# Patient Record
Sex: FEMALE | Race: BLACK OR AFRICAN AMERICAN | NOT HISPANIC OR LATINO | Employment: UNEMPLOYED | RURAL
[De-identification: names, ages, dates, MRNs, and addresses within clinical notes are randomized per-mention and may not be internally consistent; named-entity substitution may affect disease eponyms.]

---

## 2017-04-17 ENCOUNTER — HISTORICAL (OUTPATIENT)
Dept: ADMINISTRATIVE | Facility: HOSPITAL | Age: 42
End: 2017-04-17

## 2017-04-19 LAB
LAB AP CLINICAL INFORMATION: NORMAL
LAB AP COMMENTS: NORMAL
LAB AP GENERAL CAT - HISTORICAL: NORMAL
LAB AP INTERPRETATION/RESULT - HISTORICAL: NEGATIVE
LAB AP SPECIMEN ADEQUACY - HISTORICAL: NORMAL
LAB AP SPECIMEN SUBMITTED - HISTORICAL: NORMAL

## 2019-08-12 ENCOUNTER — HISTORICAL (OUTPATIENT)
Dept: ADMINISTRATIVE | Facility: HOSPITAL | Age: 44
End: 2019-08-12

## 2020-06-01 ENCOUNTER — HISTORICAL (OUTPATIENT)
Dept: ADMINISTRATIVE | Facility: HOSPITAL | Age: 45
End: 2020-06-01

## 2020-06-16 ENCOUNTER — HISTORICAL (OUTPATIENT)
Dept: ADMINISTRATIVE | Facility: HOSPITAL | Age: 45
End: 2020-06-16

## 2020-09-23 ENCOUNTER — HISTORICAL (OUTPATIENT)
Dept: ADMINISTRATIVE | Facility: HOSPITAL | Age: 45
End: 2020-09-23

## 2020-09-25 LAB
LAB AP CLINICAL INFORMATION: NORMAL
LAB AP GENERAL CAT - HISTORICAL: NORMAL
LAB AP INTERPRETATION/RESULT - HISTORICAL: NEGATIVE
LAB AP SPECIMEN ADEQUACY - HISTORICAL: NORMAL
LAB AP SPECIMEN SUBMITTED - HISTORICAL: NORMAL

## 2020-09-28 ENCOUNTER — HISTORICAL (OUTPATIENT)
Dept: ADMINISTRATIVE | Facility: HOSPITAL | Age: 45
End: 2020-09-28

## 2020-10-09 ENCOUNTER — HISTORICAL (OUTPATIENT)
Dept: ADMINISTRATIVE | Facility: HOSPITAL | Age: 45
End: 2020-10-09

## 2020-12-22 LAB
LEFT EYE DM RETINOPATHY: NEGATIVE
RIGHT EYE DM RETINOPATHY: NEGATIVE

## 2021-04-09 ENCOUNTER — OFFICE VISIT (OUTPATIENT)
Dept: FAMILY MEDICINE | Facility: CLINIC | Age: 46
End: 2021-04-09
Payer: OTHER GOVERNMENT

## 2021-04-09 VITALS
HEIGHT: 66 IN | WEIGHT: 227 LBS | TEMPERATURE: 99 F | DIASTOLIC BLOOD PRESSURE: 82 MMHG | SYSTOLIC BLOOD PRESSURE: 130 MMHG | HEART RATE: 83 BPM | BODY MASS INDEX: 36.48 KG/M2 | OXYGEN SATURATION: 97 %

## 2021-04-09 DIAGNOSIS — J01.91 ACUTE RECURRENT SINUSITIS, UNSPECIFIED LOCATION: Primary | ICD-10-CM

## 2021-04-09 DIAGNOSIS — H66.92 LEFT OTITIS MEDIA, UNSPECIFIED OTITIS MEDIA TYPE: ICD-10-CM

## 2021-04-09 PROCEDURE — 99213 PR OFFICE/OUTPT VISIT, EST, LEVL III, 20-29 MIN: ICD-10-PCS | Mod: ,,, | Performed by: NURSE PRACTITIONER

## 2021-04-09 PROCEDURE — 99213 OFFICE O/P EST LOW 20 MIN: CPT | Mod: ,,, | Performed by: NURSE PRACTITIONER

## 2021-04-09 RX ORDER — ATORVASTATIN CALCIUM 20 MG/1
20 TABLET, FILM COATED ORAL DAILY
COMMUNITY
End: 2022-02-11 | Stop reason: SDUPTHER

## 2021-04-09 RX ORDER — LEVOTHYROXINE SODIUM 150 UG/1
TABLET ORAL
COMMUNITY
Start: 2021-03-12 | End: 2021-05-14 | Stop reason: DRUGHIGH

## 2021-04-09 RX ORDER — NIFEDIPINE 60 MG/1
60 TABLET, EXTENDED RELEASE ORAL DAILY
COMMUNITY
Start: 2021-03-12 | End: 2021-08-16 | Stop reason: SDUPTHER

## 2021-04-09 RX ORDER — METOPROLOL SUCCINATE 100 MG/1
100 TABLET, EXTENDED RELEASE ORAL DAILY
COMMUNITY
Start: 2021-03-16 | End: 2021-12-06

## 2021-04-09 RX ORDER — HYDRALAZINE HYDROCHLORIDE 100 MG/1
100 TABLET, FILM COATED ORAL 3 TIMES DAILY
COMMUNITY
Start: 2021-03-26 | End: 2021-11-15 | Stop reason: SDUPTHER

## 2021-04-09 RX ORDER — NEOMYCIN SULFATE, POLYMYXIN B SULFATE AND HYDROCORTISONE 10; 3.5; 1 MG/ML; MG/ML; [USP'U]/ML
3 SUSPENSION/ DROPS AURICULAR (OTIC) 3 TIMES DAILY
Qty: 10 ML | Refills: 0 | Status: SHIPPED | OUTPATIENT
Start: 2021-04-09 | End: 2021-08-16

## 2021-04-09 RX ORDER — LORATADINE 10 MG/1
10 TABLET ORAL DAILY
Qty: 90 TABLET | Refills: 1 | Status: SHIPPED | OUTPATIENT
Start: 2021-04-09 | End: 2021-08-16

## 2021-04-09 RX ORDER — TRAMADOL HYDROCHLORIDE 50 MG/1
TABLET ORAL
COMMUNITY
Start: 2021-01-04 | End: 2021-08-11 | Stop reason: CLARIF

## 2021-04-09 RX ORDER — SULFAMETHOXAZOLE AND TRIMETHOPRIM 800; 160 MG/1; MG/1
TABLET ORAL
COMMUNITY
Start: 2021-01-04 | End: 2021-04-09

## 2021-04-09 RX ORDER — CEPHALEXIN 500 MG/1
CAPSULE ORAL
COMMUNITY
Start: 2021-03-09 | End: 2021-04-09

## 2021-04-09 RX ORDER — SERTRALINE HYDROCHLORIDE 100 MG/1
100 TABLET, FILM COATED ORAL DAILY
COMMUNITY
Start: 2021-03-03 | End: 2021-10-04 | Stop reason: SDUPTHER

## 2021-04-09 RX ORDER — LEVOFLOXACIN 500 MG/1
TABLET, FILM COATED ORAL
COMMUNITY
Start: 2021-02-06 | End: 2021-04-09

## 2021-04-09 RX ORDER — VENLAFAXINE HYDROCHLORIDE 150 MG/1
CAPSULE, EXTENDED RELEASE ORAL
COMMUNITY
Start: 2021-03-30 | End: 2021-06-24 | Stop reason: SDUPTHER

## 2021-04-09 RX ORDER — CLONIDINE HYDROCHLORIDE 0.2 MG/1
TABLET ORAL
COMMUNITY
Start: 2021-02-18 | End: 2021-06-24 | Stop reason: SDUPTHER

## 2021-04-09 RX ORDER — PREDNISONE 20 MG/1
40 TABLET ORAL DAILY
Qty: 10 TABLET | Refills: 0 | Status: SHIPPED | OUTPATIENT
Start: 2021-04-09 | End: 2021-04-14

## 2021-04-09 RX ORDER — FLUTICASONE PROPIONATE 50 MCG
2 SPRAY, SUSPENSION (ML) NASAL DAILY
Qty: 18.2 ML | Refills: 0 | Status: SHIPPED | OUTPATIENT
Start: 2021-04-09 | End: 2022-02-03 | Stop reason: SDUPTHER

## 2021-04-09 RX ORDER — ROSUVASTATIN CALCIUM 20 MG/1
TABLET, COATED ORAL
COMMUNITY
Start: 2021-03-30 | End: 2021-08-16

## 2021-04-09 RX ORDER — ACETAMINOPHEN AND CODEINE PHOSPHATE 300; 30 MG/1; MG/1
TABLET ORAL
COMMUNITY
Start: 2021-03-09 | End: 2021-08-16

## 2021-04-21 ENCOUNTER — OFFICE VISIT (OUTPATIENT)
Dept: FAMILY MEDICINE | Facility: CLINIC | Age: 46
End: 2021-04-21
Payer: OTHER GOVERNMENT

## 2021-04-21 ENCOUNTER — TELEPHONE (OUTPATIENT)
Dept: FAMILY MEDICINE | Facility: CLINIC | Age: 46
End: 2021-04-21

## 2021-04-21 VITALS
HEART RATE: 88 BPM | BODY MASS INDEX: 36.96 KG/M2 | RESPIRATION RATE: 16 BRPM | HEIGHT: 66 IN | WEIGHT: 230 LBS | DIASTOLIC BLOOD PRESSURE: 80 MMHG | SYSTOLIC BLOOD PRESSURE: 130 MMHG | TEMPERATURE: 97 F

## 2021-04-21 DIAGNOSIS — H66.90 OTITIS MEDIA, UNSPECIFIED LATERALITY, UNSPECIFIED OTITIS MEDIA TYPE: ICD-10-CM

## 2021-04-21 DIAGNOSIS — H60.90 OTITIS EXTERNA, UNSPECIFIED CHRONICITY, UNSPECIFIED LATERALITY, UNSPECIFIED TYPE: Primary | ICD-10-CM

## 2021-04-21 PROCEDURE — 99213 OFFICE O/P EST LOW 20 MIN: CPT | Mod: ,,, | Performed by: FAMILY MEDICINE

## 2021-04-21 PROCEDURE — 99213 PR OFFICE/OUTPT VISIT, EST, LEVL III, 20-29 MIN: ICD-10-PCS | Mod: ,,, | Performed by: FAMILY MEDICINE

## 2021-04-21 RX ORDER — AZITHROMYCIN 250 MG/1
TABLET, FILM COATED ORAL
Qty: 6 TABLET | Refills: 0 | Status: SHIPPED | OUTPATIENT
Start: 2021-04-21 | End: 2021-04-26

## 2021-05-07 ENCOUNTER — OFFICE VISIT (OUTPATIENT)
Dept: FAMILY MEDICINE | Facility: CLINIC | Age: 46
End: 2021-05-07
Payer: OTHER GOVERNMENT

## 2021-05-07 VITALS
TEMPERATURE: 98 F | HEART RATE: 78 BPM | DIASTOLIC BLOOD PRESSURE: 80 MMHG | WEIGHT: 226 LBS | BODY MASS INDEX: 36.32 KG/M2 | HEIGHT: 66 IN | SYSTOLIC BLOOD PRESSURE: 130 MMHG | OXYGEN SATURATION: 97 %

## 2021-05-07 DIAGNOSIS — M54.50 ACUTE LEFT-SIDED LOW BACK PAIN WITHOUT SCIATICA: Primary | ICD-10-CM

## 2021-05-07 DIAGNOSIS — R35.0 URINARY FREQUENCY: ICD-10-CM

## 2021-05-07 DIAGNOSIS — M62.838 OTHER MUSCLE SPASM: ICD-10-CM

## 2021-05-07 LAB
BILIRUB UR QL STRIP: NEGATIVE
CLARITY UR: CLEAR
COLOR UR: YELLOW
GLUCOSE UR STRIP-MCNC: >=1000 MG/DL
KETONES UR STRIP-SCNC: NEGATIVE MG/DL
LEUKOCYTE ESTERASE UR QL STRIP: NEGATIVE
NITRITE UR QL STRIP: NEGATIVE
PH UR STRIP: 6.5 PH UNITS
PROT UR QL STRIP: NEGATIVE
RBC # UR STRIP: NEGATIVE /UL
SP GR UR STRIP: 1.02
UROBILINOGEN UR STRIP-ACNC: 0.2 MG/DL

## 2021-05-07 PROCEDURE — 99213 OFFICE O/P EST LOW 20 MIN: CPT | Mod: 25,,, | Performed by: FAMILY MEDICINE

## 2021-05-07 PROCEDURE — 96372 THER/PROPH/DIAG INJ SC/IM: CPT | Mod: ,,, | Performed by: FAMILY MEDICINE

## 2021-05-07 PROCEDURE — 99213 PR OFFICE/OUTPT VISIT, EST, LEVL III, 20-29 MIN: ICD-10-PCS | Mod: 25,,, | Performed by: FAMILY MEDICINE

## 2021-05-07 PROCEDURE — 96372 PR INJECTION,THERAP/PROPH/DIAG2ST, IM OR SUBCUT: ICD-10-PCS | Mod: ,,, | Performed by: FAMILY MEDICINE

## 2021-05-07 PROCEDURE — 81003 URINALYSIS AUTO W/O SCOPE: CPT | Mod: QW,,, | Performed by: CLINICAL MEDICAL LABORATORY

## 2021-05-07 PROCEDURE — 81003 URINALYSIS: ICD-10-PCS | Mod: QW,,, | Performed by: CLINICAL MEDICAL LABORATORY

## 2021-05-07 RX ORDER — KETOROLAC TROMETHAMINE 30 MG/ML
30 INJECTION, SOLUTION INTRAMUSCULAR; INTRAVENOUS
Status: COMPLETED | OUTPATIENT
Start: 2021-05-07 | End: 2021-05-07

## 2021-05-07 RX ORDER — NAPROXEN 500 MG/1
500 TABLET ORAL 2 TIMES DAILY
Qty: 20 TABLET | Refills: 0 | Status: SHIPPED | OUTPATIENT
Start: 2021-05-07 | End: 2021-08-16

## 2021-05-07 RX ORDER — BACLOFEN 10 MG/1
TABLET ORAL
Qty: 30 TABLET | Refills: 0 | Status: SHIPPED | OUTPATIENT
Start: 2021-05-07 | End: 2021-08-16

## 2021-05-07 RX ADMIN — KETOROLAC TROMETHAMINE 30 MG: 30 INJECTION, SOLUTION INTRAMUSCULAR; INTRAVENOUS at 09:05

## 2021-05-10 ENCOUNTER — TELEPHONE (OUTPATIENT)
Dept: FAMILY MEDICINE | Facility: CLINIC | Age: 46
End: 2021-05-10

## 2021-05-14 ENCOUNTER — OFFICE VISIT (OUTPATIENT)
Dept: FAMILY MEDICINE | Facility: CLINIC | Age: 46
End: 2021-05-14
Payer: OTHER GOVERNMENT

## 2021-05-14 ENCOUNTER — TELEPHONE (OUTPATIENT)
Dept: FAMILY MEDICINE | Facility: CLINIC | Age: 46
End: 2021-05-14

## 2021-05-14 VITALS
SYSTOLIC BLOOD PRESSURE: 130 MMHG | BODY MASS INDEX: 36.64 KG/M2 | TEMPERATURE: 98 F | DIASTOLIC BLOOD PRESSURE: 80 MMHG | WEIGHT: 228 LBS | HEIGHT: 66 IN | OXYGEN SATURATION: 95 % | HEART RATE: 79 BPM

## 2021-05-14 DIAGNOSIS — E03.8 OTHER SPECIFIED HYPOTHYROIDISM: ICD-10-CM

## 2021-05-14 DIAGNOSIS — I10 ESSENTIAL HYPERTENSION: Primary | ICD-10-CM

## 2021-05-14 DIAGNOSIS — E11.65 CONTROLLED TYPE 2 DIABETES MELLITUS WITH HYPERGLYCEMIA, WITHOUT LONG-TERM CURRENT USE OF INSULIN: ICD-10-CM

## 2021-05-14 LAB
ANION GAP SERPL CALCULATED.3IONS-SCNC: 10 MMOL/L (ref 7–16)
BASOPHILS # BLD AUTO: 0.03 K/UL (ref 0–0.2)
BASOPHILS NFR BLD AUTO: 0.4 % (ref 0–1)
BUN SERPL-MCNC: 8 MG/DL (ref 7–18)
BUN/CREAT SERPL: 10 (ref 6–20)
CALCIUM SERPL-MCNC: 9.3 MG/DL (ref 8.5–10.1)
CHLORIDE SERPL-SCNC: 104 MMOL/L (ref 98–107)
CO2 SERPL-SCNC: 32 MMOL/L (ref 21–32)
CREAT SERPL-MCNC: 0.82 MG/DL (ref 0.55–1.02)
DIFFERENTIAL METHOD BLD: ABNORMAL
EOSINOPHIL # BLD AUTO: 0.12 K/UL (ref 0–0.5)
EOSINOPHIL NFR BLD AUTO: 1.7 % (ref 1–4)
ERYTHROCYTE [DISTWIDTH] IN BLOOD BY AUTOMATED COUNT: 12 % (ref 11.5–14.5)
EST. AVERAGE GLUCOSE BLD GHB EST-MCNC: 270 MG/DL
GLUCOSE SERPL-MCNC: 238 MG/DL (ref 74–106)
HBA1C MFR BLD HPLC: 10.7 % (ref 4.5–6.6)
HCT VFR BLD AUTO: 40.7 % (ref 38–47)
HGB BLD-MCNC: 12.6 G/DL (ref 12–16)
IMM GRANULOCYTES # BLD AUTO: 0.02 K/UL (ref 0–0.04)
IMM GRANULOCYTES NFR BLD: 0.3 % (ref 0–0.4)
LYMPHOCYTES # BLD AUTO: 2.83 K/UL (ref 1–4.8)
LYMPHOCYTES NFR BLD AUTO: 39.8 % (ref 27–41)
MCH RBC QN AUTO: 27 PG (ref 27–31)
MCHC RBC AUTO-ENTMCNC: 31 G/DL (ref 32–36)
MCV RBC AUTO: 87.3 FL (ref 80–96)
MONOCYTES # BLD AUTO: 0.4 K/UL (ref 0–0.8)
MONOCYTES NFR BLD AUTO: 5.6 % (ref 2–6)
MPC BLD CALC-MCNC: 10.5 FL (ref 9.4–12.4)
NEUTROPHILS # BLD AUTO: 3.71 K/UL (ref 1.8–7.7)
NEUTROPHILS NFR BLD AUTO: 52.2 % (ref 53–65)
NRBC # BLD AUTO: 0 X10E3/UL
NRBC, AUTO (.00): 0 %
PLATELET # BLD AUTO: 425 K/UL (ref 150–400)
POTASSIUM SERPL-SCNC: 3.6 MMOL/L (ref 3.5–5.1)
RBC # BLD AUTO: 4.66 M/UL (ref 4.2–5.4)
SODIUM SERPL-SCNC: 142 MMOL/L (ref 136–145)
TSH SERPL DL<=0.005 MIU/L-ACNC: 10.2 UIU/ML (ref 0.36–3.74)
WBC # BLD AUTO: 7.11 K/UL (ref 4.5–11)

## 2021-05-14 PROCEDURE — 83036 HEMOGLOBIN GLYCOSYLATED A1C: CPT | Mod: ,,, | Performed by: CLINICAL MEDICAL LABORATORY

## 2021-05-14 PROCEDURE — 84443 TSH: ICD-10-PCS | Mod: ,,, | Performed by: CLINICAL MEDICAL LABORATORY

## 2021-05-14 PROCEDURE — 85025 CBC WITH DIFFERENTIAL: ICD-10-PCS | Mod: ,,, | Performed by: CLINICAL MEDICAL LABORATORY

## 2021-05-14 PROCEDURE — 80048 BASIC METABOLIC PANEL: ICD-10-PCS | Mod: ,,, | Performed by: CLINICAL MEDICAL LABORATORY

## 2021-05-14 PROCEDURE — 85025 COMPLETE CBC W/AUTO DIFF WBC: CPT | Mod: ,,, | Performed by: CLINICAL MEDICAL LABORATORY

## 2021-05-14 PROCEDURE — 99214 OFFICE O/P EST MOD 30 MIN: CPT | Mod: ,,, | Performed by: FAMILY MEDICINE

## 2021-05-14 PROCEDURE — 83036 HEMOGLOBIN A1C: ICD-10-PCS | Mod: ,,, | Performed by: CLINICAL MEDICAL LABORATORY

## 2021-05-14 PROCEDURE — 80048 BASIC METABOLIC PNL TOTAL CA: CPT | Mod: ,,, | Performed by: CLINICAL MEDICAL LABORATORY

## 2021-05-14 PROCEDURE — 99214 PR OFFICE/OUTPT VISIT, EST, LEVL IV, 30-39 MIN: ICD-10-PCS | Mod: ,,, | Performed by: FAMILY MEDICINE

## 2021-05-14 PROCEDURE — 84443 ASSAY THYROID STIM HORMONE: CPT | Mod: ,,, | Performed by: CLINICAL MEDICAL LABORATORY

## 2021-05-14 RX ORDER — LEVOTHYROXINE SODIUM 175 UG/1
175 TABLET ORAL
Qty: 30 TABLET | Refills: 2 | Status: SHIPPED | OUTPATIENT
Start: 2021-05-14 | End: 2021-08-28 | Stop reason: SDUPTHER

## 2021-05-17 ENCOUNTER — TELEPHONE (OUTPATIENT)
Dept: FAMILY MEDICINE | Facility: CLINIC | Age: 46
End: 2021-05-17

## 2021-06-07 ENCOUNTER — OFFICE VISIT (OUTPATIENT)
Dept: FAMILY MEDICINE | Facility: CLINIC | Age: 46
End: 2021-06-07
Payer: OTHER GOVERNMENT

## 2021-06-07 VITALS
SYSTOLIC BLOOD PRESSURE: 118 MMHG | WEIGHT: 224 LBS | BODY MASS INDEX: 36 KG/M2 | HEIGHT: 66 IN | HEART RATE: 75 BPM | TEMPERATURE: 98 F | DIASTOLIC BLOOD PRESSURE: 80 MMHG | OXYGEN SATURATION: 97 %

## 2021-06-07 DIAGNOSIS — M62.838 OTHER MUSCLE SPASM: ICD-10-CM

## 2021-06-07 DIAGNOSIS — M54.50 ACUTE LEFT-SIDED LOW BACK PAIN WITHOUT SCIATICA: Primary | ICD-10-CM

## 2021-06-07 PROCEDURE — 99213 PR OFFICE/OUTPT VISIT, EST, LEVL III, 20-29 MIN: ICD-10-PCS | Mod: 25,,, | Performed by: FAMILY MEDICINE

## 2021-06-07 PROCEDURE — 99213 OFFICE O/P EST LOW 20 MIN: CPT | Mod: 25,,, | Performed by: FAMILY MEDICINE

## 2021-06-07 PROCEDURE — 96372 PR INJECTION,THERAP/PROPH/DIAG2ST, IM OR SUBCUT: ICD-10-PCS | Mod: ,,, | Performed by: FAMILY MEDICINE

## 2021-06-07 PROCEDURE — 96372 THER/PROPH/DIAG INJ SC/IM: CPT | Mod: ,,, | Performed by: FAMILY MEDICINE

## 2021-06-07 RX ORDER — KETOROLAC TROMETHAMINE 30 MG/ML
30 INJECTION, SOLUTION INTRAMUSCULAR; INTRAVENOUS
Status: COMPLETED | OUTPATIENT
Start: 2021-06-07 | End: 2021-06-07

## 2021-06-07 RX ORDER — NAPROXEN 500 MG/1
500 TABLET ORAL 2 TIMES DAILY
Qty: 20 TABLET | Refills: 0 | Status: SHIPPED | OUTPATIENT
Start: 2021-06-07 | End: 2021-08-11 | Stop reason: CLARIF

## 2021-06-07 RX ORDER — MELOXICAM 7.5 MG/1
TABLET ORAL
COMMUNITY
Start: 2020-12-10 | End: 2022-02-11

## 2021-06-07 RX ORDER — BACLOFEN 10 MG/1
TABLET ORAL
Qty: 30 TABLET | Refills: 0 | Status: SHIPPED | OUTPATIENT
Start: 2021-06-07 | End: 2021-08-11 | Stop reason: CLARIF

## 2021-06-07 RX ADMIN — KETOROLAC TROMETHAMINE 30 MG: 30 INJECTION, SOLUTION INTRAMUSCULAR; INTRAVENOUS at 04:06

## 2021-06-24 ENCOUNTER — OFFICE VISIT (OUTPATIENT)
Dept: FAMILY MEDICINE | Facility: CLINIC | Age: 46
End: 2021-06-24
Payer: OTHER GOVERNMENT

## 2021-06-24 VITALS
OXYGEN SATURATION: 96 % | BODY MASS INDEX: 36 KG/M2 | HEIGHT: 66 IN | WEIGHT: 224 LBS | TEMPERATURE: 98 F | SYSTOLIC BLOOD PRESSURE: 132 MMHG | HEART RATE: 72 BPM | DIASTOLIC BLOOD PRESSURE: 72 MMHG

## 2021-06-24 DIAGNOSIS — M47.816 LUMBAR SPONDYLOSIS: Primary | ICD-10-CM

## 2021-06-24 PROCEDURE — 99213 PR OFFICE/OUTPT VISIT, EST, LEVL III, 20-29 MIN: ICD-10-PCS | Mod: 25,,, | Performed by: FAMILY MEDICINE

## 2021-06-24 PROCEDURE — 96372 THER/PROPH/DIAG INJ SC/IM: CPT | Mod: ,,, | Performed by: FAMILY MEDICINE

## 2021-06-24 PROCEDURE — 96372 PR INJECTION,THERAP/PROPH/DIAG2ST, IM OR SUBCUT: ICD-10-PCS | Mod: ,,, | Performed by: FAMILY MEDICINE

## 2021-06-24 PROCEDURE — 99213 OFFICE O/P EST LOW 20 MIN: CPT | Mod: 25,,, | Performed by: FAMILY MEDICINE

## 2021-06-24 RX ORDER — NAPROXEN 500 MG/1
500 TABLET ORAL 2 TIMES DAILY
Qty: 20 TABLET | Refills: 0 | Status: SHIPPED | OUTPATIENT
Start: 2021-06-24 | End: 2021-08-11 | Stop reason: CLARIF

## 2021-06-24 RX ORDER — KETOROLAC TROMETHAMINE 30 MG/ML
30 INJECTION, SOLUTION INTRAMUSCULAR; INTRAVENOUS
Status: COMPLETED | OUTPATIENT
Start: 2021-06-24 | End: 2021-06-24

## 2021-06-24 RX ORDER — CLONIDINE HYDROCHLORIDE 0.2 MG/1
0.2 TABLET ORAL DAILY
Qty: 30 TABLET | Refills: 2 | Status: SHIPPED | OUTPATIENT
Start: 2021-06-24 | End: 2021-07-01

## 2021-06-24 RX ORDER — VENLAFAXINE HYDROCHLORIDE 150 MG/1
CAPSULE, EXTENDED RELEASE ORAL
Qty: 30 CAPSULE | Refills: 2 | Status: SHIPPED | OUTPATIENT
Start: 2021-06-24 | End: 2021-08-28 | Stop reason: SDUPTHER

## 2021-06-24 RX ORDER — TRAMADOL HYDROCHLORIDE 50 MG/1
50 TABLET ORAL EVERY 6 HOURS PRN
Qty: 20 TABLET | Refills: 0 | Status: SHIPPED | OUTPATIENT
Start: 2021-06-24 | End: 2021-08-16 | Stop reason: SDUPTHER

## 2021-06-24 RX ADMIN — KETOROLAC TROMETHAMINE 30 MG: 30 INJECTION, SOLUTION INTRAMUSCULAR; INTRAVENOUS at 11:06

## 2021-07-01 ENCOUNTER — TELEPHONE (OUTPATIENT)
Dept: FAMILY MEDICINE | Facility: CLINIC | Age: 46
End: 2021-07-01

## 2021-07-01 RX ORDER — CLONIDINE HYDROCHLORIDE 0.2 MG/1
0.2 TABLET ORAL 4 TIMES DAILY
Qty: 120 TABLET | Refills: 2 | Status: SHIPPED | OUTPATIENT
Start: 2021-07-01 | End: 2021-09-16

## 2021-07-12 ENCOUNTER — OFFICE VISIT (OUTPATIENT)
Dept: SPINE | Facility: CLINIC | Age: 46
End: 2021-07-12
Payer: OTHER GOVERNMENT

## 2021-07-12 DIAGNOSIS — M47.816 LUMBAR SPONDYLOSIS: ICD-10-CM

## 2021-07-12 DIAGNOSIS — M51.36 DDD (DEGENERATIVE DISC DISEASE), LUMBAR: ICD-10-CM

## 2021-07-12 DIAGNOSIS — M54.9 DORSALGIA, UNSPECIFIED: ICD-10-CM

## 2021-07-12 DIAGNOSIS — M54.16 LUMBAR RADICULOPATHY: Primary | ICD-10-CM

## 2021-07-12 DIAGNOSIS — E66.9 CLASS 2 OBESITY WITH BODY MASS INDEX (BMI) OF 36.0 TO 36.9 IN ADULT, UNSPECIFIED OBESITY TYPE, UNSPECIFIED WHETHER SERIOUS COMORBIDITY PRESENT: ICD-10-CM

## 2021-07-12 PROCEDURE — 99214 PR OFFICE/OUTPT VISIT, EST, LEVL IV, 30-39 MIN: ICD-10-PCS | Mod: S$PBB,,, | Performed by: ORTHOPAEDIC SURGERY

## 2021-07-12 PROCEDURE — 99214 OFFICE O/P EST MOD 30 MIN: CPT | Mod: PBBFAC | Performed by: ORTHOPAEDIC SURGERY

## 2021-07-12 PROCEDURE — 99214 OFFICE O/P EST MOD 30 MIN: CPT | Mod: S$PBB,,, | Performed by: ORTHOPAEDIC SURGERY

## 2021-07-20 DIAGNOSIS — M54.9 DORSALGIA, UNSPECIFIED: ICD-10-CM

## 2021-07-26 ENCOUNTER — OFFICE VISIT (OUTPATIENT)
Dept: SPINE | Facility: CLINIC | Age: 46
End: 2021-07-26
Payer: OTHER GOVERNMENT

## 2021-07-26 ENCOUNTER — HOSPITAL ENCOUNTER (OUTPATIENT)
Dept: RADIOLOGY | Facility: HOSPITAL | Age: 46
Discharge: HOME | End: 2021-07-26
Attending: ORTHOPAEDIC SURGERY
Payer: OTHER GOVERNMENT

## 2021-07-26 DIAGNOSIS — M54.9 DORSALGIA, UNSPECIFIED: ICD-10-CM

## 2021-07-26 DIAGNOSIS — E66.9 CLASS 2 OBESITY WITH BODY MASS INDEX (BMI) OF 36.0 TO 36.9 IN ADULT, UNSPECIFIED OBESITY TYPE, UNSPECIFIED WHETHER SERIOUS COMORBIDITY PRESENT: ICD-10-CM

## 2021-07-26 DIAGNOSIS — M51.36 DDD (DEGENERATIVE DISC DISEASE), LUMBAR: Primary | ICD-10-CM

## 2021-07-26 DIAGNOSIS — M54.16 LUMBAR RADICULOPATHY: ICD-10-CM

## 2021-07-26 PROCEDURE — 99214 PR OFFICE/OUTPT VISIT, EST, LEVL IV, 30-39 MIN: ICD-10-PCS | Mod: S$PBB,,, | Performed by: ORTHOPAEDIC SURGERY

## 2021-07-26 PROCEDURE — 72110 X-RAY EXAM L-2 SPINE 4/>VWS: CPT | Mod: 26,,, | Performed by: ORTHOPAEDIC SURGERY

## 2021-07-26 PROCEDURE — 72110 XR LUMBAR SPINE 5 VIEW WITH FLEX AND EXT: ICD-10-PCS | Mod: 26,,, | Performed by: ORTHOPAEDIC SURGERY

## 2021-07-26 PROCEDURE — 99213 OFFICE O/P EST LOW 20 MIN: CPT | Mod: PBBFAC | Performed by: ORTHOPAEDIC SURGERY

## 2021-07-26 PROCEDURE — 72110 X-RAY EXAM L-2 SPINE 4/>VWS: CPT | Mod: TC

## 2021-07-26 PROCEDURE — 99214 OFFICE O/P EST MOD 30 MIN: CPT | Mod: S$PBB,,, | Performed by: ORTHOPAEDIC SURGERY

## 2021-08-05 DIAGNOSIS — M47.816 LUMBAR SPONDYLOSIS: Primary | ICD-10-CM

## 2021-08-05 RX ORDER — SODIUM CHLORIDE 9 MG/ML
INJECTION, SOLUTION INTRAVENOUS CONTINUOUS
Status: CANCELLED | OUTPATIENT
Start: 2021-08-05

## 2021-08-11 RX ORDER — AZITHROMYCIN 250 MG/1
TABLET, FILM COATED ORAL
COMMUNITY
Start: 2020-11-09 | End: 2021-08-11 | Stop reason: CLARIF

## 2021-08-11 RX ORDER — MELOXICAM 7.5 MG/1
TABLET ORAL
COMMUNITY
Start: 2020-11-24 | End: 2021-08-11 | Stop reason: CLARIF

## 2021-08-11 RX ORDER — TRIAMCINOLONE ACETONIDE 1 MG/G
OINTMENT TOPICAL
COMMUNITY
Start: 2020-08-04 | End: 2021-08-16

## 2021-08-11 RX ORDER — CLINDAMYCIN HYDROCHLORIDE 300 MG/1
CAPSULE ORAL
COMMUNITY
Start: 2020-08-18 | End: 2021-08-16

## 2021-08-11 RX ORDER — NIFEDIPINE 60 MG/1
TABLET, EXTENDED RELEASE ORAL
COMMUNITY
Start: 2020-08-18 | End: 2021-08-11 | Stop reason: CLARIF

## 2021-08-11 RX ORDER — ROSUVASTATIN CALCIUM 20 MG/1
TABLET, COATED ORAL
COMMUNITY
Start: 2021-02-05 | End: 2021-08-11 | Stop reason: CLARIF

## 2021-08-11 RX ORDER — NEOMYCIN SULFATE, POLYMYXIN B SULFATE AND HYDROCORTISONE 10; 3.5; 1 MG/ML; MG/ML; [USP'U]/ML
SUSPENSION/ DROPS AURICULAR (OTIC)
COMMUNITY
Start: 2020-10-21 | End: 2021-08-16

## 2021-08-11 RX ORDER — CIPROFLOXACIN 250 MG/1
TABLET, FILM COATED ORAL
COMMUNITY
Start: 2020-09-23 | End: 2021-08-16

## 2021-08-11 RX ORDER — HYDRALAZINE HYDROCHLORIDE 100 MG/1
TABLET, FILM COATED ORAL
COMMUNITY
Start: 2020-10-23 | End: 2021-08-11 | Stop reason: CLARIF

## 2021-08-11 RX ORDER — HYDROXYZINE HYDROCHLORIDE 25 MG/1
TABLET, FILM COATED ORAL
COMMUNITY
Start: 2020-08-04 | End: 2021-08-16

## 2021-08-11 RX ORDER — CEPHALEXIN 500 MG/1
CAPSULE ORAL
COMMUNITY
Start: 2021-03-03 | End: 2021-08-16

## 2021-08-11 RX ORDER — LEVOFLOXACIN 500 MG/1
TABLET, FILM COATED ORAL
COMMUNITY
Start: 2021-02-05 | End: 2021-08-16

## 2021-08-11 RX ORDER — ACETAMINOPHEN AND CODEINE PHOSPHATE 300; 30 MG/1; MG/1
TABLET ORAL
COMMUNITY
Start: 2021-03-03 | End: 2021-08-11 | Stop reason: CLARIF

## 2021-08-11 RX ORDER — AZITHROMYCIN 250 MG/1
TABLET, FILM COATED ORAL
COMMUNITY
Start: 2021-04-21 | End: 2021-08-16

## 2021-08-11 RX ORDER — FLUCONAZOLE 200 MG/1
TABLET ORAL
COMMUNITY
Start: 2020-09-23 | End: 2021-08-16

## 2021-08-11 RX ORDER — FLUTICASONE PROPIONATE 50 MCG
SPRAY, SUSPENSION (ML) NASAL
COMMUNITY
Start: 2020-11-09 | End: 2021-08-11 | Stop reason: CLARIF

## 2021-08-11 RX ORDER — METOPROLOL SUCCINATE 100 MG/1
TABLET, EXTENDED RELEASE ORAL
COMMUNITY
Start: 2021-01-13 | End: 2021-08-16

## 2021-08-11 RX ORDER — NITROFURANTOIN 25; 75 MG/1; MG/1
CAPSULE ORAL
COMMUNITY
Start: 2020-09-29 | End: 2021-08-16

## 2021-08-11 RX ORDER — VENLAFAXINE HYDROCHLORIDE 150 MG/1
CAPSULE, EXTENDED RELEASE ORAL
COMMUNITY
Start: 2021-02-22 | End: 2021-08-11 | Stop reason: CLARIF

## 2021-08-11 RX ORDER — CIPROFLOXACIN 500 MG/1
TABLET ORAL
COMMUNITY
Start: 2020-09-14 | End: 2021-08-16

## 2021-08-11 RX ORDER — PREDNISONE 20 MG/1
TABLET ORAL
COMMUNITY
Start: 2021-04-09 | End: 2021-08-16

## 2021-08-11 RX ORDER — SULFAMETHOXAZOLE AND TRIMETHOPRIM 800; 160 MG/1; MG/1
TABLET ORAL
COMMUNITY
Start: 2021-01-02 | End: 2021-08-16

## 2021-08-11 RX ORDER — INFLUENZA A VIRUS A/GUANGDONG-MAONAN/SWL1536/2019 CNIC-1909 (H1N1) ANTIGEN (FORMALDEHYDE INACTIVATED), INFLUENZA A VIRUS A/HONG KONG/2671/2019 (H3N2) ANTIGEN (FORMALDEHYDE INACTIVATED), INFLUENZA B VIRUS B/PHUKET/3073/2013 ANTIGEN (FORMALDEHYDE INACTIVATED), AND INFLUENZA B VIRUS B/WASHINGTON/02/2019 ANTIGEN (FORMALDEHYDE INACTIVATED) 15; 15; 15; 15 UG/.5ML; UG/.5ML; UG/.5ML; UG/.5ML
INJECTION, SUSPENSION INTRAMUSCULAR
COMMUNITY
Start: 2020-09-29 | End: 2021-08-16

## 2021-08-11 RX ORDER — PERMETHRIN 50 MG/G
CREAM TOPICAL
COMMUNITY
Start: 2020-08-04 | End: 2021-08-16

## 2021-08-11 RX ORDER — TRAMADOL HYDROCHLORIDE 50 MG/1
TABLET ORAL
COMMUNITY
Start: 2021-01-02 | End: 2021-08-11 | Stop reason: CLARIF

## 2021-08-11 RX ORDER — SERTRALINE HYDROCHLORIDE 100 MG/1
TABLET, FILM COATED ORAL
COMMUNITY
Start: 2020-10-23 | End: 2021-08-11 | Stop reason: CLARIF

## 2021-08-11 RX ORDER — GLIPIZIDE AND METFORMIN HCL 2.5; 5 MG/1; MG/1
1 TABLET, FILM COATED ORAL 2 TIMES DAILY
COMMUNITY
Start: 2020-09-18 | End: 2021-08-16 | Stop reason: SDUPTHER

## 2021-08-16 ENCOUNTER — OFFICE VISIT (OUTPATIENT)
Dept: FAMILY MEDICINE | Facility: CLINIC | Age: 46
End: 2021-08-16
Payer: OTHER GOVERNMENT

## 2021-08-16 ENCOUNTER — TELEPHONE (OUTPATIENT)
Dept: FAMILY MEDICINE | Facility: CLINIC | Age: 46
End: 2021-08-16

## 2021-08-16 VITALS
HEIGHT: 66 IN | WEIGHT: 227 LBS | HEART RATE: 80 BPM | OXYGEN SATURATION: 97 % | BODY MASS INDEX: 36.48 KG/M2 | DIASTOLIC BLOOD PRESSURE: 80 MMHG | SYSTOLIC BLOOD PRESSURE: 120 MMHG | TEMPERATURE: 98 F

## 2021-08-16 DIAGNOSIS — E11.65 UNCONTROLLED TYPE 2 DIABETES MELLITUS WITH HYPERGLYCEMIA: ICD-10-CM

## 2021-08-16 DIAGNOSIS — M47.816 LUMBAR SPONDYLOSIS: Primary | ICD-10-CM

## 2021-08-16 DIAGNOSIS — E03.8 OTHER SPECIFIED HYPOTHYROIDISM: ICD-10-CM

## 2021-08-16 LAB — TSH SERPL DL<=0.005 MIU/L-ACNC: 30 UIU/ML (ref 0.36–3.74)

## 2021-08-16 PROCEDURE — 96372 PR INJECTION,THERAP/PROPH/DIAG2ST, IM OR SUBCUT: ICD-10-PCS | Mod: ,,, | Performed by: FAMILY MEDICINE

## 2021-08-16 PROCEDURE — 84443 ASSAY THYROID STIM HORMONE: CPT | Mod: ,,, | Performed by: CLINICAL MEDICAL LABORATORY

## 2021-08-16 PROCEDURE — 99214 OFFICE O/P EST MOD 30 MIN: CPT | Mod: 25,,, | Performed by: FAMILY MEDICINE

## 2021-08-16 PROCEDURE — 99214 PR OFFICE/OUTPT VISIT, EST, LEVL IV, 30-39 MIN: ICD-10-PCS | Mod: 25,,, | Performed by: FAMILY MEDICINE

## 2021-08-16 PROCEDURE — 84443 TSH: ICD-10-PCS | Mod: ,,, | Performed by: CLINICAL MEDICAL LABORATORY

## 2021-08-16 PROCEDURE — 96372 THER/PROPH/DIAG INJ SC/IM: CPT | Mod: ,,, | Performed by: FAMILY MEDICINE

## 2021-08-16 RX ORDER — ALBUTEROL SULFATE 90 UG/1
2 AEROSOL, METERED RESPIRATORY (INHALATION) EVERY 6 HOURS PRN
Qty: 18 G | Refills: 1 | Status: SHIPPED | OUTPATIENT
Start: 2021-08-16 | End: 2022-06-23

## 2021-08-16 RX ORDER — KETOROLAC TROMETHAMINE 30 MG/ML
30 INJECTION, SOLUTION INTRAMUSCULAR; INTRAVENOUS
Status: COMPLETED | OUTPATIENT
Start: 2021-08-16 | End: 2021-08-16

## 2021-08-16 RX ORDER — NIFEDIPINE 60 MG/1
60 TABLET, EXTENDED RELEASE ORAL DAILY
Qty: 90 TABLET | Refills: 0 | Status: SHIPPED | OUTPATIENT
Start: 2021-08-16 | End: 2021-11-11 | Stop reason: SDUPTHER

## 2021-08-16 RX ORDER — GLIPIZIDE AND METFORMIN HCL 2.5; 5 MG/1; MG/1
1 TABLET, FILM COATED ORAL 2 TIMES DAILY
Qty: 180 TABLET | Refills: 0 | Status: SHIPPED | OUTPATIENT
Start: 2021-08-16 | End: 2022-01-06

## 2021-08-16 RX ORDER — TRAMADOL HYDROCHLORIDE 50 MG/1
50 TABLET ORAL EVERY 6 HOURS PRN
Qty: 20 TABLET | Refills: 0 | Status: SHIPPED | OUTPATIENT
Start: 2021-08-16 | End: 2022-02-11 | Stop reason: SDUPTHER

## 2021-08-16 RX ORDER — ALBUTEROL SULFATE 90 UG/1
2 AEROSOL, METERED RESPIRATORY (INHALATION) EVERY 6 HOURS PRN
COMMUNITY
End: 2021-08-16 | Stop reason: SDUPTHER

## 2021-08-16 RX ADMIN — KETOROLAC TROMETHAMINE 30 MG: 30 INJECTION, SOLUTION INTRAMUSCULAR; INTRAVENOUS at 02:08

## 2021-08-27 ENCOUNTER — OFFICE VISIT (OUTPATIENT)
Dept: FAMILY MEDICINE | Facility: CLINIC | Age: 46
End: 2021-08-27
Payer: OTHER GOVERNMENT

## 2021-08-27 VITALS
SYSTOLIC BLOOD PRESSURE: 120 MMHG | TEMPERATURE: 98 F | OXYGEN SATURATION: 96 % | HEIGHT: 66 IN | DIASTOLIC BLOOD PRESSURE: 64 MMHG | HEART RATE: 77 BPM | WEIGHT: 227 LBS | BODY MASS INDEX: 36.48 KG/M2

## 2021-08-27 DIAGNOSIS — Z01.818 PREOPERATIVE CLEARANCE: ICD-10-CM

## 2021-08-27 DIAGNOSIS — Z01.812 PRE-OPERATIVE LABORATORY EXAMINATION: ICD-10-CM

## 2021-08-27 DIAGNOSIS — Z01.810 PRE-OPERATIVE CARDIOVASCULAR EXAMINATION: Primary | ICD-10-CM

## 2021-08-27 PROCEDURE — 93000 ELECTROCARDIOGRAM COMPLETE: CPT | Mod: ,,, | Performed by: FAMILY MEDICINE

## 2021-08-27 PROCEDURE — 93000 PR ELECTROCARDIOGRAM, COMPLETE: ICD-10-PCS | Mod: ,,, | Performed by: FAMILY MEDICINE

## 2021-08-27 PROCEDURE — 99214 OFFICE O/P EST MOD 30 MIN: CPT | Mod: ,,, | Performed by: FAMILY MEDICINE

## 2021-08-27 PROCEDURE — 99214 PR OFFICE/OUTPT VISIT, EST, LEVL IV, 30-39 MIN: ICD-10-PCS | Mod: ,,, | Performed by: FAMILY MEDICINE

## 2021-08-30 ENCOUNTER — CLINICAL SUPPORT (OUTPATIENT)
Dept: FAMILY MEDICINE | Facility: CLINIC | Age: 46
End: 2021-08-30
Payer: OTHER GOVERNMENT

## 2021-08-30 ENCOUNTER — TELEPHONE (OUTPATIENT)
Dept: FAMILY MEDICINE | Facility: CLINIC | Age: 46
End: 2021-08-30

## 2021-08-30 DIAGNOSIS — Z01.818 PREOPERATIVE CLEARANCE: ICD-10-CM

## 2021-08-30 DIAGNOSIS — Z01.810 PRE-OPERATIVE CARDIOVASCULAR EXAMINATION: Primary | ICD-10-CM

## 2021-08-30 DIAGNOSIS — Z01.812 PRE-OPERATIVE LABORATORY EXAMINATION: ICD-10-CM

## 2021-08-30 LAB
ALBUMIN SERPL BCP-MCNC: 3.5 G/DL (ref 3.5–5)
ALBUMIN/GLOB SERPL: 0.9 {RATIO}
ALP SERPL-CCNC: 150 U/L (ref 37–98)
ALT SERPL W P-5'-P-CCNC: 36 U/L (ref 13–56)
ANION GAP SERPL CALCULATED.3IONS-SCNC: 10 MMOL/L (ref 7–16)
APTT PPP: 40.5 SECONDS (ref 25.2–37.3)
AST SERPL W P-5'-P-CCNC: 26 U/L (ref 15–37)
BASOPHILS # BLD AUTO: 0.03 K/UL (ref 0–0.2)
BASOPHILS NFR BLD AUTO: 0.5 % (ref 0–1)
BILIRUB SERPL-MCNC: 0.2 MG/DL (ref 0–1.2)
BUN SERPL-MCNC: 8 MG/DL (ref 7–18)
BUN/CREAT SERPL: 10 (ref 6–20)
CALCIUM SERPL-MCNC: 9.1 MG/DL (ref 8.5–10.1)
CHLORIDE SERPL-SCNC: 104 MMOL/L (ref 98–107)
CO2 SERPL-SCNC: 29 MMOL/L (ref 21–32)
CREAT SERPL-MCNC: 0.83 MG/DL (ref 0.55–1.02)
DIFFERENTIAL METHOD BLD: ABNORMAL
EOSINOPHIL # BLD AUTO: 0.09 K/UL (ref 0–0.5)
EOSINOPHIL NFR BLD AUTO: 1.4 % (ref 1–4)
ERYTHROCYTE [DISTWIDTH] IN BLOOD BY AUTOMATED COUNT: 13.1 % (ref 11.5–14.5)
EST. AVERAGE GLUCOSE BLD GHB EST-MCNC: 217 MG/DL
GLOBULIN SER-MCNC: 3.8 G/DL (ref 2–4)
GLUCOSE SERPL-MCNC: 337 MG/DL (ref 74–106)
HBA1C MFR BLD HPLC: 9.1 % (ref 4.5–6.6)
HCT VFR BLD AUTO: 37.9 % (ref 38–47)
HCV AB SER QL: NORMAL
HGB BLD-MCNC: 12 G/DL (ref 12–16)
HIV 1+O+2 AB SERPL QL: NORMAL
IMM GRANULOCYTES # BLD AUTO: 0.02 K/UL (ref 0–0.04)
IMM GRANULOCYTES NFR BLD: 0.3 % (ref 0–0.4)
INR BLD: 0.88 (ref 0.9–1.1)
LYMPHOCYTES # BLD AUTO: 3.22 K/UL (ref 1–4.8)
LYMPHOCYTES NFR BLD AUTO: 49.5 % (ref 27–41)
MCH RBC QN AUTO: 27.9 PG (ref 27–31)
MCHC RBC AUTO-ENTMCNC: 31.7 G/DL (ref 32–36)
MCV RBC AUTO: 88.1 FL (ref 80–96)
MONOCYTES # BLD AUTO: 0.43 K/UL (ref 0–0.8)
MONOCYTES NFR BLD AUTO: 6.6 % (ref 2–6)
MPC BLD CALC-MCNC: 10.2 FL (ref 9.4–12.4)
NEUTROPHILS # BLD AUTO: 2.71 K/UL (ref 1.8–7.7)
NEUTROPHILS NFR BLD AUTO: 41.7 % (ref 53–65)
NRBC # BLD AUTO: 0 X10E3/UL
NRBC, AUTO (.00): 0 %
PLATELET # BLD AUTO: 422 K/UL (ref 150–400)
POTASSIUM SERPL-SCNC: 3.5 MMOL/L (ref 3.5–5.1)
PROT SERPL-MCNC: 7.3 G/DL (ref 6.4–8.2)
PROTHROMBIN TIME: 12 SECONDS (ref 11.7–14.7)
RBC # BLD AUTO: 4.3 M/UL (ref 4.2–5.4)
SODIUM SERPL-SCNC: 139 MMOL/L (ref 136–145)
WBC # BLD AUTO: 6.5 K/UL (ref 4.5–11)

## 2021-08-30 PROCEDURE — 83036 HEMOGLOBIN GLYCOSYLATED A1C: CPT | Mod: ,,, | Performed by: CLINICAL MEDICAL LABORATORY

## 2021-08-30 PROCEDURE — 80053 COMPREHEN METABOLIC PANEL: CPT | Mod: ,,, | Performed by: CLINICAL MEDICAL LABORATORY

## 2021-08-30 PROCEDURE — 80053 COMPREHENSIVE METABOLIC PANEL: ICD-10-PCS | Mod: ,,, | Performed by: CLINICAL MEDICAL LABORATORY

## 2021-08-30 PROCEDURE — 83036 HEMOGLOBIN A1C: ICD-10-PCS | Mod: ,,, | Performed by: CLINICAL MEDICAL LABORATORY

## 2021-08-30 PROCEDURE — 87389 HIV-1 AG W/HIV-1&-2 AB AG IA: CPT | Mod: ,,, | Performed by: CLINICAL MEDICAL LABORATORY

## 2021-08-30 PROCEDURE — 85025 COMPLETE CBC W/AUTO DIFF WBC: CPT | Mod: ,,, | Performed by: CLINICAL MEDICAL LABORATORY

## 2021-08-30 PROCEDURE — 85730 THROMBOPLASTIN TIME PARTIAL: CPT | Performed by: FAMILY MEDICINE

## 2021-08-30 PROCEDURE — 85025 CBC WITH DIFFERENTIAL: ICD-10-PCS | Mod: ,,, | Performed by: CLINICAL MEDICAL LABORATORY

## 2021-08-30 PROCEDURE — 86803 HEPATITIS C ANTIBODY: ICD-10-PCS | Mod: ,,, | Performed by: CLINICAL MEDICAL LABORATORY

## 2021-08-30 PROCEDURE — 87389 HIV 1 / 2 ANTIBODY: ICD-10-PCS | Mod: ,,, | Performed by: CLINICAL MEDICAL LABORATORY

## 2021-08-30 PROCEDURE — 86803 HEPATITIS C AB TEST: CPT | Mod: ,,, | Performed by: CLINICAL MEDICAL LABORATORY

## 2021-08-30 PROCEDURE — 85610 PROTHROMBIN TIME: CPT | Performed by: FAMILY MEDICINE

## 2021-08-31 ENCOUNTER — TELEPHONE (OUTPATIENT)
Dept: SPINE | Facility: CLINIC | Age: 46
End: 2021-08-31

## 2021-09-03 ENCOUNTER — OFFICE VISIT (OUTPATIENT)
Dept: FAMILY MEDICINE | Facility: CLINIC | Age: 46
End: 2021-09-03
Payer: OTHER GOVERNMENT

## 2021-09-03 VITALS
WEIGHT: 227 LBS | DIASTOLIC BLOOD PRESSURE: 76 MMHG | TEMPERATURE: 99 F | HEART RATE: 70 BPM | HEIGHT: 66 IN | BODY MASS INDEX: 36.48 KG/M2 | SYSTOLIC BLOOD PRESSURE: 116 MMHG | OXYGEN SATURATION: 95 %

## 2021-09-03 DIAGNOSIS — E11.65 UNCONTROLLED TYPE 2 DIABETES MELLITUS WITH HYPERGLYCEMIA: ICD-10-CM

## 2021-09-03 DIAGNOSIS — M54.16 LUMBAR RADICULOPATHY: Primary | ICD-10-CM

## 2021-09-03 PROCEDURE — 96372 THER/PROPH/DIAG INJ SC/IM: CPT | Mod: ,,, | Performed by: FAMILY MEDICINE

## 2021-09-03 PROCEDURE — 96372 PR INJECTION,THERAP/PROPH/DIAG2ST, IM OR SUBCUT: ICD-10-PCS | Mod: ,,, | Performed by: FAMILY MEDICINE

## 2021-09-03 PROCEDURE — 99214 OFFICE O/P EST MOD 30 MIN: CPT | Mod: 25,,, | Performed by: FAMILY MEDICINE

## 2021-09-03 PROCEDURE — 99214 PR OFFICE/OUTPT VISIT, EST, LEVL IV, 30-39 MIN: ICD-10-PCS | Mod: 25,,, | Performed by: FAMILY MEDICINE

## 2021-09-03 RX ORDER — TRAMADOL HYDROCHLORIDE 50 MG/1
50 TABLET ORAL EVERY 6 HOURS
Qty: 20 TABLET | Refills: 0 | Status: SHIPPED | OUTPATIENT
Start: 2021-09-03 | End: 2022-02-11 | Stop reason: SDUPTHER

## 2021-09-03 RX ORDER — KETOROLAC TROMETHAMINE 30 MG/ML
30 INJECTION, SOLUTION INTRAMUSCULAR; INTRAVENOUS
Status: COMPLETED | OUTPATIENT
Start: 2021-09-03 | End: 2021-09-03

## 2021-09-03 RX ORDER — DULAGLUTIDE 0.75 MG/.5ML
0.75 INJECTION, SOLUTION SUBCUTANEOUS
Qty: 4 PEN | Refills: 3 | Status: SHIPPED | OUTPATIENT
Start: 2021-09-03 | End: 2021-10-04 | Stop reason: SDUPTHER

## 2021-09-03 RX ADMIN — KETOROLAC TROMETHAMINE 30 MG: 30 INJECTION, SOLUTION INTRAMUSCULAR; INTRAVENOUS at 08:09

## 2021-09-13 ENCOUNTER — INFUSION (OUTPATIENT)
Dept: INFECTIOUS DISEASES | Facility: HOSPITAL | Age: 46
End: 2021-09-13
Attending: FAMILY MEDICINE
Payer: OTHER GOVERNMENT

## 2021-09-13 ENCOUNTER — OFFICE VISIT (OUTPATIENT)
Dept: FAMILY MEDICINE | Facility: CLINIC | Age: 46
End: 2021-09-13
Payer: OTHER GOVERNMENT

## 2021-09-13 VITALS
OXYGEN SATURATION: 96 % | DIASTOLIC BLOOD PRESSURE: 97 MMHG | RESPIRATION RATE: 16 BRPM | TEMPERATURE: 99 F | HEART RATE: 99 BPM | SYSTOLIC BLOOD PRESSURE: 142 MMHG

## 2021-09-13 VITALS
HEART RATE: 99 BPM | TEMPERATURE: 98 F | HEIGHT: 66 IN | OXYGEN SATURATION: 99 % | BODY MASS INDEX: 34.55 KG/M2 | WEIGHT: 215 LBS

## 2021-09-13 DIAGNOSIS — U07.1 COVID-19: ICD-10-CM

## 2021-09-13 DIAGNOSIS — U07.1 COVID-19: Primary | ICD-10-CM

## 2021-09-13 DIAGNOSIS — Z20.822 ENCOUNTER FOR LABORATORY TESTING FOR COVID-19 VIRUS: ICD-10-CM

## 2021-09-13 LAB
CTP QC/QA: YES
SARS-COV-2 AG RESP QL IA.RAPID: POSITIVE

## 2021-09-13 PROCEDURE — 63600175 PHARM REV CODE 636 W HCPCS: Performed by: FAMILY MEDICINE

## 2021-09-13 PROCEDURE — M0243 CASIRIVI AND IMDEVI INFUSION: HCPCS | Performed by: FAMILY MEDICINE

## 2021-09-13 PROCEDURE — 87426 SARSCOV CORONAVIRUS AG IA: CPT | Mod: QW,,, | Performed by: FAMILY MEDICINE

## 2021-09-13 PROCEDURE — 87426 SARS CORONAVIRUS 2 ANTIGEN POCT: ICD-10-PCS | Mod: QW,,, | Performed by: FAMILY MEDICINE

## 2021-09-13 PROCEDURE — 99213 OFFICE O/P EST LOW 20 MIN: CPT | Mod: ,,, | Performed by: FAMILY MEDICINE

## 2021-09-13 PROCEDURE — 99213 PR OFFICE/OUTPT VISIT, EST, LEVL III, 20-29 MIN: ICD-10-PCS | Mod: ,,, | Performed by: FAMILY MEDICINE

## 2021-09-13 PROCEDURE — 25000003 PHARM REV CODE 250: Performed by: FAMILY MEDICINE

## 2021-09-13 RX ORDER — SODIUM CHLORIDE 0.9 % (FLUSH) 0.9 %
10 SYRINGE (ML) INJECTION
Status: DISCONTINUED | OUTPATIENT
Start: 2021-09-13 | End: 2022-02-11

## 2021-09-13 RX ORDER — DIPHENHYDRAMINE HYDROCHLORIDE 50 MG/ML
25 INJECTION INTRAMUSCULAR; INTRAVENOUS ONCE AS NEEDED
Status: DISCONTINUED | OUTPATIENT
Start: 2021-09-13 | End: 2022-02-11

## 2021-09-13 RX ORDER — ONDANSETRON 4 MG/1
4 TABLET, ORALLY DISINTEGRATING ORAL ONCE AS NEEDED
Status: DISCONTINUED | OUTPATIENT
Start: 2021-09-13 | End: 2022-02-11

## 2021-09-13 RX ORDER — BENZONATATE 100 MG/1
CAPSULE ORAL
Qty: 30 CAPSULE | Refills: 0 | Status: ON HOLD | OUTPATIENT
Start: 2021-09-13 | End: 2021-12-01 | Stop reason: HOSPADM

## 2021-09-13 RX ORDER — CETIRIZINE HYDROCHLORIDE 10 MG/1
10 TABLET ORAL DAILY
Qty: 30 TABLET | Refills: 0 | Status: SHIPPED | OUTPATIENT
Start: 2021-09-13 | End: 2022-02-11

## 2021-09-13 RX ORDER — ALBUTEROL SULFATE 90 UG/1
2 AEROSOL, METERED RESPIRATORY (INHALATION)
Status: DISCONTINUED | OUTPATIENT
Start: 2021-09-13 | End: 2022-02-11

## 2021-09-13 RX ORDER — ACETAMINOPHEN 325 MG/1
650 TABLET ORAL ONCE AS NEEDED
Status: DISCONTINUED | OUTPATIENT
Start: 2021-09-13 | End: 2022-02-11

## 2021-09-13 RX ORDER — IPRATROPIUM BROMIDE 21 UG/1
2 SPRAY, METERED NASAL 3 TIMES DAILY
Qty: 15 ML | Refills: 0 | Status: SHIPPED | OUTPATIENT
Start: 2021-09-13 | End: 2022-01-03

## 2021-09-13 RX ORDER — EPINEPHRINE 0.3 MG/.3ML
0.3 INJECTION SUBCUTANEOUS
Status: DISCONTINUED | OUTPATIENT
Start: 2021-09-13 | End: 2022-02-11

## 2021-09-13 RX ADMIN — CASIRIVIMAB AND IMDEVIMAB 600 MG: 600; 600 INJECTION, SOLUTION, CONCENTRATE INTRAVENOUS at 11:09

## 2021-09-30 ENCOUNTER — HOSPITAL ENCOUNTER (OUTPATIENT)
Dept: RADIOLOGY | Facility: HOSPITAL | Age: 46
Discharge: HOME | End: 2021-09-30
Payer: OTHER GOVERNMENT

## 2021-09-30 DIAGNOSIS — Z12.31 SCREENING MAMMOGRAM, ENCOUNTER FOR: ICD-10-CM

## 2021-09-30 PROCEDURE — 77067 SCR MAMMO BI INCL CAD: CPT | Mod: TC

## 2021-10-04 ENCOUNTER — OFFICE VISIT (OUTPATIENT)
Dept: FAMILY MEDICINE | Facility: CLINIC | Age: 46
End: 2021-10-04
Payer: OTHER GOVERNMENT

## 2021-10-04 VITALS
HEIGHT: 66 IN | HEART RATE: 94 BPM | DIASTOLIC BLOOD PRESSURE: 70 MMHG | BODY MASS INDEX: 35.52 KG/M2 | TEMPERATURE: 97 F | OXYGEN SATURATION: 98 % | WEIGHT: 221 LBS | SYSTOLIC BLOOD PRESSURE: 120 MMHG

## 2021-10-04 DIAGNOSIS — F41.1 GENERALIZED ANXIETY DISORDER: Primary | ICD-10-CM

## 2021-10-04 PROCEDURE — 99213 PR OFFICE/OUTPT VISIT, EST, LEVL III, 20-29 MIN: ICD-10-PCS | Mod: ,,, | Performed by: FAMILY MEDICINE

## 2021-10-04 PROCEDURE — 99213 OFFICE O/P EST LOW 20 MIN: CPT | Mod: ,,, | Performed by: FAMILY MEDICINE

## 2021-10-04 RX ORDER — DULAGLUTIDE 0.75 MG/.5ML
0.75 INJECTION, SOLUTION SUBCUTANEOUS
Qty: 4 PEN | Refills: 3 | Status: SHIPPED | OUTPATIENT
Start: 2021-10-04 | End: 2022-01-03

## 2021-10-04 RX ORDER — CLONIDINE HYDROCHLORIDE 0.2 MG/1
TABLET ORAL
Qty: 120 TABLET | Refills: 2 | Status: SHIPPED | OUTPATIENT
Start: 2021-10-04 | End: 2021-11-11 | Stop reason: SDUPTHER

## 2021-10-04 RX ORDER — LEVOTHYROXINE SODIUM 175 UG/1
TABLET ORAL
Qty: 30 TABLET | Refills: 2 | Status: SHIPPED | OUTPATIENT
Start: 2021-10-04 | End: 2021-12-06

## 2021-10-04 RX ORDER — SERTRALINE HYDROCHLORIDE 100 MG/1
100 TABLET, FILM COATED ORAL DAILY
Qty: 60 TABLET | Refills: 0 | Status: SHIPPED | OUTPATIENT
Start: 2021-10-04 | End: 2022-01-06 | Stop reason: SDUPTHER

## 2021-10-15 ENCOUNTER — OFFICE VISIT (OUTPATIENT)
Dept: FAMILY MEDICINE | Facility: CLINIC | Age: 46
End: 2021-10-15
Payer: OTHER GOVERNMENT

## 2021-10-15 VITALS
HEIGHT: 66 IN | OXYGEN SATURATION: 98 % | DIASTOLIC BLOOD PRESSURE: 70 MMHG | HEART RATE: 92 BPM | TEMPERATURE: 99 F | BODY MASS INDEX: 35.52 KG/M2 | WEIGHT: 221 LBS | SYSTOLIC BLOOD PRESSURE: 90 MMHG

## 2021-10-15 DIAGNOSIS — E03.8 OTHER SPECIFIED HYPOTHYROIDISM: ICD-10-CM

## 2021-10-15 DIAGNOSIS — E11.65 UNCONTROLLED TYPE 2 DIABETES MELLITUS WITH HYPERGLYCEMIA: Primary | ICD-10-CM

## 2021-10-15 PROCEDURE — 99213 PR OFFICE/OUTPT VISIT, EST, LEVL III, 20-29 MIN: ICD-10-PCS | Mod: ,,, | Performed by: FAMILY MEDICINE

## 2021-10-15 PROCEDURE — 99213 OFFICE O/P EST LOW 20 MIN: CPT | Mod: ,,, | Performed by: FAMILY MEDICINE

## 2021-10-18 ENCOUNTER — TELEPHONE (OUTPATIENT)
Dept: FAMILY MEDICINE | Facility: CLINIC | Age: 46
End: 2021-10-18

## 2021-10-25 ENCOUNTER — OFFICE VISIT (OUTPATIENT)
Dept: OBSTETRICS AND GYNECOLOGY | Facility: CLINIC | Age: 46
End: 2021-10-25
Payer: OTHER GOVERNMENT

## 2021-10-25 VITALS
HEIGHT: 63 IN | BODY MASS INDEX: 39.16 KG/M2 | WEIGHT: 221 LBS | SYSTOLIC BLOOD PRESSURE: 106 MMHG | DIASTOLIC BLOOD PRESSURE: 69 MMHG

## 2021-10-25 DIAGNOSIS — Z12.72 SPECIAL SCREENING FOR MALIGNANT NEOPLASMS, VAGINA: ICD-10-CM

## 2021-10-25 DIAGNOSIS — Z01.419 ENCOUNTER FOR ANNUAL ROUTINE GYNECOLOGICAL EXAMINATION: Primary | ICD-10-CM

## 2021-10-25 PROCEDURE — 99213 OFFICE O/P EST LOW 20 MIN: CPT | Mod: PBBFAC | Performed by: OBSTETRICS & GYNECOLOGY

## 2021-10-25 PROCEDURE — 99396 PR PREVENTIVE VISIT,EST,40-64: ICD-10-PCS | Mod: S$PBB,,, | Performed by: OBSTETRICS & GYNECOLOGY

## 2021-10-25 PROCEDURE — 88142 CYTOPATH C/V THIN LAYER: CPT | Mod: GCY | Performed by: OBSTETRICS & GYNECOLOGY

## 2021-10-25 PROCEDURE — 99396 PREV VISIT EST AGE 40-64: CPT | Mod: S$PBB,,, | Performed by: OBSTETRICS & GYNECOLOGY

## 2021-10-27 LAB
GH SERPL-MCNC: NORMAL NG/ML
INSULIN SERPL-ACNC: NORMAL U[IU]/ML
LAB AP CLINICAL INFORMATION: NORMAL
LAB AP GYN INTERPRETATION: NEGATIVE
LAB AP PAP DISCLAIMER COMMENTS: NORMAL
RENIN PLAS-CCNC: NORMAL NG/ML/H

## 2021-11-11 ENCOUNTER — OFFICE VISIT (OUTPATIENT)
Dept: FAMILY MEDICINE | Facility: CLINIC | Age: 46
End: 2021-11-11
Payer: OTHER GOVERNMENT

## 2021-11-11 VITALS
BODY MASS INDEX: 36.96 KG/M2 | SYSTOLIC BLOOD PRESSURE: 130 MMHG | DIASTOLIC BLOOD PRESSURE: 80 MMHG | OXYGEN SATURATION: 98 % | WEIGHT: 230 LBS | TEMPERATURE: 98 F | HEIGHT: 66 IN | HEART RATE: 92 BPM

## 2021-11-11 DIAGNOSIS — E11.65 UNCONTROLLED TYPE 2 DIABETES MELLITUS WITH HYPERGLYCEMIA: ICD-10-CM

## 2021-11-11 DIAGNOSIS — Z79.899 ENCOUNTER FOR LONG-TERM (CURRENT) USE OF OTHER MEDICATIONS: ICD-10-CM

## 2021-11-11 DIAGNOSIS — M54.16 LUMBAR RADICULOPATHY: Primary | ICD-10-CM

## 2021-11-11 LAB
CTP QC/QA: YES
GLUCOSE SERPL-MCNC: 256 MG/DL (ref 70–110)
POC (AMP) AMPHETAMINE: NEGATIVE
POC (BAR) BARBITURATES: NEGATIVE
POC (BUP) BUPRENORPHINE: NEGATIVE
POC (BZO) BENZODIAZEPINES: NEGATIVE
POC (COC) COCAINE: NEGATIVE
POC (MDMA) METHYLENEDIOXYMETHAMPHETAMINE 3,4: NEGATIVE
POC (MET) METHAMPHETAMINE: NEGATIVE
POC (MOP) OPIATES: NEGATIVE
POC (MTD) METHADONE: NEGATIVE
POC (OXY) OXYCODONE: NEGATIVE
POC (PCP) PHENCYCLIDINE: NEGATIVE
POC (TCA) TRICYCLIC ANTIDEPRESSANTS: NEGATIVE
POC TEMPERATURE (URINE): 90
POC THC: NEGATIVE

## 2021-11-11 PROCEDURE — 99214 PR OFFICE/OUTPT VISIT, EST, LEVL IV, 30-39 MIN: ICD-10-PCS | Mod: 25,,, | Performed by: FAMILY MEDICINE

## 2021-11-11 PROCEDURE — 80305 POCT URINE DRUG SCREEN PRESUMP: ICD-10-PCS | Mod: QW,,, | Performed by: FAMILY MEDICINE

## 2021-11-11 PROCEDURE — 80305 DRUG TEST PRSMV DIR OPT OBS: CPT | Mod: QW,,, | Performed by: FAMILY MEDICINE

## 2021-11-11 PROCEDURE — 96372 THER/PROPH/DIAG INJ SC/IM: CPT | Mod: ,,, | Performed by: FAMILY MEDICINE

## 2021-11-11 PROCEDURE — 96372 PR INJECTION,THERAP/PROPH/DIAG2ST, IM OR SUBCUT: ICD-10-PCS | Mod: ,,, | Performed by: FAMILY MEDICINE

## 2021-11-11 PROCEDURE — 99214 OFFICE O/P EST MOD 30 MIN: CPT | Mod: 25,,, | Performed by: FAMILY MEDICINE

## 2021-11-11 RX ORDER — KETOROLAC TROMETHAMINE 30 MG/ML
30 INJECTION, SOLUTION INTRAMUSCULAR; INTRAVENOUS
Status: COMPLETED | OUTPATIENT
Start: 2021-11-11 | End: 2021-11-11

## 2021-11-11 RX ORDER — HYDROCODONE BITARTRATE AND ACETAMINOPHEN 7.5; 325 MG/1; MG/1
1 TABLET ORAL EVERY 8 HOURS PRN
Qty: 15 TABLET | Refills: 0 | Status: SHIPPED | OUTPATIENT
Start: 2021-11-11 | End: 2022-04-28

## 2021-11-11 RX ORDER — CLONIDINE HYDROCHLORIDE 0.2 MG/1
TABLET ORAL
Qty: 120 TABLET | Refills: 2 | Status: SHIPPED | OUTPATIENT
Start: 2021-11-11 | End: 2021-12-16

## 2021-11-11 RX ORDER — NIFEDIPINE 60 MG/1
60 TABLET, EXTENDED RELEASE ORAL DAILY
Qty: 90 TABLET | Refills: 0 | Status: SHIPPED | OUTPATIENT
Start: 2021-11-11 | End: 2022-02-03 | Stop reason: SDUPTHER

## 2021-11-11 RX ADMIN — KETOROLAC TROMETHAMINE 30 MG: 30 INJECTION, SOLUTION INTRAMUSCULAR; INTRAVENOUS at 08:11

## 2021-11-12 ENCOUNTER — TELEPHONE (OUTPATIENT)
Dept: ADMINISTRATIVE | Facility: HOSPITAL | Age: 46
End: 2021-11-12
Payer: OTHER GOVERNMENT

## 2021-11-12 DIAGNOSIS — Z01.810 PRE-OPERATIVE CARDIOVASCULAR EXAMINATION, HIGH RISK SURGERY: Primary | ICD-10-CM

## 2021-11-15 DIAGNOSIS — Z01.818 PRE-PROCEDURAL EXAMINATION: Primary | ICD-10-CM

## 2021-11-15 RX ORDER — HYDRALAZINE HYDROCHLORIDE 100 MG/1
100 TABLET, FILM COATED ORAL 3 TIMES DAILY
Qty: 270 TABLET | Refills: 0 | Status: SHIPPED | OUTPATIENT
Start: 2021-11-15 | End: 2022-02-03 | Stop reason: SDUPTHER

## 2021-11-18 ENCOUNTER — OFFICE VISIT (OUTPATIENT)
Dept: FAMILY MEDICINE | Facility: CLINIC | Age: 46
End: 2021-11-18
Payer: OTHER GOVERNMENT

## 2021-11-18 VITALS
BODY MASS INDEX: 35.79 KG/M2 | TEMPERATURE: 98 F | OXYGEN SATURATION: 95 % | HEART RATE: 103 BPM | DIASTOLIC BLOOD PRESSURE: 82 MMHG | WEIGHT: 228 LBS | HEIGHT: 67 IN | SYSTOLIC BLOOD PRESSURE: 130 MMHG

## 2021-11-18 DIAGNOSIS — Z01.818 PRE-OPERATIVE CLEARANCE: Primary | ICD-10-CM

## 2021-11-18 PROCEDURE — 99213 PR OFFICE/OUTPT VISIT, EST, LEVL III, 20-29 MIN: ICD-10-PCS | Mod: ,,, | Performed by: FAMILY MEDICINE

## 2021-11-18 PROCEDURE — 99213 OFFICE O/P EST LOW 20 MIN: CPT | Mod: ,,, | Performed by: FAMILY MEDICINE

## 2021-11-18 RX ORDER — ONDANSETRON 4 MG/1
4 TABLET, FILM COATED ORAL EVERY 6 HOURS PRN
Qty: 20 TABLET | Refills: 0 | Status: SHIPPED | OUTPATIENT
Start: 2021-11-18 | End: 2022-01-03

## 2021-11-19 DIAGNOSIS — Z01.818 PRE-PROCEDURAL EXAMINATION: Primary | ICD-10-CM

## 2021-11-23 PROCEDURE — 85730 THROMBOPLASTIN TIME PARTIAL: CPT | Performed by: ORTHOPAEDIC SURGERY

## 2021-11-23 PROCEDURE — 85610 PROTHROMBIN TIME: CPT | Performed by: ORTHOPAEDIC SURGERY

## 2021-11-29 ENCOUNTER — OFFICE VISIT (OUTPATIENT)
Dept: SPINE | Facility: CLINIC | Age: 46
DRG: 517 | End: 2021-11-29
Payer: OTHER GOVERNMENT

## 2021-11-29 DIAGNOSIS — Z01.818 PRE-PROCEDURAL EXAMINATION: ICD-10-CM

## 2021-11-29 DIAGNOSIS — M54.16 LUMBAR RADICULOPATHY: Primary | ICD-10-CM

## 2021-11-29 DIAGNOSIS — M51.36 DDD (DEGENERATIVE DISC DISEASE), LUMBAR: ICD-10-CM

## 2021-11-29 DIAGNOSIS — M47.816 LUMBAR SPONDYLOSIS: ICD-10-CM

## 2021-11-29 PROCEDURE — 99024 PR POST-OP FOLLOW-UP VISIT: ICD-10-PCS | Mod: S$PBB,,, | Performed by: ORTHOPAEDIC SURGERY

## 2021-11-29 PROCEDURE — 99024 POSTOP FOLLOW-UP VISIT: CPT | Mod: S$PBB,,, | Performed by: ORTHOPAEDIC SURGERY

## 2021-11-29 PROCEDURE — 99212 OFFICE O/P EST SF 10 MIN: CPT | Mod: PBBFAC | Performed by: ORTHOPAEDIC SURGERY

## 2021-11-29 RX ORDER — PROMETHAZINE HYDROCHLORIDE 25 MG/1
25 TABLET ORAL EVERY 4 HOURS
Qty: 45 TABLET | Refills: 0 | Status: SHIPPED | OUTPATIENT
Start: 2021-11-29 | End: 2022-05-08

## 2021-11-29 RX ORDER — OXYCODONE AND ACETAMINOPHEN 5; 325 MG/1; MG/1
1 TABLET ORAL EVERY 4 HOURS PRN
Qty: 45 TABLET | Refills: 0 | Status: SHIPPED | OUTPATIENT
Start: 2021-11-29 | End: 2022-02-11

## 2021-11-29 RX ORDER — CYCLOBENZAPRINE HCL 5 MG
5 TABLET ORAL 3 TIMES DAILY PRN
Qty: 45 TABLET | Refills: 0 | Status: SHIPPED | OUTPATIENT
Start: 2021-11-29 | End: 2021-12-09

## 2021-11-30 ENCOUNTER — ANESTHESIA (OUTPATIENT)
Dept: SURGERY | Facility: HOSPITAL | Age: 46
DRG: 517 | End: 2021-11-30
Payer: OTHER GOVERNMENT

## 2021-11-30 ENCOUNTER — HOSPITAL ENCOUNTER (INPATIENT)
Facility: HOSPITAL | Age: 46
LOS: 1 days | Discharge: HOME | DRG: 517 | End: 2021-12-01
Attending: ORTHOPAEDIC SURGERY | Admitting: ORTHOPAEDIC SURGERY
Payer: OTHER GOVERNMENT

## 2021-11-30 ENCOUNTER — ANESTHESIA EVENT (OUTPATIENT)
Dept: SURGERY | Facility: HOSPITAL | Age: 46
DRG: 517 | End: 2021-11-30
Payer: OTHER GOVERNMENT

## 2021-11-30 DIAGNOSIS — U07.1 COVID-19: ICD-10-CM

## 2021-11-30 DIAGNOSIS — M47.816 LUMBAR SPONDYLOSIS: ICD-10-CM

## 2021-11-30 DIAGNOSIS — I10 HYPERTENSION: ICD-10-CM

## 2021-11-30 LAB
GLUCOSE SERPL-MCNC: 201 MG/DL (ref 70–105)
GLUCOSE SERPL-MCNC: 263 MG/DL (ref 70–105)
GLUCOSE SERPL-MCNC: 313 MG/DL (ref 70–105)
GLUCOSE SERPL-MCNC: 375 MG/DL (ref 70–105)

## 2021-11-30 PROCEDURE — 82962 GLUCOSE BLOOD TEST: CPT

## 2021-11-30 PROCEDURE — 37000008 HC ANESTHESIA 1ST 15 MINUTES: Performed by: ORTHOPAEDIC SURGERY

## 2021-11-30 PROCEDURE — 11000001 HC ACUTE MED/SURG PRIVATE ROOM

## 2021-11-30 PROCEDURE — 63047 LAM FACETEC & FORAMOT LUMBAR: CPT | Mod: ,,, | Performed by: ORTHOPAEDIC SURGERY

## 2021-11-30 PROCEDURE — 94640 AIRWAY INHALATION TREATMENT: CPT

## 2021-11-30 PROCEDURE — 36000711: Performed by: ORTHOPAEDIC SURGERY

## 2021-11-30 PROCEDURE — 97161 PT EVAL LOW COMPLEX 20 MIN: CPT

## 2021-11-30 PROCEDURE — 27000509 HC TUBE SALEM SUMP ANY SIZE: Performed by: ANESTHESIOLOGY

## 2021-11-30 PROCEDURE — 63600175 PHARM REV CODE 636 W HCPCS: Performed by: ANESTHESIOLOGY

## 2021-11-30 PROCEDURE — 63047 PR LAMINEC/FACETECT/FORAMIN,LUMBAR 1 SEG: ICD-10-PCS | Mod: ,,, | Performed by: ORTHOPAEDIC SURGERY

## 2021-11-30 PROCEDURE — 63048 LAM FACETEC &FORAMOT EA ADDL: CPT | Mod: ,,, | Performed by: ORTHOPAEDIC SURGERY

## 2021-11-30 PROCEDURE — 36000710: Performed by: ORTHOPAEDIC SURGERY

## 2021-11-30 PROCEDURE — 25000003 PHARM REV CODE 250: Performed by: ORTHOPAEDIC SURGERY

## 2021-11-30 PROCEDURE — 27000689 HC BLADE LARYNGOSCOPE ANY SIZE: Performed by: ANESTHESIOLOGY

## 2021-11-30 PROCEDURE — 96372 THER/PROPH/DIAG INJ SC/IM: CPT

## 2021-11-30 PROCEDURE — 63600175 PHARM REV CODE 636 W HCPCS: Performed by: NURSE ANESTHETIST, CERTIFIED REGISTERED

## 2021-11-30 PROCEDURE — 63048 PR LAMINECT/FACETECT/FORAMINOT, EA ADDTL VERTEBRAL SEGM: ICD-10-PCS | Mod: ,,, | Performed by: ORTHOPAEDIC SURGERY

## 2021-11-30 PROCEDURE — 27000221 HC OXYGEN, UP TO 24 HOURS

## 2021-11-30 PROCEDURE — 63600175 PHARM REV CODE 636 W HCPCS: Performed by: ORTHOPAEDIC SURGERY

## 2021-11-30 PROCEDURE — 25000003 PHARM REV CODE 250: Performed by: NURSE ANESTHETIST, CERTIFIED REGISTERED

## 2021-11-30 PROCEDURE — D9220A PRA ANESTHESIA: Mod: ANES,,, | Performed by: ANESTHESIOLOGY

## 2021-11-30 PROCEDURE — 37000009 HC ANESTHESIA EA ADD 15 MINS: Performed by: ORTHOPAEDIC SURGERY

## 2021-11-30 PROCEDURE — 93010 ELECTROCARDIOGRAM REPORT: CPT | Mod: ,,, | Performed by: INTERNAL MEDICINE

## 2021-11-30 PROCEDURE — 27000260 *HC AIRWAY ORAL: Performed by: ANESTHESIOLOGY

## 2021-11-30 PROCEDURE — 93005 ELECTROCARDIOGRAM TRACING: CPT

## 2021-11-30 PROCEDURE — 25000242 PHARM REV CODE 250 ALT 637 W/ HCPCS: Performed by: ANESTHESIOLOGY

## 2021-11-30 PROCEDURE — 27000716 HC OXISENSOR PROBE, ANY SIZE: Performed by: ANESTHESIOLOGY

## 2021-11-30 PROCEDURE — 71000033 HC RECOVERY, INTIAL HOUR: Performed by: ORTHOPAEDIC SURGERY

## 2021-11-30 PROCEDURE — 27000165 HC TUBE, ETT CUFFED: Performed by: ANESTHESIOLOGY

## 2021-11-30 PROCEDURE — D9220A PRA ANESTHESIA: ICD-10-PCS | Mod: ANES,,, | Performed by: ANESTHESIOLOGY

## 2021-11-30 PROCEDURE — 27000510 HC BLANKET BAIR HUGGER ANY SIZE: Performed by: ANESTHESIOLOGY

## 2021-11-30 PROCEDURE — 99900035 HC TECH TIME PER 15 MIN (STAT)

## 2021-11-30 PROCEDURE — D9220A PRA ANESTHESIA: ICD-10-PCS | Mod: CRNA,,, | Performed by: NURSE ANESTHETIST, CERTIFIED REGISTERED

## 2021-11-30 PROCEDURE — 94761 N-INVAS EAR/PLS OXIMETRY MLT: CPT

## 2021-11-30 PROCEDURE — 27201423 OPTIME MED/SURG SUP & DEVICES STERILE SUPPLY: Performed by: ORTHOPAEDIC SURGERY

## 2021-11-30 PROCEDURE — D9220A PRA ANESTHESIA: Mod: CRNA,,, | Performed by: NURSE ANESTHETIST, CERTIFIED REGISTERED

## 2021-11-30 PROCEDURE — 93010 EKG 12-LEAD: ICD-10-PCS | Mod: ,,, | Performed by: INTERNAL MEDICINE

## 2021-11-30 RX ORDER — VANCOMYCIN HYDROCHLORIDE 1 G/20ML
INJECTION, POWDER, LYOPHILIZED, FOR SOLUTION INTRAVENOUS
Status: DISCONTINUED | OUTPATIENT
Start: 2021-11-30 | End: 2021-11-30 | Stop reason: HOSPADM

## 2021-11-30 RX ORDER — NIFEDIPINE 60 MG/1
60 TABLET, EXTENDED RELEASE ORAL DAILY
Status: DISCONTINUED | OUTPATIENT
Start: 2021-12-01 | End: 2021-12-01 | Stop reason: HOSPADM

## 2021-11-30 RX ORDER — MORPHINE SULFATE 10 MG/ML
4 INJECTION INTRAMUSCULAR; INTRAVENOUS; SUBCUTANEOUS EVERY 5 MIN PRN
Status: DISCONTINUED | OUTPATIENT
Start: 2021-11-30 | End: 2021-11-30 | Stop reason: HOSPADM

## 2021-11-30 RX ORDER — ONDANSETRON 2 MG/ML
4 INJECTION INTRAMUSCULAR; INTRAVENOUS EVERY 12 HOURS PRN
Status: DISCONTINUED | OUTPATIENT
Start: 2021-11-30 | End: 2021-12-01 | Stop reason: HOSPADM

## 2021-11-30 RX ORDER — EPINEPHRINE CONVENIENCE KIT 1 MG/ML(1)
KIT INTRAMUSCULAR; SUBCUTANEOUS
Status: DISCONTINUED | OUTPATIENT
Start: 2021-11-30 | End: 2021-11-30 | Stop reason: HOSPADM

## 2021-11-30 RX ORDER — CELECOXIB 100 MG/1
200 CAPSULE ORAL ONCE
Status: COMPLETED | OUTPATIENT
Start: 2021-11-30 | End: 2021-11-30

## 2021-11-30 RX ORDER — GABAPENTIN 300 MG/1
600 CAPSULE ORAL ONCE
Status: COMPLETED | OUTPATIENT
Start: 2021-11-30 | End: 2021-11-30

## 2021-11-30 RX ORDER — LIDOCAINE HYDROCHLORIDE 20 MG/ML
INJECTION, SOLUTION EPIDURAL; INFILTRATION; INTRACAUDAL; PERINEURAL
Status: DISCONTINUED | OUTPATIENT
Start: 2021-11-30 | End: 2021-11-30

## 2021-11-30 RX ORDER — TRAMADOL HYDROCHLORIDE 50 MG/1
50 TABLET ORAL EVERY 6 HOURS
Status: DISCONTINUED | OUTPATIENT
Start: 2021-11-30 | End: 2021-12-01 | Stop reason: HOSPADM

## 2021-11-30 RX ORDER — CEFAZOLIN SODIUM 2 G/50ML
2 SOLUTION INTRAVENOUS
Status: DISCONTINUED | OUTPATIENT
Start: 2021-11-30 | End: 2021-11-30 | Stop reason: HOSPADM

## 2021-11-30 RX ORDER — OXYCODONE HYDROCHLORIDE 5 MG/1
10 TABLET ORAL EVERY 4 HOURS PRN
Status: DISCONTINUED | OUTPATIENT
Start: 2021-11-30 | End: 2021-12-01 | Stop reason: HOSPADM

## 2021-11-30 RX ORDER — ALBUTEROL SULFATE 90 UG/1
2 AEROSOL, METERED RESPIRATORY (INHALATION)
Status: CANCELLED | OUTPATIENT
Start: 2021-11-30

## 2021-11-30 RX ORDER — SODIUM CHLORIDE 0.9 % (FLUSH) 0.9 %
10 SYRINGE (ML) INJECTION
Status: DISCONTINUED | OUTPATIENT
Start: 2021-11-30 | End: 2021-12-01 | Stop reason: HOSPADM

## 2021-11-30 RX ORDER — DOCUSATE SODIUM 100 MG/1
100 CAPSULE, LIQUID FILLED ORAL 2 TIMES DAILY
Status: DISCONTINUED | OUTPATIENT
Start: 2021-11-30 | End: 2021-12-01 | Stop reason: HOSPADM

## 2021-11-30 RX ORDER — MUPIROCIN 20 MG/G
OINTMENT TOPICAL 2 TIMES DAILY
Status: DISCONTINUED | OUTPATIENT
Start: 2021-11-30 | End: 2021-12-01 | Stop reason: HOSPADM

## 2021-11-30 RX ORDER — IPRATROPIUM BROMIDE AND ALBUTEROL SULFATE 2.5; .5 MG/3ML; MG/3ML
3 SOLUTION RESPIRATORY (INHALATION) ONCE AS NEEDED
Status: COMPLETED | OUTPATIENT
Start: 2021-11-30 | End: 2021-11-30

## 2021-11-30 RX ORDER — VENLAFAXINE HYDROCHLORIDE 75 MG/1
150 CAPSULE, EXTENDED RELEASE ORAL DAILY
Status: DISCONTINUED | OUTPATIENT
Start: 2021-12-01 | End: 2021-12-01 | Stop reason: HOSPADM

## 2021-11-30 RX ORDER — INSULIN ASPART 100 [IU]/ML
1-10 INJECTION, SOLUTION INTRAVENOUS; SUBCUTANEOUS
Status: DISCONTINUED | OUTPATIENT
Start: 2021-11-30 | End: 2021-12-01 | Stop reason: HOSPADM

## 2021-11-30 RX ORDER — CLINDAMYCIN PHOSPHATE 900 MG/50ML
INJECTION, SOLUTION INTRAVENOUS
Status: DISCONTINUED | OUTPATIENT
Start: 2021-11-30 | End: 2021-11-30

## 2021-11-30 RX ORDER — SODIUM CHLORIDE 9 MG/ML
INJECTION, SOLUTION INTRAVENOUS CONTINUOUS
Status: DISCONTINUED | OUTPATIENT
Start: 2021-11-30 | End: 2021-11-30

## 2021-11-30 RX ORDER — ONDANSETRON 4 MG/1
4 TABLET, ORALLY DISINTEGRATING ORAL ONCE AS NEEDED
Status: CANCELLED | OUTPATIENT
Start: 2021-11-30 | End: 2033-04-28

## 2021-11-30 RX ORDER — HYDRALAZINE HYDROCHLORIDE 50 MG/1
100 TABLET, FILM COATED ORAL 3 TIMES DAILY
Status: DISCONTINUED | OUTPATIENT
Start: 2021-11-30 | End: 2021-12-01 | Stop reason: HOSPADM

## 2021-11-30 RX ORDER — HYDROMORPHONE HYDROCHLORIDE 2 MG/ML
0.5 INJECTION, SOLUTION INTRAMUSCULAR; INTRAVENOUS; SUBCUTANEOUS EVERY 5 MIN PRN
Status: DISCONTINUED | OUTPATIENT
Start: 2021-11-30 | End: 2021-11-30 | Stop reason: HOSPADM

## 2021-11-30 RX ORDER — FENTANYL CITRATE 50 UG/ML
INJECTION, SOLUTION INTRAMUSCULAR; INTRAVENOUS
Status: DISCONTINUED | OUTPATIENT
Start: 2021-11-30 | End: 2021-11-30

## 2021-11-30 RX ORDER — SODIUM CHLORIDE 0.9 % (FLUSH) 0.9 %
10 SYRINGE (ML) INJECTION
Status: CANCELLED | OUTPATIENT
Start: 2021-11-30

## 2021-11-30 RX ORDER — GLUCAGON 1 MG
1 KIT INJECTION
Status: DISCONTINUED | OUTPATIENT
Start: 2021-11-30 | End: 2021-12-01 | Stop reason: HOSPADM

## 2021-11-30 RX ORDER — OXYCODONE HYDROCHLORIDE 5 MG/1
5 TABLET ORAL
Status: DISCONTINUED | OUTPATIENT
Start: 2021-11-30 | End: 2021-11-30 | Stop reason: HOSPADM

## 2021-11-30 RX ORDER — OXYCODONE HCL 10 MG/1
10 TABLET, FILM COATED, EXTENDED RELEASE ORAL ONCE
Status: COMPLETED | OUTPATIENT
Start: 2021-11-30 | End: 2021-11-30

## 2021-11-30 RX ORDER — FLUTICASONE PROPIONATE 50 MCG
2 SPRAY, SUSPENSION (ML) NASAL DAILY
Status: DISCONTINUED | OUTPATIENT
Start: 2021-12-01 | End: 2021-12-01 | Stop reason: HOSPADM

## 2021-11-30 RX ORDER — MEPERIDINE HYDROCHLORIDE 25 MG/ML
25 INJECTION INTRAMUSCULAR; INTRAVENOUS; SUBCUTANEOUS EVERY 10 MIN PRN
Status: DISCONTINUED | OUTPATIENT
Start: 2021-11-30 | End: 2021-11-30 | Stop reason: HOSPADM

## 2021-11-30 RX ORDER — ACETAMINOPHEN 500 MG
500 TABLET ORAL EVERY 4 HOURS
Status: DISCONTINUED | OUTPATIENT
Start: 2021-11-30 | End: 2021-12-01 | Stop reason: HOSPADM

## 2021-11-30 RX ORDER — IPRATROPIUM BROMIDE 21 UG/1
2 SPRAY, METERED NASAL 3 TIMES DAILY
Status: DISCONTINUED | OUTPATIENT
Start: 2021-11-30 | End: 2021-12-01 | Stop reason: HOSPADM

## 2021-11-30 RX ORDER — CYCLOBENZAPRINE HCL 5 MG
5 TABLET ORAL 3 TIMES DAILY PRN
Status: DISCONTINUED | OUTPATIENT
Start: 2021-11-30 | End: 2021-12-01 | Stop reason: HOSPADM

## 2021-11-30 RX ORDER — SERTRALINE HYDROCHLORIDE 50 MG/1
100 TABLET, FILM COATED ORAL DAILY
Status: DISCONTINUED | OUTPATIENT
Start: 2021-12-01 | End: 2021-12-01 | Stop reason: HOSPADM

## 2021-11-30 RX ORDER — CLINDAMYCIN PHOSPHATE 900 MG/50ML
900 INJECTION, SOLUTION INTRAVENOUS
Status: COMPLETED | OUTPATIENT
Start: 2021-11-30 | End: 2021-12-01

## 2021-11-30 RX ORDER — EPHEDRINE SULFATE 50 MG/ML
INJECTION, SOLUTION INTRAVENOUS
Status: DISCONTINUED | OUTPATIENT
Start: 2021-11-30 | End: 2021-11-30

## 2021-11-30 RX ORDER — ONDANSETRON 2 MG/ML
INJECTION INTRAMUSCULAR; INTRAVENOUS
Status: DISCONTINUED | OUTPATIENT
Start: 2021-11-30 | End: 2021-11-30

## 2021-11-30 RX ORDER — MORPHINE SULFATE 2 MG/ML
2 INJECTION, SOLUTION INTRAMUSCULAR; INTRAVENOUS
Status: DISCONTINUED | OUTPATIENT
Start: 2021-11-30 | End: 2021-12-01 | Stop reason: HOSPADM

## 2021-11-30 RX ORDER — MIDAZOLAM HYDROCHLORIDE 1 MG/ML
INJECTION INTRAMUSCULAR; INTRAVENOUS
Status: DISCONTINUED | OUTPATIENT
Start: 2021-11-30 | End: 2021-11-30

## 2021-11-30 RX ORDER — ONDANSETRON 2 MG/ML
4 INJECTION INTRAMUSCULAR; INTRAVENOUS DAILY PRN
Status: DISCONTINUED | OUTPATIENT
Start: 2021-11-30 | End: 2021-11-30 | Stop reason: HOSPADM

## 2021-11-30 RX ORDER — ROCURONIUM BROMIDE 10 MG/ML
INJECTION, SOLUTION INTRAVENOUS
Status: DISCONTINUED | OUTPATIENT
Start: 2021-11-30 | End: 2021-11-30

## 2021-11-30 RX ORDER — PROPOFOL 10 MG/ML
VIAL (ML) INTRAVENOUS
Status: DISCONTINUED | OUTPATIENT
Start: 2021-11-30 | End: 2021-11-30

## 2021-11-30 RX ORDER — AMOXICILLIN 250 MG
1 CAPSULE ORAL 2 TIMES DAILY
Status: DISCONTINUED | OUTPATIENT
Start: 2021-11-30 | End: 2021-12-01 | Stop reason: HOSPADM

## 2021-11-30 RX ORDER — ALBUTEROL SULFATE 90 UG/1
2 AEROSOL, METERED RESPIRATORY (INHALATION) EVERY 6 HOURS PRN
Status: CANCELLED | OUTPATIENT
Start: 2021-11-30

## 2021-11-30 RX ORDER — METOPROLOL SUCCINATE 100 MG/1
100 TABLET, EXTENDED RELEASE ORAL DAILY
Status: DISCONTINUED | OUTPATIENT
Start: 2021-12-01 | End: 2021-12-01 | Stop reason: HOSPADM

## 2021-11-30 RX ORDER — FAMOTIDINE 20 MG/1
20 TABLET, FILM COATED ORAL 2 TIMES DAILY
Status: DISCONTINUED | OUTPATIENT
Start: 2021-11-30 | End: 2021-12-01 | Stop reason: HOSPADM

## 2021-11-30 RX ORDER — DIPHENHYDRAMINE HYDROCHLORIDE 50 MG/ML
25 INJECTION INTRAMUSCULAR; INTRAVENOUS EVERY 6 HOURS PRN
Status: DISCONTINUED | OUTPATIENT
Start: 2021-11-30 | End: 2021-11-30 | Stop reason: HOSPADM

## 2021-11-30 RX ORDER — ATORVASTATIN CALCIUM 20 MG/1
20 TABLET, FILM COATED ORAL DAILY
Status: DISCONTINUED | OUTPATIENT
Start: 2021-12-01 | End: 2021-12-01 | Stop reason: HOSPADM

## 2021-11-30 RX ORDER — METOCLOPRAMIDE HYDROCHLORIDE 5 MG/ML
5 INJECTION INTRAMUSCULAR; INTRAVENOUS EVERY 6 HOURS PRN
Status: DISCONTINUED | OUTPATIENT
Start: 2021-11-30 | End: 2021-12-01 | Stop reason: HOSPADM

## 2021-11-30 RX ORDER — PROMETHAZINE HYDROCHLORIDE 25 MG/1
25 TABLET ORAL EVERY 4 HOURS
Status: DISCONTINUED | OUTPATIENT
Start: 2021-11-30 | End: 2021-12-01 | Stop reason: HOSPADM

## 2021-11-30 RX ORDER — POLYETHYLENE GLYCOL 3350 17 G/17G
17 POWDER, FOR SOLUTION ORAL DAILY
Status: DISCONTINUED | OUTPATIENT
Start: 2021-12-01 | End: 2021-12-01 | Stop reason: HOSPADM

## 2021-11-30 RX ORDER — OXYCODONE HYDROCHLORIDE 5 MG/1
5 TABLET ORAL
Status: DISCONTINUED | OUTPATIENT
Start: 2021-11-30 | End: 2021-12-01 | Stop reason: HOSPADM

## 2021-11-30 RX ADMIN — CLINDAMYCIN PHOSPHATE 900 MG: 900 INJECTION, SOLUTION INTRAVENOUS at 04:11

## 2021-11-30 RX ADMIN — IPRATROPIUM BROMIDE AND ALBUTEROL SULFATE 3 ML: .5; 3 SOLUTION RESPIRATORY (INHALATION) at 01:11

## 2021-11-30 RX ADMIN — TRANEXAMIC ACID 1000 MG: 100 INJECTION, SOLUTION INTRAVENOUS at 10:11

## 2021-11-30 RX ADMIN — TRANEXAMIC ACID 1000 MG: 100 INJECTION, SOLUTION INTRAVENOUS at 12:11

## 2021-11-30 RX ADMIN — ACETAMINOPHEN 500 MG: 500 TABLET ORAL at 10:11

## 2021-11-30 RX ADMIN — FENTANYL CITRATE 100 MCG: 50 INJECTION INTRAMUSCULAR; INTRAVENOUS at 10:11

## 2021-11-30 RX ADMIN — ONDANSETRON 8 MG: 2 INJECTION INTRAMUSCULAR; INTRAVENOUS at 10:11

## 2021-11-30 RX ADMIN — SODIUM CHLORIDE: 9 INJECTION, SOLUTION INTRAVENOUS at 11:11

## 2021-11-30 RX ADMIN — INSULIN ASPART 8 UNITS: 100 INJECTION, SOLUTION INTRAVENOUS; SUBCUTANEOUS at 04:11

## 2021-11-30 RX ADMIN — HYDRALAZINE HYDROCHLORIDE 100 MG: 50 TABLET ORAL at 08:11

## 2021-11-30 RX ADMIN — INSULIN ASPART 5 UNITS: 100 INJECTION, SOLUTION INTRAVENOUS; SUBCUTANEOUS at 08:11

## 2021-11-30 RX ADMIN — HYDROMORPHONE HYDROCHLORIDE 0.5 MG: 2 INJECTION INTRAMUSCULAR; INTRAVENOUS; SUBCUTANEOUS at 01:11

## 2021-11-30 RX ADMIN — HUMAN INSULIN 6 UNITS: 100 INJECTION, SOLUTION SUBCUTANEOUS at 01:11

## 2021-11-30 RX ADMIN — EPHEDRINE SULFATE 25 MG: 50 INJECTION INTRAVENOUS at 12:11

## 2021-11-30 RX ADMIN — OXYCODONE HYDROCHLORIDE 10 MG: 10 TABLET, FILM COATED, EXTENDED RELEASE ORAL at 08:11

## 2021-11-30 RX ADMIN — FENTANYL CITRATE 100 MCG: 50 INJECTION INTRAMUSCULAR; INTRAVENOUS at 11:11

## 2021-11-30 RX ADMIN — PROMETHAZINE HYDROCHLORIDE 25 MG: 25 TABLET ORAL at 05:11

## 2021-11-30 RX ADMIN — MIDAZOLAM 2 MG: 1 INJECTION INTRAMUSCULAR; INTRAVENOUS at 10:11

## 2021-11-30 RX ADMIN — CELECOXIB 200 MG: 100 CAPSULE ORAL at 08:11

## 2021-11-30 RX ADMIN — CLINDAMYCIN PHOSPHATE 900 MG: 900 INJECTION, SOLUTION INTRAVENOUS at 10:11

## 2021-11-30 RX ADMIN — MEPERIDINE HYDROCHLORIDE 25 MG: 25 INJECTION INTRAMUSCULAR; INTRAVENOUS; SUBCUTANEOUS at 01:11

## 2021-11-30 RX ADMIN — PROMETHAZINE HYDROCHLORIDE 25 MG: 25 TABLET ORAL at 10:11

## 2021-11-30 RX ADMIN — MUPIROCIN: 20 OINTMENT TOPICAL at 08:11

## 2021-11-30 RX ADMIN — ROCURONIUM BROMIDE 50 MG: 10 INJECTION, SOLUTION INTRAVENOUS at 10:11

## 2021-11-30 RX ADMIN — OXYCODONE HYDROCHLORIDE 5 MG: 5 TABLET ORAL at 04:11

## 2021-11-30 RX ADMIN — OXYCODONE HYDROCHLORIDE 5 MG: 5 TABLET ORAL at 08:11

## 2021-11-30 RX ADMIN — FAMOTIDINE 20 MG: 20 TABLET, FILM COATED ORAL at 08:11

## 2021-11-30 RX ADMIN — ROCURONIUM BROMIDE 20 MG: 10 INJECTION, SOLUTION INTRAVENOUS at 11:11

## 2021-11-30 RX ADMIN — GABAPENTIN 600 MG: 300 CAPSULE ORAL at 08:11

## 2021-11-30 RX ADMIN — ROCURONIUM BROMIDE 30 MG: 10 INJECTION, SOLUTION INTRAVENOUS at 11:11

## 2021-11-30 RX ADMIN — SUGAMMADEX 200 MG: 100 INJECTION, SOLUTION INTRAVENOUS at 01:11

## 2021-11-30 RX ADMIN — DOCUSATE SODIUM 100 MG: 100 CAPSULE ORAL at 08:11

## 2021-11-30 RX ADMIN — ACETAMINOPHEN 500 MG: 500 TABLET ORAL at 05:11

## 2021-11-30 RX ADMIN — LIDOCAINE HYDROCHLORIDE 100 MG: 20 INJECTION, SOLUTION INTRAVENOUS at 10:11

## 2021-11-30 RX ADMIN — SENNOSIDES AND DOCUSATE SODIUM 1 TABLET: 50; 8.6 TABLET ORAL at 08:11

## 2021-11-30 RX ADMIN — PROPOFOL 150 MG: 10 INJECTION, EMULSION INTRAVENOUS at 10:11

## 2021-11-30 RX ADMIN — CLINDAMYCIN IN 5 PERCENT DEXTROSE 900 MG: 18 INJECTION, SOLUTION INTRAVENOUS at 10:11

## 2021-11-30 RX ADMIN — TRAMADOL HYDROCHLORIDE 50 MG: 50 TABLET, FILM COATED ORAL at 05:11

## 2021-12-01 VITALS
TEMPERATURE: 98 F | DIASTOLIC BLOOD PRESSURE: 84 MMHG | WEIGHT: 233 LBS | RESPIRATION RATE: 18 BRPM | BODY MASS INDEX: 38.82 KG/M2 | SYSTOLIC BLOOD PRESSURE: 132 MMHG | OXYGEN SATURATION: 96 % | HEART RATE: 96 BPM | HEIGHT: 65 IN

## 2021-12-01 LAB
GLUCOSE SERPL-MCNC: 180 MG/DL (ref 70–105)
GLUCOSE SERPL-MCNC: 214 MG/DL (ref 70–105)

## 2021-12-01 PROCEDURE — 63600175 PHARM REV CODE 636 W HCPCS: Performed by: ORTHOPAEDIC SURGERY

## 2021-12-01 PROCEDURE — 25000003 PHARM REV CODE 250: Performed by: ORTHOPAEDIC SURGERY

## 2021-12-01 PROCEDURE — 97165 OT EVAL LOW COMPLEX 30 MIN: CPT

## 2021-12-01 PROCEDURE — 96372 THER/PROPH/DIAG INJ SC/IM: CPT

## 2021-12-01 PROCEDURE — 97110 THERAPEUTIC EXERCISES: CPT

## 2021-12-01 PROCEDURE — 25000242 PHARM REV CODE 250 ALT 637 W/ HCPCS: Performed by: ORTHOPAEDIC SURGERY

## 2021-12-01 PROCEDURE — 94761 N-INVAS EAR/PLS OXIMETRY MLT: CPT

## 2021-12-01 PROCEDURE — 82962 GLUCOSE BLOOD TEST: CPT

## 2021-12-01 RX ADMIN — ACETAMINOPHEN 500 MG: 500 TABLET ORAL at 09:12

## 2021-12-01 RX ADMIN — OXYCODONE HYDROCHLORIDE 5 MG: 5 TABLET ORAL at 11:12

## 2021-12-01 RX ADMIN — TRAMADOL HYDROCHLORIDE 50 MG: 50 TABLET, FILM COATED ORAL at 06:12

## 2021-12-01 RX ADMIN — POLYETHYLENE GLYCOL 3350 17 G: 17 POWDER, FOR SOLUTION ORAL at 08:12

## 2021-12-01 RX ADMIN — NIFEDIPINE 60 MG: 60 TABLET, FILM COATED, EXTENDED RELEASE ORAL at 08:12

## 2021-12-01 RX ADMIN — INSULIN ASPART 2 UNITS: 100 INJECTION, SOLUTION INTRAVENOUS; SUBCUTANEOUS at 11:12

## 2021-12-01 RX ADMIN — PROMETHAZINE HYDROCHLORIDE 25 MG: 25 TABLET ORAL at 02:12

## 2021-12-01 RX ADMIN — OXYCODONE HYDROCHLORIDE 5 MG: 5 TABLET ORAL at 12:12

## 2021-12-01 RX ADMIN — CLINDAMYCIN PHOSPHATE 900 MG: 900 INJECTION, SOLUTION INTRAVENOUS at 04:12

## 2021-12-01 RX ADMIN — LEVOTHYROXINE SODIUM 175 MCG: 0.1 TABLET ORAL at 08:12

## 2021-12-01 RX ADMIN — METOPROLOL SUCCINATE 100 MG: 100 TABLET, EXTENDED RELEASE ORAL at 08:12

## 2021-12-01 RX ADMIN — TRAMADOL HYDROCHLORIDE 50 MG: 50 TABLET, FILM COATED ORAL at 12:12

## 2021-12-01 RX ADMIN — PROMETHAZINE HYDROCHLORIDE 25 MG: 25 TABLET ORAL at 06:12

## 2021-12-01 RX ADMIN — OXYCODONE HYDROCHLORIDE 5 MG: 5 TABLET ORAL at 08:12

## 2021-12-01 RX ADMIN — VENLAFAXINE HYDROCHLORIDE 150 MG: 75 CAPSULE, EXTENDED RELEASE ORAL at 08:12

## 2021-12-01 RX ADMIN — PROMETHAZINE HYDROCHLORIDE 25 MG: 25 TABLET ORAL at 09:12

## 2021-12-01 RX ADMIN — MORPHINE SULFATE 2 MG: 2 INJECTION, SOLUTION INTRAMUSCULAR; INTRAVENOUS at 09:12

## 2021-12-01 RX ADMIN — HYDRALAZINE HYDROCHLORIDE 100 MG: 50 TABLET ORAL at 08:12

## 2021-12-01 RX ADMIN — OXYCODONE HYDROCHLORIDE 5 MG: 5 TABLET ORAL at 04:12

## 2021-12-01 RX ADMIN — TRAMADOL HYDROCHLORIDE 50 MG: 50 TABLET, FILM COATED ORAL at 11:12

## 2021-12-01 RX ADMIN — FAMOTIDINE 20 MG: 20 TABLET, FILM COATED ORAL at 08:12

## 2021-12-01 RX ADMIN — FLUTICASONE PROPIONATE 100 MCG: 50 SPRAY, METERED NASAL at 08:12

## 2021-12-01 RX ADMIN — INSULIN ASPART 4 UNITS: 100 INJECTION, SOLUTION INTRAVENOUS; SUBCUTANEOUS at 06:12

## 2021-12-01 RX ADMIN — DOCUSATE SODIUM 100 MG: 100 CAPSULE ORAL at 08:12

## 2021-12-01 RX ADMIN — ACETAMINOPHEN 500 MG: 500 TABLET ORAL at 06:12

## 2021-12-01 RX ADMIN — ATORVASTATIN CALCIUM 20 MG: 20 TABLET, FILM COATED ORAL at 08:12

## 2021-12-01 RX ADMIN — ACETAMINOPHEN 500 MG: 500 TABLET ORAL at 02:12

## 2021-12-01 RX ADMIN — MUPIROCIN: 20 OINTMENT TOPICAL at 08:12

## 2021-12-01 RX ADMIN — SERTRALINE HYDROCHLORIDE 100 MG: 50 TABLET ORAL at 08:12

## 2021-12-01 RX ADMIN — SENNOSIDES AND DOCUSATE SODIUM 1 TABLET: 50; 8.6 TABLET ORAL at 08:12

## 2021-12-02 ENCOUNTER — TELEPHONE (OUTPATIENT)
Dept: SPINE | Facility: CLINIC | Age: 46
End: 2021-12-02
Payer: OTHER GOVERNMENT

## 2021-12-13 DIAGNOSIS — Z09 POSTOP CHECK: Primary | ICD-10-CM

## 2021-12-15 ENCOUNTER — OFFICE VISIT (OUTPATIENT)
Dept: SPINE | Facility: CLINIC | Age: 46
End: 2021-12-15
Payer: OTHER GOVERNMENT

## 2021-12-15 ENCOUNTER — HOSPITAL ENCOUNTER (OUTPATIENT)
Dept: RADIOLOGY | Facility: HOSPITAL | Age: 46
Discharge: HOME | End: 2021-12-15
Attending: ORTHOPAEDIC SURGERY
Payer: OTHER GOVERNMENT

## 2021-12-15 DIAGNOSIS — M51.36 DDD (DEGENERATIVE DISC DISEASE), LUMBAR: Primary | ICD-10-CM

## 2021-12-15 DIAGNOSIS — M54.16 LUMBAR RADICULOPATHY: ICD-10-CM

## 2021-12-15 DIAGNOSIS — Z09 POSTOP CHECK: ICD-10-CM

## 2021-12-15 PROCEDURE — 99024 PR POST-OP FOLLOW-UP VISIT: ICD-10-PCS | Mod: ,,, | Performed by: ORTHOPAEDIC SURGERY

## 2021-12-15 PROCEDURE — 72100 X-RAY EXAM L-S SPINE 2/3 VWS: CPT | Mod: TC

## 2021-12-15 PROCEDURE — 72100 XR LUMBAR SPINE AP AND LATERAL: ICD-10-PCS | Mod: 26,,, | Performed by: ORTHOPAEDIC SURGERY

## 2021-12-15 PROCEDURE — 72100 X-RAY EXAM L-S SPINE 2/3 VWS: CPT | Mod: 26,,, | Performed by: ORTHOPAEDIC SURGERY

## 2021-12-15 PROCEDURE — 99214 OFFICE O/P EST MOD 30 MIN: CPT | Mod: PBBFAC | Performed by: ORTHOPAEDIC SURGERY

## 2021-12-15 PROCEDURE — 99024 POSTOP FOLLOW-UP VISIT: CPT | Mod: ,,, | Performed by: ORTHOPAEDIC SURGERY

## 2021-12-15 RX ORDER — SULFAMETHOXAZOLE AND TRIMETHOPRIM 800; 160 MG/1; MG/1
1 TABLET ORAL 2 TIMES DAILY
Status: SHIPPED | OUTPATIENT
Start: 2021-12-15 | End: 2021-12-25

## 2021-12-15 RX ORDER — OXYCODONE AND ACETAMINOPHEN 5; 325 MG/1; MG/1
1 TABLET ORAL EVERY 4 HOURS PRN
Qty: 45 TABLET | Refills: 0 | Status: SHIPPED | OUTPATIENT
Start: 2021-12-15 | End: 2022-05-08

## 2021-12-27 ENCOUNTER — TELEPHONE (OUTPATIENT)
Dept: SPINE | Facility: CLINIC | Age: 46
End: 2021-12-27
Payer: OTHER GOVERNMENT

## 2021-12-27 RX ORDER — CYCLOBENZAPRINE HCL 5 MG
5 TABLET ORAL 3 TIMES DAILY PRN
Qty: 45 TABLET | Refills: 3 | Status: SHIPPED | OUTPATIENT
Start: 2021-12-27 | End: 2022-04-28

## 2021-12-28 ENCOUNTER — TELEPHONE (OUTPATIENT)
Dept: SPINE | Facility: CLINIC | Age: 46
End: 2021-12-28
Payer: OTHER GOVERNMENT

## 2021-12-28 ENCOUNTER — OFFICE VISIT (OUTPATIENT)
Dept: SPINE | Facility: CLINIC | Age: 46
End: 2021-12-28
Payer: OTHER GOVERNMENT

## 2021-12-28 ENCOUNTER — HOSPITAL ENCOUNTER (OUTPATIENT)
Dept: RADIOLOGY | Facility: HOSPITAL | Age: 46
Discharge: HOME | End: 2021-12-28
Attending: NURSE PRACTITIONER
Payer: OTHER GOVERNMENT

## 2021-12-28 DIAGNOSIS — Z09 POSTOP CHECK: Primary | ICD-10-CM

## 2021-12-28 DIAGNOSIS — T81.49XA WOUND INFECTION AFTER SURGERY: ICD-10-CM

## 2021-12-28 DIAGNOSIS — Z09 POSTOP CHECK: ICD-10-CM

## 2021-12-28 PROCEDURE — 72100 X-RAY EXAM L-S SPINE 2/3 VWS: CPT | Mod: TC

## 2021-12-28 PROCEDURE — 99214 OFFICE O/P EST MOD 30 MIN: CPT | Mod: PBBFAC | Performed by: NURSE PRACTITIONER

## 2021-12-28 PROCEDURE — 72100 XR LUMBAR SPINE AP AND LATERAL: ICD-10-PCS | Mod: 26,,, | Performed by: RADIOLOGY

## 2021-12-28 PROCEDURE — 99024 PR POST-OP FOLLOW-UP VISIT: ICD-10-PCS | Mod: ,,, | Performed by: NURSE PRACTITIONER

## 2021-12-28 PROCEDURE — 99024 POSTOP FOLLOW-UP VISIT: CPT | Mod: ,,, | Performed by: NURSE PRACTITIONER

## 2021-12-28 PROCEDURE — 72100 X-RAY EXAM L-S SPINE 2/3 VWS: CPT | Mod: 26,,, | Performed by: RADIOLOGY

## 2021-12-28 RX ORDER — SULFAMETHOXAZOLE AND TRIMETHOPRIM 800; 160 MG/1; MG/1
1 TABLET ORAL 2 TIMES DAILY
Qty: 20 TABLET | Refills: 0 | Status: CANCELLED | OUTPATIENT
Start: 2021-12-28 | End: 2022-01-07

## 2022-01-03 ENCOUNTER — OFFICE VISIT (OUTPATIENT)
Dept: SPINE | Facility: CLINIC | Age: 47
End: 2022-01-03
Payer: OTHER GOVERNMENT

## 2022-01-03 ENCOUNTER — OFFICE VISIT (OUTPATIENT)
Dept: FAMILY MEDICINE | Facility: CLINIC | Age: 47
End: 2022-01-03
Payer: OTHER GOVERNMENT

## 2022-01-03 VITALS
SYSTOLIC BLOOD PRESSURE: 132 MMHG | BODY MASS INDEX: 37.35 KG/M2 | WEIGHT: 238 LBS | DIASTOLIC BLOOD PRESSURE: 70 MMHG | HEIGHT: 67 IN | OXYGEN SATURATION: 96 % | TEMPERATURE: 97 F | HEART RATE: 95 BPM

## 2022-01-03 DIAGNOSIS — M47.816 LUMBAR SPONDYLOSIS: ICD-10-CM

## 2022-01-03 DIAGNOSIS — R10.9 RIGHT FLANK PAIN: ICD-10-CM

## 2022-01-03 DIAGNOSIS — T81.49XA WOUND INFECTION AFTER SURGERY: Primary | ICD-10-CM

## 2022-01-03 DIAGNOSIS — E11.65 UNCONTROLLED TYPE 2 DIABETES MELLITUS WITH HYPERGLYCEMIA: Primary | ICD-10-CM

## 2022-01-03 LAB
AMORPH PHOS CRY #/AREA URNS LPF: ABNORMAL /LPF
BACTERIA #/AREA URNS HPF: ABNORMAL /HPF
BILIRUB UR QL STRIP: NEGATIVE
CAOX CRY #/AREA URNS LPF: ABNORMAL /LPF
CLARITY UR: ABNORMAL
COLOR UR: YELLOW
GLUCOSE SERPL-MCNC: 246 MG/DL (ref 70–110)
GLUCOSE UR STRIP-MCNC: NEGATIVE MG/DL
KETONES UR STRIP-SCNC: 15 MG/DL
LEUKOCYTE ESTERASE UR QL STRIP: NEGATIVE
NITRITE UR QL STRIP: NEGATIVE
PH UR STRIP: 6 PH UNITS
PROT UR QL STRIP: 30
RBC # UR STRIP: NEGATIVE /UL
RBC #/AREA URNS HPF: ABNORMAL /HPF
SP GR UR STRIP: 1.02
SQUAMOUS #/AREA URNS LPF: ABNORMAL /LPF
UROBILINOGEN UR STRIP-ACNC: 0.2 MG/DL
WBC #/AREA URNS HPF: ABNORMAL /HPF

## 2022-01-03 PROCEDURE — 81001 URINALYSIS AUTO W/SCOPE: CPT | Mod: ,,, | Performed by: CLINICAL MEDICAL LABORATORY

## 2022-01-03 PROCEDURE — 99214 PR OFFICE/OUTPT VISIT, EST, LEVL IV, 30-39 MIN: ICD-10-PCS | Mod: ,,, | Performed by: FAMILY MEDICINE

## 2022-01-03 PROCEDURE — 99214 OFFICE O/P EST MOD 30 MIN: CPT | Mod: PBBFAC | Performed by: ORTHOPAEDIC SURGERY

## 2022-01-03 PROCEDURE — 99214 OFFICE O/P EST MOD 30 MIN: CPT | Mod: ,,, | Performed by: FAMILY MEDICINE

## 2022-01-03 PROCEDURE — 81001 URINALYSIS, REFLEX TO URINE CULTURE: ICD-10-PCS | Mod: ,,, | Performed by: CLINICAL MEDICAL LABORATORY

## 2022-01-03 PROCEDURE — 99024 PR POST-OP FOLLOW-UP VISIT: ICD-10-PCS | Mod: ,,, | Performed by: ORTHOPAEDIC SURGERY

## 2022-01-03 PROCEDURE — 99024 POSTOP FOLLOW-UP VISIT: CPT | Mod: ,,, | Performed by: ORTHOPAEDIC SURGERY

## 2022-01-03 RX ORDER — DULAGLUTIDE 1.5 MG/.5ML
1.5 INJECTION, SOLUTION SUBCUTANEOUS
Qty: 4 PEN | Refills: 2 | Status: SHIPPED | OUTPATIENT
Start: 2022-01-03 | End: 2022-02-03 | Stop reason: SDUPTHER

## 2022-01-04 ENCOUNTER — TELEPHONE (OUTPATIENT)
Dept: SPINE | Facility: CLINIC | Age: 47
End: 2022-01-04
Payer: OTHER GOVERNMENT

## 2022-01-04 ENCOUNTER — TELEPHONE (OUTPATIENT)
Dept: FAMILY MEDICINE | Facility: CLINIC | Age: 47
End: 2022-01-04
Payer: OTHER GOVERNMENT

## 2022-01-04 DIAGNOSIS — T81.49XA WOUND INFECTION AFTER SURGERY: Primary | ICD-10-CM

## 2022-01-04 NOTE — TELEPHONE ENCOUNTER
----- Message from Long Fernández DO sent at 1/4/2022 10:37 AM CST -----  Urinalysis shows mild dehydration but no signs of infection

## 2022-01-04 NOTE — PROGRESS NOTES
MDM/time:  postop    ASSESSMENT:  46 y.o. female with lumbar spondylosis and radiculopathy now status post L2-L5 laminectomy 2021    PLAN:  Repeat labs today were persistently elevated.  I have ordered an MRI lumbar spine to assess for fluid collection    HPI:  46 y.o. female here for wound check.  She is status post L2-L5 laminectomy 2021.  Reports her drainage has been improving.  Has been taking the Bactrim.     IMAGIN2021 X-rays lumbar spine reviewed show:  There is no fracture identified.  There are postsurgical changes from laminectomies in the lumbar spine similar to prior.  Alignment is maintained.       Past Medical History:   Diagnosis Date    Asthma     CHF (congestive heart failure)     Chronic pain syndrome     Depressive disorder     Enlarged heart     Gastroparesis     GERD (gastroesophageal reflux disease)     Hyperlipidemia     Hypertension     IBS (irritable bowel syndrome)     Kidney stones     Lumbar radiculopathy     Sleep apnea      Past Surgical History:   Procedure Laterality Date    Bilateral L3-S1 MBB Bilateral 2017, 2017, 2014    Dr Jessica Randolph    CARPAL TUNNEL RELEASE      CHOLECYSTECTOMY      DIAGNOSTIC LAPAROSCOPY  2010    Exploratory Laparotomy, Myomectomy, Hydrotubation-Dr. Feng    DILATION AND CURETTAGE OF UTERUS  2010    Hysteroscopy-Dr. Feng    EXPLORATORY LAPAROTOMY WITH UTERINE MYOMECTOMY  2007    Dr. Marquise Anderson    HYSTERECTOMY  2011    Robotic, FABIENNE, Cystoscopy-Dr. Feng    HYSTEROSCOPY WITH DILATION AND CURETTAGE OF UTERUS  2006    Laparoscopy, Chromotubation-Dr. Marquise Anderson    LAMINECTOMY N/A 2021    Procedure: L2-L5 Laminectomy;  Surgeon: Cyril Upton MD;  Location: [facility], [place];  Service: Neurosurgery;  Laterality: N/A;    LEFT HEART CATHETERIZATION      Left L3-S1 RFTC Left 2017    Dr Lopez    Left SI JI Left 2013    Dr Randolph    OOPHORECTOMY   11/11/2014    Robotic, FABIENNE, Cystoscopy-Dr. Feng    SINUS SURGERY       Social History     Tobacco Use    Smoking status: Never Smoker    Smokeless tobacco: Never Used   Substance Use Topics    Alcohol use: Not Currently    Drug use: Never      Current Outpatient Medications   Medication Instructions    albuterol (PROVENTIL/VENTOLIN HFA) 90 mcg/actuation inhaler 2 puffs, Inhalation, Every 6 hours PRN, Rescue     atorvastatin (LIPITOR) 20 mg, Oral, Daily    cetirizine (ZYRTEC) 10 mg, Oral, Daily    cloNIDine (CATAPRES) 0.2 MG tablet TAKE 1 TABLET BY MOUTH 4 TIMES DAILY    cyclobenzaprine (FLEXERIL) 5 mg, Oral, 3 times daily PRN    fluticasone propionate (FLONASE) 100 mcg, Each Nostril, Daily    glipizide-metformin (METAGLIP) 2.5-500 mg per tablet 1 tablet, Oral, 2 times daily    hydrALAZINE (APRESOLINE) 100 mg, Oral, 3 times daily    HYDROcodone-acetaminophen (NORCO) 7.5-325 mg per tablet 1 tablet, Oral, Every 8 hours PRN    levothyroxine (SYNTHROID, LEVOTHROID) 175 MCG tablet TAKE 1 TABLET BY MOUTH EACH MORNING BEFORE BREAKFAST ON EMPTY STOMACH (DRINK PLENTY OF WATER)    meloxicam (MOBIC) 7.5 MG tablet TAKE 1 TABLET BY MOUTH DAILY WITH FOOD AS NEEDED FOR PAIN ...STOP TAKING ALL OTHER NSAIDS WHILE TAKING THIS MEDICATION    metoprolol succinate (TOPROL-XL) 100 MG 24 hr tablet TAKE 1 TABLET BY MOUTH EVERY DAY FOR HIGH BLOOD PRESSURE    NIFEdipine (PROCARDIA-XL) 60 mg, Oral, Daily    oxyCODONE-acetaminophen (PERCOCET) 5-325 mg per tablet 1 tablet, Oral, Every 4 hours PRN    oxyCODONE-acetaminophen (PERCOCET) 5-325 mg per tablet 1 tablet, Oral, Every 4 hours PRN    promethazine (PHENERGAN) 25 mg, Oral, Every 4 hours    sertraline (ZOLOFT) 100 mg, Oral, Daily    traMADoL (ULTRAM) 50 mg, Oral, Every 6 hours PRN    traMADoL (ULTRAM) 50 mg, Oral, Every 6 hours    TRULICITY 1.5 mg, Subcutaneous, Every 7 days    venlafaxine (EFFEXOR-XR) 150 MG Cp24 TAKE 1 CAPSULE BY MOUTH EVERY DAY WITH FOOD         EXAM:  Constitutional  General Appearance:  There is no height or weight on file to calculate BMI., NAD  Psychiatric   Orientation: Oriented to time, oriented to place, oriented to person  Mood and Affect: Active and alert, normal mood, normal affect  Gait and Station   Appearance:  Normal gait, normal tandem gait, able to walk on toes, able to walk on heels  Healing incision with some induration.  Unable to express any purulence.    5/5 strength  Sensation intact  2+ pulses

## 2022-01-04 NOTE — TELEPHONE ENCOUNTER
Dr. Upton reviewed patients lab work and has order MRI lumbar with and without contrast to assess infection in lumbar. Scheduled MRI for 1/6 @ 12:15 and Notified patient of appt. She verbalizes understanding. She confirms that she is still taking PO antibiotics.

## 2022-01-06 ENCOUNTER — DOCUMENTATION ONLY (OUTPATIENT)
Dept: SPINE | Facility: CLINIC | Age: 47
End: 2022-01-06
Payer: OTHER GOVERNMENT

## 2022-01-06 ENCOUNTER — OFFICE VISIT (OUTPATIENT)
Dept: FAMILY MEDICINE | Facility: CLINIC | Age: 47
End: 2022-01-06
Payer: OTHER GOVERNMENT

## 2022-01-06 VITALS
HEART RATE: 98 BPM | OXYGEN SATURATION: 97 % | DIASTOLIC BLOOD PRESSURE: 80 MMHG | TEMPERATURE: 97 F | HEIGHT: 66 IN | BODY MASS INDEX: 36.16 KG/M2 | SYSTOLIC BLOOD PRESSURE: 130 MMHG | WEIGHT: 225 LBS

## 2022-01-06 DIAGNOSIS — L76.34 POSTOPERATIVE SEROMA OF SUBCUTANEOUS TISSUE AFTER NON-DERMATOLOGIC PROCEDURE: ICD-10-CM

## 2022-01-06 DIAGNOSIS — T81.49XA WOUND INFECTION AFTER SURGERY: Primary | ICD-10-CM

## 2022-01-06 DIAGNOSIS — E11.65 UNCONTROLLED TYPE 2 DIABETES MELLITUS WITH HYPERGLYCEMIA: Primary | ICD-10-CM

## 2022-01-06 PROCEDURE — 99213 OFFICE O/P EST LOW 20 MIN: CPT | Mod: ,,, | Performed by: FAMILY MEDICINE

## 2022-01-06 PROCEDURE — 99213 PR OFFICE/OUTPT VISIT, EST, LEVL III, 20-29 MIN: ICD-10-PCS | Mod: ,,, | Performed by: FAMILY MEDICINE

## 2022-01-06 RX ORDER — VENLAFAXINE HYDROCHLORIDE 150 MG/1
CAPSULE, EXTENDED RELEASE ORAL
Qty: 30 CAPSULE | Refills: 0 | Status: SHIPPED | OUTPATIENT
Start: 2022-01-06 | End: 2022-01-06 | Stop reason: SDUPTHER

## 2022-01-06 RX ORDER — GLIPIZIDE AND METFORMIN HCL 5; 500 MG/1; MG/1
1 TABLET, FILM COATED ORAL
Qty: 60 TABLET | Refills: 2 | Status: SHIPPED | OUTPATIENT
Start: 2022-01-06 | End: 2022-08-02 | Stop reason: SDUPTHER

## 2022-01-06 RX ORDER — METOPROLOL SUCCINATE 100 MG/1
100 TABLET, EXTENDED RELEASE ORAL DAILY
Qty: 90 TABLET | Refills: 0 | Status: SHIPPED | OUTPATIENT
Start: 2022-01-06 | End: 2022-02-03 | Stop reason: SDUPTHER

## 2022-01-06 RX ORDER — VENLAFAXINE HYDROCHLORIDE 150 MG/1
CAPSULE, EXTENDED RELEASE ORAL
Qty: 90 CAPSULE | Refills: 0 | Status: SHIPPED | OUTPATIENT
Start: 2022-01-06 | End: 2022-02-03 | Stop reason: SDUPTHER

## 2022-01-06 RX ORDER — CLONIDINE HYDROCHLORIDE 0.2 MG/1
0.2 TABLET ORAL 4 TIMES DAILY
Qty: 120 TABLET | Refills: 0 | Status: SHIPPED | OUTPATIENT
Start: 2022-01-06 | End: 2022-01-06 | Stop reason: SDUPTHER

## 2022-01-06 RX ORDER — SERTRALINE HYDROCHLORIDE 100 MG/1
100 TABLET, FILM COATED ORAL DAILY
Qty: 90 TABLET | Refills: 0 | Status: SHIPPED | OUTPATIENT
Start: 2022-01-06 | End: 2022-02-03 | Stop reason: SDUPTHER

## 2022-01-06 RX ORDER — SERTRALINE HYDROCHLORIDE 100 MG/1
100 TABLET, FILM COATED ORAL DAILY
Qty: 30 TABLET | Refills: 0 | Status: SHIPPED | OUTPATIENT
Start: 2022-01-06 | End: 2022-01-06 | Stop reason: SDUPTHER

## 2022-01-06 RX ORDER — CLONIDINE HYDROCHLORIDE 0.2 MG/1
0.2 TABLET ORAL 4 TIMES DAILY
Qty: 360 TABLET | Refills: 0 | Status: SHIPPED | OUTPATIENT
Start: 2022-01-06 | End: 2022-02-03 | Stop reason: SDUPTHER

## 2022-01-06 RX ORDER — METOPROLOL SUCCINATE 100 MG/1
100 TABLET, EXTENDED RELEASE ORAL DAILY PRN
Qty: 30 TABLET | Refills: 0 | Status: SHIPPED | OUTPATIENT
Start: 2022-01-06 | End: 2022-01-06 | Stop reason: SDUPTHER

## 2022-01-06 NOTE — PROGRESS NOTES
Mrs. Leonardo stopped by clinic after her MRI. Dr. Upton reviewed MRI and has ordered CT Biopsy Paraspinal- IR. First available appt 1/12 @ 8:30. Discussed with patient she verbalizes understanding and states that she is still on her antibiotics.

## 2022-01-06 NOTE — PROGRESS NOTES
[facility]    Chief Complaint      Chief Complaint   Patient presents with    Diabetes      Elevated blood sugar       History of Present Illness      Carey Leonardo is a 46 y.o. female with chronic conditions of HTN, DM2, hypothyroidism, dyslipidemia, anxiety and lumbar radiculopathy who presents today for elevated blood glucose.  Patient recently underwent L2-L5 laminectomy.  There were concerns over possible wound infection inpatient recently completed course of Bactrim.  Patient states blood glucose has been running 200-300s.  Patient states she has been compliant medications.    Past Medical History:  Past Medical History:   Diagnosis Date    Asthma     CHF (congestive heart failure)     Chronic pain syndrome     Depressive disorder     Enlarged heart     Gastroparesis     GERD (gastroesophageal reflux disease)     Hyperlipidemia     Hypertension     IBS (irritable bowel syndrome)     Kidney stones     Lumbar radiculopathy     Sleep apnea        Past Surgical History:   has a past surgical history that includes Hysterectomy (09/29/2011); Oophorectomy (11/11/2014); Dilation and curettage of uterus (04/14/2010); Diagnostic laparoscopy (04/14/2010); Exploratory laparotomy with uterine myomectomy (02/27/2007); Hysteroscopy with dilation and curettage of uterus (04/04/2006); Carpal tunnel release; Cholecystectomy; Bilateral L3-S1 MBB (Bilateral, 6-, 5-, 1-8-2014); Left heart catheterization; Left L3-S1 RFTC (Left, 07/18/2017); Left SI JI (Left, 09/30/2013); Sinus surgery; and Laminectomy (N/A, 11/30/2021).    Social History:  Social History     Tobacco Use    Smoking status: Never Smoker    Smokeless tobacco: Never Used   Substance Use Topics    Alcohol use: Not Currently    Drug use: Never       I personally reviewed all past medical, surgical, and social.     Review of Systems   Constitutional: Negative for fatigue and fever.   HENT: Negative for ear pain.    Eyes:  Negative for pain and visual disturbance.   Respiratory: Negative for chest tightness and shortness of breath.    Cardiovascular: Negative for chest pain and leg swelling.   Gastrointestinal: Negative for abdominal pain.   Genitourinary: Negative for difficulty urinating.   Musculoskeletal: Negative for gait problem and myalgias.   Skin: Positive for wound. Negative for rash.   Neurological: Negative for dizziness and light-headedness.   Hematological: Does not bruise/bleed easily.        Medications:  Outpatient Encounter Medications as of 1/3/2022   Medication Sig Dispense Refill    albuterol (PROVENTIL/VENTOLIN HFA) 90 mcg/actuation inhaler Inhale 2 puffs into the lungs every 6 (six) hours as needed for Wheezing. Rescue 18 g 1    atorvastatin (LIPITOR) 20 MG tablet Take 20 mg by mouth once daily.      cloNIDine (CATAPRES) 0.2 MG tablet TAKE 1 TABLET BY MOUTH 4 TIMES DAILY 120 tablet 2    cyclobenzaprine (FLEXERIL) 5 MG tablet Take 1 tablet (5 mg total) by mouth 3 (three) times daily as needed for Muscle spasms. 45 tablet 3    fluticasone propionate (FLONASE) 50 mcg/actuation nasal spray 2 sprays (100 mcg total) by Each Nostril route once daily. 18.2 mL 0    glipizide-metformin (METAGLIP) 2.5-500 mg per tablet Take 1 tablet by mouth 2 (two) times a day. 180 tablet 0    hydrALAZINE (APRESOLINE) 100 MG tablet Take 1 tablet (100 mg total) by mouth 3 (three) times daily. 270 tablet 0    HYDROcodone-acetaminophen (NORCO) 7.5-325 mg per tablet Take 1 tablet by mouth every 8 (eight) hours as needed for Pain. 15 tablet 0    levothyroxine (SYNTHROID, LEVOTHROID) 175 MCG tablet TAKE 1 TABLET BY MOUTH EACH MORNING BEFORE BREAKFAST ON EMPTY STOMACH (DRINK PLENTY OF WATER) 30 tablet 2    meloxicam (MOBIC) 7.5 MG tablet TAKE 1 TABLET BY MOUTH DAILY WITH FOOD AS NEEDED FOR PAIN ...STOP TAKING ALL OTHER NSAIDS WHILE TAKING THIS MEDICATION      metoprolol succinate (TOPROL-XL) 100 MG 24 hr tablet TAKE 1 TABLET BY MOUTH  EVERY DAY FOR HIGH BLOOD PRESSURE 90 tablet 3    NIFEdipine (PROCARDIA-XL) 60 MG (OSM) 24 hr tablet Take 1 tablet (60 mg total) by mouth once daily. 90 tablet 0    oxyCODONE-acetaminophen (PERCOCET) 5-325 mg per tablet Take 1 tablet by mouth every 4 (four) hours as needed for Pain. 45 tablet 0    oxyCODONE-acetaminophen (PERCOCET) 5-325 mg per tablet Take 1 tablet by mouth every 4 (four) hours as needed for Pain. 45 tablet 0    promethazine (PHENERGAN) 25 MG tablet Take 1 tablet (25 mg total) by mouth every 4 (four) hours. 45 tablet 0    sertraline (ZOLOFT) 100 MG tablet Take 1 tablet (100 mg total) by mouth once daily. 60 tablet 0    traMADoL (ULTRAM) 50 mg tablet Take 1 tablet (50 mg total) by mouth every 6 (six) hours as needed for Pain. 20 tablet 0    traMADoL (ULTRAM) 50 mg tablet Take 1 tablet (50 mg total) by mouth every 6 (six) hours. 20 tablet 0    venlafaxine (EFFEXOR-XR) 150 MG Cp24 TAKE 1 CAPSULE BY MOUTH EVERY DAY WITH FOOD 30 capsule 2    [DISCONTINUED] dulaglutide (TRULICITY) 0.75 mg/0.5 mL pen injector Inject 0.75 mg into the skin every 7 days. 4 pen 3    cetirizine (ZYRTEC) 10 MG tablet Take 1 tablet (10 mg total) by mouth once daily. 30 tablet 0    dulaglutide (TRULICITY) 1.5 mg/0.5 mL pen injector Inject 1.5 mg into the skin every 7 days. 4 pen 2    [DISCONTINUED] ipratropium (ATROVENT) 21 mcg (0.03 %) nasal spray 2 sprays by Nasal route 3 (three) times daily. (Patient not taking: Reported on 11/29/2021) 15 mL 0    [DISCONTINUED] ondansetron (ZOFRAN) 4 MG tablet Take 1 tablet (4 mg total) by mouth every 6 (six) hours as needed for Nausea. (Patient not taking: Reported on 11/29/2021) 20 tablet 0     Facility-Administered Encounter Medications as of 1/3/2022   Medication Dose Route Frequency Provider Last Rate Last Admin    acetaminophen tablet 650 mg  650 mg Oral Once PRN Long Fernández DO        albuterol inhaler 2 puff  2 puff Inhalation Q20 Min PRN Long Fernández DO      "   diphenhydrAMINE injection 25 mg  25 mg Intravenous Once PRN Long Fernández, DO        EPINEPHrine (EPIPEN) 0.3 mg/0.3 mL pen injection 0.3 mg  0.3 mg Intramuscular PRN Long Fernández, DO        methylPREDNISolone sodium succinate injection 40 mg  40 mg Intravenous Once PRN Long Fernández, DO        ondansetron disintegrating tablet 4 mg  4 mg Oral Once PRN Long Fernández, DO        sodium chloride 0.9% 500 mL flush bag   Intravenous PRN Long Fernández, DO        sodium chloride 0.9% flush 10 mL  10 mL Intravenous PRN Long Fernández, DO           Allergies:  Review of patient's allergies indicates:   Allergen Reactions    Fish containing products Anaphylaxis    Iodinated contrast media     Penicillins        Health Maintenance:    There is no immunization history on file for this patient.   Health Maintenance   Topic Date Due    Lipid Panel  Never done    Foot Exam  Never done    Eye Exam  Never done    TETANUS VACCINE  Never done    Hemoglobin A1c  05/23/2022    Mammogram  09/30/2022    Low Dose Statin  11/30/2022    Hepatitis C Screening  Completed        Physical Exam      Vital Signs  Temp: 97.2 °F (36.2 °C)  Pulse: 95  SpO2: 96 %  BP: 132/70  BP Location: Left arm  Patient Position: Sitting  Height and Weight  Height: 5' 7" (170.2 cm)  Weight: 108 kg (238 lb)  BSA (Calculated - sq m): 2.26 sq meters  BMI (Calculated): 37.3  Weight in (lb) to have BMI = 25: 159.3]    Physical Exam  Constitutional:       Appearance: Normal appearance.   HENT:      Head: Normocephalic and atraumatic.   Eyes:      Extraocular Movements: Extraocular movements intact.      Conjunctiva/sclera: Conjunctivae normal.      Pupils: Pupils are equal, round, and reactive to light.   Cardiovascular:      Rate and Rhythm: Normal rate and regular rhythm.      Pulses: Normal pulses.           Radial pulses are 2+ on the right side and 2+ on the left side.      Heart sounds: Normal heart sounds. "   Pulmonary:      Effort: Pulmonary effort is normal.      Breath sounds: Normal breath sounds.   Abdominal:      Palpations: Abdomen is soft.      Tenderness: There is no abdominal tenderness.   Musculoskeletal:         General: Normal range of motion.      Right lower leg: No edema.      Left lower leg: No edema.   Skin:     General: Skin is warm and dry.      Findings: No rash.          Neurological:      General: No focal deficit present.      Mental Status: She is alert. Mental status is at baseline.   Psychiatric:         Mood and Affect: Mood normal.          Laboratory:  CBC:  Recent Labs   Lab 11/23/21  1346 11/23/21  1346 12/28/21  0912 12/28/21  0912 01/03/22  0824   WBC 7.47   < > 7.03   < > 6.86   RBC 4.47   < > 4.07 L   < > 4.17 L   Hemoglobin 12.4   < > 11.1 L   < > 11.3 L   Hematocrit 39.8   < > 36.9 L   < > 37.4 L   Platelet Count 461 H   < > 414 H   < > 482 H   MCV 89.0   < > 90.7   < > 89.7   MCH 27.7   < > 27.3   < > 27.1   MCHC 31.2 L  --  30.1 L  --  30.2 L    < > = values in this interval not displayed.     CMP:  Recent Labs   Lab 08/30/21  1017 10/18/21  0832 11/23/21  1346 11/23/21  1346 12/28/21  0912   Glucose 337 H   < > 194 H   < > 341 H   Calcium 9.1  --  9.0   < > 9.4   Albumin 3.5  --  3.4 L   < > 3.3 L   Total Protein 7.3  --  7.7   < > 7.4   Sodium 139  --  143   < > 137   Potassium 3.5  --  3.5   < > 3.8   CO2 29  --  29   < > 33 H   Chloride 104  --  108 H   < > 101   BUN 8  --  6 L   < > 8   Alk Phos 150 H  --  157 H   < > 148 H   ALT 36  --  45   < > 32   AST 26  --  32   < > 28   Bilirubin, Total 0.2  --  0.2  --  0.3    < > = values in this interval not displayed.     LIPIDS:  Recent Labs   Lab 05/14/21  0938 08/16/21  1450 10/18/21  0832   TSH 10.200 H 30.000 H 1.690     TSH:  Recent Labs   Lab 05/14/21  0938 08/16/21  1450 10/18/21  0832   TSH 10.200 H 30.000 H 1.690     A1C:  Recent Labs   Lab 05/14/21  0938 08/30/21  1017 11/15/21  1049 11/23/21  1346   Hemoglobin A1C 10.7  H 9.1 H 7.6 H 7.6 H       Assessment/Plan     Carey Leonardo is a 46 y.o.female with:     1. Uncontrolled type 2 diabetes mellitus with hyperglycemia  - POCT glucose = 246  - discussed that with recent surgery a possible infections is likely contributing to her hyperglycemia; but also discuss that hyperglycemia was slow wound healing  - will increase Trulicity to 1.5 mg weekly  - patient to check with insurance company to see what with common her and testing supplies they will cover    2. Right flank pain  - will check urinalysis to rule out other sources of infection  - Urinalysis, Reflex to Urine Culture Urine, Clean Catch  - Urinalysis, Microscopic    3. Lumbar spondylosis  - follow-up with ortho spine as scheduled  - notify their clinic or our clinic if she notes any signs of worsening infection    Total time spent face-to-face and non-face-to-face coordinating care for this encounter was: 30 min    Chronic conditions status updated as per HPI.  Other than changes above, cont current medications and maintain follow up with specialists.  Return to clinic PRN.    Long Fernández DO  [facility]

## 2022-01-10 NOTE — PATIENT INSTRUCTIONS
- take medications as prescribed  - follow-up with Interventional Radiology as scheduled  - notify clinic if blood glucose remains elevated

## 2022-01-10 NOTE — PROGRESS NOTES
[facility]    Chief Complaint      Chief Complaint   Patient presents with    Follow-up     Med refill       History of Present Illness      Carey Leonardo is a 46 y.o. female with chronic conditions of HTN, DM2, hypothyroidism, dyslipidemia, anxiety and lumbar radiculopathy who presents today for elevated blood glucose and to discuss MRI.  Patient had MRI this morning which demonstrated small fluid collection posterior to the thecal sac at L4.  States she is scheduled for draining of this abscess versus seroma on 01/12/2022.  Trulicity was increased 1.5 mg weekly at last visit.  Patient states she still does not have a glucometer, but feels like her blood sugar has been elevated.    Past Medical History:  Past Medical History:   Diagnosis Date    Asthma     CHF (congestive heart failure)     Chronic pain syndrome     Depressive disorder     Enlarged heart     Gastroparesis     GERD (gastroesophageal reflux disease)     Hyperlipidemia     Hypertension     IBS (irritable bowel syndrome)     Kidney stones     Lumbar radiculopathy     Sleep apnea        Past Surgical History:   has a past surgical history that includes Hysterectomy (09/29/2011); Oophorectomy (11/11/2014); Dilation and curettage of uterus (04/14/2010); Diagnostic laparoscopy (04/14/2010); Exploratory laparotomy with uterine myomectomy (02/27/2007); Hysteroscopy with dilation and curettage of uterus (04/04/2006); Carpal tunnel release; Cholecystectomy; Bilateral L3-S1 MBB (Bilateral, 6-, 5-, 1-8-2014); Left heart catheterization; Left L3-S1 RFTC (Left, 07/18/2017); Left SI JI (Left, 09/30/2013); Sinus surgery; and Laminectomy (N/A, 11/30/2021).    Social History:  Social History     Tobacco Use    Smoking status: Never Smoker    Smokeless tobacco: Never Used   Substance Use Topics    Alcohol use: Not Currently    Drug use: Never       I personally reviewed all past medical, surgical, and social.     Review of  Systems   Constitutional: Negative for fatigue and fever.   HENT: Negative for ear pain.    Eyes: Negative for pain and visual disturbance.   Respiratory: Negative for chest tightness and shortness of breath.    Cardiovascular: Negative for chest pain and leg swelling.   Gastrointestinal: Negative for abdominal pain.   Genitourinary: Negative for difficulty urinating.   Musculoskeletal: Positive for back pain. Negative for gait problem and myalgias.   Skin: Negative for rash.   Neurological: Negative for dizziness and light-headedness.   Hematological: Does not bruise/bleed easily.        Medications:  Outpatient Encounter Medications as of 1/6/2022   Medication Sig Dispense Refill    HYDROcodone-acetaminophen (NORCO) 7.5-325 mg per tablet Take 1 tablet by mouth every 8 (eight) hours as needed for Pain. 15 tablet 0    levothyroxine (SYNTHROID, LEVOTHROID) 175 MCG tablet TAKE 1 TABLET BY MOUTH EACH MORNING BEFORE BREAKFAST ON EMPTY STOMACH (DRINK PLENTY OF WATER) 30 tablet 2    meloxicam (MOBIC) 7.5 MG tablet TAKE 1 TABLET BY MOUTH DAILY WITH FOOD AS NEEDED FOR PAIN ...STOP TAKING ALL OTHER NSAIDS WHILE TAKING THIS MEDICATION      NIFEdipine (PROCARDIA-XL) 60 MG (OSM) 24 hr tablet Take 1 tablet (60 mg total) by mouth once daily. 90 tablet 0    oxyCODONE-acetaminophen (PERCOCET) 5-325 mg per tablet Take 1 tablet by mouth every 4 (four) hours as needed for Pain. 45 tablet 0    oxyCODONE-acetaminophen (PERCOCET) 5-325 mg per tablet Take 1 tablet by mouth every 4 (four) hours as needed for Pain. 45 tablet 0    promethazine (PHENERGAN) 25 MG tablet Take 1 tablet (25 mg total) by mouth every 4 (four) hours. 45 tablet 0    traMADoL (ULTRAM) 50 mg tablet Take 1 tablet (50 mg total) by mouth every 6 (six) hours as needed for Pain. 20 tablet 0    traMADoL (ULTRAM) 50 mg tablet Take 1 tablet (50 mg total) by mouth every 6 (six) hours. 20 tablet 0    [DISCONTINUED] cloNIDine (CATAPRES) 0.2 MG tablet TAKE 1 TABLET BY  MOUTH 4 TIMES DAILY 120 tablet 2    [DISCONTINUED] metoprolol succinate (TOPROL-XL) 100 MG 24 hr tablet TAKE 1 TABLET BY MOUTH EVERY DAY FOR HIGH BLOOD PRESSURE 90 tablet 3    [DISCONTINUED] sertraline (ZOLOFT) 100 MG tablet Take 1 tablet (100 mg total) by mouth once daily. 60 tablet 0    [DISCONTINUED] venlafaxine (EFFEXOR-XR) 150 MG Cp24 TAKE 1 CAPSULE BY MOUTH EVERY DAY WITH FOOD 30 capsule 2    albuterol (PROVENTIL/VENTOLIN HFA) 90 mcg/actuation inhaler Inhale 2 puffs into the lungs every 6 (six) hours as needed for Wheezing. Rescue 18 g 1    atorvastatin (LIPITOR) 20 MG tablet Take 20 mg by mouth once daily.      cetirizine (ZYRTEC) 10 MG tablet Take 1 tablet (10 mg total) by mouth once daily. 30 tablet 0    cloNIDine (CATAPRES) 0.2 MG tablet Take 1 tablet (0.2 mg total) by mouth 4 (four) times daily. 360 tablet 0    cyclobenzaprine (FLEXERIL) 5 MG tablet Take 1 tablet (5 mg total) by mouth 3 (three) times daily as needed for Muscle spasms. 45 tablet 3    dulaglutide (TRULICITY) 1.5 mg/0.5 mL pen injector Inject 1.5 mg into the skin every 7 days. 4 pen 2    fluticasone propionate (FLONASE) 50 mcg/actuation nasal spray 2 sprays (100 mcg total) by Each Nostril route once daily. 18.2 mL 0    glipizide-metformin (METAGLIP) 5-500 mg per tablet Take 1 tablet by mouth 2 (two) times daily before meals. 60 tablet 2    hydrALAZINE (APRESOLINE) 100 MG tablet Take 1 tablet (100 mg total) by mouth 3 (three) times daily. 270 tablet 0    metoprolol succinate (TOPROL-XL) 100 MG 24 hr tablet Take 1 tablet (100 mg total) by mouth once daily. 90 tablet 0    sertraline (ZOLOFT) 100 MG tablet Take 1 tablet (100 mg total) by mouth once daily. 90 tablet 0    venlafaxine (EFFEXOR-XR) 150 MG Cp24 TAKE 1 CAPSULE BY MOUTH EVERY DAY WITH FOOD 90 capsule 0    [DISCONTINUED] cloNIDine (CATAPRES) 0.2 MG tablet Take 1 tablet (0.2 mg total) by mouth 4 (four) times daily. 120 tablet 0    [DISCONTINUED] glipizide-metformin  (METAGLIP) 2.5-500 mg per tablet Take 1 tablet by mouth 2 (two) times a day. 180 tablet 0    [DISCONTINUED] metoprolol succinate (TOPROL-XL) 100 MG 24 hr tablet Take 1 tablet (100 mg total) by mouth daily as needed. 30 tablet 0    [DISCONTINUED] sertraline (ZOLOFT) 100 MG tablet Take 1 tablet (100 mg total) by mouth once daily. 30 tablet 0    [DISCONTINUED] venlafaxine (EFFEXOR-XR) 150 MG Cp24 TAKE 1 CAPSULE BY MOUTH EVERY DAY WITH FOOD 30 capsule 0     Facility-Administered Encounter Medications as of 1/6/2022   Medication Dose Route Frequency Provider Last Rate Last Admin    acetaminophen tablet 650 mg  650 mg Oral Once PRN Long Fernández, DO        albuterol inhaler 2 puff  2 puff Inhalation Q20 Min PRN Long Fernández DO        diphenhydrAMINE injection 25 mg  25 mg Intravenous Once PRN Long Fernández DO        EPINEPHrine (EPIPEN) 0.3 mg/0.3 mL pen injection 0.3 mg  0.3 mg Intramuscular PRN Long Fernández DO        [COMPLETED] gadoterate meglumine injection 20 mL  20 mL Intravenous ONCE PRN Cyril Upton MD   20 mL at 01/06/22 1214    methylPREDNISolone sodium succinate injection 40 mg  40 mg Intravenous Once PRN Long Fernández DO        ondansetron disintegrating tablet 4 mg  4 mg Oral Once PRN Long Fernández DO        sodium chloride 0.9% 500 mL flush bag   Intravenous PRN Long Fernández,         sodium chloride 0.9% flush 10 mL  10 mL Intravenous PRN Long Fernández DO           Allergies:  Review of patient's allergies indicates:   Allergen Reactions    Fish containing products Anaphylaxis    Iodinated contrast media     Penicillins        Health Maintenance:    There is no immunization history on file for this patient.   Health Maintenance   Topic Date Due    Lipid Panel  Never done    Foot Exam  Never done    Eye Exam  Never done    TETANUS VACCINE  Never done    Hemoglobin A1c  05/23/2022    Mammogram  09/30/2022    Low Dose Statin   "01/06/2023    Hepatitis C Screening  Completed        Physical Exam      Vital Signs  Temp: 97.2 °F (36.2 °C)  Pulse: 98  SpO2: 97 %  BP: 130/80  BP Location: Left arm  Patient Position: Sitting  Height and Weight  Height: 5' 6" (167.6 cm)  Weight: 102.1 kg (225 lb)  BSA (Calculated - sq m): 2.18 sq meters  BMI (Calculated): 36.3  Weight in (lb) to have BMI = 25: 154.6]    Physical Exam  Constitutional:       Appearance: Normal appearance.   HENT:      Head: Normocephalic and atraumatic.   Eyes:      Extraocular Movements: Extraocular movements intact.      Conjunctiva/sclera: Conjunctivae normal.      Pupils: Pupils are equal, round, and reactive to light.   Cardiovascular:      Rate and Rhythm: Normal rate and regular rhythm.      Pulses: Normal pulses.           Radial pulses are 2+ on the right side and 2+ on the left side.      Heart sounds: Normal heart sounds.   Pulmonary:      Effort: Pulmonary effort is normal.      Breath sounds: Normal breath sounds.   Abdominal:      Palpations: Abdomen is soft.      Tenderness: There is no abdominal tenderness.   Musculoskeletal:         General: Normal range of motion.      Right lower leg: No edema.      Left lower leg: No edema.   Skin:     General: Skin is warm and dry.      Findings: No rash.   Neurological:      General: No focal deficit present.      Mental Status: She is alert. Mental status is at baseline.   Psychiatric:         Mood and Affect: Mood normal.          Laboratory:  CBC:  Recent Labs   Lab 11/23/21  1346 11/23/21  1346 12/28/21  0912 12/28/21  0912 01/03/22  0824   WBC 7.47   < > 7.03   < > 6.86   RBC 4.47   < > 4.07 L   < > 4.17 L   Hemoglobin 12.4   < > 11.1 L   < > 11.3 L   Hematocrit 39.8   < > 36.9 L   < > 37.4 L   Platelet Count 461 H   < > 414 H   < > 482 H   MCV 89.0   < > 90.7   < > 89.7   MCH 27.7   < > 27.3   < > 27.1   MCHC 31.2 L  --  30.1 L  --  30.2 L    < > = values in this interval not displayed.     CMP:  Recent Labs   Lab " 08/30/21  1017 10/18/21  0832 11/23/21  1346 11/23/21  1346 12/28/21  0912   Glucose 337 H   < > 194 H   < > 341 H   Calcium 9.1  --  9.0   < > 9.4   Albumin 3.5  --  3.4 L   < > 3.3 L   Total Protein 7.3  --  7.7   < > 7.4   Sodium 139  --  143   < > 137   Potassium 3.5  --  3.5   < > 3.8   CO2 29  --  29   < > 33 H   Chloride 104  --  108 H   < > 101   BUN 8  --  6 L   < > 8   Alk Phos 150 H  --  157 H   < > 148 H   ALT 36  --  45   < > 32   AST 26  --  32   < > 28   Bilirubin, Total 0.2  --  0.2  --  0.3    < > = values in this interval not displayed.     LIPIDS:  Recent Labs   Lab 05/14/21  0938 08/16/21  1450 10/18/21  0832   TSH 10.200 H 30.000 H 1.690     TSH:  Recent Labs   Lab 05/14/21  0938 08/16/21  1450 10/18/21  0832   TSH 10.200 H 30.000 H 1.690     A1C:  Recent Labs   Lab 05/14/21  0938 08/30/21  1017 11/15/21  1049 11/23/21  1346   Hemoglobin A1C 10.7 H 9.1 H 7.6 H 7.6 H       Assessment/Plan     Carey Leonardo is a 46 y.o.female with:     1. Postoperative seroma of subcutaneous tissue after non-dermatologic procedure  - follow-up with Interventional Radiology as scheduled    2. Uncontrolled type 2 diabetes mellitus with hyperglycemia  - will increase glipizide-metformin to 5-500mg BID  - [facility]    Total time spent face-to-face and non-face-to-face coordinating care for this encounter was: 20 min    Chronic conditions status updated as per HPI.  Other than changes above, cont current medications and maintain follow up with specialists.  Return to clinic as scheduled.    Long Fernández DO  [facility]

## 2022-01-12 ENCOUNTER — HOSPITAL ENCOUNTER (OUTPATIENT)
Dept: RADIOLOGY | Facility: HOSPITAL | Age: 47
Discharge: HOME | End: 2022-01-12
Attending: ORTHOPAEDIC SURGERY
Payer: OTHER GOVERNMENT

## 2022-01-12 VITALS
OXYGEN SATURATION: 95 % | BODY MASS INDEX: 36.68 KG/M2 | HEART RATE: 97 BPM | RESPIRATION RATE: 18 BRPM | DIASTOLIC BLOOD PRESSURE: 81 MMHG | HEIGHT: 67 IN | WEIGHT: 233.69 LBS | SYSTOLIC BLOOD PRESSURE: 141 MMHG | TEMPERATURE: 98 F

## 2022-01-12 DIAGNOSIS — T81.49XA WOUND INFECTION AFTER SURGERY: ICD-10-CM

## 2022-01-12 LAB
APTT PPP: 42.9 SECONDS (ref 25.2–37.3)
GLUCOSE SERPL-MCNC: 135 MG/DL (ref 70–105)
GRAM STN SPEC: NORMAL
GRAM STN SPEC: NORMAL
INR BLD: 0.91 (ref 0.9–1.1)
PROTHROMBIN TIME: 12.3 SECONDS (ref 11.7–14.7)

## 2022-01-12 PROCEDURE — 77012 CT SCAN FOR NEEDLE BIOPSY: CPT | Mod: 26,,, | Performed by: RADIOLOGY

## 2022-01-12 PROCEDURE — 87205 SMEAR GRAM STAIN: CPT | Performed by: ORTHOPAEDIC SURGERY

## 2022-01-12 PROCEDURE — 25000003 PHARM REV CODE 250: Performed by: RADIOLOGY

## 2022-01-12 PROCEDURE — 10160 PNXR ASPIR ABSC HMTMA BULLA: CPT | Mod: ,,, | Performed by: RADIOLOGY

## 2022-01-12 PROCEDURE — 77012 CT ABSCESS ASPIRATION WITHOUT CATHETER: ICD-10-PCS | Mod: 26,,, | Performed by: RADIOLOGY

## 2022-01-12 PROCEDURE — 77012 CT SCAN FOR NEEDLE BIOPSY: CPT | Mod: TC

## 2022-01-12 PROCEDURE — 85730 THROMBOPLASTIN TIME PARTIAL: CPT | Performed by: RADIOLOGY

## 2022-01-12 PROCEDURE — 10160 CT ABSCESS ASPIRATION WITHOUT CATHETER: ICD-10-PCS | Mod: ,,, | Performed by: RADIOLOGY

## 2022-01-12 PROCEDURE — 36415 COLL VENOUS BLD VENIPUNCTURE: CPT | Performed by: RADIOLOGY

## 2022-01-12 PROCEDURE — 82962 GLUCOSE BLOOD TEST: CPT

## 2022-01-12 PROCEDURE — 87070 CULTURE OTHR SPECIMN AEROBIC: CPT | Performed by: ORTHOPAEDIC SURGERY

## 2022-01-12 PROCEDURE — 87075 CULTR BACTERIA EXCEPT BLOOD: CPT | Performed by: ORTHOPAEDIC SURGERY

## 2022-01-12 RX ORDER — SODIUM CHLORIDE 450 MG/100ML
30 INJECTION, SOLUTION INTRAVENOUS ONCE
Status: COMPLETED | OUTPATIENT
Start: 2022-01-12 | End: 2022-01-12

## 2022-01-12 RX ADMIN — SODIUM CHLORIDE 30 ML/HR: 4.5 INJECTION, SOLUTION INTRAVENOUS at 09:01

## 2022-01-12 NOTE — PROGRESS NOTES
Interventional radiology consulted for lower back fluid collection aspiration.  Informed consent has been obtained.  History and physical has been reviewed.

## 2022-01-12 NOTE — PROCEDURES
CT guided fluid aspiration performed by Dr. Plascencia and assisted by Sonny HELMS.  Patient was prepped with Betadine and draped in sterile fashion.  Formal time-out was called with all present in agreement.  4 cc of 1% lidocaine infused.  3cc of blood tinged thin fluid dained  Specimen sent to the lab for analysis.  Specimen transport to the lab by Tita Mancilla RN . Patient tolerated procedure well with no immediate complication.

## 2022-01-14 DIAGNOSIS — Z09 POSTOP CHECK: Primary | ICD-10-CM

## 2022-01-14 LAB — MICROORGANISM SPEC CULT: ABNORMAL

## 2022-01-15 LAB — BACTERIA SPEC ANAEROBE CULT: NORMAL

## 2022-01-19 ENCOUNTER — OFFICE VISIT (OUTPATIENT)
Dept: SPINE | Facility: CLINIC | Age: 47
End: 2022-01-19
Payer: OTHER GOVERNMENT

## 2022-01-19 DIAGNOSIS — M54.16 LUMBAR RADICULOPATHY: ICD-10-CM

## 2022-01-19 DIAGNOSIS — T81.49XA WOUND INFECTION AFTER SURGERY: Primary | ICD-10-CM

## 2022-01-19 PROCEDURE — 99024 PR POST-OP FOLLOW-UP VISIT: ICD-10-PCS | Mod: ,,, | Performed by: ORTHOPAEDIC SURGERY

## 2022-01-19 PROCEDURE — 99212 OFFICE O/P EST SF 10 MIN: CPT | Mod: PBBFAC | Performed by: ORTHOPAEDIC SURGERY

## 2022-01-19 PROCEDURE — 99024 POSTOP FOLLOW-UP VISIT: CPT | Mod: ,,, | Performed by: ORTHOPAEDIC SURGERY

## 2022-01-19 RX ORDER — CEPHALEXIN 500 MG/1
500 CAPSULE ORAL EVERY 6 HOURS
Qty: 56 CAPSULE | Refills: 0 | Status: SHIPPED | OUTPATIENT
Start: 2022-01-19 | End: 2022-02-02

## 2022-01-19 NOTE — PROGRESS NOTES
MDM/time:  postop    ASSESSMENT:  46 y.o. female with lumbar spondylosis and radiculopathy now status post L2-L5 laminectomy 2021    PLAN:  Wound was probed and packed today.  No purulence noted.  Repeat labs today.  Keflex.   will do packing changes.  Follow-up on Monday    HPI:  46 y.o. female here for wound check.  She is status post L2-L5 laminectomy 2021.  She had IR biopsy which showed strep agalactiae.  She reports that yesterday she noticed increased drainage from the inferior aspect of her incision.  Denies any new injuries.  She has been out of the Bactrim for a couple days.     IMAGIN2021 X-rays lumbar spine reviewed show:  There is no fracture identified.  There are postsurgical changes from laminectomies in the lumbar spine similar to prior.  Alignment is maintained.       Past Medical History:   Diagnosis Date    Asthma     CHF (congestive heart failure)     Chronic pain syndrome     Depressive disorder     Enlarged heart     Gastroparesis     GERD (gastroesophageal reflux disease)     Hyperlipidemia     Hypertension     IBS (irritable bowel syndrome)     Kidney stones     Lumbar radiculopathy     Sleep apnea     Does not use a CPAP     Past Surgical History:   Procedure Laterality Date    Bilateral L3-S1 MBB Bilateral 2017, 2017, 2014    Dr Jessica Randolph    CARPAL TUNNEL RELEASE      CHOLECYSTECTOMY      DIAGNOSTIC LAPAROSCOPY  2010    Exploratory Laparotomy, Myomectomy, Hydrotubation-Dr. Feng    DILATION AND CURETTAGE OF UTERUS  2010    Hysteroscopy-Dr. Feng    EXPLORATORY LAPAROTOMY WITH UTERINE MYOMECTOMY  2007    Dr. Marquise Anderson    HYSTERECTOMY  2011    Robotic, FABIENNE, Cystoscopy-Dr. Feng    HYSTEROSCOPY WITH DILATION AND CURETTAGE OF UTERUS  2006    Laparoscopy, Chromotubation-Dr. Marquise Anderson    LAMINECTOMY N/A 2021    Procedure: L2-L5 Laminectomy;  Surgeon: Cyril Upton MD;   Location: [facility];  Service: Neurosurgery;  Laterality: N/A;    LEFT HEART CATHETERIZATION      Left L3-S1 RFTC Left 07/18/2017    Dr Lopez    Left SI JI Left 09/30/2013    Dr Randolph    OOPHORECTOMY  11/11/2014    Robotic, FABIENNE, Cystoscopy-Dr. Feng    SINUS SURGERY       Social History     Tobacco Use    Smoking status: Never Smoker    Smokeless tobacco: Never Used   Substance Use Topics    Alcohol use: Not Currently    Drug use: Not Currently     Types: Hydrocodone, Oxycodone      Current Outpatient Medications   Medication Instructions    albuterol (PROVENTIL/VENTOLIN HFA) 90 mcg/actuation inhaler 2 puffs, Inhalation, Every 6 hours PRN, Rescue     atorvastatin (LIPITOR) 20 mg, Oral, Daily    cephALEXin (KEFLEX) 500 mg, Oral, Every 6 hours    cetirizine (ZYRTEC) 10 mg, Oral, Daily    cloNIDine (CATAPRES) 0.2 mg, Oral, 4 times daily    cyclobenzaprine (FLEXERIL) 5 mg, Oral, 3 times daily PRN    fluticasone propionate (FLONASE) 100 mcg, Each Nostril, Daily    glipizide-metformin (METAGLIP) 5-500 mg per tablet 1 tablet, Oral, 2 times daily before meals    hydrALAZINE (APRESOLINE) 100 mg, Oral, 3 times daily    HYDROcodone-acetaminophen (NORCO) 7.5-325 mg per tablet 1 tablet, Oral, Every 8 hours PRN    levothyroxine (SYNTHROID, LEVOTHROID) 175 MCG tablet TAKE 1 TABLET BY MOUTH EACH MORNING BEFORE BREAKFAST ON EMPTY STOMACH (DRINK PLENTY OF WATER)    meloxicam (MOBIC) 7.5 MG tablet TAKE 1 TABLET BY MOUTH DAILY WITH FOOD AS NEEDED FOR PAIN ...STOP TAKING ALL OTHER NSAIDS WHILE TAKING THIS MEDICATION    metoprolol succinate (TOPROL-XL) 100 mg, Oral, Daily    NIFEdipine (PROCARDIA-XL) 60 mg, Oral, Daily    oxyCODONE-acetaminophen (PERCOCET) 5-325 mg per tablet 1 tablet, Oral, Every 4 hours PRN    oxyCODONE-acetaminophen (PERCOCET) 5-325 mg per tablet 1 tablet, Oral, Every 4 hours PRN    promethazine (PHENERGAN) 25 mg, Oral, Every 4 hours    sertraline (ZOLOFT) 100 mg, Oral, Daily    traMADoL  (ULTRAM) 50 mg, Oral, Every 6 hours PRN    traMADoL (ULTRAM) 50 mg, Oral, Every 6 hours    TRULICITY 1.5 mg, Subcutaneous, Every 7 days    venlafaxine (EFFEXOR-XR) 150 MG Cp24 TAKE 1 CAPSULE BY MOUTH EVERY DAY WITH FOOD        EXAM:  Constitutional  General Appearance:  There is no height or weight on file to calculate BMI., NAD  Psychiatric   Orientation: Oriented to time, oriented to place, oriented to person  Mood and Affect: Active and alert, normal mood, normal affect  Gait and Station   Appearance:  Normal gait, normal tandem gait, able to walk on toes, able to walk on heels  Healing incision with some induration.  Wound probed and packed. Unable to express any purulence.    5/5 strength  Sensation intact  2+ pulses

## 2022-02-03 ENCOUNTER — OFFICE VISIT (OUTPATIENT)
Dept: FAMILY MEDICINE | Facility: CLINIC | Age: 47
End: 2022-02-03
Payer: OTHER GOVERNMENT

## 2022-02-03 VITALS
TEMPERATURE: 97 F | HEART RATE: 90 BPM | DIASTOLIC BLOOD PRESSURE: 76 MMHG | BODY MASS INDEX: 36.73 KG/M2 | HEIGHT: 67 IN | SYSTOLIC BLOOD PRESSURE: 132 MMHG | WEIGHT: 234 LBS | OXYGEN SATURATION: 95 %

## 2022-02-03 DIAGNOSIS — E11.65 UNCONTROLLED TYPE 2 DIABETES MELLITUS WITH HYPERGLYCEMIA: Primary | ICD-10-CM

## 2022-02-03 DIAGNOSIS — M47.816 LUMBAR SPONDYLOSIS: ICD-10-CM

## 2022-02-03 DIAGNOSIS — J01.91 ACUTE RECURRENT SINUSITIS, UNSPECIFIED LOCATION: ICD-10-CM

## 2022-02-03 LAB
ANION GAP SERPL CALCULATED.3IONS-SCNC: 11 MMOL/L (ref 7–16)
BUN SERPL-MCNC: 8 MG/DL (ref 7–18)
BUN/CREAT SERPL: 10 (ref 6–20)
CALCIUM SERPL-MCNC: 9.1 MG/DL (ref 8.5–10.1)
CHLORIDE SERPL-SCNC: 104 MMOL/L (ref 98–107)
CO2 SERPL-SCNC: 29 MMOL/L (ref 21–32)
CREAT SERPL-MCNC: 0.81 MG/DL (ref 0.55–1.02)
CREAT UR-MCNC: 435 MG/DL (ref 28–219)
EST. AVERAGE GLUCOSE BLD GHB EST-MCNC: 164 MG/DL
GLUCOSE SERPL-MCNC: 188 MG/DL (ref 74–106)
HBA1C MFR BLD HPLC: 7.5 % (ref 4.5–6.6)
MICROALBUMIN UR-MCNC: 2.3 MG/DL (ref 0–2.8)
MICROALBUMIN/CREAT RATIO PNL UR: 5.3 MG/G (ref 0–30)
POTASSIUM SERPL-SCNC: 3.7 MMOL/L (ref 3.5–5.1)
SODIUM SERPL-SCNC: 140 MMOL/L (ref 136–145)

## 2022-02-03 PROCEDURE — 99214 PR OFFICE/OUTPT VISIT, EST, LEVL IV, 30-39 MIN: ICD-10-PCS | Mod: ,,, | Performed by: FAMILY MEDICINE

## 2022-02-03 PROCEDURE — 82043 UR ALBUMIN QUANTITATIVE: CPT | Mod: ,,, | Performed by: CLINICAL MEDICAL LABORATORY

## 2022-02-03 PROCEDURE — 80048 BASIC METABOLIC PANEL: ICD-10-PCS | Mod: ,,, | Performed by: CLINICAL MEDICAL LABORATORY

## 2022-02-03 PROCEDURE — 82570 ASSAY OF URINE CREATININE: CPT | Mod: ,,, | Performed by: CLINICAL MEDICAL LABORATORY

## 2022-02-03 PROCEDURE — 99214 OFFICE O/P EST MOD 30 MIN: CPT | Mod: ,,, | Performed by: FAMILY MEDICINE

## 2022-02-03 PROCEDURE — 80048 BASIC METABOLIC PNL TOTAL CA: CPT | Mod: ,,, | Performed by: CLINICAL MEDICAL LABORATORY

## 2022-02-03 PROCEDURE — 83036 HEMOGLOBIN GLYCOSYLATED A1C: CPT | Mod: ,,, | Performed by: CLINICAL MEDICAL LABORATORY

## 2022-02-03 PROCEDURE — 82043 MICROALBUMIN / CREATININE RATIO URINE: ICD-10-PCS | Mod: ,,, | Performed by: CLINICAL MEDICAL LABORATORY

## 2022-02-03 PROCEDURE — 82570 MICROALBUMIN / CREATININE RATIO URINE: ICD-10-PCS | Mod: ,,, | Performed by: CLINICAL MEDICAL LABORATORY

## 2022-02-03 PROCEDURE — 83036 HEMOGLOBIN A1C: ICD-10-PCS | Mod: ,,, | Performed by: CLINICAL MEDICAL LABORATORY

## 2022-02-03 RX ORDER — NIFEDIPINE 60 MG/1
60 TABLET, EXTENDED RELEASE ORAL DAILY
Qty: 90 TABLET | Refills: 0 | Status: SHIPPED | OUTPATIENT
Start: 2022-02-03 | End: 2022-06-13

## 2022-02-03 RX ORDER — SERTRALINE HYDROCHLORIDE 100 MG/1
100 TABLET, FILM COATED ORAL DAILY
Qty: 90 TABLET | Refills: 0 | Status: SHIPPED | OUTPATIENT
Start: 2022-02-03 | End: 2022-04-15

## 2022-02-03 RX ORDER — CLONIDINE HYDROCHLORIDE 0.2 MG/1
0.2 TABLET ORAL 4 TIMES DAILY
Qty: 360 TABLET | Refills: 0 | Status: SHIPPED | OUTPATIENT
Start: 2022-02-03 | End: 2022-04-15

## 2022-02-03 RX ORDER — FLUTICASONE PROPIONATE 50 MCG
2 SPRAY, SUSPENSION (ML) NASAL DAILY
Qty: 18.2 ML | Refills: 0 | Status: SHIPPED | OUTPATIENT
Start: 2022-02-03 | End: 2022-06-23

## 2022-02-03 RX ORDER — SERTRALINE HYDROCHLORIDE 100 MG/1
100 TABLET, FILM COATED ORAL DAILY
Qty: 30 TABLET | Refills: 0 | Status: CANCELLED | OUTPATIENT
Start: 2022-02-03

## 2022-02-03 RX ORDER — DULAGLUTIDE 1.5 MG/.5ML
1.5 INJECTION, SOLUTION SUBCUTANEOUS
Qty: 4 PEN | Refills: 2 | Status: SHIPPED | OUTPATIENT
Start: 2022-02-03 | End: 2022-06-20

## 2022-02-03 RX ORDER — TRAMADOL HYDROCHLORIDE 50 MG/1
50 TABLET ORAL EVERY 6 HOURS PRN
Qty: 20 TABLET | Refills: 0 | Status: SHIPPED | OUTPATIENT
Start: 2022-02-03 | End: 2022-04-28

## 2022-02-03 RX ORDER — METOPROLOL SUCCINATE 100 MG/1
100 TABLET, EXTENDED RELEASE ORAL DAILY
Qty: 90 TABLET | Refills: 0 | Status: SHIPPED | OUTPATIENT
Start: 2022-02-03 | End: 2022-05-03

## 2022-02-03 RX ORDER — VENLAFAXINE HYDROCHLORIDE 150 MG/1
CAPSULE, EXTENDED RELEASE ORAL
Qty: 90 CAPSULE | Refills: 0 | Status: SHIPPED | OUTPATIENT
Start: 2022-02-03 | End: 2023-01-10 | Stop reason: SDUPTHER

## 2022-02-03 RX ORDER — HYDRALAZINE HYDROCHLORIDE 100 MG/1
100 TABLET, FILM COATED ORAL 3 TIMES DAILY
Qty: 270 TABLET | Refills: 0 | Status: SHIPPED | OUTPATIENT
Start: 2022-02-03 | End: 2022-08-02 | Stop reason: SDUPTHER

## 2022-02-04 ENCOUNTER — TELEPHONE (OUTPATIENT)
Dept: FAMILY MEDICINE | Facility: CLINIC | Age: 47
End: 2022-02-04
Payer: OTHER GOVERNMENT

## 2022-02-04 NOTE — PROGRESS NOTES
[facility]    Chief Complaint      Chief Complaint   Patient presents with    Follow-up     1 month follow up       History of Present Illness      Carey Leonardo is a 46 y.o. female with chronic conditions of HTN, DM2, hypothyroidism, dyslipidemia, anxiety and lumbar radiculopathy who presents today for routine follow up. Trulicity and glipizide/metformin were increased at last visits. States blood glucose has been improving. States she is still having significant left leg and low back pain post laminectomy. Pain worse after standing extended periods of time or walking long distances. Interested in Pain Treatment referral.    Past Medical History:  Past Medical History:   Diagnosis Date    Asthma     CHF (congestive heart failure)     Chronic pain syndrome     Depressive disorder     Enlarged heart     Gastroparesis     GERD (gastroesophageal reflux disease)     Hyperlipidemia     Hypertension     IBS (irritable bowel syndrome)     Kidney stones     Lumbar radiculopathy     Sleep apnea     Does not use a CPAP       Past Surgical History:   has a past surgical history that includes Hysterectomy (09/29/2011); Oophorectomy (11/11/2014); Dilation and curettage of uterus (04/14/2010); Diagnostic laparoscopy (04/14/2010); Exploratory laparotomy with uterine myomectomy (02/27/2007); Hysteroscopy with dilation and curettage of uterus (04/04/2006); Carpal tunnel release; Cholecystectomy; Bilateral L3-S1 MBB (Bilateral, 6-, 5-, 1-8-2014); Left heart catheterization; Left L3-S1 RFTC (Left, 07/18/2017); Left SI JI (Left, 09/30/2013); Sinus surgery; and Laminectomy (N/A, 11/30/2021).    Social History:  Social History     Tobacco Use    Smoking status: Never Smoker    Smokeless tobacco: Never Used   Substance Use Topics    Alcohol use: Not Currently    Drug use: Not Currently     Types: Hydrocodone, Oxycodone       I personally reviewed all past medical, surgical, and social.      Review of Systems   Constitutional: Negative for fatigue and fever.   HENT: Negative for ear pain.    Eyes: Negative for pain and visual disturbance.   Respiratory: Negative for chest tightness and shortness of breath.    Cardiovascular: Negative for chest pain and leg swelling.   Gastrointestinal: Negative for abdominal pain.   Genitourinary: Negative for difficulty urinating.   Musculoskeletal: Positive for arthralgias and back pain. Negative for gait problem and myalgias.   Skin: Negative for rash.   Neurological: Negative for dizziness and light-headedness.   Hematological: Does not bruise/bleed easily.        Medications:  Outpatient Encounter Medications as of 2/3/2022   Medication Sig Dispense Refill    atorvastatin (LIPITOR) 20 MG tablet Take 20 mg by mouth once daily.      cyclobenzaprine (FLEXERIL) 5 MG tablet Take 1 tablet (5 mg total) by mouth 3 (three) times daily as needed for Muscle spasms. 45 tablet 3    glipizide-metformin (METAGLIP) 5-500 mg per tablet Take 1 tablet by mouth 2 (two) times daily before meals. 60 tablet 2    HYDROcodone-acetaminophen (NORCO) 7.5-325 mg per tablet Take 1 tablet by mouth every 8 (eight) hours as needed for Pain. 15 tablet 0    levothyroxine (SYNTHROID, LEVOTHROID) 175 MCG tablet TAKE 1 TABLET BY MOUTH EACH MORNING BEFORE BREAKFAST ON EMPTY STOMACH (DRINK PLENTY OF WATER) 30 tablet 2    meloxicam (MOBIC) 7.5 MG tablet TAKE 1 TABLET BY MOUTH DAILY WITH FOOD AS NEEDED FOR PAIN ...STOP TAKING ALL OTHER NSAIDS WHILE TAKING THIS MEDICATION      oxyCODONE-acetaminophen (PERCOCET) 5-325 mg per tablet Take 1 tablet by mouth every 4 (four) hours as needed for Pain. 45 tablet 0    oxyCODONE-acetaminophen (PERCOCET) 5-325 mg per tablet Take 1 tablet by mouth every 4 (four) hours as needed for Pain. 45 tablet 0    promethazine (PHENERGAN) 25 MG tablet Take 1 tablet (25 mg total) by mouth every 4 (four) hours. 45 tablet 0    traMADoL (ULTRAM) 50 mg tablet Take 1 tablet  (50 mg total) by mouth every 6 (six) hours as needed for Pain. 20 tablet 0    traMADoL (ULTRAM) 50 mg tablet Take 1 tablet (50 mg total) by mouth every 6 (six) hours. 20 tablet 0    [DISCONTINUED] cloNIDine (CATAPRES) 0.2 MG tablet Take 1 tablet (0.2 mg total) by mouth 4 (four) times daily. 360 tablet 0    [DISCONTINUED] dulaglutide (TRULICITY) 1.5 mg/0.5 mL pen injector Inject 1.5 mg into the skin every 7 days. 4 pen 2    [DISCONTINUED] fluticasone propionate (FLONASE) 50 mcg/actuation nasal spray 2 sprays (100 mcg total) by Each Nostril route once daily. 18.2 mL 0    [DISCONTINUED] hydrALAZINE (APRESOLINE) 100 MG tablet Take 1 tablet (100 mg total) by mouth 3 (three) times daily. 270 tablet 0    [DISCONTINUED] metoprolol succinate (TOPROL-XL) 100 MG 24 hr tablet Take 1 tablet (100 mg total) by mouth once daily. 90 tablet 0    [DISCONTINUED] NIFEdipine (PROCARDIA-XL) 60 MG (OSM) 24 hr tablet Take 1 tablet (60 mg total) by mouth once daily. 90 tablet 0    [DISCONTINUED] sertraline (ZOLOFT) 100 MG tablet Take 1 tablet (100 mg total) by mouth once daily. 90 tablet 0    [DISCONTINUED] venlafaxine (EFFEXOR-XR) 150 MG Cp24 TAKE 1 CAPSULE BY MOUTH EVERY DAY WITH FOOD 90 capsule 0    albuterol (PROVENTIL/VENTOLIN HFA) 90 mcg/actuation inhaler Inhale 2 puffs into the lungs every 6 (six) hours as needed for Wheezing. Rescue 18 g 1    [] cephALEXin (KEFLEX) 500 MG capsule Take 1 capsule (500 mg total) by mouth every 6 (six) hours. for 14 days 56 capsule 0    cetirizine (ZYRTEC) 10 MG tablet Take 1 tablet (10 mg total) by mouth once daily. 30 tablet 0    cloNIDine (CATAPRES) 0.2 MG tablet Take 1 tablet (0.2 mg total) by mouth 4 (four) times daily. 360 tablet 0    dulaglutide (TRULICITY) 1.5 mg/0.5 mL pen injector Inject 1.5 mg into the skin every 7 days. 4 pen 2    fluticasone propionate (FLONASE) 50 mcg/actuation nasal spray 2 sprays (100 mcg total) by Each Nostril route once daily. 18.2 mL 0     hydrALAZINE (APRESOLINE) 100 MG tablet Take 1 tablet (100 mg total) by mouth 3 (three) times daily. 270 tablet 0    metoprolol succinate (TOPROL-XL) 100 MG 24 hr tablet Take 1 tablet (100 mg total) by mouth once daily. 90 tablet 0    NIFEdipine (PROCARDIA-XL) 60 MG (OSM) 24 hr tablet Take 1 tablet (60 mg total) by mouth once daily. 90 tablet 0    sertraline (ZOLOFT) 100 MG tablet Take 1 tablet (100 mg total) by mouth once daily. 90 tablet 0    traMADoL (ULTRAM) 50 mg tablet Take 1 tablet (50 mg total) by mouth every 6 (six) hours as needed for Pain. 20 tablet 0    venlafaxine (EFFEXOR-XR) 150 MG Cp24 TAKE 1 CAPSULE BY MOUTH EVERY DAY WITH FOOD 90 capsule 0     Facility-Administered Encounter Medications as of 2/3/2022   Medication Dose Route Frequency Provider Last Rate Last Admin    acetaminophen tablet 650 mg  650 mg Oral Once PRN Long Fernández, DO        albuterol inhaler 2 puff  2 puff Inhalation Q20 Min PRN Long Fernández, DO        diphenhydrAMINE injection 25 mg  25 mg Intravenous Once PRN Long Fernández, DO        EPINEPHrine (EPIPEN) 0.3 mg/0.3 mL pen injection 0.3 mg  0.3 mg Intramuscular PRN Long Fernández, DO        methylPREDNISolone sodium succinate injection 40 mg  40 mg Intravenous Once PRN Long Fernández, DO        ondansetron disintegrating tablet 4 mg  4 mg Oral Once PRN Long Fernández, DO        sodium chloride 0.9% 500 mL flush bag   Intravenous PRN Long Fernández, DO        sodium chloride 0.9% flush 10 mL  10 mL Intravenous PRN Long Fernández, DO           Allergies:  Review of patient's allergies indicates:   Allergen Reactions    Fish containing products Anaphylaxis    Iodinated contrast media     Penicillins        Health Maintenance:    There is no immunization history on file for this patient.   Health Maintenance   Topic Date Due    Lipid Panel  Never done    Foot Exam  Never done    Eye Exam  Never done    TETANUS VACCINE  Never done  "   Hemoglobin A1c  08/03/2022    Mammogram  09/30/2022    Low Dose Statin  02/03/2023    Hepatitis C Screening  Completed        Physical Exam      Vital Signs  Temp: 97.1 °F (36.2 °C)  Pulse: 90  SpO2: 95 %  BP: 132/76  BP Location: Left arm  Patient Position: Sitting  Height and Weight  Height: 5' 7" (170.2 cm)  Weight: 106.1 kg (234 lb)  BSA (Calculated - sq m): 2.24 sq meters  BMI (Calculated): 36.6  Weight in (lb) to have BMI = 25: 159.3]    Physical Exam  Constitutional:       Appearance: Normal appearance.   HENT:      Head: Normocephalic and atraumatic.   Eyes:      Extraocular Movements: Extraocular movements intact.      Conjunctiva/sclera: Conjunctivae normal.      Pupils: Pupils are equal, round, and reactive to light.   Cardiovascular:      Rate and Rhythm: Normal rate and regular rhythm.      Pulses: Normal pulses.           Radial pulses are 2+ on the right side and 2+ on the left side.      Heart sounds: Normal heart sounds.   Pulmonary:      Effort: Pulmonary effort is normal.      Breath sounds: Normal breath sounds.   Abdominal:      Palpations: Abdomen is soft.      Tenderness: There is no abdominal tenderness.   Musculoskeletal:         General: Normal range of motion.      Right lower leg: No edema.      Left lower leg: No edema.   Skin:     General: Skin is warm and dry.      Findings: No rash.   Neurological:      General: No focal deficit present.      Mental Status: She is alert. Mental status is at baseline.   Psychiatric:         Mood and Affect: Mood normal.          Laboratory:  CBC:  Recent Labs   Lab 12/28/21  0912 12/28/21  0912 01/03/22  0824 01/03/22  0824 01/19/22  0847   WBC 7.03   < > 6.86   < > 6.38   RBC 4.07 L   < > 4.17 L   < > 3.59 L   Hemoglobin 11.1 L   < > 11.3 L   < > 9.8 L   Hematocrit 36.9 L   < > 37.4 L   < > 33.1 L   Platelet Count 414 H   < > 482 H   < > 502 H   MCV 90.7   < > 89.7   < > 92.2   MCH 27.3   < > 27.1   < > 27.3   MCHC 30.1 L  --  30.2 L  --  29.6 " L    < > = values in this interval not displayed.     CMP:  Recent Labs   Lab 08/30/21  1017 10/18/21  0832 11/23/21  1346 11/23/21  1346 12/28/21  0912 12/28/21  0912 02/03/22  0910   Glucose 337 H   < > 194 H   < > 341 H   < > 188 H   Calcium 9.1  --  9.0   < > 9.4   < > 9.1   Albumin 3.5  --  3.4 L   < > 3.3 L  --   --    Total Protein 7.3  --  7.7   < > 7.4  --   --    Sodium 139  --  143   < > 137   < > 140   Potassium 3.5  --  3.5   < > 3.8   < > 3.7   CO2 29  --  29   < > 33 H   < > 29   Chloride 104  --  108 H   < > 101   < > 104   BUN 8  --  6 L   < > 8   < > 8   Alk Phos 150 H  --  157 H   < > 148 H  --   --    ALT 36  --  45   < > 32  --   --    AST 26  --  32   < > 28  --   --    Bilirubin, Total 0.2  --  0.2  --  0.3  --   --     < > = values in this interval not displayed.     LIPIDS:  Recent Labs   Lab 05/14/21  0938 08/16/21  1450 10/18/21  0832   TSH 10.200 H 30.000 H 1.690     TSH:  Recent Labs   Lab 05/14/21  0938 08/16/21  1450 10/18/21  0832   TSH 10.200 H 30.000 H 1.690     A1C:  Recent Labs   Lab 05/14/21  0938 08/30/21  1017 11/15/21  1049 11/23/21  1346 02/03/22  0910   Hemoglobin A1C 10.7 H 9.1 H 7.6 H 7.6 H 7.5 H       Assessment/Plan     Carey Leonardo is a 46 y.o.female with:     1. Acute recurrent sinusitis, unspecified location  - fluticasone propionate (FLONASE) 50 mcg/actuation nasal spray; 2 sprays (100 mcg total) by Each Nostril route once daily.  Dispense: 18.2 mL; Refill: 0    2. Uncontrolled type 2 diabetes mellitus with hyperglycemia  - Will recheck A1c and adjust medications as indicated  - Basic Metabolic Panel  - Hemoglobin A1C  - Microalbumin/Creatinine Ratio, Urine    3. Lumbar spondylosis  - Ambulatory referral/consult to Pain Clinic; Future       Total time spent face-to-face and non-face-to-face coordinating care for this encounter was: 30 min    Chronic conditions status updated as per HPI.  Other than changes above, cont current medications and maintain follow up  with specialists.  Return to clinic in 6 weeks.    Long Fernández DO  [facility]

## 2022-02-04 NOTE — TELEPHONE ENCOUNTER
----- Message from Long Fernández DO sent at 2/4/2022 10:21 AM CST -----  A1c still just above goal, but stable. It hasn't quite been 3 months with all her diabetic med changes so will wait until recheck of A1c before we consider changing her medications.

## 2022-02-11 ENCOUNTER — OFFICE VISIT (OUTPATIENT)
Dept: FAMILY MEDICINE | Facility: CLINIC | Age: 47
End: 2022-02-11
Payer: OTHER GOVERNMENT

## 2022-02-11 VITALS
WEIGHT: 206 LBS | BODY MASS INDEX: 33.11 KG/M2 | SYSTOLIC BLOOD PRESSURE: 121 MMHG | RESPIRATION RATE: 18 BRPM | OXYGEN SATURATION: 97 % | HEART RATE: 96 BPM | DIASTOLIC BLOOD PRESSURE: 79 MMHG | HEIGHT: 66 IN | TEMPERATURE: 99 F

## 2022-02-11 DIAGNOSIS — J06.9 UPPER RESPIRATORY TRACT INFECTION, UNSPECIFIED TYPE: ICD-10-CM

## 2022-02-11 DIAGNOSIS — R52 BODY ACHES: Primary | ICD-10-CM

## 2022-02-11 DIAGNOSIS — J02.9 SORE THROAT: ICD-10-CM

## 2022-02-11 DIAGNOSIS — J45.909 ASTHMA, UNSPECIFIED ASTHMA SEVERITY, UNSPECIFIED WHETHER COMPLICATED, UNSPECIFIED WHETHER PERSISTENT: ICD-10-CM

## 2022-02-11 DIAGNOSIS — R09.89 RUNNY NOSE: ICD-10-CM

## 2022-02-11 PROBLEM — E11.9 TYPE 2 DIABETES MELLITUS WITHOUT COMPLICATION, WITHOUT LONG-TERM CURRENT USE OF INSULIN: Status: ACTIVE | Noted: 2022-02-11

## 2022-02-11 LAB
CTP QC/QA: YES
CTP QC/QA: YES
FLUAV AG NPH QL: NEGATIVE
FLUBV AG NPH QL: NEGATIVE
S PYO RRNA THROAT QL PROBE: NEGATIVE
SARS-COV-2 AG RESP QL IA.RAPID: NEGATIVE

## 2022-02-11 PROCEDURE — 87880 POCT RAPID STREP A: ICD-10-PCS | Mod: QW,,, | Performed by: NURSE PRACTITIONER

## 2022-02-11 PROCEDURE — 87880 STREP A ASSAY W/OPTIC: CPT | Mod: QW,,, | Performed by: NURSE PRACTITIONER

## 2022-02-11 PROCEDURE — 87428 POCT SARS-COV2 (COVID) WITH FLU ANTIGEN: ICD-10-PCS | Mod: QW,,, | Performed by: NURSE PRACTITIONER

## 2022-02-11 PROCEDURE — 99214 OFFICE O/P EST MOD 30 MIN: CPT | Mod: ,,, | Performed by: NURSE PRACTITIONER

## 2022-02-11 PROCEDURE — 87081 CULTURE SCREEN ONLY: CPT | Mod: ,,, | Performed by: CLINICAL MEDICAL LABORATORY

## 2022-02-11 PROCEDURE — 99214 PR OFFICE/OUTPT VISIT, EST, LEVL IV, 30-39 MIN: ICD-10-PCS | Mod: ,,, | Performed by: NURSE PRACTITIONER

## 2022-02-11 PROCEDURE — 87428 SARSCOV & INF VIR A&B AG IA: CPT | Mod: QW,,, | Performed by: NURSE PRACTITIONER

## 2022-02-11 PROCEDURE — 87081 CULTURE, STREP A,  THROAT: ICD-10-PCS | Mod: ,,, | Performed by: CLINICAL MEDICAL LABORATORY

## 2022-02-11 RX ORDER — ATORVASTATIN CALCIUM 20 MG/1
20 TABLET, FILM COATED ORAL NIGHTLY
Qty: 90 TABLET | Refills: 3 | Status: SHIPPED | OUTPATIENT
Start: 2022-02-11 | End: 2023-03-21 | Stop reason: SDUPTHER

## 2022-02-11 RX ORDER — AZITHROMYCIN 250 MG/1
TABLET, FILM COATED ORAL
Qty: 6 TABLET | Refills: 0 | Status: SHIPPED | OUTPATIENT
Start: 2022-02-11 | End: 2022-02-16

## 2022-02-11 RX ORDER — ALBUTEROL SULFATE 0.83 MG/ML
2.5 SOLUTION RESPIRATORY (INHALATION) EVERY 6 HOURS PRN
Qty: 25 EACH | Refills: 0 | Status: SHIPPED | OUTPATIENT
Start: 2022-02-11 | End: 2022-06-23

## 2022-02-11 NOTE — PROGRESS NOTES
ROGELIO Chahal   [facility]      PATIENT NAME: Carey Leonardo  : 1975  DATE: 22  MRN: [id number]      Patient PCP Information     Provider PCP Type    Long Fernández DO General          Reason for Visit / Chief Complaint: Sore Throat, Generalized Body Aches, and Nasal Congestion       History of Present Illness / Problem Focused Workflow     Carey Leonardo presents to the clinic with Sore Throat, Generalized Body Aches, and Nasal Congestion     OSKAR Patino is a 46 year-old well nourished  female that presents to the clinic with symptoms of sore throat, nasal congestion, runny-nose, and feeling tired for 2 days. She has had no fever, no nausea, vomiting, or diarrhea. Also, has complaints of loss of appetite, headaches, and just not feeling well. At home she has taken Tylenol and Thera-flu over-the-counter with no relief of symptoms. She has a history of asthma and allergies with bilateral tubes placed in each ear. She comes to the clinic seeking relief of the symptoms that she has been having.  Hx of covid 2022. No hospitalization. Did not require oxygen. Did receive monoclonal infusion 2022 which patient states helped symptoms.   Has not received covid vaccine.   Prior medical hx HTN, DM, asthma, thyroid disorder , CHF, KILO    Review of Systems     Review of Systems   Constitutional: Negative for activity change, appetite change, chills, diaphoresis, fatigue, fever and unexpected weight change.   HENT: Positive for congestion, rhinorrhea, sinus pressure, sinus pain and sore throat. Negative for ear pain, facial swelling, hearing loss and nosebleeds.    Respiratory: Negative for apnea, cough, shortness of breath and wheezing.    Cardiovascular: Negative for chest pain, palpitations and leg swelling.   Gastrointestinal: Negative for abdominal distention, abdominal pain, blood in stool, constipation, diarrhea and nausea.   Endocrine: Negative for cold  intolerance, heat intolerance, polydipsia, polyphagia and polyuria.   Genitourinary: Negative for decreased urine volume, difficulty urinating, dysuria, flank pain, frequency, hematuria and urgency.   Musculoskeletal: Positive for myalgias. Negative for arthralgias and joint swelling.   Skin: Negative for color change and rash.   Neurological: Negative for dizziness, tremors, seizures, syncope, facial asymmetry, speech difficulty, weakness, light-headedness, numbness and headaches.   Hematological: Negative for adenopathy. Does not bruise/bleed easily.   Psychiatric/Behavioral: Negative for behavioral problems and confusion.       Medical / Social / Family History     Past Medical History:   Diagnosis Date    Asthma     CHF (congestive heart failure)     Chronic pain syndrome     Depressive disorder     Enlarged heart     Gastroparesis     GERD (gastroesophageal reflux disease)     Hyperlipidemia     Hypertension     IBS (irritable bowel syndrome)     Kidney stones     Lumbar radiculopathy     Sleep apnea     Does not use a CPAP       Past Surgical History:   Procedure Laterality Date    Bilateral L3-S1 MBB Bilateral 6-, 5-, 1-8-2014    Dr Jessica Randolph    CARPAL TUNNEL RELEASE      CHOLECYSTECTOMY      DIAGNOSTIC LAPAROSCOPY  04/14/2010    Exploratory Laparotomy, Myomectomy, Hydrotubation-Dr. Feng    DILATION AND CURETTAGE OF UTERUS  04/14/2010    Hysteroscopy-Dr. Feng    EXPLORATORY LAPAROTOMY WITH UTERINE MYOMECTOMY  02/27/2007    Dr. Marquise Anderson    HYSTERECTOMY  09/29/2011    Robotic, FABIENNE, Cystoscopy-Dr. Feng    HYSTEROSCOPY WITH DILATION AND CURETTAGE OF UTERUS  04/04/2006    Laparoscopy, Chromotubation-Dr. Marquise Anderson    LAMINECTOMY N/A 11/30/2021    Procedure: L2-L5 Laminectomy;  Surgeon: Cyril Upton MD;  Location: [facility];  Service: Neurosurgery;  Laterality: N/A;    LEFT HEART CATHETERIZATION      Left L3-S1 RFTC Left 07/18/2017    Dr Lopez    Left SI  JI Left 09/30/2013    Dr Randolph    OOPHORECTOMY  11/11/2014    Robotic, FABIENNE, Cystoscopy-Dr. Feng    SINUS SURGERY         Social History  Ms.  reports that she has never smoked. She has never used smokeless tobacco. She reports previous alcohol use. She reports previous drug use. Drugs: Hydrocodone and Oxycodone.    Family History  Ms.'s family history includes Diabetes Mellitus in her mother; Heart disease in her mother and sister; Hypertension in her mother; Migraines in her mother.    Medications and Allergies     Medications  Outpatient Medications Marked as Taking for the 2/11/22 encounter (Office Visit) with ROGELIO Chahal   Medication Sig Dispense Refill    albuterol (PROVENTIL/VENTOLIN HFA) 90 mcg/actuation inhaler Inhale 2 puffs into the lungs every 6 (six) hours as needed for Wheezing. Rescue 18 g 1    cetirizine (ZYRTEC) 10 MG tablet Take 1 tablet (10 mg total) by mouth once daily. 30 tablet 0    cloNIDine (CATAPRES) 0.2 MG tablet Take 1 tablet (0.2 mg total) by mouth 4 (four) times daily. 360 tablet 0    cyclobenzaprine (FLEXERIL) 5 MG tablet Take 1 tablet (5 mg total) by mouth 3 (three) times daily as needed for Muscle spasms. 45 tablet 3    dulaglutide (TRULICITY) 1.5 mg/0.5 mL pen injector Inject 1.5 mg into the skin every 7 days. 4 pen 2    fluticasone propionate (FLONASE) 50 mcg/actuation nasal spray 2 sprays (100 mcg total) by Each Nostril route once daily. 18.2 mL 0    glipizide-metformin (METAGLIP) 5-500 mg per tablet Take 1 tablet by mouth 2 (two) times daily before meals. 60 tablet 2    hydrALAZINE (APRESOLINE) 100 MG tablet Take 1 tablet (100 mg total) by mouth 3 (three) times daily. 270 tablet 0    levothyroxine (SYNTHROID, LEVOTHROID) 175 MCG tablet TAKE 1 TABLET BY MOUTH EACH MORNING BEFORE BREAKFAST ON EMPTY STOMACH (DRINK PLENTY OF WATER) 30 tablet 2    metoprolol succinate (TOPROL-XL) 100 MG 24 hr tablet Take 1 tablet (100 mg total) by mouth once daily. 90 tablet 0     NIFEdipine (PROCARDIA-XL) 60 MG (OSM) 24 hr tablet Take 1 tablet (60 mg total) by mouth once daily. 90 tablet 0    sertraline (ZOLOFT) 100 MG tablet Take 1 tablet (100 mg total) by mouth once daily. 90 tablet 0    traMADoL (ULTRAM) 50 mg tablet Take 1 tablet (50 mg total) by mouth every 6 (six) hours as needed for Pain. 20 tablet 0    venlafaxine (EFFEXOR-XR) 150 MG Cp24 TAKE 1 CAPSULE BY MOUTH EVERY DAY WITH FOOD 90 capsule 0    [DISCONTINUED] atorvastatin (LIPITOR) 20 MG tablet Take 20 mg by mouth once daily.       Current Facility-Administered Medications for the 2/11/22 encounter (Office Visit) with ROGELIO Chahal   Medication Dose Route Frequency Provider Last Rate Last Admin    [DISCONTINUED] acetaminophen tablet 650 mg  650 mg Oral Once PRN Long Fernádnez, DO        [DISCONTINUED] albuterol inhaler 2 puff  2 puff Inhalation Q20 Min PRN Long Fernández DO        [DISCONTINUED] diphenhydrAMINE injection 25 mg  25 mg Intravenous Once PRN Long Fernández, DO        [DISCONTINUED] EPINEPHrine (EPIPEN) 0.3 mg/0.3 mL pen injection 0.3 mg  0.3 mg Intramuscular PRN Long Fernández DO        [DISCONTINUED] methylPREDNISolone sodium succinate injection 40 mg  40 mg Intravenous Once PRN Long Fernández DO        [DISCONTINUED] ondansetron disintegrating tablet 4 mg  4 mg Oral Once PRN Long Fernández, DO        [DISCONTINUED] sodium chloride 0.9% 500 mL flush bag   Intravenous PRN Long Fernández DO        [DISCONTINUED] sodium chloride 0.9% flush 10 mL  10 mL Intravenous PRN Long Fernández DO           Allergies  Review of patient's allergies indicates:   Allergen Reactions    Fish containing products Anaphylaxis    Iodinated contrast media     Penicillins        Physical Examination     Vitals:    02/11/22 1014   BP: 121/79   BP Location: Right arm   Patient Position: Sitting   Pulse: 96   Resp: 18   Temp: 98.6 °F (37 °C)   SpO2: 97%   Weight: 93.4 kg (206  "lb)  Comment: per pt   Height: 5' 6" (1.676 m)       Physical Exam  Vitals reviewed.   Constitutional:       Appearance: Normal appearance. She is obese.   HENT:      Head: Normocephalic.      Right Ear: Tympanic membrane, ear canal and external ear normal.      Left Ear: Tympanic membrane, ear canal and external ear normal.      Ears:      Comments: Tube in place right TM, no tube to left     Nose: Congestion and rhinorrhea present.      Comments: Enlarged turbinates, frontal and maxillary sinus tenderness      Mouth/Throat:      Mouth: Mucous membranes are moist.      Pharynx: Posterior oropharyngeal erythema present.   Eyes:      Extraocular Movements: Extraocular movements intact.   Cardiovascular:      Rate and Rhythm: Normal rate and regular rhythm.      Pulses: Normal pulses.      Heart sounds: Normal heart sounds.   Pulmonary:      Effort: Pulmonary effort is normal.      Breath sounds: Wheezing present.      Comments: Exp wheezing left anterior chest, diminishes breath sounds posterior bilaterally on auscultation  Abdominal:      Palpations: Abdomen is soft.   Musculoskeletal:         General: Normal range of motion.      Cervical back: Normal range of motion.   Skin:     General: Skin is warm and dry.      Capillary Refill: Capillary refill takes less than 2 seconds.   Neurological:      General: No focal deficit present.      Mental Status: She is alert and oriented to person, place, and time.   Psychiatric:         Mood and Affect: Mood normal.         Behavior: Behavior normal.         Thought Content: Thought content normal.         Judgment: Judgment normal.           Office Visit on 02/11/2022   Component Date Value Ref Range Status    SARS Coronavirus 2 Antigen 02/11/2022 Negative  Negative Final    Rapid Influenza A Ag 02/11/2022 Negative  Negative Final    Rapid Influenza B Ag 02/11/2022 Negative  Negative Final     Acceptable 02/11/2022 Yes   Final    Rapid Strep A Screen " 02/11/2022 Negative  Negative Final     Acceptable 02/11/2022 Yes   Final   Office Visit on 02/03/2022   Component Date Value Ref Range Status    Sodium 02/03/2022 140  136 - 145 mmol/L Final    Potassium 02/03/2022 3.7  3.5 - 5.1 mmol/L Final    Chloride 02/03/2022 104  98 - 107 mmol/L Final    CO2 02/03/2022 29  21 - 32 mmol/L Final    Anion Gap 02/03/2022 11  7 - 16 mmol/L Final    Glucose 02/03/2022 188* 74 - 106 mg/dL Final    BUN 02/03/2022 8  7 - 18 mg/dL Final    Creatinine 02/03/2022 0.81  0.55 - 1.02 mg/dL Final    BUN/Creatinine Ratio 02/03/2022 10  6 - 20 Final    Calcium 02/03/2022 9.1  8.5 - 10.1 mg/dL Final    eGFR 02/03/2022 81  >=60 mL/min/1.73m² Final    Hemoglobin A1C 02/03/2022 7.5* 4.5 - 6.6 % Final      Normal:               <5.7%  Pre-Diabetic:       5.7% to 6.4%  Diabetic:             >6.4%  Diabetic Goal:     <7%    Estimated Average Glucose 02/03/2022 164  mg/dL Final    Creatinine, Urine 02/03/2022 435* 28 - 219 mg/dL Final    Microalbumin 02/03/2022 2.3  0.0 - 2.8 mg/dL Final    Microalbumin/Creatinine Ratio 02/03/2022 5.3  0.0 - 30.0 mg/g Final             Assessment and Plan (including Health Maintenance)       Plan:   Body aches  Runny nose  URI  -     POCT SARS-COV2 (COVID) with Flu Antigen negative/negative     Sore throat  -     POCT rapid strep A negative, throat cx pending    Asthma      - albuterol neb refills every 4-6 hours prn      - azithromycin 1 pack take as directed     Follow up prn if symptoms fail to improve  Supportive care adequate hydration, tylenol prn for fever or pain  There are no Patient Instructions on file for this visit.       Health Maintenance Due   Topic Date Due    Lipid Panel  Never done    COVID-19 Vaccine (1) Never done    Pneumococcal Vaccines (Age 0-64) (1 of 2 - PPSV23) Never done    Foot Exam  Never done    Eye Exam  Never done    TETANUS VACCINE  Never done    Sign Pain Contract  Never done    Complete  Opioid Risk Tool  Never done    Influenza Vaccine (1) Never done    Colorectal Cancer Screening  Never done         There is no immunization history on file for this patient.     Problem List Items Addressed This Visit    None     Visit Diagnoses     Body aches    -  Primary    Relevant Orders    POCT SARS-COV2 (COVID) with Flu Antigen (Completed)    Runny nose        Relevant Orders    POCT SARS-COV2 (COVID) with Flu Antigen (Completed)    Sore throat        Relevant Orders    POCT rapid strep A (Completed)    Strep A culture, throat    Asthma, unspecified asthma severity, unspecified whether complicated, unspecified whether persistent              Health Maintenance Topics with due status: Not Due       Topic Last Completion Date    Mammogram 09/30/2021    Diabetes Urine Screening 02/03/2022    Hemoglobin A1c 02/03/2022    Low Dose Statin 02/11/2022       Future Appointments   Date Time Provider Department Center   2/14/2022  9:30 AM Long Fernández DO [facility] [place]   3/17/2022  8:30 AM Long Fernández DO [facility] [place]   10/6/2022  9:00 AM [facility]            Signature:  ROGELIO Chahal  [facility]     Date of encounter: 2/11/22

## 2022-02-13 LAB — DEPRECATED S PYO AG THROAT QL EIA: NORMAL

## 2022-02-14 ENCOUNTER — OFFICE VISIT (OUTPATIENT)
Dept: FAMILY MEDICINE | Facility: CLINIC | Age: 47
End: 2022-02-14
Payer: OTHER GOVERNMENT

## 2022-02-14 VITALS
BODY MASS INDEX: 34.55 KG/M2 | OXYGEN SATURATION: 95 % | TEMPERATURE: 99 F | WEIGHT: 215 LBS | SYSTOLIC BLOOD PRESSURE: 177 MMHG | HEIGHT: 66 IN | DIASTOLIC BLOOD PRESSURE: 100 MMHG | HEART RATE: 86 BPM

## 2022-02-14 DIAGNOSIS — Z20.822 ENCOUNTER FOR LABORATORY TESTING FOR COVID-19 VIRUS: Primary | ICD-10-CM

## 2022-02-14 DIAGNOSIS — U07.1 COVID-19: ICD-10-CM

## 2022-02-14 LAB
CTP QC/QA: YES
FLUAV AG NPH QL: NEGATIVE
FLUBV AG NPH QL: NEGATIVE
SARS-COV-2 AG RESP QL IA.RAPID: POSITIVE

## 2022-02-14 PROCEDURE — 99213 OFFICE O/P EST LOW 20 MIN: CPT | Mod: ,,, | Performed by: FAMILY MEDICINE

## 2022-02-14 PROCEDURE — 87428 POCT SARS-COV2 (COVID) WITH FLU ANTIGEN: ICD-10-PCS | Mod: QW,,, | Performed by: FAMILY MEDICINE

## 2022-02-14 PROCEDURE — 87428 SARSCOV & INF VIR A&B AG IA: CPT | Mod: QW,,, | Performed by: FAMILY MEDICINE

## 2022-02-14 PROCEDURE — 99213 PR OFFICE/OUTPT VISIT, EST, LEVL III, 20-29 MIN: ICD-10-PCS | Mod: ,,, | Performed by: FAMILY MEDICINE

## 2022-02-14 RX ORDER — BENZONATATE 100 MG/1
CAPSULE ORAL
Qty: 30 CAPSULE | Refills: 0 | Status: SHIPPED | OUTPATIENT
Start: 2022-02-14 | End: 2022-05-08 | Stop reason: CLARIF

## 2022-02-14 RX ORDER — CETIRIZINE HYDROCHLORIDE 10 MG/1
10 TABLET ORAL DAILY
Qty: 30 TABLET | Refills: 0 | Status: SHIPPED | OUTPATIENT
Start: 2022-02-14 | End: 2022-03-16

## 2022-02-14 RX ORDER — IPRATROPIUM BROMIDE 21 UG/1
2 SPRAY, METERED NASAL 3 TIMES DAILY
Qty: 15 ML | Refills: 0 | Status: SHIPPED | OUTPATIENT
Start: 2022-02-14 | End: 2022-05-08

## 2022-02-14 NOTE — PROGRESS NOTES
[facility]    Chief Complaint      Chief Complaint   Patient presents with    SS/URI     Sore Throat, Hoarseness, wheezing, SOB, fatigue, fever(Friday) x  3 days       History of Present Illness      Carey Leonardo is a 46 y.o. female with chronic conditions of HTN, DM2, hypothyroidism, dyslipidemia, anxiety and lumbar radiculopathy who presents today for fever and sore throat. Symptoms started Friday. Negative for Covid and flu at that time. Noted sore throat, hoarseness, mild dyspnea, fever and fatigue.    Past Medical History:  Past Medical History:   Diagnosis Date    Asthma     CHF (congestive heart failure)     Chronic pain syndrome     Depressive disorder     Enlarged heart     Gastroparesis     GERD (gastroesophageal reflux disease)     Hyperlipidemia     Hypertension     IBS (irritable bowel syndrome)     Kidney stones     Lumbar radiculopathy     Sleep apnea     Does not use a CPAP       Past Surgical History:   has a past surgical history that includes Hysterectomy (09/29/2011); Oophorectomy (11/11/2014); Dilation and curettage of uterus (04/14/2010); Diagnostic laparoscopy (04/14/2010); Exploratory laparotomy with uterine myomectomy (02/27/2007); Hysteroscopy with dilation and curettage of uterus (04/04/2006); Carpal tunnel release; Cholecystectomy; Bilateral L3-S1 MBB (Bilateral, 6-, 5-, 1-8-2014); Left heart catheterization; Left L3-S1 RFTC (Left, 07/18/2017); Left SI JI (Left, 09/30/2013); Sinus surgery; and Laminectomy (N/A, 11/30/2021).    Social History:  Social History     Tobacco Use    Smoking status: Never Smoker    Smokeless tobacco: Never Used   Substance Use Topics    Alcohol use: Not Currently    Drug use: Not Currently     Types: Hydrocodone, Oxycodone       I personally reviewed all past medical, surgical, and social.     Review of Systems   Constitutional: Positive for fatigue and fever.   HENT: Positive for sore throat. Negative for ear pain.     Eyes: Negative for pain and visual disturbance.   Respiratory: Positive for shortness of breath. Negative for chest tightness.    Cardiovascular: Negative for chest pain and leg swelling.   Gastrointestinal: Negative for abdominal pain.   Genitourinary: Negative for difficulty urinating.   Musculoskeletal: Negative for gait problem and myalgias.   Skin: Negative for rash.   Neurological: Negative for dizziness and light-headedness.   Hematological: Does not bruise/bleed easily.        Medications:  Outpatient Encounter Medications as of 2/14/2022   Medication Sig Dispense Refill    albuterol (PROVENTIL) 2.5 mg /3 mL (0.083 %) nebulizer solution Take 3 mLs (2.5 mg total) by nebulization every 6 (six) hours as needed for Wheezing. Rescue 25 each 0    albuterol (PROVENTIL/VENTOLIN HFA) 90 mcg/actuation inhaler Inhale 2 puffs into the lungs every 6 (six) hours as needed for Wheezing. Rescue 18 g 1    atorvastatin (LIPITOR) 20 MG tablet Take 1 tablet (20 mg total) by mouth every evening. 90 tablet 3    azithromycin (Z-DANIEL) 250 MG tablet Take 2 tablets by mouth on day 1; Take 1 tablet by mouth on days 2-5 6 tablet 0    benzonatate (TESSALON) 100 MG capsule One to two capsules three times daily as needed for cough 30 capsule 0    cetirizine (ZYRTEC) 10 MG tablet Take 1 tablet (10 mg total) by mouth once daily. 30 tablet 0    cetirizine (ZYRTEC) 10 MG tablet Take 1 tablet (10 mg total) by mouth once daily. 30 tablet 0    cloNIDine (CATAPRES) 0.2 MG tablet Take 1 tablet (0.2 mg total) by mouth 4 (four) times daily. 360 tablet 0    cyclobenzaprine (FLEXERIL) 5 MG tablet Take 1 tablet (5 mg total) by mouth 3 (three) times daily as needed for Muscle spasms. 45 tablet 3    dulaglutide (TRULICITY) 1.5 mg/0.5 mL pen injector Inject 1.5 mg into the skin every 7 days. 4 pen 2    fluticasone propionate (FLONASE) 50 mcg/actuation nasal spray 2 sprays (100 mcg total) by Each Nostril route once daily. 18.2 mL 0     glipizide-metformin (METAGLIP) 5-500 mg per tablet Take 1 tablet by mouth 2 (two) times daily before meals. 60 tablet 2    hydrALAZINE (APRESOLINE) 100 MG tablet Take 1 tablet (100 mg total) by mouth 3 (three) times daily. 270 tablet 0    HYDROcodone-acetaminophen (NORCO) 7.5-325 mg per tablet Take 1 tablet by mouth every 8 (eight) hours as needed for Pain. 15 tablet 0    ipratropium (ATROVENT) 21 mcg (0.03 %) nasal spray 2 sprays by Nasal route 3 (three) times daily. 15 mL 0    levothyroxine (SYNTHROID, LEVOTHROID) 175 MCG tablet TAKE 1 TABLET BY MOUTH EACH MORNING BEFORE BREAKFAST ON EMPTY STOMACH (DRINK PLENTY OF WATER) 30 tablet 2    metoprolol succinate (TOPROL-XL) 100 MG 24 hr tablet Take 1 tablet (100 mg total) by mouth once daily. 90 tablet 0    NIFEdipine (PROCARDIA-XL) 60 MG (OSM) 24 hr tablet Take 1 tablet (60 mg total) by mouth once daily. 90 tablet 0    oxyCODONE-acetaminophen (PERCOCET) 5-325 mg per tablet Take 1 tablet by mouth every 4 (four) hours as needed for Pain. 45 tablet 0    promethazine (PHENERGAN) 25 MG tablet Take 1 tablet (25 mg total) by mouth every 4 (four) hours. 45 tablet 0    sertraline (ZOLOFT) 100 MG tablet Take 1 tablet (100 mg total) by mouth once daily. 90 tablet 0    traMADoL (ULTRAM) 50 mg tablet Take 1 tablet (50 mg total) by mouth every 6 (six) hours as needed for Pain. 20 tablet 0    venlafaxine (EFFEXOR-XR) 150 MG Cp24 TAKE 1 CAPSULE BY MOUTH EVERY DAY WITH FOOD 90 capsule 0     No facility-administered encounter medications on file as of 2/14/2022.       Allergies:  Review of patient's allergies indicates:   Allergen Reactions    Fish containing products Anaphylaxis    Iodinated contrast media     Penicillins        Health Maintenance:    There is no immunization history on file for this patient.   Health Maintenance   Topic Date Due    Lipid Panel  Never done    Foot Exam  Never done    Eye Exam  Never done    TETANUS VACCINE  Never done    Hemoglobin  "A1c  08/03/2022    Mammogram  09/30/2022    Low Dose Statin  02/11/2023    Hepatitis C Screening  Completed        Physical Exam      Vital Signs  Temp: 98.7 °F (37.1 °C)  Temp src: Oral  Pulse: 86  SpO2: 95 %  BP: (!) 177/100  BP Location: Left arm  Patient Position: Sitting  Height and Weight  Height: 5' 6" (167.6 cm)  Weight: 97.5 kg (215 lb)  BSA (Calculated - sq m): 2.13 sq meters  BMI (Calculated): 34.7  Weight in (lb) to have BMI = 25: 154.6]    Physical Exam  Constitutional:       Appearance: Normal appearance.   HENT:      Head: Normocephalic and atraumatic.   Eyes:      Extraocular Movements: Extraocular movements intact.      Conjunctiva/sclera: Conjunctivae normal.      Pupils: Pupils are equal, round, and reactive to light.   Cardiovascular:      Rate and Rhythm: Normal rate and regular rhythm.      Pulses: Normal pulses.           Radial pulses are 2+ on the right side and 2+ on the left side.      Heart sounds: Normal heart sounds.   Pulmonary:      Effort: Pulmonary effort is normal.      Breath sounds: Normal breath sounds.   Abdominal:      Palpations: Abdomen is soft.      Tenderness: There is no abdominal tenderness.   Musculoskeletal:         General: Normal range of motion.      Right lower leg: No edema.      Left lower leg: No edema.   Skin:     General: Skin is warm and dry.      Findings: No rash.   Neurological:      General: No focal deficit present.      Mental Status: She is alert. Mental status is at baseline.   Psychiatric:         Mood and Affect: Mood normal.          Laboratory:  CBC:  Recent Labs   Lab 12/28/21  0912 12/28/21  0912 01/03/22  0824 01/03/22  0824 01/19/22  0847   WBC 7.03   < > 6.86   < > 6.38   RBC 4.07 L   < > 4.17 L   < > 3.59 L   Hemoglobin 11.1 L   < > 11.3 L   < > 9.8 L   Hematocrit 36.9 L   < > 37.4 L   < > 33.1 L   Platelet Count 414 H   < > 482 H   < > 502 H   MCV 90.7   < > 89.7   < > 92.2   MCH 27.3   < > 27.1   < > 27.3   MCHC 30.1 L  --  30.2 L  --  " 29.6 L    < > = values in this interval not displayed.     CMP:  Recent Labs   Lab 08/30/21  1017 10/18/21  0832 11/23/21  1346 11/23/21  1346 12/28/21  0912 12/28/21  0912 02/03/22  0910   Glucose 337 H   < > 194 H   < > 341 H   < > 188 H   Calcium 9.1  --  9.0   < > 9.4   < > 9.1   Albumin 3.5  --  3.4 L   < > 3.3 L  --   --    Total Protein 7.3  --  7.7   < > 7.4  --   --    Sodium 139  --  143   < > 137   < > 140   Potassium 3.5  --  3.5   < > 3.8   < > 3.7   CO2 29  --  29   < > 33 H   < > 29   Chloride 104  --  108 H   < > 101   < > 104   BUN 8  --  6 L   < > 8   < > 8   Alk Phos 150 H  --  157 H   < > 148 H  --   --    ALT 36  --  45   < > 32  --   --    AST 26  --  32   < > 28  --   --    Bilirubin, Total 0.2  --  0.2  --  0.3  --   --     < > = values in this interval not displayed.     LIPIDS:  Recent Labs   Lab 05/14/21  0938 08/16/21  1450 10/18/21  0832   TSH 10.200 H 30.000 H 1.690     TSH:  Recent Labs   Lab 05/14/21  0938 08/16/21  1450 10/18/21  0832   TSH 10.200 H 30.000 H 1.690     A1C:  Recent Labs   Lab 05/14/21  0938 08/30/21  1017 11/15/21  1049 11/23/21  1346 02/03/22  0910   Hemoglobin A1C 10.7 H 9.1 H 7.6 H 7.6 H 7.5 H       Assessment/Plan     Carey Leonardo is a 46 y.o.female with:     1. Encounter for laboratory testing for COVID-19 virus  - POCT SARS-COV2 (COVID) with Flu Antigen    2. COVID-19  - Covid positive  - OTC Vitamin C, Zinc, and Pepcid  - intranasal Atrovent p.r.n. for nasal congestion  - benzonatate +OTC Mucinex for cough  - daily antihistamine    Total time spent face-to-face and non-face-to-face coordinating care for this encounter was: 20 min    Chronic conditions status updated as per HPI.  Other than changes above, cont current medications and maintain follow up with specialists.  Return to clinic PRN.    Long Fernández DO  [facility]

## 2022-02-25 ENCOUNTER — OFFICE VISIT (OUTPATIENT)
Dept: FAMILY MEDICINE | Facility: CLINIC | Age: 47
End: 2022-02-25
Payer: OTHER GOVERNMENT

## 2022-02-25 VITALS
HEART RATE: 88 BPM | OXYGEN SATURATION: 98 % | SYSTOLIC BLOOD PRESSURE: 130 MMHG | HEIGHT: 66 IN | WEIGHT: 234 LBS | TEMPERATURE: 98 F | BODY MASS INDEX: 37.61 KG/M2 | DIASTOLIC BLOOD PRESSURE: 70 MMHG

## 2022-02-25 DIAGNOSIS — E03.8 OTHER SPECIFIED HYPOTHYROIDISM: Primary | ICD-10-CM

## 2022-02-25 LAB — TSH SERPL DL<=0.005 MIU/L-ACNC: 40.1 UIU/ML (ref 0.36–3.74)

## 2022-02-25 PROCEDURE — 84443 ASSAY THYROID STIM HORMONE: CPT | Mod: ,,, | Performed by: CLINICAL MEDICAL LABORATORY

## 2022-02-25 PROCEDURE — 84443 TSH: ICD-10-PCS | Mod: ,,, | Performed by: CLINICAL MEDICAL LABORATORY

## 2022-02-25 PROCEDURE — 99213 PR OFFICE/OUTPT VISIT, EST, LEVL III, 20-29 MIN: ICD-10-PCS | Mod: ,,, | Performed by: FAMILY MEDICINE

## 2022-02-25 PROCEDURE — 99213 OFFICE O/P EST LOW 20 MIN: CPT | Mod: ,,, | Performed by: FAMILY MEDICINE

## 2022-02-25 NOTE — PATIENT INSTRUCTIONS
- Take medications as prescribed  - Notify clinic if symptoms persist or worsen  - Follow up with endocrinology

## 2022-02-25 NOTE — PROGRESS NOTES
[facility]    Chief Complaint      Chief Complaint   Patient presents with    Thyroid     Off sleep pattern, feeling sluggish x few weeks.       History of Present Illness      Carey Leonardo is a 46 y.o. female with chronic conditions of HTN, DM2, hypothyroidism, dyslipidemia, anxiety and lumbar radiculopathy who presents today for fatigue. Pt states she's noted increased fatigue and lethargy over the last few days. Concerned about it being issue with her thyroid since she's had difficulty controlling TSH levels in the past. Pt was referred to endocrinology in October 2021, but she missed appt due to issues with her lumbar spine surgery at that time. Compliant with medications and states she is taking appropriately.    Past Medical History:  Past Medical History:   Diagnosis Date    Asthma     CHF (congestive heart failure)     Chronic pain syndrome     Depressive disorder     Enlarged heart     Gastroparesis     GERD (gastroesophageal reflux disease)     Hyperlipidemia     Hypertension     IBS (irritable bowel syndrome)     Kidney stones     Lumbar radiculopathy     Sleep apnea     Does not use a CPAP       Past Surgical History:   has a past surgical history that includes Hysterectomy (09/29/2011); Oophorectomy (11/11/2014); Dilation and curettage of uterus (04/14/2010); Diagnostic laparoscopy (04/14/2010); Exploratory laparotomy with uterine myomectomy (02/27/2007); Hysteroscopy with dilation and curettage of uterus (04/04/2006); Carpal tunnel release; Cholecystectomy; Bilateral L3-S1 MBB (Bilateral, 6-, 5-, 1-8-2014); Left heart catheterization; Left L3-S1 RFTC (Left, 07/18/2017); Left SI JI (Left, 09/30/2013); Sinus surgery; and Laminectomy (N/A, 11/30/2021).    Social History:  Social History     Tobacco Use    Smoking status: Never Smoker    Smokeless tobacco: Never Used   Substance Use Topics    Alcohol use: Not Currently    Drug use: Not Currently     Types:  Hydrocodone, Oxycodone       I personally reviewed all past medical, surgical, and social.     Review of Systems   Constitutional: Positive for fatigue. Negative for fever.   HENT: Negative for ear pain.    Eyes: Negative for pain and visual disturbance.   Respiratory: Negative for chest tightness and shortness of breath.    Cardiovascular: Negative for chest pain and leg swelling.   Gastrointestinal: Negative for abdominal pain.   Genitourinary: Negative for difficulty urinating.   Musculoskeletal: Negative for gait problem and myalgias.   Skin: Negative for rash.   Neurological: Negative for dizziness and light-headedness.   Hematological: Does not bruise/bleed easily.        Medications:  Outpatient Encounter Medications as of 2/25/2022   Medication Sig Dispense Refill    albuterol (PROVENTIL) 2.5 mg /3 mL (0.083 %) nebulizer solution Take 3 mLs (2.5 mg total) by nebulization every 6 (six) hours as needed for Wheezing. Rescue 25 each 0    albuterol (PROVENTIL/VENTOLIN HFA) 90 mcg/actuation inhaler Inhale 2 puffs into the lungs every 6 (six) hours as needed for Wheezing. Rescue 18 g 1    atorvastatin (LIPITOR) 20 MG tablet Take 1 tablet (20 mg total) by mouth every evening. 90 tablet 3    benzonatate (TESSALON) 100 MG capsule One to two capsules three times daily as needed for cough 30 capsule 0    cetirizine (ZYRTEC) 10 MG tablet Take 1 tablet (10 mg total) by mouth once daily. 30 tablet 0    cloNIDine (CATAPRES) 0.2 MG tablet Take 1 tablet (0.2 mg total) by mouth 4 (four) times daily. 360 tablet 0    cyclobenzaprine (FLEXERIL) 5 MG tablet Take 1 tablet (5 mg total) by mouth 3 (three) times daily as needed for Muscle spasms. 45 tablet 3    dulaglutide (TRULICITY) 1.5 mg/0.5 mL pen injector Inject 1.5 mg into the skin every 7 days. 4 pen 2    fluticasone propionate (FLONASE) 50 mcg/actuation nasal spray 2 sprays (100 mcg total) by Each Nostril route once daily. 18.2 mL 0    glipizide-metformin (METAGLIP)  5-500 mg per tablet Take 1 tablet by mouth 2 (two) times daily before meals. 60 tablet 2    hydrALAZINE (APRESOLINE) 100 MG tablet Take 1 tablet (100 mg total) by mouth 3 (three) times daily. 270 tablet 0    HYDROcodone-acetaminophen (NORCO) 7.5-325 mg per tablet Take 1 tablet by mouth every 8 (eight) hours as needed for Pain. 15 tablet 0    ipratropium (ATROVENT) 21 mcg (0.03 %) nasal spray 2 sprays by Nasal route 3 (three) times daily. 15 mL 0    levothyroxine (SYNTHROID, LEVOTHROID) 175 MCG tablet TAKE 1 TABLET BY MOUTH EACH MORNING BEFORE BREAKFAST ON EMPTY STOMACH (DRINK PLENTY OF WATER) 30 tablet 2    metoprolol succinate (TOPROL-XL) 100 MG 24 hr tablet Take 1 tablet (100 mg total) by mouth once daily. 90 tablet 0    NIFEdipine (PROCARDIA-XL) 60 MG (OSM) 24 hr tablet Take 1 tablet (60 mg total) by mouth once daily. 90 tablet 0    oxyCODONE-acetaminophen (PERCOCET) 5-325 mg per tablet Take 1 tablet by mouth every 4 (four) hours as needed for Pain. 45 tablet 0    promethazine (PHENERGAN) 25 MG tablet Take 1 tablet (25 mg total) by mouth every 4 (four) hours. 45 tablet 0    sertraline (ZOLOFT) 100 MG tablet Take 1 tablet (100 mg total) by mouth once daily. 90 tablet 0    traMADoL (ULTRAM) 50 mg tablet Take 1 tablet (50 mg total) by mouth every 6 (six) hours as needed for Pain. 20 tablet 0    venlafaxine (EFFEXOR-XR) 150 MG Cp24 TAKE 1 CAPSULE BY MOUTH EVERY DAY WITH FOOD 90 capsule 0     No facility-administered encounter medications on file as of 2/25/2022.       Allergies:  Review of patient's allergies indicates:   Allergen Reactions    Fish containing products Anaphylaxis    Iodinated contrast media     Penicillins        Health Maintenance:    There is no immunization history on file for this patient.   Health Maintenance   Topic Date Due    Lipid Panel  Never done    Foot Exam  Never done    Eye Exam  Never done    TETANUS VACCINE  Never done    Hemoglobin A1c  08/03/2022    Mammogram   "09/30/2022    Low Dose Statin  02/25/2023    Hepatitis C Screening  Completed        Physical Exam      Vital Signs  Temp: 98.3 °F (36.8 °C)  Temp src: Oral  Pulse: 88  SpO2: 98 %  BP: 130/70  BP Location: Left arm  Patient Position: Sitting  Height and Weight  Height: 5' 6" (167.6 cm)  Weight: 106.1 kg (234 lb)  BSA (Calculated - sq m): 2.22 sq meters  BMI (Calculated): 37.8  Weight in (lb) to have BMI = 25: 154.6]    Physical Exam  Constitutional:       Appearance: Normal appearance.   HENT:      Head: Normocephalic and atraumatic.   Eyes:      Extraocular Movements: Extraocular movements intact.      Conjunctiva/sclera: Conjunctivae normal.      Pupils: Pupils are equal, round, and reactive to light.   Neck:      Thyroid: No thyroid tenderness.   Cardiovascular:      Rate and Rhythm: Normal rate and regular rhythm.      Pulses: Normal pulses.           Radial pulses are 2+ on the right side and 2+ on the left side.      Heart sounds: Normal heart sounds.   Pulmonary:      Effort: Pulmonary effort is normal.      Breath sounds: Normal breath sounds.   Abdominal:      Palpations: Abdomen is soft.      Tenderness: There is no abdominal tenderness.   Musculoskeletal:         General: Normal range of motion.      Right lower leg: No edema.      Left lower leg: No edema.   Skin:     General: Skin is warm and dry.      Findings: No rash.   Neurological:      General: No focal deficit present.      Mental Status: She is alert. Mental status is at baseline.   Psychiatric:         Mood and Affect: Mood normal.          Laboratory:  CBC:  Recent Labs   Lab 12/28/21  0912 01/03/22  0824 01/19/22  0847   WBC 7.03 6.86 6.38   RBC 4.07 L 4.17 L 3.59 L   Hemoglobin 11.1 L 11.3 L 9.8 L   Hematocrit 36.9 L 37.4 L 33.1 L   Platelet Count 414 H 482 H 502 H   MCV 90.7 89.7 92.2   MCH 27.3 27.1 27.3   MCHC 30.1 L 30.2 L 29.6 L     CMP:  Recent Labs   Lab 08/30/21  1017 10/18/21  0832 11/23/21  1346 12/28/21  0912 02/03/22  0910 "   Glucose 337 H   < > 194 H 341 H 188 H   Calcium 9.1  --  9.0 9.4 9.1   Albumin 3.5  --  3.4 L 3.3 L  --    Total Protein 7.3  --  7.7 7.4  --    Sodium 139  --  143 137 140   Potassium 3.5  --  3.5 3.8 3.7   CO2 29  --  29 33 H 29   Chloride 104  --  108 H 101 104   BUN 8  --  6 L 8 8   Alk Phos 150 H  --  157 H 148 H  --    ALT 36  --  45 32  --    AST 26  --  32 28  --    Bilirubin, Total 0.2  --  0.2 0.3  --     < > = values in this interval not displayed.     LIPIDS:  Recent Labs   Lab 05/14/21  0938 08/16/21  1450 10/18/21  0832   TSH 10.200 H 30.000 H 1.690     TSH:  Recent Labs   Lab 05/14/21  0938 08/16/21  1450 10/18/21  0832   TSH 10.200 H 30.000 H 1.690     A1C:  Recent Labs   Lab 05/14/21  0938 08/30/21  1017 11/15/21  1049 11/23/21  1346 02/03/22  0910   Hemoglobin A1C 10.7 H 9.1 H 7.6 H 7.6 H 7.5 H       Assessment/Plan     Carey Leonardo is a 46 y.o.female with:     1. Other specified hypothyroidism  - Will recheck TSH level today  - Ambulatory referral/consult to Endocrinology; Future  - TSH       Total time spent face-to-face and non-face-to-face coordinating care for this encounter was: 20 min    Chronic conditions status updated as per HPI.  Other than changes above, cont current medications and maintain follow up with specialists.  Return to clinic as scheduled.    Long Fernández DO  [facility]

## 2022-03-01 ENCOUNTER — TELEPHONE (OUTPATIENT)
Dept: FAMILY MEDICINE | Facility: CLINIC | Age: 47
End: 2022-03-01
Payer: OTHER GOVERNMENT

## 2022-03-01 DIAGNOSIS — Z09 FOLLOW-UP SURGERY CARE: Primary | ICD-10-CM

## 2022-03-01 RX ORDER — LEVOTHYROXINE SODIUM 200 UG/1
200 TABLET ORAL
Qty: 90 TABLET | Refills: 0 | Status: SHIPPED | OUTPATIENT
Start: 2022-03-01 | End: 2022-06-20 | Stop reason: SDUPTHER

## 2022-03-09 ENCOUNTER — OFFICE VISIT (OUTPATIENT)
Dept: SPINE | Facility: CLINIC | Age: 47
End: 2022-03-09
Payer: OTHER GOVERNMENT

## 2022-03-09 ENCOUNTER — HOSPITAL ENCOUNTER (OUTPATIENT)
Dept: RADIOLOGY | Facility: HOSPITAL | Age: 47
Discharge: HOME | End: 2022-03-09
Attending: ORTHOPAEDIC SURGERY
Payer: OTHER GOVERNMENT

## 2022-03-09 DIAGNOSIS — M54.16 LUMBAR RADICULOPATHY: ICD-10-CM

## 2022-03-09 DIAGNOSIS — T81.49XA WOUND INFECTION AFTER SURGERY: Primary | ICD-10-CM

## 2022-03-09 DIAGNOSIS — M51.36 DDD (DEGENERATIVE DISC DISEASE), LUMBAR: ICD-10-CM

## 2022-03-09 DIAGNOSIS — Z09 FOLLOW-UP SURGERY CARE: ICD-10-CM

## 2022-03-09 PROCEDURE — 99212 OFFICE O/P EST SF 10 MIN: CPT | Mod: PBBFAC | Performed by: ORTHOPAEDIC SURGERY

## 2022-03-09 PROCEDURE — 99214 PR OFFICE/OUTPT VISIT, EST, LEVL IV, 30-39 MIN: ICD-10-PCS | Mod: S$PBB,,, | Performed by: ORTHOPAEDIC SURGERY

## 2022-03-09 PROCEDURE — 99214 OFFICE O/P EST MOD 30 MIN: CPT | Mod: S$PBB,,, | Performed by: ORTHOPAEDIC SURGERY

## 2022-03-09 PROCEDURE — 72100 XR LUMBAR SPINE AP AND LATERAL: ICD-10-PCS | Mod: 26,,, | Performed by: ORTHOPAEDIC SURGERY

## 2022-03-09 PROCEDURE — 72100 X-RAY EXAM L-S SPINE 2/3 VWS: CPT | Mod: TC

## 2022-03-09 PROCEDURE — 72100 X-RAY EXAM L-S SPINE 2/3 VWS: CPT | Mod: 26,,, | Performed by: ORTHOPAEDIC SURGERY

## 2022-03-09 NOTE — PROGRESS NOTES
MDM/time:  Greater than 30 minutes spent on this encounter including 10 minutes reviewing imaging and notes, 15 minutes with the patient, 5 minutes documentation    ASSESSMENT:  46 y.o. female with lumbar spondylosis and radiculopathy now status post L2-L5 laminectomy 11/30/2021    PLAN:  Have ordered repeat labs and an MRI lumbar spine with and without to assess for abscess    HPI:  46 y.o. female here for wound check.  She is status post L2-L5 laminectomy 11/30/2021.  Was seen January 19, 2022 and wound was packed placed on antibiotics at that time.  She was postop follow-up following week but has not followed up until today.  Reports she was doing better on the antibiotics however last several weeks has had worsening pain and some drainage in the back.  Denies any recent injuries.     IMAGING:  X-rays lumbar spine reviewed show:  On the AP there is normal coronal alignment.  There are 5 non-rib-bearing lumbar vertebrae.  On the lateral there is maintained lumbar lordosis.  She is status post L2-L5 laminectomy.     Past Medical History:   Diagnosis Date    Asthma     CHF (congestive heart failure)     Chronic pain syndrome     Depressive disorder     Enlarged heart     Gastroparesis     GERD (gastroesophageal reflux disease)     Hyperlipidemia     Hypertension     IBS (irritable bowel syndrome)     Kidney stones     Lumbar radiculopathy     Sleep apnea     Does not use a CPAP     Past Surgical History:   Procedure Laterality Date    Bilateral L3-S1 MBB Bilateral 6-, 5-, 1-8-2014    Dr Jessica Randolph    CARPAL TUNNEL RELEASE      CHOLECYSTECTOMY      DIAGNOSTIC LAPAROSCOPY  04/14/2010    Exploratory Laparotomy, Myomectomy, Hydrotubation-Dr. Feng    DILATION AND CURETTAGE OF UTERUS  04/14/2010    Hysteroscopy-Dr. Feng    EXPLORATORY LAPAROTOMY WITH UTERINE MYOMECTOMY  02/27/2007    Dr. Marquise Anderson    HYSTERECTOMY  09/29/2011    Robotic, FABIENNE, Cystoscopy-Dr. Feng     HYSTEROSCOPY WITH DILATION AND CURETTAGE OF UTERUS  04/04/2006    Laparoscopy, Chromotubation-Dr. Marquise Anderson    LAMINECTOMY N/A 11/30/2021    Procedure: L2-L5 Laminectomy;  Surgeon: Cyril Upton MD;  Location: [facility];  Service: Neurosurgery;  Laterality: N/A;    LEFT HEART CATHETERIZATION      Left L3-S1 RFTC Left 07/18/2017    Dr Lopez    Left SI JI Left 09/30/2013    Dr Randolph    OOPHORECTOMY  11/11/2014    Robotic, FABIENNE, Cystoscopy-Dr. Feng    SINUS SURGERY       Social History     Tobacco Use    Smoking status: Never Smoker    Smokeless tobacco: Never Used   Substance Use Topics    Alcohol use: Not Currently    Drug use: Not Currently     Types: Hydrocodone, Oxycodone      Current Outpatient Medications   Medication Instructions    albuterol (PROVENTIL) 2.5 mg, Nebulization, Every 6 hours PRN, Rescue    albuterol (PROVENTIL/VENTOLIN HFA) 90 mcg/actuation inhaler 2 puffs, Inhalation, Every 6 hours PRN, Rescue     atorvastatin (LIPITOR) 20 mg, Oral, Nightly    benzonatate (TESSALON) 100 MG capsule One to two capsules three times daily as needed for cough    cetirizine (ZYRTEC) 10 mg, Oral, Daily    cloNIDine (CATAPRES) 0.2 mg, Oral, 4 times daily    cyclobenzaprine (FLEXERIL) 5 mg, Oral, 3 times daily PRN    fluticasone propionate (FLONASE) 100 mcg, Each Nostril, Daily    glipizide-metformin (METAGLIP) 5-500 mg per tablet 1 tablet, Oral, 2 times daily before meals    hydrALAZINE (APRESOLINE) 100 mg, Oral, 3 times daily    HYDROcodone-acetaminophen (NORCO) 7.5-325 mg per tablet 1 tablet, Oral, Every 8 hours PRN    ipratropium (ATROVENT) 21 mcg (0.03 %) nasal spray 2 sprays, Nasal, 3 times daily    levothyroxine (SYNTHROID) 200 mcg, Oral, Before breakfast    metoprolol succinate (TOPROL-XL) 100 mg, Oral, Daily    NIFEdipine (PROCARDIA-XL) 60 mg, Oral, Daily    oxyCODONE-acetaminophen (PERCOCET) 5-325 mg per tablet 1 tablet, Oral, Every 4 hours PRN    promethazine (PHENERGAN) 25  mg, Oral, Every 4 hours    sertraline (ZOLOFT) 100 mg, Oral, Daily    traMADoL (ULTRAM) 50 mg, Oral, Every 6 hours PRN    TRULICITY 1.5 mg, Subcutaneous, Every 7 days    venlafaxine (EFFEXOR-XR) 150 MG Cp24 TAKE 1 CAPSULE BY MOUTH EVERY DAY WITH FOOD        EXAM:  Constitutional  General Appearance:  There is no height or weight on file to calculate BMI., NAD  Psychiatric   Orientation: Oriented to time, oriented to place, oriented to person  Mood and Affect: Active and alert, normal mood, normal affect  Gait and Station   Appearance:  Normal gait, normal tandem gait, able to walk on toes, able to walk on heels  Healing incision with some fluctuance. Unable to express any purulence.    5/5 strength  Sensation intact  2+ pulses

## 2022-03-09 NOTE — PROGRESS NOTES
AP, lateral views of the lumbar spine reviewed    On the AP there is normal coronal alignment.  There are 5 non-rib-bearing lumbar vertebrae.  On the lateral there is maintained lumbar lordosis.  She is status post L2-L5 laminectomy.    Impression:  Spondylotic changes of the lumbar spine as noted above.  Status post L2-L5 laminectomy

## 2022-03-21 ENCOUNTER — OFFICE VISIT (OUTPATIENT)
Dept: SPINE | Facility: CLINIC | Age: 47
End: 2022-03-21
Payer: OTHER GOVERNMENT

## 2022-03-21 DIAGNOSIS — T81.49XA WOUND INFECTION AFTER SURGERY: ICD-10-CM

## 2022-03-21 DIAGNOSIS — M54.16 LUMBAR RADICULOPATHY: ICD-10-CM

## 2022-03-21 DIAGNOSIS — M51.36 DDD (DEGENERATIVE DISC DISEASE), LUMBAR: Primary | ICD-10-CM

## 2022-03-21 PROCEDURE — 99214 PR OFFICE/OUTPT VISIT, EST, LEVL IV, 30-39 MIN: ICD-10-PCS | Mod: S$PBB,,, | Performed by: ORTHOPAEDIC SURGERY

## 2022-03-21 PROCEDURE — 99213 OFFICE O/P EST LOW 20 MIN: CPT | Mod: PBBFAC | Performed by: ORTHOPAEDIC SURGERY

## 2022-03-21 PROCEDURE — 99214 OFFICE O/P EST MOD 30 MIN: CPT | Mod: S$PBB,,, | Performed by: ORTHOPAEDIC SURGERY

## 2022-03-21 RX ORDER — DOXYCYCLINE 100 MG/1
100 CAPSULE ORAL 2 TIMES DAILY
Qty: 60 CAPSULE | Refills: 0 | OUTPATIENT
Start: 2022-03-21 | End: 2022-05-08

## 2022-03-22 ENCOUNTER — OFFICE VISIT (OUTPATIENT)
Dept: FAMILY MEDICINE | Facility: CLINIC | Age: 47
End: 2022-03-22
Payer: OTHER GOVERNMENT

## 2022-03-22 VITALS
HEART RATE: 83 BPM | DIASTOLIC BLOOD PRESSURE: 80 MMHG | BODY MASS INDEX: 37.45 KG/M2 | SYSTOLIC BLOOD PRESSURE: 100 MMHG | OXYGEN SATURATION: 99 % | TEMPERATURE: 98 F | WEIGHT: 233 LBS | HEIGHT: 66 IN

## 2022-03-22 DIAGNOSIS — M47.816 LUMBAR SPONDYLOSIS: Primary | ICD-10-CM

## 2022-03-22 DIAGNOSIS — E03.8 OTHER SPECIFIED HYPOTHYROIDISM: ICD-10-CM

## 2022-03-22 LAB — TSH SERPL DL<=0.005 MIU/L-ACNC: 37.4 UIU/ML (ref 0.36–3.74)

## 2022-03-22 PROCEDURE — 99213 PR OFFICE/OUTPT VISIT, EST, LEVL III, 20-29 MIN: ICD-10-PCS | Mod: 25,,, | Performed by: FAMILY MEDICINE

## 2022-03-22 PROCEDURE — 84443 TSH: ICD-10-PCS | Mod: ,,, | Performed by: CLINICAL MEDICAL LABORATORY

## 2022-03-22 PROCEDURE — 96372 PR INJECTION,THERAP/PROPH/DIAG2ST, IM OR SUBCUT: ICD-10-PCS | Mod: ,,, | Performed by: FAMILY MEDICINE

## 2022-03-22 PROCEDURE — 84443 ASSAY THYROID STIM HORMONE: CPT | Mod: ,,, | Performed by: CLINICAL MEDICAL LABORATORY

## 2022-03-22 PROCEDURE — 99213 OFFICE O/P EST LOW 20 MIN: CPT | Mod: 25,,, | Performed by: FAMILY MEDICINE

## 2022-03-22 PROCEDURE — 96372 THER/PROPH/DIAG INJ SC/IM: CPT | Mod: ,,, | Performed by: FAMILY MEDICINE

## 2022-03-22 RX ORDER — KETOROLAC TROMETHAMINE 30 MG/ML
30 INJECTION, SOLUTION INTRAMUSCULAR; INTRAVENOUS
Status: COMPLETED | OUTPATIENT
Start: 2022-03-22 | End: 2022-03-22

## 2022-03-22 RX ADMIN — KETOROLAC TROMETHAMINE 30 MG: 30 INJECTION, SOLUTION INTRAMUSCULAR; INTRAVENOUS at 08:03

## 2022-03-22 NOTE — PROGRESS NOTES
MDM/time:  Greater than 30 minutes spent on this encounter including 10 minutes reviewing imaging and notes, 15 minutes with the patient, 5 minutes documentation    ASSESSMENT:  46 y.o. female with lumbar spondylosis and radiculopathy now status post L2-L5 laminectomy 11/30/2021    PLAN:  Have given her a prescription for doxycycline.  Follow-up in 2 weeks for wound check and repeat labs    HPI:  46 y.o. female here for wound check.  She is status post L2-L5 laminectomy 11/30/2021.  Reports that she still has some drainage from the incision.  Recent MRI does not show any fluid collection or abscess.  Denies any recent injuries.     IMAGING:  X-rays lumbar spine reviewed show:  On the AP there is normal coronal alignment.  There are 5 non-rib-bearing lumbar vertebrae.  On the lateral there is maintained lumbar lordosis.  She is status post L2-L5 laminectomy.     Past Medical History:   Diagnosis Date    Asthma     CHF (congestive heart failure)     Chronic pain syndrome     Depressive disorder     Enlarged heart     Gastroparesis     GERD (gastroesophageal reflux disease)     Hyperlipidemia     Hypertension     IBS (irritable bowel syndrome)     Kidney stones     Lumbar radiculopathy     Sleep apnea     Does not use a CPAP     Past Surgical History:   Procedure Laterality Date    Bilateral L3-S1 MBB Bilateral 6-, 5-, 1-8-2014    Dr Jessica Randolph    CARPAL TUNNEL RELEASE      CHOLECYSTECTOMY      DIAGNOSTIC LAPAROSCOPY  04/14/2010    Exploratory Laparotomy, Myomectomy, Hydrotubation-Dr. Feng    DILATION AND CURETTAGE OF UTERUS  04/14/2010    Hysteroscopy-Dr. Feng    EXPLORATORY LAPAROTOMY WITH UTERINE MYOMECTOMY  02/27/2007    Dr. Marquise Anderson    HYSTERECTOMY  09/29/2011    Robotic, FABIENNE, Cystoscopy-Dr. Feng    HYSTEROSCOPY WITH DILATION AND CURETTAGE OF UTERUS  04/04/2006    Laparoscopy, Chromotubation-Dr. Marquise Anderson    LAMINECTOMY N/A 11/30/2021    Procedure: L2-L5  Laminectomy;  Surgeon: Cyril Upton MD;  Location: [facility];  Service: Neurosurgery;  Laterality: N/A;    LEFT HEART CATHETERIZATION      Left L3-S1 RFTC Left 07/18/2017    Dr Lopez    Left SI JI Left 09/30/2013    Dr Randolph    OOPHORECTOMY  11/11/2014    Robotic, FABIENNE, Cystoscopy-Dr. Feng    SINUS SURGERY       Social History     Tobacco Use    Smoking status: Never Smoker    Smokeless tobacco: Never Used   Substance Use Topics    Alcohol use: Not Currently    Drug use: Not Currently     Types: Hydrocodone, Oxycodone      Current Outpatient Medications   Medication Instructions    albuterol (PROVENTIL) 2.5 mg, Nebulization, Every 6 hours PRN, Rescue    albuterol (PROVENTIL/VENTOLIN HFA) 90 mcg/actuation inhaler 2 puffs, Inhalation, Every 6 hours PRN, Rescue     atorvastatin (LIPITOR) 20 mg, Oral, Nightly    benzonatate (TESSALON) 100 MG capsule One to two capsules three times daily as needed for cough    cetirizine (ZYRTEC) 10 mg, Oral, Daily    cloNIDine (CATAPRES) 0.2 mg, Oral, 4 times daily    cyclobenzaprine (FLEXERIL) 5 mg, Oral, 3 times daily PRN    doxycycline (MONODOX) 100 mg, Oral, 2 times daily    fluticasone propionate (FLONASE) 100 mcg, Each Nostril, Daily    glipizide-metformin (METAGLIP) 5-500 mg per tablet 1 tablet, Oral, 2 times daily before meals    hydrALAZINE (APRESOLINE) 100 mg, Oral, 3 times daily    HYDROcodone-acetaminophen (NORCO) 7.5-325 mg per tablet 1 tablet, Oral, Every 8 hours PRN    ipratropium (ATROVENT) 21 mcg (0.03 %) nasal spray 2 sprays, Nasal, 3 times daily    levothyroxine (SYNTHROID) 200 mcg, Oral, Before breakfast    metoprolol succinate (TOPROL-XL) 100 mg, Oral, Daily    NIFEdipine (PROCARDIA-XL) 60 mg, Oral, Daily    oxyCODONE-acetaminophen (PERCOCET) 5-325 mg per tablet 1 tablet, Oral, Every 4 hours PRN    promethazine (PHENERGAN) 25 mg, Oral, Every 4 hours    sertraline (ZOLOFT) 100 mg, Oral, Daily    traMADoL (ULTRAM) 50 mg, Oral, Every  6 hours PRN    TRULICITY 1.5 mg, Subcutaneous, Every 7 days    venlafaxine (EFFEXOR-XR) 150 MG Cp24 TAKE 1 CAPSULE BY MOUTH EVERY DAY WITH FOOD        EXAM:  Constitutional  General Appearance:  There is no height or weight on file to calculate BMI., NAD  Psychiatric   Orientation: Oriented to time, oriented to place, oriented to person  Mood and Affect: Active and alert, normal mood, normal affect  Gait and Station   Appearance:  Normal gait, normal tandem gait, able to walk on toes, able to walk on heels  Healing incision with some fluctuance. Unable to express any purulence.    5/5 strength  Sensation intact  2+ pulses

## 2022-03-22 NOTE — PROGRESS NOTES
[facility]    Chief Complaint      Chief Complaint   Patient presents with    Follow-up     6 week follow up; pain in back/legs.       History of Present Illness      Carey Leonardo is a 46 y.o. female with chronic conditions of HTN, DM2, hypothyroidism, dyslipidemia, anxiety and lumbar radiculopathy who presents today for routine follow up. Today pt has c/o chronic low back pain, but states she is doing well otherwise. Has follow up with Pain Treatment in 2 weeks. Levothyroxine was increased to 200mcg daily at last visit. Pt was referred to endocrinology, but because she missed last appt she was instructed to call their office to get appt scheduled. Pt states she never called for appt, but she plans to call today.    Past Medical History:  Past Medical History:   Diagnosis Date    Asthma     CHF (congestive heart failure)     Chronic pain syndrome     Depressive disorder     Enlarged heart     Gastroparesis     GERD (gastroesophageal reflux disease)     Hyperlipidemia     Hypertension     IBS (irritable bowel syndrome)     Kidney stones     Lumbar radiculopathy     Sleep apnea     Does not use a CPAP       Past Surgical History:   has a past surgical history that includes Hysterectomy (09/29/2011); Oophorectomy (11/11/2014); Dilation and curettage of uterus (04/14/2010); Diagnostic laparoscopy (04/14/2010); Exploratory laparotomy with uterine myomectomy (02/27/2007); Hysteroscopy with dilation and curettage of uterus (04/04/2006); Carpal tunnel release; Cholecystectomy; Bilateral L3-S1 MBB (Bilateral, 6-, 5-, 1-8-2014); Left heart catheterization; Left L3-S1 RFTC (Left, 07/18/2017); Left SI JI (Left, 09/30/2013); Sinus surgery; and Laminectomy (N/A, 11/30/2021).    Social History:  Social History     Tobacco Use    Smoking status: Never Smoker    Smokeless tobacco: Never Used   Substance Use Topics    Alcohol use: Not Currently    Drug use: Not Currently     Types:  Hydrocodone, Oxycodone       I personally reviewed all past medical, surgical, and social.     Review of Systems   Constitutional: Negative for fatigue and fever.   HENT: Negative for ear pain.    Eyes: Negative for pain and visual disturbance.   Respiratory: Negative for chest tightness and shortness of breath.    Cardiovascular: Negative for chest pain and leg swelling.   Gastrointestinal: Negative for abdominal pain.   Genitourinary: Negative for difficulty urinating.   Musculoskeletal: Positive for back pain. Negative for gait problem and myalgias.   Skin: Negative for rash.   Neurological: Negative for dizziness and light-headedness.   Hematological: Does not bruise/bleed easily.        Medications:  Outpatient Encounter Medications as of 3/22/2022   Medication Sig Dispense Refill    albuterol (PROVENTIL) 2.5 mg /3 mL (0.083 %) nebulizer solution Take 3 mLs (2.5 mg total) by nebulization every 6 (six) hours as needed for Wheezing. Rescue 25 each 0    albuterol (PROVENTIL/VENTOLIN HFA) 90 mcg/actuation inhaler Inhale 2 puffs into the lungs every 6 (six) hours as needed for Wheezing. Rescue 18 g 1    atorvastatin (LIPITOR) 20 MG tablet Take 1 tablet (20 mg total) by mouth every evening. 90 tablet 3    benzonatate (TESSALON) 100 MG capsule One to two capsules three times daily as needed for cough 30 capsule 0    cetirizine (ZYRTEC) 10 MG tablet Take 1 tablet (10 mg total) by mouth once daily. 30 tablet 0    cloNIDine (CATAPRES) 0.2 MG tablet Take 1 tablet (0.2 mg total) by mouth 4 (four) times daily. 360 tablet 0    cyclobenzaprine (FLEXERIL) 5 MG tablet Take 1 tablet (5 mg total) by mouth 3 (three) times daily as needed for Muscle spasms. 45 tablet 3    doxycycline (MONODOX) 100 MG capsule Take 1 capsule (100 mg total) by mouth 2 (two) times daily. 60 capsule 0    dulaglutide (TRULICITY) 1.5 mg/0.5 mL pen injector Inject 1.5 mg into the skin every 7 days. 4 pen 2    fluticasone propionate (FLONASE) 50  mcg/actuation nasal spray 2 sprays (100 mcg total) by Each Nostril route once daily. 18.2 mL 0    glipizide-metformin (METAGLIP) 5-500 mg per tablet Take 1 tablet by mouth 2 (two) times daily before meals. 60 tablet 2    hydrALAZINE (APRESOLINE) 100 MG tablet Take 1 tablet (100 mg total) by mouth 3 (three) times daily. 270 tablet 0    HYDROcodone-acetaminophen (NORCO) 7.5-325 mg per tablet Take 1 tablet by mouth every 8 (eight) hours as needed for Pain. 15 tablet 0    ipratropium (ATROVENT) 21 mcg (0.03 %) nasal spray 2 sprays by Nasal route 3 (three) times daily. 15 mL 0    levothyroxine (SYNTHROID) 200 MCG tablet Take 1 tablet (200 mcg total) by mouth before breakfast. 90 tablet 0    metoprolol succinate (TOPROL-XL) 100 MG 24 hr tablet Take 1 tablet (100 mg total) by mouth once daily. 90 tablet 0    NIFEdipine (PROCARDIA-XL) 60 MG (OSM) 24 hr tablet Take 1 tablet (60 mg total) by mouth once daily. 90 tablet 0    oxyCODONE-acetaminophen (PERCOCET) 5-325 mg per tablet Take 1 tablet by mouth every 4 (four) hours as needed for Pain. 45 tablet 0    promethazine (PHENERGAN) 25 MG tablet Take 1 tablet (25 mg total) by mouth every 4 (four) hours. 45 tablet 0    sertraline (ZOLOFT) 100 MG tablet Take 1 tablet (100 mg total) by mouth once daily. 90 tablet 0    traMADoL (ULTRAM) 50 mg tablet Take 1 tablet (50 mg total) by mouth every 6 (six) hours as needed for Pain. 20 tablet 0    venlafaxine (EFFEXOR-XR) 150 MG Cp24 TAKE 1 CAPSULE BY MOUTH EVERY DAY WITH FOOD 90 capsule 0     Facility-Administered Encounter Medications as of 3/22/2022   Medication Dose Route Frequency Provider Last Rate Last Admin    [COMPLETED] ketorolac injection 30 mg  30 mg Intramuscular 1 time in Clinic/HOD Long Fernández DO   30 mg at 03/22/22 0877       Allergies:  Review of patient's allergies indicates:   Allergen Reactions    Fish containing products Anaphylaxis    Iodinated contrast media     [facility]  "Maintenance:    There is no immunization history on file for this patient.   Health Maintenance   Topic Date Due    Lipid Panel  Never done    Foot Exam  Never done    Eye Exam  Never done    TETANUS VACCINE  Never done    Hemoglobin A1c  08/03/2022    Mammogram  09/30/2022    Low Dose Statin  02/25/2023    Hepatitis C Screening  Completed        Physical Exam      Vital Signs  Temp: 98.4 °F (36.9 °C)  Temp src: Oral  Pulse: 83  SpO2: 99 %  BP: 100/80  BP Location: Left arm  Patient Position: Sitting  Height and Weight  Height: 5' 6" (167.6 cm)  Weight: 105.7 kg (233 lb)  BSA (Calculated - sq m): 2.22 sq meters  BMI (Calculated): 37.6  Weight in (lb) to have BMI = 25: 154.6]    Physical Exam  Constitutional:       Appearance: Normal appearance.   HENT:      Head: Normocephalic and atraumatic.   Eyes:      Extraocular Movements: Extraocular movements intact.      Conjunctiva/sclera: Conjunctivae normal.      Pupils: Pupils are equal, round, and reactive to light.   Cardiovascular:      Rate and Rhythm: Normal rate and regular rhythm.      Pulses: Normal pulses.           Radial pulses are 2+ on the right side and 2+ on the left side.      Heart sounds: Normal heart sounds.   Pulmonary:      Effort: Pulmonary effort is normal.      Breath sounds: Normal breath sounds.   Abdominal:      Palpations: Abdomen is soft.      Tenderness: There is no abdominal tenderness.   Musculoskeletal:         General: Normal range of motion.      Right lower leg: No edema.      Left lower leg: No edema.   Skin:     General: Skin is warm and dry.      Findings: No rash.   Neurological:      General: No focal deficit present.      Mental Status: She is alert. Mental status is at baseline.   Psychiatric:         Mood and Affect: Mood normal.          Laboratory:  CBC:  Recent Labs   Lab 01/03/22  0824 01/19/22  0847 03/09/22  0910   WBC 6.86 6.38 7.98   RBC 4.17 L 3.59 L 4.39   Hemoglobin 11.3 L 9.8 L 12.0   Hematocrit 37.4 L 33.1 " L 38.8   Platelet Count 482 H 502 H 423 H   MCV 89.7 92.2 88.4   MCH 27.1 27.3 27.3   MCHC 30.2 L 29.6 L 30.9 L     CMP:  Recent Labs   Lab 08/30/21  1017 10/18/21  0832 11/23/21  1346 12/28/21  0912 02/03/22  0910 03/09/22  0910   Glucose 337 H   < > 194 H 341 H   < > 193 H   Calcium 9.1  --  9.0 9.4   < > 9.0   Albumin 3.5  --  3.4 L 3.3 L  --   --    Total Protein 7.3  --  7.7 7.4  --   --    Sodium 139  --  143 137   < > 141   Potassium 3.5  --  3.5 3.8   < > 3.3 L   CO2 29  --  29 33 H   < > 29   Chloride 104  --  108 H 101   < > 104   BUN 8  --  6 L 8   < > 7   Alk Phos 150 H  --  157 H 148 H  --   --    ALT 36  --  45 32  --   --    AST 26  --  32 28  --   --    Bilirubin, Total 0.2  --  0.2 0.3  --   --     < > = values in this interval not displayed.     LIPIDS:  Recent Labs   Lab 08/16/21  1450 10/18/21  0832 02/25/22  0850   TSH 30.000 H 1.690 40.100 H     TSH:  Recent Labs   Lab 08/16/21  1450 10/18/21  0832 02/25/22  0850   TSH 30.000 H 1.690 40.100 H     A1C:  Recent Labs   Lab 05/14/21  0938 08/30/21  1017 11/15/21  1049 11/23/21  1346 02/03/22  0910   Hemoglobin A1C 10.7 H 9.1 H 7.6 H 7.6 H 7.5 H       Assessment/Plan     Carey Leonardo is a 46 y.o.female with:     1. Lumbar spondylosis  - ketorolac injection 30 mg    2. Other specified hypothyroidism  - Discussed importance of calling to schedule appt with endocrinology; pt voiced agreement and understanding  - Will recheck TSH today and titrate levothyroxine as indicated  - TSH       Total time spent face-to-face and non-face-to-face coordinating care for this encounter was: 20 min    Chronic conditions status updated as per HPI.  Other than changes above, cont current medications and maintain follow up with specialists.  Return to clinic in 6 weeks.    Long Fernández DO  [facility]

## 2022-03-24 ENCOUNTER — TELEPHONE (OUTPATIENT)
Dept: FAMILY MEDICINE | Facility: CLINIC | Age: 47
End: 2022-03-24
Payer: OTHER GOVERNMENT

## 2022-03-30 ENCOUNTER — OFFICE VISIT (OUTPATIENT)
Dept: PAIN MEDICINE | Facility: CLINIC | Age: 47
End: 2022-03-30
Payer: OTHER GOVERNMENT

## 2022-03-30 VITALS
WEIGHT: 232 LBS | DIASTOLIC BLOOD PRESSURE: 92 MMHG | RESPIRATION RATE: 17 BRPM | OXYGEN SATURATION: 98 % | HEIGHT: 66 IN | SYSTOLIC BLOOD PRESSURE: 146 MMHG | HEART RATE: 82 BPM | BODY MASS INDEX: 37.28 KG/M2

## 2022-03-30 DIAGNOSIS — Z79.899 ENCOUNTER FOR LONG-TERM (CURRENT) USE OF OTHER MEDICATIONS: Primary | ICD-10-CM

## 2022-03-30 DIAGNOSIS — M47.816 LUMBAR SPONDYLOSIS: ICD-10-CM

## 2022-03-30 LAB
CTP QC/QA: YES
POC (AMP) AMPHETAMINE: NEGATIVE
POC (BAR) BARBITURATES: NEGATIVE
POC (BUP) BUPRENORPHINE: NEGATIVE
POC (BZO) BENZODIAZEPINES: NEGATIVE
POC (COC) COCAINE: NEGATIVE
POC (MDMA) METHYLENEDIOXYMETHAMPHETAMINE 3,4: NEGATIVE
POC (MET) METHAMPHETAMINE: NEGATIVE
POC (MOP) OPIATES: NEGATIVE
POC (MTD) METHADONE: NEGATIVE
POC (OXY) OXYCODONE: NEGATIVE
POC (PCP) PHENCYCLIDINE: NEGATIVE
POC (TCA) TRICYCLIC ANTIDEPRESSANTS: NEGATIVE
POC TEMPERATURE (URINE): 90
POC THC: NEGATIVE

## 2022-03-30 PROCEDURE — 99203 OFFICE O/P NEW LOW 30 MIN: CPT | Mod: S$PBB,,, | Performed by: PAIN MEDICINE

## 2022-03-30 PROCEDURE — 99215 OFFICE O/P EST HI 40 MIN: CPT | Mod: PBBFAC | Performed by: PAIN MEDICINE

## 2022-03-30 PROCEDURE — G0481 DRUG TEST DEF 8-14 CLASSES: HCPCS | Mod: ,,, | Performed by: CLINICAL MEDICAL LABORATORY

## 2022-03-30 PROCEDURE — 99203 PR OFFICE/OUTPT VISIT, NEW, LEVL III, 30-44 MIN: ICD-10-PCS | Mod: S$PBB,,, | Performed by: PAIN MEDICINE

## 2022-03-30 PROCEDURE — G0481 PR DRUG TEST DEF 8-14 CLASSES: ICD-10-PCS | Mod: ,,, | Performed by: CLINICAL MEDICAL LABORATORY

## 2022-03-30 PROCEDURE — 80305 DRUG TEST PRSMV DIR OPT OBS: CPT | Mod: PBBFAC | Performed by: PAIN MEDICINE

## 2022-03-30 RX ORDER — TRAMADOL HYDROCHLORIDE 50 MG/1
50 TABLET ORAL EVERY 12 HOURS PRN
Qty: 45 TABLET | Refills: 0 | Status: SHIPPED | OUTPATIENT
Start: 2022-03-30 | End: 2022-04-28

## 2022-03-30 RX ORDER — CYCLOBENZAPRINE HCL 10 MG
10 TABLET ORAL 3 TIMES DAILY PRN
Qty: 90 TABLET | Refills: 0 | Status: SHIPPED | OUTPATIENT
Start: 2022-03-30 | End: 2022-04-28 | Stop reason: SDUPTHER

## 2022-03-30 NOTE — PROGRESS NOTES
Chronic Pain - New Consult    Referring Physician: Long Fernández, DO       SUBJECTIVE: Disclaimer: This note has been generated using voice-recognition software. There may be typographical errors that have been missed during proof-reading      Initial encounter:    Carey Leonardo presents to the clinic for the evaluation of lower back  pain.       46-year-old female presents for new patient evaluation and consultation.  She was last evaluated by Dr. Lopez in 2017 for chronic lower back pain.  The pain remained persistent and she eventually required a  L2-5  laminectomy from  by Dr. Upton,  November 30, 2021. She still experiences chronic lower back pain with radicular symptoms to the bilateral lower extremities, left  greater than right.  She notes  paresthesia and weakness of the lower extremities  and feet.  The pain interrupts her sleep.  Postoperatively she developed  a wound infection and remains on oral antibiotics.  She was referred to our clinic for opioid maintenance.  Tramadol 1-2 tablets a day provides adequate pain relief.  She has not been involved in physical therapy and is not a candidate for nerve block injections.      Pain Assessment  Pain Assessment: 0-10  Pain Score:   3  Pain Location: Back  Pain Descriptors: Aching  Pain Frequency: Continuous  Pain Intervention(s): Home medication, Rest      Physical Therapy/Home Exercise: no        Pain Medications:  has a current medication list which includes the following prescription(s): albuterol, albuterol, atorvastatin, benzonatate, clonidine, cyclobenzaprine, doxycycline, trulicity, fluticasone propionate, glipizide-metformin, hydralazine, hydrocodone-acetaminophen, ipratropium, levothyroxine, metoprolol succinate, nifedipine, oxycodone-acetaminophen, promethazine, sertraline, venlafaxine, cetirizine, cyclobenzaprine, tramadol, and tramadol.      Tried in Past:  NSAIDS-yes  TCA-no  SNRI-no  Anti-convulsants-yes  Muscle  "Relaxants-yes  Opioids-yes  Benzodiazepines-no     4A"s of Opioid Risk Assessment  Activity: Patient can not perform  ADL  Analgesia:  Patient's pain is partially controlled by current medication.   Aberrant Behavior:  reviewed with no aberrant drug seeking/taking behavior     report:  Reviewed and consistent with medication use as prescribed.    Patient denies suicidal or homicidal ideations    Pain interventional therapy-yes    Chiropractor -yes, 2 years ago  Acupuncture -   TENS unit -no  Spinal decompression -yes  Joint replacement -no     Review of Systems          MRI Lumbar Spine W WO Contrast  Narrative: EXAMINATION:  MRI LUMBAR SPINE W WO CONTRAST    CLINICAL HISTORY:  Low back pain, infection suspected;  Infection following a procedure, other surgical site, initial encounter    TECHNIQUE:  Multiplanar multisequence MRI lumbar spine performed without and with intravenous contrast.  20 mL  Dotarem utilized.    COMPARISON:  Spine MRI 01/06/2022    FINDINGS:  Again seen are post laminectomy changes from L2 through L5 similar to the prior exam.  The vertebral body heights and alignment are maintained.  There is no spinal canal stenosis.  There are facet degenerative changes from L3-4 through L5-S1 without high-grade foraminal narrowing detected.  The conus terminates at L1.  Cauda quinine nerve roots appear normal with no enhancement.  The fluid collection previously seen in the superficial soft tissues has resolved and no longer identified.  Also the collection seen posterior to the thecal sac is no longer identified.  There remains enhancement of the posterior soft tissues which appears somewhat uniform and a not uncommon postoperative finding.  Impression: The fluid collection seen posterior to the thecal sac and within the superficial soft tissues have resolved.  No new fluid collection or abnormality identified.    Electronically signed by: Jean Plascencia  Date:    03/11/2022  Time:    10:50         Past " Medical History:   Diagnosis Date    Asthma     CHF (congestive heart failure)     Chronic pain syndrome     Depressive disorder     Diabetes mellitus, type 2     Enlarged heart     Gastroparesis     GERD (gastroesophageal reflux disease)     Hyperlipidemia     Hypertension     IBS (irritable bowel syndrome)     Kidney stones     Lumbar radiculopathy     Sleep apnea     Does not use a CPAP    Thyroid disease      Past Surgical History:   Procedure Laterality Date    Bilateral L3-S1 MBB Bilateral 6-, 5-, 1-8-2014    Dr Jessica Randolph    CARPAL TUNNEL RELEASE      CHOLECYSTECTOMY      DIAGNOSTIC LAPAROSCOPY  04/14/2010    Exploratory Laparotomy, Myomectomy, Hydrotubation-Dr. Feng    DILATION AND CURETTAGE OF UTERUS  04/14/2010    Hysteroscopy-Dr. Feng    EXPLORATORY LAPAROTOMY WITH UTERINE MYOMECTOMY  02/27/2007    Dr. Maruqise Anderson    HYSTERECTOMY  09/29/2011    Robotic, FABIENNE, Cystoscopy-Dr. Feng    HYSTEROSCOPY WITH DILATION AND CURETTAGE OF UTERUS  04/04/2006    Laparoscopy, Chromotubation-Dr. Marquise Anderson    LAMINECTOMY N/A 11/30/2021    Procedure: L2-L5 Laminectomy;  Surgeon: Cyril Upton MD;  Location: [facility];  Service: Neurosurgery;  Laterality: N/A;    LEFT HEART CATHETERIZATION      Left L3-S1 RFTC Left 07/18/2017    Dr Lopez    Left SI JI Left 09/30/2013    Dr Randolph    OOPHORECTOMY  11/11/2014    Robotic, FABIENNE, Cystoscopy-Dr. Feng    SINUS SURGERY       Social History     Socioeconomic History    Marital status:    Tobacco Use    Smoking status: Never Smoker    Smokeless tobacco: Never Used   Substance and Sexual Activity    Alcohol use: Not Currently    Drug use: Not Currently     Types: Hydrocodone, Oxycodone    Sexual activity: Not Currently     Family History   Problem Relation Age of Onset    Diabetes Mellitus Mother     Heart disease Mother     Hypertension Mother     Migraines Mother     Heart disease Sister      Review of  "patient's allergies indicates:   Allergen Reactions    Fish containing products Anaphylaxis    Iodinated contrast media     Penicillins          OBJECTIVE:  Vitals:    03/30/22 1338   BP: (!) 146/92   Pulse: 82   Resp: 17     BP (!) 146/92   Pulse 82   Resp 17   Ht 5' 6" (1.676 m)   Wt 105.2 kg (232 lb)   LMP  (LMP Unknown)   SpO2 98%   BMI 37.45 kg/m²   Physical Exam       ASSESSMENT: 46 y.o. year old female with pain, consistent with     Encounter Diagnoses   Name Primary?    Lumbar spondylosis     Encounter for long-term (current) use of other medications Yes        PLAN:   1. reviewed  2..Addiction, Dependency, Tolerance, Opioid abuse-misuse, Death, Diversion Discussed. Overdose reversal drug Naloxone discussed  2.UDS point of care obtained for new patient evaluation and consultation. We will obtain a definitve UDS for confirmation.  3. Opioid contract signed today  4.Refill/ Continue medications for pain control and function.  Start Flexeril for muscle spasticity and continue tramadol for chronic pain       Requested Prescriptions     Signed Prescriptions Disp Refills    traMADoL (ULTRAM) 50 mg tablet 45 tablet 0     Sig: Take 1 tablet (50 mg total) by mouth every 12 (twelve) hours as needed for Pain.    cyclobenzaprine (FLEXERIL) 10 MG tablet 90 tablet 0     Sig: Take 1 tablet (10 mg total) by mouth 3 (three) times daily as needed for Muscle spasms.     5. Urine drug screen and confirmation testing was ordered as documented on the requisition form in order to verify medication compliance, test for illicit substances.    Orders Placed This Encounter   Procedures    Drug Screen Definitive 14, Urine     Standing Status:   Future     Number of Occurrences:   1     Standing Expiration Date:   5/29/2023     Order Specific Question:   Specimen Source     Answer:   Urine    POCT Urine Drug Screen Presump     Interpretive Information:     Negative:  No drug detected at the cut off level.   Positive:  " This result represents presumptive positive for the   tested drug, other substances may yield a positive response other   than the analyte of interest. This result should be utilized for   diagnostic purpose only. Confirmation testing will be performed upon physician request only.         6. Patient is not a candidate for nerve block injections at this time  7. Continue opiates as prescribed for post-laminectomy pain  8.Follow with FUNMI Albert in 1 month for re-evaluation and medication refill        The total time spent for evaluation and management on 03/30/2022 including reviewing separately obtained history, performing a medically appropriate exam and evaluation, documenting clinical information in the health record, independently interpreting results and communicating them to the patient/family/caregiver, and ordering medications/tests/procedures was between 15-29 minutes.    The above plan and management options were discussed at length with patient. Patient is in agreement with the above and verbalized understanding. It will be communicated with the referring physician via electronic record, fax, or mail.    Rasheeda Bills  03/30/2022

## 2022-03-31 ENCOUNTER — OFFICE VISIT (OUTPATIENT)
Dept: FAMILY MEDICINE | Facility: CLINIC | Age: 47
End: 2022-03-31
Payer: OTHER GOVERNMENT

## 2022-03-31 ENCOUNTER — TELEPHONE (OUTPATIENT)
Dept: FAMILY MEDICINE | Facility: CLINIC | Age: 47
End: 2022-03-31
Payer: OTHER GOVERNMENT

## 2022-03-31 VITALS
HEIGHT: 66 IN | TEMPERATURE: 98 F | HEART RATE: 75 BPM | WEIGHT: 233 LBS | DIASTOLIC BLOOD PRESSURE: 72 MMHG | SYSTOLIC BLOOD PRESSURE: 134 MMHG | BODY MASS INDEX: 37.45 KG/M2 | OXYGEN SATURATION: 98 %

## 2022-03-31 DIAGNOSIS — E03.8 OTHER SPECIFIED HYPOTHYROIDISM: ICD-10-CM

## 2022-03-31 DIAGNOSIS — R30.0 DYSURIA: Primary | ICD-10-CM

## 2022-03-31 LAB
BILIRUB UR QL STRIP: NEGATIVE
CLARITY UR: CLEAR
COLOR UR: YELLOW
GLUCOSE UR STRIP-MCNC: NEGATIVE MG/DL
KETONES UR STRIP-SCNC: NEGATIVE MG/DL
LEUKOCYTE ESTERASE UR QL STRIP: NEGATIVE
NITRITE UR QL STRIP: NEGATIVE
PH UR STRIP: 6.5 PH UNITS
PROT UR QL STRIP: NEGATIVE
RBC # UR STRIP: NEGATIVE /UL
SP GR UR STRIP: 1.02
UROBILINOGEN UR STRIP-ACNC: 0.2 MG/DL

## 2022-03-31 PROCEDURE — 81003 URINALYSIS AUTO W/O SCOPE: CPT | Mod: QW,,, | Performed by: CLINICAL MEDICAL LABORATORY

## 2022-03-31 PROCEDURE — 81003 URINALYSIS, REFLEX TO URINE CULTURE: ICD-10-PCS | Mod: QW,,, | Performed by: CLINICAL MEDICAL LABORATORY

## 2022-03-31 PROCEDURE — 99213 PR OFFICE/OUTPT VISIT, EST, LEVL III, 20-29 MIN: ICD-10-PCS | Mod: ,,, | Performed by: FAMILY MEDICINE

## 2022-03-31 PROCEDURE — 99213 OFFICE O/P EST LOW 20 MIN: CPT | Mod: ,,, | Performed by: FAMILY MEDICINE

## 2022-03-31 RX ORDER — PHENAZOPYRIDINE HYDROCHLORIDE 100 MG/1
100 TABLET, FILM COATED ORAL 3 TIMES DAILY PRN
Qty: 12 TABLET | Refills: 0 | Status: SHIPPED | OUTPATIENT
Start: 2022-03-31 | End: 2022-04-10

## 2022-03-31 RX ORDER — NITROFURANTOIN 25; 75 MG/1; MG/1
100 CAPSULE ORAL 2 TIMES DAILY
Qty: 14 CAPSULE | Refills: 0 | Status: SHIPPED | OUTPATIENT
Start: 2022-03-31 | End: 2022-05-08

## 2022-03-31 NOTE — PROGRESS NOTES
[facility] Medicine    Chief Complaint      Chief Complaint   Patient presents with    Back Pain     Left sided Pain When Using The Bathroom (Urination) x 5 Days       History of Present Illness      Carey Leonardo is a 46 y.o. female with chronic conditions of HTN, DM2, hypothyroidism, dyslipidemia, anxiety and lumbar radiculopathy who presents today for dysuria and flank pain. States symptoms present for 4 days duration. Noted low back pain radiating to pelvic area when she urinates. Noted dysuria, frequency and urgency. Denies hematuria. States she was diagnosed with nephrolithiasis approx 1 year ago, but had no interventions.    Pt states she tried to call endocrinology to reschedule appt but they refused to see her because she'd missed her appt during a period she was having back surgery.    Past Medical History:  Past Medical History:   Diagnosis Date    Asthma     CHF (congestive heart failure)     Chronic pain syndrome     Depressive disorder     Diabetes mellitus, type 2     Enlarged heart     Gastroparesis     GERD (gastroesophageal reflux disease)     Hyperlipidemia     Hypertension     IBS (irritable bowel syndrome)     Kidney stones     Lumbar radiculopathy     Sleep apnea     Does not use a CPAP    Thyroid disease        Past Surgical History:   has a past surgical history that includes Hysterectomy (09/29/2011); Oophorectomy (11/11/2014); Dilation and curettage of uterus (04/14/2010); Diagnostic laparoscopy (04/14/2010); Exploratory laparotomy with uterine myomectomy (02/27/2007); Hysteroscopy with dilation and curettage of uterus (04/04/2006); Carpal tunnel release; Cholecystectomy; Bilateral L3-S1 MBB (Bilateral, 6-, 5-, 1-8-2014); Left heart catheterization; Left L3-S1 RFTC (Left, 07/18/2017); Left SI JI (Left, 09/30/2013); Sinus surgery; and Laminectomy (N/A, 11/30/2021).    Social History:  Social History     Tobacco Use    Smoking status: Never Smoker     Smokeless tobacco: Never Used   Substance Use Topics    Alcohol use: Not Currently    Drug use: Not Currently     Types: Hydrocodone, Oxycodone       I personally reviewed all past medical, surgical, and social.     Review of Systems   Constitutional: Negative for fatigue and fever.   HENT: Negative for ear pain.    Eyes: Negative for pain and visual disturbance.   Respiratory: Negative for chest tightness and shortness of breath.    Cardiovascular: Negative for chest pain and leg swelling.   Gastrointestinal: Negative for abdominal pain.   Genitourinary: Positive for dysuria, frequency and urgency. Negative for difficulty urinating and hematuria.   Musculoskeletal: Negative for gait problem and myalgias.   Skin: Negative for rash.   Neurological: Negative for dizziness and light-headedness.   Hematological: Does not bruise/bleed easily.        Medications:  Outpatient Encounter Medications as of 3/31/2022   Medication Sig Dispense Refill    albuterol (PROVENTIL) 2.5 mg /3 mL (0.083 %) nebulizer solution Take 3 mLs (2.5 mg total) by nebulization every 6 (six) hours as needed for Wheezing. Rescue 25 each 0    albuterol (PROVENTIL/VENTOLIN HFA) 90 mcg/actuation inhaler Inhale 2 puffs into the lungs every 6 (six) hours as needed for Wheezing. Rescue 18 g 1    atorvastatin (LIPITOR) 20 MG tablet Take 1 tablet (20 mg total) by mouth every evening. 90 tablet 3    benzonatate (TESSALON) 100 MG capsule One to two capsules three times daily as needed for cough 30 capsule 0    cetirizine (ZYRTEC) 10 MG tablet Take 1 tablet (10 mg total) by mouth once daily. 30 tablet 0    cloNIDine (CATAPRES) 0.2 MG tablet Take 1 tablet (0.2 mg total) by mouth 4 (four) times daily. 360 tablet 0    cyclobenzaprine (FLEXERIL) 10 MG tablet Take 1 tablet (10 mg total) by mouth 3 (three) times daily as needed for Muscle spasms. 90 tablet 0    cyclobenzaprine (FLEXERIL) 5 MG tablet Take 1 tablet (5 mg total) by mouth 3 (three) times daily  as needed for Muscle spasms. 45 tablet 3    doxycycline (MONODOX) 100 MG capsule Take 1 capsule (100 mg total) by mouth 2 (two) times daily. 60 capsule 0    dulaglutide (TRULICITY) 1.5 mg/0.5 mL pen injector Inject 1.5 mg into the skin every 7 days. 4 pen 2    fluticasone propionate (FLONASE) 50 mcg/actuation nasal spray 2 sprays (100 mcg total) by Each Nostril route once daily. 18.2 mL 0    glipizide-metformin (METAGLIP) 5-500 mg per tablet Take 1 tablet by mouth 2 (two) times daily before meals. 60 tablet 2    hydrALAZINE (APRESOLINE) 100 MG tablet Take 1 tablet (100 mg total) by mouth 3 (three) times daily. 270 tablet 0    HYDROcodone-acetaminophen (NORCO) 7.5-325 mg per tablet Take 1 tablet by mouth every 8 (eight) hours as needed for Pain. 15 tablet 0    ipratropium (ATROVENT) 21 mcg (0.03 %) nasal spray 2 sprays by Nasal route 3 (three) times daily. 15 mL 0    levothyroxine (SYNTHROID) 200 MCG tablet Take 1 tablet (200 mcg total) by mouth before breakfast. 90 tablet 0    metoprolol succinate (TOPROL-XL) 100 MG 24 hr tablet Take 1 tablet (100 mg total) by mouth once daily. 90 tablet 0    NIFEdipine (PROCARDIA-XL) 60 MG (OSM) 24 hr tablet Take 1 tablet (60 mg total) by mouth once daily. 90 tablet 0    nitrofurantoin, macrocrystal-monohydrate, (MACROBID) 100 MG capsule Take 1 capsule (100 mg total) by mouth 2 (two) times daily. 14 capsule 0    oxyCODONE-acetaminophen (PERCOCET) 5-325 mg per tablet Take 1 tablet by mouth every 4 (four) hours as needed for Pain. 45 tablet 0    phenazopyridine (PYRIDIUM) 100 MG tablet Take 1 tablet (100 mg total) by mouth 3 (three) times daily as needed for Pain. 12 tablet 0    promethazine (PHENERGAN) 25 MG tablet Take 1 tablet (25 mg total) by mouth every 4 (four) hours. 45 tablet 0    sertraline (ZOLOFT) 100 MG tablet Take 1 tablet (100 mg total) by mouth once daily. 90 tablet 0    traMADoL (ULTRAM) 50 mg tablet Take 1 tablet (50 mg total) by mouth every 6 (six)  "hours as needed for Pain. (Patient not taking: Reported on 3/30/2022) 20 tablet 0    traMADoL (ULTRAM) 50 mg tablet Take 1 tablet (50 mg total) by mouth every 12 (twelve) hours as needed for Pain. 45 tablet 0    venlafaxine (EFFEXOR-XR) 150 MG Cp24 TAKE 1 CAPSULE BY MOUTH EVERY DAY WITH FOOD 90 capsule 0     No facility-administered encounter medications on file as of 3/31/2022.       Allergies:  Review of patient's allergies indicates:   Allergen Reactions    Fish containing products Anaphylaxis    Iodinated contrast media     Penicillins        Health Maintenance:    There is no immunization history on file for this patient.   Health Maintenance   Topic Date Due    Lipid Panel  Never done    Foot Exam  Never done    Eye Exam  Never done    TETANUS VACCINE  Never done    Hemoglobin A1c  08/03/2022    Mammogram  09/30/2022    Low Dose Statin  03/30/2023    Hepatitis C Screening  Completed        Physical Exam      Vital Signs  Temp: 98.3 °F (36.8 °C)  Temp src: Temporal  Pulse: 75  SpO2: 98 %  BP: 134/72  BP Location: Right arm  Patient Position: Sitting  Height and Weight  Height: 5' 6" (167.6 cm)  Weight: 105.7 kg (233 lb)  BSA (Calculated - sq m): 2.22 sq meters  BMI (Calculated): 37.6  Weight in (lb) to have BMI = 25: 154.6]    Physical Exam  Constitutional:       Appearance: Normal appearance.   HENT:      Head: Normocephalic and atraumatic.   Eyes:      Extraocular Movements: Extraocular movements intact.      Conjunctiva/sclera: Conjunctivae normal.      Pupils: Pupils are equal, round, and reactive to light.   Cardiovascular:      Rate and Rhythm: Normal rate and regular rhythm.      Pulses: Normal pulses.           Radial pulses are 2+ on the right side and 2+ on the left side.      Heart sounds: Normal heart sounds.   Pulmonary:      Effort: Pulmonary effort is normal.      Breath sounds: Normal breath sounds.   Abdominal:      Palpations: Abdomen is soft.      Tenderness: There is no " abdominal tenderness. There is no right CVA tenderness or left CVA tenderness.   Musculoskeletal:         General: Normal range of motion.      Right lower leg: No edema.      Left lower leg: No edema.   Skin:     General: Skin is warm and dry.      Findings: No rash.   Neurological:      General: No focal deficit present.      Mental Status: She is alert. Mental status is at baseline.   Psychiatric:         Mood and Affect: Mood normal.          Laboratory:  CBC:  Recent Labs   Lab 01/03/22  0824 01/19/22  0847 03/09/22  0910   WBC 6.86 6.38 7.98   RBC 4.17 L 3.59 L 4.39   Hemoglobin 11.3 L 9.8 L 12.0   Hematocrit 37.4 L 33.1 L 38.8   Platelet Count 482 H 502 H 423 H   MCV 89.7 92.2 88.4   MCH 27.1 27.3 27.3   MCHC 30.2 L 29.6 L 30.9 L     CMP:  Recent Labs   Lab 08/30/21  1017 10/18/21  0832 11/23/21  1346 12/28/21  0912 02/03/22  0910 03/09/22  0910   Glucose 337 H   < > 194 H 341 H   < > 193 H   Calcium 9.1  --  9.0 9.4   < > 9.0   Albumin 3.5  --  3.4 L 3.3 L  --   --    Total Protein 7.3  --  7.7 7.4  --   --    Sodium 139  --  143 137   < > 141   Potassium 3.5  --  3.5 3.8   < > 3.3 L   CO2 29  --  29 33 H   < > 29   Chloride 104  --  108 H 101   < > 104   BUN 8  --  6 L 8   < > 7   Alk Phos 150 H  --  157 H 148 H  --   --    ALT 36  --  45 32  --   --    AST 26  --  32 28  --   --    Bilirubin, Total 0.2  --  0.2 0.3  --   --     < > = values in this interval not displayed.     LIPIDS:  Recent Labs   Lab 10/18/21  0832 02/25/22  0850 03/22/22  0917   TSH 1.690 40.100 H 37.400 H     TSH:  Recent Labs   Lab 10/18/21  0832 02/25/22  0850 03/22/22  0917   TSH 1.690 40.100 H 37.400 H     A1C:  Recent Labs   Lab 05/14/21  0938 08/30/21  1017 11/15/21  1049 11/23/21  1346 02/03/22  0910   Hemoglobin A1C 10.7 H 9.1 H 7.6 H 7.6 H 7.5 H       Assessment/Plan     Carey LANGLEY Malissa is a 46 y.o.female with:     1. Dysuria  - X-Ray KUB; Future  - Urinalysis, Reflex to Urine Culture Urine, Clean Catch; Future  - Pyridium  100mg TID PRN  - Will go ahead and send in Macrobid 100mg BID x7 days since I'll be out of the office the next few days    2. Other specified hypothyroidism  - Ambulatory referral/consult to Endocrinology; Future       Total time spent face-to-face and non-face-to-face coordinating care for this encounter was: 20 min    Chronic conditions status updated as per HPI.  Other than changes above, cont current medications and maintain follow up with specialists.  Return to clinic as scheduled.    Long Fernández DO  [facility]

## 2022-03-31 NOTE — TELEPHONE ENCOUNTER
----- Message from Long Fernández DO sent at 3/31/2022  2:39 PM CDT -----  Urinalysis normal; no signs of UTI. X-ray demonstrated constipation, but no kidney stones noted. Needs to start an OTC stool softener like Miralax or Colace.

## 2022-04-01 LAB
6-ACETYLMORPHINE, URINE (RUSH): NEGATIVE 10 NG/ML
7-AMINOCLONAZEPAM, URINE (RUSH): NEGATIVE 25 NG/ML
A-HYDROXYALPRAZOLAM, URINE (RUSH): NEGATIVE 25 NG/ML
ACETYL FENTANYL, URINE (RUSH): NEGATIVE 2.5 NG/ML
ACETYL NORFENTANYL OXALATE, URINE (RUSH): NEGATIVE 5 NG/ML
AMPHET UR QL SCN: NEGATIVE 100 NG/ML
BENZOYLECGONINE, URINE (RUSH): NEGATIVE 100 NG/ML
BUPRENORPHINE UR QL SCN: NEGATIVE 25 NG/ML
CODEINE, URINE (RUSH): NEGATIVE 25 NG/ML
CREAT UR-MCNC: 391 MG/DL (ref 28–219)
EDDP, URINE (RUSH): NEGATIVE 25 NG/ML
FENTANYL, URINE (RUSH): NEGATIVE 2.5 NG/ML
HYDROCODONE, URINE (RUSH): NEGATIVE 25 NG/ML
HYDROMORPHONE, URINE (RUSH): NEGATIVE 25 NG/ML
LORAZEPAM, URINE (RUSH): NEGATIVE 25 NG/ML
METHADONE UR QL SCN: NEGATIVE 25 NG/ML
METHAMPHET UR QL SCN: NEGATIVE 100 NG/ML
MORPHINE, URINE (RUSH): NEGATIVE 25 NG/ML
NORBUPRENORPHINE, URINE (RUSH): NEGATIVE 25 NG/ML
NORDIAZEPAM, URINE (RUSH): NEGATIVE 25 NG/ML
NORFENTANYL OXALATE, URINE (RUSH): NEGATIVE 5 NG/ML
NORHYDROCODONE, URINE (RUSH): NEGATIVE 50 NG/ML
NOROXYCODONE HCL, URINE (RUSH): NEGATIVE 50 NG/ML
OXAZEPAM, URINE (RUSH): NEGATIVE 25 NG/ML
OXYCODONE UR QL SCN: NEGATIVE 25 NG/ML
OXYMORPHONE, URINE (RUSH): NEGATIVE 25 NG/ML
PH UR STRIP: 6.5 PH UNITS
SP GR UR STRIP: 1.02
TAPENTADOL, URINE (RUSH): NEGATIVE 25 NG/ML
TEMAZEPAM, URINE (RUSH): NEGATIVE 25 NG/ML
THC-COOH, URINE (RUSH): NEGATIVE 25 NG/ML
TRAMADOL, URINE (RUSH): NEGATIVE 100 NG/ML

## 2022-04-01 NOTE — TELEPHONE ENCOUNTER
Pt notified on lab and xray findings, no questions or concerns at this time. Verbalized understanding

## 2022-04-11 ENCOUNTER — OFFICE VISIT (OUTPATIENT)
Dept: FAMILY MEDICINE | Facility: CLINIC | Age: 47
End: 2022-04-11
Payer: OTHER GOVERNMENT

## 2022-04-11 VITALS
DIASTOLIC BLOOD PRESSURE: 76 MMHG | TEMPERATURE: 98 F | HEART RATE: 86 BPM | BODY MASS INDEX: 38.25 KG/M2 | OXYGEN SATURATION: 96 % | HEIGHT: 66 IN | WEIGHT: 238 LBS | SYSTOLIC BLOOD PRESSURE: 110 MMHG

## 2022-04-11 DIAGNOSIS — L01.00 IMPETIGO: Primary | ICD-10-CM

## 2022-04-11 PROCEDURE — 99213 PR OFFICE/OUTPT VISIT, EST, LEVL III, 20-29 MIN: ICD-10-PCS | Mod: ,,, | Performed by: FAMILY MEDICINE

## 2022-04-11 PROCEDURE — 99213 OFFICE O/P EST LOW 20 MIN: CPT | Mod: ,,, | Performed by: FAMILY MEDICINE

## 2022-04-11 RX ORDER — DOXYCYCLINE HYCLATE 100 MG
100 TABLET ORAL 2 TIMES DAILY
Qty: 14 TABLET | Refills: 0 | OUTPATIENT
Start: 2022-04-11 | End: 2022-05-08

## 2022-04-11 NOTE — PROGRESS NOTES
"[facility] Medicine    Chief Complaint      Chief Complaint   Patient presents with    Insect Bite     Burning/stinging/itching insect bite on chin x 5 days.       History of Present Illness      Carey Leonardo is a 46 y.o. female who presents today for ulcerated skin lesion on chin. States symptoms started 4 days ago. Questionable arthropod bite. States area has slowly increased in size and had small amount of clear drainage. Lesion is pruritic and painful; describes as "burning." States she has been using a Bactrim ointment with little improvement in symptoms.    Past Medical History:  Past Medical History:   Diagnosis Date    Asthma     CHF (congestive heart failure)     Chronic pain syndrome     Depressive disorder     Diabetes mellitus, type 2     Enlarged heart     Gastroparesis     GERD (gastroesophageal reflux disease)     Hyperlipidemia     Hypertension     IBS (irritable bowel syndrome)     Kidney stones     Lumbar radiculopathy     Sleep apnea     Does not use a CPAP    Thyroid disease        Past Surgical History:   has a past surgical history that includes Hysterectomy (09/29/2011); Oophorectomy (11/11/2014); Dilation and curettage of uterus (04/14/2010); Diagnostic laparoscopy (04/14/2010); Exploratory laparotomy with uterine myomectomy (02/27/2007); Hysteroscopy with dilation and curettage of uterus (04/04/2006); Carpal tunnel release; Cholecystectomy; Bilateral L3-S1 MBB (Bilateral, 6-, 5-, 1-8-2014); Left heart catheterization; Left L3-S1 RFTC (Left, 07/18/2017); Left SI JI (Left, 09/30/2013); Sinus surgery; and Laminectomy (N/A, 11/30/2021).    Social History:  Social History     Tobacco Use    Smoking status: Never Smoker    Smokeless tobacco: Never Used   Substance Use Topics    Alcohol use: Not Currently    Drug use: Not Currently     Types: Hydrocodone, Oxycodone       I personally reviewed all past medical, surgical, and social.     Review of Systems "   Constitutional: Negative for fatigue and fever.   HENT: Negative for ear pain.    Eyes: Negative for pain and visual disturbance.   Respiratory: Negative for chest tightness and shortness of breath.    Cardiovascular: Negative for chest pain and leg swelling.   Gastrointestinal: Negative for abdominal pain.   Genitourinary: Negative for difficulty urinating.   Musculoskeletal: Negative for gait problem and myalgias.   Skin: Positive for wound. Negative for rash.   Neurological: Negative for dizziness and light-headedness.   Hematological: Does not bruise/bleed easily.        Medications:  Outpatient Encounter Medications as of 4/11/2022   Medication Sig Dispense Refill    albuterol (PROVENTIL) 2.5 mg /3 mL (0.083 %) nebulizer solution Take 3 mLs (2.5 mg total) by nebulization every 6 (six) hours as needed for Wheezing. Rescue 25 each 0    albuterol (PROVENTIL/VENTOLIN HFA) 90 mcg/actuation inhaler Inhale 2 puffs into the lungs every 6 (six) hours as needed for Wheezing. Rescue 18 g 1    atorvastatin (LIPITOR) 20 MG tablet Take 1 tablet (20 mg total) by mouth every evening. 90 tablet 3    benzonatate (TESSALON) 100 MG capsule One to two capsules three times daily as needed for cough 30 capsule 0    cetirizine (ZYRTEC) 10 MG tablet Take 1 tablet (10 mg total) by mouth once daily. 30 tablet 0    cloNIDine (CATAPRES) 0.2 MG tablet Take 1 tablet (0.2 mg total) by mouth 4 (four) times daily. 360 tablet 0    cyclobenzaprine (FLEXERIL) 10 MG tablet Take 1 tablet (10 mg total) by mouth 3 (three) times daily as needed for Muscle spasms. 90 tablet 0    cyclobenzaprine (FLEXERIL) 5 MG tablet Take 1 tablet (5 mg total) by mouth 3 (three) times daily as needed for Muscle spasms. 45 tablet 3    doxycycline (MONODOX) 100 MG capsule Take 1 capsule (100 mg total) by mouth 2 (two) times daily. 60 capsule 0    doxycycline (VIBRA-TABS) 100 MG tablet Take 1 tablet (100 mg total) by mouth 2 (two) times daily. 14 tablet 0     dulaglutide (TRULICITY) 1.5 mg/0.5 mL pen injector Inject 1.5 mg into the skin every 7 days. 4 pen 2    fluticasone propionate (FLONASE) 50 mcg/actuation nasal spray 2 sprays (100 mcg total) by Each Nostril route once daily. 18.2 mL 0    glipizide-metformin (METAGLIP) 5-500 mg per tablet Take 1 tablet by mouth 2 (two) times daily before meals. 60 tablet 2    hydrALAZINE (APRESOLINE) 100 MG tablet Take 1 tablet (100 mg total) by mouth 3 (three) times daily. 270 tablet 0    HYDROcodone-acetaminophen (NORCO) 7.5-325 mg per tablet Take 1 tablet by mouth every 8 (eight) hours as needed for Pain. 15 tablet 0    ipratropium (ATROVENT) 21 mcg (0.03 %) nasal spray 2 sprays by Nasal route 3 (three) times daily. 15 mL 0    levothyroxine (SYNTHROID) 200 MCG tablet Take 1 tablet (200 mcg total) by mouth before breakfast. 90 tablet 0    metoprolol succinate (TOPROL-XL) 100 MG 24 hr tablet Take 1 tablet (100 mg total) by mouth once daily. 90 tablet 0    NIFEdipine (PROCARDIA-XL) 60 MG (OSM) 24 hr tablet Take 1 tablet (60 mg total) by mouth once daily. 90 tablet 0    nitrofurantoin, macrocrystal-monohydrate, (MACROBID) 100 MG capsule Take 1 capsule (100 mg total) by mouth 2 (two) times daily. 14 capsule 0    oxyCODONE-acetaminophen (PERCOCET) 5-325 mg per tablet Take 1 tablet by mouth every 4 (four) hours as needed for Pain. 45 tablet 0    [] phenazopyridine (PYRIDIUM) 100 MG tablet Take 1 tablet (100 mg total) by mouth 3 (three) times daily as needed for Pain. 12 tablet 0    promethazine (PHENERGAN) 25 MG tablet Take 1 tablet (25 mg total) by mouth every 4 (four) hours. 45 tablet 0    sertraline (ZOLOFT) 100 MG tablet Take 1 tablet (100 mg total) by mouth once daily. 90 tablet 0    traMADoL (ULTRAM) 50 mg tablet Take 1 tablet (50 mg total) by mouth every 6 (six) hours as needed for Pain. (Patient not taking: Reported on 3/30/2022) 20 tablet 0    traMADoL (ULTRAM) 50 mg tablet Take 1 tablet (50 mg total) by  "mouth every 12 (twelve) hours as needed for Pain. 45 tablet 0    venlafaxine (EFFEXOR-XR) 150 MG Cp24 TAKE 1 CAPSULE BY MOUTH EVERY DAY WITH FOOD 90 capsule 0     No facility-administered encounter medications on file as of 4/11/2022.       Allergies:  Review of patient's allergies indicates:   Allergen Reactions    Fish containing products Anaphylaxis    Iodinated contrast media     Penicillins        Health Maintenance:    There is no immunization history on file for this patient.   Health Maintenance   Topic Date Due    Lipid Panel  Never done    Foot Exam  Never done    Eye Exam  Never done    TETANUS VACCINE  Never done    Hemoglobin A1c  08/03/2022    Mammogram  09/30/2022    Low Dose Statin  03/30/2023    Hepatitis C Screening  Completed        Physical Exam      Vital Signs  Temp: 97.9 °F (36.6 °C)  Temp src: Oral  Pulse: 86  SpO2: 96 %  BP: 110/76  BP Location: Left arm  Patient Position: Sitting  Height and Weight  Height: 5' 6" (167.6 cm)  Weight: 108 kg (238 lb)  BSA (Calculated - sq m): 2.24 sq meters  BMI (Calculated): 38.4  Weight in (lb) to have BMI = 25: 154.6]    Physical Exam  Constitutional:       Appearance: Normal appearance.   HENT:      Head: Normocephalic and atraumatic.     Eyes:      Extraocular Movements: Extraocular movements intact.      Conjunctiva/sclera: Conjunctivae normal.      Pupils: Pupils are equal, round, and reactive to light.   Cardiovascular:      Rate and Rhythm: Normal rate and regular rhythm.      Pulses: Normal pulses.           Radial pulses are 2+ on the right side and 2+ on the left side.      Heart sounds: Normal heart sounds.   Pulmonary:      Effort: Pulmonary effort is normal.      Breath sounds: Normal breath sounds.   Abdominal:      Palpations: Abdomen is soft.      Tenderness: There is no abdominal tenderness.   Musculoskeletal:         General: Normal range of motion.      Right lower leg: No edema.      Left lower leg: No edema.   Skin:     " General: Skin is warm and dry.      Findings: No rash.   Neurological:      General: No focal deficit present.      Mental Status: She is alert. Mental status is at baseline.   Psychiatric:         Mood and Affect: Mood normal.          Laboratory:  CBC:  Recent Labs   Lab 01/03/22  0824 01/19/22  0847 03/09/22  0910   WBC 6.86 6.38 7.98   RBC 4.17 L 3.59 L 4.39   Hemoglobin 11.3 L 9.8 L 12.0   Hematocrit 37.4 L 33.1 L 38.8   Platelet Count 482 H 502 H 423 H   MCV 89.7 92.2 88.4   MCH 27.1 27.3 27.3   MCHC 30.2 L 29.6 L 30.9 L     CMP:  Recent Labs   Lab 08/30/21  1017 10/18/21  0832 11/23/21  1346 12/28/21  0912 02/03/22  0910 03/09/22  0910   Glucose 337 H   < > 194 H 341 H   < > 193 H   Calcium 9.1  --  9.0 9.4   < > 9.0   Albumin 3.5  --  3.4 L 3.3 L  --   --    Total Protein 7.3  --  7.7 7.4  --   --    Sodium 139  --  143 137   < > 141   Potassium 3.5  --  3.5 3.8   < > 3.3 L   CO2 29  --  29 33 H   < > 29   Chloride 104  --  108 H 101   < > 104   BUN 8  --  6 L 8   < > 7   Alk Phos 150 H  --  157 H 148 H  --   --    ALT 36  --  45 32  --   --    AST 26  --  32 28  --   --    Bilirubin, Total 0.2  --  0.2 0.3  --   --     < > = values in this interval not displayed.     LIPIDS:  Recent Labs   Lab 10/18/21  0832 02/25/22  0850 03/22/22  0917   TSH 1.690 40.100 H 37.400 H     TSH:  Recent Labs   Lab 10/18/21  0832 02/25/22  0850 03/22/22  0917   TSH 1.690 40.100 H 37.400 H     A1C:  Recent Labs   Lab 05/14/21  0938 08/30/21  1017 11/15/21  1049 11/23/21  1346 02/03/22  0910   Hemoglobin A1C 10.7 H 9.1 H 7.6 H 7.6 H 7.5 H       Assessment/Plan     Everettluzmaria Leonardo is a 46 y.o.female with:     1. Impetigo  - Doxycycline 100mg BID x7 days  - Notify clinic if symptoms do not improve and will refer to derm    Total time spent face-to-face and non-face-to-face coordinating care for this encounter was: 20 min    Chronic conditions status updated as per HPI.  Other than changes above, cont current medications and  maintain follow up with specialists.  Return to clinic PRN.    Long Fernández DO  [facility]

## 2022-04-19 ENCOUNTER — OFFICE VISIT (OUTPATIENT)
Dept: FAMILY MEDICINE | Facility: CLINIC | Age: 47
End: 2022-04-19
Payer: OTHER GOVERNMENT

## 2022-04-19 VITALS
SYSTOLIC BLOOD PRESSURE: 110 MMHG | HEART RATE: 82 BPM | BODY MASS INDEX: 37.28 KG/M2 | WEIGHT: 232 LBS | DIASTOLIC BLOOD PRESSURE: 70 MMHG | OXYGEN SATURATION: 98 % | HEIGHT: 66 IN | TEMPERATURE: 99 F

## 2022-04-19 DIAGNOSIS — R41.840 DECREASED ATTENTION SPAN: Primary | ICD-10-CM

## 2022-04-19 DIAGNOSIS — J11.1 FLU: ICD-10-CM

## 2022-04-19 DIAGNOSIS — Z11.52 ENCOUNTER FOR SCREENING LABORATORY TESTING FOR COVID-19 VIRUS: ICD-10-CM

## 2022-04-19 LAB
CTP QC/QA: YES
FLUAV AG NPH QL: NEGATIVE
FLUBV AG NPH QL: NEGATIVE
SARS-COV-2 AG RESP QL IA.RAPID: NEGATIVE

## 2022-04-19 PROCEDURE — 87428 POCT SARS-COV2 (COVID) WITH FLU ANTIGEN: ICD-10-PCS | Mod: QW,,, | Performed by: FAMILY MEDICINE

## 2022-04-19 PROCEDURE — 99213 PR OFFICE/OUTPT VISIT, EST, LEVL III, 20-29 MIN: ICD-10-PCS | Mod: ,,, | Performed by: FAMILY MEDICINE

## 2022-04-19 PROCEDURE — 99213 OFFICE O/P EST LOW 20 MIN: CPT | Mod: ,,, | Performed by: FAMILY MEDICINE

## 2022-04-19 PROCEDURE — 87428 SARSCOV & INF VIR A&B AG IA: CPT | Mod: QW,,, | Performed by: FAMILY MEDICINE

## 2022-04-19 RX ORDER — CETIRIZINE HYDROCHLORIDE 10 MG/1
10 TABLET ORAL DAILY
Qty: 30 TABLET | Refills: 0 | Status: SHIPPED | OUTPATIENT
Start: 2022-04-19 | End: 2023-03-09

## 2022-04-19 RX ORDER — IPRATROPIUM BROMIDE 21 UG/1
2 SPRAY, METERED NASAL 3 TIMES DAILY
Qty: 15 ML | Refills: 0 | Status: SHIPPED | OUTPATIENT
Start: 2022-04-19 | End: 2022-05-08

## 2022-04-19 RX ORDER — BENZONATATE 100 MG/1
CAPSULE ORAL
Qty: 30 CAPSULE | Refills: 0 | Status: SHIPPED | OUTPATIENT
Start: 2022-04-19 | End: 2022-05-08 | Stop reason: CLARIF

## 2022-04-19 RX ORDER — OSELTAMIVIR PHOSPHATE 75 MG/1
75 CAPSULE ORAL 2 TIMES DAILY
Qty: 10 CAPSULE | Refills: 0 | Status: SHIPPED | OUTPATIENT
Start: 2022-04-19 | End: 2022-04-24

## 2022-04-19 NOTE — PROGRESS NOTES
[facility]    Chief Complaint      Chief Complaint   Patient presents with    Referral     Pt wanting referral for ADHD    Flu     Body aches, runny nose, nonproductive cough, SOB, nausea, diarrhea x 1 day. Has been exposed from husbad, grandchildren.        History of Present Illness      Carey Leonardo is a 46 y.o. female with chronic conditions of HTN, DM2, hypothyroidism, dyslipidemia, anxiety and lumbar radiculopathy who presents today for referral for ADHD testing. Pt followed by Dr. Gonzales at [facility] for anxiety. Dr. Gonzales felt like pt would benefit from psychology testing and pt requiring referral. Pt states she has difficulty completing tasks around the house, gets distracted easily and has very short attention span.    Today also has c/o congestion, cough, mild dyspnea, myalgia, nausea and diarrhea. States 5 people in her home recently tested positive for flu A.     Past Medical History:  Past Medical History:   Diagnosis Date    Asthma     CHF (congestive heart failure)     Chronic pain syndrome     Depressive disorder     Diabetes mellitus, type 2     Enlarged heart     Gastroparesis     GERD (gastroesophageal reflux disease)     Hyperlipidemia     Hypertension     IBS (irritable bowel syndrome)     Kidney stones     Lumbar radiculopathy     Sleep apnea     Does not use a CPAP    Thyroid disease        Past Surgical History:   has a past surgical history that includes Hysterectomy (09/29/2011); Oophorectomy (11/11/2014); Dilation and curettage of uterus (04/14/2010); Diagnostic laparoscopy (04/14/2010); Exploratory laparotomy with uterine myomectomy (02/27/2007); Hysteroscopy with dilation and curettage of uterus (04/04/2006); Carpal tunnel release; Cholecystectomy; Bilateral L3-S1 MBB (Bilateral, 6-, 5-, 1-8-2014); Left heart catheterization; Left L3-S1 RFTC (Left, 07/18/2017); Left SI JI (Left, 09/30/2013); Sinus surgery; and Laminectomy (N/A,  11/30/2021).    Social History:  Social History     Tobacco Use    Smoking status: Never Smoker    Smokeless tobacco: Never Used   Substance Use Topics    Alcohol use: Not Currently    Drug use: Not Currently     Types: Hydrocodone, Oxycodone       I personally reviewed all past medical, surgical, and social.     Review of Systems   Constitutional: Negative for fatigue and fever.   HENT: Positive for congestion. Negative for ear pain.    Eyes: Negative for pain and visual disturbance.   Respiratory: Positive for cough and shortness of breath. Negative for chest tightness.    Cardiovascular: Negative for chest pain and leg swelling.   Gastrointestinal: Positive for diarrhea and nausea. Negative for abdominal pain.   Genitourinary: Negative for difficulty urinating.   Musculoskeletal: Positive for myalgias. Negative for gait problem.   Skin: Negative for rash.   Neurological: Negative for dizziness and light-headedness.   Hematological: Does not bruise/bleed easily.   Psychiatric/Behavioral: Positive for decreased concentration.        Medications:  Outpatient Encounter Medications as of 4/19/2022   Medication Sig Dispense Refill    albuterol (PROVENTIL) 2.5 mg /3 mL (0.083 %) nebulizer solution Take 3 mLs (2.5 mg total) by nebulization every 6 (six) hours as needed for Wheezing. Rescue 25 each 0    albuterol (PROVENTIL/VENTOLIN HFA) 90 mcg/actuation inhaler Inhale 2 puffs into the lungs every 6 (six) hours as needed for Wheezing. Rescue 18 g 1    atorvastatin (LIPITOR) 20 MG tablet Take 1 tablet (20 mg total) by mouth every evening. 90 tablet 3    benzonatate (TESSALON) 100 MG capsule One to two capsules three times daily as needed for cough 30 capsule 0    benzonatate (TESSALON) 100 MG capsule One to two capsules three times daily as needed for cough 30 capsule 0    cetirizine (ZYRTEC) 10 MG tablet Take 1 tablet (10 mg total) by mouth once daily. 30 tablet 0    cetirizine (ZYRTEC) 10 MG tablet Take 1  tablet (10 mg total) by mouth once daily. 30 tablet 0    cloNIDine (CATAPRES) 0.2 MG tablet TAKE 1 TABLET FOUR TIMES A  tablet 3    cyclobenzaprine (FLEXERIL) 10 MG tablet Take 1 tablet (10 mg total) by mouth 3 (three) times daily as needed for Muscle spasms. 90 tablet 0    cyclobenzaprine (FLEXERIL) 5 MG tablet Take 1 tablet (5 mg total) by mouth 3 (three) times daily as needed for Muscle spasms. 45 tablet 3    doxycycline (MONODOX) 100 MG capsule Take 1 capsule (100 mg total) by mouth 2 (two) times daily. 60 capsule 0    doxycycline (VIBRA-TABS) 100 MG tablet Take 1 tablet (100 mg total) by mouth 2 (two) times daily. 14 tablet 0    dulaglutide (TRULICITY) 1.5 mg/0.5 mL pen injector Inject 1.5 mg into the skin every 7 days. 4 pen 2    fluticasone propionate (FLONASE) 50 mcg/actuation nasal spray 2 sprays (100 mcg total) by Each Nostril route once daily. 18.2 mL 0    glipizide-metformin (METAGLIP) 5-500 mg per tablet Take 1 tablet by mouth 2 (two) times daily before meals. 60 tablet 2    hydrALAZINE (APRESOLINE) 100 MG tablet Take 1 tablet (100 mg total) by mouth 3 (three) times daily. 270 tablet 0    HYDROcodone-acetaminophen (NORCO) 7.5-325 mg per tablet Take 1 tablet by mouth every 8 (eight) hours as needed for Pain. 15 tablet 0    ipratropium (ATROVENT) 21 mcg (0.03 %) nasal spray 2 sprays by Nasal route 3 (three) times daily. 15 mL 0    ipratropium (ATROVENT) 21 mcg (0.03 %) nasal spray 2 sprays by Nasal route 3 (three) times daily. 15 mL 0    levothyroxine (SYNTHROID) 200 MCG tablet Take 1 tablet (200 mcg total) by mouth before breakfast. 90 tablet 0    metoprolol succinate (TOPROL-XL) 100 MG 24 hr tablet Take 1 tablet (100 mg total) by mouth once daily. 90 tablet 0    NIFEdipine (PROCARDIA-XL) 60 MG (OSM) 24 hr tablet Take 1 tablet (60 mg total) by mouth once daily. 90 tablet 0    nitrofurantoin, macrocrystal-monohydrate, (MACROBID) 100 MG capsule Take 1 capsule (100 mg total) by mouth  "2 (two) times daily. 14 capsule 0    oseltamivir (TAMIFLU) 75 MG capsule Take 1 capsule (75 mg total) by mouth 2 (two) times daily. for 5 days 10 capsule 0    oxyCODONE-acetaminophen (PERCOCET) 5-325 mg per tablet Take 1 tablet by mouth every 4 (four) hours as needed for Pain. 45 tablet 0    promethazine (PHENERGAN) 25 MG tablet Take 1 tablet (25 mg total) by mouth every 4 (four) hours. 45 tablet 0    sertraline (ZOLOFT) 100 MG tablet TAKE 1 TABLET DAILY 90 tablet 3    traMADoL (ULTRAM) 50 mg tablet Take 1 tablet (50 mg total) by mouth every 6 (six) hours as needed for Pain. (Patient not taking: Reported on 3/30/2022) 20 tablet 0    traMADoL (ULTRAM) 50 mg tablet Take 1 tablet (50 mg total) by mouth every 12 (twelve) hours as needed for Pain. 45 tablet 0    venlafaxine (EFFEXOR-XR) 150 MG Cp24 TAKE 1 CAPSULE BY MOUTH EVERY DAY WITH FOOD 90 capsule 0     No facility-administered encounter medications on file as of 4/19/2022.       Allergies:  Review of patient's allergies indicates:   Allergen Reactions    Fish containing products Anaphylaxis    Iodinated contrast media     Penicillins        Health Maintenance:    There is no immunization history on file for this patient.   Health Maintenance   Topic Date Due    Lipid Panel  Never done    Foot Exam  Never done    Eye Exam  Never done    TETANUS VACCINE  Never done    Hemoglobin A1c  08/03/2022    Mammogram  09/30/2022    Low Dose Statin  03/30/2023    Hepatitis C Screening  Completed        Physical Exam      Vital Signs  Temp: 98.7 °F (37.1 °C)  Temp src: Oral  Pulse: 82  SpO2: 98 %  BP: 110/70  BP Location: Left arm  Patient Position: Sitting  Height and Weight  Height: 5' 6" (167.6 cm)  Weight: 105.2 kg (232 lb)  BSA (Calculated - sq m): 2.21 sq meters  BMI (Calculated): 37.5  Weight in (lb) to have BMI = 25: 154.6]    Physical Exam  Constitutional:       Appearance: Normal appearance.   HENT:      Head: Normocephalic and atraumatic.      Right " Ear: Tympanic membrane and ear canal normal.      Left Ear: Tympanic membrane and ear canal normal.   Eyes:      Extraocular Movements: Extraocular movements intact.      Conjunctiva/sclera: Conjunctivae normal.      Pupils: Pupils are equal, round, and reactive to light.   Cardiovascular:      Rate and Rhythm: Normal rate and regular rhythm.      Pulses: Normal pulses.           Radial pulses are 2+ on the right side and 2+ on the left side.      Heart sounds: Normal heart sounds.   Pulmonary:      Effort: Pulmonary effort is normal.      Breath sounds: Normal breath sounds.   Abdominal:      Palpations: Abdomen is soft.      Tenderness: There is no abdominal tenderness.   Musculoskeletal:         General: Normal range of motion.      Right lower leg: No edema.      Left lower leg: No edema.   Skin:     General: Skin is warm and dry.      Findings: No rash.   Neurological:      General: No focal deficit present.      Mental Status: She is alert. Mental status is at baseline.   Psychiatric:         Mood and Affect: Mood normal.          Laboratory:  CBC:  Recent Labs   Lab 01/03/22  0824 01/19/22  0847 03/09/22  0910   WBC 6.86 6.38 7.98   RBC 4.17 L 3.59 L 4.39   Hemoglobin 11.3 L 9.8 L 12.0   Hematocrit 37.4 L 33.1 L 38.8   Platelet Count 482 H 502 H 423 H   MCV 89.7 92.2 88.4   MCH 27.1 27.3 27.3   MCHC 30.2 L 29.6 L 30.9 L     CMP:  Recent Labs   Lab 08/30/21  1017 10/18/21  0832 11/23/21  1346 12/28/21  0912 02/03/22  0910 03/09/22  0910   Glucose 337 H   < > 194 H 341 H   < > 193 H   Calcium 9.1  --  9.0 9.4   < > 9.0   Albumin 3.5  --  3.4 L 3.3 L  --   --    Total Protein 7.3  --  7.7 7.4  --   --    Sodium 139  --  143 137   < > 141   Potassium 3.5  --  3.5 3.8   < > 3.3 L   CO2 29  --  29 33 H   < > 29   Chloride 104  --  108 H 101   < > 104   BUN 8  --  6 L 8   < > 7   Alk Phos 150 H  --  157 H 148 H  --   --    ALT 36  --  45 32  --   --    AST 26  --  32 28  --   --    Bilirubin, Total 0.2  --  0.2 0.3   --   --     < > = values in this interval not displayed.     LIPIDS:  Recent Labs   Lab 10/18/21  0832 02/25/22  0850 03/22/22  0917   TSH 1.690 40.100 H 37.400 H     TSH:  Recent Labs   Lab 10/18/21  0832 02/25/22  0850 03/22/22  0917   TSH 1.690 40.100 H 37.400 H     A1C:  Recent Labs   Lab 05/14/21  0938 08/30/21  1017 11/15/21  1049 11/23/21  1346 02/03/22  0910   Hemoglobin A1C 10.7 H 9.1 H 7.6 H 7.6 H 7.5 H       Assessment/Plan     Carey Leonardo is a 46 y.o.female with:     1. Decreased attention Span  - Ambulatory referral/consult to Psychiatry; Future    2. Encounter for screening laboratory testing for COVID-19 virus  - POCT SARS-COV2 (COVID) with Flu Antigen    3. Flu  - Flu and covid negative, but concern for false negative considering significant expsoure  - Tamiflu BID x5 days  - intranasal Atrovent p.r.n. for nasal congestion  - benzonatate +OTC Mucinex for cough  - daily antihistamine    Total time spent face-to-face and non-face-to-face coordinating care for this encounter was: 20 min    Chronic conditions status updated as per HPI.  Other than changes above, cont current medications and maintain follow up with specialists.  Return to clinic as scheduled.    Long Fernández DO  [facility]

## 2022-04-28 ENCOUNTER — OFFICE VISIT (OUTPATIENT)
Dept: PAIN MEDICINE | Facility: CLINIC | Age: 47
End: 2022-04-28
Payer: OTHER GOVERNMENT

## 2022-04-28 VITALS
WEIGHT: 234 LBS | HEIGHT: 67 IN | BODY MASS INDEX: 36.73 KG/M2 | DIASTOLIC BLOOD PRESSURE: 61 MMHG | SYSTOLIC BLOOD PRESSURE: 118 MMHG | HEART RATE: 79 BPM

## 2022-04-28 DIAGNOSIS — M96.1 POSTLAMINECTOMY SYNDROME, LUMBAR REGION: Chronic | ICD-10-CM

## 2022-04-28 DIAGNOSIS — M47.817 LUMBOSACRAL SPONDYLOSIS WITHOUT MYELOPATHY: Primary | Chronic | ICD-10-CM

## 2022-04-28 DIAGNOSIS — M53.3 DISORDER OF SACRUM: Chronic | ICD-10-CM

## 2022-04-28 PROCEDURE — 99214 OFFICE O/P EST MOD 30 MIN: CPT | Mod: S$PBB,25,, | Performed by: PHYSICIAN ASSISTANT

## 2022-04-28 PROCEDURE — 99213 OFFICE O/P EST LOW 20 MIN: CPT | Mod: PBBFAC | Performed by: PHYSICIAN ASSISTANT

## 2022-04-28 PROCEDURE — 99214 PR OFFICE/OUTPT VISIT, EST, LEVL IV, 30-39 MIN: ICD-10-PCS | Mod: S$PBB,25,, | Performed by: PHYSICIAN ASSISTANT

## 2022-04-28 PROCEDURE — 96372 THER/PROPH/DIAG INJ SC/IM: CPT | Mod: PBBFAC | Performed by: PHYSICIAN ASSISTANT

## 2022-04-28 RX ORDER — TRAMADOL HYDROCHLORIDE 50 MG/1
50 TABLET ORAL EVERY 12 HOURS PRN
Qty: 45 TABLET | Refills: 1 | Status: SHIPPED | OUTPATIENT
Start: 2022-04-29 | End: 2023-01-10

## 2022-04-28 RX ORDER — KETOROLAC TROMETHAMINE 30 MG/ML
60 INJECTION, SOLUTION INTRAMUSCULAR; INTRAVENOUS
Status: COMPLETED | OUTPATIENT
Start: 2022-04-28 | End: 2022-04-28

## 2022-04-28 RX ORDER — CYCLOBENZAPRINE HCL 10 MG
10 TABLET ORAL 3 TIMES DAILY PRN
Qty: 90 TABLET | Refills: 1 | Status: SHIPPED | OUTPATIENT
Start: 2022-04-28 | End: 2023-01-10

## 2022-04-28 RX ADMIN — KETOROLAC TROMETHAMINE 60 MG: 30 INJECTION, SOLUTION INTRAMUSCULAR at 10:04

## 2022-04-28 NOTE — PROGRESS NOTES
Disclaimer:  This note has been generated using voice recognition software.  There may be typographical errors that have been missed during proof reading      Subjective:      Patient ID: Carey Leonardo is a 46 y.o. female.    Chief Complaint: Low-back Pain      Low-back Pain  This is a chronic problem. The current episode started more than 1 year ago. The problem occurs daily. The problem has been waxing and waning since onset. Associated symptoms include leg pain. Pertinent negatives include no bladder incontinence, chest pain, dysuria or fever.     Review of Systems   Constitutional: Negative for activity change, appetite change, chills, diaphoresis, fever and unexpected weight change.   HENT: Negative for drooling, ear discharge, ear pain, facial swelling, nosebleeds, sore throat, trouble swallowing, voice change and goiter.    Eyes: Negative for photophobia, pain, discharge, redness and visual disturbance.   Respiratory: Negative for apnea, cough, choking, chest tightness, shortness of breath, wheezing and stridor.    Cardiovascular: Negative for chest pain, palpitations and leg swelling.   Gastrointestinal: Negative for abdominal distention, change in bowel habit, diarrhea, rectal pain, vomiting, fecal incontinence and change in bowel habit.   Endocrine: Negative for cold intolerance, heat intolerance, polydipsia, polyphagia and polyuria.   Genitourinary: Negative for bladder incontinence, dysuria, flank pain, frequency and hot flashes.   Musculoskeletal: Positive for arthralgias, back pain and leg pain. Negative for neck pain.   Integumentary:  Negative for color change, pallor and rash.   Allergic/Immunologic: Negative for immunocompromised state.   Neurological: Negative for dizziness, vertigo, seizures, syncope, facial asymmetry, speech difficulty, light-headedness, disturbances in coordination, memory loss and coordination difficulties.   Hematological: Negative for adenopathy. Does not bruise/bleed  "easily.   Psychiatric/Behavioral: Negative for agitation, behavioral problems, confusion, decreased concentration, dysphoric mood, hallucinations, self-injury and suicidal ideas. The patient is not nervous/anxious and is not hyperactive.             Objective:  Vitals:    04/28/22 0932   BP: 118/61   Pulse: 79   Weight: 106.1 kg (234 lb)   Height: 5' 7" (1.702 m)   PainSc:   6         Physical Exam  Vitals and nursing note reviewed. Exam conducted with a chaperone present.   Constitutional:       General: She is awake. She is not in acute distress.     Appearance: Normal appearance. She is not ill-appearing, toxic-appearing or diaphoretic.   HENT:      Head: Normocephalic and atraumatic.      Nose: Nose normal.      Mouth/Throat:      Mouth: Mucous membranes are moist.      Pharynx: Oropharynx is clear.   Eyes:      Conjunctiva/sclera: Conjunctivae normal.      Pupils: Pupils are equal, round, and reactive to light.   Cardiovascular:      Rate and Rhythm: Normal rate.   Pulmonary:      Effort: Pulmonary effort is normal. No respiratory distress.   Abdominal:      Palpations: Abdomen is soft.      Tenderness: There is no guarding.   Musculoskeletal:         General: Normal range of motion.      Cervical back: Normal range of motion and neck supple. Tenderness present. No rigidity.      Lumbar back: Tenderness present.   Skin:     General: Skin is warm and dry.      Coloration: Skin is not jaundiced or pale.   Neurological:      General: No focal deficit present.      Mental Status: She is alert and oriented to person, place, and time. Mental status is at baseline.      Cranial Nerves: Cranial nerves are intact. No cranial nerve deficit (II-XII).   Psychiatric:         Mood and Affect: Mood normal.         Behavior: Behavior normal. Behavior is cooperative.         Thought Content: Thought content normal.           X-Ray KUB  Narrative: EXAMINATION:  XR KUB    CLINICAL HISTORY:  Dysuria    COMPARISON:  12/03/2014 the " through 06/01/2020    FINDINGS:  Surgical clips are present in the right upper quadrant.  There is abundant stool throughout the colon but no evidence of a fecal impaction.  No calcifications are seen overlying the kidneys.  Numerous bilateral pelvic calcifications are present.    The patient is status post laminectomy at the L3 and L4 levels and degenerative changes are present at multiple levels in the lower thoracic and lumbar spine.  Impression: Abundant stool but nonobstructive bowel gas pattern.    Place of service: [facility]    Electronically signed by: Raeann Field  Date:    03/31/2022  Time:    12:33       Office Visit on 04/19/2022   Component Date Value Ref Range Status    SARS Coronavirus 2 Antigen 04/19/2022 Negative  Negative Final    Rapid Influenza A Ag 04/19/2022 Negative  Negative Final    Rapid Influenza B Ag 04/19/2022 Negative  Negative Final     Acceptable 04/19/2022 Yes   Final   Office Visit on 03/31/2022   Component Date Value Ref Range Status    Color, UA 03/31/2022 Yellow  Colorless, Straw, Yellow, Dark Yellow Final    Clarity, UA 03/31/2022 Clear  Clear Final    pH, UA 03/31/2022 6.5  5.0, 5.5, 6.0, 6.5, 7.0, 7.5, 8.0 pH Units Final    Leukocytes, UA 03/31/2022 Negative  Negative Final    Nitrites, UA 03/31/2022 Negative  Negative Final    Protein, UA 03/31/2022 Negative  Negative Final    Glucose, UA 03/31/2022 Negative  Negative mg/dL Final    Ketones, UA 03/31/2022 Negative  Negative, Trace mg/dL Final    Urobilinogen, UA 03/31/2022 0.2  0.2, 1.0 mg/dL Final    Bilirubin, UA 03/31/2022 Negative  Negative Final    Blood, UA 03/31/2022 Negative  Negative Final    Specific [place], UA 03/31/2022 1.020  <=1.005, 1.010, 1.015, 1.020, 1.025, 1.030 Final   Office Visit on 03/30/2022   Component Date Value Ref Range Status    POC Amphetamines 03/30/2022 Negative  Negative, Inconclusive Final    POC Barbiturates 03/30/2022 Negative  Negative,  Inconclusive Final    POC Benzodiazepines 03/30/2022 Negative  Negative, Inconclusive Final    POC Cocaine 03/30/2022 Negative  Negative, Inconclusive Final    POC THC 03/30/2022 Negative  Negative, Inconclusive Final    POC Methadone 03/30/2022 Negative  Negative, Inconclusive Final    POC Methamphetamine 03/30/2022 Negative  Negative, Inconclusive Final    POC Opiates 03/30/2022 Negative  Negative, Inconclusive Final    POC Oxycodone 03/30/2022 Negative  Negative, Inconclusive Final    POC Phencyclidine 03/30/2022 Negative  Negative, Inconclusive Final    POC Methylenedioxymethamphetamine * 03/30/2022 Negative  Negative, Inconclusive Final    POC Tricyclic Antidepressants 03/30/2022 Negative  Negative, Inconclusive Final    POC Buprenorphine 03/30/2022 Negative   Final     Acceptable 03/30/2022 Yes   Final    POC Temperature (Urine) 03/30/2022 90   Final    pH, UA 03/30/2022 6.5  5.0, 5.5, 6.0, 6.5, 7.0, 7.5, 8.0 pH Units Final    Specific Gravity, UA 03/30/2022 1.025  <=1.005, 1.010, 1.015, 1.020, 1.025, 1.030 Final    Creatinine, Urine 03/30/2022 391 (A) 28 - 219 mg/dL Final    6-Acetylmorphine 03/30/2022 Negative  10 ng/mL Final    7-Aminoclonazepam 03/30/2022 Negative  Negative 25 ng/mL Final    a-Hydroxyalprazolam 03/30/2022 Negative  25 ng/mL Final    Acetyl Fentanyl 03/30/2022 Negative  2.5 ng/mL Final    Acetyl Norfentanyl Oxalate 03/30/2022 Negative  5 ng/mL Final    Benzoylecgonine 03/30/2022 Negative  100 ng/mL Final    Buprenorphine 03/30/2022 Negative  25 ng/mL Final    Codeine 03/30/2022 Negative  25 ng/mL Final    EDDP 03/30/2022 Negative  25 ng/mL Final    Fentanyl 03/30/2022 Negative  2.5 ng/mL Final    Hydrocodone 03/30/2022 Negative  25 ng/mL Final    Hydromorphone 03/30/2022 Negative  25 ng/mL Final    Lorazepam 03/30/2022 Negative  25 ng/mL Final    Morphine 03/30/2022 Negative  25 ng/mL Final    Norbuprenorphine 03/30/2022 Negative  25 ng/mL Final     Nordiazepam 03/30/2022 Negative  25 ng/mL Final    Norfentanyl Oxalate 03/30/2022 Negative  5 ng/mL Final    Norhydrocodone 03/30/2022 Negative  50 ng/mL Final    Noroxycodone HCL 03/30/2022 Negative  50 ng/mL Final    Oxazepam 03/30/2022 Negative  25 ng/mL Final    Oxymorphone 03/30/2022 Negative  25 ng/mL Final    Tapentadol 03/30/2022 Negative  25 ng/mL Final    Temazepam 03/30/2022 Negative  25 ng/mL Final    THC-COOH 03/30/2022 Negative  25 ng/mL Final    Tramadol 03/30/2022 Negative  100 ng/mL Final    Amphetamine, Urine 03/30/2022 Negative  Negative 100 ng/mL Final    Methamphetamines, Urine 03/30/2022 Negative  Negative 100 ng/mL Final    Methadone, Urine 03/30/2022 Negative  Negative 25 ng/mL Final    Oxycodone, Urine 03/30/2022 Negative  Negative 25 ng/mL Final   Office Visit on 03/22/2022   Component Date Value Ref Range Status    TSH 03/22/2022 37.400 (A) 0.358 - 3.740 uIU/mL Final   Lab Visit on 03/09/2022   Component Date Value Ref Range Status    ESR Westergren 03/09/2022 66 (A) 0 - 20 mm/Hr Final    WBC 03/09/2022 7.98  4.50 - 11.00 K/uL Final    RBC 03/09/2022 4.39  4.20 - 5.40 M/uL Final    Hemoglobin 03/09/2022 12.0  12.0 - 16.0 g/dL Final    Hematocrit 03/09/2022 38.8  38.0 - 47.0 % Final    MCV 03/09/2022 88.4  80.0 - 96.0 fL Final    MCH 03/09/2022 27.3  27.0 - 31.0 pg Final    MCHC 03/09/2022 30.9 (A) 32.0 - 36.0 g/dL Final    RDW 03/09/2022 14.0  11.5 - 14.5 % Final    Platelet Count 03/09/2022 423 (A) 150 - 400 K/uL Final    MPV 03/09/2022 9.4  9.4 - 12.4 fL Final    Neutrophils % 03/09/2022 53.8  53.0 - 65.0 % Final    Lymphocytes % 03/09/2022 36.1  27.0 - 41.0 % Final    Monocytes % 03/09/2022 6.1 (A) 2.0 - 6.0 % Final    Eosinophils % 03/09/2022 3.1  1.0 - 4.0 % Final    Basophils % 03/09/2022 0.6  0.0 - 1.0 % Final    Immature Granulocytes % 03/09/2022 0.3  0.0 - 0.4 % Final    nRBC, Auto 03/09/2022 0.0  <=0.0 % Final    Neutrophils, Abs 03/09/2022  4.29  1.80 - 7.70 K/uL Final    Lymphocytes, Absolute 03/09/2022 2.88  1.00 - 4.80 K/uL Final    Monocytes, Absolute 03/09/2022 0.49  0.00 - 0.80 K/uL Final    Eosinophils, Absolute 03/09/2022 0.25  0.00 - 0.50 K/uL Final    Basophils, Absolute 03/09/2022 0.05  0.00 - 0.20 K/uL Final    Immature Granulocytes, Absolute 03/09/2022 0.02  0.00 - 0.04 K/uL Final    nRBC, Absolute 03/09/2022 0.00  <=0.00 x10e3/uL Final    Diff Type 03/09/2022 Auto   Final    Sodium 03/09/2022 141  136 - 145 mmol/L Final    Potassium 03/09/2022 3.3 (A) 3.5 - 5.1 mmol/L Final    Chloride 03/09/2022 104  98 - 107 mmol/L Final    CO2 03/09/2022 29  21 - 32 mmol/L Final    Anion Gap 03/09/2022 11  7 - 16 mmol/L Final    Glucose 03/09/2022 193 (A) 74 - 106 mg/dL Final    BUN 03/09/2022 7  7 - 18 mg/dL Final    Creatinine 03/09/2022 0.76  0.55 - 1.02 mg/dL Final    BUN/Creatinine Ratio 03/09/2022 9  6 - 20 Final    Calcium 03/09/2022 9.0  8.5 - 10.1 mg/dL Final    eGFR 03/09/2022 87  >=60 mL/min/1.73m² Final    CRP 03/09/2022 0.58  0.00 - 0.80 mg/dL Final   Office Visit on 02/25/2022   Component Date Value Ref Range Status    TSH 02/25/2022 40.100 (A) 0.358 - 3.740 uIU/mL Final   Office Visit on 02/14/2022   Component Date Value Ref Range Status    SARS Coronavirus 2 Antigen 02/14/2022 Positive (A) Negative Final    Rapid Influenza A Ag 02/14/2022 Negative  Negative Final    Rapid Influenza B Ag 02/14/2022 Negative  Negative Final     Acceptable 02/14/2022 Yes   Final   Office Visit on 02/11/2022   Component Date Value Ref Range Status    SARS Coronavirus 2 Antigen 02/11/2022 Negative  Negative Final    Rapid Influenza A Ag 02/11/2022 Negative  Negative Final    Rapid Influenza B Ag 02/11/2022 Negative  Negative Final     Acceptable 02/11/2022 Yes   Final    Rapid Strep A Screen 02/11/2022 Negative  Negative Final     Acceptable 02/11/2022 Yes   Final    Culture, Group A  Strep 02/11/2022 Negative for Group A Streptococcus   Final   Office Visit on 02/03/2022   Component Date Value Ref Range Status    Sodium 02/03/2022 140  136 - 145 mmol/L Final    Potassium 02/03/2022 3.7  3.5 - 5.1 mmol/L Final    Chloride 02/03/2022 104  98 - 107 mmol/L Final    CO2 02/03/2022 29  21 - 32 mmol/L Final    Anion Gap 02/03/2022 11  7 - 16 mmol/L Final    Glucose 02/03/2022 188 (A) 74 - 106 mg/dL Final    BUN 02/03/2022 8  7 - 18 mg/dL Final    Creatinine 02/03/2022 0.81  0.55 - 1.02 mg/dL Final    BUN/Creatinine Ratio 02/03/2022 10  6 - 20 Final    Calcium 02/03/2022 9.1  8.5 - 10.1 mg/dL Final    eGFR 02/03/2022 81  >=60 mL/min/1.73m² Final    Hemoglobin A1C 02/03/2022 7.5 (A) 4.5 - 6.6 % Final    Estimated Average Glucose 02/03/2022 164  mg/dL Final    Creatinine, Urine 02/03/2022 435 (A) 28 - 219 mg/dL Final    Microalbumin 02/03/2022 2.3  0.0 - 2.8 mg/dL Final    Microalbumin/Creatinine Ratio 02/03/2022 5.3  0.0 - 30.0 mg/g Final   Lab Visit on 01/19/2022   Component Date Value Ref Range Status    ESR Westergren 01/19/2022 65 (A) 0 - 20 mm/Hr Final    CRP 01/19/2022 0.37  0.00 - 0.80 mg/dL Final    WBC 01/19/2022 6.38  4.50 - 11.00 K/uL Final    RBC 01/19/2022 3.59 (A) 4.20 - 5.40 M/uL Final    Hemoglobin 01/19/2022 9.8 (A) 12.0 - 16.0 g/dL Final    Hematocrit 01/19/2022 33.1 (A) 38.0 - 47.0 % Final    MCV 01/19/2022 92.2  80.0 - 96.0 fL Final    MCH 01/19/2022 27.3  27.0 - 31.0 pg Final    MCHC 01/19/2022 29.6 (A) 32.0 - 36.0 g/dL Final    RDW 01/19/2022 14.5  11.5 - 14.5 % Final    Platelet Count 01/19/2022 502 (A) 150 - 400 K/uL Final    MPV 01/19/2022 9.8  9.4 - 12.4 fL Final    Neutrophils % 01/19/2022 47.0 (A) 53.0 - 65.0 % Final    Lymphocytes % 01/19/2022 43.1 (A) 27.0 - 41.0 % Final    Monocytes % 01/19/2022 6.1 (A) 2.0 - 6.0 % Final    Eosinophils % 01/19/2022 2.7  1.0 - 4.0 % Final    Basophils % 01/19/2022 0.8  0.0 - 1.0 % Final    Immature  Granulocytes % 01/19/2022 0.3  0.0 - 0.4 % Final    nRBC, Auto 01/19/2022 0.0  <=0.0 % Final    Neutrophils, Abs 01/19/2022 3.00  1.80 - 7.70 K/uL Final    Lymphocytes, Absolute 01/19/2022 2.75  1.00 - 4.80 K/uL Final    Monocytes, Absolute 01/19/2022 0.39  0.00 - 0.80 K/uL Final    Eosinophils, Absolute 01/19/2022 0.17  0.00 - 0.50 K/uL Final    Basophils, Absolute 01/19/2022 0.05  0.00 - 0.20 K/uL Final    Immature Granulocytes, Absolute 01/19/2022 0.02  0.00 - 0.04 K/uL Final    nRBC, Absolute 01/19/2022 0.00  <=0.00 x10e3/uL Final    Diff Type 01/19/2022 Auto   Final   There may be more visits with results that are not included.         No orders of the defined types were placed in this encounter.        Requested Prescriptions     Signed Prescriptions Disp Refills    traMADoL (ULTRAM) 50 mg tablet 45 tablet 1     Sig: Take 1 tablet (50 mg total) by mouth every 12 (twelve) hours as needed for Pain.    cyclobenzaprine (FLEXERIL) 10 MG tablet 90 tablet 1     Sig: Take 1 tablet (10 mg total) by mouth 3 (three) times daily as needed for Muscle spasms.       Assessment:     1. Lumbosacral spondylosis without myelopathy    2. Postlaminectomy syndrome, lumbar region    3. Disorder of sacrum         A's of Opioid Risk Assessment  Activity:Patient can perform ADL.   Analgesia:Patients pain is partially controlled by current medication. Patient has tried OTC medications such as Tylenol and Ibuprofen with out relief.   Adverse Effects: Patient denies constipation or sedation.  Aberrant Behavior:  reviewed with no aberrant drug seeking/taking behavior.  Overdose reversal drug naloxone discussed    Drug screen reviewed      Plan:    Patient is not a candidate for nerve block injections at this time    She states she has completed her antibiotics after infection from surgery    Continues complaint back and joint pain    She is requesting Toradol injection today    Toradol 60 mg IM, tolerated well    She states  current medication is helping she would like to continue current medication    Continue home exercise program as directed    Continue current medication    Follow-up 2 months    Dr. Bills, March 2023    Bring original prescription medication bottles/container/box with labels to each visit

## 2022-05-02 ENCOUNTER — OFFICE VISIT (OUTPATIENT)
Dept: SPINE | Facility: CLINIC | Age: 47
End: 2022-05-02
Payer: OTHER GOVERNMENT

## 2022-05-02 DIAGNOSIS — M54.16 LUMBAR RADICULOPATHY: Primary | ICD-10-CM

## 2022-05-02 DIAGNOSIS — M51.36 DDD (DEGENERATIVE DISC DISEASE), LUMBAR: ICD-10-CM

## 2022-05-02 DIAGNOSIS — T81.49XA WOUND INFECTION AFTER SURGERY: ICD-10-CM

## 2022-05-02 DIAGNOSIS — Z09 FOLLOW-UP SURGERY CARE: ICD-10-CM

## 2022-05-02 PROCEDURE — 99214 OFFICE O/P EST MOD 30 MIN: CPT | Mod: S$PBB,,, | Performed by: ORTHOPAEDIC SURGERY

## 2022-05-02 PROCEDURE — 99212 OFFICE O/P EST SF 10 MIN: CPT | Mod: PBBFAC | Performed by: ORTHOPAEDIC SURGERY

## 2022-05-02 PROCEDURE — 99214 PR OFFICE/OUTPT VISIT, EST, LEVL IV, 30-39 MIN: ICD-10-PCS | Mod: S$PBB,,, | Performed by: ORTHOPAEDIC SURGERY

## 2022-05-03 NOTE — PROGRESS NOTES
MDM/time:  Greater than 30 minutes spent on this encounter including 10 minutes reviewing imaging and notes, 15 minutes with the patient, 5 minutes documentation    ASSESSMENT:  46 y.o. female with lumbar spondylosis and radiculopathy now status post L2-L5 laminectomy 11/30/2021    PLAN:  Repeated her lab work.  CRP is normal.  ESR is trending down.  Physical therapy.  Follow-up in 3 months    HPI:  46 y.o. female here for wound check.  She is status post L2-L5 laminectomy 11/30/2021.  Reports she has been doing well.  Incision is healed.  Denies any recent injuries.     IMAGING:  X-rays lumbar spine reviewed show:  On the AP there is normal coronal alignment.  There are 5 non-rib-bearing lumbar vertebrae.  On the lateral there is maintained lumbar lordosis.  She is status post L2-L5 laminectomy.     Past Medical History:   Diagnosis Date    Asthma     CHF (congestive heart failure)     Chronic pain syndrome     Depressive disorder     Diabetes mellitus, type 2     Enlarged heart     Gastroparesis     GERD (gastroesophageal reflux disease)     Hyperlipidemia     Hypertension     IBS (irritable bowel syndrome)     Kidney stones     Lumbar radiculopathy     Sleep apnea     Does not use a CPAP    Thyroid disease      Past Surgical History:   Procedure Laterality Date    Bilateral L3-S1 MBB Bilateral 6-, 5-, 1-8-2014    Dr Jessica Randolph    CARPAL TUNNEL RELEASE      CHOLECYSTECTOMY      DIAGNOSTIC LAPAROSCOPY  04/14/2010    Exploratory Laparotomy, Myomectomy, Hydrotubation-Dr. Feng    DILATION AND CURETTAGE OF UTERUS  04/14/2010    Hysteroscopy-Dr. Feng    EXPLORATORY LAPAROTOMY WITH UTERINE MYOMECTOMY  02/27/2007    Dr. Marquise Anderson    HYSTERECTOMY  09/29/2011    Robotic, FABIENNE, Cystoscopy-Dr. Feng    HYSTEROSCOPY WITH DILATION AND CURETTAGE OF UTERUS  04/04/2006    Laparoscopy, Chromotubation-Dr. Marquise Anderson    LAMINECTOMY N/A 11/30/2021    Procedure: L2-L5 Laminectomy;   Surgeon: Cyril Upton MD;  Location: [facility];  Service: Neurosurgery;  Laterality: N/A;    LEFT HEART CATHETERIZATION      Left L3-S1 RFTC Left 07/18/2017    Dr Lopez    Left SI JI Left 09/30/2013    Dr Randolph    OOPHORECTOMY  11/11/2014    Robotic, FABIENNE, Cystoscopy-Dr. Feng    SINUS SURGERY       Social History     Tobacco Use    Smoking status: Never Smoker    Smokeless tobacco: Never Used   Substance Use Topics    Alcohol use: Not Currently    Drug use: Not Currently     Types: Hydrocodone, Oxycodone      Current Outpatient Medications   Medication Instructions    albuterol (PROVENTIL) 2.5 mg, Nebulization, Every 6 hours PRN, Rescue    albuterol (PROVENTIL/VENTOLIN HFA) 90 mcg/actuation inhaler 2 puffs, Inhalation, Every 6 hours PRN, Rescue     atorvastatin (LIPITOR) 20 mg, Oral, Nightly    benzonatate (TESSALON) 100 MG capsule One to two capsules three times daily as needed for cough    benzonatate (TESSALON) 100 MG capsule One to two capsules three times daily as needed for cough    cetirizine (ZYRTEC) 10 mg, Oral, Daily    cloNIDine (CATAPRES) 0.2 MG tablet TAKE 1 TABLET FOUR TIMES A DAY    cyclobenzaprine (FLEXERIL) 10 mg, Oral, 3 times daily PRN    doxycycline (MONODOX) 100 mg, Oral, 2 times daily    doxycycline (VIBRA-TABS) 100 mg, Oral, 2 times daily    fluticasone propionate (FLONASE) 100 mcg, Each Nostril, Daily    glipizide-metformin (METAGLIP) 5-500 mg per tablet 1 tablet, Oral, 2 times daily before meals    hydrALAZINE (APRESOLINE) 100 mg, Oral, 3 times daily    ipratropium (ATROVENT) 21 mcg (0.03 %) nasal spray 2 sprays, Nasal, 3 times daily    ipratropium (ATROVENT) 21 mcg (0.03 %) nasal spray 2 sprays, Nasal, 3 times daily    levothyroxine (SYNTHROID) 200 mcg, Oral, Before breakfast    metoprolol succinate (TOPROL-XL) 100 MG 24 hr tablet TAKE 1 TABLET DAILY    NIFEdipine (PROCARDIA-XL) 60 mg, Oral, Daily    nitrofurantoin, macrocrystal-monohydrate, (MACROBID)  100 MG capsule 100 mg, Oral, 2 times daily    oxyCODONE-acetaminophen (PERCOCET) 5-325 mg per tablet 1 tablet, Oral, Every 4 hours PRN    promethazine (PHENERGAN) 25 mg, Oral, Every 4 hours    sertraline (ZOLOFT) 100 MG tablet TAKE 1 TABLET DAILY    traMADoL (ULTRAM) 50 mg, Oral, Every 12 hours PRN    TRULICITY 1.5 mg, Subcutaneous, Every 7 days    venlafaxine (EFFEXOR-XR) 150 MG Cp24 TAKE 1 CAPSULE BY MOUTH EVERY DAY WITH FOOD        EXAM:  Constitutional  General Appearance:  There is no height or weight on file to calculate BMI., NAD  Psychiatric   Orientation: Oriented to time, oriented to place, oriented to person  Mood and Affect: Active and alert, normal mood, normal affect  Gait and Station   Appearance:  Normal gait, normal tandem gait, able to walk on toes, able to walk on heels  Healed incision    5/5 strength  Sensation intact  2+ pulses

## 2022-05-05 ENCOUNTER — OFFICE VISIT (OUTPATIENT)
Dept: FAMILY MEDICINE | Facility: CLINIC | Age: 47
End: 2022-05-05
Payer: OTHER GOVERNMENT

## 2022-05-05 VITALS
HEIGHT: 67 IN | BODY MASS INDEX: 36.26 KG/M2 | OXYGEN SATURATION: 95 % | SYSTOLIC BLOOD PRESSURE: 120 MMHG | DIASTOLIC BLOOD PRESSURE: 70 MMHG | WEIGHT: 231 LBS | HEART RATE: 86 BPM

## 2022-05-05 DIAGNOSIS — R10.84 GENERALIZED ABDOMINAL PAIN: ICD-10-CM

## 2022-05-05 DIAGNOSIS — R11.0 NAUSEA: ICD-10-CM

## 2022-05-05 DIAGNOSIS — R10.11 RUQ PAIN: Primary | ICD-10-CM

## 2022-05-05 LAB
ALBUMIN SERPL BCP-MCNC: 3.6 G/DL (ref 3.5–5)
ALBUMIN/GLOB SERPL: 0.8 {RATIO}
ALP SERPL-CCNC: 162 U/L (ref 39–100)
ALT SERPL W P-5'-P-CCNC: 51 U/L (ref 13–56)
ANION GAP SERPL CALCULATED.3IONS-SCNC: 9 MMOL/L (ref 7–16)
AST SERPL W P-5'-P-CCNC: 44 U/L (ref 15–37)
BACTERIA #/AREA URNS HPF: ABNORMAL /HPF
BASOPHILS # BLD AUTO: 0.03 K/UL (ref 0–0.2)
BASOPHILS NFR BLD AUTO: 0.5 % (ref 0–1)
BILIRUB SERPL-MCNC: 0.3 MG/DL (ref 0–1.2)
BILIRUB UR QL STRIP: NEGATIVE
BUN SERPL-MCNC: 7 MG/DL (ref 7–18)
BUN/CREAT SERPL: 8 (ref 6–20)
CALCIUM SERPL-MCNC: 9.2 MG/DL (ref 8.5–10.1)
CAOX CRY #/AREA URNS LPF: ABNORMAL /LPF
CHLORIDE SERPL-SCNC: 100 MMOL/L (ref 98–107)
CLARITY UR: ABNORMAL
CO2 SERPL-SCNC: 32 MMOL/L (ref 21–32)
COLOR UR: YELLOW
CREAT SERPL-MCNC: 0.9 MG/DL (ref 0.55–1.02)
DIFFERENTIAL METHOD BLD: ABNORMAL
EOSINOPHIL # BLD AUTO: 0.17 K/UL (ref 0–0.5)
EOSINOPHIL NFR BLD AUTO: 2.6 % (ref 1–4)
ERYTHROCYTE [DISTWIDTH] IN BLOOD BY AUTOMATED COUNT: 13.6 % (ref 11.5–14.5)
GLOBULIN SER-MCNC: 4.3 G/DL (ref 2–4)
GLUCOSE SERPL-MCNC: 346 MG/DL (ref 74–106)
GLUCOSE UR STRIP-MCNC: 500 MG/DL
HCT VFR BLD AUTO: 39.4 % (ref 38–47)
HGB BLD-MCNC: 12.5 G/DL (ref 12–16)
IMM GRANULOCYTES # BLD AUTO: 0.02 K/UL (ref 0–0.04)
IMM GRANULOCYTES NFR BLD: 0.3 % (ref 0–0.4)
KETONES UR STRIP-SCNC: ABNORMAL MG/DL
LEUKOCYTE ESTERASE UR QL STRIP: ABNORMAL
LYMPHOCYTES # BLD AUTO: 2.52 K/UL (ref 1–4.8)
LYMPHOCYTES NFR BLD AUTO: 39.2 % (ref 27–41)
MCH RBC QN AUTO: 27.4 PG (ref 27–31)
MCHC RBC AUTO-ENTMCNC: 31.7 G/DL (ref 32–36)
MCV RBC AUTO: 86.4 FL (ref 80–96)
MONOCYTES # BLD AUTO: 0.37 K/UL (ref 0–0.8)
MONOCYTES NFR BLD AUTO: 5.8 % (ref 2–6)
MPC BLD CALC-MCNC: 10.2 FL (ref 9.4–12.4)
NEUTROPHILS # BLD AUTO: 3.32 K/UL (ref 1.8–7.7)
NEUTROPHILS NFR BLD AUTO: 51.6 % (ref 53–65)
NITRITE UR QL STRIP: NEGATIVE
NRBC # BLD AUTO: 0 X10E3/UL
NRBC, AUTO (.00): 0 %
PH UR STRIP: 6.5 PH UNITS
PLATELET # BLD AUTO: 419 K/UL (ref 150–400)
POTASSIUM SERPL-SCNC: 3.3 MMOL/L (ref 3.5–5.1)
PROT SERPL-MCNC: 7.9 G/DL (ref 6.4–8.2)
PROT UR QL STRIP: 30
RBC # BLD AUTO: 4.56 M/UL (ref 4.2–5.4)
RBC # UR STRIP: NEGATIVE /UL
RBC #/AREA URNS HPF: ABNORMAL /HPF
SODIUM SERPL-SCNC: 138 MMOL/L (ref 136–145)
SP GR UR STRIP: 1.02
SQUAMOUS #/AREA URNS LPF: ABNORMAL /LPF
UROBILINOGEN UR STRIP-ACNC: 0.2 MG/DL
WBC # BLD AUTO: 6.43 K/UL (ref 4.5–11)
WBC #/AREA URNS HPF: ABNORMAL /HPF

## 2022-05-05 PROCEDURE — 85025 COMPLETE CBC W/AUTO DIFF WBC: CPT | Mod: ,,, | Performed by: CLINICAL MEDICAL LABORATORY

## 2022-05-05 PROCEDURE — 85025 CBC WITH DIFFERENTIAL: ICD-10-PCS | Mod: ,,, | Performed by: CLINICAL MEDICAL LABORATORY

## 2022-05-05 PROCEDURE — 81001 URINALYSIS AUTO W/SCOPE: CPT | Mod: ,,, | Performed by: CLINICAL MEDICAL LABORATORY

## 2022-05-05 PROCEDURE — 99213 OFFICE O/P EST LOW 20 MIN: CPT | Mod: ,,, | Performed by: FAMILY MEDICINE

## 2022-05-05 PROCEDURE — 80053 COMPREHEN METABOLIC PANEL: CPT | Mod: ,,, | Performed by: CLINICAL MEDICAL LABORATORY

## 2022-05-05 PROCEDURE — 99213 PR OFFICE/OUTPT VISIT, EST, LEVL III, 20-29 MIN: ICD-10-PCS | Mod: ,,, | Performed by: FAMILY MEDICINE

## 2022-05-05 PROCEDURE — 80053 COMPREHENSIVE METABOLIC PANEL: ICD-10-PCS | Mod: ,,, | Performed by: CLINICAL MEDICAL LABORATORY

## 2022-05-05 PROCEDURE — 81001 URINALYSIS, REFLEX TO URINE CULTURE: ICD-10-PCS | Mod: ,,, | Performed by: CLINICAL MEDICAL LABORATORY

## 2022-05-06 RX ORDER — ONDANSETRON 4 MG/1
4 TABLET, FILM COATED ORAL EVERY 8 HOURS PRN
Qty: 30 TABLET | Refills: 0 | Status: SHIPPED | OUTPATIENT
Start: 2022-05-06 | End: 2022-06-23

## 2022-05-06 NOTE — PROGRESS NOTES
[facility]    Chief Complaint      Chief Complaint   Patient presents with    Right Side Pain     Rt side pain x 3 days. 8/10 pain rate; throbbing pain; Last BM last night       History of Present Illness      Carey Leonardo is a 46 y.o. female with chronic conditions of HTN, DM2, hypothyroidism, dyslipidemia, anxiety and lumbar radiculopathy who presents today for RUQ pain and nausea. States symptoms have been present intermittently the last few days duration with increasing frequency. Noted mild nausea with episodes of pain. Has hx of cholecystectomy in 2015. Denies dysuria, hematuria, diarrhea, melena or hematochezia.    Past Medical History:  Past Medical History:   Diagnosis Date    Asthma     CHF (congestive heart failure)     Chronic pain syndrome     Depressive disorder     Diabetes mellitus, type 2     Enlarged heart     Gastroparesis     GERD (gastroesophageal reflux disease)     Hyperlipidemia     Hypertension     IBS (irritable bowel syndrome)     Kidney stones     Lumbar radiculopathy     Sleep apnea     Does not use a CPAP    Thyroid disease        Past Surgical History:   has a past surgical history that includes Hysterectomy (09/29/2011); Oophorectomy (11/11/2014); Dilation and curettage of uterus (04/14/2010); Diagnostic laparoscopy (04/14/2010); Exploratory laparotomy with uterine myomectomy (02/27/2007); Hysteroscopy with dilation and curettage of uterus (04/04/2006); Carpal tunnel release; Cholecystectomy; Bilateral L3-S1 MBB (Bilateral, 6-, 5-, 1-8-2014); Left heart catheterization; Left L3-S1 RFTC (Left, 07/18/2017); Left SI JI (Left, 09/30/2013); Sinus surgery; and Laminectomy (N/A, 11/30/2021).    Social History:  Social History     Tobacco Use    Smoking status: Never Smoker    Smokeless tobacco: Never Used   Substance Use Topics    Alcohol use: Not Currently    Drug use: Not Currently     Types: Hydrocodone, Oxycodone       I personally reviewed  all past medical, surgical, and social.     Review of Systems   Constitutional: Negative for fatigue and fever.   HENT: Negative for ear pain.    Eyes: Negative for pain and visual disturbance.   Respiratory: Negative for chest tightness and shortness of breath.    Cardiovascular: Negative for chest pain and leg swelling.   Gastrointestinal: Positive for abdominal pain and nausea. Negative for blood in stool and vomiting.   Genitourinary: Negative for difficulty urinating and dysuria.   Musculoskeletal: Negative for gait problem and myalgias.   Skin: Negative for rash.   Neurological: Negative for dizziness and light-headedness.   Hematological: Does not bruise/bleed easily.        Medications:  Outpatient Encounter Medications as of 5/5/2022   Medication Sig Dispense Refill    albuterol (PROVENTIL) 2.5 mg /3 mL (0.083 %) nebulizer solution Take 3 mLs (2.5 mg total) by nebulization every 6 (six) hours as needed for Wheezing. Rescue 25 each 0    albuterol (PROVENTIL/VENTOLIN HFA) 90 mcg/actuation inhaler Inhale 2 puffs into the lungs every 6 (six) hours as needed for Wheezing. Rescue 18 g 1    atorvastatin (LIPITOR) 20 MG tablet Take 1 tablet (20 mg total) by mouth every evening. 90 tablet 3    benzonatate (TESSALON) 100 MG capsule One to two capsules three times daily as needed for cough 30 capsule 0    benzonatate (TESSALON) 100 MG capsule One to two capsules three times daily as needed for cough 30 capsule 0    cetirizine (ZYRTEC) 10 MG tablet Take 1 tablet (10 mg total) by mouth once daily. 30 tablet 0    cloNIDine (CATAPRES) 0.2 MG tablet TAKE 1 TABLET FOUR TIMES A  tablet 3    cyclobenzaprine (FLEXERIL) 10 MG tablet Take 1 tablet (10 mg total) by mouth 3 (three) times daily as needed for Muscle spasms. 90 tablet 1    doxycycline (MONODOX) 100 MG capsule Take 1 capsule (100 mg total) by mouth 2 (two) times daily. (Patient not taking: Reported on 4/28/2022) 60 capsule 0    doxycycline  (VIBRA-TABS) 100 MG tablet Take 1 tablet (100 mg total) by mouth 2 (two) times daily. (Patient not taking: Reported on 4/28/2022) 14 tablet 0    dulaglutide (TRULICITY) 1.5 mg/0.5 mL pen injector Inject 1.5 mg into the skin every 7 days. 4 pen 2    fluticasone propionate (FLONASE) 50 mcg/actuation nasal spray 2 sprays (100 mcg total) by Each Nostril route once daily. 18.2 mL 0    glipizide-metformin (METAGLIP) 5-500 mg per tablet Take 1 tablet by mouth 2 (two) times daily before meals. 60 tablet 2    hydrALAZINE (APRESOLINE) 100 MG tablet Take 1 tablet (100 mg total) by mouth 3 (three) times daily. 270 tablet 0    ipratropium (ATROVENT) 21 mcg (0.03 %) nasal spray 2 sprays by Nasal route 3 (three) times daily. 15 mL 0    ipratropium (ATROVENT) 21 mcg (0.03 %) nasal spray 2 sprays by Nasal route 3 (three) times daily. 15 mL 0    levothyroxine (SYNTHROID) 200 MCG tablet Take 1 tablet (200 mcg total) by mouth before breakfast. 90 tablet 0    metoprolol succinate (TOPROL-XL) 100 MG 24 hr tablet TAKE 1 TABLET DAILY 90 tablet 3    NIFEdipine (PROCARDIA-XL) 60 MG (OSM) 24 hr tablet Take 1 tablet (60 mg total) by mouth once daily. 90 tablet 0    nitrofurantoin, macrocrystal-monohydrate, (MACROBID) 100 MG capsule Take 1 capsule (100 mg total) by mouth 2 (two) times daily. 14 capsule 0    oxyCODONE-acetaminophen (PERCOCET) 5-325 mg per tablet Take 1 tablet by mouth every 4 (four) hours as needed for Pain. 45 tablet 0    promethazine (PHENERGAN) 25 MG tablet Take 1 tablet (25 mg total) by mouth every 4 (four) hours. 45 tablet 0    sertraline (ZOLOFT) 100 MG tablet TAKE 1 TABLET DAILY 90 tablet 3    traMADoL (ULTRAM) 50 mg tablet Take 1 tablet (50 mg total) by mouth every 12 (twelve) hours as needed for Pain. 45 tablet 1    venlafaxine (EFFEXOR-XR) 150 MG Cp24 TAKE 1 CAPSULE BY MOUTH EVERY DAY WITH FOOD 90 capsule 0     No facility-administered encounter medications on file as of 5/5/2022.  "      Allergies:  Review of patient's allergies indicates:   Allergen Reactions    Fish containing products Anaphylaxis    Iodinated contrast media     Penicillins        Health Maintenance:    There is no immunization history on file for this patient.   Health Maintenance   Topic Date Due    Lipid Panel  Never done    Foot Exam  Never done    Eye Exam  Never done    TETANUS VACCINE  Never done    Hemoglobin A1c  08/03/2022    Mammogram  09/30/2022    Low Dose Statin  03/30/2023    Hepatitis C Screening  Completed        Physical Exam      Vital Signs  Pulse: 86  SpO2: 95 %  BP: 120/70  BP Location: Left arm  Patient Position: Sitting  Height and Weight  Height: 5' 7" (170.2 cm)  Weight: 104.8 kg (231 lb)  BSA (Calculated - sq m): 2.23 sq meters  BMI (Calculated): 36.2  Weight in (lb) to have BMI = 25: 159.3]    Physical Exam  Constitutional:       Appearance: Normal appearance.   HENT:      Head: Normocephalic and atraumatic.   Eyes:      Extraocular Movements: Extraocular movements intact.      Conjunctiva/sclera: Conjunctivae normal.      Pupils: Pupils are equal, round, and reactive to light.   Cardiovascular:      Rate and Rhythm: Normal rate and regular rhythm.      Pulses: Normal pulses.           Radial pulses are 2+ on the right side and 2+ on the left side.      Heart sounds: Normal heart sounds.   Pulmonary:      Effort: Pulmonary effort is normal.      Breath sounds: Normal breath sounds.   Abdominal:      General: Abdomen is flat.      Palpations: Abdomen is soft.      Tenderness: There is no abdominal tenderness. There is no guarding or rebound.   Musculoskeletal:         General: Normal range of motion.      Right lower leg: No edema.      Left lower leg: No edema.   Skin:     General: Skin is warm and dry.      Findings: No rash.   Neurological:      General: No focal deficit present.      Mental Status: She is alert. Mental status is at baseline.   Psychiatric:         Mood and Affect: " Mood normal.          Laboratory:  CBC:  Recent Labs   Lab 03/09/22  0910 05/02/22  0916 05/05/22  0852   WBC 7.98 6.73 6.43   RBC 4.39 4.24 4.56   Hemoglobin 12.0 11.5 L 12.5   Hematocrit 38.8 36.4 L 39.4   Platelet Count 423 H 375 419 H   MCV 88.4 85.8 86.4   MCH 27.3 27.1 27.4   MCHC 30.9 L 31.6 L 31.7 L     CMP:  Recent Labs   Lab 11/23/21  1346 12/28/21  0912 02/03/22  0910 05/05/22  0852   Glucose 194 H 341 H   < > 346 H   Calcium 9.0 9.4   < > 9.2   Albumin 3.4 L 3.3 L  --  3.6   Total Protein 7.7 7.4  --  7.9   Sodium 143 137   < > 138   Potassium 3.5 3.8   < > 3.3 L   CO2 29 33 H   < > 32   Chloride 108 H 101   < > 100   BUN 6 L 8   < > 7   Alk Phos 157 H 148 H  --  162 H   ALT 45 32  --  51   AST 32 28  --  44 H   Bilirubin, Total 0.2 0.3  --  0.3    < > = values in this interval not displayed.     LIPIDS:  Recent Labs   Lab 10/18/21  0832 02/25/22  0850 03/22/22  0917   TSH 1.690 40.100 H 37.400 H     TSH:  Recent Labs   Lab 10/18/21  0832 02/25/22  0850 03/22/22  0917   TSH 1.690 40.100 H 37.400 H     A1C:  Recent Labs   Lab 05/14/21  0938 08/30/21  1017 11/15/21  1049 11/23/21  1346 02/03/22  0910   Hemoglobin A1C 10.7 H 9.1 H 7.6 H 7.6 H 7.5 H       Assessment/Plan     Carey Leonardo is a 46 y.o.female with:     1. RUQ pain  - Labs/imaging as detailed below  - CBC Auto Differential  - Comprehensive Metabolic Panel  - Urinalysis, Reflex to Urine Culture  - Urinalysis, Microscopic    2. Nausea  - Zofran PRN for nausea  - CBC Auto Differential  - Comprehensive Metabolic Panel  - Urinalysis, Reflex to Urine Culture  - Urinalysis, Microscopic       Addendum 5/9/22:  - Patient seen in ED over the weekend with worsening abdominal pain and nausea; was advised to have PCP refer her to GI for evaluation  - Labs in ED were essentially unchanged from those in clinic last week; slightly elevated LFTs and mild hypokalemia, but no significant WBC count and KUB demonstrated constipation    Total time spent  face-to-face and non-face-to-face coordinating care for this encounter was: 20 min    Chronic conditions status updated as per HPI.  Other than changes above, cont current medications and maintain follow up with specialists.  Return to clinic PRN.    Long Fernández DO  [facility]

## 2022-05-08 ENCOUNTER — HOSPITAL ENCOUNTER (EMERGENCY)
Facility: HOSPITAL | Age: 47
Discharge: HOME | End: 2022-05-08
Payer: OTHER GOVERNMENT

## 2022-05-08 VITALS
BODY MASS INDEX: 36.73 KG/M2 | HEIGHT: 67 IN | OXYGEN SATURATION: 98 % | TEMPERATURE: 98 F | DIASTOLIC BLOOD PRESSURE: 80 MMHG | SYSTOLIC BLOOD PRESSURE: 121 MMHG | WEIGHT: 234 LBS | RESPIRATION RATE: 17 BRPM | HEART RATE: 83 BPM

## 2022-05-08 DIAGNOSIS — E11.9 TYPE 2 DIABETES MELLITUS WITHOUT COMPLICATION, WITHOUT LONG-TERM CURRENT USE OF INSULIN: ICD-10-CM

## 2022-05-08 DIAGNOSIS — M96.1 POSTLAMINECTOMY SYNDROME, LUMBAR REGION: Chronic | ICD-10-CM

## 2022-05-08 DIAGNOSIS — R10.9 ABDOMINAL PAIN: ICD-10-CM

## 2022-05-08 DIAGNOSIS — M53.3 DISORDER OF SACRUM: Chronic | ICD-10-CM

## 2022-05-08 DIAGNOSIS — M47.816 LUMBAR SPONDYLOSIS: ICD-10-CM

## 2022-05-08 DIAGNOSIS — R10.9 ABDOMINAL PAIN, UNSPECIFIED ABDOMINAL LOCATION: Primary | ICD-10-CM

## 2022-05-08 DIAGNOSIS — M47.817 LUMBOSACRAL SPONDYLOSIS WITHOUT MYELOPATHY: Chronic | ICD-10-CM

## 2022-05-08 LAB
ALBUMIN SERPL BCP-MCNC: 3.8 G/DL (ref 3.5–5)
ALBUMIN/GLOB SERPL: 0.9 {RATIO}
ALP SERPL-CCNC: 168 U/L (ref 39–100)
ALT SERPL W P-5'-P-CCNC: 49 U/L (ref 13–56)
ANION GAP SERPL CALCULATED.3IONS-SCNC: 12 MMOL/L (ref 7–16)
AST SERPL W P-5'-P-CCNC: 39 U/L (ref 15–37)
BASOPHILS # BLD AUTO: 0.05 K/UL (ref 0–0.2)
BASOPHILS NFR BLD AUTO: 0.7 % (ref 0–1)
BILIRUB SERPL-MCNC: 0.4 MG/DL (ref 0–1.2)
BILIRUB UR QL STRIP: NEGATIVE
BUN SERPL-MCNC: 9 MG/DL (ref 7–18)
BUN/CREAT SERPL: 9 (ref 6–20)
CALCIUM SERPL-MCNC: 9.5 MG/DL (ref 8.5–10.1)
CHLORIDE SERPL-SCNC: 97 MMOL/L (ref 98–107)
CLARITY UR: CLEAR
CO2 SERPL-SCNC: 29 MMOL/L (ref 21–32)
COLOR UR: YELLOW
CREAT SERPL-MCNC: 1.02 MG/DL (ref 0.55–1.02)
DIFFERENTIAL METHOD BLD: ABNORMAL
EOSINOPHIL # BLD AUTO: 0.18 K/UL (ref 0–0.5)
EOSINOPHIL NFR BLD AUTO: 2.4 % (ref 1–4)
ERYTHROCYTE [DISTWIDTH] IN BLOOD BY AUTOMATED COUNT: 13.2 % (ref 11.5–14.5)
GLOBULIN SER-MCNC: 4.4 G/DL (ref 2–4)
GLUCOSE SERPL-MCNC: 360 MG/DL (ref 74–106)
GLUCOSE UR STRIP-MCNC: NEGATIVE MG/DL
HCT VFR BLD AUTO: 41.1 % (ref 38–47)
HGB BLD-MCNC: 12.8 G/DL (ref 12–16)
IMM GRANULOCYTES # BLD AUTO: 0.03 K/UL (ref 0–0.04)
IMM GRANULOCYTES NFR BLD: 0.4 % (ref 0–0.4)
KETONES UR STRIP-SCNC: NEGATIVE MG/DL
LEUKOCYTE ESTERASE UR QL STRIP: NEGATIVE
LYMPHOCYTES # BLD AUTO: 2.87 K/UL (ref 1–4.8)
LYMPHOCYTES NFR BLD AUTO: 38.2 % (ref 27–41)
MCH RBC QN AUTO: 26.8 PG (ref 27–31)
MCHC RBC AUTO-ENTMCNC: 31.1 G/DL (ref 32–36)
MCV RBC AUTO: 86.2 FL (ref 80–96)
MONOCYTES # BLD AUTO: 0.43 K/UL (ref 0–0.8)
MONOCYTES NFR BLD AUTO: 5.7 % (ref 2–6)
MPC BLD CALC-MCNC: 9.4 FL (ref 9.4–12.4)
NEUTROPHILS # BLD AUTO: 3.96 K/UL (ref 1.8–7.7)
NEUTROPHILS NFR BLD AUTO: 52.6 % (ref 53–65)
NITRITE UR QL STRIP: NEGATIVE
NRBC # BLD AUTO: 0 X10E3/UL
NRBC, AUTO (.00): 0 %
PH UR STRIP: 7.5 PH UNITS
PLATELET # BLD AUTO: 394 K/UL (ref 150–400)
POTASSIUM SERPL-SCNC: 3.1 MMOL/L (ref 3.5–5.1)
PROT SERPL-MCNC: 8.2 G/DL (ref 6.4–8.2)
PROT UR QL STRIP: NEGATIVE
RBC # BLD AUTO: 4.77 M/UL (ref 4.2–5.4)
RBC # UR STRIP: NEGATIVE /UL
SODIUM SERPL-SCNC: 135 MMOL/L (ref 136–145)
SP GR UR STRIP: 1.01
UROBILINOGEN UR STRIP-ACNC: 0.2 MG/DL
WBC # BLD AUTO: 7.52 K/UL (ref 4.5–11)

## 2022-05-08 PROCEDURE — 99283 PR EMERGENCY DEPT VISIT,LEVEL III: ICD-10-PCS | Mod: ,,, | Performed by: NURSE PRACTITIONER

## 2022-05-08 PROCEDURE — 96365 THER/PROPH/DIAG IV INF INIT: CPT

## 2022-05-08 PROCEDURE — 99284 EMERGENCY DEPT VISIT MOD MDM: CPT | Mod: 25

## 2022-05-08 PROCEDURE — 96375 TX/PRO/DX INJ NEW DRUG ADDON: CPT

## 2022-05-08 PROCEDURE — 99283 EMERGENCY DEPT VISIT LOW MDM: CPT | Mod: ,,, | Performed by: NURSE PRACTITIONER

## 2022-05-08 PROCEDURE — 80053 COMPREHEN METABOLIC PANEL: CPT | Performed by: NURSE PRACTITIONER

## 2022-05-08 PROCEDURE — 96361 HYDRATE IV INFUSION ADD-ON: CPT

## 2022-05-08 PROCEDURE — 63600175 PHARM REV CODE 636 W HCPCS: Performed by: NURSE PRACTITIONER

## 2022-05-08 PROCEDURE — 25000003 PHARM REV CODE 250: Performed by: NURSE PRACTITIONER

## 2022-05-08 PROCEDURE — 81003 URINALYSIS AUTO W/O SCOPE: CPT | Performed by: NURSE PRACTITIONER

## 2022-05-08 PROCEDURE — 36415 COLL VENOUS BLD VENIPUNCTURE: CPT | Performed by: NURSE PRACTITIONER

## 2022-05-08 PROCEDURE — 85025 COMPLETE CBC W/AUTO DIFF WBC: CPT | Performed by: NURSE PRACTITIONER

## 2022-05-08 RX ORDER — MORPHINE SULFATE 2 MG/ML
2 INJECTION, SOLUTION INTRAMUSCULAR; INTRAVENOUS
Status: COMPLETED | OUTPATIENT
Start: 2022-05-08 | End: 2022-05-08

## 2022-05-08 RX ORDER — MIDAZOLAM HYDROCHLORIDE 1 MG/ML
10 INJECTION INTRAMUSCULAR; INTRAVENOUS
Status: DISCONTINUED | OUTPATIENT
Start: 2022-05-08 | End: 2022-05-08

## 2022-05-08 RX ORDER — FENTANYL CITRATE 50 UG/ML
100 INJECTION, SOLUTION INTRAMUSCULAR; INTRAVENOUS
Status: DISCONTINUED | OUTPATIENT
Start: 2022-05-08 | End: 2022-05-08

## 2022-05-08 RX ADMIN — SODIUM CHLORIDE 1000 ML: 9 INJECTION, SOLUTION INTRAVENOUS at 10:05

## 2022-05-08 RX ADMIN — MORPHINE SULFATE 2 MG: 2 INJECTION, SOLUTION INTRAMUSCULAR; INTRAVENOUS at 10:05

## 2022-05-08 RX ADMIN — PROMETHAZINE HYDROCHLORIDE 25 MG: 25 INJECTION INTRAMUSCULAR; INTRAVENOUS at 10:05

## 2022-05-08 NOTE — DISCHARGE INSTRUCTIONS
Follow-up Gastroenterology.  If do not hear from their office in the next 1-2 days call primary care provider for rescheduling.  Return emergency department as needed.  Continue home medications as labeled.  May use over-the-counter laxative or bulk laxative per label directions for constipation.

## 2022-05-08 NOTE — ED PROVIDER NOTES
Encounter Date: 5/8/2022       History     Chief Complaint   Patient presents with    Abdominal Pain     46-year-old female presents ambulatory to the emergency department complaint of right side abdominal pain.  She points to the right lower quadrant and states that radiates up to the right upper quadrant.  States it is constant.  The symptoms have gotten worse.  She was seen in outlying clinic approximately 3 days ago.  She had x-rays and lab work performed.  She was told that she was constipated.  States she has had a bowel movement since then.  States that she has also had been nauseated has vomited x2 today.  Denies diarrhea.  Denies fever or chills.  When asked what is different compared to her visit to her primary care provider she states it has gotten worse in intensity.        Review of patient's allergies indicates:   Allergen Reactions    Fish containing products Anaphylaxis    Iodinated contrast media     Penicillins      Past Medical History:   Diagnosis Date    Asthma     CHF (congestive heart failure)     Chronic pain syndrome     Depressive disorder     Diabetes mellitus, type 2     Enlarged heart     Gastroparesis     GERD (gastroesophageal reflux disease)     Hyperlipidemia     Hypertension     IBS (irritable bowel syndrome)     Kidney stones     Lumbar radiculopathy     Sleep apnea     Does not use a CPAP    Thyroid disease      Past Surgical History:   Procedure Laterality Date    Bilateral L3-S1 MBB Bilateral 6-, 5-, 1-8-2014    Dr Jessica Randolph    CARPAL TUNNEL RELEASE      CHOLECYSTECTOMY      DIAGNOSTIC LAPAROSCOPY  04/14/2010    Exploratory Laparotomy, Myomectomy, Hydrotubation-Dr. Feng    DILATION AND CURETTAGE OF UTERUS  04/14/2010    Hysteroscopy-Dr. Feng    EXPLORATORY LAPAROTOMY WITH UTERINE MYOMECTOMY  02/27/2007    Dr. Marquise Anderson    HYSTERECTOMY  09/29/2011    Robotic, FABIENNE, Cystoscopy-Dr. Feng    HYSTEROSCOPY WITH DILATION AND  CURETTAGE OF UTERUS  04/04/2006    Laparoscopy, Chromotubation-Dr. Marquise Anderson    LAMINECTOMY N/A 11/30/2021    Procedure: L2-L5 Laminectomy;  Surgeon: Cyril Upton MD;  Location: [facility];  Service: Neurosurgery;  Laterality: N/A;    LEFT HEART CATHETERIZATION      Left L3-S1 RFTC Left 07/18/2017    Dr Lopez    Left SI JI Left 09/30/2013    Dr Randolph    OOPHORECTOMY  11/11/2014    Robotic, FABIENNE, Cystoscopy-Dr. Feng    SINUS SURGERY       Family History   Problem Relation Age of Onset    Diabetes Mellitus Mother     Heart disease Mother     Hypertension Mother     Migraines Mother     Heart disease Sister      Social History     Tobacco Use    Smoking status: Never Smoker    Smokeless tobacco: Never Used   Substance Use Topics    Alcohol use: Not Currently    Drug use: Not Currently     Types: Hydrocodone, Oxycodone     Review of Systems   Constitutional: Negative for activity change, appetite change, fatigue, fever and unexpected weight change.   Respiratory: Negative for chest tightness and shortness of breath.    Cardiovascular: Negative for chest pain and palpitations.   Gastrointestinal: Positive for abdominal pain, nausea and vomiting. Negative for abdominal distention, anal bleeding, blood in stool, constipation and diarrhea.   Genitourinary: Negative for decreased urine volume, difficulty urinating, dysuria, flank pain, frequency and urgency.   Neurological: Negative for dizziness, syncope, weakness and light-headedness.       Physical Exam     Initial Vitals [05/08/22 1009]   BP Pulse Resp Temp SpO2   121/80 83 18 98 °F (36.7 °C) 98 %      MAP       --         Physical Exam    Constitutional: She appears well-developed and well-nourished.   HENT:   Head: Normocephalic and atraumatic.   Eyes: Pupils are equal, round, and reactive to light.   Neck: Neck supple. No JVD present.   Cardiovascular: Normal rate, regular rhythm, normal heart sounds and intact distal pulses.   Pulmonary/Chest:  Breath sounds normal.   Abdominal: Abdomen is soft. Bowel sounds are normal. She exhibits no mass. There is abdominal tenderness.   He generalized abdominal tenderness without guarding or rebound.  Reports more tenderness to palpation in the right side of the abdomen   There is no rebound and no guarding.   Musculoskeletal:      Cervical back: Neck supple.     Neurological: She is alert and oriented to person, place, and time. GCS score is 15. GCS eye subscore is 4. GCS verbal subscore is 5. GCS motor subscore is 6.   Skin: Skin is warm and dry. Capillary refill takes less than 2 seconds.   Psychiatric: She has a normal mood and affect.         Medical Screening Exam   See Full Note    ED Course   Procedures  Labs Reviewed   COMPREHENSIVE METABOLIC PANEL - Abnormal; Notable for the following components:       Result Value    Sodium 135 (*)     Potassium 3.1 (*)     Chloride 97 (*)     Glucose 360 (*)     Globulin 4.4 (*)     Alk Phos 168 (*)     AST 39 (*)     All other components within normal limits   CBC WITH DIFFERENTIAL - Abnormal; Notable for the following components:    MCH 26.8 (*)     MCHC 31.1 (*)     Neutrophils % 52.6 (*)     All other components within normal limits   URINALYSIS, REFLEX TO URINE CULTURE - Normal   CBC W/ AUTO DIFFERENTIAL    Narrative:     The following orders were created for panel order CBC auto differential.  Procedure                               Abnormality         Status                     ---------                               -----------         ------                     CBC with Differential[[id number]]        Abnormal            Final result                 Please view results for these tests on the individual orders.          Imaging Results          X-Ray Abdomen Flat And Erect (Final result)  Result time 05/08/22 14:02:57    Final result by Valeriy Suarez MD (05/08/22 14:02:57)                 Impression:      No acute radiographic abnormality in the abdomen.    A  mild-moderate degree of fecal stasis/constipation is suspected.    [facility] of [facility]      Electronically signed by: Valeriy Erick  Date:    05/08/2022  Time:    14:02             Narrative:    EXAMINATION:  XR ABDOMEN FLAT AND ERECT    CLINICAL HISTORY:  Unspecified abdominal pain    COMPARISON:  05/05/2022    FINDINGS:  Bowel gas pattern is nonspecific and within normal limits. No abnormally dilated small bowel loops to suggest obstruction. There is no free air within the abdomen. There is a moderate amount of stool seen throughout the colon.  Cholecystectomy clips are noted.    No abnormal calcific densities project within the abdomen.  Calcific densities in the pelvis are similar to prior most compatible with calcified phleboliths.    Lung bases are predominantly clear. There is no acute osseous abnormality.                                 Medications   sodium chloride 0.9% bolus 1,000 mL (0 mLs Intravenous Stopped 5/8/22 1502)   morphine injection 2 mg (2 mg Intravenous Given 5/8/22 1046)   promethazine (PHENERGAN) 25 mg in dextrose 5 % 50 mL IVPB (0 mg Intravenous Stopped 5/8/22 1133)                 ED Course as of 05/22/22 0839   Sun May 08, 2022   1407 Laboratory data and x-rays reviewed [CM]      ED Course User Index  [CM] ROGELIO Baron          Clinical Impression:   Final diagnoses:  [R10.9] Abdominal pain  [R10.9] Abdominal pain, unspecified abdominal location (Primary)          ED Disposition Condition    Discharge Stable        ED Prescriptions     None        Follow-up Information     Follow up With Specialties Details Why Contact Info    Long Fernández,  Family Medicine Call in 1 day As needed [address]  [facility] [address]  [phone]             ROGELIO Baron  05/08/22 1410       ROGELIO Baron  05/22/22 0839

## 2022-05-08 NOTE — ED TRIAGE NOTES
Patient presents with abdominal pain x4 days. Also c/o n/v. States she had a BM this morning. Hurting in RLQ.

## 2022-05-10 ENCOUNTER — TELEPHONE (OUTPATIENT)
Dept: FAMILY MEDICINE | Facility: CLINIC | Age: 47
End: 2022-05-10
Payer: OTHER GOVERNMENT

## 2022-05-10 ENCOUNTER — TELEPHONE (OUTPATIENT)
Dept: EMERGENCY MEDICINE | Facility: HOSPITAL | Age: 47
End: 2022-05-10
Payer: OTHER GOVERNMENT

## 2022-05-10 NOTE — TELEPHONE ENCOUNTER
----- Message from Long Fernández DO sent at 5/6/2022  2:11 PM CDT -----  X-ray showed constipation which may be the source of her pain. Other bloodwork was essentially normal. No UTI noted.

## 2022-05-10 NOTE — TELEPHONE ENCOUNTER
Spoke to patient verbalized results. Patient understood. No questions or concerns voiced at this time. Advised OTC constipation meds.

## 2022-06-15 ENCOUNTER — HOSPITAL ENCOUNTER (OUTPATIENT)
Dept: RADIOLOGY | Facility: HOSPITAL | Age: 47
Discharge: HOME | End: 2022-06-15
Attending: NURSE PRACTITIONER
Payer: OTHER GOVERNMENT

## 2022-06-15 ENCOUNTER — OFFICE VISIT (OUTPATIENT)
Dept: GASTROENTEROLOGY | Facility: CLINIC | Age: 47
End: 2022-06-15
Payer: OTHER GOVERNMENT

## 2022-06-15 VITALS
SYSTOLIC BLOOD PRESSURE: 123 MMHG | WEIGHT: 229.63 LBS | BODY MASS INDEX: 36.9 KG/M2 | DIASTOLIC BLOOD PRESSURE: 78 MMHG | HEART RATE: 84 BPM | HEIGHT: 66 IN | OXYGEN SATURATION: 97 %

## 2022-06-15 DIAGNOSIS — K59.00 CONSTIPATION, UNSPECIFIED CONSTIPATION TYPE: ICD-10-CM

## 2022-06-15 DIAGNOSIS — R10.31 RIGHT LOWER QUADRANT ABDOMINAL PAIN: Primary | ICD-10-CM

## 2022-06-15 DIAGNOSIS — R10.31 RIGHT LOWER QUADRANT ABDOMINAL PAIN: ICD-10-CM

## 2022-06-15 PROCEDURE — 74018 RADEX ABDOMEN 1 VIEW: CPT | Mod: TC

## 2022-06-15 PROCEDURE — 85610 PROTHROMBIN TIME: CPT | Performed by: NURSE PRACTITIONER

## 2022-06-15 PROCEDURE — 74018 RADEX ABDOMEN 1 VIEW: CPT | Mod: 26,,, | Performed by: RADIOLOGY

## 2022-06-15 PROCEDURE — 99214 OFFICE O/P EST MOD 30 MIN: CPT | Mod: ,,, | Performed by: NURSE PRACTITIONER

## 2022-06-15 PROCEDURE — 99214 PR OFFICE/OUTPT VISIT, EST, LEVL IV, 30-39 MIN: ICD-10-PCS | Mod: ,,, | Performed by: NURSE PRACTITIONER

## 2022-06-15 PROCEDURE — 74018 XR KUB: ICD-10-PCS | Mod: 26,,, | Performed by: RADIOLOGY

## 2022-06-15 RX ORDER — TRAZODONE HYDROCHLORIDE 100 MG/1
100 TABLET ORAL NIGHTLY
COMMUNITY
Start: 2022-05-27 | End: 2023-03-21 | Stop reason: SDUPTHER

## 2022-06-15 RX ORDER — RISPERIDONE 0.25 MG/1
.25-.5 TABLET ORAL 2 TIMES DAILY PRN
COMMUNITY
Start: 2022-05-25 | End: 2023-01-10 | Stop reason: SDUPTHER

## 2022-06-15 RX ORDER — METHYLPHENIDATE HYDROCHLORIDE 20 MG/1
20 TABLET ORAL 2 TIMES DAILY
COMMUNITY
Start: 2022-05-27 | End: 2023-03-21

## 2022-06-15 NOTE — PATIENT INSTRUCTIONS
Miralax powder 1 capful in 8 ounces of liquid every day    Do not eat or drink before the CT scan    Water 64 ounces per day

## 2022-06-15 NOTE — PROGRESS NOTES
Carey Leonardo is a 46 y.o. female here for Abdominal Pain (Patient complaining of stabbing right lower quadrant pain when having BM. States when having this pain she is unable to eat anything )        PCP: Long Fernández  Referring Provider: No referring provider defined for this encounter.     HPI:  Presents for report of RLQ abdominal pain. Patient had x ray on 5/8/22 that did show constipation. States that abdominal pain has been present intermittently for 2 months. No rectal bleeding, nausea, vomiting or fever. Alk Phos 168, AST 49, ALT 39. Bili is normal. She is diabetic. Glucose on 5/8/22, 360. She is currently on Trulicity and MetaGlip. States that she had a colonoscopy reported a couple of years ago. Record is not available for review, [facility] is not working.        ROS:  Review of Systems   Constitutional: Negative for appetite change, fatigue, fever and unexpected weight change.   HENT: Negative for mouth sores and trouble swallowing.    Respiratory: Negative for shortness of breath and wheezing.    Cardiovascular: Negative for chest pain and palpitations.   Gastrointestinal: Positive for abdominal pain and constipation. Negative for blood in stool, change in bowel habit, diarrhea, nausea, vomiting, reflux and change in bowel habit.   Genitourinary: Negative for dysuria and urgency.   Musculoskeletal: Negative for gait problem.   Integumentary:  Negative for pallor.   Neurological: Negative for dizziness and light-headedness.   Psychiatric/Behavioral: The patient is not nervous/anxious.           PMHX:  has a past medical history of Asthma, CHF (congestive heart failure), Chronic pain syndrome, Depressive disorder, Diabetes mellitus, type 2, Enlarged heart, Gastroparesis, GERD (gastroesophageal reflux disease), Hyperlipidemia, Hypertension, IBS (irritable bowel syndrome), Kidney stones, Lumbar radiculopathy, Sleep apnea, and Thyroid disease.    PSHX:  has a past surgical history that includes  Hysterectomy (09/29/2011); Oophorectomy (11/11/2014); Dilation and curettage of uterus (04/14/2010); Diagnostic laparoscopy (04/14/2010); Exploratory laparotomy with uterine myomectomy (02/27/2007); Hysteroscopy with dilation and curettage of uterus (04/04/2006); Carpal tunnel release; Cholecystectomy; Bilateral L3-S1 MBB (Bilateral, 6-, 5-, 1-8-2014); Left heart catheterization; Left L3-S1 RFTC (Left, 07/18/2017); Left SI JI (Left, 09/30/2013); Sinus surgery; and Laminectomy (N/A, 11/30/2021).    PFHX: family history includes Diabetes Mellitus in her mother; Heart disease in her mother and sister; Hypertension in her mother; Migraines in her mother.    PSlHX:  reports that she has never smoked. She has never used smokeless tobacco. She reports previous alcohol use. She reports previous drug use. Drugs: Hydrocodone and Oxycodone.        Review of patient's allergies indicates:   Allergen Reactions    Fish containing products Anaphylaxis    Iodinated contrast media     Penicillins        Medication List with Changes/Refills   Current Medications    ALBUTEROL (PROVENTIL) 2.5 MG /3 ML (0.083 %) NEBULIZER SOLUTION    Take 3 mLs (2.5 mg total) by nebulization every 6 (six) hours as needed for Wheezing. Rescue    ALBUTEROL (PROVENTIL/VENTOLIN HFA) 90 MCG/ACTUATION INHALER    Inhale 2 puffs into the lungs every 6 (six) hours as needed for Wheezing. Rescue    ATORVASTATIN (LIPITOR) 20 MG TABLET    Take 1 tablet (20 mg total) by mouth every evening.    CETIRIZINE (ZYRTEC) 10 MG TABLET    Take 1 tablet (10 mg total) by mouth once daily.    CLONIDINE (CATAPRES) 0.2 MG TABLET    TAKE 1 TABLET FOUR TIMES A DAY    CYCLOBENZAPRINE (FLEXERIL) 10 MG TABLET    Take 1 tablet (10 mg total) by mouth 3 (three) times daily as needed for Muscle spasms.    DULAGLUTIDE (TRULICITY) 1.5 MG/0.5 ML PEN INJECTOR    Inject 1.5 mg into the skin every 7 days.    FLUTICASONE PROPIONATE (FLONASE) 50 MCG/ACTUATION NASAL SPRAY    2  "sprays (100 mcg total) by Each Nostril route once daily.    GLIPIZIDE-METFORMIN (METAGLIP) 5-500 MG PER TABLET    Take 1 tablet by mouth 2 (two) times daily before meals.    HYDRALAZINE (APRESOLINE) 100 MG TABLET    Take 1 tablet (100 mg total) by mouth 3 (three) times daily.    LEVOTHYROXINE (SYNTHROID) 200 MCG TABLET    Take 1 tablet (200 mcg total) by mouth before breakfast.    METHYLPHENIDATE HCL (RITALIN) 20 MG TABLET    Take 20 mg by mouth 2 (two) times daily.    METOPROLOL SUCCINATE (TOPROL-XL) 100 MG 24 HR TABLET    TAKE 1 TABLET DAILY    NIFEDIPINE (PROCARDIA-XL) 60 MG (OSM) 24 HR TABLET    TAKE 1 TABLET BY MOUTH EVERY DAY FOR HIGH BLOOD PRESSURE    ONDANSETRON (ZOFRAN) 4 MG TABLET    Take 1 tablet (4 mg total) by mouth every 8 (eight) hours as needed for Nausea.    RISPERIDONE (RISPERDAL) 0.25 MG TAB    Take 0.25-0.5 mg by mouth 2 (two) times daily as needed.    SERTRALINE (ZOLOFT) 100 MG TABLET    TAKE 1 TABLET DAILY    TRAMADOL (ULTRAM) 50 MG TABLET    Take 1 tablet (50 mg total) by mouth every 12 (twelve) hours as needed for Pain.    TRAZODONE (DESYREL) 100 MG TABLET    Take 100 mg by mouth nightly.    VENLAFAXINE (EFFEXOR-XR) 150 MG CP24    TAKE 1 CAPSULE BY MOUTH EVERY DAY WITH FOOD        Objective Findings:  Vital Signs:  /78   Pulse 84   Ht 5' 6" (1.676 m)   Wt 104.1 kg (229 lb 9.6 oz)   LMP  (LMP Unknown)   SpO2 97%   BMI 37.06 kg/m²  Body mass index is 37.06 kg/m².    Physical Exam:  Physical Exam  Vitals and nursing note reviewed.   Constitutional:       General: She is not in acute distress.     Appearance: Normal appearance. She is obese.   HENT:      Mouth/Throat:      Mouth: Mucous membranes are moist.   Cardiovascular:      Rate and Rhythm: Normal rate and regular rhythm.   Pulmonary:      Effort: Pulmonary effort is normal.      Breath sounds: No wheezing, rhonchi or rales.   Abdominal:      General: Bowel sounds are normal. There is no distension.      Palpations: Abdomen is " soft. There is no mass.      Tenderness: There is no abdominal tenderness. There is no guarding or rebound.      Hernia: No hernia is present.   Musculoskeletal:      Right lower leg: No edema.      Left lower leg: No edema.   Skin:     General: Skin is warm and dry.      Coloration: Skin is not jaundiced or pale.   Neurological:      Mental Status: She is alert and oriented to person, place, and time.   Psychiatric:         Mood and Affect: Mood normal.         Behavior: Behavior is slowed.          Labs:  Lab Results   Component Value Date    WBC 7.52 05/08/2022    HGB 12.8 05/08/2022    HCT 41.1 05/08/2022    MCV 86.2 05/08/2022    RDW 13.2 05/08/2022     05/08/2022    LYMPH 38.2 05/08/2022    LYMPH 2.87 05/08/2022    MONO 5.7 05/08/2022    EOS 0.18 05/08/2022    BASO 0.05 05/08/2022     Lab Results   Component Value Date     (L) 05/08/2022    K 3.1 (L) 05/08/2022    CL 97 (L) 05/08/2022    CO2 29 05/08/2022     (H) 05/08/2022    BUN 9 05/08/2022    CREATININE 1.02 05/08/2022    CALCIUM 9.5 05/08/2022    PROT 8.2 05/08/2022    ALBUMIN 3.8 05/08/2022    BILITOT 0.4 05/08/2022    ALKPHOS 168 (H) 05/08/2022    AST 39 (H) 05/08/2022    ALT 49 05/08/2022         Imaging: No results found.      Assessment:  Carey Leonardo is a 46 y.o. female here with:  1. Right lower quadrant abdominal pain    2. Constipation, unspecified constipation type          Recommendations:  1. Schedule CT abdomen and pelvis  2. KUB today  3. Liver labs     Follow up in about 4 weeks (around 7/13/2022).      Order summary:  Orders Placed This Encounter    CT Abdomen Pelvis  Without Contrast    X-Ray KUB    Ceruloplasmin    Hepatitis B Core Antibody, IgM    NELL EIA w/ Reflex to dsDNA/BRENDA    Alpha-1-Antitrypsin    Hepatitis C Antibody    Hepatitis B Surface Ab, Qualitative    Hepatitis B Surface Antigen    Ferritin    Iron and TIBC    Protein Electrophoresis, Serum with Reflex KYA    CBC Auto Differential     Comprehensive Metabolic Panel    Protime-INR    Hepatitis A Antibody, Total    Antimitochondrial Antibody    Anti-Smooth Muscle Antibody    Miscellaneous Test, Sendout LKM-1 liver kidney microsomal antibody       Thank you for allowing me to participate in the care of Carey Leonardo.      SERENITY McguireC

## 2022-06-16 ENCOUNTER — TELEPHONE (OUTPATIENT)
Dept: GASTROENTEROLOGY | Facility: CLINIC | Age: 47
End: 2022-06-16
Payer: OTHER GOVERNMENT

## 2022-06-16 NOTE — TELEPHONE ENCOUNTER
Results and recommendations called to patient. Verbalized understanding.    ----- Message from ROGELIO Mitchell sent at 6/16/2022  4:00 PM CDT -----  Stool in the right colon. Recommend laxative mag citrate today. Miralax daily.

## 2022-06-20 ENCOUNTER — OFFICE VISIT (OUTPATIENT)
Dept: FAMILY MEDICINE | Facility: CLINIC | Age: 47
End: 2022-06-20
Payer: OTHER GOVERNMENT

## 2022-06-20 VITALS
BODY MASS INDEX: 36.64 KG/M2 | OXYGEN SATURATION: 96 % | DIASTOLIC BLOOD PRESSURE: 76 MMHG | SYSTOLIC BLOOD PRESSURE: 156 MMHG | TEMPERATURE: 98 F | HEIGHT: 66 IN | WEIGHT: 228 LBS | HEART RATE: 88 BPM

## 2022-06-20 DIAGNOSIS — E11.65 UNCONTROLLED TYPE 2 DIABETES MELLITUS WITH HYPERGLYCEMIA: Primary | ICD-10-CM

## 2022-06-20 DIAGNOSIS — E03.8 OTHER SPECIFIED HYPOTHYROIDISM: ICD-10-CM

## 2022-06-20 PROCEDURE — 99213 OFFICE O/P EST LOW 20 MIN: CPT | Mod: ,,, | Performed by: FAMILY MEDICINE

## 2022-06-20 PROCEDURE — 99213 PR OFFICE/OUTPT VISIT, EST, LEVL III, 20-29 MIN: ICD-10-PCS | Mod: ,,, | Performed by: FAMILY MEDICINE

## 2022-06-20 RX ORDER — LEVOTHYROXINE SODIUM 200 UG/1
200 TABLET ORAL
Qty: 90 TABLET | Refills: 0 | Status: SHIPPED | OUTPATIENT
Start: 2022-06-20 | End: 2022-08-30 | Stop reason: SDUPTHER

## 2022-06-20 RX ORDER — DULAGLUTIDE 3 MG/.5ML
3 INJECTION, SOLUTION SUBCUTANEOUS
Qty: 12 PEN | Refills: 1 | Status: SHIPPED | OUTPATIENT
Start: 2022-06-20 | End: 2022-08-02 | Stop reason: SDUPTHER

## 2022-06-20 NOTE — PROGRESS NOTES
[facility]    Chief Complaint      Chief Complaint   Patient presents with    Blood Sugar     Pt states blood sugar has been spiking up and down for a few days. Currently ([phone])       History of Present Illness      Carey Leonardo is a 46 y.o. female with chronic conditions of HTN, DM2, hypothyroidism, dyslipidemia, anxiety and lumbar radiculopathy who presents today for elevated blood glucose. Pt states blood glucose has been running 300-400s over the last few weeks. States she has been out of Trulicity for 2 weeks. Labs from March 2022 showed glucose <200. States she has not had any other medication changes or dietary changes. Has not tolerated higher doses of metformin in the past due to adverse GI effects. States she did not call to see about rescheduling endocrinology appt in [place].    Past Medical History:  Past Medical History:   Diagnosis Date    Asthma     CHF (congestive heart failure)     Chronic pain syndrome     Depressive disorder     Diabetes mellitus, type 2     Enlarged heart     Gastroparesis     GERD (gastroesophageal reflux disease)     Hyperlipidemia     Hypertension     IBS (irritable bowel syndrome)     Kidney stones     Lumbar radiculopathy     Sleep apnea     Does not use a CPAP    Thyroid disease        Past Surgical History:   has a past surgical history that includes Hysterectomy (09/29/2011); Oophorectomy (11/11/2014); Dilation and curettage of uterus (04/14/2010); Diagnostic laparoscopy (04/14/2010); Exploratory laparotomy with uterine myomectomy (02/27/2007); Hysteroscopy with dilation and curettage of uterus (04/04/2006); Carpal tunnel release; Cholecystectomy; Bilateral L3-S1 MBB (Bilateral, 6-, 5-, 1-8-2014); Left heart catheterization; Left L3-S1 RFTC (Left, 07/18/2017); Left SI JI (Left, 09/30/2013); Sinus surgery; and Laminectomy (N/A, 11/30/2021).    Social History:  Social History     Tobacco Use    Smoking status: Never Smoker     Smokeless tobacco: Never Used   Substance Use Topics    Alcohol use: Not Currently    Drug use: Not Currently     Types: Hydrocodone, Oxycodone       I personally reviewed all past medical, surgical, and social.     Review of Systems   Constitutional: Negative for fatigue and fever.   HENT: Negative for ear pain.    Eyes: Negative for pain and visual disturbance.   Respiratory: Negative for chest tightness and shortness of breath.    Cardiovascular: Negative for chest pain and leg swelling.   Gastrointestinal: Negative for abdominal pain.   Genitourinary: Negative for difficulty urinating.   Musculoskeletal: Negative for gait problem and myalgias.   Skin: Negative for rash.   Neurological: Negative for dizziness and light-headedness.   Hematological: Does not bruise/bleed easily.        Medications:  Outpatient Encounter Medications as of 6/20/2022   Medication Sig Dispense Refill    albuterol (PROVENTIL) 2.5 mg /3 mL (0.083 %) nebulizer solution Take 3 mLs (2.5 mg total) by nebulization every 6 (six) hours as needed for Wheezing. Rescue 25 each 0    albuterol (PROVENTIL/VENTOLIN HFA) 90 mcg/actuation inhaler Inhale 2 puffs into the lungs every 6 (six) hours as needed for Wheezing. Rescue 18 g 1    atorvastatin (LIPITOR) 20 MG tablet Take 1 tablet (20 mg total) by mouth every evening. 90 tablet 3    cetirizine (ZYRTEC) 10 MG tablet Take 1 tablet (10 mg total) by mouth once daily. 30 tablet 0    cloNIDine (CATAPRES) 0.2 MG tablet TAKE 1 TABLET FOUR TIMES A  tablet 3    cyclobenzaprine (FLEXERIL) 10 MG tablet Take 1 tablet (10 mg total) by mouth 3 (three) times daily as needed for Muscle spasms. 90 tablet 1    dulaglutide (TRULICITY) 3 mg/0.5 mL pen injector Inject 3 mg into the skin every 7 days. 12 pen 1    fluticasone propionate (FLONASE) 50 mcg/actuation nasal spray 2 sprays (100 mcg total) by Each Nostril route once daily. 18.2 mL 0    glipizide-metformin (METAGLIP) 5-500 mg per tablet Take 1  tablet by mouth 2 (two) times daily before meals. 60 tablet 2    hydrALAZINE (APRESOLINE) 100 MG tablet Take 1 tablet (100 mg total) by mouth 3 (three) times daily. 270 tablet 0    levothyroxine (SYNTHROID) 200 MCG tablet Take 1 tablet (200 mcg total) by mouth before breakfast. 90 tablet 0    methylphenidate HCl (RITALIN) 20 MG tablet Take 20 mg by mouth 2 (two) times daily.      metoprolol succinate (TOPROL-XL) 100 MG 24 hr tablet TAKE 1 TABLET DAILY 90 tablet 3    NIFEdipine (PROCARDIA-XL) 60 MG (OSM) 24 hr tablet TAKE 1 TABLET BY MOUTH EVERY DAY FOR HIGH BLOOD PRESSURE 90 tablet 0    ondansetron (ZOFRAN) 4 MG tablet Take 1 tablet (4 mg total) by mouth every 8 (eight) hours as needed for Nausea. 30 tablet 0    risperiDONE (RISPERDAL) 0.25 MG Tab Take 0.25-0.5 mg by mouth 2 (two) times daily as needed.      sertraline (ZOLOFT) 100 MG tablet TAKE 1 TABLET DAILY 90 tablet 3    traMADoL (ULTRAM) 50 mg tablet Take 1 tablet (50 mg total) by mouth every 12 (twelve) hours as needed for Pain. 45 tablet 1    traZODone (DESYREL) 100 MG tablet Take 100 mg by mouth nightly.      venlafaxine (EFFEXOR-XR) 150 MG Cp24 TAKE 1 CAPSULE BY MOUTH EVERY DAY WITH FOOD 90 capsule 0    [DISCONTINUED] dulaglutide (TRULICITY) 1.5 mg/0.5 mL pen injector Inject 1.5 mg into the skin every 7 days. 4 pen 2    [DISCONTINUED] levothyroxine (SYNTHROID) 200 MCG tablet Take 1 tablet (200 mcg total) by mouth before breakfast. 90 tablet 0     No facility-administered encounter medications on file as of 6/20/2022.       Allergies:  Review of patient's allergies indicates:   Allergen Reactions    Fish containing products Anaphylaxis    Iodinated contrast media     Penicillins        Health Maintenance:    There is no immunization history on file for this patient.   Health Maintenance   Topic Date Due    Lipid Panel  Never done    Foot Exam  Never done    Eye Exam  Never done    TETANUS VACCINE  Never done    Hemoglobin A1c   "08/03/2022    Mammogram  09/30/2022    Low Dose Statin  06/15/2023    Hepatitis C Screening  Completed        Physical Exam      Vital Signs  Temp: 98.4 °F (36.9 °C)  Temp src: Oral  Pulse: 88  SpO2: 96 %  BP: (!) 156/76  BP Location: Left arm  Patient Position: Sitting  Height and Weight  Height: 5' 6" (167.6 cm)  Weight: 103.4 kg (228 lb)  BSA (Calculated - sq m): 2.19 sq meters  BMI (Calculated): 36.8  Weight in (lb) to have BMI = 25: 154.6]    Physical Exam  Constitutional:       Appearance: Normal appearance.   HENT:      Head: Normocephalic and atraumatic.   Eyes:      Extraocular Movements: Extraocular movements intact.      Conjunctiva/sclera: Conjunctivae normal.      Pupils: Pupils are equal, round, and reactive to light.   Cardiovascular:      Rate and Rhythm: Normal rate and regular rhythm.      Pulses: Normal pulses.           Radial pulses are 2+ on the right side and 2+ on the left side.      Heart sounds: Normal heart sounds.   Pulmonary:      Effort: Pulmonary effort is normal.      Breath sounds: Normal breath sounds.   Abdominal:      Palpations: Abdomen is soft.      Tenderness: There is no abdominal tenderness.   Musculoskeletal:         General: Normal range of motion.      Right lower leg: No edema.      Left lower leg: No edema.   Skin:     General: Skin is warm and dry.      Findings: No rash.   Neurological:      General: No focal deficit present.      Mental Status: She is alert. Mental status is at baseline.   Psychiatric:         Mood and Affect: Mood normal.          Laboratory:  CBC:  Recent Labs   Lab 05/05/22  0852 05/08/22  1039 06/15/22  0943   WBC 6.43 7.52 4.85   RBC 4.56 4.77 4.31   Hemoglobin 12.5 12.8 11.9 L   Hematocrit 39.4 41.1 37.9 L   Platelet Count 419 H 394 346   MCV 86.4 86.2 87.9   MCH 27.4 26.8 L 27.6   MCHC 31.7 L 31.1 L 31.4 L     CMP:  Recent Labs   Lab 05/05/22  0852 05/08/22  1039 06/15/22  0943   Glucose 346 H 360 H 432 H   Calcium 9.2 9.5 8.8   Albumin 3.6 " 3.8 3.3 L   Total Protein 7.9 8.2 7.1  7.0   Sodium 138 135 L 137   Potassium 3.3 L 3.1 L 3.6   CO2 32 29 32   Chloride 100 97 L 98   BUN 7 9 4 L   Alk Phos 162 H 168 H 145 H   ALT 51 49 25   AST 44 H 39 H 15   Bilirubin, Total 0.3 0.4 0.3     LIPIDS:  Recent Labs   Lab 10/18/21  0832 02/25/22  0850 03/22/22  0917   TSH 1.690 40.100 H 37.400 H     TSH:  Recent Labs   Lab 10/18/21  0832 02/25/22  0850 03/22/22  0917   TSH 1.690 40.100 H 37.400 H     A1C:  Recent Labs   Lab 05/14/21  0938 08/30/21  1017 11/15/21  1049 11/23/21  1346 02/03/22  0910   Hemoglobin A1C 10.7 H 9.1 H 7.6 H 7.6 H 7.5 H       Assessment/Plan     Carey Leonardo is a 46 y.o.female with:     1. Uncontrolled type 2 diabetes mellitus with hyperglycemia  - Counseled on diabetic diet  - Will increase Trulicity to 3mg weekly  - Ambulatory referral/consult to Endocrinology; Future    2. Other specified hypothyroidism  - Ambulatory referral/consult to Endocrinology; Future       Total time spent face-to-face and non-face-to-face coordinating care for this encounter was: 20 min    Chronic conditions status updated as per HPI.  Other than changes above, cont current medications and maintain follow up with specialists.  Return to clinic in 2 months.    Long Fernández DO  [facility]

## 2022-06-23 ENCOUNTER — OFFICE VISIT (OUTPATIENT)
Dept: FAMILY MEDICINE | Facility: CLINIC | Age: 47
End: 2022-06-23
Payer: OTHER GOVERNMENT

## 2022-06-23 VITALS
OXYGEN SATURATION: 97 % | RESPIRATION RATE: 18 BRPM | SYSTOLIC BLOOD PRESSURE: 128 MMHG | BODY MASS INDEX: 36.16 KG/M2 | DIASTOLIC BLOOD PRESSURE: 82 MMHG | HEART RATE: 87 BPM | HEIGHT: 66 IN | TEMPERATURE: 99 F | WEIGHT: 225 LBS

## 2022-06-23 DIAGNOSIS — R51.9 ACUTE NONINTRACTABLE HEADACHE, UNSPECIFIED HEADACHE TYPE: ICD-10-CM

## 2022-06-23 DIAGNOSIS — U07.1 COVID-19 VIRUS INFECTION: Primary | ICD-10-CM

## 2022-06-23 DIAGNOSIS — J02.9 SORE THROAT: ICD-10-CM

## 2022-06-23 LAB
CTP QC/QA: YES
CTP QC/QA: YES
S PYO RRNA THROAT QL PROBE: NEGATIVE
SARS-COV-2 AG RESP QL IA.RAPID: POSITIVE

## 2022-06-23 PROCEDURE — 87880 POCT RAPID STREP A: ICD-10-PCS | Mod: QW,,, | Performed by: NURSE PRACTITIONER

## 2022-06-23 PROCEDURE — 99214 PR OFFICE/OUTPT VISIT, EST, LEVL IV, 30-39 MIN: ICD-10-PCS | Mod: ,,, | Performed by: NURSE PRACTITIONER

## 2022-06-23 PROCEDURE — 87426 SARS CORONAVIRUS 2 ANTIGEN POCT: ICD-10-PCS | Mod: QW,,, | Performed by: NURSE PRACTITIONER

## 2022-06-23 PROCEDURE — 87426 SARSCOV CORONAVIRUS AG IA: CPT | Mod: QW,,, | Performed by: NURSE PRACTITIONER

## 2022-06-23 PROCEDURE — 87880 STREP A ASSAY W/OPTIC: CPT | Mod: QW,,, | Performed by: NURSE PRACTITIONER

## 2022-06-23 PROCEDURE — 99214 OFFICE O/P EST MOD 30 MIN: CPT | Mod: ,,, | Performed by: NURSE PRACTITIONER

## 2022-06-23 RX ORDER — GUAIFENESIN AND DEXTROMETHORPHAN HYDROBROMIDE 1200; 60 MG/1; MG/1
1 TABLET, EXTENDED RELEASE ORAL 2 TIMES DAILY PRN
Qty: 20 TABLET | Refills: 0 | Status: SHIPPED | OUTPATIENT
Start: 2022-06-23 | End: 2022-07-03

## 2022-06-23 NOTE — PROGRESS NOTES
[facility]    Chief Complaint      Chief Complaint   Patient presents with    Cough    Nasal Congestion    Sore Throat    Headache    Sinus Problem     With post nasal drainage no fever noted symptoms present about 2 days with OTC medications in use      History of Present Illness      Carey Leonardo is a 46 y.o. female with chronic conditions of T2DM, chronic pain, and CHF who presents today for c/o URI symptoms x2 days.    Sinus Problem  This is a new problem. The current episode started in the past 7 days. The problem has been gradually worsening since onset. There has been no fever. Her pain is at a severity of 5/10. The pain is moderate. Associated symptoms include chills, congestion, coughing, diaphoresis, headaches, a hoarse voice, sneezing and a sore throat. Pertinent negatives include no ear pain, neck pain, shortness of breath, sinus pressure or swollen glands. Past treatments include acetaminophen and oral decongestants. The treatment provided mild relief.     Past Medical History:  Past Medical History:   Diagnosis Date    Asthma     CHF (congestive heart failure)     Chronic pain syndrome     Depressive disorder     Diabetes mellitus, type 2     Enlarged heart     Gastroparesis     GERD (gastroesophageal reflux disease)     Hyperlipidemia     Hypertension     IBS (irritable bowel syndrome)     Kidney stones     Lumbar radiculopathy     Sleep apnea     Does not use a CPAP    Thyroid disease      Past Surgical History:   has a past surgical history that includes Hysterectomy (09/29/2011); Oophorectomy (11/11/2014); Dilation and curettage of uterus (04/14/2010); Diagnostic laparoscopy (04/14/2010); Exploratory laparotomy with uterine myomectomy (02/27/2007); Hysteroscopy with dilation and curettage of uterus (04/04/2006); Carpal tunnel release; Cholecystectomy; Bilateral L3-S1 MBB (Bilateral, 6-, 5-, 1-8-2014); Left heart catheterization; Left L3-S1 RFTC (Left,  07/18/2017); Left SI JI (Left, 09/30/2013); Sinus surgery; and Laminectomy (N/A, 11/30/2021).    Social History:  Social History     Tobacco Use    Smoking status: Never Smoker    Smokeless tobacco: Never Used   Substance Use Topics    Alcohol use: Not Currently    Drug use: Not Currently     Types: Hydrocodone, Oxycodone     I personally reviewed all past medical, surgical, and social.     Review of Systems   Constitutional: Positive for chills, diaphoresis and malaise/fatigue.   HENT: Positive for congestion, hoarse voice, sinus pain, sneezing and sore throat. Negative for ear pain and sinus pressure.    Eyes: Negative for pain, discharge and redness.   Respiratory: Positive for cough and sputum production (yellow). Negative for shortness of breath and wheezing.    Cardiovascular: Negative for chest pain.   Gastrointestinal: Negative for abdominal pain, constipation, diarrhea, nausea and vomiting.   Genitourinary: Negative for dysuria, frequency and urgency.   Musculoskeletal: Negative for neck pain.   Skin: Negative for itching and rash.   Neurological: Positive for headaches.   Endo/Heme/Allergies: Negative for environmental allergies. Does not bruise/bleed easily.   Psychiatric/Behavioral: Negative for depression and suicidal ideas.      Medications:  Outpatient Encounter Medications as of 6/23/2022   Medication Sig Dispense Refill    atorvastatin (LIPITOR) 20 MG tablet Take 1 tablet (20 mg total) by mouth every evening. 90 tablet 3    cloNIDine (CATAPRES) 0.2 MG tablet TAKE 1 TABLET FOUR TIMES A  tablet 3    cyclobenzaprine (FLEXERIL) 10 MG tablet Take 1 tablet (10 mg total) by mouth 3 (three) times daily as needed for Muscle spasms. 90 tablet 1    dulaglutide (TRULICITY) 3 mg/0.5 mL pen injector Inject 3 mg into the skin every 7 days. 12 pen 1    glipizide-metformin (METAGLIP) 5-500 mg per tablet Take 1 tablet by mouth 2 (two) times daily before meals. 60 tablet 2    hydrALAZINE (APRESOLINE)  100 MG tablet Take 1 tablet (100 mg total) by mouth 3 (three) times daily. 270 tablet 0    levothyroxine (SYNTHROID) 200 MCG tablet Take 1 tablet (200 mcg total) by mouth before breakfast. 90 tablet 0    methylphenidate HCl (RITALIN) 20 MG tablet Take 20 mg by mouth 2 (two) times daily.      metoprolol succinate (TOPROL-XL) 100 MG 24 hr tablet TAKE 1 TABLET DAILY 90 tablet 3    NIFEdipine (PROCARDIA-XL) 60 MG (OSM) 24 hr tablet TAKE 1 TABLET BY MOUTH EVERY DAY FOR HIGH BLOOD PRESSURE 90 tablet 0    risperiDONE (RISPERDAL) 0.25 MG Tab Take 0.25-0.5 mg by mouth 2 (two) times daily as needed.      sertraline (ZOLOFT) 100 MG tablet TAKE 1 TABLET DAILY 90 tablet 3    traMADoL (ULTRAM) 50 mg tablet Take 1 tablet (50 mg total) by mouth every 12 (twelve) hours as needed for Pain. 45 tablet 1    traZODone (DESYREL) 100 MG tablet Take 100 mg by mouth nightly.      venlafaxine (EFFEXOR-XR) 150 MG Cp24 TAKE 1 CAPSULE BY MOUTH EVERY DAY WITH FOOD 90 capsule 0    cetirizine (ZYRTEC) 10 MG tablet Take 1 tablet (10 mg total) by mouth once daily. 30 tablet 0    dextromethorphan-guaiFENesin (MUCINEX DM) 60-1,200 mg per 12 hr tablet Take 1 tablet by mouth 2 (two) times daily as needed (cough/congestion). 20 tablet 0    nirmatrelvir-ritonavir 150 mg x 2- 100 mg copackaged tablets (EUA) Take 3 tablets by mouth 2 (two) times daily for 5 days. Each dose contains 2 nirmatrelvir (pink tablets) and 1 ritonavir (white tablet). Take all 3 tablets together 30 tablet 0    [DISCONTINUED] albuterol (PROVENTIL) 2.5 mg /3 mL (0.083 %) nebulizer solution Take 3 mLs (2.5 mg total) by nebulization every 6 (six) hours as needed for Wheezing. Rescue (Patient not taking: Reported on 6/23/2022) 25 each 0    [DISCONTINUED] albuterol (PROVENTIL/VENTOLIN HFA) 90 mcg/actuation inhaler Inhale 2 puffs into the lungs every 6 (six) hours as needed for Wheezing. Rescue (Patient not taking: Reported on 6/23/2022) 18 g 1    [DISCONTINUED] fluticasone  "propionate (FLONASE) 50 mcg/actuation nasal spray 2 sprays (100 mcg total) by Each Nostril route once daily. (Patient not taking: Reported on 6/23/2022) 18.2 mL 0    [DISCONTINUED] ondansetron (ZOFRAN) 4 MG tablet Take 1 tablet (4 mg total) by mouth every 8 (eight) hours as needed for Nausea. (Patient not taking: Reported on 6/23/2022) 30 tablet 0     No facility-administered encounter medications on file as of 6/23/2022.     Allergies:  Review of patient's allergies indicates:   Allergen Reactions    Fish containing products Anaphylaxis    Iodinated contrast media     Penicillins      Health Maintenance:    There is no immunization history on file for this patient.   Health Maintenance   Topic Date Due    Lipid Panel  Never done    Foot Exam  Never done    Eye Exam  Never done    TETANUS VACCINE  Never done    Hemoglobin A1c  08/03/2022    Mammogram  09/30/2022    Low Dose Statin  06/23/2023    Hepatitis C Screening  Completed      Physical Exam      Vital Signs  Temp: 98.7 °F (37.1 °C)  Pulse: 87  Resp: 18  SpO2: 97 %  BP: 128/82  Pain Score:   6  Pain Loc: Throat  Height and Weight  Height: 5' 6" (167.6 cm)  Weight: 102.1 kg (225 lb)  BSA (Calculated - sq m): 2.18 sq meters  BMI (Calculated): 36.3  Weight in (lb) to have BMI = 25: 154.6]    Physical Exam  Vitals and nursing note reviewed.   Constitutional:       General: She is not in acute distress.     Appearance: Normal appearance.   HENT:      Head: Normocephalic and atraumatic.      Right Ear: External ear normal.      Left Ear: External ear normal.      Nose: Congestion present.      Mouth/Throat:      Mouth: Mucous membranes are moist.      Pharynx: Oropharynx is clear.   Eyes:      Conjunctiva/sclera: Conjunctivae normal.      Pupils: Pupils are equal, round, and reactive to light.   Cardiovascular:      Rate and Rhythm: Normal rate and regular rhythm.      Pulses: Normal pulses.      Heart sounds: Normal heart sounds. No murmur " heard.  Pulmonary:      Effort: Pulmonary effort is normal. No respiratory distress.      Breath sounds: Normal breath sounds.   Musculoskeletal:         General: Normal range of motion.      Cervical back: Normal range of motion and neck supple.   Skin:     General: Skin is warm and dry.   Neurological:      General: No focal deficit present.      Mental Status: She is alert and oriented to person, place, and time. Mental status is at baseline.   Psychiatric:         Mood and Affect: Mood normal.         Behavior: Behavior normal.         Thought Content: Thought content normal.         Judgment: Judgment normal.        Laboratory:  CBC:  Recent Labs   Lab 05/05/22  0852 05/08/22  1039 06/15/22  0943   WBC 6.43 7.52 4.85   RBC 4.56 4.77 4.31   Hemoglobin 12.5 12.8 11.9 L   Hematocrit 39.4 41.1 37.9 L   Platelet Count 419 H 394 346   MCV 86.4 86.2 87.9   MCH 27.4 26.8 L 27.6   MCHC 31.7 L 31.1 L 31.4 L     CMP:  Recent Labs   Lab 05/05/22  0852 05/08/22  1039 06/15/22  0943   Glucose 346 H 360 H 432 H   Calcium 9.2 9.5 8.8   Albumin 3.6 3.8 3.3 L   Total Protein 7.9 8.2 7.1  7.0   Sodium 138 135 L 137   Potassium 3.3 L 3.1 L 3.6   CO2 32 29 32   Chloride 100 97 L 98   BUN 7 9 4 L   Alk Phos 162 H 168 H 145 H   ALT 51 49 25   AST 44 H 39 H 15   Bilirubin, Total 0.3 0.4 0.3     LIPIDS:  Recent Labs   Lab 10/18/21  0832 02/25/22  0850 03/22/22  0917   TSH 1.690 40.100 H 37.400 H     TSH:  Recent Labs   Lab 10/18/21  0832 02/25/22  0850 03/22/22  0917   TSH 1.690 40.100 H 37.400 H     A1C:  Recent Labs   Lab 05/14/21  0938 08/30/21  1017 11/15/21  1049 11/23/21  1346 02/03/22  0910   Hemoglobin A1C 10.7 H 9.1 H 7.6 H 7.6 H 7.5 H     Assessment/Plan     Carey Leonardo is a 46 y.o.female with:    1. Sore throat  - POCT rapid strep A    2. Acute nonintractable headache, unspecified headache type  - SARS Coronavirus 2 Antigen, POCT    3. COVID-19 virus infection  - nirmatrelvir-ritonavir 150 mg x 2- 100 mg copackaged  tablets (EUA); Take 3 tablets by mouth 2 (two) times daily for 5 days. Each dose contains 2 nirmatrelvir (pink tablets) and 1 ritonavir (white tablet). Take all 3 tablets together  Dispense: 30 tablet; Refill: 0  - dextromethorphan-guaiFENesin (MUCINEX DM) 60-1,200 mg per 12 hr tablet; Take 1 tablet by mouth 2 (two) times daily as needed (cough/congestion).  Dispense: 20 tablet; Refill: 0    OTC Medications  o Ibuprofen or acetaminophen as instructed for fever (temperature 100.4 and greater), and body aches/headaches.  o Vitamin C 500 mg per day.  o Zinc  mg per day.  o Melatonin 0.3-2 mg at bedtime as needed for sleep.  o Vitamin D3 1,000-4,000 IU per day.  o Aspirin 81 mg per day.   Chronic conditions status updated as per HPI.  Other than changes above, cont current medications and maintain follow up with specialists.  Return to clinic as needed.     Ursula Kapadia, JAMAALP  [facility]

## 2022-08-02 ENCOUNTER — OFFICE VISIT (OUTPATIENT)
Dept: FAMILY MEDICINE | Facility: CLINIC | Age: 47
End: 2022-08-02
Payer: OTHER GOVERNMENT

## 2022-08-02 VITALS
HEIGHT: 67 IN | SYSTOLIC BLOOD PRESSURE: 136 MMHG | BODY MASS INDEX: 33.74 KG/M2 | HEART RATE: 95 BPM | WEIGHT: 215 LBS | DIASTOLIC BLOOD PRESSURE: 86 MMHG | TEMPERATURE: 99 F | OXYGEN SATURATION: 98 %

## 2022-08-02 DIAGNOSIS — I10 HYPERTENSION, UNSPECIFIED TYPE: ICD-10-CM

## 2022-08-02 DIAGNOSIS — E11.9 TYPE 2 DIABETES MELLITUS WITHOUT COMPLICATION, WITHOUT LONG-TERM CURRENT USE OF INSULIN: Primary | ICD-10-CM

## 2022-08-02 DIAGNOSIS — E03.9 HYPOTHYROIDISM, UNSPECIFIED TYPE: ICD-10-CM

## 2022-08-02 DIAGNOSIS — Z09 FOLLOW-UP EXAMINATION AFTER ORTHOPEDIC SURGERY: Primary | ICD-10-CM

## 2022-08-02 LAB
BILIRUB SERPL-MCNC: NEGATIVE MG/DL
BLOOD URINE, POC: NEGATIVE
COLOR, POC UA: YELLOW
CREAT UR-MCNC: 49 MG/DL (ref 28–219)
EST. AVERAGE GLUCOSE BLD GHB EST-MCNC: 334 MG/DL
GLUCOSE UR QL STRIP: >=1000
HBA1C MFR BLD HPLC: 12.6 % (ref 4.5–6.6)
KETONES UR QL STRIP: NEGATIVE
LEUKOCYTE ESTERASE URINE, POC: NEGATIVE
MICROALBUMIN UR-MCNC: 0.9 MG/DL (ref 0–2.8)
MICROALBUMIN/CREAT RATIO PNL UR: 18.4 MG/G (ref 0–30)
NITRITE, POC UA: NEGATIVE
PH, POC UA: 6.5
PROTEIN, POC: NEGATIVE
SPECIFIC GRAVITY, POC UA: <=1.005
T4 FREE SERPL-MCNC: 0.85 NG/DL (ref 0.76–1.46)
TSH SERPL DL<=0.005 MIU/L-ACNC: 7.73 UIU/ML (ref 0.36–3.74)
UROBILINOGEN, POC UA: 0.2

## 2022-08-02 PROCEDURE — 84443 TSH: ICD-10-PCS | Mod: ,,, | Performed by: CLINICAL MEDICAL LABORATORY

## 2022-08-02 PROCEDURE — 82043 UR ALBUMIN QUANTITATIVE: CPT | Mod: ,,, | Performed by: CLINICAL MEDICAL LABORATORY

## 2022-08-02 PROCEDURE — 84439 ASSAY OF FREE THYROXINE: CPT | Mod: ,,, | Performed by: CLINICAL MEDICAL LABORATORY

## 2022-08-02 PROCEDURE — 82570 ASSAY OF URINE CREATININE: CPT | Mod: ,,, | Performed by: CLINICAL MEDICAL LABORATORY

## 2022-08-02 PROCEDURE — 81003 POCT URINALYSIS W/O SCOPE: ICD-10-PCS | Mod: QW,,, | Performed by: NURSE PRACTITIONER

## 2022-08-02 PROCEDURE — 81003 URINALYSIS AUTO W/O SCOPE: CPT | Mod: QW,,, | Performed by: NURSE PRACTITIONER

## 2022-08-02 PROCEDURE — 83036 HEMOGLOBIN A1C: ICD-10-PCS | Mod: ,,, | Performed by: CLINICAL MEDICAL LABORATORY

## 2022-08-02 PROCEDURE — 99214 PR OFFICE/OUTPT VISIT, EST, LEVL IV, 30-39 MIN: ICD-10-PCS | Mod: ,,, | Performed by: NURSE PRACTITIONER

## 2022-08-02 PROCEDURE — 83036 HEMOGLOBIN GLYCOSYLATED A1C: CPT | Mod: ,,, | Performed by: CLINICAL MEDICAL LABORATORY

## 2022-08-02 PROCEDURE — 82043 MICROALBUMIN / CREATININE RATIO URINE: ICD-10-PCS | Mod: ,,, | Performed by: CLINICAL MEDICAL LABORATORY

## 2022-08-02 PROCEDURE — 84443 ASSAY THYROID STIM HORMONE: CPT | Mod: ,,, | Performed by: CLINICAL MEDICAL LABORATORY

## 2022-08-02 PROCEDURE — 82570 MICROALBUMIN / CREATININE RATIO URINE: ICD-10-PCS | Mod: ,,, | Performed by: CLINICAL MEDICAL LABORATORY

## 2022-08-02 PROCEDURE — 99214 OFFICE O/P EST MOD 30 MIN: CPT | Mod: ,,, | Performed by: NURSE PRACTITIONER

## 2022-08-02 PROCEDURE — 84439 T4, FREE: ICD-10-PCS | Mod: ,,, | Performed by: CLINICAL MEDICAL LABORATORY

## 2022-08-02 RX ORDER — DULAGLUTIDE 3 MG/.5ML
3 INJECTION, SOLUTION SUBCUTANEOUS
Qty: 12 PEN | Refills: 3 | Status: SHIPPED | OUTPATIENT
Start: 2022-08-02 | End: 2022-11-18

## 2022-08-02 RX ORDER — GLIPIZIDE AND METFORMIN HCL 5; 500 MG/1; MG/1
1 TABLET, FILM COATED ORAL
Qty: 180 TABLET | Refills: 3 | Status: SHIPPED | OUTPATIENT
Start: 2022-08-02 | End: 2023-03-21 | Stop reason: SDUPTHER

## 2022-08-02 RX ORDER — HYDRALAZINE HYDROCHLORIDE 100 MG/1
100 TABLET, FILM COATED ORAL 3 TIMES DAILY
Qty: 270 TABLET | Refills: 0 | Status: SHIPPED | OUTPATIENT
Start: 2022-08-02 | End: 2022-08-30 | Stop reason: SDUPTHER

## 2022-08-02 NOTE — PROGRESS NOTES
[facility]          Chief Complaint        Chief Complaint   Patient presents with    Thyroid Problems    Blood Sugar     Glucose spiking up in afternoons; feeling weak             History of Present Illness           Carey Leonardo is a 46 y.o. female with chronic conditions of DM2, hypothyroidism, chronic pain syndrome, asthma, HLP, and depression who presents today for routine f/u on chronic conditions. Pt has been out of her Trulicity for several weeks and states her BGs are running over 400 regularly.  She is feeling more fatigued than normal.  She had a TSH over 37 a few months ago and has never had a repeat result since she went to 200mcg on her levothyroxine.            Past Medical History:     Past Medical History:   Diagnosis Date    Asthma     CHF (congestive heart failure)     Chronic pain syndrome     Depressive disorder     Diabetes mellitus, type 2     Enlarged heart     Gastroparesis     GERD (gastroesophageal reflux disease)     Hyperlipidemia     Hypertension     IBS (irritable bowel syndrome)     Kidney stones     Lumbar radiculopathy     Sleep apnea     Does not use a CPAP    Thyroid disease              Past Surgical History:      has a past surgical history that includes Hysterectomy (09/29/2011); Oophorectomy (11/11/2014); Dilation and curettage of uterus (04/14/2010); Diagnostic laparoscopy (04/14/2010); Exploratory laparotomy with uterine myomectomy (02/27/2007); Hysteroscopy with dilation and curettage of uterus (04/04/2006); Carpal tunnel release; Cholecystectomy; Bilateral L3-S1 MBB (Bilateral, 6-, 5-, 1-8-2014); Left heart catheterization; Left L3-S1 RFTC (Left, 07/18/2017); Left SI JI (Left, 09/30/2013); Sinus surgery; and Laminectomy (N/A, 11/30/2021).          Social History:     Social History     Tobacco Use    Smoking status: Never Smoker    Smokeless tobacco: Never Used   Substance Use Topics    Alcohol use: Not Currently    Drug use:  Not Currently     Types: Hydrocodone, Oxycodone             I personally reviewed all past medical, surgical, and social.           Review of Systems   Constitutional: Positive for fatigue.   HENT: Negative.    Eyes: Negative.    Respiratory: Negative.    Cardiovascular: Negative.    Gastrointestinal: Negative.    Endocrine: Negative.    Genitourinary: Negative.    Musculoskeletal: Negative.    Skin: Negative.    Allergic/Immunologic: Negative.    Neurological: Negative.    Psychiatric/Behavioral: Negative.               Medications:     Outpatient Encounter Medications as of 8/2/2022   Medication Sig Dispense Refill    atorvastatin (LIPITOR) 20 MG tablet Take 1 tablet (20 mg total) by mouth every evening. 90 tablet 3    cetirizine (ZYRTEC) 10 MG tablet Take 1 tablet (10 mg total) by mouth once daily. 30 tablet 0    cloNIDine (CATAPRES) 0.2 MG tablet TAKE 1 TABLET FOUR TIMES A  tablet 3    cyclobenzaprine (FLEXERIL) 10 MG tablet Take 1 tablet (10 mg total) by mouth 3 (three) times daily as needed for Muscle spasms. 90 tablet 1    dulaglutide (TRULICITY) 3 mg/0.5 mL pen injector Inject 3 mg into the skin every 7 days. 12 pen 3    glipizide-metformin (METAGLIP) 5-500 mg per tablet Take 1 tablet by mouth 2 (two) times daily before meals. 180 tablet 3    hydrALAZINE (APRESOLINE) 100 MG tablet Take 1 tablet (100 mg total) by mouth 3 (three) times daily. 270 tablet 0    levothyroxine (SYNTHROID) 200 MCG tablet Take 1 tablet (200 mcg total) by mouth before breakfast. 90 tablet 0    methylphenidate HCl (RITALIN) 20 MG tablet Take 20 mg by mouth 2 (two) times daily.      metoprolol succinate (TOPROL-XL) 100 MG 24 hr tablet TAKE 1 TABLET DAILY 90 tablet 3    NIFEdipine (PROCARDIA-XL) 60 MG (OSM) 24 hr tablet TAKE 1 TABLET BY MOUTH EVERY DAY FOR HIGH BLOOD PRESSURE 90 tablet 0    risperiDONE (RISPERDAL) 0.25 MG Tab Take 0.25-0.5 mg by mouth 2 (two) times daily as needed.      sertraline (ZOLOFT) 100 MG  "tablet TAKE 1 TABLET DAILY 90 tablet 3    traMADoL (ULTRAM) 50 mg tablet Take 1 tablet (50 mg total) by mouth every 12 (twelve) hours as needed for Pain. 45 tablet 1    traZODone (DESYREL) 100 MG tablet Take 100 mg by mouth nightly.      venlafaxine (EFFEXOR-XR) 150 MG Cp24 TAKE 1 CAPSULE BY MOUTH EVERY DAY WITH FOOD 90 capsule 0    [DISCONTINUED] dulaglutide (TRULICITY) 3 mg/0.5 mL pen injector Inject 3 mg into the skin every 7 days. 12 pen 1    [DISCONTINUED] glipizide-metformin (METAGLIP) 5-500 mg per tablet Take 1 tablet by mouth 2 (two) times daily before meals. 60 tablet 2    [DISCONTINUED] hydrALAZINE (APRESOLINE) 100 MG tablet Take 1 tablet (100 mg total) by mouth 3 (three) times daily. 270 tablet 0    [DISCONTINUED] levothyroxine (SYNTHROID, LEVOTHROID) 175 MCG tablet TAKE 1 TABLET BY MOUTH EACH MORNING, BEFORE BREAKFAST ON EMPTY STOMACH FOR THYROID (DRINK PLENTY OF WATER) 30 tablet 2     No facility-administered encounter medications on file as of 8/2/2022.             Allergies:     Review of patient's allergies indicates:   Allergen Reactions    Fish containing products Anaphylaxis    Iodinated contrast media     Penicillins              Health Maintenance:       There is no immunization history on file for this patient.      Health Maintenance   Topic Date Due    Lipid Panel  Never done    Foot Exam  Never done    Eye Exam  Never done    TETANUS VACCINE  Never done    Hemoglobin A1c  08/03/2022    Mammogram  09/30/2022    Low Dose Statin  06/27/2023    Hepatitis C Screening  Completed              Physical Exam           Vital Signs  Temp: 98.7 °F (37.1 °C)  Temp src: Oral  Pulse: 95  SpO2: 98 %  BP: 136/86  BP Location: Left arm  Patient Position: Sitting  Height and Weight  Height: 5' 7" (170.2 cm)  Weight: 97.5 kg (215 lb)  BSA (Calculated - sq m): 2.15 sq meters  BMI (Calculated): 33.7  Weight in (lb) to have BMI = 25: 159.3]          Physical Exam  Vitals and nursing note " reviewed.   Constitutional:       General: She is not in acute distress.     Appearance: Normal appearance. She is obese. She is not ill-appearing.   HENT:      Head: Normocephalic.      Right Ear: External ear normal.      Left Ear: External ear normal.      Nose: Nose normal. No congestion or rhinorrhea.      Mouth/Throat:      Mouth: Mucous membranes are moist.   Eyes:      Pupils: Pupils are equal, round, and reactive to light.   Cardiovascular:      Rate and Rhythm: Normal rate and regular rhythm.      Pulses: Normal pulses.      Heart sounds: Normal heart sounds. No murmur heard.    No friction rub. No gallop.   Pulmonary:      Effort: Pulmonary effort is normal. No respiratory distress.      Breath sounds: Normal breath sounds. No stridor. No wheezing, rhonchi or rales.   Abdominal:      General: There is no distension.      Palpations: Abdomen is soft.   Musculoskeletal:         General: Normal range of motion.      Cervical back: Normal range of motion and neck supple. No rigidity or tenderness.   Skin:     General: Skin is warm and dry.      Coloration: Skin is not jaundiced or pale.   Neurological:      General: No focal deficit present.      Mental Status: She is alert and oriented to person, place, and time. Mental status is at baseline.      Cranial Nerves: No cranial nerve deficit.      Sensory: No sensory deficit.      Motor: No weakness.      Coordination: Coordination normal.      Gait: Gait normal.   Psychiatric:         Mood and Affect: Mood normal.         Behavior: Behavior normal.         Thought Content: Thought content normal.         Judgment: Judgment normal.                Laboratory:     CBC:     Recent Labs   Lab 05/05/22  0852 05/08/22  1039 06/15/22  0943   WBC 6.43 7.52 4.85   RBC 4.56 4.77 4.31   Hemoglobin 12.5 12.8 11.9 L   Hematocrit 39.4 41.1 37.9 L   Platelet Count 419 H 394 346   MCV 86.4 86.2 87.9   MCH 27.4 26.8 L 27.6   MCHC 31.7 L 31.1 L 31.4 L        CMP:     Recent Labs    Lab 05/05/22  0852 05/08/22  1039 06/15/22  0943   Glucose 346 H 360 H 432 H   Calcium 9.2 9.5 8.8   Albumin 3.6 3.8 3.3 L   Total Protein 7.9 8.2 7.1  7.0   Sodium 138 135 L 137   Potassium 3.3 L 3.1 L 3.6   CO2 32 29 32   Chloride 100 97 L 98   BUN 7 9 4 L   Alk Phos 162 H 168 H 145 H   ALT 51 49 25   AST 44 H 39 H 15   Bilirubin, Total 0.3 0.4 0.3        LIPIDS:     Recent Labs   Lab 10/18/21  0832 02/25/22  0850 03/22/22  0917   TSH 1.690 40.100 H 37.400 H        TSH:     Recent Labs   Lab 10/18/21  0832 02/25/22  0850 03/22/22  0917   TSH 1.690 40.100 H 37.400 H        A1C:     Recent Labs   Lab 05/14/21  0938 08/30/21  1017 11/15/21  1049 11/23/21  1346 02/03/22  0910   Hemoglobin A1C 10.7 H 9.1 H 7.6 H 7.6 H 7.5 H             Assessment/Plan          Carey Leonardo is a 46 y.o.female with:           1. Type 2 diabetes mellitus without complication, without long-term current use of insulin  - glipizide-metformin (METAGLIP) 5-500 mg per tablet; Take 1 tablet by mouth 2 (two) times daily before meals.  Dispense: 180 tablet; Refill: 3  - dulaglutide (TRULICITY) 3 mg/0.5 mL pen injector; Inject 3 mg into the skin every 7 days.  Dispense: 12 pen; Refill: 3  - Hemoglobin A1C; Future  - POCT URINALYSIS W/O SCOPE  - Microalbumin/Creatinine Ratio, Urine; Future  - Hemoglobin A1C  - Microalbumin/Creatinine Ratio, Urine    2. Hypothyroidism, unspecified type  - TSH; Future  - T4, Free; Future  - TSH  - T4, Free    3. Hypertension, unspecified type  - hydrALAZINE (APRESOLINE) 100 MG tablet; Take 1 tablet (100 mg total) by mouth 3 (three) times daily.  Dispense: 270 tablet; Refill: 0  - POCT URINALYSIS W/O SCOPE  - Microalbumin/Creatinine Ratio, Urine; Future  - Microalbumin/Creatinine Ratio, Urine    -needs to start back trulicity; she states there was a pharmacy issue; will investigate  -she is on 200mcg of levothyroxine; she states she takes it everyday; need to check tsh, ft4  -refill hydralazine; cont current bp  meds; dash diet, reg exercise; ada diet           Total time spent face-to-face and non-face-to-face coordinating care for this encounter was: 35 min          Chronic conditions status updated as per HPI.  Other than changes above, cont current medications and maintain follow up with specialists.  Return to clinic 3 months.          TIFFANY Reynoso     [facility]

## 2022-08-03 ENCOUNTER — OFFICE VISIT (OUTPATIENT)
Dept: SPINE | Facility: CLINIC | Age: 47
End: 2022-08-03
Payer: OTHER GOVERNMENT

## 2022-08-03 ENCOUNTER — HOSPITAL ENCOUNTER (OUTPATIENT)
Dept: RADIOLOGY | Facility: HOSPITAL | Age: 47
Discharge: HOME | End: 2022-08-03
Attending: ORTHOPAEDIC SURGERY
Payer: OTHER GOVERNMENT

## 2022-08-03 DIAGNOSIS — Z09 FOLLOW-UP SURGERY CARE: ICD-10-CM

## 2022-08-03 DIAGNOSIS — M54.16 LUMBAR RADICULOPATHY: ICD-10-CM

## 2022-08-03 DIAGNOSIS — M51.36 DDD (DEGENERATIVE DISC DISEASE), LUMBAR: Primary | ICD-10-CM

## 2022-08-03 DIAGNOSIS — Z09 FOLLOW-UP EXAMINATION AFTER ORTHOPEDIC SURGERY: ICD-10-CM

## 2022-08-03 PROCEDURE — 99213 OFFICE O/P EST LOW 20 MIN: CPT | Mod: PBBFAC | Performed by: ORTHOPAEDIC SURGERY

## 2022-08-03 PROCEDURE — 72100 XR LUMBAR SPINE AP AND LATERAL: ICD-10-PCS | Mod: 26,,, | Performed by: ORTHOPAEDIC SURGERY

## 2022-08-03 PROCEDURE — 99214 PR OFFICE/OUTPT VISIT, EST, LEVL IV, 30-39 MIN: ICD-10-PCS | Mod: S$PBB,,, | Performed by: ORTHOPAEDIC SURGERY

## 2022-08-03 PROCEDURE — 72100 X-RAY EXAM L-S SPINE 2/3 VWS: CPT | Mod: TC

## 2022-08-03 PROCEDURE — 99214 OFFICE O/P EST MOD 30 MIN: CPT | Mod: S$PBB,,, | Performed by: ORTHOPAEDIC SURGERY

## 2022-08-03 PROCEDURE — 72100 X-RAY EXAM L-S SPINE 2/3 VWS: CPT | Mod: 26,,, | Performed by: ORTHOPAEDIC SURGERY

## 2022-08-03 RX ORDER — GABAPENTIN 300 MG/1
300 CAPSULE ORAL 3 TIMES DAILY
Qty: 90 CAPSULE | Refills: 11 | Status: SHIPPED | OUTPATIENT
Start: 2022-08-03 | End: 2024-03-28

## 2022-08-03 NOTE — PROGRESS NOTES
Let pt know her TSH is a little high but better than last time.  Also her A1c is 12.6 which means her avg. Blood sugar is running over 300.  Goal is under 7 on the A1c    So she definitely needs to start the Trulicity and let us know if she can't get it for some reason.  As far as her thyroid meds it's important to take it on an empty stomach 30 min before other meds or food.  If she's doing that then we need to increase her thyroid med a little.    Let me know what she says.

## 2022-08-04 NOTE — PROGRESS NOTES
MDM/time:  Greater than 30 minutes spent on this encounter including 10 minutes reviewing imaging and notes, 15 minutes with the patient, 5 minutes documentation    ASSESSMENT:  46 y.o. female with lumbar spondylosis and radiculopathy now status post L2-L5 laminectomy 11/30/2021    PLAN:  Neurontin.  Physical therapy.  Follow-up in 2 months.  MRI if not improving    HPI:  46 y.o. female here for repeat evaluation status post L2-L5 laminectomy 11/30/2021.  Not complaining mostly back pain that radiates to the left lower extremity.  She has been seen Dr. Bills and [place] shows.  No injections.  Has been taking tramadol     IMAGING:  X-rays lumbar spine reviewed show:  On the AP there is normal coronal alignment.  There are 5 non-rib-bearing lumbar vertebrae.  On the lateral there is maintained lumbar lordosis.  She is status post L2-L5 laminectomy.     Past Medical History:   Diagnosis Date    Asthma     CHF (congestive heart failure)     Chronic pain syndrome     Depressive disorder     Diabetes mellitus, type 2     Enlarged heart     Gastroparesis     GERD (gastroesophageal reflux disease)     Hyperlipidemia     Hypertension     IBS (irritable bowel syndrome)     Kidney stones     Lumbar radiculopathy     Sleep apnea     Does not use a CPAP    Thyroid disease      Past Surgical History:   Procedure Laterality Date    Bilateral L3-S1 MBB Bilateral 6-, 5-, 1-8-2014    Dr Jessica Randolph    CARPAL TUNNEL RELEASE      CHOLECYSTECTOMY      DIAGNOSTIC LAPAROSCOPY  04/14/2010    Exploratory Laparotomy, Myomectomy, Hydrotubation-Dr. Feng    DILATION AND CURETTAGE OF UTERUS  04/14/2010    Hysteroscopy-Dr. Feng    EXPLORATORY LAPAROTOMY WITH UTERINE MYOMECTOMY  02/27/2007    Dr. Marquise Anderson    HYSTERECTOMY  09/29/2011    Robotic, FABIENNE, Cystoscopy-Dr. Feng    HYSTEROSCOPY WITH DILATION AND CURETTAGE OF UTERUS  04/04/2006    Laparoscopy, Chromotubation-Dr. Marquise Anderson     LAMINECTOMY N/A 11/30/2021    Procedure: L2-L5 Laminectomy;  Surgeon: Cyril Upton MD;  Location: [facility];  Service: Neurosurgery;  Laterality: N/A;    LEFT HEART CATHETERIZATION      Left L3-S1 RFTC Left 07/18/2017    Dr Lopez    Left SI JI Left 09/30/2013    Dr Randolph    OOPHORECTOMY  11/11/2014    Robotic, FABIENNE, Cystoscopy-Dr. Feng    SINUS SURGERY       Social History     Tobacco Use    Smoking status: Never Smoker    Smokeless tobacco: Never Used   Substance Use Topics    Alcohol use: Not Currently    Drug use: Not Currently     Types: Hydrocodone, Oxycodone      Current Outpatient Medications   Medication Instructions    atorvastatin (LIPITOR) 20 mg, Oral, Nightly    cetirizine (ZYRTEC) 10 mg, Oral, Daily    cloNIDine (CATAPRES) 0.2 MG tablet TAKE 1 TABLET FOUR TIMES A DAY    cyclobenzaprine (FLEXERIL) 10 mg, Oral, 3 times daily PRN    gabapentin (NEURONTIN) 300 mg, Oral, 3 times daily    glipizide-metformin (METAGLIP) 5-500 mg per tablet 1 tablet, Oral, 2 times daily before meals    hydrALAZINE (APRESOLINE) 100 mg, Oral, 3 times daily    levothyroxine (SYNTHROID) 200 mcg, Oral, Before breakfast    methylphenidate HCl (RITALIN) 20 mg, Oral, 2 times daily    metoprolol succinate (TOPROL-XL) 100 MG 24 hr tablet TAKE 1 TABLET DAILY    NIFEdipine (PROCARDIA-XL) 60 MG (OSM) 24 hr tablet TAKE 1 TABLET BY MOUTH EVERY DAY FOR HIGH BLOOD PRESSURE    risperiDONE (RISPERDAL) 0.25-0.5 mg, Oral, 2 times daily PRN    sertraline (ZOLOFT) 100 MG tablet TAKE 1 TABLET DAILY    traMADoL (ULTRAM) 50 mg, Oral, Every 12 hours PRN    traZODone (DESYREL) 100 mg, Oral, Nightly    TRULICITY 3 mg, Subcutaneous, Every 7 days    venlafaxine (EFFEXOR-XR) 150 MG Cp24 TAKE 1 CAPSULE BY MOUTH EVERY DAY WITH FOOD        EXAM:  Constitutional  General Appearance:  There is no height or weight on file to calculate BMI., NAD  Psychiatric   Orientation: Oriented to time, oriented to place, oriented to person  Mood  and Affect: Active and alert, normal mood, normal affect  Gait and Station   Appearance:  Normal gait, normal tandem gait, able to walk on toes, able to walk on heels  Healed incision    5/5 strength  Sensation intact  2+ pulses

## 2022-08-09 ENCOUNTER — TELEPHONE (OUTPATIENT)
Dept: FAMILY MEDICINE | Facility: CLINIC | Age: 47
End: 2022-08-09
Payer: OTHER GOVERNMENT

## 2022-08-09 NOTE — TELEPHONE ENCOUNTER
----- Message from Jeet Lancaster NP sent at 8/3/2022  7:52 AM CDT -----  Let pt know her TSH is a little high but better than last time.  Also her A1c is 12.6 which means her avg. Blood sugar is running over 300.  Goal is under 7 on the A1c    So she definitely needs to start the Trulicity and let us know if she can't get it for some reason.  As far as her thyroid meds it's important to take it on an empty stomach 30 min before other meds or food.  If she's doing that then we need to incre  ase her thyroid med a little.    Let me know what she says.

## 2022-08-11 ENCOUNTER — HOSPITAL ENCOUNTER (EMERGENCY)
Facility: HOSPITAL | Age: 47
Discharge: HOME | End: 2022-08-11
Attending: EMERGENCY MEDICINE
Payer: OTHER GOVERNMENT

## 2022-08-11 VITALS
BODY MASS INDEX: 32.14 KG/M2 | HEIGHT: 66 IN | DIASTOLIC BLOOD PRESSURE: 58 MMHG | SYSTOLIC BLOOD PRESSURE: 101 MMHG | HEART RATE: 86 BPM | TEMPERATURE: 98 F | RESPIRATION RATE: 11 BRPM | WEIGHT: 200 LBS | OXYGEN SATURATION: 96 %

## 2022-08-11 DIAGNOSIS — R10.9 ABDOMINAL PAIN, UNSPECIFIED ABDOMINAL LOCATION: Primary | ICD-10-CM

## 2022-08-11 LAB
ALBUMIN SERPL BCP-MCNC: 3.8 G/DL (ref 3.5–5)
ALBUMIN/GLOB SERPL: 1 {RATIO}
ALP SERPL-CCNC: 146 U/L (ref 39–100)
ALT SERPL W P-5'-P-CCNC: 26 U/L (ref 13–56)
ANION GAP SERPL CALCULATED.3IONS-SCNC: 17 MMOL/L (ref 7–16)
AST SERPL W P-5'-P-CCNC: 15 U/L (ref 15–37)
BACTERIA #/AREA URNS HPF: ABNORMAL /HPF
BASOPHILS # BLD AUTO: 0.05 K/UL (ref 0–0.2)
BASOPHILS NFR BLD AUTO: 0.6 % (ref 0–1)
BILIRUB SERPL-MCNC: 0.4 MG/DL (ref 0–1.2)
BILIRUB UR QL STRIP: NEGATIVE
BUN SERPL-MCNC: 15 MG/DL (ref 7–18)
BUN/CREAT SERPL: 12 (ref 6–20)
CALCIUM SERPL-MCNC: 8.4 MG/DL (ref 8.5–10.1)
CAOX CRY #/AREA URNS LPF: ABNORMAL /LPF
CHLORIDE SERPL-SCNC: 98 MMOL/L (ref 98–107)
CLARITY UR: ABNORMAL
CO2 SERPL-SCNC: 25 MMOL/L (ref 21–32)
COLOR UR: YELLOW
CREAT SERPL-MCNC: 1.24 MG/DL (ref 0.55–1.02)
DIFFERENTIAL METHOD BLD: ABNORMAL
EGFR (NO RACE VARIABLE) (RUSH/TITUS): 55 ML/MIN/1.73M²
EOSINOPHIL # BLD AUTO: 0.06 K/UL (ref 0–0.5)
EOSINOPHIL NFR BLD AUTO: 0.8 % (ref 1–4)
ERYTHROCYTE [DISTWIDTH] IN BLOOD BY AUTOMATED COUNT: 12.9 % (ref 11.5–14.5)
GLOBULIN SER-MCNC: 3.9 G/DL (ref 2–4)
GLUCOSE SERPL-MCNC: 298 MG/DL (ref 70–105)
GLUCOSE SERPL-MCNC: 317 MG/DL (ref 74–106)
GLUCOSE UR STRIP-MCNC: 30 MG/DL
HCO3 UR-SCNC: 28.5 MMOL/L (ref 24–28)
HCT VFR BLD AUTO: 41 % (ref 38–47)
HCT VFR BLD CALC: 45 % (ref 35–51)
HGB BLD-MCNC: 13.1 G/DL (ref 12–16)
HYALINE CASTS #/AREA URNS LPF: ABNORMAL /LPF
IMM GRANULOCYTES # BLD AUTO: 0.03 K/UL (ref 0–0.04)
IMM GRANULOCYTES NFR BLD: 0.4 % (ref 0–0.4)
KETONES UR STRIP-SCNC: 10 MG/DL
LDH SERPL L TO P-CCNC: 1.4 MMOL/L (ref 0.3–1.2)
LEUKOCYTE ESTERASE UR QL STRIP: ABNORMAL
LIPASE SERPL-CCNC: 80 U/L (ref 73–393)
LYMPHOCYTES # BLD AUTO: 3.2 K/UL (ref 1–4.8)
LYMPHOCYTES NFR BLD AUTO: 41.3 % (ref 27–41)
MAGNESIUM SERPL-MCNC: 1.3 MG/DL (ref 1.7–2.3)
MCH RBC QN AUTO: 28 PG (ref 27–31)
MCHC RBC AUTO-ENTMCNC: 32 G/DL (ref 32–36)
MCV RBC AUTO: 87.6 FL (ref 80–96)
MONOCYTES # BLD AUTO: 0.49 K/UL (ref 0–0.8)
MONOCYTES NFR BLD AUTO: 6.3 % (ref 2–6)
MPC BLD CALC-MCNC: 9.9 FL (ref 9.4–12.4)
MUCOUS THREADS #/AREA URNS HPF: ABNORMAL /HPF
NEUTROPHILS # BLD AUTO: 3.91 K/UL (ref 1.8–7.7)
NEUTROPHILS NFR BLD AUTO: 50.6 % (ref 53–65)
NITRITE UR QL STRIP: NEGATIVE
NRBC # BLD AUTO: 0 X10E3/UL
NRBC, AUTO (.00): 0 %
PCO2 BLDA: 43 MMHG (ref 41–51)
PH SMN: 7.43 [PH] (ref 7.32–7.42)
PH UR STRIP: 6 PH UNITS
PLATELET # BLD AUTO: 427 K/UL (ref 150–400)
PO2 BLDA: 43 MMHG (ref 25–40)
POC BASE EXCESS: 3.6 MMOL/L (ref -2–3)
POC CO2: 29.8 MMOL/L
POC IONIZED CALCIUM: 1.01 MMOL/L (ref 1.15–1.35)
POC SATURATED O2: 80 % (ref 40–70)
POCT GLUCOSE: 343 MG/DL (ref 60–95)
POTASSIUM BLD-SCNC: 2.5 MMOL/L (ref 3.4–4.5)
POTASSIUM SERPL-SCNC: 2.8 MMOL/L (ref 3.5–5.1)
PROT SERPL-MCNC: 7.7 G/DL (ref 6.4–8.2)
PROT UR QL STRIP: 50
RBC # BLD AUTO: 4.68 M/UL (ref 4.2–5.4)
RBC # UR STRIP: NEGATIVE /UL
RBC #/AREA URNS HPF: ABNORMAL /HPF
SODIUM BLD-SCNC: 134 MMOL/L (ref 136–145)
SODIUM SERPL-SCNC: 137 MMOL/L (ref 136–145)
SP GR UR STRIP: 1.02
SQUAMOUS #/AREA URNS LPF: ABNORMAL /LPF
UROBILINOGEN UR STRIP-ACNC: 2 MG/DL
WBC # BLD AUTO: 7.74 K/UL (ref 4.5–11)
WBC #/AREA URNS HPF: ABNORMAL /HPF

## 2022-08-11 PROCEDURE — 85014 HEMATOCRIT: CPT

## 2022-08-11 PROCEDURE — 82947 ASSAY GLUCOSE BLOOD QUANT: CPT

## 2022-08-11 PROCEDURE — 85025 COMPLETE CBC W/AUTO DIFF WBC: CPT | Performed by: EMERGENCY MEDICINE

## 2022-08-11 PROCEDURE — 96376 TX/PRO/DX INJ SAME DRUG ADON: CPT

## 2022-08-11 PROCEDURE — 36415 COLL VENOUS BLD VENIPUNCTURE: CPT | Performed by: EMERGENCY MEDICINE

## 2022-08-11 PROCEDURE — 83605 ASSAY OF LACTIC ACID: CPT

## 2022-08-11 PROCEDURE — 82330 ASSAY OF CALCIUM: CPT

## 2022-08-11 PROCEDURE — 99284 PR EMERGENCY DEPT VISIT,LEVEL IV: ICD-10-PCS | Mod: ,,, | Performed by: EMERGENCY MEDICINE

## 2022-08-11 PROCEDURE — 63600175 PHARM REV CODE 636 W HCPCS: Performed by: EMERGENCY MEDICINE

## 2022-08-11 PROCEDURE — 83690 ASSAY OF LIPASE: CPT | Performed by: EMERGENCY MEDICINE

## 2022-08-11 PROCEDURE — 99284 EMERGENCY DEPT VISIT MOD MDM: CPT | Mod: ,,, | Performed by: EMERGENCY MEDICINE

## 2022-08-11 PROCEDURE — 82962 GLUCOSE BLOOD TEST: CPT

## 2022-08-11 PROCEDURE — 87086 URINE CULTURE/COLONY COUNT: CPT | Performed by: EMERGENCY MEDICINE

## 2022-08-11 PROCEDURE — 87077 CULTURE AEROBIC IDENTIFY: CPT | Performed by: EMERGENCY MEDICINE

## 2022-08-11 PROCEDURE — 96365 THER/PROPH/DIAG IV INF INIT: CPT

## 2022-08-11 PROCEDURE — 99285 EMERGENCY DEPT VISIT HI MDM: CPT | Mod: 25

## 2022-08-11 PROCEDURE — 96375 TX/PRO/DX INJ NEW DRUG ADDON: CPT

## 2022-08-11 PROCEDURE — 96366 THER/PROPH/DIAG IV INF ADDON: CPT

## 2022-08-11 PROCEDURE — 83735 ASSAY OF MAGNESIUM: CPT | Performed by: EMERGENCY MEDICINE

## 2022-08-11 PROCEDURE — 96368 THER/DIAG CONCURRENT INF: CPT

## 2022-08-11 PROCEDURE — 84295 ASSAY OF SERUM SODIUM: CPT

## 2022-08-11 PROCEDURE — 81001 URINALYSIS AUTO W/SCOPE: CPT | Performed by: EMERGENCY MEDICINE

## 2022-08-11 PROCEDURE — 25000003 PHARM REV CODE 250: Performed by: EMERGENCY MEDICINE

## 2022-08-11 PROCEDURE — 82803 BLOOD GASES ANY COMBINATION: CPT

## 2022-08-11 PROCEDURE — 80053 COMPREHEN METABOLIC PANEL: CPT | Performed by: EMERGENCY MEDICINE

## 2022-08-11 PROCEDURE — 84132 ASSAY OF SERUM POTASSIUM: CPT

## 2022-08-11 RX ORDER — POTASSIUM CHLORIDE 7.45 MG/ML
10 INJECTION INTRAVENOUS
Status: COMPLETED | OUTPATIENT
Start: 2022-08-11 | End: 2022-08-11

## 2022-08-11 RX ORDER — ONDANSETRON 2 MG/ML
4 INJECTION INTRAMUSCULAR; INTRAVENOUS
Status: COMPLETED | OUTPATIENT
Start: 2022-08-11 | End: 2022-08-11

## 2022-08-11 RX ORDER — POTASSIUM CHLORIDE 20 MEQ/1
20 TABLET, EXTENDED RELEASE ORAL
Status: COMPLETED | OUTPATIENT
Start: 2022-08-11 | End: 2022-08-11

## 2022-08-11 RX ORDER — MAGNESIUM SULFATE HEPTAHYDRATE 40 MG/ML
2 INJECTION, SOLUTION INTRAVENOUS
Status: COMPLETED | OUTPATIENT
Start: 2022-08-11 | End: 2022-08-11

## 2022-08-11 RX ORDER — SODIUM CHLORIDE 9 MG/ML
1000 INJECTION, SOLUTION INTRAVENOUS
Status: COMPLETED | OUTPATIENT
Start: 2022-08-11 | End: 2022-08-11

## 2022-08-11 RX ORDER — HYDROMORPHONE HYDROCHLORIDE 2 MG/ML
1 INJECTION, SOLUTION INTRAMUSCULAR; INTRAVENOUS; SUBCUTANEOUS
Status: COMPLETED | OUTPATIENT
Start: 2022-08-11 | End: 2022-08-11

## 2022-08-11 RX ADMIN — POTASSIUM CHLORIDE 20 MEQ: 20 TABLET, EXTENDED RELEASE ORAL at 10:08

## 2022-08-11 RX ADMIN — ONDANSETRON 4 MG: 2 INJECTION INTRAMUSCULAR; INTRAVENOUS at 01:08

## 2022-08-11 RX ADMIN — POTASSIUM CHLORIDE 10 MEQ: 7.46 INJECTION, SOLUTION INTRAVENOUS at 11:08

## 2022-08-11 RX ADMIN — HYDROMORPHONE HYDROCHLORIDE 1 MG: 2 INJECTION INTRAMUSCULAR; INTRAVENOUS; SUBCUTANEOUS at 01:08

## 2022-08-11 RX ADMIN — POTASSIUM CHLORIDE 10 MEQ: 7.46 INJECTION, SOLUTION INTRAVENOUS at 10:08

## 2022-08-11 RX ADMIN — ONDANSETRON 4 MG: 2 INJECTION INTRAMUSCULAR; INTRAVENOUS at 10:08

## 2022-08-11 RX ADMIN — SODIUM CHLORIDE 1000 ML: 9 INJECTION, SOLUTION INTRAVENOUS at 10:08

## 2022-08-11 RX ADMIN — SODIUM CHLORIDE 1000 ML: 9 INJECTION, SOLUTION INTRAVENOUS at 11:08

## 2022-08-11 RX ADMIN — MAGNESIUM SULFATE HEPTAHYDRATE 2 G: 40 INJECTION, SOLUTION INTRAVENOUS at 01:08

## 2022-08-11 NOTE — ED PROVIDER NOTES
Encounter Date: 8/11/2022       History     Chief Complaint   Patient presents with    Abdominal Pain    Vomiting     Carey Leonardo is a 45yo AA female with a PMH of depression, DM, gastroparesis, GERD, HTN, IBS, hypothyroidism who presents to the ED with abdominal pain, nausea, and vomiting. For the past 1-2 months, patient reports having elevated blood glucose in the 300s from finger sticks at home. She's been taking metformin and glipizide daily without skipping doses and sees Jeet Lancaster NP her PCP for her diabetes management. Patient saw the PCP on 8/2/11 for better sugar control. Patient's A1c was 12.6 and sugar in the 300s. Patient's TSH was elevated at 7 and T4 0.85 but has improved from TSH at 37 in March 2022. The plan was to start patient on Trulicity for better diabetes management but patient hasn't started on it yet. 2 day ago, patient's sugar continued to be in the 300s and she started developing nausea, vomiting, and abdominal pain worse in the periumbilical region. Patient has vomited 3-4 times in the past 2 days and last vomited this morning without having breakfast. Patient has been dizzy, weak, and lethargic from vomiting and symptoms. Patient denies recent sick contacts or eating bad food. Family members have no similar symptoms.        Review of patient's allergies indicates:   Allergen Reactions    Fish containing products Anaphylaxis    Iodinated contrast media     Penicillins      Past Medical History:   Diagnosis Date    Asthma     CHF (congestive heart failure)     Chronic pain syndrome     Depressive disorder     Diabetes mellitus, type 2     Enlarged heart     Gastroparesis     GERD (gastroesophageal reflux disease)     Hyperlipidemia     Hypertension     IBS (irritable bowel syndrome)     Kidney stones     Lumbar radiculopathy     Sleep apnea     Does not use a CPAP    Thyroid disease      Past Surgical History:   Procedure Laterality Date    Bilateral L3-S1 MBB Bilateral 6-,  5-, 1-8-2014    Dr Jessica Randolph    CARPAL TUNNEL RELEASE      CHOLECYSTECTOMY      DIAGNOSTIC LAPAROSCOPY  04/14/2010    Exploratory Laparotomy, Myomectomy, Hydrotubation-Dr. Feng    DILATION AND CURETTAGE OF UTERUS  04/14/2010    Hysteroscopy-Dr. Feng    EXPLORATORY LAPAROTOMY WITH UTERINE MYOMECTOMY  02/27/2007    Dr. Marquise Anderson    HYSTERECTOMY  09/29/2011    Robotic, FABIENNE, Cystoscopy-Dr. Feng    HYSTEROSCOPY WITH DILATION AND CURETTAGE OF UTERUS  04/04/2006    Laparoscopy, Chromotubation-Dr. Marquise Anderson    LAMINECTOMY N/A 11/30/2021    Procedure: L2-L5 Laminectomy;  Surgeon: Cyril Upton MD;  Location: [facility];  Service: Neurosurgery;  Laterality: N/A;    LEFT HEART CATHETERIZATION      Left L3-S1 RFTC Left 07/18/2017    Dr Lopez    Left SI JI Left 09/30/2013    Dr Randolph    OOPHORECTOMY  11/11/2014    Robotic, FABIENNE, Cystoscopy-Dr. Feng    SINUS SURGERY       Family History   Problem Relation Age of Onset    Diabetes Mellitus Mother     Heart disease Mother     Hypertension Mother     Migraines Mother     Heart disease Sister      Social History     Tobacco Use    Smoking status: Never Smoker    Smokeless tobacco: Never Used   Substance Use Topics    Alcohol use: Not Currently    Drug use: Not Currently     Types: Hydrocodone, Oxycodone     Review of Systems   Constitutional: Positive for fatigue. Negative for chills, diaphoresis and fever.   HENT: Negative for drooling, facial swelling, sneezing and sore throat.    Eyes: Negative for discharge and redness.   Respiratory: Negative for cough and shortness of breath.    Cardiovascular: Negative for chest pain.   Gastrointestinal: Positive for abdominal pain, nausea and vomiting. Negative for blood in stool and diarrhea.   Genitourinary: Positive for decreased urine volume. Negative for difficulty urinating, dysuria, flank pain and hematuria.   Musculoskeletal: Negative for back pain.   Neurological: Positive for dizziness. Negative for  facial asymmetry and speech difficulty.        Dizziness from vomiting and dehydrated   Psychiatric/Behavioral: Negative for agitation, behavioral problems and confusion.       Physical Exam     Initial Vitals [08/11/22 0924]   BP Pulse Resp Temp SpO2   (!) 95/56 95 16 97.7 °F (36.5 °C) 96 %      MAP       --         Physical Exam    Nursing note and vitals reviewed.  Constitutional: She appears well-developed and well-nourished. She is not diaphoretic. She appears distressed.   Lying lateral recumbent position on her right side on bed, in mild distress from nausea and vomiting   HENT:   Head: Normocephalic and atraumatic.   Eyes: Pupils are equal, round, and reactive to light. Right eye exhibits no discharge. Left eye exhibits no discharge. No scleral icterus.   Neck: Neck supple.   Cardiovascular: Normal rate and regular rhythm.   Pulmonary/Chest: Breath sounds normal. No respiratory distress. She has no wheezes.   Abdominal: Abdomen is soft. Bowel sounds are normal. She exhibits no distension. There is abdominal tenderness.   Periumbilical region mild TTP. Patient vomited after abdominal exam There is no rebound and no guarding.   Musculoskeletal:      Cervical back: Neck supple.     Neurological: She is alert.   Skin: Skin is warm.   Psychiatric: She has a normal mood and affect. Her behavior is normal.         Medical Screening Exam   See Full Note    ED Course   Procedures  Labs Reviewed   COMPREHENSIVE METABOLIC PANEL - Abnormal; Notable for the following components:       Result Value    Potassium 2.8 (*)     Anion Gap 17 (*)     Glucose 317 (*)     Creatinine 1.24 (*)     Calcium 8.4 (*)     Alk Phos 146 (*)     eGFR 55 (*)     All other components within normal limits   URINALYSIS, REFLEX TO URINE CULTURE - Abnormal; Notable for the following components:    Leukocytes, UA Trace (*)     Protein, UA 50  (*)     Glucose, UA 30  (*)     Ketones, UA 10  (*)     Urobilinogen, UA 2  (*)     All other components  within normal limits   CBC WITH DIFFERENTIAL - Abnormal; Notable for the following components:    Platelet Count 427 (*)     Neutrophils % 50.6 (*)     Lymphocytes % 41.3 (*)     Monocytes % 6.3 (*)     Eosinophils % 0.8 (*)     All other components within normal limits   MAGNESIUM - Abnormal; Notable for the following components:    Magnesium 1.3 (*)     All other components within normal limits   URINALYSIS, MICROSCOPIC - Abnormal; Notable for the following components:    WBC, UA 11-15 (*)     Bacteria, UA Many (*)     Squamous Epithelial Cells, UA Moderate (*)     Mucus, UA Moderate (*)     Hyaline Casts, UA 5-10 (*)     Calcium Oxalate Crystals, UA Few (*)     All other components within normal limits   POCT GLUCOSE MONITORING CONTINUOUS - Abnormal; Notable for the following components:    POC Glucose 298 (*)     All other components within normal limits   LIPASE - Normal   CULTURE, URINE   CBC W/ AUTO DIFFERENTIAL    Narrative:     The following orders were created for panel order CBC auto differential.  Procedure                               Abnormality         Status                     ---------                               -----------         ------                     CBC with Differential[[id number]]        Abnormal            Final result                 Please view results for these tests on the individual orders.          Imaging Results              CT Abdomen Pelvis  Without Contrast (Final result)  Result time 08/11/22 12:37:27      Final result by Jeet Mendoza DO (08/11/22 12:37:27)                   Impression:      1. No evidence to suggest acute pathology within the abdomen or pelvis.      Electronically signed by: Jeet Mendoza  Date:    08/11/2022  Time:    12:37               Narrative:    EXAMINATION:  CT ABDOMEN PELVIS WITHOUT CONTRAST    CLINICAL HISTORY:  Abdominal pain, acute, nonlocalized;    COMPARISON:  2016 and 1/6/22    TECHNIQUE:  CT ABDOMEN PELVIS WITHOUT  CONTRAST    FINDINGS:  Lower lobes: Clear.    Cardiac: No effusion.    Abdomen:    Hepatobiliary/gallbladder: Normal for noncontrast technique.  Gallbladder is removed.  No ductal obstruction.    Spleen: Normal for noncontrast technique.    Pancreas: Normal for noncontrast technique.    Adrenal/Genitourinary system: Normal for noncontrast technique.    Bowel and Mesentery: There is no evidence for bowel obstruction.    Peritoneum: Normal.    Retroperitoneum: Multiple nonenlarged left aortic lymph nodes.    Vasculature: Normal.    Lymph nodes: No enlarged lymph nodes.    Abdominal wall: Normal.    Osseous structures: Postoperative change                                       Medications   0.9%  NaCl infusion (0 mLs Intravenous Stopped 8/11/22 1148)   ondansetron injection 4 mg (4 mg Intravenous Given 8/11/22 1052)   potassium chloride 10 mEq in 100 mL IVPB (0 mEq Intravenous Stopped 8/11/22 1148)   potassium chloride 10 mEq in 100 mL IVPB (0 mEq Intravenous Stopped 8/11/22 1343)   potassium chloride SA CR tablet 20 mEq (20 mEq Oral Given 8/11/22 1052)   0.9%  NaCl infusion (0 mLs Intravenous Stopped 8/11/22 1343)   magnesium sulfate 2g in water 50mL IVPB (premix) (0 g Intravenous Stopped 8/11/22 1533)   HYDROmorphone (PF) injection 1 mg (1 mg Intravenous Given 8/11/22 1314)   ondansetron injection 4 mg (4 mg Intravenous Given 8/11/22 1314)                Attending Attestation:   Physician Attestation Statement for Resident:  As the supervising MD   Physician Attestation Statement: I have personally seen and examined this patient.   I agree with the above history.  -:   As the supervising MD I agree with the above PE.     As the supervising MD I agree with the above treatment, course, plan, and disposition.    I have reviewed and agree with the residents interpretation of the following: CT scans and lab data.               ED Course as of 08/11/22 1538   Thu Aug 11, 2022   1111 Magnesium(!): 1.3 [HW]      ED Course  User Index  [HW] Yasmine Barr DO          Clinical Impression:   Final diagnoses:  [R10.9] Abdominal pain, unspecified abdominal location (Primary)          ED Disposition Condition    Discharge Stable          ED Prescriptions       None          Follow-up Information       Follow up With Specialties Details Why Contact Info    Long Fernández DO Family Medicine  As needed [address]  [facility] [address]  [phone]               Tan Joaquin MD  08/11/22 1534       Yasmine Barr DO  Resident  08/11/22 1538       Tan Joaquin MD  11/15/22 1627

## 2022-08-12 ENCOUNTER — TELEPHONE (OUTPATIENT)
Dept: EMERGENCY MEDICINE | Facility: HOSPITAL | Age: 47
End: 2022-08-12
Payer: OTHER GOVERNMENT

## 2022-08-14 LAB — UA COMPLETE W REFLEX CULTURE PNL UR: ABNORMAL

## 2022-08-14 RX ORDER — LEVOFLOXACIN 250 MG/1
250 TABLET ORAL DAILY
Qty: 10 TABLET | Refills: 0 | Status: SHIPPED | OUTPATIENT
Start: 2022-08-14 | End: 2022-08-24

## 2022-08-15 ENCOUNTER — TELEPHONE (OUTPATIENT)
Dept: EMERGENCY MEDICINE | Facility: HOSPITAL | Age: 47
End: 2022-08-15
Payer: OTHER GOVERNMENT

## 2022-08-15 NOTE — TELEPHONE ENCOUNTER
----- Message from ROGELIO Rosado sent at 8/14/2022  2:19 PM CDT -----  Please notify patient of positive urine culture.  Let her know Levaquin was sent to Mr Quiles listed in the computer.  Patient will need to follow up with PCP after completing antibiotics for repeat urinalysis.

## 2022-08-30 ENCOUNTER — OFFICE VISIT (OUTPATIENT)
Dept: OTOLARYNGOLOGY | Facility: CLINIC | Age: 47
End: 2022-08-30
Payer: OTHER GOVERNMENT

## 2022-08-30 ENCOUNTER — OFFICE VISIT (OUTPATIENT)
Dept: FAMILY MEDICINE | Facility: CLINIC | Age: 47
End: 2022-08-30
Payer: OTHER GOVERNMENT

## 2022-08-30 VITALS
OXYGEN SATURATION: 99 % | HEIGHT: 67 IN | SYSTOLIC BLOOD PRESSURE: 146 MMHG | HEART RATE: 89 BPM | BODY MASS INDEX: 33.59 KG/M2 | WEIGHT: 214 LBS | TEMPERATURE: 98 F | DIASTOLIC BLOOD PRESSURE: 90 MMHG

## 2022-08-30 VITALS — HEIGHT: 67 IN | WEIGHT: 214 LBS | BODY MASS INDEX: 33.59 KG/M2

## 2022-08-30 DIAGNOSIS — E03.9 HYPOTHYROIDISM, UNSPECIFIED TYPE: ICD-10-CM

## 2022-08-30 DIAGNOSIS — H65.23 CHRONIC SEROUS OTITIS MEDIA OF BOTH EARS: Primary | ICD-10-CM

## 2022-08-30 DIAGNOSIS — I10 HYPERTENSION, UNSPECIFIED TYPE: ICD-10-CM

## 2022-08-30 DIAGNOSIS — E11.9 TYPE 2 DIABETES MELLITUS WITHOUT COMPLICATION, WITHOUT LONG-TERM CURRENT USE OF INSULIN: Primary | ICD-10-CM

## 2022-08-30 DIAGNOSIS — T16.1XXA FOREIGN BODY OF RIGHT EAR, INITIAL ENCOUNTER: ICD-10-CM

## 2022-08-30 PROCEDURE — 99214 PR OFFICE/OUTPT VISIT, EST, LEVL IV, 30-39 MIN: ICD-10-PCS | Mod: S$PBB,,, | Performed by: OTOLARYNGOLOGY

## 2022-08-30 PROCEDURE — 99214 OFFICE O/P EST MOD 30 MIN: CPT | Mod: PBBFAC | Performed by: OTOLARYNGOLOGY

## 2022-08-30 PROCEDURE — 99214 OFFICE O/P EST MOD 30 MIN: CPT | Mod: S$PBB,,, | Performed by: OTOLARYNGOLOGY

## 2022-08-30 PROCEDURE — 99214 PR OFFICE/OUTPT VISIT, EST, LEVL IV, 30-39 MIN: ICD-10-PCS | Mod: ,,, | Performed by: NURSE PRACTITIONER

## 2022-08-30 PROCEDURE — 99214 OFFICE O/P EST MOD 30 MIN: CPT | Mod: ,,, | Performed by: NURSE PRACTITIONER

## 2022-08-30 RX ORDER — CIPROFLOXACIN AND DEXAMETHASONE 3; 1 MG/ML; MG/ML
4 SUSPENSION/ DROPS AURICULAR (OTIC) 2 TIMES DAILY
Qty: 7.5 ML | Refills: 1 | Status: SHIPPED | OUTPATIENT
Start: 2022-08-30 | End: 2023-01-10

## 2022-08-30 RX ORDER — HYDRALAZINE HYDROCHLORIDE 100 MG/1
100 TABLET, FILM COATED ORAL 3 TIMES DAILY
Qty: 270 TABLET | Refills: 1 | Status: SHIPPED | OUTPATIENT
Start: 2022-08-30 | End: 2022-10-15

## 2022-08-30 RX ORDER — NIFEDIPINE 60 MG/1
TABLET, EXTENDED RELEASE ORAL
Qty: 90 TABLET | Refills: 1 | Status: SHIPPED | OUTPATIENT
Start: 2022-08-30 | End: 2022-10-04 | Stop reason: SDUPTHER

## 2022-08-30 RX ORDER — LEVOTHYROXINE SODIUM 200 UG/1
200 TABLET ORAL
Qty: 90 TABLET | Refills: 1 | Status: SHIPPED | OUTPATIENT
Start: 2022-08-30 | End: 2023-03-21 | Stop reason: SDUPTHER

## 2022-08-30 RX ORDER — LEVOTHYROXINE SODIUM 175 UG/1
175 TABLET ORAL EVERY MORNING
COMMUNITY
Start: 2022-08-19 | End: 2022-08-30 | Stop reason: ALTCHOICE

## 2022-08-30 NOTE — PROGRESS NOTES
Subjective:       Patient ID: Carey Leonardo is a 46 y.o. female.    Chief Complaint: Foreign Body in Ear (Patient presents to clinic today for a bug in her right ear.)    HPI  Review of Systems   Constitutional:  Negative for chills, fatigue and fever.   HENT:  Positive for ear pain. Negative for ear discharge and hearing loss.        Objective:      Physical Exam  Constitutional:       Appearance: Normal appearance.   HENT:      Head: Normocephalic.      Right Ear: Tympanic membrane, ear canal and external ear normal.      Left Ear: Tympanic membrane, ear canal and external ear normal. A PE tube is present.      Nose: Nose normal.      Mouth/Throat:      Lips: Pink.      Mouth: Mucous membranes are moist.      Pharynx: Oropharynx is clear.   Eyes:      General: Lids are normal. Lids are everted, no foreign bodies appreciated.   Pulmonary:      Effort: Pulmonary effort is normal.   Skin:     General: Skin is warm and dry.   Neurological:      Mental Status: She is alert and oriented to person, place, and time.   Psychiatric:         Mood and Affect: Mood normal.         Behavior: Behavior is cooperative.       Assessment:       Problem List Items Addressed This Visit    None  Visit Diagnoses       Chronic serous otitis media of both ears    -  Primary            Plan:   Follow up in 4-5 mos.

## 2022-08-30 NOTE — PROGRESS NOTES
[facility]          Chief Complaint        Chief Complaint   Patient presents with    Headache    Ear Ache     Rt ear stopped up             History of Present Illness           Carey Leonardo is a 46 y.o. female with chronic conditions of DM2, hypothyroidism, GERD, HLD and HTN who presents today for routine f/u and ear pain.  The pt has had recent trouble with high TSH labs; her most recent one a month ago was 7.7 down from 37.  She states she still feels bad on most days. She has had high A1c results being over 12 as was tested one month ago.  She is having some right ear pain that started a few days ago.  No drainage from ear, no fever.          Past Medical History:     Past Medical History:   Diagnosis Date    Asthma     CHF (congestive heart failure)     Chronic pain syndrome     Depressive disorder     Diabetes mellitus, type 2     Enlarged heart     Gastroparesis     GERD (gastroesophageal reflux disease)     Hyperlipidemia     Hypertension     IBS (irritable bowel syndrome)     Kidney stones     Lumbar radiculopathy     Sleep apnea     Does not use a CPAP    Thyroid disease              Past Surgical History:      has a past surgical history that includes Hysterectomy (09/29/2011); Oophorectomy (11/11/2014); Dilation and curettage of uterus (04/14/2010); Diagnostic laparoscopy (04/14/2010); Exploratory laparotomy with uterine myomectomy (02/27/2007); Hysteroscopy with dilation and curettage of uterus (04/04/2006); Carpal tunnel release; Cholecystectomy; Bilateral L3-S1 MBB (Bilateral, 6-, 5-, 1-8-2014); Left heart catheterization; Left L3-S1 RFTC (Left, 07/18/2017); Left SI JI (Left, 09/30/2013); Sinus surgery; and Laminectomy (N/A, 11/30/2021).          Social History:     Social History     Tobacco Use    Smoking status: Never    Smokeless tobacco: Never   Substance Use Topics    Alcohol use: Not Currently    Drug use: Not Currently     Types: Hydrocodone, Oxycodone             I  personally reviewed all past medical, surgical, and social.           Review of Systems   Constitutional: Negative.    HENT:  Positive for ear pain.    Eyes: Negative.    Respiratory: Negative.     Cardiovascular: Negative.    Gastrointestinal: Negative.    Endocrine: Negative.    Genitourinary: Negative.    Musculoskeletal: Negative.    Skin: Negative.    Allergic/Immunologic: Negative.    Neurological: Negative.    Psychiatric/Behavioral: Negative.              Medications:     Outpatient Encounter Medications as of 8/30/2022   Medication Sig Dispense Refill    atorvastatin (LIPITOR) 20 MG tablet Take 1 tablet (20 mg total) by mouth every evening. 90 tablet 3    cetirizine (ZYRTEC) 10 MG tablet Take 1 tablet (10 mg total) by mouth once daily. 30 tablet 0    cloNIDine (CATAPRES) 0.2 MG tablet TAKE 1 TABLET FOUR TIMES A  tablet 3    cyclobenzaprine (FLEXERIL) 10 MG tablet Take 1 tablet (10 mg total) by mouth 3 (three) times daily as needed for Muscle spasms. 90 tablet 1    dulaglutide (TRULICITY) 3 mg/0.5 mL pen injector Inject 3 mg into the skin every 7 days. 12 pen 3    gabapentin (NEURONTIN) 300 MG capsule Take 1 capsule (300 mg total) by mouth 3 (three) times daily. 90 capsule 11    glipizide-metformin (METAGLIP) 5-500 mg per tablet Take 1 tablet by mouth 2 (two) times daily before meals. 180 tablet 3    hydrALAZINE (APRESOLINE) 100 MG tablet Take 1 tablet (100 mg total) by mouth 3 (three) times daily. 270 tablet 1    levothyroxine (SYNTHROID) 200 MCG tablet Take 1 tablet (200 mcg total) by mouth before breakfast. 90 tablet 1    methylphenidate HCl (RITALIN) 20 MG tablet Take 20 mg by mouth 2 (two) times daily.      metoprolol succinate (TOPROL-XL) 100 MG 24 hr tablet TAKE 1 TABLET DAILY 90 tablet 3    NIFEdipine (PROCARDIA-XL) 60 MG (OSM) 24 hr tablet TAKE 1 TABLET BY MOUTH EVERY DAY FOR HIGH BLOOD PRESSURE  Strength: 60 mg 90 tablet 1    risperiDONE (RISPERDAL) 0.25 MG Tab Take 0.25-0.5 mg by mouth 2  "(two) times daily as needed.      sertraline (ZOLOFT) 100 MG tablet TAKE 1 TABLET DAILY 90 tablet 3    traMADoL (ULTRAM) 50 mg tablet Take 1 tablet (50 mg total) by mouth every 12 (twelve) hours as needed for Pain. 45 tablet 1    traZODone (DESYREL) 100 MG tablet Take 100 mg by mouth nightly.      venlafaxine (EFFEXOR-XR) 150 MG Cp24 TAKE 1 CAPSULE BY MOUTH EVERY DAY WITH FOOD 90 capsule 0    [DISCONTINUED] hydrALAZINE (APRESOLINE) 100 MG tablet Take 1 tablet (100 mg total) by mouth 3 (three) times daily. 270 tablet 0    [DISCONTINUED] levothyroxine (SYNTHROID) 200 MCG tablet Take 1 tablet (200 mcg total) by mouth before breakfast. 90 tablet 0    [DISCONTINUED] levothyroxine (SYNTHROID, LEVOTHROID) 175 MCG tablet Take 175 mcg by mouth every morning.      [DISCONTINUED] NIFEdipine (PROCARDIA-XL) 60 MG (OSM) 24 hr tablet TAKE 1 TABLET BY MOUTH EVERY DAY FOR HIGH BLOOD PRESSURE 90 tablet 0     No facility-administered encounter medications on file as of 8/30/2022.             Allergies:     Review of patient's allergies indicates:   Allergen Reactions    Fish containing products Anaphylaxis    Iodinated contrast media     Penicillins              Health Maintenance:       There is no immunization history on file for this patient.      Health Maintenance   Topic Date Due    Lipid Panel  Never done    Foot Exam  Never done    Eye Exam  Never done    TETANUS VACCINE  Never done    Mammogram  09/30/2022    Hemoglobin A1c  11/02/2022    Low Dose Statin  06/27/2023    Hepatitis C Screening  Completed              Physical Exam           Vital Signs  Temp: 97.5 °F (36.4 °C)  Temp src: Oral  Pulse: 89  SpO2: 99 %  BP: (!) 146/90  BP Location: Left arm  Patient Position: Sitting  Height and Weight  Height: 5' 7" (170.2 cm)  Weight: 97.1 kg (214 lb)  BSA (Calculated - sq m): 2.14 sq meters  BMI (Calculated): 33.5  Weight in (lb) to have BMI = 25: 159.3]          Physical Exam  Vitals and nursing note reviewed.   Constitutional:  "      General: She is not in acute distress.     Appearance: Normal appearance. She is not ill-appearing.   HENT:      Head: Normocephalic.      Right Ear: External ear normal.      Left Ear: External ear normal.      Ears:      Comments: Right middle ear with what looks like a bug in her ear.  It's round and looks like it's stuck to her EAC; minimal wax seen.  No bleeding noted.  Possibly a tick.  I attempted 4-5 irrigation of her and I was unable to flush out the object.  It appears adhered to her tissue.     Nose: Nose normal. No congestion or rhinorrhea.      Mouth/Throat:      Mouth: Mucous membranes are moist.   Eyes:      Pupils: Pupils are equal, round, and reactive to light.   Cardiovascular:      Rate and Rhythm: Normal rate and regular rhythm.      Pulses: Normal pulses.      Heart sounds: Normal heart sounds. No murmur heard.    No friction rub. No gallop.   Pulmonary:      Effort: Pulmonary effort is normal. No respiratory distress.      Breath sounds: Normal breath sounds. No stridor. No wheezing, rhonchi or rales.   Abdominal:      General: There is no distension.      Palpations: Abdomen is soft.   Musculoskeletal:         General: Normal range of motion.      Cervical back: Normal range of motion and neck supple. No rigidity or tenderness.   Skin:     General: Skin is warm and dry.      Coloration: Skin is not jaundiced or pale.   Neurological:      General: No focal deficit present.      Mental Status: She is alert and oriented to person, place, and time. Mental status is at baseline.      Cranial Nerves: No cranial nerve deficit.      Sensory: No sensory deficit.      Motor: No weakness.      Coordination: Coordination normal.      Gait: Gait normal.   Psychiatric:         Mood and Affect: Mood normal.         Behavior: Behavior normal.         Thought Content: Thought content normal.         Judgment: Judgment normal.              Laboratory:     CBC:     Recent Labs   Lab 05/08/22  1039  06/15/22  0943 08/11/22  1013 08/11/22  1024   WBC 7.52 4.85  --  7.74   RBC 4.77 4.31  --  4.68   Hemoglobin 12.8 11.9 L  --  13.1   POC Hematocrit  --   --    < >  --    Hematocrit 41.1 37.9 L  --  41.0   Platelet Count 394 346  --  427 H   MCV 86.2 87.9  --  87.6   MCH 26.8 L 27.6  --  28.0   MCHC 31.1 L 31.4 L  --  32.0    < > = values in this interval not displayed.        CMP:     Recent Labs   Lab 05/08/22  1039 06/15/22  0943 08/11/22  1024   Glucose 360 H 432 H 317 H   Calcium 9.5 8.8 8.4 L   Albumin 3.8 3.3 L 3.8   Total Protein 8.2 7.1  7.0 7.7   Sodium 135 L 137 137   Potassium 3.1 L 3.6 2.8 L   CO2 29 32 25   Chloride 97 L 98 98   BUN 9 4 L 15   Alk Phos 168 H 145 H 146 H   ALT 49 25 26   AST 39 H 15 15   Bilirubin, Total 0.4 0.3 0.4        LIPIDS:     Recent Labs   Lab 02/25/22  0850 03/22/22  0917 08/02/22  1428   TSH 40.100 H 37.400 H 7.730 H        TSH:     Recent Labs   Lab 02/25/22  0850 03/22/22  0917 08/02/22  1428   TSH 40.100 H 37.400 H 7.730 H        A1C:     Recent Labs   Lab 05/14/21  0938 08/30/21  1017 11/15/21  1049 11/23/21  1346 02/03/22  0910 08/02/22  1428   Hemoglobin A1C 10.7 H 9.1 H 7.6 H 7.6 H 7.5 H 12.6 H             Assessment/Plan          Carey Lenoardo is a 46 y.o.female with:           1. Type 2 diabetes mellitus without complication, without long-term current use of insulin    2. Hypertension, unspecified type  - hydrALAZINE (APRESOLINE) 100 MG tablet; Take 1 tablet (100 mg total) by mouth 3 (three) times daily.  Dispense: 270 tablet; Refill: 1  - NIFEdipine (PROCARDIA-XL) 60 MG (OSM) 24 hr tablet; TAKE 1 TABLET BY MOUTH EVERY DAY FOR HIGH BLOOD PRESSURE  Strength: 60 mg  Dispense: 90 tablet; Refill: 1    3. Hypothyroidism, unspecified type  - levothyroxine (SYNTHROID) 200 MCG tablet; Take 1 tablet (200 mcg total) by mouth before breakfast.  Dispense: 90 tablet; Refill: 1    4. Foreign body of right ear, initial encounter    -cont current HTN regimen; cont current  regimen for DM2; recheck A1c in 2-3 months; discussed proper ADA diet  -discussed proper way to take levothyroxine; recheck TSH in 2-3 months  -referred to ENT to better take care of the object in her right ear; it appears to be a bug maybe a tick adhered to her EAC.             Total time spent face-to-face and non-face-to-face coordinating care for this encounter was: 45 min          Chronic conditions status updated as per HPI.  Other than changes above, cont current medications and maintain follow up with specialists.  Return to clinic in 3 months.          ROGELIO Reynoso-C     [facility]

## 2022-10-04 DIAGNOSIS — I10 HYPERTENSION, UNSPECIFIED TYPE: ICD-10-CM

## 2022-10-04 RX ORDER — SERTRALINE HYDROCHLORIDE 100 MG/1
100 TABLET, FILM COATED ORAL DAILY
Qty: 90 TABLET | Refills: 3 | Status: SHIPPED | OUTPATIENT
Start: 2022-10-04 | End: 2023-03-21 | Stop reason: ALTCHOICE

## 2022-10-04 RX ORDER — METOPROLOL SUCCINATE 100 MG/1
100 TABLET, EXTENDED RELEASE ORAL DAILY
Qty: 90 TABLET | Refills: 3 | Status: SHIPPED | OUTPATIENT
Start: 2022-10-04 | End: 2023-03-21 | Stop reason: SDUPTHER

## 2022-10-04 RX ORDER — NIFEDIPINE 60 MG/1
TABLET, EXTENDED RELEASE ORAL
Qty: 90 TABLET | Refills: 1 | Status: SHIPPED | OUTPATIENT
Start: 2022-10-04 | End: 2023-01-10 | Stop reason: SDUPTHER

## 2022-10-06 ENCOUNTER — HOSPITAL ENCOUNTER (OUTPATIENT)
Dept: RADIOLOGY | Facility: HOSPITAL | Age: 47
Discharge: HOME | End: 2022-10-06
Payer: OTHER GOVERNMENT

## 2022-10-06 VITALS — BODY MASS INDEX: 33.59 KG/M2 | HEIGHT: 67 IN | WEIGHT: 214 LBS

## 2022-10-06 DIAGNOSIS — Z12.31 OTHER SCREENING MAMMOGRAM: ICD-10-CM

## 2022-10-06 PROCEDURE — 77067 SCR MAMMO BI INCL CAD: CPT | Mod: TC

## 2022-10-18 ENCOUNTER — CLINICAL SUPPORT (OUTPATIENT)
Dept: FAMILY MEDICINE | Facility: CLINIC | Age: 47
End: 2022-10-18
Payer: OTHER GOVERNMENT

## 2022-10-18 DIAGNOSIS — Z23 NEED FOR IMMUNIZATION AGAINST INFLUENZA: Primary | ICD-10-CM

## 2022-10-18 PROCEDURE — 90471 IMMUNIZATION ADMIN: CPT | Mod: ,,, | Performed by: NURSE PRACTITIONER

## 2022-10-18 PROCEDURE — 90686 IIV4 VACC NO PRSV 0.5 ML IM: CPT | Mod: ,,, | Performed by: NURSE PRACTITIONER

## 2022-10-18 PROCEDURE — 90471 FLU VACCINE (QUAD) GREATER THAN OR EQUAL TO 3YO PRESERVATIVE FREE IM: ICD-10-PCS | Mod: ,,, | Performed by: NURSE PRACTITIONER

## 2022-10-18 PROCEDURE — 90686 FLU VACCINE (QUAD) GREATER THAN OR EQUAL TO 3YO PRESERVATIVE FREE IM: ICD-10-PCS | Mod: ,,, | Performed by: NURSE PRACTITIONER

## 2022-10-26 ENCOUNTER — OFFICE VISIT (OUTPATIENT)
Dept: OBSTETRICS AND GYNECOLOGY | Facility: CLINIC | Age: 47
End: 2022-10-26
Payer: OTHER GOVERNMENT

## 2022-10-26 VITALS
WEIGHT: 218 LBS | DIASTOLIC BLOOD PRESSURE: 71 MMHG | SYSTOLIC BLOOD PRESSURE: 106 MMHG | BODY MASS INDEX: 34.21 KG/M2 | HEIGHT: 67 IN

## 2022-10-26 DIAGNOSIS — N64.4 BREAST TENDERNESS IN FEMALE: ICD-10-CM

## 2022-10-26 DIAGNOSIS — Z01.419 WOMEN'S ANNUAL ROUTINE GYNECOLOGICAL EXAMINATION: Primary | ICD-10-CM

## 2022-10-26 DIAGNOSIS — Z12.72 SPECIAL SCREENING FOR MALIGNANT NEOPLASMS, VAGINA: ICD-10-CM

## 2022-10-26 PROCEDURE — 99396 PREV VISIT EST AGE 40-64: CPT | Mod: S$PBB,,, | Performed by: OBSTETRICS & GYNECOLOGY

## 2022-10-26 PROCEDURE — 88142 CYTOPATH C/V THIN LAYER: CPT | Mod: GCY | Performed by: OBSTETRICS & GYNECOLOGY

## 2022-10-26 PROCEDURE — 99214 OFFICE O/P EST MOD 30 MIN: CPT | Mod: PBBFAC | Performed by: OBSTETRICS & GYNECOLOGY

## 2022-10-26 PROCEDURE — 99396 PR PREVENTIVE VISIT,EST,40-64: ICD-10-PCS | Mod: S$PBB,,, | Performed by: OBSTETRICS & GYNECOLOGY

## 2022-11-01 NOTE — PROGRESS NOTES
Craey Leonardo female  for   Chief Complaint   Patient presents with    Annual Exam     Cu/pap    breast tenderness      OB History          1    Para   1    Term   1            AB        Living   1         SAB        IAB        Ectopic        Multiple        Live Births                        Past Medical History:   Diagnosis Date    Asthma     CHF (congestive heart failure)     Chronic pain syndrome     Depressive disorder     Diabetes mellitus, type 2     Enlarged heart     Gastroparesis     GERD (gastroesophageal reflux disease)     Hyperlipidemia     Hypertension     IBS (irritable bowel syndrome)     Kidney stones     Lumbar radiculopathy     Sleep apnea     Does not use a CPAP    Thyroid disease       Past Surgical History:   Procedure Laterality Date    Bilateral L3-S1 MBB Bilateral 2017, 2017, 2014    Dr Jessica Randolph    CARPAL TUNNEL RELEASE      CHOLECYSTECTOMY      DIAGNOSTIC LAPAROSCOPY  2010    Exploratory Laparotomy, Myomectomy, Hydrotubation-Dr. Feng    DILATION AND CURETTAGE OF UTERUS  2010    Hysteroscopy-Dr. Feng    EXPLORATORY LAPAROTOMY WITH UTERINE MYOMECTOMY  2007    Dr. Marquise Anderson    HYSTERECTOMY  2011    Robotic, FABIENNE, Cystoscopy-Dr. Feng    HYSTEROSCOPY WITH DILATION AND CURETTAGE OF UTERUS  2006    Laparoscopy, Chromotubation-Dr. Marquise Adnerson    LAMINECTOMY N/A 2021    Procedure: L2-L5 Laminectomy;  Surgeon: Cyril Upton MD;  Location: [facility];  Service: Neurosurgery;  Laterality: N/A;    LEFT HEART CATHETERIZATION      Left L3-S1 RFTC Left 2017    Dr Lopez    Left SI JI Left 2013    Dr Randolph    OOPHORECTOMY  2014    Robotic, FABIENNE, Cystoscopy-Dr. eFng    SINUS SURGERY        Review of patient's allergies indicates:   Allergen Reactions    Fish containing products Anaphylaxis    Iodinated contrast media     Penicillins              Physical exam:     General Appearance:  healthy    Chest:chest clear, no wheezing, rales, normal symmetric air entry, Heart exam - S1, S2 normal, no murmur, no gallop, rate regular    Breasts: Normal appearance. No masses palpated.    Abdomen:Normal, benign.    Pelvic:   Vulva: normal  Uterus:  surgically absent  Adnexa:  surgically absent  Rectal: Confirmatory. Hemoccult of the stool isnegative    Extremity:normal    Skin: normal exam        Assessment:   Problem List Items Addressed This Visit    None  Visit Diagnoses       Women's annual routine gynecological examination    -  Primary    Special screening for malignant neoplasms, vagina        Breast tenderness in female                 Plan:  The patient had a mammogram this month.  She had a colonoscopy 3-5 years ago.  A Pap smear was done today.  She was started on caffeine restriction because of her mastodynia.

## 2022-11-02 ENCOUNTER — OFFICE VISIT (OUTPATIENT)
Dept: FAMILY MEDICINE | Facility: CLINIC | Age: 47
End: 2022-11-02
Payer: OTHER GOVERNMENT

## 2022-11-02 VITALS
BODY MASS INDEX: 33.75 KG/M2 | HEIGHT: 66 IN | OXYGEN SATURATION: 98 % | DIASTOLIC BLOOD PRESSURE: 81 MMHG | HEART RATE: 82 BPM | SYSTOLIC BLOOD PRESSURE: 117 MMHG | WEIGHT: 210 LBS | TEMPERATURE: 98 F

## 2022-11-02 DIAGNOSIS — Z91.09 ENVIRONMENTAL ALLERGIES: ICD-10-CM

## 2022-11-02 DIAGNOSIS — I10 HYPERTENSION, UNSPECIFIED TYPE: ICD-10-CM

## 2022-11-02 DIAGNOSIS — E11.9 TYPE 2 DIABETES MELLITUS WITHOUT COMPLICATION, WITHOUT LONG-TERM CURRENT USE OF INSULIN: ICD-10-CM

## 2022-11-02 DIAGNOSIS — E03.9 HYPOTHYROIDISM, UNSPECIFIED TYPE: Primary | ICD-10-CM

## 2022-11-02 LAB
ALBUMIN SERPL BCP-MCNC: 3.3 G/DL (ref 3.5–5)
ALBUMIN/GLOB SERPL: 0.8 {RATIO}
ALP SERPL-CCNC: 137 U/L (ref 39–100)
ALT SERPL W P-5'-P-CCNC: 32 U/L (ref 13–56)
ANION GAP SERPL CALCULATED.3IONS-SCNC: 9 MMOL/L (ref 7–16)
AST SERPL W P-5'-P-CCNC: 21 U/L (ref 15–37)
BILIRUB SERPL-MCNC: 0.2 MG/DL (ref ?–1.2)
BUN SERPL-MCNC: 9 MG/DL (ref 7–18)
BUN/CREAT SERPL: 13 (ref 6–20)
CALCIUM SERPL-MCNC: 8.9 MG/DL (ref 8.5–10.1)
CHLORIDE SERPL-SCNC: 106 MMOL/L (ref 98–107)
CO2 SERPL-SCNC: 31 MMOL/L (ref 21–32)
CREAT SERPL-MCNC: 0.71 MG/DL (ref 0.55–1.02)
EGFR (NO RACE VARIABLE) (RUSH/TITUS): 106 ML/MIN/1.73M²
EST. AVERAGE GLUCOSE BLD GHB EST-MCNC: 180 MG/DL
GLOBULIN SER-MCNC: 4.1 G/DL (ref 2–4)
GLUCOSE SERPL-MCNC: 217 MG/DL (ref 74–106)
HBA1C MFR BLD HPLC: 8 % (ref 4.5–6.6)
POTASSIUM SERPL-SCNC: 3.6 MMOL/L (ref 3.5–5.1)
PROT SERPL-MCNC: 7.4 G/DL (ref 6.4–8.2)
SODIUM SERPL-SCNC: 142 MMOL/L (ref 136–145)
T3 SERPL-MCNC: 125 NG/DL (ref 60–181)
T4 FREE SERPL-MCNC: 1.19 NG/DL (ref 0.76–1.46)
TSH SERPL DL<=0.005 MIU/L-ACNC: 0.35 UIU/ML (ref 0.36–3.74)

## 2022-11-02 PROCEDURE — 84439 ASSAY OF FREE THYROXINE: CPT | Mod: ,,, | Performed by: CLINICAL MEDICAL LABORATORY

## 2022-11-02 PROCEDURE — 84480 ASSAY TRIIODOTHYRONINE (T3): CPT | Mod: ,,, | Performed by: CLINICAL MEDICAL LABORATORY

## 2022-11-02 PROCEDURE — 80053 COMPREHENSIVE METABOLIC PANEL: ICD-10-PCS | Mod: ,,, | Performed by: CLINICAL MEDICAL LABORATORY

## 2022-11-02 PROCEDURE — 99214 PR OFFICE/OUTPT VISIT, EST, LEVL IV, 30-39 MIN: ICD-10-PCS | Mod: ,,, | Performed by: NURSE PRACTITIONER

## 2022-11-02 PROCEDURE — 84480 T3: ICD-10-PCS | Mod: ,,, | Performed by: CLINICAL MEDICAL LABORATORY

## 2022-11-02 PROCEDURE — 80053 COMPREHEN METABOLIC PANEL: CPT | Mod: ,,, | Performed by: CLINICAL MEDICAL LABORATORY

## 2022-11-02 PROCEDURE — 84443 TSH: ICD-10-PCS | Mod: ,,, | Performed by: CLINICAL MEDICAL LABORATORY

## 2022-11-02 PROCEDURE — 99214 OFFICE O/P EST MOD 30 MIN: CPT | Mod: ,,, | Performed by: NURSE PRACTITIONER

## 2022-11-02 PROCEDURE — 83036 HEMOGLOBIN GLYCOSYLATED A1C: CPT | Mod: ,,, | Performed by: CLINICAL MEDICAL LABORATORY

## 2022-11-02 PROCEDURE — 84443 ASSAY THYROID STIM HORMONE: CPT | Mod: ,,, | Performed by: CLINICAL MEDICAL LABORATORY

## 2022-11-02 PROCEDURE — 83036 HEMOGLOBIN A1C: ICD-10-PCS | Mod: ,,, | Performed by: CLINICAL MEDICAL LABORATORY

## 2022-11-02 PROCEDURE — 84439 T4, FREE: ICD-10-PCS | Mod: ,,, | Performed by: CLINICAL MEDICAL LABORATORY

## 2022-11-02 RX ORDER — ALBUTEROL SULFATE 90 UG/1
2 AEROSOL, METERED RESPIRATORY (INHALATION) EVERY 6 HOURS PRN
Qty: 18 G | Refills: 0 | Status: SHIPPED | OUTPATIENT
Start: 2022-11-02 | End: 2023-07-19 | Stop reason: SDUPTHER

## 2022-11-02 NOTE — PROGRESS NOTES
[facility]          Chief Complaint        Chief Complaint   Patient presents with    Follow-up     2m follow up             History of Present Illness           Carey Leonardo is a 47 y.o. female with chronic conditions of hypothyroidism, DM2, HTN, HLD, GERD, and depression who presents today for routine follow up. The pt's last A1c in August this year was 12.2.  She was high on her TSH at over 7 which is an improvement from 37.  Pt states she still has issues with fatigue and generalized body aches.            Past Medical History:     Past Medical History:   Diagnosis Date    Asthma     CHF (congestive heart failure)     Chronic pain syndrome     Depressive disorder     Diabetes mellitus, type 2     Enlarged heart     Gastroparesis     GERD (gastroesophageal reflux disease)     Hyperlipidemia     Hypertension     IBS (irritable bowel syndrome)     Kidney stones     Lumbar radiculopathy     Sleep apnea     Does not use a CPAP    Thyroid disease              Past Surgical History:      has a past surgical history that includes Hysterectomy (09/29/2011); Oophorectomy (11/11/2014); Dilation and curettage of uterus (04/14/2010); Diagnostic laparoscopy (04/14/2010); Exploratory laparotomy with uterine myomectomy (02/27/2007); Hysteroscopy with dilation and curettage of uterus (04/04/2006); Carpal tunnel release; Cholecystectomy; Bilateral L3-S1 MBB (Bilateral, 6-, 5-, 1-8-2014); Left heart catheterization; Left L3-S1 RFTC (Left, 07/18/2017); Left SI JI (Left, 09/30/2013); Sinus surgery; and Laminectomy (N/A, 11/30/2021).          Social History:     Social History     Tobacco Use    Smoking status: Never    Smokeless tobacco: Never   Substance Use Topics    Alcohol use: Not Currently    Drug use: Not Currently     Types: Hydrocodone, Oxycodone             I personally reviewed all past medical, surgical, and social.           Review of Systems   Constitutional: Negative.    HENT: Negative.      Eyes: Negative.    Respiratory: Negative.     Cardiovascular: Negative.    Gastrointestinal: Negative.    Endocrine: Negative.    Genitourinary: Negative.    Musculoskeletal: Negative.    Skin: Negative.    Allergic/Immunologic: Negative.    Neurological: Negative.    Psychiatric/Behavioral: Negative.              Medications:     Outpatient Encounter Medications as of 11/2/2022   Medication Sig Dispense Refill    atorvastatin (LIPITOR) 20 MG tablet Take 1 tablet (20 mg total) by mouth every evening. 90 tablet 3    cetirizine (ZYRTEC) 10 MG tablet Take 1 tablet (10 mg total) by mouth once daily. 30 tablet 0    ciprofloxacin-dexamethasone 0.3-0.1% (CIPRODEX) 0.3-0.1 % DrpS Place 4 drops into the right ear 2 (two) times daily. 7.5 mL 1    cloNIDine (CATAPRES) 0.2 MG tablet TAKE 1 TABLET FOUR TIMES A  tablet 3    cyclobenzaprine (FLEXERIL) 10 MG tablet Take 1 tablet (10 mg total) by mouth 3 (three) times daily as needed for Muscle spasms. 90 tablet 1    dulaglutide (TRULICITY) 3 mg/0.5 mL pen injector Inject 3 mg into the skin every 7 days. 12 pen 3    gabapentin (NEURONTIN) 300 MG capsule Take 1 capsule (300 mg total) by mouth 3 (three) times daily. 90 capsule 11    glipizide-metformin (METAGLIP) 5-500 mg per tablet Take 1 tablet by mouth 2 (two) times daily before meals. 180 tablet 3    hydrALAZINE (APRESOLINE) 100 MG tablet TAKE 1 TABLET BY MOUTH 3 TIMES DAILY 270 tablet 1    levothyroxine (SYNTHROID) 200 MCG tablet Take 1 tablet (200 mcg total) by mouth before breakfast. 90 tablet 1    methylphenidate HCl (RITALIN) 20 MG tablet Take 20 mg by mouth 2 (two) times daily.      metoprolol succinate (TOPROL-XL) 100 MG 24 hr tablet Take 1 tablet (100 mg total) by mouth once daily. 90 tablet 3    NIFEdipine (PROCARDIA-XL) 60 MG (OSM) 24 hr tablet TAKE 1 TABLET BY MOUTH EVERY DAY FOR HIGH BLOOD PRESSURE  Strength: 60 mg 90 tablet 1    risperiDONE (RISPERDAL) 0.25 MG Tab Take 0.25-0.5 mg by mouth 2 (two) times daily  "as needed.      sertraline (ZOLOFT) 100 MG tablet Take 1 tablet (100 mg total) by mouth once daily. 90 tablet 3    traMADoL (ULTRAM) 50 mg tablet Take 1 tablet (50 mg total) by mouth every 12 (twelve) hours as needed for Pain. 45 tablet 1    traZODone (DESYREL) 100 MG tablet Take 100 mg by mouth nightly.      venlafaxine (EFFEXOR-XR) 150 MG Cp24 TAKE 1 CAPSULE BY MOUTH EVERY DAY WITH FOOD 90 capsule 0     No facility-administered encounter medications on file as of 11/2/2022.             Allergies:     Review of patient's allergies indicates:   Allergen Reactions    Fish containing products Anaphylaxis    Iodinated contrast media     Penicillins              Health Maintenance:     Immunization History   Administered Date(s) Administered    Influenza - Quadrivalent - PF *Preferred* (6 months and older) 10/18/2022         Health Maintenance   Topic Date Due    Lipid Panel  Never done    Foot Exam  Never done    Eye Exam  Never done    TETANUS VACCINE  Never done    Hemoglobin A1c  11/02/2022    Low Dose Statin  06/27/2023    Mammogram  10/06/2023    Hepatitis C Screening  Completed              Physical Exam           Vital Signs  Temp: 98 °F (36.7 °C)  Temp src: Oral  Pulse: 82  SpO2: 98 %  BP: 117/81  BP Location: Left arm  Patient Position: Sitting  Height and Weight  Height: 5' 6" (167.6 cm)  Weight: 95.3 kg (210 lb)  BSA (Calculated - sq m): 2.11 sq meters  BMI (Calculated): 33.9  Weight in (lb) to have BMI = 25: 154.6]          Physical Exam  Constitutional:       Appearance: Normal appearance.   HENT:      Head: Normocephalic.      Right Ear: External ear normal.      Left Ear: External ear normal.      Nose: Nose normal.      Mouth/Throat:      Pharynx: Oropharynx is clear.   Eyes:      Conjunctiva/sclera: Conjunctivae normal.   Cardiovascular:      Rate and Rhythm: Normal rate.   Pulmonary:      Effort: Pulmonary effort is normal.   Abdominal:      General: Abdomen is flat.      Palpations: Abdomen is soft. "   Musculoskeletal:         General: No swelling or tenderness. Normal range of motion.      Cervical back: Neck supple.      Right lower leg: No edema.      Left lower leg: No edema.   Skin:     General: Skin is warm.      Coloration: Skin is not jaundiced or pale.      Findings: No bruising, erythema or rash.   Neurological:      General: No focal deficit present.      Mental Status: She is alert and oriented to person, place, and time. Mental status is at baseline.      Cranial Nerves: No cranial nerve deficit.      Sensory: No sensory deficit.      Motor: No weakness.      Gait: Gait normal.   Psychiatric:         Mood and Affect: Mood normal.         Behavior: Behavior normal.         Thought Content: Thought content normal.         Judgment: Judgment normal.              Laboratory:     CBC:     Recent Labs   Lab 05/08/22  1039 06/15/22  0943 08/11/22  1013 08/11/22  1024   WBC 7.52 4.85  --  7.74   RBC 4.77 4.31  --  4.68   Hemoglobin 12.8 11.9 L  --  13.1   POC Hematocrit  --   --    < >  --    Hematocrit 41.1 37.9 L  --  41.0   Platelet Count 394 346  --  427 H   MCV 86.2 87.9  --  87.6   MCH 26.8 L 27.6  --  28.0   MCHC 31.1 L 31.4 L  --  32.0    < > = values in this interval not displayed.        CMP:     Recent Labs   Lab 05/08/22  1039 06/15/22  0943 08/11/22  1024   Glucose 360 H 432 H 317 H   Calcium 9.5 8.8 8.4 L   Albumin 3.8 3.3 L 3.8   Total Protein 8.2 7.1  7.0 7.7   Sodium 135 L 137 137   Potassium 3.1 L 3.6 2.8 L   CO2 29 32 25   Chloride 97 L 98 98   BUN 9 4 L 15   Alk Phos 168 H 145 H 146 H   ALT 49 25 26   AST 39 H 15 15   Bilirubin, Total 0.4 0.3 0.4        LIPIDS:     Recent Labs   Lab 02/25/22  0850 03/22/22  0917 08/02/22  1428   TSH 40.100 H 37.400 H 7.730 H        TSH:     Recent Labs   Lab 02/25/22  0850 03/22/22  0917 08/02/22  1428   TSH 40.100 H 37.400 H 7.730 H        A1C:     Recent Labs   Lab 05/14/21  0938 08/30/21  1017 11/15/21  1049 11/23/21  1346 02/03/22  0910  08/02/22  1428   Hemoglobin A1C 10.7 H 9.1 H 7.6 H 7.6 H 7.5 H 12.6 H             Assessment/Plan          Carey Leonardo is a 47 y.o.female with:           1. Hypothyroidism, unspecified type  - TSH; Future  - T4, Free; Future  - Comprehensive Metabolic Panel; Future  - TSH; Future  - T4, Free; Future  - T3; Future  - TSH  - T4, Free  - Comprehensive Metabolic Panel  - T3    2. Hypertension, unspecified type  - Comprehensive Metabolic Panel; Future  - Comprehensive Metabolic Panel    3. Type 2 diabetes mellitus without complication, without long-term current use of insulin  - Hemoglobin A1C; Future  - Comprehensive Metabolic Panel; Future  - Hemoglobin A1C  - Comprehensive Metabolic Panel      -check labs today; possible adjustments needed       Total time spent face-to-face and non-face-to-face coordinating care for this encounter was: 25 min          Chronic conditions status updated as per HPI.  Other than changes above, cont current medications and maintain follow up with specialists.  Return to clinic in 3 months.          ROGELIO Reynoso-C     [facility]

## 2022-11-02 NOTE — PROGRESS NOTES
Let pt know her a1c is better; it's 8.0 this time down from 12.6 last time.  Her labs overall are good. The TSH is just a tad on the low side so almost completely normal.  Let's check it again in 8 weeks.

## 2022-11-09 ENCOUNTER — TELEPHONE (OUTPATIENT)
Dept: FAMILY MEDICINE | Facility: CLINIC | Age: 47
End: 2022-11-09
Payer: OTHER GOVERNMENT

## 2022-11-10 NOTE — TELEPHONE ENCOUNTER
----- Message from Jeet Lancaster NP sent at 11/2/2022  5:38 PM CDT -----  Let pt know her a1c is better; it's 8.0 this time down from 12.6 last time.  Her labs overall are good. The TSH is just a tad on the low side so almost completely normal.  Let's check it again in 8 weeks.

## 2022-11-11 ENCOUNTER — OFFICE VISIT (OUTPATIENT)
Dept: FAMILY MEDICINE | Facility: CLINIC | Age: 47
End: 2022-11-11
Payer: OTHER GOVERNMENT

## 2022-11-11 VITALS
BODY MASS INDEX: 33.91 KG/M2 | HEART RATE: 89 BPM | DIASTOLIC BLOOD PRESSURE: 90 MMHG | OXYGEN SATURATION: 99 % | SYSTOLIC BLOOD PRESSURE: 148 MMHG | WEIGHT: 211 LBS | TEMPERATURE: 98 F | HEIGHT: 66 IN

## 2022-11-11 DIAGNOSIS — J00 NASOPHARYNGITIS: Primary | ICD-10-CM

## 2022-11-11 PROCEDURE — 99213 PR OFFICE/OUTPT VISIT, EST, LEVL III, 20-29 MIN: ICD-10-PCS | Mod: 25,,, | Performed by: NURSE PRACTITIONER

## 2022-11-11 PROCEDURE — 96372 THER/PROPH/DIAG INJ SC/IM: CPT | Mod: ,,, | Performed by: NURSE PRACTITIONER

## 2022-11-11 PROCEDURE — 96372 PR INJECTION,THERAP/PROPH/DIAG2ST, IM OR SUBCUT: ICD-10-PCS | Mod: ,,, | Performed by: NURSE PRACTITIONER

## 2022-11-11 PROCEDURE — 99213 OFFICE O/P EST LOW 20 MIN: CPT | Mod: 25,,, | Performed by: NURSE PRACTITIONER

## 2022-11-11 RX ORDER — AZITHROMYCIN 250 MG/1
TABLET, FILM COATED ORAL
Qty: 6 TABLET | Refills: 0 | Status: SHIPPED | OUTPATIENT
Start: 2022-11-11 | End: 2023-01-10

## 2022-11-11 RX ORDER — DEXAMETHASONE SODIUM PHOSPHATE 4 MG/ML
8 INJECTION, SOLUTION INTRA-ARTICULAR; INTRALESIONAL; INTRAMUSCULAR; INTRAVENOUS; SOFT TISSUE
Status: COMPLETED | OUTPATIENT
Start: 2022-11-11 | End: 2022-11-11

## 2022-11-11 RX ORDER — AZITHROMYCIN 250 MG/1
TABLET, FILM COATED ORAL
Qty: 6 TABLET | Refills: 0 | Status: SHIPPED | OUTPATIENT
Start: 2022-11-11 | End: 2022-11-11

## 2022-11-11 RX ADMIN — DEXAMETHASONE SODIUM PHOSPHATE 8 MG: 4 INJECTION, SOLUTION INTRA-ARTICULAR; INTRALESIONAL; INTRAMUSCULAR; INTRAVENOUS; SOFT TISSUE at 10:11

## 2022-11-11 NOTE — PROGRESS NOTES
Subjective:       Patient ID: Shataluzmaria Leonardo is a 47 y.o. female.    Chief Complaint: Sinus    Pt presents today for sinus congestion for the past 2 days.    Review of Systems   Constitutional:  Positive for fatigue.   HENT:  Positive for nasal congestion.    Eyes:  Positive for itching.   Respiratory:  Positive for cough.    Cardiovascular: Negative.    Gastrointestinal: Negative.    Endocrine: Negative.    Genitourinary: Negative.    Musculoskeletal: Negative.    Integumentary:  Negative.   Allergic/Immunologic: Negative.    Neurological: Negative.    Hematological: Negative.    Psychiatric/Behavioral: Negative.         Objective:      Physical Exam  Vitals and nursing note reviewed.   Constitutional:       General: She is not in acute distress.     Appearance: Normal appearance. She is not ill-appearing.   HENT:      Head: Normocephalic.      Right Ear: External ear normal.      Left Ear: External ear normal.      Nose: Congestion and rhinorrhea present.      Mouth/Throat:      Mouth: Mucous membranes are moist.   Eyes:      Pupils: Pupils are equal, round, and reactive to light.   Cardiovascular:      Rate and Rhythm: Normal rate and regular rhythm.      Pulses: Normal pulses.      Heart sounds: Normal heart sounds. No murmur heard.    No friction rub. No gallop.   Pulmonary:      Effort: Pulmonary effort is normal. No respiratory distress.      Breath sounds: Normal breath sounds. No stridor. No wheezing, rhonchi or rales.   Abdominal:      General: There is no distension.      Palpations: Abdomen is soft.   Musculoskeletal:         General: Normal range of motion.      Cervical back: Normal range of motion and neck supple. No rigidity or tenderness.   Skin:     General: Skin is warm and dry.      Coloration: Skin is not jaundiced or pale.   Neurological:      General: No focal deficit present.      Mental Status: She is alert and oriented to person, place, and time. Mental status is at baseline.      Cranial  Nerves: No cranial nerve deficit.      Sensory: No sensory deficit.      Motor: No weakness.      Coordination: Coordination normal.      Gait: Gait normal.   Psychiatric:         Mood and Affect: Mood normal.         Behavior: Behavior normal.         Thought Content: Thought content normal.         Judgment: Judgment normal.       Assessment:       Problem List Items Addressed This Visit    None  Visit Diagnoses       Nasopharyngitis    -  Primary    Relevant Medications    azithromycin (Z-DANIEL) 250 MG tablet              Plan:       Treat s/s first for the next 4-5 days before starting azithromycin if not improving.

## 2022-11-18 ENCOUNTER — TELEPHONE (OUTPATIENT)
Dept: FAMILY MEDICINE | Facility: CLINIC | Age: 47
End: 2022-11-18
Payer: OTHER GOVERNMENT

## 2022-11-18 RX ORDER — DULAGLUTIDE 4.5 MG/.5ML
4.5 INJECTION, SOLUTION SUBCUTANEOUS
Qty: 4 PEN | Refills: 11 | Status: SHIPPED | OUTPATIENT
Start: 2022-11-18 | End: 2023-03-21 | Stop reason: SDUPTHER

## 2022-11-18 NOTE — TELEPHONE ENCOUNTER
----- Message from Jeet Lancaster NP sent at 11/18/2022  2:12 PM CST -----  Ok I sent 4.5mg; so I don't get why they dont' have the 3mg; is it that it's out of stock or what?  ----- Message -----  From: Yoseph Boudreaux LPN  Sent: 11/18/2022   1:57 PM CST  To: Jeet Lancaster NP      ----- Message -----  From: Yani Hernández  Sent: 11/18/2022  10:25 AM CST  To: Tonny Marcano Staff    Patient was told that by the pharmacy that the dulaglutide (TRULICITY) 3 mg/0.5 mL pen injector she can't get it at that dosage. She was told she needs to get something else called in at that dose at [facility] [address]    [phone]

## 2022-11-18 NOTE — TELEPHONE ENCOUNTER
Spoke to patient she stated pharmacy do have 3mg in stock. Voiced to patient a new rx was sent 4.5 to [facility]. Understood. No questions or concerns.

## 2023-01-10 ENCOUNTER — OFFICE VISIT (OUTPATIENT)
Dept: FAMILY MEDICINE | Facility: CLINIC | Age: 48
End: 2023-01-10
Payer: OTHER GOVERNMENT

## 2023-01-10 VITALS
TEMPERATURE: 98 F | BODY MASS INDEX: 32.96 KG/M2 | SYSTOLIC BLOOD PRESSURE: 130 MMHG | HEIGHT: 67 IN | OXYGEN SATURATION: 99 % | DIASTOLIC BLOOD PRESSURE: 86 MMHG | WEIGHT: 210 LBS | HEART RATE: 101 BPM

## 2023-01-10 DIAGNOSIS — Z12.12 SCREENING FOR COLORECTAL CANCER: ICD-10-CM

## 2023-01-10 DIAGNOSIS — I10 HYPERTENSION, UNSPECIFIED TYPE: ICD-10-CM

## 2023-01-10 DIAGNOSIS — E89.0 POSTOPERATIVE HYPOTHYROIDISM: Primary | ICD-10-CM

## 2023-01-10 DIAGNOSIS — Z12.11 SCREENING FOR COLORECTAL CANCER: ICD-10-CM

## 2023-01-10 LAB — TSH SERPL DL<=0.005 MIU/L-ACNC: 0.04 UIU/ML (ref 0.36–3.74)

## 2023-01-10 PROCEDURE — 84443 TSH: ICD-10-PCS | Mod: ,,, | Performed by: CLINICAL MEDICAL LABORATORY

## 2023-01-10 PROCEDURE — 99214 PR OFFICE/OUTPT VISIT, EST, LEVL IV, 30-39 MIN: ICD-10-PCS | Mod: ,,, | Performed by: NURSE PRACTITIONER

## 2023-01-10 PROCEDURE — 99214 OFFICE O/P EST MOD 30 MIN: CPT | Mod: ,,, | Performed by: NURSE PRACTITIONER

## 2023-01-10 PROCEDURE — 84443 ASSAY THYROID STIM HORMONE: CPT | Mod: ,,, | Performed by: CLINICAL MEDICAL LABORATORY

## 2023-01-10 RX ORDER — NIFEDIPINE 60 MG/1
TABLET, EXTENDED RELEASE ORAL
Qty: 90 TABLET | Refills: 1 | Status: SHIPPED | OUTPATIENT
Start: 2023-01-10 | End: 2023-07-19 | Stop reason: SDUPTHER

## 2023-01-10 RX ORDER — RISPERIDONE 0.25 MG/1
.25-.5 TABLET ORAL 2 TIMES DAILY PRN
Qty: 90 TABLET | Refills: 0 | Status: SHIPPED | OUTPATIENT
Start: 2023-01-10 | End: 2023-07-19

## 2023-01-10 RX ORDER — VENLAFAXINE HYDROCHLORIDE 150 MG/1
CAPSULE, EXTENDED RELEASE ORAL
Qty: 90 CAPSULE | Refills: 0 | Status: SHIPPED | OUTPATIENT
Start: 2023-01-10 | End: 2023-03-21 | Stop reason: SDUPTHER

## 2023-01-10 NOTE — PROGRESS NOTES
[facility] Medicine    Chief Complaint      Chief Complaint   Patient presents with    Establish Care     History of Present Illness      Carey Leonardo is a 47 y.o. female with chronic conditions of hypertension, T2DM, and chronic pain syndrome who presents today for establishment of care.    Past Medical History:  Past Medical History:   Diagnosis Date    Asthma     CHF (congestive heart failure)     Chronic pain syndrome     Depressive disorder     Diabetes mellitus, type 2     Enlarged heart     Gastroparesis     GERD (gastroesophageal reflux disease)     Hyperlipidemia     Hypertension     IBS (irritable bowel syndrome)     Kidney stones     Lumbar radiculopathy     Sleep apnea     Does not use a CPAP    Thyroid disease      Past Surgical History:   has a past surgical history that includes Hysterectomy (09/29/2011); Oophorectomy (11/11/2014); Dilation and curettage of uterus (04/14/2010); Diagnostic laparoscopy (04/14/2010); Exploratory laparotomy with uterine myomectomy (02/27/2007); Hysteroscopy with dilation and curettage of uterus (04/04/2006); Carpal tunnel release; Cholecystectomy; Bilateral L3-S1 MBB (Bilateral, 6-, 5-, 1-8-2014); Left heart catheterization; Left L3-S1 RFTC (Left, 07/18/2017); Left SI JI (Left, 09/30/2013); Sinus surgery; and Laminectomy (N/A, 11/30/2021).    Social History:  Social History     Tobacco Use    Smoking status: Never    Smokeless tobacco: Never   Substance Use Topics    Alcohol use: Not Currently    Drug use: Not Currently     Types: Hydrocodone, Oxycodone     I personally reviewed all past medical, surgical, and social.     Review of Systems   Constitutional:  Negative for chills, fever and malaise/fatigue.   HENT:  Negative for congestion, ear pain and sore throat.    Eyes:  Negative for blurred vision and double vision.   Respiratory:  Negative for cough and shortness of breath.    Cardiovascular:  Negative for chest pain, palpitations and leg swelling.    Gastrointestinal:  Negative for abdominal pain, nausea and vomiting.   Genitourinary:  Negative for dysuria, frequency and urgency.   Musculoskeletal:  Negative for myalgias.   Skin:  Negative for itching and rash.   Neurological:  Negative for dizziness, weakness and headaches.   Endo/Heme/Allergies:  Does not bruise/bleed easily.   Psychiatric/Behavioral:  Negative for suicidal ideas.       Medications:  Outpatient Encounter Medications as of 1/10/2023   Medication Sig Dispense Refill    albuterol (VENTOLIN HFA) 90 mcg/actuation inhaler Inhale 2 puffs into the lungs every 6 (six) hours as needed for Wheezing. Rescue 18 g 0    atorvastatin (LIPITOR) 20 MG tablet Take 1 tablet (20 mg total) by mouth every evening. 90 tablet 3    cloNIDine (CATAPRES) 0.2 MG tablet TAKE 1 TABLET FOUR TIMES A  tablet 3    dulaglutide (TRULICITY) 4.5 mg/0.5 mL pen injector Inject 4.5 mg into the skin every 7 days. 4 pen 11    gabapentin (NEURONTIN) 300 MG capsule Take 1 capsule (300 mg total) by mouth 3 (three) times daily. 90 capsule 11    glipizide-metformin (METAGLIP) 5-500 mg per tablet Take 1 tablet by mouth 2 (two) times daily before meals. 180 tablet 3    hydrALAZINE (APRESOLINE) 100 MG tablet TAKE 1 TABLET BY MOUTH 3 TIMES DAILY 270 tablet 1    levothyroxine (SYNTHROID) 200 MCG tablet Take 1 tablet (200 mcg total) by mouth before breakfast. 90 tablet 1    methylphenidate HCl (RITALIN) 20 MG tablet Take 20 mg by mouth 2 (two) times daily.      metoprolol succinate (TOPROL-XL) 100 MG 24 hr tablet Take 1 tablet (100 mg total) by mouth once daily. 90 tablet 3    sertraline (ZOLOFT) 100 MG tablet Take 1 tablet (100 mg total) by mouth once daily. 90 tablet 3    [DISCONTINUED] NIFEdipine (PROCARDIA-XL) 60 MG (OSM) 24 hr tablet TAKE 1 TABLET BY MOUTH EVERY DAY FOR HIGH BLOOD PRESSURE  Strength: 60 mg 90 tablet 1    [DISCONTINUED] risperiDONE (RISPERDAL) 0.25 MG Tab Take 0.25-0.5 mg by mouth 2 (two) times daily as needed.       [DISCONTINUED] venlafaxine (EFFEXOR-XR) 150 MG Cp24 TAKE 1 CAPSULE BY MOUTH EVERY DAY WITH FOOD 90 capsule 0    cetirizine (ZYRTEC) 10 MG tablet Take 1 tablet (10 mg total) by mouth once daily. 30 tablet 0    NIFEdipine (PROCARDIA-XL) 60 MG (OSM) 24 hr tablet TAKE 1 TABLET BY MOUTH EVERY DAY FOR HIGH BLOOD PRESSURE  Strength: 60 mg 90 tablet 1    risperiDONE (RISPERDAL) 0.25 MG Tab Take 1-2 tablets (0.25-0.5 mg total) by mouth 2 (two) times daily as needed. 90 tablet 0    traZODone (DESYREL) 100 MG tablet Take 100 mg by mouth nightly.      venlafaxine (EFFEXOR-XR) 150 MG Cp24 TAKE 1 CAPSULE BY MOUTH EVERY DAY WITH FOOD 90 capsule 0    [DISCONTINUED] azithromycin (Z-DANIEL) 250 MG tablet TAKE TWO TABS PO ON DAY 1, THEN TAKE ONE TAB A DAY FOR 4 DAYS (Patient not taking: Reported on 1/10/2023) 6 tablet 0    [DISCONTINUED] ciprofloxacin-dexamethasone 0.3-0.1% (CIPRODEX) 0.3-0.1 % DrpS Place 4 drops into the right ear 2 (two) times daily. (Patient not taking: Reported on 1/10/2023) 7.5 mL 1    [DISCONTINUED] cyclobenzaprine (FLEXERIL) 10 MG tablet Take 1 tablet (10 mg total) by mouth 3 (three) times daily as needed for Muscle spasms. (Patient not taking: Reported on 1/10/2023) 90 tablet 1    [DISCONTINUED] traMADoL (ULTRAM) 50 mg tablet Take 1 tablet (50 mg total) by mouth every 12 (twelve) hours as needed for Pain. (Patient not taking: Reported on 1/10/2023) 45 tablet 1     No facility-administered encounter medications on file as of 1/10/2023.     Allergies:  Review of patient's allergies indicates:   Allergen Reactions    Fish containing products Anaphylaxis    Iodinated contrast media     Penicillins      Health Maintenance:  Immunization History   Administered Date(s) Administered    Influenza - Quadrivalent - PF *Preferred* (6 months and older) 10/18/2022      Health Maintenance   Topic Date Due    Foot Exam  Never done    Eye Exam  Never done    TETANUS VACCINE  Never done    Hemoglobin A1c  03/02/2023    Low Dose  "Statin  06/27/2023    Mammogram  10/06/2023    Lipid Panel  12/02/2023    Hepatitis C Screening  Completed      Physical Exam      Vital Signs  Temp: 98.4 °F (36.9 °C)  Temp src: Oral  Pulse: 101  SpO2: 99 %  BP: 130/86  BP Location: Right arm  Patient Position: Sitting  Height and Weight  Height: 5' 7" (170.2 cm)  Weight: 95.3 kg (210 lb)  BSA (Calculated - sq m): 2.12 sq meters  BMI (Calculated): 32.9  Weight in (lb) to have BMI = 25: 159.3]    Physical Exam  Vitals and nursing note reviewed.   Constitutional:       General: She is not in acute distress.     Appearance: Normal appearance. She is obese.   HENT:      Head: Normocephalic and atraumatic.      Right Ear: External ear normal.      Left Ear: External ear normal.      Nose: Nose normal.      Mouth/Throat:      Mouth: Mucous membranes are moist.   Eyes:      Conjunctiva/sclera: Conjunctivae normal.   Cardiovascular:      Rate and Rhythm: Normal rate and regular rhythm.      Heart sounds: Normal heart sounds. No murmur heard.  Pulmonary:      Effort: Pulmonary effort is normal. No respiratory distress.      Breath sounds: Normal breath sounds.   Musculoskeletal:         General: Normal range of motion.   Skin:     Findings: No rash.   Neurological:      Mental Status: She is alert and oriented to person, place, and time.      Gait: Gait normal.   Psychiatric:         Mood and Affect: Mood normal.         Behavior: Behavior normal.         Thought Content: Thought content normal.         Judgment: Judgment normal.      Laboratory:  CBC:  Recent Labs   Lab 05/08/22  1039 06/15/22  0943 08/11/22  1013 08/11/22  1024   WBC 7.52 4.85  --  7.74   RBC 4.77 4.31  --  4.68   Hemoglobin 12.8 11.9 L  --  13.1   POC Hematocrit  --   --    < >  --    Hematocrit 41.1 37.9 L  --  41.0   Platelet Count 394 346  --  427 H   MCV 86.2 87.9  --  87.6   MCH 26.8 L 27.6  --  28.0   MCHC 31.1 L 31.4 L  --  32.0    < > = values in this interval not displayed.     CMP:  Recent Labs "   Lab 08/11/22  1024 11/02/22  1013 12/02/22  1436   Glucose 317 H 217 H 168 H   Calcium 8.4 L 8.9 9.2   Albumin 3.8 3.3 L 3.6   Total Protein 7.7 7.4 7.6   Sodium 137 142 142   Potassium 2.8 L 3.6 3.4 L   CO2 25 31 30   Chloride 98 106 105   BUN 15 9 8   Alk Phos 146 H 137 H 142 H   ALT 26 32 26   AST 15 21 15   Bilirubin, Total 0.4 0.2 0.3     LIPIDS:  Recent Labs   Lab 08/02/22  1428 11/02/22  1013 12/02/22  1436   TSH 7.730 H 0.346 L 18.400 H   HDL Cholesterol  --   --  44   Cholesterol  --   --  231 H   Triglycerides  --   --  265 H   LDL Calculated  --   --  134   Cholesterol/HDL Ratio (Risk Factor)  --   --  5.3   Non-HDL  --   --  187     TSH:  Recent Labs   Lab 08/02/22  1428 11/02/22  1013 12/02/22  1436   TSH 7.730 H 0.346 L 18.400 H     A1C:  Recent Labs   Lab 05/14/21  0938 08/30/21  1017 11/15/21  1049 11/23/21  1346 02/03/22  0910 08/02/22  1428 11/02/22  1013 12/02/22  1436   Hemoglobin A1C 10.7 H 9.1 H 7.6 H 7.6 H 7.5 H 12.6 H 8.0 H 7.3 H       Assessment/Plan     Carey Leonardo is a 47 y.o.female with:    1. Postoperative hypothyroidism  - TSH; Future    2. Hypertension, unspecified type  - NIFEdipine (PROCARDIA-XL) 60 MG (OSM) 24 hr tablet; TAKE 1 TABLET BY MOUTH EVERY DAY FOR HIGH BLOOD PRESSURE  Strength: 60 mg  Dispense: 90 tablet; Refill: 1    3. Screening for colorectal cancer  - Ambulatory referral/consult to Gastroenterology; Future   -Call  to schedule appointment for ophthalmologist.    Chronic conditions status updated as per HPI.  Other than changes above, cont current medications and maintain follow up with specialists.  Return to clinic in 3 months.    Ursula Kapadia, JAMAALP  [facility]

## 2023-01-11 ENCOUNTER — TELEPHONE (OUTPATIENT)
Dept: FAMILY MEDICINE | Facility: CLINIC | Age: 48
End: 2023-01-11
Payer: OTHER GOVERNMENT

## 2023-01-11 DIAGNOSIS — Z12.11 SCREENING FOR COLORECTAL CANCER: Primary | ICD-10-CM

## 2023-01-11 DIAGNOSIS — Z12.12 SCREENING FOR COLORECTAL CANCER: Primary | ICD-10-CM

## 2023-01-11 NOTE — TELEPHONE ENCOUNTER
----- Message from ROGELIO Blancas sent at 1/10/2023  4:49 PM CST -----  Please contact the patient and let them know that their results were fine and do not require any change in treatment.

## 2023-01-13 ENCOUNTER — TELEPHONE (OUTPATIENT)
Dept: FAMILY MEDICINE | Facility: CLINIC | Age: 48
End: 2023-01-13
Payer: OTHER GOVERNMENT

## 2023-01-25 ENCOUNTER — OFFICE VISIT (OUTPATIENT)
Dept: FAMILY MEDICINE | Facility: CLINIC | Age: 48
End: 2023-01-25
Payer: OTHER GOVERNMENT

## 2023-01-25 VITALS
DIASTOLIC BLOOD PRESSURE: 80 MMHG | OXYGEN SATURATION: 96 % | WEIGHT: 214 LBS | SYSTOLIC BLOOD PRESSURE: 122 MMHG | HEIGHT: 67 IN | BODY MASS INDEX: 33.59 KG/M2 | TEMPERATURE: 98 F | HEART RATE: 94 BPM

## 2023-01-25 DIAGNOSIS — H66.91 RIGHT OTITIS MEDIA, UNSPECIFIED OTITIS MEDIA TYPE: Primary | ICD-10-CM

## 2023-01-25 DIAGNOSIS — H65.191 ACUTE MIDDLE EAR EFFUSION, RIGHT: ICD-10-CM

## 2023-01-25 DIAGNOSIS — Z20.822 CONTACT WITH AND (SUSPECTED) EXPOSURE TO COVID-19: ICD-10-CM

## 2023-01-25 PROCEDURE — 99213 PR OFFICE/OUTPT VISIT, EST, LEVL III, 20-29 MIN: ICD-10-PCS | Mod: ,,, | Performed by: NURSE PRACTITIONER

## 2023-01-25 PROCEDURE — 99213 OFFICE O/P EST LOW 20 MIN: CPT | Mod: ,,, | Performed by: NURSE PRACTITIONER

## 2023-01-25 PROCEDURE — 87428 POCT SARS-COV2 (COVID) WITH FLU ANTIGEN: ICD-10-PCS | Mod: QW,,, | Performed by: NURSE PRACTITIONER

## 2023-01-25 PROCEDURE — 87428 SARSCOV & INF VIR A&B AG IA: CPT | Mod: QW,,, | Performed by: NURSE PRACTITIONER

## 2023-01-25 RX ORDER — IPRATROPIUM BROMIDE 21 UG/1
2 SPRAY, METERED NASAL 2 TIMES DAILY
Qty: 30 ML | Refills: 0 | Status: SHIPPED | OUTPATIENT
Start: 2023-01-25 | End: 2023-03-09

## 2023-01-25 RX ORDER — AZITHROMYCIN 250 MG/1
TABLET, FILM COATED ORAL
Qty: 6 TABLET | Refills: 0 | Status: SHIPPED | OUTPATIENT
Start: 2023-01-25 | End: 2023-03-21 | Stop reason: ALTCHOICE

## 2023-01-25 RX ORDER — PREDNISONE 20 MG/1
20 TABLET ORAL DAILY
Qty: 5 TABLET | Refills: 0 | Status: SHIPPED | OUTPATIENT
Start: 2023-01-25 | End: 2023-03-09

## 2023-01-25 NOTE — PROGRESS NOTES
[facility] Medicine    Chief Complaint      Chief Complaint   Patient presents with    Chest Congestion    Ear Fullness     States feels like fluid in ear; right one hurts    Cough    Nasal Congestion    Fatigue    Documentation Only     x3d       History of Present Illness      Carey Leonardo is a 47 y.o. female. She  has a past medical history of Asthma, CHF (congestive heart failure), Chronic pain syndrome, Depressive disorder, Diabetes mellitus, type 2, Enlarged heart, Gastroparesis, GERD (gastroesophageal reflux disease), Hyperlipidemia, Hypertension, IBS (irritable bowel syndrome), Kidney stones, Lumbar radiculopathy, Sleep apnea, and Thyroid disease., who presents today for URI type symptoms but primarily R ear pain x3 days.    Past Medical History:  Past Medical History:   Diagnosis Date    Asthma     CHF (congestive heart failure)     Chronic pain syndrome     Depressive disorder     Diabetes mellitus, type 2     Enlarged heart     Gastroparesis     GERD (gastroesophageal reflux disease)     Hyperlipidemia     Hypertension     IBS (irritable bowel syndrome)     Kidney stones     Lumbar radiculopathy     Sleep apnea     Does not use a CPAP    Thyroid disease        Past Surgical History:   has a past surgical history that includes Hysterectomy (09/29/2011); Oophorectomy (11/11/2014); Dilation and curettage of uterus (04/14/2010); Diagnostic laparoscopy (04/14/2010); Exploratory laparotomy with uterine myomectomy (02/27/2007); Hysteroscopy with dilation and curettage of uterus (04/04/2006); Carpal tunnel release; Cholecystectomy; Bilateral L3-S1 MBB (Bilateral, 6-, 5-, 1-8-2014); Left heart catheterization; Left L3-S1 RFTC (Left, 07/18/2017); Left SI JI (Left, 09/30/2013); Sinus surgery; and Laminectomy (N/A, 11/30/2021).    Social History:  Social History     Tobacco Use    Smoking status: Never    Smokeless tobacco: Never   Substance Use Topics    Alcohol use: Not Currently    Drug use: Not  Currently     Types: Hydrocodone, Oxycodone       I personally reviewed all past medical, surgical, and social.     Review of Systems   Constitutional:  Positive for fatigue. Negative for chills and fever.   HENT:  Positive for congestion and ear pain. Negative for rhinorrhea, sneezing and sore throat.    Respiratory:  Positive for cough. Negative for shortness of breath.    Gastrointestinal:  Negative for diarrhea, nausea and vomiting.   Musculoskeletal:  Negative for myalgias.   Skin:  Negative for rash.   Neurological:  Negative for headaches.      Medications:  Outpatient Encounter Medications as of 1/25/2023   Medication Sig Dispense Refill    albuterol (VENTOLIN HFA) 90 mcg/actuation inhaler Inhale 2 puffs into the lungs every 6 (six) hours as needed for Wheezing. Rescue 18 g 0    atorvastatin (LIPITOR) 20 MG tablet Take 1 tablet (20 mg total) by mouth every evening. 90 tablet 3    cloNIDine (CATAPRES) 0.2 MG tablet TAKE 1 TABLET FOUR TIMES A  tablet 3    dulaglutide (TRULICITY) 4.5 mg/0.5 mL pen injector Inject 4.5 mg into the skin every 7 days. 4 pen 11    gabapentin (NEURONTIN) 300 MG capsule Take 1 capsule (300 mg total) by mouth 3 (three) times daily. 90 capsule 11    glipizide-metformin (METAGLIP) 5-500 mg per tablet Take 1 tablet by mouth 2 (two) times daily before meals. 180 tablet 3    hydrALAZINE (APRESOLINE) 100 MG tablet TAKE 1 TABLET BY MOUTH 3 TIMES DAILY 270 tablet 1    levothyroxine (SYNTHROID) 200 MCG tablet Take 1 tablet (200 mcg total) by mouth before breakfast. 90 tablet 1    metoprolol succinate (TOPROL-XL) 100 MG 24 hr tablet Take 1 tablet (100 mg total) by mouth once daily. 90 tablet 3    NIFEdipine (PROCARDIA-XL) 60 MG (OSM) 24 hr tablet TAKE 1 TABLET BY MOUTH EVERY DAY FOR HIGH BLOOD PRESSURE  Strength: 60 mg 90 tablet 1    risperiDONE (RISPERDAL) 0.25 MG Tab Take 1-2 tablets (0.25-0.5 mg total) by mouth 2 (two) times daily as needed. 90 tablet 0    sertraline (ZOLOFT) 100 MG  "tablet Take 1 tablet (100 mg total) by mouth once daily. 90 tablet 3    venlafaxine (EFFEXOR-XR) 150 MG Cp24 TAKE 1 CAPSULE BY MOUTH EVERY DAY WITH FOOD 90 capsule 0    azithromycin (Z-DANIEL) 250 MG tablet Take 2 tablets by mouth on day 1; Take 1 tablet by mouth on days 2-5 6 tablet 0    cetirizine (ZYRTEC) 10 MG tablet Take 1 tablet (10 mg total) by mouth once daily. 30 tablet 0    ipratropium (ATROVENT) 21 mcg (0.03 %) nasal spray 2 sprays by Each Nostril route 2 (two) times daily. 30 mL 0    methylphenidate HCl (RITALIN) 20 MG tablet Take 20 mg by mouth 2 (two) times daily.      predniSONE (DELTASONE) 20 MG tablet Take 1 tablet (20 mg total) by mouth once daily. 5 tablet 0    traZODone (DESYREL) 100 MG tablet Take 100 mg by mouth nightly.       No facility-administered encounter medications on file as of 1/25/2023.       Allergies:  Review of patient's allergies indicates:   Allergen Reactions    Fish containing products Anaphylaxis    Iodinated contrast media     Penicillins        Health Maintenance:  Immunization History   Administered Date(s) Administered    Influenza - Quadrivalent - PF *Preferred* (6 months and older) 10/18/2022      Health Maintenance   Topic Date Due    Foot Exam  Never done    Eye Exam  Never done    TETANUS VACCINE  Never done    Hemoglobin A1c  03/02/2023    Mammogram  10/06/2023    Lipid Panel  12/02/2023    Low Dose Statin  01/25/2024    Hepatitis C Screening  Completed        Physical Exam      Vital Signs  Temp: 98.4 °F (36.9 °C)  Temp src: Oral  Pulse: 94  SpO2: 96 %  BP: 122/80  Height and Weight  Height: 5' 7" (170.2 cm)  Weight: 97.1 kg (214 lb)  BSA (Calculated - sq m): 2.14 sq meters  BMI (Calculated): 33.5  Weight in (lb) to have BMI = 25: 159.3]    Physical Exam  Vitals and nursing note reviewed.   Constitutional:       Appearance: Normal appearance. She is well-developed.   HENT:      Head: Normocephalic.      Right Ear: Hearing normal. Tenderness present. A middle ear " effusion is present. Tympanic membrane is erythematous.      Left Ear: Hearing, tympanic membrane, ear canal and external ear normal.      Nose: Congestion present.   Eyes:      General: Lids are normal.      Extraocular Movements: Extraocular movements intact.      Conjunctiva/sclera: Conjunctivae normal.      Pupils: Pupils are equal, round, and reactive to light.   Cardiovascular:      Rate and Rhythm: Normal rate and regular rhythm.      Heart sounds: Normal heart sounds.   Pulmonary:      Effort: Pulmonary effort is normal.      Breath sounds: Normal breath sounds.   Musculoskeletal:         General: Normal range of motion.      Cervical back: Normal range of motion and neck supple.      Right lower leg: No edema.      Left lower leg: No edema.   Skin:     General: Skin is warm and dry.   Neurological:      Mental Status: She is alert and oriented to person, place, and time.      Gait: Gait is intact.   Psychiatric:         Behavior: Behavior is cooperative.        Laboratory:  CBC:  Recent Labs   Lab 05/08/22  1039 06/15/22  0943 08/11/22  1013 08/11/22  1024   WBC 7.52 4.85  --  7.74   RBC 4.77 4.31  --  4.68   Hemoglobin 12.8 11.9 L  --  13.1   POC Hematocrit  --   --    < >  --    Hematocrit 41.1 37.9 L  --  41.0   Platelet Count 394 346  --  427 H   MCV 86.2 87.9  --  87.6   MCH 26.8 L 27.6  --  28.0   MCHC 31.1 L 31.4 L  --  32.0    < > = values in this interval not displayed.     CMP:  Recent Labs   Lab 08/11/22  1024 11/02/22  1013 12/02/22  1436   Glucose 317 H 217 H 168 H   Calcium 8.4 L 8.9 9.2   Albumin 3.8 3.3 L 3.6   Total Protein 7.7 7.4 7.6   Sodium 137 142 142   Potassium 2.8 L 3.6 3.4 L   CO2 25 31 30   Chloride 98 106 105   BUN 15 9 8   Alk Phos 146 H 137 H 142 H   ALT 26 32 26   AST 15 21 15   Bilirubin, Total 0.4 0.2 0.3     LIPIDS:  Recent Labs   Lab 11/02/22  1013 12/02/22  1436 01/10/23  1000   TSH 0.346 L 18.400 H 0.038 L   HDL Cholesterol  --  44  --    Cholesterol  --  231 H  --     Triglycerides  --  265 H  --    LDL Calculated  --  134  --    Cholesterol/HDL Ratio (Risk Factor)  --  5.3  --    Non-HDL  --  187  --      TSH:  Recent Labs   Lab 11/02/22  1013 12/02/22  1436 01/10/23  1000   TSH 0.346 L 18.400 H 0.038 L     A1C:  Recent Labs   Lab 05/14/21  0938 08/30/21  1017 11/15/21  1049 11/23/21  1346 02/03/22  0910 08/02/22  1428 11/02/22  1013 12/02/22  1436   Hemoglobin A1C 10.7 H 9.1 H 7.6 H 7.6 H 7.5 H 12.6 H 8.0 H 7.3 H       Assessment/Plan     Carey Leonardo is a 47 y.o.female with:     1. Contact with and (suspected) exposure to covid-19  - POCT SARS-COV2 (COVID) with Flu Antigen    2. Right otitis media, unspecified otitis media type  - azithromycin (Z-DANIEL) 250 MG tablet; Take 2 tablets by mouth on day 1; Take 1 tablet by mouth on days 2-5  Dispense: 6 tablet; Refill: 0  - predniSONE (DELTASONE) 20 MG tablet; Take 1 tablet (20 mg total) by mouth once daily.  Dispense: 5 tablet; Refill: 0  - ipratropium (ATROVENT) 21 mcg (0.03 %) nasal spray; 2 sprays by Each Nostril route 2 (two) times daily.  Dispense: 30 mL; Refill: 0    3. Acute middle ear effusion, right  - predniSONE (DELTASONE) 20 MG tablet; Take 1 tablet (20 mg total) by mouth once daily.  Dispense: 5 tablet; Refill: 0       Total time spent face-to-face and non-face-to-face coordinating care for this encounter was: 20 minutes min    Chronic conditions status updated as per HPI.  Other than changes above, cont current medications and maintain follow up with specialists.  Return to clinic prn if symptoms worsen or fail to improve.    Tala Walter, FNP  [facility]

## 2023-02-17 ENCOUNTER — OFFICE VISIT (OUTPATIENT)
Dept: FAMILY MEDICINE | Facility: CLINIC | Age: 48
End: 2023-02-17
Payer: OTHER GOVERNMENT

## 2023-02-17 VITALS
TEMPERATURE: 98 F | HEIGHT: 67 IN | BODY MASS INDEX: 33.59 KG/M2 | OXYGEN SATURATION: 97 % | SYSTOLIC BLOOD PRESSURE: 118 MMHG | HEART RATE: 93 BPM | DIASTOLIC BLOOD PRESSURE: 70 MMHG | WEIGHT: 214 LBS

## 2023-02-17 DIAGNOSIS — H66.91 RIGHT OTITIS MEDIA, UNSPECIFIED OTITIS MEDIA TYPE: Primary | ICD-10-CM

## 2023-02-17 PROCEDURE — 99212 OFFICE O/P EST SF 10 MIN: CPT | Mod: ,,, | Performed by: NURSE PRACTITIONER

## 2023-02-17 PROCEDURE — 99212 PR OFFICE/OUTPT VISIT, EST, LEVL II, 10-19 MIN: ICD-10-PCS | Mod: ,,, | Performed by: NURSE PRACTITIONER

## 2023-02-17 RX ORDER — CEFDINIR 300 MG/1
300 CAPSULE ORAL 2 TIMES DAILY
Qty: 20 CAPSULE | Refills: 0 | Status: SHIPPED | OUTPATIENT
Start: 2023-02-17 | End: 2023-02-27

## 2023-02-17 NOTE — PROGRESS NOTES
[facility]    Chief Complaint      Chief Complaint   Patient presents with    Ear Fullness     Right; with yellow drainage; painful; x couple of weeks       History of Present Illness      Carey Leonardo is a 47 y.o. female. She  has a past medical history of Asthma, CHF (congestive heart failure), Chronic pain syndrome, Depressive disorder, Diabetes mellitus, type 2, Enlarged heart, Gastroparesis, GERD (gastroesophageal reflux disease), Hyperlipidemia, Hypertension, IBS (irritable bowel syndrome), Kidney stones, Lumbar radiculopathy, Sleep apnea, and Thyroid disease., who presents today for R ear pain with drainage x several weeks.    Past Medical History:  Past Medical History:   Diagnosis Date    Asthma     CHF (congestive heart failure)     Chronic pain syndrome     Depressive disorder     Diabetes mellitus, type 2     Enlarged heart     Gastroparesis     GERD (gastroesophageal reflux disease)     Hyperlipidemia     Hypertension     IBS (irritable bowel syndrome)     Kidney stones     Lumbar radiculopathy     Sleep apnea     Does not use a CPAP    Thyroid disease        Past Surgical History:   has a past surgical history that includes Hysterectomy (09/29/2011); Oophorectomy (11/11/2014); Dilation and curettage of uterus (04/14/2010); Diagnostic laparoscopy (04/14/2010); Exploratory laparotomy with uterine myomectomy (02/27/2007); Hysteroscopy with dilation and curettage of uterus (04/04/2006); Carpal tunnel release; Cholecystectomy; Bilateral L3-S1 MBB (Bilateral, 6-, 5-, 1-8-2014); Left heart catheterization; Left L3-S1 RFTC (Left, 07/18/2017); Left SI JI (Left, 09/30/2013); Sinus surgery; and Laminectomy (N/A, 11/30/2021).    Social History:  Social History     Tobacco Use    Smoking status: Never    Smokeless tobacco: Never   Substance Use Topics    Alcohol use: Not Currently    Drug use: Not Currently     Types: Hydrocodone, Oxycodone       I personally reviewed all past medical,  surgical, and social.     Review of Systems   Constitutional:  Negative for chills and fever.   HENT:  Positive for ear discharge and ear pain. Negative for congestion and sore throat.    Respiratory:  Negative for cough.    Neurological:  Negative for headaches.      Medications:  Outpatient Encounter Medications as of 2/17/2023   Medication Sig Dispense Refill    albuterol (VENTOLIN HFA) 90 mcg/actuation inhaler Inhale 2 puffs into the lungs every 6 (six) hours as needed for Wheezing. Rescue 18 g 0    azithromycin (Z-DANIEL) 250 MG tablet Take 2 tablets by mouth on day 1; Take 1 tablet by mouth on days 2-5 6 tablet 0    cloNIDine (CATAPRES) 0.2 MG tablet TAKE 1 TABLET FOUR TIMES A  tablet 3    dulaglutide (TRULICITY) 4.5 mg/0.5 mL pen injector Inject 4.5 mg into the skin every 7 days. 4 pen 11    gabapentin (NEURONTIN) 300 MG capsule Take 1 capsule (300 mg total) by mouth 3 (three) times daily. 90 capsule 11    glipizide-metformin (METAGLIP) 5-500 mg per tablet Take 1 tablet by mouth 2 (two) times daily before meals. 180 tablet 3    hydrALAZINE (APRESOLINE) 100 MG tablet TAKE 1 TABLET BY MOUTH 3 TIMES DAILY 270 tablet 1    ipratropium (ATROVENT) 21 mcg (0.03 %) nasal spray 2 sprays by Each Nostril route 2 (two) times daily. 30 mL 0    levothyroxine (SYNTHROID) 200 MCG tablet Take 1 tablet (200 mcg total) by mouth before breakfast. 90 tablet 1    methylphenidate HCl (RITALIN) 20 MG tablet Take 20 mg by mouth 2 (two) times daily.      metoprolol succinate (TOPROL-XL) 100 MG 24 hr tablet Take 1 tablet (100 mg total) by mouth once daily. 90 tablet 3    NIFEdipine (PROCARDIA-XL) 60 MG (OSM) 24 hr tablet TAKE 1 TABLET BY MOUTH EVERY DAY FOR HIGH BLOOD PRESSURE  Strength: 60 mg 90 tablet 1    predniSONE (DELTASONE) 20 MG tablet Take 1 tablet (20 mg total) by mouth once daily. 5 tablet 0    risperiDONE (RISPERDAL) 0.25 MG Tab Take 1-2 tablets (0.25-0.5 mg total) by mouth 2 (two) times daily as needed. 90 tablet 0     "sertraline (ZOLOFT) 100 MG tablet Take 1 tablet (100 mg total) by mouth once daily. 90 tablet 3    traZODone (DESYREL) 100 MG tablet Take 100 mg by mouth nightly.      venlafaxine (EFFEXOR-XR) 150 MG Cp24 TAKE 1 CAPSULE BY MOUTH EVERY DAY WITH FOOD 90 capsule 0    atorvastatin (LIPITOR) 20 MG tablet Take 1 tablet (20 mg total) by mouth every evening. 90 tablet 3    cefdinir (OMNICEF) 300 MG capsule Take 1 capsule (300 mg total) by mouth 2 (two) times daily. for 10 days 20 capsule 0    cetirizine (ZYRTEC) 10 MG tablet Take 1 tablet (10 mg total) by mouth once daily. 30 tablet 0     No facility-administered encounter medications on file as of 2/17/2023.       Allergies:  Review of patient's allergies indicates:   Allergen Reactions    Fish containing products Anaphylaxis    Iodinated contrast media     Penicillins        Health Maintenance:  Immunization History   Administered Date(s) Administered    Influenza - Quadrivalent - PF *Preferred* (6 months and older) 10/18/2022      Health Maintenance   Topic Date Due    Foot Exam  Never done    Eye Exam  Never done    TETANUS VACCINE  Never done    Low Dose Statin  Never done    Hemoglobin A1c  03/02/2023    Mammogram  10/06/2023    Lipid Panel  12/02/2023    Hepatitis C Screening  Completed        Physical Exam      Vital Signs  Temp: 97.9 °F (36.6 °C)  Temp src: Oral  Pulse: 93  SpO2: 97 %  BP: 118/70  Height and Weight  Height: 5' 7" (170.2 cm)  Weight: 97.1 kg (214 lb)  BSA (Calculated - sq m): 2.14 sq meters  BMI (Calculated): 33.5  Weight in (lb) to have BMI = 25: 159.3]    Physical Exam  Vitals and nursing note reviewed.   Constitutional:       Appearance: Normal appearance. She is well-developed.   HENT:      Head: Normocephalic.      Right Ear: Hearing normal. Drainage present. A PE tube is present.      Left Ear: Hearing, tympanic membrane, ear canal and external ear normal.      Nose: Nose normal.   Eyes:      General: Lids are normal.      Conjunctiva/sclera: " Conjunctivae normal.   Cardiovascular:      Rate and Rhythm: Normal rate and regular rhythm.      Heart sounds: Normal heart sounds.   Pulmonary:      Effort: Pulmonary effort is normal.      Breath sounds: Normal breath sounds.   Musculoskeletal:         General: Normal range of motion.      Cervical back: Normal range of motion and neck supple.   Skin:     General: Skin is warm and dry.   Neurological:      Mental Status: She is alert and oriented to person, place, and time.      Gait: Gait is intact.   Psychiatric:         Behavior: Behavior is cooperative.        Laboratory:  CBC:  Recent Labs   Lab 05/08/22  1039 06/15/22  0943 08/11/22  1013 08/11/22  1024   WBC 7.52 4.85  --  7.74   RBC 4.77 4.31  --  4.68   Hemoglobin 12.8 11.9 L  --  13.1   POC Hematocrit  --   --    < >  --    Hematocrit 41.1 37.9 L  --  41.0   Platelet Count 394 346  --  427 H   MCV 86.2 87.9  --  87.6   MCH 26.8 L 27.6  --  28.0   MCHC 31.1 L 31.4 L  --  32.0    < > = values in this interval not displayed.     CMP:  Recent Labs   Lab 08/11/22  1024 11/02/22  1013 12/02/22  1436   Glucose 317 H 217 H 168 H   Calcium 8.4 L 8.9 9.2   Albumin 3.8 3.3 L 3.6   Total Protein 7.7 7.4 7.6   Sodium 137 142 142   Potassium 2.8 L 3.6 3.4 L   CO2 25 31 30   Chloride 98 106 105   BUN 15 9 8   Alk Phos 146 H 137 H 142 H   ALT 26 32 26   AST 15 21 15   Bilirubin, Total 0.4 0.2 0.3     LIPIDS:  Recent Labs   Lab 11/02/22  1013 12/02/22  1436 01/10/23  1000   TSH 0.346 L 18.400 H 0.038 L   HDL Cholesterol  --  44  --    Cholesterol  --  231 H  --    Triglycerides  --  265 H  --    LDL Calculated  --  134  --    Cholesterol/HDL Ratio (Risk Factor)  --  5.3  --    Non-HDL  --  187  --      TSH:  Recent Labs   Lab 11/02/22  1013 12/02/22  1436 01/10/23  1000   TSH 0.346 L 18.400 H 0.038 L     A1C:  Recent Labs   Lab 05/14/21  0938 08/30/21  1017 11/15/21  1049 11/23/21  1346 02/03/22  0910 08/02/22  1428 11/02/22  1013 12/02/22  1436   Hemoglobin A1C 10.7 H  9.1 H 7.6 H 7.6 H 7.5 H 12.6 H 8.0 H 7.3 H       Assessment/Plan     Carey Leonardo is a 47 y.o.female with:     1. Right otitis media, unspecified otitis media type  - cefdinir (OMNICEF) 300 MG capsule; Take 1 capsule (300 mg total) by mouth 2 (two) times daily. for 10 days  Dispense: 20 capsule; Refill: 0       Total time spent face-to-face and non-face-to-face coordinating care for this encounter was:  10  min    Chronic conditions status updated as per HPI.  Other than changes above, cont current medications and maintain follow up with specialists.  Return to clinic prn if symptoms worsen or fail to improve.    Tala Walter, JAMAALP  [facility]

## 2023-03-09 ENCOUNTER — OFFICE VISIT (OUTPATIENT)
Dept: FAMILY MEDICINE | Facility: CLINIC | Age: 48
End: 2023-03-09
Payer: OTHER GOVERNMENT

## 2023-03-09 VITALS
BODY MASS INDEX: 33.9 KG/M2 | SYSTOLIC BLOOD PRESSURE: 132 MMHG | TEMPERATURE: 99 F | OXYGEN SATURATION: 98 % | WEIGHT: 216 LBS | DIASTOLIC BLOOD PRESSURE: 88 MMHG | HEIGHT: 67 IN | HEART RATE: 75 BPM

## 2023-03-09 DIAGNOSIS — J00 NASOPHARYNGITIS: Primary | ICD-10-CM

## 2023-03-09 DIAGNOSIS — Z20.822 CONTACT WITH AND (SUSPECTED) EXPOSURE TO COVID-19: ICD-10-CM

## 2023-03-09 DIAGNOSIS — J02.9 SORE THROAT: ICD-10-CM

## 2023-03-09 PROCEDURE — 87428 POCT SARS-COV2 (COVID) WITH FLU ANTIGEN: ICD-10-PCS | Mod: QW,,, | Performed by: NURSE PRACTITIONER

## 2023-03-09 PROCEDURE — 87880 POCT RAPID STREP A: ICD-10-PCS | Mod: QW,,, | Performed by: NURSE PRACTITIONER

## 2023-03-09 PROCEDURE — 96372 THER/PROPH/DIAG INJ SC/IM: CPT | Mod: JZ,,, | Performed by: NURSE PRACTITIONER

## 2023-03-09 PROCEDURE — 96372 PR INJECTION,THERAP/PROPH/DIAG2ST, IM OR SUBCUT: ICD-10-PCS | Mod: JZ,,, | Performed by: NURSE PRACTITIONER

## 2023-03-09 PROCEDURE — 87428 SARSCOV & INF VIR A&B AG IA: CPT | Mod: QW,,, | Performed by: NURSE PRACTITIONER

## 2023-03-09 PROCEDURE — 99213 OFFICE O/P EST LOW 20 MIN: CPT | Mod: 25,,, | Performed by: NURSE PRACTITIONER

## 2023-03-09 PROCEDURE — 99213 PR OFFICE/OUTPT VISIT, EST, LEVL III, 20-29 MIN: ICD-10-PCS | Mod: 25,,, | Performed by: NURSE PRACTITIONER

## 2023-03-09 PROCEDURE — 87880 STREP A ASSAY W/OPTIC: CPT | Mod: QW,,, | Performed by: NURSE PRACTITIONER

## 2023-03-09 RX ORDER — DEXAMETHASONE SODIUM PHOSPHATE 4 MG/ML
4 INJECTION, SOLUTION INTRA-ARTICULAR; INTRALESIONAL; INTRAMUSCULAR; INTRAVENOUS; SOFT TISSUE
Status: COMPLETED | OUTPATIENT
Start: 2023-03-09 | End: 2023-03-09

## 2023-03-09 RX ORDER — IPRATROPIUM BROMIDE 21 UG/1
2 SPRAY, METERED NASAL 2 TIMES DAILY
Qty: 30 ML | Refills: 0 | Status: SHIPPED | OUTPATIENT
Start: 2023-03-09 | End: 2023-07-19 | Stop reason: SDUPTHER

## 2023-03-09 RX ORDER — CETIRIZINE HYDROCHLORIDE, PSEUDOEPHEDRINE HYDROCHLORIDE 5; 120 MG/1; MG/1
1 TABLET, FILM COATED, EXTENDED RELEASE ORAL 2 TIMES DAILY
Qty: 20 TABLET | Refills: 0 | Status: SHIPPED | OUTPATIENT
Start: 2023-03-09 | End: 2023-03-19

## 2023-03-09 RX ADMIN — DEXAMETHASONE SODIUM PHOSPHATE 4 MG: 4 INJECTION, SOLUTION INTRA-ARTICULAR; INTRALESIONAL; INTRAMUSCULAR; INTRAVENOUS; SOFT TISSUE at 09:03

## 2023-03-09 NOTE — PROGRESS NOTES
[facility]    Chief Complaint      Chief Complaint   Patient presents with    Ear Fullness     both    Headache    Sore Throat    Nasal Congestion    Cough    Generalized Body Aches    Fatigue    Documentation Only     Started yesterday       History of Present Illness      Carey Leonardo is a 47 y.o. female. She  has a past medical history of Asthma, CHF (congestive heart failure), Chronic pain syndrome, Depressive disorder, Diabetes mellitus, type 2, Enlarged heart, Gastroparesis, GERD (gastroesophageal reflux disease), Hyperlipidemia, Hypertension, IBS (irritable bowel syndrome), Kidney stones, Lumbar radiculopathy, Sleep apnea, and Thyroid disease., who presents today for URI type symptoms since yesterday.    Past Medical History:  Past Medical History:   Diagnosis Date    Asthma     CHF (congestive heart failure)     Chronic pain syndrome     Depressive disorder     Diabetes mellitus, type 2     Enlarged heart     Gastroparesis     GERD (gastroesophageal reflux disease)     Hyperlipidemia     Hypertension     IBS (irritable bowel syndrome)     Kidney stones     Lumbar radiculopathy     Sleep apnea     Does not use a CPAP    Thyroid disease        Past Surgical History:   has a past surgical history that includes Hysterectomy (09/29/2011); Oophorectomy (11/11/2014); Dilation and curettage of uterus (04/14/2010); Diagnostic laparoscopy (04/14/2010); Exploratory laparotomy with uterine myomectomy (02/27/2007); Hysteroscopy with dilation and curettage of uterus (04/04/2006); Carpal tunnel release; Cholecystectomy; Bilateral L3-S1 MBB (Bilateral, 6-, 5-, 1-8-2014); Left heart catheterization; Left L3-S1 RFTC (Left, 07/18/2017); Left SI JI (Left, 09/30/2013); Sinus surgery; and Laminectomy (N/A, 11/30/2021).    Social History:  Social History     Tobacco Use    Smoking status: Never    Smokeless tobacco: Never   Substance Use Topics    Alcohol use: Not Currently    Drug use: Not Currently      Types: Hydrocodone, Oxycodone       I personally reviewed all past medical, surgical, and social.     Review of Systems   Constitutional:  Positive for fatigue. Negative for chills and fever.   HENT:  Positive for congestion, ear pain and sore throat. Negative for rhinorrhea and sneezing.    Respiratory:  Positive for cough. Negative for shortness of breath.    Gastrointestinal:  Negative for diarrhea, nausea and vomiting.   Musculoskeletal:  Positive for myalgias.   Skin:  Negative for rash.   Neurological:  Positive for headaches.      Medications:  Outpatient Encounter Medications as of 3/9/2023   Medication Sig Dispense Refill    albuterol (VENTOLIN HFA) 90 mcg/actuation inhaler Inhale 2 puffs into the lungs every 6 (six) hours as needed for Wheezing. Rescue 18 g 0    atorvastatin (LIPITOR) 20 MG tablet Take 1 tablet (20 mg total) by mouth every evening. 90 tablet 3    azithromycin (Z-DANIEL) 250 MG tablet Take 2 tablets by mouth on day 1; Take 1 tablet by mouth on days 2-5 6 tablet 0    cetirizine-pseudoephedrine 5-120 mg Tb12 Take 1 tablet by mouth 2 (two) times a day. for 10 days 20 tablet 0    cloNIDine (CATAPRES) 0.2 MG tablet TAKE 1 TABLET FOUR TIMES A  tablet 3    dulaglutide (TRULICITY) 4.5 mg/0.5 mL pen injector Inject 4.5 mg into the skin every 7 days. 4 pen 11    gabapentin (NEURONTIN) 300 MG capsule Take 1 capsule (300 mg total) by mouth 3 (three) times daily. 90 capsule 11    glipizide-metformin (METAGLIP) 5-500 mg per tablet Take 1 tablet by mouth 2 (two) times daily before meals. 180 tablet 3    hydrALAZINE (APRESOLINE) 100 MG tablet TAKE 1 TABLET BY MOUTH 3 TIMES DAILY 270 tablet 1    ipratropium (ATROVENT) 21 mcg (0.03 %) nasal spray 2 sprays by Each Nostril route 2 (two) times daily. 30 mL 0    levothyroxine (SYNTHROID) 200 MCG tablet Take 1 tablet (200 mcg total) by mouth before breakfast. 90 tablet 1    methylphenidate HCl (RITALIN) 20 MG tablet Take 20 mg by mouth 2 (two) times daily.       metoprolol succinate (TOPROL-XL) 100 MG 24 hr tablet Take 1 tablet (100 mg total) by mouth once daily. 90 tablet 3    NIFEdipine (PROCARDIA-XL) 60 MG (OSM) 24 hr tablet TAKE 1 TABLET BY MOUTH EVERY DAY FOR HIGH BLOOD PRESSURE  Strength: 60 mg 90 tablet 1    risperiDONE (RISPERDAL) 0.25 MG Tab Take 1-2 tablets (0.25-0.5 mg total) by mouth 2 (two) times daily as needed. 90 tablet 0    sertraline (ZOLOFT) 100 MG tablet Take 1 tablet (100 mg total) by mouth once daily. 90 tablet 3    traZODone (DESYREL) 100 MG tablet Take 100 mg by mouth nightly.      venlafaxine (EFFEXOR-XR) 150 MG Cp24 TAKE 1 CAPSULE BY MOUTH EVERY DAY WITH FOOD 90 capsule 0    [DISCONTINUED] cetirizine (ZYRTEC) 10 MG tablet Take 1 tablet (10 mg total) by mouth once daily. 30 tablet 0    [DISCONTINUED] ipratropium (ATROVENT) 21 mcg (0.03 %) nasal spray 2 sprays by Each Nostril route 2 (two) times daily. 30 mL 0    [DISCONTINUED] predniSONE (DELTASONE) 20 MG tablet Take 1 tablet (20 mg total) by mouth once daily. 5 tablet 0     Facility-Administered Encounter Medications as of 3/9/2023   Medication Dose Route Frequency Provider Last Rate Last Admin    dexAMETHasone injection 4 mg  4 mg Intramuscular 1 time in Clinic/HOD ROGELIO Bobby           Allergies:  Review of patient's allergies indicates:   Allergen Reactions    Fish containing products Anaphylaxis    Iodinated contrast media     Penicillins        Health Maintenance:  Immunization History   Administered Date(s) Administered    Influenza - Quadrivalent - PF *Preferred* (6 months and older) 10/18/2022      Health Maintenance   Topic Date Due    Foot Exam  Never done    Eye Exam  Never done    TETANUS VACCINE  Never done    Low Dose Statin  Never done    Hemoglobin A1c  03/02/2023    Mammogram  10/06/2023    Lipid Panel  12/02/2023    Hepatitis C Screening  Completed        Physical Exam      Vital Signs  Temp: 98.7 °F (37.1 °C)  Temp Source: Oral  Pulse: 75  SpO2: 98 %  BP:  "132/88  Height and Weight  Height: 5' 7" (170.2 cm)  Weight: 98 kg (216 lb)  BSA (Calculated - sq m): 2.15 sq meters  BMI (Calculated): 33.8  Weight in (lb) to have BMI = 25: 159.3]    Physical Exam  Vitals and nursing note reviewed.   Constitutional:       Appearance: Normal appearance. She is well-developed.   HENT:      Head: Normocephalic.      Right Ear: Hearing, ear canal and external ear normal. Tympanic membrane is bulging.      Left Ear: Hearing, ear canal and external ear normal. Tympanic membrane is bulging.      Ears:      Comments: Dull fluid behind level of bilateral TM's     Nose: Congestion present.      Right Turbinates: Swollen.      Left Turbinates: Swollen.      Mouth/Throat:      Lips: Pink.   Eyes:      General: Lids are normal.      Conjunctiva/sclera: Conjunctivae normal.   Cardiovascular:      Rate and Rhythm: Normal rate and regular rhythm.      Heart sounds: Normal heart sounds.   Pulmonary:      Effort: Pulmonary effort is normal.      Breath sounds: Normal breath sounds.   Musculoskeletal:         General: Normal range of motion.      Cervical back: Normal range of motion and neck supple.      Right lower leg: No edema.      Left lower leg: No edema.   Skin:     General: Skin is warm and dry.   Neurological:      Mental Status: She is alert and oriented to person, place, and time.      Gait: Gait is intact.   Psychiatric:         Behavior: Behavior is cooperative.        Laboratory:  CBC:  Recent Labs   Lab 05/08/22  1039 06/15/22  0943 08/11/22  1013 08/11/22  1024   WBC 7.52 4.85  --  7.74   RBC 4.77 4.31  --  4.68   Hemoglobin 12.8 11.9 L  --  13.1   POC Hematocrit  --   --    < >  --    Hematocrit 41.1 37.9 L  --  41.0   Platelet Count 394 346  --  427 H   MCV 86.2 87.9  --  87.6   MCH 26.8 L 27.6  --  28.0   MCHC 31.1 L 31.4 L  --  32.0    < > = values in this interval not displayed.     CMP:  Recent Labs   Lab 08/11/22  1024 11/02/22  1013 12/02/22  1436   Glucose 317 H 217 H 168 H "   Calcium 8.4 L 8.9 9.2   Albumin 3.8 3.3 L 3.6   Total Protein 7.7 7.4 7.6   Sodium 137 142 142   Potassium 2.8 L 3.6 3.4 L   CO2 25 31 30   Chloride 98 106 105   BUN 15 9 8   Alk Phos 146 H 137 H 142 H   ALT 26 32 26   AST 15 21 15   Bilirubin, Total 0.4 0.2 0.3     LIPIDS:  Recent Labs   Lab 11/02/22  1013 12/02/22  1436 01/10/23  1000   TSH 0.346 L 18.400 H 0.038 L   HDL Cholesterol  --  44  --    Cholesterol  --  231 H  --    Triglycerides  --  265 H  --    LDL Calculated  --  134  --    Cholesterol/HDL Ratio (Risk Factor)  --  5.3  --    Non-HDL  --  187  --      TSH:  Recent Labs   Lab 11/02/22  1013 12/02/22  1436 01/10/23  1000   TSH 0.346 L 18.400 H 0.038 L     A1C:  Recent Labs   Lab 05/14/21  0938 08/30/21  1017 11/15/21  1049 11/23/21  1346 02/03/22  0910 08/02/22  1428 11/02/22  1013 12/02/22  1436   Hemoglobin A1C 10.7 H 9.1 H 7.6 H 7.6 H 7.5 H 12.6 H 8.0 H 7.3 H       Assessment/Plan     Everettluzmaria Leonardo is a 47 y.o.female with:     1. Contact with and (suspected) exposure to covid-19  - POCT SARS-COV2 (COVID) with Flu Antigen    2. Sore throat  - POCT rapid strep A    3. Nasopharyngitis  - ipratropium (ATROVENT) 21 mcg (0.03 %) nasal spray; 2 sprays by Each Nostril route 2 (two) times daily.  Dispense: 30 mL; Refill: 0  - cetirizine-pseudoephedrine 5-120 mg Tb12; Take 1 tablet by mouth 2 (two) times a day. for 10 days  Dispense: 20 tablet; Refill: 0  - dexAMETHasone injection 4 mg       Total time spent face-to-face and non-face-to-face coordinating care for this encounter was: 20 minutes     Chronic conditions status updated as per HPI.  Other than changes above, cont current medications and maintain follow up with specialists.  Return to clinic prn if symptoms worsen or fail to improve.    Tala Walter, JAMAALP  [facility]

## 2023-03-13 ENCOUNTER — OFFICE VISIT (OUTPATIENT)
Dept: OTOLARYNGOLOGY | Facility: CLINIC | Age: 48
End: 2023-03-13
Payer: OTHER GOVERNMENT

## 2023-03-13 VITALS — WEIGHT: 216 LBS | BODY MASS INDEX: 33.9 KG/M2 | HEIGHT: 67 IN

## 2023-03-13 DIAGNOSIS — H90.2 CONDUCTIVE HEARING LOSS, UNSPECIFIED LATERALITY: ICD-10-CM

## 2023-03-13 DIAGNOSIS — H65.21 RIGHT CHRONIC SEROUS OTITIS MEDIA: Primary | ICD-10-CM

## 2023-03-13 DIAGNOSIS — H92.11 OTORRHEA, RIGHT EAR: ICD-10-CM

## 2023-03-13 PROCEDURE — 99214 PR OFFICE/OUTPT VISIT, EST, LEVL IV, 30-39 MIN: ICD-10-PCS | Mod: S$PBB,,, | Performed by: OTOLARYNGOLOGY

## 2023-03-13 PROCEDURE — 99214 OFFICE O/P EST MOD 30 MIN: CPT | Mod: S$PBB,,, | Performed by: OTOLARYNGOLOGY

## 2023-03-13 PROCEDURE — 99214 OFFICE O/P EST MOD 30 MIN: CPT | Mod: PBBFAC | Performed by: OTOLARYNGOLOGY

## 2023-03-13 RX ORDER — CIPROFLOXACIN AND DEXAMETHASONE 3; 1 MG/ML; MG/ML
3 SUSPENSION/ DROPS AURICULAR (OTIC) 2 TIMES DAILY
Qty: 7.5 ML | Refills: 0 | Status: SHIPPED | OUTPATIENT
Start: 2023-03-13 | End: 2023-04-13 | Stop reason: ALTCHOICE

## 2023-03-13 RX ORDER — CIPROFLOXACIN 500 MG/1
500 TABLET ORAL 2 TIMES DAILY
Qty: 20 TABLET | Refills: 0 | Status: SHIPPED | OUTPATIENT
Start: 2023-03-13 | End: 2023-04-13 | Stop reason: ALTCHOICE

## 2023-03-13 NOTE — PROGRESS NOTES
Subjective:       Patient ID: Carey Leonardo is a 47 y.o. female.    Chief Complaint: Ear Drainage (Patient presents for right ear drainage she has had for a month. She has been on antibiotics but has not seen a change. )    Ear Drainage   Associated symptoms include ear discharge and hearing loss.   Review of Systems   HENT:  Positive for ear discharge, ear pain and hearing loss.    All other systems reviewed and are negative.    Objective:      Physical Exam  General: NAD  Head: Normocephalic, atraumatic, no facial asymmetry/normal strength,  Ears: Both auricules normal in appearance, w/o deformities tympanic membranes  right tube in place with otorrhea external auditory canals normal  Nose: External nose w/o deformities normal turbinates no drainage or inflammation  Oral Cavity: Lips, gums, floor of mouth, tongue hard palate, and buccal mucosa without mass/lesion  Oropharynx: Mucosa pink and moist, soft palate, posterior pharynx and oropharyngeal wall without mass/lesion  Neck: Supple, symmetric, trachea midline, no palpable mass/lesion, no palpable cervical lymphadenopathy  Skin: Warm and dry, no concerning lesions  Respiratory: Respirations even, unlabored   Assessment:       1. Right chronic serous otitis media    2. Otorrhea, right ear    3. Conductive hearing loss, unspecified laterality          Plan:       Ciprodex cipro   F/u 2 weeks may need tube replaced

## 2023-03-21 ENCOUNTER — OFFICE VISIT (OUTPATIENT)
Dept: FAMILY MEDICINE | Facility: CLINIC | Age: 48
End: 2023-03-21
Payer: OTHER GOVERNMENT

## 2023-03-21 VITALS
OXYGEN SATURATION: 99 % | RESPIRATION RATE: 17 BRPM | SYSTOLIC BLOOD PRESSURE: 132 MMHG | WEIGHT: 210 LBS | HEART RATE: 79 BPM | HEIGHT: 66 IN | BODY MASS INDEX: 33.75 KG/M2 | DIASTOLIC BLOOD PRESSURE: 84 MMHG | TEMPERATURE: 98 F

## 2023-03-21 DIAGNOSIS — E11.9 TYPE 2 DIABETES MELLITUS WITHOUT COMPLICATION, WITHOUT LONG-TERM CURRENT USE OF INSULIN: Primary | ICD-10-CM

## 2023-03-21 DIAGNOSIS — I10 PRIMARY HYPERTENSION: ICD-10-CM

## 2023-03-21 DIAGNOSIS — E03.9 HYPOTHYROIDISM, UNSPECIFIED TYPE: ICD-10-CM

## 2023-03-21 LAB
ALBUMIN SERPL BCP-MCNC: 3.4 G/DL (ref 3.5–5)
ALBUMIN/GLOB SERPL: 0.9 {RATIO}
ALP SERPL-CCNC: 150 U/L (ref 39–100)
ALT SERPL W P-5'-P-CCNC: 34 U/L (ref 13–56)
ANION GAP SERPL CALCULATED.3IONS-SCNC: 9 MMOL/L (ref 7–16)
AST SERPL W P-5'-P-CCNC: 21 U/L (ref 15–37)
BILIRUB SERPL-MCNC: 0.2 MG/DL (ref ?–1.2)
BUN SERPL-MCNC: 10 MG/DL (ref 7–18)
BUN/CREAT SERPL: 13 (ref 6–20)
CALCIUM SERPL-MCNC: 9.1 MG/DL (ref 8.5–10.1)
CHLORIDE SERPL-SCNC: 107 MMOL/L (ref 98–107)
CO2 SERPL-SCNC: 30 MMOL/L (ref 21–32)
CREAT SERPL-MCNC: 0.78 MG/DL (ref 0.55–1.02)
EGFR (NO RACE VARIABLE) (RUSH/TITUS): 95 ML/MIN/1.73M²
EST. AVERAGE GLUCOSE BLD GHB EST-MCNC: 154 MG/DL
GLOBULIN SER-MCNC: 3.8 G/DL (ref 2–4)
GLUCOSE SERPL-MCNC: 201 MG/DL (ref 74–106)
HBA1C MFR BLD HPLC: 7.2 % (ref 4.5–6.6)
POTASSIUM SERPL-SCNC: 3.8 MMOL/L (ref 3.5–5.1)
PROT SERPL-MCNC: 7.2 G/DL (ref 6.4–8.2)
SODIUM SERPL-SCNC: 142 MMOL/L (ref 136–145)
T4 FREE SERPL-MCNC: 1.01 NG/DL (ref 0.76–1.46)
TSH SERPL DL<=0.005 MIU/L-ACNC: 16 UIU/ML (ref 0.36–3.74)

## 2023-03-21 PROCEDURE — 99214 OFFICE O/P EST MOD 30 MIN: CPT | Mod: ,,, | Performed by: NURSE PRACTITIONER

## 2023-03-21 PROCEDURE — 80053 COMPREHENSIVE METABOLIC PANEL: ICD-10-PCS | Mod: ,,, | Performed by: CLINICAL MEDICAL LABORATORY

## 2023-03-21 PROCEDURE — 84443 ASSAY THYROID STIM HORMONE: CPT | Mod: ,,, | Performed by: CLINICAL MEDICAL LABORATORY

## 2023-03-21 PROCEDURE — 84439 T4, FREE: ICD-10-PCS | Mod: ,,, | Performed by: CLINICAL MEDICAL LABORATORY

## 2023-03-21 PROCEDURE — 84443 TSH: ICD-10-PCS | Mod: ,,, | Performed by: CLINICAL MEDICAL LABORATORY

## 2023-03-21 PROCEDURE — 83036 HEMOGLOBIN A1C: ICD-10-PCS | Mod: ,,, | Performed by: CLINICAL MEDICAL LABORATORY

## 2023-03-21 PROCEDURE — 80053 COMPREHEN METABOLIC PANEL: CPT | Mod: ,,, | Performed by: CLINICAL MEDICAL LABORATORY

## 2023-03-21 PROCEDURE — 83036 HEMOGLOBIN GLYCOSYLATED A1C: CPT | Mod: ,,, | Performed by: CLINICAL MEDICAL LABORATORY

## 2023-03-21 PROCEDURE — 99214 PR OFFICE/OUTPT VISIT, EST, LEVL IV, 30-39 MIN: ICD-10-PCS | Mod: ,,, | Performed by: NURSE PRACTITIONER

## 2023-03-21 PROCEDURE — 84439 ASSAY OF FREE THYROXINE: CPT | Mod: ,,, | Performed by: CLINICAL MEDICAL LABORATORY

## 2023-03-21 RX ORDER — ATORVASTATIN CALCIUM 20 MG/1
20 TABLET, FILM COATED ORAL NIGHTLY
Qty: 90 TABLET | Refills: 0 | Status: SHIPPED | OUTPATIENT
Start: 2023-03-21 | End: 2023-07-19 | Stop reason: SDUPTHER

## 2023-03-21 RX ORDER — TRAZODONE HYDROCHLORIDE 100 MG/1
100 TABLET ORAL NIGHTLY PRN
Qty: 90 TABLET | Refills: 0 | Status: SHIPPED | OUTPATIENT
Start: 2023-03-21 | End: 2023-07-19

## 2023-03-21 RX ORDER — VENLAFAXINE HYDROCHLORIDE 150 MG/1
CAPSULE, EXTENDED RELEASE ORAL
Qty: 90 CAPSULE | Refills: 0 | Status: SHIPPED | OUTPATIENT
Start: 2023-03-21 | End: 2023-07-19 | Stop reason: SDUPTHER

## 2023-03-21 RX ORDER — METOPROLOL SUCCINATE 100 MG/1
100 TABLET, EXTENDED RELEASE ORAL DAILY
Qty: 90 TABLET | Refills: 0 | Status: SHIPPED | OUTPATIENT
Start: 2023-03-21 | End: 2023-07-19 | Stop reason: SDUPTHER

## 2023-03-21 RX ORDER — LEVOTHYROXINE SODIUM 200 UG/1
200 TABLET ORAL
Qty: 90 TABLET | Refills: 0 | Status: SHIPPED | OUTPATIENT
Start: 2023-03-21 | End: 2023-07-10 | Stop reason: SDUPTHER

## 2023-03-21 RX ORDER — HYDRALAZINE HYDROCHLORIDE 100 MG/1
100 TABLET, FILM COATED ORAL 3 TIMES DAILY
Qty: 270 TABLET | Refills: 0 | Status: SHIPPED | OUTPATIENT
Start: 2023-03-21 | End: 2023-06-06 | Stop reason: SDUPTHER

## 2023-03-21 RX ORDER — CLONIDINE HYDROCHLORIDE 0.2 MG/1
0.2 TABLET ORAL 4 TIMES DAILY
Qty: 360 TABLET | Refills: 0 | Status: SHIPPED | OUTPATIENT
Start: 2023-03-21 | End: 2023-07-19 | Stop reason: SDUPTHER

## 2023-03-21 RX ORDER — DULAGLUTIDE 4.5 MG/.5ML
4.5 INJECTION, SOLUTION SUBCUTANEOUS
Qty: 12 PEN | Refills: 0 | Status: SHIPPED | OUTPATIENT
Start: 2023-03-21 | End: 2023-06-19

## 2023-03-21 RX ORDER — GLIPIZIDE AND METFORMIN HCL 5; 500 MG/1; MG/1
1 TABLET, FILM COATED ORAL
Qty: 180 TABLET | Refills: 0 | Status: SHIPPED | OUTPATIENT
Start: 2023-03-21 | End: 2023-07-19 | Stop reason: SDUPTHER

## 2023-03-21 NOTE — PROGRESS NOTES
[facility]    Chief Complaint      Chief Complaint   Patient presents with    Medication Refill    Follow-up     States F/U and fasting this am     Back Pain     States chronic of back pain, Hx of back surgery with Dr Upton      History of Present Illness      Carey Leonardo is a 47 y.o. female with chronic conditions of CHF, GERD, and hyperlipidemia who presents today for follow up. She endorses that she was scheduled to see Dr. Dunn 03/13/2023, but she had to cancel. She is planning to call and reschedule her appointment.     She also has c/o worsening low back pain that radiates down her leg and worsens with standing for long periods of time. She is concerned that may have lifted something the wrong way. She has been taking OTC Motrin with no relief. She rates her pain 6/10 on a numerical scale at this time. She     Past Medical History:  Past Medical History:   Diagnosis Date    Asthma     CHF (congestive heart failure)     Chronic pain syndrome     Depressive disorder     Diabetes mellitus, type 2     Disorder of sacrum 4/28/2022    Enlarged heart     Gastroparesis     GERD (gastroesophageal reflux disease)     Hyperlipidemia     Hypertension     IBS (irritable bowel syndrome)     Kidney stones     Lumbar radiculopathy     Sleep apnea     Does not use a CPAP    Thyroid disease      Past Surgical History:   has a past surgical history that includes Hysterectomy (09/29/2011); Oophorectomy (11/11/2014); Dilation and curettage of uterus (04/14/2010); Diagnostic laparoscopy (04/14/2010); Exploratory laparotomy with uterine myomectomy (02/27/2007); Hysteroscopy with dilation and curettage of uterus (04/04/2006); Carpal tunnel release; Cholecystectomy; Bilateral L3-S1 MBB (Bilateral, 6-, 5-, 1-8-2014); Left heart catheterization; Left L3-S1 RFTC (Left, 07/18/2017); Left SI JI (Left, 09/30/2013); Sinus surgery; and Laminectomy (N/A, 11/30/2021).    Social History:  Social History     Tobacco  Use    Smoking status: Never    Smokeless tobacco: Never   Substance Use Topics    Alcohol use: Not Currently    Drug use: Not Currently     Types: Hydrocodone, Oxycodone     I personally reviewed all past medical, surgical, and social.     Review of Systems   Constitutional:  Positive for malaise/fatigue.   Cardiovascular:  Negative for chest pain, palpitations and leg swelling.   Musculoskeletal:  Positive for back pain (chronic) and joint pain.   Neurological:  Negative for tingling and weakness.   Psychiatric/Behavioral:  The patient has insomnia.         Restlessness      Medications:  Outpatient Encounter Medications as of 3/21/2023   Medication Sig Dispense Refill    albuterol (VENTOLIN HFA) 90 mcg/actuation inhaler Inhale 2 puffs into the lungs every 6 (six) hours as needed for Wheezing. Rescue 18 g 0    ciprofloxacin HCl (CIPRO) 500 MG tablet Take 1 tablet (500 mg total) by mouth 2 (two) times daily. 20 tablet 0    ciprofloxacin-dexAMETHasone 0.3-0.1% (CIPRODEX) 0.3-0.1 % DrpS Place 3 drops into the right ear 2 (two) times daily. 7.5 mL 0    gabapentin (NEURONTIN) 300 MG capsule Take 1 capsule (300 mg total) by mouth 3 (three) times daily. 90 capsule 11    ipratropium (ATROVENT) 21 mcg (0.03 %) nasal spray 2 sprays by Each Nostril route 2 (two) times daily. 30 mL 0    NIFEdipine (PROCARDIA-XL) 60 MG (OSM) 24 hr tablet TAKE 1 TABLET BY MOUTH EVERY DAY FOR HIGH BLOOD PRESSURE  Strength: 60 mg 90 tablet 1    risperiDONE (RISPERDAL) 0.25 MG Tab Take 1-2 tablets (0.25-0.5 mg total) by mouth 2 (two) times daily as needed. 90 tablet 0    [DISCONTINUED] cloNIDine (CATAPRES) 0.2 MG tablet TAKE 1 TABLET FOUR TIMES A  tablet 3    [DISCONTINUED] dulaglutide (TRULICITY) 4.5 mg/0.5 mL pen injector Inject 4.5 mg into the skin every 7 days. 4 pen 11    [DISCONTINUED] glipizide-metformin (METAGLIP) 5-500 mg per tablet Take 1 tablet by mouth 2 (two) times daily before meals. 180 tablet 3    [DISCONTINUED] hydrALAZINE  (APRESOLINE) 100 MG tablet TAKE 1 TABLET BY MOUTH 3 TIMES DAILY 270 tablet 1    [DISCONTINUED] methylphenidate HCl (RITALIN) 20 MG tablet Take 20 mg by mouth 2 (two) times daily.      [DISCONTINUED] metoprolol succinate (TOPROL-XL) 100 MG 24 hr tablet Take 1 tablet (100 mg total) by mouth once daily. 90 tablet 3    [DISCONTINUED] sertraline (ZOLOFT) 100 MG tablet Take 1 tablet (100 mg total) by mouth once daily. 90 tablet 3    [DISCONTINUED] traZODone (DESYREL) 100 MG tablet Take 100 mg by mouth nightly.      [DISCONTINUED] venlafaxine (EFFEXOR-XR) 150 MG Cp24 TAKE 1 CAPSULE BY MOUTH EVERY DAY WITH FOOD 90 capsule 0    atorvastatin (LIPITOR) 20 MG tablet Take 1 tablet (20 mg total) by mouth every evening. 90 tablet 0    [] cetirizine-pseudoephedrine 5-120 mg Tb12 Take 1 tablet by mouth 2 (two) times a day. for 10 days 20 tablet 0    cloNIDine (CATAPRES) 0.2 MG tablet Take 1 tablet (0.2 mg total) by mouth 4 (four) times daily. 360 tablet 0    dulaglutide (TRULICITY) 4.5 mg/0.5 mL pen injector Inject 4.5 mg into the skin every 7 days. 12 pen 0    glipizide-metformin (METAGLIP) 5-500 mg per tablet Take 1 tablet by mouth 2 (two) times daily before meals. 180 tablet 0    hydrALAZINE (APRESOLINE) 100 MG tablet Take 1 tablet (100 mg total) by mouth 3 (three) times daily. 270 tablet 0    levothyroxine (SYNTHROID) 200 MCG tablet Take 1 tablet (200 mcg total) by mouth before breakfast. 90 tablet 0    metoprolol succinate (TOPROL-XL) 100 MG 24 hr tablet Take 1 tablet (100 mg total) by mouth once daily. 90 tablet 0    traZODone (DESYREL) 100 MG tablet Take 1 tablet (100 mg total) by mouth nightly as needed for Insomnia. 90 tablet 0    venlafaxine (EFFEXOR-XR) 150 MG Cp24 TAKE 1 CAPSULE BY MOUTH EVERY DAY WITH FOOD 90 capsule 0    [DISCONTINUED] atorvastatin (LIPITOR) 20 MG tablet Take 1 tablet (20 mg total) by mouth every evening. 90 tablet 3    [DISCONTINUED] azithromycin (Z-DANIEL) 250 MG tablet Take 2 tablets by mouth on  "day 1; Take 1 tablet by mouth on days 2-5 (Patient not taking: Reported on 3/21/2023) 6 tablet 0    [DISCONTINUED] levothyroxine (SYNTHROID) 200 MCG tablet Take 1 tablet (200 mcg total) by mouth before breakfast. 90 tablet 1     No facility-administered encounter medications on file as of 3/21/2023.     Allergies:  Review of patient's allergies indicates:   Allergen Reactions    Fish containing products Anaphylaxis    Iodinated contrast media     Penicillins      Health Maintenance:  Immunization History   Administered Date(s) Administered    Influenza - Quadrivalent - PF *Preferred* (6 months and older) 10/18/2022      Health Maintenance   Topic Date Due    Foot Exam  Never done    Eye Exam  Never done    TETANUS VACCINE  Never done    Hemoglobin A1c  06/21/2023    Mammogram  10/06/2023    Lipid Panel  12/02/2023    Low Dose Statin  03/21/2024    Hepatitis C Screening  Completed      Physical Exam      Vital Signs  Temp: 98.3 °F (36.8 °C)  Pulse: 79  Resp: 17  SpO2: 99 %  BP: 132/84  Pain Score:   6  Pain Loc: Back  Height and Weight  Height: 5' 6" (167.6 cm)  Weight: 95.3 kg (210 lb)  BSA (Calculated - sq m): 2.11 sq meters  BMI (Calculated): 33.9  Weight in (lb) to have BMI = 25: 154.6]    Physical Exam  Vitals reviewed.   Constitutional:       Appearance: Normal appearance. She is obese.   HENT:      Head: Normocephalic.      Nose: Nose normal.      Mouth/Throat:      Mouth: Mucous membranes are moist.   Eyes:      Conjunctiva/sclera: Conjunctivae normal.   Cardiovascular:      Rate and Rhythm: Normal rate and regular rhythm.      Heart sounds: Normal heart sounds. No murmur heard.  Musculoskeletal:      Cervical back: Normal range of motion.   Skin:     General: Skin is warm.   Neurological:      Mental Status: She is alert and oriented to person, place, and time.   Psychiatric:         Mood and Affect: Mood normal.         Behavior: Behavior normal.         Thought Content: Thought content normal.         " Judgment: Judgment normal.      Laboratory:  CBC:  Recent Labs   Lab 05/08/22  1039 06/15/22  0943 08/11/22  1013 08/11/22  1024   WBC 7.52 4.85  --  7.74   RBC 4.77 4.31  --  4.68   Hemoglobin 12.8 11.9 L  --  13.1   POC Hematocrit  --   --    < >  --    Hematocrit 41.1 37.9 L  --  41.0   Platelet Count 394 346  --  427 H   MCV 86.2 87.9  --  87.6   MCH 26.8 L 27.6  --  28.0   MCHC 31.1 L 31.4 L  --  32.0    < > = values in this interval not displayed.     CMP:  Recent Labs   Lab 11/02/22  1013 12/02/22  1436 03/21/23  0921   Glucose 217 H 168 H 201 H   Calcium 8.9 9.2 9.1   Albumin 3.3 L 3.6 3.4 L   Total Protein 7.4 7.6 7.2   Sodium 142 142 142   Potassium 3.6 3.4 L 3.8   CO2 31 30 30   Chloride 106 105 107   BUN 9 8 10   Alk Phos 137 H 142 H 150 H   ALT 32 26 34   AST 21 15 21   Bilirubin, Total 0.2 0.3 0.2     LIPIDS:  Recent Labs   Lab 12/02/22  1436 01/10/23  1000 03/21/23  0921   TSH 18.400 H 0.038 L 16.000 H   HDL Cholesterol 44  --   --    Cholesterol 231 H  --   --    Triglycerides 265 H  --   --    LDL Calculated 134  --   --    Cholesterol/HDL Ratio (Risk Factor) 5.3  --   --    Non-  --   --      TSH:  Recent Labs   Lab 12/02/22  1436 01/10/23  1000 03/21/23  0921   TSH 18.400 H 0.038 L 16.000 H     A1C:  Recent Labs   Lab 05/14/21  0938 08/30/21  1017 11/15/21  1049 11/23/21  1346 02/03/22  0910 08/02/22  1428 11/02/22  1013 12/02/22  1436 03/21/23  0921   Hemoglobin A1C 10.7 H 9.1 H 7.6 H 7.6 H 7.5 H 12.6 H 8.0 H 7.3 H 7.2 H       Assessment/Plan     Problem List Items Addressed This Visit          Cardiac/Vascular    Hypertension     BP Readings from Last 3 Encounters:   03/21/23 132/84   03/09/23 132/88   02/17/23 118/70      The current medical regimen is effective;  continue present plan and medications. Recommended patient to check home readings to monitor and see me for followup as scheduled or sooner as needed.   Discussed sodium restriction, maintaining ideal body weight and regular  exercise program as physiologic means to continue to achieve blood pressure control in addition to medication compliance.  Patient was educated that both decongestant and anti-inflammatory medication may raise blood pressure.           Relevant Medications    hydrALAZINE (APRESOLINE) 100 MG tablet    metoprolol succinate (TOPROL-XL) 100 MG 24 hr tablet    cloNIDine (CATAPRES) 0.2 MG tablet    Other Relevant Orders    Comprehensive Metabolic Panel (Completed)       Endocrine    Type 2 diabetes mellitus without complication, without long-term current use of insulin - Primary (Chronic)     Lab Results   Component Value Date    HGBA1C 7.2 (H) 03/21/2023     Lab Results   Component Value Date    MICROALBUR 0.9 12/02/2022      Diabetes is under good control at this time for age and comorbid conditions.   We discussed diabetic diet and regular exercise.   We discussed home blood sugar monitoring, if appropriate - the patient should test once daily and as needed.   We discussed low sugar/low carbohydrate diet and regular exercise to prevent progression. No need for prescription medication at this time. Recheck A1c in 3 months.  Diabetic complications addressed: Discussed regular foot exam at home using a mirror.  Patient was counseled on the need for yearly eye exam to screen for/monitor diabetic retinopathy and yearly diabetic foot exam.         Relevant Medications    dulaglutide (TRULICITY) 4.5 mg/0.5 mL pen injector    glipizide-metformin (METAGLIP) 5-500 mg per tablet    Other Relevant Orders    Hemoglobin A1C (Completed)    Comprehensive Metabolic Panel (Completed)    Hypothyroidism     -Take Synthroid on an empty stomach QAM and wait at least an hour to eat or drink coffee   -Call to schedule appointment with endocrinology.         Relevant Medications    levothyroxine (SYNTHROID) 200 MCG tablet    Other Relevant Orders    TSH (Completed)    T4, Free (Completed)    CBC Auto Differential      Carey Leonardo is a 47  y.o.female with:    1. Type 2 diabetes mellitus without complication, without long-term current use of insulin  - Hemoglobin A1C; Future  - Comprehensive Metabolic Panel; Future  - Hemoglobin A1C  - Comprehensive Metabolic Panel  - glipizide-metformin (METAGLIP) 5-500 mg per tablet; Take 1 tablet by mouth 2 (two) times daily before meals.  Dispense: 180 tablet; Refill: 0    2. Hypothyroidism, unspecified type  - TSH; Future  - T4, Free; Future  - TSH  - T4, Free  - CBC Auto Differential; Future  - levothyroxine (SYNTHROID) 200 MCG tablet; Take 1 tablet (200 mcg total) by mouth before breakfast.  Dispense: 90 tablet; Refill: 0    3. Primary hypertension  - Comprehensive Metabolic Panel; Future  - Comprehensive Metabolic Panel  - hydrALAZINE (APRESOLINE) 100 MG tablet; Take 1 tablet (100 mg total) by mouth 3 (three) times daily.  Dispense: 270 tablet; Refill: 0  - metoprolol succinate (TOPROL-XL) 100 MG 24 hr tablet; Take 1 tablet (100 mg total) by mouth once daily.  Dispense: 90 tablet; Refill: 0  - cloNIDine (CATAPRES) 0.2 MG tablet; Take 1 tablet (0.2 mg total) by mouth 4 (four) times daily.  Dispense: 360 tablet; Refill: 0  -Bring all current medications and their original bottles to next appointment.  -Schedule appointment with endocrinology soon as possible.    Chronic conditions status updated as per HPI.  Other than changes above, cont current medications and maintain follow up with specialists.  Return to clinic in 3 months.    Ursula Kapadia, JAMAALP  [facility]

## 2023-03-23 ENCOUNTER — TELEPHONE (OUTPATIENT)
Dept: FAMILY MEDICINE | Facility: CLINIC | Age: 48
End: 2023-03-23
Payer: OTHER GOVERNMENT

## 2023-03-23 NOTE — TELEPHONE ENCOUNTER
Spoke with Ms Leonardo, voiced understanding of lab findings and information from provider, will see Dr Clark on 03/24/2023 and states she does take her medication and as directed.

## 2023-03-23 NOTE — TELEPHONE ENCOUNTER
----- Message from ROGELIO Blancas sent at 3/23/2023  2:26 PM CDT -----  Please contact the patient and let them know that her TSH is 16 which is significantly elevated.  Her free T4 was okay.  She needs to ensure that she is taking her Synthroid in the morning upon awakening prior to eating or drinking anything and she needs to wait at least an hour after taking her Synthroid before she takes any other medications or eats breakfast.    Follow up with Endocrinology.   (0) independent

## 2023-03-25 PROBLEM — M53.3 DISORDER OF SACRUM: Chronic | Status: RESOLVED | Noted: 2022-04-28 | Resolved: 2023-03-25

## 2023-03-25 PROBLEM — E11.9 TYPE 2 DIABETES MELLITUS WITHOUT COMPLICATION, WITHOUT LONG-TERM CURRENT USE OF INSULIN: Chronic | Status: ACTIVE | Noted: 2022-02-11

## 2023-03-25 NOTE — ASSESSMENT & PLAN NOTE
Lab Results   Component Value Date    HGBA1C 7.2 (H) 03/21/2023     Lab Results   Component Value Date    MICROALBUR 0.9 12/02/2022      Diabetes is under good control at this time for age and comorbid conditions.   We discussed diabetic diet and regular exercise.   We discussed home blood sugar monitoring, if appropriate - the patient should test once daily and as needed.   We discussed low sugar/low carbohydrate diet and regular exercise to prevent progression. No need for prescription medication at this time. Recheck A1c in 3 months.  Diabetic complications addressed: Discussed regular foot exam at home using a mirror.  Patient was counseled on the need for yearly eye exam to screen for/monitor diabetic retinopathy and yearly diabetic foot exam.

## 2023-03-25 NOTE — ASSESSMENT & PLAN NOTE
-Take Synthroid on an empty stomach QAM and wait at least an hour to eat or drink coffee   -Call to schedule appointment with endocrinology.

## 2023-03-25 NOTE — ASSESSMENT & PLAN NOTE
BP Readings from Last 3 Encounters:   03/21/23 132/84   03/09/23 132/88   02/17/23 118/70      The current medical regimen is effective;  continue present plan and medications. Recommended patient to check home readings to monitor and see me for followup as scheduled or sooner as needed.   Discussed sodium restriction, maintaining ideal body weight and regular exercise program as physiologic means to continue to achieve blood pressure control in addition to medication compliance.  Patient was educated that both decongestant and anti-inflammatory medication may raise blood pressure.

## 2023-03-27 ENCOUNTER — OFFICE VISIT (OUTPATIENT)
Dept: OTOLARYNGOLOGY | Facility: CLINIC | Age: 48
End: 2023-03-27
Payer: OTHER GOVERNMENT

## 2023-03-27 VITALS — HEIGHT: 66 IN | BODY MASS INDEX: 33.75 KG/M2 | WEIGHT: 210 LBS

## 2023-03-27 DIAGNOSIS — H92.11 OTORRHEA, RIGHT EAR: ICD-10-CM

## 2023-03-27 DIAGNOSIS — H65.23 CHRONIC SEROUS OTITIS MEDIA OF BOTH EARS: Primary | ICD-10-CM

## 2023-03-27 PROCEDURE — 99214 OFFICE O/P EST MOD 30 MIN: CPT | Mod: S$PBB,,, | Performed by: OTOLARYNGOLOGY

## 2023-03-27 PROCEDURE — 99214 OFFICE O/P EST MOD 30 MIN: CPT | Mod: PBBFAC | Performed by: OTOLARYNGOLOGY

## 2023-03-27 PROCEDURE — 99214 PR OFFICE/OUTPT VISIT, EST, LEVL IV, 30-39 MIN: ICD-10-PCS | Mod: S$PBB,,, | Performed by: OTOLARYNGOLOGY

## 2023-03-27 NOTE — H&P (VIEW-ONLY)
Subjective:       Patient ID: Carey Leonardo is a 47 y.o. female.    Chief Complaint: Follow-up (Patient is a three week follow up. She has seen no improvements. )  Drainage is better  Follow-up    Review of Systems   HENT:  Positive for ear discharge, ear pain and hearing loss.    All other systems reviewed and are negative.    Objective:      Physical Exam  General: NAD  Head: Normocephalic, atraumatic, no facial asymmetry/normal strength,  Ears: Both auricules normal in appearance, w/o deformities tympanic membranes tubes not functioning external auditory canals normal  Nose: External nose w/o deformities normal turbinates no drainage or inflammation  Oral Cavity: Lips, gums, floor of mouth, tongue hard palate, and buccal mucosa without mass/lesion  Oropharynx: Mucosa pink and moist, soft palate, posterior pharynx and oropharyngeal wall without mass/lesion  Neck: Supple, symmetric, trachea midline, no palpable mass/lesion, no palpable cervical lymphadenopathy  Skin: Warm and dry, no concerning lesions  Respiratory: Respirations even, unlabored   Assessment:       1. Chronic serous otitis media of both ears    2. Otorrhea, right ear        Plan:       Bilateral t tubes in OR

## 2023-04-04 ENCOUNTER — ANESTHESIA (OUTPATIENT)
Dept: SURGERY | Facility: HOSPITAL | Age: 48
End: 2023-04-04
Payer: OTHER GOVERNMENT

## 2023-04-04 ENCOUNTER — ANESTHESIA EVENT (OUTPATIENT)
Dept: SURGERY | Facility: HOSPITAL | Age: 48
End: 2023-04-04
Payer: OTHER GOVERNMENT

## 2023-04-04 ENCOUNTER — HOSPITAL ENCOUNTER (OUTPATIENT)
Facility: HOSPITAL | Age: 48
Discharge: HOME | End: 2023-04-04
Attending: OTOLARYNGOLOGY | Admitting: OTOLARYNGOLOGY
Payer: OTHER GOVERNMENT

## 2023-04-04 VITALS
HEART RATE: 86 BPM | TEMPERATURE: 98 F | SYSTOLIC BLOOD PRESSURE: 123 MMHG | DIASTOLIC BLOOD PRESSURE: 78 MMHG | HEIGHT: 66 IN | WEIGHT: 210 LBS | BODY MASS INDEX: 33.75 KG/M2 | RESPIRATION RATE: 18 BRPM | OXYGEN SATURATION: 95 %

## 2023-04-04 DIAGNOSIS — H65.23 CHRONIC SEROUS OTITIS MEDIA OF BOTH EARS: ICD-10-CM

## 2023-04-04 DIAGNOSIS — R07.9 CHEST PAIN: ICD-10-CM

## 2023-04-04 DIAGNOSIS — H66.93 CHRONIC OTITIS MEDIA OF BOTH EARS: Primary | ICD-10-CM

## 2023-04-04 LAB
CK MB SERPL-MCNC: <1 NG/ML (ref 1–3.6)
CK SERPL-CCNC: 79 U/L (ref 26–192)
GLUCOSE SERPL-MCNC: 164 MG/DL (ref 70–105)
GLUCOSE SERPL-MCNC: 188 MG/DL (ref 70–105)
TROPONIN I SERPL HS-MCNC: 7.4 PG/ML

## 2023-04-04 PROCEDURE — D9220A PRA ANESTHESIA: ICD-10-PCS | Mod: ANES,,, | Performed by: ANESTHESIOLOGY

## 2023-04-04 PROCEDURE — 63600175 PHARM REV CODE 636 W HCPCS: Performed by: NURSE ANESTHETIST, CERTIFIED REGISTERED

## 2023-04-04 PROCEDURE — 82550 ASSAY OF CK (CPK): CPT | Performed by: ANESTHESIOLOGY

## 2023-04-04 PROCEDURE — D9220A PRA ANESTHESIA: ICD-10-PCS | Mod: CRNA,,, | Performed by: NURSE ANESTHETIST, CERTIFIED REGISTERED

## 2023-04-04 PROCEDURE — 71000039 HC RECOVERY, EACH ADD'L HOUR: Performed by: OTOLARYNGOLOGY

## 2023-04-04 PROCEDURE — 71000015 HC POSTOP RECOV 1ST HR: Performed by: OTOLARYNGOLOGY

## 2023-04-04 PROCEDURE — 71000033 HC RECOVERY, INTIAL HOUR: Performed by: OTOLARYNGOLOGY

## 2023-04-04 PROCEDURE — 36000704 HC OR TIME LEV I 1ST 15 MIN: Performed by: OTOLARYNGOLOGY

## 2023-04-04 PROCEDURE — 69436 PR CREATE EARDRUM OPENING,GEN ANESTH: ICD-10-PCS | Mod: 50,,, | Performed by: OTOLARYNGOLOGY

## 2023-04-04 PROCEDURE — 27000177 HC AIRWAY, LARYNGEAL MASK: Performed by: ANESTHESIOLOGY

## 2023-04-04 PROCEDURE — 00126 ANES PX EAR TYMPANOTOMY: CPT | Performed by: OTOLARYNGOLOGY

## 2023-04-04 PROCEDURE — 27000655: Performed by: ANESTHESIOLOGY

## 2023-04-04 PROCEDURE — 27000716 HC OXISENSOR PROBE, ANY SIZE: Performed by: ANESTHESIOLOGY

## 2023-04-04 PROCEDURE — D9220A PRA ANESTHESIA: Mod: CRNA,,, | Performed by: NURSE ANESTHETIST, CERTIFIED REGISTERED

## 2023-04-04 PROCEDURE — 93010 ELECTROCARDIOGRAM REPORT: CPT | Mod: ,,, | Performed by: STUDENT IN AN ORGANIZED HEALTH CARE EDUCATION/TRAINING PROGRAM

## 2023-04-04 PROCEDURE — D9220A PRA ANESTHESIA: Mod: ANES,,, | Performed by: ANESTHESIOLOGY

## 2023-04-04 PROCEDURE — 93005 ELECTROCARDIOGRAM TRACING: CPT | Mod: 59

## 2023-04-04 PROCEDURE — 37000008 HC ANESTHESIA 1ST 15 MINUTES: Performed by: OTOLARYNGOLOGY

## 2023-04-04 PROCEDURE — 25000003 PHARM REV CODE 250: Performed by: NURSE ANESTHETIST, CERTIFIED REGISTERED

## 2023-04-04 PROCEDURE — 84484 ASSAY OF TROPONIN QUANT: CPT | Performed by: ANESTHESIOLOGY

## 2023-04-04 PROCEDURE — 27201423 OPTIME MED/SURG SUP & DEVICES STERILE SUPPLY: Performed by: OTOLARYNGOLOGY

## 2023-04-04 PROCEDURE — 93010 EKG 12-LEAD: ICD-10-PCS | Mod: ,,, | Performed by: STUDENT IN AN ORGANIZED HEALTH CARE EDUCATION/TRAINING PROGRAM

## 2023-04-04 PROCEDURE — 25000003 PHARM REV CODE 250: Performed by: OTOLARYNGOLOGY

## 2023-04-04 PROCEDURE — 82962 GLUCOSE BLOOD TEST: CPT

## 2023-04-04 PROCEDURE — 27000510 HC BLANKET BAIR HUGGER ANY SIZE: Performed by: ANESTHESIOLOGY

## 2023-04-04 PROCEDURE — 69436 CREATE EARDRUM OPENING: CPT | Mod: 50,,, | Performed by: OTOLARYNGOLOGY

## 2023-04-04 PROCEDURE — 27000284 HC CANNULA NASAL: Performed by: ANESTHESIOLOGY

## 2023-04-04 PROCEDURE — 63600175 PHARM REV CODE 636 W HCPCS: Performed by: ANESTHESIOLOGY

## 2023-04-04 PROCEDURE — 25000003 PHARM REV CODE 250: Performed by: ANESTHESIOLOGY

## 2023-04-04 DEVICE — IMPLANTABLE DEVICE: Type: IMPLANTABLE DEVICE | Site: EAR | Status: FUNCTIONAL

## 2023-04-04 RX ORDER — SERTRALINE HYDROCHLORIDE 100 MG/1
100 TABLET, FILM COATED ORAL NIGHTLY
COMMUNITY
End: 2023-07-19 | Stop reason: SDUPTHER

## 2023-04-04 RX ORDER — SODIUM CHLORIDE 9 MG/ML
INJECTION, SOLUTION INTRAVENOUS CONTINUOUS
Status: DISCONTINUED | OUTPATIENT
Start: 2023-04-04 | End: 2023-04-04 | Stop reason: HOSPADM

## 2023-04-04 RX ORDER — SODIUM CHLORIDE, SODIUM LACTATE, POTASSIUM CHLORIDE, CALCIUM CHLORIDE 600; 310; 30; 20 MG/100ML; MG/100ML; MG/100ML; MG/100ML
INJECTION, SOLUTION INTRAVENOUS CONTINUOUS
Status: DISCONTINUED | OUTPATIENT
Start: 2023-04-04 | End: 2023-04-04 | Stop reason: HOSPADM

## 2023-04-04 RX ORDER — LIDOCAINE HYDROCHLORIDE 10 MG/ML
1 INJECTION INFILTRATION; PERINEURAL ONCE
Status: DISCONTINUED | OUTPATIENT
Start: 2023-04-04 | End: 2023-04-04 | Stop reason: HOSPADM

## 2023-04-04 RX ORDER — LIDOCAINE HYDROCHLORIDE 20 MG/ML
INJECTION, SOLUTION EPIDURAL; INFILTRATION; INTRACAUDAL; PERINEURAL
Status: DISCONTINUED | OUTPATIENT
Start: 2023-04-04 | End: 2023-04-04

## 2023-04-04 RX ORDER — MEPERIDINE HYDROCHLORIDE 25 MG/ML
25 INJECTION INTRAMUSCULAR; INTRAVENOUS; SUBCUTANEOUS EVERY 10 MIN PRN
Status: DISCONTINUED | OUTPATIENT
Start: 2023-04-04 | End: 2023-04-04 | Stop reason: HOSPADM

## 2023-04-04 RX ORDER — MAG HYDROX/ALUMINUM HYD/SIMETH 200-200-20
30 SUSPENSION, ORAL (FINAL DOSE FORM) ORAL ONCE
Status: COMPLETED | OUTPATIENT
Start: 2023-04-04 | End: 2023-04-04

## 2023-04-04 RX ORDER — CIPROFLOXACIN AND DEXAMETHASONE 3; 1 MG/ML; MG/ML
SUSPENSION/ DROPS AURICULAR (OTIC)
Status: DISCONTINUED | OUTPATIENT
Start: 2023-04-04 | End: 2023-04-04 | Stop reason: HOSPADM

## 2023-04-04 RX ORDER — LIDOCAINE HYDROCHLORIDE 20 MG/ML
15 SOLUTION OROPHARYNGEAL ONCE
Status: COMPLETED | OUTPATIENT
Start: 2023-04-04 | End: 2023-04-04

## 2023-04-04 RX ORDER — OXYCODONE HYDROCHLORIDE 5 MG/1
5 TABLET ORAL
Status: DISCONTINUED | OUTPATIENT
Start: 2023-04-04 | End: 2023-04-04 | Stop reason: HOSPADM

## 2023-04-04 RX ORDER — MAG HYDROX/ALUMINUM HYD/SIMETH 200-200-20
30 SUSPENSION, ORAL (FINAL DOSE FORM) ORAL ONCE
Status: DISCONTINUED | OUTPATIENT
Start: 2023-04-04 | End: 2023-04-04

## 2023-04-04 RX ORDER — HYDROMORPHONE HYDROCHLORIDE 2 MG/ML
0.5 INJECTION, SOLUTION INTRAMUSCULAR; INTRAVENOUS; SUBCUTANEOUS EVERY 5 MIN PRN
Status: DISCONTINUED | OUTPATIENT
Start: 2023-04-04 | End: 2023-04-04 | Stop reason: HOSPADM

## 2023-04-04 RX ORDER — ONDANSETRON 2 MG/ML
INJECTION INTRAMUSCULAR; INTRAVENOUS
Status: DISCONTINUED | OUTPATIENT
Start: 2023-04-04 | End: 2023-04-04

## 2023-04-04 RX ORDER — LIDOCAINE HYDROCHLORIDE 20 MG/ML
15 SOLUTION OROPHARYNGEAL ONCE
Status: DISCONTINUED | OUTPATIENT
Start: 2023-04-04 | End: 2023-04-04

## 2023-04-04 RX ORDER — SODIUM CHLORIDE, SODIUM LACTATE, POTASSIUM CHLORIDE, CALCIUM CHLORIDE 600; 310; 30; 20 MG/100ML; MG/100ML; MG/100ML; MG/100ML
125 INJECTION, SOLUTION INTRAVENOUS CONTINUOUS
Status: DISCONTINUED | OUTPATIENT
Start: 2023-04-04 | End: 2023-04-04 | Stop reason: HOSPADM

## 2023-04-04 RX ORDER — MORPHINE SULFATE 10 MG/ML
4 INJECTION INTRAMUSCULAR; INTRAVENOUS; SUBCUTANEOUS EVERY 5 MIN PRN
Status: DISCONTINUED | OUTPATIENT
Start: 2023-04-04 | End: 2023-04-04 | Stop reason: HOSPADM

## 2023-04-04 RX ORDER — ONDANSETRON 2 MG/ML
4 INJECTION INTRAMUSCULAR; INTRAVENOUS DAILY PRN
Status: DISCONTINUED | OUTPATIENT
Start: 2023-04-04 | End: 2023-04-04 | Stop reason: HOSPADM

## 2023-04-04 RX ORDER — DIPHENHYDRAMINE HYDROCHLORIDE 50 MG/ML
25 INJECTION INTRAMUSCULAR; INTRAVENOUS EVERY 6 HOURS PRN
Status: DISCONTINUED | OUTPATIENT
Start: 2023-04-04 | End: 2023-04-04 | Stop reason: HOSPADM

## 2023-04-04 RX ORDER — PROPOFOL 10 MG/ML
INJECTION, EMULSION INTRAVENOUS
Status: DISCONTINUED | OUTPATIENT
Start: 2023-04-04 | End: 2023-04-04

## 2023-04-04 RX ADMIN — PROPOFOL 150 MG: 10 INJECTION, EMULSION INTRAVENOUS at 08:04

## 2023-04-04 RX ADMIN — LIDOCAINE HYDROCHLORIDE 15 ML: 20 SOLUTION ORAL; TOPICAL at 10:04

## 2023-04-04 RX ADMIN — ONDANSETRON 4 MG: 2 INJECTION INTRAMUSCULAR; INTRAVENOUS at 08:04

## 2023-04-04 RX ADMIN — ALUMINUM HYDROXIDE, MAGNESIUM HYDROXIDE, AND SIMETHICONE 30 ML: 200; 200; 20 SUSPENSION ORAL at 10:04

## 2023-04-04 RX ADMIN — SODIUM CHLORIDE: 9 INJECTION, SOLUTION INTRAVENOUS at 07:04

## 2023-04-04 RX ADMIN — ONDANSETRON HYDROCHLORIDE 4 MG: 2 SOLUTION INTRAMUSCULAR; INTRAVENOUS at 10:04

## 2023-04-04 RX ADMIN — LIDOCAINE HYDROCHLORIDE 60 MG: 20 INJECTION, SOLUTION INTRAVENOUS at 08:04

## 2023-04-04 NOTE — PLAN OF CARE
0907-Pt rec'd to PACU, sedated but arousable, SaO2-95% on O2@3L n/c, NADN, resp even et unlabored, IV fluids infusing, cotton balls x 2 intact to bilateral ears,will con't to monitor, safety measure    0935-Pt remains to be drowsy bur arousable, c/o chest pressure/, on a scale of 7,  notified, O2@3L placed on pt, SaO2-97%, NADN, order for EKG rec'd, will con' to monitor    0945- @ bedside to check on pt, no new orders rec'd at this time, waiting on EKG results.    0955-EKG being done @ bedside,  was notified via phone call and given update on pt status.    0958-Notified  that pt EKG results are in.    0959- here to review EKG results, results were normal, pt still c/o feeling chest pressure, MD FÉLIX states he will put in order for cardiac enzymes.    1005-Lab was notified about doing the cardiac enzymes, pt is drowsy and responds to verbal stimuli, c/o intermittent chest pressure to the right side, no pain med given at this time due to pt remains to be drowsy,  aware.    1015-Pt c/o nausea, Zofran 4mg given ivp, cool wash cloth placed on forehead per pt request, NADN, remains to c/o chest pressure to right side, will con't to monitor.    1024-Labs being drawn@ bedside.    1030-Pt resting quietly, Zofran was effective, pt denies having any nausea @ this time, NADN, SaO2-97% on 3L n/c, will con't to monitor.    1042- here to check on pt, she continues to c/o intermittent pressure to right side of chest, new order rec'd for GI cocktail, will con't to monitor, safety measures ongoing.    1057-Pt was given GI cocktail @ bedside, will con't to monitor.    1116-Notified Uriah Zamora regarding troponin level, MD states it ok to discharge pt to ASC room 6, will pass on to outpt nurse that she's not to leave facility until the CK level come back, pt states chest pressure has lighten up.    1125-Pt care released to Erum(RN), pt awake with  @ bedside, vss  hr-82, resp-16, SaO2-93% on O2@2L, b/p 124/79, reported to nurse  to notify  about the results of the cardiac enzymes before pt leaves the outpatient unit.

## 2023-04-04 NOTE — OP NOTE
Surgery Date: 4/4/2023     Surgeon(s) and Role:     * Sea Hinton MD - Primary    Assisting Surgeon: None    Pre-op Diagnosis:  Chronic serous otitis media of both ears [H65.23]    Post-op Diagnosis:  Post-Op Diagnosis Codes:     * Chronic serous otitis media of both ears [H65.23]    Procedure(s) (LRB):  MYRINGOTOMY, WITH TYMPANOSTOMY TUBE INSERTION (T-TUBES) (Bilateral)    After general mask anesthesia using the operating microscope the left ear was examined and a myringotomy was performed in the anterior inferior quadrant and a t tube was placed without difficulty excess middle ear  fluid was suctioned. The opposite ear was done in a likewise fashion the patient tolerated the procedure well and was reversed taken to RR in stable condition            Anesthesia: General    Operative Findings: Fluid    Estimated Blood Loss: 0

## 2023-04-04 NOTE — ANESTHESIA POSTPROCEDURE EVALUATION
Anesthesia Post Evaluation    Patient: Shataluzmaria Leonardo    Procedure(s) Performed: Procedure(s) (LRB):  MYRINGOTOMY, WITH TYMPANOSTOMY TUBE INSERTION (T-TUBES) (Bilateral)    Final Anesthesia Type: general      Patient location during evaluation: PACU  Patient participation: Yes- Able to Participate  Level of consciousness: awake and sedated  Post-procedure vital signs: reviewed and stable  Pain management: adequate  Airway patency: patent    PONV status at discharge: No PONV  Anesthetic complications: no      Cardiovascular status: blood pressure returned to baseline  Respiratory status: unassisted  Hydration status: euvolemic  Follow-up not needed.          Vitals Value Taken Time   /78 04/04/23 1200   Temp 36.8 °C (98.3 °F) 04/04/23 0916   Pulse 84 04/04/23 1202   Resp 16 04/04/23 1202   SpO2 93 % 04/04/23 1202   Vitals shown include unvalidated device data.      Event Time   Out of Recovery 11:16:00         Pain/Miah Score: Miah Score: 10 (4/4/2023 12:19 PM)  Modified Miah Score: 20 (4/4/2023 12:19 PM)

## 2023-04-04 NOTE — DISCHARGE INSTRUCTIONS
Dr Hinton's post op discharge instruction sheet  Use ciprodex drops- 2 drops each ear twice a day for 2 days, start tonight  Keep water out of ears   May take tylenol or motrin as needed for pain  Call for any problems

## 2023-04-04 NOTE — BRIEF OP NOTE
[facility] - Orthopedic Periop Services  Brief Operative Note    Surgery Date: 4/4/2023     Surgeon(s) and Role:     * Sea Hinton MD - Primary    Assisting Surgeon: None    Pre-op Diagnosis:  Chronic serous otitis media of both ears [H65.23]    Post-op Diagnosis:  Post-Op Diagnosis Codes:     * Chronic serous otitis media of both ears [H65.23]    Procedure(s) (LRB):  MYRINGOTOMY, WITH TYMPANOSTOMY TUBE INSERTION (T-TUBES) (Bilateral)    Anesthesia: General    Operative Findings: Fluid    Estimated Blood Loss: 0         Specimens:   Specimen (24h ago, onward)      None              Discharge Note    OUTCOME: Patient tolerated treatment/procedure well without complication and is now ready for discharge.    DISPOSITION: Home or Self Care    FINAL DIAGNOSIS:  CHAD  FOLLOWUP: In clinic    DISCHARGE INSTRUCTIONS:  No discharge procedures on file.

## 2023-04-04 NOTE — TELEPHONE ENCOUNTER
----- Message from Long Fernández DO sent at 3/1/2022  1:52 PM CST -----  TSH very elevated. Will increase dose of levothyroxine. Needs to make sure she calls Dr. Sistrunk's office to make an appt   Birth Control Pills Counseling: Birth Control Pill Counseling: I discussed with the patient the potential side effects of OCPs including but not limited to increased risk of stroke, heart attack, thrombophlebitis, deep venous thrombosis, hepatic adenomas, breast changes, GI upset, headaches, and depression.  The patient verbalized understanding of the proper use and possible adverse effects of OCPs. All of the patient's questions and concerns were addressed.

## 2023-04-04 NOTE — ANESTHESIA PREPROCEDURE EVALUATION
04/04/2023  Carey Leonardo is a 47 y.o., female.      Pre-op Assessment    I have reviewed the Patient Summary Reports.     I have reviewed the Nursing Notes. I have reviewed the NPO Status.   I have reviewed the Medications.     Review of Systems  Anesthesia Hx:  No problems with previous Anesthesia    Social:  Non-Smoker, No Alcohol Use    Hematology/Oncology:  Hematology Normal   Oncology Normal     EENT/Dental:EENT/Dental Normal   Cardiovascular:   Hypertension CHF    Pulmonary:   Asthma Sleep Apnea    Renal/:   Chronic Renal Disease, CKD    Hepatic/GI:   GERD    Musculoskeletal:   Arthritis     Neurological:   Chronic Pain Syndrome   Endocrine:   Diabetes, poorly controlled, type 2 Hypothyroidism  Obesity / BMI > 30  Dermatological:  Skin Normal    Psych:   anxiety          Physical Exam  General: Well nourished    Airway:  Mallampati: III / III  Mouth Opening: Normal  TM Distance: > 6 cm  Tongue: Normal  Neck ROM: Normal ROM    Chest/Lungs:  Clear to auscultation, Normal Respiratory Rate    Heart:  Rate: Normal  Rhythm: Regular Rhythm        Anesthesia Plan  Type of Anesthesia, risks & benefits discussed:    Anesthesia Type: Gen Supraglottic Airway  Intra-op Monitoring Plan: Standard ASA Monitors  Post Op Pain Control Plan: multimodal analgesia  Induction:  IV  Informed Consent: Informed consent signed with the Patient and all parties understand the risks and agree with anesthesia plan.  All questions answered.   ASA Score: 3  Day of Surgery Review of History & Physical: H&P Update referred to the surgeon/provider.I have interviewed and examined the patient. I have reviewed the patient's H&P dated: There are no significant changes. H&P completed by Anesthesiologist.    Ready For Surgery From Anesthesia Perspective.     .

## 2023-04-11 NOTE — PROGRESS NOTES
Subjective:         Patient ID: Carey Leonardo is a 47 y.o. female.    Chief Complaint: Low-back Pain and Knee Pain (Right knee)      Pain  This is a chronic problem. The current episode started more than 1 year ago. The problem occurs daily. The problem has been waxing and waning. Associated symptoms include arthralgias. Pertinent negatives include no anorexia, change in bowel habit, chest pain, chills, coughing, diaphoresis, fever, neck pain, rash, sore throat, swollen glands, urinary symptoms, vertigo or vomiting.   Review of Systems   Constitutional:  Negative for activity change, appetite change, chills, diaphoresis, fever and unexpected weight change.   HENT:  Negative for drooling, ear discharge, ear pain, facial swelling, nosebleeds, sore throat, trouble swallowing, voice change and goiter.    Eyes:  Negative for photophobia, pain, discharge, redness and visual disturbance.   Respiratory:  Negative for apnea, cough, choking, chest tightness, shortness of breath, wheezing and stridor.    Cardiovascular:  Negative for chest pain, palpitations and leg swelling.   Gastrointestinal:  Negative for abdominal distention, anorexia, change in bowel habit, diarrhea, rectal pain, vomiting, fecal incontinence and change in bowel habit.   Endocrine: Negative for cold intolerance, heat intolerance, polydipsia, polyphagia and polyuria.   Genitourinary:  Negative for bladder incontinence, dysuria, flank pain, frequency and hot flashes.   Musculoskeletal:  Positive for arthralgias, back pain and leg pain. Negative for neck pain.   Integumentary:  Negative for color change, pallor and rash.   Allergic/Immunologic: Negative for immunocompromised state.   Neurological:  Negative for dizziness, vertigo, seizures, syncope, facial asymmetry, speech difficulty, light-headedness, coordination difficulties, memory loss and coordination difficulties.   Hematological:  Negative for adenopathy. Does not bruise/bleed easily.    Psychiatric/Behavioral:  Negative for agitation, behavioral problems, confusion, decreased concentration, dysphoric mood, hallucinations, self-injury and suicidal ideas. The patient is not nervous/anxious and is not hyperactive.          Past Medical History:   Diagnosis Date    Asthma     CHF (congestive heart failure)     Chronic pain syndrome     Depressive disorder     Diabetes mellitus, type 2     Disorder of sacrum 4/28/2022    Enlarged heart     Gastroparesis     GERD (gastroesophageal reflux disease)     Hyperlipidemia     Hypertension     IBS (irritable bowel syndrome)     Kidney stones     Lumbar radiculopathy     Sleep apnea     Does not use a CPAP    Thyroid disease      Past Surgical History:   Procedure Laterality Date    Bilateral L3-S1 MBB Bilateral 6-, 5-, 1-8-2014    Dr Jessica Randolph    CARPAL TUNNEL RELEASE      CHOLECYSTECTOMY      DIAGNOSTIC LAPAROSCOPY  04/14/2010    Exploratory Laparotomy, Myomectomy, Hydrotubation-Dr. Feng    DILATION AND CURETTAGE OF UTERUS  04/14/2010    Hysteroscopy-Dr. Feng    EXPLORATORY LAPAROTOMY WITH UTERINE MYOMECTOMY  02/27/2007    Dr. Marquise Anderson    HYSTERECTOMY  09/29/2011    Robotic, FABIENNE, Cystoscopy-Dr. Feng    HYSTEROSCOPY WITH DILATION AND CURETTAGE OF UTERUS  04/04/2006    Laparoscopy, Chromotubation-Dr. Marquise Anderson    LAMINECTOMY N/A 11/30/2021    Procedure: L2-L5 Laminectomy;  Surgeon: Cyril Upton MD;  Location: [facility] OR;  Service: Neurosurgery;  Laterality: N/A;    LEFT HEART CATHETERIZATION      Left L3-S1 RFTC Left 07/18/2017    Dr Lopez    Left SI JI Left 09/30/2013    Dr Randolph    MYRINGOTOMY WITH INSERTION OF VENTILATION TUBE Bilateral 4/4/2023    Procedure: MYRINGOTOMY, WITH TYMPANOSTOMY TUBE INSERTION (T-TUBES);  Surgeon: Sea Hinton MD;  Location: [facility] OR;  Service: ENT;  Laterality: Bilateral;  T-TUBES    OOPHORECTOMY  11/11/2014    Robotic, FABIENNE, Cystoscopy-Dr. Feng    SINUS SURGERY       Social  "History     Socioeconomic History    Marital status:    Tobacco Use    Smoking status: Never    Smokeless tobacco: Never   Substance and Sexual Activity    Alcohol use: Not Currently    Drug use: Not Currently     Types: Hydrocodone, Oxycodone    Sexual activity: Not Currently     Family History   Problem Relation Age of Onset    Diabetes Mellitus Mother     Heart disease Mother     Hypertension Mother     Migraines Mother     Heart disease Sister      Review of patient's allergies indicates:   Allergen Reactions    Fish containing products Anaphylaxis    Iodinated contrast media     Penicillins         Objective:  Vitals:    04/12/23 0900   BP: 130/79   Pulse: 75   Resp: 18   Weight: 99.2 kg (218 lb 9.6 oz)   Height: 5' 7" (1.702 m)   PainSc:   8         Physical Exam  Vitals and nursing note reviewed. Exam conducted with a chaperone present.   Constitutional:       General: She is awake. She is not in acute distress.     Appearance: Normal appearance. She is not ill-appearing, toxic-appearing or diaphoretic.   HENT:      Head: Normocephalic and atraumatic.      Nose: Nose normal.      Mouth/Throat:      Mouth: Mucous membranes are moist.      Pharynx: Oropharynx is clear.   Eyes:      Conjunctiva/sclera: Conjunctivae normal.      Pupils: Pupils are equal, round, and reactive to light.   Cardiovascular:      Rate and Rhythm: Normal rate.   Pulmonary:      Effort: Pulmonary effort is normal. No respiratory distress.   Abdominal:      Palpations: Abdomen is soft.      Tenderness: There is no guarding.   Musculoskeletal:         General: Normal range of motion.      Cervical back: Normal range of motion and neck supple. No rigidity.   Skin:     General: Skin is warm and dry.      Coloration: Skin is not jaundiced or pale.   Neurological:      General: No focal deficit present.      Mental Status: She is alert and oriented to person, place, and time. Mental status is at baseline.      Cranial Nerves: No cranial " nerve deficit (II-XII).   Psychiatric:         Mood and Affect: Mood normal.         Behavior: Behavior normal. Behavior is cooperative.         Thought Content: Thought content normal.         Mammo Digital Screening Bilat  Narrative: Result:   Mammo Digital Screening Bilat     History:  Patient is 47 y.o. and is seen for a screening mammogram.    Films Compared:  Prior images (if available) were compared.     Findings:  The breasts have scattered areas of fibroglandular density. There is no   evidence of suspicious masses, calcifications, or other abnormal findings.  Impression: Bilateral  There is no mammographic evidence of malignancy.    BI-RADS Category:   Overall: 1 - Negative       Recommendation:  Routine screening mammogram in 1 year is recommended.    Your estimated lifetime risk of breast cancer (to age 85) based on   Tyrer-Cuzick risk assessment model is Tyrer-Cuzick: 4.71 %. According to   the American Cancer Society, patients with a lifetime breast cancer risk   of 20% or higher might benefit from supplemental screening tests.       Admission on 04/04/2023, Discharged on 04/04/2023   Component Date Value Ref Range Status    POC Glucose 04/04/2023 188 (H)  70 - 105 mg/dL Final    POC Glucose 04/04/2023 164 (H)  70 - 105 mg/dL Final    CK 04/04/2023 79  26 - 192 U/L Final    CK-MB 04/04/2023 <1.0 (L)  1.0 - 3.6 ng/mL Final    Troponin I High Sensitivity 04/04/2023 7.4  <=60.4 pg/mL Final   Office Visit on 03/21/2023   Component Date Value Ref Range Status    Hemoglobin A1C 03/21/2023 7.2 (H)  4.5 - 6.6 % Final    Estimated Average Glucose 03/21/2023 154  mg/dL Final    Sodium 03/21/2023 142  136 - 145 mmol/L Final    Potassium 03/21/2023 3.8  3.5 - 5.1 mmol/L Final    Chloride 03/21/2023 107  98 - 107 mmol/L Final    CO2 03/21/2023 30  21 - 32 mmol/L Final    Anion Gap 03/21/2023 9  7 - 16 mmol/L Final    Glucose 03/21/2023 201 (H)  74 - 106 mg/dL Final    BUN 03/21/2023 10  7 - 18 mg/dL Final     Creatinine 03/21/2023 0.78  0.55 - 1.02 mg/dL Final    BUN/Creatinine Ratio 03/21/2023 13  6 - 20 Final    Calcium 03/21/2023 9.1  8.5 - 10.1 mg/dL Final    Total Protein 03/21/2023 7.2  6.4 - 8.2 g/dL Final    Albumin 03/21/2023 3.4 (L)  3.5 - 5.0 g/dL Final    Globulin 03/21/2023 3.8  2.0 - 4.0 g/dL Final    A/G Ratio 03/21/2023 0.9   Final    Bilirubin, Total 03/21/2023 0.2  >0.0 - 1.2 mg/dL Final    Alk Phos 03/21/2023 150 (H)  39 - 100 U/L Final    ALT 03/21/2023 34  13 - 56 U/L Final    AST 03/21/2023 21  15 - 37 U/L Final    eGFR 03/21/2023 95  >=60 mL/min/1.73m² Final    TSH 03/21/2023 16.000 (H)  0.358 - 3.740 uIU/mL Final    Free T4 03/21/2023 1.01  0.76 - 1.46 ng/dL Final   Office Visit on 03/09/2023   Component Date Value Ref Range Status    SARS Coronavirus 2 Antigen 03/09/2023 Negative  Negative Final    Rapid Influenza A Ag 03/09/2023 Negative  Negative Final    Rapid Influenza B Ag 03/09/2023 Negative  Negative Final     Acceptable 03/09/2023 Yes   Final    Rapid Strep A Screen 03/09/2023 Negative  Negative Final     Acceptable 03/09/2023 Yes   Final   Office Visit on 01/25/2023   Component Date Value Ref Range Status    SARS Coronavirus 2 Antigen 01/25/2023 Negative  Negative Final    Rapid Influenza A Ag 01/25/2023 Negative  Negative Final    Rapid Influenza B Ag 01/25/2023 Negative  Negative Final     Acceptable 01/25/2023 Yes   Final   Office Visit on 01/10/2023   Component Date Value Ref Range Status    TSH 01/10/2023 0.038 (L)  0.358 - 3.740 uIU/mL Final   Lab Requisition on 12/02/2022   Component Date Value Ref Range Status    Free T4 12/02/2022 0.73 (L)  0.76 - 1.46 ng/dL Final    Hemoglobin A1C 12/02/2022 7.3 (H)  4.5 - 6.6 % Final    Estimated Average Glucose 12/02/2022 157  mg/dL Final    Free T3 12/02/2022 1.79 (L)  2.18 - 3.98 pg/mL Final    TSH 12/02/2022 18.400 (H)  0.358 - 3.740 uIU/mL Final    Triglycerides 12/02/2022 265 (H)  35 - 150  mg/dL Final    Cholesterol 12/02/2022 231 (H)  0 - 200 mg/dL Final    HDL Cholesterol 12/02/2022 44  40 - 60 mg/dL Final    Cholesterol/HDL Ratio (Risk Factor) 12/02/2022 5.3   Final    Non-HDL 12/02/2022 187  mg/dL Final    LDL Calculated 12/02/2022 134  mg/dL Final    LDL/HDL 12/02/2022 3.0   Final    VLDL 12/02/2022 53  mg/dL Final    Sodium 12/02/2022 142  136 - 145 mmol/L Final    Potassium 12/02/2022 3.4 (L)  3.5 - 5.1 mmol/L Final    Chloride 12/02/2022 105  98 - 107 mmol/L Final    CO2 12/02/2022 30  21 - 32 mmol/L Final    Anion Gap 12/02/2022 10  7 - 16 mmol/L Final    Glucose 12/02/2022 168 (H)  74 - 106 mg/dL Final    BUN 12/02/2022 8  7 - 18 mg/dL Final    Creatinine 12/02/2022 0.83  0.55 - 1.02 mg/dL Final    BUN/Creatinine Ratio 12/02/2022 10  6 - 20 Final    Calcium 12/02/2022 9.2  8.5 - 10.1 mg/dL Final    Total Protein 12/02/2022 7.6  6.4 - 8.2 g/dL Final    Albumin 12/02/2022 3.6  3.5 - 5.0 g/dL Final    Globulin 12/02/2022 4.0  2.0 - 4.0 g/dL Final    A/G Ratio 12/02/2022 0.9   Final    Bilirubin, Total 12/02/2022 0.3  >0.0 - 1.2 mg/dL Final    Alk Phos 12/02/2022 142 (H)  39 - 100 U/L Final    ALT 12/02/2022 26  13 - 56 U/L Final    AST 12/02/2022 15  15 - 37 U/L Final    eGFR 12/02/2022 88  >=60 mL/min/1.73m² Final    Creatinine, Urine 12/02/2022 292 (H)  28 - 219 mg/dL Final    Microalbumin 12/02/2022 0.9  0.0 - 2.8 mg/dL Final    Microalbumin/Creatinine Ratio 12/02/2022 3.1  0.0 - 30.0 mg/g Final   Office Visit on 11/02/2022   Component Date Value Ref Range Status    TSH 11/02/2022 0.346 (L)  0.358 - 3.740 uIU/mL Final    Free T4 11/02/2022 1.19  0.76 - 1.46 ng/dL Final    Hemoglobin A1C 11/02/2022 8.0 (H)  4.5 - 6.6 % Final    Estimated Average Glucose 11/02/2022 180  mg/dL Final    Sodium 11/02/2022 142  136 - 145 mmol/L Final    Potassium 11/02/2022 3.6  3.5 - 5.1 mmol/L Final    Chloride 11/02/2022 106  98 - 107 mmol/L Final    CO2 11/02/2022 31  21 - 32 mmol/L Final    Anion Gap  11/02/2022 9  7 - 16 mmol/L Final    Glucose 11/02/2022 217 (H)  74 - 106 mg/dL Final    BUN 11/02/2022 9  7 - 18 mg/dL Final    Creatinine 11/02/2022 0.71  0.55 - 1.02 mg/dL Final    BUN/Creatinine Ratio 11/02/2022 13  6 - 20 Final    Calcium 11/02/2022 8.9  8.5 - 10.1 mg/dL Final    Total Protein 11/02/2022 7.4  6.4 - 8.2 g/dL Final    Albumin 11/02/2022 3.3 (L)  3.5 - 5.0 g/dL Final    Globulin 11/02/2022 4.1 (H)  2.0 - 4.0 g/dL Final    A/G Ratio 11/02/2022 0.8   Final    Bilirubin, Total 11/02/2022 0.2  >0.0 - 1.2 mg/dL Final    Alk Phos 11/02/2022 137 (H)  39 - 100 U/L Final    ALT 11/02/2022 32  13 - 56 U/L Final    AST 11/02/2022 21  15 - 37 U/L Final    eGFR 11/02/2022 106  >=60 mL/min/1.73m² Final    Total T3 11/02/2022 125  60 - 181 ng/dL Final   Office Visit on 10/26/2022   Component Date Value Ref Range Status    Case Report 10/26/2022    Final                    Value:Pap Cytology                                      Case: [id number]                                   Authorizing Provider:  Temo Feng MD            Collected:           10/26/2022 09:30 AM          Ordering Location:     [facility] Received:            10/27/2022 09:29 AM                                 - Obstetrics And                                                                                    Gynecology                                                                   First Screen:          HERMILA Tyler(ASCP)                                                        Specimen:    Liquid-Based Pap Test, Screening, Vagina                                                   Interpretation 10/26/2022 Negative              Final    General Categorization 10/26/2022 Negative for intraepithelial lesion or malignancy   Final    Specimen Adequacy 10/26/2022 Satisfactory for evaluation   Final    Clinical Information 10/26/2022    Final                    Value:This result contains rich text formatting which cannot be  displayed here.    Disclaimer 10/26/2022    Final                    Value:This result contains rich text formatting which cannot be displayed here.         No orders of the defined types were placed in this encounter.      Requested Prescriptions     Pending Prescriptions Disp Refills    traMADoL (ULTRAM) 50 mg tablet 45 tablet 1     Sig: Take 1 tablet (50 mg total) by mouth every 12 (twelve) hours as needed for Pain.       Assessment:     1. Lumbosacral spondylosis without myelopathy    2. Postlaminectomy syndrome, lumbar region    3. Disorder of sacrum         A's of Opioid Risk Assessment  Activity:Patient can perform ADL.   Analgesia:Patients pain is partially controlled by current medication. Patient has tried OTC medications such as Tylenol and Ibuprofen with out relief.   Adverse Effects: Patient denies constipation or sedation.  Aberrant Behavior:  reviewed with no aberrant drug seeking/taking behavior.  Overdose reversal drug naloxone discussed    Drug screen reviewed      Plan:    Last seen April 2022    She had lumbar surgery [facility] November 2021 complicated by postop infection    She returns to clinic today complaint back pain buttock and leg pain right greater than left pain numbness and tingling right knee pain worse with standing walking short distances better with short periods resting    Denies loss of bowel or bladder function    Reviewed MRI [facility] multiple level degenerative changes    Requesting to resume physical therapy    She is requesting Toradol injection today    Toradol 60 mg IM, tolerated well    Follow-up 2 months discuss options consider repeating MRI lumbar spine if pain continues    Dr. Bills, March 2023    Bring original prescription medication bottles/container/box with labels to each visit

## 2023-04-12 ENCOUNTER — OFFICE VISIT (OUTPATIENT)
Dept: PAIN MEDICINE | Facility: CLINIC | Age: 48
End: 2023-04-12
Payer: OTHER GOVERNMENT

## 2023-04-12 VITALS
HEART RATE: 75 BPM | DIASTOLIC BLOOD PRESSURE: 79 MMHG | SYSTOLIC BLOOD PRESSURE: 130 MMHG | HEIGHT: 67 IN | WEIGHT: 218.63 LBS | BODY MASS INDEX: 34.31 KG/M2 | RESPIRATION RATE: 18 BRPM

## 2023-04-12 DIAGNOSIS — M53.3 DISORDER OF SACRUM: Chronic | ICD-10-CM

## 2023-04-12 DIAGNOSIS — Z79.899 ENCOUNTER FOR LONG-TERM (CURRENT) USE OF OTHER MEDICATIONS: ICD-10-CM

## 2023-04-12 DIAGNOSIS — M54.2 NECK PAIN: ICD-10-CM

## 2023-04-12 DIAGNOSIS — M47.817 LUMBOSACRAL SPONDYLOSIS WITHOUT MYELOPATHY: Primary | Chronic | ICD-10-CM

## 2023-04-12 DIAGNOSIS — M96.1 POSTLAMINECTOMY SYNDROME, LUMBAR REGION: Chronic | ICD-10-CM

## 2023-04-12 LAB

## 2023-04-12 PROCEDURE — 99215 OFFICE O/P EST HI 40 MIN: CPT | Mod: PBBFAC,25 | Performed by: PHYSICIAN ASSISTANT

## 2023-04-12 PROCEDURE — 96372 THER/PROPH/DIAG INJ SC/IM: CPT | Mod: PBBFAC | Performed by: PHYSICIAN ASSISTANT

## 2023-04-12 PROCEDURE — 99214 PR OFFICE/OUTPT VISIT, EST, LEVL IV, 30-39 MIN: ICD-10-PCS | Mod: S$PBB,25,, | Performed by: PHYSICIAN ASSISTANT

## 2023-04-12 PROCEDURE — 99214 OFFICE O/P EST MOD 30 MIN: CPT | Mod: S$PBB,25,, | Performed by: PHYSICIAN ASSISTANT

## 2023-04-12 PROCEDURE — 80305 DRUG TEST PRSMV DIR OPT OBS: CPT | Mod: PBBFAC | Performed by: PHYSICIAN ASSISTANT

## 2023-04-12 RX ORDER — TRAMADOL HYDROCHLORIDE 50 MG/1
50 TABLET ORAL EVERY 12 HOURS PRN
Qty: 45 TABLET | Refills: 1 | Status: SHIPPED | OUTPATIENT
Start: 2023-04-12 | End: 2023-05-18 | Stop reason: SDUPTHER

## 2023-04-12 RX ORDER — CYCLOBENZAPRINE HCL 10 MG
10 TABLET ORAL 3 TIMES DAILY PRN
Qty: 90 TABLET | Refills: 1 | Status: SHIPPED | OUTPATIENT
Start: 2023-04-12 | End: 2023-05-18 | Stop reason: SDUPTHER

## 2023-04-12 RX ORDER — KETOROLAC TROMETHAMINE 30 MG/ML
60 INJECTION, SOLUTION INTRAMUSCULAR; INTRAVENOUS
Status: COMPLETED | OUTPATIENT
Start: 2023-04-12 | End: 2023-04-12

## 2023-04-12 RX ORDER — MELOXICAM 15 MG/1
15 TABLET ORAL DAILY
Qty: 30 TABLET | Refills: 1 | Status: SHIPPED | OUTPATIENT
Start: 2023-04-12 | End: 2023-05-18 | Stop reason: SDUPTHER

## 2023-04-12 RX ADMIN — KETOROLAC TROMETHAMINE 60 MG: 30 INJECTION, SOLUTION INTRAMUSCULAR at 09:04

## 2023-04-13 ENCOUNTER — OFFICE VISIT (OUTPATIENT)
Dept: OTOLARYNGOLOGY | Facility: CLINIC | Age: 48
End: 2023-04-13
Payer: OTHER GOVERNMENT

## 2023-04-13 ENCOUNTER — OFFICE VISIT (OUTPATIENT)
Dept: FAMILY MEDICINE | Facility: CLINIC | Age: 48
End: 2023-04-13
Payer: OTHER GOVERNMENT

## 2023-04-13 VITALS
SYSTOLIC BLOOD PRESSURE: 130 MMHG | HEART RATE: 82 BPM | OXYGEN SATURATION: 99 % | TEMPERATURE: 98 F | RESPIRATION RATE: 18 BRPM | DIASTOLIC BLOOD PRESSURE: 82 MMHG | HEIGHT: 67 IN | BODY MASS INDEX: 33.43 KG/M2 | WEIGHT: 213 LBS

## 2023-04-13 VITALS — WEIGHT: 219 LBS | BODY MASS INDEX: 34.37 KG/M2 | HEIGHT: 67 IN

## 2023-04-13 DIAGNOSIS — F41.9 ANXIETY: Primary | ICD-10-CM

## 2023-04-13 DIAGNOSIS — H65.23 CHRONIC SEROUS OTITIS MEDIA OF BOTH EARS: Primary | ICD-10-CM

## 2023-04-13 LAB
BASOPHILS # BLD AUTO: 0.03 K/UL (ref 0–0.2)
BASOPHILS NFR BLD AUTO: 0.4 % (ref 0–1)
DIFFERENTIAL METHOD BLD: ABNORMAL
EOSINOPHIL # BLD AUTO: 0.13 K/UL (ref 0–0.5)
EOSINOPHIL NFR BLD AUTO: 1.8 % (ref 1–4)
ERYTHROCYTE [DISTWIDTH] IN BLOOD BY AUTOMATED COUNT: 13.3 % (ref 11.5–14.5)
HCT VFR BLD AUTO: 38.6 % (ref 38–47)
HGB BLD-MCNC: 11.6 G/DL (ref 12–16)
IMM GRANULOCYTES # BLD AUTO: 0.03 K/UL (ref 0–0.04)
IMM GRANULOCYTES NFR BLD: 0.4 % (ref 0–0.4)
LYMPHOCYTES # BLD AUTO: 3.2 K/UL (ref 1–4.8)
LYMPHOCYTES NFR BLD AUTO: 43.2 % (ref 27–41)
MCH RBC QN AUTO: 26.9 PG (ref 27–31)
MCHC RBC AUTO-ENTMCNC: 30.1 G/DL (ref 32–36)
MCV RBC AUTO: 89.4 FL (ref 80–96)
MONOCYTES # BLD AUTO: 0.45 K/UL (ref 0–0.8)
MONOCYTES NFR BLD AUTO: 6.1 % (ref 2–6)
MPC BLD CALC-MCNC: 10.5 FL (ref 9.4–12.4)
NEUTROPHILS # BLD AUTO: 3.56 K/UL (ref 1.8–7.7)
NEUTROPHILS NFR BLD AUTO: 48.1 % (ref 53–65)
NRBC # BLD AUTO: 0 X10E3/UL
NRBC, AUTO (.00): 0 %
PLATELET # BLD AUTO: 447 K/UL (ref 150–400)
RBC # BLD AUTO: 4.32 M/UL (ref 4.2–5.4)
WBC # BLD AUTO: 7.4 K/UL (ref 4.5–11)

## 2023-04-13 PROCEDURE — 99213 OFFICE O/P EST LOW 20 MIN: CPT | Mod: ,,, | Performed by: NURSE PRACTITIONER

## 2023-04-13 PROCEDURE — 99024 POSTOP FOLLOW-UP VISIT: CPT | Mod: ,,, | Performed by: OTOLARYNGOLOGY

## 2023-04-13 PROCEDURE — 85025 COMPLETE CBC W/AUTO DIFF WBC: CPT | Mod: ,,, | Performed by: CLINICAL MEDICAL LABORATORY

## 2023-04-13 PROCEDURE — 82306 VITAMIN D: ICD-10-PCS | Mod: ,,, | Performed by: CLINICAL MEDICAL LABORATORY

## 2023-04-13 PROCEDURE — 82306 VITAMIN D 25 HYDROXY: CPT | Mod: ,,, | Performed by: CLINICAL MEDICAL LABORATORY

## 2023-04-13 PROCEDURE — 99024 PR POST-OP FOLLOW-UP VISIT: ICD-10-PCS | Mod: ,,, | Performed by: OTOLARYNGOLOGY

## 2023-04-13 PROCEDURE — 99214 OFFICE O/P EST MOD 30 MIN: CPT | Mod: PBBFAC | Performed by: OTOLARYNGOLOGY

## 2023-04-13 PROCEDURE — 99213 PR OFFICE/OUTPT VISIT, EST, LEVL III, 20-29 MIN: ICD-10-PCS | Mod: ,,, | Performed by: NURSE PRACTITIONER

## 2023-04-13 PROCEDURE — 85025 CBC WITH DIFFERENTIAL: ICD-10-PCS | Mod: ,,, | Performed by: CLINICAL MEDICAL LABORATORY

## 2023-04-13 NOTE — PROGRESS NOTES
"   ROGELIO Blancas   [place] [address] [place] [address]     PATIENT NAME: Carey Leonardo  : 1975  DATE: 23  MRN: [id number]      Billing Provider: ROGELIO Blancas  Level of Service: NE OFFICE/OUTPT VISIT, EST, LEVL III, 20-29 MIN  Patient PCP Information       Provider PCP Type    ROGELIO Blancas General        Reason for Visit / Chief Complaint: Anxiety (States stress and anxiety present about 2-3 months, of UKO and feels its making her hair fall out. )     Update PCP  Update Chief Complaint        History of Present Illness / Problem Focused Workflow     SB is a 46 y/o BF with a PMH of anxiety, CHF, and hypertension who presents today with concerns regarding her uncontrolled anxiety. She is concerned about hair loss in the middle of hair.  She feels that her symptoms are related to recently dealing with several family issues. She stated "I have had about 4 different things happen at the same time with my family but I thought I was handling things". Her  has been recently dealing with health related issues including amputation part of his foot.  Her daughter was missing for a while, but has now returned.  She and her spouse are currently caring for her daughter's 3 children who are ages 7, 4 and 1.  She also reports that her sister had a near death experience and that her nephew was recently [facility] climbed a possible human trafficking.    Anxiety  Presents for follow-up visit. Symptoms include depressed mood, excessive worry, insomnia, irritability, malaise, nervous/anxious behavior and restlessness. Patient reports no chest pain, compulsions, confusion, decreased concentration, feeling of choking, palpitations or suicidal ideas. Primary symptoms comment: itchiness. Symptoms occur constantly. Duration: 3-4 months. The severity of symptoms is interfering with daily activities and causing significant distress. The patient sleeps " 3 hours per night. The quality of sleep is poor. Nighttime awakenings: one to two.     Compliance with medications is %.   .a  Review of Systems     Review of Systems   Constitutional:  Positive for activity change, fatigue and irritability.   Cardiovascular:  Negative for chest pain and palpitations.   Psychiatric/Behavioral:  Positive for sleep disturbance. Negative for confusion, decreased concentration and suicidal ideas. The patient is nervous/anxious and has insomnia.      Medical / Social / Family History     Past Medical History:   Diagnosis Date    Asthma     CHF (congestive heart failure)     Chronic pain syndrome     Depressive disorder     Diabetes mellitus, type 2     Disorder of sacrum 4/28/2022    Enlarged heart     Gastroparesis     GERD (gastroesophageal reflux disease)     Hyperlipidemia     Hypertension     IBS (irritable bowel syndrome)     Kidney stones     Lumbar radiculopathy     Sleep apnea     Does not use a CPAP    Thyroid disease        Past Surgical History:   Procedure Laterality Date    Bilateral L3-S1 MBB Bilateral 6-, 5-, 1-8-2014    Dr Jessica Randolph    CARPAL TUNNEL RELEASE      CHOLECYSTECTOMY      DIAGNOSTIC LAPAROSCOPY  04/14/2010    Exploratory Laparotomy, Myomectomy, Hydrotubation-Dr. Feng    DILATION AND CURETTAGE OF UTERUS  04/14/2010    Hysteroscopy-Dr. Feng    EXPLORATORY LAPAROTOMY WITH UTERINE MYOMECTOMY  02/27/2007    Dr. Marquise Anderson    HYSTERECTOMY  09/29/2011    Robotic, FABIENNE, Cystoscopy-Dr. Feng    HYSTEROSCOPY WITH DILATION AND CURETTAGE OF UTERUS  04/04/2006    Laparoscopy, Chromotubation-Dr. Marquise Anderson    LAMINECTOMY N/A 11/30/2021    Procedure: L2-L5 Laminectomy;  Surgeon: Cyril Upton MD;  Location: [facility];  Service: Neurosurgery;  Laterality: N/A;    LEFT HEART CATHETERIZATION      Left L3-S1 RFTC Left 07/18/2017    Dr Lopez    Left SI JI Left 09/30/2013    Dr Randolph    MYRINGOTOMY WITH INSERTION OF VENTILATION TUBE  Bilateral 4/4/2023    Procedure: MYRINGOTOMY, WITH TYMPANOSTOMY TUBE INSERTION (T-TUBES);  Surgeon: Sea Hinton MD;  Location: [facility];  Service: ENT;  Laterality: Bilateral;  T-TUBES    OOPHORECTOMY  11/11/2014    Robotic, FABIENNE, Cystoscopy-Dr. Feng    SINUS SURGERY         Social History  Ms.  reports that she has never smoked. She has never used smokeless tobacco. She reports that she does not currently use alcohol. She reports that she does not currently use drugs after having used the following drugs: Hydrocodone and Oxycodone.    Family History  Ms.'s family history includes Diabetes Mellitus in her mother; Heart disease in her mother and sister; Hypertension in her mother; Migraines in her mother.    Medications and Allergies     Medications  Outpatient Medications Marked as Taking for the 4/13/23 encounter (Office Visit) with ROGELIO Blancas   Medication Sig Dispense Refill    albuterol (VENTOLIN HFA) 90 mcg/actuation inhaler Inhale 2 puffs into the lungs every 6 (six) hours as needed for Wheezing. Rescue 18 g 0    atorvastatin (LIPITOR) 20 MG tablet Take 1 tablet (20 mg total) by mouth every evening. 90 tablet 0    cloNIDine (CATAPRES) 0.2 MG tablet Take 1 tablet (0.2 mg total) by mouth 4 (four) times daily. 360 tablet 0    cyclobenzaprine (FLEXERIL) 10 MG tablet Take 1 tablet (10 mg total) by mouth 3 (three) times daily as needed for Muscle spasms. 90 tablet 1    dulaglutide (TRULICITY) 4.5 mg/0.5 mL pen injector Inject 4.5 mg into the skin every 7 days. 12 pen 0    empagliflozin (JARDIANCE) 10 mg tablet Take 10 mg by mouth once daily.      gabapentin (NEURONTIN) 300 MG capsule Take 1 capsule (300 mg total) by mouth 3 (three) times daily. 90 capsule 11    glipizide-metformin (METAGLIP) 5-500 mg per tablet Take 1 tablet by mouth 2 (two) times daily before meals. 180 tablet 0    hydrALAZINE (APRESOLINE) 100 MG tablet Take 1 tablet (100 mg total) by mouth 3 (three) times daily. 270 tablet  0    ipratropium (ATROVENT) 21 mcg (0.03 %) nasal spray 2 sprays by Each Nostril route 2 (two) times daily. 30 mL 0    levothyroxine (SYNTHROID) 200 MCG tablet Take 1 tablet (200 mcg total) by mouth before breakfast. 90 tablet 0    meloxicam (MOBIC) 15 MG tablet Take 1 tablet (15 mg total) by mouth once daily. 30 tablet 1    metoprolol succinate (TOPROL-XL) 100 MG 24 hr tablet Take 1 tablet (100 mg total) by mouth once daily. 90 tablet 0    NIFEdipine (PROCARDIA-XL) 60 MG (OSM) 24 hr tablet TAKE 1 TABLET BY MOUTH EVERY DAY FOR HIGH BLOOD PRESSURE  Strength: 60 mg 90 tablet 1    risperiDONE (RISPERDAL) 0.25 MG Tab Take 1-2 tablets (0.25-0.5 mg total) by mouth 2 (two) times daily as needed. 90 tablet 0    sertraline (ZOLOFT) 100 MG tablet Take 100 mg by mouth every evening.      traMADoL (ULTRAM) 50 mg tablet Take 1 tablet (50 mg total) by mouth every 12 (twelve) hours as needed for Pain. 45 tablet 1    traZODone (DESYREL) 100 MG tablet Take 1 tablet (100 mg total) by mouth nightly as needed for Insomnia. 90 tablet 0    venlafaxine (EFFEXOR-XR) 150 MG Cp24 TAKE 1 CAPSULE BY MOUTH EVERY DAY WITH FOOD 90 capsule 0     Allergies  Review of patient's allergies indicates:   Allergen Reactions    Fish containing products Anaphylaxis    Iodinated contrast media     Penicillins      Physical Examination     Vitals:    04/13/23 0958   BP: 130/82   Pulse: 82   Resp: 18   Temp: 98.1 °F (36.7 °C)       Physical Exam  Vitals reviewed.   Constitutional:       Appearance: Normal appearance. She is obese.   HENT:      Head: Atraumatic.      Mouth/Throat:      Mouth: Mucous membranes are moist.   Eyes:      Conjunctiva/sclera: Conjunctivae normal.   Cardiovascular:      Rate and Rhythm: Normal rate and regular rhythm.   Pulmonary:      Effort: Pulmonary effort is normal.      Breath sounds: Normal breath sounds.   Skin:     General: Skin is warm.   Neurological:      Mental Status: She is alert and oriented to person, place, and time.    Psychiatric:         Mood and Affect: Mood normal.         Behavior: Behavior normal.         Thought Content: Thought content normal.         Judgment: Judgment normal.      Imaging / Labs     Office Visit on 04/12/2023   Component Date Value Ref Range Status    POC Amphetamines 04/12/2023 Negative  Negative, Inconclusive Final    POC Barbiturates 04/12/2023 Negative  Negative, Inconclusive Final    POC Benzodiazepines 04/12/2023 Negative  Negative, Inconclusive Final    POC Cocaine 04/12/2023 Negative  Negative, Inconclusive Final    POC THC 04/12/2023 Negative  Negative, Inconclusive Final    POC Methadone 04/12/2023 Negative  Negative, Inconclusive Final    POC Methamphetamine 04/12/2023 Negative  Negative, Inconclusive Final    POC Opiates 04/12/2023 Negative  Negative, Inconclusive Final    POC Oxycodone 04/12/2023 Negative  Negative, Inconclusive Final    POC Phencyclidine 04/12/2023 Negative  Negative, Inconclusive Final    POC Methylenedioxymethamphetamine * 04/12/2023 Negative  Negative, Inconclusive Final    POC Tricyclic Antidepressants 04/12/2023 Negative  Negative, Inconclusive Final    POC Buprenorphine 04/12/2023 Negative   Final     Acceptable 04/12/2023 Yes   Final    POC Temperature (Urine) 04/12/2023 94   Final          Assessment and Plan (including Health Maintenance)      Problem List  Smart Sets  Document Outside HM   :    Health Maintenance Due   Topic Date Due    COVID-19 Vaccine (1) Never done    Pneumococcal Vaccines (Age 0-64) (1 - PCV) Never done    Foot Exam  Never done    Eye Exam  Never done    TETANUS VACCINE  Never done    Colorectal Cancer Screening  Never done     -Authorization to release records for documentation of most recent eye examination.    Problem List Items Addressed This Visit          Psychiatric    Anxiety - Primary    Relevant Orders    Ambulatory referral/consult to Psychology    Vitamin D (Completed)    CBC Auto Differential (Completed)        Health Maintenance Topics with due status: Not Due       Topic Last Completion Date    Mammogram 10/06/2022    Diabetes Urine Screening 12/02/2022    Lipid Panel 12/02/2022    Hemoglobin A1c 03/21/2023    Low Dose Statin 04/13/2023     Follow up in about 2 months (around 6/13/2023) for Follow up for diabetes, anxiety.     Future Appointments   Date Time Provider Department Center   4/19/2023 10:45 AM Margo Goff, PT [facility] REHAB [place] [facility]    5/31/2023 11:00 AM [facility] GI ROOM 02 [facility]   6/7/2023  8:30 AM Rasheeda Bills MD [facility] PNTRE [facility]   6/13/2023  9:00 AM ROGELIO Blancas [facility]   7/20/2023  8:30 AM Sea Hinton MD OB ENT [facility]   10/12/2023  9:00 AM [facility] MAM[facility] MMIC [facility] [place]          Signature:      ROGELIO Terrazas  Family Nurse Practitioner  [facility]    Date of encounter: 4/13/23

## 2023-04-13 NOTE — PROGRESS NOTES
Subjective:       Patient ID: Carey Leonardo is a 47 y.o. female.    Chief Complaint: Follow-up (Patient is a post op follow up. She is doing well. )    HPI  Review of Systems   Constitutional:  Negative for chills, fatigue and fever.   HENT:  Negative for ear discharge, ear pain and hearing loss.      Objective:      Physical Exam  Constitutional:       Appearance: Normal appearance.   HENT:      Head: Normocephalic.      Right Ear: Tympanic membrane, ear canal and external ear normal. A PE tube is present.      Left Ear: Tympanic membrane, ear canal and external ear normal. A PE tube is present.      Nose: Nose normal.      Mouth/Throat:      Lips: Pink.      Mouth: Mucous membranes are moist.      Pharynx: Oropharynx is clear.   Eyes:      Pupils: Pupils are equal, round, and reactive to light.   Pulmonary:      Effort: Pulmonary effort is normal.   Skin:     General: Skin is warm and dry.   Neurological:      Mental Status: She is alert and oriented to person, place, and time.   Psychiatric:         Mood and Affect: Mood normal.         Behavior: Behavior is cooperative.       Assessment:       1. Chronic serous otitis media of both ears        Plan:   Follow up in 3 mos or sooner if needed.

## 2023-04-14 LAB — 25(OH)D3 SERPL-MCNC: 16.3 NG/ML

## 2023-04-15 DIAGNOSIS — E55.9 VITAMIN D DEFICIENCY: Primary | ICD-10-CM

## 2023-04-15 PROBLEM — F41.9 ANXIETY: Status: ACTIVE | Noted: 2023-04-15

## 2023-04-15 RX ORDER — ASPIRIN 325 MG
50000 TABLET, DELAYED RELEASE (ENTERIC COATED) ORAL
Qty: 12 CAPSULE | Refills: 1 | Status: SHIPPED | OUTPATIENT
Start: 2023-04-15 | End: 2023-07-14

## 2023-04-19 ENCOUNTER — CLINICAL SUPPORT (OUTPATIENT)
Dept: REHABILITATION | Facility: HOSPITAL | Age: 48
End: 2023-04-19
Payer: OTHER GOVERNMENT

## 2023-04-19 DIAGNOSIS — R52 PAIN: ICD-10-CM

## 2023-04-19 DIAGNOSIS — M25.60 DECREASED RANGE OF MOTION: ICD-10-CM

## 2023-04-19 DIAGNOSIS — M47.817 LUMBOSACRAL SPONDYLOSIS WITHOUT MYELOPATHY: Primary | Chronic | ICD-10-CM

## 2023-04-19 DIAGNOSIS — M54.2 NECK PAIN: ICD-10-CM

## 2023-04-19 DIAGNOSIS — R53.1 DECREASED STRENGTH: ICD-10-CM

## 2023-04-19 PROCEDURE — 97161 PT EVAL LOW COMPLEX 20 MIN: CPT

## 2023-04-19 NOTE — PLAN OF CARE
[facility]  Physical Therapy Initial Evaluation    Date: 4/19/2023   Name: Carey Leonardo  Clinic Number: [id number]    Therapy Diagnosis:   Encounter Diagnoses   Name Primary?    Lumbosacral spondylosis without myelopathy Yes    Neck pain     Pain     Decreased strength     Decreased range of motion      Physician: Kory Peng PA    Physician Orders: PT Eval and Treat   Medical Diagnosis from Referral: Low back pain/Neck  Evaluation Date: 4/19/2023  Authorization Period Expiration: 4/11/24  Plan of Care Expiration: 7/12/23  Visit # / Visits authorized: 1/ no limits    Time In: 1050  Time Out: 1135    Precautions: Standard, Diabetes, and CHF    Subjective   Date of onset: Chronic condition    History of current condition - Carey reports: L2-L5 laminectomy in 2021. Intermittent pain in the low back. The front of the right leg hurts and worsens with prolonged walking/standing. Difficulty with making it through a shopping trip. Right handed. Denies numbness/tingling and no sensation deficits. The pain is described as sharp and does not go below knee. Feels stable with no buckling. Several steps to the door with rail but does not have difficulty. Relief with sitting and pain medication. Pain will wake at night. Independent with all activities of daily living.  Currently not working  Hobbies: walking but will rest  See MD 6/7/23     Medical History:   Past Medical History:   Diagnosis Date    Asthma     CHF (congestive heart failure)     Chronic pain syndrome     Depressive disorder     Diabetes mellitus, type 2     Disorder of sacrum 4/28/2022    Enlarged heart     Gastroparesis     GERD (gastroesophageal reflux disease)     Hyperlipidemia     Hypertension     IBS (irritable bowel syndrome)     Kidney stones     Lumbar radiculopathy     Sleep apnea     Does not use a CPAP    Thyroid disease        Surgical History:   Carey Leonardo  has a past surgical history that includes  Hysterectomy (09/29/2011); Oophorectomy (11/11/2014); Dilation and curettage of uterus (04/14/2010); Diagnostic laparoscopy (04/14/2010); Exploratory laparotomy with uterine myomectomy (02/27/2007); Hysteroscopy with dilation and curettage of uterus (04/04/2006); Carpal tunnel release; Cholecystectomy; Bilateral L3-S1 MBB (Bilateral, 6-, 5-, 1-8-2014); Left heart catheterization; Left L3-S1 RFTC (Left, 07/18/2017); Left SI JI (Left, 09/30/2013); Sinus surgery; Laminectomy (N/A, 11/30/2021); and Myringotomy with insertion of ventilation tube (Bilateral, 4/4/2023).    Medications:   Carey has a current medication list which includes the following prescription(s): albuterol, atorvastatin, cholecalciferol (vitamin d3), clonidine, cyclobenzaprine, trulicity, empagliflozin, gabapentin, glipizide-metformin, hydralazine, ipratropium, levothyroxine, meloxicam, metoprolol succinate, nifedipine, risperidone, sertraline, tramadol, trazodone, and venlafaxine.    Allergies:   Review of patient's allergies indicates:   Allergen Reactions    Fish containing products Anaphylaxis    Iodinated contrast media     Penicillins         Imaging, MRI studies, bone scan films: FINDINGS:  Again seen are post laminectomy changes from L2 through L5 similar to the prior exam.  The vertebral body heights and alignment are maintained.  There is no spinal canal stenosis.  There are facet degenerative changes from L3-4 through L5-S1 without high-grade foraminal narrowing detected.  The conus terminates at L1.  Cauda quinine nerve roots appear normal with no enhancement.  The fluid collection previously seen in the superficial soft tissues has resolved and no longer identified.  Also the collection seen posterior to the thecal sac is no longer identified.  There remains enhancement of the posterior soft tissues which appears somewhat uniform and a not uncommon postoperative finding.  Impression:  The fluid collection seen posterior  to the thecal sac and within the superficial soft tissues have resolved.  No new fluid collection or abnormality identified.    Impression:  Spondylotic changes of the lumbar spine as noted above.  Status post L2-L5 laminectomy    Pain:  Current 7/10, worst 10/10,  Location: bilateral back  and upper legs   Description: Sharp  Aggravating Factors: Standing, Walking, and Night Time  Easing Factors: pain medication and rest      Objective     Right lower leg varus   Pain in middle of lumbar with active forward bend  Normal pelvic mobiltiy  Decreased light touch right L3 dermatome  Able to maintain single leg balance x 10 sec without loss of balance  Manual muscle test bilateral lower extremities 5/5 except left hamstring and quad 4/5  Manual muscle test right glute max 4-/5, left glute max 4/5  Manual muscle test left glute med 3+/5, right glute med 4-/5  Weak right multifidus firing with active leg extension testing   (-) ERIC but did have left hip tightness and with the right the low back had pain  (-) bilateral sitting slump test  (-) bilateral straight leg raise test       Limitation/Restriction for FOTO Survey    Therapist reviewed FOTO scores for Carey Leonardo on 4/19/2023.   FOTO documents entered into [employer] - see Media section.    Intake Score: 44%       Home Exercises and Patient Education Provided    Education provided:   Eval Findings  - Patient educated on biomechanical justification for therapeutic exercise and importance of compliance with HEP in order to improve overall impairments and QOL   Patient was educated on all the above exercise prior/during/after for proper posture, positioning, and execution for safe performance with home exercise program.   Patient educated on the importance of improved core and upper and lower extremity strength in order to improve alignment of the spine and upper and lower extremities with static positions and dynamic movement.   Patient educated on the importance of  strong core and lower extremity musculature in order to improve both static and dynamic balance, improve gait mechanics, reduce fall risk and improve household and community mobility.     Written Home Exercises Provided:  - .  Exercises were reviewed and Carey was able to demonstrate them prior to the end of the session.  Carey demonstrated  -  understanding of the education provided.     See EMR under  -  for exercises provided  - .    Assessment   Carey is a 47 y.o. female referred to outpatient Physical Therapy with a medical diagnosis of lumbar pain with right leg radicular pain. Patient presents with weak bilateral glute med stability, pain in the middle of the lumbar spine with active forward trunk flexion. Patient has generalized lower extremities muscle imbalances with strength and stability. Patient will benefit from skilled physical therapy at this time to address deficits with strengthening, stretching, core/hip stabilization, modalities, myofascial release, manual, traction and dry needling as needed.     Patient prognosis is Good.     Patient will benefit from skilled outpatient Physical Therapy to address the deficits stated above and in the chart below, provide patient /family education, and to maximize patientt's level of independence.     Plan of care discussed with patient: Yes  Patient's spiritual, cultural and educational needs considered and patient is agreeable to the plan of care and goals as stated below:     Anticipated Barriers for therapy: chronic condition    Goals:  Short Term Goals: 6 weeks   Patient will be able to sleep ~ 3 hours without waking from pain  Patient will improve manual muscle test to 4-/5  Patient will maintain prolonged positions x 15 minutes with  5/10 pain    Long Term Goals: 12 weeks   Patient will be able to sleep through the night without waking from pain  Patient will improve manual muscle test to 4/5  Patient will maintain prolonged positions x 30  minutes with  2 /10 pain    Plan   Plan of care Certification: 4/19/2023 to 7/12/23.    Outpatient Physical Therapy 2 times weekly for 12 weeks to include the following interventions: Aquatic Therapy, Hybresis with Dex, Cervical/Lumbar Traction, Electrical Stimulation IFC/Premod, Gait Training, Manual Therapy, Moist Heat/ Ice, Neuromuscular Re-ed, Patient Education, Self Care, Therapeutic Activities, Therapeutic Exercise, Ultrasound, and Dry Needling.     GABRIELA MCKENZIE, PT        I CERTIFY THE NEED FOR THESE SERVICES FURNISHED UNDER THIS PLAN OF TREATMENT AND WHILE UNDER MY CARE.    Physician's comments:      Physician's Signature: ____________________________________________Date________________        Please fax this MD signed form to:  [facility]  [phone] fax

## 2023-04-25 ENCOUNTER — CLINICAL SUPPORT (OUTPATIENT)
Dept: REHABILITATION | Facility: HOSPITAL | Age: 48
End: 2023-04-25
Payer: OTHER GOVERNMENT

## 2023-04-25 DIAGNOSIS — R52 PAIN: Primary | ICD-10-CM

## 2023-04-25 DIAGNOSIS — R53.1 DECREASED STRENGTH: ICD-10-CM

## 2023-04-25 DIAGNOSIS — M25.60 DECREASED RANGE OF MOTION: ICD-10-CM

## 2023-04-25 PROCEDURE — 97110 THERAPEUTIC EXERCISES: CPT | Mod: CQ

## 2023-04-27 ENCOUNTER — CLINICAL SUPPORT (OUTPATIENT)
Dept: REHABILITATION | Facility: HOSPITAL | Age: 48
End: 2023-04-27
Payer: OTHER GOVERNMENT

## 2023-04-27 DIAGNOSIS — M25.60 DECREASED RANGE OF MOTION: ICD-10-CM

## 2023-04-27 DIAGNOSIS — R53.1 DECREASED STRENGTH: ICD-10-CM

## 2023-04-27 DIAGNOSIS — R52 PAIN: Primary | ICD-10-CM

## 2023-04-27 PROCEDURE — 97110 THERAPEUTIC EXERCISES: CPT

## 2023-04-27 NOTE — PROGRESS NOTES
[facility]   Physical Therapy Treatment Note     Name: Carey Leonardo  Clinic Number: [id number]    Therapy Diagnosis:   Encounter Diagnoses   Name Primary?    Pain Yes    Decreased strength     Decreased range of motion      Physician: Kory Peng PA    Visit Date: 4/27/2023    Physician: Kory Peng PA     Physician Orders: PT Eval and Treat   Medical Diagnosis from Referral: Low back pain/Neck  Evaluation Date: 4/19/2023  Authorization Period Expiration: 4/11/24  Plan of Care Expiration: 7/12/23  Visit # / Visits authorized: 2/20    PTA Visit #: 1/5     Time In: 9:00 am   Time Out: 09:40 am  Total Billable Time: 40 minutes    SUBJECTIVE     Pt reports: No pain in back but legs are bothering a little bit.   She was compliant with home exercise program.  Response to previous treatment: first treatment after evaluation   Functional change: none     Pain: 0/10  Location: bilateral back      OBJECTIVE     Objective Measures updated at progress report unless specified.     Treatment     Carey received the treatments listed below:      therapeutic exercises to develop strength, posture, and core stabilization for 30 minutes including:  NuStep   Slant board 3 x 30 seconds  Hamstring stretch 4 x 15 seconds  Ball roll outs 10 x 5 seconds  Cybex back extension 4 x 10 3#  Cybex hip abduction 2 x 10 2#  Lower trunk rotation 3 x 10 red swiss ball  Hamstring rolls 3 x 10 red swiss ball   Bridges 3 x 10     neuromuscular re-education activities to improve: Balance, Coordination, Proprioception, and Posture for - minutes. The following activities were included:    supervised modalities after being cleared for contradictions: TENS:  Carey received TENS electrical stimulation for pain to the lower back. Pt received continuous mode for - minutes. Carey tolerated treatment well without any adverse effects.       Patient Education and Home Exercises     Home Exercises Provided and  Patient Education Provided     Education provided:   - encouraged to continue home exercise program     Written Home Exercises Provided: Patient instructed to cont prior HEP. Exercises were reviewed and Everettluzmaria was able to demonstrate them prior to the end of the session.  Carey demonstrated good  understanding of the education provided. See EMR under Patient Instructions for exercises provided during therapy sessions    ASSESSMENT     Patient tolerated the addition of all exercises with no complaints of pain. Continue to progress patient's POC as tolerated.      Carey Is progressing towards her goals.   Pt prognosis is Good.     Pt will continue to benefit from skilled outpatient physical therapy to address the deficits listed in the problem list box on initial evaluation, provide pt/family education and to maximize pt's level of independence in the home and community environment.     Pt's spiritual, cultural and educational needs considered and pt agreeable to plan of care and goals.     Anticipated barriers to physical therapy: none    Goals:   Short Term Goals: 6 weeks   Patient will be able to sleep ~ 3 hours without waking from pain  Patient will improve manual muscle test to 4-/5  Patient will maintain prolonged positions x 15 minutes with  5/10 pain     Long Term Goals: 12 weeks   Patient will be able to sleep through the night without waking from pain  Patient will improve manual muscle test to 4/5  Patient will maintain prolonged positions x 30 minutes with  2 /10 pain       PLAN       Plan of care Certification: 4/19/2023 to 7/12/23.     Outpatient Physical Therapy 2 times weekly for 12 weeks to include the following interventions: Aquatic Therapy, Hybresis with Dex, Cervical/Lumbar Traction, Electrical Stimulation IFC/Premod, Gait Training, Manual Therapy, Moist Heat/ Ice, Neuromuscular Re-ed, Patient Education, Self Care, Therapeutic Activities, Therapeutic Exercise, Ultrasound, and Dry Needling.       Norm Ruth, PT   04/27/2023

## 2023-05-05 ENCOUNTER — CLINICAL SUPPORT (OUTPATIENT)
Dept: REHABILITATION | Facility: HOSPITAL | Age: 48
End: 2023-05-05
Payer: OTHER GOVERNMENT

## 2023-05-05 DIAGNOSIS — R53.1 DECREASED STRENGTH: ICD-10-CM

## 2023-05-05 DIAGNOSIS — M25.60 DECREASED RANGE OF MOTION: ICD-10-CM

## 2023-05-05 DIAGNOSIS — R52 PAIN: Primary | ICD-10-CM

## 2023-05-05 PROCEDURE — 97110 THERAPEUTIC EXERCISES: CPT | Mod: CQ

## 2023-05-05 NOTE — PROGRESS NOTES
[facility]   Physical Therapy Treatment Note     Name: Carey Leonardo  Clinic Number: [id number]    Therapy Diagnosis:   Encounter Diagnoses   Name Primary?    Pain Yes    Decreased strength     Decreased range of motion      Physician: Kory Peng PA    Visit Date: 5/5/2023    Physician: Kory Peng PA     Physician Orders: PT Eval and Treat   Medical Diagnosis from Referral: Low back pain/Neck  Evaluation Date: 4/19/2023  Authorization Period Expiration: 4/11/24  Plan of Care Expiration: 7/12/23  Visit # / Visits authorized: 4/20    PTA Visit #: 1/5     Time In: 9:00 am   Time Out: 09:40 am  Total Billable Time: 40 minutes    SUBJECTIVE     Pt reports: No pain in back but legs are bothering a little bit.   She was compliant with home exercise program.  Response to previous treatment: first treatment after evaluation   Functional change: none     Pain: 0/10  Location: bilateral back      OBJECTIVE     Objective Measures updated at progress report unless specified.     Treatment     Carey received the treatments listed below:      therapeutic exercises to develop strength, posture, and core stabilization for 30 minutes including:    NuStep 5 minutes  Slant board 3 x 30 seconds  Hamstring stretch 4 x 15 seconds  Ball roll outs 10 x 5 seconds  Cybex back extension 4 x 10 3#  Cybex hip abduction 2 x 10 2#  Lower trunk rotation 3 x 10 red swiss ball  Hamstring rolls 3 x 10 red swiss ball   Bridges 3 x 10       Moist heat pack 8 minutes for pain relief in lower back    neuromuscular re-education activities to improve: Balance, Coordination, Proprioception, and Posture for - minutes. The following activities were included:    supervised modalities after being cleared for contradictions: TENS:  Carey received TENS electrical stimulation for pain to the lower back. Pt received continuous mode for - minutes. Carey tolerated treatment well without any adverse effects.        Patient Education and Home Exercises     Home Exercises Provided and Patient Education Provided     Education provided:   - encouraged to continue home exercise program     Written Home Exercises Provided: Patient instructed to cont prior HEP. Exercises were reviewed and Carey was able to demonstrate them prior to the end of the session.  Everettluzmaria demonstrated good  understanding of the education provided. See EMR under Patient Instructions for exercises provided during therapy sessions    ASSESSMENT     Patient tolerated the addition of all exercises with no complaints of pain. Ended session with moist heat pack for pain in lower back. Patient states that she was in a lot of pain and could not do anymore exercises. Continue to progress patient's POC as tolerated.      Carey Is progressing towards her goals.   Pt prognosis is Good.     Pt will continue to benefit from skilled outpatient physical therapy to address the deficits listed in the problem list box on initial evaluation, provide pt/family education and to maximize pt's level of independence in the home and community environment.     Pt's spiritual, cultural and educational needs considered and pt agreeable to plan of care and goals.     Anticipated barriers to physical therapy: none    Goals:   Short Term Goals: 6 weeks   Patient will be able to sleep ~ 3 hours without waking from pain  Patient will improve manual muscle test to 4-/5  Patient will maintain prolonged positions x 15 minutes with  5/10 pain     Long Term Goals: 12 weeks   Patient will be able to sleep through the night without waking from pain  Patient will improve manual muscle test to 4/5  Patient will maintain prolonged positions x 30 minutes with  2 /10 pain       PLAN       Plan of care Certification: 4/19/2023 to 7/12/23.     Outpatient Physical Therapy 2 times weekly for 12 weeks to include the following interventions: Aquatic Therapy, Hybresis with Dex, Cervical/Lumbar  Traction, Electrical Stimulation IFC/Premod, Gait Training, Manual Therapy, Moist Heat/ Ice, Neuromuscular Re-ed, Patient Education, Self Care, Therapeutic Activities, Therapeutic Exercise, Ultrasound, and Dry Needling.      Divya Mujica, PTA   05/05/2023

## 2023-05-09 ENCOUNTER — CLINICAL SUPPORT (OUTPATIENT)
Dept: REHABILITATION | Facility: HOSPITAL | Age: 48
End: 2023-05-09
Payer: OTHER GOVERNMENT

## 2023-05-09 DIAGNOSIS — M25.60 DECREASED RANGE OF MOTION: ICD-10-CM

## 2023-05-09 DIAGNOSIS — R53.1 DECREASED STRENGTH: ICD-10-CM

## 2023-05-09 DIAGNOSIS — R52 PAIN: Primary | ICD-10-CM

## 2023-05-09 PROCEDURE — 97110 THERAPEUTIC EXERCISES: CPT | Mod: CQ

## 2023-05-09 NOTE — PROGRESS NOTES
[facility]   Physical Therapy Treatment Note     Name: Carey Leonardo  Clinic Number: [id number]    Therapy Diagnosis:   Encounter Diagnoses   Name Primary?    Pain Yes    Decreased strength     Decreased range of motion      Physician: Kory Peng PA    Visit Date: 5/9/2023    Physician: Kory Peng PA     Physician Orders: PT Eval and Treat   Medical Diagnosis from Referral: Low back pain/Neck  Evaluation Date: 4/19/2023  Authorization Period Expiration: 4/11/24  Plan of Care Expiration: 7/12/23  Visit # / Visits authorized: 4/20    PTA Visit #: 1/5     Time In: 10:00 am   Time Out: 10:40 am  Total Billable Time: 40 minutes    SUBJECTIVE     Pt reports: No pain right now.  She was compliant with home exercise program.  Response to previous treatment: first treatment after evaluation   Functional change: none     Pain: 0/10  Location: bilateral back      OBJECTIVE     Objective Measures updated at progress report unless specified.     Treatment     Carey received the treatments listed below:      therapeutic exercises to develop strength, posture, and core stabilization for 40 minutes including:    NuStep 5 minutes  Slant board 3 x 30 seconds  Hamstring stretch 4 x 15 seconds  Ball roll outs 10 x 5 seconds  Supine hip abduction 2 x 10 red theraband   Supine marches 2 x 10 red theraband   Lower trunk rotation 3 x 10   Hamstring rolls 3 x 10 red swiss ball   Bridges 3 x 10       Moist heat pack 0 minutes for pain relief in lower back    neuromuscular re-education activities to improve: Balance, Coordination, Proprioception, and Posture for - minutes. The following activities were included:    supervised modalities after being cleared for contradictions: TENS:  Carey received TENS electrical stimulation for pain to the lower back. Pt received continuous mode for - minutes. Carey tolerated treatment well without any adverse effects.       Patient Education and Home  Exercises     Home Exercises Provided and Patient Education Provided     Education provided:   - encouraged to continue home exercise program     Written Home Exercises Provided: Patient instructed to cont prior HEP. Exercises were reviewed and Everettluzmaria was able to demonstrate them prior to the end of the session.  Carey demonstrated good  understanding of the education provided. See EMR under Patient Instructions for exercises provided during therapy sessions    ASSESSMENT     Patient tolerated the addition of all exercises with no complaints of pain. Modified home exercise program today due to incased pain at last session. Will reasses next visit. Continue to progress patient's POC as tolerated.      Carey Is progressing towards her goals.   Pt prognosis is Good.     Pt will continue to benefit from skilled outpatient physical therapy to address the deficits listed in the problem list box on initial evaluation, provide pt/family education and to maximize pt's level of independence in the home and community environment.     Pt's spiritual, cultural and educational needs considered and pt agreeable to plan of care and goals.     Anticipated barriers to physical therapy: none    Goals:   Short Term Goals: 6 weeks   Patient will be able to sleep ~ 3 hours without waking from pain  Patient will improve manual muscle test to 4-/5  Patient will maintain prolonged positions x 15 minutes with  5/10 pain     Long Term Goals: 12 weeks   Patient will be able to sleep through the night without waking from pain  Patient will improve manual muscle test to 4/5  Patient will maintain prolonged positions x 30 minutes with  2 /10 pain       PLAN       Plan of care Certification: 4/19/2023 to 7/12/23.     Outpatient Physical Therapy 2 times weekly for 12 weeks to include the following interventions: Aquatic Therapy, Hybresis with Dex, Cervical/Lumbar Traction, Electrical Stimulation IFC/Premod, Gait Training, Manual Therapy,  Moist Heat/ Ice, Neuromuscular Re-ed, Patient Education, Self Care, Therapeutic Activities, Therapeutic Exercise, Ultrasound, and Dry Needling.      Divya Mujica, PTA   05/09/2023

## 2023-05-18 ENCOUNTER — HOSPITAL ENCOUNTER (OUTPATIENT)
Dept: RADIOLOGY | Facility: HOSPITAL | Age: 48
Discharge: HOME | End: 2023-05-18
Attending: PHYSICIAN ASSISTANT
Payer: OTHER GOVERNMENT

## 2023-05-18 ENCOUNTER — CLINICAL SUPPORT (OUTPATIENT)
Dept: REHABILITATION | Facility: HOSPITAL | Age: 48
End: 2023-05-18
Payer: OTHER GOVERNMENT

## 2023-05-18 ENCOUNTER — OFFICE VISIT (OUTPATIENT)
Dept: PAIN MEDICINE | Facility: CLINIC | Age: 48
End: 2023-05-18
Payer: OTHER GOVERNMENT

## 2023-05-18 VITALS
HEART RATE: 80 BPM | DIASTOLIC BLOOD PRESSURE: 77 MMHG | SYSTOLIC BLOOD PRESSURE: 139 MMHG | BODY MASS INDEX: 34.09 KG/M2 | RESPIRATION RATE: 18 BRPM | WEIGHT: 217.19 LBS | HEIGHT: 67 IN

## 2023-05-18 DIAGNOSIS — M96.1 POSTLAMINECTOMY SYNDROME, LUMBAR REGION: Chronic | ICD-10-CM

## 2023-05-18 DIAGNOSIS — M54.17 LUMBOSACRAL RADICULOPATHY: ICD-10-CM

## 2023-05-18 DIAGNOSIS — M54.9 DORSALGIA, UNSPECIFIED: ICD-10-CM

## 2023-05-18 DIAGNOSIS — M25.60 DECREASED RANGE OF MOTION: ICD-10-CM

## 2023-05-18 DIAGNOSIS — R53.1 DECREASED STRENGTH: ICD-10-CM

## 2023-05-18 DIAGNOSIS — R52 PAIN: Primary | ICD-10-CM

## 2023-05-18 DIAGNOSIS — M53.3 DISORDER OF SACRUM: ICD-10-CM

## 2023-05-18 DIAGNOSIS — M53.3 DISORDER OF SACRUM: Chronic | ICD-10-CM

## 2023-05-18 DIAGNOSIS — M54.17 LUMBOSACRAL RADICULOPATHY: Primary | Chronic | ICD-10-CM

## 2023-05-18 PROCEDURE — 73521 X-RAY EXAM HIPS BI 2 VIEWS: CPT | Mod: TC

## 2023-05-18 PROCEDURE — 97012 MECHANICAL TRACTION THERAPY: CPT | Mod: CQ

## 2023-05-18 PROCEDURE — 73521 X-RAY EXAM HIPS BI 2 VIEWS: CPT | Mod: 26,,, | Performed by: RADIOLOGY

## 2023-05-18 PROCEDURE — 72100 X-RAY EXAM L-S SPINE 2/3 VWS: CPT | Mod: TC

## 2023-05-18 PROCEDURE — 97140 MANUAL THERAPY 1/> REGIONS: CPT | Mod: 59,CQ

## 2023-05-18 PROCEDURE — 99214 OFFICE O/P EST MOD 30 MIN: CPT | Mod: S$PBB,25,, | Performed by: PHYSICIAN ASSISTANT

## 2023-05-18 PROCEDURE — 72100 X-RAY EXAM L-S SPINE 2/3 VWS: CPT | Mod: 26,,, | Performed by: RADIOLOGY

## 2023-05-18 PROCEDURE — 96372 THER/PROPH/DIAG INJ SC/IM: CPT | Mod: PBBFAC | Performed by: PHYSICIAN ASSISTANT

## 2023-05-18 PROCEDURE — 99214 PR OFFICE/OUTPT VISIT, EST, LEVL IV, 30-39 MIN: ICD-10-PCS | Mod: S$PBB,25,, | Performed by: PHYSICIAN ASSISTANT

## 2023-05-18 PROCEDURE — 99215 OFFICE O/P EST HI 40 MIN: CPT | Mod: PBBFAC | Performed by: PHYSICIAN ASSISTANT

## 2023-05-18 PROCEDURE — 97110 THERAPEUTIC EXERCISES: CPT | Mod: CQ

## 2023-05-18 PROCEDURE — 72100 XR LUMBAR SPINE 2 OR 3 VIEWS: ICD-10-PCS | Mod: 26,,, | Performed by: RADIOLOGY

## 2023-05-18 PROCEDURE — 73521 XR HIPS BILATERAL 2 VIEW INCL AP PELVIS: ICD-10-PCS | Mod: 26,,, | Performed by: RADIOLOGY

## 2023-05-18 RX ORDER — TRAMADOL HYDROCHLORIDE 50 MG/1
50 TABLET ORAL EVERY 8 HOURS PRN
Qty: 90 TABLET | Refills: 0 | Status: SHIPPED | OUTPATIENT
Start: 2023-05-18 | End: 2023-06-20 | Stop reason: SDUPTHER

## 2023-05-18 RX ORDER — KETOROLAC TROMETHAMINE 30 MG/ML
60 INJECTION, SOLUTION INTRAMUSCULAR; INTRAVENOUS
Status: COMPLETED | OUTPATIENT
Start: 2023-05-18 | End: 2023-05-18

## 2023-05-18 RX ORDER — CYCLOBENZAPRINE HCL 10 MG
10 TABLET ORAL 3 TIMES DAILY PRN
Qty: 90 TABLET | Refills: 0 | Status: SHIPPED | OUTPATIENT
Start: 2023-05-18 | End: 2023-06-07 | Stop reason: SDUPTHER

## 2023-05-18 RX ORDER — MELOXICAM 15 MG/1
15 TABLET ORAL DAILY
Qty: 30 TABLET | Refills: 0 | Status: SHIPPED | OUTPATIENT
Start: 2023-05-18 | End: 2023-06-07 | Stop reason: SDUPTHER

## 2023-05-18 RX ADMIN — KETOROLAC TROMETHAMINE 60 MG: 30 INJECTION, SOLUTION INTRAMUSCULAR at 03:05

## 2023-05-18 NOTE — PROGRESS NOTES
[facility]   Physical Therapy Treatment Note / Discharge Summary    Name: Carey Leonardo  Clinic Number: [id number]    Therapy Diagnosis:   Encounter Diagnoses   Name Primary?    Pain Yes    Decreased strength     Decreased range of motion      Physician: Kory Peng PA    Visit Date: 5/18/2023    Physician: Kory Peng PA     Physician Orders: PT Eval and Treat   Medical Diagnosis from Referral: Low back pain/Neck  Evaluation Date: 4/19/2023  Authorization Period Expiration: 4/11/24  Plan of Care Expiration: 7/12/23  Visit # / Visits authorized: 6/20    PTA Visit #: 2/5     Time In: 10:00 am   Time Out: 10:38 am  Total Billable Time: 38 minutes    SUBJECTIVE     Pt reports: she has pain in right thigh ad groin area. Patient states that she was unable to come to last visit due to pain and that she was unable to get out of bed. She also stated that she has an appointment with pain treatment today.    She was compliant with home exercise program.  Response to previous treatment: first treatment after evaluation   Functional change: none     Pain: 8/10  Location: bilateral back      OBJECTIVE     Objective Measures updated at progress report unless specified.     Treatment     Carey received the treatments listed below:      therapeutic exercises to develop strength, posture, and core stabilization for 8 minutes including:    NuStep 5 minutes  Slant board 3 x 30 seconds  Hamstring stretch 4 x 15 seconds  Ball roll outs 10 x 5 seconds    neuromuscular re-education activities to improve: Balance, Coordination, Proprioception, and Posture for - minutes. The following activities were included:      manual therapy techniques: Joint mobilizations, Manual traction, and Myofacial release were applied to the: right thigh for 15 minutes, including:  Green roller to right thigh  Piriformis stretch  Hip flexors stretch         supervised modalities after being cleared for  contradictions: Mechanical Traction:  Carey received intermittent mechanical traction to the lumbar spine at a force of 75 pounds for a total of 15 minutes. Hold time of 60 seconds and rest time for 20  seconds. Patient tolerated treatment well without any adverse effects.    TENS:  Carey received TENS electrical stimulation for pain to the lower back. Pt received continuous mode for - minutes. Carey tolerated treatment well without any adverse effects.       Patient Education and Home Exercises     Home Exercises Provided and Patient Education Provided     Education provided:   - encouraged to continue home exercise program     Written Home Exercises Provided: Patient instructed to cont prior HEP. Exercises were reviewed and Carey was able to demonstrate them prior to the end of the session.  Carey demonstrated good  understanding of the education provided. See EMR under Patient Instructions for exercises provided during therapy sessions    ASSESSMENT     Plan of care was modified today due to increased pain in quad muscle and groin area. Patient states that she was unable to come last visit due to increased pain. Today's session focused on myofascial release and stretching to right lower extremity. Ended session with mechanical traction to help with pain in lower extremity.      Carey Is progressing towards her goals.   Pt prognosis is Good.     Pt will continue to benefit from skilled outpatient physical therapy to address the deficits listed in the problem list box on initial evaluation, provide pt/family education and to maximize pt's level of independence in the home and community environment.     Pt's spiritual, cultural and educational needs considered and pt agreeable to plan of care and goals.     Anticipated barriers to physical therapy: none    Goals:   Short Term Goals: 6 weeks   Patient will be able to sleep ~ 3 hours without waking from pain  Patient will improve manual muscle test  to 4-/5  Patient will maintain prolonged positions x 15 minutes with  5/10 pain     Long Term Goals: 12 weeks   Patient will be able to sleep through the night without waking from pain  Patient will improve manual muscle test to 4/5  Patient will maintain prolonged positions x 30 minutes with  2 /10 pain       PLAN       Plan of care Certification: 4/19/2023 to 7/12/23.     Outpatient Physical Therapy 2 times weekly for 12 weeks to include the following interventions: Aquatic Therapy, Hybresis with Dex, Cervical/Lumbar Traction, Electrical Stimulation IFC/Premod, Gait Training, Manual Therapy, Moist Heat/ Ice, Neuromuscular Re-ed, Patient Education, Self Care, Therapeutic Activities, Therapeutic Exercise, Ultrasound, and Dry Needling.      Divya Mujica, MAYA   05/18/2023        Patient did not return after session. Reason Unknown. Please consider this note a discharge from physical therapy. Thank you for this referral!  Yvonne Goff DPT, MTC

## 2023-05-18 NOTE — PROGRESS NOTES
Subjective:         Patient ID: Carey Leonardo is a 47 y.o. female.    Chief Complaint: Low-back Pain      Pain  This is a chronic problem. The current episode started more than 1 year ago. The problem occurs daily. The problem has been unchanged. Associated symptoms include arthralgias. Pertinent negatives include no anorexia, change in bowel habit, chest pain, chills, coughing, diaphoresis, fever, neck pain, sore throat, swollen glands, urinary symptoms, vertigo or vomiting.   Review of Systems   Constitutional:  Negative for activity change, appetite change, chills, diaphoresis, fever and unexpected weight change.   HENT:  Negative for drooling, ear discharge, ear pain, facial swelling, nosebleeds, sore throat, trouble swallowing, voice change and goiter.    Eyes:  Negative for photophobia, pain, discharge, redness and visual disturbance.   Respiratory:  Negative for apnea, cough, choking, chest tightness, shortness of breath, wheezing and stridor.    Cardiovascular:  Negative for chest pain, palpitations and leg swelling.   Gastrointestinal:  Negative for abdominal distention, anorexia, change in bowel habit, diarrhea, rectal pain, vomiting, fecal incontinence and change in bowel habit.   Endocrine: Negative for cold intolerance, heat intolerance, polydipsia, polyphagia and polyuria.   Genitourinary:  Negative for bladder incontinence, dysuria, flank pain, frequency and hot flashes.   Musculoskeletal:  Positive for arthralgias, back pain and leg pain. Negative for neck pain.   Integumentary:  Negative for color change and pallor.   Allergic/Immunologic: Negative for immunocompromised state.   Neurological:  Negative for dizziness, vertigo, seizures, syncope, facial asymmetry, speech difficulty, light-headedness, coordination difficulties, memory loss and coordination difficulties.   Hematological:  Negative for adenopathy. Does not bruise/bleed easily.   Psychiatric/Behavioral:  Negative for agitation,  behavioral problems, confusion, decreased concentration, dysphoric mood, hallucinations, self-injury and suicidal ideas. The patient is not nervous/anxious and is not hyperactive.          Past Medical History:   Diagnosis Date    Asthma     CHF (congestive heart failure)     Chronic pain syndrome     Depressive disorder     Diabetes mellitus, type 2     Disorder of sacrum 4/28/2022    Enlarged heart     Gastroparesis     GERD (gastroesophageal reflux disease)     Hyperlipidemia     Hypertension     IBS (irritable bowel syndrome)     Kidney stones     Lumbar radiculopathy     Sleep apnea     Does not use a CPAP    Thyroid disease      Past Surgical History:   Procedure Laterality Date    Bilateral L3-S1 MBB Bilateral 6-, 5-, 1-8-2014    Dr Jessica Randolph    CARPAL TUNNEL RELEASE      CHOLECYSTECTOMY      DIAGNOSTIC LAPAROSCOPY  04/14/2010    Exploratory Laparotomy, Myomectomy, Hydrotubation-Dr. Feng    DILATION AND CURETTAGE OF UTERUS  04/14/2010    Hysteroscopy-Dr. Feng    EXPLORATORY LAPAROTOMY WITH UTERINE MYOMECTOMY  02/27/2007    Dr. Marquise Anderson    HYSTERECTOMY  09/29/2011    Robotic, FABIENNE, Cystoscopy-Dr. Feng    HYSTEROSCOPY WITH DILATION AND CURETTAGE OF UTERUS  04/04/2006    Laparoscopy, Chromotubation-Dr. Marquise Anderson    LAMINECTOMY N/A 11/30/2021    Procedure: L2-L5 Laminectomy;  Surgeon: Cyril Upton MD;  [facility];  Service: Neurosurgery;  Laterality: N/A;    LEFT HEART CATHETERIZATION      Left L3-S1 RFTC Left 07/18/2017    Dr Lopez    Left SI JI Left 09/30/2013    Dr Randolph    MYRINGOTOMY WITH INSERTION OF VENTILATION TUBE Bilateral 4/4/2023    Procedure: MYRINGOTOMY, WITH TYMPANOSTOMY TUBE INSERTION (T-TUBES);  Surgeon: Sea Hinton MD;  Location: [facility] OR;  Service: ENT;  Laterality: Bilateral;  T-TUBES    OOPHORECTOMY  11/11/2014    Robotic, FABIENNE, Cystoscopy-Dr. Feng    SINUS SURGERY       Social History  "    Socioeconomic History    Marital status:    Tobacco Use    Smoking status: Never    Smokeless tobacco: Never   Substance and Sexual Activity    Alcohol use: Not Currently    Drug use: Not Currently     Types: Hydrocodone, Oxycodone    Sexual activity: Not Currently     Family History   Problem Relation Age of Onset    Diabetes Mellitus Mother     Heart disease Mother     Hypertension Mother     Migraines Mother     Heart disease Sister      Review of patient's allergies indicates:   Allergen Reactions    Fish containing products Anaphylaxis    Iodinated contrast media     Penicillins         Objective:  Vitals:    05/18/23 1437   BP: 139/77   Pulse: 80   Resp: 18   Weight: 98.5 kg (217 lb 3.2 oz)   Height: 5' 7" (1.702 m)   PainSc:   8         Physical Exam  Vitals and nursing note reviewed. Exam conducted with a chaperone present.   Constitutional:       General: She is awake. She is not in acute distress.     Appearance: Normal appearance. She is not ill-appearing, toxic-appearing or diaphoretic.   HENT:      Head: Normocephalic and atraumatic.      Nose: Nose normal.      Mouth/Throat:      Mouth: Mucous membranes are moist.      Pharynx: Oropharynx is clear.   Eyes:      Conjunctiva/sclera: Conjunctivae normal.      Pupils: Pupils are equal, round, and reactive to light.   Cardiovascular:      Rate and Rhythm: Normal rate.   Pulmonary:      Effort: Pulmonary effort is normal. No respiratory distress.   Abdominal:      Palpations: Abdomen is soft.      Tenderness: There is no guarding.   Musculoskeletal:         General: Normal range of motion.      Cervical back: Normal range of motion and neck supple. No rigidity.   Skin:     General: Skin is warm and dry.      Coloration: Skin is not jaundiced or pale.   Neurological:      General: No focal deficit present.      Mental Status: She is alert and oriented to person, place, and time. Mental status is at baseline.      Cranial Nerves: No " cranial nerve deficit (II-XII).   Psychiatric:         Mood and Affect: Mood normal.         Behavior: Behavior normal. Behavior is cooperative.         Thought Content: Thought content normal.         Mammo Digital Screening Bilat  Narrative: Result:   Mammo Digital Screening Bilat     History:  Patient is 47 y.o. and is seen for a screening mammogram.    Films Compared:  Prior images (if available) were compared.     Findings:  The breasts have scattered areas of fibroglandular density. There is no   evidence of suspicious masses, calcifications, or other abnormal findings.  Impression: Bilateral  There is no mammographic evidence of malignancy.    BI-RADS Category:   Overall: 1 - Negative       Recommendation:  Routine screening mammogram in 1 year is recommended.    Your estimated lifetime risk of breast cancer (to age 85) based on   Tyrer-Cuzick risk assessment model is Tyrer-Cuzick: 4.71 %. According to   the American Cancer Society, patients with a lifetime breast cancer risk   of 20% or higher might benefit from supplemental screening tests.       Office Visit on 04/13/2023   Component Date Value Ref Range Status    Vitamin D 25-Hydroxy, Blood 04/13/2023 16.3  ng/mL Final    WBC 04/13/2023 7.40  4.50 - 11.00 K/uL Final    RBC 04/13/2023 4.32  4.20 - 5.40 M/uL Final    Hemoglobin 04/13/2023 11.6 (L)  12.0 - 16.0 g/dL Final    Hematocrit 04/13/2023 38.6  38.0 - 47.0 % Final    MCV 04/13/2023 89.4  80.0 - 96.0 fL Final    MCH 04/13/2023 26.9 (L)  27.0 - 31.0 pg Final    MCHC 04/13/2023 30.1 (L)  32.0 - 36.0 g/dL Final    RDW 04/13/2023 13.3  11.5 - 14.5 % Final    Platelet Count 04/13/2023 447 (H)  150 - 400 K/uL Final    MPV 04/13/2023 10.5  9.4 - 12.4 fL Final    Neutrophils % 04/13/2023 48.1 (L)  53.0 - 65.0 % Final    Lymphocytes % 04/13/2023 43.2 (H)  27.0 - 41.0 % Final    Monocytes % 04/13/2023 6.1 (H)  2.0 - 6.0 % Final    Eosinophils % 04/13/2023 1.8  1.0 - 4.0 % Final    Basophils %  04/13/2023 0.4  0.0 - 1.0 % Final    Immature Granulocytes % 04/13/2023 0.4  0.0 - 0.4 % Final    nRBC, Auto 04/13/2023 0.0  <=0.0 % Final    Neutrophils, Abs 04/13/2023 3.56  1.80 - 7.70 K/uL Final    Lymphocytes, Absolute 04/13/2023 3.20  1.00 - 4.80 K/uL Final    Monocytes, Absolute 04/13/2023 0.45  0.00 - 0.80 K/uL Final    Eosinophils, Absolute 04/13/2023 0.13  0.00 - 0.50 K/uL Final    Basophils, Absolute 04/13/2023 0.03  0.00 - 0.20 K/uL Final    Immature Granulocytes, Absolute 04/13/2023 0.03  0.00 - 0.04 K/uL Final    nRBC, Absolute 04/13/2023 0.00  <=0.00 x10e3/uL Final    Diff Type 04/13/2023 Auto   Final   Office Visit on 04/12/2023   Component Date Value Ref Range Status    POC Amphetamines 04/12/2023 Negative  Negative, Inconclusive Final    POC Barbiturates 04/12/2023 Negative  Negative, Inconclusive Final    POC Benzodiazepines 04/12/2023 Negative  Negative, Inconclusive Final    POC Cocaine 04/12/2023 Negative  Negative, Inconclusive Final    POC THC 04/12/2023 Negative  Negative, Inconclusive Final    POC Methadone 04/12/2023 Negative  Negative, Inconclusive Final    POC Methamphetamine 04/12/2023 Negative  Negative, Inconclusive Final    POC Opiates 04/12/2023 Negative  Negative, Inconclusive Final    POC Oxycodone 04/12/2023 Negative  Negative, Inconclusive Final    POC Phencyclidine 04/12/2023 Negative  Negative, Inconclusive Final    POC Methylenedioxymethamphetamine * 04/12/2023 Negative  Negative, Inconclusive Final    POC Tricyclic Antidepressants 04/12/2023 Negative  Negative, Inconclusive Final    POC Buprenorphine 04/12/2023 Negative   Final     Acceptable 04/12/2023 Yes   Final    POC Temperature (Urine) 04/12/2023 94   Final   Admission on 04/04/2023, Discharged on 04/04/2023   Component Date Value Ref Range Status    POC Glucose 04/04/2023 188 (H)  70 - 105 mg/dL Final    POC Glucose 04/04/2023 164 (H)  70 - 105 mg/dL Final    CK 04/04/2023 79   26 - 192 U/L Final    CK-MB 04/04/2023 <1.0 (L)  1.0 - 3.6 ng/mL Final    Troponin I High Sensitivity 04/04/2023 7.4  <=60.4 pg/mL Final   Office Visit on 03/21/2023   Component Date Value Ref Range Status    Hemoglobin A1C 03/21/2023 7.2 (H)  4.5 - 6.6 % Final    Estimated Average Glucose 03/21/2023 154  mg/dL Final    Sodium 03/21/2023 142  136 - 145 mmol/L Final    Potassium 03/21/2023 3.8  3.5 - 5.1 mmol/L Final    Chloride 03/21/2023 107  98 - 107 mmol/L Final    CO2 03/21/2023 30  21 - 32 mmol/L Final    Anion Gap 03/21/2023 9  7 - 16 mmol/L Final    Glucose 03/21/2023 201 (H)  74 - 106 mg/dL Final    BUN 03/21/2023 10  7 - 18 mg/dL Final    Creatinine 03/21/2023 0.78  0.55 - 1.02 mg/dL Final    BUN/Creatinine Ratio 03/21/2023 13  6 - 20 Final    Calcium 03/21/2023 9.1  8.5 - 10.1 mg/dL Final    Total Protein 03/21/2023 7.2  6.4 - 8.2 g/dL Final    Albumin 03/21/2023 3.4 (L)  3.5 - 5.0 g/dL Final    Globulin 03/21/2023 3.8  2.0 - 4.0 g/dL Final    A/G Ratio 03/21/2023 0.9   Final    Bilirubin, Total 03/21/2023 0.2  >0.0 - 1.2 mg/dL Final    Alk Phos 03/21/2023 150 (H)  39 - 100 U/L Final    ALT 03/21/2023 34  13 - 56 U/L Final    AST 03/21/2023 21  15 - 37 U/L Final    eGFR 03/21/2023 95  >=60 mL/min/1.73m² Final    TSH 03/21/2023 16.000 (H)  0.358 - 3.740 uIU/mL Final    Free T4 03/21/2023 1.01  0.76 - 1.46 ng/dL Final   Office Visit on 03/09/2023   Component Date Value Ref Range Status    SARS Coronavirus 2 Antigen 03/09/2023 Negative  Negative Final    Rapid Influenza A Ag 03/09/2023 Negative  Negative Final    Rapid Influenza B Ag 03/09/2023 Negative  Negative Final     Acceptable 03/09/2023 Yes   Final    Rapid Strep A Screen 03/09/2023 Negative  Negative Final     Acceptable 03/09/2023 Yes   Final   Office Visit on 01/25/2023   Component Date Value Ref Range Status    SARS Coronavirus 2 Antigen 01/25/2023 Negative  Negative Final    Rapid  Influenza A Ag 01/25/2023 Negative  Negative Final    Rapid Influenza B Ag 01/25/2023 Negative  Negative Final     Acceptable 01/25/2023 Yes   Final   Office Visit on 01/10/2023   Component Date Value Ref Range Status    TSH 01/10/2023 0.038 (L)  0.358 - 3.740 uIU/mL Final   Lab Requisition on 12/02/2022   Component Date Value Ref Range Status    Free T4 12/02/2022 0.73 (L)  0.76 - 1.46 ng/dL Final    Hemoglobin A1C 12/02/2022 7.3 (H)  4.5 - 6.6 % Final    Estimated Average Glucose 12/02/2022 157  mg/dL Final    Free T3 12/02/2022 1.79 (L)  2.18 - 3.98 pg/mL Final    TSH 12/02/2022 18.400 (H)  0.358 - 3.740 uIU/mL Final    Triglycerides 12/02/2022 265 (H)  35 - 150 mg/dL Final    Cholesterol 12/02/2022 231 (H)  0 - 200 mg/dL Final    HDL Cholesterol 12/02/2022 44  40 - 60 mg/dL Final    Cholesterol/HDL Ratio (Risk Factor) 12/02/2022 5.3   Final    Non-HDL 12/02/2022 187  mg/dL Final    LDL Calculated 12/02/2022 134  mg/dL Final    LDL/HDL 12/02/2022 3.0   Final    VLDL 12/02/2022 53  mg/dL Final    Sodium 12/02/2022 142  136 - 145 mmol/L Final    Potassium 12/02/2022 3.4 (L)  3.5 - 5.1 mmol/L Final    Chloride 12/02/2022 105  98 - 107 mmol/L Final    CO2 12/02/2022 30  21 - 32 mmol/L Final    Anion Gap 12/02/2022 10  7 - 16 mmol/L Final    Glucose 12/02/2022 168 (H)  74 - 106 mg/dL Final    BUN 12/02/2022 8  7 - 18 mg/dL Final    Creatinine 12/02/2022 0.83  0.55 - 1.02 mg/dL Final    BUN/Creatinine Ratio 12/02/2022 10  6 - 20 Final    Calcium 12/02/2022 9.2  8.5 - 10.1 mg/dL Final    Total Protein 12/02/2022 7.6  6.4 - 8.2 g/dL Final    Albumin 12/02/2022 3.6  3.5 - 5.0 g/dL Final    Globulin 12/02/2022 4.0  2.0 - 4.0 g/dL Final    A/G Ratio 12/02/2022 0.9   Final    Bilirubin, Total 12/02/2022 0.3  >0.0 - 1.2 mg/dL Final    Alk Phos 12/02/2022 142 (H)  39 - 100 U/L Final    ALT 12/02/2022 26  13 - 56 U/L Final    AST 12/02/2022 15  15 - 37 U/L Final    eGFR  12/02/2022 88  >=60 mL/min/1.73m² Final    Creatinine, Urine 12/02/2022 292 (H)  28 - 219 mg/dL Final    Microalbumin 12/02/2022 0.9  0.0 - 2.8 mg/dL Final    Microalbumin/Creatinine Ratio 12/02/2022 3.1  0.0 - 30.0 mg/g Final         Orders Placed This Encounter   Procedures    MRI Lumbar Spine W WO Cont     Standing Status:   Future     Standing Expiration Date:   5/18/2024     Order Specific Question:   Does the patient have a pacemaker or a defibrillator (Note: Some facilities may not be able to schedule an MRI for patients with pacemakers and defibrillators. You should contact your local radiology department to determine if this is the case.)?     Answer:   No     Order Specific Question:   Does the patient have an aneurysm or surgical clip, pump, nerve/brain stimulator, middle/inner ear prosthesis, or other metal implant or foreign object (bullet, shrapnel)? If they have a card related to their implant, ask them to bring it. Issues related to the implant may cause the MRI to be delayed.     Answer:   No     Order Specific Question:   Is the patient claustrophobic?     Answer:   No     Order Specific Question:   Will the patient require sedation?     Answer:   No     Order Specific Question:   Does the patient have any of the following conditions? Diabetes, History of Renal Disease or Hypertension requiring medical therapy?     Answer:   No     Order Specific Question:   Is the patient pregnant?     Answer:   No     Order Specific Question:   May the Radiologist modify the order per protocol to meet the clinical needs of the patient?     Answer:   Yes     Order Specific Question:   Is this part of a Research Study?     Answer:   No     Order Specific Question:   Recist criteria?     Answer:   No     Order Specific Question:   Will this service be billed to a Worker's Comp policy?     Answer:   No     Order Specific Question:   Does the patient have on a skin patch for medication with aluminized backing?      Answer:   No    X-Ray Hips Bilateral 2 View Inc AP Pelvis     Standing Status:   Future     Standing Expiration Date:   5/18/2024     Order Specific Question:   Does the patient have a splint or a brace?     Answer:   No     Order Specific Question:   Does the patient have a cast?     Answer:   No     Order Specific Question:   Is the patient pregnant?     Answer:   No     Order Specific Question:   May the Radiologist modify the order per protocol to meet the clinical needs of the patient?     Answer:   Yes     Order Specific Question:   Release to patient     Answer:   Immediate    X-Ray Lumbar Spine 2 Or 3 Views     Standing Status:   Future     Standing Expiration Date:   8/18/2023     Order Specific Question:   Is the patient pregnant?     Answer:   No     Order Specific Question:   May the Radiologist modify the order per protocol to meet the clinical needs of the patient?     Answer:   Yes     Order Specific Question:   Does the patient have a neck collar or brace on?     Answer:   No       Requested Prescriptions     Signed Prescriptions Disp Refills    cyclobenzaprine (FLEXERIL) 10 MG tablet 90 tablet 0     Sig: Take 1 tablet (10 mg total) by mouth 3 (three) times daily as needed for Muscle spasms.    meloxicam (MOBIC) 15 MG tablet 30 tablet 0     Sig: Take 1 tablet (15 mg total) by mouth once daily.    traMADoL (ULTRAM) 50 mg tablet 90 tablet 0     Sig: Take 1 tablet (50 mg total) by mouth every 8 (eight) hours as needed for Pain.       Assessment:     1. Lumbosacral radiculopathy    2. Postlaminectomy syndrome, lumbar region    3. Disorder of sacrum    4. Dorsalgia, unspecified         A's of Opioid Risk Assessment  Activity:Patient can perform ADL.   Analgesia:Patients pain is partially controlled by current medication. Patient has tried OTC medications such as Tylenol and Ibuprofen with out relief.   Adverse Effects: Patient denies constipation or sedation.  Aberrant Behavior:  reviewed with no  aberrant drug seeking/taking behavior.  Overdose reversal drug naloxone discussed    Drug screen reviewed      Plan:    She had lumbar surgery [facility] November 2021 complicated by postop infection    Physical therapy notes through May 9, 2023 [facility] reviewed    Complaint back pain buttock and leg pain right greater than left pain numbness and tingling right knee pain worse with standing walking short distances better with short periods resting    Requesting adjustment tramadol    Tramadol 50 mg 1 p.o. q.8 hours number 90 tablets     Requesting further investigation for increasing lumbosacral radiculopathy type symptoms    She is requesting Toradol injection today    Toradol 60 mg IM, tolerated well    Will order x-ray lumbar spine pelvis and hips today     Order MRI lumbar spine without contrast history lumbar surgery, lumbosacral radiculopathy    MRI for consideration procedure/surgery    I have given the patient medically directed home exercise program    Patient has tried NSAIDs and neuromodulators (neurontin, lyrica, elavil, or cymbalta)    Follow-up 1 month discuss results MRI lumbar spine    Dr. Bills, March 2023    Bring original prescription medication bottles/container/box with labels to each visit

## 2023-05-29 ENCOUNTER — OFFICE VISIT (OUTPATIENT)
Dept: FAMILY MEDICINE | Facility: CLINIC | Age: 48
End: 2023-05-29
Payer: OTHER GOVERNMENT

## 2023-05-29 VITALS
TEMPERATURE: 98 F | RESPIRATION RATE: 18 BRPM | WEIGHT: 210 LBS | DIASTOLIC BLOOD PRESSURE: 78 MMHG | HEART RATE: 85 BPM | HEIGHT: 67 IN | BODY MASS INDEX: 32.96 KG/M2 | OXYGEN SATURATION: 98 % | SYSTOLIC BLOOD PRESSURE: 128 MMHG

## 2023-05-29 DIAGNOSIS — R11.2 NAUSEA AND VOMITING, UNSPECIFIED VOMITING TYPE: ICD-10-CM

## 2023-05-29 DIAGNOSIS — R50.9 FEVER, UNSPECIFIED FEVER CAUSE: ICD-10-CM

## 2023-05-29 DIAGNOSIS — N30.00 ACUTE CYSTITIS WITHOUT HEMATURIA: Primary | ICD-10-CM

## 2023-05-29 LAB
ALBUMIN SERPL BCP-MCNC: 3.8 G/DL (ref 3.5–5)
ALBUMIN/GLOB SERPL: 0.9 {RATIO}
ALP SERPL-CCNC: 140 U/L (ref 39–100)
ALT SERPL W P-5'-P-CCNC: 28 U/L (ref 13–56)
ANION GAP SERPL CALCULATED.3IONS-SCNC: 7 MMOL/L (ref 7–16)
AST SERPL W P-5'-P-CCNC: 19 U/L (ref 15–37)
BACTERIA #/AREA URNS HPF: ABNORMAL /HPF
BASOPHILS # BLD AUTO: 0.04 K/UL (ref 0–0.2)
BASOPHILS NFR BLD AUTO: 0.5 % (ref 0–1)
BILIRUB SERPL-MCNC: 0.4 MG/DL (ref ?–1.2)
BILIRUB SERPL-MCNC: POSITIVE MG/DL
BILIRUB UR QL STRIP: NEGATIVE
BLOOD URINE, POC: NEGATIVE
BUN SERPL-MCNC: 11 MG/DL (ref 7–18)
BUN/CREAT SERPL: 11 (ref 6–20)
CALCIUM SERPL-MCNC: 9.3 MG/DL (ref 8.5–10.1)
CHLORIDE SERPL-SCNC: 103 MMOL/L (ref 98–107)
CLARITY UR: ABNORMAL
CO2 SERPL-SCNC: 30 MMOL/L (ref 21–32)
COLOR UR: YELLOW
COLOR, POC UA: NORMAL
CREAT SERPL-MCNC: 0.98 MG/DL (ref 0.55–1.02)
CTP QC/QA: YES
DIFFERENTIAL METHOD BLD: ABNORMAL
EGFR (NO RACE VARIABLE) (RUSH/TITUS): 72 ML/MIN/1.73M2
EOSINOPHIL # BLD AUTO: 0.18 K/UL (ref 0–0.5)
EOSINOPHIL NFR BLD AUTO: 2.4 % (ref 1–4)
ERYTHROCYTE [DISTWIDTH] IN BLOOD BY AUTOMATED COUNT: 12.9 % (ref 11.5–14.5)
FLUAV AG NPH QL: NEGATIVE
FLUBV AG NPH QL: NEGATIVE
GLOBULIN SER-MCNC: 4.2 G/DL (ref 2–4)
GLUCOSE SERPL-MCNC: 189 MG/DL (ref 70–110)
GLUCOSE SERPL-MCNC: 206 MG/DL (ref 74–106)
GLUCOSE UR QL STRIP: NEGATIVE
GLUCOSE UR STRIP-MCNC: 30 MG/DL
HCT VFR BLD AUTO: 38.8 % (ref 38–47)
HGB BLD-MCNC: 11.6 G/DL (ref 12–16)
IMM GRANULOCYTES # BLD AUTO: 0.01 K/UL (ref 0–0.04)
IMM GRANULOCYTES NFR BLD: 0.1 % (ref 0–0.4)
KETONES UR QL STRIP: POSITIVE
KETONES UR STRIP-SCNC: NEGATIVE MG/DL
LEUKOCYTE ESTERASE UR QL STRIP: ABNORMAL
LEUKOCYTE ESTERASE URINE, POC: POSITIVE
LYMPHOCYTES # BLD AUTO: 3.03 K/UL (ref 1–4.8)
LYMPHOCYTES NFR BLD AUTO: 40.8 % (ref 27–41)
MCH RBC QN AUTO: 27 PG (ref 27–31)
MCHC RBC AUTO-ENTMCNC: 29.9 G/DL (ref 32–36)
MCV RBC AUTO: 90.2 FL (ref 80–96)
MONOCYTES # BLD AUTO: 0.36 K/UL (ref 0–0.8)
MONOCYTES NFR BLD AUTO: 4.8 % (ref 2–6)
MPC BLD CALC-MCNC: 9.6 FL (ref 9.4–12.4)
MUCOUS, UA: ABNORMAL /LPF
NEUTROPHILS # BLD AUTO: 3.81 K/UL (ref 1.8–7.7)
NEUTROPHILS NFR BLD AUTO: 51.4 % (ref 53–65)
NITRITE UR QL STRIP: NEGATIVE
NITRITE, POC UA: NEGATIVE
NRBC # BLD AUTO: 0 X10E3/UL
NRBC, AUTO (.00): 0 %
PH UR STRIP: 6 PH UNITS
PH, POC UA: 6
PLATELET # BLD AUTO: 485 K/UL (ref 150–400)
POTASSIUM SERPL-SCNC: 3.3 MMOL/L (ref 3.5–5.1)
PROT SERPL-MCNC: 8 G/DL (ref 6.4–8.2)
PROT UR QL STRIP: 100
PROTEIN, POC: POSITIVE
RBC # BLD AUTO: 4.3 M/UL (ref 4.2–5.4)
RBC # UR STRIP: NEGATIVE /UL
RBC #/AREA URNS HPF: 80 /HPF
SARS-COV-2 AG RESP QL IA.RAPID: NEGATIVE
SODIUM SERPL-SCNC: 137 MMOL/L (ref 136–145)
SP GR UR STRIP: 1.04
SPECIFIC GRAVITY, POC UA: 1.03
SQUAMOUS #/AREA URNS LPF: ABNORMAL /HPF
UROBILINOGEN UR STRIP-ACNC: 2 MG/DL
UROBILINOGEN, POC UA: 0.2
WBC # BLD AUTO: 7.43 K/UL (ref 4.5–11)
WBC #/AREA URNS HPF: 30 /HPF

## 2023-05-29 PROCEDURE — 87428 POCT SARS-COV2 (COVID) WITH FLU ANTIGEN: ICD-10-PCS | Mod: QW,,, | Performed by: NURSE PRACTITIONER

## 2023-05-29 PROCEDURE — 81003 POCT URINALYSIS W/O SCOPE: ICD-10-PCS | Mod: QW,,, | Performed by: NURSE PRACTITIONER

## 2023-05-29 PROCEDURE — 87186 CULTURE, URINE: ICD-10-PCS | Mod: ,,, | Performed by: CLINICAL MEDICAL LABORATORY

## 2023-05-29 PROCEDURE — 87428 SARSCOV & INF VIR A&B AG IA: CPT | Mod: QW,,, | Performed by: NURSE PRACTITIONER

## 2023-05-29 PROCEDURE — 85025 CBC WITH DIFFERENTIAL: ICD-10-PCS | Mod: ,,, | Performed by: CLINICAL MEDICAL LABORATORY

## 2023-05-29 PROCEDURE — 87086 URINE CULTURE/COLONY COUNT: CPT | Mod: ,,, | Performed by: CLINICAL MEDICAL LABORATORY

## 2023-05-29 PROCEDURE — 81001 URINALYSIS AUTO W/SCOPE: CPT | Mod: ,,, | Performed by: CLINICAL MEDICAL LABORATORY

## 2023-05-29 PROCEDURE — 87186 SC STD MICRODIL/AGAR DIL: CPT | Mod: ,,, | Performed by: CLINICAL MEDICAL LABORATORY

## 2023-05-29 PROCEDURE — 99214 OFFICE O/P EST MOD 30 MIN: CPT | Mod: ,,, | Performed by: NURSE PRACTITIONER

## 2023-05-29 PROCEDURE — 87077 CULTURE, URINE: ICD-10-PCS | Mod: ,,, | Performed by: CLINICAL MEDICAL LABORATORY

## 2023-05-29 PROCEDURE — 87077 CULTURE AEROBIC IDENTIFY: CPT | Mod: ,,, | Performed by: CLINICAL MEDICAL LABORATORY

## 2023-05-29 PROCEDURE — 80053 COMPREHENSIVE METABOLIC PANEL: ICD-10-PCS | Mod: ,,, | Performed by: CLINICAL MEDICAL LABORATORY

## 2023-05-29 PROCEDURE — 80053 COMPREHEN METABOLIC PANEL: CPT | Mod: ,,, | Performed by: CLINICAL MEDICAL LABORATORY

## 2023-05-29 PROCEDURE — 99214 PR OFFICE/OUTPT VISIT, EST, LEVL IV, 30-39 MIN: ICD-10-PCS | Mod: ,,, | Performed by: NURSE PRACTITIONER

## 2023-05-29 PROCEDURE — 87086 CULTURE, URINE: ICD-10-PCS | Mod: ,,, | Performed by: CLINICAL MEDICAL LABORATORY

## 2023-05-29 PROCEDURE — 85025 COMPLETE CBC W/AUTO DIFF WBC: CPT | Mod: ,,, | Performed by: CLINICAL MEDICAL LABORATORY

## 2023-05-29 PROCEDURE — 81001 URINALYSIS, REFLEX TO URINE CULTURE: ICD-10-PCS | Mod: ,,, | Performed by: CLINICAL MEDICAL LABORATORY

## 2023-05-29 PROCEDURE — 81003 URINALYSIS AUTO W/O SCOPE: CPT | Mod: QW,,, | Performed by: NURSE PRACTITIONER

## 2023-05-29 RX ORDER — ONDANSETRON 4 MG/1
4 TABLET, FILM COATED ORAL EVERY 6 HOURS PRN
Qty: 30 TABLET | Refills: 0 | Status: SHIPPED | OUTPATIENT
Start: 2023-05-29 | End: 2023-07-19

## 2023-05-29 RX ORDER — NITROFURANTOIN 25; 75 MG/1; MG/1
100 CAPSULE ORAL 2 TIMES DAILY
Qty: 10 CAPSULE | Refills: 0 | Status: SHIPPED | OUTPATIENT
Start: 2023-05-29 | End: 2023-06-02 | Stop reason: ALTCHOICE

## 2023-05-29 NOTE — PROGRESS NOTES
"Subjective:       Patient ID: Carey Leonardo is a 47 y.o. female.    Vitals:  height is 5' 7" (1.702 m) and weight is 95.3 kg (210 lb). Her temperature is 98.3 °F (36.8 °C). Her blood pressure is 128/78 and her pulse is 85. Her respiration is 18 and oxygen saturation is 98%.     Chief Complaint: Nasal Congestion, Chills (With possible fever ), Generalized Body Aches, Fatigue, Nausea, Emesis, and Diarrhea (States symptoms present about 3-4 days of UKO and OTC medications in use )    GARRETT is a 48 y/o BF who presents today with c/o n/v/d with runny nose and cough that started 5 days ago. She has not had any vomiting or diarrhea in 2 days, but she is still experiencing fatigue, nausea and weakness. She has been taking OTC ibuprofen with no relief.     GI Problem  The primary symptoms include fever, fatigue, nausea, vomiting, diarrhea and myalgias. Primary symptoms do not include abdominal pain or dysuria. The illness began 3 to 5 days ago. The onset was sudden. The problem has not changed since onset.  Nausea began 3 to 5 days ago. The nausea is exacerbated by food.   The vomiting began more than 2 days ago.   The diarrhea began 3 to 5 days ago.   The illness is also significant for chills. The illness does not include constipation.     Constitution: Positive for appetite change, chills, sweating, fatigue, fever and generalized weakness.   HENT:  Positive for congestion (nasal). Negative for ear pain and ear discharge.    Respiratory:  Positive for cough and sputum production (white).    Gastrointestinal:  Positive for nausea, vomiting and diarrhea. Negative for abdominal pain and constipation.   Genitourinary:  Negative for dysuria, urgency, urine decreased, hematuria, history of kidney stones, vaginal discharge and pelvic pain.   Musculoskeletal:  Positive for muscle ache.   Psychiatric/Behavioral:  Positive for sleep disturbance (due to acute illness).      Objective:      Physical Exam  Vitals reviewed. "   Constitutional:       Appearance: Normal appearance. She is obese.   HENT:      Head: Normocephalic.   Cardiovascular:      Rate and Rhythm: Normal rate and regular rhythm.      Heart sounds: Normal heart sounds. No murmur heard.  Abdominal:      General: There is no distension.      Palpations: Abdomen is soft.      Tenderness: There is no abdominal tenderness.   Musculoskeletal:      Cervical back: Normal range of motion.   Neurological:      Mental Status: She is alert and oriented to person, place, and time.   Psychiatric:         Mood and Affect: Mood normal.         Behavior: Behavior normal.         Thought Content: Thought content normal.         Judgment: Judgment normal.          Assessment:       1. Fever, unspecified fever cause    2. Nausea and vomiting, unspecified vomiting type      Recent Results (from the past 168 hour(s))   POCT glucose    Collection Time: 05/29/23 10:21 AM   Result Value Ref Range    POC Glucose 189 (A) 70 - 110 MG/DL   POCT SARS-COV2 (COVID) with Flu Antigen    Collection Time: 05/29/23 10:41 AM   Result Value Ref Range    SARS Coronavirus 2 Antigen Negative Negative    Rapid Influenza A Ag Negative Negative    Rapid Influenza B Ag Negative Negative     Acceptable Yes    POCT URINALYSIS W/O SCOPE    Collection Time: 05/29/23 10:42 AM   Result Value Ref Range    Color, UA Dark Yellow     Spec Grav UA 1.030     pH, UA 6.0     WBC, UA positive     Nitrite, UA negative     Protein, POC positive     Glucose, UA negative     Ketones, UA positive     Bilirubin, POC positive     Urobilinogen, UA 0.2     Blood, UA negative    Comprehensive Metabolic Panel    Collection Time: 05/29/23 12:03 PM   Result Value Ref Range    Sodium 137 136 - 145 mmol/L    Potassium 3.3 (L) 3.5 - 5.1 mmol/L    Chloride 103 98 - 107 mmol/L    CO2 30 21 - 32 mmol/L    Anion Gap 7 7 - 16 mmol/L    Glucose 206 (H) 74 - 106 mg/dL    BUN 11 7 - 18 mg/dL    Creatinine 0.98 0.55 - 1.02 mg/dL     BUN/Creatinine Ratio 11 6 - 20    Calcium 9.3 8.5 - 10.1 mg/dL    Total Protein 8.0 6.4 - 8.2 g/dL    Albumin 3.8 3.5 - 5.0 g/dL    Globulin 4.2 (H) 2.0 - 4.0 g/dL    A/G Ratio 0.9     Bilirubin, Total 0.4 >0.0 - 1.2 mg/dL    Alk Phos 140 (H) 39 - 100 U/L    ALT 28 13 - 56 U/L    AST 19 15 - 37 U/L    eGFR 72 >=60 mL/min/1.73m2   CBC with Differential    Collection Time: 05/29/23 12:03 PM   Result Value Ref Range    WBC 7.43 4.50 - 11.00 K/uL    RBC 4.30 4.20 - 5.40 M/uL    Hemoglobin 11.6 (L) 12.0 - 16.0 g/dL    Hematocrit 38.8 38.0 - 47.0 %    MCV 90.2 80.0 - 96.0 fL    MCH 27.0 27.0 - 31.0 pg    MCHC 29.9 (L) 32.0 - 36.0 g/dL    RDW 12.9 11.5 - 14.5 %    Platelet Count 485 (H) 150 - 400 K/uL    MPV 9.6 9.4 - 12.4 fL    Neutrophils % 51.4 (L) 53.0 - 65.0 %    Lymphocytes % 40.8 27.0 - 41.0 %    Monocytes % 4.8 2.0 - 6.0 %    Eosinophils % 2.4 1.0 - 4.0 %    Basophils % 0.5 0.0 - 1.0 %    Immature Granulocytes % 0.1 0.0 - 0.4 %    nRBC, Auto 0.0 <=0.0 %    Neutrophils, Abs 3.81 1.80 - 7.70 K/uL    Lymphocytes, Absolute 3.03 1.00 - 4.80 K/uL    Monocytes, Absolute 0.36 0.00 - 0.80 K/uL    Eosinophils, Absolute 0.18 0.00 - 0.50 K/uL    Basophils, Absolute 0.04 0.00 - 0.20 K/uL    Immature Granulocytes, Absolute 0.01 0.00 - 0.04 K/uL    nRBC, Absolute 0.00 <=0.00 x10e3/uL    Diff Type Auto    Urinalysis, Reflex to Urine Culture    Collection Time: 05/29/23  2:45 PM    Specimen: Urine   Result Value Ref Range    Color, UA Yellow Colorless, Straw, Yellow, Light Yellow, Dark Yellow    Clarity, UA Ex.Turbid Clear    pH, UA 6.0 5.0 to 8.0 pH Units    Leukocytes, UA Large (A) Negative    Nitrites, UA Negative Negative    Protein,  (A) Negative    Glucose, UA 30 (A) Normal mg/dL    Ketones, UA Negative Negative mg/dL    Urobilinogen, UA 2 (A) 0.2, 1.0, Normal mg/dL    Bilirubin, UA Negative Negative    Blood, UA Negative Negative    Specific Gravity, UA 1.038 (H) <=1.030   Urinalysis, Microscopic    Collection Time:  05/29/23  2:45 PM   Result Value Ref Range    WBC, UA 30 (H) <=5 /hpf    RBC, UA 80 (H) <=3 /hpf    Bacteria, UA Many (A) None Seen /hpf    Squamous Epithelial Cells, UA Moderate (A) None Seen /HPF    Mucous Many (A) None Seen /LPF       Recent Results (from the past 5 hour(s))   Urinalysis, Reflex to Urine Culture    Collection Time: 05/29/23  2:45 PM    Specimen: Urine   Result Value Ref Range    Color, UA Yellow Colorless, Straw, Yellow, Light Yellow, Dark Yellow    Clarity, UA Ex.Turbid Clear    pH, UA 6.0 5.0 to 8.0 pH Units    Leukocytes, UA Large (A) Negative    Nitrites, UA Negative Negative    Protein,  (A) Negative    Glucose, UA 30 (A) Normal mg/dL    Ketones, UA Negative Negative mg/dL    Urobilinogen, UA 2 (A) 0.2, 1.0, Normal mg/dL    Bilirubin, UA Negative Negative    Blood, UA Negative Negative    Specific Gravity, UA 1.038 (H) <=1.030   Urinalysis, Microscopic    Collection Time: 05/29/23  2:45 PM   Result Value Ref Range    WBC, UA 30 (H) <=5 /hpf    RBC, UA 80 (H) <=3 /hpf    Bacteria, UA Many (A) None Seen /hpf    Squamous Epithelial Cells, UA Moderate (A) None Seen /HPF    Mucous Many (A) None Seen /LPF      Plan:       -Urine culture pending.     Fever, unspecified fever cause  -     POCT SARS-COV2 (COVID) with Flu Antigen    Nausea and vomiting, unspecified vomiting type  -     POCT URINALYSIS W/O SCOPE  -     POCT glucose  -     CBC Auto Differential; Future; Expected date: 05/29/2023  -     Comprehensive Metabolic Panel; Future; Expected date: 05/29/2023  -     ondansetron (ZOFRAN) 4 MG tablet; Take 1 tablet (4 mg total) by mouth every 6 (six) hours as needed for Nausea.  Dispense: 30 tablet; Refill: 0  -     Urinalysis, Reflex to Urine Culture; Future; Expected date: 05/29/2023  -     Urinalysis, Microscopic  -     Urine culture    Follow up if symptoms worsen or fail to improve.     An After Visit Summary was printed and given to the patient.     Ursula Kapadia, MAGGIE, APRN-BC  Family  Nurse Practitioner    Family [address] [address]

## 2023-06-01 LAB — UA COMPLETE W REFLEX CULTURE PNL UR: ABNORMAL

## 2023-06-02 DIAGNOSIS — R82.79 URINE CULTURE POSITIVE FOR ALPHA-HEMOLYTIC STREPTOCOCCUS: Primary | ICD-10-CM

## 2023-06-02 RX ORDER — LEVOFLOXACIN 250 MG/1
250 TABLET ORAL DAILY
Qty: 3 TABLET | Refills: 0 | Status: SHIPPED | OUTPATIENT
Start: 2023-06-02 | End: 2023-07-19

## 2023-06-06 DIAGNOSIS — I10 PRIMARY HYPERTENSION: ICD-10-CM

## 2023-06-07 ENCOUNTER — OFFICE VISIT (OUTPATIENT)
Dept: PAIN MEDICINE | Facility: CLINIC | Age: 48
End: 2023-06-07
Payer: OTHER GOVERNMENT

## 2023-06-07 VITALS
WEIGHT: 214.63 LBS | HEIGHT: 67 IN | HEART RATE: 77 BPM | BODY MASS INDEX: 33.69 KG/M2 | SYSTOLIC BLOOD PRESSURE: 105 MMHG | RESPIRATION RATE: 18 BRPM | DIASTOLIC BLOOD PRESSURE: 65 MMHG

## 2023-06-07 DIAGNOSIS — G57.91 ILIOINGUINAL NEURALGIA OF RIGHT SIDE: Primary | ICD-10-CM

## 2023-06-07 DIAGNOSIS — M96.1 POSTLAMINECTOMY SYNDROME, LUMBAR REGION: Chronic | ICD-10-CM

## 2023-06-07 DIAGNOSIS — M53.3 DISORDER OF SACRUM: Chronic | ICD-10-CM

## 2023-06-07 DIAGNOSIS — M54.17 LUMBOSACRAL RADICULOPATHY: Chronic | ICD-10-CM

## 2023-06-07 PROCEDURE — 99214 OFFICE O/P EST MOD 30 MIN: CPT | Mod: S$PBB,,, | Performed by: PAIN MEDICINE

## 2023-06-07 PROCEDURE — 99215 OFFICE O/P EST HI 40 MIN: CPT | Mod: PBBFAC | Performed by: PAIN MEDICINE

## 2023-06-07 PROCEDURE — 99214 PR OFFICE/OUTPT VISIT, EST, LEVL IV, 30-39 MIN: ICD-10-PCS | Mod: S$PBB,,, | Performed by: PAIN MEDICINE

## 2023-06-07 RX ORDER — CYCLOBENZAPRINE HCL 10 MG
10 TABLET ORAL 3 TIMES DAILY PRN
Qty: 90 TABLET | Refills: 0 | Status: SHIPPED | OUTPATIENT
Start: 2023-06-07 | End: 2023-07-12 | Stop reason: SDUPTHER

## 2023-06-07 RX ORDER — MELOXICAM 15 MG/1
15 TABLET ORAL DAILY
Qty: 30 TABLET | Refills: 0 | Status: SHIPPED | OUTPATIENT
Start: 2023-06-07 | End: 2023-07-12 | Stop reason: SDUPTHER

## 2023-06-07 RX ORDER — HYDRALAZINE HYDROCHLORIDE 100 MG/1
100 TABLET, FILM COATED ORAL 3 TIMES DAILY
Qty: 270 TABLET | Refills: 0 | Status: SHIPPED | OUTPATIENT
Start: 2023-06-07 | End: 2023-07-19 | Stop reason: SDUPTHER

## 2023-06-07 NOTE — PROGRESS NOTES
She Disclaimer: This note has been generated using voice-recognition software. There may be typographical errors that have been missed during proof-reading        Patient ID: Carey Leonardo is a 47 y.o. female.      Chief Complaint: Low-back Pain      47-year-old female returns for re-evaluation of chronic lower back, post-laminectomy pain syndrome and postoperative diskitis.  She received  3 sessions of antibiotics but has not returned for re-evaluation with Dr Upton.  Physical therapy failed to provide relief.  Her pain radiates from the  lower back to the bilateral lower extremities.  Her pain was tolerable with prescribed medications until 1 month ago  and worsened after walking at a [place] in  [place].  She now complains of inguinal and groin pain radiating to the anterior  thighs and knees.  The pain is worse with standing and walking.  She was found to have ilioinguinal neuralgia and I have scheduled an  ilioinguinal nerve block.  She reports taking Ultram twice a day.         Pain Assessment  Pain Assessment: 0-10  Pain Score:   7  Pain Location: Back (lower back pain)  Pain Descriptors: Aching  Pain Frequency: Constant/continuous  Pain Onset: Awakened from sleep  Clinical Progression: Gradually worsening  Aggravating Factors: Standing  Pain Intervention(s): Medication (See eMAR), Home medication      A's of Opioid Risk Assessment  Activity:Patient is unable to  perform ADL.   Analgesia:Patients pain is not controlled by current medication.   Adverse Effects: Patient denies constipation or sedation.  Aberrant Behavior:  reviewed with no aberrant drug seeking/taking behavior.      Patient denies any suicidal or homicidal ideations    Physical Therapy/Home Exercise: yes, no relief     X-Ray Hips Bilateral 2 View Inc AP Pelvis  Narrative: EXAMINATION:  XR HIPS BILATERAL 2 VIEW INCL AP PELVIS    CLINICAL HISTORY:  Sacrococcygeal disorders, not elsewhere classified    TECHNIQUE:  AP view of the pelvis and  frogleg lateral views of both hips were performed.    COMPARISON:  None.    FINDINGS:  Mild degenerative changes are seen of both hips and sacroiliac joints.  There is no fracture.  No dislocation.  Impression: Mild degenerative changes.    Electronically signed by: Jean Plascencia  Date:    05/18/2023  Time:    16:07  X-Ray Lumbar Spine 2 Or 3 Views  Narrative: EXAMINATION:  XR LUMBAR SPINE 2 OR 3 VIEWS    CLINICAL HISTORY:  Low back pain, symptoms persist with > 6wks conservative treatment;  Dorsalgia, unspecified    TECHNIQUE:  AP and lateral lumbar spine radiograph    COMPARISON:  08/03/2022    FINDINGS:  The frontal view is somewhat oblique 8.  The laminectomy changes seen previously are less well defined on this exam.  The vertebral body heights and sagittal alignment are maintained.  Degenerative endplate and facet changes throughout similar prior.  Impression: Degenerative changes    Electronically signed by: Jean Plascencia  Date:    05/18/2023  Time:    15:56      Review of Systems   Constitutional: Negative.    HENT: Negative.     Eyes: Negative.    Respiratory: Negative.     Cardiovascular: Negative.    Gastrointestinal: Negative.    Endocrine: Negative.    Genitourinary: Negative.    Musculoskeletal:  Positive for arthralgias and back pain.   Integumentary:  Negative.   Neurological:  Positive for numbness (Right anterior thigh).   Hematological: Negative.    Psychiatric/Behavioral: Negative.             Past Medical History:   Diagnosis Date    Asthma     CHF (congestive heart failure)     Chronic pain syndrome     Depressive disorder     Diabetes mellitus, type 2     Disorder of sacrum 4/28/2022    Enlarged heart     Gastroparesis     GERD (gastroesophageal reflux disease)     Hyperlipidemia     Hypertension     IBS (irritable bowel syndrome)     Kidney stones     Lumbar radiculopathy     Sleep apnea     Does not use a CPAP    Thyroid disease      Past Surgical History:   Procedure Laterality Date    Bilateral  L3-S1 MBB Bilateral 6-, 5-, 1-8-2014    Dr Jessica Randolph    CARPAL TUNNEL RELEASE      CHOLECYSTECTOMY      DIAGNOSTIC LAPAROSCOPY  04/14/2010    Exploratory Laparotomy, Myomectomy, Hydrotubation-Dr. Feng    DILATION AND CURETTAGE OF UTERUS  04/14/2010    Hysteroscopy-Dr. Feng    EXPLORATORY LAPAROTOMY WITH UTERINE MYOMECTOMY  02/27/2007    Dr. Marquise Anderson    HYSTERECTOMY  09/29/2011    Robotic, FABIENNE, Cystoscopy-Dr. Feng    HYSTEROSCOPY WITH DILATION AND CURETTAGE OF UTERUS  04/04/2006    Laparoscopy, Chromotubation-Dr. Marquise Anderson    LAMINECTOMY N/A 11/30/2021    Procedure: L2-L5 Laminectomy;  Surgeon: Cyril Upton MD;  Location: [facility] OR;  Service: Neurosurgery;  Laterality: N/A;    LEFT HEART CATHETERIZATION      Left L3-S1 RFTC Left 07/18/2017    Dr Lopez    Left SI JI Left 09/30/2013    Dr Randolph    MYRINGOTOMY WITH INSERTION OF VENTILATION TUBE Bilateral 4/4/2023    Procedure: MYRINGOTOMY, WITH TYMPANOSTOMY TUBE INSERTION (T-TUBES);  Surgeon: Sea Hinton MD;  Location: [facility] OR;  Service: ENT;  Laterality: Bilateral;  T-TUBES    OOPHORECTOMY  11/11/2014    Robotic, FABIENNE, Cystoscopy-Dr. Feng    SINUS SURGERY       Social History     Socioeconomic History    Marital status:    Tobacco Use    Smoking status: Never    Smokeless tobacco: Never   Substance and Sexual Activity    Alcohol use: Not Currently    Drug use: Not Currently     Types: Hydrocodone, Oxycodone    Sexual activity: Not Currently     Family History   Problem Relation Age of Onset    Diabetes Mellitus Mother     Heart disease Mother     Hypertension Mother     Migraines Mother     Heart disease Sister      Review of patient's allergies indicates:   Allergen Reactions    Fish containing products Anaphylaxis    Iodinated contrast media     Penicillins      has a current medication list which includes the following prescription(s): albuterol, atorvastatin, cholecalciferol (vitamin d3), clonidine,  trulicity, empagliflozin, gabapentin, glipizide-metformin, hydralazine, ipratropium, levofloxacin, levothyroxine, metoprolol succinate, nifedipine, ondansetron, risperidone, sertraline, tramadol, trazodone, venlafaxine, cyclobenzaprine, and meloxicam.      Objective:  Vitals:    06/07/23 0825   BP: 105/65   Pulse: 77   Resp: 18        Physical Exam  Vitals and nursing note reviewed.   Constitutional:       General: She is not in acute distress.     Appearance: Normal appearance. She is not ill-appearing, toxic-appearing or diaphoretic.   HENT:      Head: Normocephalic and atraumatic.      Nose: Nose normal.      Mouth/Throat:      Mouth: Mucous membranes are moist.   Eyes:      Extraocular Movements: Extraocular movements intact.      Pupils: Pupils are equal, round, and reactive to light.   Cardiovascular:      Rate and Rhythm: Normal rate and regular rhythm.      Heart sounds: Normal heart sounds.   Pulmonary:      Effort: Pulmonary effort is normal. No respiratory distress.      Breath sounds: Normal breath sounds. No stridor. No wheezing or rhonchi.   Abdominal:      General: Bowel sounds are normal.      Palpations: Abdomen is soft.   Musculoskeletal:         General: No swelling or deformity. Normal range of motion.      Cervical back: Normal and normal range of motion. No spasms or tenderness. No pain with movement. Normal range of motion.      Thoracic back: Normal.      Lumbar back: No spasms, tenderness or bony tenderness. Normal range of motion. Negative right straight leg raise test and negative left straight leg raise test. No scoliosis.      Right lower leg: Tenderness (ilioinguinal nerve) present. No edema.      Left lower leg: No edema.   Skin:     General: Skin is warm.   Neurological:      General: No focal deficit present.      Mental Status: She is alert and oriented to person, place, and time. Mental status is at baseline.      Cranial Nerves: No cranial nerve deficit.      Sensory: Sensation is  intact. No sensory deficit.      Motor: No weakness.      Coordination: Coordination normal.      Gait: Gait normal.      Deep Tendon Reflexes: Reflexes are normal and symmetric.   Psychiatric:         Mood and Affect: Mood normal.         Behavior: Behavior normal.         Assessment:      1. Ilioinguinal neuralgia of right side    2. Postlaminectomy syndrome, lumbar region    3. Disorder of sacrum    4. Lumbosacral radiculopathy          Plan:  1. reviewed  2.Addiction, Dependency, Tolerance, Opioid abuse-misuse, Death, Diversion Discussed. Overdose reversal drug Naloxone discussed.  3.Refill/Continue medications for pain control and function       Requested Prescriptions     Signed Prescriptions Disp Refills    cyclobenzaprine (FLEXERIL) 10 MG tablet 90 tablet 0     Sig: Take 1 tablet (10 mg total) by mouth 3 (three) times daily as needed for Muscle spasms.    meloxicam (MOBIC) 15 MG tablet 30 tablet 0     Sig: Take 1 tablet (15 mg total) by mouth once daily.     4.Schedule right ilioinguinal nerve block for ilioinguinal neuralgia  Orders Placed This Encounter   Procedures    Case Request Operating Room: BLOCK, NERVE, ILIOINGUINAL     Order Specific Question:   Medical Necessity:     Answer:   Medically Non-Urgent [100]     Order Specific Question:   CPT Code:     Answer:   UT PERIPHERAL NERVE BLOCK [[id number]]     Order Specific Question:   Is an on-site pathologist required for this procedure?     Answer:   N/A      5.Monitored Anesthesia Care medical necessity authorization request:    Monitor anesthesia request is medically indicated for the scheduled nerve block procedure due to:  - needle phobia and anxiety, placing  the patient at risk during the provided service.  -patient has severe sleep apnea for which BiPAP and oxygen are needed while sleeping  -patient has an ASA class greater than 3 and requires constant presence of an anesthesiologist during the procedure:  -patient has severe problems with  muscles and muscle spasticity that makes it hard to lie still  -patient suffers from chronic pain and is unable to function due to  diminished ADLs    6.The planned medically necessary  surgical procedure is performed in a hospital outpatient department and not in an ambulatory surgical center due to:     -there is no geographically assessable ambulatory surgery center that has the  necessary equipment and fluoroscopy needed for the procedure     -there is no geographically assessable ambulatory surgical center available at which the physician has privileges     -an ASC's  specific  guideline regarding the individuals weight or health conditions that prevent the use of an ASC         report:  Reviewed and consistent with medication use as prescribed.      The total time spent for evaluation and management on 06/08/2023 including reviewing separately obtained history, performing a medically appropriate exam and evaluation, documenting clinical information in the health record, independently interpreting results and communicating them to the patient/family/caregiver, and ordering medications/tests/procedures was between 15-29 minutes.    The above plan and management options were discussed at length with patient. Patient is in agreement with the above and verbalized understanding. It will be communicated with the referring physician via electronic record, fax, or mail.

## 2023-06-07 NOTE — PATIENT INSTRUCTIONS
Ilioniguninal nerve block is scheduled on 6/22/23 at 9:20 am        Procedure Instructions:    Nothing to eat or drink for 8 hours or after midnight including gum, candy, mints, or tobacco products.  If you are scheduled for 1:30 or later nothing to eat or drink after 5 a.m. the morning of the procedure, including gum, candy, mints, or tobacco products.  Must have a  at least 18 yrs of age to stay present at all times  No Diabetic medications the morning of procedure, check blood sugar the morning of procedure, if it is greater than 200 call the office at [phone]  If you are started on antibiotics or have been prescribed antibiotics, have a fever, or have any other type of infection call to reschedule procedure.  If you take blood pressure medications you can take it at your regular scheduled time with a small sip of WATER!  Dentures are to be removed prior to procedure or we can provide you with a denture cup to remove them prior to being taken back for procedure.   False eyelashes are to be removed before the morning of procedure.    Contacts will have to be removed prior to procedure.  No jewelry is to be worn to the procedure.     HOLD ASPIRIN AND ASPIRIN PRODUCTS  (ASPIRIN, BC POWDER ETC. ) FOR 7 DAYS  PRIOR TO PROCEDURE  HOLD NSAIDS( ibuprofen, mobic, meloxicam, advil, diclofenac, naproxen, relafen, celebrex,  methotrexate, aleve etc....)  FOR 3 DAYS   PRIOR TO PROCEDURE

## 2023-06-07 NOTE — H&P (VIEW-ONLY)
She Disclaimer: This note has been generated using voice-recognition software. There may be typographical errors that have been missed during proof-reading        Patient ID: Carey Leonardo is a 47 y.o. female.      Chief Complaint: Low-back Pain      47-year-old female returns for re-evaluation of chronic lower back, post-laminectomy pain syndrome and postoperative diskitis.  She received  3 sessions of antibiotics but has not returned for re-evaluation with Dr Upton.  Physical therapy failed to provide relief.  Her pain radiates from the  lower back to the bilateral lower extremities.  Her pain was tolerable with prescribed medications until 1 month ago  and worsened after walking at a [place] in  [place].  She now complains of inguinal and groin pain radiating to the anterior  thighs and knees.  The pain is worse with standing and walking.  She was found to have ilioinguinal neuralgia and I have scheduled an  ilioinguinal nerve block.  She reports taking Ultram twice a day.         Pain Assessment  Pain Assessment: 0-10  Pain Score:   7  Pain Location: Back (lower back pain)  Pain Descriptors: Aching  Pain Frequency: Constant/continuous  Pain Onset: Awakened from sleep  Clinical Progression: Gradually worsening  Aggravating Factors: Standing  Pain Intervention(s): Medication (See eMAR), Home medication      A's of Opioid Risk Assessment  Activity:Patient is unable to  perform ADL.   Analgesia:Patients pain is not controlled by current medication.   Adverse Effects: Patient denies constipation or sedation.  Aberrant Behavior:  reviewed with no aberrant drug seeking/taking behavior.      Patient denies any suicidal or homicidal ideations    Physical Therapy/Home Exercise: yes, no relief     X-Ray Hips Bilateral 2 View Inc AP Pelvis  Narrative: EXAMINATION:  XR HIPS BILATERAL 2 VIEW INCL AP PELVIS    CLINICAL HISTORY:  Sacrococcygeal disorders, not elsewhere classified    TECHNIQUE:  AP view of the pelvis and  frogleg lateral views of both hips were performed.    COMPARISON:  None.    FINDINGS:  Mild degenerative changes are seen of both hips and sacroiliac joints.  There is no fracture.  No dislocation.  Impression: Mild degenerative changes.    Electronically signed by: Jean Plascencia  Date:    05/18/2023  Time:    16:07  X-Ray Lumbar Spine 2 Or 3 Views  Narrative: EXAMINATION:  XR LUMBAR SPINE 2 OR 3 VIEWS    CLINICAL HISTORY:  Low back pain, symptoms persist with > 6wks conservative treatment;  Dorsalgia, unspecified    TECHNIQUE:  AP and lateral lumbar spine radiograph    COMPARISON:  08/03/2022    FINDINGS:  The frontal view is somewhat oblique 8.  The laminectomy changes seen previously are less well defined on this exam.  The vertebral body heights and sagittal alignment are maintained.  Degenerative endplate and facet changes throughout similar prior.  Impression: Degenerative changes    Electronically signed by: Jean Plascencia  Date:    05/18/2023  Time:    15:56      Review of Systems   Constitutional: Negative.    HENT: Negative.     Eyes: Negative.    Respiratory: Negative.     Cardiovascular: Negative.    Gastrointestinal: Negative.    Endocrine: Negative.    Genitourinary: Negative.    Musculoskeletal:  Positive for arthralgias and back pain.   Integumentary:  Negative.   Neurological:  Positive for numbness (Right anterior thigh).   Hematological: Negative.    Psychiatric/Behavioral: Negative.             Past Medical History:   Diagnosis Date    Asthma     CHF (congestive heart failure)     Chronic pain syndrome     Depressive disorder     Diabetes mellitus, type 2     Disorder of sacrum 4/28/2022    Enlarged heart     Gastroparesis     GERD (gastroesophageal reflux disease)     Hyperlipidemia     Hypertension     IBS (irritable bowel syndrome)     Kidney stones     Lumbar radiculopathy     Sleep apnea     Does not use a CPAP    Thyroid disease      Past Surgical History:   Procedure Laterality Date    Bilateral  L3-S1 MBB Bilateral 6-, 5-, 1-8-2014    Dr Jessica Randolph    CARPAL TUNNEL RELEASE      CHOLECYSTECTOMY      DIAGNOSTIC LAPAROSCOPY  04/14/2010    Exploratory Laparotomy, Myomectomy, Hydrotubation-Dr. Feng    DILATION AND CURETTAGE OF UTERUS  04/14/2010    Hysteroscopy-Dr. Feng    EXPLORATORY LAPAROTOMY WITH UTERINE MYOMECTOMY  02/27/2007    Dr. Marquise Anderson    HYSTERECTOMY  09/29/2011    Robotic, FABIENNE, Cystoscopy-Dr. Feng    HYSTEROSCOPY WITH DILATION AND CURETTAGE OF UTERUS  04/04/2006    Laparoscopy, Chromotubation-Dr. Marquise Anderson    LAMINECTOMY N/A 11/30/2021    Procedure: L2-L5 Laminectomy;  Surgeon: Cyril Upton MD;  Location: [facility] OR;  Service: Neurosurgery;  Laterality: N/A;    LEFT HEART CATHETERIZATION      Left L3-S1 RFTC Left 07/18/2017    Dr Lopez    Left SI JI Left 09/30/2013    Dr Randolph    MYRINGOTOMY WITH INSERTION OF VENTILATION TUBE Bilateral 4/4/2023    Procedure: MYRINGOTOMY, WITH TYMPANOSTOMY TUBE INSERTION (T-TUBES);  Surgeon: Sea Hinton MD;  Location: [facility] OR;  Service: ENT;  Laterality: Bilateral;  T-TUBES    OOPHORECTOMY  11/11/2014    Robotic, FABIENNE, Cystoscopy-Dr. Feng    SINUS SURGERY       Social History     Socioeconomic History    Marital status:    Tobacco Use    Smoking status: Never    Smokeless tobacco: Never   Substance and Sexual Activity    Alcohol use: Not Currently    Drug use: Not Currently     Types: Hydrocodone, Oxycodone    Sexual activity: Not Currently     Family History   Problem Relation Age of Onset    Diabetes Mellitus Mother     Heart disease Mother     Hypertension Mother     Migraines Mother     Heart disease Sister      Review of patient's allergies indicates:   Allergen Reactions    Fish containing products Anaphylaxis    Iodinated contrast media     Penicillins      has a current medication list which includes the following prescription(s): albuterol, atorvastatin, cholecalciferol (vitamin d3), clonidine,  trulicity, empagliflozin, gabapentin, glipizide-metformin, hydralazine, ipratropium, levofloxacin, levothyroxine, metoprolol succinate, nifedipine, ondansetron, risperidone, sertraline, tramadol, trazodone, venlafaxine, cyclobenzaprine, and meloxicam.      Objective:  Vitals:    06/07/23 0825   BP: 105/65   Pulse: 77   Resp: 18        Physical Exam  Vitals and nursing note reviewed.   Constitutional:       General: She is not in acute distress.     Appearance: Normal appearance. She is not ill-appearing, toxic-appearing or diaphoretic.   HENT:      Head: Normocephalic and atraumatic.      Nose: Nose normal.      Mouth/Throat:      Mouth: Mucous membranes are moist.   Eyes:      Extraocular Movements: Extraocular movements intact.      Pupils: Pupils are equal, round, and reactive to light.   Cardiovascular:      Rate and Rhythm: Normal rate and regular rhythm.      Heart sounds: Normal heart sounds.   Pulmonary:      Effort: Pulmonary effort is normal. No respiratory distress.      Breath sounds: Normal breath sounds. No stridor. No wheezing or rhonchi.   Abdominal:      General: Bowel sounds are normal.      Palpations: Abdomen is soft.   Musculoskeletal:         General: No swelling or deformity. Normal range of motion.      Cervical back: Normal and normal range of motion. No spasms or tenderness. No pain with movement. Normal range of motion.      Thoracic back: Normal.      Lumbar back: No spasms, tenderness or bony tenderness. Normal range of motion. Negative right straight leg raise test and negative left straight leg raise test. No scoliosis.      Right lower leg: Tenderness (ilioinguinal nerve) present. No edema.      Left lower leg: No edema.   Skin:     General: Skin is warm.   Neurological:      General: No focal deficit present.      Mental Status: She is alert and oriented to person, place, and time. Mental status is at baseline.      Cranial Nerves: No cranial nerve deficit.      Sensory: Sensation is  intact. No sensory deficit.      Motor: No weakness.      Coordination: Coordination normal.      Gait: Gait normal.      Deep Tendon Reflexes: Reflexes are normal and symmetric.   Psychiatric:         Mood and Affect: Mood normal.         Behavior: Behavior normal.         Assessment:      1. Ilioinguinal neuralgia of right side    2. Postlaminectomy syndrome, lumbar region    3. Disorder of sacrum    4. Lumbosacral radiculopathy          Plan:  1. reviewed  2.Addiction, Dependency, Tolerance, Opioid abuse-misuse, Death, Diversion Discussed. Overdose reversal drug Naloxone discussed.  3.Refill/Continue medications for pain control and function       Requested Prescriptions     Signed Prescriptions Disp Refills    cyclobenzaprine (FLEXERIL) 10 MG tablet 90 tablet 0     Sig: Take 1 tablet (10 mg total) by mouth 3 (three) times daily as needed for Muscle spasms.    meloxicam (MOBIC) 15 MG tablet 30 tablet 0     Sig: Take 1 tablet (15 mg total) by mouth once daily.     4.Schedule right ilioinguinal nerve block for ilioinguinal neuralgia  Orders Placed This Encounter   Procedures    Case Request Operating Room: BLOCK, NERVE, ILIOINGUINAL     Order Specific Question:   Medical Necessity:     Answer:   Medically Non-Urgent [100]     Order Specific Question:   CPT Code:     Answer:   [place] PERIPHERAL NERVE BLOCK [[id number]]     Order Specific Question:   Is an on-site pathologist required for this procedure?     Answer:   N/A      5.Monitored Anesthesia Care medical necessity authorization request:    Monitor anesthesia request is medically indicated for the scheduled nerve block procedure due to:  - needle phobia and anxiety, placing  the patient at risk during the provided service.  -patient has severe sleep apnea for which BiPAP and oxygen are needed while sleeping  -patient has an ASA class greater than 3 and requires constant presence of an anesthesiologist during the procedure:  -patient has severe problems with  muscles and muscle spasticity that makes it hard to lie still  -patient suffers from chronic pain and is unable to function due to  diminished ADLs    6.The planned medically necessary  surgical procedure is performed in a hospital outpatient department and not in an ambulatory surgical center due to:     -there is no geographically assessable ambulatory surgery center that has the  necessary equipment and fluoroscopy needed for the procedure     -there is no geographically assessable ambulatory surgical center available at which the physician has privileges     -an ASC's  specific  guideline regarding the individuals weight or health conditions that prevent the use of an ASC         report:  Reviewed and consistent with medication use as prescribed.      The total time spent for evaluation and management on 06/08/2023 including reviewing separately obtained history, performing a medically appropriate exam and evaluation, documenting clinical information in the health record, independently interpreting results and communicating them to the patient/family/caregiver, and ordering medications/tests/procedures was between 15-29 minutes.    The above plan and management options were discussed at length with patient. Patient is in agreement with the above and verbalized understanding. It will be communicated with the referring physician via electronic record, fax, or mail.

## 2023-06-13 ENCOUNTER — HOSPITAL ENCOUNTER (EMERGENCY)
Facility: HOSPITAL | Age: 48
Discharge: HOME | End: 2023-06-13
Payer: OTHER GOVERNMENT

## 2023-06-13 VITALS
DIASTOLIC BLOOD PRESSURE: 68 MMHG | HEIGHT: 67 IN | HEART RATE: 83 BPM | RESPIRATION RATE: 19 BRPM | WEIGHT: 210 LBS | SYSTOLIC BLOOD PRESSURE: 99 MMHG | TEMPERATURE: 98 F | OXYGEN SATURATION: 97 % | BODY MASS INDEX: 32.96 KG/M2

## 2023-06-13 DIAGNOSIS — R10.9 ABDOMINAL PAIN, UNSPECIFIED ABDOMINAL LOCATION: ICD-10-CM

## 2023-06-13 DIAGNOSIS — R11.2 NAUSEA AND VOMITING, UNSPECIFIED VOMITING TYPE: ICD-10-CM

## 2023-06-13 DIAGNOSIS — E87.6 HYPOKALEMIA: Primary | ICD-10-CM

## 2023-06-13 LAB
ALBUMIN SERPL BCP-MCNC: 3.3 G/DL (ref 3.5–5)
ALBUMIN/GLOB SERPL: 0.7 {RATIO}
ALP SERPL-CCNC: 130 U/L (ref 39–100)
ALT SERPL W P-5'-P-CCNC: 20 U/L (ref 13–56)
ANION GAP SERPL CALCULATED.3IONS-SCNC: 9 MMOL/L (ref 7–16)
AST SERPL W P-5'-P-CCNC: 14 U/L (ref 15–37)
BASOPHILS # BLD AUTO: 0.04 K/UL (ref 0–0.2)
BASOPHILS NFR BLD AUTO: 0.5 % (ref 0–1)
BILIRUB SERPL-MCNC: 0.2 MG/DL (ref ?–1.2)
BILIRUB UR QL STRIP: NEGATIVE
BUN SERPL-MCNC: 7 MG/DL (ref 7–18)
BUN/CREAT SERPL: 7 (ref 6–20)
CALCIUM SERPL-MCNC: 8.9 MG/DL (ref 8.5–10.1)
CHLORIDE SERPL-SCNC: 98 MMOL/L (ref 98–107)
CLARITY UR: NORMAL
CO2 SERPL-SCNC: 35 MMOL/L (ref 21–32)
COLOR UR: NORMAL
CREAT SERPL-MCNC: 0.95 MG/DL (ref 0.55–1.02)
DIFFERENTIAL METHOD BLD: ABNORMAL
EGFR (NO RACE VARIABLE) (RUSH/TITUS): 75 ML/MIN/1.73M2
EOSINOPHIL # BLD AUTO: 0.13 K/UL (ref 0–0.5)
EOSINOPHIL NFR BLD AUTO: 1.7 % (ref 1–4)
ERYTHROCYTE [DISTWIDTH] IN BLOOD BY AUTOMATED COUNT: 12.4 % (ref 11.5–14.5)
GLOBULIN SER-MCNC: 4.6 G/DL (ref 2–4)
GLUCOSE SERPL-MCNC: 171 MG/DL (ref 70–105)
GLUCOSE SERPL-MCNC: 176 MG/DL (ref 74–106)
GLUCOSE UR STRIP-MCNC: NORMAL MG/DL
HCT VFR BLD AUTO: 39.3 % (ref 38–47)
HGB BLD-MCNC: 12 G/DL (ref 12–16)
IMM GRANULOCYTES # BLD AUTO: 0.03 K/UL (ref 0–0.04)
IMM GRANULOCYTES NFR BLD: 0.4 % (ref 0–0.4)
KETONES UR STRIP-SCNC: NEGATIVE MG/DL
LEUKOCYTE ESTERASE UR QL STRIP: NEGATIVE
LIPASE SERPL-CCNC: 60 U/L (ref 73–393)
LYMPHOCYTES # BLD AUTO: 2.83 K/UL (ref 1–4.8)
LYMPHOCYTES NFR BLD AUTO: 36.9 % (ref 27–41)
MAGNESIUM SERPL-MCNC: 1.7 MG/DL (ref 1.7–2.3)
MCH RBC QN AUTO: 27.6 PG (ref 27–31)
MCHC RBC AUTO-ENTMCNC: 30.5 G/DL (ref 32–36)
MCV RBC AUTO: 90.6 FL (ref 80–96)
MONOCYTES # BLD AUTO: 0.43 K/UL (ref 0–0.8)
MONOCYTES NFR BLD AUTO: 5.6 % (ref 2–6)
MPC BLD CALC-MCNC: 9.2 FL (ref 9.4–12.4)
NEUTROPHILS # BLD AUTO: 4.21 K/UL (ref 1.8–7.7)
NEUTROPHILS NFR BLD AUTO: 54.9 % (ref 53–65)
NITRITE UR QL STRIP: NEGATIVE
NRBC # BLD AUTO: 0 X10E3/UL
NRBC, AUTO (.00): 0 %
PH UR STRIP: 7.5 PH UNITS
PLATELET # BLD AUTO: 367 K/UL (ref 150–400)
POTASSIUM SERPL-SCNC: 2.8 MMOL/L (ref 3.5–5.1)
PROT SERPL-MCNC: 7.9 G/DL (ref 6.4–8.2)
PROT UR QL STRIP: NEGATIVE
RBC # BLD AUTO: 4.34 M/UL (ref 4.2–5.4)
RBC # UR STRIP: NEGATIVE /UL
SODIUM SERPL-SCNC: 139 MMOL/L (ref 136–145)
SP GR UR STRIP: 1.01
UROBILINOGEN UR STRIP-ACNC: NORMAL MG/DL
WBC # BLD AUTO: 7.67 K/UL (ref 4.5–11)

## 2023-06-13 PROCEDURE — 83735 ASSAY OF MAGNESIUM: CPT | Performed by: NURSE PRACTITIONER

## 2023-06-13 PROCEDURE — 25000003 PHARM REV CODE 250: Performed by: NURSE PRACTITIONER

## 2023-06-13 PROCEDURE — 85025 COMPLETE CBC W/AUTO DIFF WBC: CPT | Performed by: NURSE PRACTITIONER

## 2023-06-13 PROCEDURE — 99285 PR EMERGENCY DEPT VISIT,LEVEL V: ICD-10-PCS | Mod: ,,, | Performed by: NURSE PRACTITIONER

## 2023-06-13 PROCEDURE — 83690 ASSAY OF LIPASE: CPT | Performed by: NURSE PRACTITIONER

## 2023-06-13 PROCEDURE — 99285 EMERGENCY DEPT VISIT HI MDM: CPT | Mod: ,,, | Performed by: NURSE PRACTITIONER

## 2023-06-13 PROCEDURE — 96361 HYDRATE IV INFUSION ADD-ON: CPT

## 2023-06-13 PROCEDURE — 80053 COMPREHEN METABOLIC PANEL: CPT | Performed by: NURSE PRACTITIONER

## 2023-06-13 PROCEDURE — 81003 URINALYSIS AUTO W/O SCOPE: CPT | Performed by: NURSE PRACTITIONER

## 2023-06-13 PROCEDURE — 63600175 PHARM REV CODE 636 W HCPCS: Performed by: NURSE PRACTITIONER

## 2023-06-13 PROCEDURE — 96368 THER/DIAG CONCURRENT INF: CPT

## 2023-06-13 PROCEDURE — 82962 GLUCOSE BLOOD TEST: CPT

## 2023-06-13 PROCEDURE — 96375 TX/PRO/DX INJ NEW DRUG ADDON: CPT

## 2023-06-13 PROCEDURE — 99284 EMERGENCY DEPT VISIT MOD MDM: CPT | Mod: 25

## 2023-06-13 PROCEDURE — 96365 THER/PROPH/DIAG IV INF INIT: CPT

## 2023-06-13 RX ORDER — POTASSIUM CHLORIDE 20 MEQ/1
20 TABLET, EXTENDED RELEASE ORAL 2 TIMES DAILY
Qty: 6 TABLET | Refills: 0 | Status: SHIPPED | OUTPATIENT
Start: 2023-06-13 | End: 2023-06-16

## 2023-06-13 RX ORDER — POTASSIUM CHLORIDE 20 MEQ/1
20 TABLET, EXTENDED RELEASE ORAL
Status: COMPLETED | OUTPATIENT
Start: 2023-06-13 | End: 2023-06-13

## 2023-06-13 RX ORDER — PROMETHAZINE HYDROCHLORIDE 25 MG/1
25 SUPPOSITORY RECTAL EVERY 6 HOURS PRN
Qty: 10 SUPPOSITORY | Refills: 0 | Status: SHIPPED | OUTPATIENT
Start: 2023-06-13 | End: 2023-07-19

## 2023-06-13 RX ORDER — POTASSIUM CHLORIDE 7.45 MG/ML
10 INJECTION INTRAVENOUS
Status: COMPLETED | OUTPATIENT
Start: 2023-06-13 | End: 2023-06-13

## 2023-06-13 RX ORDER — ONDANSETRON 2 MG/ML
4 INJECTION INTRAMUSCULAR; INTRAVENOUS
Status: DISCONTINUED | OUTPATIENT
Start: 2023-06-13 | End: 2023-06-13

## 2023-06-13 RX ORDER — PROMETHAZINE HYDROCHLORIDE 25 MG/1
25 TABLET ORAL EVERY 6 HOURS PRN
Qty: 15 TABLET | Refills: 0 | Status: SHIPPED | OUTPATIENT
Start: 2023-06-13 | End: 2023-07-19

## 2023-06-13 RX ORDER — ONDANSETRON 2 MG/ML
4 INJECTION INTRAMUSCULAR; INTRAVENOUS
Status: COMPLETED | OUTPATIENT
Start: 2023-06-13 | End: 2023-06-13

## 2023-06-13 RX ADMIN — SODIUM CHLORIDE 500 ML: 9 INJECTION, SOLUTION INTRAVENOUS at 12:06

## 2023-06-13 RX ADMIN — POTASSIUM CHLORIDE 20 MEQ: 1500 TABLET, EXTENDED RELEASE ORAL at 12:06

## 2023-06-13 RX ADMIN — SODIUM CHLORIDE 500 ML: 9 INJECTION, SOLUTION INTRAVENOUS at 11:06

## 2023-06-13 RX ADMIN — ONDANSETRON 4 MG: 2 INJECTION INTRAMUSCULAR; INTRAVENOUS at 11:06

## 2023-06-13 RX ADMIN — PROMETHAZINE HYDROCHLORIDE 25 MG: 25 INJECTION INTRAMUSCULAR; INTRAVENOUS at 12:06

## 2023-06-13 RX ADMIN — POTASSIUM CHLORIDE 10 MEQ: 7.46 INJECTION, SOLUTION INTRAVENOUS at 12:06

## 2023-06-13 NOTE — DISCHARGE INSTRUCTIONS
Take potassium 2 times a day as directed.  Take Zofran as needed for nausea.  Follow-up with gastroenterology; you should be contacted with an appointment.  Follow up with your primary care provider in 2 days; you need to have your potassium rechecked. Return to the emergency department for any increase in symptoms or for any other new or worrisome symptoms.

## 2023-06-13 NOTE — ED PROVIDER NOTES
Diagnosis: Hypokalemia, nausea and vomiting Encounter Date: 6/13/2023       History     Chief Complaint   Patient presents with    Abdominal Pain     47-year-old female presents to the emergency department to be evaluated for nausea, vomiting and abdominal pain.  Her symptoms began last week.  She is diabetic reports that blood sugar has been running in the 270s on average.  She takes Trulicity weekly.  She said that she has felt constipated.  Her last bowel movement was 2 days ago and she reports that it was normal.  She has had some low back pain.  Denies any recent fall, injury, dysuria, fever, chills.  She was treated for a urinary tract infection last week and reports that she completed her antibiotics as directed.    The history is provided by the patient.   Abdominal Pain  The current episode started several days ago. The other symptoms of the illness include nausea and vomiting. The other symptoms of the illness do not include fever, fatigue, jaundice, melena, diarrhea, dysuria, hematemesis, hematochezia, vaginal discharge or vaginal bleeding.   Vomiting occurs 2 to 5 times per day.   Symptoms associated with the illness do not include chills, anorexia, diaphoresis, heartburn, constipation, urgency, hematuria, frequency or back pain.   Review of patient's allergies indicates:   Allergen Reactions    Fish containing products Anaphylaxis    Iodinated contrast media     Penicillins      Past Medical History:   Diagnosis Date    Asthma     CHF (congestive heart failure)     Chronic pain syndrome     Depressive disorder     Diabetes mellitus, type 2     Disorder of sacrum 4/28/2022    Enlarged heart     Gastroparesis     GERD (gastroesophageal reflux disease)     Hyperlipidemia     Hypertension     IBS (irritable bowel syndrome)     Kidney stones     Lumbar radiculopathy     Sleep apnea     Does not use a CPAP    Thyroid disease      Past Surgical History:   Procedure Laterality Date    Bilateral L3-S1 MBB  Bilateral 6-, 5-, 1-8-2014    Dr Jessica Randolph    CARPAL TUNNEL RELEASE      CHOLECYSTECTOMY      DIAGNOSTIC LAPAROSCOPY  04/14/2010    Exploratory Laparotomy, Myomectomy, Hydrotubation-Dr. Feng    DILATION AND CURETTAGE OF UTERUS  04/14/2010    Hysteroscopy-Dr. Feng    EXPLORATORY LAPAROTOMY WITH UTERINE MYOMECTOMY  02/27/2007    Dr. Marquise Anderson    HYSTERECTOMY  09/29/2011    Robotic, FABIENNE, Cystoscopy-Dr. Feng    HYSTEROSCOPY WITH DILATION AND CURETTAGE OF UTERUS  04/04/2006    Laparoscopy, Chromotubation-Dr. Marquise Anderson    LAMINECTOMY N/A 11/30/2021    Procedure: L2-L5 Laminectomy;  Surgeon: Cyril Upton MD;  Location[facility] OR;  Service: Neurosurgery;  Laterality: N/A;    LEFT HEART CATHETERIZATION      Left L3-S1 RFTC Left 07/18/2017    Dr Lopez    Left SI JI Left 09/30/2013    Dr Randolph    MYRINGOTOMY WITH INSERTION OF VENTILATION TUBE Bilateral 4/4/2023    Procedure: MYRINGOTOMY, WITH TYMPANOSTOMY TUBE INSERTION (T-TUBES);  Surgeon: Sea Hinton MD;  Location: [facility] OR;  Service: ENT;  Laterality: Bilateral;  T-TUBES    OOPHORECTOMY  11/11/2014    Robotic, FABIENNE, Cystoscopy-Dr. Feng    SINUS SURGERY       Family History   Problem Relation Age of Onset    Diabetes Mellitus Mother     Heart disease Mother     Hypertension Mother     Migraines Mother     Heart disease Sister      Social History     Tobacco Use    Smoking status: Never    Smokeless tobacco: Never   Substance Use Topics    Alcohol use: Not Currently    Drug use: Not Currently     Types: Hydrocodone, Oxycodone     Review of Systems   Constitutional:  Negative for chills, diaphoresis, fatigue and fever.   Gastrointestinal:  Positive for abdominal pain, nausea and vomiting. Negative for anorexia, constipation, diarrhea, heartburn, hematemesis, hematochezia, jaundice and melena.   Genitourinary:  Negative for dysuria, frequency, hematuria, urgency, vaginal bleeding and vaginal discharge.   Musculoskeletal:   Negative for back pain.   All other systems reviewed and are negative.    Physical Exam     Initial Vitals [06/13/23 1045]   BP Pulse Resp Temp SpO2   99/68 83 19 98.2 °F (36.8 °C) 97 %      MAP       --         Physical Exam    Vitals reviewed.  Constitutional: She appears well-developed and well-nourished.   Neck: Neck supple.   Cardiovascular:  Normal rate and regular rhythm.           Pulmonary/Chest: Breath sounds normal.   Abdominal: Abdomen is soft. Bowel sounds are normal. She exhibits no distension and no mass. There is no abdominal tenderness. There is no rebound and no guarding.   Musculoskeletal:         General: Normal range of motion.      Cervical back: Neck supple.     Neurological: She is alert and oriented to person, place, and time. She has normal strength. GCS score is 15. GCS eye subscore is 4. GCS verbal subscore is 5. GCS motor subscore is 6.   Skin: Skin is warm and dry. Capillary refill takes less than 2 seconds.   Psychiatric: She has a normal mood and affect.       Medical Screening Exam   See Full Note    ED Course   Procedures  Labs Reviewed   COMPREHENSIVE METABOLIC PANEL - Abnormal; Notable for the following components:       Result Value    Potassium 2.8 (*)     CO2 35 (*)     Glucose 176 (*)     Albumin 3.3 (*)     Globulin 4.6 (*)     Alk Phos 130 (*)     AST 14 (*)     All other components within normal limits   LIPASE - Abnormal; Notable for the following components:    Lipase 60 (*)     All other components within normal limits   CBC WITH DIFFERENTIAL - Abnormal; Notable for the following components:    MCHC 30.5 (*)     MPV 9.2 (*)     All other components within normal limits   POCT GLUCOSE MONITORING CONTINUOUS - Abnormal; Notable for the following components:    POC Glucose 171 (*)     All other components within normal limits   MAGNESIUM - Normal   CBC W/ AUTO DIFFERENTIAL    Narrative:     The following orders were created for panel order CBC auto differential.  Procedure                                Abnormality         Status                     ---------                               -----------         ------                     CBC with Differential[[id number]]        Abnormal            Final result                 Please view results for these tests on the individual orders.   URINALYSIS   EXTRA TUBES    Narrative:     The following orders were created for panel order EXTRA TUBES.  Procedure                               Abnormality         Status                     ---------                               -----------         ------                     Light Blue Top Hold[[id number]]                                                         Red Top Hold[[id number]]                                                                  Please view results for these tests on the individual orders.   LIGHT BLUE TOP HOLD   RED TOP HOLD          Imaging Results              XR ABDOMEN, ACUTE 2 OR MORE VIEWS WITH CHEST (Final result)  Result time 06/13/23 11:05:09      Final result by Kevin Torres II, MD (06/13/23 11:05:09)                   Impression:      No evidence of acute process demonstrated      Electronically signed by: Kevin Torres  Date:    06/13/2023  Time:    11:05               Narrative:    EXAMINATION:  XR ABDOMEN ACUTE 2 OR MORE VIEWS WITH CHEST    CLINICAL HISTORY:  Abdominal pain abdominal pain;    COMPARISON:  15 Jammie 2022    TECHNIQUE:  XR ABDOMEN ACUTE 2 VIEWS WITH CHEST    FINDINGS:  No free fluid or free air seen.  The bowel gas pattern appears within normal limits.  No abnormal calcifications are present. Chest shows no evidence of abnormality.  Clips from previous surgery.  No other abnormality is identified.                                       Medications   sodium chloride 0.9% bolus 500 mL 500 mL (0 mLs Intravenous Stopped 6/13/23 1241)   ondansetron injection 4 mg (4 mg Intravenous Given 6/13/23 1131)   potassium chloride 10 mEq in 100 mL IVPB (10 mEq  Intravenous New Bag 23 1240)   sodium chloride 0.9% bolus 500 mL 500 mL (500 mLs Intravenous New Bag 23 1241)   potassium chloride SA CR tablet 20 mEq (20 mEq Oral Given 23 1258)   promethazine (PHENERGAN) 25 mg in dextrose 5 % (D5W) 50 mL IVPB (25 mg Intravenous New Bag 23 1258)     Medical Decision Makin-year-old female presents to the emergency department to be evaluated for nausea, vomiting and abdominal pain.  Her symptoms began last week.  She is diabetic reports that blood sugar has been running in the 270s on average.  She takes Trulicity weekly.  She said that she has felt constipated.  Her last bowel movement was 2 days ago and she reports that it was normal.  She has had some low back pain.  Denies any recent fall, injury, dysuria, fever, chills.She was treated for a urinary tract infection last week and reports that she completed her antibiotics as directed.  I ordered labs and personally reviewed them.  Labs significant for hypokalemia  I ordered X-rays and personally reviewed them and reviewed the radiologist interpretation.  Xray significant for no acute process   Patient was managed in the ED with IV Zofran, potassium chloride, normal saline.    The response to treatment was improved.    Patient was discharged in stable condition.  Detailed return precautions discussed.  Diagnosis:  Hypokalemia, nausea and vomiting, abdominal pain  Prescribed Phenergan tablets, Phenergan suppositories and potassium chloride tablets  Patient referred to Gastroenterology                       Clinical Impression:   Final diagnoses:  [E87.6] Hypokalemia (Primary)  [R11.2] Nausea and vomiting, unspecified vomiting type  [R10.9] Abdominal pain, unspecified abdominal location        ED Disposition Condition    Discharge Stable          ED Prescriptions       Medication Sig Dispense Start Date End Date Auth. Provider    promethazine (PHENERGAN) 25 MG tablet Take 1 tablet (25 mg total) by mouth  every 6 (six) hours as needed for Nausea. 15 tablet 6/13/2023 -- ROGELIO Escoto    promethazine (PHENERGAN) 25 MG suppository Place 1 suppository (25 mg total) rectally every 6 (six) hours as needed for Nausea. 10 suppository 6/13/2023 -- ROGELIO Escoto    potassium chloride SA (K-DUR,KLOR-CON) 20 MEQ tablet Take 1 tablet (20 mEq total) by mouth 2 (two) times daily. for 3 days 6 tablet 6/13/2023 6/16/2023 ROGELIO Escoto          Follow-up Information    None          ROGELIO Escoto  06/13/23 3166

## 2023-06-20 DIAGNOSIS — M54.17 LUMBOSACRAL RADICULOPATHY: Chronic | ICD-10-CM

## 2023-06-20 DIAGNOSIS — M96.1 POSTLAMINECTOMY SYNDROME, LUMBAR REGION: Chronic | ICD-10-CM

## 2023-06-20 DIAGNOSIS — M53.3 DISORDER OF SACRUM: Chronic | ICD-10-CM

## 2023-06-20 RX ORDER — TRAMADOL HYDROCHLORIDE 50 MG/1
50 TABLET ORAL EVERY 8 HOURS PRN
Qty: 45 TABLET | Refills: 0 | Status: SHIPPED | OUTPATIENT
Start: 2023-06-20 | End: 2023-07-12 | Stop reason: SDUPTHER

## 2023-06-22 ENCOUNTER — HOSPITAL ENCOUNTER (OUTPATIENT)
Facility: HOSPITAL | Age: 48
Discharge: HOME | End: 2023-06-22
Attending: PAIN MEDICINE | Admitting: PAIN MEDICINE
Payer: OTHER GOVERNMENT

## 2023-06-22 ENCOUNTER — ANESTHESIA (OUTPATIENT)
Dept: PAIN MEDICINE | Facility: HOSPITAL | Age: 48
End: 2023-06-22
Payer: OTHER GOVERNMENT

## 2023-06-22 ENCOUNTER — TELEPHONE (OUTPATIENT)
Dept: PAIN MEDICINE | Facility: CLINIC | Age: 48
End: 2023-06-22
Payer: OTHER GOVERNMENT

## 2023-06-22 ENCOUNTER — ANESTHESIA EVENT (OUTPATIENT)
Dept: PAIN MEDICINE | Facility: HOSPITAL | Age: 48
End: 2023-06-22
Payer: OTHER GOVERNMENT

## 2023-06-22 VITALS
DIASTOLIC BLOOD PRESSURE: 74 MMHG | SYSTOLIC BLOOD PRESSURE: 116 MMHG | BODY MASS INDEX: 33.43 KG/M2 | HEART RATE: 80 BPM | TEMPERATURE: 98 F | RESPIRATION RATE: 15 BRPM | OXYGEN SATURATION: 96 % | WEIGHT: 213 LBS | HEIGHT: 67 IN

## 2023-06-22 DIAGNOSIS — G57.90 ILIOINGUINAL NEURALGIA: ICD-10-CM

## 2023-06-22 LAB — GLUCOSE SERPL-MCNC: 141 MG/DL (ref 70–105)

## 2023-06-22 PROCEDURE — 37000008 HC ANESTHESIA 1ST 15 MINUTES: Performed by: PAIN MEDICINE

## 2023-06-22 PROCEDURE — 82962 GLUCOSE BLOOD TEST: CPT

## 2023-06-22 PROCEDURE — 64425 NJX AA&/STRD II IH NERVES: CPT | Mod: RT,,, | Performed by: PAIN MEDICINE

## 2023-06-22 PROCEDURE — 27000284 HC CANNULA NASAL: Performed by: NURSE ANESTHETIST, CERTIFIED REGISTERED

## 2023-06-22 PROCEDURE — 63600175 PHARM REV CODE 636 W HCPCS: Performed by: NURSE ANESTHETIST, CERTIFIED REGISTERED

## 2023-06-22 PROCEDURE — 25000003 PHARM REV CODE 250: Performed by: PAIN MEDICINE

## 2023-06-22 PROCEDURE — 64425 PR NERVE BLOCK INJ, ANES/STEROID, ILIOINGUINAL/ILIOHYPOGASTRIC: ICD-10-PCS | Mod: RT,,, | Performed by: PAIN MEDICINE

## 2023-06-22 PROCEDURE — 27000716 HC OXISENSOR PROBE, ANY SIZE: Performed by: NURSE ANESTHETIST, CERTIFIED REGISTERED

## 2023-06-22 PROCEDURE — D9220A PRA ANESTHESIA: Mod: 23,,, | Performed by: NURSE ANESTHETIST, CERTIFIED REGISTERED

## 2023-06-22 PROCEDURE — 64425 NJX AA&/STRD II IH NERVES: CPT | Mod: RT | Performed by: PAIN MEDICINE

## 2023-06-22 PROCEDURE — 63600175 PHARM REV CODE 636 W HCPCS: Performed by: PAIN MEDICINE

## 2023-06-22 PROCEDURE — 25000003 PHARM REV CODE 250: Performed by: NURSE ANESTHETIST, CERTIFIED REGISTERED

## 2023-06-22 PROCEDURE — D9220A PRA ANESTHESIA: ICD-10-PCS | Mod: 23,,, | Performed by: NURSE ANESTHETIST, CERTIFIED REGISTERED

## 2023-06-22 PROCEDURE — 01991 ANES DX/THER NRV BLK&INJ OTH: CPT | Performed by: PAIN MEDICINE

## 2023-06-22 RX ORDER — LIDOCAINE HYDROCHLORIDE 20 MG/ML
INJECTION, SOLUTION EPIDURAL; INFILTRATION; INTRACAUDAL; PERINEURAL
Status: DISCONTINUED | OUTPATIENT
Start: 2023-06-22 | End: 2023-06-22

## 2023-06-22 RX ORDER — SODIUM CHLORIDE 9 MG/ML
INJECTION, SOLUTION INTRAVENOUS CONTINUOUS
Status: DISCONTINUED | OUTPATIENT
Start: 2023-06-22 | End: 2023-06-22 | Stop reason: HOSPADM

## 2023-06-22 RX ORDER — TRIAMCINOLONE ACETONIDE 40 MG/ML
INJECTION, SUSPENSION INTRA-ARTICULAR; INTRAMUSCULAR CODE/TRAUMA/SEDATION MEDICATION
Status: DISCONTINUED | OUTPATIENT
Start: 2023-06-22 | End: 2023-06-22 | Stop reason: HOSPADM

## 2023-06-22 RX ORDER — BUPIVACAINE HYDROCHLORIDE 2.5 MG/ML
INJECTION, SOLUTION EPIDURAL; INFILTRATION; INTRACAUDAL CODE/TRAUMA/SEDATION MEDICATION
Status: DISCONTINUED | OUTPATIENT
Start: 2023-06-22 | End: 2023-06-22 | Stop reason: HOSPADM

## 2023-06-22 RX ORDER — PROPOFOL 10 MG/ML
VIAL (ML) INTRAVENOUS
Status: DISCONTINUED | OUTPATIENT
Start: 2023-06-22 | End: 2023-06-22

## 2023-06-22 RX ADMIN — PROPOFOL 100 MG: 10 INJECTION, EMULSION INTRAVENOUS at 09:06

## 2023-06-22 RX ADMIN — SODIUM CHLORIDE: 9 INJECTION, SOLUTION INTRAVENOUS at 09:06

## 2023-06-22 RX ADMIN — LIDOCAINE HYDROCHLORIDE 80 MG: 20 INJECTION, SOLUTION INTRAVENOUS at 09:06

## 2023-06-22 NOTE — BRIEF OP NOTE
Discharge Note  Short Stay    Admit Date: 6/22/2023    Discharge Date: 6/22/2023    Attending Physician: Rasheeda Bills     Discharge Provider: Rasheeda Bills    Diagnosis:  Right ilioinguinal neuralgia    Discharged Condition: Good    Final Diagnoses: Ilioinguinal neuralgia of right side [G57.91]    Disposition: Home or Self Care    Hospital Course: No complications, uneventful    Outcome of Hospitalization, Treatment, Procedure, or Surgery:  Patient was admitted for outpatient interventional pain management procedure. The patient tolerated the procedure well with no complications.    Follow up/Patient Instructions:  Follow up as scheduled in Pain Management office in 3-4 weeks.  Patient has received instructions and follow up date and time.    Medications:  Continue previous medications

## 2023-06-22 NOTE — ANESTHESIA POSTPROCEDURE EVALUATION
Anesthesia Post Evaluation    Patient: Shatagia D Leonardo    Procedure(s) Performed: Procedure(s) (LRB):  BLOCK, NERVE, ILIOINGUINAL (Right)    Final Anesthesia Type: general      Patient location: [facility].  Patient participation: Yes- Able to Participate  Level of consciousness: awake and alert  Post-procedure vital signs: reviewed and stable  Pain management: adequate  Airway patency: patent    PONV status at discharge: No PONV  Anesthetic complications: no      Cardiovascular status: blood pressure returned to baseline, hemodynamically stable and stable  Respiratory status: unassisted  Hydration status: euvolemic  Follow-up not needed.  Comments: Pt voices appreciation for care          Vitals Value Taken Time   /75 06/22/23 1031   Temp 97.4 06/22/23 1321   Pulse 79 06/22/23 1033   Resp 13 06/22/23 1033   SpO2 97 % 06/22/23 1033   Vitals shown include unvalidated device data.      Event Time   Out of Recovery 10:31:00         Pain/Miah Score: Miah Score: 10 (6/22/2023 10:30 AM)

## 2023-06-22 NOTE — DISCHARGE SUMMARY
[facility] - Pain Management  Discharge Note  Short Stay    Procedure(s) (LRB):  BLOCK, NERVE, ILIOINGUINAL (Right)      OUTCOME: Patient tolerated treatment/procedure well without complication and is now ready for discharge.    DISPOSITION: Home or Self Care    FINAL DIAGNOSIS:  Right ilioinguinal neuralgia    FOLLOWUP: In clinic    DISCHARGE INSTRUCTIONS:  See nurse's notes     TIME SPENT ON DISCHARGE: 5 minutes

## 2023-06-22 NOTE — PLAN OF CARE
REFER TO WRITTEN DOCUMENT AND RECOVERY INFORMATION.    D/CD PATIENT VIAA WHEELCHAIR AT 1039.    INFORMED PATIENT IF UNABLE TO VOID IN 8 HOURS, GO TO ER. NOTIFY MD OF REDNESS OR DRAINAGE FROM INJECTION SITE OR FEVER OVER 3-4 DAY. REST AND DRINK PLENTY OF FLUIDS FOR THE REMAINDER OF THE DAY. NO LIFTING OVER 5 LBS FOR THE REMAINDER OF THE DAY. CONTINUE REGULAR MEDICATIONS AS PRESCRIBED. MAY TAKE PAIN MEDICATION AS PRESCRIBED.     PAIN IMPROVED  100%

## 2023-06-22 NOTE — ANESTHESIA PREPROCEDURE EVALUATION
06/22/2023  Carey Leonardo is a 47 y.o., female.      Pre-op Assessment    I have reviewed the Patient Summary Reports.     I have reviewed the Nursing Notes. I have reviewed the NPO Status.   I have reviewed the Medications.     Review of Systems  Anesthesia Hx:  No problems with previous Anesthesia    Social:  Non-Smoker, No Alcohol Use    Cardiovascular:   Hypertension, well controlled CHF    Pulmonary:   Asthma mild and moderate Sleep Apnea, CPAP    Renal/:   Chronic Renal Disease    Hepatic/GI:   GERD, well controlled    Musculoskeletal:   Arthritis     Neurological:   Neuromuscular Disease,    Endocrine:   Diabetes, well controlled, type 2 Hypothyroidism  Obesity / BMI > 30  Psych:   Psychiatric History          Physical Exam  General: Well nourished, Cooperative, Alert and Oriented    Airway:  Mallampati: III   Mouth Opening: Normal  TM Distance: Normal  Tongue: Large  Neck ROM: Normal ROM    Dental:  Intact        Anesthesia Plan  Type of Anesthesia, risks & benefits discussed:    Anesthesia Type: Gen Natural Airway, MAC  Intra-op Monitoring Plan: Standard ASA Monitors  Post Op Pain Control Plan: multimodal analgesia and IV/PO Opioids PRN  Induction:  IV  Informed Consent: Informed consent signed with the Patient and all parties understand the risks and agree with anesthesia plan.  All questions answered. Patient consented to blood products? Yes  ASA Score: 3  Day of Surgery Review of History & Physical: I have interviewed and examined the patient. I have reviewed the patient's H&P dated: There are no significant changes.     Ready For Surgery From Anesthesia Perspective.     .   Past Medical History:   Diagnosis Date    Asthma     CHF (congestive heart failure)     Chronic pain syndrome     Depressive disorder     Diabetes mellitus, type 2     Disorder of sacrum 4/28/2022    Enlarged heart      Gastroparesis     GERD (gastroesophageal reflux disease)     Hyperlipidemia     Hypertension     IBS (irritable bowel syndrome)     Kidney stones     Lumbar radiculopathy     Sleep apnea     Does not use a CPAP    Thyroid disease        Past Surgical History:   Procedure Laterality Date    Bilateral L3-S1 MBB Bilateral 6-, 5-, 1-8-2014    Dr Jessica Randolph    CARPAL TUNNEL RELEASE      CHOLECYSTECTOMY      DIAGNOSTIC LAPAROSCOPY  04/14/2010    Exploratory Laparotomy, Myomectomy, Hydrotubation-Dr. Feng    DILATION AND CURETTAGE OF UTERUS  04/14/2010    Hysteroscopy-Dr. Feng    EXPLORATORY LAPAROTOMY WITH UTERINE MYOMECTOMY  02/27/2007    Dr. Marquise Anderson    HYSTERECTOMY  09/29/2011    Robotic, FABIENNE, Cystoscopy-Dr. Feng    HYSTEROSCOPY WITH DILATION AND CURETTAGE OF UTERUS  04/04/2006    Laparoscopy, Chromotubation-Dr. Marquise Anderson    LAMINECTOMY N/A 11/30/2021    Procedure: L2-L5 Laminectomy;  Surgeon: Cyril Upton MD;  Location[facility];  Service: Neurosurgery;  Laterality: N/A;    LEFT HEART CATHETERIZATION      Left L3-S1 RFTC Left 07/18/2017    Dr Lopez    Left SI JI Left 09/30/2013    Dr Randolph    MYRINGOTOMY WITH INSERTION OF VENTILATION TUBE Bilateral 4/4/2023    Procedure: MYRINGOTOMY, WITH TYMPANOSTOMY TUBE INSERTION (T-TUBES);  Surgeon: Sea Hinton MD;  Location: [facility];  Service: ENT;  Laterality: Bilateral;  T-TUBES    OOPHORECTOMY  11/11/2014    Robotic, FABIENNE, Cystoscopy-Dr. Feng    SINUS SURGERY         Family History   Problem Relation Age of Onset    Diabetes Mellitus Mother     Heart disease Mother     Hypertension Mother     Migraines Mother     Heart disease Sister        Social History     Socioeconomic History    Marital status:    Tobacco Use    Smoking status: Never    Smokeless tobacco: Never   Substance and Sexual Activity    Alcohol use: Not Currently    Drug use: Not Currently     Types: Hydrocodone,  Oxycodone    Sexual activity: Not Currently       No current facility-administered medications for this encounter.       Review of patient's allergies indicates:   Allergen Reactions    Fish containing products Anaphylaxis    Iodinated contrast media     Penicillins      Patient Active Problem List   Diagnosis    Lumbar spondylosis    Type 2 diabetes mellitus without complication, without long-term current use of insulin    Postlaminectomy syndrome, lumbar region    Lumbosacral spondylosis without myelopathy    Hypertension    Hypothyroidism    Chronic otitis media of both ears    Anxiety    Pain    Decreased strength    Decreased range of motion    Lumbosacral radiculopathy

## 2023-06-22 NOTE — TELEPHONE ENCOUNTER
----- Message from Crystal Salcido sent at 6/19/2023  9:48 AM CDT -----  Regarding: rx  Pt was in office 2 weeks was told she would have a rx for tramadol sent in to usual pharmacy pt has not received that rx please call pt back at [phone]      Tramadol was sent to pharmacy 6/20/23.

## 2023-06-22 NOTE — TRANSFER OF CARE
"Anesthesia Transfer of Care Note    Patient: Shatagia D Leonardo    Procedure(s) Performed: Procedure(s) (LRB):  BLOCK, NERVE, ILIOINGUINAL (Right)    Patient location: Other: [facility]    Anesthesia Type: general    Transport from OR: Transported from OR on room air with adequate spontaneous ventilation    Post pain: adequate analgesia    Post assessment: no apparent anesthetic complications    Post vital signs: stable    Level of consciousness: sedated and responds to stimulation    Nausea/Vomiting: no nausea/vomiting    Complications: none    Transfer of care protocol was followedComments: Good SV continue, NAD noted, VSS, RTRN      Last vitals:   Visit Vitals  /75 (BP Location: Right arm, Patient Position: Lying)   Pulse 85   Temp 36.6 °C (97.9 °F) (Oral)   Resp 16   Ht 5' 7" (1.702 m)   Wt 96.6 kg (213 lb)   LMP  (LMP Unknown)   SpO2 95%   BMI 33.36 kg/m²     "

## 2023-06-22 NOTE — OP NOTE
PROCEDURE DATE: 6/22/2023  Preop operative diagnosis:  Right ilioinguinal nerve block  Postoperative diagnosis:  Same  Procedure:  Right ilioinguinal nerve block under fluoroscopy  Physician:  MONTRELL Bills MD  Anesthesia:  Monitored anesthesia care  EBL:  None  IV fluids:  Per anesthesia  Complications:  None  Details of the procedure:  The patient arrived in the holding area where informed consent, stable vital signs and IV access were obtained.  There were no EKG chest x-ray and laboratory contraindications to the blood procedure.  The patient was transported to the fluoroscopy suite and placed in a supine position with all pressure points checked and padded.  A large area over the right ASIS was prepped with chlor prep.  2 inches medial and 2 inches inferior to the ASIS was  infiltrated with 1 cc a a 1% lidocaine. A 16 gauge Angiocath was inserted through the infiltrated area.  The stylet was removed and a 20 gauge curved blunt tip RFTC  needle was advanced through the Angiocath in the direction of the ilioinguinal nerve.  Motor stimulation was performed to identify the ilioinguinal nerve.  5 cc from a mixture of 4 cc 0.25% Marcaine with 40 mg Kenalog was injected at the the side of stimulation.  The needle was removed and sterile Band-Aid dressing was applied to the puncture site.  The patient tolerated procedure well and was transported back to the holding area in good condition.  He was discharged home and instructed to follow-up in 2-3 weeks for re-evaluation.

## 2023-07-08 ENCOUNTER — HOSPITAL ENCOUNTER (EMERGENCY)
Facility: HOSPITAL | Age: 48
Discharge: HOME | End: 2023-07-08
Payer: OTHER GOVERNMENT

## 2023-07-08 VITALS
TEMPERATURE: 98 F | BODY MASS INDEX: 31.39 KG/M2 | HEIGHT: 67 IN | DIASTOLIC BLOOD PRESSURE: 75 MMHG | SYSTOLIC BLOOD PRESSURE: 102 MMHG | WEIGHT: 200 LBS | OXYGEN SATURATION: 98 % | HEART RATE: 97 BPM | RESPIRATION RATE: 16 BRPM

## 2023-07-08 DIAGNOSIS — K04.7 DENTAL ABSCESS: Primary | ICD-10-CM

## 2023-07-08 PROCEDURE — 99284 EMERGENCY DEPT VISIT MOD MDM: CPT

## 2023-07-08 PROCEDURE — 63600175 PHARM REV CODE 636 W HCPCS: Performed by: NURSE PRACTITIONER

## 2023-07-08 PROCEDURE — 99284 PR EMERGENCY DEPT VISIT,LEVEL IV: ICD-10-PCS | Mod: ,,, | Performed by: NURSE PRACTITIONER

## 2023-07-08 PROCEDURE — 99284 EMERGENCY DEPT VISIT MOD MDM: CPT | Mod: ,,, | Performed by: NURSE PRACTITIONER

## 2023-07-08 PROCEDURE — 96372 THER/PROPH/DIAG INJ SC/IM: CPT | Performed by: NURSE PRACTITIONER

## 2023-07-08 RX ORDER — ARIPIPRAZOLE 2 MG/1
1 TABLET ORAL
COMMUNITY
Start: 2023-06-20 | End: 2024-03-28

## 2023-07-08 RX ORDER — CEFTRIAXONE 1 G/1
1 INJECTION, POWDER, FOR SOLUTION INTRAMUSCULAR; INTRAVENOUS
Status: COMPLETED | OUTPATIENT
Start: 2023-07-08 | End: 2023-07-08

## 2023-07-08 RX ORDER — DEXAMETHASONE SODIUM PHOSPHATE 4 MG/ML
4 INJECTION, SOLUTION INTRA-ARTICULAR; INTRALESIONAL; INTRAMUSCULAR; INTRAVENOUS; SOFT TISSUE
Status: COMPLETED | OUTPATIENT
Start: 2023-07-08 | End: 2023-07-08

## 2023-07-08 RX ORDER — CLINDAMYCIN HYDROCHLORIDE 150 MG/1
300 CAPSULE ORAL 4 TIMES DAILY
Qty: 56 CAPSULE | Refills: 0 | Status: SHIPPED | OUTPATIENT
Start: 2023-07-08 | End: 2023-07-15

## 2023-07-08 RX ADMIN — DEXAMETHASONE SODIUM PHOSPHATE 4 MG: 4 INJECTION, SOLUTION INTRA-ARTICULAR; INTRALESIONAL; INTRAMUSCULAR; INTRAVENOUS; SOFT TISSUE at 05:07

## 2023-07-08 RX ADMIN — CEFTRIAXONE 1 G: 1 INJECTION, POWDER, FOR SOLUTION INTRAMUSCULAR; INTRAVENOUS at 05:07

## 2023-07-08 NOTE — ED PROVIDER NOTES
Encounter Date: 7/8/2023       History     Chief Complaint   Patient presents with    Dental Pain     47 year old female presents to ED with complaint of dental pain. Patient reports pain and swelling started on yesterday. Endorses pain to same side back in January and reports not following up with dentist after pain resolved. Denies fever, chills, ear pain, headache.     The history is provided by the patient. No  was used.   Review of patient's allergies indicates:   Allergen Reactions    Fish containing products Anaphylaxis    Iodinated contrast media     Penicillins      Past Medical History:   Diagnosis Date    Asthma     CHF (congestive heart failure)     Chronic pain syndrome     Depressive disorder     Diabetes mellitus, type 2     Disorder of sacrum 4/28/2022    Enlarged heart     Gastroparesis     GERD (gastroesophageal reflux disease)     Hyperlipidemia     Hypertension     IBS (irritable bowel syndrome)     Kidney stones     Lumbar radiculopathy     Sleep apnea     Does not use a CPAP    Thyroid disease      Past Surgical History:   Procedure Laterality Date    Bilateral L3-S1 MBB Bilateral 6-, 5-, 1-8-2014    Dr Jessica Randolph    CARPAL TUNNEL RELEASE      CHOLECYSTECTOMY      DIAGNOSTIC LAPAROSCOPY  04/14/2010    Exploratory Laparotomy, Myomectomy, Hydrotubation-Dr. Feng    DILATION AND CURETTAGE OF UTERUS  04/14/2010    Hysteroscopy-Dr. Feng    EXPLORATORY LAPAROTOMY WITH UTERINE MYOMECTOMY  02/27/2007    Dr. Marquise Anderson    HYSTERECTOMY  09/29/2011    Robotic, FABIENNE, Cystoscopy-Dr. Feng    HYSTEROSCOPY WITH DILATION AND CURETTAGE OF UTERUS  04/04/2006    Laparoscopy, Chromotubation-Dr. Marquise Anderson    INJECTION OF ANESTHETIC AGENT AROUND ILIOINGUINAL NERVE Right 6/22/2023    Procedure: BLOCK, NERVE, ILIOINGUINAL;  Surgeon: Rasheeda Bills MD;  Location: [facility];  Service: Pain Management;  Laterality: Right;    LAMINECTOMY N/A 11/30/2021     Procedure: L2-L5 Laminectomy;  Surgeon: Cyril Upton MD;  [facility];  Service: Neurosurgery;  Laterality: N/A;    LEFT HEART CATHETERIZATION      Left L3-S1 RFTC Left 07/18/2017    Dr Lopez    Left SI JI Left 09/30/2013    Dr Randolph    MYRINGOTOMY WITH INSERTION OF VENTILATION TUBE Bilateral 4/4/2023    Procedure: MYRINGOTOMY, WITH TYMPANOSTOMY TUBE INSERTION (T-TUBES);  Surgeon: Sea Hinton MD;  Location: [facility] OR;  Service: ENT;  Laterality: Bilateral;  T-TUBES    OOPHORECTOMY  11/11/2014    Robotic, FABIENNE, Cystoscopy-Dr. Feng    SINUS SURGERY       Family History   Problem Relation Age of Onset    Diabetes Mellitus Mother     Heart disease Mother     Hypertension Mother     Migraines Mother     Heart disease Sister      Social History     Tobacco Use    Smoking status: Never    Smokeless tobacco: Never   Substance Use Topics    Alcohol use: Not Currently    Drug use: Not Currently     Types: Hydrocodone, Oxycodone     Review of Systems   Constitutional:  Negative for chills and fever.   HENT:  Positive for dental problem. Negative for ear pain, sinus pressure and sinus pain.    Respiratory:  Negative for cough and shortness of breath.    Cardiovascular:  Negative for chest pain and palpitations.   Gastrointestinal:  Negative for nausea and vomiting.   Endocrine: Negative for cold intolerance and heat intolerance.   Genitourinary:  Negative for dysuria and urgency.   Musculoskeletal:  Negative for arthralgias and gait problem.   Neurological:  Negative for dizziness and weakness.   Hematological:  Negative for adenopathy. Does not bruise/bleed easily.   Psychiatric/Behavioral:  Negative for agitation and confusion.    All other systems reviewed and are negative.    Physical Exam     Initial Vitals [07/08/23 1640]   BP Pulse Resp Temp SpO2   102/75 97 16 98.3 °F (36.8 °C) 98 %      MAP       --         Physical Exam    Nursing note and vitals reviewed.  Constitutional: She appears  well-developed and well-nourished.   HENT:   Head: Normocephalic and atraumatic.   Mouth/Throat: Abnormal dentition. Dental abscesses and dental caries present.       Eyes: EOM are normal. Pupils are equal, round, and reactive to light.   Neck: Neck supple.   Normal range of motion.  Cardiovascular:  Normal rate and regular rhythm.           No murmur heard.  Pulmonary/Chest: She has no wheezes. She has no rhonchi.   Abdominal: Abdomen is soft. She exhibits no distension. There is no abdominal tenderness.   Musculoskeletal:         General: No tenderness or edema.      Cervical back: Normal range of motion and neck supple.     Lymphadenopathy:     She has no cervical adenopathy.   Neurological: She is alert and oriented to person, place, and time. No cranial nerve deficit or sensory deficit.   Skin: Skin is warm and dry. Capillary refill takes less than 2 seconds.   Psychiatric: She has a normal mood and affect. Thought content normal.       Medical Screening Exam   See Full Note    ED Course   Procedures  Labs Reviewed - No data to display       Imaging Results    None          Medications   cefTRIAXone injection 1 g (1 g Intramuscular Given 7/8/23 1703)   dexAMETHasone injection 4 mg (4 mg Intramuscular Given 7/8/23 1703)     Medical Decision Making:   Initial Assessment:   Dental pain  Differential Diagnosis:   Dental abscess  ED Management:  [facility]    Patient presents for emergent evaluation of acute dental pain that poses a threat to life and/or bodily function.    In the ED patient found to have acute dental abscess.       Discharge [facility]  Patient was managed in the ED with IM Rocephin, Decadron.    The response to treatment was good.    Patient was discharged in stable condition.  Detailed return precautions discussed.                         Clinical Impression:   Final diagnoses:  [K04.7] Dental abscess (Primary)        ED Disposition Condition    Discharge Stable          ED Prescriptions       Medication Sig  Dispense Start Date End Date Auth. Provider    clindamycin (CLEOCIN) 150 MG capsule Take 2 capsules (300 mg total) by mouth 4 (four) times daily. for 7 days 56 capsule 7/8/2023 7/15/2023 ROGELIO Hurd          Follow-up Information    None          ROGELIO Hurd  07/08/23 7859

## 2023-07-10 ENCOUNTER — TELEPHONE (OUTPATIENT)
Dept: FAMILY MEDICINE | Facility: CLINIC | Age: 48
End: 2023-07-10
Payer: OTHER GOVERNMENT

## 2023-07-10 DIAGNOSIS — E03.9 HYPOTHYROIDISM, UNSPECIFIED TYPE: ICD-10-CM

## 2023-07-10 RX ORDER — LEVOTHYROXINE SODIUM 200 UG/1
200 TABLET ORAL
Qty: 30 TABLET | Refills: 0 | Status: SHIPPED | OUTPATIENT
Start: 2023-07-10 | End: 2023-07-19 | Stop reason: SDUPTHER

## 2023-07-10 NOTE — TELEPHONE ENCOUNTER
----- Message from Anitra Herrera sent at 7/7/2023  8:58 AM CDT -----  Regarding: meds  Pt called,  needs scripts for levothyroxine (SYNTHROID) 200 MCG tablet.  [phone].  [address]

## 2023-07-10 NOTE — TELEPHONE ENCOUNTER
----- Message from Scottie Fajardo RN sent at 7/10/2023  8:11 AM CDT -----  Regarding: Rx request  Requesting refill Rx on thyroid medication to be sent into pharmacy, thanks

## 2023-07-11 ENCOUNTER — TELEPHONE (OUTPATIENT)
Dept: FAMILY MEDICINE | Facility: CLINIC | Age: 48
End: 2023-07-11
Payer: OTHER GOVERNMENT

## 2023-07-11 NOTE — TELEPHONE ENCOUNTER
----- Message from ROGELIO Blancas sent at 7/10/2023  8:30 AM CDT -----  Regarding: RE: Rx request  A 30 day supply has been sent to [address]. She needs an office visit for further refills.   ----- Message -----  From: Scottie Fajardo RN  Sent: 7/10/2023   8:13 AM CDT  To: ROGELIO Blancas  Subject: Rx request                                       Requesting refill Rx on thyroid medication to be sent into pharmacy, thanks        [place]

## 2023-07-12 ENCOUNTER — OFFICE VISIT (OUTPATIENT)
Dept: PAIN MEDICINE | Facility: CLINIC | Age: 48
End: 2023-07-12
Payer: OTHER GOVERNMENT

## 2023-07-12 VITALS
HEIGHT: 67 IN | SYSTOLIC BLOOD PRESSURE: 124 MMHG | HEART RATE: 78 BPM | DIASTOLIC BLOOD PRESSURE: 82 MMHG | RESPIRATION RATE: 18 BRPM | WEIGHT: 212 LBS | BODY MASS INDEX: 33.27 KG/M2

## 2023-07-12 DIAGNOSIS — Z79.899 ENCOUNTER FOR LONG-TERM (CURRENT) USE OF OTHER MEDICATIONS: ICD-10-CM

## 2023-07-12 DIAGNOSIS — M54.17 LUMBOSACRAL RADICULOPATHY: Chronic | ICD-10-CM

## 2023-07-12 DIAGNOSIS — M53.3 DISORDER OF SACRUM: Chronic | ICD-10-CM

## 2023-07-12 DIAGNOSIS — M54.16 LUMBAR RADICULOPATHY, CHRONIC: ICD-10-CM

## 2023-07-12 DIAGNOSIS — M96.1 POSTLAMINECTOMY SYNDROME, LUMBAR REGION: Chronic | ICD-10-CM

## 2023-07-12 DIAGNOSIS — G57.91 ILIOINGUINAL NEURALGIA OF RIGHT SIDE: Primary | Chronic | ICD-10-CM

## 2023-07-12 LAB
CTP QC/QA: YES
POC (AMP) AMPHETAMINE: NEGATIVE
POC (BAR) BARBITURATES: NEGATIVE
POC (BUP) BUPRENORPHINE: NEGATIVE
POC (BZO) BENZODIAZEPINES: ABNORMAL
POC (COC) COCAINE: NEGATIVE
POC (MDMA) METHYLENEDIOXYMETHAMPHETAMINE 3,4: NEGATIVE
POC (MET) METHAMPHETAMINE: NEGATIVE
POC (MOP) OPIATES: NEGATIVE
POC (MTD) METHADONE: NEGATIVE
POC (OXY) OXYCODONE: NEGATIVE
POC (PCP) PHENCYCLIDINE: NEGATIVE
POC (TCA) TRICYCLIC ANTIDEPRESSANTS: NEGATIVE
POC TEMPERATURE (URINE): 90
POC THC: NEGATIVE

## 2023-07-12 PROCEDURE — 99214 PR OFFICE/OUTPT VISIT, EST, LEVL IV, 30-39 MIN: ICD-10-PCS | Mod: S$PBB,,, | Performed by: PHYSICIAN ASSISTANT

## 2023-07-12 PROCEDURE — 99214 OFFICE O/P EST MOD 30 MIN: CPT | Mod: S$PBB,,, | Performed by: PHYSICIAN ASSISTANT

## 2023-07-12 PROCEDURE — 99215 OFFICE O/P EST HI 40 MIN: CPT | Mod: PBBFAC | Performed by: PHYSICIAN ASSISTANT

## 2023-07-12 PROCEDURE — 80305 DRUG TEST PRSMV DIR OPT OBS: CPT | Mod: PBBFAC | Performed by: PHYSICIAN ASSISTANT

## 2023-07-12 RX ORDER — TRAMADOL HYDROCHLORIDE 50 MG/1
50 TABLET ORAL EVERY 8 HOURS PRN
Qty: 45 TABLET | Refills: 0 | Status: SHIPPED | OUTPATIENT
Start: 2023-07-20 | End: 2023-08-10 | Stop reason: SDUPTHER

## 2023-07-12 RX ORDER — CYCLOBENZAPRINE HCL 10 MG
10 TABLET ORAL 3 TIMES DAILY PRN
Qty: 90 TABLET | Refills: 0 | Status: SHIPPED | OUTPATIENT
Start: 2023-07-12 | End: 2023-08-10 | Stop reason: SDUPTHER

## 2023-07-12 RX ORDER — MELOXICAM 15 MG/1
15 TABLET ORAL DAILY
Qty: 30 TABLET | Refills: 0 | Status: SHIPPED | OUTPATIENT
Start: 2023-07-12 | End: 2023-08-10 | Stop reason: SDUPTHER

## 2023-07-12 NOTE — PROGRESS NOTES
Subjective:         Patient ID: Carey Leonardo is a 47 y.o. female.    Chief Complaint: Low-back Pain      Pain  This is a chronic problem. The current episode started more than 1 year ago. The problem occurs daily. The problem has been gradually improving. Associated symptoms include arthralgias. Pertinent negatives include no anorexia, change in bowel habit, chest pain, chills, coughing, diaphoresis, fever, neck pain, sore throat, swollen glands, urinary symptoms, vertigo or vomiting.   Review of Systems   Constitutional:  Negative for activity change, appetite change, chills, diaphoresis, fever and unexpected weight change.   HENT:  Negative for drooling, ear discharge, ear pain, facial swelling, nosebleeds, sore throat, trouble swallowing, voice change and goiter.    Eyes:  Negative for photophobia, pain, discharge, redness and visual disturbance.   Respiratory:  Negative for apnea, cough, choking, chest tightness, shortness of breath, wheezing and stridor.    Cardiovascular:  Negative for chest pain, palpitations and leg swelling.   Gastrointestinal:  Negative for abdominal distention, anorexia, change in bowel habit, diarrhea, rectal pain, vomiting, fecal incontinence and change in bowel habit.   Endocrine: Negative for cold intolerance, heat intolerance, polydipsia, polyphagia and polyuria.   Genitourinary:  Negative for bladder incontinence, dysuria, flank pain, frequency and hot flashes.   Musculoskeletal:  Positive for arthralgias, back pain and leg pain. Negative for neck pain.   Integumentary:  Negative for color change and pallor.   Allergic/Immunologic: Negative for immunocompromised state.   Neurological:  Negative for dizziness, vertigo, seizures, syncope, facial asymmetry, speech difficulty, light-headedness, coordination difficulties, memory loss and coordination difficulties.   Hematological:  Negative for adenopathy. Does not bruise/bleed easily.   Psychiatric/Behavioral:  Negative for  agitation, behavioral problems, confusion, decreased concentration, dysphoric mood, hallucinations, self-injury and suicidal ideas. The patient is not nervous/anxious and is not hyperactive.          Past Medical History:   Diagnosis Date    Asthma     CHF (congestive heart failure)     Chronic pain syndrome     Depressive disorder     Diabetes mellitus, type 2     Disorder of sacrum 4/28/2022    Enlarged heart     Gastroparesis     GERD (gastroesophageal reflux disease)     Hyperlipidemia     Hypertension     IBS (irritable bowel syndrome)     Kidney stones     Lumbar radiculopathy     Sleep apnea     Does not use a CPAP    Thyroid disease      Past Surgical History:   Procedure Laterality Date    Bilateral L3-S1 MBB Bilateral 6-, 5-, 1-8-2014    Dr Jessica Randolph    CARPAL TUNNEL RELEASE      CHOLECYSTECTOMY      DIAGNOSTIC LAPAROSCOPY  04/14/2010    Exploratory Laparotomy, Myomectomy, Hydrotubation-Dr. Feng    DILATION AND CURETTAGE OF UTERUS  04/14/2010    Hysteroscopy-Dr. Feng    EXPLORATORY LAPAROTOMY WITH UTERINE MYOMECTOMY  02/27/2007    Dr. Marquise Anderson    HYSTERECTOMY  09/29/2011    Robotic, FABIENNE, Cystoscopy-Dr. Feng    HYSTEROSCOPY WITH DILATION AND CURETTAGE OF UTERUS  04/04/2006    Laparoscopy, Chromotubation-Dr. Marquise Anderson    INJECTION OF ANESTHETIC AGENT AROUND ILIOINGUINAL NERVE Right 6/22/2023    Procedure: BLOCK, NERVE, ILIOINGUINAL;  Surgeon: Rasheeda Bills MD;  Location: [facility];  Service: Pain Management;  Laterality: Right;    LAMINECTOMY N/A 11/30/2021    Procedure: L2-L5 Laminectomy;  Surgeon: Cyril Upton MD;  Location: [facility] OR;  Service: Neurosurgery;  Laterality: N/A;    LEFT HEART CATHETERIZATION      Left L3-S1 RFTC Left 07/18/2017    Dr Lopez    Left SI JI Left 09/30/2013    Dr Randolph    MYRINGOTOMY WITH INSERTION OF VENTILATION TUBE Bilateral 4/4/2023    Procedure: MYRINGOTOMY, WITH TYMPANOSTOMY TUBE INSERTION (T-TUBES);  Surgeon: Sea RUBIO  "MD Jamaal;  Location: [facility] OR;  Service: ENT;  Laterality: Bilateral;  T-TUBES    OOPHORECTOMY  11/11/2014    Robotic, FABIENNE, Cystoscopy-Dr. Feng    SINUS SURGERY       Social History     Socioeconomic History    Marital status:    Tobacco Use    Smoking status: Never    Smokeless tobacco: Never   Substance and Sexual Activity    Alcohol use: Not Currently    Drug use: Not Currently     Types: Hydrocodone, Oxycodone    Sexual activity: Not Currently     Family History   Problem Relation Age of Onset    Diabetes Mellitus Mother     Heart disease Mother     Hypertension Mother     Migraines Mother     Heart disease Sister      Review of patient's allergies indicates:   Allergen Reactions    Fish containing products Anaphylaxis    Iodinated contrast media     Penicillins         Objective:  Vitals:    07/12/23 1405   BP: 124/82   Pulse: 78   Resp: 18   Weight: 96.2 kg (212 lb)   Height: 5' 7" (1.702 m)   PainSc:   8         Physical Exam  Vitals and nursing note reviewed. Exam conducted with a chaperone present.   Constitutional:       General: She is awake. She is not in acute distress.     Appearance: Normal appearance. She is not ill-appearing, toxic-appearing or diaphoretic.   HENT:      Head: Normocephalic and atraumatic.      Nose: Nose normal.      Mouth/Throat:      Mouth: Mucous membranes are moist.      Pharynx: Oropharynx is clear.   Eyes:      Conjunctiva/sclera: Conjunctivae normal.      Pupils: Pupils are equal, round, and reactive to light.   Cardiovascular:      Rate and Rhythm: Normal rate.   Pulmonary:      Effort: Pulmonary effort is normal. No respiratory distress.   Abdominal:      Palpations: Abdomen is soft.      Tenderness: There is no guarding.   Musculoskeletal:         General: Normal range of motion.      Cervical back: Normal range of motion and neck supple. No rigidity.   Skin:     General: Skin is warm and dry.      Coloration: Skin is not jaundiced or pale. "   Neurological:      General: No focal deficit present.      Mental Status: She is alert and oriented to person, place, and time. Mental status is at baseline.      Cranial Nerves: No cranial nerve deficit (II-XII).   Psychiatric:         Mood and Affect: Mood normal.         Behavior: Behavior normal. Behavior is cooperative.         Thought Content: Thought content normal.         FL Fluoro for Pain Management  See OP Notes for results.     IMPRESSION: See OP Notes for results.     This procedure was auto-finalized by: Virtual Radiologist       Admission on 06/22/2023, Discharged on 06/22/2023   Component Date Value Ref Range Status    POC Glucose 06/22/2023 141 (H)  70 - 105 mg/dL Final   Admission on 06/13/2023, Discharged on 06/13/2023   Component Date Value Ref Range Status    Sodium 06/13/2023 139  136 - 145 mmol/L Final    Potassium 06/13/2023 2.8 (L)  3.5 - 5.1 mmol/L Final    Chloride 06/13/2023 98  98 - 107 mmol/L Final    CO2 06/13/2023 35 (H)  21 - 32 mmol/L Final    Anion Gap 06/13/2023 9  7 - 16 mmol/L Final    Glucose 06/13/2023 176 (H)  74 - 106 mg/dL Final    BUN 06/13/2023 7  7 - 18 mg/dL Final    Creatinine 06/13/2023 0.95  0.55 - 1.02 mg/dL Final    BUN/Creatinine Ratio 06/13/2023 7  6 - 20 Final    Calcium 06/13/2023 8.9  8.5 - 10.1 mg/dL Final    Total Protein 06/13/2023 7.9  6.4 - 8.2 g/dL Final    Albumin 06/13/2023 3.3 (L)  3.5 - 5.0 g/dL Final    Globulin 06/13/2023 4.6 (H)  2.0 - 4.0 g/dL Final    A/G Ratio 06/13/2023 0.7   Final    Bilirubin, Total 06/13/2023 0.2  >0.0 - 1.2 mg/dL Final    Alk Phos 06/13/2023 130 (H)  39 - 100 U/L Final    ALT 06/13/2023 20  13 - 56 U/L Final    AST 06/13/2023 14 (L)  15 - 37 U/L Final    eGFR 06/13/2023 75  >=60 mL/min/1.73m2 Final    Lipase 06/13/2023 60 (L)  73 - 393 U/L Final    Color, UA 06/13/2023 Light Yellow  Colorless, Straw, Yellow, Light Yellow, Dark Yellow Final    Clarity, UA 06/13/2023 Slightly Cloudy  Clear Final    pH,  06/13/2023 7.5   5.0 to 8.0 pH Units Final    Leukocytes, UA 06/13/2023 Negative  Negative Final    Nitrites, UA 06/13/2023 Negative  Negative Final    Protein, UA 06/13/2023 Negative  Negative Final    Glucose, UA 06/13/2023 Normal  Normal mg/dL Final    Ketones, UA 06/13/2023 Negative  Negative mg/dL Final    Urobilinogen, UA 06/13/2023 Normal  0.2, 1.0, Normal mg/dL Final    Bilirubin, UA 06/13/2023 Negative  Negative Final    Blood, UA 06/13/2023 Negative  Negative Final    Specific Gravity, UA 06/13/2023 1.009  <=1.030 Final    WBC 06/13/2023 7.67  4.50 - 11.00 K/uL Final    RBC 06/13/2023 4.34  4.20 - 5.40 M/uL Final    Hemoglobin 06/13/2023 12.0  12.0 - 16.0 g/dL Final    Hematocrit 06/13/2023 39.3  38.0 - 47.0 % Final    MCV 06/13/2023 90.6  80.0 - 96.0 fL Final    MCH 06/13/2023 27.6  27.0 - 31.0 pg Final    MCHC 06/13/2023 30.5 (L)  32.0 - 36.0 g/dL Final    RDW 06/13/2023 12.4  11.5 - 14.5 % Final    Platelet Count 06/13/2023 367  150 - 400 K/uL Final    MPV 06/13/2023 9.2 (L)  9.4 - 12.4 fL Final    Neutrophils % 06/13/2023 54.9  53.0 - 65.0 % Final    Lymphocytes % 06/13/2023 36.9  27.0 - 41.0 % Final    Monocytes % 06/13/2023 5.6  2.0 - 6.0 % Final    Eosinophils % 06/13/2023 1.7  1.0 - 4.0 % Final    Basophils % 06/13/2023 0.5  0.0 - 1.0 % Final    Immature Granulocytes % 06/13/2023 0.4  0.0 - 0.4 % Final    nRBC, Auto 06/13/2023 0.0  <=0.0 % Final    Neutrophils, Abs 06/13/2023 4.21  1.80 - 7.70 K/uL Final    Lymphocytes, Absolute 06/13/2023 2.83  1.00 - 4.80 K/uL Final    Monocytes, Absolute 06/13/2023 0.43  0.00 - 0.80 K/uL Final    Eosinophils, Absolute 06/13/2023 0.13  0.00 - 0.50 K/uL Final    Basophils, Absolute 06/13/2023 0.04  0.00 - 0.20 K/uL Final    Immature Granulocytes, Absolute 06/13/2023 0.03  0.00 - 0.04 K/uL Final    nRBC, Absolute 06/13/2023 0.00  <=0.00 x10e3/uL Final    Diff Type 06/13/2023 Auto   Final    POC Glucose 06/13/2023 171 (H)  70 - 105 mg/dL Final    Magnesium 06/13/2023 1.7  1.7 - 2.3  mg/dL Final   Office Visit on 05/29/2023   Component Date Value Ref Range Status    Color, UA 05/29/2023 Dark Yellow   Corrected    Spec Grav UA 05/29/2023 1.030   Corrected    pH, UA 05/29/2023 6.0   Final-Edited    WBC, UA 05/29/2023 positive   Corrected    Nitrite, UA 05/29/2023 negative   Final-Edited    Protein, POC 05/29/2023 positive   Corrected    Glucose, UA 05/29/2023 negative   Final-Edited    Ketones, UA 05/29/2023 positive   Corrected    Bilirubin, POC 05/29/2023 positive   Corrected    Urobilinogen, UA 05/29/2023 0.2   Final-Edited    Blood, UA 05/29/2023 negative   Final-Edited    POC Glucose 05/29/2023 189 (A)  70 - 110 MG/DL Final    SARS Coronavirus 2 Antigen 05/29/2023 Negative  Negative Final    Rapid Influenza A Ag 05/29/2023 Negative  Negative Final    Rapid Influenza B Ag 05/29/2023 Negative  Negative Final     Acceptable 05/29/2023 Yes   Final    Sodium 05/29/2023 137  136 - 145 mmol/L Final    Potassium 05/29/2023 3.3 (L)  3.5 - 5.1 mmol/L Final    Chloride 05/29/2023 103  98 - 107 mmol/L Final    CO2 05/29/2023 30  21 - 32 mmol/L Final    Anion Gap 05/29/2023 7  7 - 16 mmol/L Final    Glucose 05/29/2023 206 (H)  74 - 106 mg/dL Final    BUN 05/29/2023 11  7 - 18 mg/dL Final    Creatinine 05/29/2023 0.98  0.55 - 1.02 mg/dL Final    BUN/Creatinine Ratio 05/29/2023 11  6 - 20 Final    Calcium 05/29/2023 9.3  8.5 - 10.1 mg/dL Final    Total Protein 05/29/2023 8.0  6.4 - 8.2 g/dL Final    Albumin 05/29/2023 3.8  3.5 - 5.0 g/dL Final    Globulin 05/29/2023 4.2 (H)  2.0 - 4.0 g/dL Final    A/G Ratio 05/29/2023 0.9   Final    Bilirubin, Total 05/29/2023 0.4  >0.0 - 1.2 mg/dL Final    Alk Phos 05/29/2023 140 (H)  39 - 100 U/L Final    ALT 05/29/2023 28  13 - 56 U/L Final    AST 05/29/2023 19  15 - 37 U/L Final    eGFR 05/29/2023 72  >=60 mL/min/1.73m2 Final    WBC 05/29/2023 7.43  4.50 - 11.00 K/uL Final    RBC 05/29/2023 4.30  4.20 - 5.40 M/uL Final    Hemoglobin 05/29/2023 11.6 (L)   12.0 - 16.0 g/dL Final    Hematocrit 05/29/2023 38.8  38.0 - 47.0 % Final    MCV 05/29/2023 90.2  80.0 - 96.0 fL Final    MCH 05/29/2023 27.0  27.0 - 31.0 pg Final    MCHC 05/29/2023 29.9 (L)  32.0 - 36.0 g/dL Final    RDW 05/29/2023 12.9  11.5 - 14.5 % Final    Platelet Count 05/29/2023 485 (H)  150 - 400 K/uL Final    MPV 05/29/2023 9.6  9.4 - 12.4 fL Final    Neutrophils % 05/29/2023 51.4 (L)  53.0 - 65.0 % Final    Lymphocytes % 05/29/2023 40.8  27.0 - 41.0 % Final    Monocytes % 05/29/2023 4.8  2.0 - 6.0 % Final    Eosinophils % 05/29/2023 2.4  1.0 - 4.0 % Final    Basophils % 05/29/2023 0.5  0.0 - 1.0 % Final    Immature Granulocytes % 05/29/2023 0.1  0.0 - 0.4 % Final    nRBC, Auto 05/29/2023 0.0  <=0.0 % Final    Neutrophils, Abs 05/29/2023 3.81  1.80 - 7.70 K/uL Final    Lymphocytes, Absolute 05/29/2023 3.03  1.00 - 4.80 K/uL Final    Monocytes, Absolute 05/29/2023 0.36  0.00 - 0.80 K/uL Final    Eosinophils, Absolute 05/29/2023 0.18  0.00 - 0.50 K/uL Final    Basophils, Absolute 05/29/2023 0.04  0.00 - 0.20 K/uL Final    Immature Granulocytes, Absolute 05/29/2023 0.01  0.00 - 0.04 K/uL Final    nRBC, Absolute 05/29/2023 0.00  <=0.00 x10e3/uL Final    Diff Type 05/29/2023 Auto   Final    Color, UA 05/29/2023 Yellow  Colorless, Straw, Yellow, Light Yellow, Dark Yellow Final    Clarity, UA 05/29/2023 Ex.Turbid  Clear Final    pH, UA 05/29/2023 6.0  5.0 to 8.0 pH Units Final    Leukocytes, UA 05/29/2023 Large (A)  Negative Final    Nitrites, UA 05/29/2023 Negative  Negative Final    Protein, UA 05/29/2023 100 (A)  Negative Final    Glucose, UA 05/29/2023 30 (A)  Normal mg/dL Final    Ketones, UA 05/29/2023 Negative  Negative mg/dL Final    Urobilinogen, UA 05/29/2023 2 (A)  0.2, 1.0, Normal mg/dL Final    Bilirubin, UA 05/29/2023 Negative  Negative Final    Blood, UA 05/29/2023 Negative  Negative Final    Specific Gravity, UA 05/29/2023 1.038 (H)  <=1.030 Final    WBC, UA 05/29/2023 30 (H)  <=5 /hpf Final     RBC, UA 05/29/2023 80 (H)  <=3 /hpf Final    Bacteria, UA 05/29/2023 Many (A)  None Seen /hpf Final    Squamous Epithelial Cells, UA 05/29/2023 Moderate (A)  None Seen /HPF Final    Mucous 05/29/2023 Many (A)  None Seen /LPF Final    Culture, Urine 05/29/2023 15,000 Streptococcus agalactiae (Group B) (A)   Final   Office Visit on 04/13/2023   Component Date Value Ref Range Status    Vitamin D 25-Hydroxy, Blood 04/13/2023 16.3  ng/mL Final    WBC 04/13/2023 7.40  4.50 - 11.00 K/uL Final    RBC 04/13/2023 4.32  4.20 - 5.40 M/uL Final    Hemoglobin 04/13/2023 11.6 (L)  12.0 - 16.0 g/dL Final    Hematocrit 04/13/2023 38.6  38.0 - 47.0 % Final    MCV 04/13/2023 89.4  80.0 - 96.0 fL Final    MCH 04/13/2023 26.9 (L)  27.0 - 31.0 pg Final    MCHC 04/13/2023 30.1 (L)  32.0 - 36.0 g/dL Final    RDW 04/13/2023 13.3  11.5 - 14.5 % Final    Platelet Count 04/13/2023 447 (H)  150 - 400 K/uL Final    MPV 04/13/2023 10.5  9.4 - 12.4 fL Final    Neutrophils % 04/13/2023 48.1 (L)  53.0 - 65.0 % Final    Lymphocytes % 04/13/2023 43.2 (H)  27.0 - 41.0 % Final    Monocytes % 04/13/2023 6.1 (H)  2.0 - 6.0 % Final    Eosinophils % 04/13/2023 1.8  1.0 - 4.0 % Final    Basophils % 04/13/2023 0.4  0.0 - 1.0 % Final    Immature Granulocytes % 04/13/2023 0.4  0.0 - 0.4 % Final    nRBC, Auto 04/13/2023 0.0  <=0.0 % Final    Neutrophils, Abs 04/13/2023 3.56  1.80 - 7.70 K/uL Final    Lymphocytes, Absolute 04/13/2023 3.20  1.00 - 4.80 K/uL Final    Monocytes, Absolute 04/13/2023 0.45  0.00 - 0.80 K/uL Final    Eosinophils, Absolute 04/13/2023 0.13  0.00 - 0.50 K/uL Final    Basophils, Absolute 04/13/2023 0.03  0.00 - 0.20 K/uL Final    Immature Granulocytes, Absolute 04/13/2023 0.03  0.00 - 0.04 K/uL Final    nRBC, Absolute 04/13/2023 0.00  <=0.00 x10e3/uL Final    Diff Type 04/13/2023 Auto   Final   Office Visit on 04/12/2023   Component Date Value Ref Range Status    POC Amphetamines 04/12/2023 Negative  Negative, Inconclusive Final    POC  Barbiturates 04/12/2023 Negative  Negative, Inconclusive Final    POC Benzodiazepines 04/12/2023 Negative  Negative, Inconclusive Final    POC Cocaine 04/12/2023 Negative  Negative, Inconclusive Final    POC THC 04/12/2023 Negative  Negative, Inconclusive Final    POC Methadone 04/12/2023 Negative  Negative, Inconclusive Final    POC Methamphetamine 04/12/2023 Negative  Negative, Inconclusive Final    POC Opiates 04/12/2023 Negative  Negative, Inconclusive Final    POC Oxycodone 04/12/2023 Negative  Negative, Inconclusive Final    POC Phencyclidine 04/12/2023 Negative  Negative, Inconclusive Final    POC Methylenedioxymethamphetamine * 04/12/2023 Negative  Negative, Inconclusive Final    POC Tricyclic Antidepressants 04/12/2023 Negative  Negative, Inconclusive Final    POC Buprenorphine 04/12/2023 Negative   Final     Acceptable 04/12/2023 Yes   Final    POC Temperature (Urine) 04/12/2023 94   Final   Admission on 04/04/2023, Discharged on 04/04/2023   Component Date Value Ref Range Status    POC Glucose 04/04/2023 188 (H)  70 - 105 mg/dL Final    POC Glucose 04/04/2023 164 (H)  70 - 105 mg/dL Final    CK 04/04/2023 79  26 - 192 U/L Final    CK-MB 04/04/2023 <1.0 (L)  1.0 - 3.6 ng/mL Final    Troponin I High Sensitivity 04/04/2023 7.4  <=60.4 pg/mL Final   Office Visit on 03/21/2023   Component Date Value Ref Range Status    Hemoglobin A1C 03/21/2023 7.2 (H)  4.5 - 6.6 % Final    Estimated Average Glucose 03/21/2023 154  mg/dL Final    Sodium 03/21/2023 142  136 - 145 mmol/L Final    Potassium 03/21/2023 3.8  3.5 - 5.1 mmol/L Final    Chloride 03/21/2023 107  98 - 107 mmol/L Final    CO2 03/21/2023 30  21 - 32 mmol/L Final    Anion Gap 03/21/2023 9  7 - 16 mmol/L Final    Glucose 03/21/2023 201 (H)  74 - 106 mg/dL Final    BUN 03/21/2023 10  7 - 18 mg/dL Final    Creatinine 03/21/2023 0.78  0.55 - 1.02 mg/dL Final    BUN/Creatinine Ratio 03/21/2023 13  6 - 20 Final    Calcium 03/21/2023 9.1  8.5 -  10.1 mg/dL Final    Total Protein 03/21/2023 7.2  6.4 - 8.2 g/dL Final    Albumin 03/21/2023 3.4 (L)  3.5 - 5.0 g/dL Final    Globulin 03/21/2023 3.8  2.0 - 4.0 g/dL Final    A/G Ratio 03/21/2023 0.9   Final    Bilirubin, Total 03/21/2023 0.2  >0.0 - 1.2 mg/dL Final    Alk Phos 03/21/2023 150 (H)  39 - 100 U/L Final    ALT 03/21/2023 34  13 - 56 U/L Final    AST 03/21/2023 21  15 - 37 U/L Final    eGFR 03/21/2023 95  >=60 mL/min/1.73m² Final    TSH 03/21/2023 16.000 (H)  0.358 - 3.740 uIU/mL Final    Free T4 03/21/2023 1.01  0.76 - 1.46 ng/dL Final   Office Visit on 03/09/2023   Component Date Value Ref Range Status    SARS Coronavirus 2 Antigen 03/09/2023 Negative  Negative Final    Rapid Influenza A Ag 03/09/2023 Negative  Negative Final    Rapid Influenza B Ag 03/09/2023 Negative  Negative Final     Acceptable 03/09/2023 Yes   Final    Rapid Strep A Screen 03/09/2023 Negative  Negative Final     Acceptable 03/09/2023 Yes   Final   Office Visit on 01/25/2023   Component Date Value Ref Range Status    SARS Coronavirus 2 Antigen 01/25/2023 Negative  Negative Final    Rapid Influenza A Ag 01/25/2023 Negative  Negative Final    Rapid Influenza B Ag 01/25/2023 Negative  Negative Final     Acceptable 01/25/2023 Yes   Final         Orders Placed This Encounter   Procedures    MRI Lumbar Spine Without Contrast     Standing Status:   Future     Standing Expiration Date:   7/12/2024     Order Specific Question:   Does the patient have a pacemaker or a defibrillator (Note: Some facilities may not be able to schedule an MRI for patients with pacemakers and defibrillators. You should contact your local radiology department to determine if this is the case.)?     Answer:   No     Order Specific Question:   Does the patient have an aneurysm or surgical clip, pump, nerve/brain stimulator, middle/inner ear prosthesis, or other metal implant or foreign object (bullet, shrapnel)? If they  have a card related to their implant, ask them to bring it. Issues related to the implant may cause the MRI to be delayed.     Answer:   No     Order Specific Question:   Is the patient claustrophobic?     Answer:   No     Order Specific Question:   Will the patient require sedation?     Answer:   No     Order Specific Question:   Does the patient have any of the following conditions? Diabetes, History of Renal Disease or Hypertension requiring medical therapy?     Answer:   No     Order Specific Question:   May the Radiologist modify the order per protocol to meet the clinical needs of the patient?     Answer:   Yes     Order Specific Question:   Is this part of a Research Study?     Answer:   No     Order Specific Question:   Recist criteria?     Answer:   No     Order Specific Question:   Will this service be billed to a Worker's Comp policy?     Answer:   No     Order Specific Question:   Does the patient have on a skin patch for medication with aluminized backing?     Answer:   No     Order Specific Question:   Is the patient pregnant?     Answer:   No       Requested Prescriptions     Signed Prescriptions Disp Refills    meloxicam (MOBIC) 15 MG tablet 30 tablet 0     Sig: Take 1 tablet (15 mg total) by mouth once daily.    cyclobenzaprine (FLEXERIL) 10 MG tablet 90 tablet 0     Sig: Take 1 tablet (10 mg total) by mouth 3 (three) times daily as needed for Muscle spasms.    traMADoL (ULTRAM) 50 mg tablet 45 tablet 0     Sig: Take 1 tablet (50 mg total) by mouth every 8 (eight) hours as needed for Pain.       Assessment:     1. Ilioinguinal neuralgia of right side    2. Postlaminectomy syndrome, lumbar region    3. Disorder of sacrum    4. Lumbosacral radiculopathy    5. Lumbar radiculopathy, chronic         A's of Opioid Risk Assessment  Activity:Patient can perform ADL.   Analgesia:Patients pain is partially controlled by current medication. Patient has tried OTC medications such as Tylenol and Ibuprofen with  out relief.   Adverse Effects: Patient denies constipation or sedation.  Aberrant Behavior:  reviewed with no aberrant drug seeking/taking behavior.  Overdose reversal drug naloxone discussed    Drug screen reviewed      Plan:    She had lumbar surgery [facility] November 2021 complicated by postop infection    Presumptive drug screen July 12, 2023 positive for benzodiazepines inconsistent     Presumptive drug screen negative, this is expected result, patient takes tramadol, cup does not test for tramadol    Order definitive drug screen for clarification benzodiazepine    Follow-up after right ilioinguinal nerve block June 22, 2023  She states procedure did not help improve her pain level    She would like to reorder the MRI     She states she forgot about the appointment did not get MRI lumbar spine ordered last visit    Complaint back pain buttock and leg pain right greater than left pain numbness and tingling right knee pain worse with standing walking short distances better with short periods resting    Order MRI lumbar spine without contrast history lumbar surgery, lumbosacral radiculopathy    MRI for consideration procedure/surgery    I have given the patient medically directed home exercise program    Patient has tried NSAIDs and neuromodulators (neurontin, lyrica, elavil, or cymbalta)    Follow-up 1 month discuss results MRI lumbar spine    Dr. Bills, March 2023    Bring original prescription medication bottles/container/box with labels to each visit

## 2023-07-19 ENCOUNTER — OFFICE VISIT (OUTPATIENT)
Dept: FAMILY MEDICINE | Facility: CLINIC | Age: 48
End: 2023-07-19
Payer: OTHER GOVERNMENT

## 2023-07-19 ENCOUNTER — TELEPHONE (OUTPATIENT)
Dept: FAMILY MEDICINE | Facility: CLINIC | Age: 48
End: 2023-07-19
Payer: OTHER GOVERNMENT

## 2023-07-19 VITALS
WEIGHT: 215 LBS | BODY MASS INDEX: 33.74 KG/M2 | HEART RATE: 71 BPM | DIASTOLIC BLOOD PRESSURE: 86 MMHG | OXYGEN SATURATION: 98 % | HEIGHT: 67 IN | RESPIRATION RATE: 17 BRPM | TEMPERATURE: 98 F | SYSTOLIC BLOOD PRESSURE: 136 MMHG

## 2023-07-19 DIAGNOSIS — I10 PRIMARY HYPERTENSION: ICD-10-CM

## 2023-07-19 DIAGNOSIS — E11.9 TYPE 2 DIABETES MELLITUS WITHOUT COMPLICATION, WITHOUT LONG-TERM CURRENT USE OF INSULIN: Primary | Chronic | ICD-10-CM

## 2023-07-19 DIAGNOSIS — Z91.09 ENVIRONMENTAL ALLERGIES: ICD-10-CM

## 2023-07-19 DIAGNOSIS — E03.9 HYPOTHYROIDISM, UNSPECIFIED TYPE: ICD-10-CM

## 2023-07-19 DIAGNOSIS — I10 HYPERTENSION, UNSPECIFIED TYPE: ICD-10-CM

## 2023-07-19 DIAGNOSIS — J00 NASOPHARYNGITIS: ICD-10-CM

## 2023-07-19 DIAGNOSIS — F41.9 ANXIETY: ICD-10-CM

## 2023-07-19 LAB
ALBUMIN SERPL BCP-MCNC: 3.3 G/DL (ref 3.5–5)
ALBUMIN/GLOB SERPL: 0.9 {RATIO}
ALP SERPL-CCNC: 122 U/L (ref 39–100)
ALT SERPL W P-5'-P-CCNC: 31 U/L (ref 13–56)
ANION GAP SERPL CALCULATED.3IONS-SCNC: 11 MMOL/L (ref 7–16)
AST SERPL W P-5'-P-CCNC: 23 U/L (ref 15–37)
BILIRUB SERPL-MCNC: 0.3 MG/DL (ref ?–1.2)
BUN SERPL-MCNC: 9 MG/DL (ref 7–18)
BUN/CREAT SERPL: 9 (ref 6–20)
CALCIUM SERPL-MCNC: 8.8 MG/DL (ref 8.5–10.1)
CHLORIDE SERPL-SCNC: 106 MMOL/L (ref 98–107)
CO2 SERPL-SCNC: 31 MMOL/L (ref 21–32)
CREAT SERPL-MCNC: 0.97 MG/DL (ref 0.55–1.02)
EGFR (NO RACE VARIABLE) (RUSH/TITUS): 73 ML/MIN/1.73M2
EST. AVERAGE GLUCOSE BLD GHB EST-MCNC: 157 MG/DL
GLOBULIN SER-MCNC: 3.8 G/DL (ref 2–4)
GLUCOSE SERPL-MCNC: 188 MG/DL (ref 74–106)
HBA1C MFR BLD HPLC: 7.3 % (ref 4.5–6.6)
POTASSIUM SERPL-SCNC: 3.8 MMOL/L (ref 3.5–5.1)
PROT SERPL-MCNC: 7.1 G/DL (ref 6.4–8.2)
SODIUM SERPL-SCNC: 144 MMOL/L (ref 136–145)
TSH SERPL DL<=0.005 MIU/L-ACNC: 49.7 UIU/ML (ref 0.36–3.74)

## 2023-07-19 PROCEDURE — 80053 COMPREHEN METABOLIC PANEL: CPT | Mod: ,,, | Performed by: CLINICAL MEDICAL LABORATORY

## 2023-07-19 PROCEDURE — 80053 COMPREHENSIVE METABOLIC PANEL: ICD-10-PCS | Mod: ,,, | Performed by: CLINICAL MEDICAL LABORATORY

## 2023-07-19 PROCEDURE — 83036 HEMOGLOBIN GLYCOSYLATED A1C: CPT | Mod: ,,, | Performed by: CLINICAL MEDICAL LABORATORY

## 2023-07-19 PROCEDURE — 84443 ASSAY THYROID STIM HORMONE: CPT | Mod: ,,, | Performed by: CLINICAL MEDICAL LABORATORY

## 2023-07-19 PROCEDURE — 83036 HEMOGLOBIN A1C: ICD-10-PCS | Mod: ,,, | Performed by: CLINICAL MEDICAL LABORATORY

## 2023-07-19 PROCEDURE — 99214 OFFICE O/P EST MOD 30 MIN: CPT | Mod: ,,, | Performed by: NURSE PRACTITIONER

## 2023-07-19 PROCEDURE — 84443 TSH: ICD-10-PCS | Mod: ,,, | Performed by: CLINICAL MEDICAL LABORATORY

## 2023-07-19 PROCEDURE — 99214 PR OFFICE/OUTPT VISIT, EST, LEVL IV, 30-39 MIN: ICD-10-PCS | Mod: ,,, | Performed by: NURSE PRACTITIONER

## 2023-07-19 RX ORDER — INSULIN PUMP SYRINGE, 3 ML
EACH MISCELLANEOUS
Qty: 1 EACH | Refills: 0 | Status: SHIPPED | OUTPATIENT
Start: 2023-07-19 | End: 2024-03-28

## 2023-07-19 RX ORDER — LEVOTHYROXINE SODIUM 200 UG/1
200 TABLET ORAL
Qty: 90 TABLET | Refills: 0 | Status: SHIPPED | OUTPATIENT
Start: 2023-07-19 | End: 2023-08-28 | Stop reason: SDUPTHER

## 2023-07-19 RX ORDER — LANCETS
EACH MISCELLANEOUS
Qty: 100 EACH | Refills: 3 | Status: SHIPPED | OUTPATIENT
Start: 2023-07-19 | End: 2024-03-28

## 2023-07-19 RX ORDER — VENLAFAXINE HYDROCHLORIDE 150 MG/1
CAPSULE, EXTENDED RELEASE ORAL
Qty: 90 CAPSULE | Refills: 0 | Status: SHIPPED | OUTPATIENT
Start: 2023-07-19 | End: 2023-08-28 | Stop reason: SDUPTHER

## 2023-07-19 RX ORDER — NIFEDIPINE 60 MG/1
60 TABLET, EXTENDED RELEASE ORAL DAILY
Qty: 90 TABLET | Refills: 0 | Status: SHIPPED | OUTPATIENT
Start: 2023-07-19 | End: 2023-10-12

## 2023-07-19 RX ORDER — HYDRALAZINE HYDROCHLORIDE 100 MG/1
100 TABLET, FILM COATED ORAL 3 TIMES DAILY
Qty: 270 TABLET | Refills: 0 | Status: SHIPPED | OUTPATIENT
Start: 2023-07-19 | End: 2023-08-28 | Stop reason: SDUPTHER

## 2023-07-19 RX ORDER — CLONIDINE HYDROCHLORIDE 0.2 MG/1
0.2 TABLET ORAL 4 TIMES DAILY
Qty: 360 TABLET | Refills: 0 | Status: SHIPPED | OUTPATIENT
Start: 2023-07-19 | End: 2023-10-17

## 2023-07-19 RX ORDER — ATORVASTATIN CALCIUM 20 MG/1
20 TABLET, FILM COATED ORAL NIGHTLY
Qty: 90 TABLET | Refills: 0 | Status: SHIPPED | OUTPATIENT
Start: 2023-07-19 | End: 2023-11-24 | Stop reason: SDUPTHER

## 2023-07-19 RX ORDER — IPRATROPIUM BROMIDE 21 UG/1
2 SPRAY, METERED NASAL 2 TIMES DAILY
Qty: 30 ML | Refills: 0 | Status: SHIPPED | OUTPATIENT
Start: 2023-07-19

## 2023-07-19 RX ORDER — ALBUTEROL SULFATE 90 UG/1
2 AEROSOL, METERED RESPIRATORY (INHALATION) EVERY 6 HOURS PRN
Qty: 18 G | Refills: 0 | Status: SHIPPED | OUTPATIENT
Start: 2023-07-19 | End: 2023-11-24 | Stop reason: SDUPTHER

## 2023-07-19 RX ORDER — SERTRALINE HYDROCHLORIDE 100 MG/1
100 TABLET, FILM COATED ORAL NIGHTLY
Qty: 90 TABLET | Refills: 0 | Status: SHIPPED | OUTPATIENT
Start: 2023-07-19 | End: 2023-12-01 | Stop reason: SDUPTHER

## 2023-07-19 RX ORDER — DULAGLUTIDE 4.5 MG/.5ML
4.5 INJECTION, SOLUTION SUBCUTANEOUS
Qty: 12 PEN | Refills: 1 | Status: SHIPPED | OUTPATIENT
Start: 2023-07-19 | End: 2024-01-15

## 2023-07-19 RX ORDER — GLIPIZIDE AND METFORMIN HCL 5; 500 MG/1; MG/1
1 TABLET, FILM COATED ORAL
Qty: 180 TABLET | Refills: 0 | Status: SHIPPED | OUTPATIENT
Start: 2023-07-19 | End: 2023-11-24 | Stop reason: SDUPTHER

## 2023-07-19 RX ORDER — METOPROLOL SUCCINATE 100 MG/1
100 TABLET, EXTENDED RELEASE ORAL DAILY
Qty: 90 TABLET | Refills: 0 | Status: SHIPPED | OUTPATIENT
Start: 2023-07-19 | End: 2023-12-01 | Stop reason: SDUPTHER

## 2023-07-19 NOTE — ASSESSMENT & PLAN NOTE
BP Readings from Last 3 Encounters:   07/19/23 136/86   07/12/23 124/82   07/08/23 102/75     Chronic, suboptimally controlled  Continue current medications and treatment plan

## 2023-07-19 NOTE — PROGRESS NOTES
Patient referred to Endocrinology for poorly controlled hypothyroidism.  There is significant concerns for noncompliance with current Synthroid dose.

## 2023-07-19 NOTE — PROGRESS NOTES
ROGELIO Blancas   [facility]  [address] [place] [address]     PATIENT NAME: Carey Leonardo  : 1975  DATE: 23  MRN: [id number]      Billing Provider: ROGELIO Blancas  Level of Service: AR OFFICE/OUTPT VISIT, EST, LEVL IV, 30-39 MIN  Patient PCP Information       Provider PCP Type    ROGELIO Blancas General          Reason for Visit / Chief Complaint: Follow-up (States 3 month F/U and fasting this am ) and Medication Refill (Requesting refill Rx on routine medications and for Hypertension medications )       Update PCP  Update Chief Complaint      History of Present Illness / Problem Focused Workflow   SB is a 46 y/o BF with a PMH diabetes, chronic pain syndrome, and GERD who presents today for medication refills. She was scheduled with her endocrinologist this past March but she reports that her appointment was canceled and it has not been rescheduled. She has had some issues with getting her Trulicity covered by  unless she uses [facility]. She also needs a glucometer to monitor her blood glucose levels at home.     Review of Systems     Review of Systems   Constitutional:  Negative for fatigue and fever.   HENT:  Negative for congestion.    Respiratory:  Negative for cough.    Cardiovascular:  Negative for chest pain and leg swelling.   Gastrointestinal:  Negative for abdominal pain.   Musculoskeletal:  Negative for back pain, gait problem and joint swelling.   Skin:  Negative for rash and wound.   Neurological:  Negative for dizziness and headaches.   Psychiatric/Behavioral:  The patient is not nervous/anxious.      Medical / Social / Family History     Past Medical History:   Diagnosis Date    Asthma     CHF (congestive heart failure)     Chronic pain syndrome     Depressive disorder     Diabetes mellitus, type 2     Disorder of sacrum 2022    Enlarged heart     Gastroparesis     GERD (gastroesophageal reflux disease)     Hyperlipidemia      Hypertension     IBS (irritable bowel syndrome)     Kidney stones     Lumbar radiculopathy     Sleep apnea     Does not use a CPAP    Thyroid disease      Past Surgical History:   Procedure Laterality Date    Bilateral L3-S1 MBB Bilateral 6-, 5-, 1-8-2014    Dr Jessica Randolph    CARPAL TUNNEL RELEASE      CHOLECYSTECTOMY      DIAGNOSTIC LAPAROSCOPY  04/14/2010    Exploratory Laparotomy, Myomectomy, Hydrotubation-Dr. Feng    DILATION AND CURETTAGE OF UTERUS  04/14/2010    Hysteroscopy-Dr. Feng    EXPLORATORY LAPAROTOMY WITH UTERINE MYOMECTOMY  02/27/2007    Dr. Marquise Anderson    HYSTERECTOMY  09/29/2011    Robotic, FABIENNE, Cystoscopy-Dr. Feng    HYSTEROSCOPY WITH DILATION AND CURETTAGE OF UTERUS  04/04/2006    Laparoscopy, Chromotubation-Dr. Marquise Anderson    INJECTION OF ANESTHETIC AGENT AROUND ILIOINGUINAL NERVE Right 6/22/2023    Procedure: BLOCK, NERVE, ILIOINGUINAL;  Surgeon: Rasheeda Bills MD;  Location: [facility] PAIN [facility];  Service: Pain Management;  Laterality: Right;    LAMINECTOMY N/A 11/30/2021    Procedure: L2-L5 Laminectomy;  Surgeon: Cyril Upton MD;  Location: [facility] OR;  Service: Neurosurgery;  Laterality: N/A;    LEFT HEART CATHETERIZATION      Left L3-S1 RFTC Left 07/18/2017    Dr Lopez    Left SI JI Left 09/30/2013    Dr Randolph    MYRINGOTOMY WITH INSERTION OF VENTILATION TUBE Bilateral 4/4/2023    Procedure: MYRINGOTOMY, WITH TYMPANOSTOMY TUBE INSERTION (T-TUBES);  Surgeon: Sea Hinton MD;  Location: [facility] OR;  Service: ENT;  Laterality: Bilateral;  T-TUBES    OOPHORECTOMY  11/11/2014    Robotic, FABIENNE, Cystoscopy-Dr. Feng    SINUS SURGERY       Social History  Social History     Socioeconomic History    Marital status:    Tobacco Use    Smoking status: Never    Smokeless tobacco: Never   Substance and Sexual Activity    Alcohol use: Not Currently    Drug use: Not Currently     Types: Hydrocodone, Oxycodone    Sexual activity: Not Currently       Medications and Allergies     Medications  Outpatient Medications Marked as Taking for the 7/19/23 encounter (Office Visit) with ROGELIO Blancas   Medication Sig Dispense Refill    ARIPiprazole (ABILIFY) 2 MG Tab Take 1 mg by mouth.      cyclobenzaprine (FLEXERIL) 10 MG tablet Take 1 tablet (10 mg total) by mouth 3 (three) times daily as needed for Muscle spasms. 90 tablet 0    empagliflozin (JARDIANCE) 10 mg tablet Take 10 mg by mouth once daily.      gabapentin (NEURONTIN) 300 MG capsule Take 1 capsule (300 mg total) by mouth 3 (three) times daily. 90 capsule 11    meloxicam (MOBIC) 15 MG tablet Take 1 tablet (15 mg total) by mouth once daily. 30 tablet 0    traMADoL (ULTRAM) 50 mg tablet Take 1 tablet (50 mg total) by mouth every 8 (eight) hours as needed for Pain. 45 tablet 0    [DISCONTINUED] albuterol (VENTOLIN HFA) 90 mcg/actuation inhaler Inhale 2 puffs into the lungs every 6 (six) hours as needed for Wheezing. Rescue 18 g 0    [DISCONTINUED] atorvastatin (LIPITOR) 20 MG tablet Take 1 tablet (20 mg total) by mouth every evening. 90 tablet 0    [DISCONTINUED] cloNIDine (CATAPRES) 0.2 MG tablet Take 1 tablet (0.2 mg total) by mouth 4 (four) times daily. 360 tablet 0    [DISCONTINUED] glipizide-metformin (METAGLIP) 5-500 mg per tablet Take 1 tablet by mouth 2 (two) times daily before meals. 180 tablet 0    [DISCONTINUED] hydrALAZINE (APRESOLINE) 100 MG tablet Take 1 tablet (100 mg total) by mouth 3 (three) times daily. 270 tablet 0    [DISCONTINUED] ipratropium (ATROVENT) 21 mcg (0.03 %) nasal spray 2 sprays by Each Nostril route 2 (two) times daily. 30 mL 0    [DISCONTINUED] levothyroxine (SYNTHROID) 200 MCG tablet Take 1 tablet (200 mcg total) by mouth before breakfast. 30 tablet 0    [DISCONTINUED] metoprolol succinate (TOPROL-XL) 100 MG 24 hr tablet Take 1 tablet (100 mg total) by mouth once daily. 90 tablet 0    [DISCONTINUED] NIFEdipine (PROCARDIA-XL) 60 MG (OSM) 24 hr tablet TAKE 1 TABLET BY  MOUTH EVERY DAY FOR HIGH BLOOD PRESSURE  Strength: 60 mg 90 tablet 1    [DISCONTINUED] risperiDONE (RISPERDAL) 0.25 MG Tab Take 1-2 tablets (0.25-0.5 mg total) by mouth 2 (two) times daily as needed. 90 tablet 0    [DISCONTINUED] sertraline (ZOLOFT) 100 MG tablet Take 100 mg by mouth every evening.      [DISCONTINUED] traZODone (DESYREL) 100 MG tablet Take 1 tablet (100 mg total) by mouth nightly as needed for Insomnia. 90 tablet 0    [DISCONTINUED] venlafaxine (EFFEXOR-XR) 150 MG Cp24 TAKE 1 CAPSULE BY MOUTH EVERY DAY WITH FOOD 90 capsule 0     Allergies  Review of patient's allergies indicates:   Allergen Reactions    Fish containing products Anaphylaxis    Iodinated contrast media     Penicillins      Physical Examination     Vitals:    07/19/23 1016   BP: 136/86   Pulse:    Resp:    Temp:        Physical Exam  Vitals reviewed.   Constitutional:       Appearance: Normal appearance.   Cardiovascular:      Rate and Rhythm: Normal rate and regular rhythm.      Pulses:           Dorsalis pedis pulses are 3+ on the right side and 3+ on the left side.        Posterior tibial pulses are 2+ on the right side and 2+ on the left side.   Pulmonary:      Effort: Pulmonary effort is normal.      Breath sounds: Normal breath sounds.   Feet:      Right foot:      Protective Sensation: 10 sites tested.  10 sites sensed.      Skin integrity: Dry skin present.      Toenail Condition: Right toenails are normal.      Left foot:      Protective Sensation: 10 sites tested.  10 sites sensed.      Skin integrity: Dry skin present.      Toenail Condition: Left toenails are normal.      Comments: Protective Sensation (w/ 10 gram monofilament):  Right: Intact  Left: Intact    Visual Inspection:  Dry Skin -  Bilateral    Pedal Pulses:   Right: Present  Left: Present    Posterior Tibialis Pulses:   Right:Present  Left: Present     Neurological:      Mental Status: She is alert and oriented to person, place, and time.   Psychiatric:          Mood and Affect: Mood normal. Affect is flat.         Speech: Speech is delayed.         Behavior: Behavior is slowed.         Thought Content: Thought content normal.         Judgment: Judgment normal.      Labs     Lab Results   Component Value Date     07/19/2023    K 3.8 07/19/2023     07/19/2023    CO2 31 07/19/2023    BUN 9 07/19/2023    CREATININE 0.97 07/19/2023    CALCIUM 8.8 07/19/2023    ANIONGAP 11 07/19/2023    EGFRNORACEVR 73 07/19/2023       Lab Results   Component Value Date    HGBA1C 7.3 (H) 07/19/2023      Lab Results   Component Value Date    MICROALBUR 0.9 12/02/2022      Lab Results   Component Value Date    WBC 7.67 06/13/2023    HGB 12.0 06/13/2023    HCT 39.3 06/13/2023    MCV 90.6 06/13/2023     06/13/2023     Assessment and Plan (including Health Maintenance)      Problem List  Smart Sets  Document Outside HM   :    Health Maintenance Due   Topic Date Due    COVID-19 Vaccine (1) Never done    Pneumococcal Vaccines (Age 0-64) (1 - PCV) Never done    Eye Exam  Never done    TETANUS VACCINE  Never done    Colorectal Cancer Screening  Never done     Problem List Items Addressed This Visit          Psychiatric    Anxiety (Chronic)    Overview     Zoloft 100 mg oral daily   Effexor  mg oral daily         Current Assessment & Plan     Chronic, controlled  Continue current medications and treatment plan         Relevant Medications    sertraline (ZOLOFT) 100 MG tablet    venlafaxine (EFFEXOR-XR) 150 MG Cp24       Cardiac/Vascular    Hypertension (Chronic)    Overview     Goal BP < 130/80         Current Assessment & Plan     BP Readings from Last 3 Encounters:   07/19/23 136/86   07/12/23 124/82   07/08/23 102/75   Chronic, suboptimally controlled  Continue current medications and treatment plan         Relevant Medications    cloNIDine (CATAPRES) 0.2 MG tablet    metoprolol succinate (TOPROL-XL) 100 MG 24 hr tablet    NIFEdipine (PROCARDIA-XL) 60 MG (OSM) 24 hr tablet     hydrALAZINE (APRESOLINE) 100 MG tablet       Endocrine    Type 2 diabetes mellitus without complication, without long-term current use of insulin - Primary (Chronic)    Overview     Goal A1c less than 7.0%   Jardiance 10 mg oral daily   Glipizide-metformin 5-500 mg         Current Assessment & Plan     Lab Results   Component Value Date    HGBA1C 7.3 (H) 07/19/2023   Optometry referral requested for diabetic eye exam   Foot exam performed on office visit  Chronic, controlled  Continue current medications and treatment plan         Relevant Medications    glipizide-metformin (METAGLIP) 5-500 mg per tablet    dulaglutide (TRULICITY) 4.5 mg/0.5 mL pen injector    blood-glucose meter kit    lancets Misc    blood sugar diagnostic Strp    Other Relevant Orders    Comprehensive Metabolic Panel (Completed)    Hemoglobin A1C (Completed)    Ambulatory referral/consult to Optometry    Foot Exam Performed (Completed)    Hypothyroidism (Chronic)    Overview     Synthroid 200 mcg oral daily         Current Assessment & Plan     Lab Results   Component Value Date    TSH 49.700 (H) 07/19/2023    F6OTLPA 125 11/02/2022    FREET4 1.01 03/21/2023   Chronic, not controlled   Continue current medications  Requested referral to Endocrinology         Relevant Medications    levothyroxine (SYNTHROID) 200 MCG tablet    Other Relevant Orders    TSH (Completed)    Ambulatory referral/consult to Endocrinology     Other Visit Diagnoses       Environmental allergies        Relevant Medications    albuterol (VENTOLIN HFA) 90 mcg/actuation inhaler    Nasopharyngitis        Relevant Medications    ipratropium (ATROVENT) 21 mcg (0.03 %) nasal spray          Health Maintenance Topics with due status: Not Due       Topic Last Completion Date    Mammogram 10/06/2022    Influenza Vaccine 10/18/2022    Diabetes Urine Screening 12/02/2022    Lipid Panel 12/02/2022    Low Dose Statin 07/19/2023    Foot Exam 07/19/2023    Hemoglobin A1c 07/19/2023        Future Appointments   Date Time Provider Department Center   7/31/2023 10:45 AM [facility] MRI2 [facility]   8/10/2023 10:00 AM [facility] [place] [facility]   9/19/2023 11:00 AM [facility] ROOM 02 [facility]   10/12/2023  9:00 AM [facility] MAM[facility]1 [facility]   10/17/2023 10:00 AM ROGELIO Blancas [facility] CLAUDINE [facility]      Signature:    ROGELIO Terrazas  Family Nurse Practitioner  [facility]    Date of encounter: 7/19/23

## 2023-07-19 NOTE — ASSESSMENT & PLAN NOTE
Lab Results   Component Value Date    HGBA1C 7.3 (H) 07/19/2023   Optometry referral requested for diabetic eye exam   Foot exam performed on office visit  Chronic, controlled  Continue current medications and treatment plan

## 2023-07-19 NOTE — PATIENT INSTRUCTIONS
-If you have any questions or concerns about your referral please call [phone].    -Check blood glucose levels daily.   -Please bring all medications in original bottles to next appointment.

## 2023-07-20 PROBLEM — E03.9 HYPOTHYROIDISM: Chronic | Status: ACTIVE | Noted: 2022-08-02

## 2023-07-20 NOTE — ASSESSMENT & PLAN NOTE
Lab Results   Component Value Date    TSH 49.700 (H) 07/19/2023    F5ABRLH 125 11/02/2022    FREET4 1.01 03/21/2023     Chronic, not controlled   Continue current medications  Requested referral to Endocrinology

## 2023-07-20 NOTE — PROGRESS NOTES
DDS WAS NOT RESULTED PER IVORY QUIROGA. REAGENTS IS ON BACK ORDER. PLEASE REORDER AT NEXT VIST.

## 2023-07-21 ENCOUNTER — PATIENT OUTREACH (OUTPATIENT)
Dept: ADMINISTRATIVE | Facility: HOSPITAL | Age: 48
End: 2023-07-21

## 2023-07-21 NOTE — LETTER
AUTHORIZATION FOR RELEASE OF   CONFIDENTIAL INFORMATION    Dear [facility],    We are seeing Carey Leonardo, date of birth 1975, in the clinic at [facility]. ROGELIO Blancas is the patient's PCP. Carey Leonardo has an outstanding lab/procedure at the time we reviewed her chart. In order to help keep her health information updated, she has authorized us to request the following medical record(s):        (  )  MAMMOGRAM                                      (  )  COLONOSCOPY      (  )  PAP SMEAR                                          (  )  OUTSIDE LAB RESULTS     (  )  DEXA SCAN                                          (  X)  EYE EXAM            (  )  FOOT EXAM                                          (  )  ENTIRE RECORD     (  )  OUTSIDE IMMUNIZATIONS                 (  )  _______________         Please fax records to [facility], Michaela L Moore, FNP, [phone]     If you have any questions, please contact Lilibeth Pitt Lpn-Clinic Care Coordinator at [phone].           Patient Name: Carey Leonardo  : 1975  Patient Phone #: [phone]

## 2023-07-21 NOTE — PROGRESS NOTES
Health maintenance record review for population health care gaps    07/21/2023   --Chart accessed for:chart review  --Care Gaps addressed:vaccine and eye exam  Outreach made to patient via  . (Success) (Left Message) (Unavailable)   Patient stated   Care Everywhere updates requested and reviewed.  Media reports reviewed.  [facility] and [facility] reviewed.  Immunization Database (Immprint/MIXX) reviewed. Vaccinations uploaded:   updated with external HAC Report  Requested Recent Eye Exam records from [facility]  Next appointment  . Appointment notes updated to include:   Health Maintenance Due   Topic Date Due    COVID-19 Vaccine (1) Never done    Pneumococcal Vaccines (Age 0-64) (1 - PCV) Never done    Eye Exam  Never done    TETANUS VACCINE  Never done    Colorectal Cancer Screening  Never done

## 2023-07-24 ENCOUNTER — PATIENT OUTREACH (OUTPATIENT)
Dept: ADMINISTRATIVE | Facility: HOSPITAL | Age: 48
End: 2023-07-24

## 2023-07-24 DIAGNOSIS — H52.4 PRESBYOPIA: ICD-10-CM

## 2023-07-24 DIAGNOSIS — H52.13 BILATERAL MYOPIA: Primary | ICD-10-CM

## 2023-07-24 DIAGNOSIS — H52.222 REGULAR ASTIGMATISM OF LEFT EYE: ICD-10-CM

## 2023-07-24 NOTE — PROGRESS NOTES
Health maintenance record review for population health care gaps    07/24/2023   --Chart accessed for:chart review  --Care Gaps addressed:DM Eye Exam    Care Everywhere updates requested and reviewed.  Media reports reviewed.  [facility] and [facility] reviewed.  Immunization Database (Immprint/MIXX) reviewed. Vaccinations uploaded:   updated     Uploaded recent diabetic eye exam to chart for health maintenance  Health Maintenance Due   Topic Date Due    COVID-19 Vaccine (1) Never done    Pneumococcal Vaccines (Age 0-64) (1 - PCV) Never done    TETANUS VACCINE  Never done    Colorectal Cancer Screening  Never done    Eye Exam  12/22/2021

## 2023-07-31 ENCOUNTER — HOSPITAL ENCOUNTER (OUTPATIENT)
Dept: RADIOLOGY | Facility: HOSPITAL | Age: 48
Discharge: HOME | End: 2023-07-31
Attending: PHYSICIAN ASSISTANT
Payer: OTHER GOVERNMENT

## 2023-07-31 DIAGNOSIS — M54.16 LUMBAR RADICULOPATHY, CHRONIC: ICD-10-CM

## 2023-07-31 PROCEDURE — 72148 MRI LUMBAR SPINE WITHOUT CONTRAST: ICD-10-PCS | Mod: 26,,, | Performed by: STUDENT IN AN ORGANIZED HEALTH CARE EDUCATION/TRAINING PROGRAM

## 2023-07-31 PROCEDURE — 72148 MRI LUMBAR SPINE W/O DYE: CPT | Mod: TC

## 2023-07-31 PROCEDURE — 72148 MRI LUMBAR SPINE W/O DYE: CPT | Mod: 26,,, | Performed by: STUDENT IN AN ORGANIZED HEALTH CARE EDUCATION/TRAINING PROGRAM

## 2023-08-01 ENCOUNTER — OFFICE VISIT (OUTPATIENT)
Dept: FAMILY MEDICINE | Facility: CLINIC | Age: 48
End: 2023-08-01
Payer: OTHER GOVERNMENT

## 2023-08-01 VITALS
WEIGHT: 217 LBS | DIASTOLIC BLOOD PRESSURE: 80 MMHG | HEART RATE: 87 BPM | BODY MASS INDEX: 34.06 KG/M2 | OXYGEN SATURATION: 98 % | HEIGHT: 67 IN | RESPIRATION RATE: 20 BRPM | TEMPERATURE: 99 F | SYSTOLIC BLOOD PRESSURE: 135 MMHG

## 2023-08-01 DIAGNOSIS — Z20.822 EXPOSURE TO COVID-19 VIRUS: ICD-10-CM

## 2023-08-01 DIAGNOSIS — J06.9 ACUTE UPPER RESPIRATORY INFECTION: Primary | ICD-10-CM

## 2023-08-01 DIAGNOSIS — J02.9 SORE THROAT: ICD-10-CM

## 2023-08-01 PROCEDURE — 99213 OFFICE O/P EST LOW 20 MIN: CPT | Mod: 25,,, | Performed by: NURSE PRACTITIONER

## 2023-08-01 PROCEDURE — 99213 PR OFFICE/OUTPT VISIT, EST, LEVL III, 20-29 MIN: ICD-10-PCS | Mod: 25,,, | Performed by: NURSE PRACTITIONER

## 2023-08-01 PROCEDURE — 87880 STREP A ASSAY W/OPTIC: CPT | Mod: QW,,, | Performed by: NURSE PRACTITIONER

## 2023-08-01 PROCEDURE — 96372 PR INJECTION,THERAP/PROPH/DIAG2ST, IM OR SUBCUT: ICD-10-PCS | Mod: ,,, | Performed by: NURSE PRACTITIONER

## 2023-08-01 PROCEDURE — 87428 SARSCOV & INF VIR A&B AG IA: CPT | Mod: QW,,, | Performed by: NURSE PRACTITIONER

## 2023-08-01 PROCEDURE — 96372 THER/PROPH/DIAG INJ SC/IM: CPT | Mod: ,,, | Performed by: NURSE PRACTITIONER

## 2023-08-01 PROCEDURE — 87880 POCT RAPID STREP A: ICD-10-PCS | Mod: QW,,, | Performed by: NURSE PRACTITIONER

## 2023-08-01 PROCEDURE — 87428 POCT SARS-COV2 (COVID) WITH FLU ANTIGEN: ICD-10-PCS | Mod: QW,,, | Performed by: NURSE PRACTITIONER

## 2023-08-01 RX ORDER — DEXAMETHASONE SODIUM PHOSPHATE 4 MG/ML
4 INJECTION, SOLUTION INTRA-ARTICULAR; INTRALESIONAL; INTRAMUSCULAR; INTRAVENOUS; SOFT TISSUE
Status: COMPLETED | OUTPATIENT
Start: 2023-08-01 | End: 2023-08-01

## 2023-08-01 RX ORDER — CHLORPHENIRAMINE MALEATE AND DEXTROMETHORPHAN HYDROBROMIDE 4; 30 MG/1; MG/1
1 TABLET, FILM COATED ORAL EVERY 6 HOURS
Qty: 20 EACH | Refills: 1 | Status: SHIPPED | OUTPATIENT
Start: 2023-08-01 | End: 2023-08-11

## 2023-08-01 RX ADMIN — DEXAMETHASONE SODIUM PHOSPHATE 4 MG: 4 INJECTION, SOLUTION INTRA-ARTICULAR; INTRALESIONAL; INTRAMUSCULAR; INTRAVENOUS; SOFT TISSUE at 05:08

## 2023-08-01 NOTE — PROGRESS NOTES
Subjective:       Patient ID: Carey Leonardo is a 47 y.o. female.    Chief Complaint: Generalized Body Aches (Body aches, headache, cough and sneezing)    Body aches,headache, cough and sneezing    Review of Systems   Constitutional:  Negative for appetite change, fatigue and fever.   HENT:  Positive for sneezing. Negative for nasal congestion, ear pain and sore throat.    Eyes:  Negative for pain, discharge and itching.   Respiratory:  Positive for cough. Negative for shortness of breath.    Cardiovascular:  Negative for chest pain and leg swelling.   Gastrointestinal:  Negative for abdominal pain, change in bowel habit, nausea, vomiting and change in bowel habit.   Musculoskeletal:  Positive for myalgias. Negative for back pain, gait problem and neck pain.   Integumentary:  Negative for rash and wound.   Allergic/Immunologic: Negative for immunocompromised state.   Neurological:  Positive for headaches. Negative for dizziness and weakness.   All other systems reviewed and are negative.        Objective:      Physical Exam  Vitals and nursing note reviewed.   Constitutional:       General: She is not in acute distress.     Appearance: Normal appearance. She is not ill-appearing, toxic-appearing or diaphoretic.   HENT:      Head: Normocephalic.      Right Ear: Tympanic membrane, ear canal and external ear normal.      Left Ear: Tympanic membrane, ear canal and external ear normal.      Nose: Congestion present. No rhinorrhea.      Mouth/Throat:      Mouth: Mucous membranes are moist.      Pharynx: No oropharyngeal exudate or posterior oropharyngeal erythema.   Eyes:      General: No scleral icterus.        Right eye: No discharge.         Left eye: No discharge.      Extraocular Movements: Extraocular movements intact.      Conjunctiva/sclera: Conjunctivae normal.      Pupils: Pupils are equal, round, and reactive to light.   Cardiovascular:      Rate and Rhythm: Normal rate and regular rhythm.      Pulses: Normal  pulses.      Heart sounds: Normal heart sounds. No murmur heard.  Pulmonary:      Effort: Pulmonary effort is normal. No respiratory distress.      Breath sounds: Normal breath sounds. No wheezing, rhonchi or rales.   Musculoskeletal:         General: Normal range of motion.      Cervical back: Neck supple. No tenderness.   Lymphadenopathy:      Cervical: No cervical adenopathy.   Skin:     General: Skin is warm and dry.      Capillary Refill: Capillary refill takes less than 2 seconds.      Findings: No rash.   Neurological:      Mental Status: She is alert and oriented to person, place, and time.   Psychiatric:         Mood and Affect: Mood normal.         Behavior: Behavior normal.         Thought Content: Thought content normal.         Judgment: Judgment normal.            Assessment:       1. Acute upper respiratory infection    2. Exposure to COVID-19 virus    3. Sore throat        Plan:   Acute upper respiratory infection  -     dexAMETHasone injection 4 mg  -     chlorpheniramine-dextromethorp (CORICIDIN HBP COUGH AND COLD) 4-30 mg Tab; Take 1 tablet by mouth every 6 (six) hours. for 10 days  Dispense: 20 each; Refill: 1    Exposure to COVID-19 virus  -     POCT SARS-COV2 (COVID) with Flu Antigen    Sore throat  -     POCT rapid strep A         Risks, benefits, and side effects were discussed with the patient. All questions were answered to the fullest satisfaction of the patient, and pt verbalized understanding and agreement to treatment plan. Pt was to call with any new or worsening symptoms, or present to the ER

## 2023-08-10 NOTE — PROGRESS NOTES
Subjective:         Patient ID: Carey Leonardo is a 47 y.o. female.    Chief Complaint: Low-back Pain and Leg Pain      Pain  This is a chronic problem. The current episode started more than 1 year ago. The problem occurs daily. The problem has been unchanged. Associated symptoms include arthralgias. Pertinent negatives include no anorexia, change in bowel habit, chest pain, chills, coughing, diaphoresis, fever, neck pain, sore throat, swollen glands, urinary symptoms, vertigo or vomiting.     Review of Systems   Constitutional:  Negative for activity change, appetite change, chills, diaphoresis, fever and unexpected weight change.   HENT:  Negative for drooling, ear discharge, ear pain, facial swelling, nosebleeds, sore throat, trouble swallowing, voice change and goiter.    Eyes:  Negative for photophobia, pain, discharge, redness and visual disturbance.   Respiratory:  Negative for apnea, cough, choking, chest tightness, shortness of breath, wheezing and stridor.    Cardiovascular:  Negative for chest pain, palpitations and leg swelling.   Gastrointestinal:  Negative for abdominal distention, anorexia, change in bowel habit, diarrhea, rectal pain, vomiting, fecal incontinence and change in bowel habit.   Endocrine: Negative for cold intolerance, heat intolerance, polydipsia, polyphagia and polyuria.   Genitourinary:  Negative for bladder incontinence, dysuria, flank pain, frequency and hot flashes.   Musculoskeletal:  Positive for arthralgias, back pain and leg pain. Negative for neck pain.   Integumentary:  Negative for color change and pallor.   Allergic/Immunologic: Negative for immunocompromised state.   Neurological:  Negative for dizziness, vertigo, seizures, syncope, facial asymmetry, speech difficulty, light-headedness, coordination difficulties, memory loss and coordination difficulties.   Hematological:  Negative for adenopathy. Does not bruise/bleed easily.   Psychiatric/Behavioral:  Negative for  agitation, behavioral problems, confusion, decreased concentration, dysphoric mood, hallucinations, self-injury and suicidal ideas. The patient is not nervous/anxious and is not hyperactive.            Past Medical History:   Diagnosis Date    Asthma     CHF (congestive heart failure)     Chronic pain syndrome     Depressive disorder     Diabetes mellitus, type 2     Disorder of sacrum 4/28/2022    Enlarged heart     Gastroparesis     GERD (gastroesophageal reflux disease)     Hyperlipidemia     Hypertension     IBS (irritable bowel syndrome)     Kidney stones     Lumbar radiculopathy     Sleep apnea     Does not use a CPAP    Thyroid disease      Past Surgical History:   Procedure Laterality Date    Bilateral L3-S1 MBB Bilateral 6-, 5-, 1-8-2014    Dr Jessica Randolph    CARPAL TUNNEL RELEASE      CHOLECYSTECTOMY      DIAGNOSTIC LAPAROSCOPY  04/14/2010    Exploratory Laparotomy, Myomectomy, Hydrotubation-Dr. Feng    DILATION AND CURETTAGE OF UTERUS  04/14/2010    Hysteroscopy-Dr. Feng    EXPLORATORY LAPAROTOMY WITH UTERINE MYOMECTOMY  02/27/2007    Dr. Marquise Anderson    HYSTERECTOMY  09/29/2011    Robotic, FABIENNE, Cystoscopy-Dr. Feng    HYSTEROSCOPY WITH DILATION AND CURETTAGE OF UTERUS  04/04/2006    Laparoscopy, Chromotubation-Dr. Marquise Anderson    INJECTION OF ANESTHETIC AGENT AROUND ILIOINGUINAL NERVE Right 6/22/2023    Procedure: BLOCK, NERVE, ILIOINGUINAL;  Surgeon: Rasheeda Bills MD;  Location: [facility];  Service: Pain Management;  Laterality: Right;    LAMINECTOMY N/A 11/30/2021    Procedure: L2-L5 Laminectomy;  Surgeon: Cyril Utpon MD;  Location: [facility] OR;  Service: Neurosurgery;  Laterality: N/A;    LEFT HEART CATHETERIZATION      Left L3-S1 RFTC Left 07/18/2017    Dr Lopez    Left SI JI Left 09/30/2013    Dr Randolph    MYRINGOTOMY WITH INSERTION OF VENTILATION TUBE Bilateral 4/4/2023    Procedure: MYRINGOTOMY, WITH TYMPANOSTOMY TUBE INSERTION (T-TUBES);  Surgeon: Sea RUBIO  "MD Jamaal;  Location: [facility] OR;  Service: ENT;  Laterality: Bilateral;  T-TUBES    OOPHORECTOMY  11/11/2014    Robotic, FABIENNE, Cystoscopy-Dr. Feng    SINUS SURGERY       Social History     Socioeconomic History    Marital status:    Tobacco Use    Smoking status: Never    Smokeless tobacco: Never   Substance and Sexual Activity    Alcohol use: Not Currently    Drug use: Not Currently     Types: Hydrocodone, Oxycodone    Sexual activity: Not Currently     Family History   Problem Relation Age of Onset    Diabetes Mellitus Mother     Heart disease Mother     Hypertension Mother     Migraines Mother     Heart disease Sister      Review of patient's allergies indicates:   Allergen Reactions    Fish containing products Anaphylaxis    Iodinated contrast media     Penicillins         Objective:  Vitals:    08/14/23 0917 08/14/23 0918   BP: (!) 141/85    Pulse: 91    Resp: 20    Weight: 98.9 kg (218 lb)    Height: 5' 7" (1.702 m)    PainSc:   7   7         Physical Exam  Vitals and nursing note reviewed. Exam conducted with a chaperone present.   Constitutional:       General: She is awake. She is not in acute distress.     Appearance: Normal appearance. She is not ill-appearing, toxic-appearing or diaphoretic.   HENT:      Head: Normocephalic and atraumatic.      Nose: Nose normal.      Mouth/Throat:      Mouth: Mucous membranes are moist.      Pharynx: Oropharynx is clear.   Eyes:      Conjunctiva/sclera: Conjunctivae normal.      Pupils: Pupils are equal, round, and reactive to light.   Cardiovascular:      Rate and Rhythm: Normal rate.   Pulmonary:      Effort: Pulmonary effort is normal. No respiratory distress.   Abdominal:      Palpations: Abdomen is soft.      Tenderness: There is no guarding.   Musculoskeletal:         General: Normal range of motion.      Cervical back: Normal range of motion and neck supple. No rigidity.   Skin:     General: Skin is warm and dry.      Coloration: Skin is not " jaundiced or pale.   Neurological:      General: No focal deficit present.      Mental Status: She is alert and oriented to person, place, and time. Mental status is at baseline.      Cranial Nerves: No cranial nerve deficit (II-XII).   Psychiatric:         Mood and Affect: Mood normal.         Behavior: Behavior normal. Behavior is cooperative.         Thought Content: Thought content normal.           MRI Lumbar Spine Without Contrast  Narrative: EXAMINATION:  MRI LUMBAR SPINE WITHOUT CONTRAST    CLINICAL HISTORY:  Lumbar radiculopathy, symptoms persist with conservative treatment; Radiculopathy, lumbar region    TECHNIQUE:  Multiplanar, multisequence MR images were acquired from the thoracolumbar junction to the sacrum without the administration of contrast.    COMPARISON:  3/11/22    FINDINGS:  Alignment: Normal.    Vertebrae: Mild multilevel endplate change.  Laminectomy changes extending from L2 through L5, similar.    Discs: Mild multilevel degenerative disc disease    Cord: Normal.  Conus terminates at L1    Degenerative findings:    T12-L1: Normal    L1-L2: Circumferential disc bulge, facet change results in mild spinal canal narrowing as well as mild bilateral neural foraminal narrowing, unchanged from comparison    L2-L3: Circumferential disc bulge, facet change results in mild spinal canal narrowing as well as mild bilateral neural foraminal narrowing, unchanged from comparison    L3-L4: Circumferential disc bulge, facet change results in mild spinal canal narrowing as well as mild-to-moderate bilateral neural foraminal narrowing, unchanged from comparison    L4-L5: Circumferential disc bulge, facet change results in mild spinal canal narrowing as well as mild bilateral neural foraminal narrowing, unchanged    L5-S1: Circumferential disc bulge, facet change results in mild spinal canal narrowing as well as mild bilateral neural foraminal narrowing, unchanged from comparison    Paraspinal muscles & soft  tissues: Unremarkable.  Impression: Multilevel degenerative change of the lumbar spine, unchanged from 03/11/2022    Electronically signed by: Jeet Mendoza  Date:    07/31/2023  Time:    09:47       Office Visit on 08/01/2023   Component Date Value Ref Range Status    SARS Coronavirus 2 Antigen 08/01/2023 Negative  Negative Final    Rapid Influenza A Ag 08/01/2023 Negative  Negative Final    Rapid Influenza B Ag 08/01/2023 Negative  Negative Final     Acceptable 08/01/2023 Yes   Final    Rapid Strep A Screen 08/01/2023 Negative  Negative Final     Acceptable 08/01/2023 Yes   Final   Patient Outreach on 07/24/2023   Component Date Value Ref Range Status    Left Eye DM Retinopathy 12/22/2020 Negative   Final    Right Eye DM Retinopathy 12/22/2020 Negative   Final   Office Visit on 07/19/2023   Component Date Value Ref Range Status    Sodium 07/19/2023 144  136 - 145 mmol/L Final    Potassium 07/19/2023 3.8  3.5 - 5.1 mmol/L Final    Chloride 07/19/2023 106  98 - 107 mmol/L Final    CO2 07/19/2023 31  21 - 32 mmol/L Final    Anion Gap 07/19/2023 11  7 - 16 mmol/L Final    Glucose 07/19/2023 188 (H)  74 - 106 mg/dL Final    BUN 07/19/2023 9  7 - 18 mg/dL Final    Creatinine 07/19/2023 0.97  0.55 - 1.02 mg/dL Final    BUN/Creatinine Ratio 07/19/2023 9  6 - 20 Final    Calcium 07/19/2023 8.8  8.5 - 10.1 mg/dL Final    Total Protein 07/19/2023 7.1  6.4 - 8.2 g/dL Final    Albumin 07/19/2023 3.3 (L)  3.5 - 5.0 g/dL Final    Globulin 07/19/2023 3.8  2.0 - 4.0 g/dL Final    A/G Ratio 07/19/2023 0.9   Final    Bilirubin, Total 07/19/2023 0.3  >0.0 - 1.2 mg/dL Final    Alk Phos 07/19/2023 122 (H)  39 - 100 U/L Final    ALT 07/19/2023 31  13 - 56 U/L Final    AST 07/19/2023 23  15 - 37 U/L Final    eGFR 07/19/2023 73  >=60 mL/min/1.73m2 Final    Hemoglobin A1C 07/19/2023 7.3 (H)  4.5 - 6.6 % Final    Estimated Average Glucose 07/19/2023 157  mg/dL Final    TSH 07/19/2023 49.700 (H)  0.358 -  3.740 uIU/mL Final   Office Visit on 07/12/2023   Component Date Value Ref Range Status    POC Amphetamines 07/12/2023 Negative  Negative, Inconclusive Final    POC Barbiturates 07/12/2023 Negative  Negative, Inconclusive Final    POC Benzodiazepines 07/12/2023 Presumptive Positive (A)  Negative, Inconclusive Final    POC Cocaine 07/12/2023 Negative  Negative, Inconclusive Final    POC THC 07/12/2023 Negative  Negative, Inconclusive Final    POC Methadone 07/12/2023 Negative  Negative, Inconclusive Final    POC Methamphetamine 07/12/2023 Negative  Negative, Inconclusive Final    POC Opiates 07/12/2023 Negative  Negative, Inconclusive Final    POC Oxycodone 07/12/2023 Negative  Negative, Inconclusive Final    POC Phencyclidine 07/12/2023 Negative  Negative, Inconclusive Final    POC Methylenedioxymethamphetamine * 07/12/2023 Negative  Negative, Inconclusive Final    POC Tricyclic Antidepressants 07/12/2023 Negative  Negative, Inconclusive Final    POC Buprenorphine 07/12/2023 Negative   Final     Acceptable 07/12/2023 Yes   Final    POC Temperature (Urine) 07/12/2023 90   Final   Admission on 06/22/2023, Discharged on 06/22/2023   Component Date Value Ref Range Status    POC Glucose 06/22/2023 141 (H)  70 - 105 mg/dL Final   Admission on 06/13/2023, Discharged on 06/13/2023   Component Date Value Ref Range Status    Sodium 06/13/2023 139  136 - 145 mmol/L Final    Potassium 06/13/2023 2.8 (L)  3.5 - 5.1 mmol/L Final    Chloride 06/13/2023 98  98 - 107 mmol/L Final    CO2 06/13/2023 35 (H)  21 - 32 mmol/L Final    Anion Gap 06/13/2023 9  7 - 16 mmol/L Final    Glucose 06/13/2023 176 (H)  74 - 106 mg/dL Final    BUN 06/13/2023 7  7 - 18 mg/dL Final    Creatinine 06/13/2023 0.95  0.55 - 1.02 mg/dL Final    BUN/Creatinine Ratio 06/13/2023 7  6 - 20 Final    Calcium 06/13/2023 8.9  8.5 - 10.1 mg/dL Final    Total Protein 06/13/2023 7.9  6.4 - 8.2 g/dL Final    Albumin 06/13/2023 3.3 (L)  3.5 - 5.0 g/dL  Final    Globulin 06/13/2023 4.6 (H)  2.0 - 4.0 g/dL Final    A/G Ratio 06/13/2023 0.7   Final    Bilirubin, Total 06/13/2023 0.2  >0.0 - 1.2 mg/dL Final    Alk Phos 06/13/2023 130 (H)  39 - 100 U/L Final    ALT 06/13/2023 20  13 - 56 U/L Final    AST 06/13/2023 14 (L)  15 - 37 U/L Final    eGFR 06/13/2023 75  >=60 mL/min/1.73m2 Final    Lipase 06/13/2023 60 (L)  73 - 393 U/L Final    Color, UA 06/13/2023 Light Yellow  Colorless, Straw, Yellow, Light Yellow, Dark Yellow Final    Clarity, UA 06/13/2023 Slightly Cloudy  Clear Final    pH, UA 06/13/2023 7.5  5.0 to 8.0 pH Units Final    Leukocytes, UA 06/13/2023 Negative  Negative Final    Nitrites, UA 06/13/2023 Negative  Negative Final    Protein, UA 06/13/2023 Negative  Negative Final    Glucose, UA 06/13/2023 Normal  Normal mg/dL Final    Ketones, UA 06/13/2023 Negative  Negative mg/dL Final    Urobilinogen, UA 06/13/2023 Normal  0.2, 1.0, Normal mg/dL Final    Bilirubin, UA 06/13/2023 Negative  Negative Final    Blood, UA 06/13/2023 Negative  Negative Final    Specific Gravity, UA 06/13/2023 1.009  <=1.030 Final    WBC 06/13/2023 7.67  4.50 - 11.00 K/uL Final    RBC 06/13/2023 4.34  4.20 - 5.40 M/uL Final    Hemoglobin 06/13/2023 12.0  12.0 - 16.0 g/dL Final    Hematocrit 06/13/2023 39.3  38.0 - 47.0 % Final    MCV 06/13/2023 90.6  80.0 - 96.0 fL Final    MCH 06/13/2023 27.6  27.0 - 31.0 pg Final    MCHC 06/13/2023 30.5 (L)  32.0 - 36.0 g/dL Final    RDW 06/13/2023 12.4  11.5 - 14.5 % Final    Platelet Count 06/13/2023 367  150 - 400 K/uL Final    MPV 06/13/2023 9.2 (L)  9.4 - 12.4 fL Final    Neutrophils % 06/13/2023 54.9  53.0 - 65.0 % Final    Lymphocytes % 06/13/2023 36.9  27.0 - 41.0 % Final    Monocytes % 06/13/2023 5.6  2.0 - 6.0 % Final    Eosinophils % 06/13/2023 1.7  1.0 - 4.0 % Final    Basophils % 06/13/2023 0.5  0.0 - 1.0 % Final    Immature Granulocytes % 06/13/2023 0.4  0.0 - 0.4 % Final    nRBC, Auto 06/13/2023 0.0  <=0.0 % Final    Neutrophils,  Abs 06/13/2023 4.21  1.80 - 7.70 K/uL Final    Lymphocytes, Absolute 06/13/2023 2.83  1.00 - 4.80 K/uL Final    Monocytes, Absolute 06/13/2023 0.43  0.00 - 0.80 K/uL Final    Eosinophils, Absolute 06/13/2023 0.13  0.00 - 0.50 K/uL Final    Basophils, Absolute 06/13/2023 0.04  0.00 - 0.20 K/uL Final    Immature Granulocytes, Absolute 06/13/2023 0.03  0.00 - 0.04 K/uL Final    nRBC, Absolute 06/13/2023 0.00  <=0.00 x10e3/uL Final    Diff Type 06/13/2023 Auto   Final    POC Glucose 06/13/2023 171 (H)  70 - 105 mg/dL Final    Magnesium 06/13/2023 1.7  1.7 - 2.3 mg/dL Final   Office Visit on 05/29/2023   Component Date Value Ref Range Status    Color, UA 05/29/2023 Dark Yellow   Corrected    Spec Grav UA 05/29/2023 1.030   Corrected    pH, UA 05/29/2023 6.0   Final-Edited    WBC, UA 05/29/2023 positive   Corrected    Nitrite, UA 05/29/2023 negative   Final-Edited    Protein, POC 05/29/2023 positive   Corrected    Glucose, UA 05/29/2023 negative   Final-Edited    Ketones, UA 05/29/2023 positive   Corrected    Bilirubin, POC 05/29/2023 positive   Corrected    Urobilinogen, UA 05/29/2023 0.2   Final-Edited    Blood, UA 05/29/2023 negative   Final-Edited    POC Glucose 05/29/2023 189 (A)  70 - 110 MG/DL Final    SARS Coronavirus 2 Antigen 05/29/2023 Negative  Negative Final    Rapid Influenza A Ag 05/29/2023 Negative  Negative Final    Rapid Influenza B Ag 05/29/2023 Negative  Negative Final     Acceptable 05/29/2023 Yes   Final    Sodium 05/29/2023 137  136 - 145 mmol/L Final    Potassium 05/29/2023 3.3 (L)  3.5 - 5.1 mmol/L Final    Chloride 05/29/2023 103  98 - 107 mmol/L Final    CO2 05/29/2023 30  21 - 32 mmol/L Final    Anion Gap 05/29/2023 7  7 - 16 mmol/L Final    Glucose 05/29/2023 206 (H)  74 - 106 mg/dL Final    BUN 05/29/2023 11  7 - 18 mg/dL Final    Creatinine 05/29/2023 0.98  0.55 - 1.02 mg/dL Final    BUN/Creatinine Ratio 05/29/2023 11  6 - 20 Final    Calcium 05/29/2023 9.3  8.5 - 10.1 mg/dL  Final    Total Protein 05/29/2023 8.0  6.4 - 8.2 g/dL Final    Albumin 05/29/2023 3.8  3.5 - 5.0 g/dL Final    Globulin 05/29/2023 4.2 (H)  2.0 - 4.0 g/dL Final    A/G Ratio 05/29/2023 0.9   Final    Bilirubin, Total 05/29/2023 0.4  >0.0 - 1.2 mg/dL Final    Alk Phos 05/29/2023 140 (H)  39 - 100 U/L Final    ALT 05/29/2023 28  13 - 56 U/L Final    AST 05/29/2023 19  15 - 37 U/L Final    eGFR 05/29/2023 72  >=60 mL/min/1.73m2 Final    WBC 05/29/2023 7.43  4.50 - 11.00 K/uL Final    RBC 05/29/2023 4.30  4.20 - 5.40 M/uL Final    Hemoglobin 05/29/2023 11.6 (L)  12.0 - 16.0 g/dL Final    Hematocrit 05/29/2023 38.8  38.0 - 47.0 % Final    MCV 05/29/2023 90.2  80.0 - 96.0 fL Final    MCH 05/29/2023 27.0  27.0 - 31.0 pg Final    MCHC 05/29/2023 29.9 (L)  32.0 - 36.0 g/dL Final    RDW 05/29/2023 12.9  11.5 - 14.5 % Final    Platelet Count 05/29/2023 485 (H)  150 - 400 K/uL Final    MPV 05/29/2023 9.6  9.4 - 12.4 fL Final    Neutrophils % 05/29/2023 51.4 (L)  53.0 - 65.0 % Final    Lymphocytes % 05/29/2023 40.8  27.0 - 41.0 % Final    Monocytes % 05/29/2023 4.8  2.0 - 6.0 % Final    Eosinophils % 05/29/2023 2.4  1.0 - 4.0 % Final    Basophils % 05/29/2023 0.5  0.0 - 1.0 % Final    Immature Granulocytes % 05/29/2023 0.1  0.0 - 0.4 % Final    nRBC, Auto 05/29/2023 0.0  <=0.0 % Final    Neutrophils, Abs 05/29/2023 3.81  1.80 - 7.70 K/uL Final    Lymphocytes, Absolute 05/29/2023 3.03  1.00 - 4.80 K/uL Final    Monocytes, Absolute 05/29/2023 0.36  0.00 - 0.80 K/uL Final    Eosinophils, Absolute 05/29/2023 0.18  0.00 - 0.50 K/uL Final    Basophils, Absolute 05/29/2023 0.04  0.00 - 0.20 K/uL Final    Immature Granulocytes, Absolute 05/29/2023 0.01  0.00 - 0.04 K/uL Final    nRBC, Absolute 05/29/2023 0.00  <=0.00 x10e3/uL Final    Diff Type 05/29/2023 Auto   Final    Color, UA 05/29/2023 Yellow  Colorless, Straw, Yellow, Light Yellow, Dark Yellow Final    Clarity, UA 05/29/2023 Ex.Turbid  Clear Final    pH, UA 05/29/2023 6.0  5.0 to  8.0 pH Units Final    Leukocytes, UA 05/29/2023 Large (A)  Negative Final    Nitrites, UA 05/29/2023 Negative  Negative Final    Protein, UA 05/29/2023 100 (A)  Negative Final    Glucose, UA 05/29/2023 30 (A)  Normal mg/dL Final    Ketones, UA 05/29/2023 Negative  Negative mg/dL Final    Urobilinogen, UA 05/29/2023 2 (A)  0.2, 1.0, Normal mg/dL Final    Bilirubin, UA 05/29/2023 Negative  Negative Final    Blood, UA 05/29/2023 Negative  Negative Final    Specific Gravity, UA 05/29/2023 1.038 (H)  <=1.030 Final    WBC, UA 05/29/2023 30 (H)  <=5 /hpf Final    RBC, UA 05/29/2023 80 (H)  <=3 /hpf Final    Bacteria, UA 05/29/2023 Many (A)  None Seen /hpf Final    Squamous Epithelial Cells, UA 05/29/2023 Moderate (A)  None Seen /HPF Final    Mucous 05/29/2023 Many (A)  None Seen /LPF Final    Culture, Urine 05/29/2023 15,000 Streptococcus agalactiae (Group B) (A)   Final   Office Visit on 04/13/2023   Component Date Value Ref Range Status    Vitamin D 25-Hydroxy, Blood 04/13/2023 16.3  ng/mL Final    WBC 04/13/2023 7.40  4.50 - 11.00 K/uL Final    RBC 04/13/2023 4.32  4.20 - 5.40 M/uL Final    Hemoglobin 04/13/2023 11.6 (L)  12.0 - 16.0 g/dL Final    Hematocrit 04/13/2023 38.6  38.0 - 47.0 % Final    MCV 04/13/2023 89.4  80.0 - 96.0 fL Final    MCH 04/13/2023 26.9 (L)  27.0 - 31.0 pg Final    MCHC 04/13/2023 30.1 (L)  32.0 - 36.0 g/dL Final    RDW 04/13/2023 13.3  11.5 - 14.5 % Final    Platelet Count 04/13/2023 447 (H)  150 - 400 K/uL Final    MPV 04/13/2023 10.5  9.4 - 12.4 fL Final    Neutrophils % 04/13/2023 48.1 (L)  53.0 - 65.0 % Final    Lymphocytes % 04/13/2023 43.2 (H)  27.0 - 41.0 % Final    Monocytes % 04/13/2023 6.1 (H)  2.0 - 6.0 % Final    Eosinophils % 04/13/2023 1.8  1.0 - 4.0 % Final    Basophils % 04/13/2023 0.4  0.0 - 1.0 % Final    Immature Granulocytes % 04/13/2023 0.4  0.0 - 0.4 % Final    nRBC, Auto 04/13/2023 0.0  <=0.0 % Final    Neutrophils, Abs 04/13/2023 3.56  1.80 - 7.70 K/uL Final     Lymphocytes, Absolute 04/13/2023 3.20  1.00 - 4.80 K/uL Final    Monocytes, Absolute 04/13/2023 0.45  0.00 - 0.80 K/uL Final    Eosinophils, Absolute 04/13/2023 0.13  0.00 - 0.50 K/uL Final    Basophils, Absolute 04/13/2023 0.03  0.00 - 0.20 K/uL Final    Immature Granulocytes, Absolute 04/13/2023 0.03  0.00 - 0.04 K/uL Final    nRBC, Absolute 04/13/2023 0.00  <=0.00 x10e3/uL Final    Diff Type 04/13/2023 Auto   Final   Office Visit on 04/12/2023   Component Date Value Ref Range Status    POC Amphetamines 04/12/2023 Negative  Negative, Inconclusive Final    POC Barbiturates 04/12/2023 Negative  Negative, Inconclusive Final    POC Benzodiazepines 04/12/2023 Negative  Negative, Inconclusive Final    POC Cocaine 04/12/2023 Negative  Negative, Inconclusive Final    POC THC 04/12/2023 Negative  Negative, Inconclusive Final    POC Methadone 04/12/2023 Negative  Negative, Inconclusive Final    POC Methamphetamine 04/12/2023 Negative  Negative, Inconclusive Final    POC Opiates 04/12/2023 Negative  Negative, Inconclusive Final    POC Oxycodone 04/12/2023 Negative  Negative, Inconclusive Final    POC Phencyclidine 04/12/2023 Negative  Negative, Inconclusive Final    POC Methylenedioxymethamphetamine * 04/12/2023 Negative  Negative, Inconclusive Final    POC Tricyclic Antidepressants 04/12/2023 Negative  Negative, Inconclusive Final    POC Buprenorphine 04/12/2023 Negative   Final     Acceptable 04/12/2023 Yes   Final    POC Temperature (Urine) 04/12/2023 94   Final   Admission on 04/04/2023, Discharged on 04/04/2023   Component Date Value Ref Range Status    POC Glucose 04/04/2023 188 (H)  70 - 105 mg/dL Final    POC Glucose 04/04/2023 164 (H)  70 - 105 mg/dL Final    CK 04/04/2023 79  26 - 192 U/L Final    CK-MB 04/04/2023 <1.0 (L)  1.0 - 3.6 ng/mL Final    Troponin I High Sensitivity 04/04/2023 7.4  <=60.4 pg/mL Final   There may be more visits with results that are not included.         No orders of the  defined types were placed in this encounter.      Requested Prescriptions     Signed Prescriptions Disp Refills    meloxicam (MOBIC) 15 MG tablet 30 tablet 0     Sig: Take 1 tablet (15 mg total) by mouth once daily.    cyclobenzaprine (FLEXERIL) 10 MG tablet 90 tablet 0     Sig: Take 1 tablet (10 mg total) by mouth 3 (three) times daily as needed for Muscle spasms.    traMADoL (ULTRAM) 50 mg tablet 90 tablet 0     Sig: Take 1 tablet (50 mg total) by mouth every 8 (eight) hours as needed for Pain.       Assessment:     1. Lumbosacral radiculopathy    2. Postlaminectomy syndrome, lumbar region    3. Disorder of sacrum    4. Ilioinguinal neuralgia of right side         A's of Opioid Risk Assessment  Activity:Patient can perform ADL.   Analgesia:Patients pain is partially controlled by current medication. Patient has tried OTC medications such as Tylenol and Ibuprofen with out relief.   Adverse Effects: Patient denies constipation or sedation.  Aberrant Behavior:  reviewed with no aberrant drug seeking/taking behavior.  Overdose reversal drug naloxone discussed    Drug screen reviewed      Plan:    MRI lumbar spine [facility] July 31, 2023 multiple level degenerative changes mild bilateral foraminal stenosis L3/4 L4/5 less so at L5/S1 postop changes noted     History lumbar surgery laminectomy L2 through 5 November 30, 2021 Dr. Upton complicated by postop infection    Presumptive drug screen negative, this is expected result, patient takes tramadol, cup does not test for tramadol    She states she would like to have procedure for back pain worse with flexion extension rotation lumbar spine ongoing for more than 3 months facet joint in nature     Requesting Toradol injection    Toradol 60 mg IM, tolerated well    Indications for this procedure for this specific patient include the following   - Pt has had symptoms for three months with moderate to severe pain with functional impairment rated of 7/10 pain.   - Pain  non-responsive to conservative care.    - Pain predominately axial and not associated with radiculopathy or claudication.    - No non-facet pathology as source of pain.    - Clinical assessment implicates facet joint as putative pain source.    - facet loading maneuver positive  - Pain is exacerbated by extension or prolonged sitting/standing and relieved by rest.    - No unexplained neurologic deficit.    - No history of coagulopathy, infection or unstable medical conditions.    - Pain is causing significant functional limitation resulting in diminished quality of life and impaired age appropriate ADL's.   - Clinical assessment implicates facet joint as putative source of pain  - Repeat injections not done prior to 7 days   - no more than 2 levels will be done    The planned medically necessary  surgical procedure is performed in a hospital outpatient department and not in an ambulatory surgical center due to:     -there is no geographically assessable ambulatory surgery center that has the  necessary equipment and fluoroscopy needed for the procedure     -there is no geographically assessable ambulatory surgical center available at which the physician has privileges     -an ASC's  specific  guideline regarding the individuals weight or health conditions that prevent the use of an ASC     -Medial branch block performed in consideration for RFTC, not just for therapeutic treatment    -done under fluoro    Monitor anesthesia request is medically indicated for the scheduled nerve block procedure due to:  1- needle phobia and anxiety, placing  the patient at risk during the provided service.  2-patient has an ASA class greater than 3 and requires constant presence of an anesthesiologist during the procedure,   3-patient has severe problems hard to lie still  4-patient suffers from chronic pain and is unable to function due to  diminished ADLs    Schedule bilateral lumbar medial branch block L4 through S1, # 1,  lumbosacral spondylosis      Dr. Bills    Bring original prescription medication bottles/container/box with labels to each visit

## 2023-08-11 LAB
LEFT EYE DM RETINOPATHY: NEGATIVE
RIGHT EYE DM RETINOPATHY: NEGATIVE

## 2023-08-14 ENCOUNTER — OFFICE VISIT (OUTPATIENT)
Dept: PAIN MEDICINE | Facility: CLINIC | Age: 48
End: 2023-08-14
Payer: OTHER GOVERNMENT

## 2023-08-14 VITALS
SYSTOLIC BLOOD PRESSURE: 141 MMHG | BODY MASS INDEX: 34.21 KG/M2 | DIASTOLIC BLOOD PRESSURE: 85 MMHG | HEIGHT: 67 IN | HEART RATE: 91 BPM | RESPIRATION RATE: 20 BRPM | WEIGHT: 218 LBS

## 2023-08-14 DIAGNOSIS — G57.91 ILIOINGUINAL NEURALGIA OF RIGHT SIDE: Chronic | ICD-10-CM

## 2023-08-14 DIAGNOSIS — M47.817 LUMBOSACRAL SPONDYLOSIS WITHOUT MYELOPATHY: Primary | Chronic | ICD-10-CM

## 2023-08-14 DIAGNOSIS — M53.3 DISORDER OF SACRUM: Chronic | ICD-10-CM

## 2023-08-14 DIAGNOSIS — M96.1 POSTLAMINECTOMY SYNDROME, LUMBAR REGION: Chronic | ICD-10-CM

## 2023-08-14 PROCEDURE — 99214 PR OFFICE/OUTPT VISIT, EST, LEVL IV, 30-39 MIN: ICD-10-PCS | Mod: S$PBB,25,, | Performed by: PHYSICIAN ASSISTANT

## 2023-08-14 PROCEDURE — 99215 OFFICE O/P EST HI 40 MIN: CPT | Mod: PBBFAC,25 | Performed by: PHYSICIAN ASSISTANT

## 2023-08-14 PROCEDURE — 99999PBSHW PR PBB SHADOW TECHNICAL ONLY FILED TO HB: ICD-10-PCS | Mod: PBBFAC,,,

## 2023-08-14 PROCEDURE — 99214 OFFICE O/P EST MOD 30 MIN: CPT | Mod: S$PBB,25,, | Performed by: PHYSICIAN ASSISTANT

## 2023-08-14 PROCEDURE — 96372 THER/PROPH/DIAG INJ SC/IM: CPT | Mod: PBBFAC | Performed by: PHYSICIAN ASSISTANT

## 2023-08-14 PROCEDURE — 99999PBSHW PR PBB SHADOW TECHNICAL ONLY FILED TO HB: Mod: PBBFAC,,,

## 2023-08-14 RX ORDER — TRAMADOL HYDROCHLORIDE 50 MG/1
50 TABLET ORAL EVERY 8 HOURS PRN
Qty: 90 TABLET | Refills: 0 | Status: SHIPPED | OUTPATIENT
Start: 2023-08-18 | End: 2024-01-24 | Stop reason: SDUPTHER

## 2023-08-14 RX ORDER — KETOROLAC TROMETHAMINE 30 MG/ML
60 INJECTION, SOLUTION INTRAMUSCULAR; INTRAVENOUS
Status: COMPLETED | OUTPATIENT
Start: 2023-08-14 | End: 2023-08-14

## 2023-08-14 RX ORDER — MELOXICAM 15 MG/1
15 TABLET ORAL DAILY
Qty: 30 TABLET | Refills: 0 | Status: SHIPPED | OUTPATIENT
Start: 2023-08-14 | End: 2024-01-18 | Stop reason: SDUPTHER

## 2023-08-14 RX ORDER — CYCLOBENZAPRINE HCL 10 MG
10 TABLET ORAL 3 TIMES DAILY PRN
Qty: 90 TABLET | Refills: 0 | Status: SHIPPED | OUTPATIENT
Start: 2023-08-14 | End: 2024-01-24 | Stop reason: SDUPTHER

## 2023-08-14 RX ADMIN — KETOROLAC TROMETHAMINE 60 MG: 30 INJECTION, SOLUTION INTRAMUSCULAR at 10:08

## 2023-08-14 NOTE — PATIENT INSTRUCTIONS

## 2023-08-25 ENCOUNTER — TELEPHONE (OUTPATIENT)
Dept: FAMILY MEDICINE | Facility: CLINIC | Age: 48
End: 2023-08-25
Payer: OTHER GOVERNMENT

## 2023-08-25 NOTE — TELEPHONE ENCOUNTER
----- Message from Hortensia Shaffer MA sent at 8/25/2023 12:01 PM CDT -----  Patient called [phone] asking for refill on Hydralazine 100mg,Levothyroxine 200 mg and Effexor- mg to be sent in to [facility] She has an appt. Coming up in October for her Med Refill but states she is out of those 3.

## 2023-08-27 ENCOUNTER — OFFICE VISIT (OUTPATIENT)
Dept: FAMILY MEDICINE | Facility: CLINIC | Age: 48
End: 2023-08-27
Payer: OTHER GOVERNMENT

## 2023-08-27 VITALS
TEMPERATURE: 99 F | OXYGEN SATURATION: 98 % | SYSTOLIC BLOOD PRESSURE: 106 MMHG | HEART RATE: 92 BPM | RESPIRATION RATE: 17 BRPM | BODY MASS INDEX: 34.21 KG/M2 | HEIGHT: 67 IN | DIASTOLIC BLOOD PRESSURE: 74 MMHG | WEIGHT: 218 LBS

## 2023-08-27 DIAGNOSIS — Z20.822 LAB TEST NEGATIVE FOR COVID-19 VIRUS: ICD-10-CM

## 2023-08-27 DIAGNOSIS — J01.01 ACUTE RECURRENT MAXILLARY SINUSITIS: ICD-10-CM

## 2023-08-27 DIAGNOSIS — H92.01 ACUTE EAR PAIN, RIGHT: ICD-10-CM

## 2023-08-27 DIAGNOSIS — H65.04 RECURRENT ACUTE SEROUS OTITIS MEDIA OF RIGHT EAR: Primary | ICD-10-CM

## 2023-08-27 DIAGNOSIS — R50.9 FEVER, UNSPECIFIED FEVER CAUSE: ICD-10-CM

## 2023-08-27 PROCEDURE — 87428 SARSCOV & INF VIR A&B AG IA: CPT | Mod: QW,,, | Performed by: NURSE PRACTITIONER

## 2023-08-27 PROCEDURE — 96372 THER/PROPH/DIAG INJ SC/IM: CPT | Mod: ,,, | Performed by: NURSE PRACTITIONER

## 2023-08-27 PROCEDURE — 99214 PR OFFICE/OUTPT VISIT, EST, LEVL IV, 30-39 MIN: ICD-10-PCS | Mod: 25,,, | Performed by: NURSE PRACTITIONER

## 2023-08-27 PROCEDURE — 96372 PR INJECTION,THERAP/PROPH/DIAG2ST, IM OR SUBCUT: ICD-10-PCS | Mod: ,,, | Performed by: NURSE PRACTITIONER

## 2023-08-27 PROCEDURE — 99214 OFFICE O/P EST MOD 30 MIN: CPT | Mod: 25,,, | Performed by: NURSE PRACTITIONER

## 2023-08-27 PROCEDURE — 87428 POCT SARS-COV2 (COVID) WITH FLU ANTIGEN: ICD-10-PCS | Mod: QW,,, | Performed by: NURSE PRACTITIONER

## 2023-08-27 RX ORDER — LURASIDONE HYDROCHLORIDE 20 MG/1
TABLET, FILM COATED ORAL
COMMUNITY
Start: 2023-07-20

## 2023-08-27 RX ORDER — CEFDINIR 300 MG/1
300 CAPSULE ORAL 2 TIMES DAILY
Qty: 20 CAPSULE | Refills: 0 | Status: SHIPPED | OUTPATIENT
Start: 2023-08-27 | End: 2023-09-06

## 2023-08-27 RX ORDER — KETOROLAC TROMETHAMINE 30 MG/ML
60 INJECTION, SOLUTION INTRAMUSCULAR; INTRAVENOUS
Status: COMPLETED | OUTPATIENT
Start: 2023-08-27 | End: 2023-08-27

## 2023-08-27 RX ORDER — FEXOFENADINE HCL AND PSEUDOEPHEDRINE HCI 60; 120 MG/1; MG/1
1 TABLET, EXTENDED RELEASE ORAL 2 TIMES DAILY
Qty: 20 TABLET | Refills: 0 | Status: SHIPPED | OUTPATIENT
Start: 2023-08-27 | End: 2023-09-06

## 2023-08-27 RX ORDER — PREDNISONE 20 MG/1
40 TABLET ORAL DAILY
Qty: 10 TABLET | Refills: 0 | Status: SHIPPED | OUTPATIENT
Start: 2023-08-27 | End: 2023-09-01

## 2023-08-27 RX ADMIN — KETOROLAC TROMETHAMINE 60 MG: 30 INJECTION, SOLUTION INTRAMUSCULAR; INTRAVENOUS at 09:08

## 2023-08-27 NOTE — PROGRESS NOTES
"Subjective:       Patient ID: Carey Leonardo is a 47 y.o. female.    Vitals:  height is 5' 7" (1.702 m) and weight is 98.9 kg (218 lb). Her temperature is 98.5 °F (36.9 °C). Her blood pressure is 106/74 and her pulse is 92. Her respiration is 17 and oxygen saturation is 98%.     Chief Complaint: Nausea, Sore Throat, Fatigue, Dizziness, Fever, Generalized Body Aches, Chills, Nasal Congestion, Cough (All symptoms started x 2 days ago /Taking OTC Tylenol), and Medication Refill    GARRETT is a 48 y/o BF who presents today for c/o URI symptoms. She states that her grandchildren who live with her have had similar symptoms and have both been exposed to COVID.     URI   This is a new problem. The maximum temperature recorded prior to her arrival was 102 - 102.9 F. The fever has been present for Less than 1 day. Associated symptoms include congestion, coughing, ear pain (right ear), nausea, a sore throat and vomiting.       Constitution: Positive for fatigue and fever.   HENT:  Positive for ear pain (right ear), ear discharge (right ear), congestion and sore throat.    Respiratory:  Positive for cough and sputum production (yellow with red specks).    Gastrointestinal:  Positive for nausea and vomiting.   Allergic/Immunologic: Positive for environmental allergies and recurrent sinus infections. Negative for seasonal allergies.   Neurological:  Positive for dizziness.         Objective:      Physical Exam  Vitals reviewed.   Constitutional:       Appearance: Normal appearance.   HENT:      Right Ear: Drainage (green, cloudy drainage) present. No PE tube (dislodged in ear canal). Tympanic membrane is erythematous and bulging.      Left Ear: Drainage (white) present. A PE tube is present. Tympanic membrane is bulging. Tympanic membrane is not erythematous.      Nose: Congestion present.      Right Turbinates: Swollen.      Left Turbinates: Swollen.      Right Sinus: Maxillary sinus tenderness present.      Left Sinus: Maxillary " sinus tenderness present.      Mouth/Throat:      Pharynx: Posterior oropharyngeal erythema present.      Comments: Postnasal drip  Cardiovascular:      Rate and Rhythm: Normal rate and regular rhythm.   Pulmonary:      Effort: Pulmonary effort is normal.      Breath sounds: Normal breath sounds.   Neurological:      Mental Status: She is alert and oriented to person, place, and time.   Psychiatric:         Mood and Affect: Mood normal.         Behavior: Behavior normal.         Thought Content: Thought content normal.         Judgment: Judgment normal.              Assessment:       1. Recurrent acute serous otitis media of right ear    2. Fever, unspecified fever cause    3. Lab test negative for COVID-19 virus    4. Acute recurrent maxillary sinusitis    5. Acute ear pain, right          Recent Results (from the past 24 hour(s))   POCT SARS-COV2 (COVID) with Flu Antigen    Collection Time: 08/27/23  8:30 AM   Result Value Ref Range    SARS Coronavirus 2 Antigen Negative Negative    Rapid Influenza A Ag Negative Negative    Rapid Influenza B Ag Negative Negative     Acceptable Yes         Plan:         Recurrent acute serous otitis media of right ear  -     cefdinir (OMNICEF) 300 MG capsule; Take 1 capsule (300 mg total) by mouth 2 (two) times daily. for 10 days  Dispense: 20 capsule; Refill: 0    Fever, unspecified fever cause  -     POCT SARS-COV2 (COVID) with Flu Antigen    Lab test negative for COVID-19 virus    Acute recurrent maxillary sinusitis  -     predniSONE (DELTASONE) 20 MG tablet; Take 2 tablets (40 mg total) by mouth once daily. for 5 days  Dispense: 10 tablet; Refill: 0  -     fexofenadine-pseudoephedrine  mg (ALLEGRA-D)  mg per tablet; Take 1 tablet by mouth 2 (two) times daily. for 10 days  Dispense: 20 tablet; Refill: 0    Acute ear pain, right  -     ketorolac injection 60 mg                   Follow up if symptoms worsen or fail to improve.     Future Appointments    Date Time Provider Department Center   9/19/2023 11:00 AM [facility] GI ROOM 02 [facility] [place] [facility]   10/12/2023  9:00 AM [facility] MAMMO1 [facility] [place]   10/17/2023 10:00 AM Ursula Kapadia FNP [facility] CLAUD[facility] [place]        An After Visit Summary was printed and given to the patient.     Signature:    ROGELIO Terrazas  Family Nurse Practitioner  [facility]    Date of encounter: 8/27/23

## 2023-08-27 NOTE — PATIENT INSTRUCTIONS
Your test was NEGATIVE for COVID-19 (coronavirus).      You may leave home and/or return to work when the following conditions are met:  24 hours fever free without fever-reducing medications AND  Improved symptoms  You are fully vaccinated or have not had close contact with someone with COVID-19 (within 6 feet for 15 minutes or more)    If you are fully vaccinated and had a close contact, there is no need for quarantine, unless you develop symptoms.      If you are not fully vaccinated and had a close contact:  Retest at 5 to 7 days post-exposure  If possible, it is recommended that you quarantine for 5 days from the time of contact regardless of your test status.  A mask should be worn indoors post quarantine.    -Notify Dr. Hinton regarding tube displacement of right ear.

## 2023-08-28 ENCOUNTER — OFFICE VISIT (OUTPATIENT)
Dept: OTOLARYNGOLOGY | Facility: CLINIC | Age: 48
End: 2023-08-28
Payer: OTHER GOVERNMENT

## 2023-08-28 VITALS — HEIGHT: 67 IN | WEIGHT: 218 LBS | BODY MASS INDEX: 34.21 KG/M2

## 2023-08-28 DIAGNOSIS — I10 PRIMARY HYPERTENSION: ICD-10-CM

## 2023-08-28 DIAGNOSIS — E03.9 HYPOTHYROIDISM, UNSPECIFIED TYPE: ICD-10-CM

## 2023-08-28 DIAGNOSIS — H65.23 CHRONIC SEROUS OTITIS MEDIA OF BOTH EARS: Primary | ICD-10-CM

## 2023-08-28 DIAGNOSIS — H92.01 OTALGIA OF RIGHT EAR: ICD-10-CM

## 2023-08-28 DIAGNOSIS — F41.9 ANXIETY: ICD-10-CM

## 2023-08-28 DIAGNOSIS — H90.2 CONDUCTIVE HEARING LOSS, UNSPECIFIED LATERALITY: ICD-10-CM

## 2023-08-28 PROCEDURE — 99214 OFFICE O/P EST MOD 30 MIN: CPT | Mod: S$PBB,,, | Performed by: OTOLARYNGOLOGY

## 2023-08-28 PROCEDURE — 99214 PR OFFICE/OUTPT VISIT, EST, LEVL IV, 30-39 MIN: ICD-10-PCS | Mod: S$PBB,,, | Performed by: OTOLARYNGOLOGY

## 2023-08-28 PROCEDURE — 99215 OFFICE O/P EST HI 40 MIN: CPT | Mod: PBBFAC | Performed by: OTOLARYNGOLOGY

## 2023-08-28 RX ORDER — CIPROFLOXACIN 500 MG/1
500 TABLET ORAL 2 TIMES DAILY
Qty: 20 TABLET | Refills: 0 | Status: SHIPPED | OUTPATIENT
Start: 2023-08-28 | End: 2023-12-01 | Stop reason: ALTCHOICE

## 2023-08-28 NOTE — TELEPHONE ENCOUNTER
----- Message from Anitra Herrera sent at 8/28/2023  3:38 PM CDT -----  Regarding: meds  Pt called,  needs to talk to nurse about her medicines.  [phone]

## 2023-08-28 NOTE — PROGRESS NOTES
Subjective:       Patient ID: Carey Leonardo is a 47 y.o. female.    Chief Complaint: Ear Drainage (Right ear. Pt states her right ear has been draining for a couple of days. Seen PCP on 8/27/23, states she took tube out that was laying in ear canal. ) and Otalgia (Right ear. )    Ear Drainage   Associated symptoms include hearing loss.   Otalgia   Associated symptoms include hearing loss.     Review of Systems   HENT:  Positive for congestion, ear pain and hearing loss.    All other systems reviewed and are negative.      Objective:      Physical Exam  General: NAD  Head: Normocephalic, atraumatic, no facial asymmetry/normal strength,  Ears: Both auricules normal in appearance, w/o deformities tympanic membranes fluid infected right external auditory canals normal  Nose: External nose w/o deformities normal turbinates no drainage or inflammation  Oral Cavity: Lips, gums, floor of mouth, tongue hard palate, and buccal mucosa without mass/lesion  Oropharynx: Mucosa pink and moist, soft palate, posterior pharynx and oropharyngeal wall without mass/lesion  Neck: Supple, symmetric, trachea midline, no palpable mass/lesion, no palpable cervical lymphadenopathy  Skin: Warm and dry, no concerning lesions  Respiratory: Respirations even, unlabored   Assessment:       1. Chronic serous otitis media of both ears    2. Conductive hearing loss, unspecified laterality    3. Otalgia of right ear        Plan:       Cipro ciprodex   Bilateral t-tubes in OR

## 2023-08-30 RX ORDER — VENLAFAXINE HYDROCHLORIDE 150 MG/1
CAPSULE, EXTENDED RELEASE ORAL
Qty: 90 CAPSULE | Refills: 0 | Status: SHIPPED | OUTPATIENT
Start: 2023-08-30 | End: 2023-12-01 | Stop reason: SDUPTHER

## 2023-08-30 RX ORDER — HYDRALAZINE HYDROCHLORIDE 100 MG/1
100 TABLET, FILM COATED ORAL 3 TIMES DAILY
Qty: 270 TABLET | Refills: 0 | Status: SHIPPED | OUTPATIENT
Start: 2023-08-30 | End: 2023-12-01 | Stop reason: SDUPTHER

## 2023-08-30 RX ORDER — LEVOTHYROXINE SODIUM 200 UG/1
200 TABLET ORAL
Qty: 90 TABLET | Refills: 0 | Status: SHIPPED | OUTPATIENT
Start: 2023-08-30 | End: 2023-11-24 | Stop reason: SDUPTHER

## 2023-08-31 ENCOUNTER — TELEPHONE (OUTPATIENT)
Dept: PAIN MEDICINE | Facility: CLINIC | Age: 48
End: 2023-08-31
Payer: OTHER GOVERNMENT

## 2023-08-31 NOTE — TELEPHONE ENCOUNTER
Patient's Bilateral L4-S1 MBB due to patient currently having ear infection with out starting Cipro. Patient will be rescheduled.

## 2023-08-31 NOTE — TELEPHONE ENCOUNTER
Patient is rescheduled for her procedure- BILATERAL L4-S1 MBB for 09/21/2023 at 11:40 am with Dr. Bills.      Procedure Instructions:    Nothing to eat or drink for 8 hours or after midnight including gum, candy, mints, or tobacco products.  If you are scheduled for 1:30 or later nothing to eat or drink after 5 a.m. the morning of the procedure, including gum, candy, mints, or tobacco products.  Must have a  at least 18 yrs of age to stay present at all times  No Diabetic medications the morning of procedure, check blood sugar the morning of procedure, if it is greater than 200 call the office at [phone]  If you are started on antibiotics or have been prescribed antibiotics, have a fever, or have any other type of infection call to reschedule procedure.  If you take blood pressure medications you can take it at your regular scheduled time with a small sip of WATER!  Dentures are to be removed prior to procedure or we can provide you with a denture cup to remove them prior to being taken back for procedure.   False eyelashes are to be removed before the morning of procedure.    Contacts will have to be removed prior to procedure.  No jewelry is to be worn to the procedure.     HOLD ASPIRIN AND ASPIRIN PRODUCTS  (ASPIRIN, BC POWDER ETC. ) FOR 7 DAYS  PRIOR TO PROCEDURE  HOLD NSAIDS( ibuprofen, mobic, meloxicam, advil, diclofenac, naproxen, relafen, celebrex,  methotrexate, aleve etc....)  FOR 3 DAYS   PRIOR TO PROCEDURE

## 2023-09-11 ENCOUNTER — ANESTHESIA EVENT (OUTPATIENT)
Dept: SURGERY | Facility: HOSPITAL | Age: 48
End: 2023-09-11
Payer: OTHER GOVERNMENT

## 2023-09-11 NOTE — ANESTHESIA PREPROCEDURE EVALUATION
09/11/2023  Carey Leonardo is a 47 y.o., female.      Pre-op Assessment    I have reviewed the Patient Summary Reports.     I have reviewed the Nursing Notes. I have reviewed the NPO Status.   I have reviewed the Medications.     Review of Systems  Anesthesia Hx:  No problems with previous Anesthesia  Denies Family Hx of Anesthesia complications.   Denies Personal Hx of Anesthesia complications.   Social:  Non-Smoker, No Alcohol Use    Hematology/Oncology:  Hematology Normal   Oncology Normal     EENT/Dental:EENT/Dental Normal   Cardiovascular:   Hypertension CHF ECG has been reviewed.    Pulmonary:   Asthma Sleep Apnea    Renal/:   Chronic Renal Disease, CKD    Hepatic/GI:   GERD    Musculoskeletal:   Arthritis     Neurological:   Neuromuscular Disease,   Chronic Pain Syndrome   Endocrine:   Diabetes, poorly controlled, type 2 Hypothyroidism  Obesity / BMI > 30  Dermatological:  Skin Normal    Psych:   Psychiatric History anxiety          Physical Exam  General: Well nourished    Airway:  Mallampati: III / III  Mouth Opening: Normal  TM Distance: > 6 cm  Tongue: Normal  Neck ROM: Normal ROM    Chest/Lungs:  Clear to auscultation, Normal Respiratory Rate    Heart:  Rate: Normal  Rhythm: Regular Rhythm        Chemistry        Component Value Date/Time     07/19/2023 1025    K 3.8 07/19/2023 1025     07/19/2023 1025    CO2 31 07/19/2023 1025    BUN 9 07/19/2023 1025    CREATININE 0.97 07/19/2023 1025     (H) 07/19/2023 1025        Component Value Date/Time    CALCIUM 8.8 07/19/2023 1025    ALKPHOS 122 (H) 07/19/2023 1025    AST 23 07/19/2023 1025    ALT 31 07/19/2023 1025    BILITOT 0.3 07/19/2023 1025    ESTGFRAFRICA 107 11/23/2021 1346    EGFRNONAA 68 06/15/2022 0943        Lab Results   Component Value Date    WBC 7.67 06/13/2023    HGB 12.0 06/13/2023    HCT 39.3 06/13/2023      06/13/2023     Results for orders placed or performed during the hospital encounter of 04/04/23   EKG 12-lead    Collection Time: 04/04/23  9:57 AM    Narrative    Test Reason : R07.9,    Vent. Rate : 084 BPM     Atrial Rate : 084 BPM     P-R Int : 178 ms          QRS Dur : 076 ms      QT Int : 386 ms       P-R-T Axes : 057 015 013 degrees     QTc Int : 456 ms    Normal sinus rhythm  Nonspecific T wave abnormality  Abnormal ECG  When compared with ECG of 11-AUG-2022 10:03,  PREVIOUS ECG IS PRESENT  Confirmed by Kimberly Haas MD ([id number]) on 4/18/2023 12:02:54 PM    Referred By: HEMAL DUNHAM           Confirmed By:Kimberly Haas MD         Anesthesia Plan  Type of Anesthesia, risks & benefits discussed:    Anesthesia Type: Gen Supraglottic Airway, Gen Natural Airway  Intra-op Monitoring Plan: Standard ASA Monitors  Post Op Pain Control Plan: multimodal analgesia  Induction:  IV  Airway Plan: Direct, Post-Induction  Informed Consent: Informed consent signed with the Patient and all parties understand the risks and agree with anesthesia plan.  All questions answered.   ASA Score: 3  Day of Surgery Review of History & Physical: H&P Update referred to the surgeon/provider.I have interviewed and examined the patient. I have reviewed the patient's H&P dated: There are no significant changes.     Ready For Surgery From Anesthesia Perspective.     .

## 2023-09-12 ENCOUNTER — ANESTHESIA (OUTPATIENT)
Dept: SURGERY | Facility: HOSPITAL | Age: 48
End: 2023-09-12
Payer: OTHER GOVERNMENT

## 2023-09-12 ENCOUNTER — HOSPITAL ENCOUNTER (OUTPATIENT)
Facility: HOSPITAL | Age: 48
Discharge: HOME | End: 2023-09-12
Attending: OTOLARYNGOLOGY | Admitting: OTOLARYNGOLOGY
Payer: OTHER GOVERNMENT

## 2023-09-12 VITALS
DIASTOLIC BLOOD PRESSURE: 82 MMHG | BODY MASS INDEX: 33.74 KG/M2 | OXYGEN SATURATION: 96 % | SYSTOLIC BLOOD PRESSURE: 133 MMHG | HEART RATE: 88 BPM | TEMPERATURE: 98 F | RESPIRATION RATE: 16 BRPM | HEIGHT: 67 IN | WEIGHT: 215 LBS

## 2023-09-12 DIAGNOSIS — H65.23 CHRONIC SEROUS OTITIS MEDIA OF BOTH EARS: Primary | ICD-10-CM

## 2023-09-12 LAB
GLUCOSE SERPL-MCNC: 141 MG/DL (ref 70–105)
GLUCOSE SERPL-MCNC: 160 MG/DL (ref 70–105)

## 2023-09-12 PROCEDURE — 27201423 OPTIME MED/SURG SUP & DEVICES STERILE SUPPLY: Performed by: OTOLARYNGOLOGY

## 2023-09-12 PROCEDURE — D9220A PRA ANESTHESIA: Mod: CRNA,,, | Performed by: NURSE ANESTHETIST, CERTIFIED REGISTERED

## 2023-09-12 PROCEDURE — 27000510 HC BLANKET BAIR HUGGER ANY SIZE: Performed by: NURSE ANESTHETIST, CERTIFIED REGISTERED

## 2023-09-12 PROCEDURE — 36000704 HC OR TIME LEV I 1ST 15 MIN: Performed by: OTOLARYNGOLOGY

## 2023-09-12 PROCEDURE — D9220A PRA ANESTHESIA: ICD-10-PCS | Mod: ANES,,, | Performed by: ANESTHESIOLOGY

## 2023-09-12 PROCEDURE — 71000033 HC RECOVERY, INTIAL HOUR: Performed by: OTOLARYNGOLOGY

## 2023-09-12 PROCEDURE — 71000015 HC POSTOP RECOV 1ST HR: Performed by: OTOLARYNGOLOGY

## 2023-09-12 PROCEDURE — 00126 ANES PX EAR TYMPANOTOMY: CPT | Performed by: OTOLARYNGOLOGY

## 2023-09-12 PROCEDURE — 69436 CREATE EARDRUM OPENING: CPT | Mod: 50,,, | Performed by: OTOLARYNGOLOGY

## 2023-09-12 PROCEDURE — 63600175 PHARM REV CODE 636 W HCPCS: Performed by: ANESTHESIOLOGY

## 2023-09-12 PROCEDURE — 37000009 HC ANESTHESIA EA ADD 15 MINS: Performed by: OTOLARYNGOLOGY

## 2023-09-12 PROCEDURE — 63600175 PHARM REV CODE 636 W HCPCS: Performed by: NURSE ANESTHETIST, CERTIFIED REGISTERED

## 2023-09-12 PROCEDURE — 27000655: Performed by: NURSE ANESTHETIST, CERTIFIED REGISTERED

## 2023-09-12 PROCEDURE — D9220A PRA ANESTHESIA: ICD-10-PCS | Mod: CRNA,,, | Performed by: NURSE ANESTHETIST, CERTIFIED REGISTERED

## 2023-09-12 PROCEDURE — 27000284 HC CANNULA NASAL: Performed by: NURSE ANESTHETIST, CERTIFIED REGISTERED

## 2023-09-12 PROCEDURE — 37000008 HC ANESTHESIA 1ST 15 MINUTES: Performed by: OTOLARYNGOLOGY

## 2023-09-12 PROCEDURE — 25000003 PHARM REV CODE 250: Performed by: OTOLARYNGOLOGY

## 2023-09-12 PROCEDURE — 71000016 HC POSTOP RECOV ADDL HR: Performed by: OTOLARYNGOLOGY

## 2023-09-12 PROCEDURE — D9220A PRA ANESTHESIA: Mod: ANES,,, | Performed by: ANESTHESIOLOGY

## 2023-09-12 PROCEDURE — 82962 GLUCOSE BLOOD TEST: CPT

## 2023-09-12 PROCEDURE — 27000177 HC AIRWAY, LARYNGEAL MASK: Performed by: NURSE ANESTHETIST, CERTIFIED REGISTERED

## 2023-09-12 PROCEDURE — 27000716 HC OXISENSOR PROBE, ANY SIZE: Performed by: NURSE ANESTHETIST, CERTIFIED REGISTERED

## 2023-09-12 PROCEDURE — 69436 PR CREATE EARDRUM OPENING,GEN ANESTH: ICD-10-PCS | Mod: 50,,, | Performed by: OTOLARYNGOLOGY

## 2023-09-12 PROCEDURE — 36000705 HC OR TIME LEV I EA ADD 15 MIN: Performed by: OTOLARYNGOLOGY

## 2023-09-12 PROCEDURE — 25000003 PHARM REV CODE 250: Performed by: NURSE ANESTHETIST, CERTIFIED REGISTERED

## 2023-09-12 DEVICE — TUBE VENT MODIFIED T: Type: IMPLANTABLE DEVICE | Site: EAR | Status: FUNCTIONAL

## 2023-09-12 RX ORDER — ONDANSETRON 2 MG/ML
INJECTION INTRAMUSCULAR; INTRAVENOUS
Status: DISCONTINUED | OUTPATIENT
Start: 2023-09-12 | End: 2023-09-12

## 2023-09-12 RX ORDER — MEPERIDINE HYDROCHLORIDE 25 MG/ML
25 INJECTION INTRAMUSCULAR; INTRAVENOUS; SUBCUTANEOUS ONCE AS NEEDED
Status: DISCONTINUED | OUTPATIENT
Start: 2023-09-12 | End: 2023-09-12 | Stop reason: HOSPADM

## 2023-09-12 RX ORDER — KETOROLAC TROMETHAMINE 30 MG/ML
30 INJECTION, SOLUTION INTRAMUSCULAR; INTRAVENOUS ONCE
Status: DISCONTINUED | OUTPATIENT
Start: 2023-09-12 | End: 2023-09-12

## 2023-09-12 RX ORDER — SODIUM CHLORIDE 9 MG/ML
INJECTION, SOLUTION INTRAVENOUS CONTINUOUS
Status: DISCONTINUED | OUTPATIENT
Start: 2023-09-12 | End: 2023-09-12 | Stop reason: HOSPADM

## 2023-09-12 RX ORDER — MORPHINE SULFATE 10 MG/ML
4 INJECTION INTRAMUSCULAR; INTRAVENOUS; SUBCUTANEOUS EVERY 5 MIN PRN
Status: DISCONTINUED | OUTPATIENT
Start: 2023-09-12 | End: 2023-09-12 | Stop reason: HOSPADM

## 2023-09-12 RX ORDER — PROPOFOL 10 MG/ML
INJECTION, EMULSION INTRAVENOUS
Status: DISCONTINUED | OUTPATIENT
Start: 2023-09-12 | End: 2023-09-12

## 2023-09-12 RX ORDER — DIPHENHYDRAMINE HYDROCHLORIDE 50 MG/ML
25 INJECTION INTRAMUSCULAR; INTRAVENOUS EVERY 6 HOURS PRN
Status: DISCONTINUED | OUTPATIENT
Start: 2023-09-12 | End: 2023-09-12 | Stop reason: HOSPADM

## 2023-09-12 RX ORDER — LIDOCAINE HYDROCHLORIDE 20 MG/ML
INJECTION, SOLUTION EPIDURAL; INFILTRATION; INTRACAUDAL; PERINEURAL
Status: DISCONTINUED | OUTPATIENT
Start: 2023-09-12 | End: 2023-09-12

## 2023-09-12 RX ORDER — KETOROLAC TROMETHAMINE 30 MG/ML
30 INJECTION, SOLUTION INTRAMUSCULAR; INTRAVENOUS ONCE
Status: COMPLETED | OUTPATIENT
Start: 2023-09-12 | End: 2023-09-12

## 2023-09-12 RX ORDER — ONDANSETRON 2 MG/ML
4 INJECTION INTRAMUSCULAR; INTRAVENOUS DAILY PRN
Status: DISCONTINUED | OUTPATIENT
Start: 2023-09-12 | End: 2023-09-12 | Stop reason: HOSPADM

## 2023-09-12 RX ORDER — CIPROFLOXACIN AND DEXAMETHASONE 3; 1 MG/ML; MG/ML
SUSPENSION/ DROPS AURICULAR (OTIC)
Status: DISCONTINUED | OUTPATIENT
Start: 2023-09-12 | End: 2023-09-12 | Stop reason: HOSPADM

## 2023-09-12 RX ORDER — HYDROMORPHONE HYDROCHLORIDE 2 MG/ML
0.5 INJECTION, SOLUTION INTRAMUSCULAR; INTRAVENOUS; SUBCUTANEOUS EVERY 5 MIN PRN
Status: DISCONTINUED | OUTPATIENT
Start: 2023-09-12 | End: 2023-09-12 | Stop reason: HOSPADM

## 2023-09-12 RX ADMIN — KETOROLAC TROMETHAMINE 30 MG: 30 INJECTION, SOLUTION INTRAMUSCULAR at 09:09

## 2023-09-12 RX ADMIN — PROPOFOL 150 MG: 10 INJECTION, EMULSION INTRAVENOUS at 08:09

## 2023-09-12 RX ADMIN — LIDOCAINE HYDROCHLORIDE 60 MG: 20 INJECTION, SOLUTION INTRAVENOUS at 08:09

## 2023-09-12 RX ADMIN — MORPHINE SULFATE 4 MG: 10 INJECTION, SOLUTION INTRAMUSCULAR; INTRAVENOUS at 08:09

## 2023-09-12 RX ADMIN — KETOROLAC TROMETHAMINE 30 MG: 30 INJECTION, SOLUTION INTRAMUSCULAR at 08:09

## 2023-09-12 RX ADMIN — ONDANSETRON 4 MG: 2 INJECTION INTRAMUSCULAR; INTRAVENOUS at 08:09

## 2023-09-12 RX ADMIN — SODIUM CHLORIDE: 9 INJECTION, SOLUTION INTRAVENOUS at 06:09

## 2023-09-12 NOTE — TRANSFER OF CARE
"Anesthesia Transfer of Care Note    Patient: Carey LANGLEY Leonardo    Procedure(s) Performed: Procedure(s) (LRB):  MYRINGOTOMY, WITH TYMPANOSTOMY TUBE INSERTION, T-TUBES (Bilateral)    Patient location: PACU    Anesthesia Type: general    Transport from OR: Transported from OR on 2-3 L/min O2 by NC with adequate spontaneous ventilation    Post pain: adequate analgesia    Post assessment: no apparent anesthetic complications and tolerated procedure well    Post vital signs: stable    Level of consciousness: awake and oriented    Nausea/Vomiting: no nausea/vomiting    Complications: none    Transfer of care protocol was followed      Last vitals:   Visit Vitals  BP (!) 152/79 (BP Location: Left arm, Patient Position: Lying)   Pulse 89   Temp 36.4 °C (97.6 °F) (Oral)   Resp 15   Ht 5' 7" (1.702 m)   Wt 97.5 kg (215 lb)   LMP  (LMP Unknown)   SpO2 (!) 94%   Breastfeeding No   BMI 33.67 kg/m²     "

## 2023-09-12 NOTE — OR NURSING
0825  Received to pacu in stable condition on 4l/nc.      Morphine 4mg and Toradol 30/30 given also.    0845 2lnc    0850 RA    0855 Transferred patient back to  OP in stable condition on RA.  BS report to MOSHE Upton RN @ 0905.  VSS @ 130/82, 95%2l nc, 89HR and 12 RR.  Family at BS.

## 2023-09-12 NOTE — ANESTHESIA POSTPROCEDURE EVALUATION
Anesthesia Post Evaluation    Patient: Shatagia D Leonardo    Procedure(s) Performed: Procedure(s) (LRB):  MYRINGOTOMY, WITH TYMPANOSTOMY TUBE INSERTION, T-TUBES (Bilateral)    Final Anesthesia Type: general      Patient location during evaluation: PACU  Patient participation: Yes- Able to Participate  Level of consciousness: awake and alert and oriented  Post-procedure vital signs: reviewed and stable  Pain management: adequate  Airway patency: patent  KILO mitigation strategies: Multimodal analgesia  PONV status at discharge: No PONV  Anesthetic complications: no      Cardiovascular status: hemodynamically stable  Respiratory status: unassisted and spontaneous ventilation  Hydration status: euvolemic  Follow-up not needed.          Vitals Value Taken Time   /83 09/12/23 0843   Temp 36.4 °C (97.6 °F) 09/12/23 0825   Pulse 89 09/12/23 0846   Resp 16 09/12/23 0828   SpO2 96 % 09/12/23 0846   Vitals shown include unvalidated device data.      No case tracking events are documented in the log.      Pain/Miah Score: Pain Rating Prior to Med Admin: 10 (9/12/2023  8:38 AM)

## 2023-09-12 NOTE — BRIEF OP NOTE
[facility] - Orthopedic Periop Services  Brief Operative Note    Surgery Date: 9/12/2023     Surgeon(s) and Role:     * Sea Hinton MD - Primary    Assisting Surgeon: None    Pre-op Diagnosis:  Chronic serous otitis media of both ears [H65.23]    Post-op Diagnosis:  Post-Op Diagnosis Codes:     * Chronic serous otitis media of both ears [H65.23]    Procedure(s) (LRB):  MYRINGOTOMY, WITH TYMPANOSTOMY TUBE INSERTION, T-TUBES (Bilateral)    Anesthesia: General    Operative Findings: Fluid    Estimated Blood Loss: 0         Specimens:   Specimen (24h ago, onward)      None              Discharge Note    OUTCOME: Patient tolerated treatment/procedure well without complication and is now ready for discharge.    DISPOSITION: Home or Self Care    FINAL DIAGNOSIS:  CHAD  FOLLOWUP: In clinic    DISCHARGE INSTRUCTIONS:  No discharge procedures on file.

## 2023-09-12 NOTE — ANESTHESIA PROCEDURE NOTES
Intubation    Date/Time: 9/12/2023 8:06 AM    Performed by: Dillan Coleman CRNA  Authorized by: Dillan Coleman CRNA    Intubation:     Induction:  Intravenous    Intubated:  Postinduction    Mask Ventilation:  Easy with oral airway    Attempts:  1    Attempted By:  CRNA    Difficult Airway Encountered?: No      Complications:  None    Airway Device:  Supraglottic airway/LMA    Airway Device Size:  4.0    Placement Verified By:  Capnometry    Findings Post-Intubation:  BS equal bilateral and atraumatic/condition of teeth unchanged

## 2023-09-12 NOTE — OP NOTE
Surgery Date: 9/12/2023     Surgeon(s) and Role:     * Sea Hinton MD - Primary    Assisting Surgeon: None    Pre-op Diagnosis:  Chronic serous otitis media of both ears [H65.23]    Post-op Diagnosis:  Post-Op Diagnosis Codes:     * Chronic serous otitis media of both ears [H65.23]    Procedure(s) (LRB):  MYRINGOTOMY, WITH TYMPANOSTOMY TUBE INSERTION, T-TUBES (Bilateral)    After general mask anesthesia using the operating microscope the left ear was examined and a myringotomy was performed in the anterior inferior quadrant and a t tube was placed without difficulty excess middle ear  fluid was suctioned. The opposite ear was done in a likewise fashion the patient tolerated the procedure well and was reversed taken to RR in stable condition            Anesthesia: General    Operative Findings: Fluid    Estimated Blood Loss: 0

## 2023-09-14 ENCOUNTER — TELEPHONE (OUTPATIENT)
Dept: PAIN MEDICINE | Facility: CLINIC | Age: 48
End: 2023-09-14
Payer: OTHER GOVERNMENT

## 2023-09-14 PROBLEM — R53.1 DECREASED STRENGTH: Status: RESOLVED | Noted: 2023-04-19 | Resolved: 2023-09-14

## 2023-09-14 PROBLEM — M25.60 DECREASED RANGE OF MOTION: Status: RESOLVED | Noted: 2023-04-19 | Resolved: 2023-09-14

## 2023-09-14 PROBLEM — R52 PAIN: Status: RESOLVED | Noted: 2023-04-19 | Resolved: 2023-09-14

## 2023-09-14 NOTE — TELEPHONE ENCOUNTER
Patient is instructed to hold Trulicity 7 days prior to procdedure- Bilateral Lumbar MBB scheduled on 09/21/2023 with Dr. Bills.  Patient verbalizes understanding.

## 2023-09-21 ENCOUNTER — ANESTHESIA EVENT (OUTPATIENT)
Dept: PAIN MEDICINE | Facility: HOSPITAL | Age: 48
End: 2023-09-21
Payer: OTHER GOVERNMENT

## 2023-09-21 ENCOUNTER — ANESTHESIA (OUTPATIENT)
Dept: PAIN MEDICINE | Facility: HOSPITAL | Age: 48
End: 2023-09-21
Payer: OTHER GOVERNMENT

## 2023-09-21 ENCOUNTER — HOSPITAL ENCOUNTER (OUTPATIENT)
Facility: HOSPITAL | Age: 48
Discharge: HOME | End: 2023-09-21
Attending: PAIN MEDICINE | Admitting: PAIN MEDICINE
Payer: OTHER GOVERNMENT

## 2023-09-21 VITALS
HEART RATE: 76 BPM | RESPIRATION RATE: 14 BRPM | HEIGHT: 67 IN | SYSTOLIC BLOOD PRESSURE: 124 MMHG | OXYGEN SATURATION: 97 % | BODY MASS INDEX: 33.74 KG/M2 | DIASTOLIC BLOOD PRESSURE: 78 MMHG | WEIGHT: 215 LBS | TEMPERATURE: 97 F

## 2023-09-21 DIAGNOSIS — M47.817 SPONDYLOSIS OF LUMBOSACRAL REGION WITHOUT MYELOPATHY OR RADICULOPATHY: ICD-10-CM

## 2023-09-21 LAB — GLUCOSE SERPL-MCNC: 150 MG/DL (ref 70–105)

## 2023-09-21 PROCEDURE — 64493 PR INJ DX/THER AGNT PARAVERT FACET JOINT,IMG GUIDE,LUMBAR/SAC,1ST LVL: ICD-10-PCS | Mod: 50,,, | Performed by: PAIN MEDICINE

## 2023-09-21 PROCEDURE — 63600175 PHARM REV CODE 636 W HCPCS: Performed by: NURSE ANESTHETIST, CERTIFIED REGISTERED

## 2023-09-21 PROCEDURE — 64494 INJ PARAVERT F JNT L/S 2 LEV: CPT | Mod: 50,,, | Performed by: PAIN MEDICINE

## 2023-09-21 PROCEDURE — 25000003 PHARM REV CODE 250: Performed by: PAIN MEDICINE

## 2023-09-21 PROCEDURE — 63600175 PHARM REV CODE 636 W HCPCS: Performed by: PAIN MEDICINE

## 2023-09-21 PROCEDURE — 01992 ANES DX/THER NRV BLK&INJ PRN: CPT | Performed by: PAIN MEDICINE

## 2023-09-21 PROCEDURE — D9220A PRA ANESTHESIA: ICD-10-PCS | Mod: 23,,, | Performed by: NURSE ANESTHETIST, CERTIFIED REGISTERED

## 2023-09-21 PROCEDURE — 64493 INJ PARAVERT F JNT L/S 1 LEV: CPT | Mod: 50,,, | Performed by: PAIN MEDICINE

## 2023-09-21 PROCEDURE — 64493 INJ PARAVERT F JNT L/S 1 LEV: CPT | Mod: 50 | Performed by: PAIN MEDICINE

## 2023-09-21 PROCEDURE — D9220A PRA ANESTHESIA: Mod: 23,,, | Performed by: NURSE ANESTHETIST, CERTIFIED REGISTERED

## 2023-09-21 PROCEDURE — 82962 GLUCOSE BLOOD TEST: CPT

## 2023-09-21 PROCEDURE — 25000003 PHARM REV CODE 250: Performed by: NURSE ANESTHETIST, CERTIFIED REGISTERED

## 2023-09-21 PROCEDURE — 37000009 HC ANESTHESIA EA ADD 15 MINS: Performed by: PAIN MEDICINE

## 2023-09-21 PROCEDURE — 64494 INJ PARAVERT F JNT L/S 2 LEV: CPT | Mod: 50 | Performed by: PAIN MEDICINE

## 2023-09-21 PROCEDURE — 37000008 HC ANESTHESIA 1ST 15 MINUTES: Performed by: PAIN MEDICINE

## 2023-09-21 PROCEDURE — 64494 PR INJ DX/THER AGNT PARAVERT FACET JOINT,IMG GUIDE,LUMBAR/SAC, 2ND LEVEL: ICD-10-PCS | Mod: 50,,, | Performed by: PAIN MEDICINE

## 2023-09-21 PROCEDURE — 27000284 HC CANNULA NASAL: Performed by: NURSE ANESTHETIST, CERTIFIED REGISTERED

## 2023-09-21 RX ORDER — SODIUM CHLORIDE 9 MG/ML
INJECTION, SOLUTION INTRAVENOUS CONTINUOUS
Status: DISCONTINUED | OUTPATIENT
Start: 2023-09-21 | End: 2023-09-21 | Stop reason: HOSPADM

## 2023-09-21 RX ORDER — LIDOCAINE HYDROCHLORIDE 20 MG/ML
INJECTION, SOLUTION EPIDURAL; INFILTRATION; INTRACAUDAL; PERINEURAL
Status: DISCONTINUED | OUTPATIENT
Start: 2023-09-21 | End: 2023-09-21

## 2023-09-21 RX ORDER — PROPOFOL 10 MG/ML
VIAL (ML) INTRAVENOUS
Status: DISCONTINUED | OUTPATIENT
Start: 2023-09-21 | End: 2023-09-21

## 2023-09-21 RX ORDER — TRIAMCINOLONE ACETONIDE 40 MG/ML
INJECTION, SUSPENSION INTRA-ARTICULAR; INTRAMUSCULAR CODE/TRAUMA/SEDATION MEDICATION
Status: DISCONTINUED | OUTPATIENT
Start: 2023-09-21 | End: 2023-09-21 | Stop reason: HOSPADM

## 2023-09-21 RX ORDER — BUPIVACAINE HYDROCHLORIDE 2.5 MG/ML
INJECTION, SOLUTION INFILTRATION; PERINEURAL CODE/TRAUMA/SEDATION MEDICATION
Status: DISCONTINUED | OUTPATIENT
Start: 2023-09-21 | End: 2023-09-21 | Stop reason: HOSPADM

## 2023-09-21 RX ADMIN — PROPOFOL 50 MG: 10 INJECTION, EMULSION INTRAVENOUS at 11:09

## 2023-09-21 RX ADMIN — SODIUM CHLORIDE: 9 INJECTION, SOLUTION INTRAVENOUS at 11:09

## 2023-09-21 RX ADMIN — LIDOCAINE HYDROCHLORIDE 100 MG: 20 INJECTION, SOLUTION INTRAVENOUS at 11:09

## 2023-09-21 NOTE — H&P
Subjective:         Patient ID: Carey Leonardo is a 47 y.o. female.    Chief Complaint: Low-back Pain and Leg Pain    Pain  This is a chronic problem. The current episode started more than 1 year ago. The problem occurs daily. The problem has been unchanged. Associated symptoms include arthralgias. Pertinent negatives include no anorexia, change in bowel habit, chest pain, chills, coughing, diaphoresis, fever, neck pain, sore throat, swollen glands, urinary symptoms, vertigo or vomiting.     Review of Systems   Constitutional:  Negative for activity change, appetite change, chills, diaphoresis, fever and unexpected weight change.   HENT:  Negative for drooling, ear discharge, ear pain, facial swelling, nosebleeds, sore throat, trouble swallowing, voice change and goiter.    Eyes:  Negative for photophobia, pain, discharge, redness and visual disturbance.   Respiratory:  Negative for apnea, cough, choking, chest tightness, shortness of breath, wheezing and stridor.    Cardiovascular:  Negative for chest pain, palpitations and leg swelling.   Gastrointestinal:  Negative for abdominal distention, anorexia, change in bowel habit, diarrhea, rectal pain, vomiting and fecal incontinence.   Endocrine: Negative for cold intolerance, heat intolerance, polydipsia, polyphagia and polyuria.   Genitourinary:  Negative for bladder incontinence, dysuria, flank pain, frequency and hot flashes.   Musculoskeletal:  Positive for arthralgias, back pain and leg pain. Negative for neck pain.   Integumentary:  Negative for color change and pallor.   Allergic/Immunologic: Negative for immunocompromised state.   Neurological:  Negative for dizziness, vertigo, seizures, syncope, facial asymmetry, speech difficulty, light-headedness, memory loss and coordination difficulties.   Hematological:  Negative for adenopathy. Does not bruise/bleed easily.   Psychiatric/Behavioral:  Negative for agitation, behavioral problems, confusion, decreased  concentration, dysphoric mood, hallucinations, self-injury and suicidal ideas. The patient is not nervous/anxious and is not hyperactive.            Past Medical History:   Diagnosis Date    Asthma     CHF (congestive heart failure)     Chronic pain syndrome     Depressive disorder     Diabetes mellitus, type 2     Disorder of sacrum 4/28/2022    Enlarged heart     Gastroparesis     GERD (gastroesophageal reflux disease)     Hyperlipidemia     Hypertension     IBS (irritable bowel syndrome)     Kidney stones     Lumbar radiculopathy     Sleep apnea     Does not use a CPAP    Thyroid disease      Past Surgical History:   Procedure Laterality Date    Bilateral L3-S1 MBB Bilateral 6-, 5-, 1-8-2014    Dr Jessica Randolph    CARPAL TUNNEL RELEASE      CHOLECYSTECTOMY      DIAGNOSTIC LAPAROSCOPY  04/14/2010    Exploratory Laparotomy, Myomectomy, Hydrotubation-Dr. Feng    DILATION AND CURETTAGE OF UTERUS  04/14/2010    Hysteroscopy-Dr. Feng    EXPLORATORY LAPAROTOMY WITH UTERINE MYOMECTOMY  02/27/2007    Dr. Marquise Anderson    HYSTERECTOMY  09/29/2011    Robotic, FABIENNE, Cystoscopy-Dr. Feng    HYSTEROSCOPY WITH DILATION AND CURETTAGE OF UTERUS  04/04/2006    Laparoscopy, Chromotubation-Dr. Marquise Anderson    INJECTION OF ANESTHETIC AGENT AROUND ILIOINGUINAL NERVE Right 6/22/2023    Procedure: BLOCK, NERVE, ILIOINGUINAL;  Surgeon: Rasheeda Bills MD;  Location: [facility];  Service: Pain Management;  Laterality: Right;    LAMINECTOMY N/A 11/30/2021    Procedure: L2-L5 Laminectomy;  Surgeon: Cyril Upton MD;  Location: [facility] OR;  Service: Neurosurgery;  Laterality: N/A;    LEFT HEART CATHETERIZATION      Left L3-S1 RFTC Left 07/18/2017    Dr Lopez    Left SI JI Left 09/30/2013    Dr Randolph    MYRINGOTOMY WITH INSERTION OF VENTILATION TUBE Bilateral 4/4/2023    Procedure: MYRINGOTOMY, WITH TYMPANOSTOMY TUBE INSERTION (T-TUBES);  Surgeon: Sea Hinton MD;  Location: [facility] OR;  Service:  "ENT;  Laterality: Bilateral;  T-TUBES    OOPHORECTOMY  11/11/2014    Robotic, FABIENNE, Cystoscopy-Dr. Feng    SINUS SURGERY       Social History     Socioeconomic History    Marital status:    Tobacco Use    Smoking status: Never    Smokeless tobacco: Never   Substance and Sexual Activity    Alcohol use: Not Currently    Drug use: Not Currently     Types: Hydrocodone, Oxycodone    Sexual activity: Not Currently     Family History   Problem Relation Age of Onset    Diabetes Mellitus Mother     Heart disease Mother     Hypertension Mother     Migraines Mother     Heart disease Sister      Review of patient's allergies indicates:   Allergen Reactions    Fish containing products Anaphylaxis    Iodinated contrast media     Penicillins         Objective:  Vitals:    08/14/23 0917 08/14/23 0918   BP: (!) 141/85    Pulse: 91    Resp: 20    Weight: 98.9 kg (218 lb)    Height: 5' 7" (1.702 m)    PainSc:   7   7         Physical Exam  Vitals and nursing note reviewed. Exam conducted with a chaperone present.   Constitutional:       General: She is awake. She is not in acute distress.     Appearance: Normal appearance. She is not ill-appearing, toxic-appearing or diaphoretic.   HENT:      Head: Normocephalic and atraumatic.      Nose: Nose normal.      Mouth/Throat:      Mouth: Mucous membranes are moist.      Pharynx: Oropharynx is clear.   Eyes:      Conjunctiva/sclera: Conjunctivae normal.      Pupils: Pupils are equal, round, and reactive to light.   Cardiovascular:      Rate and Rhythm: Normal rate.   Pulmonary:      Effort: Pulmonary effort is normal. No respiratory distress.   Abdominal:      Palpations: Abdomen is soft.      Tenderness: There is no guarding.   Musculoskeletal:         General: Normal range of motion.      Cervical back: Normal range of motion and neck supple. No rigidity.   Skin:     General: Skin is warm and dry.      Coloration: Skin is not jaundiced or pale.   Neurological:      General: No focal " deficit present.      Mental Status: She is alert and oriented to person, place, and time. Mental status is at baseline.      Cranial Nerves: No cranial nerve deficit (II-XII).   Psychiatric:         Mood and Affect: Mood normal.         Behavior: Behavior normal. Behavior is cooperative.         Thought Content: Thought content normal.           MRI Lumbar Spine Without Contrast  Narrative: EXAMINATION:  MRI LUMBAR SPINE WITHOUT CONTRAST    CLINICAL HISTORY:  Lumbar radiculopathy, symptoms persist with conservative treatment; Radiculopathy, lumbar region    TECHNIQUE:  Multiplanar, multisequence MR images were acquired from the thoracolumbar junction to the sacrum without the administration of contrast.    COMPARISON:  3/11/22    FINDINGS:  Alignment: Normal.    Vertebrae: Mild multilevel endplate change.  Laminectomy changes extending from L2 through L5, similar.    Discs: Mild multilevel degenerative disc disease    Cord: Normal.  Conus terminates at L1    Degenerative findings:    T12-L1: Normal    L1-L2: Circumferential disc bulge, facet change results in mild spinal canal narrowing as well as mild bilateral neural foraminal narrowing, unchanged from comparison    L2-L3: Circumferential disc bulge, facet change results in mild spinal canal narrowing as well as mild bilateral neural foraminal narrowing, unchanged from comparison    L3-L4: Circumferential disc bulge, facet change results in mild spinal canal narrowing as well as mild-to-moderate bilateral neural foraminal narrowing, unchanged from comparison    L4-L5: Circumferential disc bulge, facet change results in mild spinal canal narrowing as well as mild bilateral neural foraminal narrowing, unchanged    L5-S1: Circumferential disc bulge, facet change results in mild spinal canal narrowing as well as mild bilateral neural foraminal narrowing, unchanged from comparison    Paraspinal muscles & soft tissues: Unremarkable.  Impression: Multilevel degenerative  change of the lumbar spine, unchanged from 03/11/2022    Electronically signed by: Jeet Mendoza  Date:    07/31/2023  Time:    09:47       Office Visit on 08/01/2023   Component Date Value Ref Range Status    SARS Coronavirus 2 Antigen 08/01/2023 Negative  Negative Final    Rapid Influenza A Ag 08/01/2023 Negative  Negative Final    Rapid Influenza B Ag 08/01/2023 Negative  Negative Final     Acceptable 08/01/2023 Yes   Final    Rapid Strep A Screen 08/01/2023 Negative  Negative Final     Acceptable 08/01/2023 Yes   Final   Patient Outreach on 07/24/2023   Component Date Value Ref Range Status    Left Eye DM Retinopathy 12/22/2020 Negative   Final    Right Eye DM Retinopathy 12/22/2020 Negative   Final   Office Visit on 07/19/2023   Component Date Value Ref Range Status    Sodium 07/19/2023 144  136 - 145 mmol/L Final    Potassium 07/19/2023 3.8  3.5 - 5.1 mmol/L Final    Chloride 07/19/2023 106  98 - 107 mmol/L Final    CO2 07/19/2023 31  21 - 32 mmol/L Final    Anion Gap 07/19/2023 11  7 - 16 mmol/L Final    Glucose 07/19/2023 188 (H)  74 - 106 mg/dL Final    BUN 07/19/2023 9  7 - 18 mg/dL Final    Creatinine 07/19/2023 0.97  0.55 - 1.02 mg/dL Final    BUN/Creatinine Ratio 07/19/2023 9  6 - 20 Final    Calcium 07/19/2023 8.8  8.5 - 10.1 mg/dL Final    Total Protein 07/19/2023 7.1  6.4 - 8.2 g/dL Final    Albumin 07/19/2023 3.3 (L)  3.5 - 5.0 g/dL Final    Globulin 07/19/2023 3.8  2.0 - 4.0 g/dL Final    A/G Ratio 07/19/2023 0.9   Final    Bilirubin, Total 07/19/2023 0.3  >0.0 - 1.2 mg/dL Final    Alk Phos 07/19/2023 122 (H)  39 - 100 U/L Final    ALT 07/19/2023 31  13 - 56 U/L Final    AST 07/19/2023 23  15 - 37 U/L Final    eGFR 07/19/2023 73  >=60 mL/min/1.73m2 Final    Hemoglobin A1C 07/19/2023 7.3 (H)  4.5 - 6.6 % Final    Estimated Average Glucose 07/19/2023 157  mg/dL Final    TSH 07/19/2023 49.700 (H)  0.358 - 3.740 uIU/mL Final   Office Visit on 07/12/2023   Component Date  Value Ref Range Status    POC Amphetamines 07/12/2023 Negative  Negative, Inconclusive Final    POC Barbiturates 07/12/2023 Negative  Negative, Inconclusive Final    POC Benzodiazepines 07/12/2023 Presumptive Positive (A)  Negative, Inconclusive Final    POC Cocaine 07/12/2023 Negative  Negative, Inconclusive Final    POC THC 07/12/2023 Negative  Negative, Inconclusive Final    POC Methadone 07/12/2023 Negative  Negative, Inconclusive Final    POC Methamphetamine 07/12/2023 Negative  Negative, Inconclusive Final    POC Opiates 07/12/2023 Negative  Negative, Inconclusive Final    POC Oxycodone 07/12/2023 Negative  Negative, Inconclusive Final    POC Phencyclidine 07/12/2023 Negative  Negative, Inconclusive Final    POC Methylenedioxymethamphetamine * 07/12/2023 Negative  Negative, Inconclusive Final    POC Tricyclic Antidepressants 07/12/2023 Negative  Negative, Inconclusive Final    POC Buprenorphine 07/12/2023 Negative   Final     Acceptable 07/12/2023 Yes   Final    POC Temperature (Urine) 07/12/2023 90   Final   Admission on 06/22/2023, Discharged on 06/22/2023   Component Date Value Ref Range Status    POC Glucose 06/22/2023 141 (H)  70 - 105 mg/dL Final   Admission on 06/13/2023, Discharged on 06/13/2023   Component Date Value Ref Range Status    Sodium 06/13/2023 139  136 - 145 mmol/L Final    Potassium 06/13/2023 2.8 (L)  3.5 - 5.1 mmol/L Final    Chloride 06/13/2023 98  98 - 107 mmol/L Final    CO2 06/13/2023 35 (H)  21 - 32 mmol/L Final    Anion Gap 06/13/2023 9  7 - 16 mmol/L Final    Glucose 06/13/2023 176 (H)  74 - 106 mg/dL Final    BUN 06/13/2023 7  7 - 18 mg/dL Final    Creatinine 06/13/2023 0.95  0.55 - 1.02 mg/dL Final    BUN/Creatinine Ratio 06/13/2023 7  6 - 20 Final    Calcium 06/13/2023 8.9  8.5 - 10.1 mg/dL Final    Total Protein 06/13/2023 7.9  6.4 - 8.2 g/dL Final    Albumin 06/13/2023 3.3 (L)  3.5 - 5.0 g/dL Final    Globulin 06/13/2023 4.6 (H)  2.0 - 4.0 g/dL Final    A/G  Ratio 06/13/2023 0.7   Final    Bilirubin, Total 06/13/2023 0.2  >0.0 - 1.2 mg/dL Final    Alk Phos 06/13/2023 130 (H)  39 - 100 U/L Final    ALT 06/13/2023 20  13 - 56 U/L Final    AST 06/13/2023 14 (L)  15 - 37 U/L Final    eGFR 06/13/2023 75  >=60 mL/min/1.73m2 Final    Lipase 06/13/2023 60 (L)  73 - 393 U/L Final    Color, UA 06/13/2023 Light Yellow  Colorless, Straw, Yellow, Light Yellow, Dark Yellow Final    Clarity, UA 06/13/2023 Slightly Cloudy  Clear Final    pH, UA 06/13/2023 7.5  5.0 to 8.0 pH Units Final    Leukocytes, UA 06/13/2023 Negative  Negative Final    Nitrites, UA 06/13/2023 Negative  Negative Final    Protein, UA 06/13/2023 Negative  Negative Final    Glucose, UA 06/13/2023 Normal  Normal mg/dL Final    Ketones, UA 06/13/2023 Negative  Negative mg/dL Final    Urobilinogen, UA 06/13/2023 Normal  0.2, 1.0, Normal mg/dL Final    Bilirubin, UA 06/13/2023 Negative  Negative Final    Blood, UA 06/13/2023 Negative  Negative Final    Specific Gravity, UA 06/13/2023 1.009  <=1.030 Final    WBC 06/13/2023 7.67  4.50 - 11.00 K/uL Final    RBC 06/13/2023 4.34  4.20 - 5.40 M/uL Final    Hemoglobin 06/13/2023 12.0  12.0 - 16.0 g/dL Final    Hematocrit 06/13/2023 39.3  38.0 - 47.0 % Final    MCV 06/13/2023 90.6  80.0 - 96.0 fL Final    MCH 06/13/2023 27.6  27.0 - 31.0 pg Final    MCHC 06/13/2023 30.5 (L)  32.0 - 36.0 g/dL Final    RDW 06/13/2023 12.4  11.5 - 14.5 % Final    Platelet Count 06/13/2023 367  150 - 400 K/uL Final    MPV 06/13/2023 9.2 (L)  9.4 - 12.4 fL Final    Neutrophils % 06/13/2023 54.9  53.0 - 65.0 % Final    Lymphocytes % 06/13/2023 36.9  27.0 - 41.0 % Final    Monocytes % 06/13/2023 5.6  2.0 - 6.0 % Final    Eosinophils % 06/13/2023 1.7  1.0 - 4.0 % Final    Basophils % 06/13/2023 0.5  0.0 - 1.0 % Final    Immature Granulocytes % 06/13/2023 0.4  0.0 - 0.4 % Final    nRBC, Auto 06/13/2023 0.0  <=0.0 % Final    Neutrophils, Abs 06/13/2023 4.21  1.80 - 7.70 K/uL Final    Lymphocytes, Absolute  06/13/2023 2.83  1.00 - 4.80 K/uL Final    Monocytes, Absolute 06/13/2023 0.43  0.00 - 0.80 K/uL Final    Eosinophils, Absolute 06/13/2023 0.13  0.00 - 0.50 K/uL Final    Basophils, Absolute 06/13/2023 0.04  0.00 - 0.20 K/uL Final    Immature Granulocytes, Absolute 06/13/2023 0.03  0.00 - 0.04 K/uL Final    nRBC, Absolute 06/13/2023 0.00  <=0.00 x10e3/uL Final    Diff Type 06/13/2023 Auto   Final    POC Glucose 06/13/2023 171 (H)  70 - 105 mg/dL Final    Magnesium 06/13/2023 1.7  1.7 - 2.3 mg/dL Final   Office Visit on 05/29/2023   Component Date Value Ref Range Status    Color, UA 05/29/2023 Dark Yellow   Corrected    Spec Grav UA 05/29/2023 1.030   Corrected    pH, UA 05/29/2023 6.0   Final-Edited    WBC, UA 05/29/2023 positive   Corrected    Nitrite, UA 05/29/2023 negative   Final-Edited    Protein, POC 05/29/2023 positive   Corrected    Glucose, UA 05/29/2023 negative   Final-Edited    Ketones, UA 05/29/2023 positive   Corrected    Bilirubin, POC 05/29/2023 positive   Corrected    Urobilinogen, UA 05/29/2023 0.2   Final-Edited    Blood, UA 05/29/2023 negative   Final-Edited    POC Glucose 05/29/2023 189 (A)  70 - 110 MG/DL Final    SARS Coronavirus 2 Antigen 05/29/2023 Negative  Negative Final    Rapid Influenza A Ag 05/29/2023 Negative  Negative Final    Rapid Influenza B Ag 05/29/2023 Negative  Negative Final     Acceptable 05/29/2023 Yes   Final    Sodium 05/29/2023 137  136 - 145 mmol/L Final    Potassium 05/29/2023 3.3 (L)  3.5 - 5.1 mmol/L Final    Chloride 05/29/2023 103  98 - 107 mmol/L Final    CO2 05/29/2023 30  21 - 32 mmol/L Final    Anion Gap 05/29/2023 7  7 - 16 mmol/L Final    Glucose 05/29/2023 206 (H)  74 - 106 mg/dL Final    BUN 05/29/2023 11  7 - 18 mg/dL Final    Creatinine 05/29/2023 0.98  0.55 - 1.02 mg/dL Final    BUN/Creatinine Ratio 05/29/2023 11  6 - 20 Final    Calcium 05/29/2023 9.3  8.5 - 10.1 mg/dL Final    Total Protein 05/29/2023 8.0  6.4 - 8.2 g/dL Final    Albumin  05/29/2023 3.8  3.5 - 5.0 g/dL Final    Globulin 05/29/2023 4.2 (H)  2.0 - 4.0 g/dL Final    A/G Ratio 05/29/2023 0.9   Final    Bilirubin, Total 05/29/2023 0.4  >0.0 - 1.2 mg/dL Final    Alk Phos 05/29/2023 140 (H)  39 - 100 U/L Final    ALT 05/29/2023 28  13 - 56 U/L Final    AST 05/29/2023 19  15 - 37 U/L Final    eGFR 05/29/2023 72  >=60 mL/min/1.73m2 Final    WBC 05/29/2023 7.43  4.50 - 11.00 K/uL Final    RBC 05/29/2023 4.30  4.20 - 5.40 M/uL Final    Hemoglobin 05/29/2023 11.6 (L)  12.0 - 16.0 g/dL Final    Hematocrit 05/29/2023 38.8  38.0 - 47.0 % Final    MCV 05/29/2023 90.2  80.0 - 96.0 fL Final    MCH 05/29/2023 27.0  27.0 - 31.0 pg Final    MCHC 05/29/2023 29.9 (L)  32.0 - 36.0 g/dL Final    RDW 05/29/2023 12.9  11.5 - 14.5 % Final    Platelet Count 05/29/2023 485 (H)  150 - 400 K/uL Final    MPV 05/29/2023 9.6  9.4 - 12.4 fL Final    Neutrophils % 05/29/2023 51.4 (L)  53.0 - 65.0 % Final    Lymphocytes % 05/29/2023 40.8  27.0 - 41.0 % Final    Monocytes % 05/29/2023 4.8  2.0 - 6.0 % Final    Eosinophils % 05/29/2023 2.4  1.0 - 4.0 % Final    Basophils % 05/29/2023 0.5  0.0 - 1.0 % Final    Immature Granulocytes % 05/29/2023 0.1  0.0 - 0.4 % Final    nRBC, Auto 05/29/2023 0.0  <=0.0 % Final    Neutrophils, Abs 05/29/2023 3.81  1.80 - 7.70 K/uL Final    Lymphocytes, Absolute 05/29/2023 3.03  1.00 - 4.80 K/uL Final    Monocytes, Absolute 05/29/2023 0.36  0.00 - 0.80 K/uL Final    Eosinophils, Absolute 05/29/2023 0.18  0.00 - 0.50 K/uL Final    Basophils, Absolute 05/29/2023 0.04  0.00 - 0.20 K/uL Final    Immature Granulocytes, Absolute 05/29/2023 0.01  0.00 - 0.04 K/uL Final    nRBC, Absolute 05/29/2023 0.00  <=0.00 x10e3/uL Final    Diff Type 05/29/2023 Auto   Final    Color, UA 05/29/2023 Yellow  Colorless, Straw, Yellow, Light Yellow, Dark Yellow Final    Clarity, UA 05/29/2023 Ex.Turbid  Clear Final    pH, UA 05/29/2023 6.0  5.0 to 8.0 pH Units Final    Leukocytes, UA 05/29/2023 Large (A)  Negative  Final    Nitrites, UA 05/29/2023 Negative  Negative Final    Protein, UA 05/29/2023 100 (A)  Negative Final    Glucose, UA 05/29/2023 30 (A)  Normal mg/dL Final    Ketones, UA 05/29/2023 Negative  Negative mg/dL Final    Urobilinogen, UA 05/29/2023 2 (A)  0.2, 1.0, Normal mg/dL Final    Bilirubin, UA 05/29/2023 Negative  Negative Final    Blood, UA 05/29/2023 Negative  Negative Final    Specific Gravity, UA 05/29/2023 1.038 (H)  <=1.030 Final    WBC, UA 05/29/2023 30 (H)  <=5 /hpf Final    RBC, UA 05/29/2023 80 (H)  <=3 /hpf Final    Bacteria, UA 05/29/2023 Many (A)  None Seen /hpf Final    Squamous Epithelial Cells, UA 05/29/2023 Moderate (A)  None Seen /HPF Final    Mucous 05/29/2023 Many (A)  None Seen /LPF Final    Culture, Urine 05/29/2023 15,000 Streptococcus agalactiae (Group B) (A)   Final   Office Visit on 04/13/2023   Component Date Value Ref Range Status    Vitamin D 25-Hydroxy, Blood 04/13/2023 16.3  ng/mL Final    WBC 04/13/2023 7.40  4.50 - 11.00 K/uL Final    RBC 04/13/2023 4.32  4.20 - 5.40 M/uL Final    Hemoglobin 04/13/2023 11.6 (L)  12.0 - 16.0 g/dL Final    Hematocrit 04/13/2023 38.6  38.0 - 47.0 % Final    MCV 04/13/2023 89.4  80.0 - 96.0 fL Final    MCH 04/13/2023 26.9 (L)  27.0 - 31.0 pg Final    MCHC 04/13/2023 30.1 (L)  32.0 - 36.0 g/dL Final    RDW 04/13/2023 13.3  11.5 - 14.5 % Final    Platelet Count 04/13/2023 447 (H)  150 - 400 K/uL Final    MPV 04/13/2023 10.5  9.4 - 12.4 fL Final    Neutrophils % 04/13/2023 48.1 (L)  53.0 - 65.0 % Final    Lymphocytes % 04/13/2023 43.2 (H)  27.0 - 41.0 % Final    Monocytes % 04/13/2023 6.1 (H)  2.0 - 6.0 % Final    Eosinophils % 04/13/2023 1.8  1.0 - 4.0 % Final    Basophils % 04/13/2023 0.4  0.0 - 1.0 % Final    Immature Granulocytes % 04/13/2023 0.4  0.0 - 0.4 % Final    nRBC, Auto 04/13/2023 0.0  <=0.0 % Final    Neutrophils, Abs 04/13/2023 3.56  1.80 - 7.70 K/uL Final    Lymphocytes, Absolute 04/13/2023 3.20  1.00 - 4.80 K/uL Final    Monocytes,  Absolute 04/13/2023 0.45  0.00 - 0.80 K/uL Final    Eosinophils, Absolute 04/13/2023 0.13  0.00 - 0.50 K/uL Final    Basophils, Absolute 04/13/2023 0.03  0.00 - 0.20 K/uL Final    Immature Granulocytes, Absolute 04/13/2023 0.03  0.00 - 0.04 K/uL Final    nRBC, Absolute 04/13/2023 0.00  <=0.00 x10e3/uL Final    Diff Type 04/13/2023 Auto   Final   Office Visit on 04/12/2023   Component Date Value Ref Range Status    POC Amphetamines 04/12/2023 Negative  Negative, Inconclusive Final    POC Barbiturates 04/12/2023 Negative  Negative, Inconclusive Final    POC Benzodiazepines 04/12/2023 Negative  Negative, Inconclusive Final    POC Cocaine 04/12/2023 Negative  Negative, Inconclusive Final    POC THC 04/12/2023 Negative  Negative, Inconclusive Final    POC Methadone 04/12/2023 Negative  Negative, Inconclusive Final    POC Methamphetamine 04/12/2023 Negative  Negative, Inconclusive Final    POC Opiates 04/12/2023 Negative  Negative, Inconclusive Final    POC Oxycodone 04/12/2023 Negative  Negative, Inconclusive Final    POC Phencyclidine 04/12/2023 Negative  Negative, Inconclusive Final    POC Methylenedioxymethamphetamine * 04/12/2023 Negative  Negative, Inconclusive Final    POC Tricyclic Antidepressants 04/12/2023 Negative  Negative, Inconclusive Final    POC Buprenorphine 04/12/2023 Negative   Final     Acceptable 04/12/2023 Yes   Final    POC Temperature (Urine) 04/12/2023 94   Final   Admission on 04/04/2023, Discharged on 04/04/2023   Component Date Value Ref Range Status    POC Glucose 04/04/2023 188 (H)  70 - 105 mg/dL Final    POC Glucose 04/04/2023 164 (H)  70 - 105 mg/dL Final    CK 04/04/2023 79  26 - 192 U/L Final    CK-MB 04/04/2023 <1.0 (L)  1.0 - 3.6 ng/mL Final    Troponin I High Sensitivity 04/04/2023 7.4  <=60.4 pg/mL Final   There may be more visits with results that are not included.         No orders of the defined types were placed in this encounter.      Requested Prescriptions      Signed Prescriptions Disp Refills    meloxicam (MOBIC) 15 MG tablet 30 tablet 0     Sig: Take 1 tablet (15 mg total) by mouth once daily.    cyclobenzaprine (FLEXERIL) 10 MG tablet 90 tablet 0     Sig: Take 1 tablet (10 mg total) by mouth 3 (three) times daily as needed for Muscle spasms.    traMADoL (ULTRAM) 50 mg tablet 90 tablet 0     Sig: Take 1 tablet (50 mg total) by mouth every 8 (eight) hours as needed for Pain.       Assessment:     1. Lumbosacral radiculopathy    2. Postlaminectomy syndrome, lumbar region    3. Disorder of sacrum    4. Ilioinguinal neuralgia of right side         A's of Opioid Risk Assessment  Activity:Patient can perform ADL.   Analgesia:Patients pain is partially controlled by current medication. Patient has tried OTC medications such as Tylenol and Ibuprofen with out relief.   Adverse Effects: Patient denies constipation or sedation.  Aberrant Behavior:  reviewed with no aberrant drug seeking/taking behavior.  Overdose reversal drug naloxone discussed    Drug screen reviewed      Plan:    MRI lumbar spine [facility] July 31, 2023 multiple level degenerative changes mild bilateral foraminal stenosis L3/4 L4/5 less so at L5/S1 postop changes noted     History lumbar surgery laminectomy L2 through 5 November 30, 2021 Dr. Upton complicated by postop infection    Presumptive drug screen negative, this is expected result, patient takes tramadol, cup does not test for tramadol    She states she would like to have procedure for back pain worse with flexion extension rotation lumbar spine ongoing for more than 3 months facet joint in nature     Requesting Toradol injection    Toradol 60 mg IM, tolerated well    Indications for this procedure for this specific patient include the following   - Pt has had symptoms for three months with moderate to severe pain with functional impairment rated of 7/10 pain.   - Pain non-responsive to conservative care.    - Pain predominately axial and not  associated with radiculopathy or claudication.    - No non-facet pathology as source of pain.    - Clinical assessment implicates facet joint as putative pain source.    - facet loading maneuver positive  - Pain is exacerbated by extension or prolonged sitting/standing and relieved by rest.    - No unexplained neurologic deficit.    - No history of coagulopathy, infection or unstable medical conditions.    - Pain is causing significant functional limitation resulting in diminished quality of life and impaired age appropriate ADL's.   - Clinical assessment implicates facet joint as putative source of pain  - Repeat injections not done prior to 7 days   - no more than 2 levels will be done    The planned medically necessary  surgical procedure is performed in a hospital outpatient department and not in an ambulatory surgical center due to:     -there is no geographically assessable ambulatory surgery center that has the  necessary equipment and fluoroscopy needed for the procedure     -there is no geographically assessable ambulatory surgical center available at which the physician has privileges     -an ASC's  specific  guideline regarding the individuals weight or health conditions that prevent the use of an ASC     -Medial branch block performed in consideration for RFTC, not just for therapeutic treatment    -done under fluoro    Monitor anesthesia request is medically indicated for the scheduled nerve block procedure due to:  1- needle phobia and anxiety, placing  the patient at risk during the provided service.  2-patient has an ASA class greater than 3 and requires constant presence of an anesthesiologist during the procedure,   3-patient has severe problems hard to lie still  4-patient suffers from chronic pain and is unable to function due to  diminished ADLs    Schedule bilateral lumbar medial branch block L4 through S1, # 1, lumbosacral spondylosis      Dr. Bills    Bring original prescription medication  bottles/container/box with labels to each visit             Review of Systems   Constitutional:  Negative for activity change, appetite change, chills, diaphoresis, fever and unexpected weight change.   HENT:  Negative for drooling, ear discharge, ear pain, facial swelling, nosebleeds, sore throat, trouble swallowing and voice change.    Eyes:  Negative for photophobia, pain, discharge, redness and visual disturbance.   Respiratory:  Negative for apnea, cough, choking, chest tightness, shortness of breath, wheezing and stridor.    Cardiovascular:  Negative for chest pain, palpitations and leg swelling.   Gastrointestinal:  Negative for abdominal distention, anorexia, change in bowel habit, diarrhea, rectal pain and vomiting.   Genitourinary:  Negative for bladder incontinence, dysuria, flank pain and frequency.   Musculoskeletal:  Positive for arthralgias and back pain. Negative for neck pain.   Skin:  Negative for color change and pallor.   Neurological:  Negative for dizziness, vertigo, seizures, syncope, facial asymmetry, speech difficulty, light-headedness and disturbances in coordination.   Endo/Heme/Allergies:  Negative for cold intolerance, heat intolerance, polydipsia, polyphagia and adenopathy. Does not bruise/bleed easily.   Psychiatric/Behavioral:  Negative for agitation, behavioral problems, confusion, decreased concentration, dysphoric mood, hallucinations, self-injury and suicidal ideas. The patient is not nervous/anxious and is not hyperactive.

## 2023-09-21 NOTE — OP NOTE
Procedure Note    Procedure Date: 9/21/2023    Procedure Performed:  Bilateral  Lumbar Facet  Medial Branch Block @ L4-5,5-S1 with Fluoroscopic Guidance    Indications: Patient has failed conservative therapy.      Pre-op diagnosis: Lumbar Spondylosis    Post-op diagnosis: same    Physician: MONTRELL Bills MD    Anesthesia: MAC    Estimated Blood Loss: Less than 1cc    IVF: Per Anesthesia    Complications: None    Technique:  The patient was interviewed in the holding area and Risks/Benefits were discussed, including but not limited to  the possibility of new or different pain, bleeding or infection.   All questions were answered.  The patient understood and accepted risks.  Consent was reviewed and signed.  A time out was taken to identify the patient, procedure and side of the procedure. The patient was placed in a prone position, then prepped and draped in the usual sterile fashion using ChloraPrep and sterile towels.  The levels were determined under fluoroscopic guidance and then marked.  1% Lidocaine was given by raising a skin wheal at the skin over each site and then infiltrated.  A 22-gauge 4 inch needle was introduced to the anatomic location of the bilateral L4-5,5-S1 medial branch nerves.  Then, after negative aspiration, 1 cc from a  mixture of Bupivacaine 0.25% 3cc  and  40mg kenalog ) was injected @ each level.The patient tolerated the procedure well and was transferred to the [facility] in stable condition.  The patient was monitored after the procedure.  Patient was given post procedure and discharge instructions to follow at home.  The patient was discharged in a stable condition with an adult .

## 2023-09-21 NOTE — ANESTHESIA PREPROCEDURE EVALUATION
09/21/2023  Carey Leonardo is a 47 y.o., female.    Social History     Socioeconomic History    Marital status:    Tobacco Use    Smoking status: Never    Smokeless tobacco: Never   Substance and Sexual Activity    Alcohol use: Not Currently    Drug use: Not Currently     Types: Hydrocodone, Oxycodone    Sexual activity: Not Currently     Pre-op Assessment    I have reviewed the Patient Summary Reports.     I have reviewed the Nursing Notes. I have reviewed the NPO Status.   I have reviewed the Medications.     Review of Systems  Anesthesia Hx:  No problems with previous Anesthesia    Cardiovascular:   Hypertension CHF    Pulmonary:   Asthma Sleep Apnea, CPAP    Renal/:   Chronic Renal Disease    Hepatic/GI:   GERD    Musculoskeletal:   Arthritis   Lumbar Spine Disorders   Neurological:   Neuromuscular Disease,  Pain Syndrome  Chronic Pain Syndrome   Endocrine:   Diabetes Hypothyroidism    Psych:   Psychiatric History        Past Medical History:   Diagnosis Date    Asthma     CHF (congestive heart failure)     Chronic pain syndrome     Depressive disorder     Diabetes mellitus, type 2     Disorder of sacrum 04/28/2022    Enlarged heart     Gastroparesis     GERD (gastroesophageal reflux disease)     Hyperlipidemia     Hypertension     IBS (irritable bowel syndrome)     Kidney stones     Lumbar radiculopathy     Sleep apnea     Does not use a CPAP    Thyroid disease     Unspecified chronic bronchitis      Past Surgical History:   Procedure Laterality Date    Bilateral L3-S1 MBB Bilateral 6-, 5-, 1-8-2014    Dr Jessica Randolph    CARPAL TUNNEL RELEASE      CHOLECYSTECTOMY      DIAGNOSTIC LAPAROSCOPY  04/14/2010    Exploratory Laparotomy, Myomectomy, Hydrotubation-Dr. Feng    DILATION AND CURETTAGE OF UTERUS  04/14/2010    Hysteroscopy-Dr. Feng     EXPLORATORY LAPAROTOMY WITH UTERINE MYOMECTOMY  02/27/2007    Dr. Marquise Anderson    HYSTERECTOMY  09/29/2011    Robotic, FABIENNE, Cystoscopy-Dr. Feng    HYSTEROSCOPY WITH DILATION AND CURETTAGE OF UTERUS  04/04/2006    Laparoscopy, Chromotubation-Dr. Marquise Anderson    INJECTION OF ANESTHETIC AGENT AROUND ILIOINGUINAL NERVE Right 6/22/2023    Procedure: BLOCK, NERVE, ILIOINGUINAL;  Surgeon: Rasheeda Bills MD;  [facility];  Service: Pain Management;  Laterality: Right;    LAMINECTOMY N/A 11/30/2021    Procedure: L2-L5 Laminectomy;  Surgeon: Cyril Upton MD;  Location: [facility];  Service: Neurosurgery;  Laterality: N/A;    LEFT HEART CATHETERIZATION      Left L3-S1 RFTC Left 07/18/2017    Dr Lopez    Left SI JI Left 09/30/2013    Dr Randolph    MYRINGOTOMY WITH INSERTION OF VENTILATION TUBE Bilateral 4/4/2023    Procedure: MYRINGOTOMY, WITH TYMPANOSTOMY TUBE INSERTION (T-TUBES);  Surgeon: Sea Hinton MD;  [facility];  Service: ENT;  Laterality: Bilateral;  T-TUBES    MYRINGOTOMY WITH INSERTION OF VENTILATION TUBE Bilateral 9/12/2023    Procedure: MYRINGOTOMY, WITH TYMPANOSTOMY TUBE INSERTION, T-TUBES;  Surgeon: Sea Hinton MD;  Location: [facility];  Service: ENT;  Laterality: Bilateral;    OOPHORECTOMY  11/11/2014    Robotic, FABIENNE, Cystoscopy-Dr. Feng    SINUS SURGERY         Physical Exam  General: Well nourished, Cooperative, Alert and Oriented    Airway:  Mallampati: II       Chest/Lungs:  Clear to auscultation    Heart:  Rate: Normal        Anesthesia Plan  Type of Anesthesia, risks & benefits discussed:    Anesthesia Type: Gen Natural Airway, MAC  Intra-op Monitoring Plan: Standard ASA Monitors  Post Op Pain Control Plan: multimodal analgesia and IV/PO Opioids PRN  Induction:  IV  Informed Consent: Informed consent signed with the Patient and all parties understand the risks and agree with anesthesia plan.  All questions answered. Patient consented  to blood products? Yes  ASA Score: 3  Day of Surgery Review of History & Physical: I have interviewed and examined the patient. I have reviewed the patient's H&P dated: There are no significant changes.     Ready For Surgery From Anesthesia Perspective.     .

## 2023-09-21 NOTE — DISCHARGE SUMMARY
[facility] - Pain Management  Discharge Note  Short Stay    Procedure(s) (LRB):  BLOCK, NERVE, FACET JOINT, LUMBAR, MEDIAL BRANCH (Bilateral)      OUTCOME: Patient tolerated treatment/procedure well without complication and is now ready for discharge.    DISPOSITION: Home or Self Care    FINAL DIAGNOSIS:  Lumbosacral spondylosis    FOLLOWUP: In clinic    DISCHARGE INSTRUCTIONS:  See nurse's notes     TIME SPENT ON DISCHARGE: 5 minutes

## 2023-09-21 NOTE — PLAN OF CARE
REFER TO WRITTEN DOCUMENT AND RECOVERY INFORMATION.    D/CD PATIENT VIAA WHEELCHAIR AT 1231.    INFORMED PATIENT IF UNABLE TO VOID IN 8 HOURS, GO TO ER. NOTIFY MD OF REDNESS OR DRAINAGE FROM INJECTION SITE OR FEVER OVER 3-4 DAY. REST AND DRINK PLENTY OF FLUIDS FOR THE REMAINDER OF THE DAY. NO LIFTING OVER 5 LBS FOR THE REMAINDER OF THE DAY. CONTINUE REGULAR MEDICATIONS AS PRESCRIBED. MAY TAKE PAIN MEDICATION AS PRESCRIBED.     PAIN IMPROVED  100%     Preop pain 8.    Postop pain 0.

## 2023-09-21 NOTE — BRIEF OP NOTE
The  Discharge Note  Short Stay    Admit Date: 9/21/2023    Discharge Date: 9/21/2023    Attending Physician: Rasheeda Bills     Discharge Provider: Rasheeda Bills    Diagnosis: Lumbosacral spondylosis    Procedure performed: Bilateral L4-5, L5-S1 MBB    Findings: Procedure tolerated well and without complications. Consistent with diagnosis.    EBL: 0cc    Specimens: None    Discharged Condition: Good    Final Diagnoses: Lumbosacral spondylosis without myelopathy [M47.817]    Disposition: Home or Self Care    Hospital Course: No complications, uneventful    Outcome of Hospitalization, Treatment, Procedure, or Surgery:  Patient was admitted for outpatient interventional pain management procedure. The patient tolerated the procedure well with no complications.    Follow up/Patient Instructions:  Follow up as scheduled in Pain Management office in 3-4 weeks.  Patient has received instructions and follow up date and time.    Medications:  Continue previous medications

## 2023-09-21 NOTE — ANESTHESIA POSTPROCEDURE EVALUATION
Anesthesia Post Evaluation    Patient: Shatagia D Leonardo    Procedure(s) Performed: Procedure(s) (LRB):  BLOCK, NERVE, FACET JOINT, LUMBAR, MEDIAL BRANCH (Bilateral)    Final Anesthesia Type: general      Patient location during evaluation: PACU  Patient participation: Yes- Able to Participate  Level of consciousness: responds to stimulation  Post-procedure vital signs: reviewed and stable  Pain management: adequate  Airway patency: patent  KILO mitigation strategies: Multimodal analgesia  PONV status at discharge: No PONV  Anesthetic complications: no      Cardiovascular status: hemodynamically stable  Respiratory status: unassisted, spontaneous ventilation and room air  Hydration status: euvolemic  Follow-up not needed.  Comments: Refer to nursing note for pain/julio cesar score upon discharge from recovery.           Vitals Value Taken Time   /71 09/21/23 1148   Temp 36.1 °C (97 °F) 09/21/23 1148   Pulse 80 09/21/23 1149   Resp 18 09/21/23 1149   SpO2 90 % 09/21/23 1149   Vitals shown include unvalidated device data.      No case tracking events are documented in the log.      Pain/Julio Cesar Score: No data recorded

## 2023-10-02 ENCOUNTER — OFFICE VISIT (OUTPATIENT)
Dept: FAMILY MEDICINE | Facility: CLINIC | Age: 48
End: 2023-10-02
Payer: OTHER GOVERNMENT

## 2023-10-02 VITALS
SYSTOLIC BLOOD PRESSURE: 109 MMHG | OXYGEN SATURATION: 98 % | RESPIRATION RATE: 18 BRPM | WEIGHT: 215 LBS | HEIGHT: 67 IN | BODY MASS INDEX: 33.74 KG/M2 | DIASTOLIC BLOOD PRESSURE: 81 MMHG | TEMPERATURE: 99 F | HEART RATE: 89 BPM

## 2023-10-02 DIAGNOSIS — R53.83 FATIGUE, UNSPECIFIED TYPE: ICD-10-CM

## 2023-10-02 DIAGNOSIS — R50.9 FEVER, UNSPECIFIED FEVER CAUSE: ICD-10-CM

## 2023-10-02 DIAGNOSIS — E11.9 TYPE 2 DIABETES MELLITUS WITHOUT COMPLICATION, WITHOUT LONG-TERM CURRENT USE OF INSULIN: Primary | ICD-10-CM

## 2023-10-02 LAB
25(OH)D3 SERPL-MCNC: 32.3 NG/ML
ALBUMIN SERPL BCP-MCNC: 3.3 G/DL (ref 3.5–5)
ALBUMIN/GLOB SERPL: 0.8 {RATIO}
ALP SERPL-CCNC: 135 U/L (ref 39–100)
ALT SERPL W P-5'-P-CCNC: 32 U/L (ref 13–56)
ANION GAP SERPL CALCULATED.3IONS-SCNC: 10 MMOL/L (ref 7–16)
AST SERPL W P-5'-P-CCNC: 17 U/L (ref 15–37)
BASOPHILS # BLD AUTO: 0.05 K/UL (ref 0–0.2)
BASOPHILS NFR BLD AUTO: 0.6 % (ref 0–1)
BILIRUB SERPL-MCNC: 0.3 MG/DL (ref ?–1.2)
BUN SERPL-MCNC: 11 MG/DL (ref 7–18)
BUN/CREAT SERPL: 13 (ref 6–20)
CALCIUM SERPL-MCNC: 9.1 MG/DL (ref 8.5–10.1)
CHLORIDE SERPL-SCNC: 106 MMOL/L (ref 98–107)
CO2 SERPL-SCNC: 30 MMOL/L (ref 21–32)
CREAT SERPL-MCNC: 0.84 MG/DL (ref 0.55–1.02)
CTP QC/QA: YES
DIFFERENTIAL METHOD BLD: ABNORMAL
EGFR (NO RACE VARIABLE) (RUSH/TITUS): 86 ML/MIN/1.73M2
EOSINOPHIL # BLD AUTO: 0.1 K/UL (ref 0–0.5)
EOSINOPHIL NFR BLD AUTO: 1.2 % (ref 1–4)
ERYTHROCYTE [DISTWIDTH] IN BLOOD BY AUTOMATED COUNT: 12.9 % (ref 11.5–14.5)
FLUAV AG NPH QL: NEGATIVE
FLUBV AG NPH QL: NEGATIVE
GLOBULIN SER-MCNC: 4.1 G/DL (ref 2–4)
GLUCOSE SERPL-MCNC: 176 MG/DL (ref 74–106)
HCT VFR BLD AUTO: 39.1 % (ref 38–47)
HGB BLD-MCNC: 11.8 G/DL (ref 12–16)
IMM GRANULOCYTES # BLD AUTO: 0.02 K/UL (ref 0–0.04)
IMM GRANULOCYTES NFR BLD: 0.2 % (ref 0–0.4)
LYMPHOCYTES # BLD AUTO: 3.46 K/UL (ref 1–4.8)
LYMPHOCYTES NFR BLD AUTO: 40.1 % (ref 27–41)
MCH RBC QN AUTO: 27.6 PG (ref 27–31)
MCHC RBC AUTO-ENTMCNC: 30.2 G/DL (ref 32–36)
MCV RBC AUTO: 91.6 FL (ref 80–96)
MONOCYTES # BLD AUTO: 0.45 K/UL (ref 0–0.8)
MONOCYTES NFR BLD AUTO: 5.2 % (ref 2–6)
MPC BLD CALC-MCNC: 9.6 FL (ref 9.4–12.4)
NEUTROPHILS # BLD AUTO: 4.55 K/UL (ref 1.8–7.7)
NEUTROPHILS NFR BLD AUTO: 52.7 % (ref 53–65)
NRBC # BLD AUTO: 0 X10E3/UL
NRBC, AUTO (.00): 0 %
PLATELET # BLD AUTO: 438 K/UL (ref 150–400)
POTASSIUM SERPL-SCNC: 4.2 MMOL/L (ref 3.5–5.1)
PROT SERPL-MCNC: 7.4 G/DL (ref 6.4–8.2)
RBC # BLD AUTO: 4.27 M/UL (ref 4.2–5.4)
SARS-COV-2 AG RESP QL IA.RAPID: NEGATIVE
SODIUM SERPL-SCNC: 142 MMOL/L (ref 136–145)
WBC # BLD AUTO: 8.63 K/UL (ref 4.5–11)

## 2023-10-02 PROCEDURE — 80053 COMPREHEN METABOLIC PANEL: CPT | Mod: ,,, | Performed by: CLINICAL MEDICAL LABORATORY

## 2023-10-02 PROCEDURE — 87428 SARSCOV & INF VIR A&B AG IA: CPT | Mod: QW,,, | Performed by: NURSE PRACTITIONER

## 2023-10-02 PROCEDURE — 99214 PR OFFICE/OUTPT VISIT, EST, LEVL IV, 30-39 MIN: ICD-10-PCS | Mod: ,,, | Performed by: NURSE PRACTITIONER

## 2023-10-02 PROCEDURE — 87428 POCT SARS-COV2 (COVID) WITH FLU ANTIGEN: ICD-10-PCS | Mod: QW,,, | Performed by: NURSE PRACTITIONER

## 2023-10-02 PROCEDURE — 82306 VITAMIN D: ICD-10-PCS | Mod: ,,, | Performed by: CLINICAL MEDICAL LABORATORY

## 2023-10-02 PROCEDURE — 82306 VITAMIN D 25 HYDROXY: CPT | Mod: ,,, | Performed by: CLINICAL MEDICAL LABORATORY

## 2023-10-02 PROCEDURE — 85025 COMPLETE CBC W/AUTO DIFF WBC: CPT | Mod: ,,, | Performed by: CLINICAL MEDICAL LABORATORY

## 2023-10-02 PROCEDURE — 85025 CBC WITH DIFFERENTIAL: ICD-10-PCS | Mod: ,,, | Performed by: CLINICAL MEDICAL LABORATORY

## 2023-10-02 PROCEDURE — 80053 COMPREHENSIVE METABOLIC PANEL: ICD-10-PCS | Mod: ,,, | Performed by: CLINICAL MEDICAL LABORATORY

## 2023-10-02 PROCEDURE — 99214 OFFICE O/P EST MOD 30 MIN: CPT | Mod: ,,, | Performed by: NURSE PRACTITIONER

## 2023-10-02 NOTE — PROGRESS NOTES
"Subjective:       Patient ID: Carey Leonardo is a 48 y.o. female.    Vitals:  height is 5' 7" (1.702 m) and weight is 97.5 kg (215 lb). Her temperature is 98.5 °F (36.9 °C). Her blood pressure is 109/81 and her pulse is 89. Her respiration is 18 and oxygen saturation is 98%.     Chief Complaint: Generalized Body Aches, Fatigue, Fever, Anorexia (States no desire to eat or drink, symptoms present about 3 days with no OTC in use ), Headache, and Sore Throat    GARRETT is a 47 y/o BF who presents today for multiple complaints at this time.         Constitution: Positive for appetite change, fatigue, fever and generalized weakness.   HENT:  Positive for sore throat. Negative for ear pain, ear discharge, congestion, sinus pain and sinus pressure.    Respiratory:  Positive for cough. Negative for sputum production.    Gastrointestinal:  Positive for nausea. Negative for abdominal pain and vomiting.   Neurological:  Positive for headaches.         Objective:      Physical Exam  Vitals reviewed.   Constitutional:       Appearance: Normal appearance. She is obese. She is ill-appearing.   Cardiovascular:      Rate and Rhythm: Normal rate and regular rhythm.   Pulmonary:      Effort: Pulmonary effort is normal.      Breath sounds: Normal breath sounds.   Neurological:      Mental Status: She is alert and oriented to person, place, and time.   Psychiatric:         Mood and Affect: Mood normal. Affect is flat.         Speech: Speech is delayed.         Behavior: Behavior is slowed.         Thought Content: Thought content normal.         Judgment: Judgment normal.              Assessment:       1. Type 2 diabetes mellitus without complication, without long-term current use of insulin    2. Fever, unspecified fever cause    3. Fatigue, unspecified type          Recent Results (from the past 168 hour(s))   POCT SARS-COV2 (COVID) with Flu Antigen    Collection Time: 10/02/23  4:05 PM   Result Value Ref Range    SARS Coronavirus 2 " Antigen Negative Negative    Rapid Influenza A Ag Negative Negative    Rapid Influenza B Ag Negative Negative     Acceptable Yes    Comprehensive Metabolic Panel    Collection Time: 10/02/23  6:28 PM   Result Value Ref Range    Sodium 142 136 - 145 mmol/L    Potassium 4.2 3.5 - 5.1 mmol/L    Chloride 106 98 - 107 mmol/L    CO2 30 21 - 32 mmol/L    Anion Gap 10 7 - 16 mmol/L    Glucose 176 (H) 74 - 106 mg/dL    BUN 11 7 - 18 mg/dL    Creatinine 0.84 0.55 - 1.02 mg/dL    BUN/Creatinine Ratio 13 6 - 20    Calcium 9.1 8.5 - 10.1 mg/dL    Total Protein 7.4 6.4 - 8.2 g/dL    Albumin 3.3 (L) 3.5 - 5.0 g/dL    Globulin 4.1 (H) 2.0 - 4.0 g/dL    A/G Ratio 0.8     Bilirubin, Total 0.3 >0.0 - 1.2 mg/dL    Alk Phos 135 (H) 39 - 100 U/L    ALT 32 13 - 56 U/L    AST 17 15 - 37 U/L    eGFR 86 >=60 mL/min/1.73m2   Vitamin D    Collection Time: 10/02/23  6:28 PM   Result Value Ref Range    Vitamin D 25-Hydroxy, Blood 32.3 ng/mL   CBC with Differential    Collection Time: 10/02/23  6:28 PM   Result Value Ref Range    WBC 8.63 4.50 - 11.00 K/uL    RBC 4.27 4.20 - 5.40 M/uL    Hemoglobin 11.8 (L) 12.0 - 16.0 g/dL    Hematocrit 39.1 38.0 - 47.0 %    MCV 91.6 80.0 - 96.0 fL    MCH 27.6 27.0 - 31.0 pg    MCHC 30.2 (L) 32.0 - 36.0 g/dL    RDW 12.9 11.5 - 14.5 %    Platelet Count 438 (H) 150 - 400 K/uL    MPV 9.6 9.4 - 12.4 fL    Neutrophils % 52.7 (L) 53.0 - 65.0 %    Lymphocytes % 40.1 27.0 - 41.0 %    Monocytes % 5.2 2.0 - 6.0 %    Eosinophils % 1.2 1.0 - 4.0 %    Basophils % 0.6 0.0 - 1.0 %    Immature Granulocytes % 0.2 0.0 - 0.4 %    nRBC, Auto 0.0 <=0.0 %    Neutrophils, Abs 4.55 1.80 - 7.70 K/uL    Lymphocytes, Absolute 3.46 1.00 - 4.80 K/uL    Monocytes, Absolute 0.45 0.00 - 0.80 K/uL    Eosinophils, Absolute 0.10 0.00 - 0.50 K/uL    Basophils, Absolute 0.05 0.00 - 0.20 K/uL    Immature Granulocytes, Absolute 0.02 0.00 - 0.04 K/uL    nRBC, Absolute 0.00 <=0.00 x10e3/uL    Diff Type Auto          Plan:         Type 2  diabetes mellitus without complication, without long-term current use of insulin  -     POCT glucose  -     Comprehensive Metabolic Panel; Future; Expected date: 10/02/2023    Fever, unspecified fever cause  -     POCT SARS-COV2 (COVID) with Flu Antigen    Fatigue, unspecified type  -     CBC Auto Differential; Future; Expected date: 10/02/2023  -     Vitamin D; Future; Expected date: 10/02/2023      Follow up if symptoms worsen or fail to improve.     Future Appointments   Date Time Provider Department Center   10/9/2023 10:45 AM Kory Peng PA [facility]   10/12/2023  9:00 AM [facility]   10/17/2023 10:00 AM Ursula Kapadia FNP [facility] CLAUDINE [facility] After Visit Summary was printed and given to the patient.     Signature:    ROGELIO Terrazas  Family Nurse Practitioner  [facility]    Date of encounter: 10/2/23

## 2023-10-08 DIAGNOSIS — R74.8 ELEVATED LIVER ENZYMES: Primary | ICD-10-CM

## 2023-10-11 DIAGNOSIS — I10 HYPERTENSION, UNSPECIFIED TYPE: ICD-10-CM

## 2023-10-12 ENCOUNTER — HOSPITAL ENCOUNTER (OUTPATIENT)
Dept: RADIOLOGY | Facility: HOSPITAL | Age: 48
Discharge: HOME | End: 2023-10-12
Attending: OBSTETRICS & GYNECOLOGY
Payer: OTHER GOVERNMENT

## 2023-10-12 DIAGNOSIS — Z12.31 OTHER SCREENING MAMMOGRAM: ICD-10-CM

## 2023-10-12 PROCEDURE — 77067 SCR MAMMO BI INCL CAD: CPT | Mod: TC

## 2023-10-12 RX ORDER — NIFEDIPINE 60 MG/1
60 TABLET, EXTENDED RELEASE ORAL
Qty: 90 TABLET | Refills: 0 | Status: SHIPPED | OUTPATIENT
Start: 2023-10-12 | End: 2023-11-24 | Stop reason: SDUPTHER

## 2023-10-15 DIAGNOSIS — I10 PRIMARY HYPERTENSION: ICD-10-CM

## 2023-10-17 RX ORDER — CLONIDINE HYDROCHLORIDE 0.2 MG/1
TABLET ORAL
Qty: 360 TABLET | Refills: 0 | Status: SHIPPED | OUTPATIENT
Start: 2023-10-17 | End: 2023-12-01 | Stop reason: SDUPTHER

## 2023-10-30 ENCOUNTER — OFFICE VISIT (OUTPATIENT)
Dept: OBSTETRICS AND GYNECOLOGY | Facility: CLINIC | Age: 48
End: 2023-10-30
Payer: OTHER GOVERNMENT

## 2023-10-30 VITALS
DIASTOLIC BLOOD PRESSURE: 86 MMHG | WEIGHT: 222 LBS | HEIGHT: 67 IN | BODY MASS INDEX: 34.84 KG/M2 | SYSTOLIC BLOOD PRESSURE: 138 MMHG

## 2023-10-30 DIAGNOSIS — Z01.419 WOMEN'S ANNUAL ROUTINE GYNECOLOGICAL EXAMINATION: Primary | ICD-10-CM

## 2023-10-30 DIAGNOSIS — Z12.72 SPECIAL SCREENING FOR MALIGNANT NEOPLASMS, VAGINA: ICD-10-CM

## 2023-10-30 PROCEDURE — 88142 CYTOPATH C/V THIN LAYER: CPT | Mod: TC,GCY | Performed by: OBSTETRICS & GYNECOLOGY

## 2023-10-30 PROCEDURE — 99396 PR PREVENTIVE VISIT,EST,40-64: ICD-10-PCS | Mod: S$PBB,,, | Performed by: OBSTETRICS & GYNECOLOGY

## 2023-10-30 PROCEDURE — 99396 PREV VISIT EST AGE 40-64: CPT | Mod: S$PBB,,, | Performed by: OBSTETRICS & GYNECOLOGY

## 2023-10-30 PROCEDURE — 99214 OFFICE O/P EST MOD 30 MIN: CPT | Mod: PBBFAC | Performed by: OBSTETRICS & GYNECOLOGY

## 2023-10-30 RX ORDER — FLUCONAZOLE 200 MG/1
200 TABLET ORAL DAILY
Qty: 3 TABLET | Refills: 0 | Status: SHIPPED | OUTPATIENT
Start: 2023-10-30 | End: 2024-02-09 | Stop reason: ALTCHOICE

## 2023-10-30 NOTE — PROGRESS NOTES
Carey Leonardo female  for   Chief Complaint   Patient presents with    Annual Exam     Pt is here for an annual exam with pap.      OB History          1    Para   1    Term   1            AB        Living   1         SAB        IAB        Ectopic        Multiple        Live Births                      Past Medical History:   Diagnosis Date    Asthma     CHF (congestive heart failure)     Chronic pain syndrome     Depressive disorder     Diabetes mellitus, type 2     Disorder of sacrum 2022    Enlarged heart     Gastroparesis     GERD (gastroesophageal reflux disease)     Hyperlipidemia     Hypertension     IBS (irritable bowel syndrome)     Kidney stones     Lumbar radiculopathy     Sleep apnea     Does not use a CPAP    Thyroid disease     Unspecified chronic bronchitis       Past Surgical History:   Procedure Laterality Date    Bilateral L3-S1 MBB Bilateral 2017, 2017, 2014    Dr Jessica Randolph    CARPAL TUNNEL RELEASE      CHOLECYSTECTOMY      DIAGNOSTIC LAPAROSCOPY  2010    Exploratory Laparotomy, Myomectomy, Hydrotubation-Dr. Feng    DILATION AND CURETTAGE OF UTERUS  2010    Hysteroscopy-Dr. Feng    EXPLORATORY LAPAROTOMY WITH UTERINE MYOMECTOMY  2007    Dr. Marquise Anderson    HYSTERECTOMY  2011    Robotic, FABIENNE, Cystoscopy-Dr. Feng    HYSTEROSCOPY WITH DILATION AND CURETTAGE OF UTERUS  2006    Laparoscopy, Chromotubation-Dr. Marquise Anderson    INJECTION OF ANESTHETIC AGENT AROUND ILIOINGUINAL NERVE Right 2023    Procedure: BLOCK, NERVE, ILIOINGUINAL;  Surgeon: Rasheeda Bills MD;  Location: [facility];  Service: Pain Management;  Laterality: Right;    INJECTION OF ANESTHETIC AGENT AROUND MEDIAL BRANCH NERVES INNERVATING LUMBAR FACET JOINT Bilateral 2023    Procedure: BLOCK, NERVE, FACET JOINT, LUMBAR, MEDIAL BRANCH;  Surgeon: Rasheeda Bills MD;  Location: [facility];  Service: Pain Management;  Laterality:  Bilateral;    LAMINECTOMY N/A 11/30/2021    Procedure: L2-L5 Laminectomy;  Surgeon: Cyril Upton MD;  Location: [facility] OR;  Service: Neurosurgery;  Laterality: N/A;    LEFT HEART CATHETERIZATION      Left L3-S1 RFTC Left 07/18/2017    Dr Lopez    Left SI JI Left 09/30/2013    Dr Randolph    MYRINGOTOMY WITH INSERTION OF VENTILATION TUBE Bilateral 4/4/2023    Procedure: MYRINGOTOMY, WITH TYMPANOSTOMY TUBE INSERTION (T-TUBES);  Surgeon: Sea Hinton MD;  Location: [facility] OR;  Service: ENT;  Laterality: Bilateral;  T-TUBES    MYRINGOTOMY WITH INSERTION OF VENTILATION TUBE Bilateral 9/12/2023    Procedure: MYRINGOTOMY, WITH TYMPANOSTOMY TUBE INSERTION, T-TUBES;  Surgeon: Sea Hinton MD;  Location: [facility] OR;  Service: ENT;  Laterality: Bilateral;    OOPHORECTOMY  11/11/2014    Robotic, FABIENNE, Cystoscopy-Dr. Feng    SINUS SURGERY        Review of patient's allergies indicates:   Allergen Reactions    Fish containing products Anaphylaxis    Iodinated contrast media     Penicillins              Physical exam:     General Appearance: healthy    Chest:chest clear, no wheezing, rales, normal symmetric air entry, Heart exam - S1, S2 normal, no murmur, no gallop, rate regular    Breast:  normal appearance, no masses or tenderness    Abdomen:Normal, benign.    Pelvic:   Vulva :  Normal vulva  Vagina: normal vagina, no discharge, exudate, lesion, or erythema  Uterus:  cervix is absent; adnexa not palpable    Rectal: normal and Hemoccult negative  Extremity:normal    Skin: normal exam        Assessment:   Problem List Items Addressed This Visit    None  Visit Diagnoses       Women's annual routine gynecological examination    -  Primary    Relevant Orders    ThinPrep Pap Test    Special screening for malignant neoplasms, vagina        Relevant Orders    ThinPrep Pap Test             Plan:  A Pap smear was done today.  She had a mammogram this month.  She has a colonoscopy scheduled.  The patient was  given Diflucan for a yeast infection.

## 2023-11-05 ENCOUNTER — HOSPITAL ENCOUNTER (EMERGENCY)
Facility: HOSPITAL | Age: 48
Discharge: HOME | End: 2023-11-05
Payer: OTHER GOVERNMENT

## 2023-11-05 VITALS
HEART RATE: 88 BPM | RESPIRATION RATE: 18 BRPM | TEMPERATURE: 98 F | DIASTOLIC BLOOD PRESSURE: 66 MMHG | HEIGHT: 67 IN | OXYGEN SATURATION: 96 % | WEIGHT: 220 LBS | SYSTOLIC BLOOD PRESSURE: 105 MMHG | BODY MASS INDEX: 34.53 KG/M2

## 2023-11-05 DIAGNOSIS — R10.9 FLANK PAIN: Primary | ICD-10-CM

## 2023-11-05 DIAGNOSIS — R11.0 NAUSEA: ICD-10-CM

## 2023-11-05 LAB
ALBUMIN SERPL BCP-MCNC: 3.2 G/DL (ref 3.5–5)
ALBUMIN/GLOB SERPL: 0.7 {RATIO}
ALP SERPL-CCNC: 121 U/L (ref 39–100)
ALT SERPL W P-5'-P-CCNC: 31 U/L (ref 13–56)
ANION GAP SERPL CALCULATED.3IONS-SCNC: 6 MMOL/L (ref 7–16)
AST SERPL W P-5'-P-CCNC: 19 U/L (ref 15–37)
BASOPHILS # BLD AUTO: 0.04 K/UL (ref 0–0.2)
BASOPHILS NFR BLD AUTO: 0.6 % (ref 0–1)
BILIRUB SERPL-MCNC: 0.3 MG/DL (ref ?–1.2)
BILIRUB UR QL STRIP: NEGATIVE
BUN SERPL-MCNC: 9 MG/DL (ref 7–18)
BUN/CREAT SERPL: 12 (ref 6–20)
CALCIUM SERPL-MCNC: 9 MG/DL (ref 8.5–10.1)
CHLORIDE SERPL-SCNC: 108 MMOL/L (ref 98–107)
CLARITY UR: CLEAR
CO2 SERPL-SCNC: 30 MMOL/L (ref 21–32)
COLOR UR: COLORLESS
CREAT SERPL-MCNC: 0.74 MG/DL (ref 0.55–1.02)
DIFFERENTIAL METHOD BLD: ABNORMAL
EGFR (NO RACE VARIABLE) (RUSH/TITUS): 101 ML/MIN/1.73M2
EOSINOPHIL # BLD AUTO: 0.1 K/UL (ref 0–0.5)
EOSINOPHIL NFR BLD AUTO: 1.4 % (ref 1–4)
ERYTHROCYTE [DISTWIDTH] IN BLOOD BY AUTOMATED COUNT: 12.4 % (ref 11.5–14.5)
GLOBULIN SER-MCNC: 4.4 G/DL (ref 2–4)
GLUCOSE SERPL-MCNC: 140 MG/DL (ref 70–105)
GLUCOSE SERPL-MCNC: 159 MG/DL (ref 74–106)
GLUCOSE UR STRIP-MCNC: NORMAL MG/DL
HCT VFR BLD AUTO: 36.6 % (ref 38–47)
HGB BLD-MCNC: 11.4 G/DL (ref 12–16)
IMM GRANULOCYTES # BLD AUTO: 0.02 K/UL (ref 0–0.04)
IMM GRANULOCYTES NFR BLD: 0.3 % (ref 0–0.4)
KETONES UR STRIP-SCNC: NEGATIVE MG/DL
LEUKOCYTE ESTERASE UR QL STRIP: NEGATIVE
LYMPHOCYTES # BLD AUTO: 2.6 K/UL (ref 1–4.8)
LYMPHOCYTES NFR BLD AUTO: 37 % (ref 27–41)
MCH RBC QN AUTO: 27.7 PG (ref 27–31)
MCHC RBC AUTO-ENTMCNC: 31.1 G/DL (ref 32–36)
MCV RBC AUTO: 89.1 FL (ref 80–96)
MONOCYTES # BLD AUTO: 0.48 K/UL (ref 0–0.8)
MONOCYTES NFR BLD AUTO: 6.8 % (ref 2–6)
MPC BLD CALC-MCNC: 9.5 FL (ref 9.4–12.4)
NEUTROPHILS # BLD AUTO: 3.79 K/UL (ref 1.8–7.7)
NEUTROPHILS NFR BLD AUTO: 53.9 % (ref 53–65)
NITRITE UR QL STRIP: NEGATIVE
NRBC # BLD AUTO: 0 X10E3/UL
NRBC, AUTO (.00): 0 %
PH UR STRIP: 7 PH UNITS
PLATELET # BLD AUTO: 372 K/UL (ref 150–400)
POTASSIUM SERPL-SCNC: 3.4 MMOL/L (ref 3.5–5.1)
PROT SERPL-MCNC: 7.6 G/DL (ref 6.4–8.2)
PROT UR QL STRIP: NEGATIVE
RBC # BLD AUTO: 4.11 M/UL (ref 4.2–5.4)
RBC # UR STRIP: NEGATIVE /UL
SODIUM SERPL-SCNC: 141 MMOL/L (ref 136–145)
SP GR UR STRIP: 1.01
UROBILINOGEN UR STRIP-ACNC: NORMAL MG/DL
WBC # BLD AUTO: 7.03 K/UL (ref 4.5–11)

## 2023-11-05 PROCEDURE — 25000003 PHARM REV CODE 250

## 2023-11-05 PROCEDURE — 99284 EMERGENCY DEPT VISIT MOD MDM: CPT | Mod: ,,,

## 2023-11-05 PROCEDURE — 99284 PR EMERGENCY DEPT VISIT,LEVEL IV: ICD-10-PCS | Mod: ,,,

## 2023-11-05 PROCEDURE — 96361 HYDRATE IV INFUSION ADD-ON: CPT

## 2023-11-05 PROCEDURE — 81003 URINALYSIS AUTO W/O SCOPE: CPT

## 2023-11-05 PROCEDURE — 96365 THER/PROPH/DIAG IV INF INIT: CPT

## 2023-11-05 PROCEDURE — 82962 GLUCOSE BLOOD TEST: CPT

## 2023-11-05 PROCEDURE — 63600175 PHARM REV CODE 636 W HCPCS

## 2023-11-05 PROCEDURE — 99285 EMERGENCY DEPT VISIT HI MDM: CPT | Mod: 25

## 2023-11-05 PROCEDURE — 96375 TX/PRO/DX INJ NEW DRUG ADDON: CPT

## 2023-11-05 PROCEDURE — 80053 COMPREHEN METABOLIC PANEL: CPT

## 2023-11-05 PROCEDURE — 85025 COMPLETE CBC W/AUTO DIFF WBC: CPT

## 2023-11-05 RX ORDER — KETOROLAC TROMETHAMINE 15 MG/ML
15 INJECTION, SOLUTION INTRAMUSCULAR; INTRAVENOUS
Status: COMPLETED | OUTPATIENT
Start: 2023-11-05 | End: 2023-11-05

## 2023-11-05 RX ORDER — ONDANSETRON 2 MG/ML
4 INJECTION INTRAMUSCULAR; INTRAVENOUS
Status: COMPLETED | OUTPATIENT
Start: 2023-11-05 | End: 2023-11-05

## 2023-11-05 RX ADMIN — SODIUM CHLORIDE 1000 ML: 9 INJECTION, SOLUTION INTRAVENOUS at 09:11

## 2023-11-05 RX ADMIN — KETOROLAC TROMETHAMINE 15 MG: 15 INJECTION, SOLUTION INTRAMUSCULAR; INTRAVENOUS at 09:11

## 2023-11-05 RX ADMIN — PROMETHAZINE HYDROCHLORIDE 25 MG: 25 INJECTION INTRAMUSCULAR; INTRAVENOUS at 09:11

## 2023-11-05 RX ADMIN — ONDANSETRON 4 MG: 2 INJECTION INTRAMUSCULAR; INTRAVENOUS at 09:11

## 2023-11-05 NOTE — ED TRIAGE NOTES
Presents to ED for complaints of Left Flank Pain and nausea since early this morning.  Denies dysuria or injury.

## 2023-11-05 NOTE — DISCHARGE INSTRUCTIONS
Follow-up with your primary care provider in 2 days. Return to Emergency Department for any new or worsening symptoms.

## 2023-11-05 NOTE — ED PROVIDER NOTES
Encounter Date: 11/5/2023       History     Chief Complaint   Patient presents with    Flank Pain    Nausea     Patient is a 49 yo female who presents to ED with complaint of left flank pain and nausea. Onset 4 am today when the pain woke her from sleep. She denies any vomiting. Has been having regular bowel movements per her reports. She denies any dysuria, fever/chills. She does report a history of kidney stones. Reports the pain comes and goes.     The history is provided by the patient.     Review of patient's allergies indicates:   Allergen Reactions    Fish containing products Anaphylaxis    Iodinated contrast media     Penicillins      Past Medical History:   Diagnosis Date    Asthma     CHF (congestive heart failure)     Chronic pain syndrome     Depressive disorder     Diabetes mellitus, type 2     Disorder of sacrum 04/28/2022    Enlarged heart     Gastroparesis     GERD (gastroesophageal reflux disease)     Hyperlipidemia     Hypertension     IBS (irritable bowel syndrome)     Kidney stones     Lumbar radiculopathy     Sleep apnea     Does not use a CPAP    Thyroid disease     Unspecified chronic bronchitis      Past Surgical History:   Procedure Laterality Date    Bilateral L3-S1 MBB Bilateral 6-, 5-, 1-8-2014    Dr Jessica Randolph    CARPAL TUNNEL RELEASE      CHOLECYSTECTOMY      DIAGNOSTIC LAPAROSCOPY  04/14/2010    Exploratory Laparotomy, Myomectomy, Hydrotubation-Dr. Feng    DILATION AND CURETTAGE OF UTERUS  04/14/2010    Hysteroscopy-Dr. Feng    EXPLORATORY LAPAROTOMY WITH UTERINE MYOMECTOMY  02/27/2007    Dr. Marquise Anderson    HYSTERECTOMY  09/29/2011    Robotic, FABIENNE, Cystoscopy-Dr. Feng    HYSTEROSCOPY WITH DILATION AND CURETTAGE OF UTERUS  04/04/2006    Laparoscopy, Chromotubation-Dr. Marquise Anderson    INJECTION OF ANESTHETIC AGENT AROUND ILIOINGUINAL NERVE Right 6/22/2023    Procedure: BLOCK, NERVE, ILIOINGUINAL;  Surgeon: Rasheeda Bills MD;   Location: [facility] PAIN [facility];  Service: Pain Management;  Laterality: Right;    INJECTION OF ANESTHETIC AGENT AROUND MEDIAL BRANCH NERVES INNERVATING LUMBAR FACET JOINT Bilateral 9/21/2023    Procedure: BLOCK, NERVE, FACET JOINT, LUMBAR, MEDIAL BRANCH;  Surgeon: Rasheeda Bills MD;  Location: [facility] PAIN [facility];  Service: Pain Management;  Laterality: Bilateral;    LAMINECTOMY N/A 11/30/2021    Procedure: L2-L5 Laminectomy;  Surgeon: Cyril Upton MD;  Location: [facility] OR;  Service: Neurosurgery;  Laterality: N/A;    LEFT HEART CATHETERIZATION      Left L3-S1 RFTC Left 07/18/2017    Dr Lopez    Left SI JI Left 09/30/2013    Dr Randolph    MYRINGOTOMY WITH INSERTION OF VENTILATION TUBE Bilateral 4/4/2023    Procedure: MYRINGOTOMY, WITH TYMPANOSTOMY TUBE INSERTION (T-TUBES);  Surgeon: Sea Hinton MD;  Location: [facility] ORTHO OR;  Service: ENT;  Laterality: Bilateral;  T-TUBES    MYRINGOTOMY WITH INSERTION OF VENTILATION TUBE Bilateral 9/12/2023    Procedure: MYRINGOTOMY, WITH TYMPANOSTOMY TUBE INSERTION, T-TUBES;  Surgeon: Sea Hinton MD;  Location: [facility] ORTHO OR;  Service: ENT;  Laterality: Bilateral;    OOPHORECTOMY  11/11/2014    Robotic, FABIENNE, Cystoscopy-Dr. Feng    SINUS SURGERY       Family History   Problem Relation Age of Onset    Diabetes Mellitus Mother     Heart disease Mother     Hypertension Mother     Migraines Mother     Heart disease Sister      Social History     Tobacco Use    Smoking status: Never     Passive exposure: Never    Smokeless tobacco: Never   Substance Use Topics    Alcohol use: Not Currently    Drug use: Not Currently     Types: Hydrocodone, Oxycodone     Review of Systems   Constitutional:  Negative for chills and fever.   Gastrointestinal:  Positive for abdominal pain and nausea. Negative for constipation, diarrhea and vomiting.       Physical Exam     Initial Vitals [11/05/23 0825]   BP Pulse Resp Temp SpO2   128/89 86 16 98 °F (36.7 °C) 97 %       MAP       --         Physical Exam    Constitutional: She appears well-developed and well-nourished.   Eyes: Pupils are equal, round, and reactive to light.   Cardiovascular:  Normal rate, regular rhythm and intact distal pulses.           Pulmonary/Chest: Breath sounds normal.   Abdominal: Abdomen is soft. Bowel sounds are normal. There is abdominal tenderness (Left sided, mild).   Musculoskeletal:         General: Normal range of motion.     Neurological: She is alert and oriented to person, place, and time.   Skin: Skin is warm and dry.   Psychiatric: She has a normal mood and affect.       Medical Screening Exam   See Full Note    ED Course   Procedures  Labs Reviewed   COMPREHENSIVE METABOLIC PANEL - Abnormal; Notable for the following components:       Result Value    Potassium 3.4 (*)     Chloride 108 (*)     Anion Gap 6 (*)     Glucose 159 (*)     Albumin 3.2 (*)     Globulin 4.4 (*)     Alk Phos 121 (*)     All other components within normal limits   CBC WITH DIFFERENTIAL - Abnormal; Notable for the following components:    RBC 4.11 (*)     Hemoglobin 11.4 (*)     Hematocrit 36.6 (*)     MCHC 31.1 (*)     Monocytes % 6.8 (*)     All other components within normal limits   POCT GLUCOSE MONITORING CONTINUOUS - Abnormal; Notable for the following components:    POC Glucose 140 (*)     All other components within normal limits   CBC W/ AUTO DIFFERENTIAL    Narrative:     The following orders were created for panel order CBC auto differential.  Procedure                               Abnormality         Status                     ---------                               -----------         ------                     CBC with Differential[[id number]]       Abnormal            Final result                 Please view results for these tests on the individual orders.   URINALYSIS, REFLEX TO URINE CULTURE          Imaging Results              CT Renal Stone Study ABD Pelvis WO (Final result)  Result time  11/05/23 09:03:22      Final result by Jean Plascencia MD (11/05/23 09:03:22)                   Impression:      No acute abnormality identified in the abdomen or pelvis.  No renal stone or hydronephrosis on either side.      Electronically signed by: Jean Plascencia  Date:    11/05/2023  Time:    09:03               Narrative:    EXAMINATION:  CT RENAL STONE STUDY ABD PELVIS WO    CLINICAL HISTORY:  Left flank pain, kidney stone suspected;    TECHNIQUE:  Low dose axial images, sagittal and coronal reformations were obtained from the lung bases to the pubic symphysis.  Contrast was not administered.  The CT examination was performed using one or more of the following dose reduction techniques: Automated exposure control, adjustment of the mA and kV according to patient's size, use of acute or iterative reconstruction techniques.    COMPARISON:  08/11/2022    FINDINGS:  Lung bases are clear.    Liver unremarkable.  Gallbladder is absent.  The adrenals, spleen, and pancreas appear normal.  No renal stone.  No hydronephrosis.  No hydroureter.  Urinary bladder decompressed and unremarkable.  Prior hysterectomy.  No adnexal lesion.    No pneumoperitoneum.  No ascites.  No adenopathy.  Abdominal aorta and iliacs are normal in course and caliber.    The appendix is normal.  No bowel obstruction or acute bowel abnormality identified.  Mild degree of colonic stool is seen.    No acute fracture.  Previous laminectomies at L3 and L4 again seen.                                       Medications   sodium chloride 0.9% bolus 1,000 mL 1,000 mL (0 mLs Intravenous Stopped 11/5/23 1022)   ondansetron injection 4 mg (4 mg Intravenous Given 11/5/23 0910)   ketorolac injection 15 mg (15 mg Intravenous Given 11/5/23 0910)   promethazine (PHENERGAN) 25 mg in dextrose 5 % (D5W) 50 mL IVPB (0 mg Intravenous Stopped 11/5/23 1007)     Medical Decision Making  Left flank pain and nausea since 4 am today. No vomiting. Denies any constipation or  diarrhea. Hx of kidney stones. Pain feels similar. CT renal study unremarkable. Mild colonic stool noted. Pain improved with Toradol. Continued to experience nausea so this was treated with Phenergan. Her UA was unremarkable. No significant WBC. Patient is feeling better. Will dc home with PCP follow-up.     Amount and/or Complexity of Data Reviewed  Labs: ordered.  Radiology: ordered.    Risk  Prescription drug management.                               Clinical Impression:   Final diagnoses:  [R10.9] Flank pain (Primary)  [R11.0] Nausea        ED Disposition Condition    Discharge Stable          ED Prescriptions    None       Follow-up Information       Follow up With Specialties Details Why Contact Info    Ursula Kapadia FNP Family Medicine, Surgery, Emergency Medicine On 11/7/2023  [address]  [facility] [address]  [phone]               Gloria Márquez FNP  11/05/23 1048

## 2023-11-24 DIAGNOSIS — E11.9 TYPE 2 DIABETES MELLITUS WITHOUT COMPLICATION, WITHOUT LONG-TERM CURRENT USE OF INSULIN: Chronic | ICD-10-CM

## 2023-11-24 DIAGNOSIS — I10 PRIMARY HYPERTENSION: ICD-10-CM

## 2023-11-24 DIAGNOSIS — E03.9 HYPOTHYROIDISM, UNSPECIFIED TYPE: ICD-10-CM

## 2023-11-24 DIAGNOSIS — Z91.09 ENVIRONMENTAL ALLERGIES: ICD-10-CM

## 2023-11-24 DIAGNOSIS — I10 HYPERTENSION, UNSPECIFIED TYPE: ICD-10-CM

## 2023-11-24 DIAGNOSIS — F41.9 ANXIETY: ICD-10-CM

## 2023-11-24 RX ORDER — HYDRALAZINE HYDROCHLORIDE 100 MG/1
100 TABLET, FILM COATED ORAL 3 TIMES DAILY
Qty: 270 TABLET | Refills: 0 | Status: CANCELLED | OUTPATIENT
Start: 2023-11-24

## 2023-11-24 RX ORDER — VENLAFAXINE HYDROCHLORIDE 150 MG/1
CAPSULE, EXTENDED RELEASE ORAL
Qty: 90 CAPSULE | Refills: 0 | Status: CANCELLED | OUTPATIENT
Start: 2023-11-24

## 2023-11-24 RX ORDER — CLONIDINE HYDROCHLORIDE 0.2 MG/1
TABLET ORAL
Qty: 360 TABLET | Refills: 0 | Status: CANCELLED | OUTPATIENT
Start: 2023-11-24

## 2023-11-24 RX ORDER — METOPROLOL SUCCINATE 100 MG/1
100 TABLET, EXTENDED RELEASE ORAL DAILY
Qty: 90 TABLET | Refills: 0 | Status: CANCELLED | OUTPATIENT
Start: 2023-11-24

## 2023-11-24 RX ORDER — SERTRALINE HYDROCHLORIDE 100 MG/1
100 TABLET, FILM COATED ORAL NIGHTLY
Qty: 90 TABLET | Refills: 0 | Status: CANCELLED | OUTPATIENT
Start: 2023-11-24

## 2023-11-28 ENCOUNTER — TELEPHONE (OUTPATIENT)
Dept: FAMILY MEDICINE | Facility: CLINIC | Age: 48
End: 2023-11-28
Payer: OTHER GOVERNMENT

## 2023-11-28 NOTE — TELEPHONE ENCOUNTER
----- Message from Anitra Gomez sent at 11/28/2023  7:45 AM CST -----    ----- Message -----  From: Crista Burnett LPN  Sent: 11/28/2023   7:44 AM CST  To: Anitra Gomez    Pt will have to be seen as a walk in tomorrow or schedule appt with vasques when she gets back or try immediate care.   ----- Message -----  From: Anitra Gomez  Sent: 11/27/2023  12:28 PM CST  To: [facility] Staff    PATIENT OF [facility] NEED REFILLS ON HER metoprolol succinate (TOPROL-XL) 100 MG 24 hr tablet,  Disp Refills Start End NATANAEL  cloNIDine (CATAPRES) 0.2 MG tablet

## 2023-12-01 ENCOUNTER — OFFICE VISIT (OUTPATIENT)
Dept: FAMILY MEDICINE | Facility: CLINIC | Age: 48
End: 2023-12-01
Payer: OTHER GOVERNMENT

## 2023-12-01 VITALS
HEART RATE: 94 BPM | DIASTOLIC BLOOD PRESSURE: 85 MMHG | OXYGEN SATURATION: 95 % | SYSTOLIC BLOOD PRESSURE: 117 MMHG | HEIGHT: 67 IN | RESPIRATION RATE: 17 BRPM | WEIGHT: 220 LBS | TEMPERATURE: 98 F | BODY MASS INDEX: 34.53 KG/M2

## 2023-12-01 DIAGNOSIS — R50.9 FEVER, UNSPECIFIED FEVER CAUSE: ICD-10-CM

## 2023-12-01 DIAGNOSIS — F41.9 ANXIETY: ICD-10-CM

## 2023-12-01 DIAGNOSIS — I10 PRIMARY HYPERTENSION: ICD-10-CM

## 2023-12-01 DIAGNOSIS — R53.83 FATIGUE, UNSPECIFIED TYPE: ICD-10-CM

## 2023-12-01 DIAGNOSIS — J02.9 SORE THROAT: ICD-10-CM

## 2023-12-01 DIAGNOSIS — J03.90 TONSILLITIS: Primary | ICD-10-CM

## 2023-12-01 LAB
CTP QC/QA: YES
CTP QC/QA: YES
FLUAV AG NPH QL: NEGATIVE
FLUAV AG NPH QL: NEGATIVE
FLUBV AG NPH QL: NEGATIVE
FLUBV AG NPH QL: NEGATIVE
SARS-COV-2 AG RESP QL IA.RAPID: NEGATIVE

## 2023-12-01 PROCEDURE — 99213 OFFICE O/P EST LOW 20 MIN: CPT | Mod: ,,, | Performed by: NURSE PRACTITIONER

## 2023-12-01 PROCEDURE — 87426 POCT SARS-COV2 (COVID) WITH FLU ANTIGEN: ICD-10-PCS | Mod: QW,,, | Performed by: NURSE PRACTITIONER

## 2023-12-01 PROCEDURE — 99213 PR OFFICE/OUTPT VISIT, EST, LEVL III, 20-29 MIN: ICD-10-PCS | Mod: ,,, | Performed by: NURSE PRACTITIONER

## 2023-12-01 PROCEDURE — 87804 INFLUENZA ASSAY W/OPTIC: CPT | Mod: 59,QW,, | Performed by: NURSE PRACTITIONER

## 2023-12-01 PROCEDURE — 87426 SARSCOV CORONAVIRUS AG IA: CPT | Mod: QW,,, | Performed by: NURSE PRACTITIONER

## 2023-12-01 PROCEDURE — 87804 POCT INFLUENZA A/B: ICD-10-PCS | Mod: 59,QW,, | Performed by: NURSE PRACTITIONER

## 2023-12-01 RX ORDER — CLONIDINE HYDROCHLORIDE 0.2 MG/1
0.2 TABLET ORAL 4 TIMES DAILY
Qty: 360 TABLET | Refills: 0 | Status: SHIPPED | OUTPATIENT
Start: 2023-12-01 | End: 2023-12-06 | Stop reason: SDUPTHER

## 2023-12-01 RX ORDER — AZITHROMYCIN 250 MG/1
TABLET, FILM COATED ORAL
Qty: 6 TABLET | Refills: 0 | Status: SHIPPED | OUTPATIENT
Start: 2023-12-01 | End: 2023-12-06

## 2023-12-01 RX ORDER — LANCETS 28 GAUGE
EACH MISCELLANEOUS
COMMUNITY
Start: 2023-11-15 | End: 2024-03-28

## 2023-12-01 RX ORDER — METOPROLOL SUCCINATE 100 MG/1
100 TABLET, EXTENDED RELEASE ORAL DAILY
Qty: 90 TABLET | Refills: 0 | Status: SHIPPED | OUTPATIENT
Start: 2023-12-01 | End: 2023-12-06 | Stop reason: SDUPTHER

## 2023-12-01 RX ORDER — HYDRALAZINE HYDROCHLORIDE 100 MG/1
100 TABLET, FILM COATED ORAL 3 TIMES DAILY
Qty: 270 TABLET | Refills: 0 | Status: SHIPPED | OUTPATIENT
Start: 2023-12-01 | End: 2024-02-28 | Stop reason: SDUPTHER

## 2023-12-01 RX ORDER — SERTRALINE HYDROCHLORIDE 100 MG/1
100 TABLET, FILM COATED ORAL NIGHTLY
Qty: 90 TABLET | Refills: 0 | Status: SHIPPED | OUTPATIENT
Start: 2023-12-01

## 2023-12-01 RX ORDER — VENLAFAXINE HYDROCHLORIDE 150 MG/1
CAPSULE, EXTENDED RELEASE ORAL
Qty: 90 CAPSULE | Refills: 0 | Status: SHIPPED | OUTPATIENT
Start: 2023-12-01 | End: 2023-12-06 | Stop reason: SDUPTHER

## 2023-12-01 NOTE — PROGRESS NOTES
ROGELIO Willson        PATIENT NAME: Carey Leonardo  : 1975  DATE: 23  MRN: [id number]      Billing Provider: ROGELIO Willson  Level of Service: [place] OFFICE/OUTPT VISIT, EST, LEVL III, 20-29 MIN  Patient PCP Information       Provider PCP Type    ROGELIO Blancas General            Reason for Visit / Chief Complaint: Sore Throat, Headache, Generalized Body Aches, Fatigue, and Sinus Problem (With sinus pressure and post nasal drainage, states symptoms present less than 24 hours, with OTC medications )         History of Present Illness / Problem Focused Workflow     Carey Leonardo presents to the clinic with Sore Throat, Headache, Generalized Body Aches, Fatigue, and Sinus Problem (With sinus pressure and post nasal drainage, states symptoms present less than 24 hours, with OTC medications )     Ms. Leonardo presents to clinic with complaints of sore throat, headache, bodyaches, fatigue, nasal congestion. She reports a h/o strep throat.        Review of Systems     Review of Systems   Constitutional:  Positive for chills, fatigue and fever.   HENT:  Positive for nasal congestion, sinus pressure/congestion and sore throat.    Respiratory: Negative.     Cardiovascular: Negative.    Gastrointestinal: Negative.    Genitourinary: Negative.    Neurological: Negative.    Psychiatric/Behavioral: Negative.          Medical / Social / Family History     Past Medical History:   Diagnosis Date    Asthma     CHF (congestive heart failure)     Chronic pain syndrome     Depressive disorder     Diabetes mellitus, type 2     Disorder of sacrum 2022    Enlarged heart     Gastroparesis     GERD (gastroesophageal reflux disease)     Hyperlipidemia     Hypertension     IBS (irritable bowel syndrome)     Kidney stones     Lumbar radiculopathy     Sleep apnea     Does not use a CPAP    Thyroid disease     Unspecified chronic bronchitis        Past Surgical History:   Procedure Laterality Date    Bilateral  L3-S1 MBB Bilateral 6-, 5-, 1-8-2014    Dr Jessica Randolph    CARPAL TUNNEL RELEASE      CHOLECYSTECTOMY      DIAGNOSTIC LAPAROSCOPY  04/14/2010    Exploratory Laparotomy, Myomectomy, Hydrotubation-Dr. Feng    DILATION AND CURETTAGE OF UTERUS  04/14/2010    Hysteroscopy-Dr. Feng    EXPLORATORY LAPAROTOMY WITH UTERINE MYOMECTOMY  02/27/2007    Dr. Marquise Anderson    HYSTERECTOMY  09/29/2011    Robotic, FABIENNE, Cystoscopy-Dr. Feng    HYSTEROSCOPY WITH DILATION AND CURETTAGE OF UTERUS  04/04/2006    Laparoscopy, Chromotubation-Dr. Marquise Anderson    INJECTION OF ANESTHETIC AGENT AROUND ILIOINGUINAL NERVE Right 6/22/2023    Procedure: BLOCK, NERVE, ILIOINGUINAL;  Surgeon: Rasheeda Bills MD;  Location: [facility] PAIN [facility];  Service: Pain Management;  Laterality: Right;    INJECTION OF ANESTHETIC AGENT AROUND MEDIAL BRANCH NERVES INNERVATING LUMBAR FACET JOINT Bilateral 9/21/2023    Procedure: BLOCK, NERVE, FACET JOINT, LUMBAR, MEDIAL BRANCH;  Surgeon: Rasheeda Bills MD;  Location: [facility] PAIN MGMT;  Service: Pain Management;  Laterality: Bilateral;    LAMINECTOMY N/A 11/30/2021    Procedure: L2-L5 Laminectomy;  Surgeon: Cyril Upton MD;  Location: [facility] OR;  Service: Neurosurgery;  Laterality: N/A;    LEFT HEART CATHETERIZATION      Left L3-S1 RFTC Left 07/18/2017    Dr Lopez    Left SI JI Left 09/30/2013    Dr Randolph    MYRINGOTOMY WITH INSERTION OF VENTILATION TUBE Bilateral 4/4/2023    Procedure: MYRINGOTOMY, WITH TYMPANOSTOMY TUBE INSERTION (T-TUBES);  Surgeon: Sea Hinton MD;  Location: [facility] OR;  Service: ENT;  Laterality: Bilateral;  T-TUBES    MYRINGOTOMY WITH INSERTION OF VENTILATION TUBE Bilateral 9/12/2023    Procedure: MYRINGOTOMY, WITH TYMPANOSTOMY TUBE INSERTION, T-TUBES;  Surgeon: Sea Hinton MD;  Location: [facility] OR;  Service: ENT;  Laterality: Bilateral;    OOPHORECTOMY  11/11/2014    Robotic, FABIENNE, Cystoscopy-Dr. Feng    SINUS SURGERY         Social  History  Ms. Carey Leonardo   reports that she has never smoked. She has never been exposed to tobacco smoke. She has never used smokeless tobacco. She reports that she does not currently use alcohol. She reports that she does not currently use drugs after having used the following drugs: Hydrocodone and Oxycodone.    Family History  Ms.'s Carey Leonardo  family history includes Diabetes Mellitus in her mother; Heart disease in her mother and sister; Hypertension in her mother; Migraines in her mother.    Medications and Allergies     Medications  Outpatient Medications Marked as Taking for the 12/1/23 encounter (Office Visit) with Priti Stafford FNP   Medication Sig Dispense Refill    albuterol (VENTOLIN HFA) 90 mcg/actuation inhaler Inhale 2 puffs into the lungs every 6 (six) hours as needed for Wheezing. Rescue 18 g 0    ARIPiprazole (ABILIFY) 2 MG Tab Take 1 mg by mouth.      atorvastatin (LIPITOR) 20 MG tablet Take 1 tablet (20 mg total) by mouth every evening. 90 tablet 0    blood sugar diagnostic Strp To check BG one time daily, to use with insurance preferred meter 100 each 3    blood-glucose meter kit To check BG one time daily, to use with insurance preferred meter 1 each 0    cyclobenzaprine (FLEXERIL) 10 MG tablet Take 1 tablet (10 mg total) by mouth 3 (three) times daily as needed for Muscle spasms. 90 tablet 0    dulaglutide (TRULICITY) 4.5 mg/0.5 mL pen injector Inject 4.5 mg into the skin every 7 days. 12 pen 1    empagliflozin (JARDIANCE) 10 mg tablet Take 10 mg by mouth once daily.      fluconazole (DIFLUCAN) 200 MG Tab Take 1 tablet (200 mg total) by mouth once daily. 3 tablet 0    FREESTYLE LANCETS 28 gauge lancets Apply topically.      glipizide-metformin (METAGLIP) 5-500 mg per tablet Take 1 tablet by mouth 2 (two) times daily before meals. 180 tablet 0    ipratropium (ATROVENT) 21 mcg (0.03 %) nasal spray 2 sprays by Each Nostril route 2 (two) times daily. 30 mL 0    lancets Misc To  check BG one time daily, to use with insurance preferred meter 100 each 3    levothyroxine (SYNTHROID) 200 MCG tablet Take 1 tablet (200 mcg total) by mouth before breakfast. 90 tablet 0    lurasidone (LATUDA) 20 mg Tab tablet       meloxicam (MOBIC) 15 MG tablet Take 1 tablet (15 mg total) by mouth once daily. 30 tablet 0    NIFEdipine (PROCARDIA-XL) 60 MG (OSM) 24 hr tablet TAKE 1 TABLET(60 MG) BY MOUTH EVERY DAY 90 tablet 0    [DISCONTINUED] cloNIDine (CATAPRES) 0.2 MG tablet TAKE 1 TABLET(0.2 MG) BY MOUTH FOUR TIMES DAILY 360 tablet 0    [DISCONTINUED] hydrALAZINE (APRESOLINE) 100 MG tablet Take 1 tablet (100 mg total) by mouth 3 (three) times daily. 270 tablet 0    [DISCONTINUED] metoprolol succinate (TOPROL-XL) 100 MG 24 hr tablet Take 1 tablet (100 mg total) by mouth once daily. 90 tablet 0    [DISCONTINUED] sertraline (ZOLOFT) 100 MG tablet Take 1 tablet (100 mg total) by mouth every evening. 90 tablet 0    [DISCONTINUED] venlafaxine (EFFEXOR-XR) 150 MG Cp24 TAKE 1 CAPSULE BY MOUTH EVERY DAY WITH FOOD 90 capsule 0       Allergies  Review of patient's allergies indicates:   Allergen Reactions    Fish containing products Anaphylaxis    Iodinated contrast media     Penicillins          Vitals:    12/01/23 1318   BP: 117/85   Pulse: 94   Resp: 17   Temp: 98.3 °F (36.8 °C)     Physical Exam  Vitals and nursing note reviewed.   Constitutional:       Appearance: Normal appearance.   HENT:      Head: Normocephalic.      Right Ear: Tympanic membrane normal.      Left Ear: Tympanic membrane normal.      Nose: Congestion and rhinorrhea present.      Mouth/Throat:      Mouth: Mucous membranes are moist.      Pharynx: Posterior oropharyngeal erythema present. No oropharyngeal exudate.   Eyes:      Pupils: Pupils are equal, round, and reactive to light.   Cardiovascular:      Rate and Rhythm: Normal rate and regular rhythm.      Pulses: Normal pulses.      Heart sounds: Normal heart sounds.   Pulmonary:      Effort:  Pulmonary effort is normal.      Breath sounds: Normal breath sounds.   Abdominal:      General: Bowel sounds are normal.      Palpations: Abdomen is soft.   Musculoskeletal:      Cervical back: Normal range of motion.   Skin:     General: Skin is warm and dry.   Neurological:      Mental Status: She is alert and oriented to person, place, and time.   Psychiatric:         Behavior: Behavior normal.            Lab Results   Component Value Date    WBC 7.03 11/05/2023    HGB 11.4 (L) 11/05/2023    HCT 36.6 (L) 11/05/2023    MCV 89.1 11/05/2023     11/05/2023          Sodium   Date Value Ref Range Status   11/05/2023 141 136 - 145 mmol/L Final     Potassium   Date Value Ref Range Status   11/05/2023 3.4 (L) 3.5 - 5.1 mmol/L Final     Chloride   Date Value Ref Range Status   11/05/2023 108 (H) 98 - 107 mmol/L Final     CO2   Date Value Ref Range Status   11/05/2023 30 21 - 32 mmol/L Final     Glucose   Date Value Ref Range Status   11/05/2023 159 (H) 74 - 106 mg/dL Final     BUN   Date Value Ref Range Status   11/05/2023 9 7 - 18 mg/dL Final     Creatinine   Date Value Ref Range Status   11/05/2023 0.74 0.55 - 1.02 mg/dL Final     Calcium   Date Value Ref Range Status   11/05/2023 9.0 8.5 - 10.1 mg/dL Final     Total Protein   Date Value Ref Range Status   11/05/2023 7.6 6.4 - 8.2 g/dL Final     Albumin   Date Value Ref Range Status   11/05/2023 3.2 (L) 3.5 - 5.0 g/dL Final     Bilirubin, Total   Date Value Ref Range Status   11/05/2023 0.3 >0.0 - 1.2 mg/dL Final     Alk Phos   Date Value Ref Range Status   11/05/2023 121 (H) 39 - 100 U/L Final     AST   Date Value Ref Range Status   11/05/2023 19 15 - 37 U/L Final     ALT   Date Value Ref Range Status   11/05/2023 31 13 - 56 U/L Final     Anion Gap   Date Value Ref Range Status   11/05/2023 6 (L) 7 - 16 mmol/L Final     eGFR   Date Value Ref Range Status   11/05/2023 101 >=60 mL/min/1.73m2 Final        Lab Results   Component Value Date    CHOL 231 (H)  "12/02/2022     Lab Results   Component Value Date    HDL 44 12/02/2022     Lab Results   Component Value Date    LDLCALC 134 12/02/2022     Lab Results   Component Value Date    TRIG 265 (H) 12/02/2022     Lab Results   Component Value Date    CHOLHDL 5.3 12/02/2022        Lab Results   Component Value Date    HGBA1C 7.3 (H) 07/19/2023        Lab Results   Component Value Date    TSH 49.700 (H) 07/19/2023    U0VVLAC 125 11/02/2022    FREET4 1.01 03/21/2023        No results found for: "PSA", "PSATOTAL", "PSAFREE", "PSAFREEPCT"       Health Maintenance Due   Topic Date Due    COVID-19 Vaccine (1) Never done    Pneumococcal Vaccines (Age 0-64) (1 - PCV) Never done    TETANUS VACCINE  Never done    Colorectal Cancer Screening  Never done    Eye Exam  12/22/2021    Influenza Vaccine (1) 09/01/2023    Hemoglobin A1c  10/19/2023    Lipid Panel  12/02/2023    Diabetes Urine Screening  12/02/2023       Problem List Items Addressed This Visit          Psychiatric    Anxiety (Chronic)    Overview     Zoloft 100 mg oral daily   Effexor  mg oral daily         Relevant Medications    sertraline (ZOLOFT) 100 MG tablet    venlafaxine (EFFEXOR-XR) 150 MG Cp24       ENT    Sore throat       Cardiac/Vascular    Hypertension (Chronic)    Overview     Goal BP < 130/80         Relevant Medications    cloNIDine (CATAPRES) 0.2 MG tablet    hydrALAZINE (APRESOLINE) 100 MG tablet    metoprolol succinate (TOPROL-XL) 100 MG 24 hr tablet     Other Visit Diagnoses       Tonsillitis    -  Primary    Fever, unspecified fever cause        Relevant Orders    POCT Influenza A/B Rapid Antigen (Completed)    POCT SARS-COV2 (COVID) with Flu Antigen (Completed)    Fatigue, unspecified type              Flu and Covid negative, take meds as prescribed, RTC if symptoms worsen or persist.      Health Maintenance Topics with due status: Not Due       Topic Last Completion Date    Foot Exam 07/19/2023    Mammogram 10/12/2023    Low Dose Statin " 12/01/2023       Future Appointments   Date Time Provider Department Center   10/17/2024  9:00 AM [facility]        There are no Patient Instructions on file for this visit.  No follow-ups on file.       Date of encounter: 12/1/23

## 2023-12-06 ENCOUNTER — TELEPHONE (OUTPATIENT)
Dept: FAMILY MEDICINE | Facility: CLINIC | Age: 48
End: 2023-12-06
Payer: OTHER GOVERNMENT

## 2023-12-06 DIAGNOSIS — I10 PRIMARY HYPERTENSION: ICD-10-CM

## 2023-12-06 DIAGNOSIS — F41.9 ANXIETY: ICD-10-CM

## 2023-12-06 RX ORDER — METOPROLOL SUCCINATE 100 MG/1
100 TABLET, EXTENDED RELEASE ORAL DAILY
Qty: 90 TABLET | Refills: 0 | Status: SHIPPED | OUTPATIENT
Start: 2023-12-06 | End: 2024-01-12 | Stop reason: SDUPTHER

## 2023-12-06 RX ORDER — CLONIDINE HYDROCHLORIDE 0.2 MG/1
0.2 TABLET ORAL 4 TIMES DAILY
Qty: 360 TABLET | Refills: 0 | Status: SHIPPED | OUTPATIENT
Start: 2023-12-06 | End: 2024-02-28 | Stop reason: SDUPTHER

## 2023-12-06 RX ORDER — VENLAFAXINE HYDROCHLORIDE 150 MG/1
CAPSULE, EXTENDED RELEASE ORAL
Qty: 90 CAPSULE | Refills: 0 | Status: SHIPPED | OUTPATIENT
Start: 2023-12-06

## 2023-12-06 NOTE — TELEPHONE ENCOUNTER
----- Message from ROGELIO Blancas sent at 12/6/2023  1:36 PM CST -----  Regarding: RE: rx request  Prescriptions sent to [address]  ----- Message -----  From: Kasey Patrick MA  Sent: 11/28/2023   2:28 PM CST  To: ROGELIO Blancas  Subject: rx request                                       Message was sent to [facility] staff and sent back to Anitra stating needs to do a walk in or schedule appt. When return.  ----- Message -----  From: Anitra Gomez  Sent: 11/27/2023  12:31 PM CST  To: [facility] Staff Pool    PATIENT CALLED NEEDING REFILLS venlafaxine (EFFEXOR-XR) 150 MG Cp24, cloNIDine (CATAPRES) 0.2 MG tablet,  metoprolol succinate (TOPROL-XL) 100 MG 24 hr tablet

## 2023-12-06 NOTE — TELEPHONE ENCOUNTER
----- Message from Kasey Patrick MA sent at 11/28/2023  2:26 PM CST -----  Regarding: rx request  Message was sent to [facility] staff and sent back to Anitra stating needs to do a walk in or schedule appt. When return.  ----- Message -----  From: Anitra Gomez  Sent: 11/27/2023  12:31 PM CST  To: [facility] Vergara Staff Pool    PATIENT CALLED NEEDING REFILLS venlafaxine (EFFEXOR-XR) 150 MG Cp24, cloNIDine (CATAPRES) 0.2 MG tablet,  metoprolol succinate (TOPROL-XL) 100 MG 24 hr tablet

## 2023-12-11 RX ORDER — ALBUTEROL SULFATE 90 UG/1
2 AEROSOL, METERED RESPIRATORY (INHALATION) EVERY 6 HOURS PRN
Qty: 18 G | Refills: 0 | Status: SHIPPED | OUTPATIENT
Start: 2023-12-11 | End: 2024-12-10

## 2023-12-11 RX ORDER — ATORVASTATIN CALCIUM 20 MG/1
20 TABLET, FILM COATED ORAL NIGHTLY
Qty: 90 TABLET | Refills: 0 | Status: SHIPPED | OUTPATIENT
Start: 2023-12-11 | End: 2024-12-05

## 2023-12-11 RX ORDER — GLIPIZIDE AND METFORMIN HCL 5; 500 MG/1; MG/1
1 TABLET, FILM COATED ORAL
Qty: 180 TABLET | Refills: 0 | Status: SHIPPED | OUTPATIENT
Start: 2023-12-11 | End: 2024-12-10

## 2023-12-11 RX ORDER — LEVOTHYROXINE SODIUM 200 UG/1
200 TABLET ORAL
Qty: 90 TABLET | Refills: 0 | Status: SHIPPED | OUTPATIENT
Start: 2023-12-11 | End: 2024-02-09 | Stop reason: SDUPTHER

## 2023-12-11 RX ORDER — NIFEDIPINE 60 MG/1
60 TABLET, EXTENDED RELEASE ORAL DAILY
Qty: 90 TABLET | Refills: 0 | Status: SHIPPED | OUTPATIENT
Start: 2023-12-11 | End: 2024-01-12 | Stop reason: SDUPTHER

## 2024-01-12 ENCOUNTER — TELEPHONE (OUTPATIENT)
Dept: FAMILY MEDICINE | Facility: CLINIC | Age: 49
End: 2024-01-12
Payer: OTHER GOVERNMENT

## 2024-01-12 DIAGNOSIS — I10 PRIMARY HYPERTENSION: ICD-10-CM

## 2024-01-12 DIAGNOSIS — I10 HYPERTENSION, UNSPECIFIED TYPE: ICD-10-CM

## 2024-01-12 RX ORDER — NIFEDIPINE 60 MG/1
60 TABLET, EXTENDED RELEASE ORAL DAILY
Qty: 90 TABLET | Refills: 0 | Status: SHIPPED | OUTPATIENT
Start: 2024-01-12

## 2024-01-12 RX ORDER — METOPROLOL SUCCINATE 100 MG/1
100 TABLET, EXTENDED RELEASE ORAL DAILY
Qty: 90 TABLET | Refills: 0 | Status: SHIPPED | OUTPATIENT
Start: 2024-01-12

## 2024-01-12 NOTE — TELEPHONE ENCOUNTER
----- Message from Lilly Correa sent at 1/10/2024 11:57 AM CST -----  Regarding: Med Refill  Pt called requesting refills of the following medications:    NIFEdipine (PROCARDIA-XL) 60 MG (OSM) 24 hr tablet  metoprolol succinate (TOPROL-XL) 100 MG 24 hr tablet    Pharmacy: [facility] [address]  Pt phone #: [phone]

## 2024-01-12 NOTE — TELEPHONE ENCOUNTER
----- Message from Scottie Fajardo RN sent at 1/12/2024  9:13 AM CST -----  Regarding: Rx request  Requesting refill Rxs on Procardia and Toprol XL to be sent into pharmacy, thanks

## 2024-01-18 NOTE — H&P (VIEW-ONLY)
Subjective:         Patient ID: Carey Leonardo is a 48 y.o. female.    Chief Complaint: Leg Pain and Low-back Pain      Pain  This is a chronic problem. The current episode started more than 1 year ago. The problem occurs daily. The problem has been gradually improving. Associated symptoms include arthralgias. Pertinent negatives include no anorexia, change in bowel habit, chest pain, chills, coughing, diaphoresis, fever, neck pain, sore throat, swollen glands, urinary symptoms, vertigo or vomiting.     Review of Systems   Constitutional:  Negative for activity change, appetite change, chills, diaphoresis, fever and unexpected weight change.   HENT:  Negative for drooling, ear discharge, ear pain, facial swelling, nosebleeds, sore throat, trouble swallowing, voice change and goiter.    Eyes:  Negative for photophobia, pain, discharge, redness and visual disturbance.   Respiratory:  Negative for apnea, cough, choking, chest tightness, shortness of breath, wheezing and stridor.    Cardiovascular:  Negative for chest pain, palpitations and leg swelling.   Gastrointestinal:  Negative for abdominal distention, anorexia, change in bowel habit, diarrhea, rectal pain, vomiting and fecal incontinence.   Endocrine: Negative for cold intolerance, heat intolerance, polydipsia, polyphagia and polyuria.   Genitourinary:  Negative for bladder incontinence, dysuria, flank pain, frequency and hot flashes.   Musculoskeletal:  Positive for arthralgias, back pain and leg pain. Negative for neck pain.   Integumentary:  Negative for color change and pallor.   Allergic/Immunologic: Negative for immunocompromised state.   Neurological:  Negative for dizziness, vertigo, seizures, syncope, facial asymmetry, speech difficulty, light-headedness, memory loss and coordination difficulties.   Hematological:  Negative for adenopathy. Does not bruise/bleed easily.   Psychiatric/Behavioral:  Negative for agitation, behavioral problems, confusion,  decreased concentration, dysphoric mood, hallucinations, self-injury and suicidal ideas. The patient is not nervous/anxious and is not hyperactive.            Past Medical History:   Diagnosis Date    Asthma     CHF (congestive heart failure)     Chronic pain syndrome     Depressive disorder     Diabetes mellitus, type 2     Disorder of sacrum 04/28/2022    Enlarged heart     Gastroparesis     GERD (gastroesophageal reflux disease)     Hyperlipidemia     Hypertension     IBS (irritable bowel syndrome)     Kidney stones     Lumbar radiculopathy     Sleep apnea     Does not use a CPAP    Thyroid disease     Unspecified chronic bronchitis      Past Surgical History:   Procedure Laterality Date    Bilateral L3-S1 MBB Bilateral 6-, 5-, 1-8-2014    Dr Jessica Randolph    CARPAL TUNNEL RELEASE      CHOLECYSTECTOMY      DIAGNOSTIC LAPAROSCOPY  04/14/2010    Exploratory Laparotomy, Myomectomy, Hydrotubation-Dr. Feng    DILATION AND CURETTAGE OF UTERUS  04/14/2010    Hysteroscopy-Dr. Feng    EXPLORATORY LAPAROTOMY WITH UTERINE MYOMECTOMY  02/27/2007    Dr. Marquise Anderson    HYSTERECTOMY  09/29/2011    Robotic, FABIENNE, Cystoscopy-Dr. Feng    HYSTEROSCOPY WITH DILATION AND CURETTAGE OF UTERUS  04/04/2006    Laparoscopy, Chromotubation-Dr. Marquise Anderson    INJECTION OF ANESTHETIC AGENT AROUND ILIOINGUINAL NERVE Right 6/22/2023    Procedure: BLOCK, NERVE, ILIOINGUINAL;  Surgeon: Rasheeda Bills MD;  Location: [facility];  Service: Pain Management;  Laterality: Right;    INJECTION OF ANESTHETIC AGENT AROUND MEDIAL BRANCH NERVES INNERVATING LUMBAR FACET JOINT Bilateral 9/21/2023    Procedure: BLOCK, NERVE, FACET JOINT, LUMBAR, MEDIAL BRANCH;  Surgeon: Rasheeda Bills MD;  Location: [facility];  Service: Pain Management;  Laterality: Bilateral;    LAMINECTOMY N/A 11/30/2021    Procedure: L2-L5 Laminectomy;  Surgeon: Cyril Upton MD;  Location: [facility] OR;  Service: Neurosurgery;  Laterality:  "N/A;    LEFT HEART CATHETERIZATION      Left L3-S1 RFTC Left 07/18/2017    Dr Lopez    Left SI JI Left 09/30/2013    Dr Randolph    MYRINGOTOMY WITH INSERTION OF VENTILATION TUBE Bilateral 4/4/2023    Procedure: MYRINGOTOMY, WITH TYMPANOSTOMY TUBE INSERTION (T-TUBES);  Surgeon: Sea Hinton MD;  Location: [facility];  Service: ENT;  Laterality: Bilateral;  T-TUBES    MYRINGOTOMY WITH INSERTION OF VENTILATION TUBE Bilateral 9/12/2023    Procedure: MYRINGOTOMY, WITH TYMPANOSTOMY TUBE INSERTION, T-TUBES;  Surgeon: Sea Hinton MD;  Location: [facility];  Service: ENT;  Laterality: Bilateral;    OOPHORECTOMY  11/11/2014    Robotic, FABIENNE, Cystoscopy-Dr. Feng    SINUS SURGERY       Social History     Socioeconomic History    Marital status:    Tobacco Use    Smoking status: Never     Passive exposure: Never    Smokeless tobacco: Never   Substance and Sexual Activity    Alcohol use: Not Currently    Drug use: Not Currently     Types: Hydrocodone, Oxycodone    Sexual activity: Not Currently     Family History   Problem Relation Age of Onset    Diabetes Mellitus Mother     Heart disease Mother     Hypertension Mother     Migraines Mother     Heart disease Sister      Review of patient's allergies indicates:   Allergen Reactions    Fish containing products Anaphylaxis    Iodinated contrast media     Penicillins         Objective:  Vitals:    01/24/24 0831   BP: 120/74   Pulse: 87   Resp: 18   Weight: 101.6 kg (224 lb)   Height: 5' 7" (1.702 m)   PainSc:   8         Physical Exam  Vitals and nursing note reviewed. Exam conducted with a chaperone present.   Constitutional:       General: She is awake. She is not in acute distress.     Appearance: Normal appearance. She is not ill-appearing, toxic-appearing or diaphoretic.   HENT:      Head: Normocephalic and atraumatic.      Nose: Nose normal.      Mouth/Throat:      Mouth: Mucous membranes are moist.      Pharynx: Oropharynx is clear.   Eyes:      " Conjunctiva/sclera: Conjunctivae normal.      Pupils: Pupils are equal, round, and reactive to light.   Cardiovascular:      Rate and Rhythm: Normal rate.   Pulmonary:      Effort: Pulmonary effort is normal. No respiratory distress.   Abdominal:      Palpations: Abdomen is soft.      Tenderness: There is no guarding.   Musculoskeletal:         General: Normal range of motion.      Cervical back: Normal range of motion and neck supple. No rigidity.   Skin:     General: Skin is warm and dry.      Coloration: Skin is not jaundiced or pale.   Neurological:      General: No focal deficit present.      Mental Status: She is alert and oriented to person, place, and time. Mental status is at baseline.      Cranial Nerves: No cranial nerve deficit (II-XII).   Psychiatric:         Mood and Affect: Mood normal.         Behavior: Behavior normal. Behavior is cooperative.         Thought Content: Thought content normal.         CT Renal Stone Study ABD Pelvis WO  Narrative: EXAMINATION:  CT RENAL STONE STUDY ABD PELVIS WO    CLINICAL HISTORY:  Left flank pain, kidney stone suspected;    TECHNIQUE:  Low dose axial images, sagittal and coronal reformations were obtained from the lung bases to the pubic symphysis.  Contrast was not administered.  The CT examination was performed using one or more of the following dose reduction techniques: Automated exposure control, adjustment of the mA and kV according to patient's size, use of acute or iterative reconstruction techniques.    COMPARISON:  08/11/2022    FINDINGS:  Lung bases are clear.    Liver unremarkable.  Gallbladder is absent.  The adrenals, spleen, and pancreas appear normal.  No renal stone.  No hydronephrosis.  No hydroureter.  Urinary bladder decompressed and unremarkable.  Prior hysterectomy.  No adnexal lesion.    No pneumoperitoneum.  No ascites.  No adenopathy.  Abdominal aorta and iliacs are normal in course and caliber.    The appendix is normal.  No bowel  obstruction or acute bowel abnormality identified.  Mild degree of colonic stool is seen.    No acute fracture.  Previous laminectomies at L3 and L4 again seen.  Impression: No acute abnormality identified in the abdomen or pelvis.  No renal stone or hydronephrosis on either side.    Electronically signed by: Jean Plascencia  Date:    11/05/2023  Time:    09:03       Office Visit on 12/01/2023   Component Date Value Ref Range Status    Rapid Influenza A Ag 12/01/2023 Negative  Negative Final    Rapid Influenza B Ag 12/01/2023 Negative  Negative Final     Acceptable 12/01/2023 Yes   Final    SARS Coronavirus 2 Antigen 12/01/2023 Negative  Negative Final    Rapid Influenza A Ag 12/01/2023 Negative  Negative Final    Rapid Influenza B Ag 12/01/2023 Negative  Negative Final     Acceptable 12/01/2023 Yes   Final   Admission on 11/05/2023, Discharged on 11/05/2023   Component Date Value Ref Range Status    POC Glucose 11/05/2023 140 (H)  70 - 105 mg/dL Final    Sodium 11/05/2023 141  136 - 145 mmol/L Final    Potassium 11/05/2023 3.4 (L)  3.5 - 5.1 mmol/L Final    Chloride 11/05/2023 108 (H)  98 - 107 mmol/L Final    CO2 11/05/2023 30  21 - 32 mmol/L Final    Anion Gap 11/05/2023 6 (L)  7 - 16 mmol/L Final    Glucose 11/05/2023 159 (H)  74 - 106 mg/dL Final    BUN 11/05/2023 9  7 - 18 mg/dL Final    Creatinine 11/05/2023 0.74  0.55 - 1.02 mg/dL Final    BUN/Creatinine Ratio 11/05/2023 12  6 - 20 Final    Calcium 11/05/2023 9.0  8.5 - 10.1 mg/dL Final    Total Protein 11/05/2023 7.6  6.4 - 8.2 g/dL Final    Albumin 11/05/2023 3.2 (L)  3.5 - 5.0 g/dL Final    Globulin 11/05/2023 4.4 (H)  2.0 - 4.0 g/dL Final    A/G Ratio 11/05/2023 0.7   Final    Bilirubin, Total 11/05/2023 0.3  >0.0 - 1.2 mg/dL Final    Alk Phos 11/05/2023 121 (H)  39 - 100 U/L Final    ALT 11/05/2023 31  13 - 56 U/L Final    AST 11/05/2023 19  15 - 37 U/L Final    eGFR 11/05/2023 101  >=60 mL/min/1.73m2 Final    Color, UA  11/05/2023 Colorless  Colorless, Straw, Yellow, Light Yellow, Dark Yellow Final    Clarity, UA 11/05/2023 Clear  Clear Final    pH, UA 11/05/2023 7.0  5.0 to 8.0 pH Units Final    Leukocytes, UA 11/05/2023 Negative  Negative Final    Nitrites, UA 11/05/2023 Negative  Negative Final    Protein, UA 11/05/2023 Negative  Negative Final    Glucose, UA 11/05/2023 Normal  Normal mg/dL Final    Ketones, UA 11/05/2023 Negative  Negative mg/dL Final    Urobilinogen, UA 11/05/2023 Normal  0.2, 1.0, Normal mg/dL Final    Bilirubin, UA 11/05/2023 Negative  Negative Final    Blood, UA 11/05/2023 Negative  Negative Final    Specific [place], UA 11/05/2023 1.011  <=1.030 Final    WBC 11/05/2023 7.03  4.50 - 11.00 K/uL Final    RBC 11/05/2023 4.11 (L)  4.20 - 5.40 M/uL Final    Hemoglobin 11/05/2023 11.4 (L)  12.0 - 16.0 g/dL Final    Hematocrit 11/05/2023 36.6 (L)  38.0 - 47.0 % Final    MCV 11/05/2023 89.1  80.0 - 96.0 fL Final    MCH 11/05/2023 27.7  27.0 - 31.0 pg Final    MCHC 11/05/2023 31.1 (L)  32.0 - 36.0 g/dL Final    RDW 11/05/2023 12.4  11.5 - 14.5 % Final    Platelet Count 11/05/2023 372  150 - 400 K/uL Final    MPV 11/05/2023 9.5  9.4 - 12.4 fL Final    Neutrophils % 11/05/2023 53.9  53.0 - 65.0 % Final    Lymphocytes % 11/05/2023 37.0  27.0 - 41.0 % Final    Monocytes % 11/05/2023 6.8 (H)  2.0 - 6.0 % Final    Eosinophils % 11/05/2023 1.4  1.0 - 4.0 % Final    Basophils % 11/05/2023 0.6  0.0 - 1.0 % Final    Immature Granulocytes % 11/05/2023 0.3  0.0 - 0.4 % Final    nRBC, Auto 11/05/2023 0.0  <=0.0 % Final    Neutrophils, Abs 11/05/2023 3.79  1.80 - 7.70 K/uL Final    Lymphocytes, Absolute 11/05/2023 2.60  1.00 - 4.80 K/uL Final    Monocytes, Absolute 11/05/2023 0.48  0.00 - 0.80 K/uL Final    Eosinophils, Absolute 11/05/2023 0.10  0.00 - 0.50 K/uL Final    Basophils, Absolute 11/05/2023 0.04  0.00 - 0.20 K/uL Final    Immature Granulocytes, Absolute 11/05/2023 0.02  0.00 - 0.04 K/uL Final    nRBC, Absolute  11/05/2023 0.00  <=0.00 x10e3/uL Final    Diff Type 11/05/2023 Auto   Final   Office Visit on 10/30/2023   Component Date Value Ref Range Status    Case Report 10/30/2023    Final                    Value:Pap Cytology                                      Case: [id number]                                   Authorizing Provider:  Temo Feng MD           Collected:           10/30/2023 10:18 AM          Ordering Location:     [facility] Received:            10/31/2023 09:56 AM                                 - Obstetrics And                                                                                    Gynecology                                                                   First Screen:          Marilyn Bills CT(ASCP)                                                       Specimen:    Liquid-Based Pap Test, Screening, Vagina                                                   Interpretation 10/30/2023 Negative              Final    General Categorization 10/30/2023 Negative for intraepithelial lesion or malignancy   Final    Specimen Adequacy 10/30/2023 Satisfactory for evaluation   Final    Clinical Information 10/30/2023    Final                    Value:This result contains rich text formatting which cannot be displayed here.    Disclaimer 10/30/2023    Final                    Value:This result contains rich text formatting which cannot be displayed here.   Office Visit on 10/02/2023   Component Date Value Ref Range Status    SARS Coronavirus 2 Antigen 10/02/2023 Negative  Negative Final    Rapid Influenza A Ag 10/02/2023 Negative  Negative Final    Rapid Influenza B Ag 10/02/2023 Negative  Negative Final     Acceptable 10/02/2023 Yes   Final    Sodium 10/02/2023 142  136 - 145 mmol/L Final    Potassium 10/02/2023 4.2  3.5 - 5.1 mmol/L Final    Chloride 10/02/2023 106  98 - 107 mmol/L Final    CO2 10/02/2023 30  21 - 32 mmol/L Final    Anion Gap 10/02/2023 10  7 - 16 mmol/L  Final    Glucose 10/02/2023 176 (H)  74 - 106 mg/dL Final    BUN 10/02/2023 11  7 - 18 mg/dL Final    Creatinine 10/02/2023 0.84  0.55 - 1.02 mg/dL Final    BUN/Creatinine Ratio 10/02/2023 13  6 - 20 Final    Calcium 10/02/2023 9.1  8.5 - 10.1 mg/dL Final    Total Protein 10/02/2023 7.4  6.4 - 8.2 g/dL Final    Albumin 10/02/2023 3.3 (L)  3.5 - 5.0 g/dL Final    Globulin 10/02/2023 4.1 (H)  2.0 - 4.0 g/dL Final    A/G Ratio 10/02/2023 0.8   Final    Bilirubin, Total 10/02/2023 0.3  >0.0 - 1.2 mg/dL Final    Alk Phos 10/02/2023 135 (H)  39 - 100 U/L Final    ALT 10/02/2023 32  13 - 56 U/L Final    AST 10/02/2023 17  15 - 37 U/L Final    eGFR 10/02/2023 86  >=60 mL/min/1.73m2 Final    Vitamin D 25-Hydroxy, Blood 10/02/2023 32.3  ng/mL Final    WBC 10/02/2023 8.63  4.50 - 11.00 K/uL Final    RBC 10/02/2023 4.27  4.20 - 5.40 M/uL Final    Hemoglobin 10/02/2023 11.8 (L)  12.0 - 16.0 g/dL Final    Hematocrit 10/02/2023 39.1  38.0 - 47.0 % Final    MCV 10/02/2023 91.6  80.0 - 96.0 fL Final    MCH 10/02/2023 27.6  27.0 - 31.0 pg Final    MCHC 10/02/2023 30.2 (L)  32.0 - 36.0 g/dL Final    RDW 10/02/2023 12.9  11.5 - 14.5 % Final    Platelet Count 10/02/2023 438 (H)  150 - 400 K/uL Final    MPV 10/02/2023 9.6  9.4 - 12.4 fL Final    Neutrophils % 10/02/2023 52.7 (L)  53.0 - 65.0 % Final    Lymphocytes % 10/02/2023 40.1  27.0 - 41.0 % Final    Monocytes % 10/02/2023 5.2  2.0 - 6.0 % Final    Eosinophils % 10/02/2023 1.2  1.0 - 4.0 % Final    Basophils % 10/02/2023 0.6  0.0 - 1.0 % Final    Immature Granulocytes % 10/02/2023 0.2  0.0 - 0.4 % Final    nRBC, Auto 10/02/2023 0.0  <=0.0 % Final    Neutrophils, Abs 10/02/2023 4.55  1.80 - 7.70 K/uL Final    Lymphocytes, Absolute 10/02/2023 3.46  1.00 - 4.80 K/uL Final    Monocytes, Absolute 10/02/2023 0.45  0.00 - 0.80 K/uL Final    Eosinophils, Absolute 10/02/2023 0.10  0.00 - 0.50 K/uL Final    Basophils, Absolute 10/02/2023 0.05  0.00 - 0.20 K/uL Final    Immature Granulocytes,  Absolute 10/02/2023 0.02  0.00 - 0.04 K/uL Final    nRBC, Absolute 10/02/2023 0.00  <=0.00 x10e3/uL Final    Diff Type 10/02/2023 Auto   Final   Admission on 09/21/2023, Discharged on 09/21/2023   Component Date Value Ref Range Status    POC Glucose 09/21/2023 150 (H)  70 - 105 mg/dL Final   Admission on 09/12/2023, Discharged on 09/12/2023   Component Date Value Ref Range Status    POC Glucose 09/12/2023 141 (H)  70 - 105 mg/dL Final    POC Glucose 09/12/2023 160 (H)  70 - 105 mg/dL Final   Office Visit on 08/27/2023   Component Date Value Ref Range Status    SARS Coronavirus 2 Antigen 08/27/2023 Negative  Negative Final    Rapid Influenza A Ag 08/27/2023 Negative  Negative Final    Rapid Influenza B Ag 08/27/2023 Negative  Negative Final     Acceptable 08/27/2023 Yes   Final   Office Visit on 08/01/2023   Component Date Value Ref Range Status    SARS Coronavirus 2 Antigen 08/01/2023 Negative  Negative Final    Rapid Influenza A Ag 08/01/2023 Negative  Negative Final    Rapid Influenza B Ag 08/01/2023 Negative  Negative Final     Acceptable 08/01/2023 Yes   Final    Rapid Strep A Screen 08/01/2023 Negative  Negative Final     Acceptable 08/01/2023 Yes   Final         Orders Placed This Encounter   Procedures    X-Ray Knee 3 View Right     Standing Status:   Future     Standing Expiration Date:   1/24/2025     Order Specific Question:   May the Radiologist modify the order per protocol to meet the clinical needs of the patient?     Answer:   Yes     Order Specific Question:   Release to patient     Answer:   Immediate    Case Request Operating Room: Block-nerve-medial branch-lumbar, bilateral L4 through S1     Order Specific Question:   Medical Necessity:     Answer:   Medically Non-Urgent [100]     Order Specific Question:   CPT Code:     Answer:   OK INJ DX/THER AGNT PARAVERT FACET JOINT,IMG GUIDE,LUMBAR/SAC,1ST LVL [48259]     Order Specific Question:   CPT Code:      Answer:   MT INJ DX/THER AGNT PARAVERT FACET JOINT,IMG GUIDE,LUMBAR/SAC, 2ND LEVEL [68425]     Order Specific Question:   Is an on-site pathologist required for this procedure?     Answer:   N/A       Requested Prescriptions     Signed Prescriptions Disp Refills    meloxicam (MOBIC) 15 MG tablet 30 tablet 0     Sig: Take 1 tablet (15 mg total) by mouth once daily.    cyclobenzaprine (FLEXERIL) 10 MG tablet 90 tablet 0     Sig: Take 1 tablet (10 mg total) by mouth 3 (three) times daily as needed for Muscle spasms.    traMADoL (ULTRAM) 50 mg tablet 90 tablet 0     Sig: Take 1 tablet (50 mg total) by mouth every 8 (eight) hours as needed for Pain.       Assessment:     1. Lumbosacral spondylosis without myelopathy    2. Postlaminectomy syndrome, lumbar region    3. Disorder of sacrum    4. Ilioinguinal neuralgia of right side    5. Acute pain of right knee         A's of Opioid Risk Assessment  Activity:Patient can perform ADL.   Analgesia:Patients pain is partially controlled by current medication. Patient has tried OTC medications such as Tylenol and Ibuprofen with out relief.   Adverse Effects: Patient denies constipation or sedation.  Aberrant Behavior:  reviewed with no aberrant drug seeking/taking behavior.  Overdose reversal drug naloxone discussed    Drug screen reviewed        MRI lumbar spine [facility] July 31, 2023 multiple level degenerative changes mild bilateral foraminal stenosis L3/4 L4/5 less so at L5/S1 postop changes noted           Plan:      January 24, 2024 serum drug screen        History lumbar surgery laminectomy L2 through 5 November 30, 2021 Dr. Upton complicated by postop infection    Presumptive drug screen negative, this is expected result, patient takes tramadol, cup does not test for tramadol      Follow-up after bilateral lumbar L4 through S1 medial branch block # 1, September 21, 2023 she states she had 80% relief after procedure, the procedure did help improve her level  function     She complaining some right knee pain requesting further evaluation    X-ray right knee     Considering right knee injection     Requesting next lumbar procedure for remaining back pain    She states she would like to have procedure for back pain worse with flexion extension rotation lumbar spine ongoing for more than 3 months facet joint in nature     Requesting Toradol injection    Toradol 60 mg IM, tolerated well    X-ray right knee today    Indications for this procedure for this specific patient include the following   - Pt has had symptoms for three months with moderate to severe pain with functional impairment rated of 7/10 pain.   - Pain non-responsive to conservative care.    - Pain predominately axial and not associated with radiculopathy or claudication.    - No non-facet pathology as source of pain.    - Clinical assessment implicates facet joint as putative pain source.    - facet loading maneuver positive  - Pain is exacerbated by extension or prolonged sitting/standing and relieved by rest.    - No unexplained neurologic deficit.    - No history of coagulopathy, infection or unstable medical conditions.    - Pain is causing significant functional limitation resulting in diminished quality of life and impaired age appropriate ADL's.   - Clinical assessment implicates facet joint as putative source of pain  - Repeat injections not done prior to 7 days   - no more than 2 levels will be done    The planned medically necessary  surgical procedure is performed in a hospital outpatient department and not in an ambulatory surgical center due to:     -there is no geographically assessable ambulatory surgery center that has the  necessary equipment and fluoroscopy needed for the procedure     -there is no geographically assessable ambulatory surgical center available at which the physician has privileges     -an ASC's  specific  guideline regarding the individuals weight or health conditions that  prevent the use of an ASC     -Medial branch block performed in consideration for RFTC, not just for therapeutic treatment    -done under fluoro    Monitor anesthesia request is medically indicated for the scheduled nerve block procedure due to:  1- needle phobia and anxiety, placing  the patient at risk during the provided service.  2-patient has an ASA class greater than 3 and requires constant presence of an anesthesiologist during the procedure,   3-patient has severe problems hard to lie still  4-patient suffers from chronic pain and is unable to function due to  diminished ADLs    Schedule bilateral lumbar medial branch block L4 through S1, # 2, lumbosacral spondylosis    Dr. Bills    Bring original prescription medication bottles/container/box with labels to each visit

## 2024-01-18 NOTE — PROGRESS NOTES
Subjective:         Patient ID: Carey Leonardo is a 48 y.o. female.    Chief Complaint: Leg Pain and Low-back Pain      Pain  This is a chronic problem. The current episode started more than 1 year ago. The problem occurs daily. The problem has been gradually improving. Associated symptoms include arthralgias. Pertinent negatives include no anorexia, change in bowel habit, chest pain, chills, coughing, diaphoresis, fever, neck pain, sore throat, swollen glands, urinary symptoms, vertigo or vomiting.     Review of Systems   Constitutional:  Negative for activity change, appetite change, chills, diaphoresis, fever and unexpected weight change.   HENT:  Negative for drooling, ear discharge, ear pain, facial swelling, nosebleeds, sore throat, trouble swallowing, voice change and goiter.    Eyes:  Negative for photophobia, pain, discharge, redness and visual disturbance.   Respiratory:  Negative for apnea, cough, choking, chest tightness, shortness of breath, wheezing and stridor.    Cardiovascular:  Negative for chest pain, palpitations and leg swelling.   Gastrointestinal:  Negative for abdominal distention, anorexia, change in bowel habit, diarrhea, rectal pain, vomiting and fecal incontinence.   Endocrine: Negative for cold intolerance, heat intolerance, polydipsia, polyphagia and polyuria.   Genitourinary:  Negative for bladder incontinence, dysuria, flank pain, frequency and hot flashes.   Musculoskeletal:  Positive for arthralgias, back pain and leg pain. Negative for neck pain.   Integumentary:  Negative for color change and pallor.   Allergic/Immunologic: Negative for immunocompromised state.   Neurological:  Negative for dizziness, vertigo, seizures, syncope, facial asymmetry, speech difficulty, light-headedness, memory loss and coordination difficulties.   Hematological:  Negative for adenopathy. Does not bruise/bleed easily.   Psychiatric/Behavioral:  Negative for agitation, behavioral problems, confusion,  decreased concentration, dysphoric mood, hallucinations, self-injury and suicidal ideas. The patient is not nervous/anxious and is not hyperactive.            Past Medical History:   Diagnosis Date    Asthma     CHF (congestive heart failure)     Chronic pain syndrome     Depressive disorder     Diabetes mellitus, type 2     Disorder of sacrum 04/28/2022    Enlarged heart     Gastroparesis     GERD (gastroesophageal reflux disease)     Hyperlipidemia     Hypertension     IBS (irritable bowel syndrome)     Kidney stones     Lumbar radiculopathy     Sleep apnea     Does not use a CPAP    Thyroid disease     Unspecified chronic bronchitis      Past Surgical History:   Procedure Laterality Date    Bilateral L3-S1 MBB Bilateral 6-, 5-, 1-8-2014    Dr Jessica Randolph    CARPAL TUNNEL RELEASE      CHOLECYSTECTOMY      DIAGNOSTIC LAPAROSCOPY  04/14/2010    Exploratory Laparotomy, Myomectomy, Hydrotubation-Dr. Feng    DILATION AND CURETTAGE OF UTERUS  04/14/2010    Hysteroscopy-Dr. Feng    EXPLORATORY LAPAROTOMY WITH UTERINE MYOMECTOMY  02/27/2007    Dr. Marquise Anderson    HYSTERECTOMY  09/29/2011    Robotic, FABIENNE, Cystoscopy-Dr. Feng    HYSTEROSCOPY WITH DILATION AND CURETTAGE OF UTERUS  04/04/2006    Laparoscopy, Chromotubation-Dr. Marquise Anderson    INJECTION OF ANESTHETIC AGENT AROUND ILIOINGUINAL NERVE Right 6/22/2023    Procedure: BLOCK, NERVE, ILIOINGUINAL;  Surgeon: Rasheeda Bills MD;  Location: [facility];  Service: Pain Management;  Laterality: Right;    INJECTION OF ANESTHETIC AGENT AROUND MEDIAL BRANCH NERVES INNERVATING LUMBAR FACET JOINT Bilateral 9/21/2023    Procedure: BLOCK, NERVE, FACET JOINT, LUMBAR, MEDIAL BRANCH;  Surgeon: Rasheeda Bills MD;  Location: [facility];  Service: Pain Management;  Laterality: Bilateral;    LAMINECTOMY N/A 11/30/2021    Procedure: L2-L5 Laminectomy;  Surgeon: Cyril Upton MD;  Location: [facility] OR;  Service: Neurosurgery;  Laterality:  "N/A;    LEFT HEART CATHETERIZATION      Left L3-S1 RFTC Left 07/18/2017    Dr Lopez    Left SI JI Left 09/30/2013    Dr Randolph    MYRINGOTOMY WITH INSERTION OF VENTILATION TUBE Bilateral 4/4/2023    Procedure: MYRINGOTOMY, WITH TYMPANOSTOMY TUBE INSERTION (T-TUBES);  Surgeon: Sea Hinton MD;  Location: [facility];  Service: ENT;  Laterality: Bilateral;  T-TUBES    MYRINGOTOMY WITH INSERTION OF VENTILATION TUBE Bilateral 9/12/2023    Procedure: MYRINGOTOMY, WITH TYMPANOSTOMY TUBE INSERTION, T-TUBES;  Surgeon: Sea Hinton MD;  Location: [facility] OR;  Service: ENT;  Laterality: Bilateral;    OOPHORECTOMY  11/11/2014    Robotic, FABIENNE, Cystoscopy-Dr. Feng    SINUS SURGERY       Social History     Socioeconomic History    Marital status:    Tobacco Use    Smoking status: Never     Passive exposure: Never    Smokeless tobacco: Never   Substance and Sexual Activity    Alcohol use: Not Currently    Drug use: Not Currently     Types: Hydrocodone, Oxycodone    Sexual activity: Not Currently     Family History   Problem Relation Age of Onset    Diabetes Mellitus Mother     Heart disease Mother     Hypertension Mother     Migraines Mother     Heart disease Sister      Review of patient's allergies indicates:   Allergen Reactions    Fish containing products Anaphylaxis    Iodinated contrast media     Penicillins         Objective:  Vitals:    01/24/24 0831   BP: 120/74   Pulse: 87   Resp: 18   Weight: 101.6 kg (224 lb)   Height: 5' 7" (1.702 m)   PainSc:   8         Physical Exam  Vitals and nursing note reviewed. Exam conducted with a chaperone present.   Constitutional:       General: She is awake. She is not in acute distress.     Appearance: Normal appearance. She is not ill-appearing, toxic-appearing or diaphoretic.   HENT:      Head: Normocephalic and atraumatic.      Nose: Nose normal.      Mouth/Throat:      Mouth: Mucous membranes are moist.      Pharynx: Oropharynx is clear.   Eyes:      " Conjunctiva/sclera: Conjunctivae normal.      Pupils: Pupils are equal, round, and reactive to light.   Cardiovascular:      Rate and Rhythm: Normal rate.   Pulmonary:      Effort: Pulmonary effort is normal. No respiratory distress.   Abdominal:      Palpations: Abdomen is soft.      Tenderness: There is no guarding.   Musculoskeletal:         General: Normal range of motion.      Cervical back: Normal range of motion and neck supple. No rigidity.   Skin:     General: Skin is warm and dry.      Coloration: Skin is not jaundiced or pale.   Neurological:      General: No focal deficit present.      Mental Status: She is alert and oriented to person, place, and time. Mental status is at baseline.      Cranial Nerves: No cranial nerve deficit (II-XII).   Psychiatric:         Mood and Affect: Mood normal.         Behavior: Behavior normal. Behavior is cooperative.         Thought Content: Thought content normal.         CT Renal Stone Study ABD Pelvis WO  Narrative: EXAMINATION:  CT RENAL STONE STUDY ABD PELVIS WO    CLINICAL HISTORY:  Left flank pain, kidney stone suspected;    TECHNIQUE:  Low dose axial images, sagittal and coronal reformations were obtained from the lung bases to the pubic symphysis.  Contrast was not administered.  The CT examination was performed using one or more of the following dose reduction techniques: Automated exposure control, adjustment of the mA and kV according to patient's size, use of acute or iterative reconstruction techniques.    COMPARISON:  08/11/2022    FINDINGS:  Lung bases are clear.    Liver unremarkable.  Gallbladder is absent.  The adrenals, spleen, and pancreas appear normal.  No renal stone.  No hydronephrosis.  No hydroureter.  Urinary bladder decompressed and unremarkable.  Prior hysterectomy.  No adnexal lesion.    No pneumoperitoneum.  No ascites.  No adenopathy.  Abdominal aorta and iliacs are normal in course and caliber.    The appendix is normal.  No bowel  obstruction or acute bowel abnormality identified.  Mild degree of colonic stool is seen.    No acute fracture.  Previous laminectomies at L3 and L4 again seen.  Impression: No acute abnormality identified in the abdomen or pelvis.  No renal stone or hydronephrosis on either side.    Electronically signed by: Jean Plascencia  Date:    11/05/2023  Time:    09:03       Office Visit on 12/01/2023   Component Date Value Ref Range Status    Rapid Influenza A Ag 12/01/2023 Negative  Negative Final    Rapid Influenza B Ag 12/01/2023 Negative  Negative Final     Acceptable 12/01/2023 Yes   Final    SARS Coronavirus 2 Antigen 12/01/2023 Negative  Negative Final    Rapid Influenza A Ag 12/01/2023 Negative  Negative Final    Rapid Influenza B Ag 12/01/2023 Negative  Negative Final     Acceptable 12/01/2023 Yes   Final   Admission on 11/05/2023, Discharged on 11/05/2023   Component Date Value Ref Range Status    POC Glucose 11/05/2023 140 (H)  70 - 105 mg/dL Final    Sodium 11/05/2023 141  136 - 145 mmol/L Final    Potassium 11/05/2023 3.4 (L)  3.5 - 5.1 mmol/L Final    Chloride 11/05/2023 108 (H)  98 - 107 mmol/L Final    CO2 11/05/2023 30  21 - 32 mmol/L Final    Anion Gap 11/05/2023 6 (L)  7 - 16 mmol/L Final    Glucose 11/05/2023 159 (H)  74 - 106 mg/dL Final    BUN 11/05/2023 9  7 - 18 mg/dL Final    Creatinine 11/05/2023 0.74  0.55 - 1.02 mg/dL Final    BUN/Creatinine Ratio 11/05/2023 12  6 - 20 Final    Calcium 11/05/2023 9.0  8.5 - 10.1 mg/dL Final    Total Protein 11/05/2023 7.6  6.4 - 8.2 g/dL Final    Albumin 11/05/2023 3.2 (L)  3.5 - 5.0 g/dL Final    Globulin 11/05/2023 4.4 (H)  2.0 - 4.0 g/dL Final    A/G Ratio 11/05/2023 0.7   Final    Bilirubin, Total 11/05/2023 0.3  >0.0 - 1.2 mg/dL Final    Alk Phos 11/05/2023 121 (H)  39 - 100 U/L Final    ALT 11/05/2023 31  13 - 56 U/L Final    AST 11/05/2023 19  15 - 37 U/L Final    eGFR 11/05/2023 101  >=60 mL/min/1.73m2 Final    Color, UA  11/05/2023 Colorless  Colorless, Straw, Yellow, Light Yellow, Dark Yellow Final    Clarity, UA 11/05/2023 Clear  Clear Final    pH, UA 11/05/2023 7.0  5.0 to 8.0 pH Units Final    Leukocytes, UA 11/05/2023 Negative  Negative Final    Nitrites, UA 11/05/2023 Negative  Negative Final    Protein, UA 11/05/2023 Negative  Negative Final    Glucose, UA 11/05/2023 Normal  Normal mg/dL Final    Ketones, UA 11/05/2023 Negative  Negative mg/dL Final    Urobilinogen, UA 11/05/2023 Normal  0.2, 1.0, Normal mg/dL Final    Bilirubin, UA 11/05/2023 Negative  Negative Final    Blood, UA 11/05/2023 Negative  Negative Final    Specific [place], UA 11/05/2023 1.011  <=1.030 Final    WBC 11/05/2023 7.03  4.50 - 11.00 K/uL Final    RBC 11/05/2023 4.11 (L)  4.20 - 5.40 M/uL Final    Hemoglobin 11/05/2023 11.4 (L)  12.0 - 16.0 g/dL Final    Hematocrit 11/05/2023 36.6 (L)  38.0 - 47.0 % Final    MCV 11/05/2023 89.1  80.0 - 96.0 fL Final    MCH 11/05/2023 27.7  27.0 - 31.0 pg Final    MCHC 11/05/2023 31.1 (L)  32.0 - 36.0 g/dL Final    RDW 11/05/2023 12.4  11.5 - 14.5 % Final    Platelet Count 11/05/2023 372  150 - 400 K/uL Final    MPV 11/05/2023 9.5  9.4 - 12.4 fL Final    Neutrophils % 11/05/2023 53.9  53.0 - 65.0 % Final    Lymphocytes % 11/05/2023 37.0  27.0 - 41.0 % Final    Monocytes % 11/05/2023 6.8 (H)  2.0 - 6.0 % Final    Eosinophils % 11/05/2023 1.4  1.0 - 4.0 % Final    Basophils % 11/05/2023 0.6  0.0 - 1.0 % Final    Immature Granulocytes % 11/05/2023 0.3  0.0 - 0.4 % Final    nRBC, Auto 11/05/2023 0.0  <=0.0 % Final    Neutrophils, Abs 11/05/2023 3.79  1.80 - 7.70 K/uL Final    Lymphocytes, Absolute 11/05/2023 2.60  1.00 - 4.80 K/uL Final    Monocytes, Absolute 11/05/2023 0.48  0.00 - 0.80 K/uL Final    Eosinophils, Absolute 11/05/2023 0.10  0.00 - 0.50 K/uL Final    Basophils, Absolute 11/05/2023 0.04  0.00 - 0.20 K/uL Final    Immature Granulocytes, Absolute 11/05/2023 0.02  0.00 - 0.04 K/uL Final    nRBC, Absolute  11/05/2023 0.00  <=0.00 x10e3/uL Final    Diff Type 11/05/2023 Auto   Final   Office Visit on 10/30/2023   Component Date Value Ref Range Status    Case Report 10/30/2023    Final                    Value:Pap Cytology                                      Case: [id number]                                   Authorizing Provider:  Temo Feng MD           Collected:           10/30/2023 10:18 AM          Ordering Location:     [facility] Received:            10/31/2023 09:56 AM                                 - Obstetrics And                                                                                    Gynecology                                                                   First Screen:          Marilyn Bills CT(ASCP)                                                       Specimen:    Liquid-Based Pap Test, Screening, Vagina                                                   Interpretation 10/30/2023 Negative              Final    General Categorization 10/30/2023 Negative for intraepithelial lesion or malignancy   Final    Specimen Adequacy 10/30/2023 Satisfactory for evaluation   Final    Clinical Information 10/30/2023    Final                    Value:This result contains rich text formatting which cannot be displayed here.    Disclaimer 10/30/2023    Final                    Value:This result contains rich text formatting which cannot be displayed here.   Office Visit on 10/02/2023   Component Date Value Ref Range Status    SARS Coronavirus 2 Antigen 10/02/2023 Negative  Negative Final    Rapid Influenza A Ag 10/02/2023 Negative  Negative Final    Rapid Influenza B Ag 10/02/2023 Negative  Negative Final     Acceptable 10/02/2023 Yes   Final    Sodium 10/02/2023 142  136 - 145 mmol/L Final    Potassium 10/02/2023 4.2  3.5 - 5.1 mmol/L Final    Chloride 10/02/2023 106  98 - 107 mmol/L Final    CO2 10/02/2023 30  21 - 32 mmol/L Final    Anion Gap 10/02/2023 10  7 - 16 mmol/L  Final    Glucose 10/02/2023 176 (H)  74 - 106 mg/dL Final    BUN 10/02/2023 11  7 - 18 mg/dL Final    Creatinine 10/02/2023 0.84  0.55 - 1.02 mg/dL Final    BUN/Creatinine Ratio 10/02/2023 13  6 - 20 Final    Calcium 10/02/2023 9.1  8.5 - 10.1 mg/dL Final    Total Protein 10/02/2023 7.4  6.4 - 8.2 g/dL Final    Albumin 10/02/2023 3.3 (L)  3.5 - 5.0 g/dL Final    Globulin 10/02/2023 4.1 (H)  2.0 - 4.0 g/dL Final    A/G Ratio 10/02/2023 0.8   Final    Bilirubin, Total 10/02/2023 0.3  >0.0 - 1.2 mg/dL Final    Alk Phos 10/02/2023 135 (H)  39 - 100 U/L Final    ALT 10/02/2023 32  13 - 56 U/L Final    AST 10/02/2023 17  15 - 37 U/L Final    eGFR 10/02/2023 86  >=60 mL/min/1.73m2 Final    Vitamin D 25-Hydroxy, Blood 10/02/2023 32.3  ng/mL Final    WBC 10/02/2023 8.63  4.50 - 11.00 K/uL Final    RBC 10/02/2023 4.27  4.20 - 5.40 M/uL Final    Hemoglobin 10/02/2023 11.8 (L)  12.0 - 16.0 g/dL Final    Hematocrit 10/02/2023 39.1  38.0 - 47.0 % Final    MCV 10/02/2023 91.6  80.0 - 96.0 fL Final    MCH 10/02/2023 27.6  27.0 - 31.0 pg Final    MCHC 10/02/2023 30.2 (L)  32.0 - 36.0 g/dL Final    RDW 10/02/2023 12.9  11.5 - 14.5 % Final    Platelet Count 10/02/2023 438 (H)  150 - 400 K/uL Final    MPV 10/02/2023 9.6  9.4 - 12.4 fL Final    Neutrophils % 10/02/2023 52.7 (L)  53.0 - 65.0 % Final    Lymphocytes % 10/02/2023 40.1  27.0 - 41.0 % Final    Monocytes % 10/02/2023 5.2  2.0 - 6.0 % Final    Eosinophils % 10/02/2023 1.2  1.0 - 4.0 % Final    Basophils % 10/02/2023 0.6  0.0 - 1.0 % Final    Immature Granulocytes % 10/02/2023 0.2  0.0 - 0.4 % Final    nRBC, Auto 10/02/2023 0.0  <=0.0 % Final    Neutrophils, Abs 10/02/2023 4.55  1.80 - 7.70 K/uL Final    Lymphocytes, Absolute 10/02/2023 3.46  1.00 - 4.80 K/uL Final    Monocytes, Absolute 10/02/2023 0.45  0.00 - 0.80 K/uL Final    Eosinophils, Absolute 10/02/2023 0.10  0.00 - 0.50 K/uL Final    Basophils, Absolute 10/02/2023 0.05  0.00 - 0.20 K/uL Final    Immature Granulocytes,  Absolute 10/02/2023 0.02  0.00 - 0.04 K/uL Final    nRBC, Absolute 10/02/2023 0.00  <=0.00 x10e3/uL Final    Diff Type 10/02/2023 Auto   Final   Admission on 09/21/2023, Discharged on 09/21/2023   Component Date Value Ref Range Status    POC Glucose 09/21/2023 150 (H)  70 - 105 mg/dL Final   Admission on 09/12/2023, Discharged on 09/12/2023   Component Date Value Ref Range Status    POC Glucose 09/12/2023 141 (H)  70 - 105 mg/dL Final    POC Glucose 09/12/2023 160 (H)  70 - 105 mg/dL Final   Office Visit on 08/27/2023   Component Date Value Ref Range Status    SARS Coronavirus 2 Antigen 08/27/2023 Negative  Negative Final    Rapid Influenza A Ag 08/27/2023 Negative  Negative Final    Rapid Influenza B Ag 08/27/2023 Negative  Negative Final     Acceptable 08/27/2023 Yes   Final   Office Visit on 08/01/2023   Component Date Value Ref Range Status    SARS Coronavirus 2 Antigen 08/01/2023 Negative  Negative Final    Rapid Influenza A Ag 08/01/2023 Negative  Negative Final    Rapid Influenza B Ag 08/01/2023 Negative  Negative Final     Acceptable 08/01/2023 Yes   Final    Rapid Strep A Screen 08/01/2023 Negative  Negative Final     Acceptable 08/01/2023 Yes   Final         Orders Placed This Encounter   Procedures    X-Ray Knee 3 View Right     Standing Status:   Future     Standing Expiration Date:   1/24/2025     Order Specific Question:   May the Radiologist modify the order per protocol to meet the clinical needs of the patient?     Answer:   Yes     Order Specific Question:   Release to patient     Answer:   Immediate    Case Request Operating Room: Block-nerve-medial branch-lumbar, bilateral L4 through S1     Order Specific Question:   Medical Necessity:     Answer:   Medically Non-Urgent [100]     Order Specific Question:   CPT Code:     Answer:   NM INJ DX/THER AGNT PARAVERT FACET JOINT,IMG GUIDE,LUMBAR/SAC,1ST LVL [39339]     Order Specific Question:   CPT Code:      Answer:   IN INJ DX/THER AGNT PARAVERT FACET JOINT,IMG GUIDE,LUMBAR/SAC, 2ND LEVEL [08217]     Order Specific Question:   Is an on-site pathologist required for this procedure?     Answer:   N/A       Requested Prescriptions     Signed Prescriptions Disp Refills    meloxicam (MOBIC) 15 MG tablet 30 tablet 0     Sig: Take 1 tablet (15 mg total) by mouth once daily.    cyclobenzaprine (FLEXERIL) 10 MG tablet 90 tablet 0     Sig: Take 1 tablet (10 mg total) by mouth 3 (three) times daily as needed for Muscle spasms.    traMADoL (ULTRAM) 50 mg tablet 90 tablet 0     Sig: Take 1 tablet (50 mg total) by mouth every 8 (eight) hours as needed for Pain.       Assessment:     1. Lumbosacral spondylosis without myelopathy    2. Postlaminectomy syndrome, lumbar region    3. Disorder of sacrum    4. Ilioinguinal neuralgia of right side    5. Acute pain of right knee         A's of Opioid Risk Assessment  Activity:Patient can perform ADL.   Analgesia:Patients pain is partially controlled by current medication. Patient has tried OTC medications such as Tylenol and Ibuprofen with out relief.   Adverse Effects: Patient denies constipation or sedation.  Aberrant Behavior:  reviewed with no aberrant drug seeking/taking behavior.  Overdose reversal drug naloxone discussed    Drug screen reviewed        MRI lumbar spine [facility] July 31, 2023 multiple level degenerative changes mild bilateral foraminal stenosis L3/4 L4/5 less so at L5/S1 postop changes noted           Plan:      January 24, 2024 serum drug screen        History lumbar surgery laminectomy L2 through 5 November 30, 2021 Dr. Upton complicated by postop infection    Presumptive drug screen negative, this is expected result, patient takes tramadol, cup does not test for tramadol      Follow-up after bilateral lumbar L4 through S1 medial branch block # 1, September 21, 2023 she states she had 80% relief after procedure, the procedure did help improve her level  function     She complaining some right knee pain requesting further evaluation    X-ray right knee     Considering right knee injection     Requesting next lumbar procedure for remaining back pain    She states she would like to have procedure for back pain worse with flexion extension rotation lumbar spine ongoing for more than 3 months facet joint in nature     Requesting Toradol injection    Toradol 60 mg IM, tolerated well    X-ray right knee today    Indications for this procedure for this specific patient include the following   - Pt has had symptoms for three months with moderate to severe pain with functional impairment rated of 7/10 pain.   - Pain non-responsive to conservative care.    - Pain predominately axial and not associated with radiculopathy or claudication.    - No non-facet pathology as source of pain.    - Clinical assessment implicates facet joint as putative pain source.    - facet loading maneuver positive  - Pain is exacerbated by extension or prolonged sitting/standing and relieved by rest.    - No unexplained neurologic deficit.    - No history of coagulopathy, infection or unstable medical conditions.    - Pain is causing significant functional limitation resulting in diminished quality of life and impaired age appropriate ADL's.   - Clinical assessment implicates facet joint as putative source of pain  - Repeat injections not done prior to 7 days   - no more than 2 levels will be done    The planned medically necessary  surgical procedure is performed in a hospital outpatient department and not in an ambulatory surgical center due to:     -there is no geographically assessable ambulatory surgery center that has the  necessary equipment and fluoroscopy needed for the procedure     -there is no geographically assessable ambulatory surgical center available at which the physician has privileges     -an ASC's  specific  guideline regarding the individuals weight or health conditions that  prevent the use of an ASC     -Medial branch block performed in consideration for RFTC, not just for therapeutic treatment    -done under fluoro    Monitor anesthesia request is medically indicated for the scheduled nerve block procedure due to:  1- needle phobia and anxiety, placing  the patient at risk during the provided service.  2-patient has an ASA class greater than 3 and requires constant presence of an anesthesiologist during the procedure,   3-patient has severe problems hard to lie still  4-patient suffers from chronic pain and is unable to function due to  diminished ADLs    Schedule bilateral lumbar medial branch block L4 through S1, # 2, lumbosacral spondylosis    Dr. Bills    Bring original prescription medication bottles/container/box with labels to each visit

## 2024-01-24 ENCOUNTER — OFFICE VISIT (OUTPATIENT)
Dept: PAIN MEDICINE | Facility: CLINIC | Age: 49
End: 2024-01-24
Payer: OTHER GOVERNMENT

## 2024-01-24 ENCOUNTER — HOSPITAL ENCOUNTER (OUTPATIENT)
Dept: RADIOLOGY | Facility: HOSPITAL | Age: 49
Discharge: HOME | End: 2024-01-24
Attending: PHYSICIAN ASSISTANT
Payer: OTHER GOVERNMENT

## 2024-01-24 VITALS
HEIGHT: 67 IN | BODY MASS INDEX: 35.16 KG/M2 | RESPIRATION RATE: 18 BRPM | DIASTOLIC BLOOD PRESSURE: 74 MMHG | HEART RATE: 87 BPM | WEIGHT: 224 LBS | SYSTOLIC BLOOD PRESSURE: 120 MMHG

## 2024-01-24 DIAGNOSIS — G57.91 ILIOINGUINAL NEURALGIA OF RIGHT SIDE: Chronic | ICD-10-CM

## 2024-01-24 DIAGNOSIS — M96.1 POSTLAMINECTOMY SYNDROME, LUMBAR REGION: Chronic | ICD-10-CM

## 2024-01-24 DIAGNOSIS — M25.561 ACUTE PAIN OF RIGHT KNEE: ICD-10-CM

## 2024-01-24 DIAGNOSIS — M47.817 LUMBOSACRAL SPONDYLOSIS WITHOUT MYELOPATHY: Primary | Chronic | ICD-10-CM

## 2024-01-24 DIAGNOSIS — Z79.899 ENCOUNTER FOR LONG-TERM (CURRENT) USE OF OTHER MEDICATIONS: ICD-10-CM

## 2024-01-24 DIAGNOSIS — M53.3 DISORDER OF SACRUM: Chronic | ICD-10-CM

## 2024-01-24 PROCEDURE — 96372 THER/PROPH/DIAG INJ SC/IM: CPT | Mod: PBBFAC | Performed by: PHYSICIAN ASSISTANT

## 2024-01-24 PROCEDURE — 99999PBSHW PR PBB SHADOW TECHNICAL ONLY FILED TO HB: Mod: PBBFAC,,,

## 2024-01-24 PROCEDURE — 99214 OFFICE O/P EST MOD 30 MIN: CPT | Mod: S$PBB,25,, | Performed by: PHYSICIAN ASSISTANT

## 2024-01-24 PROCEDURE — 99215 OFFICE O/P EST HI 40 MIN: CPT | Mod: PBBFAC,25 | Performed by: PHYSICIAN ASSISTANT

## 2024-01-24 PROCEDURE — 73562 X-RAY EXAM OF KNEE 3: CPT | Mod: 26,RT,, | Performed by: RADIOLOGY

## 2024-01-24 PROCEDURE — 73562 X-RAY EXAM OF KNEE 3: CPT | Mod: TC,RT

## 2024-01-24 RX ORDER — CYCLOBENZAPRINE HCL 10 MG
10 TABLET ORAL 3 TIMES DAILY PRN
Qty: 90 TABLET | Refills: 0 | Status: SHIPPED | OUTPATIENT
Start: 2024-01-24

## 2024-01-24 RX ORDER — TRAMADOL HYDROCHLORIDE 50 MG/1
50 TABLET ORAL EVERY 8 HOURS PRN
Qty: 90 TABLET | Refills: 0 | Status: SHIPPED | OUTPATIENT
Start: 2024-01-24 | End: 2024-02-23

## 2024-01-24 RX ORDER — MELOXICAM 15 MG/1
15 TABLET ORAL DAILY
Qty: 30 TABLET | Refills: 0 | Status: SHIPPED | OUTPATIENT
Start: 2024-01-24

## 2024-01-24 RX ORDER — KETOROLAC TROMETHAMINE 30 MG/ML
60 INJECTION, SOLUTION INTRAMUSCULAR; INTRAVENOUS
Status: COMPLETED | OUTPATIENT
Start: 2024-01-24 | End: 2024-01-24

## 2024-01-24 RX ADMIN — KETOROLAC TROMETHAMINE 60 MG: 60 INJECTION, SOLUTION INTRAMUSCULAR at 09:01

## 2024-01-24 NOTE — PATIENT INSTRUCTIONS

## 2024-02-09 ENCOUNTER — OFFICE VISIT (OUTPATIENT)
Dept: FAMILY MEDICINE | Facility: CLINIC | Age: 49
End: 2024-02-09
Payer: OTHER GOVERNMENT

## 2024-02-09 VITALS
SYSTOLIC BLOOD PRESSURE: 116 MMHG | HEIGHT: 67 IN | DIASTOLIC BLOOD PRESSURE: 80 MMHG | WEIGHT: 224 LBS | OXYGEN SATURATION: 97 % | BODY MASS INDEX: 35.16 KG/M2 | HEART RATE: 85 BPM

## 2024-02-09 DIAGNOSIS — E03.9 HYPOTHYROIDISM, UNSPECIFIED TYPE: ICD-10-CM

## 2024-02-09 DIAGNOSIS — R11.0 NAUSEA: Primary | ICD-10-CM

## 2024-02-09 PROCEDURE — 99213 OFFICE O/P EST LOW 20 MIN: CPT | Mod: ,,, | Performed by: NURSE PRACTITIONER

## 2024-02-09 PROCEDURE — S0119 ONDANSETRON 4 MG: HCPCS | Mod: ,,, | Performed by: NURSE PRACTITIONER

## 2024-02-09 RX ORDER — ONDANSETRON 4 MG/1
4 TABLET, ORALLY DISINTEGRATING ORAL
Status: COMPLETED | OUTPATIENT
Start: 2024-02-09 | End: 2024-02-09

## 2024-02-09 RX ORDER — HYDROXYZINE PAMOATE 25 MG/1
CAPSULE ORAL
COMMUNITY
Start: 2024-02-01 | End: 2024-03-28

## 2024-02-09 RX ORDER — LEVOTHYROXINE SODIUM 200 UG/1
200 TABLET ORAL
Qty: 30 TABLET | Refills: 0 | Status: SHIPPED | OUTPATIENT
Start: 2024-02-09 | End: 2024-02-10

## 2024-02-09 RX ADMIN — ONDANSETRON 4 MG: 4 TABLET, ORALLY DISINTEGRATING ORAL at 10:02

## 2024-02-09 NOTE — PROGRESS NOTES
"Subjective:       Patient ID: Carey Leonardo is a 48 y.o. female.    Vitals:  height is 5' 7" (1.702 m) and weight is 101.6 kg (224 lb). Her blood pressure is 116/80 and her pulse is 85. Her oxygen saturation is 97%.     Chief Complaint: Nausea (States that her back and sides are hurting and its causing her to be nauseated, started anout two days ago ) and Medication Refill (Synthroid/Levothyroxie/)    She has c/o "waves" of pain during the night that was unrelieved by her pain medication. She notes that the pain has been in her left flank/side area over the past couple days. She also has c/o nausea that started this AM.         Genitourinary:  Negative for dysuria, frequency, urgency, urine decreased, history of kidney stones and vaginal discharge.   Musculoskeletal:  Positive for pain and back pain (left lumbar).         Objective:      Physical Exam  Vitals reviewed.   Constitutional:       Appearance: Normal appearance. She is obese.   Cardiovascular:      Rate and Rhythm: Normal rate and regular rhythm.   Pulmonary:      Effort: Pulmonary effort is normal.      Breath sounds: Normal breath sounds.   Neurological:      Mental Status: She is alert and oriented to person, place, and time.   Psychiatric:         Mood and Affect: Mood normal. Affect is flat.         Speech: Speech is delayed.         Behavior: Behavior is slowed.         Thought Content: Thought content normal.         Judgment: Judgment normal.              Assessment:       1. Nausea    2. Hypothyroidism, unspecified type          Recent Results (from the past 168 hour(s))   Urinalysis, Reflex to Urine Culture    Collection Time: 02/10/24  8:35 AM    Specimen: Urine   Result Value Ref Range    Color, UA Yellow Colorless, Straw, Yellow, Light Yellow, Dark Yellow    Clarity, UA Turbid Clear    pH, UA 6.5 5.0 to 8.0 pH Units    Leukocytes, UA Negative Negative    Nitrites, UA Positive (A) Negative    Protein, UA 30 (A) Negative    Glucose, UA Normal " Normal mg/dL    Ketones, UA Negative Negative mg/dL    Urobilinogen, UA Normal 0.2, 1.0, Normal mg/dL    Bilirubin, UA Negative Negative    Blood, UA Negative Negative    Specific Gravity, UA 1.027 <=1.030   Urinalysis, Microscopic    Collection Time: 02/10/24  8:35 AM   Result Value Ref Range    WBC, UA 0 <=5 /hpf    RBC, UA 1 <=3 /hpf    Bacteria, UA Many (A) None Seen /hpf    Squamous Epithelial Cells, UA Occasional (A) None Seen /HPF    Calcium Oxalate Crystals, UA Many (A) None Seen /HPF    Mucous Occasional (A) None Seen /LPF   Urine culture    Collection Time: 02/10/24  8:35 AM    Specimen: Urine   Result Value Ref Range    Culture, Urine 60,000 Gram-negative Bacilli (A)       X-Ray KUB  Narrative: EXAMINATION:  XR KUB    CLINICAL HISTORY:  Nausea    TECHNIQUE:  Abdomen, 3 supine views    COMPARISON:  06/13/2023    FINDINGS:  Surgical clips are present in the right upper quadrant.  No abnormal bowel dilatation or displacement is seen.  No calcifications are seen overlying the kidneys.  Numerous bilateral pelvic calcifications are present.  There are degenerative changes in the lumbar spine.  Impression: Nonspecific bowel gas pattern.    Place of service: [facility]    Electronically signed by: Raeann Field  Date:    02/09/2024  Time:    11:02       Plan:         Nausea  -     X-Ray KUB; Future; Expected date: 02/09/2024  -     Urinalysis, Reflex to Urine Culture; Future; Expected date: 02/09/2024  -     ondansetron disintegrating tablet 4 mg  -     Urinalysis, Microscopic  -     Urine culture    Hypothyroidism, unspecified type  -     Discontinue: levothyroxine (SYNTHROID) 200 MCG tablet; Take 1 tablet (200 mcg total) by mouth before breakfast.  Dispense: 30 tablet; Refill: 0           Follow up in 4 weeks (on 3/8/2024) for Hypothyroidism.     Future Appointments   Date Time Provider Department Center   10/17/2024  9:00 AM [facility] Meir Schrader After Visit Summary  was printed and given to the patient.     Signature:    ROGELIO Terrazas  Family Nurse Practitioner  [facility]    Date of encounter: 2/9/24

## 2024-02-10 LAB
BACTERIA #/AREA URNS HPF: ABNORMAL /HPF
BILIRUB UR QL STRIP: NEGATIVE
CAOX CRY URNS QL MICRO: ABNORMAL /HPF
CLARITY UR: ABNORMAL
COLOR UR: YELLOW
GLUCOSE UR STRIP-MCNC: NORMAL MG/DL
KETONES UR STRIP-SCNC: NEGATIVE MG/DL
LEUKOCYTE ESTERASE UR QL STRIP: NEGATIVE
MUCOUS, UA: ABNORMAL /LPF
NITRITE UR QL STRIP: POSITIVE
PH UR STRIP: 6.5 PH UNITS
PROT UR QL STRIP: 30
RBC # UR STRIP: NEGATIVE /UL
RBC #/AREA URNS HPF: 1 /HPF
SP GR UR STRIP: 1.03
SQUAMOUS #/AREA URNS LPF: ABNORMAL /HPF
UROBILINOGEN UR STRIP-ACNC: NORMAL MG/DL
WBC #/AREA URNS HPF: 0 /HPF

## 2024-02-10 PROCEDURE — 87086 URINE CULTURE/COLONY COUNT: CPT | Mod: ,,, | Performed by: CLINICAL MEDICAL LABORATORY

## 2024-02-10 PROCEDURE — 81001 URINALYSIS AUTO W/SCOPE: CPT | Mod: ,,, | Performed by: CLINICAL MEDICAL LABORATORY

## 2024-02-10 PROCEDURE — 87077 CULTURE AEROBIC IDENTIFY: CPT | Mod: ,,, | Performed by: CLINICAL MEDICAL LABORATORY

## 2024-02-10 PROCEDURE — 87186 SC STD MICRODIL/AGAR DIL: CPT | Mod: ,,, | Performed by: CLINICAL MEDICAL LABORATORY

## 2024-02-10 RX ORDER — LEVOTHYROXINE SODIUM 200 UG/1
TABLET ORAL
Qty: 90 TABLET | Refills: 1 | Status: SHIPPED | OUTPATIENT
Start: 2024-02-10

## 2024-02-11 ENCOUNTER — TELEPHONE (OUTPATIENT)
Dept: FAMILY MEDICINE | Facility: CLINIC | Age: 49
End: 2024-02-11
Payer: OTHER GOVERNMENT

## 2024-02-11 DIAGNOSIS — N30.00 ACUTE CYSTITIS WITHOUT HEMATURIA: Primary | ICD-10-CM

## 2024-02-11 RX ORDER — NITROFURANTOIN 25; 75 MG/1; MG/1
100 CAPSULE ORAL 2 TIMES DAILY
Qty: 10 CAPSULE | Refills: 0 | Status: SHIPPED | OUTPATIENT
Start: 2024-02-11 | End: 2024-03-28

## 2024-02-11 NOTE — TELEPHONE ENCOUNTER
----- Message from ROGELIO Blancas sent at 2/11/2024 12:35 PM CST -----  Please contact the patient and let her know that she does have a urinary trac infection and she needs to start antibiotics. I have sent a prescription for Macrobid to the [facility] pharmacy on [address].

## 2024-02-12 LAB — UA COMPLETE W REFLEX CULTURE PNL UR: ABNORMAL

## 2024-02-13 ENCOUNTER — TELEPHONE (OUTPATIENT)
Dept: PAIN MEDICINE | Facility: CLINIC | Age: 49
End: 2024-02-13
Payer: OTHER GOVERNMENT

## 2024-02-13 ENCOUNTER — HOSPITAL ENCOUNTER (OUTPATIENT)
Facility: HOSPITAL | Age: 49
Discharge: HOME | End: 2024-02-13
Attending: PAIN MEDICINE | Admitting: PAIN MEDICINE
Payer: OTHER GOVERNMENT

## 2024-02-13 VITALS
DIASTOLIC BLOOD PRESSURE: 86 MMHG | TEMPERATURE: 99 F | HEIGHT: 66 IN | WEIGHT: 215 LBS | OXYGEN SATURATION: 95 % | BODY MASS INDEX: 34.55 KG/M2 | SYSTOLIC BLOOD PRESSURE: 128 MMHG | HEART RATE: 82 BPM

## 2024-02-13 DIAGNOSIS — M47.817 SPONDYLOSIS OF LUMBOSACRAL REGION WITHOUT MYELOPATHY OR RADICULOPATHY: ICD-10-CM

## 2024-02-13 DIAGNOSIS — M47.817 LUMBOSACRAL SPONDYLOSIS WITHOUT MYELOPATHY: Primary | Chronic | ICD-10-CM

## 2024-02-13 LAB — GLUCOSE SERPL-MCNC: 144 MG/DL (ref 70–105)

## 2024-02-13 PROCEDURE — 82962 GLUCOSE BLOOD TEST: CPT

## 2024-02-13 RX ORDER — SODIUM CHLORIDE 9 MG/ML
INJECTION, SOLUTION INTRAVENOUS CONTINUOUS
Status: DISCONTINUED | OUTPATIENT
Start: 2024-02-13 | End: 2024-02-13 | Stop reason: HOSPADM

## 2024-02-13 NOTE — TELEPHONE ENCOUNTER
Patient arrived in office today for scheduled procedure- BILATERAL L4-S1 MBB patient has current UTI and has not started antibiotic therapy yet. Patient is rescheduled for 03/14/2024 AT 10:00 AM

## 2024-02-13 NOTE — BRIEF OP NOTE
The  Discharge Note  Short Stay    Admit Date: 2/13/2024    Discharge Date: 2/13/2024    Attending Physician: Rasheeda Bills     Discharge Provider: Rasheeda Bills    Diagnosis: Bilateral Lumbosacral spondylosis    Procedure performed:  Canceled due to urinary tract infection    Findings: Procedure tolerated well and without complications. Consistent with diagnosis.    EBL: 0cc    Specimens: None    Discharged Condition: Good    Final Diagnoses: Lumbosacral spondylosis without myelopathy [M47.817]    Disposition: Home or Self Care    Hospital Course:  Procedure Canceled    Outcome of Hospitalization, Treatment, Procedure, or Surgery:  Patient was admitted for outpatient interventional pain management procedure.  Procedure was canceled and patient was  informed to complete antibiotics as prescribed     Follow up/Patient Instructions:  Follow up as scheduled for lumbar medial branch block.    Medications:  Continue previous medications

## 2024-02-13 NOTE — DISCHARGE INSTRUCTIONS
Patient Bilateral L4-S1 MBB rescheduled to 3/14/24 due to patient having lab work showing a UTI on 2/10/24. Patient was prescribed Macrobid and was instructed to  from pharmacy and complete.

## 2024-02-16 ENCOUNTER — HOSPITAL ENCOUNTER (EMERGENCY)
Facility: HOSPITAL | Age: 49
Discharge: HOME | End: 2024-02-16
Payer: OTHER GOVERNMENT

## 2024-02-16 VITALS
WEIGHT: 210 LBS | DIASTOLIC BLOOD PRESSURE: 81 MMHG | SYSTOLIC BLOOD PRESSURE: 127 MMHG | OXYGEN SATURATION: 98 % | RESPIRATION RATE: 16 BRPM | TEMPERATURE: 99 F | HEIGHT: 66 IN | BODY MASS INDEX: 33.75 KG/M2 | HEART RATE: 82 BPM

## 2024-02-16 DIAGNOSIS — E87.6 HYPOKALEMIA: ICD-10-CM

## 2024-02-16 DIAGNOSIS — N39.0 URINARY TRACT INFECTION WITHOUT HEMATURIA, SITE UNSPECIFIED: Primary | ICD-10-CM

## 2024-02-16 DIAGNOSIS — R11.2 NAUSEA AND VOMITING, UNSPECIFIED VOMITING TYPE: ICD-10-CM

## 2024-02-16 LAB
ALBUMIN SERPL BCP-MCNC: 3.8 G/DL (ref 3.5–5)
ALBUMIN/GLOB SERPL: 0.8 {RATIO}
ALP SERPL-CCNC: 135 U/L (ref 39–100)
ALT SERPL W P-5'-P-CCNC: 24 U/L (ref 13–56)
ANION GAP SERPL CALCULATED.3IONS-SCNC: 9 MMOL/L (ref 7–16)
AST SERPL W P-5'-P-CCNC: 17 U/L (ref 15–37)
BACTERIA #/AREA URNS HPF: ABNORMAL /HPF
BASOPHILS # BLD AUTO: 0.05 K/UL (ref 0–0.2)
BASOPHILS NFR BLD AUTO: 0.5 % (ref 0–1)
BILIRUB SERPL-MCNC: 0.6 MG/DL (ref ?–1.2)
BILIRUB UR QL STRIP: NEGATIVE
BUN SERPL-MCNC: 6 MG/DL (ref 7–18)
BUN/CREAT SERPL: 6 (ref 6–20)
CALCIUM SERPL-MCNC: 9.6 MG/DL (ref 8.5–10.1)
CHLORIDE SERPL-SCNC: 103 MMOL/L (ref 98–107)
CLARITY UR: ABNORMAL
CO2 SERPL-SCNC: 31 MMOL/L (ref 21–32)
COLOR UR: ABNORMAL
CREAT SERPL-MCNC: 1.02 MG/DL (ref 0.55–1.02)
DIFFERENTIAL METHOD BLD: ABNORMAL
EGFR (NO RACE VARIABLE) (RUSH/TITUS): 68 ML/MIN/1.73M2
EOSINOPHIL # BLD AUTO: 0.16 K/UL (ref 0–0.5)
EOSINOPHIL NFR BLD AUTO: 1.6 % (ref 1–4)
ERYTHROCYTE [DISTWIDTH] IN BLOOD BY AUTOMATED COUNT: 13.2 % (ref 11.5–14.5)
GLOBULIN SER-MCNC: 4.7 G/DL (ref 2–4)
GLUCOSE SERPL-MCNC: 141 MG/DL (ref 70–105)
GLUCOSE SERPL-MCNC: 142 MG/DL (ref 74–106)
GLUCOSE UR STRIP-MCNC: NORMAL MG/DL
HCT VFR BLD AUTO: 39.5 % (ref 38–47)
HGB BLD-MCNC: 12.5 G/DL (ref 12–16)
IMM GRANULOCYTES # BLD AUTO: 0.04 K/UL (ref 0–0.04)
IMM GRANULOCYTES NFR BLD: 0.4 % (ref 0–0.4)
KETONES UR STRIP-SCNC: 10 MG/DL
LEUKOCYTE ESTERASE UR QL STRIP: NEGATIVE
LIPASE SERPL-CCNC: 22 U/L (ref 16–77)
LYMPHOCYTES # BLD AUTO: 3.66 K/UL (ref 1–4.8)
LYMPHOCYTES NFR BLD AUTO: 37.7 % (ref 27–41)
MCH RBC QN AUTO: 28 PG (ref 27–31)
MCHC RBC AUTO-ENTMCNC: 31.6 G/DL (ref 32–36)
MCV RBC AUTO: 88.6 FL (ref 80–96)
MONOCYTES # BLD AUTO: 0.58 K/UL (ref 0–0.8)
MONOCYTES NFR BLD AUTO: 6 % (ref 2–6)
MPC BLD CALC-MCNC: 9.4 FL (ref 9.4–12.4)
MUCOUS, UA: ABNORMAL /LPF
NEUTROPHILS # BLD AUTO: 5.22 K/UL (ref 1.8–7.7)
NEUTROPHILS NFR BLD AUTO: 53.8 % (ref 53–65)
NITRITE UR QL STRIP: NEGATIVE
NRBC # BLD AUTO: 0 X10E3/UL
NRBC, AUTO (.00): 0 %
PH UR STRIP: 6.5 PH UNITS
PLATELET # BLD AUTO: 427 K/UL (ref 150–400)
POTASSIUM SERPL-SCNC: 3 MMOL/L (ref 3.5–5.1)
PROT SERPL-MCNC: 8.5 G/DL (ref 6.4–8.2)
PROT UR QL STRIP: 100
RBC # BLD AUTO: 4.46 M/UL (ref 4.2–5.4)
RBC # UR STRIP: NEGATIVE /UL
RBC #/AREA URNS HPF: 1 /HPF
SODIUM SERPL-SCNC: 140 MMOL/L (ref 136–145)
SP GR UR STRIP: 1.04
SQUAMOUS #/AREA URNS LPF: ABNORMAL /HPF
UROBILINOGEN UR STRIP-ACNC: 3 MG/DL
WBC # BLD AUTO: 9.71 K/UL (ref 4.5–11)
WBC #/AREA URNS HPF: 19 /HPF

## 2024-02-16 PROCEDURE — 82962 GLUCOSE BLOOD TEST: CPT

## 2024-02-16 PROCEDURE — 63600175 PHARM REV CODE 636 W HCPCS: Performed by: NURSE PRACTITIONER

## 2024-02-16 PROCEDURE — 25000003 PHARM REV CODE 250: Performed by: NURSE PRACTITIONER

## 2024-02-16 PROCEDURE — 81003 URINALYSIS AUTO W/O SCOPE: CPT | Performed by: NURSE PRACTITIONER

## 2024-02-16 PROCEDURE — 99284 EMERGENCY DEPT VISIT MOD MDM: CPT | Mod: ,,, | Performed by: NURSE PRACTITIONER

## 2024-02-16 PROCEDURE — 87086 URINE CULTURE/COLONY COUNT: CPT | Performed by: NURSE PRACTITIONER

## 2024-02-16 PROCEDURE — 96365 THER/PROPH/DIAG IV INF INIT: CPT

## 2024-02-16 PROCEDURE — 83690 ASSAY OF LIPASE: CPT | Performed by: NURSE PRACTITIONER

## 2024-02-16 PROCEDURE — 99284 EMERGENCY DEPT VISIT MOD MDM: CPT | Mod: 25

## 2024-02-16 PROCEDURE — 85025 COMPLETE CBC W/AUTO DIFF WBC: CPT | Performed by: NURSE PRACTITIONER

## 2024-02-16 PROCEDURE — 80053 COMPREHEN METABOLIC PANEL: CPT | Performed by: NURSE PRACTITIONER

## 2024-02-16 RX ORDER — PROMETHAZINE HYDROCHLORIDE 25 MG/1
25 SUPPOSITORY RECTAL EVERY 6 HOURS PRN
Qty: 10 SUPPOSITORY | Refills: 0 | Status: SHIPPED | OUTPATIENT
Start: 2024-02-16 | End: 2024-03-28

## 2024-02-16 RX ORDER — ONDANSETRON 4 MG/1
4 TABLET, ORALLY DISINTEGRATING ORAL
Status: COMPLETED | OUTPATIENT
Start: 2024-02-16 | End: 2024-02-16

## 2024-02-16 RX ORDER — PROMETHAZINE HYDROCHLORIDE 25 MG/1
25 TABLET ORAL EVERY 6 HOURS PRN
Qty: 15 TABLET | Refills: 0 | Status: SHIPPED | OUTPATIENT
Start: 2024-02-16 | End: 2024-03-28

## 2024-02-16 RX ORDER — CEFUROXIME AXETIL 500 MG/1
500 TABLET ORAL EVERY 12 HOURS
Qty: 14 TABLET | Refills: 0 | Status: SHIPPED | OUTPATIENT
Start: 2024-02-16 | End: 2024-02-23

## 2024-02-16 RX ORDER — SODIUM CHLORIDE 9 MG/ML
1000 INJECTION, SOLUTION INTRAVENOUS
Status: COMPLETED | OUTPATIENT
Start: 2024-02-16 | End: 2024-02-16

## 2024-02-16 RX ORDER — POTASSIUM CHLORIDE 20 MEQ/1
40 TABLET, EXTENDED RELEASE ORAL
Status: COMPLETED | OUTPATIENT
Start: 2024-02-16 | End: 2024-02-16

## 2024-02-16 RX ADMIN — POTASSIUM CHLORIDE 40 MEQ: 1500 TABLET, EXTENDED RELEASE ORAL at 02:02

## 2024-02-16 RX ADMIN — DEXTROSE MONOHYDRATE 1 G: 5 INJECTION INTRAVENOUS at 02:02

## 2024-02-16 RX ADMIN — ONDANSETRON 4 MG: 4 TABLET, ORALLY DISINTEGRATING ORAL at 01:02

## 2024-02-16 RX ADMIN — SODIUM CHLORIDE 1000 ML: 9 INJECTION, SOLUTION INTRAVENOUS at 02:02

## 2024-02-16 NOTE — ED TRIAGE NOTES
PATIENT PRESENTS TO ER WITH REPORT OF RECENT UTI AND BEING PLACED ON ABX FOR THIS. HAS HAD NAUSEA

## 2024-02-16 NOTE — ED PROVIDER NOTES
Encounter Date: 2/16/2024       History     Chief Complaint   Patient presents with    Urinary Tract Infection    Emesis     48 year old female presents to ED for continued UTI and nausea. Patient states she started having urinary symptoms on Sunday and was seen at clinic on last week with urinalysis noting UTI. Patient reports starting Macrobid on Tuesday and reports nausea/vomiting started afterwards. Denies diarrhea; reports last bowel movement was on yesterday and was soft. Reports abdominal pain to left upper/lower quadrant. Denies urinary symptoms to include frequency, urgency. Review of records notes history of gastroparesis    The history is provided by the patient.     Review of patient's allergies indicates:   Allergen Reactions    Fish containing products Anaphylaxis    Iodinated contrast media     Penicillins      Past Medical History:   Diagnosis Date    Asthma     CHF (congestive heart failure)     Chronic pain syndrome     Depressive disorder     Diabetes mellitus, type 2     Disorder of sacrum 04/28/2022    Enlarged heart     Gastroparesis     GERD (gastroesophageal reflux disease)     Hyperlipidemia     Hypertension     IBS (irritable bowel syndrome)     Kidney stones     Lumbar radiculopathy     Sleep apnea     Does not use a CPAP    Thyroid disease     Unspecified chronic bronchitis      Past Surgical History:   Procedure Laterality Date    Bilateral L3-S1 MBB Bilateral 6-, 5-, 1-8-2014    Dr Jessica Randolph    CARPAL TUNNEL RELEASE      CHOLECYSTECTOMY      DIAGNOSTIC LAPAROSCOPY  04/14/2010    Exploratory Laparotomy, Myomectomy, Hydrotubation-Dr. Feng    DILATION AND CURETTAGE OF UTERUS  04/14/2010    Hysteroscopy-Dr. Feng    EXPLORATORY LAPAROTOMY WITH UTERINE MYOMECTOMY  02/27/2007    Dr. Camarillo Justin    HYSTERECTOMY  09/29/2011    Robotic, FABIENNE, Cystoscopy-Dr. Feng    HYSTEROSCOPY WITH DILATION AND CURETTAGE OF UTERUS  04/04/2006    Laparoscopy, Chromotubation-Dr. Camarillo  Justin    INJECTION OF ANESTHETIC AGENT AROUND ILIOINGUINAL NERVE Right 6/22/2023    Procedure: BLOCK, NERVE, ILIOINGUINAL;  Surgeon: Rasheeda Bills MD;  Location: [facility];  Service: Pain Management;  Laterality: Right;    INJECTION OF ANESTHETIC AGENT AROUND MEDIAL BRANCH NERVES INNERVATING LUMBAR FACET JOINT Bilateral 9/21/2023    Procedure: BLOCK, NERVE, FACET JOINT, LUMBAR, MEDIAL BRANCH;  Surgeon: Rasheeda Bills MD;  Location: [facility];  Service: Pain Management;  Laterality: Bilateral;    LAMINECTOMY N/A 11/30/2021    Procedure: L2-L5 Laminectomy;  Surgeon: Cyril Upton MD;  Location: [facility] OR;  Service: Neurosurgery;  Laterality: N/A;    LEFT HEART CATHETERIZATION      Left L3-S1 RFTC Left 07/18/2017    Dr Lopez    Left SI JI Left 09/30/2013    Dr Randolph    MYRINGOTOMY WITH INSERTION OF VENTILATION TUBE Bilateral 4/4/2023    Procedure: MYRINGOTOMY, WITH TYMPANOSTOMY TUBE INSERTION (T-TUBES);  Surgeon: Sea Hinton MD;  Location: [facility];  Service: ENT;  Laterality: Bilateral;  T-TUBES    MYRINGOTOMY WITH INSERTION OF VENTILATION TUBE Bilateral 9/12/2023    Procedure: MYRINGOTOMY, WITH TYMPANOSTOMY TUBE INSERTION, T-TUBES;  Surgeon: Sea Hinton MD;  Location: [facility];  Service: ENT;  Laterality: Bilateral;    OOPHORECTOMY  11/11/2014    Robotic, FABIENNE, Cystoscopy-Dr. Feng    SINUS SURGERY       Family History   Problem Relation Age of Onset    Diabetes Mellitus Mother     Heart disease Mother     Hypertension Mother     Migraines Mother     Heart disease Sister      Social History     Tobacco Use    Smoking status: Never     Passive exposure: Never    Smokeless tobacco: Never   Substance Use Topics    Alcohol use: Not Currently    Drug use: Not Currently     Types: Hydrocodone, Oxycodone     Review of Systems   Constitutional:  Negative for chills and fever.   Respiratory:  Negative for cough and shortness of breath.    Cardiovascular:  Negative  for chest pain and palpitations.   Gastrointestinal:  Positive for abdominal pain, nausea and vomiting.   Endocrine: Negative for cold intolerance and heat intolerance.   Genitourinary:  Positive for dysuria. Negative for decreased urine volume.   Musculoskeletal:  Negative for arthralgias and back pain.   Skin:  Negative for color change and wound.   Allergic/Immunologic: Negative for environmental allergies and food allergies.   Neurological:  Negative for dizziness and weakness.   Hematological:  Negative for adenopathy. Does not bruise/bleed easily.   Psychiatric/Behavioral:  Negative for agitation and confusion.    All other systems reviewed and are negative.      Physical Exam     Initial Vitals [02/16/24 1142]   BP Pulse Resp Temp SpO2   119/83 85 18 98.3 °F (36.8 °C) 97 %      MAP       --         Physical Exam    Nursing note and vitals reviewed.  Constitutional: She appears well-developed and well-nourished.   HENT:   Head: Normocephalic and atraumatic.   Eyes: EOM are normal. Pupils are equal, round, and reactive to light.   Neck: Neck supple.   Normal range of motion.  Cardiovascular:  Normal rate and regular rhythm.           No murmur heard.  Pulmonary/Chest: She has no wheezes. She has no rhonchi.   Abdominal: Abdomen is soft. She exhibits no distension. There is abdominal tenderness.   Musculoskeletal:         General: No tenderness or edema.      Cervical back: Normal range of motion and neck supple.     Lymphadenopathy:     She has no cervical adenopathy.   Neurological: She is alert and oriented to person, place, and time. No cranial nerve deficit or sensory deficit.   Skin: Skin is warm and dry. Capillary refill takes less than 2 seconds.   Psychiatric: She has a normal mood and affect. Thought content normal.         Medical Screening Exam   See Full Note    ED Course   Procedures  Labs Reviewed   COMPREHENSIVE METABOLIC PANEL - Abnormal; Notable for the following components:       Result Value     Potassium 3.0 (*)     Glucose 142 (*)     BUN 6 (*)     Total Protein 8.5 (*)     Globulin 4.7 (*)     Alk Phos 135 (*)     All other components within normal limits   URINALYSIS, REFLEX TO URINE CULTURE - Abnormal; Notable for the following components:    Protein,  (*)     Ketones, UA 10 (*)     Urobilinogen, UA 3 (*)     Specific Gravity, UA 1.037 (*)     All other components within normal limits   CBC WITH DIFFERENTIAL - Abnormal; Notable for the following components:    MCHC 31.6 (*)     Platelet Count 427 (*)     All other components within normal limits   URINALYSIS, MICROSCOPIC - Abnormal; Notable for the following components:    WBC, UA 19 (*)     Bacteria, UA Many (*)     Squamous Epithelial Cells, UA Many (*)     Mucous Many (*)     All other components within normal limits   POCT GLUCOSE MONITORING CONTINUOUS - Abnormal; Notable for the following components:    POC Glucose 141 (*)     All other components within normal limits   LIPASE - Normal   CULTURE, URINE   CBC W/ AUTO DIFFERENTIAL    Narrative:     The following orders were created for panel order CBC W/ AUTO DIFFERENTIAL.  Procedure                               Abnormality         Status                     ---------                               -----------         ------                     CBC with Differential[[id number]]       Abnormal            Final result                 Please view results for these tests on the individual orders.          Imaging Results              X-Ray Abdomen Flat And Erect (Final result)  Result time 02/16/24 13:05:39      Final result by Kayode Feng DO (02/16/24 13:05:39)                   Impression:      No acute findings.    Point of Service: [facility]      Electronically signed by: Kayode Feng  Date:    02/16/2024  Time:    13:05               Narrative:    EXAMINATION:  XR ABDOMEN FLAT AND ERECT    CLINICAL HISTORY:  Abdominal Pain;    COMPARISON:  Abdominal x-ray February 9,  2024    TECHNIQUE:  Frontal views of the abdomen in the supine and upright position.    FINDINGS:  Nonspecific nonobstructive bowel gas pattern.  No free intraperitoneal air demonstrated.  Visualized osseous and surrounding soft tissue structures demonstrate no acute abnormality.  Pelvic phleboliths.  Surgical clips project over the right upper quadrant of the abdomen.  Lung bases clear.                                       Medications   ondansetron disintegrating tablet 4 mg (4 mg Oral Given 2/16/24 1308)   0.9%  NaCl infusion (0 mLs Intravenous Stopped 2/16/24 1510)   potassium chloride SA CR tablet 40 mEq (40 mEq Oral Given 2/16/24 1400)   cefTRIAXone (Rocephin) 1 g in dextrose 5 % in water (D5W) 100 mL IVPB (MB+) (0 g Intravenous Stopped 2/16/24 1453)     Medical Decision Making  48 year old female presents to ED for continued UTI and nausea. Patient states she started having urinary symptoms on Sunday and was seen at clinic on last week with urinalysis noting UTI. Patient reports starting Macrobid on Tuesday and reports nausea/vomiting started afterwards. Denies diarrhea; reports last bowel movement was on yesterday and was soft. Reports abdominal pain to left upper/lower quadrant. Denies urinary symptoms to include frequency, urgency. Review of records notes history of gastroparesis    Labs, diagnostics obtained. IV Rocephin;  PO Potassium, Zofran administered. Prescriptions provided    Amount and/or Complexity of Data Reviewed  Labs: ordered.     Details: Potassium 3.0 (*)  Glucose 142 (*)  BUN 6 (*)  Total Protein 8.5 (*)  Globulin 4.7 (*)  Alk Phos 135 (*)  All other components within normal limits  URINALYSIS, REFLEX TO URINE CULTURE - Abnormal; Notable for the following components:  Protein,  (*)  Ketones, UA 10 (*)  Urobilinogen, UA 3 (*)  Specific Gravity, UA 1.037 (*)  All other components within normal limits  CBC WITH DIFFERENTIAL - Abnormal; Notable for the following components:  MCHC 31.6  (*)  Platelet Count 427 (*)  All other components within normal limits  URINALYSIS, MICROSCOPIC - Abnormal; Notable for the following components:  WBC, UA 19 (*)  Bacteria, UA Many (*)  Squamous Epithelial Cells, UA Many (*)  Mucous Many (*)  All other components within normal limits  POCT GLUCOSE MONITORING CONTINUOUS - Abnormal; Notable for the following components:  POC Glucose 141 (*)    Radiology: ordered.     Details: No acute findings.      Risk  Prescription drug management.                                      Clinical Impression:   Final diagnoses:  [N39.0] Urinary tract infection without hematuria, site unspecified (Primary)  [R11.2] Nausea and vomiting, unspecified vomiting type        ED Disposition Condition    Discharge Stable          ED Prescriptions       Medication Sig Dispense Start Date End Date Auth. Provider    promethazine (PHENERGAN) 25 MG tablet Take 1 tablet (25 mg total) by mouth every 6 (six) hours as needed for Nausea. 15 tablet 2/16/2024 -- Eleonora Viramontes FNP    promethazine (PHENERGAN) 25 MG suppository Place 1 suppository (25 mg total) rectally every 6 (six) hours as needed for Nausea. 10 suppository 2/16/2024 -- Eleonora Viramontes FNP    cefUROXime (CEFTIN) 500 MG tablet (Expires today) Take 1 tablet (500 mg total) by mouth every 12 (twelve) hours. for 7 days 14 tablet 2/16/2024 2/23/2024 Eleonora Viramontes FNP          Follow-up Information    None          Eleonora Viramontes FNP  02/23/24 9779

## 2024-02-16 NOTE — DISCHARGE INSTRUCTIONS
Follow up with PCP in 48-72 hours. Return to ED if any new or worsening of symptoms occur. Discontinue Macrobid. Start Ceftin

## 2024-02-18 LAB — UA COMPLETE W REFLEX CULTURE PNL UR: NORMAL

## 2024-02-20 NOTE — DISCHARGE SUMMARY
[facility]  Discharge Note  Short Stay    Procedure(s) (LRB):  Block-nerve-medial branch-lumbar, bilateral L4 through S1 (Bilateral)      OUTCOME:  Procedure cancelled    DISPOSITION: Home or Self Care    FINAL DIAGNOSIS:  Lumbosacral spondylosis    FOLLOWUP: In clinic    DISCHARGE INSTRUCTIONS:  Follow up in clinic     TIME SPENT ON DISCHARGE: 5 minutes

## 2024-02-28 ENCOUNTER — TELEPHONE (OUTPATIENT)
Dept: FAMILY MEDICINE | Facility: CLINIC | Age: 49
End: 2024-02-28
Payer: OTHER GOVERNMENT

## 2024-02-28 DIAGNOSIS — I10 PRIMARY HYPERTENSION: ICD-10-CM

## 2024-02-28 DIAGNOSIS — I10 HYPERTENSION, UNSPECIFIED TYPE: ICD-10-CM

## 2024-02-28 RX ORDER — NIFEDIPINE 60 MG/1
60 TABLET, EXTENDED RELEASE ORAL DAILY
Qty: 90 TABLET | Refills: 0 | Status: SHIPPED | OUTPATIENT
Start: 2024-02-28

## 2024-02-28 RX ORDER — HYDRALAZINE HYDROCHLORIDE 100 MG/1
100 TABLET, FILM COATED ORAL 3 TIMES DAILY
Qty: 270 TABLET | Refills: 0 | Status: SHIPPED | OUTPATIENT
Start: 2024-02-28

## 2024-02-28 RX ORDER — CLONIDINE HYDROCHLORIDE 0.2 MG/1
0.2 TABLET ORAL 4 TIMES DAILY
Qty: 360 TABLET | Refills: 0 | Status: SHIPPED | OUTPATIENT
Start: 2024-02-28

## 2024-02-28 RX ORDER — METOPROLOL SUCCINATE 100 MG/1
100 TABLET, EXTENDED RELEASE ORAL DAILY
Qty: 90 TABLET | Refills: 0 | Status: SHIPPED | OUTPATIENT
Start: 2024-02-28 | End: 2024-03-28 | Stop reason: SDUPTHER

## 2024-02-28 NOTE — TELEPHONE ENCOUNTER
----- Message from Scottie Fajardo RN sent at 2/28/2024 11:52 AM CST -----  Regarding: Rx request  Requesting refill Rxs on BP [facility] [address]

## 2024-02-28 NOTE — TELEPHONE ENCOUNTER
----- Message from Anitra Gomez sent at 2/28/2024  8:52 AM CST -----  Patient called to get a refill on clonidine and hydralazine pharmacy [facility] [address]

## 2024-03-14 ENCOUNTER — ANESTHESIA EVENT (OUTPATIENT)
Dept: PAIN MEDICINE | Facility: HOSPITAL | Age: 49
End: 2024-03-14
Payer: OTHER GOVERNMENT

## 2024-03-14 ENCOUNTER — HOSPITAL ENCOUNTER (OUTPATIENT)
Facility: HOSPITAL | Age: 49
Discharge: HOME | End: 2024-03-14
Attending: PAIN MEDICINE | Admitting: PAIN MEDICINE
Payer: OTHER GOVERNMENT

## 2024-03-14 ENCOUNTER — ANESTHESIA (OUTPATIENT)
Dept: PAIN MEDICINE | Facility: HOSPITAL | Age: 49
End: 2024-03-14
Payer: OTHER GOVERNMENT

## 2024-03-14 VITALS
OXYGEN SATURATION: 91 % | SYSTOLIC BLOOD PRESSURE: 105 MMHG | WEIGHT: 224 LBS | TEMPERATURE: 98 F | DIASTOLIC BLOOD PRESSURE: 73 MMHG | SYSTOLIC BLOOD PRESSURE: 124 MMHG | RESPIRATION RATE: 16 BRPM | HEART RATE: 90 BPM | OXYGEN SATURATION: 99 % | HEART RATE: 84 BPM | BODY MASS INDEX: 35.16 KG/M2 | TEMPERATURE: 97 F | DIASTOLIC BLOOD PRESSURE: 70 MMHG | HEIGHT: 67 IN

## 2024-03-14 DIAGNOSIS — M47.817 SPONDYLOSIS OF LUMBOSACRAL REGION WITHOUT MYELOPATHY OR RADICULOPATHY: ICD-10-CM

## 2024-03-14 LAB — GLUCOSE SERPL-MCNC: 147 MG/DL (ref 70–105)

## 2024-03-14 PROCEDURE — 82962 GLUCOSE BLOOD TEST: CPT

## 2024-03-14 PROCEDURE — 37000009 HC ANESTHESIA EA ADD 15 MINS: Performed by: PAIN MEDICINE

## 2024-03-14 PROCEDURE — 63600175 PHARM REV CODE 636 W HCPCS: Performed by: NURSE ANESTHETIST, CERTIFIED REGISTERED

## 2024-03-14 PROCEDURE — 63600175 PHARM REV CODE 636 W HCPCS: Mod: JG | Performed by: PAIN MEDICINE

## 2024-03-14 PROCEDURE — 27000716 HC OXISENSOR PROBE, ANY SIZE: Performed by: NURSE ANESTHETIST, CERTIFIED REGISTERED

## 2024-03-14 PROCEDURE — 01992 ANES DX/THER NRV BLK&INJ PRN: CPT | Performed by: PAIN MEDICINE

## 2024-03-14 PROCEDURE — 64493 INJ PARAVERT F JNT L/S 1 LEV: CPT | Mod: 50,,, | Performed by: PAIN MEDICINE

## 2024-03-14 PROCEDURE — 25000003 PHARM REV CODE 250: Performed by: NURSE ANESTHETIST, CERTIFIED REGISTERED

## 2024-03-14 PROCEDURE — 27000284 HC CANNULA NASAL: Performed by: NURSE ANESTHETIST, CERTIFIED REGISTERED

## 2024-03-14 PROCEDURE — 64494 INJ PARAVERT F JNT L/S 2 LEV: CPT | Mod: 50,,, | Performed by: PAIN MEDICINE

## 2024-03-14 PROCEDURE — 64493 INJ PARAVERT F JNT L/S 1 LEV: CPT | Mod: 50 | Performed by: PAIN MEDICINE

## 2024-03-14 PROCEDURE — 64494 INJ PARAVERT F JNT L/S 2 LEV: CPT | Mod: 50 | Performed by: PAIN MEDICINE

## 2024-03-14 PROCEDURE — 37000008 HC ANESTHESIA 1ST 15 MINUTES: Performed by: PAIN MEDICINE

## 2024-03-14 PROCEDURE — D9220A PRA ANESTHESIA: Mod: 23,,, | Performed by: NURSE ANESTHETIST, CERTIFIED REGISTERED

## 2024-03-14 RX ORDER — LIDOCAINE HYDROCHLORIDE 20 MG/ML
INJECTION, SOLUTION EPIDURAL; INFILTRATION; INTRACAUDAL; PERINEURAL
Status: DISCONTINUED | OUTPATIENT
Start: 2024-03-14 | End: 2024-03-14

## 2024-03-14 RX ORDER — PROPOFOL 10 MG/ML
VIAL (ML) INTRAVENOUS CONTINUOUS PRN
Status: DISCONTINUED | OUTPATIENT
Start: 2024-03-14 | End: 2024-03-14

## 2024-03-14 RX ORDER — SODIUM CHLORIDE 9 MG/ML
INJECTION, SOLUTION INTRAVENOUS CONTINUOUS
Status: DISCONTINUED | OUTPATIENT
Start: 2024-03-14 | End: 2024-03-14 | Stop reason: HOSPADM

## 2024-03-14 RX ORDER — BUPIVACAINE HYDROCHLORIDE 2.5 MG/ML
INJECTION, SOLUTION INFILTRATION; PERINEURAL CODE/TRAUMA/SEDATION MEDICATION
Status: DISCONTINUED | OUTPATIENT
Start: 2024-03-14 | End: 2024-03-14 | Stop reason: HOSPADM

## 2024-03-14 RX ORDER — TRIAMCINOLONE ACETONIDE 40 MG/ML
INJECTION, SUSPENSION INTRA-ARTICULAR; INTRAMUSCULAR CODE/TRAUMA/SEDATION MEDICATION
Status: DISCONTINUED | OUTPATIENT
Start: 2024-03-14 | End: 2024-03-14 | Stop reason: HOSPADM

## 2024-03-14 RX ADMIN — PROPOFOL 200 MCG/KG/MIN: 10 INJECTION, EMULSION INTRAVENOUS at 10:03

## 2024-03-14 RX ADMIN — SODIUM CHLORIDE: 9 INJECTION, SOLUTION INTRAVENOUS at 10:03

## 2024-03-14 RX ADMIN — LIDOCAINE HYDROCHLORIDE 60 MG: 20 INJECTION, SOLUTION INTRAVENOUS at 10:03

## 2024-03-14 NOTE — ANESTHESIA POSTPROCEDURE EVALUATION
Anesthesia Post Evaluation    Patient: Carey Leonardo    Procedure(s) Performed: Procedure(s) (LRB):  BLOCK, NERVE, FACET JOINT, LUMBAR, MEDIAL BRANCH, BILATERAL L4-S1 MBB (Bilateral)    Final Anesthesia Type: general      Patient participation: Yes- Able to Participate  Level of consciousness: awake and alert  Post-procedure vital signs: reviewed and stable  Pain management: adequate  Airway patency: patent    PONV status at discharge: No PONV  Anesthetic complications: no      Cardiovascular status: blood pressure returned to baseline, hemodynamically stable and stable  Respiratory status: unassisted  Hydration status: euvolemic  Follow-up not needed.  Comments: Refer to nursing notes for pain/julio cesar score upon discharge from recovery              Vitals Value Taken Time   /81 03/14/24 1148   Temp 97F 03/14/24 1451   Pulse 80 03/14/24 1149   Resp 0 03/14/24 1111   SpO2 95 % 03/14/24 1149   Vitals shown include unvalidated device data.      Event Time   Out of Recovery 11:44:00         Pain/Julio Cesar Score: Julio Cesar Score: 10 (3/14/2024 11:35 AM)

## 2024-03-14 NOTE — H&P
Subjective:         Patient ID: Carey Leonardo is a 48 y.o. female.    Chief Complaint: Leg Pain and Low-back Pain    Pain  This is a chronic problem. The current episode started more than 1 year ago. The problem occurs daily. The problem has been gradually improving. Associated symptoms include arthralgias. Pertinent negatives include no anorexia, change in bowel habit, chest pain, chills, coughing, diaphoresis, fever, neck pain, sore throat, swollen glands, urinary symptoms, vertigo or vomiting.     Review of Systems   Constitutional:  Negative for activity change, appetite change, chills, diaphoresis, fever and unexpected weight change.   HENT:  Negative for drooling, ear discharge, ear pain, facial swelling, nosebleeds, sore throat, trouble swallowing, voice change and goiter.    Eyes:  Negative for photophobia, pain, discharge, redness and visual disturbance.   Respiratory:  Negative for apnea, cough, choking, chest tightness, shortness of breath, wheezing and stridor.    Cardiovascular:  Negative for chest pain, palpitations and leg swelling.   Gastrointestinal:  Negative for abdominal distention, anorexia, change in bowel habit, diarrhea, rectal pain, vomiting and fecal incontinence.   Endocrine: Negative for cold intolerance, heat intolerance, polydipsia, polyphagia and polyuria.   Genitourinary:  Negative for bladder incontinence, dysuria, flank pain, frequency and hot flashes.   Musculoskeletal:  Positive for arthralgias, back pain and leg pain. Negative for neck pain.   Integumentary:  Negative for color change and pallor.   Allergic/Immunologic: Negative for immunocompromised state.   Neurological:  Negative for dizziness, vertigo, seizures, syncope, facial asymmetry, speech difficulty, light-headedness, memory loss and coordination difficulties.   Hematological:  Negative for adenopathy. Does not bruise/bleed easily.   Psychiatric/Behavioral:  Negative for agitation, behavioral problems, confusion,  decreased concentration, dysphoric mood, hallucinations, self-injury and suicidal ideas. The patient is not nervous/anxious and is not hyperactive.            Past Medical History:   Diagnosis Date    Asthma     CHF (congestive heart failure)     Chronic pain syndrome     Depressive disorder     Diabetes mellitus, type 2     Disorder of sacrum 04/28/2022    Enlarged heart     Gastroparesis     GERD (gastroesophageal reflux disease)     Hyperlipidemia     Hypertension     IBS (irritable bowel syndrome)     Kidney stones     Lumbar radiculopathy     Sleep apnea     Does not use a CPAP    Thyroid disease     Unspecified chronic bronchitis      Past Surgical History:   Procedure Laterality Date    Bilateral L3-S1 MBB Bilateral 6-, 5-, 1-8-2014    Dr Jessica Randolph    CARPAL TUNNEL RELEASE      CHOLECYSTECTOMY      DIAGNOSTIC LAPAROSCOPY  04/14/2010    Exploratory Laparotomy, Myomectomy, Hydrotubation-Dr. Feng    DILATION AND CURETTAGE OF UTERUS  04/14/2010    Hysteroscopy-Dr. Feng    EXPLORATORY LAPAROTOMY WITH UTERINE MYOMECTOMY  02/27/2007    Dr. Marquise Anderson    HYSTERECTOMY  09/29/2011    Robotic, FABIENNE, Cystoscopy-Dr. Feng    HYSTEROSCOPY WITH DILATION AND CURETTAGE OF UTERUS  04/04/2006    Laparoscopy, Chromotubation-Dr. Marquise Anderson    INJECTION OF ANESTHETIC AGENT AROUND ILIOINGUINAL NERVE Right 6/22/2023    Procedure: BLOCK, NERVE, ILIOINGUINAL;  Surgeon: Rasheeda Bills MD;  Location: [facility];  Service: Pain Management;  Laterality: Right;    INJECTION OF ANESTHETIC AGENT AROUND MEDIAL BRANCH NERVES INNERVATING LUMBAR FACET JOINT Bilateral 9/21/2023    Procedure: BLOCK, NERVE, FACET JOINT, LUMBAR, MEDIAL BRANCH;  Surgeon: Rasheeda Bills MD;  Location: [facility];  Service: Pain Management;  Laterality: Bilateral;    LAMINECTOMY N/A 11/30/2021    Procedure: L2-L5 Laminectomy;  Surgeon: Cyril Upton MD;  Location: [facility] OR;  Service: Neurosurgery;  Laterality:  "N/A;    LEFT HEART CATHETERIZATION      Left L3-S1 RFTC Left 07/18/2017    Dr Lopez    Left SI JI Left 09/30/2013    Dr Randolph    MYRINGOTOMY WITH INSERTION OF VENTILATION TUBE Bilateral 4/4/2023    Procedure: MYRINGOTOMY, WITH TYMPANOSTOMY TUBE INSERTION (T-TUBES);  Surgeon: Sea Hinton MD;  Location: [facility];  Service: ENT;  Laterality: Bilateral;  T-TUBES    MYRINGOTOMY WITH INSERTION OF VENTILATION TUBE Bilateral 9/12/2023    Procedure: MYRINGOTOMY, WITH TYMPANOSTOMY TUBE INSERTION, T-TUBES;  Surgeon: Sea Hinton MD;  Location: [facility];  Service: ENT;  Laterality: Bilateral;    OOPHORECTOMY  11/11/2014    Robotic, FABIENNE, Cystoscopy-Dr. Feng    SINUS SURGERY       Social History     Socioeconomic History    Marital status:    Tobacco Use    Smoking status: Never     Passive exposure: Never    Smokeless tobacco: Never   Substance and Sexual Activity    Alcohol use: Not Currently    Drug use: Not Currently     Types: Hydrocodone, Oxycodone    Sexual activity: Not Currently     Family History   Problem Relation Age of Onset    Diabetes Mellitus Mother     Heart disease Mother     Hypertension Mother     Migraines Mother     Heart disease Sister      Review of patient's allergies indicates:   Allergen Reactions    Fish containing products Anaphylaxis    Iodinated contrast media     Penicillins         Objective:  Vitals:    01/24/24 0831   BP: 120/74   Pulse: 87   Resp: 18   Weight: 101.6 kg (224 lb)   Height: 5' 7" (1.702 m)   PainSc:   8         Physical Exam  Vitals and nursing note reviewed. Exam conducted with a chaperone present.   Constitutional:       General: She is awake. She is not in acute distress.     Appearance: Normal appearance. She is not ill-appearing, toxic-appearing or diaphoretic.   HENT:      Head: Normocephalic and atraumatic.      Nose: Nose normal.      Mouth/Throat:      Mouth: Mucous membranes are moist.      Pharynx: Oropharynx is clear.   Eyes:      " Conjunctiva/sclera: Conjunctivae normal.      Pupils: Pupils are equal, round, and reactive to light.   Cardiovascular:      Rate and Rhythm: Normal rate.   Pulmonary:      Effort: Pulmonary effort is normal. No respiratory distress.   Abdominal:      Palpations: Abdomen is soft.      Tenderness: There is no guarding.   Musculoskeletal:         General: Normal range of motion.      Cervical back: Normal range of motion and neck supple. No rigidity.   Skin:     General: Skin is warm and dry.      Coloration: Skin is not jaundiced or pale.   Neurological:      General: No focal deficit present.      Mental Status: She is alert and oriented to person, place, and time. Mental status is at baseline.      Cranial Nerves: No cranial nerve deficit (II-XII).   Psychiatric:         Mood and Affect: Mood normal.         Behavior: Behavior normal. Behavior is cooperative.         Thought Content: Thought content normal.           CT Renal Stone Study ABD Pelvis WO  Narrative: EXAMINATION:  CT RENAL STONE STUDY ABD PELVIS WO    CLINICAL HISTORY:  Left flank pain, kidney stone suspected;    TECHNIQUE:  Low dose axial images, sagittal and coronal reformations were obtained from the lung bases to the pubic symphysis.  Contrast was not administered.  The CT examination was performed using one or more of the following dose reduction techniques: Automated exposure control, adjustment of the mA and kV according to patient's size, use of acute or iterative reconstruction techniques.    COMPARISON:  08/11/2022    FINDINGS:  Lung bases are clear.    Liver unremarkable.  Gallbladder is absent.  The adrenals, spleen, and pancreas appear normal.  No renal stone.  No hydronephrosis.  No hydroureter.  Urinary bladder decompressed and unremarkable.  Prior hysterectomy.  No adnexal lesion.    No pneumoperitoneum.  No ascites.  No adenopathy.  Abdominal aorta and iliacs are normal in course and caliber.    The appendix is normal.  No bowel  obstruction or acute bowel abnormality identified.  Mild degree of colonic stool is seen.    No acute fracture.  Previous laminectomies at L3 and L4 again seen.  Impression: No acute abnormality identified in the abdomen or pelvis.  No renal stone or hydronephrosis on either side.    Electronically signed by: Jean Plascencia  Date:    11/05/2023  Time:    09:03       Office Visit on 12/01/2023   Component Date Value Ref Range Status    Rapid Influenza A Ag 12/01/2023 Negative  Negative Final    Rapid Influenza B Ag 12/01/2023 Negative  Negative Final     Acceptable 12/01/2023 Yes   Final    SARS Coronavirus 2 Antigen 12/01/2023 Negative  Negative Final    Rapid Influenza A Ag 12/01/2023 Negative  Negative Final    Rapid Influenza B Ag 12/01/2023 Negative  Negative Final     Acceptable 12/01/2023 Yes   Final   Admission on 11/05/2023, Discharged on 11/05/2023   Component Date Value Ref Range Status    POC Glucose 11/05/2023 140 (H)  70 - 105 mg/dL Final    Sodium 11/05/2023 141  136 - 145 mmol/L Final    Potassium 11/05/2023 3.4 (L)  3.5 - 5.1 mmol/L Final    Chloride 11/05/2023 108 (H)  98 - 107 mmol/L Final    CO2 11/05/2023 30  21 - 32 mmol/L Final    Anion Gap 11/05/2023 6 (L)  7 - 16 mmol/L Final    Glucose 11/05/2023 159 (H)  74 - 106 mg/dL Final    BUN 11/05/2023 9  7 - 18 mg/dL Final    Creatinine 11/05/2023 0.74  0.55 - 1.02 mg/dL Final    BUN/Creatinine Ratio 11/05/2023 12  6 - 20 Final    Calcium 11/05/2023 9.0  8.5 - 10.1 mg/dL Final    Total Protein 11/05/2023 7.6  6.4 - 8.2 g/dL Final    Albumin 11/05/2023 3.2 (L)  3.5 - 5.0 g/dL Final    Globulin 11/05/2023 4.4 (H)  2.0 - 4.0 g/dL Final    A/G Ratio 11/05/2023 0.7   Final    Bilirubin, Total 11/05/2023 0.3  >0.0 - 1.2 mg/dL Final    Alk Phos 11/05/2023 121 (H)  39 - 100 U/L Final    ALT 11/05/2023 31  13 - 56 U/L Final    AST 11/05/2023 19  15 - 37 U/L Final    eGFR 11/05/2023 101  >=60 mL/min/1.73m2 Final    Color, UA  11/05/2023 Colorless  Colorless, Straw, Yellow, Light Yellow, Dark Yellow Final    Clarity, UA 11/05/2023 Clear  Clear Final    pH, UA 11/05/2023 7.0  5.0 to 8.0 pH Units Final    Leukocytes, UA 11/05/2023 Negative  Negative Final    Nitrites, UA 11/05/2023 Negative  Negative Final    Protein, UA 11/05/2023 Negative  Negative Final    Glucose, UA 11/05/2023 Normal  Normal mg/dL Final    Ketones, UA 11/05/2023 Negative  Negative mg/dL Final    Urobilinogen, UA 11/05/2023 Normal  0.2, 1.0, Normal mg/dL Final    Bilirubin, UA 11/05/2023 Negative  Negative Final    Blood, UA 11/05/2023 Negative  Negative Final    Specific [place], UA 11/05/2023 1.011  <=1.030 Final    WBC 11/05/2023 7.03  4.50 - 11.00 K/uL Final    RBC 11/05/2023 4.11 (L)  4.20 - 5.40 M/uL Final    Hemoglobin 11/05/2023 11.4 (L)  12.0 - 16.0 g/dL Final    Hematocrit 11/05/2023 36.6 (L)  38.0 - 47.0 % Final    MCV 11/05/2023 89.1  80.0 - 96.0 fL Final    MCH 11/05/2023 27.7  27.0 - 31.0 pg Final    MCHC 11/05/2023 31.1 (L)  32.0 - 36.0 g/dL Final    RDW 11/05/2023 12.4  11.5 - 14.5 % Final    Platelet Count 11/05/2023 372  150 - 400 K/uL Final    MPV 11/05/2023 9.5  9.4 - 12.4 fL Final    Neutrophils % 11/05/2023 53.9  53.0 - 65.0 % Final    Lymphocytes % 11/05/2023 37.0  27.0 - 41.0 % Final    Monocytes % 11/05/2023 6.8 (H)  2.0 - 6.0 % Final    Eosinophils % 11/05/2023 1.4  1.0 - 4.0 % Final    Basophils % 11/05/2023 0.6  0.0 - 1.0 % Final    Immature Granulocytes % 11/05/2023 0.3  0.0 - 0.4 % Final    nRBC, Auto 11/05/2023 0.0  <=0.0 % Final    Neutrophils, Abs 11/05/2023 3.79  1.80 - 7.70 K/uL Final    Lymphocytes, Absolute 11/05/2023 2.60  1.00 - 4.80 K/uL Final    Monocytes, Absolute 11/05/2023 0.48  0.00 - 0.80 K/uL Final    Eosinophils, Absolute 11/05/2023 0.10  0.00 - 0.50 K/uL Final    Basophils, Absolute 11/05/2023 0.04  0.00 - 0.20 K/uL Final    Immature Granulocytes, Absolute 11/05/2023 0.02  0.00 - 0.04 K/uL Final    nRBC, Absolute  11/05/2023 0.00  <=0.00 x10e3/uL Final    Diff Type 11/05/2023 Auto   Final   Office Visit on 10/30/2023   Component Date Value Ref Range Status    Case Report 10/30/2023    Final                    Value:Pap Cytology                                      Case: [id number]                                   Authorizing Provider:  Temo Feng MD           Collected:           10/30/2023 10:18 AM          Ordering Location:     [facility] Received:            10/31/2023 09:56 AM                                 - Obstetrics And                                                                                    Gynecology                                                                   First Screen:          Marilyn Bills CT(ASCP)                                                       Specimen:    Liquid-Based Pap Test, Screening, Vagina                                                   Interpretation 10/30/2023 Negative              Final    General Categorization 10/30/2023 Negative for intraepithelial lesion or malignancy   Final    Specimen Adequacy 10/30/2023 Satisfactory for evaluation   Final    Clinical Information 10/30/2023    Final                    Value:This result contains rich text formatting which cannot be displayed here.    Disclaimer 10/30/2023    Final                    Value:This result contains rich text formatting which cannot be displayed here.   Office Visit on 10/02/2023   Component Date Value Ref Range Status    SARS Coronavirus 2 Antigen 10/02/2023 Negative  Negative Final    Rapid Influenza A Ag 10/02/2023 Negative  Negative Final    Rapid Influenza B Ag 10/02/2023 Negative  Negative Final     Acceptable 10/02/2023 Yes   Final    Sodium 10/02/2023 142  136 - 145 mmol/L Final    Potassium 10/02/2023 4.2  3.5 - 5.1 mmol/L Final    Chloride 10/02/2023 106  98 - 107 mmol/L Final    CO2 10/02/2023 30  21 - 32 mmol/L Final    Anion Gap 10/02/2023 10  7 - 16 mmol/L  Final    Glucose 10/02/2023 176 (H)  74 - 106 mg/dL Final    BUN 10/02/2023 11  7 - 18 mg/dL Final    Creatinine 10/02/2023 0.84  0.55 - 1.02 mg/dL Final    BUN/Creatinine Ratio 10/02/2023 13  6 - 20 Final    Calcium 10/02/2023 9.1  8.5 - 10.1 mg/dL Final    Total Protein 10/02/2023 7.4  6.4 - 8.2 g/dL Final    Albumin 10/02/2023 3.3 (L)  3.5 - 5.0 g/dL Final    Globulin 10/02/2023 4.1 (H)  2.0 - 4.0 g/dL Final    A/G Ratio 10/02/2023 0.8   Final    Bilirubin, Total 10/02/2023 0.3  >0.0 - 1.2 mg/dL Final    Alk Phos 10/02/2023 135 (H)  39 - 100 U/L Final    ALT 10/02/2023 32  13 - 56 U/L Final    AST 10/02/2023 17  15 - 37 U/L Final    eGFR 10/02/2023 86  >=60 mL/min/1.73m2 Final    Vitamin D 25-Hydroxy, Blood 10/02/2023 32.3  ng/mL Final    WBC 10/02/2023 8.63  4.50 - 11.00 K/uL Final    RBC 10/02/2023 4.27  4.20 - 5.40 M/uL Final    Hemoglobin 10/02/2023 11.8 (L)  12.0 - 16.0 g/dL Final    Hematocrit 10/02/2023 39.1  38.0 - 47.0 % Final    MCV 10/02/2023 91.6  80.0 - 96.0 fL Final    MCH 10/02/2023 27.6  27.0 - 31.0 pg Final    MCHC 10/02/2023 30.2 (L)  32.0 - 36.0 g/dL Final    RDW 10/02/2023 12.9  11.5 - 14.5 % Final    Platelet Count 10/02/2023 438 (H)  150 - 400 K/uL Final    MPV 10/02/2023 9.6  9.4 - 12.4 fL Final    Neutrophils % 10/02/2023 52.7 (L)  53.0 - 65.0 % Final    Lymphocytes % 10/02/2023 40.1  27.0 - 41.0 % Final    Monocytes % 10/02/2023 5.2  2.0 - 6.0 % Final    Eosinophils % 10/02/2023 1.2  1.0 - 4.0 % Final    Basophils % 10/02/2023 0.6  0.0 - 1.0 % Final    Immature Granulocytes % 10/02/2023 0.2  0.0 - 0.4 % Final    nRBC, Auto 10/02/2023 0.0  <=0.0 % Final    Neutrophils, Abs 10/02/2023 4.55  1.80 - 7.70 K/uL Final    Lymphocytes, Absolute 10/02/2023 3.46  1.00 - 4.80 K/uL Final    Monocytes, Absolute 10/02/2023 0.45  0.00 - 0.80 K/uL Final    Eosinophils, Absolute 10/02/2023 0.10  0.00 - 0.50 K/uL Final    Basophils, Absolute 10/02/2023 0.05  0.00 - 0.20 K/uL Final    Immature Granulocytes,  Absolute 10/02/2023 0.02  0.00 - 0.04 K/uL Final    nRBC, Absolute 10/02/2023 0.00  <=0.00 x10e3/uL Final    Diff Type 10/02/2023 Auto   Final   Admission on 09/21/2023, Discharged on 09/21/2023   Component Date Value Ref Range Status    POC Glucose 09/21/2023 150 (H)  70 - 105 mg/dL Final   Admission on 09/12/2023, Discharged on 09/12/2023   Component Date Value Ref Range Status    POC Glucose 09/12/2023 141 (H)  70 - 105 mg/dL Final    POC Glucose 09/12/2023 160 (H)  70 - 105 mg/dL Final   Office Visit on 08/27/2023   Component Date Value Ref Range Status    SARS Coronavirus 2 Antigen 08/27/2023 Negative  Negative Final    Rapid Influenza A Ag 08/27/2023 Negative  Negative Final    Rapid Influenza B Ag 08/27/2023 Negative  Negative Final     Acceptable 08/27/2023 Yes   Final   Office Visit on 08/01/2023   Component Date Value Ref Range Status    SARS Coronavirus 2 Antigen 08/01/2023 Negative  Negative Final    Rapid Influenza A Ag 08/01/2023 Negative  Negative Final    Rapid Influenza B Ag 08/01/2023 Negative  Negative Final     Acceptable 08/01/2023 Yes   Final    Rapid Strep A Screen 08/01/2023 Negative  Negative Final     Acceptable 08/01/2023 Yes   Final         Orders Placed This Encounter   Procedures    X-Ray Knee 3 View Right     Standing Status:   Future     Standing Expiration Date:   1/24/2025     Order Specific Question:   May the Radiologist modify the order per protocol to meet the clinical needs of the patient?     Answer:   Yes     Order Specific Question:   Release to patient     Answer:   Immediate    Case Request Operating Room: Block-nerve-medial branch-lumbar, bilateral L4 through S1     Order Specific Question:   Medical Necessity:     Answer:   Medically Non-Urgent [100]     Order Specific Question:   CPT Code:     Answer:   NC INJ DX/THER AGNT PARAVERT FACET JOINT,IMG GUIDE,LUMBAR/SAC,1ST LVL [36463]     Order Specific Question:   CPT Code:      Answer:   AL INJ DX/THER AGNT PARAVERT FACET JOINT,IMG GUIDE,LUMBAR/SAC, 2ND LEVEL [71044]     Order Specific Question:   Is an on-site pathologist required for this procedure?     Answer:   N/A       Requested Prescriptions     Signed Prescriptions Disp Refills    meloxicam (MOBIC) 15 MG tablet 30 tablet 0     Sig: Take 1 tablet (15 mg total) by mouth once daily.    cyclobenzaprine (FLEXERIL) 10 MG tablet 90 tablet 0     Sig: Take 1 tablet (10 mg total) by mouth 3 (three) times daily as needed for Muscle spasms.    traMADoL (ULTRAM) 50 mg tablet 90 tablet 0     Sig: Take 1 tablet (50 mg total) by mouth every 8 (eight) hours as needed for Pain.       Assessment:     1. Lumbosacral spondylosis without myelopathy    2. Postlaminectomy syndrome, lumbar region    3. Disorder of sacrum    4. Ilioinguinal neuralgia of right side    5. Acute pain of right knee         A's of Opioid Risk Assessment  Activity:Patient can perform ADL.   Analgesia:Patients pain is partially controlled by current medication. Patient has tried OTC medications such as Tylenol and Ibuprofen with out relief.   Adverse Effects: Patient denies constipation or sedation.  Aberrant Behavior:  reviewed with no aberrant drug seeking/taking behavior.  Overdose reversal drug naloxone discussed    Drug screen reviewed        MRI lumbar spine [facility] July 31, 2023 multiple level degenerative changes mild bilateral foraminal stenosis L3/4 L4/5 less so at L5/S1 postop changes noted           Plan:      January 24, 2024 serum drug screen        History lumbar surgery laminectomy L2 through 5 November 30, 2021 Dr. Upton complicated by postop infection    Presumptive drug screen negative, this is expected result, patient takes tramadol, cup does not test for tramadol      Follow-up after bilateral lumbar L4 through S1 medial branch block # 1, September 21, 2023 she states she had 80% relief after procedure, the procedure did help improve her level  function     She complaining some right knee pain requesting further evaluation    X-ray right knee     Considering right knee injection     Requesting next lumbar procedure for remaining back pain    She states she would like to have procedure for back pain worse with flexion extension rotation lumbar spine ongoing for more than 3 months facet joint in nature     Requesting Toradol injection    Toradol 60 mg IM, tolerated well    X-ray right knee today    Indications for this procedure for this specific patient include the following   - Pt has had symptoms for three months with moderate to severe pain with functional impairment rated of 7/10 pain.   - Pain non-responsive to conservative care.    - Pain predominately axial and not associated with radiculopathy or claudication.    - No non-facet pathology as source of pain.    - Clinical assessment implicates facet joint as putative pain source.    - facet loading maneuver positive  - Pain is exacerbated by extension or prolonged sitting/standing and relieved by rest.    - No unexplained neurologic deficit.    - No history of coagulopathy, infection or unstable medical conditions.    - Pain is causing significant functional limitation resulting in diminished quality of life and impaired age appropriate ADL's.   - Clinical assessment implicates facet joint as putative source of pain  - Repeat injections not done prior to 7 days   - no more than 2 levels will be done    The planned medically necessary  surgical procedure is performed in a hospital outpatient department and not in an ambulatory surgical center due to:     -there is no geographically assessable ambulatory surgery center that has the  necessary equipment and fluoroscopy needed for the procedure     -there is no geographically assessable ambulatory surgical center available at which the physician has privileges     -an ASC's  specific  guideline regarding the individuals weight or health conditions that  prevent the use of an ASC     -Medial branch block performed in consideration for RFTC, not just for therapeutic treatment    -done under fluoro    Monitor anesthesia request is medically indicated for the scheduled nerve block procedure due to:  1- needle phobia and anxiety, placing  the patient at risk during the provided service.  2-patient has an ASA class greater than 3 and requires constant presence of an anesthesiologist during the procedure,   3-patient has severe problems hard to lie still  4-patient suffers from chronic pain and is unable to function due to  diminished ADLs    Schedule bilateral lumbar medial branch block L4 through S1, # 2, lumbosacral spondylosis    Dr. Bills    Bring original prescription medication bottles/container/box with labels to each visit             Review of Systems   Constitutional:  Negative for activity change, appetite change, chills, diaphoresis, fever and unexpected weight change.   HENT:  Negative for drooling, ear discharge, ear pain, facial swelling, nosebleeds, sore throat, trouble swallowing and voice change.    Eyes:  Negative for photophobia, pain, discharge, redness and visual disturbance.   Respiratory:  Negative for apnea, cough, choking, chest tightness, shortness of breath, wheezing and stridor.    Cardiovascular:  Negative for chest pain, palpitations and leg swelling.   Gastrointestinal:  Negative for abdominal distention, anorexia, change in bowel habit, diarrhea, rectal pain and vomiting.   Genitourinary:  Negative for bladder incontinence, dysuria, flank pain and frequency.   Musculoskeletal:  Positive for arthralgias and back pain. Negative for neck pain.   Skin:  Negative for color change and pallor.   Neurological:  Negative for dizziness, vertigo, seizures, syncope, facial asymmetry, speech difficulty, light-headedness and disturbances in coordination.   Endo/Heme/Allergies:  Negative for cold intolerance, heat intolerance, polydipsia, polyphagia  and adenopathy. Does not bruise/bleed easily.   Psychiatric/Behavioral:  Negative for agitation, behavioral problems, confusion, decreased concentration, dysphoric mood, hallucinations, self-injury and suicidal ideas. The patient is not nervous/anxious and is not hyperactive.

## 2024-03-14 NOTE — DISCHARGE SUMMARY
[facility] - Pain Management  Discharge Note  Short Stay    Procedure(s) (LRB):  BLOCK, NERVE, FACET JOINT, LUMBAR, MEDIAL BRANCH, BILATERAL L4-S1 MBB (Bilateral)      OUTCOME: Patient tolerated treatment/procedure well without complication and is now ready for discharge.    DISPOSITION: Home or Self Care    FINAL DIAGNOSIS:  Lumbosacral spondylosis without myelopathy    FOLLOWUP: In clinic    DISCHARGE INSTRUCTIONS:  See nurse's notes     TIME SPENT ON DISCHARGE: 5 minutes

## 2024-03-14 NOTE — TRANSFER OF CARE
"Anesthesia Transfer of Care Note    Patient: Carey LANGLEY Leonardo    Procedure(s) Performed: Procedure(s) (LRB):  BLOCK, NERVE, FACET JOINT, LUMBAR, MEDIAL BRANCH, BILATERAL L4-S1 MBB (Bilateral)    Patient location: Other: (Pain Tx Center) Pain Tx Center    Anesthesia Type: general    Transport from OR: Transported from OR on room air with adequate spontaneous ventilation    Post pain: adequate analgesia    Post assessment: no apparent anesthetic complications    Post vital signs: stable    Level of consciousness: sedated and responds to stimulation    Nausea/Vomiting: no nausea/vomiting    Complications: none    Transfer of care protocol was followed      Last vitals: Visit Vitals  /86 (BP Location: Left forearm, Patient Position: Sitting)   Pulse 84   Temp 36.8 °C (98.2 °F) (Oral)   Resp 16   Ht 5' 7" (1.702 m)   Wt 101.6 kg (224 lb)   LMP  (LMP Unknown)   SpO2 96%   BMI 35.08 kg/m²     "

## 2024-03-14 NOTE — ANESTHESIA PREPROCEDURE EVALUATION
03/14/2024  Carey Leonardo is a 48 y.o., female.    Past Medical History:   Diagnosis Date    Asthma     CHF (congestive heart failure)     Chronic pain syndrome     Depressive disorder     Diabetes mellitus, type 2     Disorder of sacrum 04/28/2022    Enlarged heart     Gastroparesis     GERD (gastroesophageal reflux disease)     Hyperlipidemia     Hypertension     IBS (irritable bowel syndrome)     Kidney stones     Lumbar radiculopathy     Sleep apnea     Does not use a CPAP    Thyroid disease     Unspecified chronic bronchitis        Past Surgical History:   Procedure Laterality Date    Bilateral L3-S1 MBB Bilateral 6-, 5-, 1-8-2014    Dr Jessica Randolph    CARPAL TUNNEL RELEASE      CHOLECYSTECTOMY      DIAGNOSTIC LAPAROSCOPY  04/14/2010    Exploratory Laparotomy, Myomectomy, Hydrotubation-Dr. Feng    DILATION AND CURETTAGE OF UTERUS  04/14/2010    Hysteroscopy-Dr. Feng    EXPLORATORY LAPAROTOMY WITH UTERINE MYOMECTOMY  02/27/2007    Dr. Marquise Anderson    HYSTERECTOMY  09/29/2011    Robotic, FABIENNE, Cystoscopy-Dr. Feng    HYSTEROSCOPY WITH DILATION AND CURETTAGE OF UTERUS  04/04/2006    Laparoscopy, Chromotubation-Dr. Marquise Anderson    INJECTION OF ANESTHETIC AGENT AROUND ILIOINGUINAL NERVE Right 6/22/2023    Procedure: BLOCK, NERVE, ILIOINGUINAL;  Surgeon: Rasheeda Bills MD;  Location: [facility];  Service: Pain Management;  Laterality: Right;    INJECTION OF ANESTHETIC AGENT AROUND MEDIAL BRANCH NERVES INNERVATING LUMBAR FACET JOINT Bilateral 9/21/2023    Procedure: BLOCK, NERVE, FACET JOINT, LUMBAR, MEDIAL BRANCH;  Surgeon: Rasheeda Bills MD;  Location: [facility];  Service: Pain Management;  Laterality: Bilateral;    LAMINECTOMY N/A 11/30/2021    Procedure: L2-L5 Laminectomy;  Surgeon: Cyril Upton MD;  Location: [facility] OR;  Service: Neurosurgery;   Laterality: N/A;    LEFT HEART CATHETERIZATION      Left L3-S1 RFTC Left 07/18/2017    Dr Lopez    Left SI JI Left 09/30/2013    Dr Randolph    MYRINGOTOMY WITH INSERTION OF VENTILATION TUBE Bilateral 4/4/2023    Procedure: MYRINGOTOMY, WITH TYMPANOSTOMY TUBE INSERTION (T-TUBES);  Surgeon: Sea Hinton MD;  Location: [facility]THO OR;  Service: ENT;  Laterality: Bilateral;  T-TUBES    MYRINGOTOMY WITH INSERTION OF VENTILATION TUBE Bilateral 9/12/2023    Procedure: MYRINGOTOMY, WITH TYMPANOSTOMY TUBE INSERTION, T-TUBES;  Surgeon: Sea Hinton MD;  Location: [facility]TH[facility] OR;  Service: ENT;  Laterality: Bilateral;    OOPHORECTOMY  11/11/2014    Robotic, FABIENNE, Cystoscopy-Dr. Feng    SINUS SURGERY         Family History   Problem Relation Age of Onset    Diabetes Mellitus Mother     Heart disease Mother     Hypertension Mother     Migraines Mother     Heart disease Sister        Social History     Socioeconomic History    Marital status:    Tobacco Use    Smoking status: Never     Passive exposure: Never    Smokeless tobacco: Never   Substance and Sexual Activity    Alcohol use: Not Currently    Drug use: Not Currently     Types: Hydrocodone, Oxycodone    Sexual activity: Not Currently       Current Facility-Administered Medications   Medication Dose Route Frequency Provider Last Rate Last Admin    0.9%  NaCl infusion   Intravenous Continuous Rasheeda Bills MD           Review of patient's allergies indicates:   Allergen Reactions    Fish containing products Anaphylaxis    Iodinated contrast media     Penicillins        Pre-op Assessment    I have reviewed the Patient Summary Reports.     I have reviewed the Nursing Notes. I have reviewed the NPO Status.   I have reviewed the Medications.     Review of Systems  Anesthesia Hx:  No problems with previous Anesthesia             Denies Family Hx of Anesthesia complications.    Denies Personal Hx of Anesthesia complications.                     Social:  Smoker       Cardiovascular:  Exercise tolerance: poor   Hypertension       CHF    hyperlipidemia   ECG has been reviewed.                          Pulmonary:    Asthma    Sleep Apnea                Renal/:  Chronic Renal Disease                Hepatic/GI:     GERD             Musculoskeletal:  Arthritis    Musculoskeletal General/Symptoms: low back pain, joint pain.           Denies Lumbar Spine Disorders   Neurological:    Neuromuscular Disease,          Pain Syndrome   Chronic Pain Syndrome                         Endocrine:  Diabetes, type 2 Hypothyroidism   Diabetes, Type 2 Diabetes                    Obesity / BMI > 30  Psych:  Psychiatric History    Anxiety Disorder.   Depression.             Physical Exam  General: Well nourished, Alert, Oriented and Cooperative    Airway:  Mouth Opening: Normal  Neck ROM: Normal ROM    Chest/Lungs:  Normal Respiratory Rate    Heart:  Rate: Normal        Anesthesia Plan  Type of Anesthesia, risks & benefits discussed:    Anesthesia Type: Gen Natural Airway, MAC  Intra-op Monitoring Plan: Standard ASA Monitors  Post Op Pain Control Plan: multimodal analgesia and IV/PO Opioids PRN  Induction:  IV  Informed Consent: Informed consent signed with the Patient and all parties understand the risks and agree with anesthesia plan.  All questions answered. Patient consented to blood products? Yes  ASA Score: 3  Day of Surgery Review of History & Physical: I have interviewed and examined the patient. I have reviewed the patient's H&P dated: There are no significant changes.     Ready For Surgery From Anesthesia Perspective.     .

## 2024-03-14 NOTE — OP NOTE
Procedure Note    Procedure Date: 3/14/2024    Procedure Performed:  Bilateral  Lumbar Facet  Medial Branch Block @  L4-5,5-S1 with Fluoroscopic Guidance    Indications: Patient has failed conservative therapy.      Pre-op diagnosis: Lumbar Spondylosis    Post-op diagnosis: same    Physician: MONTRELL Bills MD    Anesthesia: MAC    Estimated Blood Loss: Less than 1cc    IVF: Per Anesthesia    Complications: None    Technique:  The patient was interviewed in the holding area and Risks/Benefits were discussed, including but not limited to  the possibility of new or different pain, bleeding or infection.   All questions were answered.  The patient understood and accepted risks.  Consent was reviewed and signed.  A time out was taken to identify the patient, procedure and side of the procedure. The patient was placed in a prone position, then prepped and draped in the usual sterile fashion using ChloraPrep and sterile towels.  The levels were determined under fluoroscopic guidance and then marked.  1% Lidocaine was given by raising a skin wheal at the skin over each site and then infiltrated.  A 22-gauge 4 inch needle was introduced to the anatomic location of the bilateral L4-5,5-S1 medial branch nerves.  Then, after negative aspiration, 1 cc from a  mixture of Bupivacaine 0.25% 3cc  and  40mg kenalog ) was injected @ each level.The patient tolerated the procedure well and was transferred to the [facility] in stable condition.  The patient was monitored after the procedure.  Patient was given post procedure and discharge instructions to follow at home.  The patient was discharged in a stable condition with an adult .

## 2024-03-14 NOTE — BRIEF OP NOTE
The  Discharge Note  Short Stay    Admit Date: 3/14/2024    Discharge Date: 3/14/2024    Attending Physician: Rasheeda Bills     Discharge Provider: Rasheeda Bills    Diagnosis: Lumbosacral spondylosis without myelopathy    Procedure performed: Bilateral L4-5,5-S1 MBB under fluoroscopy    Findings: Procedure tolerated well and without complications. Consistent with diagnosis.    EBL: 0cc    Specimens: None    Discharged Condition: Good    Final Diagnoses: Lumbosacral spondylosis without myelopathy [M47.817]    Disposition: Home or Self Care    Hospital Course: No complications, uneventful    Outcome of Hospitalization, Treatment, Procedure, or Surgery:  Patient was admitted for outpatient interventional pain management procedure. The patient tolerated the procedure well with no complications.    Follow up/Patient Instructions:  Follow up as scheduled in Pain Management office in 3-4 weeks.  Patient has received instructions and follow up date and time.    Medications:  Continue previous medications

## 2024-03-14 NOTE — PLAN OF CARE
Plan:  D/c pt via wheelchair at 1148  Informed pt if does not void in 8 hours to go to ER. Notify if redness, drainage, from injection site or fever over next 3-4 days. Rest and drink plenty of fluids for the remainder of the day. No lifting over 5 lbs. For the remainder of the day. Continue regular medications as prescribed. May take pain medications as prescribed.     Pain was an 8 on admit. Pain is a 5 after her procedure.

## 2024-03-28 ENCOUNTER — OFFICE VISIT (OUTPATIENT)
Dept: PRIMARY CARE CLINIC | Facility: CLINIC | Age: 49
End: 2024-03-28
Payer: OTHER GOVERNMENT

## 2024-03-28 VITALS
SYSTOLIC BLOOD PRESSURE: 106 MMHG | HEIGHT: 67 IN | WEIGHT: 217 LBS | HEART RATE: 80 BPM | OXYGEN SATURATION: 97 % | BODY MASS INDEX: 34.06 KG/M2 | DIASTOLIC BLOOD PRESSURE: 82 MMHG | TEMPERATURE: 99 F

## 2024-03-28 DIAGNOSIS — I10 PRIMARY HYPERTENSION: ICD-10-CM

## 2024-03-28 DIAGNOSIS — E11.9 TYPE 2 DIABETES MELLITUS WITHOUT COMPLICATION, WITHOUT LONG-TERM CURRENT USE OF INSULIN: Primary | Chronic | ICD-10-CM

## 2024-03-28 DIAGNOSIS — E03.9 HYPOTHYROIDISM, UNSPECIFIED TYPE: Chronic | ICD-10-CM

## 2024-03-28 DIAGNOSIS — Z23 NEED FOR VACCINATION: ICD-10-CM

## 2024-03-28 DIAGNOSIS — Z12.11 SCREENING FOR MALIGNANT NEOPLASM OF COLON: ICD-10-CM

## 2024-03-28 PROBLEM — J02.9 SORE THROAT: Status: RESOLVED | Noted: 2023-08-01 | Resolved: 2024-03-28

## 2024-03-28 PROBLEM — Z20.822 EXPOSURE TO COVID-19 VIRUS: Status: RESOLVED | Noted: 2023-08-01 | Resolved: 2024-03-28

## 2024-03-28 PROBLEM — H65.23 CHRONIC SEROUS OTITIS MEDIA OF BOTH EARS: Status: RESOLVED | Noted: 2023-04-04 | Resolved: 2024-03-28

## 2024-03-28 PROBLEM — J06.9 ACUTE UPPER RESPIRATORY INFECTION: Status: RESOLVED | Noted: 2023-08-01 | Resolved: 2024-03-28

## 2024-03-28 PROBLEM — M25.561 KNEE PAIN, RIGHT: Status: RESOLVED | Noted: 2024-01-24 | Resolved: 2024-03-28

## 2024-03-28 PROBLEM — M96.1 POSTLAMINECTOMY SYNDROME, LUMBAR REGION: Chronic | Status: RESOLVED | Noted: 2022-04-28 | Resolved: 2024-03-28

## 2024-03-28 PROCEDURE — 99214 OFFICE O/P EST MOD 30 MIN: CPT | Mod: 25,,, | Performed by: NURSE PRACTITIONER

## 2024-03-28 PROCEDURE — 90677 PCV20 VACCINE IM: CPT | Mod: ,,, | Performed by: NURSE PRACTITIONER

## 2024-03-28 PROCEDURE — 90471 IMMUNIZATION ADMIN: CPT | Mod: ,,, | Performed by: NURSE PRACTITIONER

## 2024-03-28 RX ORDER — METOPROLOL SUCCINATE 100 MG/1
100 TABLET, EXTENDED RELEASE ORAL DAILY
Qty: 90 TABLET | Refills: 3 | Status: SHIPPED | OUTPATIENT
Start: 2024-03-28 | End: 2024-05-24 | Stop reason: SDUPTHER

## 2024-03-28 RX ORDER — DULAGLUTIDE 4.5 MG/.5ML
4.5 INJECTION, SOLUTION SUBCUTANEOUS
Qty: 12 PEN | Refills: 3 | Status: SHIPPED | OUTPATIENT
Start: 2024-03-28

## 2024-03-28 NOTE — PROGRESS NOTES
Subjective     Patient ID: Carey Leonardo is a 48 y.o. female.    Chief Complaint: Establish Care (Wellness)    Pt presents to establish care.       Review of Systems   Constitutional:  Negative for activity change, appetite change, chills, fatigue and fever.   HENT:  Negative for nasal congestion, ear discharge, nosebleeds, postnasal drip, rhinorrhea, sinus pressure/congestion, sneezing, sore throat and tinnitus.    Eyes:  Negative for pain, discharge, redness and itching.   Respiratory:  Negative for cough, choking, chest tightness, shortness of breath and wheezing.    Cardiovascular:  Negative for chest pain.   Gastrointestinal:  Negative for abdominal distention, abdominal pain, blood in stool, change in bowel habit, constipation, diarrhea, nausea and vomiting.   Genitourinary:  Negative for decreased urine volume, dysuria, flank pain, frequency, menstrual irregularity and menstrual problem.   Musculoskeletal:  Negative for back pain and gait problem.   Integumentary:  Negative for wound, breast mass and breast discharge.   Allergic/Immunologic: Negative for immunocompromised state.   Neurological:  Negative for dizziness, light-headedness and headaches.   Psychiatric/Behavioral:  Negative for agitation, behavioral problems and hallucinations.    Breast: Negative for mass         Objective     Physical Exam  Vitals and nursing note reviewed.   Constitutional:       Appearance: Normal appearance.   Cardiovascular:      Rate and Rhythm: Normal rate and regular rhythm.      Heart sounds: Normal heart sounds.   Pulmonary:      Effort: Pulmonary effort is normal.      Breath sounds: Normal breath sounds.   Musculoskeletal:         General: Normal range of motion.   Neurological:      Mental Status: She is alert and oriented to person, place, and time.   Psychiatric:         Mood and Affect: Mood normal.         Behavior: Behavior normal.            Assessment and Plan     1. Type 2 diabetes mellitus without  complication, without long-term current use of insulin    Orders:  -     Hemoglobin A1C; Future; Expected date: 03/28/2024  -     CBC Auto Differential; Future; Expected date: 03/28/2024  -     Comprehensive Metabolic Panel; Future; Expected date: 03/28/2024  -     Lipid Panel; Future; Expected date: 03/28/2024  -     TSH; Future; Expected date: 03/28/2024  -     Microalbumin/Creatinine Ratio, Urine; Future; Expected date: 03/28/2024  -     empagliflozin (JARDIANCE) 10 mg tablet; Take 1 tablet (10 mg total) by mouth once daily.  Dispense: 90 tablet; Refill: 3  -     dulaglutide (TRULICITY) 4.5 mg/0.5 mL pen injector; Inject 4.5 mg into the skin every 7 days.  Dispense: 12 pen ; Refill: 3    2. Screening for malignant neoplasm of colon  -     Colonoscopy; Future; Expected date: 03/28/2024    3. Primary hypertension  Overview:  Goal BP < 130/80    Orders:  -     metoprolol succinate (TOPROL-XL) 100 MG 24 hr tablet; Take 1 tablet (100 mg total) by mouth once daily.  Dispense: 90 tablet; Refill: 3    4. Need for vaccination  -     (In Office Administered) Pneumococcal Conjugate Vaccine (20 Valent) (IM) (Preferred)    5. Hypothyroidism, unspecified type  Overview:  Synthroid 200 mcg oral daily          Will call pt with lab results. Will obtain last eye exam from [place] [facility]. Entered an order for a colonoscopy with Dr. Grace.          Follow up in about 6 months (around 9/28/2024).

## 2024-04-01 ENCOUNTER — PATIENT MESSAGE (OUTPATIENT)
Dept: PRIMARY CARE CLINIC | Facility: CLINIC | Age: 49
End: 2024-04-01
Payer: OTHER GOVERNMENT

## 2024-04-02 ENCOUNTER — PATIENT OUTREACH (OUTPATIENT)
Dept: ADMINISTRATIVE | Facility: HOSPITAL | Age: 49
End: 2024-04-02

## 2024-04-02 NOTE — PROGRESS NOTES
Population Health Chart Review & Patient Outreach Details      Further Action Needed If Patient Returns Outreach:        24 CHIKIS to [facility] for recent eye exam    Updates Requested / Reviewed:     [x]  Care Everywhere    [x]     []  External Sources ([facility], [facility], [facility], etc.)    [] [facility]   [] [facility]   [] Other:    [x]  Care Team Updated   []  Removed  or Duplicate Orders   []  Immunization Reconciliation Completed / Queried    [] [place]   [] [place]   [] [place]   [] [place]      Health Maintenance Topics Addressed and Outreach Outcomes / Actions Taken:             Breast Cancer Screening []  Mammogram Order Placed    []  Mammogram Screening Scheduled    []  External Records Requested & Care Team Updated if Applicable    []  External Records Uploaded & Care Team Updated if Applicable    []  Pt Declined Scheduling Mammogram    []  Pt Will Schedule with External Provider / Order Routed & Care Team Updated if Applicable              Cervical Cancer Screening []  Pap Smear Scheduled in Primary Care or OBGYN    []  External Records Requested & Care Team Updated if Applicable       []  External Records Uploaded, Care Team Updated, & History Updated if Applicable    []  Patient Declined Scheduling Pap Smear    []  Patient Will Schedule with External Provider & Care Team Updated if Applicable                  Colorectal Cancer Screening []  Colonoscopy Case Request / Referral / Home Test Order Placed    []  External Records Requested & Care Team Updated if Applicable    []  External Records Uploaded, Care Team Updated, & History Updated if Applicable    []  Patient Declined Completing Colon Cancer Screening    []  Patient Will Schedule with External Provider & Care Team Updated if Applicable    []  Fit Kit Mailed (add the SmartPhrase under additional notes)    []  Reminded Patient to Complete Home Test                Diabetic Eye Exam []  Eye Exam Screening Order Placed    []  Eye  Camera Scheduled or Optometry/Ophthalmology Referral Placed    [x]  External Records Requested & Care Team Updated if Applicable    []  External Records Uploaded, Care Team Updated, & History Updated if Applicable    []  Patient Declined Scheduling Eye Exam    []  Patient Will Schedule with External Provider & Care Team Updated if Applicable             Blood Pressure Control []  Primary Care Follow Up Visit Scheduled     []  Remote Blood Pressure Reading Captured    []  Patient Declined Remote Reading or Scheduling Appt - Escalated to PCP    []  Patient Will Call Back or Send Portal Message with Reading                 HbA1c & Other Labs []  Overdue Lab(s) Ordered    []  Overdue Lab(s) Scheduled    []  External Records Uploaded & Care Team Updated if Applicable    []  Primary Care Follow Up Visit Scheduled     []  Reminded Patient to Complete A1c Home Test    []  Patient Declined Scheduling Labs or Will Call Back to Schedule    []  Patient Will Schedule with External Provider / Order Routed, & Care Team Updated if Applicable           Primary Care Appointment []  Primary Care Appt Scheduled    []  Patient Declined Scheduling or Will Call Back to Schedule    []  Pt Established with External Provider, Updated Care Team, & Informed Pt to Notify Payor if Applicable           Medication Adherence /    Statin Use []  Primary Care Appointment Scheduled    []  Patient Reminded to  Prescription    []  Patient Declined, Provider Notified if Needed    []  Sent Provider Message to Review to Evaluate Pt for Statin, Add Exclusion Dx Codes, Document   Exclusion in Problem List, Change Statin Intensity Level to Moderate or High Intensity if Applicable                Osteoporosis Screening []  Dexa Order Placed    []  Dexa Appointment Scheduled    []  External Records Requested & Care Team Updated    []  External Records Uploaded, Care Team Updated, & History Updated if Applicable    []  Patient Declined Scheduling Dexa or  Will Call Back to Schedule    []  Patient Will Schedule with External Provider / Order Routed & Care Team Updated if Applicable       Additional Notes:

## 2024-04-02 NOTE — LETTER
AUTHORIZATION FOR RELEASE OF   CONFIDENTIAL INFORMATION    Dear [facility],    We are seeing Carey Leonardo, date of birth 1975, in the clinic at [facility]. Zainab Harrell FNP is the patient's PCP. Carey Leonardo has an outstanding lab/procedure at the time we reviewed her chart. In order to help keep her health information updated, she has authorized us to request the following medical record(s):        (  )  MAMMOGRAM                                      (  )  COLONOSCOPY      (  )  PAP SMEAR                                          (  )  OUTSIDE LAB RESULTS     (  )  DEXA SCAN                                          ( x )  EYE EXAM            (  )  FOOT EXAM                                          (  )  ENTIRE RECORD     (  )  OUTSIDE IMMUNIZATIONS                 (  )  _______________         Please fax records to [facility], [place], Tiffany R, FNP, [phone]     If you have any questions, please contact Seth Ulloa at [phone].           Patient Name: Carey Leonardo  : 1975  Patient Phone #: [phone]

## 2024-04-15 DIAGNOSIS — Z12.11 SCREENING FOR COLON CANCER: Primary | ICD-10-CM

## 2024-04-15 RX ORDER — POLYETHYLENE GLYCOL 3350, SODIUM SULFATE ANHYDROUS, SODIUM BICARBONATE, SODIUM CHLORIDE, POTASSIUM CHLORIDE 236; 22.74; 6.74; 5.86; 2.97 G/4L; G/4L; G/4L; G/4L; G/4L
4 POWDER, FOR SOLUTION ORAL ONCE
Qty: 4000 ML | Refills: 0 | Status: SHIPPED | OUTPATIENT
Start: 2024-04-15 | End: 2024-04-15

## 2024-04-19 ENCOUNTER — PATIENT OUTREACH (OUTPATIENT)
Dept: ADMINISTRATIVE | Facility: HOSPITAL | Age: 49
End: 2024-04-19

## 2024-04-19 NOTE — PROGRESS NOTES
Population Health Chart Review & Patient Outreach Details    Updates Requested / Reviewed:  [x]  Care Team Updated    Health Maintenance Topics Addressed and Outreach Outcomes / Actions Taken:  Diabetic Eye Exam [x] HM Updated with August 2023 Eye Exam (Dr. Soriano). History Updated.

## 2024-05-24 DIAGNOSIS — E03.9 HYPOTHYROIDISM, UNSPECIFIED TYPE: ICD-10-CM

## 2024-05-24 DIAGNOSIS — I10 PRIMARY HYPERTENSION: ICD-10-CM

## 2024-05-24 DIAGNOSIS — I10 HYPERTENSION, UNSPECIFIED TYPE: ICD-10-CM

## 2024-05-24 DIAGNOSIS — Z91.09 ENVIRONMENTAL ALLERGIES: ICD-10-CM

## 2024-05-24 DIAGNOSIS — F41.9 ANXIETY: ICD-10-CM

## 2024-05-26 RX ORDER — METOPROLOL SUCCINATE 100 MG/1
100 TABLET, EXTENDED RELEASE ORAL DAILY
Qty: 90 TABLET | Refills: 3 | Status: SHIPPED | OUTPATIENT
Start: 2024-05-26

## 2024-06-12 ENCOUNTER — OFFICE VISIT (OUTPATIENT)
Dept: OTOLARYNGOLOGY | Facility: CLINIC | Age: 49
End: 2024-06-12
Payer: OTHER GOVERNMENT

## 2024-06-12 VITALS — BODY MASS INDEX: 34.06 KG/M2 | HEIGHT: 67 IN | WEIGHT: 217 LBS

## 2024-06-12 DIAGNOSIS — H92.11 OTORRHEA, RIGHT EAR: ICD-10-CM

## 2024-06-12 DIAGNOSIS — H65.23 CHRONIC SEROUS OTITIS MEDIA OF BOTH EARS: Primary | ICD-10-CM

## 2024-06-12 DIAGNOSIS — H92.11 OTORRHEA OF RIGHT EAR: ICD-10-CM

## 2024-06-12 PROCEDURE — 99215 OFFICE O/P EST HI 40 MIN: CPT | Mod: PBBFAC | Performed by: OTOLARYNGOLOGY

## 2024-06-12 PROCEDURE — 99214 OFFICE O/P EST MOD 30 MIN: CPT | Mod: S$PBB,,, | Performed by: OTOLARYNGOLOGY

## 2024-06-12 PROCEDURE — 99999 PR PBB SHADOW E&M-EST. PATIENT-LVL V: CPT | Mod: PBBFAC,,, | Performed by: OTOLARYNGOLOGY

## 2024-06-12 RX ORDER — CIPROFLOXACIN 500 MG/1
500 TABLET ORAL 2 TIMES DAILY
Qty: 20 TABLET | Refills: 0 | Status: SHIPPED | OUTPATIENT
Start: 2024-06-12

## 2024-06-12 RX ORDER — OFLOXACIN 3 MG/ML
3 SOLUTION AURICULAR (OTIC) 2 TIMES DAILY
Qty: 10 ML | Refills: 0 | Status: SHIPPED | OUTPATIENT
Start: 2024-06-12

## 2024-06-12 NOTE — H&P (VIEW-ONLY)
Subjective:       Patient ID: Carey Leonardo is a 48 y.o. female.    Chief Complaint: Ear Drainage (Patient complains of her right P E tube has fallen out and her ear has been draining for a week.) and Otalgia (Patient complains of pain in her right ear.)    Ear Drainage   Associated symptoms include ear discharge and hearing loss.   Otalgia   Associated symptoms include ear discharge and hearing loss.     Review of Systems   HENT:  Positive for ear discharge, ear pain and hearing loss.    All other systems reviewed and are negative.      Objective:      Physical Exam  General: NAD  Head: Normocephalic, atraumatic, no facial asymmetry/normal strength,  Ears: Both auricules normal in appearance, w/o deformities tympanic membranes right tube out draining left no tube some fluid  external auditory canals normal  Nose: External nose w/o deformities normal turbinates no drainage or inflammation  Oral Cavity: Lips, gums, floor of mouth, tongue hard palate, and buccal mucosa without mass/lesion  Oropharynx: Mucosa pink and moist, soft palate, posterior pharynx and oropharyngeal wall without mass/lesion  Neck: Supple, symmetric, trachea midline, no palpable mass/lesion, no palpable cervical lymphadenopathy  Skin: Warm and dry, no concerning lesions  Respiratory: Respirations even, unlabored  Assessment:       1. Chronic serous otitis media of both ears    2. Otorrhea of right ear    3. Otorrhea, right ear        Plan:       Bilateral t-tubes in OR   Floxin Cipro

## 2024-06-14 ENCOUNTER — HOSPITAL ENCOUNTER (EMERGENCY)
Facility: HOSPITAL | Age: 49
Discharge: HOME | End: 2024-06-14
Payer: OTHER GOVERNMENT

## 2024-06-14 VITALS
SYSTOLIC BLOOD PRESSURE: 137 MMHG | OXYGEN SATURATION: 96 % | HEART RATE: 68 BPM | DIASTOLIC BLOOD PRESSURE: 86 MMHG | BODY MASS INDEX: 34.55 KG/M2 | RESPIRATION RATE: 17 BRPM | WEIGHT: 215 LBS | HEIGHT: 66 IN | TEMPERATURE: 98 F

## 2024-06-14 DIAGNOSIS — R73.9 HYPERGLYCEMIA: Primary | ICD-10-CM

## 2024-06-14 DIAGNOSIS — R10.9 LEFT FLANK PAIN: ICD-10-CM

## 2024-06-14 DIAGNOSIS — E86.0 DEHYDRATION: ICD-10-CM

## 2024-06-14 LAB
ALBUMIN SERPL BCP-MCNC: 3.2 G/DL (ref 3.5–5)
ALBUMIN/GLOB SERPL: 0.8 {RATIO}
ALP SERPL-CCNC: 131 U/L (ref 39–100)
ALT SERPL W P-5'-P-CCNC: 36 U/L (ref 13–56)
ANION GAP SERPL CALCULATED.3IONS-SCNC: 14 MMOL/L (ref 7–16)
AST SERPL W P-5'-P-CCNC: 24 U/L (ref 15–37)
BASOPHILS # BLD AUTO: 0.04 K/UL (ref 0–0.2)
BASOPHILS NFR BLD AUTO: 0.6 % (ref 0–1)
BILIRUB SERPL-MCNC: 0.2 MG/DL (ref ?–1.2)
BILIRUB UR QL STRIP: NEGATIVE
BUN SERPL-MCNC: 12 MG/DL (ref 7–18)
BUN/CREAT SERPL: 9 (ref 6–20)
CALCIUM SERPL-MCNC: 9 MG/DL (ref 8.5–10.1)
CHLORIDE SERPL-SCNC: 99 MMOL/L (ref 98–107)
CLARITY UR: CLEAR
CO2 SERPL-SCNC: 26 MMOL/L (ref 21–32)
COLOR UR: ABNORMAL
CREAT SERPL-MCNC: 1.29 MG/DL (ref 0.55–1.02)
DIFFERENTIAL METHOD BLD: ABNORMAL
EGFR (NO RACE VARIABLE) (RUSH/TITUS): 52 ML/MIN/1.73M2
EOSINOPHIL # BLD AUTO: 0.1 K/UL (ref 0–0.5)
EOSINOPHIL NFR BLD AUTO: 1.5 % (ref 1–4)
ERYTHROCYTE [DISTWIDTH] IN BLOOD BY AUTOMATED COUNT: 12.7 % (ref 11.5–14.5)
GLOBULIN SER-MCNC: 4.2 G/DL (ref 2–4)
GLUCOSE SERPL-MCNC: 382 MG/DL (ref 70–105)
GLUCOSE SERPL-MCNC: 468 MG/DL (ref 74–106)
GLUCOSE UR STRIP-MCNC: >1000 MG/DL
HCT VFR BLD AUTO: 35.4 % (ref 38–47)
HGB BLD-MCNC: 10.9 G/DL (ref 12–16)
IMM GRANULOCYTES # BLD AUTO: 0.02 K/UL (ref 0–0.04)
IMM GRANULOCYTES NFR BLD: 0.3 % (ref 0–0.4)
KETONES UR STRIP-SCNC: NEGATIVE MG/DL
LEUKOCYTE ESTERASE UR QL STRIP: NEGATIVE
LYMPHOCYTES # BLD AUTO: 2.77 K/UL (ref 1–4.8)
LYMPHOCYTES NFR BLD AUTO: 41.6 % (ref 27–41)
MCH RBC QN AUTO: 27.9 PG (ref 27–31)
MCHC RBC AUTO-ENTMCNC: 30.8 G/DL (ref 32–36)
MCV RBC AUTO: 90.8 FL (ref 80–96)
MONOCYTES # BLD AUTO: 0.36 K/UL (ref 0–0.8)
MONOCYTES NFR BLD AUTO: 5.4 % (ref 2–6)
MPC BLD CALC-MCNC: 9.8 FL (ref 9.4–12.4)
NEUTROPHILS # BLD AUTO: 3.37 K/UL (ref 1.8–7.7)
NEUTROPHILS NFR BLD AUTO: 50.6 % (ref 53–65)
NITRITE UR QL STRIP: NEGATIVE
NRBC # BLD AUTO: 0 X10E3/UL
NRBC, AUTO (.00): 0 %
PH UR STRIP: 6.5 PH UNITS
PLATELET # BLD AUTO: 362 K/UL (ref 150–400)
POTASSIUM SERPL-SCNC: 3.4 MMOL/L (ref 3.5–5.1)
PROT SERPL-MCNC: 7.4 G/DL (ref 6.4–8.2)
PROT UR QL STRIP: 10
RBC # BLD AUTO: 3.9 M/UL (ref 4.2–5.4)
RBC # UR STRIP: NEGATIVE /UL
SODIUM SERPL-SCNC: 136 MMOL/L (ref 136–145)
SP GR UR STRIP: 1.03
UROBILINOGEN UR STRIP-ACNC: NORMAL MG/DL
WBC # BLD AUTO: 6.66 K/UL (ref 4.5–11)

## 2024-06-14 PROCEDURE — 36415 COLL VENOUS BLD VENIPUNCTURE: CPT | Performed by: NURSE PRACTITIONER

## 2024-06-14 PROCEDURE — 81003 URINALYSIS AUTO W/O SCOPE: CPT | Performed by: NURSE PRACTITIONER

## 2024-06-14 PROCEDURE — 63600175 PHARM REV CODE 636 W HCPCS

## 2024-06-14 PROCEDURE — 25000003 PHARM REV CODE 250

## 2024-06-14 PROCEDURE — 82962 GLUCOSE BLOOD TEST: CPT

## 2024-06-14 PROCEDURE — 96374 THER/PROPH/DIAG INJ IV PUSH: CPT

## 2024-06-14 PROCEDURE — 85025 COMPLETE CBC W/AUTO DIFF WBC: CPT | Performed by: NURSE PRACTITIONER

## 2024-06-14 PROCEDURE — 80053 COMPREHEN METABOLIC PANEL: CPT | Performed by: NURSE PRACTITIONER

## 2024-06-14 PROCEDURE — 99285 EMERGENCY DEPT VISIT HI MDM: CPT | Mod: 25

## 2024-06-14 PROCEDURE — 96361 HYDRATE IV INFUSION ADD-ON: CPT

## 2024-06-14 PROCEDURE — 96372 THER/PROPH/DIAG INJ SC/IM: CPT | Performed by: NURSE PRACTITIONER

## 2024-06-14 PROCEDURE — 63600175 PHARM REV CODE 636 W HCPCS: Performed by: NURSE PRACTITIONER

## 2024-06-14 RX ORDER — MORPHINE SULFATE 4 MG/ML
4 INJECTION, SOLUTION INTRAMUSCULAR; INTRAVENOUS
Status: COMPLETED | OUTPATIENT
Start: 2024-06-14 | End: 2024-06-14

## 2024-06-14 RX ORDER — ONDANSETRON HYDROCHLORIDE 2 MG/ML
4 INJECTION, SOLUTION INTRAVENOUS
Status: COMPLETED | OUTPATIENT
Start: 2024-06-14 | End: 2024-06-14

## 2024-06-14 RX ADMIN — MORPHINE SULFATE 4 MG: 4 INJECTION INTRAVENOUS at 11:06

## 2024-06-14 RX ADMIN — MORPHINE SULFATE 4 MG: 4 INJECTION INTRAVENOUS at 02:06

## 2024-06-14 RX ADMIN — ONDANSETRON 4 MG: 2 INJECTION INTRAMUSCULAR; INTRAVENOUS at 11:06

## 2024-06-14 RX ADMIN — SODIUM CHLORIDE 1000 ML: 9 INJECTION, SOLUTION INTRAVENOUS at 01:06

## 2024-06-14 RX ADMIN — SODIUM CHLORIDE 1000 ML: 9 INJECTION, SOLUTION INTRAVENOUS at 03:06

## 2024-06-14 NOTE — ED TRIAGE NOTES
Patient arrives to ED with complaints of left sided flank pain that started last night and has been intermittent since. Patient also complaining of some nausea at this time.

## 2024-06-14 NOTE — DISCHARGE INSTRUCTIONS
Take your home medications as prescribed. Follow-up with your primary care provider in 3 days. Return to Emergency Department for any new or worsening symptoms.

## 2024-06-14 NOTE — ED PROVIDER NOTES
Encounter Date: 6/14/2024       History     Chief Complaint   Patient presents with    Flank Pain     Left     48-year-old female presents to the emergency department to be evaluated for left flank pain that began yesterday.  She has also had some nausea.  Denies any vomiting, diarrhea, constipation, dysuria, fever, chills.    The history is provided by the patient.   Flank Pain  This is a new problem. Pertinent negatives include no chest pain, no abdominal pain, no headaches and no shortness of breath.     Review of patient's allergies indicates:   Allergen Reactions    Fish containing products Anaphylaxis    Iodinated contrast media     Penicillins      Past Medical History:   Diagnosis Date    Acute upper respiratory infection 08/01/2023    Asthma     CHF (congestive heart failure)     Chronic pain syndrome     Chronic serous otitis media of both ears 04/04/2023    Depressive disorder     Diabetes mellitus, type 2     Diabetic eye exam 08/11/2023    Dr. [facility]    Disorder of sacrum 04/28/2022    Enlarged heart     Exposure to COVID-19 virus 08/01/2023    Gastroparesis     GERD (gastroesophageal reflux disease)     Hyperlipidemia     Hypertension     IBS (irritable bowel syndrome)     Kidney stones     Knee pain, right 01/24/2024    Lumbar radiculopathy     Postlaminectomy syndrome, lumbar region 04/28/2022    Sleep apnea     Does not use a CPAP    Sore throat 08/01/2023    Thyroid disease     Unspecified chronic bronchitis      Past Surgical History:   Procedure Laterality Date    Bilateral L3-S1 MBB Bilateral 6-, 5-, 1-8-2014    Dr Jessica Randolph    CARPAL TUNNEL RELEASE      CHOLECYSTECTOMY      DIAGNOSTIC LAPAROSCOPY  04/14/2010    Exploratory Laparotomy, Myomectomy, Hydrotubation-Dr. Feng    DILATION AND CURETTAGE OF UTERUS  04/14/2010    Hysteroscopy-Dr. Feng    EXPLORATORY LAPAROTOMY WITH UTERINE MYOMECTOMY  02/27/2007    Dr. Marquise Anderson    HYSTERECTOMY   09/29/2011    FABIENNE Barajas, Cystoscopy-Dr. Feng    HYSTEROSCOPY WITH DILATION AND CURETTAGE OF UTERUS  04/04/2006    Laparoscopy, Chromotubation-Dr. Marquise Anderson    INJECTION OF ANESTHETIC AGENT AROUND ILIOINGUINAL NERVE Right 6/22/2023    Procedure: BLOCK, NERVE, ILIOINGUINAL;  Surgeon: Rasheeda Bills MD;  Location: [facility];  Service: Pain Management;  Laterality: Right;    INJECTION OF ANESTHETIC AGENT AROUND MEDIAL BRANCH NERVES INNERVATING LUMBAR FACET JOINT Bilateral 9/21/2023    Procedure: BLOCK, NERVE, FACET JOINT, LUMBAR, MEDIAL BRANCH;  Surgeon: Rasheeda Bills MD;  Location: [facility];  Service: Pain Management;  Laterality: Bilateral;    INJECTION OF ANESTHETIC AGENT AROUND MEDIAL BRANCH NERVES INNERVATING LUMBAR FACET JOINT Bilateral 3/14/2024    Procedure: BLOCK, NERVE, FACET JOINT, LUMBAR, MEDIAL BRANCH, BILATERAL L4-S1 MBB;  Surgeon: Rasheeda Bills MD;  Location: [facility];  Service: Pain Management;  Laterality: Bilateral;    LAMINECTOMY N/A 11/30/2021    Procedure: L2-L5 Laminectomy;  Surgeon: Cyril Upton MD;  Location: [facility];  Service: Neurosurgery;  Laterality: N/A;    LEFT HEART CATHETERIZATION      Left L3-S1 RFTC Left 07/18/2017    Dr Lopez    Left SI JI Left 09/30/2013    Dr Randolph    MYRINGOTOMY WITH INSERTION OF VENTILATION TUBE Bilateral 4/4/2023    Procedure: MYRINGOTOMY, WITH TYMPANOSTOMY TUBE INSERTION (T-TUBES);  Surgeon: Sea Hinton MD;  Location: [facility];  Service: ENT;  Laterality: Bilateral;  T-TUBES    MYRINGOTOMY WITH INSERTION OF VENTILATION TUBE Bilateral 9/12/2023    Procedure: MYRINGOTOMY, WITH TYMPANOSTOMY TUBE INSERTION, T-TUBES;  Surgeon: Sea Hinton MD;  Location: [facility];  Service: ENT;  Laterality: Bilateral;    OOPHORECTOMY  11/11/2014    FABIENNE Barajas, Cystoscopy-Dr. Feng    SINUS SURGERY       Family History   Problem Relation Name Age of Onset    Diabetes Mellitus Mother      Heart  disease Mother      Hypertension Mother      Migraines Mother      Heart disease Sister       Social History     Tobacco Use    Smoking status: Never     Passive exposure: Never    Smokeless tobacco: Never   Substance Use Topics    Alcohol use: Not Currently    Drug use: Not Currently     Types: Hydrocodone, Oxycodone     Review of Systems   Respiratory:  Negative for shortness of breath.    Cardiovascular:  Negative for chest pain.   Gastrointestinal:  Positive for nausea. Negative for abdominal pain, diarrhea and vomiting.   Genitourinary:  Positive for flank pain.   Neurological:  Negative for headaches.   All other systems reviewed and are negative.      Physical Exam     Initial Vitals [06/14/24 1112]   BP Pulse Resp Temp SpO2   126/65 73 18 97.6 °F (36.4 °C) 97 %      MAP       --         Physical Exam    Vitals reviewed.  Constitutional: She appears well-developed and well-nourished.   Neck: Neck supple.   Cardiovascular:  Normal rate and regular rhythm.           Pulmonary/Chest: Breath sounds normal.   Abdominal: Abdomen is soft. Bowel sounds are normal. She exhibits no distension and no mass. There is no abdominal tenderness. There is no rebound and no guarding.   Musculoskeletal:         General: Normal range of motion.      Cervical back: Neck supple.     Neurological: She is alert and oriented to person, place, and time. She has normal strength. GCS score is 15. GCS eye subscore is 4. GCS verbal subscore is 5. GCS motor subscore is 6.   Skin: Skin is warm and dry. Capillary refill takes less than 2 seconds.   Psychiatric: She has a normal mood and affect.         Medical Screening Exam   See Full Note    ED Course   Procedures  Labs Reviewed   COMPREHENSIVE METABOLIC PANEL - Abnormal; Notable for the following components:       Result Value    Potassium 3.4 (*)     Glucose 468 (*)     Creatinine 1.29 (*)     Albumin 3.2 (*)     Globulin 4.2 (*)     Alk Phos 131 (*)     eGFR 52 (*)     All other  components within normal limits   URINALYSIS - Abnormal; Notable for the following components:    Protein, UA 10 (*)     Glucose, UA >1000 (*)     Specific Gravity, UA 1.035 (*)     All other components within normal limits   CBC WITH DIFFERENTIAL - Abnormal; Notable for the following components:    RBC 3.90 (*)     Hemoglobin 10.9 (*)     Hematocrit 35.4 (*)     MCHC 30.8 (*)     Neutrophils % 50.6 (*)     Lymphocytes % 41.6 (*)     All other components within normal limits   POCT GLUCOSE MONITORING CONTINUOUS - Abnormal; Notable for the following components:    POC Glucose 382 (*)     All other components within normal limits   CBC W/ AUTO DIFFERENTIAL    Narrative:     The following orders were created for panel order CBC auto differential.  Procedure                               Abnormality         Status                     ---------                               -----------         ------                     CBC with Differential[[id number]]       Abnormal            Final result                 Please view results for these tests on the individual orders.   POCT GLUCOSE MONITORING CONTINUOUS          Imaging Results              CT Renal Stone Study ABD Pelvis WO (Final result)  Result time 06/14/24 14:07:45      Final result by Kevin Torres II, MD (06/14/24 14:07:45)                   Impression:      No evidence of abnormality demonstrated      Electronically signed by: Kevin Torres  Date:    06/14/2024  Time:    14:07               Narrative:    EXAMINATION:  CT RENAL STONE STUDY ABD PELVIS WO    CLINICAL HISTORY:  Flank pain, kidney stone suspected;    TECHNIQUE:  Axial CT imaging of the abdomen and pelvis is performed without contrast.    CT dose reduction technique used - Dose Rite and tube current modulation.    COMPARISON:  None available    FINDINGS:  Cardiac and lung bases are within normal limits    CT abdomen: The gallbladder has been removed.  The liver, spleen, pancreas and adrenal  glands are normal in size and density.  No evidence of focal lesion is demonstrated in these solid organs.    Kidneys are normal in size and density.  No evidence of hydronephrosis or nephrolithiasis is seen.    The bowel caliber is normal and no wall thickening or adjacent inflammatory change is seen.  No evidence of free fluid or free air is present.  Appendix is not seen.    CT pelvis: The bowel and bladder appear within normal limits.  The uterus and ovaries are not identified.                                       XR ABDOMEN, ACUTE 2 OR MORE VIEWS WITH CHEST (Final result)  Result time 06/14/24 11:52:24      Final result by Kevin Torres II, MD (06/14/24 11:52:24)                   Impression:      No evidence of abnormality demonstrated      Electronically signed by: Kevin Torres  Date:    06/14/2024  Time:    11:52               Narrative:    EXAMINATION:  XR ABDOMEN ACUTE 2 OR MORE VIEWS WITH CHEST    CLINICAL HISTORY:  Abdominal pain left flank pain;    COMPARISON:  16 February 2024    TECHNIQUE:  XR ABDOMEN 2 VIEWS WITH CHEST    FINDINGS:  No free fluid or free air seen.  The bowel gas pattern appears within normal limits.  No abnormal calcifications are present. Chest shows no evidence of abnormality.  Clips in the right upper quadrant from previous surgery.  No other abnormality is identified.                                       Medications   sodium chloride 0.9% bolus 1,000 mL 1,000 mL (1,000 mLs Intravenous New Bag 6/14/24 1545)   morphine injection 4 mg (4 mg Intramuscular Given 6/14/24 1150)   ondansetron injection 4 mg (4 mg Intramuscular Given 6/14/24 1150)   sodium chloride 0.9% bolus 1,000 mL 1,000 mL (1,000 mLs Intravenous New Bag 6/14/24 1307)   morphine injection 4 mg (4 mg Intravenous Given 6/14/24 1421)     Medical Decision Making  48-year-old female presents to the emergency department to be evaluated for left flank pain that began yesterday.  She has also had some nausea.  Denies  any vomiting, diarrhea, constipation, dysuria, fever, chills.    She does have a history of renal stones; CT Renal Stone study unremarkable. Patient was treated in the ED with IVF, morphine, and zofran with improvement in the pain. She states she noticed the pain with movement while at work; she works at a correctional facility.  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 We discussed increasing her water intake and f/u with PCP regarding hyperglycemia and chronic back pain.    Amount and/or Complexity of Data Reviewed  Labs: ordered.  Radiology: ordered.    Risk  Prescription drug management.                                      Clinical Impression:   Final diagnoses:  [R73.9] Hyperglycemia (Primary)  [R10.9] Left flank pain  [E86.0] Dehydration        ED Disposition Condition    Discharge Stable          ED Prescriptions    None       Follow-up Information       Follow up With Specialties Details Why Contact Info    Zainab Harrell FNP Family Medicine, Emergency Medicine On 6/17/2024  [address] [place] [address] Primary Care  [facility] [address]  [phone]               Gloria Márquez FNP  06/14/24 0304

## 2024-06-14 NOTE — Clinical Note
"Carey Hammer" Leonardo was seen and treated in our emergency department on 6/14/2024.  She may return to work on 06/16/2024.       If you have any questions or concerns, please don't hesitate to call.      Sylvia WHITAKER RN    "

## 2024-06-18 ENCOUNTER — HOSPITAL ENCOUNTER (EMERGENCY)
Facility: HOSPITAL | Age: 49
Discharge: HOME | End: 2024-06-18
Payer: OTHER GOVERNMENT

## 2024-06-18 VITALS
RESPIRATION RATE: 18 BRPM | DIASTOLIC BLOOD PRESSURE: 83 MMHG | TEMPERATURE: 98 F | HEART RATE: 83 BPM | WEIGHT: 222 LBS | HEIGHT: 66 IN | OXYGEN SATURATION: 97 % | BODY MASS INDEX: 35.68 KG/M2 | SYSTOLIC BLOOD PRESSURE: 119 MMHG

## 2024-06-18 DIAGNOSIS — R11.2 NAUSEA AND VOMITING, UNSPECIFIED VOMITING TYPE: ICD-10-CM

## 2024-06-18 DIAGNOSIS — R10.9 ABDOMINAL PAIN, UNSPECIFIED ABDOMINAL LOCATION: Primary | ICD-10-CM

## 2024-06-18 LAB
ALBUMIN SERPL BCP-MCNC: 3.3 G/DL (ref 3.5–5)
ALBUMIN/GLOB SERPL: 0.8 {RATIO}
ALP SERPL-CCNC: 125 U/L (ref 39–100)
ALT SERPL W P-5'-P-CCNC: 31 U/L (ref 13–56)
ANION GAP SERPL CALCULATED.3IONS-SCNC: 11 MMOL/L (ref 7–16)
AST SERPL W P-5'-P-CCNC: 25 U/L (ref 15–37)
BASOPHILS # BLD AUTO: 0.04 K/UL (ref 0–0.2)
BASOPHILS NFR BLD AUTO: 0.5 % (ref 0–1)
BILIRUB SERPL-MCNC: 0.5 MG/DL (ref ?–1.2)
BUN SERPL-MCNC: 8 MG/DL (ref 7–18)
BUN/CREAT SERPL: 7 (ref 6–20)
CALCIUM SERPL-MCNC: 9.1 MG/DL (ref 8.5–10.1)
CHLORIDE SERPL-SCNC: 105 MMOL/L (ref 98–107)
CO2 SERPL-SCNC: 28 MMOL/L (ref 21–32)
CREAT SERPL-MCNC: 1.09 MG/DL (ref 0.55–1.02)
DIFFERENTIAL METHOD BLD: ABNORMAL
EGFR (NO RACE VARIABLE) (RUSH/TITUS): 63 ML/MIN/1.73M2
EOSINOPHIL # BLD AUTO: 0.09 K/UL (ref 0–0.5)
EOSINOPHIL NFR BLD AUTO: 1.2 % (ref 1–4)
ERYTHROCYTE [DISTWIDTH] IN BLOOD BY AUTOMATED COUNT: 12.7 % (ref 11.5–14.5)
GLOBULIN SER-MCNC: 4.1 G/DL (ref 2–4)
GLUCOSE SERPL-MCNC: 259 MG/DL (ref 74–106)
HCT VFR BLD AUTO: 39.2 % (ref 38–47)
HGB BLD-MCNC: 12 G/DL (ref 12–16)
IMM GRANULOCYTES # BLD AUTO: 0.02 K/UL (ref 0–0.04)
IMM GRANULOCYTES NFR BLD: 0.3 % (ref 0–0.4)
LIPASE SERPL-CCNC: 15 U/L (ref 73–393)
LYMPHOCYTES # BLD AUTO: 3.05 K/UL (ref 1–4.8)
LYMPHOCYTES NFR BLD AUTO: 39 % (ref 27–41)
MAGNESIUM SERPL-MCNC: 2.1 MG/DL (ref 1.7–2.3)
MCH RBC QN AUTO: 27.8 PG (ref 27–31)
MCHC RBC AUTO-ENTMCNC: 30.6 G/DL (ref 32–36)
MCV RBC AUTO: 91 FL (ref 80–96)
MONOCYTES # BLD AUTO: 0.5 K/UL (ref 0–0.8)
MONOCYTES NFR BLD AUTO: 6.4 % (ref 2–6)
MPC BLD CALC-MCNC: 9.5 FL (ref 9.4–12.4)
NEUTROPHILS # BLD AUTO: 4.12 K/UL (ref 1.8–7.7)
NEUTROPHILS NFR BLD AUTO: 52.6 % (ref 53–65)
NRBC # BLD AUTO: 0 X10E3/UL
NRBC, AUTO (.00): 0 %
PLATELET # BLD AUTO: 380 K/UL (ref 150–400)
POTASSIUM SERPL-SCNC: 3 MMOL/L (ref 3.5–5.1)
PROT SERPL-MCNC: 7.4 G/DL (ref 6.4–8.2)
RBC # BLD AUTO: 4.31 M/UL (ref 4.2–5.4)
SODIUM SERPL-SCNC: 141 MMOL/L (ref 136–145)
WBC # BLD AUTO: 7.82 K/UL (ref 4.5–11)

## 2024-06-18 PROCEDURE — 25500020 PHARM REV CODE 255

## 2024-06-18 PROCEDURE — 83690 ASSAY OF LIPASE: CPT | Performed by: NURSE PRACTITIONER

## 2024-06-18 PROCEDURE — 96376 TX/PRO/DX INJ SAME DRUG ADON: CPT

## 2024-06-18 PROCEDURE — 25000003 PHARM REV CODE 250

## 2024-06-18 PROCEDURE — 63600175 PHARM REV CODE 636 W HCPCS

## 2024-06-18 PROCEDURE — 80053 COMPREHEN METABOLIC PANEL: CPT | Performed by: NURSE PRACTITIONER

## 2024-06-18 PROCEDURE — 96361 HYDRATE IV INFUSION ADD-ON: CPT

## 2024-06-18 PROCEDURE — 99285 EMERGENCY DEPT VISIT HI MDM: CPT | Mod: 25

## 2024-06-18 PROCEDURE — 25000003 PHARM REV CODE 250: Performed by: NURSE PRACTITIONER

## 2024-06-18 PROCEDURE — 36415 COLL VENOUS BLD VENIPUNCTURE: CPT | Performed by: NURSE PRACTITIONER

## 2024-06-18 PROCEDURE — 63600175 PHARM REV CODE 636 W HCPCS: Performed by: NURSE PRACTITIONER

## 2024-06-18 PROCEDURE — 96375 TX/PRO/DX INJ NEW DRUG ADDON: CPT

## 2024-06-18 PROCEDURE — 96374 THER/PROPH/DIAG INJ IV PUSH: CPT

## 2024-06-18 PROCEDURE — 83735 ASSAY OF MAGNESIUM: CPT | Performed by: NURSE PRACTITIONER

## 2024-06-18 PROCEDURE — 85025 COMPLETE CBC W/AUTO DIFF WBC: CPT | Performed by: NURSE PRACTITIONER

## 2024-06-18 RX ORDER — MORPHINE SULFATE 2 MG/ML
2 INJECTION, SOLUTION INTRAMUSCULAR; INTRAVENOUS
Status: COMPLETED | OUTPATIENT
Start: 2024-06-18 | End: 2024-06-18

## 2024-06-18 RX ORDER — POTASSIUM CHLORIDE 20 MEQ/1
40 TABLET, EXTENDED RELEASE ORAL
Status: COMPLETED | OUTPATIENT
Start: 2024-06-18 | End: 2024-06-18

## 2024-06-18 RX ORDER — ONDANSETRON HYDROCHLORIDE 2 MG/ML
4 INJECTION, SOLUTION INTRAVENOUS
Status: COMPLETED | OUTPATIENT
Start: 2024-06-18 | End: 2024-06-18

## 2024-06-18 RX ORDER — ONDANSETRON 4 MG/1
4 TABLET, FILM COATED ORAL EVERY 6 HOURS
Qty: 120 TABLET | Refills: 0 | Status: SHIPPED | OUTPATIENT
Start: 2024-06-18 | End: 2024-07-18

## 2024-06-18 RX ORDER — MORPHINE SULFATE 4 MG/ML
4 INJECTION, SOLUTION INTRAMUSCULAR; INTRAVENOUS
Status: COMPLETED | OUTPATIENT
Start: 2024-06-18 | End: 2024-06-18

## 2024-06-18 RX ADMIN — MORPHINE SULFATE 4 MG: 4 INJECTION INTRAVENOUS at 02:06

## 2024-06-18 RX ADMIN — ONDANSETRON 4 MG: 2 INJECTION INTRAMUSCULAR; INTRAVENOUS at 02:06

## 2024-06-18 RX ADMIN — POTASSIUM CHLORIDE 40 MEQ: 1500 TABLET, EXTENDED RELEASE ORAL at 05:06

## 2024-06-18 RX ADMIN — IOPAMIDOL 100 ML: 755 INJECTION, SOLUTION INTRAVENOUS at 04:06

## 2024-06-18 RX ADMIN — MORPHINE SULFATE 2 MG: 2 INJECTION, SOLUTION INTRAMUSCULAR; INTRAVENOUS at 05:06

## 2024-06-18 RX ADMIN — SODIUM CHLORIDE 1000 ML: 9 INJECTION, SOLUTION INTRAVENOUS at 02:06

## 2024-06-18 NOTE — ED PROVIDER NOTES
Encounter Date: 6/18/2024       History     Chief Complaint   Patient presents with    Vomiting    Abdominal Pain     48-year-old female presents to the emergency department to be evaluated for nausea, vomiting and abdominal pain that began 4 days ago.  Denies any constipation, diarrhea, dysuria, fever, chills.    The history is provided by the patient.   Emesis   This is a new problem. The current episode started several days ago. The problem occurs 2 - 4 times per day. Associated symptoms include abdominal pain. Pertinent negatives include no arthralgias, no chills, no cough, no diarrhea, no fever, no headaches, no myalgias, no sweats and no URI.     Review of patient's allergies indicates:   Allergen Reactions    Fish containing products Anaphylaxis    Iodinated contrast media     Penicillins      Past Medical History:   Diagnosis Date    Acute upper respiratory infection 08/01/2023    Asthma     CHF (congestive heart failure)     Chronic pain syndrome     Chronic serous otitis media of both ears 04/04/2023    Depressive disorder     Diabetes mellitus, type 2     Diabetic eye exam 08/11/2023    Dr. [facility]    Disorder of sacrum 04/28/2022    Enlarged heart     Exposure to COVID-19 virus 08/01/2023    Gastroparesis     GERD (gastroesophageal reflux disease)     Hyperlipidemia     Hypertension     IBS (irritable bowel syndrome)     Kidney stones     Knee pain, right 01/24/2024    Lumbar radiculopathy     Postlaminectomy syndrome, lumbar region 04/28/2022    Sleep apnea     Does not use a CPAP    Sore throat 08/01/2023    Thyroid disease     Unspecified chronic bronchitis      Past Surgical History:   Procedure Laterality Date    Bilateral L3-S1 MBB Bilateral 6-, 5-, 1-8-2014    Dr Jessica Randolph    CARPAL TUNNEL RELEASE      CHOLECYSTECTOMY      DIAGNOSTIC LAPAROSCOPY  04/14/2010    Exploratory Laparotomy, Myomectomy, Hydrotubation-Dr. Feng    DILATION AND CURETTAGE OF  UTERUS  04/14/2010    Hysteroscopy-Dr. Feng    EXPLORATORY LAPAROTOMY WITH UTERINE MYOMECTOMY  02/27/2007    Dr. Marquise Anderson    HYSTERECTOMY  09/29/2011    Robotic, FABIENNE, Cystoscopy-Dr. Feng    HYSTEROSCOPY WITH DILATION AND CURETTAGE OF UTERUS  04/04/2006    Laparoscopy, Chromotubation-Dr. Marquise Anderson    INJECTION OF ANESTHETIC AGENT AROUND ILIOINGUINAL NERVE Right 6/22/2023    Procedure: BLOCK, NERVE, ILIOINGUINAL;  Surgeon: Rasheeda Bills MD;  [facility]: [facility];  Service: Pain Management;  Laterality: Right;    INJECTION OF ANESTHETIC AGENT AROUND MEDIAL BRANCH NERVES INNERVATING LUMBAR FACET JOINT Bilateral 9/21/2023    Procedure: BLOCK, NERVE, FACET JOINT, LUMBAR, MEDIAL BRANCH;  Surgeon: Rasheeda Bills MD;  Location: [facility];  Service: Pain Management;  Laterality: Bilateral;    INJECTION OF ANESTHETIC AGENT AROUND MEDIAL BRANCH NERVES INNERVATING LUMBAR FACET JOINT Bilateral 3/14/2024    Procedure: BLOCK, NERVE, FACET JOINT, LUMBAR, MEDIAL BRANCH, BILATERAL L4-S1 MBB;  Surgeon: Rasheeda Bills MD;  Location: [facility];  Service: Pain Management;  Laterality: Bilateral;    LAMINECTOMY N/A 11/30/2021    Procedure: L2-L5 Laminectomy;  Surgeon: Cyril Upton MD;  Location: [facility];  Service: Neurosurgery;  Laterality: N/A;    LEFT HEART CATHETERIZATION      Left L3-S1 RFTC Left 07/18/2017    Dr Lopez    Left SI JI Left 09/30/2013    Dr Randolph    MYRINGOTOMY WITH INSERTION OF VENTILATION TUBE Bilateral 4/4/2023    Procedure: MYRINGOTOMY, WITH TYMPANOSTOMY TUBE INSERTION (T-TUBES);  Surgeon: Sea Hinton MD;  Location: [facility];  Service: ENT;  Laterality: Bilateral;  T-TUBES    MYRINGOTOMY WITH INSERTION OF VENTILATION TUBE Bilateral 9/12/2023    Procedure: MYRINGOTOMY, WITH TYMPANOSTOMY TUBE INSERTION, T-TUBES;  Surgeon: Sea Hinton MD;  Location: [facility];  Service: ENT;  Laterality: Bilateral;    OOPHORECTOMY  11/11/2014    Robotic,  FABIENNE, Cystoscopy-Dr. Feng    SINUS SURGERY       Family History   Problem Relation Name Age of Onset    Diabetes Mellitus Mother      Heart disease Mother      Hypertension Mother      Migraines Mother      Heart disease Sister       Social History     Tobacco Use    Smoking status: Never     Passive exposure: Never    Smokeless tobacco: Never   Substance Use Topics    Alcohol use: Not Currently    Drug use: Not Currently     Types: Hydrocodone, Oxycodone     Review of Systems   Constitutional:  Negative for chills and fever.   Respiratory:  Negative for cough.    Gastrointestinal:  Positive for abdominal pain, nausea and vomiting. Negative for diarrhea.   Musculoskeletal:  Negative for arthralgias and myalgias.   Neurological:  Negative for headaches.   All other systems reviewed and are negative.      Physical Exam     Initial Vitals [06/18/24 1307]   BP Pulse Resp Temp SpO2   101/69 89 16 98.4 °F (36.9 °C) 97 %      MAP       --         Physical Exam    Vitals reviewed.  Constitutional: She appears well-developed and well-nourished.   Neck: Neck supple.   Cardiovascular:  Normal rate and regular rhythm.           Pulmonary/Chest: Breath sounds normal.   Abdominal: Abdomen is soft. Bowel sounds are normal. There is abdominal tenderness (Moderate tenderness to the left abdomen).   Musculoskeletal:         General: Normal range of motion.      Cervical back: Neck supple.     Neurological: She is alert and oriented to person, place, and time. She has normal strength. GCS score is 15. GCS eye subscore is 4. GCS verbal subscore is 5. GCS motor subscore is 6.   Skin: Skin is warm and dry. Capillary refill takes less than 2 seconds.   Psychiatric: She has a normal mood and affect.         Medical Screening Exam   See Full Note    ED Course   Procedures  Labs Reviewed   COMPREHENSIVE METABOLIC PANEL - Abnormal; Notable for the following components:       Result Value    Potassium 3.0 (*)     Glucose 259 (*)     Creatinine  1.09 (*)     Albumin 3.3 (*)     Globulin 4.1 (*)     Alk Phos 125 (*)     All other components within normal limits   LIPASE - Abnormal; Notable for the following components:    Lipase 15 (*)     All other components within normal limits   CBC WITH DIFFERENTIAL - Abnormal; Notable for the following components:    MCHC 30.6 (*)     Neutrophils % 52.6 (*)     Monocytes % 6.4 (*)     All other components within normal limits   MAGNESIUM - Normal   CBC W/ AUTO DIFFERENTIAL    Narrative:     The following orders were created for panel order CBC auto differential.  Procedure                               Abnormality         Status                     ---------                               -----------         ------                     CBC with Differential[[id number]]       Abnormal            Final result                 Please view results for these tests on the individual orders.          Imaging Results              CT Abdomen Pelvis With IV Contrast NO Oral Contrast (Final result)  Result time 06/18/24 16:51:45      Final result by Kayode Feng DO (06/18/24 16:51:45)                   Impression:      No convincing acute intra-abdominal CT findings.  Small pericardial effusion, similar to prior.  Prior cholecystectomy, prior hysterectomy, and other detailed findings as above.    The CT exam was performed using one or more of the following dose    reduction techniques- Automated exposure control, adjustment of the mA    and/or kV according to patient size, and/or use of iterative    reconstructed technique.    Point of Service: [facility]      Electronically signed by: Kayode Feng  Date:    06/18/2024  Time:    16:51               Narrative:    EXAMINATION:  CT ABDOMEN PELVIS WITH IV CONTRAST    CLINICAL HISTORY:  LLQ abdominal pain;    COMPARISON:  CT abdomen pelvis June 14, 2024    TECHNIQUE:  Multiple axial tomographic images of the abdomen and pelvis were obtained after administration of 100  cc Isovue 370 intravenous contrast.    FINDINGS:  Mild dependent changes of the lungs noted.  Small pericardial effusion.    No worrisome focal hepatic abnormality demonstrated on submitted images.  Prior cholecystectomy.  Visualized pancreas appears unremarkable.  Spleen grossly unremarkable.    Bilateral adrenal glands grossly unremarkable.  Bilateral kidneys appear grossly unremarkable.  Urinary bladder incompletely distended.  Prior hysterectomy.  Pelvic phleboliths present.  Gonadal phleboliths present.    No convincing evidence of gastrointestinal obstruction or acute appendicitis.  Scattered skeletal degenerative change.  Prior L3-4 laminectomy.                                       XR ABDOMEN, ACUTE 2 OR MORE VIEWS WITH CHEST (Final result)  Result time 06/18/24 15:00:10      Final result by Jean Plascencia MD (06/18/24 15:00:10)                   Impression:      Mild colonic stool.      Electronically signed by: Jean Plascencia  Date:    06/18/2024  Time:    15:00               Narrative:    EXAMINATION:  XR ABDOMEN ACUTE 2 OR MORE VIEWS WITH CHEST    CLINICAL HISTORY:  left abdominal pain;    TECHNIQUE:  PA chest radiograph, supine and erect views of the abdomen    COMPARISON:  06/14/2024 CT    FINDINGS:  Lungs clear.  Bowel gas pattern normal.  Mild colonic stool.  Surgical clips right upper quadrant.  No abnormal calcifications.  No pneumoperitoneum.                                       Medications   sodium chloride 0.9% bolus 1,000 mL 1,000 mL (0 mLs Intravenous Stopped 6/18/24 1609)   ondansetron injection 4 mg (4 mg Intravenous Given 6/18/24 1458)   morphine injection 4 mg (4 mg Intravenous Given 6/18/24 1457)   iopamidoL (ISOVUE-370) injection 100 mL (100 mLs Intravenous Given 6/18/24 1642)   potassium chloride SA CR tablet 40 mEq (40 mEq Oral Given 6/18/24 1731)   morphine injection 2 mg (2 mg Intravenous Given 6/18/24 1730)     Medical Decision Making  48-year-old female presents to the emergency  department to be evaluated for nausea, vomiting and abdominal pain that began 4 days ago.  Denies any constipation, diarrhea, dysuria, fever, chills.  Ordered and reviewed labs ED with results significant for hypokalemia   Administered 1 L normal saline bolus, Zofran, morphine, potassium p.o. in ED   Prescribed Zofran for discharge  Diagnoses:  Nausea and vomiting    Amount and/or Complexity of Data Reviewed  Radiology: ordered.    Risk  Prescription drug management.                                      Clinical Impression:   Final diagnoses:  [R10.9] Abdominal pain, unspecified abdominal location (Primary)  [R11.2] Nausea and vomiting, unspecified vomiting type        ED Disposition Condition    Discharge Stable          ED Prescriptions       Medication Sig Dispense Start Date End Date Auth. Provider    ondansetron (ZOFRAN) 4 MG tablet Take 1 tablet (4 mg total) by mouth every 6 (six) hours. 120 tablet 6/18/2024 7/18/2024 Preston Delaney NP          Follow-up Information    None          Preston Delaney NP  06/18/24 1920

## 2024-06-18 NOTE — ED TRIAGE NOTES
Pt present to ed with c/o having N/V and abdominal pain since Friday. Was seen here Friday for same thing but has not gotten better

## 2024-06-18 NOTE — Clinical Note
"Carey Landeroseusebio Leonardo was seen and treated in our emergency department on 6/18/2024.  She may return to work on 06/20/2024.       If you have any questions or concerns, please don't hesitate to call.      Preston Delaney, NP"

## 2024-06-18 NOTE — DISCHARGE INSTRUCTIONS
Follow up with primary care provider in 2 days.  Drink plenty of water.  Return to the emergency department for new or worsening symptoms.

## 2024-06-19 ENCOUNTER — TELEPHONE (OUTPATIENT)
Dept: EMERGENCY MEDICINE | Facility: HOSPITAL | Age: 49
End: 2024-06-19
Payer: OTHER GOVERNMENT

## 2024-06-20 ENCOUNTER — OFFICE VISIT (OUTPATIENT)
Dept: PRIMARY CARE CLINIC | Facility: CLINIC | Age: 49
End: 2024-06-20
Payer: OTHER GOVERNMENT

## 2024-06-20 VITALS
OXYGEN SATURATION: 98 % | HEIGHT: 66 IN | HEART RATE: 89 BPM | BODY MASS INDEX: 36 KG/M2 | WEIGHT: 224 LBS | SYSTOLIC BLOOD PRESSURE: 116 MMHG | TEMPERATURE: 99 F | DIASTOLIC BLOOD PRESSURE: 82 MMHG

## 2024-06-20 DIAGNOSIS — R11.2 NAUSEA AND VOMITING, UNSPECIFIED VOMITING TYPE: Primary | ICD-10-CM

## 2024-06-20 DIAGNOSIS — E11.9 TYPE 2 DIABETES MELLITUS WITHOUT COMPLICATION, WITHOUT LONG-TERM CURRENT USE OF INSULIN: Chronic | ICD-10-CM

## 2024-06-20 PROCEDURE — 99213 OFFICE O/P EST LOW 20 MIN: CPT | Mod: 25,,, | Performed by: NURSE PRACTITIONER

## 2024-06-20 PROCEDURE — 96372 THER/PROPH/DIAG INJ SC/IM: CPT | Mod: ,,, | Performed by: NURSE PRACTITIONER

## 2024-06-20 RX ORDER — PEN NEEDLE, DIABETIC 30 GX3/16"
1 NEEDLE, DISPOSABLE MISCELLANEOUS DAILY
Qty: 100 EACH | Refills: 5 | Status: SHIPPED | OUTPATIENT
Start: 2024-06-20

## 2024-06-20 RX ORDER — ONDANSETRON 2 MG/ML
4 INJECTION INTRAMUSCULAR; INTRAVENOUS
Status: COMPLETED | OUTPATIENT
Start: 2024-06-20 | End: 2024-06-20

## 2024-06-20 RX ORDER — INSULIN GLARGINE 100 [IU]/ML
10 INJECTION, SOLUTION SUBCUTANEOUS NIGHTLY
Qty: 10 ML | Refills: 3 | Status: SHIPPED | OUTPATIENT
Start: 2024-06-20

## 2024-06-20 RX ADMIN — ONDANSETRON 4 MG: 2 INJECTION INTRAMUSCULAR; INTRAVENOUS at 02:06

## 2024-06-20 NOTE — PROGRESS NOTES
Subjective     Patient ID: Carey Leonardo is a 48 y.o. female.    Chief Complaint: er fu (Pt states that she still having pain in LLQ area.)    Pt presents for an er follow-up. Upper abd pain after eating with nausea x several weeks.       Review of Systems   Constitutional:  Negative for activity change, appetite change, chills, fatigue and fever.   HENT:  Negative for nasal congestion, ear discharge, nosebleeds, postnasal drip, rhinorrhea, sinus pressure/congestion, sneezing, sore throat and tinnitus.    Eyes:  Negative for pain, discharge, redness and itching.   Respiratory:  Negative for cough, choking, chest tightness, shortness of breath and wheezing.    Cardiovascular:  Negative for chest pain.   Gastrointestinal:  Positive for abdominal pain. Negative for abdominal distention, blood in stool, change in bowel habit, constipation, diarrhea, nausea and vomiting.   Genitourinary:  Negative for decreased urine volume, dysuria, flank pain and frequency.   Musculoskeletal:  Negative for back pain and gait problem.   Integumentary:  Negative for wound, breast mass and breast discharge.   Allergic/Immunologic: Negative for immunocompromised state.   Neurological:  Negative for dizziness, light-headedness and headaches.   Psychiatric/Behavioral:  Negative for agitation, behavioral problems and hallucinations.    Breast: Negative for mass         Objective     Physical Exam  Vitals and nursing note reviewed.   Constitutional:       Appearance: Normal appearance.   Cardiovascular:      Rate and Rhythm: Normal rate and regular rhythm.      Heart sounds: Normal heart sounds.   Pulmonary:      Effort: Pulmonary effort is normal.      Breath sounds: Normal breath sounds.   Abdominal:      General: Bowel sounds are normal.   Musculoskeletal:         General: Normal range of motion.   Neurological:      Mental Status: She is alert and oriented to person, place, and time.   Psychiatric:         Mood and Affect: Mood normal.    "      Behavior: Behavior normal.            Assessment and Plan     1. Nausea and vomiting, unspecified vomiting type  -     ondansetron injection 4 mg    2. Type 2 diabetes mellitus without complication, without long-term current use of insulin  Orders:  -     Hemoglobin A1C; Future; Expected date: 06/20/2024  -     Microalbumin/Creatinine Ratio, Urine; Future; Expected date: 06/20/2024  -     insulin glargine U-100, Lantus, (LANTUS SOLOSTAR U-100 INSULIN) 100 unit/mL (3 mL) InPn pen; Inject 10 Units into the skin every evening.  Dispense: 10 mL; Refill: 3  -     pen needle, diabetic 32 gauge x 5/32" Ndle; 1 Application by Misc.(Non-Drug; Combo Route) route once daily.  Dispense: 100 each; Refill: 5        Stop trulicity for now to see if abd symptoms resolve and start lantus 10 units each night. Will call pt with lab results.          No follow-ups on file.    "

## 2024-06-21 ENCOUNTER — PATIENT MESSAGE (OUTPATIENT)
Dept: PRIMARY CARE CLINIC | Facility: CLINIC | Age: 49
End: 2024-06-21
Payer: OTHER GOVERNMENT

## 2024-06-29 RX ORDER — LEVOTHYROXINE SODIUM 200 UG/1
TABLET ORAL
Qty: 90 TABLET | Refills: 1 | OUTPATIENT
Start: 2024-06-29

## 2024-06-29 RX ORDER — NIFEDIPINE 60 MG/1
60 TABLET, EXTENDED RELEASE ORAL DAILY
Qty: 90 TABLET | Refills: 0 | OUTPATIENT
Start: 2024-06-29

## 2024-06-29 RX ORDER — CLONIDINE HYDROCHLORIDE 0.2 MG/1
0.2 TABLET ORAL 4 TIMES DAILY
Qty: 360 TABLET | Refills: 0 | OUTPATIENT
Start: 2024-06-29

## 2024-06-29 RX ORDER — ALBUTEROL SULFATE 90 UG/1
2 AEROSOL, METERED RESPIRATORY (INHALATION) EVERY 6 HOURS PRN
Qty: 18 G | Refills: 0 | OUTPATIENT
Start: 2024-06-29 | End: 2025-06-29

## 2024-06-29 RX ORDER — ATORVASTATIN CALCIUM 20 MG/1
20 TABLET, FILM COATED ORAL NIGHTLY
Qty: 90 TABLET | Refills: 0 | OUTPATIENT
Start: 2024-06-29 | End: 2025-06-24

## 2024-06-29 RX ORDER — VENLAFAXINE HYDROCHLORIDE 150 MG/1
CAPSULE, EXTENDED RELEASE ORAL
Qty: 90 CAPSULE | Refills: 0 | OUTPATIENT
Start: 2024-06-29

## 2024-06-29 RX ORDER — HYDRALAZINE HYDROCHLORIDE 100 MG/1
100 TABLET, FILM COATED ORAL 3 TIMES DAILY
Qty: 270 TABLET | Refills: 0 | OUTPATIENT
Start: 2024-06-29

## 2024-07-02 ENCOUNTER — ANESTHESIA (OUTPATIENT)
Dept: SURGERY | Facility: HOSPITAL | Age: 49
End: 2024-07-02
Payer: OTHER GOVERNMENT

## 2024-07-02 ENCOUNTER — ANESTHESIA EVENT (OUTPATIENT)
Dept: SURGERY | Facility: HOSPITAL | Age: 49
End: 2024-07-02
Payer: OTHER GOVERNMENT

## 2024-07-02 ENCOUNTER — HOSPITAL ENCOUNTER (OUTPATIENT)
Facility: HOSPITAL | Age: 49
Discharge: HOME | End: 2024-07-02
Attending: OTOLARYNGOLOGY | Admitting: OTOLARYNGOLOGY
Payer: OTHER GOVERNMENT

## 2024-07-02 VITALS
RESPIRATION RATE: 18 BRPM | WEIGHT: 200 LBS | DIASTOLIC BLOOD PRESSURE: 74 MMHG | BODY MASS INDEX: 32.14 KG/M2 | HEIGHT: 66 IN | OXYGEN SATURATION: 97 % | HEART RATE: 67 BPM | SYSTOLIC BLOOD PRESSURE: 124 MMHG | TEMPERATURE: 98 F

## 2024-07-02 DIAGNOSIS — H65.23 CHRONIC SEROUS OTITIS MEDIA OF BOTH EARS: Primary | ICD-10-CM

## 2024-07-02 LAB
GLUCOSE SERPL-MCNC: 216 MG/DL (ref 70–105)
GLUCOSE SERPL-MCNC: 247 MG/DL (ref 70–105)

## 2024-07-02 PROCEDURE — 27000510 HC BLANKET BAIR HUGGER ANY SIZE: Performed by: ANESTHESIOLOGY

## 2024-07-02 PROCEDURE — 25000003 PHARM REV CODE 250: Performed by: OTOLARYNGOLOGY

## 2024-07-02 PROCEDURE — 37000008 HC ANESTHESIA 1ST 15 MINUTES: Performed by: OTOLARYNGOLOGY

## 2024-07-02 PROCEDURE — 71000033 HC RECOVERY, INTIAL HOUR: Performed by: OTOLARYNGOLOGY

## 2024-07-02 PROCEDURE — 27201423 OPTIME MED/SURG SUP & DEVICES STERILE SUPPLY: Performed by: OTOLARYNGOLOGY

## 2024-07-02 PROCEDURE — 25000003 PHARM REV CODE 250: Performed by: NURSE ANESTHETIST, CERTIFIED REGISTERED

## 2024-07-02 PROCEDURE — 36000704 HC OR TIME LEV I 1ST 15 MIN: Performed by: OTOLARYNGOLOGY

## 2024-07-02 PROCEDURE — 27000689 HC BLADE LARYNGOSCOPE ANY SIZE: Performed by: ANESTHESIOLOGY

## 2024-07-02 PROCEDURE — 27000655: Performed by: ANESTHESIOLOGY

## 2024-07-02 PROCEDURE — 63600175 PHARM REV CODE 636 W HCPCS: Performed by: NURSE ANESTHETIST, CERTIFIED REGISTERED

## 2024-07-02 PROCEDURE — 00126 ANES PX EAR TYMPANOTOMY: CPT | Performed by: OTOLARYNGOLOGY

## 2024-07-02 PROCEDURE — 71000015 HC POSTOP RECOV 1ST HR: Performed by: OTOLARYNGOLOGY

## 2024-07-02 PROCEDURE — 82962 GLUCOSE BLOOD TEST: CPT

## 2024-07-02 PROCEDURE — 27000165 HC TUBE, ETT CUFFED: Performed by: ANESTHESIOLOGY

## 2024-07-02 PROCEDURE — 27000716 HC OXISENSOR PROBE, ANY SIZE: Performed by: ANESTHESIOLOGY

## 2024-07-02 PROCEDURE — 69436 CREATE EARDRUM OPENING: CPT | Mod: 50,,, | Performed by: OTOLARYNGOLOGY

## 2024-07-02 DEVICE — RICHARDS MODIFIED T-TUBE 1.32 MM ID 4.8 MM LENGTH SILICONE
Type: IMPLANTABLE DEVICE | Site: EAR | Status: FUNCTIONAL
Brand: GYRUS ACMI

## 2024-07-02 RX ORDER — MEPERIDINE HYDROCHLORIDE 25 MG/ML
25 INJECTION INTRAMUSCULAR; INTRAVENOUS; SUBCUTANEOUS EVERY 10 MIN PRN
OUTPATIENT
Start: 2024-07-02 | End: 2024-07-02

## 2024-07-02 RX ORDER — HYDROMORPHONE HYDROCHLORIDE 2 MG/ML
0.5 INJECTION, SOLUTION INTRAMUSCULAR; INTRAVENOUS; SUBCUTANEOUS EVERY 5 MIN PRN
OUTPATIENT
Start: 2024-07-02

## 2024-07-02 RX ORDER — MIDAZOLAM HYDROCHLORIDE 5 MG/ML
INJECTION INTRAMUSCULAR; INTRAVENOUS
Status: DISCONTINUED | OUTPATIENT
Start: 2024-07-02 | End: 2024-07-02

## 2024-07-02 RX ORDER — SODIUM CHLORIDE 9 MG/ML
INJECTION, SOLUTION INTRAVENOUS CONTINUOUS
Status: DISCONTINUED | OUTPATIENT
Start: 2024-07-02 | End: 2024-07-02 | Stop reason: HOSPADM

## 2024-07-02 RX ORDER — PROPOFOL 10 MG/ML
VIAL (ML) INTRAVENOUS
Status: DISCONTINUED | OUTPATIENT
Start: 2024-07-02 | End: 2024-07-02

## 2024-07-02 RX ORDER — LIDOCAINE HYDROCHLORIDE 20 MG/ML
INJECTION, SOLUTION EPIDURAL; INFILTRATION; INTRACAUDAL; PERINEURAL
Status: DISCONTINUED | OUTPATIENT
Start: 2024-07-02 | End: 2024-07-02

## 2024-07-02 RX ORDER — ONDANSETRON HYDROCHLORIDE 2 MG/ML
INJECTION, SOLUTION INTRAVENOUS
Status: DISCONTINUED | OUTPATIENT
Start: 2024-07-02 | End: 2024-07-02

## 2024-07-02 RX ORDER — DIPHENHYDRAMINE HYDROCHLORIDE 50 MG/ML
25 INJECTION INTRAMUSCULAR; INTRAVENOUS EVERY 6 HOURS PRN
OUTPATIENT
Start: 2024-07-02

## 2024-07-02 RX ORDER — ONDANSETRON HYDROCHLORIDE 2 MG/ML
4 INJECTION, SOLUTION INTRAVENOUS DAILY PRN
OUTPATIENT
Start: 2024-07-02

## 2024-07-02 RX ORDER — CIPROFLOXACIN AND DEXAMETHASONE 3; 1 MG/ML; MG/ML
SUSPENSION/ DROPS AURICULAR (OTIC)
Status: DISCONTINUED | OUTPATIENT
Start: 2024-07-02 | End: 2024-07-02 | Stop reason: HOSPADM

## 2024-07-02 RX ORDER — MORPHINE SULFATE 10 MG/ML
4 INJECTION INTRAMUSCULAR; INTRAVENOUS; SUBCUTANEOUS EVERY 5 MIN PRN
OUTPATIENT
Start: 2024-07-02

## 2024-07-02 RX ORDER — FENTANYL CITRATE 50 UG/ML
INJECTION, SOLUTION INTRAMUSCULAR; INTRAVENOUS
Status: DISCONTINUED | OUTPATIENT
Start: 2024-07-02 | End: 2024-07-02

## 2024-07-02 RX ADMIN — ONDANSETRON 4 MG: 2 INJECTION INTRAMUSCULAR; INTRAVENOUS at 08:07

## 2024-07-02 RX ADMIN — MIDAZOLAM HYDROCHLORIDE 2 MG: 5 INJECTION, SOLUTION INTRAMUSCULAR; INTRAVENOUS at 08:07

## 2024-07-02 RX ADMIN — LIDOCAINE HYDROCHLORIDE 100 MG: 20 INJECTION, SOLUTION INTRAVENOUS at 08:07

## 2024-07-02 RX ADMIN — FENTANYL CITRATE 50 MCG: 50 INJECTION INTRAMUSCULAR; INTRAVENOUS at 08:07

## 2024-07-02 RX ADMIN — PROPOFOL 50 MG: 10 INJECTION, EMULSION INTRAVENOUS at 08:07

## 2024-07-02 RX ADMIN — SODIUM CHLORIDE: 9 INJECTION, SOLUTION INTRAVENOUS at 08:07

## 2024-07-02 NOTE — TRANSFER OF CARE
"Anesthesia Transfer of Care Note    Patient: Carey LANGLEY Leonardo    Procedure(s) Performed: Procedure(s) (LRB):  MYRINGOTOMY, WITH TYMPANOSTOMY TUBE INSERTION (Bilateral)    Patient location: PACU    Anesthesia Type: general    Transport from OR: Transported from OR on 100% O2 by closed face mask with adequate spontaneous ventilation    Post pain: adequate analgesia    Post assessment: no apparent anesthetic complications    Post vital signs: stable    Level of consciousness: responds to stimulation    Nausea/Vomiting: no nausea/vomiting    Complications: none    Transfer of care protocol was followed      Last vitals: Visit Vitals  /78 (BP Location: Left arm, Patient Position: Lying)   Pulse 72   Temp 36.6 °C (97.8 °F) (Oral)   Resp 17   Ht 5' 6" (1.676 m)   Wt 90.7 kg (200 lb)   LMP  (LMP Unknown)   SpO2 (!) 89%   Breastfeeding No   BMI 32.28 kg/m²     "

## 2024-07-02 NOTE — BRIEF OP NOTE
[facility]"" - Orthopedic Periop Services  Brief Operative Note    Surgery Date: 7/2/2024     Surgeons and Role:     * Sea Hinton MD - Primary    Assisting Surgeon: None    Pre-op Diagnosis:  Chronic serous otitis media of both ears [H65.23]    Post-op Diagnosis:  Post-Op Diagnosis Codes:     * Chronic serous otitis media of both ears [H65.23]    Procedure(s) (LRB):  MYRINGOTOMY, WITH TYMPANOSTOMY TUBE INSERTION (Bilateral)    Anesthesia: General    Operative Findings: fluid    Estimated Blood Loss: 0         Specimens:   Specimen (24h ago, onward)      None              Discharge Note    OUTCOME: Patient tolerated treatment/procedure well without complication and is now ready for discharge.    DISPOSITION: Home or Self Care    FINAL DIAGNOSIS: CHAD  FOLLOWUP: In clinic    DISCHARGE INSTRUCTIONS:  No discharge procedures on file.

## 2024-07-02 NOTE — ANESTHESIA PREPROCEDURE EVALUATION
07/02/2024  Carey Leonardo is a 48 y.o., female.      Pre-op Assessment    I have reviewed the Patient Summary Reports.    I have reviewed the NPO Status.   I have reviewed the Medications.     Review of Systems         Anesthesia Plan  Type of Anesthesia, risks & benefits discussed:    Anesthesia Type: Gen ETT  Intra-op Monitoring Plan: Standard ASA Monitors  Post Op Pain Control Plan: IV/PO Opioids PRN  Induction:  IV  Informed Consent: Informed consent signed with the Patient and all parties understand the risks and agree with anesthesia plan.  All questions answered.   ASA Score: 2    Ready For Surgery From Anesthesia Perspective.     .  NPO greater than 8 hours  No anesthetic complications  Allergic to PCN    HTN  IDDM  Previous hysterectomy    MP III; very poor dentition; adequate ROM at neck

## 2024-07-02 NOTE — OP NOTE
Surgery Date: 7/2/2024     Surgeons and Role:     * Sea Hinton MD - Primary    Assisting Surgeon: None    Pre-op Diagnosis:  Chronic serous otitis media of both ears [H65.23]    Post-op Diagnosis:  Post-Op Diagnosis Codes:     * Chronic serous otitis media of both ears [H65.23]    Procedure(s) (LRB):  MYRINGOTOMY, WITH TYMPANOSTOMY TUBE INSERTION (Bilateral)    After general mask anesthesia using the operating microscope the left ear was examined and a myringotomy was performed in the anterior inferior quadrant and a t tube was placed without difficulty excess middle ear  fluid was suctioned. The opposite ear was done in a likewise fashion the patient tolerated the procedure well and was reversed taken to RR in stable condition           Anesthesia: General    Operative Findings: fluid    Estimated Blood Loss: 0

## 2024-07-03 NOTE — ANESTHESIA POSTPROCEDURE EVALUATION
Anesthesia Post Evaluation    Patient: Shatagia D Leonardo    Procedure(s) Performed: Procedure(s) (LRB):  MYRINGOTOMY, WITH TYMPANOSTOMY TUBE INSERTION (Bilateral)    Final Anesthesia Type: general      Patient location during evaluation: PACU  Post-procedure vital signs: reviewed and stable  Pain management: adequate  Airway patency: patent    PONV status at discharge: No PONV  Anesthetic complications: no      Cardiovascular status: hemodynamically stable  Respiratory status: unassisted  Hydration status: euvolemic  Follow-up not needed.              Vitals Value Taken Time   /74 07/02/24 1000   Temp 36.6 °C (97.8 °F) 07/02/24 0838   Pulse 67 07/02/24 1000   Resp 18 07/02/24 1000   SpO2 97 % 07/02/24 1000         Event Time   Out of Recovery 09:10:39         Pain/Miah Score: Miah Score: 8 (7/2/2024  9:10 AM)

## 2024-07-15 RX ORDER — ATORVASTATIN CALCIUM 20 MG/1
20 TABLET, FILM COATED ORAL NIGHTLY
Qty: 90 TABLET | Refills: 0 | OUTPATIENT
Start: 2024-07-15

## 2024-08-04 ENCOUNTER — HOSPITAL ENCOUNTER (EMERGENCY)
Facility: HOSPITAL | Age: 49
Discharge: HOME | End: 2024-08-04
Payer: OTHER GOVERNMENT

## 2024-08-04 VITALS
WEIGHT: 190 LBS | RESPIRATION RATE: 17 BRPM | OXYGEN SATURATION: 99 % | BODY MASS INDEX: 29.82 KG/M2 | DIASTOLIC BLOOD PRESSURE: 72 MMHG | HEART RATE: 86 BPM | TEMPERATURE: 98 F | HEIGHT: 67 IN | SYSTOLIC BLOOD PRESSURE: 110 MMHG

## 2024-08-04 DIAGNOSIS — E87.6 HYPOKALEMIA: ICD-10-CM

## 2024-08-04 DIAGNOSIS — M54.9 MUSCULOSKELETAL BACK PAIN: ICD-10-CM

## 2024-08-04 DIAGNOSIS — N30.00 ACUTE CYSTITIS WITHOUT HEMATURIA: Primary | ICD-10-CM

## 2024-08-04 LAB
ALBUMIN SERPL BCP-MCNC: 3.3 G/DL (ref 3.5–5)
ALBUMIN/GLOB SERPL: 0.9 {RATIO}
ALP SERPL-CCNC: 115 U/L (ref 39–100)
ALT SERPL W P-5'-P-CCNC: 26 U/L (ref 13–56)
ANION GAP SERPL CALCULATED.3IONS-SCNC: 8 MMOL/L (ref 7–16)
AST SERPL W P-5'-P-CCNC: 13 U/L (ref 15–37)
BACTERIA #/AREA URNS HPF: ABNORMAL /HPF
BASOPHILS # BLD AUTO: 0.04 K/UL (ref 0–0.2)
BASOPHILS NFR BLD AUTO: 0.6 % (ref 0–1)
BILIRUB SERPL-MCNC: 0.3 MG/DL (ref ?–1.2)
BILIRUB UR QL STRIP: NEGATIVE
BUN SERPL-MCNC: 10 MG/DL (ref 7–18)
BUN/CREAT SERPL: 9 (ref 6–20)
CALCIUM SERPL-MCNC: 9.1 MG/DL (ref 8.5–10.1)
CHLORIDE SERPL-SCNC: 105 MMOL/L (ref 98–107)
CLARITY UR: ABNORMAL
CO2 SERPL-SCNC: 29 MMOL/L (ref 21–32)
COLOR UR: YELLOW
CREAT SERPL-MCNC: 1.14 MG/DL (ref 0.55–1.02)
DIFFERENTIAL METHOD BLD: ABNORMAL
EGFR (NO RACE VARIABLE) (RUSH/TITUS): 60 ML/MIN/1.73M2
EOSINOPHIL # BLD AUTO: 0.13 K/UL (ref 0–0.5)
EOSINOPHIL NFR BLD AUTO: 1.9 % (ref 1–4)
ERYTHROCYTE [DISTWIDTH] IN BLOOD BY AUTOMATED COUNT: 11.9 % (ref 11.5–14.5)
GLOBULIN SER-MCNC: 3.7 G/DL (ref 2–4)
GLUCOSE SERPL-MCNC: 182 MG/DL (ref 74–106)
GLUCOSE UR STRIP-MCNC: NORMAL MG/DL
HCT VFR BLD AUTO: 35.9 % (ref 38–47)
HGB BLD-MCNC: 11 G/DL (ref 12–16)
HYALINE CASTS #/AREA URNS LPF: ABNORMAL /LPF
IMM GRANULOCYTES # BLD AUTO: 0.02 K/UL (ref 0–0.04)
IMM GRANULOCYTES NFR BLD: 0.3 % (ref 0–0.4)
KETONES UR STRIP-SCNC: NEGATIVE MG/DL
LEUKOCYTE ESTERASE UR QL STRIP: NEGATIVE
LYMPHOCYTES # BLD AUTO: 3.02 K/UL (ref 1–4.8)
LYMPHOCYTES NFR BLD AUTO: 43.9 % (ref 27–41)
MCH RBC QN AUTO: 27.9 PG (ref 27–31)
MCHC RBC AUTO-ENTMCNC: 30.6 G/DL (ref 32–36)
MCV RBC AUTO: 91.1 FL (ref 80–96)
MONOCYTES # BLD AUTO: 0.4 K/UL (ref 0–0.8)
MONOCYTES NFR BLD AUTO: 5.8 % (ref 2–6)
MPC BLD CALC-MCNC: 9.5 FL (ref 9.4–12.4)
MUCOUS, UA: ABNORMAL /LPF
NEUTROPHILS # BLD AUTO: 3.27 K/UL (ref 1.8–7.7)
NEUTROPHILS NFR BLD AUTO: 47.5 % (ref 53–65)
NITRITE UR QL STRIP: NEGATIVE
NRBC # BLD AUTO: 0 X10E3/UL
NRBC, AUTO (.00): 0 %
PH UR STRIP: 6.5 PH UNITS
PLATELET # BLD AUTO: 377 K/UL (ref 150–400)
POTASSIUM SERPL-SCNC: 3.3 MMOL/L (ref 3.5–5.1)
PROT SERPL-MCNC: 7 G/DL (ref 6.4–8.2)
PROT UR QL STRIP: 100
RBC # BLD AUTO: 3.94 M/UL (ref 4.2–5.4)
RBC # UR STRIP: NEGATIVE /UL
RBC #/AREA URNS HPF: 3 /HPF
SODIUM SERPL-SCNC: 139 MMOL/L (ref 136–145)
SP GR UR STRIP: 1.03
SQUAMOUS #/AREA URNS LPF: ABNORMAL /HPF
UROBILINOGEN UR STRIP-ACNC: 2 MG/DL
WBC # BLD AUTO: 6.88 K/UL (ref 4.5–11)
WBC #/AREA URNS HPF: 9 /HPF

## 2024-08-04 PROCEDURE — 96365 THER/PROPH/DIAG IV INF INIT: CPT

## 2024-08-04 PROCEDURE — 25000003 PHARM REV CODE 250: Performed by: NURSE PRACTITIONER

## 2024-08-04 PROCEDURE — 81001 URINALYSIS AUTO W/SCOPE: CPT | Performed by: NURSE PRACTITIONER

## 2024-08-04 PROCEDURE — 81003 URINALYSIS AUTO W/O SCOPE: CPT | Performed by: NURSE PRACTITIONER

## 2024-08-04 PROCEDURE — 99285 EMERGENCY DEPT VISIT HI MDM: CPT | Mod: 25

## 2024-08-04 PROCEDURE — 96361 HYDRATE IV INFUSION ADD-ON: CPT

## 2024-08-04 PROCEDURE — 63600175 PHARM REV CODE 636 W HCPCS: Performed by: NURSE PRACTITIONER

## 2024-08-04 PROCEDURE — 80053 COMPREHEN METABOLIC PANEL: CPT | Performed by: NURSE PRACTITIONER

## 2024-08-04 PROCEDURE — 96375 TX/PRO/DX INJ NEW DRUG ADDON: CPT

## 2024-08-04 PROCEDURE — 85025 COMPLETE CBC W/AUTO DIFF WBC: CPT | Performed by: NURSE PRACTITIONER

## 2024-08-04 PROCEDURE — 36415 COLL VENOUS BLD VENIPUNCTURE: CPT | Performed by: NURSE PRACTITIONER

## 2024-08-04 RX ORDER — HYDROMORPHONE HYDROCHLORIDE 2 MG/ML
1 INJECTION, SOLUTION INTRAMUSCULAR; INTRAVENOUS; SUBCUTANEOUS
Status: COMPLETED | OUTPATIENT
Start: 2024-08-04 | End: 2024-08-04

## 2024-08-04 RX ORDER — METHOCARBAMOL 500 MG/1
500 TABLET, FILM COATED ORAL 4 TIMES DAILY
Qty: 40 TABLET | Refills: 0 | Status: SHIPPED | OUTPATIENT
Start: 2024-08-04 | End: 2024-08-14

## 2024-08-04 RX ORDER — ONDANSETRON HYDROCHLORIDE 2 MG/ML
4 INJECTION, SOLUTION INTRAVENOUS
Status: COMPLETED | OUTPATIENT
Start: 2024-08-04 | End: 2024-08-04

## 2024-08-04 RX ORDER — NITROFURANTOIN 25; 75 MG/1; MG/1
100 CAPSULE ORAL 2 TIMES DAILY
Qty: 10 CAPSULE | Refills: 0 | Status: SHIPPED | OUTPATIENT
Start: 2024-08-04 | End: 2024-08-09

## 2024-08-04 RX ORDER — IBUPROFEN 800 MG/1
800 TABLET ORAL EVERY 6 HOURS PRN
Qty: 20 TABLET | Refills: 0 | Status: SHIPPED | OUTPATIENT
Start: 2024-08-04

## 2024-08-04 RX ORDER — MORPHINE SULFATE 4 MG/ML
4 INJECTION, SOLUTION INTRAMUSCULAR; INTRAVENOUS
Status: COMPLETED | OUTPATIENT
Start: 2024-08-04 | End: 2024-08-04

## 2024-08-04 RX ORDER — POTASSIUM CHLORIDE 20 MEQ/1
20 TABLET, EXTENDED RELEASE ORAL
Status: COMPLETED | OUTPATIENT
Start: 2024-08-04 | End: 2024-08-04

## 2024-08-04 RX ADMIN — PROMETHAZINE HYDROCHLORIDE 25 MG: 25 INJECTION INTRAMUSCULAR; INTRAVENOUS at 01:08

## 2024-08-04 RX ADMIN — POTASSIUM CHLORIDE 20 MEQ: 1500 TABLET, EXTENDED RELEASE ORAL at 12:08

## 2024-08-04 RX ADMIN — MORPHINE SULFATE 4 MG: 4 INJECTION, SOLUTION INTRAMUSCULAR; INTRAVENOUS at 11:08

## 2024-08-04 RX ADMIN — ONDANSETRON 4 MG: 2 INJECTION INTRAMUSCULAR; INTRAVENOUS at 11:08

## 2024-08-04 RX ADMIN — SODIUM CHLORIDE 1000 ML: 9 INJECTION, SOLUTION INTRAVENOUS at 11:08

## 2024-08-04 RX ADMIN — HYDROMORPHONE HYDROCHLORIDE 1 MG: 2 INJECTION INTRAMUSCULAR; INTRAVENOUS; SUBCUTANEOUS at 01:08

## 2024-08-27 ENCOUNTER — HOSPITAL ENCOUNTER (OUTPATIENT)
Dept: GASTROENTEROLOGY | Facility: HOSPITAL | Age: 49
Discharge: HOME | End: 2024-08-27
Attending: NURSE PRACTITIONER
Payer: OTHER GOVERNMENT

## 2024-08-27 DIAGNOSIS — Z12.11 SCREENING FOR MALIGNANT NEOPLASM OF COLON: ICD-10-CM

## 2024-08-27 DIAGNOSIS — Z12.11 COLON CANCER SCREENING: Primary | ICD-10-CM

## 2024-08-28 RX ORDER — POLYETHYLENE GLYCOL 3350, SODIUM SULFATE ANHYDROUS, SODIUM BICARBONATE, SODIUM CHLORIDE, POTASSIUM CHLORIDE 236; 22.74; 6.74; 5.86; 2.97 G/4L; G/4L; G/4L; G/4L; G/4L
4 POWDER, FOR SOLUTION ORAL ONCE
Qty: 4000 ML | Refills: 0 | Status: SHIPPED | OUTPATIENT
Start: 2024-08-28 | End: 2024-08-28

## 2024-08-29 DIAGNOSIS — F41.9 ANXIETY: ICD-10-CM

## 2024-08-29 DIAGNOSIS — I10 PRIMARY HYPERTENSION: ICD-10-CM

## 2024-08-29 RX ORDER — SERTRALINE HYDROCHLORIDE 100 MG/1
100 TABLET, FILM COATED ORAL NIGHTLY
Qty: 90 TABLET | Refills: 0 | OUTPATIENT
Start: 2024-08-29

## 2024-08-29 RX ORDER — METOPROLOL SUCCINATE 100 MG/1
100 TABLET, EXTENDED RELEASE ORAL DAILY
Qty: 90 TABLET | Refills: 3 | Status: SHIPPED | OUTPATIENT
Start: 2024-08-29

## 2024-08-31 ENCOUNTER — HOSPITAL ENCOUNTER (EMERGENCY)
Facility: HOSPITAL | Age: 49
Discharge: HOME | End: 2024-08-31
Payer: OTHER GOVERNMENT

## 2024-08-31 VITALS
WEIGHT: 205 LBS | SYSTOLIC BLOOD PRESSURE: 125 MMHG | HEART RATE: 70 BPM | OXYGEN SATURATION: 96 % | TEMPERATURE: 98 F | DIASTOLIC BLOOD PRESSURE: 84 MMHG | HEIGHT: 67 IN | RESPIRATION RATE: 18 BRPM | BODY MASS INDEX: 32.18 KG/M2

## 2024-08-31 DIAGNOSIS — R10.9 FLANK PAIN: Primary | ICD-10-CM

## 2024-08-31 LAB
ALBUMIN SERPL BCP-MCNC: 3.7 G/DL (ref 3.5–5)
ALBUMIN/GLOB SERPL: 0.9 {RATIO}
ALP SERPL-CCNC: 124 U/L (ref 39–100)
ALT SERPL W P-5'-P-CCNC: 34 U/L (ref 13–56)
ANION GAP SERPL CALCULATED.3IONS-SCNC: 10 MMOL/L (ref 7–16)
AST SERPL W P-5'-P-CCNC: 29 U/L (ref 15–37)
BASOPHILS # BLD AUTO: 0.07 K/UL (ref 0–0.2)
BASOPHILS NFR BLD AUTO: 1.1 % (ref 0–1)
BILIRUB SERPL-MCNC: 0.3 MG/DL (ref ?–1.2)
BILIRUB UR QL STRIP: NEGATIVE
BUN SERPL-MCNC: 10 MG/DL (ref 7–18)
BUN/CREAT SERPL: 10 (ref 6–20)
CALCIUM SERPL-MCNC: 9.3 MG/DL (ref 8.5–10.1)
CHLORIDE SERPL-SCNC: 103 MMOL/L (ref 98–107)
CLARITY UR: CLEAR
CO2 SERPL-SCNC: 30 MMOL/L (ref 21–32)
COLOR UR: ABNORMAL
CREAT SERPL-MCNC: 0.97 MG/DL (ref 0.55–1.02)
DIFFERENTIAL METHOD BLD: ABNORMAL
EGFR (NO RACE VARIABLE) (RUSH/TITUS): 73 ML/MIN/1.73M2
EOSINOPHIL # BLD AUTO: 0.17 K/UL (ref 0–0.5)
EOSINOPHIL NFR BLD AUTO: 2.7 % (ref 1–4)
ERYTHROCYTE [DISTWIDTH] IN BLOOD BY AUTOMATED COUNT: 12.3 % (ref 11.5–14.5)
GLOBULIN SER-MCNC: 3.9 G/DL (ref 2–4)
GLUCOSE SERPL-MCNC: 260 MG/DL (ref 74–106)
GLUCOSE UR STRIP-MCNC: >1000 MG/DL
HCT VFR BLD AUTO: 37.3 % (ref 38–47)
HGB BLD-MCNC: 11.7 G/DL (ref 12–16)
IMM GRANULOCYTES # BLD AUTO: 0.03 K/UL (ref 0–0.04)
IMM GRANULOCYTES NFR BLD: 0.5 % (ref 0–0.4)
KETONES UR STRIP-SCNC: NEGATIVE MG/DL
LEUKOCYTE ESTERASE UR QL STRIP: NEGATIVE
LYMPHOCYTES # BLD AUTO: 3.01 K/UL (ref 1–4.8)
LYMPHOCYTES NFR BLD AUTO: 48.2 % (ref 27–41)
MAGNESIUM SERPL-MCNC: 2.2 MG/DL (ref 1.7–2.3)
MCH RBC QN AUTO: 28.1 PG (ref 27–31)
MCHC RBC AUTO-ENTMCNC: 31.4 G/DL (ref 32–36)
MCV RBC AUTO: 89.4 FL (ref 80–96)
MONOCYTES # BLD AUTO: 0.34 K/UL (ref 0–0.8)
MONOCYTES NFR BLD AUTO: 5.4 % (ref 2–6)
MPC BLD CALC-MCNC: 9.7 FL (ref 9.4–12.4)
NEUTROPHILS # BLD AUTO: 2.63 K/UL (ref 1.8–7.7)
NEUTROPHILS NFR BLD AUTO: 42.1 % (ref 53–65)
NITRITE UR QL STRIP: NEGATIVE
NRBC # BLD AUTO: 0 X10E3/UL
NRBC, AUTO (.00): 0 %
PH UR STRIP: 7 PH UNITS
PLATELET # BLD AUTO: 376 K/UL (ref 150–400)
POTASSIUM SERPL-SCNC: 3.1 MMOL/L (ref 3.5–5.1)
PROT SERPL-MCNC: 7.6 G/DL (ref 6.4–8.2)
PROT UR QL STRIP: NEGATIVE
RBC # BLD AUTO: 4.17 M/UL (ref 4.2–5.4)
RBC # UR STRIP: NEGATIVE /UL
SODIUM SERPL-SCNC: 140 MMOL/L (ref 136–145)
SP GR UR STRIP: 1.03
UROBILINOGEN UR STRIP-ACNC: NORMAL MG/DL
WBC # BLD AUTO: 6.25 K/UL (ref 4.5–11)

## 2024-08-31 PROCEDURE — 36415 COLL VENOUS BLD VENIPUNCTURE: CPT | Performed by: NURSE PRACTITIONER

## 2024-08-31 PROCEDURE — 96374 THER/PROPH/DIAG INJ IV PUSH: CPT

## 2024-08-31 PROCEDURE — 83735 ASSAY OF MAGNESIUM: CPT | Performed by: NURSE PRACTITIONER

## 2024-08-31 PROCEDURE — 85025 COMPLETE CBC W/AUTO DIFF WBC: CPT | Performed by: NURSE PRACTITIONER

## 2024-08-31 PROCEDURE — 25000003 PHARM REV CODE 250: Performed by: NURSE PRACTITIONER

## 2024-08-31 PROCEDURE — 96361 HYDRATE IV INFUSION ADD-ON: CPT

## 2024-08-31 PROCEDURE — 63600175 PHARM REV CODE 636 W HCPCS: Performed by: NURSE PRACTITIONER

## 2024-08-31 PROCEDURE — 96375 TX/PRO/DX INJ NEW DRUG ADDON: CPT

## 2024-08-31 PROCEDURE — 80053 COMPREHEN METABOLIC PANEL: CPT | Performed by: NURSE PRACTITIONER

## 2024-08-31 PROCEDURE — 81003 URINALYSIS AUTO W/O SCOPE: CPT | Performed by: NURSE PRACTITIONER

## 2024-08-31 PROCEDURE — 99284 EMERGENCY DEPT VISIT MOD MDM: CPT | Mod: 25

## 2024-08-31 RX ORDER — KETOROLAC TROMETHAMINE 30 MG/ML
30 INJECTION, SOLUTION INTRAMUSCULAR; INTRAVENOUS
Status: COMPLETED | OUTPATIENT
Start: 2024-08-31 | End: 2024-08-31

## 2024-08-31 RX ORDER — ONDANSETRON HYDROCHLORIDE 2 MG/ML
4 INJECTION, SOLUTION INTRAVENOUS
Status: COMPLETED | OUTPATIENT
Start: 2024-08-31 | End: 2024-08-31

## 2024-08-31 RX ORDER — MORPHINE SULFATE 4 MG/ML
4 INJECTION, SOLUTION INTRAMUSCULAR; INTRAVENOUS
Status: DISCONTINUED | OUTPATIENT
Start: 2024-08-31 | End: 2024-08-31

## 2024-08-31 RX ORDER — POTASSIUM CHLORIDE 20 MEQ/1
40 TABLET, EXTENDED RELEASE ORAL
Status: COMPLETED | OUTPATIENT
Start: 2024-08-31 | End: 2024-08-31

## 2024-08-31 RX ORDER — ONDANSETRON 4 MG/1
4 TABLET, ORALLY DISINTEGRATING ORAL EVERY 6 HOURS PRN
Qty: 10 TABLET | Refills: 0 | Status: SHIPPED | OUTPATIENT
Start: 2024-08-31

## 2024-08-31 RX ADMIN — SODIUM CHLORIDE 1000 ML: 9 INJECTION, SOLUTION INTRAVENOUS at 10:08

## 2024-08-31 RX ADMIN — POTASSIUM CHLORIDE 40 MEQ: 1500 TABLET, EXTENDED RELEASE ORAL at 12:08

## 2024-08-31 RX ADMIN — ONDANSETRON 4 MG: 2 INJECTION INTRAMUSCULAR; INTRAVENOUS at 10:08

## 2024-08-31 RX ADMIN — KETOROLAC TROMETHAMINE 30 MG: 30 INJECTION, SOLUTION INTRAMUSCULAR at 12:08

## 2024-08-31 NOTE — ED PROVIDER NOTES
Encounter Date: 8/31/2024       History     Chief Complaint   Patient presents with    Back Pain     PRESENTS TO ER WITH COMPLAINT OF BACK PAIN AND NAUSEA      48-year-old female presents to the emergency department to be evaluated for left flank pain, nausea and vomiting that began yesterday.  This is a recurrent problem for her that began 1 year ago.  She reports this is just like the pain that she usually has.  Denies any dysuria, fever, chills, constipation, diarrhea.    The history is provided by the patient.     Review of patient's allergies indicates:   Allergen Reactions    Fish containing products Anaphylaxis    Iodinated contrast media     Penicillins      Past Medical History:   Diagnosis Date    Acute upper respiratory infection 08/01/2023    Asthma     CHF (congestive heart failure)     Chronic pain syndrome     Chronic serous otitis media of both ears 04/04/2023    Depressive disorder     Diabetes mellitus, type 2     Diabetic eye exam 08/11/2023    Dr. [facility]    Disorder of sacrum 04/28/2022    Enlarged heart     Exposure to COVID-19 virus 08/01/2023    Gastroparesis     GERD (gastroesophageal reflux disease)     Hyperlipidemia     Hypertension     IBS (irritable bowel syndrome)     Kidney stones     Knee pain, right 01/24/2024    Lumbar radiculopathy     Postlaminectomy syndrome, lumbar region 04/28/2022    Sleep apnea     Does not use a CPAP    Sore throat 08/01/2023    Thyroid disease     Unspecified chronic bronchitis      Past Surgical History:   Procedure Laterality Date    Bilateral L3-S1 MBB Bilateral 6-, 5-, 1-8-2014    Dr Jessica Randolph    CARPAL TUNNEL RELEASE      CHOLECYSTECTOMY      DIAGNOSTIC LAPAROSCOPY  04/14/2010    Exploratory Laparotomy, Myomectomy, Hydrotubation-Dr. Feng    DILATION AND CURETTAGE OF UTERUS  04/14/2010    Hysteroscopy-Dr. Feng    EXPLORATORY LAPAROTOMY WITH UTERINE MYOMECTOMY  02/27/2007    Dr. Marquise Anderson     HYSTERECTOMY  09/29/2011    Robotic, FABIENNE, Cystoscopy-Dr. Feng    HYSTEROSCOPY WITH DILATION AND CURETTAGE OF UTERUS  04/04/2006    Laparoscopy, Chromotubation-Dr. Marquise Anderson    INJECTION OF ANESTHETIC AGENT AROUND ILIOINGUINAL NERVE Right 6/22/2023    Procedure: BLOCK, NERVE, ILIOINGUINAL;  Surgeon: Rasheeda Bills MD;  Location: [facility];  Service: Pain Management;  Laterality: Right;    INJECTION OF ANESTHETIC AGENT AROUND MEDIAL BRANCH NERVES INNERVATING LUMBAR FACET JOINT Bilateral 9/21/2023    Procedure: BLOCK, NERVE, FACET JOINT, LUMBAR, MEDIAL BRANCH;  Surgeon: Rasheeda Bills MD;  Location: [facility];  Service: Pain Management;  Laterality: Bilateral;    INJECTION OF ANESTHETIC AGENT AROUND MEDIAL BRANCH NERVES INNERVATING LUMBAR FACET JOINT Bilateral 3/14/2024    Procedure: BLOCK, NERVE, FACET JOINT, LUMBAR, MEDIAL BRANCH, BILATERAL L4-S1 MBB;  Surgeon: Rasheeda Bills MD;  Location: [facility];  Service: Pain Management;  Laterality: Bilateral;    LAMINECTOMY N/A 11/30/2021    Procedure: L2-L5 Laminectomy;  Surgeon: Cyril Upton MD;  Location: [facility] OR;  Service: Neurosurgery;  Laterality: N/A;    LEFT HEART CATHETERIZATION      Left L3-S1 RFTC Left 07/18/2017    Dr Lopez    Left SI JI Left 09/30/2013    Dr Randolph    MYRINGOTOMY WITH INSERTION OF VENTILATION TUBE Bilateral 4/4/2023    Procedure: MYRINGOTOMY, WITH TYMPANOSTOMY TUBE INSERTION (T-TUBES);  Surgeon: Sea Hinton MD;  Location: [facility];  Service: ENT;  Laterality: Bilateral;  T-TUBES    MYRINGOTOMY WITH INSERTION OF VENTILATION TUBE Bilateral 9/12/2023    Procedure: MYRINGOTOMY, WITH TYMPANOSTOMY TUBE INSERTION, T-TUBES;  Surgeon: Sea Hinton MD;  Location: [facility];  Service: ENT;  Laterality: Bilateral;    MYRINGOTOMY WITH INSERTION OF VENTILATION TUBE Bilateral 7/2/2024    Procedure: MYRINGOTOMY, WITH TYMPANOSTOMY TUBE INSERTION;  Surgeon: Sea Hinton MD;   Location: [facility] OR;  Service: ENT;  Laterality: Bilateral;  Bilateral T-Tubes    OOPHORECTOMY  11/11/2014    Robotic, FABIENNE, Cystoscopy-Dr. Feng    SINUS SURGERY       Family History   Problem Relation Name Age of Onset    Diabetes Mellitus Mother      Heart disease Mother      Hypertension Mother      Migraines Mother      Heart disease Sister       Social History     Tobacco Use    Smoking status: Never     Passive exposure: Never    Smokeless tobacco: Never   Substance Use Topics    Alcohol use: Not Currently    Drug use: Not Currently     Types: Hydrocodone, Oxycodone     Review of Systems   Constitutional:  Negative for chills and fever.   Gastrointestinal:  Positive for abdominal pain, nausea and vomiting. Negative for constipation and diarrhea.   Genitourinary:  Negative for dysuria.   All other systems reviewed and are negative.      Physical Exam     Initial Vitals [08/31/24 0950]   BP Pulse Resp Temp SpO2   125/84 70 18 98.1 °F (36.7 °C) 96 %      MAP       --         Physical Exam    Vitals reviewed.  Constitutional: She appears well-developed and well-nourished.   Neck: Neck supple.   Cardiovascular:  Normal rate and regular rhythm.           Pulmonary/Chest: Breath sounds normal.   Abdominal: Abdomen is soft. Bowel sounds are normal. There is abdominal tenderness (Minimal diffuse tenderness).   Musculoskeletal:         General: Normal range of motion.      Cervical back: Normal and neck supple.      Thoracic back: Normal.      Lumbar back: Tenderness present. No swelling, edema, deformity, signs of trauma, lacerations, spasms or bony tenderness. Normal range of motion. Negative right straight leg raise test and negative left straight leg raise test. No scoliosis.        Back:      Neurological: She is alert and oriented to person, place, and time. She has normal strength. GCS score is 15. GCS eye subscore is 4. GCS verbal subscore is 5. GCS motor subscore is 6.   Skin: Skin is warm and dry.  Capillary refill takes less than 2 seconds.   Psychiatric: She has a normal mood and affect.         Medical Screening Exam   See Full Note    ED Course   Procedures  Labs Reviewed   COMPREHENSIVE METABOLIC PANEL - Abnormal       Result Value    Sodium 140      Potassium 3.1 (*)     Chloride 103      CO2 30      Anion Gap 10      Glucose 260 (*)     BUN 10      Creatinine 0.97      BUN/Creatinine Ratio 10      Calcium 9.3      Total Protein 7.6      Albumin 3.7      Globulin 3.9      A/G Ratio 0.9      Bilirubin, Total 0.3      Alk Phos 124 (*)     ALT 34      AST 29      eGFR 73     URINALYSIS - Abnormal    Color, UA Light-Yellow      Clarity, UA Clear      pH, UA 7.0      Leukocytes, UA Negative      Nitrites, UA Negative      Protein, UA Negative      Glucose, UA >1000 (*)     Ketones, UA Negative      Urobilinogen, UA Normal      Bilirubin, UA Negative      Blood, UA Negative      Specific Gravity, UA 1.035 (*)    CBC WITH DIFFERENTIAL - Abnormal    WBC 6.25      RBC 4.17 (*)     Hemoglobin 11.7 (*)     Hematocrit 37.3 (*)     MCV 89.4      MCH 28.1      MCHC 31.4 (*)     RDW 12.3      Platelet Count 376      MPV 9.7      Neutrophils % 42.1 (*)     Lymphocytes % 48.2 (*)     Monocytes % 5.4      Eosinophils % 2.7      Basophils % 1.1 (*)     Immature Granulocytes % 0.5 (*)     nRBC, Auto 0.0      Neutrophils, Abs 2.63      Lymphocytes, Absolute 3.01      Monocytes, Absolute 0.34      Eosinophils, Absolute 0.17      Basophils, Absolute 0.07      Immature Granulocytes, Absolute 0.03      nRBC, Absolute 0.00      Diff Type Auto     MAGNESIUM - Normal    Magnesium 2.2     CBC W/ AUTO DIFFERENTIAL    Narrative:     The following orders were created for panel order CBC auto differential.  Procedure                               Abnormality         Status                     ---------                               -----------         ------                     CBC with Differential[[id number]]       Abnormal             Final result                 Please view results for these tests on the individual orders.          Imaging Results              XR ABDOMEN, ACUTE 2 OR MORE VIEWS WITH CHEST (Final result)  Result time 08/31/24 11:41:52      Final result by Valeriy Suarez MD (08/31/24 11:41:52)                   Impression:      No acute radiographic abnormality in the abdomen.    A moderate degree of fecal stasis/constipation is suspected.    Place of service: [facility] [place]      Electronically signed by: Valeriy Suarez  Date:    08/31/2024  Time:    11:41               Narrative:    EXAMINATION:  XR ABDOMEN ACUTE 2 OR MORE VIEWS WITH CHEST    CLINICAL HISTORY:  left lower abdominal pain;    TECHNIQUE:  Flat and upright    COMPARISON:  Prior radiograph 06/18/2024    FINDINGS:  Bowel gas pattern is nonspecific and within normal limits. No abnormally dilated small bowel loops to suggest obstruction. There is no free air within the abdomen. There is a moderate amount of stool seen throughout the colon.  Cholecystectomy clips are noted    No abnormal calcific densities project within the abdomen.  Multiple calcific densities in the pelvis are similar to prior and most compatible with calcified phleboliths.    Lung bases are predominantly clear. There is no acute osseous abnormality.                                       Medications   potassium chloride SA CR tablet 40 mEq (has no administration in time range)   ketorolac injection 30 mg (has no administration in time range)   sodium chloride 0.9% bolus 1,000 mL 1,000 mL (0 mLs Intravenous Stopped 8/31/24 1127)   ondansetron injection 4 mg (4 mg Intravenous Given 8/31/24 1008)     Medical Decision Making  48-year-old female presents to the emergency department to be evaluated for left flank pain, nausea and vomiting that began yesterday.  This is a recurrent problem for her that began 1 year ago.  She reports this is just like the pain that she usually has.  Denies any  dysuria, fever, chills, constipation, diarrhea.  I have reviewed the patient's records.  She has had 5 CTs of her abdomen over the past year which were all significant for no acute process.  She was scheduled to have a colonoscopy last week, but was not clear enough to have the procedure done.  She is scheduled to have her colonoscopy next week.  Labs ordered and reviewed  Treated with potassium chloride p.o.  X-rays ordered, films reviewed as well as radiologist's interpretation significant for no acute process  Diagnosis: Flank pain  I instructed the patient to follow-up with gastroenterology and return to the emergency department for any increase in symptoms or for any other new or worrisome symptoms.      Amount and/or Complexity of Data Reviewed  Labs: ordered.  Radiology: ordered.    Risk  Prescription drug management.                                      Clinical Impression:   Final diagnoses:  [R10.9] Flank pain (Primary)        ED Disposition Condition    Discharge Stable          ED Prescriptions       Medication Sig Dispense Start Date End Date Auth. Provider    ondansetron (ZOFRAN-ODT) 4 MG TbDL Take 1 tablet (4 mg total) by mouth every 6 (six) hours as needed. 10 tablet 8/31/2024 -- Misti Garcia FNP          Follow-up Information    None          Misti Garcia FNP  08/31/24 9930

## 2024-08-31 NOTE — DISCHARGE INSTRUCTIONS
Clear liquid diet.  Advance diet as tolerated.  Follow up with gastroenterology. Follow up with your primary care provider in 2 days. Return to the emergency department for any increase in symptoms or for any other new or worrisome symptoms.

## 2024-09-03 DIAGNOSIS — I10 PRIMARY HYPERTENSION: ICD-10-CM

## 2024-09-03 RX ORDER — CLONIDINE HYDROCHLORIDE 0.2 MG/1
0.2 TABLET ORAL 4 TIMES DAILY
Qty: 360 TABLET | Refills: 3 | Status: SHIPPED | OUTPATIENT
Start: 2024-09-03

## 2024-09-03 RX ORDER — HYDRALAZINE HYDROCHLORIDE 100 MG/1
100 TABLET, FILM COATED ORAL 3 TIMES DAILY
Qty: 270 TABLET | Refills: 3 | Status: SHIPPED | OUTPATIENT
Start: 2024-09-03

## 2024-09-09 ENCOUNTER — HOSPITAL ENCOUNTER (OUTPATIENT)
Dept: GASTROENTEROLOGY | Facility: HOSPITAL | Age: 49
Discharge: HOME | End: 2024-09-09
Attending: NURSE PRACTITIONER | Admitting: INTERNAL MEDICINE
Payer: OTHER GOVERNMENT

## 2024-09-09 ENCOUNTER — ANESTHESIA (OUTPATIENT)
Dept: GASTROENTEROLOGY | Facility: HOSPITAL | Age: 49
End: 2024-09-09
Payer: OTHER GOVERNMENT

## 2024-09-09 ENCOUNTER — ANESTHESIA EVENT (OUTPATIENT)
Dept: GASTROENTEROLOGY | Facility: HOSPITAL | Age: 49
End: 2024-09-09
Payer: OTHER GOVERNMENT

## 2024-09-09 VITALS
OXYGEN SATURATION: 98 % | RESPIRATION RATE: 12 BRPM | WEIGHT: 190 LBS | SYSTOLIC BLOOD PRESSURE: 147 MMHG | DIASTOLIC BLOOD PRESSURE: 77 MMHG | HEIGHT: 67 IN | TEMPERATURE: 97 F | BODY MASS INDEX: 29.82 KG/M2 | HEART RATE: 74 BPM

## 2024-09-09 DIAGNOSIS — Z12.11 SCREENING FOR MALIGNANT NEOPLASM OF COLON: ICD-10-CM

## 2024-09-09 DIAGNOSIS — Z12.11 SCREENING FOR COLON CANCER: Primary | ICD-10-CM

## 2024-09-09 LAB — GLUCOSE SERPL-MCNC: 187 MG/DL (ref 70–105)

## 2024-09-09 PROCEDURE — 25000003 PHARM REV CODE 250

## 2024-09-09 PROCEDURE — 82962 GLUCOSE BLOOD TEST: CPT

## 2024-09-09 PROCEDURE — G0121 COLON CA SCRN NOT HI RSK IND: HCPCS | Performed by: INTERNAL MEDICINE

## 2024-09-09 PROCEDURE — 27000284 HC CANNULA NASAL

## 2024-09-09 PROCEDURE — 63600175 PHARM REV CODE 636 W HCPCS

## 2024-09-09 PROCEDURE — 37000008 HC ANESTHESIA 1ST 15 MINUTES

## 2024-09-09 RX ORDER — LIDOCAINE HYDROCHLORIDE 20 MG/ML
INJECTION, SOLUTION EPIDURAL; INFILTRATION; INTRACAUDAL; PERINEURAL
Status: DISCONTINUED | OUTPATIENT
Start: 2024-09-09 | End: 2024-09-09

## 2024-09-09 RX ORDER — SODIUM CHLORIDE, SODIUM LACTATE, POTASSIUM CHLORIDE, CALCIUM CHLORIDE 600; 310; 30; 20 MG/100ML; MG/100ML; MG/100ML; MG/100ML
INJECTION, SOLUTION INTRAVENOUS CONTINUOUS
Status: DISCONTINUED | OUTPATIENT
Start: 2024-09-09 | End: 2024-09-10 | Stop reason: HOSPADM

## 2024-09-09 RX ORDER — PROPOFOL 10 MG/ML
VIAL (ML) INTRAVENOUS
Status: DISCONTINUED | OUTPATIENT
Start: 2024-09-09 | End: 2024-09-09

## 2024-09-09 RX ORDER — SODIUM CHLORIDE 0.9 % (FLUSH) 0.9 %
10 SYRINGE (ML) INJECTION EVERY 6 HOURS PRN
Status: DISCONTINUED | OUTPATIENT
Start: 2024-09-09 | End: 2024-09-10 | Stop reason: HOSPADM

## 2024-09-09 RX ADMIN — PROPOFOL 30 MG: 10 INJECTION, EMULSION INTRAVENOUS at 10:09

## 2024-09-09 RX ADMIN — SODIUM CHLORIDE: 9 INJECTION, SOLUTION INTRAVENOUS at 10:09

## 2024-09-09 RX ADMIN — PROPOFOL 50 MG: 10 INJECTION, EMULSION INTRAVENOUS at 10:09

## 2024-09-09 RX ADMIN — LIDOCAINE HYDROCHLORIDE 80 MG: 20 INJECTION, SOLUTION INTRAVENOUS at 10:09

## 2024-09-09 NOTE — DISCHARGE INSTRUCTIONS
Procedure Date  9/9/24     Impression  Overall Impression:   Poor prep causing difficulty with visualization     Recommendation  Schedule repeat colonoscopy         - Two day prep with Golytely and miralax next procedure  - Discharge patient to home  - Advance diet as tolerated  - Continue present medications  - Patient has a contact number available for emergencies. The signs and symptoms of potential delayed complications were discussed with the patient. Return to normal activities tomorrow. Written discharge instructions were provided to the patient.     Indication  Screening for malignant neoplasm of colon

## 2024-09-09 NOTE — ANESTHESIA PREPROCEDURE EVALUATION
09/09/2024  Carey Leonardo is a 48 y.o., female.    Current Outpatient Medications on File Prior to Encounter   Medication Sig Dispense Refill    albuterol (VENTOLIN HFA) 90 mcg/actuation inhaler Inhale 2 puffs into the lungs every 6 (six) hours as needed for Wheezing. Rescue 18 g 0    atorvastatin (LIPITOR) 20 MG tablet Take 1 tablet (20 mg total) by mouth every evening. 90 tablet 0    cloNIDine (CATAPRES) 0.2 MG tablet Take 1 tablet (0.2 mg total) by mouth 4 (four) times daily. 360 tablet 3    cyclobenzaprine (FLEXERIL) 10 MG tablet Take 1 tablet (10 mg total) by mouth 3 (three) times daily as needed for Muscle spasms. 90 tablet 0    empagliflozin (JARDIANCE) 10 mg tablet Take 1 tablet (10 mg total) by mouth once daily. 90 tablet 3    glipizide-metformin (METAGLIP) 5-500 mg per tablet Take 1 tablet by mouth 2 (two) times daily before meals. 180 tablet 0    hydrALAZINE (APRESOLINE) 100 MG tablet Take 1 tablet (100 mg total) by mouth 3 (three) times daily. 270 tablet 3    ibuprofen (ADVIL,MOTRIN) 800 MG tablet Take 1 tablet (800 mg total) by mouth every 6 (six) hours as needed for Pain. 20 tablet 0    insulin glargine U-100, Lantus, (LANTUS SOLOSTAR U-100 INSULIN) 100 unit/mL (3 mL) InPn pen Inject 10 Units into the skin every evening. 10 mL 3    ipratropium (ATROVENT) 21 mcg (0.03 %) nasal spray 2 sprays by Each Nostril route 2 (two) times daily. 30 mL 0    levothyroxine (SYNTHROID) 200 MCG tablet TAKE 1 TABLET(200 MCG) BY MOUTH BEFORE BREAKFAST 90 tablet 1    lurasidone (LATUDA) 20 mg Tab tablet       meloxicam (MOBIC) 15 MG tablet Take 1 tablet (15 mg total) by mouth once daily. 30 tablet 0    metoprolol succinate (TOPROL-XL) 100 MG 24 hr tablet Take 1 tablet (100 mg total) by mouth once daily. 90 tablet 3    NIFEdipine (PROCARDIA-XL) 60 MG (OSM) 24 hr tablet Take 1 tablet (60 mg total) by mouth once  "daily. 90 tablet 0    ofloxacin (FLOXIN) 0.3 % otic solution Place 3 drops into the right ear 2 (two) times daily. 10 mL 0    ondansetron (ZOFRAN-ODT) 4 MG TbDL Take 1 tablet (4 mg total) by mouth every 6 (six) hours as needed. 10 tablet 0    pen needle, diabetic 32 gauge x 5/32" Ndle 1 Application by Misc.(Non-Drug; Combo Route) route once daily. 100 each 5    sertraline (ZOLOFT) 100 MG tablet Take 1 tablet (100 mg total) by mouth every evening. 90 tablet 0    venlafaxine (EFFEXOR-XR) 150 MG Cp24 TAKE 1 CAPSULE BY MOUTH EVERY DAY WITH FOOD 90 capsule 0     No current facility-administered medications on file prior to encounter.     Active Ambulatory Problems     Diagnosis Date Noted    Lumbar spondylosis 11/30/2021    Type 2 diabetes mellitus without complication, without long-term current use of insulin 02/11/2022    Lumbosacral spondylosis without myelopathy 04/28/2022    Hypertension 08/02/2022    Hypothyroidism 08/02/2022    Chronic serous otitis media of both ears 04/04/2023    Anxiety 04/15/2023    Lumbosacral radiculopathy 05/18/2023     Resolved Ambulatory Problems     Diagnosis Date Noted    Postlaminectomy syndrome, lumbar region 04/28/2022    Disorder of sacrum 04/28/2022    Pain 04/19/2023    Decreased strength 04/19/2023    Decreased range of motion 04/19/2023    Exposure to COVID-19 virus 08/01/2023    Sore throat 08/01/2023    Acute upper respiratory infection 08/01/2023    Knee pain, right 01/24/2024     Past Medical History:   Diagnosis Date    Asthma     CHF (congestive heart failure)     Chronic pain syndrome     Depressive disorder     Diabetes mellitus, type 2     Diabetic eye exam 08/11/2023    Enlarged heart     Gastroparesis     GERD (gastroesophageal reflux disease)     Hyperlipidemia     IBS (irritable bowel syndrome)     Kidney stones     Lumbar radiculopathy     Sleep apnea     Thyroid disease     Unspecified chronic bronchitis      Past Surgical History:   Procedure Laterality Date    " Bilateral L3-S1 MBB Bilateral 6-, 5-, 1-8-2014    Dr Jessica Randolph    CARPAL TUNNEL RELEASE      CHOLECYSTECTOMY      DIAGNOSTIC LAPAROSCOPY  04/14/2010    Exploratory Laparotomy, Myomectomy, Hydrotubation-Dr. Feng    DILATION AND CURETTAGE OF UTERUS  04/14/2010    Hysteroscopy-Dr. Feng    EXPLORATORY LAPAROTOMY WITH UTERINE MYOMECTOMY  02/27/2007    Dr. Marquise Anderson    HYSTERECTOMY  09/29/2011    Robotic, FABIENNE, Cystoscopy-Dr. Feng    HYSTEROSCOPY WITH DILATION AND CURETTAGE OF UTERUS  04/04/2006    Laparoscopy, Chromotubation-Dr. Marquise Anderson    INJECTION OF ANESTHETIC AGENT AROUND ILIOINGUINAL NERVE Right 6/22/2023    Procedure: BLOCK, NERVE, ILIOINGUINAL;  Surgeon: Rasheeda Bills MD;  Location: [facility];  Service: Pain Management;  Laterality: Right;    INJECTION OF ANESTHETIC AGENT AROUND MEDIAL BRANCH NERVES INNERVATING LUMBAR FACET JOINT Bilateral 9/21/2023    Procedure: BLOCK, NERVE, FACET JOINT, LUMBAR, MEDIAL BRANCH;  Surgeon: Rasheeda Bills MD;  Location: [facility];  Service: Pain Management;  Laterality: Bilateral;    INJECTION OF ANESTHETIC AGENT AROUND MEDIAL BRANCH NERVES INNERVATING LUMBAR FACET JOINT Bilateral 3/14/2024    Procedure: BLOCK, NERVE, FACET JOINT, LUMBAR, MEDIAL BRANCH, BILATERAL L4-S1 MBB;  Surgeon: Rasheeda Bills MD;  Location: [facility];  Service: Pain Management;  Laterality: Bilateral;    LAMINECTOMY N/A 11/30/2021    Procedure: L2-L5 Laminectomy;  Surgeon: Cyril Upton MD;  Location: [facility] OR;  Service: Neurosurgery;  Laterality: N/A;    LEFT HEART CATHETERIZATION      Left L3-S1 RFTC Left 07/18/2017    Dr Lopez    Left SI JI Left 09/30/2013    Dr Randolph    MYRINGOTOMY WITH INSERTION OF VENTILATION TUBE Bilateral 4/4/2023    Procedure: MYRINGOTOMY, WITH TYMPANOSTOMY TUBE INSERTION (T-TUBES);  Surgeon: Sea Hinton MD;  Location: [facility];  Service: ENT;  Laterality: Bilateral;  T-TUBES    MYRINGOTOMY WITH  INSERTION OF VENTILATION TUBE Bilateral 9/12/2023    Procedure: MYRINGOTOMY, WITH TYMPANOSTOMY TUBE INSERTION, T-TUBES;  Surgeon: Sea Hinton MD;  Location: [facility];  Service: ENT;  Laterality: Bilateral;    MYRINGOTOMY WITH INSERTION OF VENTILATION TUBE Bilateral 7/2/2024    Procedure: MYRINGOTOMY, WITH TYMPANOSTOMY TUBE INSERTION;  Surgeon: Sea Hinton MD;  Location: [facility] OR;  Service: ENT;  Laterality: Bilateral;  Bilateral T-Tubes    OOPHORECTOMY  11/11/2014    Robotic, FABIENNE, Cystoscopy-Dr. Feng    SINUS SURGERY         Pre-op Assessment    I have reviewed the Patient Summary Reports.     I have reviewed the Nursing Notes. I have reviewed the NPO Status.   I have reviewed the Medications.     Review of Systems  Anesthesia Hx:  No problems with previous Anesthesia             Denies Family Hx of Anesthesia complications.    Denies Personal Hx of Anesthesia complications.                    Social:  Non-Smoker, No Alcohol Use       Hematology/Oncology:    Oncology Normal    -- Anemia:                                  EENT/Dental:  EENT/Dental Normal           Cardiovascular:     Hypertension       CHF       ECG has been reviewed.                          Pulmonary:    Asthma    Sleep Apnea                Renal/:  Chronic Renal Disease                Hepatic/GI:     GERD             Musculoskeletal:  Arthritis               Neurological:    Neuromuscular Disease,                                   Endocrine:  Diabetes, poorly controlled, type 2 Hypothyroidism          Dermatological:  Skin Normal    Psych:  Psychiatric History                  Chemistry        Component Value Date/Time     08/31/2024 0927    K 3.1 (L) 08/31/2024 0927     08/31/2024 0927    CO2 30 08/31/2024 0927    BUN 10 08/31/2024 0927    CREATININE 0.97 08/31/2024 0927     (H) 08/31/2024 0927        Component Value Date/Time    CALCIUM 9.3 08/31/2024 0927    ALKPHOS 124 (H) 08/31/2024 0927     AST 29 08/31/2024 0927    ALT 34 08/31/2024 0927    BILITOT 0.3 08/31/2024 0927    ESTGFRAFRICA 107 11/23/2021 1346    EGFRNONAA 68 06/15/2022 0943        Lab Results   Component Value Date    WBC 6.25 08/31/2024    HGB 11.7 (L) 08/31/2024    HCT 37.3 (L) 08/31/2024    MCV 89.4 08/31/2024     08/31/2024       Lab Results   Component Value Date    HGBA1C 8.7 (H) 06/20/2024         Results for orders placed or performed during the hospital encounter of 04/04/23   EKG 12-lead    Collection Time: 04/04/23  9:57 AM    Narrative    Test Reason : R07.9,    Vent. Rate : 084 BPM     Atrial Rate : 084 BPM     P-R Int : 178 ms          QRS Dur : 076 ms      QT Int : 386 ms       P-R-T Axes : 057 015 013 degrees     QTc Int : 456 ms    Normal sinus rhythm  Nonspecific T wave abnormality  Abnormal ECG  When compared with ECG of 11-AUG-2022 10:03,  PREVIOUS ECG IS PRESENT  Confirmed by Kimberly Haas MD ([id number]) on 4/18/2023 12:02:54 PM    Referred By: HEMAL DUNHAM           Confirmed By:Kimberly Haas MD         Physical Exam  General: Well nourished, Cooperative, Alert and Oriented    Airway:  Mallampati: III   Mouth Opening: Normal  TM Distance: Normal  Tongue: Normal  Neck ROM: Normal ROM    Dental:  Intact, Partial Dentures    Chest/Lungs:  Normal Respiratory Rate        Anesthesia Plan  Type of Anesthesia, risks & benefits discussed:    Anesthesia Type: MAC  Intra-op Monitoring Plan: Standard ASA Monitors  Post Op Pain Control Plan: multimodal analgesia  Induction:  IV  Informed Consent: Informed consent signed with the Patient and all parties understand the risks and agree with anesthesia plan.  All questions answered. Patient consented to blood products? Yes  ASA Score: 3  Day of Surgery Review of History & Physical: H&P Update referred to the surgeon/provider.I have interviewed and examined the patient. I have reviewed the patient's H&P dated: There are no significant changes.     Ready For Surgery From Anesthesia  Perspective.     .

## 2024-09-09 NOTE — TELEPHONE ENCOUNTER
Please send golytely to patient preferred pharmacy per request procedure nurse, poor prep on 9/9/24

## 2024-09-09 NOTE — ANESTHESIA POSTPROCEDURE EVALUATION
Anesthesia Post Evaluation    Patient: Carey LANGLEY Malissa    Procedure(s) Performed: * Colonoscopy *    Final Anesthesia Type: MAC      Patient location during evaluation: GI PACU  Patient participation: Yes- Able to Participate  Level of consciousness: awake and alert  Post-procedure vital signs: reviewed and stable  Pain management: adequate  Airway patency: patent    PONV status at discharge: No PONV  Anesthetic complications: no      Cardiovascular status: blood pressure returned to baseline and hemodynamically stable  Respiratory status: unassisted, spontaneous ventilation and room air  Hydration status: euvolemic  Follow-up not needed.  Comments: Refer to nursing notes for pain/julio cesar score upon discharge from recovery.              Vitals Value Taken Time   /77 09/09/24 1124   Temp 36.1 °C (97 °F) 09/09/24 1051   Pulse 74 09/09/24 1124   Resp 12 09/09/24 1124   SpO2 98 % 09/09/24 1124         Event Time   Out of Recovery 11:48:46         Pain/Julio Cesar Score: Julio Cesar Score: 10 (9/9/2024 11:06 AM)

## 2024-09-09 NOTE — LETTER
September 9, 2024      [facility]  [address] [place] [address]  Phone: [phone]  Fax: [phone]       Patient: Carey Leonardo   YOB: 1975  Date of Visit: 09/09/2024    To Whom It May Concern:    Edilson Leonardo  was at [facility] on 09/09/2024. The patient may return to work on 09/10/2024 with no restrictions. If you have any questions or concerns, or if I can be of further assistance, please do not hesitate to contact me.    Sincerely,      MarycruzRN

## 2024-09-09 NOTE — TRANSFER OF CARE
"Anesthesia Transfer of Care Note    Patient: Carey Leonardo    Procedure(s) Performed: * Incomplete Colonoscopy *    Patient location: GI    Anesthesia Type: MAC    Transport from OR: Transported from OR on room air with adequate spontaneous ventilation. Continuous ECG monitoring in transport. Continuous SpO2 monitoring in transport    Post pain: adequate analgesia    Post assessment: no apparent anesthetic complications    Post vital signs: stable    Level of consciousness: sedated and responds to stimulation    Nausea/Vomiting: no nausea/vomiting    Complications: none    Transfer of care protocol was followedComments: Good SV continue, NAD, VSS, RTRN      Last vitals: Visit Vitals  /83 (BP Location: Right arm, Patient Position: Lying)   Pulse 77   Temp 36.1 °C (97 °F) (Skin)   Resp 16   Ht 5' 7" (1.702 m)   Wt 86.2 kg (190 lb)   LMP  (LMP Unknown)   SpO2 98%   Breastfeeding No   BMI 29.76 kg/m²     "

## 2024-09-09 NOTE — H&P
History & Physical - Short Stay  Gastroenterology      SUBJECTIVE:     Procedure: Colonoscopy    Chief Complaint/Indication for Procedure: Screening    (Not in a hospital admission)      Review of patient's allergies indicates:   Allergen Reactions    Fish containing products Anaphylaxis    Iodinated contrast media     Penicillins         Past Medical History:   Diagnosis Date    Acute upper respiratory infection 08/01/2023    Asthma     CHF (congestive heart failure)     Chronic pain syndrome     Chronic serous otitis media of both ears 04/04/2023    Depressive disorder     Diabetes mellitus, type 2     Diabetic eye exam 08/11/2023    Dr. [facility]    Disorder of sacrum 04/28/2022    Enlarged heart     Exposure to COVID-19 virus 08/01/2023    Gastroparesis     GERD (gastroesophageal reflux disease)     Hyperlipidemia     Hypertension     IBS (irritable bowel syndrome)     Kidney stones     Knee pain, right 01/24/2024    Lumbar radiculopathy     Postlaminectomy syndrome, lumbar region 04/28/2022    Sleep apnea     Does not use a CPAP    Sore throat 08/01/2023    Thyroid disease     Unspecified chronic bronchitis      Past Surgical History:   Procedure Laterality Date    Bilateral L3-S1 MBB Bilateral 6-, 5-, 1-8-2014    Dr Jessica Randolph    CARPAL TUNNEL RELEASE      CHOLECYSTECTOMY      DIAGNOSTIC LAPAROSCOPY  04/14/2010    Exploratory Laparotomy, Myomectomy, Hydrotubation-Dr. Feng    DILATION AND CURETTAGE OF UTERUS  04/14/2010    Hysteroscopy-Dr. Feng    EXPLORATORY LAPAROTOMY WITH UTERINE MYOMECTOMY  02/27/2007    Dr. Marquise Anderson    HYSTERECTOMY  09/29/2011    Robotic, FABIENNE, Cystoscopy-Dr. Feng    HYSTEROSCOPY WITH DILATION AND CURETTAGE OF UTERUS  04/04/2006    Laparoscopy, Chromotubation-Dr. Marquise Anderson    INJECTION OF ANESTHETIC AGENT AROUND ILIOINGUINAL NERVE Right 6/22/2023    Procedure: BLOCK, NERVE, ILIOINGUINAL;  Surgeon: Rasheeda Bills MD;  Location: [facility]  PA[facility];  Service: Pain Management;  Laterality: Right;    INJECTION OF ANESTHETIC AGENT AROUND MEDIAL BRANCH NERVES INNERVATING LUMBAR FACET JOINT Bilateral 9/21/2023    Procedure: BLOCK, NERVE, FACET JOINT, LUMBAR, MEDIAL BRANCH;  Surgeon: Rasheeda Bills MD;  Location: [facility] PAIN [facility];  Service: Pain Management;  Laterality: Bilateral;    INJECTION OF ANESTHETIC AGENT AROUND MEDIAL BRANCH NERVES INNERVATING LUMBAR FACET JOINT Bilateral 3/14/2024    Procedure: BLOCK, NERVE, FACET JOINT, LUMBAR, MEDIAL BRANCH, BILATERAL L4-S1 MBB;  Surgeon: Rasheeda Bills MD;  Location: [facility] PAIN [facility];  Service: Pain Management;  Laterality: Bilateral;    LAMINECTOMY N/A 11/30/2021    Procedure: L2-L5 Laminectomy;  Surgeon: Cyril Upton MD;  Location: [facility] OR;  Service: Neurosurgery;  Laterality: N/A;    LEFT HEART CATHETERIZATION      Left L3-S1 RFTC Left 07/18/2017    Dr Lopez    Left SI JI Left 09/30/2013    Dr Randolph    MYRINGOTOMY WITH INSERTION OF VENTILATION TUBE Bilateral 4/4/2023    Procedure: MYRINGOTOMY, WITH TYMPANOSTOMY TUBE INSERTION (T-TUBES);  Surgeon: Sea Hinton MD;  Location: [facility] OR;  Service: ENT;  Laterality: Bilateral;  T-TUBES    MYRINGOTOMY WITH INSERTION OF VENTILATION TUBE Bilateral 9/12/2023    Procedure: MYRINGOTOMY, WITH TYMPANOSTOMY TUBE INSERTION, T-TUBES;  Surgeon: Sea Hinton MD;  Location: [facility] OR;  Service: ENT;  Laterality: Bilateral;    MYRINGOTOMY WITH INSERTION OF VENTILATION TUBE Bilateral 7/2/2024    Procedure: MYRINGOTOMY, WITH TYMPANOSTOMY TUBE INSERTION;  Surgeon: Sea Hinton MD;  Location: [facility] OR;  Service: ENT;  Laterality: Bilateral;  Bilateral T-Tubes    OOPHORECTOMY  11/11/2014    Robotic, FABIENNE, Cystoscopy-Dr. Feng    SINUS SURGERY       Family History   Problem Relation Name Age of Onset    Diabetes Mellitus Mother      Heart disease Mother      Hypertension Mother      Migraines Mother      Heart disease  Sister       Social History     Tobacco Use    Smoking status: Never     Passive exposure: Never    Smokeless tobacco: Never   Substance Use Topics    Alcohol use: Not Currently    Drug use: Not Currently     Types: Hydrocodone, Oxycodone     OBJECTIVE:       Physical Exam:                                                       GENERAL:  Comfortable, in no acute distress.                                 HEENT EXAM:  Nonicteric.  No adenopathy.  Oropharynx is clear.               NECK:  Supple.                                                               LUNGS:  Clear.                                                               CARDIAC:  Regular rate and rhythm.  S1, S2.  No murmur.                      ABDOMEN:  Soft, positive bowel sounds, nontender.  No hepatosplenomegaly or masses.  No rebound or guarding.                                             EXTREMITIES:  No edema.     MENTAL STATUS:  Normal, alert and oriented.      ASSESSMENT/PLAN:     Assessment: Screening    Plan: Colonoscopy

## 2024-09-10 RX ORDER — POLYETHYLENE GLYCOL 3350, SODIUM SULFATE ANHYDROUS, SODIUM BICARBONATE, SODIUM CHLORIDE, POTASSIUM CHLORIDE 236; 22.74; 6.74; 5.86; 2.97 G/4L; G/4L; G/4L; G/4L; G/4L
4 POWDER, FOR SOLUTION ORAL ONCE
Qty: 4000 ML | Refills: 0 | Status: SHIPPED | OUTPATIENT
Start: 2024-09-10 | End: 2024-09-10

## 2024-09-16 DIAGNOSIS — F41.9 ANXIETY: ICD-10-CM

## 2024-09-17 ENCOUNTER — TELEPHONE (OUTPATIENT)
Dept: PRIMARY CARE CLINIC | Facility: CLINIC | Age: 49
End: 2024-09-17
Payer: OTHER GOVERNMENT

## 2024-09-17 DIAGNOSIS — F41.9 ANXIETY: ICD-10-CM

## 2024-09-17 RX ORDER — SERTRALINE HYDROCHLORIDE 100 MG/1
100 TABLET, FILM COATED ORAL NIGHTLY
Qty: 90 TABLET | Refills: 3 | Status: SHIPPED | OUTPATIENT
Start: 2024-09-17

## 2024-09-17 NOTE — TELEPHONE ENCOUNTER
----- Message from ROGELIO Tierney sent at 9/17/2024  9:18 AM CDT -----  Regarding: RE: REFILL ZOLOFT  No, I sent in zoloft and stop effexor  ----- Message -----  From: Juani Hidalgo RN  Sent: 9/17/2024   9:12 AM CDT  To: ROGELIO Tierney  Subject: RE: REFILL ZOLOFT                                She says she'll stop the effexor. Wants to know if she can take zoloft and something else.  ----- Message -----  From: Zainab Harrell FNP  Sent: 9/17/2024   7:43 AM CDT  To: Juani Hidalgo RN  Subject: RE: REFILL ZOLOFT                                No other antidepressants can be taken with effexor  ----- Message -----  From: Juani Hidalgo RN  Sent: 9/16/2024   3:31 PM CDT  To: ROGELIO Tierney  Subject: RE: REFILL ZOLOFT                                Says she has been taking both for years. Asks if she can have something else that can be taken with Zoloft.  ----- Message -----  From: Zainab Harrell FNP  Sent: 9/16/2024   1:32 PM CDT  To: Juani Hidalgo RN  Subject: FW: REFILL ZOLOFT                                She does not need to take both zoloft and effexor  ----- Message -----  From: Farrah Alvarez  Sent: 9/16/2024   1:18 PM CDT  To: Julio Cesar AARON Staff  Subject: REFILL ZOLOFT                                    Caller patient [phone]  Please refill Zoloft 100mg [facility] [address]  Please return call to patient with any questions.    Thanks

## 2024-09-30 ENCOUNTER — PATIENT MESSAGE (OUTPATIENT)
Dept: FAMILY MEDICINE | Facility: CLINIC | Age: 49
End: 2024-09-30
Payer: OTHER GOVERNMENT

## 2024-09-30 ENCOUNTER — OFFICE VISIT (OUTPATIENT)
Dept: FAMILY MEDICINE | Facility: CLINIC | Age: 49
End: 2024-09-30
Payer: OTHER GOVERNMENT

## 2024-09-30 VITALS
BODY MASS INDEX: 35.47 KG/M2 | HEART RATE: 65 BPM | HEIGHT: 67 IN | TEMPERATURE: 98 F | WEIGHT: 226 LBS | DIASTOLIC BLOOD PRESSURE: 80 MMHG | OXYGEN SATURATION: 98 % | SYSTOLIC BLOOD PRESSURE: 126 MMHG

## 2024-09-30 DIAGNOSIS — E11.9 TYPE 2 DIABETES MELLITUS WITHOUT COMPLICATION, WITHOUT LONG-TERM CURRENT USE OF INSULIN: Primary | ICD-10-CM

## 2024-09-30 DIAGNOSIS — I10 HYPERTENSION, UNSPECIFIED TYPE: ICD-10-CM

## 2024-09-30 DIAGNOSIS — Z23 NEED FOR VACCINATION: ICD-10-CM

## 2024-09-30 DIAGNOSIS — E03.9 HYPOTHYROIDISM, UNSPECIFIED TYPE: ICD-10-CM

## 2024-09-30 PROBLEM — H65.23 CHRONIC SEROUS OTITIS MEDIA OF BOTH EARS: Status: RESOLVED | Noted: 2023-04-04 | Resolved: 2024-09-30

## 2024-09-30 LAB
CHOLEST SERPL-MCNC: 240 MG/DL (ref 0–200)
CHOLEST/HDLC SERPL: 6 {RATIO}
EST. AVERAGE GLUCOSE BLD GHB EST-MCNC: 203 MG/DL
HBA1C MFR BLD HPLC: 8.7 % (ref 4.5–6.6)
HDLC SERPL-MCNC: 40 MG/DL (ref 40–60)
LDLC SERPL CALC-MCNC: 148 MG/DL
LDLC/HDLC SERPL: 3.7 {RATIO}
NONHDLC SERPL-MCNC: 200 MG/DL
TRIGL SERPL-MCNC: 259 MG/DL (ref 35–150)
TSH SERPL DL<=0.005 MIU/L-ACNC: 2.77 UIU/ML (ref 0.36–3.74)
VLDLC SERPL-MCNC: 52 MG/DL

## 2024-09-30 PROCEDURE — 99214 OFFICE O/P EST MOD 30 MIN: CPT | Mod: 25,,, | Performed by: NURSE PRACTITIONER

## 2024-09-30 PROCEDURE — 90656 IIV3 VACC NO PRSV 0.5 ML IM: CPT | Mod: ,,, | Performed by: NURSE PRACTITIONER

## 2024-09-30 PROCEDURE — 84443 ASSAY THYROID STIM HORMONE: CPT | Mod: ,,, | Performed by: CLINICAL MEDICAL LABORATORY

## 2024-09-30 PROCEDURE — 83036 HEMOGLOBIN GLYCOSYLATED A1C: CPT | Mod: ,,, | Performed by: CLINICAL MEDICAL LABORATORY

## 2024-09-30 PROCEDURE — 90471 IMMUNIZATION ADMIN: CPT | Mod: ,,, | Performed by: NURSE PRACTITIONER

## 2024-09-30 PROCEDURE — 80061 LIPID PANEL: CPT | Mod: ,,, | Performed by: CLINICAL MEDICAL LABORATORY

## 2024-09-30 RX ORDER — DULAGLUTIDE 0.75 MG/.5ML
0.75 INJECTION, SOLUTION SUBCUTANEOUS
Qty: 12 PEN | Refills: 3 | Status: SHIPPED | OUTPATIENT
Start: 2024-09-30 | End: 2024-10-01 | Stop reason: SDUPTHER

## 2024-09-30 NOTE — PROGRESS NOTES
Subjective     Patient ID: Carey Leonardo is a 49 y.o. female.    Chief Complaint: 6 Month fu Type 2 diabetes mellitus    Pt presents for a diabetes follow-up.  Protective Sensation (w/ 10 gram monofilament):  Right: Intact  Left: Intact    Visual Inspection:  Normal -  Bilateral    Pedal Pulses:   Right: Present  Left: Present    Posterior Tibialis Pulses:   Right:Present  Left: Present         Review of Systems   Constitutional:  Negative for activity change, appetite change, chills, fatigue and fever.   HENT:  Negative for nasal congestion, ear discharge, nosebleeds, postnasal drip, rhinorrhea, sinus pressure/congestion, sneezing, sore throat and tinnitus.    Eyes:  Negative for pain, discharge, redness and itching.   Respiratory:  Negative for cough, choking, chest tightness, shortness of breath and wheezing.    Cardiovascular:  Negative for chest pain.   Gastrointestinal:  Negative for abdominal distention, abdominal pain, blood in stool, change in bowel habit, constipation, diarrhea, nausea and vomiting.   Genitourinary:  Negative for decreased urine volume, dysuria, flank pain and frequency.   Musculoskeletal:  Negative for back pain and gait problem.   Integumentary:  Negative for wound, breast mass and breast discharge.   Allergic/Immunologic: Negative for immunocompromised state.   Neurological:  Negative for dizziness, light-headedness and headaches.   Psychiatric/Behavioral:  Negative for agitation, behavioral problems and hallucinations.    Breast: Negative for mass         Objective     Physical Exam  Vitals and nursing note reviewed.   Constitutional:       Appearance: Normal appearance.   Cardiovascular:      Rate and Rhythm: Normal rate and regular rhythm.      Heart sounds: Normal heart sounds.   Pulmonary:      Effort: Pulmonary effort is normal.      Breath sounds: Normal breath sounds.   Musculoskeletal:         General: Normal range of motion.   Feet:      Right foot:      Protective  Sensation: 10 sites tested.  10 sites sensed.      Left foot:      Protective Sensation: 10 sites tested.  10 sites sensed.   Neurological:      Mental Status: She is alert and oriented to person, place, and time.   Psychiatric:         Mood and Affect: Mood normal.         Behavior: Behavior normal.            Assessment and Plan     1. Type 2 diabetes mellitus without complication, without long-term current use of insulin  Overview:  Goal A1c less than 7.0%   Jardiance 10 mg oral daily   Glipizide-metformin 5-500 mg    Orders:  -     Hemoglobin A1C; Future; Expected date: 09/30/2024  -     Lipid Panel; Future; Expected date: 09/30/2024  -     TSH; Future; Expected date: 09/30/2024    2. Need for vaccination  -     influenza (Flulaval, Fluzone, Fluarix) 45 mcg/0.5 mL IM vaccine (> or = 6 mo) 0.5 mL    3. Hypertension, unspecified type  Overview:  Goal BP < 130/80      4. Hypothyroidism, unspecified type  Overview:  Synthroid 200 mcg oral daily          Will call pt with lab results. Will obtain last eye exam from [facility]. Pt is requesting to start back taking trulicity for diabetes.          Follow up in about 3 months (around 12/30/2024).

## 2024-09-30 NOTE — LETTER
September 30, 2024      [facility]  [address] [address]  Phone: [phone]  Fax: [phone]       Patient: Carey Leonardo   YOB: 1975  Date of Visit: 09/30/2024    To Whom It May Concern:    Edilson Leonardo  was at [facility] on 09/30/2024. The patient may return to work/school on 10/02/2024 with no restrictions. If you have any questions or concerns, or if I can be of further assistance, please do not hesitate to contact me.    Sincerely,    ROGELIO Stanley

## 2024-09-30 NOTE — PROGRESS NOTES
Subjective:         Patient ID: Carey Leonardo is a 49 y.o. female.    Chief Complaint: Low-back Pain (Patient is having low back pain, right leg and knee pain with swelling.)      Pain  This is a chronic problem. The current episode started more than 1 year ago. The problem occurs daily. The problem has been gradually improving. Associated symptoms include arthralgias. Pertinent negatives include no anorexia, change in bowel habit, chest pain, chills, coughing, diaphoresis, fever, neck pain, sore throat, swollen glands, urinary symptoms, vertigo or vomiting.     Review of Systems   Constitutional:  Negative for activity change, appetite change, chills, diaphoresis, fever and unexpected weight change.   HENT:  Negative for drooling, ear discharge, ear pain, facial swelling, nosebleeds, sore throat, trouble swallowing, voice change and goiter.    Eyes:  Negative for photophobia, pain, discharge, redness and visual disturbance.   Respiratory:  Negative for apnea, cough, choking, chest tightness, shortness of breath, wheezing and stridor.    Cardiovascular:  Negative for chest pain, palpitations and leg swelling.   Gastrointestinal:  Negative for abdominal distention, anorexia, change in bowel habit, diarrhea, rectal pain, vomiting and fecal incontinence.   Endocrine: Negative for cold intolerance, heat intolerance, polydipsia, polyphagia and polyuria.   Genitourinary:  Negative for bladder incontinence, dysuria, flank pain, frequency and hot flashes.   Musculoskeletal:  Positive for arthralgias, back pain and leg pain. Negative for neck pain.   Integumentary:  Negative for color change and pallor.   Allergic/Immunologic: Negative for immunocompromised state.   Neurological:  Negative for dizziness, vertigo, seizures, syncope, facial asymmetry, speech difficulty, light-headedness, memory loss and coordination difficulties.   Hematological:  Negative for adenopathy. Does not bruise/bleed easily.    Psychiatric/Behavioral:  Negative for agitation, behavioral problems, confusion, decreased concentration, dysphoric mood, hallucinations, self-injury and suicidal ideas. The patient is not nervous/anxious and is not hyperactive.            Past Medical History:   Diagnosis Date    Acute upper respiratory infection 08/01/2023    Asthma     CHF (congestive heart failure)     Chronic pain syndrome     Chronic serous otitis media of both ears 04/04/2023    Chronic serous otitis media of both ears 04/04/2023    Depressive disorder     Diabetes mellitus, type 2     Diabetic eye exam 08/11/2023    Dr. [facility]    Disorder of sacrum 04/28/2022    Enlarged heart     Exposure to COVID-19 virus 08/01/2023    Gastroparesis     GERD (gastroesophageal reflux disease)     Hyperlipidemia     Hypertension     IBS (irritable bowel syndrome)     Kidney stones     Knee pain, right 01/24/2024    Lumbar radiculopathy     Postlaminectomy syndrome, lumbar region 04/28/2022    Sleep apnea     Does not use a CPAP    Sore throat 08/01/2023    Thyroid disease     Unspecified chronic bronchitis      Past Surgical History:   Procedure Laterality Date    Bilateral L3-S1 MBB Bilateral 6-, 5-, 1-8-2014    Dr Jessica Randolph    CARPAL TUNNEL RELEASE      CHOLECYSTECTOMY      DIAGNOSTIC LAPAROSCOPY  04/14/2010    Exploratory Laparotomy, Myomectomy, Hydrotubation-Dr. Feng    DILATION AND CURETTAGE OF UTERUS  04/14/2010    Hysteroscopy-Dr. Feng    EXPLORATORY LAPAROTOMY WITH UTERINE MYOMECTOMY  02/27/2007    Dr. Marquise Anderson    HYSTERECTOMY  09/29/2011    Robotic, FABIENNE, Cystoscopy-Dr. Feng    HYSTEROSCOPY WITH DILATION AND CURETTAGE OF UTERUS  04/04/2006    Laparoscopy, Chromotubation-Dr. Marquise Andreson    INJECTION OF ANESTHETIC AGENT AROUND ILIOINGUINAL NERVE Right 6/22/2023    Procedure: BLOCK, NERVE, ILIOINGUINAL;  Surgeon: Rasheeda Bills MD;  Location: [facility];  Service: Pain Management;   Laterality: Right;    INJECTION OF ANESTHETIC AGENT AROUND MEDIAL BRANCH NERVES INNERVATING LUMBAR FACET JOINT Bilateral 9/21/2023    Procedure: BLOCK, NERVE, FACET JOINT, LUMBAR, MEDIAL BRANCH;  Surgeon: Rasheeda Bills MD;  Location: [facility];  Service: Pain Management;  Laterality: Bilateral;    INJECTION OF ANESTHETIC AGENT AROUND MEDIAL BRANCH NERVES INNERVATING LUMBAR FACET JOINT Bilateral 3/14/2024    Procedure: BLOCK, NERVE, FACET JOINT, LUMBAR, MEDIAL BRANCH, BILATERAL L4-S1 MBB;  Surgeon: Rasheeda Bills MD;  Location: [facility];  Service: Pain Management;  Laterality: Bilateral;    LAMINECTOMY N/A 11/30/2021    Procedure: L2-L5 Laminectomy;  Surgeon: Cyril Upton MD;  Location: [facility] OR;  Service: Neurosurgery;  Laterality: N/A;    LEFT HEART CATHETERIZATION      Left L3-S1 RFTC Left 07/18/2017    Dr Lopez    Left SI JI Left 09/30/2013    Dr Randolph    MYRINGOTOMY WITH INSERTION OF VENTILATION TUBE Bilateral 4/4/2023    Procedure: MYRINGOTOMY, WITH TYMPANOSTOMY TUBE INSERTION (T-TUBES);  Surgeon: Sea Hinton MD;  Location: [facility] OR;  Service: ENT;  Laterality: Bilateral;  T-TUBES    MYRINGOTOMY WITH INSERTION OF VENTILATION TUBE Bilateral 9/12/2023    Procedure: MYRINGOTOMY, WITH TYMPANOSTOMY TUBE INSERTION, T-TUBES;  Surgeon: Sea Hinton MD;  Location: [facility] OR;  Service: ENT;  Laterality: Bilateral;    MYRINGOTOMY WITH INSERTION OF VENTILATION TUBE Bilateral 7/2/2024    Procedure: MYRINGOTOMY, WITH TYMPANOSTOMY TUBE INSERTION;  Surgeon: Sea Hinton MD;  Location: [facility] OR;  Service: ENT;  Laterality: Bilateral;  Bilateral T-Tubes    OOPHORECTOMY  11/11/2014    Robotic, FABIENNE, Cystoscopy-Dr. Feng    SINUS SURGERY       Social History     Socioeconomic History    Marital status:    Tobacco Use    Smoking status: Never     Passive exposure: Never    Smokeless tobacco: Never   Substance and Sexual Activity    Alcohol use: Not  "Currently    Drug use: Not Currently     Types: Hydrocodone, Oxycodone    Sexual activity: Not Currently     Social Drivers of Health     Physical Activity: Insufficiently Active (3/28/2024)    Exercise Vital Sign     Days of Exercise per Week: 3 days     Minutes of Exercise per Session: 30 min   Stress: Stress Concern Present (3/28/2024)    [employer] - Occupational Stress Questionnaire     Feeling of Stress : To some extent     Family History   Problem Relation Name Age of Onset    Diabetes Mellitus Mother      Heart disease Mother      Hypertension Mother      Migraines Mother      Heart disease Sister       Review of patient's allergies indicates:   Allergen Reactions    Fish containing products Anaphylaxis    Iodinated contrast media     Penicillins         Objective:  Vitals:    10/01/24 1431 10/01/24 1432   BP: 125/72    Pulse: 70    Resp: 18    Weight: 104.3 kg (230 lb)    Height: 5' 7" (1.702 m)    PainSc: 10-Worst pain ever 10-Worst pain ever   PainLoc: Back            Physical Exam  Vitals and nursing note reviewed. Exam conducted with a chaperone present.   Constitutional:       General: She is awake. She is not in acute distress.     Appearance: Normal appearance. She is not ill-appearing, toxic-appearing or diaphoretic.   HENT:      Head: Normocephalic and atraumatic.      Nose: Nose normal.      Mouth/Throat:      Mouth: Mucous membranes are moist.      Pharynx: Oropharynx is clear.   Eyes:      Conjunctiva/sclera: Conjunctivae normal.      Pupils: Pupils are equal, round, and reactive to light.   Cardiovascular:      Rate and Rhythm: Normal rate.   Pulmonary:      Effort: Pulmonary effort is normal. No respiratory distress.   Abdominal:      Palpations: Abdomen is soft.      Tenderness: There is no guarding.   Musculoskeletal:         General: Normal range of motion.      Cervical back: Normal range of motion and neck supple. No rigidity.   Skin:     General: Skin is " warm and dry.      Coloration: Skin is not jaundiced or pale.   Neurological:      General: No focal deficit present.      Mental Status: She is alert and oriented to person, place, and time. Mental status is at baseline.      Cranial Nerves: No cranial nerve deficit (II-XII).   Psychiatric:         Mood and Affect: Mood normal.         Behavior: Behavior normal. Behavior is cooperative.         Thought Content: Thought content normal.           Colonoscopy  Narrative: Table formatting from the original result was not included.  Procedure Date  9/9/24    Impression  Overall   Impression:    Poor prep causing difficulty with visualization    Recommendation  Schedule repeat colonoscopy     - Two day prep with Golytely and miralax next procedure  - Discharge patient to home  - Advance diet as tolerated  - Continue present medications  - Patient has a contact number available for emergencies. The signs and   symptoms of potential delayed complications were discussed with the   patient. Return to normal activities tomorrow. Written discharge   instructions were provided to the patient.    Indication  Screening for malignant neoplasm of colon    Providers  Alies Rm Technician   Zena Chawla RN Registered Nurse   Eddie Treviño II, Raoul Nicholson CRNA, MD Proceduralist     Medications  General anesthesia - See anesthesia record.    Preprocedure  A history and physical has been performed, and patient medication   allergies have been reviewed. The patient's tolerance of previous   anesthesia has been reviewed. The risks and benefits of the procedure and   the sedation options and risks were discussed with the patient. All   questions were answered and informed consent obtained.    ASA Score: ASA 3 - Patient with moderate systemic disease with functional   limitations  Mallampati Airway Score: II (hard and soft palate, upper portion of   tonsils anduvula visible)    Details of the Procedure  The  patient underwent general anesthesia, which was administered by an   anesthesia professional. The patient's heart rate, blood pressure, level   of consciousness, respirations, oxygen, ECG and ETCO2 were monitored   throughout the procedure. A digital rectal exam was performed. A perianal   exam was performed. The scope was introduced through the anus and advanced   to the ascending colon. The quality of bowel preparation was evaluated   using the [place] Bowel Preparation Scale with scores of: right colon = 0,   transverse colon = 0, left colon = 1. The total BBPS score was 1. Bowel   prep was not adequate. The patient's estimated blood loss was minimal (<5   mL). The procedure was not difficult. The patient tolerated the procedure   well. There were no apparent adverse events.     Scope: Colonoscope  Scope Serial: [id number]    Events    Procedure Events   Event Event Time     Procedure Events   Event Event Time   ENDO SCOPE IN TIME 9/9/2024 10:43 AM   ENDO SCOPE OUT TIME 9/9/2024 10:50 AM     Findings  Poor prep causing difficulty with visualization       Office Visit on 09/30/2024   Component Date Value Ref Range Status    Hemoglobin A1C 09/30/2024 8.7 (H)  4.5 - 6.6 % Final    Estimated Average Glucose 09/30/2024 203  mg/dL Final    Triglycerides 09/30/2024 259 (H)  35 - 150 mg/dL Final    Cholesterol 09/30/2024 240 (H)  0 - 200 mg/dL Final    HDL Cholesterol 09/30/2024 40  40 - 60 mg/dL Final    Cholesterol/HDL Ratio (Risk Factor) 09/30/2024 6.0   Final    Non-HDL 09/30/2024 200  mg/dL Final    LDL Calculated 09/30/2024 148  mg/dL Final    LDL/HDL 09/30/2024 3.7   Final    VLDL 09/30/2024 52  mg/dL Final    TSH 09/30/2024 2.770  0.358 - 3.740 uIU/mL Final   Hospital Outpatient Visit on 09/09/2024   Component Date Value Ref Range Status    POC Glucose 09/09/2024 187 (H)  70 - 105 mg/dL Final   Admission on 08/31/2024, Discharged on 08/31/2024   Component Date Value Ref Range Status    Sodium 08/31/2024 140  136 -  145 mmol/L Final    Potassium 08/31/2024 3.1 (L)  3.5 - 5.1 mmol/L Final    Chloride 08/31/2024 103  98 - 107 mmol/L Final    CO2 08/31/2024 30  21 - 32 mmol/L Final    Anion Gap 08/31/2024 10  7 - 16 mmol/L Final    Glucose 08/31/2024 260 (H)  74 - 106 mg/dL Final    BUN 08/31/2024 10  7 - 18 mg/dL Final    Creatinine 08/31/2024 0.97  0.55 - 1.02 mg/dL Final    BUN/Creatinine Ratio 08/31/2024 10  6 - 20 Final    Calcium 08/31/2024 9.3  8.5 - 10.1 mg/dL Final    Total Protein 08/31/2024 7.6  6.4 - 8.2 g/dL Final    Albumin 08/31/2024 3.7  3.5 - 5.0 g/dL Final    Globulin 08/31/2024 3.9  2.0 - 4.0 g/dL Final    A/G Ratio 08/31/2024 0.9   Final    Bilirubin, Total 08/31/2024 0.3  >0.0 - 1.2 mg/dL Final    Alk Phos 08/31/2024 124 (H)  39 - 100 U/L Final    ALT 08/31/2024 34  13 - 56 U/L Final    AST 08/31/2024 29  15 - 37 U/L Final    eGFR 08/31/2024 73  >=60 mL/min/1.73m2 Final    Color, UA 08/31/2024 Light-Yellow  Colorless, Straw, Yellow, Light Yellow, Dark Yellow Final    Clarity, UA 08/31/2024 Clear  Clear Final    pH, UA 08/31/2024 7.0  5.0 to 8.0 pH Units Final    Leukocytes, UA 08/31/2024 Negative  Negative Final    Nitrites, UA 08/31/2024 Negative  Negative Final    Protein, UA 08/31/2024 Negative  Negative Final    Glucose, UA 08/31/2024 >1000 (A)  Normal mg/dL Final    Ketones, UA 08/31/2024 Negative  Negative mg/dL Final    Urobilinogen, UA 08/31/2024 Normal  0.2, 1.0, Normal mg/dL Final    Bilirubin, UA 08/31/2024 Negative  Negative Final    Blood, UA 08/31/2024 Negative  Negative Final    Specific Gravity, UA 08/31/2024 1.035 (H)  <=1.030 Final    WBC 08/31/2024 6.25  4.50 - 11.00 K/uL Final    RBC 08/31/2024 4.17 (L)  4.20 - 5.40 M/uL Final    Hemoglobin 08/31/2024 11.7 (L)  12.0 - 16.0 g/dL Final    Hematocrit 08/31/2024 37.3 (L)  38.0 - 47.0 % Final    MCV 08/31/2024 89.4  80.0 - 96.0 fL Final    MCH 08/31/2024 28.1  27.0 - 31.0 pg Final    MCHC 08/31/2024 31.4 (L)  32.0 - 36.0 g/dL Final    RDW  08/31/2024 12.3  11.5 - 14.5 % Final    Platelet Count 08/31/2024 376  150 - 400 K/uL Final    MPV 08/31/2024 9.7  9.4 - 12.4 fL Final    Neutrophils % 08/31/2024 42.1 (L)  53.0 - 65.0 % Final    Lymphocytes % 08/31/2024 48.2 (H)  27.0 - 41.0 % Final    Monocytes % 08/31/2024 5.4  2.0 - 6.0 % Final    Eosinophils % 08/31/2024 2.7  1.0 - 4.0 % Final    Basophils % 08/31/2024 1.1 (H)  0.0 - 1.0 % Final    Immature Granulocytes % 08/31/2024 0.5 (H)  0.0 - 0.4 % Final    nRBC, Auto 08/31/2024 0.0  <=0.0 % Final    Neutrophils, Abs 08/31/2024 2.63  1.80 - 7.70 K/uL Final    Lymphocytes, Absolute 08/31/2024 3.01  1.00 - 4.80 K/uL Final    Monocytes, Absolute 08/31/2024 0.34  0.00 - 0.80 K/uL Final    Eosinophils, Absolute 08/31/2024 0.17  0.00 - 0.50 K/uL Final    Basophils, Absolute 08/31/2024 0.07  0.00 - 0.20 K/uL Final    Immature Granulocytes, Absolute 08/31/2024 0.03  0.00 - 0.04 K/uL Final    nRBC, Absolute 08/31/2024 0.00  <=0.00 x10e3/uL Final    Diff Type 08/31/2024 Auto   Final    Magnesium 08/31/2024 2.2  1.7 - 2.3 mg/dL Final   Admission on 08/04/2024, Discharged on 08/04/2024   Component Date Value Ref Range Status    Sodium 08/04/2024 139  136 - 145 mmol/L Final    Potassium 08/04/2024 3.3 (L)  3.5 - 5.1 mmol/L Final    Chloride 08/04/2024 105  98 - 107 mmol/L Final    CO2 08/04/2024 29  21 - 32 mmol/L Final    Anion Gap 08/04/2024 8  7 - 16 mmol/L Final    Glucose 08/04/2024 182 (H)  74 - 106 mg/dL Final    BUN 08/04/2024 10  7 - 18 mg/dL Final    Creatinine 08/04/2024 1.14 (H)  0.55 - 1.02 mg/dL Final    BUN/Creatinine Ratio 08/04/2024 9  6 - 20 Final    Calcium 08/04/2024 9.1  8.5 - 10.1 mg/dL Final    Total Protein 08/04/2024 7.0  6.4 - 8.2 g/dL Final    Albumin 08/04/2024 3.3 (L)  3.5 - 5.0 g/dL Final    Globulin 08/04/2024 3.7  2.0 - 4.0 g/dL Final    A/G Ratio 08/04/2024 0.9   Final    Bilirubin, Total 08/04/2024 0.3  >0.0 - 1.2 mg/dL Final    Alk Phos 08/04/2024 115 (H)  39 - 100 U/L Final    ALT  08/04/2024 26  13 - 56 U/L Final    AST 08/04/2024 13 (L)  15 - 37 U/L Final    eGFR 08/04/2024 60  >=60 mL/min/1.73m2 Final    Color, UA 08/04/2024 Yellow  Colorless, Straw, Yellow, Light Yellow, Dark Yellow Final    Clarity, UA 08/04/2024 Turbid  Clear Final    pH, UA 08/04/2024 6.5  5.0 to 8.0 pH Units Final    Leukocytes, UA 08/04/2024 Negative  Negative Final    Nitrites, UA 08/04/2024 Negative  Negative Final    Protein, UA 08/04/2024 100 (A)  Negative Final    Glucose, UA 08/04/2024 Normal  Normal mg/dL Final    Ketones, UA 08/04/2024 Negative  Negative mg/dL Final    Urobilinogen, UA 08/04/2024 2 (A)  0.2, 1.0, Normal mg/dL Final    Bilirubin, UA 08/04/2024 Negative  Negative Final    Blood, UA 08/04/2024 Negative  Negative Final    Specific Gravity, UA 08/04/2024 1.033 (H)  <=1.030 Final    WBC 08/04/2024 6.88  4.50 - 11.00 K/uL Final    RBC 08/04/2024 3.94 (L)  4.20 - 5.40 M/uL Final    Hemoglobin 08/04/2024 11.0 (L)  12.0 - 16.0 g/dL Final    Hematocrit 08/04/2024 35.9 (L)  38.0 - 47.0 % Final    MCV 08/04/2024 91.1  80.0 - 96.0 fL Final    MCH 08/04/2024 27.9  27.0 - 31.0 pg Final    MCHC 08/04/2024 30.6 (L)  32.0 - 36.0 g/dL Final    RDW 08/04/2024 11.9  11.5 - 14.5 % Final    Platelet Count 08/04/2024 377  150 - 400 K/uL Final    MPV 08/04/2024 9.5  9.4 - 12.4 fL Final    Neutrophils % 08/04/2024 47.5 (L)  53.0 - 65.0 % Final    Lymphocytes % 08/04/2024 43.9 (H)  27.0 - 41.0 % Final    Monocytes % 08/04/2024 5.8  2.0 - 6.0 % Final    Eosinophils % 08/04/2024 1.9  1.0 - 4.0 % Final    Basophils % 08/04/2024 0.6  0.0 - 1.0 % Final    Immature Granulocytes % 08/04/2024 0.3  0.0 - 0.4 % Final    nRBC, Auto 08/04/2024 0.0  <=0.0 % Final    Neutrophils, Abs 08/04/2024 3.27  1.80 - 7.70 K/uL Final    Lymphocytes, Absolute 08/04/2024 3.02  1.00 - 4.80 K/uL Final    Monocytes, Absolute 08/04/2024 0.40  0.00 - 0.80 K/uL Final    Eosinophils, Absolute 08/04/2024 0.13  0.00 - 0.50 K/uL Final    Basophils, Absolute  08/04/2024 0.04  0.00 - 0.20 K/uL Final    Immature Granulocytes, Absolute 08/04/2024 0.02  0.00 - 0.04 K/uL Final    nRBC, Absolute 08/04/2024 0.00  <=0.00 x10e3/uL Final    Diff Type 08/04/2024 Auto   Final    WBC, UA 08/04/2024 9 (H)  <=5 /hpf Final    RBC, UA 08/04/2024 3  <=3 /hpf Final    Bacteria, UA 08/04/2024 Few (A)  None Seen /hpf Final    Squamous Epithelial Cells, UA 08/04/2024 Occasional (A)  None Seen /HPF Final    Hyaline Casts, UA 08/04/2024 2-5 (A)  None Seen /lpf Final    Mucous 08/04/2024 Many (A)  None Seen /LPF Final   Admission on 07/02/2024, Discharged on 07/02/2024   Component Date Value Ref Range Status    POC Glucose 07/02/2024 247 (H)  70 - 105 mg/dL Final    POC Glucose 07/02/2024 216 (H)  70 - 105 mg/dL Final   Lab Visit on 06/20/2024   Component Date Value Ref Range Status    Hemoglobin A1C 06/20/2024 8.7 (H)  4.5 - 6.6 % Final    Estimated Average Glucose 06/20/2024 203  mg/dL Final    Creatinine, Urine 06/20/2024 210  28 - 219 mg/dL Final    Microalbumin 06/20/2024 1.7  0.0 - 2.8 mg/dL Final    Microalbumin/Creatinine Ratio 06/20/2024 8.1  0.0 - 30.0 mg/g Final   Admission on 06/18/2024, Discharged on 06/18/2024   Component Date Value Ref Range Status    Sodium 06/18/2024 141  136 - 145 mmol/L Final    Potassium 06/18/2024 3.0 (L)  3.5 - 5.1 mmol/L Final    Chloride 06/18/2024 105  98 - 107 mmol/L Final    CO2 06/18/2024 28  21 - 32 mmol/L Final    Anion Gap 06/18/2024 11  7 - 16 mmol/L Final    Glucose 06/18/2024 259 (H)  74 - 106 mg/dL Final    BUN 06/18/2024 8  7 - 18 mg/dL Final    Creatinine 06/18/2024 1.09 (H)  0.55 - 1.02 mg/dL Final    BUN/Creatinine Ratio 06/18/2024 7  6 - 20 Final    Calcium 06/18/2024 9.1  8.5 - 10.1 mg/dL Final    Total Protein 06/18/2024 7.4  6.4 - 8.2 g/dL Final    Albumin 06/18/2024 3.3 (L)  3.5 - 5.0 g/dL Final    Globulin 06/18/2024 4.1 (H)  2.0 - 4.0 g/dL Final    A/G Ratio 06/18/2024 0.8   Final    Bilirubin, Total 06/18/2024 0.5  >0.0 - 1.2 mg/dL  Final    Alk Phos 06/18/2024 125 (H)  39 - 100 U/L Final    ALT 06/18/2024 31  13 - 56 U/L Final    AST 06/18/2024 25  15 - 37 U/L Final    eGFR 06/18/2024 63  >=60 mL/min/1.73m2 Final    Lipase 06/18/2024 15 (L)  73 - 393 U/L Final    WBC 06/18/2024 7.82  4.50 - 11.00 K/uL Final    RBC 06/18/2024 4.31  4.20 - 5.40 M/uL Final    Hemoglobin 06/18/2024 12.0  12.0 - 16.0 g/dL Final    Hematocrit 06/18/2024 39.2  38.0 - 47.0 % Final    MCV 06/18/2024 91.0  80.0 - 96.0 fL Final    MCH 06/18/2024 27.8  27.0 - 31.0 pg Final    MCHC 06/18/2024 30.6 (L)  32.0 - 36.0 g/dL Final    RDW 06/18/2024 12.7  11.5 - 14.5 % Final    Platelet Count 06/18/2024 380  150 - 400 K/uL Final    MPV 06/18/2024 9.5  9.4 - 12.4 fL Final    Neutrophils % 06/18/2024 52.6 (L)  53.0 - 65.0 % Final    Lymphocytes % 06/18/2024 39.0  27.0 - 41.0 % Final    Monocytes % 06/18/2024 6.4 (H)  2.0 - 6.0 % Final    Eosinophils % 06/18/2024 1.2  1.0 - 4.0 % Final    Basophils % 06/18/2024 0.5  0.0 - 1.0 % Final    Immature Granulocytes % 06/18/2024 0.3  0.0 - 0.4 % Final    nRBC, Auto 06/18/2024 0.0  <=0.0 % Final    Neutrophils, Abs 06/18/2024 4.12  1.80 - 7.70 K/uL Final    Lymphocytes, Absolute 06/18/2024 3.05  1.00 - 4.80 K/uL Final    Monocytes, Absolute 06/18/2024 0.50  0.00 - 0.80 K/uL Final    Eosinophils, Absolute 06/18/2024 0.09  0.00 - 0.50 K/uL Final    Basophils, Absolute 06/18/2024 0.04  0.00 - 0.20 K/uL Final    Immature Granulocytes, Absolute 06/18/2024 0.02  0.00 - 0.04 K/uL Final    nRBC, Absolute 06/18/2024 0.00  <=0.00 x10e3/uL Final    Diff Type 06/18/2024 Auto   Final    Magnesium 06/18/2024 2.1  1.7 - 2.3 mg/dL Final   Admission on 06/14/2024, Discharged on 06/14/2024   Component Date Value Ref Range Status    Sodium 06/14/2024 136  136 - 145 mmol/L Final    Potassium 06/14/2024 3.4 (L)  3.5 - 5.1 mmol/L Final    Chloride 06/14/2024 99  98 - 107 mmol/L Final    CO2 06/14/2024 26  21 - 32 mmol/L Final    Anion Gap 06/14/2024 14  7 - 16  mmol/L Final    Glucose 06/14/2024 468 (H)  74 - 106 mg/dL Final    BUN 06/14/2024 12  7 - 18 mg/dL Final    Creatinine 06/14/2024 1.29 (H)  0.55 - 1.02 mg/dL Final    BUN/Creatinine Ratio 06/14/2024 9  6 - 20 Final    Calcium 06/14/2024 9.0  8.5 - 10.1 mg/dL Final    Total Protein 06/14/2024 7.4  6.4 - 8.2 g/dL Final    Albumin 06/14/2024 3.2 (L)  3.5 - 5.0 g/dL Final    Globulin 06/14/2024 4.2 (H)  2.0 - 4.0 g/dL Final    A/G Ratio 06/14/2024 0.8   Final    Bilirubin, Total 06/14/2024 0.2  >0.0 - 1.2 mg/dL Final    Alk Phos 06/14/2024 131 (H)  39 - 100 U/L Final    ALT 06/14/2024 36  13 - 56 U/L Final    AST 06/14/2024 24  15 - 37 U/L Final    eGFR 06/14/2024 52 (L)  >=60 mL/min/1.73m2 Final    Color, UA 06/14/2024 Light-Yellow  Colorless, Straw, Yellow, Light Yellow, Dark Yellow Final    Clarity, UA 06/14/2024 Clear  Clear Final    pH, UA 06/14/2024 6.5  5.0 to 8.0 pH Units Final    Leukocytes, UA 06/14/2024 Negative  Negative Final    Nitrites, UA 06/14/2024 Negative  Negative Final    Protein, UA 06/14/2024 10 (A)  Negative Final    Glucose, UA 06/14/2024 >1000 (A)  Normal mg/dL Final    Ketones, UA 06/14/2024 Negative  Negative mg/dL Final    Urobilinogen, UA 06/14/2024 Normal  0.2, 1.0, Normal mg/dL Final    Bilirubin, UA 06/14/2024 Negative  Negative Final    Blood, UA 06/14/2024 Negative  Negative Final    Specific Gravity, UA 06/14/2024 1.035 (H)  <=1.030 Final    WBC 06/14/2024 6.66  4.50 - 11.00 K/uL Final    RBC 06/14/2024 3.90 (L)  4.20 - 5.40 M/uL Final    Hemoglobin 06/14/2024 10.9 (L)  12.0 - 16.0 g/dL Final    Hematocrit 06/14/2024 35.4 (L)  38.0 - 47.0 % Final    MCV 06/14/2024 90.8  80.0 - 96.0 fL Final    MCH 06/14/2024 27.9  27.0 - 31.0 pg Final    MCHC 06/14/2024 30.8 (L)  32.0 - 36.0 g/dL Final    RDW 06/14/2024 12.7  11.5 - 14.5 % Final    Platelet Count 06/14/2024 362  150 - 400 K/uL Final    MPV 06/14/2024 9.8  9.4 - 12.4 fL Final    Neutrophils % 06/14/2024 50.6 (L)  53.0 - 65.0 % Final     Lymphocytes % 06/14/2024 41.6 (H)  27.0 - 41.0 % Final    Monocytes % 06/14/2024 5.4  2.0 - 6.0 % Final    Eosinophils % 06/14/2024 1.5  1.0 - 4.0 % Final    Basophils % 06/14/2024 0.6  0.0 - 1.0 % Final    Immature Granulocytes % 06/14/2024 0.3  0.0 - 0.4 % Final    nRBC, Auto 06/14/2024 0.0  <=0.0 % Final    Neutrophils, Abs 06/14/2024 3.37  1.80 - 7.70 K/uL Final    Lymphocytes, Absolute 06/14/2024 2.77  1.00 - 4.80 K/uL Final    Monocytes, Absolute 06/14/2024 0.36  0.00 - 0.80 K/uL Final    Eosinophils, Absolute 06/14/2024 0.10  0.00 - 0.50 K/uL Final    Basophils, Absolute 06/14/2024 0.04  0.00 - 0.20 K/uL Final    Immature Granulocytes, Absolute 06/14/2024 0.02  0.00 - 0.04 K/uL Final    nRBC, Absolute 06/14/2024 0.00  <=0.00 x10e3/uL Final    Diff Type 06/14/2024 Auto   Final    POC Glucose 06/14/2024 382 (H)  70 - 105 mg/dL Final   Patient Outreach on 04/19/2024   Component Date Value Ref Range Status    Left Eye DM Retinopathy 08/11/2023 Negative   Final    Right Eye DM Retinopathy 08/11/2023 Negative   Final         Orders Placed This Encounter   Procedures    Case Request Operating Room: Injection, Steroid, Epidural, L4/5     Order Specific Question:   Medical Necessity:     Answer:   Medically Non-Urgent [100]     Order Specific Question:   Case classification     Answer:   E - Elective [90]     Order Specific Question:   Is an on-site pathologist required for this procedure?     Answer:   N/A       Requested Prescriptions      No prescriptions requested or ordered in this encounter       Assessment:     1. Lumbar radiculopathy, chronic    2. Postlaminectomy syndrome, lumbar region    3. Ilioinguinal neuralgia of right side                 MRI lumbar spine [facility] July 31, 2023 multiple level degenerative changes mild bilateral foraminal stenosis L3/4 L4/5 less so at L5/S1 postop changes noted           Plan:    Not using narcotics from our office October 1, 2024    Chronically elevated  glucose    History lumbar surgery laminectomy L2 through 5 November 30, 2021 Dr. Upton complicated by postop infection      Follow-up after bilateral lumbar L4 through S1 medial branch block # 2, March 14, 2024  she states she had 80% relief after procedure,   the procedure did help improve her level function     Presents today complaint of back pain buttock and leg pain numbness and tingling radicular in nature worse with standing walking laughing or coughing ongoing for more than 3 months    Requesting procedure for lumbar radiculopathy    Requesting Toradol injection     Toradol 60 mg IM, tolerated well    Denies loss of bowel or bladder function    Considering following up with spine surgery if procedures do not improve her lumbar radiculopathy      Indications for this procedure for this specific patient include the following     - Injections being provided as part of a comprehensive pain management program.    - Pt has failed 6 weeks of conservative therapy including oral meds, PT and or home exercise program which has been discussed with the patient   - Injection being provided for suspected radicular pain.    - Pain scale of greater than or equal to 3/10 with functional impairment  - No evidence of local or systemic infection, bleeding tendency or unstable medical condition.    - Pain is causing significant functional limitation resulting in diminished quality of life and impaired age appropriate ADL's.   - Repeat injections are done no sooner than 7 days after the previous injection  - Epidural done for suspected radicular pain along dermatome of nerve   - Epidural done to differentiate level of radicular nerve root pain   -procedure done prior to surgical consideration  - Repeat injections done only when pt reports 50% improvement in pain from previous injections    -increased level of function  - patient is aware if they take any NSAIDs/anticoagulant prior to procedure procedure will be postponed, this  was listed on the preop instructions and highlighted, list of NSAIDs/anticoagulant reviewed with patient to include methotrexate  - Injection done at L4/5 level(s) which is consistent with patient's dermatomal pain complaint  The planned medically necessary  surgical procedure is performed in a hospital outpatient department and not in an ambulatory surgical center due to:     -there is no geographically assessable ambulatory surgery center that has the  necessary equipment and fluoroscopy needed for the procedure     -there is no geographically assessable ambulatory surgical center available at which the physician has privileges     -an ASC's  specific  guideline regarding the individuals weight or health conditions that prevent the use of an ASC     -done under fluoro  Monitor anesthesia request is medically indicated for the scheduled nerve block procedure due to:  1- needle phobia and anxiety, placing  the patient at risk during the provided service.  2-patient has an ASA class greater than 3 and requires constant presence of an anesthesiologist during the procedure,   3-patient has severe problems hard to lie still  4-patient suffers from chronic pain and is unable to function due to  diminished ADLs    Schedule lumbar L4/5 SILVANA # 1, lumbar radiculopathy    Dr. Bills    Bring original prescription medication bottles/container/box with labels to each visit

## 2024-09-30 NOTE — LETTER
October 2, 2024      [facility]  [address] [facility] [address]  Phone: [phone]  Fax: [phone]       Patient: Carey Leonardo   YOB: 1975  Date of Visit: 10/02/2024    To Whom It May Concern:    Edilson Leonardo  was at [facility] on 10/02/2024. The patient may return to work/school on 10/3/2024 with restrictions. If you have any questions or concerns, or if I can be of further assistance, please do not hesitate to contact me.  Please allow pt to be on light duty x 1 month.     Sincerely,    ROGELIO Tierney

## 2024-10-01 ENCOUNTER — OFFICE VISIT (OUTPATIENT)
Dept: PAIN MEDICINE | Facility: CLINIC | Age: 49
End: 2024-10-01
Payer: OTHER GOVERNMENT

## 2024-10-01 VITALS
HEART RATE: 70 BPM | HEIGHT: 67 IN | SYSTOLIC BLOOD PRESSURE: 125 MMHG | DIASTOLIC BLOOD PRESSURE: 72 MMHG | RESPIRATION RATE: 18 BRPM | WEIGHT: 230 LBS | BODY MASS INDEX: 36.1 KG/M2

## 2024-10-01 DIAGNOSIS — I10 PRIMARY HYPERTENSION: ICD-10-CM

## 2024-10-01 DIAGNOSIS — E11.9 TYPE 2 DIABETES MELLITUS WITHOUT COMPLICATION, WITHOUT LONG-TERM CURRENT USE OF INSULIN: ICD-10-CM

## 2024-10-01 DIAGNOSIS — M54.16 LUMBAR RADICULOPATHY, CHRONIC: Primary | Chronic | ICD-10-CM

## 2024-10-01 DIAGNOSIS — M96.1 POSTLAMINECTOMY SYNDROME, LUMBAR REGION: Chronic | ICD-10-CM

## 2024-10-01 DIAGNOSIS — G57.91 ILIOINGUINAL NEURALGIA OF RIGHT SIDE: Chronic | ICD-10-CM

## 2024-10-01 PROCEDURE — 99214 OFFICE O/P EST MOD 30 MIN: CPT | Mod: S$PBB,25,, | Performed by: PHYSICIAN ASSISTANT

## 2024-10-01 PROCEDURE — 99999PBSHW PR PBB SHADOW TECHNICAL ONLY FILED TO HB: Mod: PBBFAC,,,

## 2024-10-01 PROCEDURE — 99999 PR PBB SHADOW E&M-EST. PATIENT-LVL V: CPT | Mod: PBBFAC,,, | Performed by: PHYSICIAN ASSISTANT

## 2024-10-01 PROCEDURE — 96372 THER/PROPH/DIAG INJ SC/IM: CPT | Mod: PBBFAC | Performed by: PHYSICIAN ASSISTANT

## 2024-10-01 PROCEDURE — 99215 OFFICE O/P EST HI 40 MIN: CPT | Mod: PBBFAC | Performed by: PHYSICIAN ASSISTANT

## 2024-10-01 RX ORDER — DULAGLUTIDE 0.75 MG/.5ML
0.75 INJECTION, SOLUTION SUBCUTANEOUS
Qty: 12 PEN | Refills: 3 | Status: SHIPPED | OUTPATIENT
Start: 2024-10-01

## 2024-10-01 RX ORDER — CLONIDINE HYDROCHLORIDE 0.2 MG/1
0.2 TABLET ORAL 4 TIMES DAILY
Qty: 360 TABLET | Refills: 3 | Status: SHIPPED | OUTPATIENT
Start: 2024-10-01

## 2024-10-01 RX ORDER — INSULIN GLARGINE 100 [IU]/ML
10 INJECTION, SOLUTION SUBCUTANEOUS NIGHTLY
Qty: 10 ML | Refills: 3 | Status: SHIPPED | OUTPATIENT
Start: 2024-10-01

## 2024-10-01 RX ORDER — KETOROLAC TROMETHAMINE 30 MG/ML
60 INJECTION, SOLUTION INTRAMUSCULAR; INTRAVENOUS
Status: COMPLETED | OUTPATIENT
Start: 2024-10-01 | End: 2024-10-01

## 2024-10-01 RX ORDER — HYDRALAZINE HYDROCHLORIDE 100 MG/1
100 TABLET, FILM COATED ORAL 3 TIMES DAILY
Qty: 270 TABLET | Refills: 3 | Status: SHIPPED | OUTPATIENT
Start: 2024-10-01

## 2024-10-01 RX ORDER — METOPROLOL SUCCINATE 100 MG/1
100 TABLET, EXTENDED RELEASE ORAL DAILY
Qty: 90 TABLET | Refills: 3 | Status: SHIPPED | OUTPATIENT
Start: 2024-10-01

## 2024-10-01 RX ADMIN — KETOROLAC TROMETHAMINE 60 MG: 30 INJECTION, SOLUTION INTRAMUSCULAR at 03:10

## 2024-10-01 NOTE — LETTER
October 1, 2024      [facility]  [address] [place] [address]  Phone: [phone]       Patient: Carey Leonardo   YOB: 1975  Date of Visit: 10/01/2024    To Whom It May Concern:    Edilson Leonardo  was at [facility] on 10/01/2024. The patient may return to work/school on 10/02/24 with no restrictions. If you have any questions or concerns, or if I can be of further assistance, please do not hesitate to contact me.    Sincerely,    Lauryn Morgan RN

## 2024-10-01 NOTE — PATIENT INSTRUCTIONS

## 2024-10-03 ENCOUNTER — HOSPITAL ENCOUNTER (EMERGENCY)
Facility: HOSPITAL | Age: 49
Discharge: HOME | End: 2024-10-03
Attending: EMERGENCY MEDICINE
Payer: OTHER GOVERNMENT

## 2024-10-03 VITALS
HEART RATE: 65 BPM | SYSTOLIC BLOOD PRESSURE: 145 MMHG | OXYGEN SATURATION: 95 % | TEMPERATURE: 98 F | RESPIRATION RATE: 16 BRPM | BODY MASS INDEX: 32.89 KG/M2 | DIASTOLIC BLOOD PRESSURE: 86 MMHG | WEIGHT: 210 LBS

## 2024-10-03 DIAGNOSIS — R52 PAIN: ICD-10-CM

## 2024-10-03 DIAGNOSIS — R10.9 ABDOMINAL PAIN, UNSPECIFIED ABDOMINAL LOCATION: Primary | ICD-10-CM

## 2024-10-03 LAB
ALBUMIN SERPL BCP-MCNC: 2.9 G/DL (ref 3.5–5)
ALBUMIN/GLOB SERPL: 0.7 {RATIO}
ALP SERPL-CCNC: 117 U/L (ref 39–100)
ALT SERPL W P-5'-P-CCNC: 30 U/L (ref 13–56)
ANION GAP SERPL CALCULATED.3IONS-SCNC: 10 MMOL/L (ref 7–16)
AST SERPL W P-5'-P-CCNC: 24 U/L (ref 15–37)
BASOPHILS # BLD AUTO: 0.03 K/UL (ref 0–0.2)
BASOPHILS NFR BLD AUTO: 0.5 % (ref 0–1)
BILIRUB SERPL-MCNC: 0.1 MG/DL (ref ?–1.2)
BILIRUB UR QL STRIP: NEGATIVE
BUN SERPL-MCNC: 9 MG/DL (ref 7–18)
BUN/CREAT SERPL: 10 (ref 6–20)
CALCIUM SERPL-MCNC: 8.8 MG/DL (ref 8.5–10.1)
CHLORIDE SERPL-SCNC: 105 MMOL/L (ref 98–107)
CLARITY UR: CLEAR
CO2 SERPL-SCNC: 28 MMOL/L (ref 21–32)
COLOR UR: ABNORMAL
CREAT SERPL-MCNC: 0.87 MG/DL (ref 0.55–1.02)
DIFFERENTIAL METHOD BLD: ABNORMAL
EGFR (NO RACE VARIABLE) (RUSH/TITUS): 82 ML/MIN/1.73M2
EOSINOPHIL # BLD AUTO: 0.15 K/UL (ref 0–0.5)
EOSINOPHIL NFR BLD AUTO: 2.6 % (ref 1–4)
ERYTHROCYTE [DISTWIDTH] IN BLOOD BY AUTOMATED COUNT: 12.3 % (ref 11.5–14.5)
GLOBULIN SER-MCNC: 3.9 G/DL (ref 2–4)
GLUCOSE SERPL-MCNC: 371 MG/DL (ref 74–106)
GLUCOSE UR STRIP-MCNC: 500 MG/DL
HCT VFR BLD AUTO: 35.5 % (ref 38–47)
HGB BLD-MCNC: 11 G/DL (ref 12–16)
IMM GRANULOCYTES # BLD AUTO: 0.01 K/UL (ref 0–0.04)
IMM GRANULOCYTES NFR BLD: 0.2 % (ref 0–0.4)
KETONES UR STRIP-SCNC: NEGATIVE MG/DL
LEUKOCYTE ESTERASE UR QL STRIP: NEGATIVE
LYMPHOCYTES # BLD AUTO: 2.76 K/UL (ref 1–4.8)
LYMPHOCYTES NFR BLD AUTO: 48.1 % (ref 27–41)
MCH RBC QN AUTO: 27.6 PG (ref 27–31)
MCHC RBC AUTO-ENTMCNC: 31 G/DL (ref 32–36)
MCV RBC AUTO: 89 FL (ref 80–96)
MONOCYTES # BLD AUTO: 0.39 K/UL (ref 0–0.8)
MONOCYTES NFR BLD AUTO: 6.8 % (ref 2–6)
MPC BLD CALC-MCNC: 9.8 FL (ref 9.4–12.4)
NEUTROPHILS # BLD AUTO: 2.4 K/UL (ref 1.8–7.7)
NEUTROPHILS NFR BLD AUTO: 41.8 % (ref 53–65)
NITRITE UR QL STRIP: NEGATIVE
NRBC # BLD AUTO: 0 X10E3/UL
NRBC, AUTO (.00): 0 %
NT-PROBNP SERPL-MCNC: 22 PG/ML (ref 1–125)
PH UR STRIP: 6.5 PH UNITS
PLATELET # BLD AUTO: 356 K/UL (ref 150–400)
POTASSIUM SERPL-SCNC: 3.1 MMOL/L (ref 3.5–5.1)
PROT SERPL-MCNC: 6.8 G/DL (ref 6.4–8.2)
PROT UR QL STRIP: NEGATIVE
RBC # BLD AUTO: 3.99 M/UL (ref 4.2–5.4)
RBC # UR STRIP: NEGATIVE /UL
SODIUM SERPL-SCNC: 140 MMOL/L (ref 136–145)
SP GR UR STRIP: 1.01
TROPONIN I SERPL DL<=0.01 NG/ML-MCNC: 29.9 PG/ML
UROBILINOGEN UR STRIP-ACNC: NORMAL MG/DL
WBC # BLD AUTO: 5.74 K/UL (ref 4.5–11)

## 2024-10-03 PROCEDURE — 96374 THER/PROPH/DIAG INJ IV PUSH: CPT

## 2024-10-03 PROCEDURE — 83880 ASSAY OF NATRIURETIC PEPTIDE: CPT | Performed by: EMERGENCY MEDICINE

## 2024-10-03 PROCEDURE — 25000003 PHARM REV CODE 250: Performed by: EMERGENCY MEDICINE

## 2024-10-03 PROCEDURE — 93005 ELECTROCARDIOGRAM TRACING: CPT

## 2024-10-03 PROCEDURE — 84484 ASSAY OF TROPONIN QUANT: CPT | Performed by: EMERGENCY MEDICINE

## 2024-10-03 PROCEDURE — 96375 TX/PRO/DX INJ NEW DRUG ADDON: CPT

## 2024-10-03 PROCEDURE — 63600175 PHARM REV CODE 636 W HCPCS: Performed by: EMERGENCY MEDICINE

## 2024-10-03 PROCEDURE — 36415 COLL VENOUS BLD VENIPUNCTURE: CPT | Performed by: EMERGENCY MEDICINE

## 2024-10-03 PROCEDURE — 99285 EMERGENCY DEPT VISIT HI MDM: CPT | Mod: 25

## 2024-10-03 PROCEDURE — 80053 COMPREHEN METABOLIC PANEL: CPT | Performed by: EMERGENCY MEDICINE

## 2024-10-03 PROCEDURE — 96361 HYDRATE IV INFUSION ADD-ON: CPT

## 2024-10-03 PROCEDURE — 85025 COMPLETE CBC W/AUTO DIFF WBC: CPT | Performed by: EMERGENCY MEDICINE

## 2024-10-03 PROCEDURE — 81003 URINALYSIS AUTO W/O SCOPE: CPT | Performed by: EMERGENCY MEDICINE

## 2024-10-03 RX ORDER — ONDANSETRON HYDROCHLORIDE 2 MG/ML
4 INJECTION, SOLUTION INTRAVENOUS
Status: COMPLETED | OUTPATIENT
Start: 2024-10-03 | End: 2024-10-03

## 2024-10-03 RX ORDER — HYDROMORPHONE HYDROCHLORIDE 2 MG/ML
1 INJECTION, SOLUTION INTRAMUSCULAR; INTRAVENOUS; SUBCUTANEOUS
Status: COMPLETED | OUTPATIENT
Start: 2024-10-03 | End: 2024-10-03

## 2024-10-03 RX ORDER — ONDANSETRON HYDROCHLORIDE 2 MG/ML
4 INJECTION, SOLUTION INTRAVENOUS
Status: DISCONTINUED | OUTPATIENT
Start: 2024-10-03 | End: 2024-10-04 | Stop reason: HOSPADM

## 2024-10-03 RX ORDER — POLYETHYLENE GLYCOL 3350 17 G/17G
17 POWDER, FOR SOLUTION ORAL DAILY
Qty: 12 PACKET | Refills: 0 | Status: SHIPPED | OUTPATIENT
Start: 2024-10-03

## 2024-10-03 RX ORDER — HYDROMORPHONE HYDROCHLORIDE 2 MG/ML
1 INJECTION, SOLUTION INTRAMUSCULAR; INTRAVENOUS; SUBCUTANEOUS
Status: DISCONTINUED | OUTPATIENT
Start: 2024-10-03 | End: 2024-10-04 | Stop reason: HOSPADM

## 2024-10-03 RX ADMIN — SODIUM CHLORIDE 1000 ML: 9 INJECTION, SOLUTION INTRAVENOUS at 07:10

## 2024-10-03 RX ADMIN — ONDANSETRON 4 MG: 2 INJECTION INTRAMUSCULAR; INTRAVENOUS at 07:10

## 2024-10-03 RX ADMIN — HYDROMORPHONE HYDROCHLORIDE 1 MG: 2 INJECTION INTRAMUSCULAR; INTRAVENOUS; SUBCUTANEOUS at 07:10

## 2024-10-04 ENCOUNTER — TELEPHONE (OUTPATIENT)
Dept: EMERGENCY MEDICINE | Facility: HOSPITAL | Age: 49
End: 2024-10-04
Payer: OTHER GOVERNMENT

## 2024-10-04 LAB
OHS QRS DURATION: 72 MS
OHS QTC CALCULATION: 452 MS

## 2024-10-04 NOTE — DISCHARGE INSTRUCTIONS
TAKE MIRALAX AS DIRECTED.  DRINK PLENTY OF FLUIDS.  FOLLOW UP WITH YOUR PRIMARY CARE PROVIDER OR RETURN TO THE EMERGENCY DEPARTMENT IF SYMPTOMS PERSIST OR WORSEN OR OTHERWISE AS NEEDED.

## 2024-10-04 NOTE — ED PROVIDER NOTES
Encounter Date: 10/3/2024    SCRIBE #1 NOTE: I, Rasheeda Good, am scribing for, and in the presence of,  Tan Donis MD.       History     Chief Complaint   Patient presents with    Flank Pain     Patient is a 49 y.o. female that arrives to the ED with c/o Flank Pain. The patient reports pain on her right side. Patient states the pain has been on going for the past couple of days now. She reports having her gallbladder but in fact she does not have her gallbladder. Patient has Mhx kidney stones. When examined the patient has RUQ abdominal pain. There are no other issues to report with this patient at this time.     The history is provided by the patient. No  was used.     Review of patient's allergies indicates:   Allergen Reactions    Fish containing products Anaphylaxis    Iodinated contrast media     Penicillins      Past Medical History:   Diagnosis Date    Asthma     CHF (congestive heart failure)     Chronic serous otitis media of both ears 04/04/2023    Depressive disorder     Diabetes mellitus, type 2     Gastroparesis     Hyperlipidemia     Hypertension     Hypothyroidism 08/02/2022    Synthroid 200 mcg oral daily      IBS (irritable bowel syndrome)     Kidney stones     Postlaminectomy syndrome, lumbar region 04/28/2022    [facility]    Sleep apnea     Does not use a CPAP     Past Surgical History:   Procedure Laterality Date    Bilateral L3-S1 MBB Bilateral 6-, 5-, 1-8-2014    Dr Jessica Randolph    CARPAL TUNNEL RELEASE      CHOLECYSTECTOMY      DIAGNOSTIC LAPAROSCOPY  04/14/2010    Exploratory Laparotomy, Myomectomy, Hydrotubation-Dr. Feng    DILATION AND CURETTAGE OF UTERUS  04/14/2010    Hysteroscopy-Dr. Feng    EPIDURAL STEROID INJECTION N/A 10/22/2024    Procedure: Injection, Steroid, Epidural, L4/5;  Surgeon: Rasheeda Bills MD;  Location: [facility];  Service: Pain Management;  Laterality: N/A;    EXPLORATORY LAPAROTOMY WITH UTERINE  MYOMECTOMY  02/27/2007    Dr. Marquise Anderson    HYSTERECTOMY  09/29/2011    Robotic, FABIENNE, Cystoscopy-Dr. Feng    HYSTEROSCOPY WITH DILATION AND CURETTAGE OF UTERUS  04/04/2006    Laparoscopy, Chromotubation-Dr. Marquise Anderson    INJECTION OF ANESTHETIC AGENT AROUND ILIOINGUINAL NERVE Right 6/22/2023    Procedure: BLOCK, NERVE, ILIOINGUINAL;  Surgeon: Rasheeda Bills MD;  Location: [facility];  Service: Pain Management;  Laterality: Right;    INJECTION OF ANESTHETIC AGENT AROUND MEDIAL BRANCH NERVES INNERVATING LUMBAR FACET JOINT Bilateral 9/21/2023    Procedure: BLOCK, NERVE, FACET JOINT, LUMBAR, MEDIAL BRANCH;  Surgeon: Rasheeda Bills MD;  Location: [facility];  Service: Pain Management;  Laterality: Bilateral;    INJECTION OF ANESTHETIC AGENT AROUND MEDIAL BRANCH NERVES INNERVATING LUMBAR FACET JOINT Bilateral 3/14/2024    Procedure: BLOCK, NERVE, FACET JOINT, LUMBAR, MEDIAL BRANCH, BILATERAL L4-S1 MBB;  Surgeon: Rasheeda Bills MD;  Location: [facility];  Service: Pain Management;  Laterality: Bilateral;    LAMINECTOMY N/A 11/30/2021    Procedure: L2-L5 Laminectomy;  Surgeon: Cyril Upton MD;  Location: [facility] OR;  Service: Neurosurgery;  Laterality: N/A;    LEFT HEART CATHETERIZATION      Left L3-S1 RFTC Left 07/18/2017    Dr Lopez    Left SI JI Left 09/30/2013    Dr Randolph    MYRINGOTOMY WITH INSERTION OF VENTILATION TUBE Bilateral 4/4/2023    Procedure: MYRINGOTOMY, WITH TYMPANOSTOMY TUBE INSERTION (T-TUBES);  Surgeon: Sea Hinton MD;  Location: [facility];  Service: ENT;  Laterality: Bilateral;  T-TUBES    MYRINGOTOMY WITH INSERTION OF VENTILATION TUBE Bilateral 9/12/2023    Procedure: MYRINGOTOMY, WITH TYMPANOSTOMY TUBE INSERTION, T-TUBES;  Surgeon: Sea Hinton MD;  Location: [facility] OR;  Service: ENT;  Laterality: Bilateral;    MYRINGOTOMY WITH INSERTION OF VENTILATION TUBE Bilateral 7/2/2024    Procedure: MYRINGOTOMY, WITH TYMPANOSTOMY TUBE  INSERTION;  Surgeon: Sea Hinton MD;  Location: [facility];  Service: ENT;  Laterality: Bilateral;  Bilateral T-Tubes    OOPHORECTOMY  11/11/2014    Robotic, FABIENNE, Cystoscopy-Dr. Feng    SINUS SURGERY       Family History   Problem Relation Name Age of Onset    Diabetes Mellitus Mother      Heart disease Mother      Hypertension Mother      Migraines Mother      Heart disease Sister       Social History     Tobacco Use    Smoking status: Never     Passive exposure: Never    Smokeless tobacco: Never   Substance Use Topics    Alcohol use: Not Currently    Drug use: Not Currently     Types: Hydrocodone, Oxycodone     Review of Systems   Constitutional:  Negative for chills, fatigue and fever.   Eyes:  Negative for pain.   Respiratory:  Negative for shortness of breath.    Cardiovascular:  Negative for chest pain.   Gastrointestinal:  Positive for abdominal pain. Negative for nausea and vomiting.   Endocrine: Negative for polyuria.   Genitourinary:  Negative for difficulty urinating.   Allergic/Immunologic: Negative.        Physical Exam     Initial Vitals   BP Pulse Resp Temp SpO2   10/03/24 1937 10/03/24 1937 10/03/24 1920 10/03/24 1937 10/03/24 1937   121/73 72 20 97.8 °F (36.6 °C) (!) 84 %      MAP       --                Physical Exam    Nursing note and vitals reviewed.  Constitutional: She appears well-developed and well-nourished.   HENT:   Head: Normocephalic and atraumatic.   Eyes: Conjunctivae and EOM are normal. Pupils are equal, round, and reactive to light.   Neck: Neck supple.   Normal range of motion.  Cardiovascular:  Normal rate, regular rhythm, normal heart sounds and intact distal pulses.           Pulmonary/Chest: Breath sounds normal.   Abdominal: Abdomen is soft. Bowel sounds are normal. There is abdominal tenderness in the right upper quadrant.   Musculoskeletal:         General: Normal range of motion.      Cervical back: Normal range of motion and neck supple.     Neurological:  She is alert and oriented to person, place, and time. She has normal strength.   Skin: Skin is warm and dry. Capillary refill takes less than 2 seconds.   Psychiatric: She has a normal mood and affect. Thought content normal.         ED Course   Procedures  Labs Reviewed   COMPREHENSIVE METABOLIC PANEL - Abnormal       Result Value    Sodium 140      Potassium 3.1 (*)     Chloride 105      CO2 28      Anion Gap 10      Glucose 371 (*)     BUN 9      Creatinine 0.87      BUN/Creatinine Ratio 10      Calcium 8.8      Total Protein 6.8      Albumin 2.9 (*)     Globulin 3.9      A/G Ratio 0.7      Bilirubin, Total 0.1      Alk Phos 117 (*)     ALT 30      AST 24      eGFR 82     URINALYSIS, REFLEX TO URINE CULTURE - Abnormal    Color, UA Light-Yellow      Clarity, UA Clear      pH, UA 6.5      Leukocytes, UA Negative      Nitrites, UA Negative      Protein, UA Negative      Glucose,  (*)     Ketones, UA Negative      Urobilinogen, UA Normal      Bilirubin, UA Negative      Blood, UA Negative      Specific Gravity, UA 1.013     CBC WITH DIFFERENTIAL - Abnormal    WBC 5.74      RBC 3.99 (*)     Hemoglobin 11.0 (*)     Hematocrit 35.5 (*)     MCV 89.0      MCH 27.6      MCHC 31.0 (*)     RDW 12.3      Platelet Count 356      MPV 9.8      Neutrophils % 41.8 (*)     Lymphocytes % 48.1 (*)     Monocytes % 6.8 (*)     Eosinophils % 2.6      Basophils % 0.5      Immature Granulocytes % 0.2      nRBC, Auto 0.0      Neutrophils, Abs 2.40      Lymphocytes, Absolute 2.76      Monocytes, Absolute 0.39      Eosinophils, Absolute 0.15      Basophils, Absolute 0.03      Immature Granulocytes, Absolute 0.01      nRBC, Absolute 0.00      Diff Type Auto     NT-PRO NATRIURETIC PEPTIDE - Normal    ProBNP 22     TROPONIN I - Normal    Troponin I High Sensitivity 29.9     CBC W/ AUTO DIFFERENTIAL    Narrative:     The following orders were created for panel order CBC auto differential.  Procedure                                Abnormality         Status                     ---------                               -----------         ------                     CBC with Differential[[id number]]       Abnormal            Final result                 Please view results for these tests on the individual orders.        ECG Results              EKG 12-lead (Final result)        Collection Time Result Time QRS Duration OHS QTC Calculation    10/03/24 21:17:30 10/04/24 19:34:22 72 452                     Final result by Interface, Lab In [facility] (10/04/24 19:34:26)                   Narrative:    Test Reason : R52,    Vent. Rate : 066 BPM     Atrial Rate : 066 BPM     P-R Int : 184 ms          QRS Dur : 072 ms      QT Int : 432 ms       P-R-T Axes : 052 -03 016 degrees     QTc Int : 452 ms    Normal sinus rhythm  Nonspecific T wave abnormality  Abnormal ECG  When compared with ECG of 04-APR-2023 09:57,  No significant change was found  Confirmed by Herberth BUSTOS, [facility] ([id number]) on 10/4/2024 7:34:19 PM    Referred By: [email or web address]   SELF           Confirmed By:Rehmat UAditya Kevin MD                                  Imaging Results               CT Renal Stone Study Abd Pelvis WO (Final result)  Result time 10/03/24 20:00:18      Final result by Abdoul Cooley MD (10/03/24 20:00:18)                   Impression:      1. No acute abnormality.  See above comments also.  2. Cardiomegaly with small pericardial effusion and mild bibasilar ground-glass changes could be associated with mild pulmonary edema.  Pneumonitis could appear similar.  Recommend clinical correlation.  3. Hepatomegaly.  4. Minimal hiatal hernia.  5. Possible constipation.  6. Few nonspecific minimally enlarged retroperitoneal lymph nodes adjacent to the psoas muscles.  See above comments.  7. This report was flagged in Epic as abnormal.      Electronically signed by: Abdoul Cooley  Date:    10/03/2024  Time:    20:00               Narrative:    EXAMINATION:  CT RENAL STONE STUDY ABD PELVIS  WO    CLINICAL HISTORY:  Flank pain, kidney stone suspected;    TECHNIQUE:  Low dose axial images, sagittal and coronal reformations were obtained from the lung bases to the pubic symphysis.  Contrast was not administered.    COMPARISON:  08/04/2024    FINDINGS:  Heart: Heart is enlarged.  Small pericardial effusion.    Lung Bases: Mild bibasilar ground-glass changes may be associated with mild pulmonary edema.  Pneumonitis could appear similar.    Liver: The liver is enlarged.  No focal abnormality.    Gallbladder: Status post cholecystectomy.    Bile Ducts: No evidence of dilated ducts.    Minimal hiatal hernia.    Pancreas: No mass or peripancreatic fat stranding.    Spleen: Unremarkable.    Adrenals: Unremarkable.    Kidneys/ Ureters: No stone, mass, hydronephrosis or hydroureter bilaterally.    Bladder: No evidence of wall thickening.    Reproductive organs: Status post hysterectomy.    GI Tract/Mesentery: No evidence of bowel obstruction or inflammation.  Moderate retained feces in the colon, predominantly on the right.    Nondistention of the left colon with limited characterization.    No evidence of appendicitis.    Peritoneal Space: No ascites. No free air.    Retroperitoneum: 1.4 cm minimally enlarged retroperitoneal lymph node anterior to the left psoas muscle on axial 64 mildly increased from the prior study.  Probable 1 cm similar of node anterior to the right psoas muscle on axial 60.  Few partially calcified nodes seen slightly more superior on the right, similar to the prior study.  This could be associated with prior granulomatous infection.  Recommend clinical correlation.    Abdominal wall: Unremarkable.    Vasculature: No evidence of aortic aneurysm.    Bones: No acute fracture.  Laminectomy defect at L3 and L4.                                    X-Rays:   Independently Interpreted Readings:   Other Readings:  EXAMINATION:  CT RENAL STONE STUDY ABD PELVIS WO     CLINICAL HISTORY:  Flank pain,  kidney stone suspected;     TECHNIQUE:  Low dose axial images, sagittal and coronal reformations were obtained from the lung bases to the pubic symphysis.  Contrast was not administered.     COMPARISON:  08/04/2024     FINDINGS:  Heart: Heart is enlarged.  Small pericardial effusion.     Lung Bases: Mild bibasilar ground-glass changes may be associated with mild pulmonary edema.  Pneumonitis could appear similar.     Liver: The liver is enlarged.  No focal abnormality.     Gallbladder: Status post cholecystectomy.     Bile Ducts: No evidence of dilated ducts.     Minimal hiatal hernia.     Pancreas: No mass or peripancreatic fat stranding.     Spleen: Unremarkable.     Adrenals: Unremarkable.     Kidneys/ Ureters: No stone, mass, hydronephrosis or hydroureter bilaterally.     Bladder: No evidence of wall thickening.     Reproductive organs: Status post hysterectomy.     GI Tract/Mesentery: No evidence of bowel obstruction or inflammation.  Moderate retained feces in the colon, predominantly on the right.     Nondistention of the left colon with limited characterization.     No evidence of appendicitis.     Peritoneal Space: No ascites. No free air.     Retroperitoneum: 1.4 cm minimally enlarged retroperitoneal lymph node anterior to the left psoas muscle on axial 64 mildly increased from the prior study.  Probable 1 cm similar of node anterior to the right psoas muscle on axial 60.  Few partially calcified nodes seen slightly more superior on the right, similar to the prior study.  This could be associated with prior granulomatous infection.  Recommend clinical correlation.     Abdominal wall: Unremarkable.     Vasculature: No evidence of aortic aneurysm.     Bones: No acute fracture.  Laminectomy defect at L3 and L4.     Impression:     1. No acute abnormality.  See above comments also.  2. Cardiomegaly with small pericardial effusion and mild bibasilar ground-glass changes could be associated with mild pulmonary  edema.  Pneumonitis could appear similar.  Recommend clinical correlation.  3. Hepatomegaly.  4. Minimal hiatal hernia.  5. Possible constipation.  6. Few nonspecific minimally enlarged retroperitoneal lymph nodes adjacent to the psoas muscles.  See above comments.  7. This report was flagged in Epic as abnormal.        Electronically signed by:Abdoul Matson  Date:                                            10/03/2024  Time:                                           20:00      Medications   sodium chloride 0.9% bolus 1,000 mL 1,000 mL (0 mLs Intravenous Stopped 10/3/24 1949)   HYDROmorphone (PF) injection 1 mg (1 mg Intravenous Given 10/3/24 1920)   ondansetron injection 4 mg (4 mg Intravenous Given 10/3/24 1921)     Medical Decision Making  Patient has RUQ pain.     DDX: GB VS OTHER    OUTPATIENT FOLLOW UP.      Amount and/or Complexity of Data Reviewed  Labs: ordered. Decision-making details documented in ED Course.  Radiology: ordered. Decision-making details documented in ED Course.  ECG/medicine tests: ordered. Decision-making details documented in ED Course.    Risk  OTC drugs.  Prescription drug management.              Attending Attestation:           Physician Attestation for Scribe:  Physician Attestation Statement for Scribe #1: I, Tan Donis MD, reviewed documentation, as scribed by Rasheeda Good in my presence, and it is both accurate and complete.             ED Course as of 10/23/24 2147   Thu Oct 03, 2024   2033 10/03/24 2002  CT Renal Stone Study Abd Pelvis WO  Performed: 10/03/24 1928  Final     [CM]      ED Course User Index  [CM] Rasheeda Good                           Clinical Impression:  Final diagnoses:  [R52] Pain  [R10.9] Abdominal pain, unspecified abdominal location (Primary)          ED Disposition Condition    Discharge Stable          ED Prescriptions       Medication Sig Dispense Start Date End Date Auth. Provider    polyethylene glycol (GLYCOLAX) 17 gram PwPk Take 17 g by mouth  once daily. 12 packet 10/3/2024 -- Tan Joaquin MD          Follow-up Information       Follow up With Specialties Details Why Contact Info    Zainab Harrell, FNP Family Medicine, Emergency Medicine  As needed [address] [place] [address] Primary Care  [place] [address]  [phone]               Tan Joaquin MD  10/23/24 [id number]

## 2024-10-07 NOTE — H&P (VIEW-ONLY)
Subjective:         Patient ID: Carey Leonardo is a 49 y.o. female.    Chief Complaint: Low-back Pain and Leg Pain      Pain  This is a chronic problem. The current episode started more than 1 year ago. The problem occurs daily. The problem has been waxing and waning. Associated symptoms include arthralgias. Pertinent negatives include no anorexia, change in bowel habit, chest pain, chills, coughing, diaphoresis, fever, neck pain, sore throat, swollen glands, urinary symptoms, vertigo or vomiting.     Review of Systems   Constitutional:  Negative for activity change, appetite change, chills, diaphoresis, fever and unexpected weight change.   HENT:  Negative for drooling, ear discharge, ear pain, facial swelling, nosebleeds, sore throat, trouble swallowing, voice change and goiter.    Eyes:  Negative for photophobia, pain, discharge, redness and visual disturbance.   Respiratory:  Negative for apnea, cough, choking, chest tightness, shortness of breath, wheezing and stridor.    Cardiovascular:  Negative for chest pain, palpitations and leg swelling.   Gastrointestinal:  Negative for abdominal distention, anorexia, change in bowel habit, diarrhea, rectal pain, vomiting and fecal incontinence.   Endocrine: Negative for cold intolerance, heat intolerance, polydipsia, polyphagia and polyuria.   Genitourinary:  Negative for bladder incontinence, dysuria, flank pain, frequency and hot flashes.   Musculoskeletal:  Positive for arthralgias, back pain and leg pain. Negative for neck pain.   Integumentary:  Negative for color change and pallor.   Allergic/Immunologic: Negative for immunocompromised state.   Neurological:  Negative for dizziness, vertigo, seizures, syncope, facial asymmetry, speech difficulty, light-headedness, memory loss and coordination difficulties.   Hematological:  Negative for adenopathy. Does not bruise/bleed easily.   Psychiatric/Behavioral:  Negative for agitation, behavioral problems, confusion,  decreased concentration, dysphoric mood, hallucinations, self-injury and suicidal ideas. The patient is not nervous/anxious and is not hyperactive.            Past Medical History:   Diagnosis Date    Asthma     CHF (congestive heart failure)     Chronic serous otitis media of both ears 04/04/2023    Depressive disorder     Diabetes mellitus, type 2     Gastroparesis     Hyperlipidemia     Hypertension     Hypothyroidism 08/02/2022    Synthroid 200 mcg oral daily      IBS (irritable bowel syndrome)     Kidney stones     Postlaminectomy syndrome, lumbar region 04/28/2022    [facility]    Sleep apnea     Does not use a CPAP     Past Surgical History:   Procedure Laterality Date    Bilateral L3-S1 MBB Bilateral 6-, 5-, 1-8-2014    Dr Jessica Randolph    CARPAL TUNNEL RELEASE      CHOLECYSTECTOMY      DIAGNOSTIC LAPAROSCOPY  04/14/2010    Exploratory Laparotomy, Myomectomy, Hydrotubation-Dr. Feng    DILATION AND CURETTAGE OF UTERUS  04/14/2010    Hysteroscopy-Dr. Feng    EXPLORATORY LAPAROTOMY WITH UTERINE MYOMECTOMY  02/27/2007    Dr. Marquise Anderson    HYSTERECTOMY  09/29/2011    Robotic, FABIENNE, Cystoscopy-Dr. Feng    HYSTEROSCOPY WITH DILATION AND CURETTAGE OF UTERUS  04/04/2006    Laparoscopy, Chromotubation-Dr. Marquise Anderson    INJECTION OF ANESTHETIC AGENT AROUND ILIOINGUINAL NERVE Right 6/22/2023    Procedure: BLOCK, NERVE, ILIOINGUINAL;  Surgeon: Rasheeda Bills MD;  Location: [facility];  Service: Pain Management;  Laterality: Right;    INJECTION OF ANESTHETIC AGENT AROUND MEDIAL BRANCH NERVES INNERVATING LUMBAR FACET JOINT Bilateral 9/21/2023    Procedure: BLOCK, NERVE, FACET JOINT, LUMBAR, MEDIAL BRANCH;  Surgeon: Rasheeda Bills MD;  Location: [facility];  Service: Pain Management;  Laterality: Bilateral;    INJECTION OF ANESTHETIC AGENT AROUND MEDIAL BRANCH NERVES INNERVATING LUMBAR FACET JOINT Bilateral 3/14/2024    Procedure: BLOCK,  NERVE, FACET JOINT, LUMBAR, MEDIAL BRANCH, BILATERAL L4-S1 MBB;  Surgeon: Rasheeda Bills MD;  Location: [facility];  Service: Pain Management;  Laterality: Bilateral;    LAMINECTOMY N/A 11/30/2021    Procedure: L2-L5 Laminectomy;  Surgeon: Cyril Upton MD;  Location[facility];  Service: Neurosurgery;  Laterality: N/A;    LEFT HEART CATHETERIZATION      Left L3-S1 RFTC Left 07/18/2017    Dr Lopez    Left SI JI Left 09/30/2013    Dr Randolph    MYRINGOTOMY WITH INSERTION OF VENTILATION TUBE Bilateral 4/4/2023    Procedure: MYRINGOTOMY, WITH TYMPANOSTOMY TUBE INSERTION (T-TUBES);  Surgeon: Sea Hinton MD;  Location: [facility] ORTHO OR;  Service: ENT;  Laterality: Bilateral;  T-TUBES    MYRINGOTOMY WITH INSERTION OF VENTILATION TUBE Bilateral 9/12/2023    Procedure: MYRINGOTOMY, WITH TYMPANOSTOMY TUBE INSERTION, T-TUBES;  Surgeon: Sea Hinton MD;  Location: [facility] ORTHO OR;  Service: ENT;  Laterality: Bilateral;    MYRINGOTOMY WITH INSERTION OF VENTILATION TUBE Bilateral 7/2/2024    Procedure: MYRINGOTOMY, WITH TYMPANOSTOMY TUBE INSERTION;  Surgeon: Sea Hinton MD;  Location[facility];  Service: ENT;  Laterality: Bilateral;  Bilateral T-Tubes    OOPHORECTOMY  11/11/2014    FABIENNE Barajas, Cystoscopy-Dr. Feng    SINUS SURGERY       Social History     Socioeconomic History    Marital status:    Tobacco Use    Smoking status: Never     Passive exposure: Never    Smokeless tobacco: Never   Substance and Sexual Activity    Alcohol use: Not Currently    Drug use: Not Currently     Types: Hydrocodone, Oxycodone    Sexual activity: Not Currently     Social Drivers of Health     Physical Activity: Insufficiently Active (3/28/2024)    Exercise Vital Sign     Days of Exercise per Week: 3 days     Minutes of Exercise per Session: 30 min   Stress: Stress Concern Present (3/28/2024)    [employer] [place] [employer] - Occupational Stress Questionnaire      "Feeling of Stress : To some extent     Family History   Problem Relation Name Age of Onset    Diabetes Mellitus Mother      Heart disease Mother      Hypertension Mother      Migraines Mother      Heart disease Sister       Review of patient's allergies indicates:   Allergen Reactions    Fish containing products Anaphylaxis    Iodinated contrast media     Penicillins         Objective:  Vitals:    10/08/24 0836   BP: (!) 143/76   Pulse: 86   Resp: 18   Weight: 103.4 kg (228 lb)   Height: 5' 7" (1.702 m)   PainSc:   9             Physical Exam  Vitals and nursing note reviewed. Exam conducted with a chaperone present.   Constitutional:       General: She is awake. She is not in acute distress.     Appearance: Normal appearance. She is not ill-appearing, toxic-appearing or diaphoretic.   HENT:      Head: Normocephalic and atraumatic.      Nose: Nose normal.      Mouth/Throat:      Mouth: Mucous membranes are moist.      Pharynx: Oropharynx is clear.   Eyes:      Conjunctiva/sclera: Conjunctivae normal.      Pupils: Pupils are equal, round, and reactive to light.   Cardiovascular:      Rate and Rhythm: Normal rate.   Pulmonary:      Effort: Pulmonary effort is normal. No respiratory distress.   Abdominal:      Palpations: Abdomen is soft.      Tenderness: There is no guarding.   Musculoskeletal:         General: Normal range of motion.      Cervical back: Normal range of motion and neck supple. No rigidity.   Skin:     General: Skin is warm and dry.      Coloration: Skin is not jaundiced or pale.   Neurological:      General: No focal deficit present.      Mental Status: She is alert and oriented to person, place, and time. Mental status is at baseline.      Cranial Nerves: No cranial nerve deficit (II-XII).   Psychiatric:         Mood and Affect: Mood normal.         Behavior: Behavior normal. Behavior is cooperative.         Thought Content: Thought content normal.         CT Renal Stone Study Abd Pelvis " WO  Narrative: EXAMINATION:  CT RENAL STONE STUDY ABD PELVIS WO    CLINICAL HISTORY:  Flank pain, kidney stone suspected;    TECHNIQUE:  Low dose axial images, sagittal and coronal reformations were obtained from the lung bases to the pubic symphysis.  Contrast was not administered.    COMPARISON:  08/04/2024    FINDINGS:  Heart: Heart is enlarged.  Small pericardial effusion.    Lung Bases: Mild bibasilar ground-glass changes may be associated with mild pulmonary edema.  Pneumonitis could appear similar.    Liver: The liver is enlarged.  No focal abnormality.    Gallbladder: Status post cholecystectomy.    Bile Ducts: No evidence of dilated ducts.    Minimal hiatal hernia.    Pancreas: No mass or peripancreatic fat stranding.    Spleen: Unremarkable.    Adrenals: Unremarkable.    Kidneys/ Ureters: No stone, mass, hydronephrosis or hydroureter bilaterally.    Bladder: No evidence of wall thickening.    Reproductive organs: Status post hysterectomy.    GI Tract/Mesentery: No evidence of bowel obstruction or inflammation.  Moderate retained feces in the colon, predominantly on the right.    Nondistention of the left colon with limited characterization.    No evidence of appendicitis.    Peritoneal Space: No ascites. No free air.    Retroperitoneum: 1.4 cm minimally enlarged retroperitoneal lymph node anterior to the left psoas muscle on axial 64 mildly increased from the prior study.  Probable 1 cm similar of node anterior to the right psoas muscle on axial 60.  Few partially calcified nodes seen slightly more superior on the right, similar to the prior study.  This could be associated with prior granulomatous infection.  Recommend clinical correlation.    Abdominal wall: Unremarkable.    Vasculature: No evidence of aortic aneurysm.    Bones: No acute fracture.  Laminectomy defect at L3 and L4.  Impression: 1. No acute abnormality.  See above comments also.  2. Cardiomegaly with small pericardial effusion and mild  bibasilar ground-glass changes could be associated with mild pulmonary edema.  Pneumonitis could appear similar.  Recommend clinical correlation.  3. Hepatomegaly.  4. Minimal hiatal hernia.  5. Possible constipation.  6. Few nonspecific minimally enlarged retroperitoneal lymph nodes adjacent to the psoas muscles.  See above comments.  7. This report was flagged in Epic as abnormal.    Electronically signed by: Abdoul Matson  Date:    10/03/2024  Time:    20:00       Admission on 10/03/2024, Discharged on 10/03/2024   Component Date Value Ref Range Status    Sodium 10/03/2024 140  136 - 145 mmol/L Final    Potassium 10/03/2024 3.1 (L)  3.5 - 5.1 mmol/L Final    Chloride 10/03/2024 105  98 - 107 mmol/L Final    CO2 10/03/2024 28  21 - 32 mmol/L Final    Anion Gap 10/03/2024 10  7 - 16 mmol/L Final    Glucose 10/03/2024 371 (H)  74 - 106 mg/dL Final    BUN 10/03/2024 9  7 - 18 mg/dL Final    Creatinine 10/03/2024 0.87  0.55 - 1.02 mg/dL Final    BUN/Creatinine Ratio 10/03/2024 10  6 - 20 Final    Calcium 10/03/2024 8.8  8.5 - 10.1 mg/dL Final    Total Protein 10/03/2024 6.8  6.4 - 8.2 g/dL Final    Albumin 10/03/2024 2.9 (L)  3.5 - 5.0 g/dL Final    Globulin 10/03/2024 3.9  2.0 - 4.0 g/dL Final    A/G Ratio 10/03/2024 0.7   Final    Bilirubin, Total 10/03/2024 0.1  >0.0 - 1.2 mg/dL Final    Alk Phos 10/03/2024 117 (H)  39 - 100 U/L Final    ALT 10/03/2024 30  13 - 56 U/L Final    AST 10/03/2024 24  15 - 37 U/L Final    eGFR 10/03/2024 82  >=60 mL/min/1.73m2 Final    Color, UA 10/03/2024 Light-Yellow  Colorless, Straw, Yellow, Light Yellow, Dark Yellow Final    Clarity, UA 10/03/2024 Clear  Clear Final    pH, UA 10/03/2024 6.5  5.0 to 8.0 pH Units Final    Leukocytes, UA 10/03/2024 Negative  Negative Final    Nitrites, UA 10/03/2024 Negative  Negative Final    Protein, UA 10/03/2024 Negative  Negative Final    Glucose, UA 10/03/2024 500 (A)  Normal mg/dL Final    Ketones, UA 10/03/2024  Negative  Negative mg/dL Final    Urobilinogen, UA 10/03/2024 Normal  0.2, 1.0, Normal mg/dL Final    Bilirubin, UA 10/03/2024 Negative  Negative Final    Blood, UA 10/03/2024 Negative  Negative Final    Specific [place], UA 10/03/2024 1.013  <=1.030 Final    WBC 10/03/2024 5.74  4.50 - 11.00 K/uL Final    RBC 10/03/2024 3.99 (L)  4.20 - 5.40 M/uL Final    Hemoglobin 10/03/2024 11.0 (L)  12.0 - 16.0 g/dL Final    Hematocrit 10/03/2024 35.5 (L)  38.0 - 47.0 % Final    MCV 10/03/2024 89.0  80.0 - 96.0 fL Final    MCH 10/03/2024 27.6  27.0 - 31.0 pg Final    MCHC 10/03/2024 31.0 (L)  32.0 - 36.0 g/dL Final    RDW 10/03/2024 12.3  11.5 - 14.5 % Final    Platelet Count 10/03/2024 356  150 - 400 K/uL Final    MPV 10/03/2024 9.8  9.4 - 12.4 fL Final    Neutrophils % 10/03/2024 41.8 (L)  53.0 - 65.0 % Final    Lymphocytes % 10/03/2024 48.1 (H)  27.0 - 41.0 % Final    Monocytes % 10/03/2024 6.8 (H)  2.0 - 6.0 % Final    Eosinophils % 10/03/2024 2.6  1.0 - 4.0 % Final    Basophils % 10/03/2024 0.5  0.0 - 1.0 % Final    Immature Granulocytes % 10/03/2024 0.2  0.0 - 0.4 % Final    nRBC, Auto 10/03/2024 0.0  <=0.0 % Final    Neutrophils, Abs 10/03/2024 2.40  1.80 - 7.70 K/uL Final    Lymphocytes, Absolute 10/03/2024 2.76  1.00 - 4.80 K/uL Final    Monocytes, Absolute 10/03/2024 0.39  0.00 - 0.80 K/uL Final    Eosinophils, Absolute 10/03/2024 0.15  0.00 - 0.50 K/uL Final    Basophils, Absolute 10/03/2024 0.03  0.00 - 0.20 K/uL Final    Immature Granulocytes, Absolute 10/03/2024 0.01  0.00 - 0.04 K/uL Final    nRBC, Absolute 10/03/2024 0.00  <=0.00 x10e3/uL Final    Diff Type 10/03/2024 Auto   Final    ProBNP 10/03/2024 22  1 - 125 pg/mL Final    Troponin I High Sensitivity 10/03/2024 29.9  <=60.4 pg/mL Final    QRS Duration 10/03/2024 72  ms Final    OHS QTC Calculation 10/03/2024 452  ms Final   Office Visit on 09/30/2024   Component Date Value Ref Range Status    Hemoglobin A1C 09/30/2024 8.7  (H)  4.5 - 6.6 % Final    Estimated Average Glucose 09/30/2024 203  mg/dL Final    Triglycerides 09/30/2024 259 (H)  35 - 150 mg/dL Final    Cholesterol 09/30/2024 240 (H)  0 - 200 mg/dL Final    HDL Cholesterol 09/30/2024 40  40 - 60 mg/dL Final    Cholesterol/HDL Ratio (Risk Factor) 09/30/2024 6.0   Final    Non-HDL 09/30/2024 200  mg/dL Final    LDL Calculated 09/30/2024 148  mg/dL Final    LDL/HDL 09/30/2024 3.7   Final    VLDL 09/30/2024 52  mg/dL Final    TSH 09/30/2024 2.770  0.358 - 3.740 uIU/mL Final   Hospital Outpatient Visit on 09/09/2024   Component Date Value Ref Range Status    POC Glucose 09/09/2024 187 (H)  70 - 105 mg/dL Final   Admission on 08/31/2024, Discharged on 08/31/2024   Component Date Value Ref Range Status    Sodium 08/31/2024 140  136 - 145 mmol/L Final    Potassium 08/31/2024 3.1 (L)  3.5 - 5.1 mmol/L Final    Chloride 08/31/2024 103  98 - 107 mmol/L Final    CO2 08/31/2024 30  21 - 32 mmol/L Final    Anion Gap 08/31/2024 10  7 - 16 mmol/L Final    Glucose 08/31/2024 260 (H)  74 - 106 mg/dL Final    BUN 08/31/2024 10  7 - 18 mg/dL Final    Creatinine 08/31/2024 0.97  0.55 - 1.02 mg/dL Final    BUN/Creatinine Ratio 08/31/2024 10  6 - 20 Final    Calcium 08/31/2024 9.3  8.5 - 10.1 mg/dL Final    Total Protein 08/31/2024 7.6  6.4 - 8.2 g/dL Final    Albumin 08/31/2024 3.7  3.5 - 5.0 g/dL Final    Globulin 08/31/2024 3.9  2.0 - 4.0 g/dL Final    A/G Ratio 08/31/2024 0.9   Final    Bilirubin, Total 08/31/2024 0.3  >0.0 - 1.2 mg/dL Final    Alk Phos 08/31/2024 124 (H)  39 - 100 U/L Final    ALT 08/31/2024 34  13 - 56 U/L Final    AST 08/31/2024 29  15 - 37 U/L Final    eGFR 08/31/2024 73  >=60 mL/min/1.73m2 Final    Color, UA 08/31/2024 Light-Yellow  Colorless, Straw, Yellow, Light Yellow, Dark Yellow Final    Clarity, UA 08/31/2024 Clear  Clear Final    pH, UA 08/31/2024 7.0  5.0 to 8.0 pH Units Final    Leukocytes, UA 08/31/2024 Negative  Negative Final     Nitrites, UA 08/31/2024 Negative  Negative Final    Protein, UA 08/31/2024 Negative  Negative Final    Glucose, UA 08/31/2024 >1000 (A)  Normal mg/dL Final    Ketones, UA 08/31/2024 Negative  Negative mg/dL Final    Urobilinogen, UA 08/31/2024 Normal  0.2, 1.0, Normal mg/dL Final    Bilirubin, UA 08/31/2024 Negative  Negative Final    Blood, UA 08/31/2024 Negative  Negative Final    Specific [place], UA 08/31/2024 1.035 (H)  <=1.030 Final    WBC 08/31/2024 6.25  4.50 - 11.00 K/uL Final    RBC 08/31/2024 4.17 (L)  4.20 - 5.40 M/uL Final    Hemoglobin 08/31/2024 11.7 (L)  12.0 - 16.0 g/dL Final    Hematocrit 08/31/2024 37.3 (L)  38.0 - 47.0 % Final    MCV 08/31/2024 89.4  80.0 - 96.0 fL Final    MCH 08/31/2024 28.1  27.0 - 31.0 pg Final    MCHC 08/31/2024 31.4 (L)  32.0 - 36.0 g/dL Final    RDW 08/31/2024 12.3  11.5 - 14.5 % Final    Platelet Count 08/31/2024 376  150 - 400 K/uL Final    MPV 08/31/2024 9.7  9.4 - 12.4 fL Final    Neutrophils % 08/31/2024 42.1 (L)  53.0 - 65.0 % Final    Lymphocytes % 08/31/2024 48.2 (H)  27.0 - 41.0 % Final    Monocytes % 08/31/2024 5.4  2.0 - 6.0 % Final    Eosinophils % 08/31/2024 2.7  1.0 - 4.0 % Final    Basophils % 08/31/2024 1.1 (H)  0.0 - 1.0 % Final    Immature Granulocytes % 08/31/2024 0.5 (H)  0.0 - 0.4 % Final    nRBC, Auto 08/31/2024 0.0  <=0.0 % Final    Neutrophils, Abs 08/31/2024 2.63  1.80 - 7.70 K/uL Final    Lymphocytes, Absolute 08/31/2024 3.01  1.00 - 4.80 K/uL Final    Monocytes, Absolute 08/31/2024 0.34  0.00 - 0.80 K/uL Final    Eosinophils, Absolute 08/31/2024 0.17  0.00 - 0.50 K/uL Final    Basophils, Absolute 08/31/2024 0.07  0.00 - 0.20 K/uL Final    Immature Granulocytes, Absolute 08/31/2024 0.03  0.00 - 0.04 K/uL Final    nRBC, Absolute 08/31/2024 0.00  <=0.00 x10e3/uL Final    Diff Type 08/31/2024 Auto   Final    Magnesium 08/31/2024 2.2  1.7 - 2.3 mg/dL Final   Admission on 08/04/2024, Discharged on 08/04/2024    Component Date Value Ref Range Status    Sodium 08/04/2024 139  136 - 145 mmol/L Final    Potassium 08/04/2024 3.3 (L)  3.5 - 5.1 mmol/L Final    Chloride 08/04/2024 105  98 - 107 mmol/L Final    CO2 08/04/2024 29  21 - 32 mmol/L Final    Anion Gap 08/04/2024 8  7 - 16 mmol/L Final    Glucose 08/04/2024 182 (H)  74 - 106 mg/dL Final    BUN 08/04/2024 10  7 - 18 mg/dL Final    Creatinine 08/04/2024 1.14 (H)  0.55 - 1.02 mg/dL Final    BUN/Creatinine Ratio 08/04/2024 9  6 - 20 Final    Calcium 08/04/2024 9.1  8.5 - 10.1 mg/dL Final    Total Protein 08/04/2024 7.0  6.4 - 8.2 g/dL Final    Albumin 08/04/2024 3.3 (L)  3.5 - 5.0 g/dL Final    Globulin 08/04/2024 3.7  2.0 - 4.0 g/dL Final    A/G Ratio 08/04/2024 0.9   Final    Bilirubin, Total 08/04/2024 0.3  >0.0 - 1.2 mg/dL Final    Alk Phos 08/04/2024 115 (H)  39 - 100 U/L Final    ALT 08/04/2024 26  13 - 56 U/L Final    AST 08/04/2024 13 (L)  15 - 37 U/L Final    eGFR 08/04/2024 60  >=60 mL/min/1.73m2 Final    Color, UA 08/04/2024 Yellow  Colorless, Straw, Yellow, Light Yellow, Dark Yellow Final    Clarity, UA 08/04/2024 Turbid  Clear Final    pH, UA 08/04/2024 6.5  5.0 to 8.0 pH Units Final    Leukocytes, UA 08/04/2024 Negative  Negative Final    Nitrites, UA 08/04/2024 Negative  Negative Final    Protein, UA 08/04/2024 100 (A)  Negative Final    Glucose, UA 08/04/2024 Normal  Normal mg/dL Final    Ketones, UA 08/04/2024 Negative  Negative mg/dL Final    Urobilinogen, UA 08/04/2024 2 (A)  0.2, 1.0, Normal mg/dL Final    Bilirubin, UA 08/04/2024 Negative  Negative Final    Blood, UA 08/04/2024 Negative  Negative Final    Specific Gravity, UA 08/04/2024 1.033 (H)  <=1.030 Final    WBC 08/04/2024 6.88  4.50 - 11.00 K/uL Final    RBC 08/04/2024 3.94 (L)  4.20 - 5.40 M/uL Final    Hemoglobin 08/04/2024 11.0 (L)  12.0 - 16.0 g/dL Final    Hematocrit 08/04/2024 35.9 (L)  38.0 - 47.0 % Final    MCV 08/04/2024 91.1  80.0 - 96.0 fL Final     MCH 08/04/2024 27.9  27.0 - 31.0 pg Final    MCHC 08/04/2024 30.6 (L)  32.0 - 36.0 g/dL Final    RDW 08/04/2024 11.9  11.5 - 14.5 % Final    Platelet Count 08/04/2024 377  150 - 400 K/uL Final    MPV 08/04/2024 9.5  9.4 - 12.4 fL Final    Neutrophils % 08/04/2024 47.5 (L)  53.0 - 65.0 % Final    Lymphocytes % 08/04/2024 43.9 (H)  27.0 - 41.0 % Final    Monocytes % 08/04/2024 5.8  2.0 - 6.0 % Final    Eosinophils % 08/04/2024 1.9  1.0 - 4.0 % Final    Basophils % 08/04/2024 0.6  0.0 - 1.0 % Final    Immature Granulocytes % 08/04/2024 0.3  0.0 - 0.4 % Final    nRBC, Auto 08/04/2024 0.0  <=0.0 % Final    Neutrophils, Abs 08/04/2024 3.27  1.80 - 7.70 K/uL Final    Lymphocytes, Absolute 08/04/2024 3.02  1.00 - 4.80 K/uL Final    Monocytes, Absolute 08/04/2024 0.40  0.00 - 0.80 K/uL Final    Eosinophils, Absolute 08/04/2024 0.13  0.00 - 0.50 K/uL Final    Basophils, Absolute 08/04/2024 0.04  0.00 - 0.20 K/uL Final    Immature Granulocytes, Absolute 08/04/2024 0.02  0.00 - 0.04 K/uL Final    nRBC, Absolute 08/04/2024 0.00  <=0.00 x10e3/uL Final    Diff Type 08/04/2024 Auto   Final    WBC, UA 08/04/2024 9 (H)  <=5 /hpf Final    RBC, UA 08/04/2024 3  <=3 /hpf Final    Bacteria, UA 08/04/2024 Few (A)  None Seen /hpf Final    Squamous Epithelial Cells, UA 08/04/2024 Occasional (A)  None Seen /HPF Final    Hyaline Casts, UA 08/04/2024 2-5 (A)  None Seen /lpf Final    Mucous 08/04/2024 Many (A)  None Seen /LPF Final   Admission on 07/02/2024, Discharged on 07/02/2024   Component Date Value Ref Range Status    POC Glucose 07/02/2024 247 (H)  70 - 105 mg/dL Final    POC Glucose 07/02/2024 216 (H)  70 - 105 mg/dL Final   Lab Visit on 06/20/2024   Component Date Value Ref Range Status    Hemoglobin A1C 06/20/2024 8.7 (H)  4.5 - 6.6 % Final    Estimated Average Glucose 06/20/2024 203  mg/dL Final    Creatinine, Urine 06/20/2024 210  28 - 219 mg/dL Final    Microalbumin 06/20/2024 1.7  0.0 - 2.8  mg/dL Final    Microalbumin/Creatinine Ratio 06/20/2024 8.1  0.0 - 30.0 mg/g Final   Admission on 06/18/2024, Discharged on 06/18/2024   Component Date Value Ref Range Status    Sodium 06/18/2024 141  136 - 145 mmol/L Final    Potassium 06/18/2024 3.0 (L)  3.5 - 5.1 mmol/L Final    Chloride 06/18/2024 105  98 - 107 mmol/L Final    CO2 06/18/2024 28  21 - 32 mmol/L Final    Anion Gap 06/18/2024 11  7 - 16 mmol/L Final    Glucose 06/18/2024 259 (H)  74 - 106 mg/dL Final    BUN 06/18/2024 8  7 - 18 mg/dL Final    Creatinine 06/18/2024 1.09 (H)  0.55 - 1.02 mg/dL Final    BUN/Creatinine Ratio 06/18/2024 7  6 - 20 Final    Calcium 06/18/2024 9.1  8.5 - 10.1 mg/dL Final    Total Protein 06/18/2024 7.4  6.4 - 8.2 g/dL Final    Albumin 06/18/2024 3.3 (L)  3.5 - 5.0 g/dL Final    Globulin 06/18/2024 4.1 (H)  2.0 - 4.0 g/dL Final    A/G Ratio 06/18/2024 0.8   Final    Bilirubin, Total 06/18/2024 0.5  >0.0 - 1.2 mg/dL Final    Alk Phos 06/18/2024 125 (H)  39 - 100 U/L Final    ALT 06/18/2024 31  13 - 56 U/L Final    AST 06/18/2024 25  15 - 37 U/L Final    eGFR 06/18/2024 63  >=60 mL/min/1.73m2 Final    Lipase 06/18/2024 15 (L)  73 - 393 U/L Final    WBC 06/18/2024 7.82  4.50 - 11.00 K/uL Final    RBC 06/18/2024 4.31  4.20 - 5.40 M/uL Final    Hemoglobin 06/18/2024 12.0  12.0 - 16.0 g/dL Final    Hematocrit 06/18/2024 39.2  38.0 - 47.0 % Final    MCV 06/18/2024 91.0  80.0 - 96.0 fL Final    MCH 06/18/2024 27.8  27.0 - 31.0 pg Final    MCHC 06/18/2024 30.6 (L)  32.0 - 36.0 g/dL Final    RDW 06/18/2024 12.7  11.5 - 14.5 % Final    Platelet Count 06/18/2024 380  150 - 400 K/uL Final    MPV 06/18/2024 9.5  9.4 - 12.4 fL Final    Neutrophils % 06/18/2024 52.6 (L)  53.0 - 65.0 % Final    Lymphocytes % 06/18/2024 39.0  27.0 - 41.0 % Final    Monocytes % 06/18/2024 6.4 (H)  2.0 - 6.0 % Final    Eosinophils % 06/18/2024 1.2  1.0 - 4.0 % Final    Basophils % 06/18/2024 0.5  0.0 - 1.0 % Final     Immature Granulocytes % 06/18/2024 0.3  0.0 - 0.4 % Final    nRBC, Auto 06/18/2024 0.0  <=0.0 % Final    Neutrophils, Abs 06/18/2024 4.12  1.80 - 7.70 K/uL Final    Lymphocytes, Absolute 06/18/2024 3.05  1.00 - 4.80 K/uL Final    Monocytes, Absolute 06/18/2024 0.50  0.00 - 0.80 K/uL Final    Eosinophils, Absolute 06/18/2024 0.09  0.00 - 0.50 K/uL Final    Basophils, Absolute 06/18/2024 0.04  0.00 - 0.20 K/uL Final    Immature Granulocytes, Absolute 06/18/2024 0.02  0.00 - 0.04 K/uL Final    nRBC, Absolute 06/18/2024 0.00  <=0.00 x10e3/uL Final    Diff Type 06/18/2024 Auto   Final    Magnesium 06/18/2024 2.1  1.7 - 2.3 mg/dL Final   Admission on 06/14/2024, Discharged on 06/14/2024   Component Date Value Ref Range Status    Sodium 06/14/2024 136  136 - 145 mmol/L Final    Potassium 06/14/2024 3.4 (L)  3.5 - 5.1 mmol/L Final    Chloride 06/14/2024 99  98 - 107 mmol/L Final    CO2 06/14/2024 26  21 - 32 mmol/L Final    Anion Gap 06/14/2024 14  7 - 16 mmol/L Final    Glucose 06/14/2024 468 (H)  74 - 106 mg/dL Final    BUN 06/14/2024 12  7 - 18 mg/dL Final    Creatinine 06/14/2024 1.29 (H)  0.55 - 1.02 mg/dL Final    BUN/Creatinine Ratio 06/14/2024 9  6 - 20 Final    Calcium 06/14/2024 9.0  8.5 - 10.1 mg/dL Final    Total Protein 06/14/2024 7.4  6.4 - 8.2 g/dL Final    Albumin 06/14/2024 3.2 (L)  3.5 - 5.0 g/dL Final    Globulin 06/14/2024 4.2 (H)  2.0 - 4.0 g/dL Final    A/G Ratio 06/14/2024 0.8   Final    Bilirubin, Total 06/14/2024 0.2  >0.0 - 1.2 mg/dL Final    Alk Phos 06/14/2024 131 (H)  39 - 100 U/L Final    ALT 06/14/2024 36  13 - 56 U/L Final    AST 06/14/2024 24  15 - 37 U/L Final    eGFR 06/14/2024 52 (L)  >=60 mL/min/1.73m2 Final    Color, UA 06/14/2024 Light-Yellow  Colorless, Straw, Yellow, Light Yellow, Dark Yellow Final    Clarity, UA 06/14/2024 Clear  Clear Final    pH, UA 06/14/2024 6.5  5.0 to 8.0 pH Units Final    Leukocytes, UA 06/14/2024 Negative  Negative Final     Nitrites, UA 06/14/2024 Negative  Negative Final    Protein, UA 06/14/2024 10 (A)  Negative Final    Glucose, UA 06/14/2024 >1000 (A)  Normal mg/dL Final    Ketones, UA 06/14/2024 Negative  Negative mg/dL Final    Urobilinogen, UA 06/14/2024 Normal  0.2, 1.0, Normal mg/dL Final    Bilirubin, UA 06/14/2024 Negative  Negative Final    Blood, UA 06/14/2024 Negative  Negative Final    Specific [place], UA 06/14/2024 1.035 (H)  <=1.030 Final    WBC 06/14/2024 6.66  4.50 - 11.00 K/uL Final    RBC 06/14/2024 3.90 (L)  4.20 - 5.40 M/uL Final    Hemoglobin 06/14/2024 10.9 (L)  12.0 - 16.0 g/dL Final    Hematocrit 06/14/2024 35.4 (L)  38.0 - 47.0 % Final    MCV 06/14/2024 90.8  80.0 - 96.0 fL Final    MCH 06/14/2024 27.9  27.0 - 31.0 pg Final    MCHC 06/14/2024 30.8 (L)  32.0 - 36.0 g/dL Final    RDW 06/14/2024 12.7  11.5 - 14.5 % Final    Platelet Count 06/14/2024 362  150 - 400 K/uL Final    MPV 06/14/2024 9.8  9.4 - 12.4 fL Final    Neutrophils % 06/14/2024 50.6 (L)  53.0 - 65.0 % Final    Lymphocytes % 06/14/2024 41.6 (H)  27.0 - 41.0 % Final    Monocytes % 06/14/2024 5.4  2.0 - 6.0 % Final    Eosinophils % 06/14/2024 1.5  1.0 - 4.0 % Final    Basophils % 06/14/2024 0.6  0.0 - 1.0 % Final    Immature Granulocytes % 06/14/2024 0.3  0.0 - 0.4 % Final    nRBC, Auto 06/14/2024 0.0  <=0.0 % Final    Neutrophils, Abs 06/14/2024 3.37  1.80 - 7.70 K/uL Final    Lymphocytes, Absolute 06/14/2024 2.77  1.00 - 4.80 K/uL Final    Monocytes, Absolute 06/14/2024 0.36  0.00 - 0.80 K/uL Final    Eosinophils, Absolute 06/14/2024 0.10  0.00 - 0.50 K/uL Final    Basophils, Absolute 06/14/2024 0.04  0.00 - 0.20 K/uL Final    Immature Granulocytes, Absolute 06/14/2024 0.02  0.00 - 0.04 K/uL Final    nRBC, Absolute 06/14/2024 0.00  <=0.00 x10e3/uL Final    Diff Type 06/14/2024 Auto   Final    POC Glucose 06/14/2024 382 (H)  70 - 105 mg/dL Final   Patient Outreach on 04/19/2024   Component Date Value Ref Range  Status    Left Eye DM Retinopathy 2023 Negative   Final    Right Eye DM Retinopathy 2023 Negative   Final   There may be more visits with results that are not included.         Orders Placed This Encounter   Procedures    POCT Urine Drug Screen Presump     Interpretive Information:     Negative:  No drug detected at the cut off level.   Positive:  This result represents presumptive positive for the   tested drug, other substances may yield a positive response other   than the analyte of interest. This result should be utilized for   diagnostic purpose only. Confirmation testing will be performed upon physician request only.          Requested Prescriptions     Signed Prescriptions Disp Refills    traMADoL (ULTRAM) 50 mg tablet 90 tablet 0     Sig: Take 1 tablet (50 mg total) by mouth every 8 (eight) hours as needed for Pain.    naloxone (NARCAN) 4 mg/actuation Spry 1 each 0     Si spray (4 mg total) by Nasal route once. Call 911, One sprays alternate nostril, may repeat 2-3 minutes as needed for 1 dose       Assessment:     1. Lumbosacral spondylosis without myelopathy    2. Postlaminectomy syndrome, lumbar region    3. Disorder of sacrum    4. Encounter for long-term (current) use of medications             A's of Opioid Risk Assessment  Activity:Patient can perform ADL.   Analgesia:Patients pain is partially controlled by current medication. Patient has tried OTC medications such as Tylenol and Ibuprofen with out relief.   Adverse Effects: Patient denies constipation or sedation.  Aberrant Behavior:  reviewed with no aberrant drug seeking/taking behavior.  Overdose reversal drug naloxone discussed     Drug screen reviewed      MRI lumbar spine [facility] 2023 multiple level degenerative changes mild bilateral foraminal stenosis L3/4 L4/5 less so at L5/S1 postop changes noted           Plan:    Narcan 2024    Chronically elevated glucose    History lumbar surgery  laminectomy L2 through 5 November 30, 2021 Dr. Upton complicated by postop infection      She had bilateral lumbar L4 through S1 medial branch block # 2, March 14, 2024  she states she had 80% relief after procedure,   the procedure did help improve her level function     Complaint of back pain buttock and leg pain numbness and tingling radicular in nature worse with standing walking laughing or coughing ongoing for more than 3 months    Requesting resume tramadol    Tramadol 50 mg 1 p.o. q.8 hours as needed    Denies loss of bowel or bladder function    Considering following up with spine surgery if procedures do not improve her lumbar radiculopathy      Keep appointment for procedure    Indications for this procedure for this specific patient include the following     - Injections being provided as part of a comprehensive pain management program.    - Pt has failed 6 weeks of conservative therapy including oral meds, PT and or home exercise program which has been discussed with the patient   - Injection being provided for suspected radicular pain.    - Pain scale of greater than or equal to 3/10 with functional impairment  - No evidence of local or systemic infection, bleeding tendency or unstable medical condition.    - Pain is causing significant functional limitation resulting in diminished quality of life and impaired age appropriate ADL's.   - Repeat injections are done no sooner than 7 days after the previous injection  - Epidural done for suspected radicular pain along dermatome of nerve   - Epidural done to differentiate level of radicular nerve root pain   -procedure done prior to surgical consideration  - Repeat injections done only when pt reports 50% improvement in pain from previous injections    -increased level of function  - patient is aware if they take any NSAIDs/anticoagulant prior to procedure procedure will be postponed, this was listed on the preop instructions and highlighted, list of  NSAIDs/anticoagulant reviewed with patient to include methotrexate  - Injection done at L4/5 level(s) which is consistent with patient's dermatomal pain complaint  The planned medically necessary  surgical procedure is performed in a hospital outpatient department and not in an ambulatory surgical center due to:     -there is no geographically assessable ambulatory surgery center that has the  necessary equipment and fluoroscopy needed for the procedure     -there is no geographically assessable ambulatory surgical center available at which the physician has privileges     -an ASC's  specific  guideline regarding the individuals weight or health conditions that prevent the use of an ASC     -done under fluoro  Monitor anesthesia request is medically indicated for the scheduled nerve block procedure due to:  1- needle phobia and anxiety, placing  the patient at risk during the provided service.  2-patient has an ASA class greater than 3 and requires constant presence of an anesthesiologist during the procedure,   3-patient has severe problems hard to lie still  4-patient suffers from chronic pain and is unable to function due to  diminished ADLs    Schedule lumbar L4/5 SILVANA # 1, lumbar radiculopathy    Dr. Bills    Bring original prescription medication bottles/container/box with labels to each visit

## 2024-10-07 NOTE — PROGRESS NOTES
Subjective:         Patient ID: Carey Leonardo is a 49 y.o. female.    Chief Complaint: Low-back Pain and Leg Pain      Pain  This is a chronic problem. The current episode started more than 1 year ago. The problem occurs daily. The problem has been waxing and waning. Associated symptoms include arthralgias. Pertinent negatives include no anorexia, change in bowel habit, chest pain, chills, coughing, diaphoresis, fever, neck pain, sore throat, swollen glands, urinary symptoms, vertigo or vomiting.     Review of Systems   Constitutional:  Negative for activity change, appetite change, chills, diaphoresis, fever and unexpected weight change.   HENT:  Negative for drooling, ear discharge, ear pain, facial swelling, nosebleeds, sore throat, trouble swallowing, voice change and goiter.    Eyes:  Negative for photophobia, pain, discharge, redness and visual disturbance.   Respiratory:  Negative for apnea, cough, choking, chest tightness, shortness of breath, wheezing and stridor.    Cardiovascular:  Negative for chest pain, palpitations and leg swelling.   Gastrointestinal:  Negative for abdominal distention, anorexia, change in bowel habit, diarrhea, rectal pain, vomiting and fecal incontinence.   Endocrine: Negative for cold intolerance, heat intolerance, polydipsia, polyphagia and polyuria.   Genitourinary:  Negative for bladder incontinence, dysuria, flank pain, frequency and hot flashes.   Musculoskeletal:  Positive for arthralgias, back pain and leg pain. Negative for neck pain.   Integumentary:  Negative for color change and pallor.   Allergic/Immunologic: Negative for immunocompromised state.   Neurological:  Negative for dizziness, vertigo, seizures, syncope, facial asymmetry, speech difficulty, light-headedness, memory loss and coordination difficulties.   Hematological:  Negative for adenopathy. Does not bruise/bleed easily.   Psychiatric/Behavioral:  Negative for agitation, behavioral problems, confusion,  decreased concentration, dysphoric mood, hallucinations, self-injury and suicidal ideas. The patient is not nervous/anxious and is not hyperactive.            Past Medical History:   Diagnosis Date    Asthma     CHF (congestive heart failure)     Chronic serous otitis media of both ears 04/04/2023    Depressive disorder     Diabetes mellitus, type 2     Gastroparesis     Hyperlipidemia     Hypertension     Hypothyroidism 08/02/2022    Synthroid 200 mcg oral daily      IBS (irritable bowel syndrome)     Kidney stones     Postlaminectomy syndrome, lumbar region 04/28/2022    [facility]    Sleep apnea     Does not use a CPAP     Past Surgical History:   Procedure Laterality Date    Bilateral L3-S1 MBB Bilateral 6-, 5-, 1-8-2014    Dr Jessica Randolph    CARPAL TUNNEL RELEASE      CHOLECYSTECTOMY      DIAGNOSTIC LAPAROSCOPY  04/14/2010    Exploratory Laparotomy, Myomectomy, Hydrotubation-Dr. Feng    DILATION AND CURETTAGE OF UTERUS  04/14/2010    Hysteroscopy-Dr. Fegn    EXPLORATORY LAPAROTOMY WITH UTERINE MYOMECTOMY  02/27/2007    Dr. Marquise Anderson    HYSTERECTOMY  09/29/2011    Robotic, FABIENNE, Cystoscopy-Dr. Feng    HYSTEROSCOPY WITH DILATION AND CURETTAGE OF UTERUS  04/04/2006    Laparoscopy, Chromotubation-Dr. Marquise Anderson    INJECTION OF ANESTHETIC AGENT AROUND ILIOINGUINAL NERVE Right 6/22/2023    Procedure: BLOCK, NERVE, ILIOINGUINAL;  Surgeon: Rasheeda Bills MD;  Location: [facility];  Service: Pain Management;  Laterality: Right;    INJECTION OF ANESTHETIC AGENT AROUND MEDIAL BRANCH NERVES INNERVATING LUMBAR FACET JOINT Bilateral 9/21/2023    Procedure: BLOCK, NERVE, FACET JOINT, LUMBAR, MEDIAL BRANCH;  Surgeon: Rasheeda Bills MD;  Location: [facility];  Service: Pain Management;  Laterality: Bilateral;    INJECTION OF ANESTHETIC AGENT AROUND MEDIAL BRANCH NERVES INNERVATING LUMBAR FACET JOINT Bilateral 3/14/2024    Procedure: BLOCK,  NERVE, FACET JOINT, LUMBAR, MEDIAL BRANCH, BILATERAL L4-S1 MBB;  Surgeon: Rasheeda Bills MD;  Location: [facility];  Service: Pain Management;  Laterality: Bilateral;    LAMINECTOMY N/A 11/30/2021    Procedure: L2-L5 Laminectomy;  Surgeon: Cyril Upton MD;  Location: [facility] OR;  Service: Neurosurgery;  Laterality: N/A;    LEFT HEART CATHETERIZATION      Left L3-S1 RFTC Left 07/18/2017    Dr Lopez    Left SI JI Left 09/30/2013    Dr Randolph    MYRINGOTOMY WITH INSERTION OF VENTILATION TUBE Bilateral 4/4/2023    Procedure: MYRINGOTOMY, WITH TYMPANOSTOMY TUBE INSERTION (T-TUBES);  Surgeon: Sea Hinton MD;  Location: [facility]THO OR;  Service: ENT;  Laterality: Bilateral;  T-TUBES    MYRINGOTOMY WITH INSERTION OF VENTILATION TUBE Bilateral 9/12/2023    Procedure: MYRINGOTOMY, WITH TYMPANOSTOMY TUBE INSERTION, T-TUBES;  Surgeon: Sea Hinton MD;  Location: [facility] OR;  Service: ENT;  Laterality: Bilateral;    MYRINGOTOMY WITH INSERTION OF VENTILATION TUBE Bilateral 7/2/2024    Procedure: MYRINGOTOMY, WITH TYMPANOSTOMY TUBE INSERTION;  Surgeon: Sea Hinton MD;  Location[facility] OR;  Service: ENT;  Laterality: Bilateral;  Bilateral T-Tubes    OOPHORECTOMY  11/11/2014    FABIENNE Barajas, Cystoscopy-Dr. Feng    SINUS SURGERY       Social History     Socioeconomic History    Marital status:    Tobacco Use    Smoking status: Never     Passive exposure: Never    Smokeless tobacco: Never   Substance and Sexual Activity    Alcohol use: Not Currently    Drug use: Not Currently     Types: Hydrocodone, Oxycodone    Sexual activity: Not Currently     Social Drivers of Health     Physical Activity: Insufficiently Active (3/28/2024)    Exercise Vital Sign     Days of Exercise per Week: 3 days     Minutes of Exercise per Session: 30 min   Stress: Stress Concern Present (3/28/2024)    [employer] - Occupational Stress Questionnaire      "Feeling of Stress : To some extent     Family History   Problem Relation Name Age of Onset    Diabetes Mellitus Mother      Heart disease Mother      Hypertension Mother      Migraines Mother      Heart disease Sister       Review of patient's allergies indicates:   Allergen Reactions    Fish containing products Anaphylaxis    Iodinated contrast media     Penicillins         Objective:  Vitals:    10/08/24 0836   BP: (!) 143/76   Pulse: 86   Resp: 18   Weight: 103.4 kg (228 lb)   Height: 5' 7" (1.702 m)   PainSc:   9             Physical Exam  Vitals and nursing note reviewed. Exam conducted with a chaperone present.   Constitutional:       General: She is awake. She is not in acute distress.     Appearance: Normal appearance. She is not ill-appearing, toxic-appearing or diaphoretic.   HENT:      Head: Normocephalic and atraumatic.      Nose: Nose normal.      Mouth/Throat:      Mouth: Mucous membranes are moist.      Pharynx: Oropharynx is clear.   Eyes:      Conjunctiva/sclera: Conjunctivae normal.      Pupils: Pupils are equal, round, and reactive to light.   Cardiovascular:      Rate and Rhythm: Normal rate.   Pulmonary:      Effort: Pulmonary effort is normal. No respiratory distress.   Abdominal:      Palpations: Abdomen is soft.      Tenderness: There is no guarding.   Musculoskeletal:         General: Normal range of motion.      Cervical back: Normal range of motion and neck supple. No rigidity.   Skin:     General: Skin is warm and dry.      Coloration: Skin is not jaundiced or pale.   Neurological:      General: No focal deficit present.      Mental Status: She is alert and oriented to person, place, and time. Mental status is at baseline.      Cranial Nerves: No cranial nerve deficit (II-XII).   Psychiatric:         Mood and Affect: Mood normal.         Behavior: Behavior normal. Behavior is cooperative.         Thought Content: Thought content normal.         CT Renal Stone Study Abd Pelvis " WO  Narrative: EXAMINATION:  CT RENAL STONE STUDY ABD PELVIS WO    CLINICAL HISTORY:  Flank pain, kidney stone suspected;    TECHNIQUE:  Low dose axial images, sagittal and coronal reformations were obtained from the lung bases to the pubic symphysis.  Contrast was not administered.    COMPARISON:  08/04/2024    FINDINGS:  Heart: Heart is enlarged.  Small pericardial effusion.    Lung Bases: Mild bibasilar ground-glass changes may be associated with mild pulmonary edema.  Pneumonitis could appear similar.    Liver: The liver is enlarged.  No focal abnormality.    Gallbladder: Status post cholecystectomy.    Bile Ducts: No evidence of dilated ducts.    Minimal hiatal hernia.    Pancreas: No mass or peripancreatic fat stranding.    Spleen: Unremarkable.    Adrenals: Unremarkable.    Kidneys/ Ureters: No stone, mass, hydronephrosis or hydroureter bilaterally.    Bladder: No evidence of wall thickening.    Reproductive organs: Status post hysterectomy.    GI Tract/Mesentery: No evidence of bowel obstruction or inflammation.  Moderate retained feces in the colon, predominantly on the right.    Nondistention of the left colon with limited characterization.    No evidence of appendicitis.    Peritoneal Space: No ascites. No free air.    Retroperitoneum: 1.4 cm minimally enlarged retroperitoneal lymph node anterior to the left psoas muscle on axial 64 mildly increased from the prior study.  Probable 1 cm similar of node anterior to the right psoas muscle on axial 60.  Few partially calcified nodes seen slightly more superior on the right, similar to the prior study.  This could be associated with prior granulomatous infection.  Recommend clinical correlation.    Abdominal wall: Unremarkable.    Vasculature: No evidence of aortic aneurysm.    Bones: No acute fracture.  Laminectomy defect at L3 and L4.  Impression: 1. No acute abnormality.  See above comments also.  2. Cardiomegaly with small pericardial effusion and mild  bibasilar ground-glass changes could be associated with mild pulmonary edema.  Pneumonitis could appear similar.  Recommend clinical correlation.  3. Hepatomegaly.  4. Minimal hiatal hernia.  5. Possible constipation.  6. Few nonspecific minimally enlarged retroperitoneal lymph nodes adjacent to the psoas muscles.  See above comments.  7. This report was flagged in Epic as abnormal.    Electronically signed by: Abdoul Matson  Date:    10/03/2024  Time:    20:00       Admission on 10/03/2024, Discharged on 10/03/2024   Component Date Value Ref Range Status    Sodium 10/03/2024 140  136 - 145 mmol/L Final    Potassium 10/03/2024 3.1 (L)  3.5 - 5.1 mmol/L Final    Chloride 10/03/2024 105  98 - 107 mmol/L Final    CO2 10/03/2024 28  21 - 32 mmol/L Final    Anion Gap 10/03/2024 10  7 - 16 mmol/L Final    Glucose 10/03/2024 371 (H)  74 - 106 mg/dL Final    BUN 10/03/2024 9  7 - 18 mg/dL Final    Creatinine 10/03/2024 0.87  0.55 - 1.02 mg/dL Final    BUN/Creatinine Ratio 10/03/2024 10  6 - 20 Final    Calcium 10/03/2024 8.8  8.5 - 10.1 mg/dL Final    Total Protein 10/03/2024 6.8  6.4 - 8.2 g/dL Final    Albumin 10/03/2024 2.9 (L)  3.5 - 5.0 g/dL Final    Globulin 10/03/2024 3.9  2.0 - 4.0 g/dL Final    A/G Ratio 10/03/2024 0.7   Final    Bilirubin, Total 10/03/2024 0.1  >0.0 - 1.2 mg/dL Final    Alk Phos 10/03/2024 117 (H)  39 - 100 U/L Final    ALT 10/03/2024 30  13 - 56 U/L Final    AST 10/03/2024 24  15 - 37 U/L Final    eGFR 10/03/2024 82  >=60 mL/min/1.73m2 Final    Color, UA 10/03/2024 Light-Yellow  Colorless, Straw, Yellow, Light Yellow, Dark Yellow Final    Clarity, UA 10/03/2024 Clear  Clear Final    pH, UA 10/03/2024 6.5  5.0 to 8.0 pH Units Final    Leukocytes, UA 10/03/2024 Negative  Negative Final    Nitrites, UA 10/03/2024 Negative  Negative Final    Protein, UA 10/03/2024 Negative  Negative Final    Glucose, UA 10/03/2024 500 (A)  Normal mg/dL Final    Ketones, UA 10/03/2024  Negative  Negative mg/dL Final    Urobilinogen, UA 10/03/2024 Normal  0.2, 1.0, Normal mg/dL Final    Bilirubin, UA 10/03/2024 Negative  Negative Final    Blood, UA 10/03/2024 Negative  Negative Final    Specific [place], UA 10/03/2024 1.013  <=1.030 Final    WBC 10/03/2024 5.74  4.50 - 11.00 K/uL Final    RBC 10/03/2024 3.99 (L)  4.20 - 5.40 M/uL Final    Hemoglobin 10/03/2024 11.0 (L)  12.0 - 16.0 g/dL Final    Hematocrit 10/03/2024 35.5 (L)  38.0 - 47.0 % Final    MCV 10/03/2024 89.0  80.0 - 96.0 fL Final    MCH 10/03/2024 27.6  27.0 - 31.0 pg Final    MCHC 10/03/2024 31.0 (L)  32.0 - 36.0 g/dL Final    RDW 10/03/2024 12.3  11.5 - 14.5 % Final    Platelet Count 10/03/2024 356  150 - 400 K/uL Final    MPV 10/03/2024 9.8  9.4 - 12.4 fL Final    Neutrophils % 10/03/2024 41.8 (L)  53.0 - 65.0 % Final    Lymphocytes % 10/03/2024 48.1 (H)  27.0 - 41.0 % Final    Monocytes % 10/03/2024 6.8 (H)  2.0 - 6.0 % Final    Eosinophils % 10/03/2024 2.6  1.0 - 4.0 % Final    Basophils % 10/03/2024 0.5  0.0 - 1.0 % Final    Immature Granulocytes % 10/03/2024 0.2  0.0 - 0.4 % Final    nRBC, Auto 10/03/2024 0.0  <=0.0 % Final    Neutrophils, Abs 10/03/2024 2.40  1.80 - 7.70 K/uL Final    Lymphocytes, Absolute 10/03/2024 2.76  1.00 - 4.80 K/uL Final    Monocytes, Absolute 10/03/2024 0.39  0.00 - 0.80 K/uL Final    Eosinophils, Absolute 10/03/2024 0.15  0.00 - 0.50 K/uL Final    Basophils, Absolute 10/03/2024 0.03  0.00 - 0.20 K/uL Final    Immature Granulocytes, Absolute 10/03/2024 0.01  0.00 - 0.04 K/uL Final    nRBC, Absolute 10/03/2024 0.00  <=0.00 x10e3/uL Final    Diff Type 10/03/2024 Auto   Final    ProBNP 10/03/2024 22  1 - 125 pg/mL Final    Troponin I High Sensitivity 10/03/2024 29.9  <=60.4 pg/mL Final    QRS Duration 10/03/2024 72  ms Final    OHS QTC Calculation 10/03/2024 452  ms Final   Office Visit on 09/30/2024   Component Date Value Ref Range Status    Hemoglobin A1C 09/30/2024 8.7  (H)  4.5 - 6.6 % Final    Estimated Average Glucose 09/30/2024 203  mg/dL Final    Triglycerides 09/30/2024 259 (H)  35 - 150 mg/dL Final    Cholesterol 09/30/2024 240 (H)  0 - 200 mg/dL Final    HDL Cholesterol 09/30/2024 40  40 - 60 mg/dL Final    Cholesterol/HDL Ratio (Risk Factor) 09/30/2024 6.0   Final    Non-HDL 09/30/2024 200  mg/dL Final    LDL Calculated 09/30/2024 148  mg/dL Final    LDL/HDL 09/30/2024 3.7   Final    VLDL 09/30/2024 52  mg/dL Final    TSH 09/30/2024 2.770  0.358 - 3.740 uIU/mL Final   Hospital Outpatient Visit on 09/09/2024   Component Date Value Ref Range Status    POC Glucose 09/09/2024 187 (H)  70 - 105 mg/dL Final   Admission on 08/31/2024, Discharged on 08/31/2024   Component Date Value Ref Range Status    Sodium 08/31/2024 140  136 - 145 mmol/L Final    Potassium 08/31/2024 3.1 (L)  3.5 - 5.1 mmol/L Final    Chloride 08/31/2024 103  98 - 107 mmol/L Final    CO2 08/31/2024 30  21 - 32 mmol/L Final    Anion Gap 08/31/2024 10  7 - 16 mmol/L Final    Glucose 08/31/2024 260 (H)  74 - 106 mg/dL Final    BUN 08/31/2024 10  7 - 18 mg/dL Final    Creatinine 08/31/2024 0.97  0.55 - 1.02 mg/dL Final    BUN/Creatinine Ratio 08/31/2024 10  6 - 20 Final    Calcium 08/31/2024 9.3  8.5 - 10.1 mg/dL Final    Total Protein 08/31/2024 7.6  6.4 - 8.2 g/dL Final    Albumin 08/31/2024 3.7  3.5 - 5.0 g/dL Final    Globulin 08/31/2024 3.9  2.0 - 4.0 g/dL Final    A/G Ratio 08/31/2024 0.9   Final    Bilirubin, Total 08/31/2024 0.3  >0.0 - 1.2 mg/dL Final    Alk Phos 08/31/2024 124 (H)  39 - 100 U/L Final    ALT 08/31/2024 34  13 - 56 U/L Final    AST 08/31/2024 29  15 - 37 U/L Final    eGFR 08/31/2024 73  >=60 mL/min/1.73m2 Final    Color, UA 08/31/2024 Light-Yellow  Colorless, Straw, Yellow, Light Yellow, Dark Yellow Final    Clarity, UA 08/31/2024 Clear  Clear Final    pH, UA 08/31/2024 7.0  5.0 to 8.0 pH Units Final    Leukocytes, UA 08/31/2024 Negative  Negative Final     Nitrites, UA 08/31/2024 Negative  Negative Final    Protein, UA 08/31/2024 Negative  Negative Final    Glucose, UA 08/31/2024 >1000 (A)  Normal mg/dL Final    Ketones, UA 08/31/2024 Negative  Negative mg/dL Final    Urobilinogen, UA 08/31/2024 Normal  0.2, 1.0, Normal mg/dL Final    Bilirubin, UA 08/31/2024 Negative  Negative Final    Blood, UA 08/31/2024 Negative  Negative Final    Specific [place], UA 08/31/2024 1.035 (H)  <=1.030 Final    WBC 08/31/2024 6.25  4.50 - 11.00 K/uL Final    RBC 08/31/2024 4.17 (L)  4.20 - 5.40 M/uL Final    Hemoglobin 08/31/2024 11.7 (L)  12.0 - 16.0 g/dL Final    Hematocrit 08/31/2024 37.3 (L)  38.0 - 47.0 % Final    MCV 08/31/2024 89.4  80.0 - 96.0 fL Final    MCH 08/31/2024 28.1  27.0 - 31.0 pg Final    MCHC 08/31/2024 31.4 (L)  32.0 - 36.0 g/dL Final    RDW 08/31/2024 12.3  11.5 - 14.5 % Final    Platelet Count 08/31/2024 376  150 - 400 K/uL Final    MPV 08/31/2024 9.7  9.4 - 12.4 fL Final    Neutrophils % 08/31/2024 42.1 (L)  53.0 - 65.0 % Final    Lymphocytes % 08/31/2024 48.2 (H)  27.0 - 41.0 % Final    Monocytes % 08/31/2024 5.4  2.0 - 6.0 % Final    Eosinophils % 08/31/2024 2.7  1.0 - 4.0 % Final    Basophils % 08/31/2024 1.1 (H)  0.0 - 1.0 % Final    Immature Granulocytes % 08/31/2024 0.5 (H)  0.0 - 0.4 % Final    nRBC, Auto 08/31/2024 0.0  <=0.0 % Final    Neutrophils, Abs 08/31/2024 2.63  1.80 - 7.70 K/uL Final    Lymphocytes, Absolute 08/31/2024 3.01  1.00 - 4.80 K/uL Final    Monocytes, Absolute 08/31/2024 0.34  0.00 - 0.80 K/uL Final    Eosinophils, Absolute 08/31/2024 0.17  0.00 - 0.50 K/uL Final    Basophils, Absolute 08/31/2024 0.07  0.00 - 0.20 K/uL Final    Immature Granulocytes, Absolute 08/31/2024 0.03  0.00 - 0.04 K/uL Final    nRBC, Absolute 08/31/2024 0.00  <=0.00 x10e3/uL Final    Diff Type 08/31/2024 Auto   Final    Magnesium 08/31/2024 2.2  1.7 - 2.3 mg/dL Final   Admission on 08/04/2024, Discharged on 08/04/2024    Component Date Value Ref Range Status    Sodium 08/04/2024 139  136 - 145 mmol/L Final    Potassium 08/04/2024 3.3 (L)  3.5 - 5.1 mmol/L Final    Chloride 08/04/2024 105  98 - 107 mmol/L Final    CO2 08/04/2024 29  21 - 32 mmol/L Final    Anion Gap 08/04/2024 8  7 - 16 mmol/L Final    Glucose 08/04/2024 182 (H)  74 - 106 mg/dL Final    BUN 08/04/2024 10  7 - 18 mg/dL Final    Creatinine 08/04/2024 1.14 (H)  0.55 - 1.02 mg/dL Final    BUN/Creatinine Ratio 08/04/2024 9  6 - 20 Final    Calcium 08/04/2024 9.1  8.5 - 10.1 mg/dL Final    Total Protein 08/04/2024 7.0  6.4 - 8.2 g/dL Final    Albumin 08/04/2024 3.3 (L)  3.5 - 5.0 g/dL Final    Globulin 08/04/2024 3.7  2.0 - 4.0 g/dL Final    A/G Ratio 08/04/2024 0.9   Final    Bilirubin, Total 08/04/2024 0.3  >0.0 - 1.2 mg/dL Final    Alk Phos 08/04/2024 115 (H)  39 - 100 U/L Final    ALT 08/04/2024 26  13 - 56 U/L Final    AST 08/04/2024 13 (L)  15 - 37 U/L Final    eGFR 08/04/2024 60  >=60 mL/min/1.73m2 Final    Color, UA 08/04/2024 Yellow  Colorless, Straw, Yellow, Light Yellow, Dark Yellow Final    Clarity, UA 08/04/2024 Turbid  Clear Final    pH, UA 08/04/2024 6.5  5.0 to 8.0 pH Units Final    Leukocytes, UA 08/04/2024 Negative  Negative Final    Nitrites, UA 08/04/2024 Negative  Negative Final    Protein, UA 08/04/2024 100 (A)  Negative Final    Glucose, UA 08/04/2024 Normal  Normal mg/dL Final    Ketones, UA 08/04/2024 Negative  Negative mg/dL Final    Urobilinogen, UA 08/04/2024 2 (A)  0.2, 1.0, Normal mg/dL Final    Bilirubin, UA 08/04/2024 Negative  Negative Final    Blood, UA 08/04/2024 Negative  Negative Final    Specific [place], UA 08/04/2024 1.033 (H)  <=1.030 Final    WBC 08/04/2024 6.88  4.50 - 11.00 K/uL Final    RBC 08/04/2024 3.94 (L)  4.20 - 5.40 M/uL Final    Hemoglobin 08/04/2024 11.0 (L)  12.0 - 16.0 g/dL Final    Hematocrit 08/04/2024 35.9 (L)  38.0 - 47.0 % Final    MCV 08/04/2024 91.1  80.0 - 96.0 fL Final     MCH 08/04/2024 27.9  27.0 - 31.0 pg Final    MCHC 08/04/2024 30.6 (L)  32.0 - 36.0 g/dL Final    RDW 08/04/2024 11.9  11.5 - 14.5 % Final    Platelet Count 08/04/2024 377  150 - 400 K/uL Final    MPV 08/04/2024 9.5  9.4 - 12.4 fL Final    Neutrophils % 08/04/2024 47.5 (L)  53.0 - 65.0 % Final    Lymphocytes % 08/04/2024 43.9 (H)  27.0 - 41.0 % Final    Monocytes % 08/04/2024 5.8  2.0 - 6.0 % Final    Eosinophils % 08/04/2024 1.9  1.0 - 4.0 % Final    Basophils % 08/04/2024 0.6  0.0 - 1.0 % Final    Immature Granulocytes % 08/04/2024 0.3  0.0 - 0.4 % Final    nRBC, Auto 08/04/2024 0.0  <=0.0 % Final    Neutrophils, Abs 08/04/2024 3.27  1.80 - 7.70 K/uL Final    Lymphocytes, Absolute 08/04/2024 3.02  1.00 - 4.80 K/uL Final    Monocytes, Absolute 08/04/2024 0.40  0.00 - 0.80 K/uL Final    Eosinophils, Absolute 08/04/2024 0.13  0.00 - 0.50 K/uL Final    Basophils, Absolute 08/04/2024 0.04  0.00 - 0.20 K/uL Final    Immature Granulocytes, Absolute 08/04/2024 0.02  0.00 - 0.04 K/uL Final    nRBC, Absolute 08/04/2024 0.00  <=0.00 x10e3/uL Final    Diff Type 08/04/2024 Auto   Final    WBC, UA 08/04/2024 9 (H)  <=5 /hpf Final    RBC, UA 08/04/2024 3  <=3 /hpf Final    Bacteria, UA 08/04/2024 Few (A)  None Seen /hpf Final    Squamous Epithelial Cells, UA 08/04/2024 Occasional (A)  None Seen /HPF Final    Hyaline Casts, UA 08/04/2024 2-5 (A)  None Seen /lpf Final    Mucous 08/04/2024 Many (A)  None Seen /LPF Final   Admission on 07/02/2024, Discharged on 07/02/2024   Component Date Value Ref Range Status    POC Glucose 07/02/2024 247 (H)  70 - 105 mg/dL Final    POC Glucose 07/02/2024 216 (H)  70 - 105 mg/dL Final   Lab Visit on 06/20/2024   Component Date Value Ref Range Status    Hemoglobin A1C 06/20/2024 8.7 (H)  4.5 - 6.6 % Final    Estimated Average Glucose 06/20/2024 203  mg/dL Final    Creatinine, Urine 06/20/2024 210  28 - 219 mg/dL Final    Microalbumin 06/20/2024 1.7  0.0 - 2.8  mg/dL Final    Microalbumin/Creatinine Ratio 06/20/2024 8.1  0.0 - 30.0 mg/g Final   Admission on 06/18/2024, Discharged on 06/18/2024   Component Date Value Ref Range Status    Sodium 06/18/2024 141  136 - 145 mmol/L Final    Potassium 06/18/2024 3.0 (L)  3.5 - 5.1 mmol/L Final    Chloride 06/18/2024 105  98 - 107 mmol/L Final    CO2 06/18/2024 28  21 - 32 mmol/L Final    Anion Gap 06/18/2024 11  7 - 16 mmol/L Final    Glucose 06/18/2024 259 (H)  74 - 106 mg/dL Final    BUN 06/18/2024 8  7 - 18 mg/dL Final    Creatinine 06/18/2024 1.09 (H)  0.55 - 1.02 mg/dL Final    BUN/Creatinine Ratio 06/18/2024 7  6 - 20 Final    Calcium 06/18/2024 9.1  8.5 - 10.1 mg/dL Final    Total Protein 06/18/2024 7.4  6.4 - 8.2 g/dL Final    Albumin 06/18/2024 3.3 (L)  3.5 - 5.0 g/dL Final    Globulin 06/18/2024 4.1 (H)  2.0 - 4.0 g/dL Final    A/G Ratio 06/18/2024 0.8   Final    Bilirubin, Total 06/18/2024 0.5  >0.0 - 1.2 mg/dL Final    Alk Phos 06/18/2024 125 (H)  39 - 100 U/L Final    ALT 06/18/2024 31  13 - 56 U/L Final    AST 06/18/2024 25  15 - 37 U/L Final    eGFR 06/18/2024 63  >=60 mL/min/1.73m2 Final    Lipase 06/18/2024 15 (L)  73 - 393 U/L Final    WBC 06/18/2024 7.82  4.50 - 11.00 K/uL Final    RBC 06/18/2024 4.31  4.20 - 5.40 M/uL Final    Hemoglobin 06/18/2024 12.0  12.0 - 16.0 g/dL Final    Hematocrit 06/18/2024 39.2  38.0 - 47.0 % Final    MCV 06/18/2024 91.0  80.0 - 96.0 fL Final    MCH 06/18/2024 27.8  27.0 - 31.0 pg Final    MCHC 06/18/2024 30.6 (L)  32.0 - 36.0 g/dL Final    RDW 06/18/2024 12.7  11.5 - 14.5 % Final    Platelet Count 06/18/2024 380  150 - 400 K/uL Final    MPV 06/18/2024 9.5  9.4 - 12.4 fL Final    Neutrophils % 06/18/2024 52.6 (L)  53.0 - 65.0 % Final    Lymphocytes % 06/18/2024 39.0  27.0 - 41.0 % Final    Monocytes % 06/18/2024 6.4 (H)  2.0 - 6.0 % Final    Eosinophils % 06/18/2024 1.2  1.0 - 4.0 % Final    Basophils % 06/18/2024 0.5  0.0 - 1.0 % Final     Immature Granulocytes % 06/18/2024 0.3  0.0 - 0.4 % Final    nRBC, Auto 06/18/2024 0.0  <=0.0 % Final    Neutrophils, Abs 06/18/2024 4.12  1.80 - 7.70 K/uL Final    Lymphocytes, Absolute 06/18/2024 3.05  1.00 - 4.80 K/uL Final    Monocytes, Absolute 06/18/2024 0.50  0.00 - 0.80 K/uL Final    Eosinophils, Absolute 06/18/2024 0.09  0.00 - 0.50 K/uL Final    Basophils, Absolute 06/18/2024 0.04  0.00 - 0.20 K/uL Final    Immature Granulocytes, Absolute 06/18/2024 0.02  0.00 - 0.04 K/uL Final    nRBC, Absolute 06/18/2024 0.00  <=0.00 x10e3/uL Final    Diff Type 06/18/2024 Auto   Final    Magnesium 06/18/2024 2.1  1.7 - 2.3 mg/dL Final   Admission on 06/14/2024, Discharged on 06/14/2024   Component Date Value Ref Range Status    Sodium 06/14/2024 136  136 - 145 mmol/L Final    Potassium 06/14/2024 3.4 (L)  3.5 - 5.1 mmol/L Final    Chloride 06/14/2024 99  98 - 107 mmol/L Final    CO2 06/14/2024 26  21 - 32 mmol/L Final    Anion Gap 06/14/2024 14  7 - 16 mmol/L Final    Glucose 06/14/2024 468 (H)  74 - 106 mg/dL Final    BUN 06/14/2024 12  7 - 18 mg/dL Final    Creatinine 06/14/2024 1.29 (H)  0.55 - 1.02 mg/dL Final    BUN/Creatinine Ratio 06/14/2024 9  6 - 20 Final    Calcium 06/14/2024 9.0  8.5 - 10.1 mg/dL Final    Total Protein 06/14/2024 7.4  6.4 - 8.2 g/dL Final    Albumin 06/14/2024 3.2 (L)  3.5 - 5.0 g/dL Final    Globulin 06/14/2024 4.2 (H)  2.0 - 4.0 g/dL Final    A/G Ratio 06/14/2024 0.8   Final    Bilirubin, Total 06/14/2024 0.2  >0.0 - 1.2 mg/dL Final    Alk Phos 06/14/2024 131 (H)  39 - 100 U/L Final    ALT 06/14/2024 36  13 - 56 U/L Final    AST 06/14/2024 24  15 - 37 U/L Final    eGFR 06/14/2024 52 (L)  >=60 mL/min/1.73m2 Final    Color, UA 06/14/2024 Light-Yellow  Colorless, Straw, Yellow, Light Yellow, Dark Yellow Final    Clarity, UA 06/14/2024 Clear  Clear Final    pH, UA 06/14/2024 6.5  5.0 to 8.0 pH Units Final    Leukocytes, UA 06/14/2024 Negative  Negative Final     Nitrites, UA 06/14/2024 Negative  Negative Final    Protein, UA 06/14/2024 10 (A)  Negative Final    Glucose, UA 06/14/2024 >1000 (A)  Normal mg/dL Final    Ketones, UA 06/14/2024 Negative  Negative mg/dL Final    Urobilinogen, UA 06/14/2024 Normal  0.2, 1.0, Normal mg/dL Final    Bilirubin, UA 06/14/2024 Negative  Negative Final    Blood, UA 06/14/2024 Negative  Negative Final    Specific [place], UA 06/14/2024 1.035 (H)  <=1.030 Final    WBC 06/14/2024 6.66  4.50 - 11.00 K/uL Final    RBC 06/14/2024 3.90 (L)  4.20 - 5.40 M/uL Final    Hemoglobin 06/14/2024 10.9 (L)  12.0 - 16.0 g/dL Final    Hematocrit 06/14/2024 35.4 (L)  38.0 - 47.0 % Final    MCV 06/14/2024 90.8  80.0 - 96.0 fL Final    MCH 06/14/2024 27.9  27.0 - 31.0 pg Final    MCHC 06/14/2024 30.8 (L)  32.0 - 36.0 g/dL Final    RDW 06/14/2024 12.7  11.5 - 14.5 % Final    Platelet Count 06/14/2024 362  150 - 400 K/uL Final    MPV 06/14/2024 9.8  9.4 - 12.4 fL Final    Neutrophils % 06/14/2024 50.6 (L)  53.0 - 65.0 % Final    Lymphocytes % 06/14/2024 41.6 (H)  27.0 - 41.0 % Final    Monocytes % 06/14/2024 5.4  2.0 - 6.0 % Final    Eosinophils % 06/14/2024 1.5  1.0 - 4.0 % Final    Basophils % 06/14/2024 0.6  0.0 - 1.0 % Final    Immature Granulocytes % 06/14/2024 0.3  0.0 - 0.4 % Final    nRBC, Auto 06/14/2024 0.0  <=0.0 % Final    Neutrophils, Abs 06/14/2024 3.37  1.80 - 7.70 K/uL Final    Lymphocytes, Absolute 06/14/2024 2.77  1.00 - 4.80 K/uL Final    Monocytes, Absolute 06/14/2024 0.36  0.00 - 0.80 K/uL Final    Eosinophils, Absolute 06/14/2024 0.10  0.00 - 0.50 K/uL Final    Basophils, Absolute 06/14/2024 0.04  0.00 - 0.20 K/uL Final    Immature Granulocytes, Absolute 06/14/2024 0.02  0.00 - 0.04 K/uL Final    nRBC, Absolute 06/14/2024 0.00  <=0.00 x10e3/uL Final    Diff Type 06/14/2024 Auto   Final    POC Glucose 06/14/2024 382 (H)  70 - 105 mg/dL Final   Patient Outreach on 04/19/2024   Component Date Value Ref Range  Status    Left Eye DM Retinopathy 2023 Negative   Final    Right Eye DM Retinopathy 2023 Negative   Final   There may be more visits with results that are not included.         Orders Placed This Encounter   Procedures    POCT Urine Drug Screen Presump     Interpretive Information:     Negative:  No drug detected at the cut off level.   Positive:  This result represents presumptive positive for the   tested drug, other substances may yield a positive response other   than the analyte of interest. This result should be utilized for   diagnostic purpose only. Confirmation testing will be performed upon physician request only.          Requested Prescriptions     Signed Prescriptions Disp Refills    traMADoL (ULTRAM) 50 mg tablet 90 tablet 0     Sig: Take 1 tablet (50 mg total) by mouth every 8 (eight) hours as needed for Pain.    naloxone (NARCAN) 4 mg/actuation Spry 1 each 0     Si spray (4 mg total) by Nasal route once. Call 911, One sprays alternate nostril, may repeat 2-3 minutes as needed for 1 dose       Assessment:     1. Lumbosacral spondylosis without myelopathy    2. Postlaminectomy syndrome, lumbar region    3. Disorder of sacrum    4. Encounter for long-term (current) use of medications             A's of Opioid Risk Assessment  Activity:Patient can perform ADL.   Analgesia:Patients pain is partially controlled by current medication. Patient has tried OTC medications such as Tylenol and Ibuprofen with out relief.   Adverse Effects: Patient denies constipation or sedation.  Aberrant Behavior:  reviewed with no aberrant drug seeking/taking behavior.  Overdose reversal drug naloxone discussed     Drug screen reviewed      MRI lumbar spine [facility] 2023 multiple level degenerative changes mild bilateral foraminal stenosis L3/4 L4/5 less so at L5/S1 postop changes noted           Plan:    Narcan 2024    Chronically elevated glucose    History lumbar surgery  laminectomy L2 through 5 November 30, 2021 Dr. Upton complicated by postop infection      She had bilateral lumbar L4 through S1 medial branch block # 2, March 14, 2024  she states she had 80% relief after procedure,   the procedure did help improve her level function     Complaint of back pain buttock and leg pain numbness and tingling radicular in nature worse with standing walking laughing or coughing ongoing for more than 3 months    Requesting resume tramadol    Tramadol 50 mg 1 p.o. q.8 hours as needed    Denies loss of bowel or bladder function    Considering following up with spine surgery if procedures do not improve her lumbar radiculopathy      Keep appointment for procedure    Indications for this procedure for this specific patient include the following     - Injections being provided as part of a comprehensive pain management program.    - Pt has failed 6 weeks of conservative therapy including oral meds, PT and or home exercise program which has been discussed with the patient   - Injection being provided for suspected radicular pain.    - Pain scale of greater than or equal to 3/10 with functional impairment  - No evidence of local or systemic infection, bleeding tendency or unstable medical condition.    - Pain is causing significant functional limitation resulting in diminished quality of life and impaired age appropriate ADL's.   - Repeat injections are done no sooner than 7 days after the previous injection  - Epidural done for suspected radicular pain along dermatome of nerve   - Epidural done to differentiate level of radicular nerve root pain   -procedure done prior to surgical consideration  - Repeat injections done only when pt reports 50% improvement in pain from previous injections    -increased level of function  - patient is aware if they take any NSAIDs/anticoagulant prior to procedure procedure will be postponed, this was listed on the preop instructions and highlighted, list of  NSAIDs/anticoagulant reviewed with patient to include methotrexate  - Injection done at L4/5 level(s) which is consistent with patient's dermatomal pain complaint  The planned medically necessary  surgical procedure is performed in a hospital outpatient department and not in an ambulatory surgical center due to:     -there is no geographically assessable ambulatory surgery center that has the  necessary equipment and fluoroscopy needed for the procedure     -there is no geographically assessable ambulatory surgical center available at which the physician has privileges     -an ASC's  specific  guideline regarding the individuals weight or health conditions that prevent the use of an ASC     -done under fluoro  Monitor anesthesia request is medically indicated for the scheduled nerve block procedure due to:  1- needle phobia and anxiety, placing  the patient at risk during the provided service.  2-patient has an ASA class greater than 3 and requires constant presence of an anesthesiologist during the procedure,   3-patient has severe problems hard to lie still  4-patient suffers from chronic pain and is unable to function due to  diminished ADLs    Schedule lumbar L4/5 SILVANA # 1, lumbar radiculopathy    Dr. Bills    Bring original prescription medication bottles/container/box with labels to each visit

## 2024-10-08 ENCOUNTER — OFFICE VISIT (OUTPATIENT)
Dept: PAIN MEDICINE | Facility: CLINIC | Age: 49
End: 2024-10-08
Payer: OTHER GOVERNMENT

## 2024-10-08 VITALS
BODY MASS INDEX: 35.79 KG/M2 | SYSTOLIC BLOOD PRESSURE: 143 MMHG | HEART RATE: 86 BPM | WEIGHT: 228 LBS | DIASTOLIC BLOOD PRESSURE: 76 MMHG | RESPIRATION RATE: 18 BRPM | HEIGHT: 67 IN

## 2024-10-08 DIAGNOSIS — Z79.899 ENCOUNTER FOR LONG-TERM (CURRENT) USE OF MEDICATIONS: ICD-10-CM

## 2024-10-08 DIAGNOSIS — M53.3 DISORDER OF SACRUM: Chronic | ICD-10-CM

## 2024-10-08 DIAGNOSIS — M96.1 POSTLAMINECTOMY SYNDROME, LUMBAR REGION: Chronic | ICD-10-CM

## 2024-10-08 DIAGNOSIS — M47.817 LUMBOSACRAL SPONDYLOSIS WITHOUT MYELOPATHY: Primary | Chronic | ICD-10-CM

## 2024-10-08 PROCEDURE — 80305 DRUG TEST PRSMV DIR OPT OBS: CPT | Mod: PBBFAC | Performed by: PHYSICIAN ASSISTANT

## 2024-10-08 PROCEDURE — 99214 OFFICE O/P EST MOD 30 MIN: CPT | Mod: S$PBB,,, | Performed by: PHYSICIAN ASSISTANT

## 2024-10-08 PROCEDURE — 99999 PR PBB SHADOW E&M-EST. PATIENT-LVL V: CPT | Mod: PBBFAC,,, | Performed by: PHYSICIAN ASSISTANT

## 2024-10-08 PROCEDURE — 99215 OFFICE O/P EST HI 40 MIN: CPT | Mod: PBBFAC | Performed by: PHYSICIAN ASSISTANT

## 2024-10-08 PROCEDURE — 99999PBSHW POCT URINE DRUG SCREEN PRESUMP: Mod: PBBFAC,,,

## 2024-10-08 RX ORDER — NALOXONE HYDROCHLORIDE 4 MG/.1ML
1 SPRAY NASAL ONCE
Qty: 1 EACH | Refills: 0 | Status: SHIPPED | OUTPATIENT
Start: 2024-10-08 | End: 2024-10-08

## 2024-10-08 RX ORDER — TRAMADOL HYDROCHLORIDE 50 MG/1
50 TABLET ORAL EVERY 8 HOURS PRN
Qty: 90 TABLET | Refills: 0 | Status: SHIPPED | OUTPATIENT
Start: 2024-10-08 | End: 2024-11-07

## 2024-10-13 DIAGNOSIS — M96.1 POSTLAMINECTOMY SYNDROME, LUMBAR REGION: Chronic | ICD-10-CM

## 2024-10-13 DIAGNOSIS — M53.3 DISORDER OF SACRUM: Chronic | ICD-10-CM

## 2024-10-13 DIAGNOSIS — G57.91 ILIOINGUINAL NEURALGIA OF RIGHT SIDE: Chronic | ICD-10-CM

## 2024-10-13 DIAGNOSIS — M47.817 LUMBOSACRAL SPONDYLOSIS WITHOUT MYELOPATHY: Chronic | ICD-10-CM

## 2024-10-14 ENCOUNTER — PATIENT OUTREACH (OUTPATIENT)
Facility: HOSPITAL | Age: 49
End: 2024-10-14
Payer: OTHER GOVERNMENT

## 2024-10-14 RX ORDER — MELOXICAM 15 MG/1
15 TABLET ORAL
Qty: 30 TABLET | Refills: 0 | OUTPATIENT
Start: 2024-10-14

## 2024-10-14 NOTE — PROGRESS NOTES
Population Health Chart Review & Patient Outreach Details    Updates Requested / Reviewed:  [x]   Reviewed    Health Maintenance Topics Addressed and Outreach Outcomes / Actions Taken:  Diabetic Eye Exam [x] CHIKIS sent to [facility].

## 2024-10-14 NOTE — LETTER
AUTHORIZATION FOR RELEASE OF   CONFIDENTIAL INFORMATION    [facility],    We are seeing Carey Leonardo, date of birth 1975, in the clinic at [facility]. Zainab Harrell FNP is the patient's PCP. Carey Leonardo has an outstanding lab/procedure at the time we reviewed her chart. In order to help keep her health information updated, she has authorized us to request the following medical record(s):        (  )  MAMMOGRAM                                      (  )  COLONOSCOPY      (  )  PAP SMEAR                                          (  )  OUTSIDE LAB RESULTS     (  )  DEXA SCAN                                          ( x )  EYE EXAM            (  )  FOOT EXAM                                          (  )  ENTIRE RECORD     (  )  OUTSIDE IMMUNIZATIONS                 (  )  _______________         Please fax records to Ricardo Upton LPN Care Coordinator at [phone].      If you have any questions, please contact Ricardo at [phone].           Patient Name: Carey Leonardo  : 1975  Patient Phone #: [phone]

## 2024-10-14 NOTE — LETTER
AUTHORIZATION FOR RELEASE OF   CONFIDENTIAL INFORMATION    Dear [facility],    We are seeing Carey Leonardo, date of birth 1975, in the clinic at [facility]. Zainab Harrell FNP is the patient's PCP. Carey Leonardo has an outstanding lab/procedure at the time we reviewed her chart. In order to help keep her health information updated, she has authorized us to request the following medical record(s):        (  )  MAMMOGRAM                                      (  )  COLONOSCOPY      (  )  PAP SMEAR                                          (  )  OUTSIDE LAB RESULTS     (  )  DEXA SCAN                                          ( x )  EYE EXAM            (  )  FOOT EXAM                                          (  )  ENTIRE RECORD     (  )  OUTSIDE IMMUNIZATIONS                 (  )  _______________         Please fax records to Ricardo Upton LPN Care Coordinator at [phone].      If you have any questions, please contact Ricardo at [phone].           Patient Name: Carey Leonardo  : 1975  Patient Phone #: [phone]

## 2024-10-16 ENCOUNTER — PATIENT OUTREACH (OUTPATIENT)
Facility: HOSPITAL | Age: 49
End: 2024-10-16
Payer: OTHER GOVERNMENT

## 2024-10-16 NOTE — Clinical Note
She doesn't have an updated eye exam at [facility]. I called her to get her scheduled but no answer. I left a voicemail and sent a portal message.

## 2024-10-16 NOTE — PROGRESS NOTES
Population Health Chart Review & Patient Outreach Details    Health Maintenance Topics Addressed and Outreach Outcomes / Actions Taken:  Diabetic Eye Exam [x] No updated Eye Exam at [facility]. Telephone Outreach to assist with scheduling updated exam. No answer; left voicemail & sent portal message.               Blood Pressure Control [x] Telephone Outreach. No answer; left voicemail & sent portal message.

## 2024-10-22 ENCOUNTER — ANESTHESIA (OUTPATIENT)
Dept: PAIN MEDICINE | Facility: HOSPITAL | Age: 49
End: 2024-10-22
Payer: OTHER GOVERNMENT

## 2024-10-22 ENCOUNTER — HOSPITAL ENCOUNTER (OUTPATIENT)
Facility: HOSPITAL | Age: 49
Discharge: HOME | End: 2024-10-22
Attending: PAIN MEDICINE | Admitting: PAIN MEDICINE
Payer: OTHER GOVERNMENT

## 2024-10-22 ENCOUNTER — ANESTHESIA EVENT (OUTPATIENT)
Dept: PAIN MEDICINE | Facility: HOSPITAL | Age: 49
End: 2024-10-22
Payer: OTHER GOVERNMENT

## 2024-10-22 VITALS
SYSTOLIC BLOOD PRESSURE: 143 MMHG | OXYGEN SATURATION: 99 % | HEART RATE: 71 BPM | BODY MASS INDEX: 35.94 KG/M2 | WEIGHT: 229 LBS | RESPIRATION RATE: 14 BRPM | TEMPERATURE: 98 F | HEIGHT: 67 IN | DIASTOLIC BLOOD PRESSURE: 86 MMHG

## 2024-10-22 DIAGNOSIS — M54.16 LUMBAR RADICULOPATHY, CHRONIC: Primary | Chronic | ICD-10-CM

## 2024-10-22 DIAGNOSIS — I10 HYPERTENSION, UNSPECIFIED TYPE: ICD-10-CM

## 2024-10-22 DIAGNOSIS — M54.16 LUMBAR RADICULOPATHY: ICD-10-CM

## 2024-10-22 LAB — GLUCOSE SERPL-MCNC: 232 MG/DL (ref 70–105)

## 2024-10-22 PROCEDURE — 37000008 HC ANESTHESIA 1ST 15 MINUTES: Performed by: PAIN MEDICINE

## 2024-10-22 PROCEDURE — 62323 NJX INTERLAMINAR LMBR/SAC: CPT | Mod: ,,, | Performed by: PAIN MEDICINE

## 2024-10-22 PROCEDURE — 63600175 PHARM REV CODE 636 W HCPCS: Performed by: NURSE ANESTHETIST, CERTIFIED REGISTERED

## 2024-10-22 PROCEDURE — 82962 GLUCOSE BLOOD TEST: CPT

## 2024-10-22 PROCEDURE — D9220A PRA ANESTHESIA: Mod: ,,, | Performed by: NURSE ANESTHETIST, CERTIFIED REGISTERED

## 2024-10-22 PROCEDURE — 62323 NJX INTERLAMINAR LMBR/SAC: CPT | Performed by: PAIN MEDICINE

## 2024-10-22 PROCEDURE — 63600175 PHARM REV CODE 636 W HCPCS: Performed by: PAIN MEDICINE

## 2024-10-22 RX ORDER — PROPOFOL 10 MG/ML
INJECTION, EMULSION INTRAVENOUS
Status: DISCONTINUED | OUTPATIENT
Start: 2024-10-22 | End: 2024-10-22

## 2024-10-22 RX ORDER — NIFEDIPINE 60 MG/1
60 TABLET, EXTENDED RELEASE ORAL DAILY
Qty: 90 TABLET | Refills: 3 | Status: SHIPPED | OUTPATIENT
Start: 2024-10-22

## 2024-10-22 RX ORDER — SODIUM CHLORIDE 9 MG/ML
INJECTION, SOLUTION INTRAVENOUS CONTINUOUS
Status: DISCONTINUED | OUTPATIENT
Start: 2024-10-22 | End: 2024-10-22 | Stop reason: HOSPADM

## 2024-10-22 RX ORDER — LIDOCAINE HYDROCHLORIDE 20 MG/ML
INJECTION, SOLUTION EPIDURAL; INFILTRATION; INTRACAUDAL; PERINEURAL
Status: DISCONTINUED | OUTPATIENT
Start: 2024-10-22 | End: 2024-10-22

## 2024-10-22 RX ORDER — TRIAMCINOLONE ACETONIDE 40 MG/ML
INJECTION, SUSPENSION INTRA-ARTICULAR; INTRAMUSCULAR CODE/TRAUMA/SEDATION MEDICATION
Status: DISCONTINUED | OUTPATIENT
Start: 2024-10-22 | End: 2024-10-22 | Stop reason: HOSPADM

## 2024-10-22 RX ADMIN — PROPOFOL 70 MG: 10 INJECTION, EMULSION INTRAVENOUS at 11:10

## 2024-10-22 RX ADMIN — LIDOCAINE HYDROCHLORIDE 70 MG: 20 INJECTION, SOLUTION EPIDURAL; INFILTRATION; INTRACAUDAL; PERINEURAL at 11:10

## 2024-10-22 RX ADMIN — PROPOFOL 40 MG: 10 INJECTION, EMULSION INTRAVENOUS at 11:10

## 2024-10-22 RX ADMIN — PROPOFOL 30 MG: 10 INJECTION, EMULSION INTRAVENOUS at 11:10

## 2024-10-22 NOTE — ANESTHESIA PREPROCEDURE EVALUATION
10/22/2024  Carey Leonardo is a 49 y.o., female.      Pre-op Assessment    I have reviewed the Patient Summary Reports.     I have reviewed the Nursing Notes. I have reviewed the NPO Status.   I have reviewed the Medications.     Review of Systems  Anesthesia Hx:  No problems with previous Anesthesia             Denies Family Hx of Anesthesia complications.    Denies Personal Hx of Anesthesia complications.                    Social:  Non-Smoker, No Alcohol Use       Hematology/Oncology:    Oncology Normal    -- Anemia:                                  EENT/Dental:  EENT/Dental Normal           Cardiovascular:     Hypertension       CHF       ECG has been reviewed.                            Pulmonary:    Asthma    Sleep Apnea                Renal/:  Chronic Renal Disease                Hepatic/GI:     GERD                Musculoskeletal:  Arthritis               Neurological:    Neuromuscular Disease,                                   Endocrine:  Diabetes, poorly controlled, type 2 Hypothyroidism          Dermatological:  Skin Normal    Psych:  Psychiatric History                  Physical Exam  General: Well nourished, Cooperative, Alert and Oriented    Airway:  Mallampati: III   Mouth Opening: Normal  TM Distance: Normal  Tongue: Normal  Neck ROM: Normal ROM    Dental:  Intact, Partial Dentures        Anesthesia Plan  Type of Anesthesia, risks & benefits discussed:    Anesthesia Type: MAC  Intra-op Monitoring Plan: Standard ASA Monitors  Post Op Pain Control Plan: multimodal analgesia  Induction:  IV  Informed Consent: Informed consent signed with the Patient and all parties understand the risks and agree with anesthesia plan.  All questions answered. Patient consented to blood products? Yes  ASA Score: 3  Day of Surgery Review of History & Physical: H&P Update referred to the surgeon/provider.I have  interviewed and examined the patient. I have reviewed the patient's H&P dated: There are no significant changes.     Ready For Surgery From Anesthesia Perspective.     .

## 2024-10-22 NOTE — ANESTHESIA POSTPROCEDURE EVALUATION
Anesthesia Post Evaluation    Patient: Carey Leonardo    Procedure(s) Performed: Procedure(s) (LRB):  Injection, Steroid, Epidural, L4/5 (N/A)    Final Anesthesia Type: MAC      Patient location during evaluation: PACU  Patient participation: Yes- Able to Participate  Level of consciousness: awake and alert and oriented  Post-procedure vital signs: reviewed and stable  Pain management: adequate  Airway patency: patent    PONV status at discharge: No PONV  Anesthetic complications: no      Cardiovascular status: blood pressure returned to baseline and hemodynamically stable  Respiratory status: unassisted  Hydration status: euvolemic  Follow-up not needed.  Comments: Refer to nursing note for pain/julio cesar score upon discharge from recovery.              Vitals Value Taken Time   /89 10/22/24 1212   Temp  10/22/24 1515   Pulse 71 10/22/24 1213   Resp 17 10/22/24 1213   SpO2 99 % 10/22/24 1213   Vitals shown include unfiled device data.      Event Time   Out of Recovery 12:12:00         Pain/Julio Cesar Score: Julio Cesar Score: 10 (10/22/2024 12:10 PM)

## 2024-10-22 NOTE — DISCHARGE SUMMARY
[facility] - Pain Management  Discharge Note  Short Stay    Procedure(s) (LRB):  Injection, Steroid, Epidural, L4/5 (N/A)      OUTCOME: Patient tolerated treatment/procedure well without complication and is now ready for discharge.    DISPOSITION: Home or Self Care    FINAL DIAGNOSIS:  Lumbar radiculopathy    FOLLOWUP: In clinic    DISCHARGE INSTRUCTIONS:  See nurse's note     TIME SPENT ON DISCHARGE: 5 minutes

## 2024-10-22 NOTE — TRANSFER OF CARE
"Anesthesia Transfer of Care Note    Patient: Shasaimagia D Leonardo    Procedure(s) Performed: Procedure(s) (LRB):  Injection, Steroid, Epidural, L4/5 (N/A)    Patient location: PACU    Anesthesia Type: MAC    Transport from OR: Transported from OR on room air with adequate spontaneous ventilation    Post pain: adequate analgesia    Post assessment: no apparent anesthetic complications and tolerated procedure well    Post vital signs: stable    Level of consciousness: alert and responds to stimulation    Nausea/Vomiting: no nausea/vomiting    Complications: none    Transfer of care protocol was followed      Last vitals: Visit Vitals  /68   Pulse 75   Temp 36.8 °C (98.2 °F) (Oral)   Resp 16   Ht 5' 7" (1.702 m)   Wt 103.9 kg (229 lb)   LMP  (LMP Unknown)   SpO2 97%   BMI 35.87 kg/m²     "

## 2024-10-22 NOTE — OP NOTE
"Procedure Note    Procedure Date: 10/22/2024    Procedure Performed:  Lumbar interlaminar epidural steroid injection under fluoroscopy at L4-5    Indications: Patient failed conservative therapy.      Pre-op diagnosis: Lumbar Radiculopathy    Post-op diagnosis: same    Physician: Rasheeda Bills MD    Anesthesia: MAC    Medications injected: Kenalog 40mg,  2 mL sterile preservative-free normal saline.    Local anesthetic used: 1% Lidocaine, 3 ml    Estimated Blood Loss:  None    Complications:  None    Technique:  The patient was interviewed in the holding area and Risks/Benefits were discussed, including, but not limited to, the possibility of new or different pain, bleeding or infection.   All questions were answered.  The patient understood and accepted risks.  Consent was verified and signed.   A time-out was taken to identify patient and procedure prior to starting the procedure. The patient was placed in the prone position on the fluoroscopy table. The area of the lumbar spine was prepped with Chloraprep and draped in a sterile manner. The L4-5  interspace was identified and marked under AP fluoroscopy. The skin and subcutaneous tissues overlying the targeted interspace were anesthetized with 3-5 mL of 1% lidocaine using a 25G 1.5" needle.  A 20G  3.5" Tuohy epidural needle was directed toward the interspace under fluoroscopic guidance until the ligamentum flavum was engaged. From this point, a loss of resistance technique with a pulsator syringe a was used to identify entrance of the needle into the epidural space. Once loss of resistance was observed 3mL of Isovue contrast solution was injected. An appropriate epidurogram was noted.  A 3mL mixture consisting of saline and 40 mg of kenalog was injected slowly and without resistance.  The needle was  removed and a sterile Band-Aid dressing was applied to the puncture site.  The patient tolerated the procedure well and was transferred to the .AC. in stable " condition.  The patient was monitored after the procedure and was given post-procedure and discharge instructions to follow at home. The patient was discharged in a stable condition and accompanied by an adult .    Epidurogram:5 mL allotment of Isovue M 300 contrast revealed excellent delineation from L 4-S1. There were no filling defects or obstruction to dye flow noted.  There was no  intravascular or intrathecal spread noted with dye flow.

## 2024-10-22 NOTE — BRIEF OP NOTE
The  Discharge Note  Short Stay    Admit Date: 10/22/2024    Discharge Date: 10/22/2024    Attending Physician: Rasheeda Bills     Discharge Provider: Rasheeda Bills    Diagnosis:  Lumbar radiculopathy    Procedure performed:  L4-5 epidural steroid injection under fluoroscopy    Findings: Procedure tolerated well and without complications. Consistent with diagnosis.    EBL: 0cc    Specimens: None    Discharged Condition: Good    Final Diagnoses: Lumbar radiculopathy, chronic [M54.16]    Disposition: Home or Self Care    Hospital Course: No complications, uneventful    Outcome of Hospitalization, Treatment, Procedure, or Surgery:  Patient was admitted for outpatient interventional pain management procedure. The patient tolerated the procedure well with no complications.    Follow up/Patient Instructions:  Follow up as scheduled in Pain Management office in 3-4 weeks.  Patient has received instructions and follow up date and time.    Medications:  Continue previous medications

## 2024-10-22 NOTE — PLAN OF CARE
REFER TO WRITTEN DOCUMENT AND RECOVERY INFORMATION.    D/CD PATIENT VIAA WHEELCHAIR AT 1225.    INFORMED PATIENT IF UNABLE TO VOID IN 8 HOURS, GO TO ER. NOTIFY MD OF REDNESS OR DRAINAGE FROM INJECTION SITE OR FEVER OVER 3-4 DAY. REST AND DRINK PLENTY OF FLUIDS FOR THE REMAINDER OF THE DAY. NO LIFTING OVER 5 LBS FOR THE REMAINDER OF THE DAY. CONTINUE REGULAR MEDICATIONS AS PRESCRIBED. MAY TAKE PAIN MEDICATION AS PRESCRIBED.     PAIN IMPROVED  100%  PRE OP PAIN 10  POSTOP PAIN 0

## 2024-10-23 ENCOUNTER — OFFICE VISIT (OUTPATIENT)
Dept: SPINE | Facility: CLINIC | Age: 49
End: 2024-10-23
Payer: OTHER GOVERNMENT

## 2024-10-23 ENCOUNTER — HOSPITAL ENCOUNTER (OUTPATIENT)
Dept: RADIOLOGY | Facility: HOSPITAL | Age: 49
Discharge: HOME | End: 2024-10-23
Attending: NURSE PRACTITIONER
Payer: OTHER GOVERNMENT

## 2024-10-23 VITALS — BODY MASS INDEX: 33.74 KG/M2 | HEIGHT: 67 IN | WEIGHT: 215 LBS

## 2024-10-23 DIAGNOSIS — M54.16 LUMBAR RADICULOPATHY: ICD-10-CM

## 2024-10-23 DIAGNOSIS — M54.16 LUMBAR RADICULOPATHY: Primary | ICD-10-CM

## 2024-10-23 DIAGNOSIS — M54.16 LUMBAR RADICULOPATHY, CHRONIC: Chronic | ICD-10-CM

## 2024-10-23 PROCEDURE — 99215 OFFICE O/P EST HI 40 MIN: CPT | Mod: PBBFAC,25 | Performed by: NURSE PRACTITIONER

## 2024-10-23 PROCEDURE — 99999 PR PBB SHADOW E&M-EST. PATIENT-LVL V: CPT | Mod: PBBFAC,,, | Performed by: NURSE PRACTITIONER

## 2024-10-23 PROCEDURE — 72110 X-RAY EXAM L-2 SPINE 4/>VWS: CPT | Mod: TC

## 2024-10-23 PROCEDURE — 72110 X-RAY EXAM L-2 SPINE 4/>VWS: CPT | Mod: 26,,, | Performed by: RADIOLOGY

## 2024-10-23 PROCEDURE — 99214 OFFICE O/P EST MOD 30 MIN: CPT | Mod: S$PBB,,, | Performed by: NURSE PRACTITIONER

## 2024-10-23 NOTE — PROGRESS NOTES
MDM/time:  Greater than 30 minutes spent on this encounter including 10 minutes reviewing imaging and notes, 15 minutes with the patient, 5 minutes documentation    ASSESSMENT:  49 y.o. female with lumbar spondylolisthesis at L L4-5 with radiculopathy now status post L2-L5 laminectomy 11/30/2021    PLAN:  MRI lumbar spine   Follow up after MRI     HPI:  49 y.o. female here for lower back and right leg pain and weakness.   Patient reports since her last office visit she has been seeing Dr. Bills at pain management has had multiple injections with little to no relief of pain.  She reports over the past few months she has had increased pain in the right leg with some weakness and buckling of the right leg.  Patient reports balance issues her last fall was 05/2024.  Denies bladder bowel incontinence.  Unable to stand or walk for very long due to pain.  Currently taking tramadol for pain.  She has had some physical therapy this year with no relief of pain.  Has had a total of approximately 4 injections with Dr. Bills with little relief.  No recent MRI.  Prior spine surgery which was a laminectomy with Dr. Upton 11/30/2021.  She did report some relief of pain after her initial surgery time but pain has increased over time.  Patient's last A1c was 9/30/2024 which was 8.7%  8/3/2022 last office visit- status post L2-L5 laminectomy 11/30/2021.  Not complaining mostly back pain that radiates to the left lower extremity.  She has been seen Dr. Bills and [facility].  No injections.  Has been taking tramadol     IMAGING:  10/23/2024 xray lumbar spine:   X-Ray Lumbar 4-5 View including Bending Views  Narrative: EXAMINATION:  XR LUMBAR SPINE 4-5 VIEW WITH BENDING VIEWS    CLINICAL HISTORY:  Radiculopathy, lumbar region    TECHNIQUE:  AP lateral flexion and extension    COMPARISON:  05/18/2023    FINDINGS:  Surgical clips are noted in the right upper quadrant suggestive of cholecystectomy clips.  Evidence of laminectomy  is suspected at the L3 and L4 levels stable since the prior.  Multiple calcifications are noted within the pelvis suggestive of phleboliths similar to on the prior.  Facet changes are noted at the L1-L2 L2-L3 L3-L4 L4-L5 levels.  An anterolisthesis is noted of the L4 on L5 vertebral body of 4 mm appearing stable upon flexion and extension.  Vertebral body heights appear well preserved.  Intervertebral disc heights appear relatively well preserved.  Impression: See above    Electronically signed by: Guero Segura MD  Date:    10/23/2024  Time:    16:54       Past Medical History:   Diagnosis Date    Asthma     CHF (congestive heart failure)     Chronic serous otitis media of both ears 04/04/2023    Depressive disorder     Diabetes mellitus, type 2     Gastroparesis     Hyperlipidemia     Hypertension     Hypothyroidism 08/02/2022    Synthroid 200 mcg oral daily      IBS (irritable bowel syndrome)     Kidney stones     Postlaminectomy syndrome, lumbar region 04/28/2022    [facility]    Sleep apnea     Does not use a CPAP     Past Surgical History:   Procedure Laterality Date    Bilateral L3-S1 MBB Bilateral 6-, 5-, 1-8-2014    Dr Jessica Randolph    CARPAL TUNNEL RELEASE      CHOLECYSTECTOMY      DIAGNOSTIC LAPAROSCOPY  04/14/2010    Exploratory Laparotomy, Myomectomy, Hydrotubation-Dr. Feng    DILATION AND CURETTAGE OF UTERUS  04/14/2010    Hysteroscopy-Dr. Feng    EPIDURAL STEROID INJECTION N/A 10/22/2024    Procedure: Injection, Steroid, Epidural, L4/5;  Surgeon: Rasheeda Bills MD;  Location: [facility];  Service: Pain Management;  Laterality: N/A;    EXPLORATORY LAPAROTOMY WITH UTERINE MYOMECTOMY  02/27/2007    Dr. Marquise Anderson    HYSTERECTOMY  09/29/2011    Robotic, FABIENNE, Cystoscopy-Dr. Feng    HYSTEROSCOPY WITH DILATION AND CURETTAGE OF UTERUS  04/04/2006    Laparoscopy, Chromotubation-Dr. Marquise Anderson    INJECTION OF ANESTHETIC AGENT AROUND ILIOINGUINAL NERVE Right  6/22/2023    Procedure: BLOCK, NERVE, ILIOINGUINAL;  Surgeon: Rasheeda Bills MD;  Location: [facility];  Service: Pain Management;  Laterality: Right;    INJECTION OF ANESTHETIC AGENT AROUND MEDIAL BRANCH NERVES INNERVATING LUMBAR FACET JOINT Bilateral 9/21/2023    Procedure: BLOCK, NERVE, FACET JOINT, LUMBAR, MEDIAL BRANCH;  Surgeon: Rasheeda Bills MD;  Location: [facility];  Service: Pain Management;  Laterality: Bilateral;    INJECTION OF ANESTHETIC AGENT AROUND MEDIAL BRANCH NERVES INNERVATING LUMBAR FACET JOINT Bilateral 3/14/2024    Procedure: BLOCK, NERVE, FACET JOINT, LUMBAR, MEDIAL BRANCH, BILATERAL L4-S1 MBB;  Surgeon: Rasheeda Bills MD;  Location: [facility];  Service: Pain Management;  Laterality: Bilateral;    LAMINECTOMY N/A 11/30/2021    Procedure: L2-L5 Laminectomy;  Surgeon: Cyril Upton MD;  Location: [facility] OR;  Service: Neurosurgery;  Laterality: N/A;    LEFT HEART CATHETERIZATION      Left L3-S1 RFTC Left 07/18/2017    Dr Lopez    Left SI JI Left 09/30/2013    Dr Randolph    MYRINGOTOMY WITH INSERTION OF VENTILATION TUBE Bilateral 4/4/2023    Procedure: MYRINGOTOMY, WITH TYMPANOSTOMY TUBE INSERTION (T-TUBES);  Surgeon: Sea Hinton MD;  Location: [facility] OR;  Service: ENT;  Laterality: Bilateral;  T-TUBES    MYRINGOTOMY WITH INSERTION OF VENTILATION TUBE Bilateral 9/12/2023    Procedure: MYRINGOTOMY, WITH TYMPANOSTOMY TUBE INSERTION, T-TUBES;  Surgeon: Sea Hinton MD;  Location: [facility];  Service: ENT;  Laterality: Bilateral;    MYRINGOTOMY WITH INSERTION OF VENTILATION TUBE Bilateral 7/2/2024    Procedure: MYRINGOTOMY, WITH TYMPANOSTOMY TUBE INSERTION;  Surgeon: Sea Hinton MD;  Location: [facility] OR;  Service: ENT;  Laterality: Bilateral;  Bilateral T-Tubes    OOPHORECTOMY  11/11/2014    Robotic, FABIENNE, Cystoscopy-Dr. Feng    SINUS SURGERY       Social History     Tobacco Use    Smoking status: Never     Passive  "exposure: Never    Smokeless tobacco: Never   Substance Use Topics    Alcohol use: Not Currently    Drug use: Not Currently     Types: Hydrocodone, Oxycodone      Current Outpatient Medications   Medication Instructions    albuterol (VENTOLIN HFA) 90 mcg/actuation inhaler 2 puffs, Inhalation, Every 6 hours PRN, Rescue    atorvastatin (LIPITOR) 20 mg, Oral, Nightly    cloNIDine (CATAPRES) 0.2 mg, Oral, 4 times daily    empagliflozin (JARDIANCE) 10 mg, Oral, Daily    glipizide-metformin (METAGLIP) 5-500 mg per tablet 1 tablet, Oral, 2 times daily before meals    hydrALAZINE (APRESOLINE) 100 mg, Oral, 3 times daily    ibuprofen (ADVIL,MOTRIN) 800 mg, Oral, Every 6 hours PRN    ipratropium (ATROVENT) 21 mcg (0.03 %) nasal spray 2 sprays, Each Nostril, 2 times daily    LANTUS SOLOSTAR U-100 INSULIN 10 Units, Subcutaneous, Nightly    levothyroxine (SYNTHROID) 200 MCG tablet TAKE 1 TABLET(200 MCG) BY MOUTH BEFORE BREAKFAST    lurasidone (LATUDA) 20 mg Tab tablet No dose, route, or frequency recorded.    meloxicam (MOBIC) 15 mg, Oral, Daily    metoprolol succinate (TOPROL-XL) 100 mg, Oral, Daily    NIFEdipine (PROCARDIA-XL) 60 mg, Oral, Daily    ofloxacin (FLOXIN) 0.3 % otic solution 3 drops, Right Ear, 2 times daily    pen needle, diabetic 32 gauge x 5/32" Ndle 1 Application, Misc.(Non-Drug; Combo Route), Daily    polyethylene glycol (GLYCOLAX) 17 g, Oral, Daily    sertraline (ZOLOFT) 100 mg, Oral, Nightly    traMADoL (ULTRAM) 50 mg, Oral, Every 8 hours PRN    TRULICITY 0.75 mg, Subcutaneous, Every 7 days        EXAM:  Constitutional  General Appearance:  obese , NAD  Psychiatric   Orientation: Oriented to time, oriented to place, oriented to person  Mood and Affect: Active and alert, normal mood, normal affect  Gait and Station   Appearance: antalgic gait to the right, unable to tandem gait, unable to walk on toes, unable to walk on heels    LUMBAR  Musculoskeletal System   Hips: Normal appearance, no leg length " discrepancy, normal motion; left, normal motion; right    Lumbar Spine                   Inspection:  Normal alignment, normal sagittal balance                  Range of motion: decreased flexion, extension, lateral bending, rotation. Pain with range of motion                  Bony Palpation of the Lumbar Spine:  No tenderness of the spinous process, no tenderness of the sacrum, no tenderness of the coccyx                  Bony Palpation of the Right Hip:  No tenderness of the iliac crest, no tenderness of the sciatic notch, no tenderness of the SI joint                  Bony Palpation of the Left Hip:  No tenderness of the iliac crest, no tenderness of the sciatic notch, no tenderness of the SI joint                  Soft Tissue Palpation on the Right:  No tenderness of the paraspinal region, no tenderness of the iliolumbar region                  Soft Tissue Palpation on the Left:  No tenderness of the paraspinal region, no tenderness of the iliolumbar region    Motor Strength   L1 Right:  Hip flexion iliopsoas 3/5    L1 Left:  Hip flexion iliopsoas 5/5              L2-L4 Right:  Knee extension quadriceps 4/5, tibialis anterior 4/5              L2-L4 Left:  Knee extension quadriceps 5/5, tibialis anterior 5/5   L5 Right:  Extensor hallucis llongus 4/5,    L5 Left:  Extensor hallucis longus 5/5,    S1 Right:  Plantar flexion gastrocnemius 4/5   S1 Left:  Plantar flexion gastrocnemius 5/5    Neurological System   Ankle Reflex Right:  normal   Ankle Reflex Left: normal   Knee Reflex Right:  normal   Knee Reflex Left:  normal   Sensation on the Right:  L2 normal, L3 normal, L4 normal, L5 normal, S1 normal   Sensation on the Left:  L2 normal, L3 normal, L4 normal, L5 normal, S1 normal              Special Test on the Right:  Seated straight leg raising test negative, no clonus of the ankle              Special Test on the Left:  Seated straight leg raising test negative, no clonus of the ankle    Skin   Lumbosacral  Spine:  Normal skin -healed incision     Cardiovascular System   Arterial Pulses Right:  Posterior tibialis normal, dorsalis pedis normal   Arterial Pulses Left:  Posterior tibialis normal, dorsalis pedis normal   Edema Right: None   Edema Left:  None

## 2024-10-24 LAB
LEFT EYE DM RETINOPATHY: NEGATIVE
RIGHT EYE DM RETINOPATHY: NEGATIVE

## 2024-10-29 NOTE — PROGRESS NOTES
Subjective:         Patient ID: Carey Leonardo is a 49 y.o. female.    Chief Complaint: Low-back Pain and Leg Pain      Pain  This is a chronic problem. The current episode started more than 1 year ago. The problem occurs daily. The problem has been unchanged. Associated symptoms include arthralgias. Pertinent negatives include no anorexia, change in bowel habit, chest pain, chills, coughing, diaphoresis, fever, neck pain, sore throat, swollen glands, urinary symptoms, vertigo or vomiting.     Review of Systems   Constitutional:  Negative for activity change, appetite change, chills, diaphoresis, fever and unexpected weight change.   HENT:  Negative for drooling, ear discharge, ear pain, facial swelling, nosebleeds, sore throat, trouble swallowing, voice change and goiter.    Eyes:  Negative for photophobia, pain, discharge, redness and visual disturbance.   Respiratory:  Negative for apnea, cough, choking, chest tightness, shortness of breath, wheezing and stridor.    Cardiovascular:  Negative for chest pain, palpitations and leg swelling.   Gastrointestinal:  Negative for abdominal distention, anorexia, change in bowel habit, diarrhea, rectal pain, vomiting and fecal incontinence.   Endocrine: Negative for cold intolerance, heat intolerance, polydipsia, polyphagia and polyuria.   Genitourinary:  Negative for bladder incontinence, dysuria, flank pain, frequency and hot flashes.   Musculoskeletal:  Positive for arthralgias, back pain and leg pain. Negative for neck pain.   Integumentary:  Negative for color change and pallor.   Allergic/Immunologic: Negative for immunocompromised state.   Neurological:  Negative for dizziness, vertigo, seizures, syncope, facial asymmetry, speech difficulty, light-headedness, memory loss and coordination difficulties.   Hematological:  Negative for adenopathy. Does not bruise/bleed easily.   Psychiatric/Behavioral:  Negative for agitation, behavioral problems, confusion, decreased  concentration, dysphoric mood, hallucinations, self-injury and suicidal ideas. The patient is not nervous/anxious and is not hyperactive.            Past Medical History:   Diagnosis Date    Asthma     CHF (congestive heart failure)     Chronic serous otitis media of both ears 04/04/2023    Depressive disorder     Diabetes mellitus, type 2     Gastroparesis     Hyperlipidemia     Hypertension     Hypothyroidism 08/02/2022    Synthroid 200 mcg oral daily      IBS (irritable bowel syndrome)     Kidney stones     Postlaminectomy syndrome, lumbar region 04/28/2022    [facility]    Sleep apnea     Does not use a CPAP     Past Surgical History:   Procedure Laterality Date    Bilateral L3-S1 MBB Bilateral 6-, 5-, 1-8-2014    Dr Jessica Randolph    CARPAL TUNNEL RELEASE      CHOLECYSTECTOMY      DIAGNOSTIC LAPAROSCOPY  04/14/2010    Exploratory Laparotomy, Myomectomy, Hydrotubation-Dr. Feng    DILATION AND CURETTAGE OF UTERUS  04/14/2010    Hysteroscopy-Dr. Feng    EPIDURAL STEROID INJECTION N/A 10/22/2024    Procedure: Injection, Steroid, Epidural, L4/5;  Surgeon: Rasheeda Bills MD;  Location: [facility];  Service: Pain Management;  Laterality: N/A;    EXPLORATORY LAPAROTOMY WITH UTERINE MYOMECTOMY  02/27/2007    Dr. Marquise Anderson    HYSTERECTOMY  09/29/2011    Robotic, FABIENNE, Cystoscopy-Dr. Feng    HYSTEROSCOPY WITH DILATION AND CURETTAGE OF UTERUS  04/04/2006    Laparoscopy, Chromotubation-Dr. Marquise Anderson    INJECTION OF ANESTHETIC AGENT AROUND ILIOINGUINAL NERVE Right 6/22/2023    Procedure: BLOCK, NERVE, ILIOINGUINAL;  Surgeon: Rasheeda Bills MD;  Location: [facility];  Service: Pain Management;  Laterality: Right;    INJECTION OF ANESTHETIC AGENT AROUND MEDIAL BRANCH NERVES INNERVATING LUMBAR FACET JOINT Bilateral 9/21/2023    Procedure: BLOCK, NERVE, FACET JOINT, LUMBAR, MEDIAL BRANCH;  Surgeon: Rasheeda Bills MD;  Location: [facility];  Service: Pain  Management;  Laterality: Bilateral;    INJECTION OF ANESTHETIC AGENT AROUND MEDIAL BRANCH NERVES INNERVATING LUMBAR FACET JOINT Bilateral 3/14/2024    Procedure: BLOCK, NERVE, FACET JOINT, LUMBAR, MEDIAL BRANCH, BILATERAL L4-S1 MBB;  Surgeon: Rasheeda Bills MD;  Location: [facility];  Service: Pain Management;  Laterality: Bilateral;    LAMINECTOMY N/A 11/30/2021    Procedure: L2-L5 Laminectomy;  Surgeon: Cyril Upton MD;  Location: [facility] OR;  Service: Neurosurgery;  Laterality: N/A;    LEFT HEART CATHETERIZATION      Left L3-S1 RFTC Left 07/18/2017    Dr Lopez    Left SI JI Left 09/30/2013    Dr Randolph    MYRINGOTOMY WITH INSERTION OF VENTILATION TUBE Bilateral 4/4/2023    Procedure: MYRINGOTOMY, WITH TYMPANOSTOMY TUBE INSERTION (T-TUBES);  Surgeon: Sea Hinton MD;  Location: [facility] ORTHO OR;  Service: ENT;  Laterality: Bilateral;  T-TUBES    MYRINGOTOMY WITH INSERTION OF VENTILATION TUBE Bilateral 9/12/2023    Procedure: MYRINGOTOMY, WITH TYMPANOSTOMY TUBE INSERTION, T-TUBES;  Surgeon: Sea Hinton MD;  Location: [facility]TH[facility] OR;  Service: ENT;  Laterality: Bilateral;    MYRINGOTOMY WITH INSERTION OF VENTILATION TUBE Bilateral 7/2/2024    Procedure: MYRINGOTOMY, WITH TYMPANOSTOMY TUBE INSERTION;  Surgeon: Sea Hinton MD;  Location: [facility] OR;  Service: ENT;  Laterality: Bilateral;  Bilateral T-Tubes    OOPHORECTOMY  11/11/2014    Robotic, FABIENNE, Cystoscopy-Dr. Feng    SINUS SURGERY       Social History     Socioeconomic History    Marital status:    Tobacco Use    Smoking status: Never     Passive exposure: Never    Smokeless tobacco: Never   Substance and Sexual Activity    Alcohol use: Not Currently    Drug use: Not Currently     Types: Hydrocodone, Oxycodone    Sexual activity: Not Currently     Social Drivers of Health     Physical Activity: Insufficiently Active (3/28/2024)    Exercise Vital Sign     Days of Exercise per Week: 3 days     Minutes of  "Exercise per Session: 30 min   Stress: Stress Concern Present (3/28/2024)    [employer] [place] [employer] - Occupational Stress Questionnaire     Feeling of Stress : To some extent     Family History   Problem Relation Name Age of Onset    Diabetes Mellitus Mother      Heart disease Mother      Hypertension Mother      Migraines Mother      Heart disease Sister       Review of patient's allergies indicates:   Allergen Reactions    Fish containing products Anaphylaxis    Iodinated contrast media     Penicillins         Objective:  Vitals:    11/05/24 1059   BP: 108/65   Pulse: 64   Resp: 20   Weight: 100.7 kg (222 lb)   Height: 5' 7" (1.702 m)   PainSc:   9               Physical Exam  Vitals and nursing note reviewed. Exam conducted with a chaperone present.   Constitutional:       General: She is awake. She is not in acute distress.     Appearance: Normal appearance. She is not ill-appearing, toxic-appearing or diaphoretic.   HENT:      Head: Normocephalic and atraumatic.      Nose: Nose normal.      Mouth/Throat:      Mouth: Mucous membranes are moist.      Pharynx: Oropharynx is clear.   Eyes:      Conjunctiva/sclera: Conjunctivae normal.      Pupils: Pupils are equal, round, and reactive to light.   Cardiovascular:      Rate and Rhythm: Normal rate.   Pulmonary:      Effort: Pulmonary effort is normal. No respiratory distress.   Abdominal:      Palpations: Abdomen is soft.      Tenderness: There is no guarding.   Musculoskeletal:         General: Normal range of motion.      Cervical back: Normal range of motion and neck supple. No rigidity.   Skin:     General: Skin is warm and dry.      Coloration: Skin is not jaundiced or pale.   Neurological:      General: No focal deficit present.      Mental Status: She is alert and oriented to person, place, and time. Mental status is at baseline.      Cranial Nerves: No cranial nerve deficit (II-XII).   Psychiatric:         Mood and Affect: Mood normal.       "   Behavior: Behavior normal. Behavior is cooperative.         Thought Content: Thought content normal.           X-Ray Lumbar 4-5 View including Bending Views  Narrative: EXAMINATION:  XR LUMBAR SPINE 4-5 VIEW WITH BENDING VIEWS    CLINICAL HISTORY:  Radiculopathy, lumbar region    TECHNIQUE:  AP lateral flexion and extension    COMPARISON:  05/18/2023    FINDINGS:  Surgical clips are noted in the right upper quadrant suggestive of cholecystectomy clips.  Evidence of laminectomy is suspected at the L3 and L4 levels stable since the prior.  Multiple calcifications are noted within the pelvis suggestive of phleboliths similar to on the prior.  Facet changes are noted at the L1-L2 L2-L3 L3-L4 L4-L5 levels.  An anterolisthesis is noted of the L4 on L5 vertebral body of 4 mm appearing stable upon flexion and extension.  Vertebral body heights appear well preserved.  Intervertebral disc heights appear relatively well preserved.  Impression: See above    Electronically signed by: Guero Segura MD  Date:    10/23/2024  Time:    16:54       Admission on 10/22/2024, Discharged on 10/22/2024   Component Date Value Ref Range Status    POC Glucose 10/22/2024 232 (H)  70 - 105 mg/dL Final   Office Visit on 10/08/2024   Component Date Value Ref Range Status    POC Amphetamines 10/08/2024 Negative  Negative, Inconclusive Final    POC Barbiturates 10/08/2024 Negative  Negative, Inconclusive Final    POC Benzodiazepines 10/08/2024 Negative  Negative, Inconclusive Final    POC Cocaine 10/08/2024 Negative  Negative, Inconclusive Final    POC THC 10/08/2024 Negative  Negative, Inconclusive Final    POC Methadone 10/08/2024 Negative  Negative, Inconclusive Final    POC Methamphetamine 10/08/2024 Negative  Negative, Inconclusive Final    POC Opiates 10/08/2024 Negative  Negative, Inconclusive Final    POC Oxycodone 10/08/2024 Negative  Negative, Inconclusive Final    POC Phencyclidine 10/08/2024 Negative  Negative, Inconclusive Final     POC Methylenedioxymethamphetamine * 10/08/2024 Negative  Negative, Inconclusive Final    POC Tricyclic Antidepressants 10/08/2024 Negative  Negative, Inconclusive Final    POC Buprenorphine 10/08/2024 Negative   Final     Acceptable 10/08/2024 Yes   Final    POC Temperature (Urine) 10/08/2024 90   Final   Admission on 10/03/2024, Discharged on 10/03/2024   Component Date Value Ref Range Status    Sodium 10/03/2024 140  136 - 145 mmol/L Final    Potassium 10/03/2024 3.1 (L)  3.5 - 5.1 mmol/L Final    Chloride 10/03/2024 105  98 - 107 mmol/L Final    CO2 10/03/2024 28  21 - 32 mmol/L Final    Anion Gap 10/03/2024 10  7 - 16 mmol/L Final    Glucose 10/03/2024 371 (H)  74 - 106 mg/dL Final    BUN 10/03/2024 9  7 - 18 mg/dL Final    Creatinine 10/03/2024 0.87  0.55 - 1.02 mg/dL Final    BUN/Creatinine Ratio 10/03/2024 10  6 - 20 Final    Calcium 10/03/2024 8.8  8.5 - 10.1 mg/dL Final    Total Protein 10/03/2024 6.8  6.4 - 8.2 g/dL Final    Albumin 10/03/2024 2.9 (L)  3.5 - 5.0 g/dL Final    Globulin 10/03/2024 3.9  2.0 - 4.0 g/dL Final    A/G Ratio 10/03/2024 0.7   Final    Bilirubin, Total 10/03/2024 0.1  >0.0 - 1.2 mg/dL Final    Alk Phos 10/03/2024 117 (H)  39 - 100 U/L Final    ALT 10/03/2024 30  13 - 56 U/L Final    AST 10/03/2024 24  15 - 37 U/L Final    eGFR 10/03/2024 82  >=60 mL/min/1.73m2 Final    Color, UA 10/03/2024 Light-Yellow  Colorless, Straw, Yellow, Light Yellow, Dark Yellow Final    Clarity, UA 10/03/2024 Clear  Clear Final    pH, UA 10/03/2024 6.5  5.0 to 8.0 pH Units Final    Leukocytes, UA 10/03/2024 Negative  Negative Final    Nitrites, UA 10/03/2024 Negative  Negative Final    Protein, UA 10/03/2024 Negative  Negative Final    Glucose, UA 10/03/2024 500 (A)  Normal mg/dL Final    Ketones, UA 10/03/2024 Negative  Negative mg/dL Final    Urobilinogen, UA 10/03/2024 Normal  0.2, 1.0, Normal mg/dL Final    Bilirubin, UA 10/03/2024 Negative  Negative Final    Blood, UA 10/03/2024  Negative  Negative Final    Specific Gravity, UA 10/03/2024 1.013  <=1.030 Final    WBC 10/03/2024 5.74  4.50 - 11.00 K/uL Final    RBC 10/03/2024 3.99 (L)  4.20 - 5.40 M/uL Final    Hemoglobin 10/03/2024 11.0 (L)  12.0 - 16.0 g/dL Final    Hematocrit 10/03/2024 35.5 (L)  38.0 - 47.0 % Final    MCV 10/03/2024 89.0  80.0 - 96.0 fL Final    MCH 10/03/2024 27.6  27.0 - 31.0 pg Final    MCHC 10/03/2024 31.0 (L)  32.0 - 36.0 g/dL Final    RDW 10/03/2024 12.3  11.5 - 14.5 % Final    Platelet Count 10/03/2024 356  150 - 400 K/uL Final    MPV 10/03/2024 9.8  9.4 - 12.4 fL Final    Neutrophils % 10/03/2024 41.8 (L)  53.0 - 65.0 % Final    Lymphocytes % 10/03/2024 48.1 (H)  27.0 - 41.0 % Final    Monocytes % 10/03/2024 6.8 (H)  2.0 - 6.0 % Final    Eosinophils % 10/03/2024 2.6  1.0 - 4.0 % Final    Basophils % 10/03/2024 0.5  0.0 - 1.0 % Final    Immature Granulocytes % 10/03/2024 0.2  0.0 - 0.4 % Final    nRBC, Auto 10/03/2024 0.0  <=0.0 % Final    Neutrophils, Abs 10/03/2024 2.40  1.80 - 7.70 K/uL Final    Lymphocytes, Absolute 10/03/2024 2.76  1.00 - 4.80 K/uL Final    Monocytes, Absolute 10/03/2024 0.39  0.00 - 0.80 K/uL Final    Eosinophils, Absolute 10/03/2024 0.15  0.00 - 0.50 K/uL Final    Basophils, Absolute 10/03/2024 0.03  0.00 - 0.20 K/uL Final    Immature Granulocytes, Absolute 10/03/2024 0.01  0.00 - 0.04 K/uL Final    nRBC, Absolute 10/03/2024 0.00  <=0.00 x10e3/uL Final    Diff Type 10/03/2024 Auto   Final    ProBNP 10/03/2024 22  1 - 125 pg/mL Final    Troponin I High Sensitivity 10/03/2024 29.9  <=60.4 pg/mL Final    QRS Duration 10/03/2024 72  ms Final    [facility] QTC Calculation 10/03/2024 452  ms Final   Office Visit on 09/30/2024   Component Date Value Ref Range Status    Hemoglobin A1C 09/30/2024 8.7 (H)  4.5 - 6.6 % Final    Estimated Average Glucose 09/30/2024 203  mg/dL Final    Triglycerides 09/30/2024 259 (H)  35 - 150 mg/dL Final    Cholesterol 09/30/2024 240 (H)  0 - 200 mg/dL Final    HDL Cholesterol  09/30/2024 40  40 - 60 mg/dL Final    Cholesterol/HDL Ratio (Risk Factor) 09/30/2024 6.0   Final    Non-HDL 09/30/2024 200  mg/dL Final    LDL Calculated 09/30/2024 148  mg/dL Final    LDL/HDL 09/30/2024 3.7   Final    VLDL 09/30/2024 52  mg/dL Final    TSH 09/30/2024 2.770  0.358 - 3.740 uIU/mL Final   Hospital Outpatient Visit on 09/09/2024   Component Date Value Ref Range Status    POC Glucose 09/09/2024 187 (H)  70 - 105 mg/dL Final   Admission on 08/31/2024, Discharged on 08/31/2024   Component Date Value Ref Range Status    Sodium 08/31/2024 140  136 - 145 mmol/L Final    Potassium 08/31/2024 3.1 (L)  3.5 - 5.1 mmol/L Final    Chloride 08/31/2024 103  98 - 107 mmol/L Final    CO2 08/31/2024 30  21 - 32 mmol/L Final    Anion Gap 08/31/2024 10  7 - 16 mmol/L Final    Glucose 08/31/2024 260 (H)  74 - 106 mg/dL Final    BUN 08/31/2024 10  7 - 18 mg/dL Final    Creatinine 08/31/2024 0.97  0.55 - 1.02 mg/dL Final    BUN/Creatinine Ratio 08/31/2024 10  6 - 20 Final    Calcium 08/31/2024 9.3  8.5 - 10.1 mg/dL Final    Total Protein 08/31/2024 7.6  6.4 - 8.2 g/dL Final    Albumin 08/31/2024 3.7  3.5 - 5.0 g/dL Final    Globulin 08/31/2024 3.9  2.0 - 4.0 g/dL Final    A/G Ratio 08/31/2024 0.9   Final    Bilirubin, Total 08/31/2024 0.3  >0.0 - 1.2 mg/dL Final    Alk Phos 08/31/2024 124 (H)  39 - 100 U/L Final    ALT 08/31/2024 34  13 - 56 U/L Final    AST 08/31/2024 29  15 - 37 U/L Final    eGFR 08/31/2024 73  >=60 mL/min/1.73m2 Final    Color, UA 08/31/2024 Light-Yellow  Colorless, Straw, Yellow, Light Yellow, Dark Yellow Final    Clarity, UA 08/31/2024 Clear  Clear Final    pH, UA 08/31/2024 7.0  5.0 to 8.0 pH Units Final    Leukocytes, UA 08/31/2024 Negative  Negative Final    Nitrites, UA 08/31/2024 Negative  Negative Final    Protein, UA 08/31/2024 Negative  Negative Final    Glucose, UA 08/31/2024 >1000 (A)  Normal mg/dL Final    Ketones, UA 08/31/2024 Negative  Negative mg/dL Final    Urobilinogen, UA 08/31/2024  Normal  0.2, 1.0, Normal mg/dL Final    Bilirubin, UA 08/31/2024 Negative  Negative Final    Blood, UA 08/31/2024 Negative  Negative Final    Specific Gravity, UA 08/31/2024 1.035 (H)  <=1.030 Final    WBC 08/31/2024 6.25  4.50 - 11.00 K/uL Final    RBC 08/31/2024 4.17 (L)  4.20 - 5.40 M/uL Final    Hemoglobin 08/31/2024 11.7 (L)  12.0 - 16.0 g/dL Final    Hematocrit 08/31/2024 37.3 (L)  38.0 - 47.0 % Final    MCV 08/31/2024 89.4  80.0 - 96.0 fL Final    MCH 08/31/2024 28.1  27.0 - 31.0 pg Final    MCHC 08/31/2024 31.4 (L)  32.0 - 36.0 g/dL Final    RDW 08/31/2024 12.3  11.5 - 14.5 % Final    Platelet Count 08/31/2024 376  150 - 400 K/uL Final    MPV 08/31/2024 9.7  9.4 - 12.4 fL Final    Neutrophils % 08/31/2024 42.1 (L)  53.0 - 65.0 % Final    Lymphocytes % 08/31/2024 48.2 (H)  27.0 - 41.0 % Final    Monocytes % 08/31/2024 5.4  2.0 - 6.0 % Final    Eosinophils % 08/31/2024 2.7  1.0 - 4.0 % Final    Basophils % 08/31/2024 1.1 (H)  0.0 - 1.0 % Final    Immature Granulocytes % 08/31/2024 0.5 (H)  0.0 - 0.4 % Final    nRBC, Auto 08/31/2024 0.0  <=0.0 % Final    Neutrophils, Abs 08/31/2024 2.63  1.80 - 7.70 K/uL Final    Lymphocytes, Absolute 08/31/2024 3.01  1.00 - 4.80 K/uL Final    Monocytes, Absolute 08/31/2024 0.34  0.00 - 0.80 K/uL Final    Eosinophils, Absolute 08/31/2024 0.17  0.00 - 0.50 K/uL Final    Basophils, Absolute 08/31/2024 0.07  0.00 - 0.20 K/uL Final    Immature Granulocytes, Absolute 08/31/2024 0.03  0.00 - 0.04 K/uL Final    nRBC, Absolute 08/31/2024 0.00  <=0.00 x10e3/uL Final    Diff Type 08/31/2024 Auto   Final    Magnesium 08/31/2024 2.2  1.7 - 2.3 mg/dL Final   Admission on 08/04/2024, Discharged on 08/04/2024   Component Date Value Ref Range Status    Sodium 08/04/2024 139  136 - 145 mmol/L Final    Potassium 08/04/2024 3.3 (L)  3.5 - 5.1 mmol/L Final    Chloride 08/04/2024 105  98 - 107 mmol/L Final    CO2 08/04/2024 29  21 - 32 mmol/L Final    Anion Gap 08/04/2024 8  7 - 16 mmol/L Final     Glucose 08/04/2024 182 (H)  74 - 106 mg/dL Final    BUN 08/04/2024 10  7 - 18 mg/dL Final    Creatinine 08/04/2024 1.14 (H)  0.55 - 1.02 mg/dL Final    BUN/Creatinine Ratio 08/04/2024 9  6 - 20 Final    Calcium 08/04/2024 9.1  8.5 - 10.1 mg/dL Final    Total Protein 08/04/2024 7.0  6.4 - 8.2 g/dL Final    Albumin 08/04/2024 3.3 (L)  3.5 - 5.0 g/dL Final    Globulin 08/04/2024 3.7  2.0 - 4.0 g/dL Final    A/G Ratio 08/04/2024 0.9   Final    Bilirubin, Total 08/04/2024 0.3  >0.0 - 1.2 mg/dL Final    Alk Phos 08/04/2024 115 (H)  39 - 100 U/L Final    ALT 08/04/2024 26  13 - 56 U/L Final    AST 08/04/2024 13 (L)  15 - 37 U/L Final    eGFR 08/04/2024 60  >=60 mL/min/1.73m2 Final    Color, UA 08/04/2024 Yellow  Colorless, Straw, Yellow, Light Yellow, Dark Yellow Final    Clarity, UA 08/04/2024 Turbid  Clear Final    pH, UA 08/04/2024 6.5  5.0 to 8.0 pH Units Final    Leukocytes, UA 08/04/2024 Negative  Negative Final    Nitrites, UA 08/04/2024 Negative  Negative Final    Protein, UA 08/04/2024 100 (A)  Negative Final    Glucose, UA 08/04/2024 Normal  Normal mg/dL Final    Ketones, UA 08/04/2024 Negative  Negative mg/dL Final    Urobilinogen, UA 08/04/2024 2 (A)  0.2, 1.0, Normal mg/dL Final    Bilirubin, UA 08/04/2024 Negative  Negative Final    Blood, UA 08/04/2024 Negative  Negative Final    Specific Gravity, UA 08/04/2024 1.033 (H)  <=1.030 Final    WBC 08/04/2024 6.88  4.50 - 11.00 K/uL Final    RBC 08/04/2024 3.94 (L)  4.20 - 5.40 M/uL Final    Hemoglobin 08/04/2024 11.0 (L)  12.0 - 16.0 g/dL Final    Hematocrit 08/04/2024 35.9 (L)  38.0 - 47.0 % Final    MCV 08/04/2024 91.1  80.0 - 96.0 fL Final    MCH 08/04/2024 27.9  27.0 - 31.0 pg Final    MCHC 08/04/2024 30.6 (L)  32.0 - 36.0 g/dL Final    RDW 08/04/2024 11.9  11.5 - 14.5 % Final    Platelet Count 08/04/2024 377  150 - 400 K/uL Final    MPV 08/04/2024 9.5  9.4 - 12.4 fL Final    Neutrophils % 08/04/2024 47.5 (L)  53.0 - 65.0 % Final    Lymphocytes % 08/04/2024  43.9 (H)  27.0 - 41.0 % Final    Monocytes % 08/04/2024 5.8  2.0 - 6.0 % Final    Eosinophils % 08/04/2024 1.9  1.0 - 4.0 % Final    Basophils % 08/04/2024 0.6  0.0 - 1.0 % Final    Immature Granulocytes % 08/04/2024 0.3  0.0 - 0.4 % Final    nRBC, Auto 08/04/2024 0.0  <=0.0 % Final    Neutrophils, Abs 08/04/2024 3.27  1.80 - 7.70 K/uL Final    Lymphocytes, Absolute 08/04/2024 3.02  1.00 - 4.80 K/uL Final    Monocytes, Absolute 08/04/2024 0.40  0.00 - 0.80 K/uL Final    Eosinophils, Absolute 08/04/2024 0.13  0.00 - 0.50 K/uL Final    Basophils, Absolute 08/04/2024 0.04  0.00 - 0.20 K/uL Final    Immature Granulocytes, Absolute 08/04/2024 0.02  0.00 - 0.04 K/uL Final    nRBC, Absolute 08/04/2024 0.00  <=0.00 x10e3/uL Final    Diff Type 08/04/2024 Auto   Final    WBC, UA 08/04/2024 9 (H)  <=5 /hpf Final    RBC, UA 08/04/2024 3  <=3 /hpf Final    Bacteria, UA 08/04/2024 Few (A)  None Seen /hpf Final    Squamous Epithelial Cells, UA 08/04/2024 Occasional (A)  None Seen /HPF Final    Hyaline Casts, UA 08/04/2024 2-5 (A)  None Seen /lpf Final    Mucous 08/04/2024 Many (A)  None Seen /LPF Final   Admission on 07/02/2024, Discharged on 07/02/2024   Component Date Value Ref Range Status    POC Glucose 07/02/2024 247 (H)  70 - 105 mg/dL Final    POC Glucose 07/02/2024 216 (H)  70 - 105 mg/dL Final   Lab Visit on 06/20/2024   Component Date Value Ref Range Status    Hemoglobin A1C 06/20/2024 8.7 (H)  4.5 - 6.6 % Final    Estimated Average Glucose 06/20/2024 203  mg/dL Final    Creatinine, Urine 06/20/2024 210  28 - 219 mg/dL Final    Microalbumin 06/20/2024 1.7  0.0 - 2.8 mg/dL Final    Microalbumin/Creatinine Ratio 06/20/2024 8.1  0.0 - 30.0 mg/g Final   Admission on 06/18/2024, Discharged on 06/18/2024   Component Date Value Ref Range Status    Sodium 06/18/2024 141  136 - 145 mmol/L Final    Potassium 06/18/2024 3.0 (L)  3.5 - 5.1 mmol/L Final    Chloride 06/18/2024 105  98 - 107 mmol/L Final    CO2 06/18/2024 28  21 - 32  mmol/L Final    Anion Gap 06/18/2024 11  7 - 16 mmol/L Final    Glucose 06/18/2024 259 (H)  74 - 106 mg/dL Final    BUN 06/18/2024 8  7 - 18 mg/dL Final    Creatinine 06/18/2024 1.09 (H)  0.55 - 1.02 mg/dL Final    BUN/Creatinine Ratio 06/18/2024 7  6 - 20 Final    Calcium 06/18/2024 9.1  8.5 - 10.1 mg/dL Final    Total Protein 06/18/2024 7.4  6.4 - 8.2 g/dL Final    Albumin 06/18/2024 3.3 (L)  3.5 - 5.0 g/dL Final    Globulin 06/18/2024 4.1 (H)  2.0 - 4.0 g/dL Final    A/G Ratio 06/18/2024 0.8   Final    Bilirubin, Total 06/18/2024 0.5  >0.0 - 1.2 mg/dL Final    Alk Phos 06/18/2024 125 (H)  39 - 100 U/L Final    ALT 06/18/2024 31  13 - 56 U/L Final    AST 06/18/2024 25  15 - 37 U/L Final    eGFR 06/18/2024 63  >=60 mL/min/1.73m2 Final    Lipase 06/18/2024 15 (L)  73 - 393 U/L Final    WBC 06/18/2024 7.82  4.50 - 11.00 K/uL Final    RBC 06/18/2024 4.31  4.20 - 5.40 M/uL Final    Hemoglobin 06/18/2024 12.0  12.0 - 16.0 g/dL Final    Hematocrit 06/18/2024 39.2  38.0 - 47.0 % Final    MCV 06/18/2024 91.0  80.0 - 96.0 fL Final    MCH 06/18/2024 27.8  27.0 - 31.0 pg Final    MCHC 06/18/2024 30.6 (L)  32.0 - 36.0 g/dL Final    RDW 06/18/2024 12.7  11.5 - 14.5 % Final    Platelet Count 06/18/2024 380  150 - 400 K/uL Final    MPV 06/18/2024 9.5  9.4 - 12.4 fL Final    Neutrophils % 06/18/2024 52.6 (L)  53.0 - 65.0 % Final    Lymphocytes % 06/18/2024 39.0  27.0 - 41.0 % Final    Monocytes % 06/18/2024 6.4 (H)  2.0 - 6.0 % Final    Eosinophils % 06/18/2024 1.2  1.0 - 4.0 % Final    Basophils % 06/18/2024 0.5  0.0 - 1.0 % Final    Immature Granulocytes % 06/18/2024 0.3  0.0 - 0.4 % Final    nRBC, Auto 06/18/2024 0.0  <=0.0 % Final    Neutrophils, Abs 06/18/2024 4.12  1.80 - 7.70 K/uL Final    Lymphocytes, Absolute 06/18/2024 3.05  1.00 - 4.80 K/uL Final    Monocytes, Absolute 06/18/2024 0.50  0.00 - 0.80 K/uL Final    Eosinophils, Absolute 06/18/2024 0.09  0.00 - 0.50 K/uL Final    Basophils, Absolute 06/18/2024 0.04  0.00 -  0.20 K/uL Final    Immature Granulocytes, Absolute 06/18/2024 0.02  0.00 - 0.04 K/uL Final    nRBC, Absolute 06/18/2024 0.00  <=0.00 x10e3/uL Final    Diff Type 06/18/2024 Auto   Final    Magnesium 06/18/2024 2.1  1.7 - 2.3 mg/dL Final   There may be more visits with results that are not included.         Orders Placed This Encounter   Procedures    X-Ray Thoracic Spine AP Lateral     Standing Status:   Future     Standing Expiration Date:   11/5/2025     Order Specific Question:   Does the patient have a neck collar or brace on?     Answer:   No     Order Specific Question:   May the Radiologist modify the order per protocol to meet the clinical needs of the patient?     Answer:   Yes     Order Specific Question:   Is the patient pregnant?     Answer:   No     Order Specific Question:   Release to patient     Answer:   Immediate    MRI Thoracic Spine Without Contrast     Standing Status:   Future     Standing Expiration Date:   11/5/2025     Order Specific Question:   Does the patient have or ever had a pacemaker or a defibrillator (Note: Some facilities may not be able to schedule an MRI for patients with pacemakers and defibrillators. You should contact your local radiology dept to determine if this is the case.)?     Answer:   No     Order Specific Question:   Does the patient have an aneurysm or surgical clip, pump, nerve/brain stimulator, middle/inner ear prosthesis, or other metal implant or foreign object (bullet, shrapnel)? If they have a card related to their implant, ask them to bring it. Issues related to the implant may cause the MRI to be delayed.     Answer:   No     Order Specific Question:   Is the patient claustrophobic?     Answer:   No     Order Specific Question:   Will the patient require po anxiolysis or sedation?     Answer:   No     Order Specific Question:   Does the patient have any of the following conditions? Diabetes, History of Renal Disease or Hypertension requiring medical therapy?      Answer:   No     Order Specific Question:   Is the patient pregnant?     Answer:   No     Order Specific Question:   May the Radiologist modify the order per protocol to meet the clinical needs of the patient?     Answer:   Yes     Order Specific Question:   Is this part of a Research Study?     Answer:   No     Order Specific Question:   Recist criteria?     Answer:   No     Order Specific Question:   Will this service be billed to a Worker's Comp policy?     Answer:   No     Order Specific Question:   Does the patient have on a skin patch for medication with aluminized backing?     Answer:   No    Ambulatory referral/consult to Psychology     Standing Status:   Future     Standing Expiration Date:   12/5/2025     Referral Priority:   Routine     Referral Type:   Psychiatric     Referral Reason:   Specialty Services Required     Referred to Provider:   Oumar Pham, ROGELIO,PMTONY     Requested Specialty:   Psychology     Number of Visits Requested:   1    Case Request Operating Room: Trial, Neurostimulator, Spinal Cord     Order Specific Question:   Medical Necessity:     Answer:   Medically Non-Urgent [100]     Order Specific Question:   Case classification     Answer:   E - Elective [90]     Order Specific Question:   Is an on-site pathologist required for this procedure?     Answer:   N/A       Requested Prescriptions     Signed Prescriptions Disp Refills    meloxicam (MOBIC) 15 MG tablet 30 tablet 0     Sig: Take 1 tablet (15 mg total) by mouth once daily.    traMADoL (ULTRAM) 50 mg tablet 90 tablet 0     Sig: Take 1 tablet (50 mg total) by mouth every 8 (eight) hours as needed for Pain.       Assessment:     1. Postlaminectomy syndrome, lumbar region    2. Disorder of sacrum    3. Ilioinguinal neuralgia of right side    4. Lumbosacral spondylosis without myelopathy    5. Lumbosacral radiculopathy    6. Anxiety    7. Pain in thoracic spine               A's of Opioid Risk Assessment  Activity:Patient can  perform ADL.   Analgesia:Patients pain is partially controlled by current medication. Patient has tried OTC medications such as Tylenol and Ibuprofen with out relief.   Adverse Effects: Patient denies constipation or sedation.  Aberrant Behavior:  reviewed with no aberrant drug seeking/taking behavior.  Overdose reversal drug naloxone discussed     Drug screen reviewed      MRI lumbar spine [facility] July 31, 2023 multiple level degenerative changes mild bilateral foraminal stenosis L3/4 L4/5 less so at L5/S1 postop changes noted           Plan:    Narcan October 2024    Chronically elevated glucose    History lumbar surgery laminectomy L2 through 5 November 30, 2021 Dr. Upton complicated by postop infection    She had bilateral lumbar L4 through S1 medial branch block # 2, March 14, 2024  she states she had 80% relief after procedure,   the procedure did help improve her level function       Follow-up after lumbar L4/5 SILVANA # 1 October 22, 2024  She states she had 70% relief after procedure  Procedure did help improve her level function    Requesting procedure for chronic back pain leg pain after lumbar surgery  Requesting resume tramadol    Denies loss of bowel or bladder function    Requesting spinal cord stimulator    Patient verbalized understanding increased risk of infection due to postop infection after lumbar surgery    She states she is willing to proceed with spinal cord stimulator trial    Continue current medication     Psychological evaluation spinal cord stimulator trial     MRI thoracic spine  X-ray thoracic spine  MRI for consideration procedure/surgery    I have given the patient medically directed home exercise program    Patient has tried NSAIDs and neuromodulators (neurontin, lyrica, elavil, or cymbalta)    Monitor anesthesia request is medically indicated for the scheduled nerve block procedure due to:  1- needle phobia and anxiety, placing  the patient at risk during the provided  service.  2-patient has an ASA class greater than 3 and requires constant presence of an anesthesiologist during the procedure,   3-patient has severe problems hard to lie still  4-patient suffers from chronic pain and is unable to function due to  diminished ADLs    Schedule lumbar spinal cord stimulator trial, lumbar radiculopathy, post laminectomy lumbar syndrome    Dr. Bills    Bring original prescription medication bottles/container/box with labels to each visit

## 2024-11-05 ENCOUNTER — OFFICE VISIT (OUTPATIENT)
Dept: PAIN MEDICINE | Facility: CLINIC | Age: 49
End: 2024-11-05
Payer: OTHER GOVERNMENT

## 2024-11-05 VITALS
RESPIRATION RATE: 20 BRPM | BODY MASS INDEX: 34.84 KG/M2 | HEIGHT: 67 IN | HEART RATE: 64 BPM | WEIGHT: 222 LBS | SYSTOLIC BLOOD PRESSURE: 108 MMHG | DIASTOLIC BLOOD PRESSURE: 65 MMHG

## 2024-11-05 DIAGNOSIS — M54.6 PAIN IN THORACIC SPINE: ICD-10-CM

## 2024-11-05 DIAGNOSIS — M53.3 DISORDER OF SACRUM: Chronic | ICD-10-CM

## 2024-11-05 DIAGNOSIS — F41.9 ANXIETY: ICD-10-CM

## 2024-11-05 DIAGNOSIS — M47.817 LUMBOSACRAL SPONDYLOSIS WITHOUT MYELOPATHY: Chronic | ICD-10-CM

## 2024-11-05 DIAGNOSIS — G57.91 ILIOINGUINAL NEURALGIA OF RIGHT SIDE: Chronic | ICD-10-CM

## 2024-11-05 DIAGNOSIS — M96.1 POSTLAMINECTOMY SYNDROME, LUMBAR REGION: Primary | Chronic | ICD-10-CM

## 2024-11-05 DIAGNOSIS — M54.17 LUMBOSACRAL RADICULOPATHY: ICD-10-CM

## 2024-11-05 PROCEDURE — 99215 OFFICE O/P EST HI 40 MIN: CPT | Mod: PBBFAC | Performed by: PHYSICIAN ASSISTANT

## 2024-11-05 PROCEDURE — 99999PBSHW PR PBB SHADOW TECHNICAL ONLY FILED TO HB: Mod: PBBFAC,,,

## 2024-11-05 PROCEDURE — 96372 THER/PROPH/DIAG INJ SC/IM: CPT | Mod: PBBFAC | Performed by: PHYSICIAN ASSISTANT

## 2024-11-05 PROCEDURE — 99214 OFFICE O/P EST MOD 30 MIN: CPT | Mod: S$PBB,25,, | Performed by: PHYSICIAN ASSISTANT

## 2024-11-05 PROCEDURE — 99999 PR PBB SHADOW E&M-EST. PATIENT-LVL V: CPT | Mod: PBBFAC,,, | Performed by: PHYSICIAN ASSISTANT

## 2024-11-05 RX ORDER — MELOXICAM 15 MG/1
15 TABLET ORAL DAILY
Qty: 30 TABLET | Refills: 0 | Status: SHIPPED | OUTPATIENT
Start: 2024-11-05

## 2024-11-05 RX ORDER — TRAMADOL HYDROCHLORIDE 50 MG/1
50 TABLET ORAL EVERY 8 HOURS PRN
Qty: 90 TABLET | Refills: 0 | Status: SHIPPED | OUTPATIENT
Start: 2024-11-07 | End: 2024-12-07

## 2024-11-05 RX ORDER — KETOROLAC TROMETHAMINE 30 MG/ML
60 INJECTION, SOLUTION INTRAMUSCULAR; INTRAVENOUS
Status: COMPLETED | OUTPATIENT
Start: 2024-11-05 | End: 2024-11-05

## 2024-11-05 RX ADMIN — KETOROLAC TROMETHAMINE 60 MG: 30 INJECTION, SOLUTION INTRAMUSCULAR at 12:11

## 2024-11-05 NOTE — PATIENT INSTRUCTIONS

## 2024-11-14 ENCOUNTER — HOSPITAL ENCOUNTER (OUTPATIENT)
Dept: RADIOLOGY | Facility: HOSPITAL | Age: 49
Discharge: HOME | End: 2024-11-14
Attending: NURSE PRACTITIONER
Payer: OTHER GOVERNMENT

## 2024-11-14 ENCOUNTER — OFFICE VISIT (OUTPATIENT)
Dept: SPINE | Facility: CLINIC | Age: 49
End: 2024-11-14
Payer: OTHER GOVERNMENT

## 2024-11-14 DIAGNOSIS — M54.16 LUMBAR RADICULOPATHY, CHRONIC: Chronic | ICD-10-CM

## 2024-11-14 DIAGNOSIS — M54.16 LUMBAR RADICULOPATHY: Primary | ICD-10-CM

## 2024-11-14 DIAGNOSIS — M43.16 SPONDYLOLISTHESIS, LUMBAR REGION: ICD-10-CM

## 2024-11-14 PROCEDURE — 72148 MRI LUMBAR SPINE W/O DYE: CPT | Mod: TC

## 2024-11-14 PROCEDURE — 99214 OFFICE O/P EST MOD 30 MIN: CPT | Mod: S$PBB,,, | Performed by: ORTHOPAEDIC SURGERY

## 2024-11-14 PROCEDURE — 99211 OFF/OP EST MAY X REQ PHY/QHP: CPT | Mod: PBBFAC,25 | Performed by: ORTHOPAEDIC SURGERY

## 2024-11-14 PROCEDURE — 99999 PR PBB SHADOW E&M-EST. PATIENT-LVL I: CPT | Mod: PBBFAC,,, | Performed by: ORTHOPAEDIC SURGERY

## 2024-11-14 PROCEDURE — 72148 MRI LUMBAR SPINE W/O DYE: CPT | Mod: 26,,, | Performed by: RADIOLOGY

## 2024-11-14 NOTE — PROGRESS NOTES
MDM/time:  30-35 minutes spent on this encounter including 10 minutes reviewing imaging and notes, 15 minutes with the patient, 5 minutes documentation    ASSESSMENT:  49 y.o. female with lumbar spondylolisthesis at L L4-5 with radiculopathy now status post L2-L5 laminectomy 11/30/2021    PLAN:  MRI lumbar spine   Follow up after MRI     HPI:  49 y.o. female here for repeat evaluation of lower back and right leg pain and weakness.   Reports her right leg is getting worse.  She reports over the past few months she has had increased pain in the right leg with some weakness and buckling of the right leg.  She has discussed possible spinal cord stimulator with Dallas Peng.  Denies bladder bowel incontinence.  Unable to stand or walk for very long due to pain.  Currently taking tramadol for pain.  She has had some physical therapy this year with no relief of pain.  Has had a total of approximately 4 injections with Dr. Bills with little relief.  Prior L2-L5 laminectomy.    IMAGING:  X-rays lumbar spine reviewed show:    On the AP there is normal coronal alignment.  There are 5 non-rib-bearing lumbar vertebrae.  On the lateral there is decreased lumbar lordosis.  There is spondylotic disease with decreased disc height and osteophyte formation noted.  Status post L2-L5 laminectomy.  Grade 1 anterolisthesis at L4-5.    MRI lumbar spine 11/14/2024 reviewed shows:   There is L2-L5 laminectomy with satisfactory central decompression  At L3-4 there is moderate bilateral foraminal stenosis   At L4-5 there is grade 1 anterolisthesis with moderate bilateral foraminal stenosis    Past Medical History:   Diagnosis Date    Asthma     CHF (congestive heart failure)     Chronic serous otitis media of both ears 04/04/2023    Depressive disorder     Diabetes mellitus, type 2     Gastroparesis     Hyperlipidemia     Hypertension     Hypothyroidism 08/02/2022    Synthroid 200 mcg oral daily      IBS (irritable bowel syndrome)      Kidney stones     Postlaminectomy syndrome, lumbar region 04/28/2022    [facility]    Sleep apnea     Does not use a CPAP     Past Surgical History:   Procedure Laterality Date    Bilateral L3-S1 MBB Bilateral 6-, 5-, 1-8-2014    Dr Jessica Randolph    CARPAL TUNNEL RELEASE      CHOLECYSTECTOMY      DIAGNOSTIC LAPAROSCOPY  04/14/2010    Exploratory Laparotomy, Myomectomy, Hydrotubation-Dr. Feng    DILATION AND CURETTAGE OF UTERUS  04/14/2010    Hysteroscopy-Dr. Feng    EPIDURAL STEROID INJECTION N/A 10/22/2024    Procedure: Injection, Steroid, Epidural, L4/5;  Surgeon: Rasheeda Bills MD;  Location: [facility];  Service: Pain Management;  Laterality: N/A;    EXPLORATORY LAPAROTOMY WITH UTERINE MYOMECTOMY  02/27/2007    Dr. Marquise Anderson    HYSTERECTOMY  09/29/2011    Robotic, FABIENNE, Cystoscopy-Dr. Feng    HYSTEROSCOPY WITH DILATION AND CURETTAGE OF UTERUS  04/04/2006    Laparoscopy, Chromotubation-Dr. Marquise Anderson    INJECTION OF ANESTHETIC AGENT AROUND ILIOINGUINAL NERVE Right 6/22/2023    Procedure: BLOCK, NERVE, ILIOINGUINAL;  Surgeon: Rasheeda Bills MD;  Location: [facility];  Service: Pain Management;  Laterality: Right;    INJECTION OF ANESTHETIC AGENT AROUND MEDIAL BRANCH NERVES INNERVATING LUMBAR FACET JOINT Bilateral 9/21/2023    Procedure: BLOCK, NERVE, FACET JOINT, LUMBAR, MEDIAL BRANCH;  Surgeon: Rasheeda Bills MD;  Location: [facility];  Service: Pain Management;  Laterality: Bilateral;    INJECTION OF ANESTHETIC AGENT AROUND MEDIAL BRANCH NERVES INNERVATING LUMBAR FACET JOINT Bilateral 3/14/2024    Procedure: BLOCK, NERVE, FACET JOINT, LUMBAR, MEDIAL BRANCH, BILATERAL L4-S1 MBB;  Surgeon: Rasheeda Bills MD;  Location: [facility];  Service: Pain Management;  Laterality: Bilateral;    LAMINECTOMY N/A 11/30/2021    Procedure: L2-L5 Laminectomy;  Surgeon: Cyril Upton MD;  Location: [facility] OR;  Service: Neurosurgery;   Laterality: N/A;    LEFT HEART CATHETERIZATION      Left L3-S1 RFTC Left 07/18/2017    Dr Lopez    Left SI JI Left 09/30/2013    Dr Randolph    MYRINGOTOMY WITH INSERTION OF VENTILATION TUBE Bilateral 4/4/2023    Procedure: MYRINGOTOMY, WITH TYMPANOSTOMY TUBE INSERTION (T-TUBES);  Surgeon: Sea Hinton MD;  Location: [facility];  Service: ENT;  Laterality: Bilateral;  T-TUBES    MYRINGOTOMY WITH INSERTION OF VENTILATION TUBE Bilateral 9/12/2023    Procedure: MYRINGOTOMY, WITH TYMPANOSTOMY TUBE INSERTION, T-TUBES;  Surgeon: Sea Hinton MD;  Location: [facility];  Service: ENT;  Laterality: Bilateral;    MYRINGOTOMY WITH INSERTION OF VENTILATION TUBE Bilateral 7/2/2024    Procedure: MYRINGOTOMY, WITH TYMPANOSTOMY TUBE INSERTION;  Surgeon: Sea Hinton MD;  Location: [facility];  Service: ENT;  Laterality: Bilateral;  Bilateral T-Tubes    OOPHORECTOMY  11/11/2014    Robotic, FABIENNE, Cystoscopy-Dr. Feng    SINUS SURGERY       Social History     Tobacco Use    Smoking status: Never     Passive exposure: Never    Smokeless tobacco: Never   Substance Use Topics    Alcohol use: Not Currently    Drug use: Not Currently     Types: Hydrocodone, Oxycodone      Current Outpatient Medications   Medication Instructions    albuterol (VENTOLIN HFA) 90 mcg/actuation inhaler 2 puffs, Inhalation, Every 6 hours PRN, Rescue    atorvastatin (LIPITOR) 20 mg, Oral, Nightly    cloNIDine (CATAPRES) 0.2 mg, Oral, 4 times daily    empagliflozin (JARDIANCE) 10 mg, Oral, Daily    glipizide-metformin (METAGLIP) 5-500 mg per tablet 1 tablet, Oral, 2 times daily before meals    hydrALAZINE (APRESOLINE) 100 mg, Oral, 3 times daily    ibuprofen (ADVIL,MOTRIN) 800 mg, Oral, Every 6 hours PRN    ipratropium (ATROVENT) 21 mcg (0.03 %) nasal spray 2 sprays, Each Nostril, 2 times daily    LANTUS SOLOSTAR U-100 INSULIN 10 Units, Subcutaneous, Nightly    levothyroxine (SYNTHROID) 200 MCG tablet TAKE 1 TABLET(200 MCG) BY  "MOUTH BEFORE BREAKFAST    lurasidone (LATUDA) 20 mg Tab tablet No dose, route, or frequency recorded.    meloxicam (MOBIC) 15 mg, Oral, Daily    metoprolol succinate (TOPROL-XL) 100 mg, Oral, Daily    NIFEdipine (PROCARDIA-XL) 60 mg, Oral, Daily    ofloxacin (FLOXIN) 0.3 % otic solution 3 drops, Right Ear, 2 times daily    pen needle, diabetic 32 gauge x 5/32" Ndle 1 Application, Misc.(Non-Drug; Combo Route), Daily    polyethylene glycol (GLYCOLAX) 17 g, Oral, Daily    sertraline (ZOLOFT) 100 mg, Oral, Nightly    traMADoL (ULTRAM) 50 mg, Oral, Every 8 hours PRN    TRULICITY 0.75 mg, Subcutaneous, Every 7 days        EXAM:  Constitutional  General Appearance:  obese , NAD  Psychiatric   Orientation: Oriented to time, oriented to place, oriented to person  Mood and Affect: Active and alert, normal mood, normal affect  Gait and Station   Appearance: antalgic gait to the right, unable to tandem gait, unable to walk on toes, unable to walk on heels    LUMBAR  Musculoskeletal System   Hips: Normal appearance, no leg length discrepancy, normal motion; left, normal motion; right    Lumbar Spine                   Inspection:  Normal alignment, normal sagittal balance                  Range of motion: decreased flexion, extension, lateral bending, rotation. Pain with range of motion                  Bony Palpation of the Lumbar Spine:  No tenderness of the spinous process, no tenderness of the sacrum, no tenderness of the coccyx                  Bony Palpation of the Right Hip:  No tenderness of the iliac crest, no tenderness of the sciatic notch, no tenderness of the SI joint                  Bony Palpation of the Left Hip:  No tenderness of the iliac crest, no tenderness of the sciatic notch, no tenderness of the SI joint                  Soft Tissue Palpation on the Right:  No tenderness of the paraspinal region, no tenderness of the iliolumbar region                  Soft Tissue Palpation on the Left:  No tenderness of " the paraspinal region, no tenderness of the iliolumbar region    Motor Strength   L1 Right:  Hip flexion iliopsoas 3/5    L1 Left:  Hip flexion iliopsoas 5/5              L2-L4 Right:  Knee extension quadriceps 4/5, tibialis anterior 4/5              L2-L4 Left:  Knee extension quadriceps 5/5, tibialis anterior 5/5   L5 Right:  Extensor hallucis llongus 4/5,    L5 Left:  Extensor hallucis longus 5/5,    S1 Right:  Plantar flexion gastrocnemius 4/5   S1 Left:  Plantar flexion gastrocnemius 5/5    Neurological System   Ankle Reflex Right:  normal   Ankle Reflex Left: normal   Knee Reflex Right:  normal   Knee Reflex Left:  normal   Sensation on the Right:  L2 normal, L3 normal, L4 normal, L5 normal, S1 normal   Sensation on the Left:  L2 normal, L3 normal, L4 normal, L5 normal, S1 normal              Special Test on the Right:  Seated straight leg raising test negative, no clonus of the ankle              Special Test on the Left:  Seated straight leg raising test negative, no clonus of the ankle    Skin   Lumbosacral Spine:  Normal skin -healed incision     Cardiovascular System   Arterial Pulses Right:  Posterior tibialis normal, dorsalis pedis normal   Arterial Pulses Left:  Posterior tibialis normal, dorsalis pedis normal   Edema Right: None   Edema Left:  None

## 2024-11-18 ENCOUNTER — TELEPHONE (OUTPATIENT)
Dept: PAIN MEDICINE | Facility: CLINIC | Age: 49
End: 2024-11-18
Payer: OTHER GOVERNMENT

## 2024-11-18 NOTE — TELEPHONE ENCOUNTER
----- Message from Lauryn sent at 11/15/2024  9:11 AM CST -----  Regarding: psych eval  Jose has requested:    PROVIDER, THANK YOU FOR YOUR REQUEST AND SUBMITTED CLINICAL. FOR FINAL MED  NEC REVIEW PLEASE SUBMIT PSYCHOLOGICAL CLEARANCE, ERIC COMPLETED BY  LICENSED CLINICAL PSYCHOLOGIST. [email or web address]/ THANK YOU.       I did not see an evaluation in her chart, could you please let me know if I have overlooked it

## 2024-11-18 NOTE — TELEPHONE ENCOUNTER
----- Message from Lauryn sent at 11/18/2024 12:27 PM CST -----  Regarding: RE: psych eval  Thank you  ----- Message -----  From: Lauryn Morgan RN  Sent: 11/18/2024  12:20 PM CST  To: Lauryn Brown  Subject: RE: psych eval                                   We are canceling procedure.  We ordered the eval and she has not done it.  ----- Message -----  From: Lauryn Brown  Sent: 11/15/2024   9:12 AM CST  To: Genna Bustillos; Sanju Vanessa Staff; #  Subject: psych eval                                        has requested:    PROVIDER, THANK YOU FOR YOUR REQUEST AND SUBMITTED CLINICAL. FOR FINAL MED  NEC REVIEW PLEASE SUBMIT PSYCHOLOGICAL CLEARANCE, EVAL COMPLETED BY  LICENSED CLINICAL PSYCHOLOGIST. [email or web address]/ THANK YOU.       I did not see an evaluation in her chart, could you please let me know if I have overlooked it    LAURYN MORGAN   11/18/2024   3:32 PM   Spoke with patient, explained need to post pone procedure until MRI ans Psychological  eval done    Appt made 12/10 for follow up after MRI

## 2024-11-20 ENCOUNTER — TELEPHONE (OUTPATIENT)
Dept: SPINE | Facility: CLINIC | Age: 49
End: 2024-11-20
Payer: OTHER GOVERNMENT

## 2024-11-20 NOTE — TELEPHONE ENCOUNTER
----- Message from Rena sent at 11/20/2024  9:54 AM CST -----  Who Called: Carey Leonardo    Caller is requesting assistance/information from provider's office.    Pt is wanting to follow up w a referral that is being sent over to [facility] , requesting a new referral be sent over.  Pt is also wanting to speak w nurse       Preferred Method of Contact: Phone Call  Patient's Preferred Phone Number on File: [phone]   Best Call Back Number, if different:  Additional Information:

## 2024-11-29 DIAGNOSIS — I10 HYPERTENSION, UNSPECIFIED TYPE: Primary | ICD-10-CM

## 2024-11-29 RX ORDER — CLONIDINE HYDROCHLORIDE 0.2 MG/1
0.2 TABLET ORAL 4 TIMES DAILY
Qty: 120 TABLET | Refills: 0 | Status: SHIPPED | OUTPATIENT
Start: 2024-11-29 | End: 2024-12-29

## 2024-12-26 DIAGNOSIS — I10 HYPERTENSION, UNSPECIFIED TYPE: ICD-10-CM

## 2024-12-26 RX ORDER — CLONIDINE HYDROCHLORIDE 0.2 MG/1
TABLET ORAL
Qty: 120 TABLET | Refills: 0 | Status: SHIPPED | OUTPATIENT
Start: 2024-12-26

## 2025-01-01 DIAGNOSIS — I10 PRIMARY HYPERTENSION: ICD-10-CM

## 2025-01-01 RX ORDER — HYDRALAZINE HYDROCHLORIDE 100 MG/1
100 TABLET, FILM COATED ORAL 3 TIMES DAILY
Qty: 270 TABLET | Refills: 3 | Status: SHIPPED | OUTPATIENT
Start: 2025-01-01

## 2025-01-22 ENCOUNTER — PATIENT MESSAGE (OUTPATIENT)
Dept: GASTROENTEROLOGY | Facility: CLINIC | Age: 50
End: 2025-01-22
Payer: OTHER GOVERNMENT

## 2025-01-23 DIAGNOSIS — I10 HYPERTENSION, UNSPECIFIED TYPE: ICD-10-CM

## 2025-01-23 RX ORDER — CLONIDINE HYDROCHLORIDE 0.2 MG/1
TABLET ORAL
Qty: 120 TABLET | Refills: 0 | Status: SHIPPED | OUTPATIENT
Start: 2025-01-23 | End: 2025-01-27 | Stop reason: SDUPTHER

## 2025-01-27 DIAGNOSIS — I10 HYPERTENSION, UNSPECIFIED TYPE: ICD-10-CM

## 2025-01-27 RX ORDER — CLONIDINE HYDROCHLORIDE 0.2 MG/1
0.2 TABLET ORAL 4 TIMES DAILY
Qty: 120 TABLET | Refills: 0 | Status: SHIPPED | OUTPATIENT
Start: 2025-01-27

## 2025-02-04 DIAGNOSIS — Z12.11 COLON CANCER SCREENING: Primary | ICD-10-CM

## 2025-02-04 RX ORDER — POLYETHYLENE GLYCOL 3350, SODIUM SULFATE ANHYDROUS, SODIUM BICARBONATE, SODIUM CHLORIDE, POTASSIUM CHLORIDE 236; 22.74; 6.74; 5.86; 2.97 G/4L; G/4L; G/4L; G/4L; G/4L
4 POWDER, FOR SOLUTION ORAL ONCE
Qty: 4000 ML | Refills: 0 | Status: SHIPPED | OUTPATIENT
Start: 2025-02-04 | End: 2025-02-04

## 2025-02-07 DIAGNOSIS — Z01.818 PRE-OPERATIVE EXAMINATION: Primary | ICD-10-CM

## 2025-02-17 ENCOUNTER — OFFICE VISIT (OUTPATIENT)
Dept: FAMILY MEDICINE | Facility: CLINIC | Age: 50
End: 2025-02-17
Payer: OTHER GOVERNMENT

## 2025-02-17 ENCOUNTER — PATIENT OUTREACH (OUTPATIENT)
Facility: HOSPITAL | Age: 50
End: 2025-02-17
Payer: OTHER GOVERNMENT

## 2025-02-17 VITALS
TEMPERATURE: 98 F | HEART RATE: 74 BPM | OXYGEN SATURATION: 97 % | BODY MASS INDEX: 34.19 KG/M2 | SYSTOLIC BLOOD PRESSURE: 110 MMHG | DIASTOLIC BLOOD PRESSURE: 74 MMHG | WEIGHT: 217.81 LBS | HEIGHT: 67 IN

## 2025-02-17 DIAGNOSIS — E11.9 TYPE 2 DIABETES MELLITUS WITHOUT COMPLICATION, WITHOUT LONG-TERM CURRENT USE OF INSULIN: ICD-10-CM

## 2025-02-17 DIAGNOSIS — M54.16 LUMBAR RADICULOPATHY, CHRONIC: Chronic | ICD-10-CM

## 2025-02-17 DIAGNOSIS — Z12.39 ENCOUNTER FOR SCREENING FOR MALIGNANT NEOPLASM OF BREAST, UNSPECIFIED SCREENING MODALITY: ICD-10-CM

## 2025-02-17 DIAGNOSIS — I10 PRIMARY HYPERTENSION: ICD-10-CM

## 2025-02-17 DIAGNOSIS — I10 HYPERTENSION, UNSPECIFIED TYPE: ICD-10-CM

## 2025-02-17 DIAGNOSIS — E03.9 HYPOTHYROIDISM, UNSPECIFIED TYPE: Primary | ICD-10-CM

## 2025-02-17 DIAGNOSIS — Z01.818 PRE-OPERATIVE EXAMINATION: ICD-10-CM

## 2025-02-17 PROBLEM — R52 PAIN: Status: RESOLVED | Noted: 2024-10-03 | Resolved: 2025-02-17

## 2025-02-17 PROBLEM — R10.9 ABDOMINAL PAIN: Status: RESOLVED | Noted: 2024-10-03 | Resolved: 2025-02-17

## 2025-02-17 LAB
ALBUMIN SERPL BCP-MCNC: 3.7 G/DL (ref 3.5–5)
ALBUMIN/GLOB SERPL: 1 {RATIO}
ALP SERPL-CCNC: 101 U/L (ref 40–150)
ALT SERPL W P-5'-P-CCNC: 20 U/L
ANION GAP SERPL CALCULATED.3IONS-SCNC: 14 MMOL/L (ref 7–16)
APTT PPP: 39.2 SECONDS (ref 25.2–37.3)
AST SERPL W P-5'-P-CCNC: 28 U/L (ref 5–34)
BASOPHILS # BLD AUTO: 0.03 K/UL (ref 0–0.2)
BASOPHILS NFR BLD AUTO: 0.5 % (ref 0–1)
BILIRUB SERPL-MCNC: 0.5 MG/DL
BUN SERPL-MCNC: 9 MG/DL (ref 7–19)
BUN/CREAT SERPL: 9 (ref 6–20)
CALCIUM SERPL-MCNC: 9.2 MG/DL (ref 8.4–10.2)
CHLORIDE SERPL-SCNC: 101 MMOL/L (ref 98–107)
CO2 SERPL-SCNC: 28 MMOL/L (ref 22–29)
CREAT SERPL-MCNC: 1.01 MG/DL (ref 0.55–1.02)
DIFFERENTIAL METHOD BLD: ABNORMAL
EGFR (NO RACE VARIABLE) (RUSH/TITUS): 69 ML/MIN/1.73M2
EOSINOPHIL # BLD AUTO: 0.07 K/UL (ref 0–0.5)
EOSINOPHIL NFR BLD AUTO: 1.1 % (ref 1–4)
ERYTHROCYTE [DISTWIDTH] IN BLOOD BY AUTOMATED COUNT: 12.7 % (ref 11.5–14.5)
EST. AVERAGE GLUCOSE BLD GHB EST-MCNC: 278 MG/DL
GLOBULIN SER-MCNC: 3.7 G/DL (ref 2–4)
GLUCOSE SERPL-MCNC: 203 MG/DL (ref 74–100)
HBA1C MFR BLD HPLC: 11.3 %
HCT VFR BLD AUTO: 41.2 % (ref 38–47)
HGB BLD-MCNC: 12.3 G/DL (ref 12–16)
HIV 1+O+2 AB SERPL QL: NORMAL
IMM GRANULOCYTES # BLD AUTO: 0.01 K/UL (ref 0–0.04)
IMM GRANULOCYTES NFR BLD: 0.2 % (ref 0–0.4)
INR BLD: 1
LYMPHOCYTES # BLD AUTO: 2.61 K/UL (ref 1–4.8)
LYMPHOCYTES NFR BLD AUTO: 42.7 % (ref 27–41)
MCH RBC QN AUTO: 28 PG (ref 27–31)
MCHC RBC AUTO-ENTMCNC: 29.9 G/DL (ref 32–36)
MCV RBC AUTO: 93.6 FL (ref 80–96)
MONOCYTES # BLD AUTO: 0.29 K/UL (ref 0–0.8)
MONOCYTES NFR BLD AUTO: 4.7 % (ref 2–6)
MPC BLD CALC-MCNC: 10.3 FL (ref 9.4–12.4)
NEUTROPHILS # BLD AUTO: 3.1 K/UL (ref 1.8–7.7)
NEUTROPHILS NFR BLD AUTO: 50.8 % (ref 53–65)
NRBC # BLD AUTO: 0 X10E3/UL
NRBC, AUTO (.00): 0 %
PLATELET # BLD AUTO: 382 K/UL (ref 150–400)
POTASSIUM SERPL-SCNC: 3 MMOL/L (ref 3.5–5.1)
PROT SERPL-MCNC: 7.4 G/DL (ref 6.4–8.3)
PROTHROMBIN TIME: 13.1 SECONDS (ref 11.7–14.7)
RBC # BLD AUTO: 4.4 M/UL (ref 4.2–5.4)
SODIUM SERPL-SCNC: 140 MMOL/L (ref 136–145)
WBC # BLD AUTO: 6.11 K/UL (ref 4.5–11)

## 2025-02-17 RX ORDER — VENLAFAXINE HYDROCHLORIDE 150 MG/1
150 CAPSULE, EXTENDED RELEASE ORAL
COMMUNITY

## 2025-02-17 NOTE — LETTER
AUTHORIZATION FOR RELEASE OF   CONFIDENTIAL INFORMATION    Dear [facility],    We are seeing Carey Leonardo, date of birth 1975, in the clinic at [facility]. Zainab Harrell FNP is the patient's PCP. Carey Leonardo has an outstanding lab/procedure at the time we reviewed her chart. In order to help keep her health information updated, she has authorized us to request the following medical record(s):        (  )  MAMMOGRAM                                      (  )  COLONOSCOPY      (  )  PAP SMEAR                                          (  )  OUTSIDE LAB RESULTS     (  )  DEXA SCAN                                          ( x )  EYE EXAM            (  )  FOOT EXAM                                          (  )  ENTIRE RECORD     (  )  OUTSIDE IMMUNIZATIONS                 (  )  _______________         Please fax records to Ricardo Upton LPN Care Coordinator at [phone].      If you have any questions, please contact [employer] at [phone].           Patient Name: Carey Leonardo  : 1975  Patient Phone #: [phone]

## 2025-02-17 NOTE — PROGRESS NOTES
Population Health Chart Review & Patient Outreach Details    Health Maintenance Topics Addressed and Outreach Outcomes / Actions Taken:  Diabetic Eye Exam [x] CHIKIS sent to [facility].

## 2025-02-17 NOTE — PROGRESS NOTES
Subjective     Patient ID: Carey Leonrado is a 49 y.o. female.    Chief Complaint: Surgery Clearance and Nausea    Pt presents for a surgery clearance for spine surgery with Dr. Upton on 3/4/2025. Pt denies problems with anesthesia.      Review of Systems   Constitutional:  Negative for activity change, appetite change, chills, fatigue and fever.   HENT:  Negative for nasal congestion, ear discharge, nosebleeds, postnasal drip, rhinorrhea, sinus pressure/congestion, sneezing, sore throat and tinnitus.    Eyes:  Negative for pain, discharge, redness and itching.   Respiratory:  Negative for cough, choking, chest tightness, shortness of breath and wheezing.    Cardiovascular:  Negative for chest pain.   Gastrointestinal:  Negative for abdominal distention, abdominal pain, blood in stool, change in bowel habit, constipation, diarrhea, nausea and vomiting.   Genitourinary:  Negative for decreased urine volume, dysuria, flank pain and frequency.   Musculoskeletal:  Negative for back pain and gait problem.   Integumentary:  Negative for wound, breast mass and breast discharge.   Allergic/Immunologic: Negative for immunocompromised state.   Neurological:  Negative for dizziness, light-headedness and headaches.   Psychiatric/Behavioral:  Negative for agitation, behavioral problems and hallucinations.    Breast: Negative for mass         Objective     Physical Exam  Vitals and nursing note reviewed.   Constitutional:       Appearance: Normal appearance.   Cardiovascular:      Rate and Rhythm: Normal rate and regular rhythm.      Heart sounds: Normal heart sounds.   Pulmonary:      Effort: Pulmonary effort is normal.      Breath sounds: Normal breath sounds.   Musculoskeletal:         General: Normal range of motion.   Neurological:      Mental Status: She is alert and oriented to person, place, and time.   Psychiatric:         Mood and Affect: Mood normal.         Behavior: Behavior normal.            Assessment and Plan      1. Hypothyroidism, unspecified type  Overview:  Synthroid 200 mcg oral daily      2. Type 2 diabetes mellitus without complication, without long-term current use of insulin    3. Primary hypertension    4. Hypertension, unspecified type    5. Lumbar radiculopathy, chronic    6. Encounter for screening for malignant neoplasm of breast, unspecified screening modality  -     Mammo Digital Screening Bilat w/ Tarun (XPD); Future; Expected date: 02/17/2025    7. Pre-operative examination  -     CBC Auto Differential  -     Comprehensive Metabolic Panel  -     PT and PTT  -     HIV 1/2 Ag/Ab (4th Gen)  -     Hepatitis C Virus (HCV) RNA Detection and Quantification, RT-PCR  -     Hemoglobin A1C  -     Urinalysis, Reflex to Urine Culture        Labs ordered by Martine Caldera are pending. No obvious reasons pt can not have surgery. Will obtain last eye exam from Dr. Soriano. Will call pt with mammogram appt.          Follow up if symptoms worsen or fail to improve.

## 2025-02-18 ENCOUNTER — HOSPITAL ENCOUNTER (OUTPATIENT)
Dept: RADIOLOGY | Facility: HOSPITAL | Age: 50
Discharge: HOME | End: 2025-02-18
Attending: NURSE PRACTITIONER
Payer: OTHER GOVERNMENT

## 2025-02-18 VITALS — HEIGHT: 67 IN | BODY MASS INDEX: 29.82 KG/M2 | WEIGHT: 190 LBS

## 2025-02-18 DIAGNOSIS — Z01.818 PRE-OPERATIVE EXAMINATION: ICD-10-CM

## 2025-02-18 DIAGNOSIS — Z12.39 ENCOUNTER FOR SCREENING FOR MALIGNANT NEOPLASM OF BREAST, UNSPECIFIED SCREENING MODALITY: ICD-10-CM

## 2025-02-18 PROCEDURE — 77067 SCR MAMMO BI INCL CAD: CPT | Mod: TC

## 2025-02-18 PROCEDURE — 71046 X-RAY EXAM CHEST 2 VIEWS: CPT | Mod: TC

## 2025-02-20 LAB — HCV RNA SERPL NAA+PROBE-ACNC: NORMAL IU/ML

## 2025-02-23 DIAGNOSIS — I10 HYPERTENSION, UNSPECIFIED TYPE: ICD-10-CM

## 2025-02-23 RX ORDER — CLONIDINE HYDROCHLORIDE 0.2 MG/1
TABLET ORAL
Qty: 120 TABLET | Refills: 0 | Status: SHIPPED | OUTPATIENT
Start: 2025-02-23

## 2025-02-27 ENCOUNTER — PATIENT OUTREACH (OUTPATIENT)
Facility: HOSPITAL | Age: 50
End: 2025-02-27
Payer: OTHER GOVERNMENT

## 2025-02-27 ENCOUNTER — OFFICE VISIT (OUTPATIENT)
Dept: FAMILY MEDICINE | Facility: CLINIC | Age: 50
End: 2025-02-27
Payer: OTHER GOVERNMENT

## 2025-02-27 VITALS
HEART RATE: 81 BPM | OXYGEN SATURATION: 99 % | SYSTOLIC BLOOD PRESSURE: 112 MMHG | HEIGHT: 67 IN | DIASTOLIC BLOOD PRESSURE: 75 MMHG | TEMPERATURE: 98 F | WEIGHT: 202.38 LBS | BODY MASS INDEX: 31.76 KG/M2

## 2025-02-27 DIAGNOSIS — E03.9 HYPOTHYROIDISM, UNSPECIFIED TYPE: ICD-10-CM

## 2025-02-27 DIAGNOSIS — E11.9 TYPE 2 DIABETES MELLITUS WITHOUT COMPLICATION, WITHOUT LONG-TERM CURRENT USE OF INSULIN: Primary | ICD-10-CM

## 2025-02-27 DIAGNOSIS — I10 HYPERTENSION, UNSPECIFIED TYPE: ICD-10-CM

## 2025-02-27 PROCEDURE — 99213 OFFICE O/P EST LOW 20 MIN: CPT | Mod: ,,, | Performed by: NURSE PRACTITIONER

## 2025-02-27 NOTE — PROGRESS NOTES
Subjective     Patient ID: Carey Leonardo is a 49 y.o. female.    Chief Complaint: Check up    Pt presents for a diabetes follow-up. Dr. Upton will not proceed with surgery due to Hga1c out of control.       Review of Systems   Constitutional:  Negative for activity change, appetite change, chills, fatigue and fever.   HENT:  Negative for nasal congestion, ear discharge, nosebleeds, postnasal drip, rhinorrhea, sinus pressure/congestion, sneezing, sore throat and tinnitus.    Eyes:  Negative for pain, discharge, redness and itching.   Respiratory:  Negative for cough, choking, chest tightness, shortness of breath and wheezing.    Cardiovascular:  Negative for chest pain.   Gastrointestinal:  Negative for abdominal distention, abdominal pain, blood in stool, change in bowel habit, constipation, diarrhea, nausea and vomiting.   Genitourinary:  Negative for decreased urine volume, dysuria, flank pain and frequency.   Musculoskeletal:  Negative for back pain and gait problem.   Integumentary:  Negative for wound, breast mass and breast discharge.   Allergic/Immunologic: Negative for immunocompromised state.   Neurological:  Negative for dizziness, light-headedness and headaches.   Psychiatric/Behavioral:  Negative for agitation, behavioral problems and hallucinations.    Breast: Negative for mass         Objective     Physical Exam  Vitals and nursing note reviewed.   Constitutional:       Appearance: Normal appearance.   Cardiovascular:      Rate and Rhythm: Normal rate and regular rhythm.      Heart sounds: Normal heart sounds.   Pulmonary:      Effort: Pulmonary effort is normal.      Breath sounds: Normal breath sounds.   Musculoskeletal:         General: Normal range of motion.   Neurological:      Mental Status: She is alert and oriented to person, place, and time.   Psychiatric:         Mood and Affect: Mood normal.         Behavior: Behavior normal.            Assessment and Plan     1. Type 2 diabetes mellitus  without complication, without long-term current use of insulin  -     Ambulatory referral/consult to Diabetic Advanced Practice Providers (Medical Management); Future; Expected date: 03/06/2025  Pt reports not taking all medications as directed  Low sugar diet    2. Hypertension, unspecified type  Controlled at 112/75  Low sodium diet    3. Hypothyroidism, unspecified type  Overview:  Synthroid 200 mcg oral daily  Pt states she does take synthroid as directed         Referral to Fela Michael for uncontrolled diabetes.          No follow-ups on file.

## 2025-02-27 NOTE — PROGRESS NOTES
Population Health Chart Review & Patient Outreach Details    Health Maintenance Topics Addressed and Outreach Outcomes / Actions Taken:  Diabetic Eye Exam [x] HM Updated with October 2024 Eye Exam (Dr. Soriano). History Updated.

## 2025-03-03 ENCOUNTER — OFFICE VISIT (OUTPATIENT)
Dept: DIABETES SERVICES | Facility: CLINIC | Age: 50
End: 2025-03-03
Payer: OTHER GOVERNMENT

## 2025-03-03 VITALS
HEIGHT: 67 IN | OXYGEN SATURATION: 99 % | RESPIRATION RATE: 18 BRPM | WEIGHT: 216.38 LBS | DIASTOLIC BLOOD PRESSURE: 60 MMHG | SYSTOLIC BLOOD PRESSURE: 100 MMHG | HEART RATE: 96 BPM | BODY MASS INDEX: 33.96 KG/M2

## 2025-03-03 DIAGNOSIS — Z79.4 TYPE 2 DIABETES MELLITUS WITH HYPERGLYCEMIA, WITH LONG-TERM CURRENT USE OF INSULIN: Primary | ICD-10-CM

## 2025-03-03 DIAGNOSIS — E11.9 TYPE 2 DIABETES MELLITUS WITHOUT COMPLICATION, WITHOUT LONG-TERM CURRENT USE OF INSULIN: ICD-10-CM

## 2025-03-03 DIAGNOSIS — E11.65 TYPE 2 DIABETES MELLITUS WITH HYPERGLYCEMIA, WITH LONG-TERM CURRENT USE OF INSULIN: Primary | ICD-10-CM

## 2025-03-03 DIAGNOSIS — F32.A DEPRESSION, UNSPECIFIED DEPRESSION TYPE: ICD-10-CM

## 2025-03-03 DIAGNOSIS — E03.9 HYPOTHYROIDISM, UNSPECIFIED TYPE: Chronic | ICD-10-CM

## 2025-03-03 DIAGNOSIS — I10 HYPERTENSION, UNSPECIFIED TYPE: ICD-10-CM

## 2025-03-03 LAB — GLUCOSE SERPL-MCNC: 322 MG/DL (ref 70–110)

## 2025-03-03 PROCEDURE — 99204 OFFICE O/P NEW MOD 45 MIN: CPT | Mod: S$PBB,,, | Performed by: NURSE PRACTITIONER

## 2025-03-03 PROCEDURE — 99215 OFFICE O/P EST HI 40 MIN: CPT | Mod: PBBFAC | Performed by: NURSE PRACTITIONER

## 2025-03-03 PROCEDURE — 82962 GLUCOSE BLOOD TEST: CPT | Mod: PBBFAC | Performed by: NURSE PRACTITIONER

## 2025-03-03 PROCEDURE — 99999PBSHW POCT GLUCOSE, HAND-HELD DEVICE: Mod: PBBFAC,,,

## 2025-03-03 PROCEDURE — 99999 PR PBB SHADOW E&M-EST. PATIENT-LVL V: CPT | Mod: PBBFAC,,, | Performed by: NURSE PRACTITIONER

## 2025-03-03 RX ORDER — LANCETS
EACH MISCELLANEOUS
Qty: 200 EACH | Refills: 4 | Status: SHIPPED | OUTPATIENT
Start: 2025-03-03

## 2025-03-03 RX ORDER — INSULIN PUMP SYRINGE, 3 ML
EACH MISCELLANEOUS
Qty: 1 EACH | Refills: 1 | Status: SHIPPED | OUTPATIENT
Start: 2025-03-03 | End: 2026-03-03

## 2025-03-03 RX ORDER — PEN NEEDLE, DIABETIC 30 GX3/16"
1 NEEDLE, DISPOSABLE MISCELLANEOUS DAILY
Qty: 100 EACH | Refills: 5 | Status: SHIPPED | OUTPATIENT
Start: 2025-03-03

## 2025-03-03 RX ORDER — ATORVASTATIN CALCIUM 20 MG/1
20 TABLET, FILM COATED ORAL NIGHTLY
Qty: 90 TABLET | Refills: 0 | Status: SHIPPED | OUTPATIENT
Start: 2025-03-03 | End: 2026-02-26

## 2025-03-03 RX ORDER — DULAGLUTIDE 1.5 MG/.5ML
1.5 INJECTION, SOLUTION SUBCUTANEOUS
Qty: 4 PEN | Refills: 11 | Status: SHIPPED | OUTPATIENT
Start: 2025-03-03 | End: 2026-03-03

## 2025-03-03 RX ORDER — INSULIN GLARGINE 100 [IU]/ML
10 INJECTION, SOLUTION SUBCUTANEOUS NIGHTLY
Qty: 10 ML | Refills: 3 | Status: SHIPPED | OUTPATIENT
Start: 2025-03-03

## 2025-03-03 RX ORDER — GLIPIZIDE 5 MG/1
5 TABLET, FILM COATED, EXTENDED RELEASE ORAL
Qty: 90 TABLET | Refills: 3 | Status: SHIPPED | OUTPATIENT
Start: 2025-03-03 | End: 2026-03-03

## 2025-03-03 NOTE — PATIENT INSTRUCTIONS
-- Medications adjusted for today's visit include:    Start on glipizide   Start back on lantus 10 units nightly     Increase your trulicity to 1.5mg   Continue your jardiance       -- Limit carbs to no more than 30-45 grams with each meal. Never eat carbs by themselves, always add protein. Make snacks low carb or non-carb only.    -- Continue checking blood sugar 3 times daily: Fasting blood sugar and vary your next 2 readings: Before lunch, before supper, 2 hours after any meal, or bedtime.   -Blood sugar goals should be a fasting blood sugar between , and no blood sugars throughout the day over 180 is good, less than 160 better, less than 140 perfect.    --Carry glucose tablets/soft peppermints/regular juice or Coke product with you at all times to treat a low blood sugar episode (less than 70). If your blood sugar is between 50-70, Chew 4 tablets or drink 1/2 cup of juice or regular Coke product. If your blood sugar is below 50, double the treatment. Re-check blood sugar in 15 minutes. If still low, repeat this. Always call the clinic to give an update for any low blood sugar episodes.    --Exercise as tolerated: Goal 30 minutes/day, 5 days/week. Start slowly and increase as tolerated.    --Follow-up for your next visit in 3 months     --Please either drop off, fax, or MyChart your readings to me as needed.

## 2025-03-03 NOTE — PROGRESS NOTES
Subjective:         Patient ID: Carey Leonardo is a 49 y.o. female.  Patient's current PCP is Zainab Harrell FNP.     Chief Complaint: Diabetes Mellitus (Patient is here to establish care with new provider. Patient will receive a blood glucose check. She has been a diabetic for about 1 year and does not check her blood sugar. She states she is struggling with keeping her blood sugar down. )    HPI  Carey Leonardo is a 49 y.o. Black or  female presenting for a new consult for diabetes. Patient has been diagnosed with type 2 diabetes for < 5 years.  Received diabetes education:    CURRENT DM MEDICATIONS:   Diabetes Medications              dulaglutide (TRULICITY) 0.75 mg/0.5 mL pen injector Inject 0.75 mg into the skin every 7 days.    empagliflozin (JARDIANCE) 10 mg tablet Take 1 tablet (10 mg total) by mouth once daily.    insulin glargine U-100, Lantus, (LANTUS SOLOSTAR U-100 INSULIN) 100 unit/mL (3 mL) InPn pen Inject 10 Units into the skin every evening.    glipizide-metformin (METAGLIP) 5-500 mg per tablet Take 1 tablet by mouth 2 (two) times daily before meals.        She is not taking the metaglip--- can't tolerate the GI upset     She has not been taking her lantus--- she has been out of it for about two weeks.       Past failed treatment include: none     Blood glucose testing is not performed. Patient is testing 0 times per day.  Meter:   Preferred lab: [facility]     Any episodes of hypoglycemia? no     Complications related to diabetes: peripheral neuropathy and cardiovascular disease    Her blood sugar in the clinic today was:   Lab Results   Component Value Date    POCGLU 322 (A) 03/03/2025       Carey Leonardo presents today for follow up visit to discuss diabetes management. At last visit,  She is a poor historian. She initially said she has only had diabetes for 1 yr. She then said she has been a diabetic for over 3 yrs.     She was supposed to have an ortho/spine procedure this  "week but that has been postponed d/t her uncontrolled glucose and A1C.     She does not have a glucometer and is unable to tell me the last time she checked her sugar.   Discussed CGM- renay. She is interested. We will submit her for one through [facility].    Educated on diet and exercise. Educated at length regarding medications and changes, along with goal glucose readings and goal A1C.       [facility]   Activity Level: sedentary     Lab Results   Component Value Date    HGBA1C 11.3 (H) 02/17/2025    HGBA1C 8.7 (H) 09/30/2024    HGBA1C 8.7 (H) 06/20/2024       STANDARDS OF CARE  Diabetes Management Status    Statin: Taking  ACE/ARB: Not taking    Screening or Prevention Patient's value Goal Complete/Controlled?   HgA1C Testing and Control   Lab Results   Component Value Date    HGBA1C 11.3 (H) 02/17/2025      Annually/Less than 8% No   Lipid profile : 09/30/2024 Annually Yes   LDL control Lab Results   Component Value Date    LDLCALC 148 09/30/2024    Annually/Less than 100 mg/dl  No   Nephropathy screening Lab Results   Component Value Date    LABMICR 8.1 06/20/2024     Lab Results   Component Value Date    PROTEINUA Negative 10/03/2024     No results found for: "UTPCR"   Annually Yes   Blood pressure BP Readings from Last 1 Encounters:   03/03/25 100/60    Less than 140/90 Yes   Dilated retinal exam : 10/24/2024 Annually Yes   Foot exam   : 09/30/2024 Annually Yes          Labs reviewed and are noted below.    Lab Results   Component Value Date    WBC 6.11 02/17/2025    HGB 12.3 02/17/2025    HCT 41.2 02/17/2025     02/17/2025    CHOL 240 (H) 09/30/2024    TRIG 259 (H) 09/30/2024    HDL 40 09/30/2024    LDLCALC 148 09/30/2024    ALT 20 02/17/2025    AST 28 02/17/2025     02/17/2025    K 3.0 (L) 02/17/2025     02/17/2025    ANIONGAP 14 02/17/2025    CREATININE 1.01 02/17/2025    ESTGFRAFRICA 107 11/23/2021    EGFRNONAA 68 06/15/2022    BUN 9 02/17/2025    CO2 28 " "02/17/2025    TSH 2.770 09/30/2024    INR 1.00 02/17/2025     (H) 02/17/2025    MICROALBUR 1.7 06/20/2024     Lab Results   Component Value Date    T3FREE 1.79 (L) 12/02/2022    FREET4 1.01 03/21/2023    TSH 2.770 09/30/2024    IRON 75 06/15/2022    TIBC 271 06/15/2022    FERRITIN 46 06/15/2022    CALCIUM 9.2 02/17/2025     No results found for: "CPEPTIDE"  No results found for: "GLUTAMICACID"  Glucose   Date Value Ref Range Status   02/17/2025 203 (H) 74 - 100 mg/dL Final     Anion Gap   Date Value Ref Range Status   02/17/2025 14 7 - 16 mmol/L Final     eGFR    Date Value Ref Range Status   11/23/2021 107 >=60 mL/min/1.73m² Final     eGFR   Date Value Ref Range Status   06/15/2022 68 >=60 mL/min/1.73m² Final       The following portions of the patient's history were reviewed and updated as appropriate: allergies, current medications, past family history, past medical history, past social history, past surgical history, and problem list.    Review of patient's allergies indicates:   Allergen Reactions    Fish containing products Anaphylaxis    Iodinated contrast media     Penicillins      Social History[1]  Past Medical History:   Diagnosis Date    Abdominal pain 10/03/2024    Asthma     CHF (congestive heart failure)     Chronic serous otitis media of both ears 04/04/2023    Depressive disorder     Diabetes mellitus, type 2     Diabetic eye exam 10/24/2024    Dr. [facility]    Gastroparesis     Hyperlipidemia     Hypertension     Hypothyroidism 08/02/2022    Synthroid 200 mcg oral daily      IBS (irritable bowel syndrome)     Kidney stones     Pain 10/03/2024    Postlaminectomy syndrome, lumbar region 04/28/2022    [facility]    Sleep apnea     Does not use a CPAP       REVIEW OF SYSTEMS:  Eyes No history of   Cardiovascular: History of   GI: Denies nausea,vomiting,constipation,or diarrhea.  : Denies dysuria.  SKIN: Denies rashes and lesions.  Neuro: " No neuropathy.  PSYCH: No tobacco use.  ENDO: See HPI.        Objective:      Vitals:    03/03/25 1453   BP: 100/60   Pulse:    Resp:      RESPIRATORY: No respiratory distress  NEUROLOGIC: Cranial nerves II-XII grossly intact.   PSYCHIATRIC: Alert & oriented x3. Normal mood and affect.  FOOT EXAMINATION:   Assessment:       1. Type 2 diabetes mellitus with hyperglycemia, with long-term current use of insulin    2. Type 2 diabetes mellitus without complication, without long-term current use of insulin    3. Hypothyroidism, unspecified type    4. Hypertension, unspecified type    5. Depression, unspecified depression type        Plan:   Type 2 diabetes mellitus with hyperglycemia, with long-term current use of insulin  -     POCT Glucose, Hand-Held Device  -     dulaglutide (TRULICITY) 1.5 mg/0.5 mL pen injector; Inject 1.5 mg into the skin every 7 days.  Dispense: 4 pen ; Refill: 11  -     glipiZIDE 5 MG TR24; Take 1 tablet (5 mg total) by mouth daily with breakfast.  Dispense: 90 tablet; Refill: 3  -     blood-glucose meter kit; To check BG 4 times daily, to use with insurance preferred meter  Dispense: 1 each; Refill: 1  -     lancets Misc; To check BG 4 times daily, to use with insurance preferred meter  Dispense: 200 each; Refill: 4  -     blood sugar diagnostic Strp; To check BG 4 times daily, to use with insurance preferred meter  Dispense: 200 each; Refill: 4  -     atorvastatin (LIPITOR) 20 MG tablet; Take 1 tablet (20 mg total) by mouth every evening.  Dispense: 90 tablet; Refill: 0  Type 2 diabetes mellitus without complication, without long-term current use of insulin  -     Ambulatory referral/consult to Diabetic Advanced Practice Providers (Medical Management)  -     empagliflozin (JARDIANCE) 10 mg tablet; Take 1 tablet (10 mg total) by mouth once daily.  Dispense: 90 tablet; Refill: 3  -     insulin glargine U-100, Lantus, (LANTUS SOLOSTAR U-100 INSULIN) 100 unit/mL (3 mL) InPn pen; Inject 10 Units into  "the skin every evening.  Dispense: 10 mL; Refill: 3  -     pen needle, diabetic 32 gauge x 5/32" Ndle; 1 Application by Misc.(Non-Drug; Combo Route) route once daily.  Dispense: 100 each; Refill: 5  -     TSH; Future; Expected date: 03/03/2025    Hypothyroidism, unspecified type  - noted  - TSH pending     Hypertension, unspecified type  - BP controlled  - educated on the need to monitor BP     Depression, unspecified depression type  - noted         - Follow up: 3 months      Portions of this note may have been created with voice recognition software. Occasional "wrong-word" or "sound-a-like" substitutions may have occurred due to the inherent limitations of voice recognition software. Please, read the note carefully and recognize, using context, where substitutions have occurred.         Fela MERCADO/EDUARDO  [facility]          [1]   Social History  Socioeconomic History    Marital status:    Tobacco Use    Smoking status: Never     Passive exposure: Never    Smokeless tobacco: Never   Substance and Sexual Activity    Alcohol use: Not Currently    Drug use: Not Currently     Types: Hydrocodone, Oxycodone    Sexual activity: Not Currently     Social Drivers of Health     Physical Activity: Insufficiently Active (3/28/2024)    Exercise Vital Sign     Days of Exercise per Week: 3 days     Minutes of Exercise per Session: 30 min   Stress: Stress Concern Present (3/28/2024)    [employer] [place] [employer] - Occupational Stress Questionnaire     Feeling of Stress : To some extent     "

## 2025-03-04 ENCOUNTER — TELEPHONE (OUTPATIENT)
Dept: DIABETES SERVICES | Facility: CLINIC | Age: 50
End: 2025-03-04
Payer: OTHER GOVERNMENT

## 2025-03-04 ENCOUNTER — RESULTS FOLLOW-UP (OUTPATIENT)
Dept: DIABETES SERVICES | Facility: CLINIC | Age: 50
End: 2025-03-04
Payer: OTHER GOVERNMENT

## 2025-03-04 DIAGNOSIS — E03.9 HYPOTHYROIDISM, UNSPECIFIED TYPE: Primary | ICD-10-CM

## 2025-03-04 NOTE — TELEPHONE ENCOUNTER
I attempted to contact patient regarding lab results. No answer, I left vm. ----- Message from CEDRIC Burns sent at 3/4/2025  9:16 AM CST -----  Can we let her know that her TSH is extremely elevated?   Check to see if she is taking her synthroid medication daily.   If she is- let her know we will need to increase it and I will send that in.   If she isnt- please inform her she needs to take it DAILY and that it will cause complications with her diabetes along with her heart.     We will need to repeat lab work in 6 wks.     ----- Message -----  From: Lab, Background User  Sent: 3/3/2025   4:22 PM CST  To: CEDRIC Burns

## 2025-03-04 NOTE — TELEPHONE ENCOUNTER
I contacted patient to make her aware of this. US is scheduled for 3/10/25 at 2:00. Patient stated that she understood and thanked me for calling. ----- Message from CEDRIC Burns sent at 3/4/2025 10:06 AM CST -----  Please let her know that she is on the max dose of synthroid. Review of her previous thyroid hormones show she is typically uncontrolled. I have ordered additional thyroid labs and an ultrasound. Along with a referral to endocrinology for further evaluation. Please reiterate to her that she needs to take this medication DAILY in the AM on an empty stomach and then wait aprox 30 mins before eating.

## 2025-03-04 NOTE — PROGRESS NOTES
Per pt she is taking this medication daily, I explained to her that you were going to increase and send into the pharmacy. She also wanted me to let you know that she has been on this medication since she was 26 yo

## 2025-03-04 NOTE — TELEPHONE ENCOUNTER
Spoke with patient regarding lab results----- Message from Keerthi sent at 3/4/2025  9:34 AM CST -----  Regarding: Calling Back for Test Results  Who Called: Carey Nails is returning phone callWho Left Message for Patient:Fela Deras the patient know what this is regarding?:Test ResultsPreferred Method of Contact: Phone CallPatient's Preferred Phone Number on File: [phone]

## 2025-03-10 ENCOUNTER — TELEPHONE (OUTPATIENT)
Dept: DIABETES SERVICES | Facility: CLINIC | Age: 50
End: 2025-03-10
Payer: OTHER GOVERNMENT

## 2025-03-10 ENCOUNTER — HOSPITAL ENCOUNTER (OUTPATIENT)
Dept: RADIOLOGY | Facility: HOSPITAL | Age: 50
Discharge: HOME | End: 2025-03-10
Attending: NURSE PRACTITIONER
Payer: OTHER GOVERNMENT

## 2025-03-10 DIAGNOSIS — E03.9 HYPOTHYROIDISM, UNSPECIFIED TYPE: ICD-10-CM

## 2025-03-10 PROCEDURE — 76536 US EXAM OF HEAD AND NECK: CPT | Mod: TC

## 2025-03-10 PROCEDURE — 76536 US EXAM OF HEAD AND NECK: CPT | Mod: 26,,, | Performed by: RADIOLOGY

## 2025-03-10 NOTE — TELEPHONE ENCOUNTER
I contacted patient to check in with her to see how her fasting am glucose has been since her last visit with Fela Michael on 3/3/25. Patient stated that her glucose has been between 170-200's.I let patient know that via verbal Fela Michael FNP/AGACNP, patient is to increase Lantus insulin by two units (12 units) and I would check back in with her in a few days. She stated that she understood and thanked me for checking in with her.

## 2025-03-12 ENCOUNTER — TELEPHONE (OUTPATIENT)
Dept: DIABETES SERVICES | Facility: CLINIC | Age: 50
End: 2025-03-12
Payer: OTHER GOVERNMENT

## 2025-03-12 NOTE — TELEPHONE ENCOUNTER
I called and spoke to patient at 0817 on 3.12.25 regarding her request to speak to a nurse. She states she woke up feeling bad this morning and that her fasting blood glucose was 329. She states her fasting blood glucose yesterday morning, 3.11.25, was 174. Fela increased her long acting insulin to 12 units Monday afternoon. I talked to Fela to see if she wanted to increase the patient's long acting insulin again. Fela ordered an increase by 2 units. Patient understands to take 14 units nightly. She thanked me. I let her know to call if she needs anything else. She thanked me.         ----- Message from Esthela sent at 3/12/2025  8:13 AM CDT -----  Pt wants nurse to call. Did not specify other than it was about her diabetes.  [phone].Who Called: Carey Pitts is requesting assistance/information from provider's office.Patient's Preferred Phone Number on File: [phone] Best Call Back Number, if different:Additional Information:

## 2025-03-13 ENCOUNTER — RESULTS FOLLOW-UP (OUTPATIENT)
Dept: DIABETES SERVICES | Facility: CLINIC | Age: 50
End: 2025-03-13

## 2025-03-17 ENCOUNTER — TELEPHONE (OUTPATIENT)
Dept: DIABETES SERVICES | Facility: CLINIC | Age: 50
End: 2025-03-17
Payer: OTHER GOVERNMENT

## 2025-03-17 NOTE — TELEPHONE ENCOUNTER
I attempted to contact patient in regards to her fasting am glucose since she last contacted the Diabetes office on 3/12/25, too see how her glucose has been as of today. Patient did not answer, mailbox full.

## 2025-03-19 DIAGNOSIS — I10 PRIMARY HYPERTENSION: ICD-10-CM

## 2025-03-19 DIAGNOSIS — I10 HYPERTENSION, UNSPECIFIED TYPE: ICD-10-CM

## 2025-03-20 RX ORDER — HYDRALAZINE HYDROCHLORIDE 100 MG/1
100 TABLET, FILM COATED ORAL 3 TIMES DAILY
Qty: 270 TABLET | Refills: 3 | Status: SHIPPED | OUTPATIENT
Start: 2025-03-20

## 2025-03-20 RX ORDER — METOPROLOL SUCCINATE 100 MG/1
100 TABLET, EXTENDED RELEASE ORAL DAILY
Qty: 90 TABLET | Refills: 3 | Status: SHIPPED | OUTPATIENT
Start: 2025-03-20

## 2025-03-20 RX ORDER — CLONIDINE HYDROCHLORIDE 0.2 MG/1
0.1 TABLET ORAL 4 TIMES DAILY
Qty: 120 TABLET | Refills: 0 | Status: SHIPPED | OUTPATIENT
Start: 2025-03-20

## 2025-03-24 ENCOUNTER — OFFICE VISIT (OUTPATIENT)
Dept: PAIN MEDICINE | Facility: CLINIC | Age: 50
End: 2025-03-24
Payer: OTHER GOVERNMENT

## 2025-03-24 VITALS
HEART RATE: 77 BPM | SYSTOLIC BLOOD PRESSURE: 123 MMHG | WEIGHT: 217 LBS | HEIGHT: 66 IN | DIASTOLIC BLOOD PRESSURE: 74 MMHG | BODY MASS INDEX: 34.87 KG/M2

## 2025-03-24 DIAGNOSIS — F41.9 ANXIETY: ICD-10-CM

## 2025-03-24 DIAGNOSIS — G57.91 ILIOINGUINAL NEURALGIA OF RIGHT SIDE: Chronic | ICD-10-CM

## 2025-03-24 DIAGNOSIS — M54.17 LUMBOSACRAL RADICULOPATHY: Primary | Chronic | ICD-10-CM

## 2025-03-24 DIAGNOSIS — M53.3 DISORDER OF SACRUM: Chronic | ICD-10-CM

## 2025-03-24 DIAGNOSIS — M96.1 POSTLAMINECTOMY SYNDROME, LUMBAR REGION: Chronic | ICD-10-CM

## 2025-03-24 PROCEDURE — 99215 OFFICE O/P EST HI 40 MIN: CPT | Mod: PBBFAC | Performed by: PHYSICIAN ASSISTANT

## 2025-03-24 PROCEDURE — 99999 PR PBB SHADOW E&M-EST. PATIENT-LVL V: CPT | Mod: PBBFAC,,, | Performed by: PHYSICIAN ASSISTANT

## 2025-03-24 PROCEDURE — 99214 OFFICE O/P EST MOD 30 MIN: CPT | Mod: S$PBB,,, | Performed by: PHYSICIAN ASSISTANT

## 2025-03-24 RX ORDER — TRAMADOL HYDROCHLORIDE 50 MG/1
50 TABLET ORAL EVERY 8 HOURS PRN
Qty: 90 TABLET | Refills: 1 | Status: SHIPPED | OUTPATIENT
Start: 2025-03-24

## 2025-03-24 NOTE — PROGRESS NOTES
Subjective:         Patient ID: Carey Leonardo is a 49 y.o. female.    Chief Complaint: Back Pain      Pain  This is a chronic problem. The current episode started more than 1 year ago. The problem occurs daily. The problem has been waxing and waning. Associated symptoms include arthralgias. Pertinent negatives include no anorexia, change in bowel habit, chest pain, chills, diaphoresis, fever, sore throat, swollen glands, urinary symptoms or vertigo.     Review of Systems   Constitutional:  Negative for activity change, appetite change, chills, diaphoresis, fever and unexpected weight change.   HENT:  Negative for drooling, ear discharge, ear pain, facial swelling, nosebleeds, sore throat, trouble swallowing, voice change and goiter.    Eyes:  Negative for photophobia, pain, discharge, redness and visual disturbance.   Respiratory:  Negative for apnea, choking, chest tightness, shortness of breath, wheezing and stridor.    Cardiovascular:  Negative for chest pain, palpitations and leg swelling.   Gastrointestinal:  Negative for abdominal distention, anorexia, change in bowel habit, diarrhea, rectal pain and fecal incontinence.   Endocrine: Negative for cold intolerance, heat intolerance, polydipsia, polyphagia and polyuria.   Genitourinary:  Negative for bladder incontinence, dysuria, flank pain, frequency and hot flashes.   Musculoskeletal:  Positive for arthralgias, back pain and leg pain.   Integumentary:  Negative for color change and pallor.   Allergic/Immunologic: Negative for immunocompromised state.   Neurological:  Negative for dizziness, vertigo, seizures, syncope, facial asymmetry, speech difficulty, light-headedness, memory loss and coordination difficulties.   Hematological:  Negative for adenopathy. Does not bruise/bleed easily.   Psychiatric/Behavioral:  Negative for agitation, behavioral problems, confusion, decreased concentration, dysphoric mood, hallucinations, self-injury and suicidal ideas.  The patient is not nervous/anxious and is not hyperactive.            Past Medical History:   Diagnosis Date    Abdominal pain 10/03/2024    Asthma     CHF (congestive heart failure)     Chronic serous otitis media of both ears 04/04/2023    Depressive disorder     Diabetes mellitus, type 2     Diabetic eye exam 10/24/2024    Dr. [facility]    Gastroparesis     Hyperlipidemia     Hypertension     Hypothyroidism 08/02/2022    Synthroid 200 mcg oral daily      IBS (irritable bowel syndrome)     Kidney stones     Pain 10/03/2024    Postlaminectomy syndrome, lumbar region 04/28/2022    [facility]    Sleep apnea     Does not use a CPAP     Past Surgical History:   Procedure Laterality Date    Bilateral L3-S1 MBB Bilateral 6-, 5-, 1-8-2014    Dr Jessica Randolph    CARPAL TUNNEL RELEASE      CHOLECYSTECTOMY      DIAGNOSTIC LAPAROSCOPY  04/14/2010    Exploratory Laparotomy, Myomectomy, Hydrotubation-Dr. Feng    DILATION AND CURETTAGE OF UTERUS  04/14/2010    Hysteroscopy-Dr. Feng    EPIDURAL STEROID INJECTION N/A 10/22/2024    Procedure: Injection, Steroid, Epidural, L4/5;  Surgeon: Rasheeda Bills MD;  Location: [facility];  Service: Pain Management;  Laterality: N/A;    EXPLORATORY LAPAROTOMY WITH UTERINE MYOMECTOMY  02/27/2007    Dr. Marquise Anderson    HYSTERECTOMY  09/29/2011    Robotic, FABIENNE, Cystoscopy-Dr. Feng    HYSTEROSCOPY WITH DILATION AND CURETTAGE OF UTERUS  04/04/2006    Laparoscopy, Chromotubation-Dr. Marquise Anderson    INJECTION OF ANESTHETIC AGENT AROUND ILIOINGUINAL NERVE Right 6/22/2023    Procedure: BLOCK, NERVE, ILIOINGUINAL;  Surgeon: Rasheeda Bills MD;  Location: [facility];  Service: Pain Management;  Laterality: Right;    INJECTION OF ANESTHETIC AGENT AROUND MEDIAL BRANCH NERVES INNERVATING LUMBAR FACET JOINT Bilateral 9/21/2023    Procedure: BLOCK, NERVE, FACET JOINT, LUMBAR, MEDIAL BRANCH;  Surgeon: Rasheeda Bills MD;   Location: [facility];  Service: Pain Management;  Laterality: Bilateral;    INJECTION OF ANESTHETIC AGENT AROUND MEDIAL BRANCH NERVES INNERVATING LUMBAR FACET JOINT Bilateral 3/14/2024    Procedure: BLOCK, NERVE, FACET JOINT, LUMBAR, MEDIAL BRANCH, BILATERAL L4-S1 MBB;  Surgeon: Rasheeda Bills MD;  Location: [facility];  Service: Pain Management;  Laterality: Bilateral;    LAMINECTOMY N/A 11/30/2021    Procedure: L2-L5 Laminectomy;  Surgeon: Cyril Upton MD;  Location: [facility];  Service: Neurosurgery;  Laterality: N/A;    LEFT HEART CATHETERIZATION      Left L3-S1 RFTC Left 07/18/2017    Dr Lopez    Left SI JI Left 09/30/2013    Dr Randolph    MYRINGOTOMY WITH INSERTION OF VENTILATION TUBE Bilateral 4/4/2023    Procedure: MYRINGOTOMY, WITH TYMPANOSTOMY TUBE INSERTION (T-TUBES);  Surgeon: Sea Hinton MD;  Location: [facility];  Service: ENT;  Laterality: Bilateral;  T-TUBES    MYRINGOTOMY WITH INSERTION OF VENTILATION TUBE Bilateral 9/12/2023    Procedure: MYRINGOTOMY, WITH TYMPANOSTOMY TUBE INSERTION, T-TUBES;  Surgeon: Sea Hinton MD;  Location: [facility];  Service: ENT;  Laterality: Bilateral;    MYRINGOTOMY WITH INSERTION OF VENTILATION TUBE Bilateral 7/2/2024    Procedure: MYRINGOTOMY, WITH TYMPANOSTOMY TUBE INSERTION;  Surgeon: Sea Hinton MD;  Location: [facility];  Service: ENT;  Laterality: Bilateral;  Bilateral T-Tubes    OOPHORECTOMY  11/11/2014    FABIENNE Barajas, Cystoscopy-Dr. Feng    SINUS SURGERY       Social History     Socioeconomic History    Marital status:    Tobacco Use    Smoking status: Never     Passive exposure: Never    Smokeless tobacco: Never   Substance and Sexual Activity    Alcohol use: Not Currently    Drug use: Not Currently     Types: Hydrocodone, Oxycodone    Sexual activity: Not Currently     Social Drivers of Health     Physical Activity: Insufficiently Active (3/28/2024)    Exercise Vital Sign     Days of  "Exercise per Week: 3 days     Minutes of Exercise per Session: 30 min   Stress: Stress Concern Present (3/28/2024)    [employer] - Occupational Stress Questionnaire     Feeling of Stress : To some extent     Family History   Problem Relation Name Age of Onset    Diabetes Mellitus Mother      Heart disease Mother      Hypertension Mother      Migraines Mother      Heart disease Sister       Review of patient's allergies indicates:   Allergen Reactions    Fish containing products Anaphylaxis    Iodinated contrast media     Penicillins         Objective:  Vitals:    03/24/25 1257 03/24/25 1259   BP: 123/74    Pulse: 77    Weight: 98.4 kg (217 lb)    Height: 5' 6" (1.676 m)    PainSc:    9                 Physical Exam  Vitals and nursing note reviewed. Exam conducted with a chaperone present.   Constitutional:       General: She is awake. She is not in acute distress.     Appearance: Normal appearance. She is not ill-appearing, toxic-appearing or diaphoretic.   HENT:      Head: Normocephalic and atraumatic.      Nose: Nose normal.      Mouth/Throat:      Mouth: Mucous membranes are moist.      Pharynx: Oropharynx is clear.   Eyes:      Conjunctiva/sclera: Conjunctivae normal.      Pupils: Pupils are equal, round, and reactive to light.   Cardiovascular:      Rate and Rhythm: Normal rate.   Pulmonary:      Effort: Pulmonary effort is normal. No respiratory distress.   Abdominal:      Palpations: Abdomen is soft.      Tenderness: There is no guarding.   Musculoskeletal:         General: Normal range of motion.      Cervical back: Normal range of motion and neck supple. No rigidity.   Skin:     General: Skin is warm and dry.      Coloration: Skin is not jaundiced or pale.   Neurological:      General: No focal deficit present.      Mental Status: She is alert and oriented to person, place, and time. Mental status is at baseline.      Cranial Nerves: No cranial nerve deficit (II-XII). "   Psychiatric:         Mood and Affect: Mood normal.         Behavior: Behavior normal. Behavior is cooperative.         Thought Content: Thought content normal.           US Thyroid  Narrative: EXAMINATION:  US THYROID    CLINICAL HISTORY:  Hypothyroidism, unspecified    TECHNIQUE:  Ultrasound of the thyroid and cervical lymph nodes was performed.    COMPARISON:  Thyroid ultrasound of November 17, 2015.    FINDINGS:  On the right no residual or recurrent thyroid tissue is seen in the thyroid bed.  On the left no recurrent or residual thyroid tissue is seen in the thyroid bed.  No adenopathy is seen in the neck on either side.  Impression: No residual or recurrent thyroid tissue is identified in the thyroid beds and no adenopathy is noted    Electronically signed by: Jean Greco MD  Date:    03/11/2025  Time:    15:42       Lab Visit on 03/04/2025   Component Date Value Ref Range Status    Free T3 03/04/2025 1.75  1.58 - 3.91 pg/mL Final    Free T4 03/04/2025 0.66 (L)  0.70 - 1.48 ng/dL Final    Thyroperoxidase Ab 03/04/2025 3.2  <=6.0 U/mL Final   Lab Visit on 03/03/2025   Component Date Value Ref Range Status    TSH 03/03/2025 27.659 (H)  0.350 - 4.940 uIU/mL Final   Office Visit on 03/03/2025   Component Date Value Ref Range Status    POC Glucose 03/03/2025 322 (A)  70 - 110 MG/DL Final   Patient Outreach on 02/27/2025   Component Date Value Ref Range Status    Left Eye DM Retinopathy 10/24/2024 Negative   Final    Right Eye DM Retinopathy 10/24/2024 Negative   Final   Office Visit on 02/17/2025   Component Date Value Ref Range Status    Sodium 02/17/2025 140  136 - 145 mmol/L Final    Potassium 02/17/2025 3.0 (L)  3.5 - 5.1 mmol/L Final    Chloride 02/17/2025 101  98 - 107 mmol/L Final    CO2 02/17/2025 28  22 - 29 mmol/L Final    Anion Gap 02/17/2025 14  7 - 16 mmol/L Final    Glucose 02/17/2025 203 (H)  74 - 100 mg/dL Final    BUN 02/17/2025 9  7 - 19 mg/dL Final    Creatinine 02/17/2025 1.01  0.55 - 1.02  mg/dL Final    BUN/Creatinine Ratio 02/17/2025 9  6 - 20 Final    Calcium 02/17/2025 9.2  8.4 - 10.2 mg/dL Final    Total Protein 02/17/2025 7.4  6.4 - 8.3 g/dL Final    Albumin 02/17/2025 3.7  3.5 - 5.0 g/dL Final    Globulin 02/17/2025 3.7  2.0 - 4.0 g/dL Final    A/G Ratio 02/17/2025 1.0   Final    Bilirubin, Total 02/17/2025 0.5  <=1.5 mg/dL Final    Alk Phos 02/17/2025 101  40 - 150 U/L Final    ALT 02/17/2025 20  <=55 U/L Final    AST 02/17/2025 28  5 - 34 U/L Final    eGFR 02/17/2025 69  >=60 mL/min/1.73m2 Final    PT 02/17/2025 13.1  11.7 - 14.7 seconds Final    INR 02/17/2025 1.00  <=3.30 Final    PTT 02/17/2025 39.2 (H)  25.2 - 37.3 seconds Final    HIV 1/2 02/17/2025 Non-Reactive  Non-Reactive Final    HCV RNA Detect/Quant 02/17/2025 Undetected  Undetected IU/mL Final    Hemoglobin A1C 02/17/2025 11.3 (H)  <=7.0 % Final    Estimated Average Glucose 02/17/2025 278  mg/dL Final    WBC 02/17/2025 6.11  4.50 - 11.00 K/uL Final    RBC 02/17/2025 4.40  4.20 - 5.40 M/uL Final    Hemoglobin 02/17/2025 12.3  12.0 - 16.0 g/dL Final    Hematocrit 02/17/2025 41.2  38.0 - 47.0 % Final    MCV 02/17/2025 93.6  80.0 - 96.0 fL Final    MCH 02/17/2025 28.0  27.0 - 31.0 pg Final    MCHC 02/17/2025 29.9 (L)  32.0 - 36.0 g/dL Final    RDW 02/17/2025 12.7  11.5 - 14.5 % Final    Platelet Count 02/17/2025 382  150 - 400 K/uL Final    MPV 02/17/2025 10.3  9.4 - 12.4 fL Final    Neutrophils % 02/17/2025 50.8 (L)  53.0 - 65.0 % Final    Lymphocytes % 02/17/2025 42.7 (H)  27.0 - 41.0 % Final    Monocytes % 02/17/2025 4.7  2.0 - 6.0 % Final    Eosinophils % 02/17/2025 1.1  1.0 - 4.0 % Final    Basophils % 02/17/2025 0.5  0.0 - 1.0 % Final    Immature Granulocytes % 02/17/2025 0.2  0.0 - 0.4 % Final    nRBC, Auto 02/17/2025 0.0  <=0.0 % Final    Neutrophils, Abs 02/17/2025 3.10  1.80 - 7.70 K/uL Final    Lymphocytes, Absolute 02/17/2025 2.61  1.00 - 4.80 K/uL Final    Monocytes, Absolute 02/17/2025 0.29  0.00 - 0.80 K/uL Final     Eosinophils, Absolute 02/17/2025 0.07  0.00 - 0.50 K/uL Final    Basophils, Absolute 02/17/2025 0.03  0.00 - 0.20 K/uL Final    Immature Granulocytes, Absolute 02/17/2025 0.01  0.00 - 0.04 K/uL Final    nRBC, Absolute 02/17/2025 0.00  <=0.00 x10e3/uL Final    Diff Type 02/17/2025 Auto   Final   Admission on 10/22/2024, Discharged on 10/22/2024   Component Date Value Ref Range Status    POC Glucose 10/22/2024 232 (H)  70 - 105 mg/dL Final   Office Visit on 10/08/2024   Component Date Value Ref Range Status    POC Amphetamines 10/08/2024 Negative  Negative, Inconclusive Final    POC Barbiturates 10/08/2024 Negative  Negative, Inconclusive Final    POC Benzodiazepines 10/08/2024 Negative  Negative, Inconclusive Final    POC Cocaine 10/08/2024 Negative  Negative, Inconclusive Final    POC THC 10/08/2024 Negative  Negative, Inconclusive Final    POC Methadone 10/08/2024 Negative  Negative, Inconclusive Final    POC Methamphetamine 10/08/2024 Negative  Negative, Inconclusive Final    POC Opiates 10/08/2024 Negative  Negative, Inconclusive Final    POC Oxycodone 10/08/2024 Negative  Negative, Inconclusive Final    POC Phencyclidine 10/08/2024 Negative  Negative, Inconclusive Final    POC Methylenedioxymethamphetamine * 10/08/2024 Negative  Negative, Inconclusive Final    POC Tricyclic Antidepressants 10/08/2024 Negative  Negative, Inconclusive Final    POC Buprenorphine 10/08/2024 Negative   Final     Acceptable 10/08/2024 Yes   Final    POC Temperature (Urine) 10/08/2024 90   Final   Admission on 10/03/2024, Discharged on 10/03/2024   Component Date Value Ref Range Status    Sodium 10/03/2024 140  136 - 145 mmol/L Final    Potassium 10/03/2024 3.1 (L)  3.5 - 5.1 mmol/L Final    Chloride 10/03/2024 105  98 - 107 mmol/L Final    CO2 10/03/2024 28  21 - 32 mmol/L Final    Anion Gap 10/03/2024 10  7 - 16 mmol/L Final    Glucose 10/03/2024 371 (H)  74 - 106 mg/dL Final    BUN 10/03/2024 9  7 - 18 mg/dL Final     Creatinine 10/03/2024 0.87  0.55 - 1.02 mg/dL Final    BUN/Creatinine Ratio 10/03/2024 10  6 - 20 Final    Calcium 10/03/2024 8.8  8.5 - 10.1 mg/dL Final    Total Protein 10/03/2024 6.8  6.4 - 8.2 g/dL Final    Albumin 10/03/2024 2.9 (L)  3.5 - 5.0 g/dL Final    Globulin 10/03/2024 3.9  2.0 - 4.0 g/dL Final    A/G Ratio 10/03/2024 0.7   Final    Bilirubin, Total 10/03/2024 0.1  >0.0 - 1.2 mg/dL Final    Alk Phos 10/03/2024 117 (H)  39 - 100 U/L Final    ALT 10/03/2024 30  13 - 56 U/L Final    AST 10/03/2024 24  15 - 37 U/L Final    eGFR 10/03/2024 82  >=60 mL/min/1.73m2 Final    Color, UA 10/03/2024 Light-Yellow  Colorless, Straw, Yellow, Light Yellow, Dark Yellow Final    Clarity, UA 10/03/2024 Clear  Clear Final    pH, UA 10/03/2024 6.5  5.0 to 8.0 pH Units Final    Leukocytes, UA 10/03/2024 Negative  Negative Final    Nitrites, UA 10/03/2024 Negative  Negative Final    Protein, UA 10/03/2024 Negative  Negative Final    Glucose, UA 10/03/2024 500 (A)  Normal mg/dL Final    Ketones, UA 10/03/2024 Negative  Negative mg/dL Final    Urobilinogen, UA 10/03/2024 Normal  0.2, 1.0, Normal mg/dL Final    Bilirubin, UA 10/03/2024 Negative  Negative Final    Blood, UA 10/03/2024 Negative  Negative Final    Specific Gravity, UA 10/03/2024 1.013  <=1.030 Final    WBC 10/03/2024 5.74  4.50 - 11.00 K/uL Final    RBC 10/03/2024 3.99 (L)  4.20 - 5.40 M/uL Final    Hemoglobin 10/03/2024 11.0 (L)  12.0 - 16.0 g/dL Final    Hematocrit 10/03/2024 35.5 (L)  38.0 - 47.0 % Final    MCV 10/03/2024 89.0  80.0 - 96.0 fL Final    MCH 10/03/2024 27.6  27.0 - 31.0 pg Final    MCHC 10/03/2024 31.0 (L)  32.0 - 36.0 g/dL Final    RDW 10/03/2024 12.3  11.5 - 14.5 % Final    Platelet Count 10/03/2024 356  150 - 400 K/uL Final    MPV 10/03/2024 9.8  9.4 - 12.4 fL Final    Neutrophils % 10/03/2024 41.8 (L)  53.0 - 65.0 % Final    Lymphocytes % 10/03/2024 48.1 (H)  27.0 - 41.0 % Final    Monocytes % 10/03/2024 6.8 (H)  2.0 - 6.0 % Final    Eosinophils  % 10/03/2024 2.6  1.0 - 4.0 % Final    Basophils % 10/03/2024 0.5  0.0 - 1.0 % Final    Immature Granulocytes % 10/03/2024 0.2  0.0 - 0.4 % Final    nRBC, Auto 10/03/2024 0.0  <=0.0 % Final    Neutrophils, Abs 10/03/2024 2.40  1.80 - 7.70 K/uL Final    Lymphocytes, Absolute 10/03/2024 2.76  1.00 - 4.80 K/uL Final    Monocytes, Absolute 10/03/2024 0.39  0.00 - 0.80 K/uL Final    Eosinophils, Absolute 10/03/2024 0.15  0.00 - 0.50 K/uL Final    Basophils, Absolute 10/03/2024 0.03  0.00 - 0.20 K/uL Final    Immature Granulocytes, Absolute 10/03/2024 0.01  0.00 - 0.04 K/uL Final    nRBC, Absolute 10/03/2024 0.00  <=0.00 x10e3/uL Final    Diff Type 10/03/2024 Auto   Final    ProBNP 10/03/2024 22  1 - 125 pg/mL Final    Troponin I High Sensitivity 10/03/2024 29.9  <=60.4 pg/mL Final    QRS Duration 10/03/2024 72  ms Final    [facility] QTC Calculation 10/03/2024 452  ms Final   Office Visit on 09/30/2024   Component Date Value Ref Range Status    Hemoglobin A1C 09/30/2024 8.7 (H)  4.5 - 6.6 % Final    Estimated Average Glucose 09/30/2024 203  mg/dL Final    Triglycerides 09/30/2024 259 (H)  35 - 150 mg/dL Final    Cholesterol 09/30/2024 240 (H)  0 - 200 mg/dL Final    HDL Cholesterol 09/30/2024 40  40 - 60 mg/dL Final    Cholesterol/HDL Ratio (Risk Factor) 09/30/2024 6.0   Final    Non-HDL 09/30/2024 200  mg/dL Final    LDL Calculated 09/30/2024 148  mg/dL Final    LDL/HDL 09/30/2024 3.7   Final    VLDL 09/30/2024 52  mg/dL Final    TSH 09/30/2024 2.770  0.358 - 3.740 uIU/mL Final         Orders Placed This Encounter   Procedures    Ambulatory referral/consult to Psychology     Standing Status:   Future     Expected Date:   3/31/2025     Expiration Date:   4/24/2026     Referral Priority:   Routine     Referral Type:   Psychiatric     Referral Reason:   Specialty Services Required     Referred to Provider:   Juan Baker, PhD     Requested Specialty:   Psychology     Number of Visits Requested:   1       Requested  Prescriptions     Signed Prescriptions Disp Refills    traMADoL (ULTRAM) 50 mg tablet 90 tablet 1     Sig: Take 1 tablet (50 mg total) by mouth every 8 (eight) hours as needed for Pain.       Assessment:     1. Lumbosacral radiculopathy    2. Postlaminectomy syndrome, lumbar region    3. Disorder of sacrum    4. Ilioinguinal neuralgia of right side    5. Anxiety                 A's of Opioid Risk Assessment  Activity:Patient can perform ADL.   Analgesia:Patients pain is partially controlled by current medication. Patient has tried OTC medications such as Tylenol and Ibuprofen with out relief.   Adverse Effects: Patient denies constipation or sedation.  Aberrant Behavior:  reviewed with no aberrant drug seeking/taking behavior.  Overdose reversal drug naloxone discussed     Drug screen reviewed      MRI lumbar spine [facility] July 31, 2023 multiple level degenerative changes mild bilateral foraminal stenosis L3/4 L4/5 less so at L5/S1 postop changes noted           Plan:    Last seen in clinic November 5, 2024    Patient canceled appointment  March 20, 2025  October 9, 2024  February 27, 2024  September 19, 2023  August 10, 2023  July 10, 2023    No show  December 10, 2024  March 28, 2024  November 14, 2023  October 9, 2023        Narcan October 2024    Chronically elevated glucose    History lumbar surgery laminectomy L2 through 5 November 30, 2021 Dr. Upton complicated by postop infection    She had bilateral lumbar L4 through S1 medial branch block # 2, March 14, 2024  she states she had 80% relief after procedure,   the procedure did help improve her level function     She had lumbar L4/5 SILVANA # 1 October 22, 2024  She states she had 70% relief after procedure  Procedure did help improve her level function      Requesting resume tramadol    Patient states Dr. Upton spine surgery recommended lumbar fusion she declined the surgery     She is requesting options     She is requesting spinal cord stimulator  trial    Denies loss of bowel or bladder function    Requesting spinal cord stimulator    Patient verbalized understanding increased risk of infection due to postop infection after lumbar surgery    She states she is willing to proceed with spinal cord stimulator trial    Continue current medication     Psychological evaluation spinal cord stimulator trial Dr. Baker    Follow-up 2 months obtain psychological evaluation spinal cord stimulator trial    Dr. Bills    Bring original prescription medication bottles/container/box with labels to each visit

## 2025-04-09 ENCOUNTER — TELEPHONE (OUTPATIENT)
Dept: DIABETES SERVICES | Facility: CLINIC | Age: 50
End: 2025-04-09
Payer: OTHER GOVERNMENT

## 2025-04-09 RX ORDER — ONDANSETRON 4 MG/1
4 TABLET, ORALLY DISINTEGRATING ORAL 2 TIMES DAILY
Qty: 15 TABLET | Refills: 0 | Status: SHIPPED | OUTPATIENT
Start: 2025-04-09

## 2025-04-09 NOTE — TELEPHONE ENCOUNTER
I contacted patient regarding this, I let her know that zofran was called into the pharmacy for her. She understood and thanked me. ----- Message from CEDRIC Burns sent at 4/9/2025 11:30 AM CDT -----  Regarding: RE: RX For Nausea  Yes. Just sent in zofran for her.  ----- Message -----  From: Rachna Vick MA  Sent: 4/9/2025  11:07 AM CDT  To: CEDRIC Burns  Subject: FW: RX For Nausea                                Can you send her in something for nausea?  ----- Message -----  From: Keerthi Cano  Sent: 4/9/2025  11:02 AM CDT  To: Fernanda Chahal Staff  Subject: RX For Nausea                                    Who Called: Carey LeonardoWhit said her Trulicity is making her nauseated and she is wondering if y'all can call something in for [id number] - [place], [place] - [address] [facility] [address]erred Method of Contact: Phone CallPatient's Preferred Phone Number on File: [phone]

## 2025-04-28 ENCOUNTER — TELEPHONE (OUTPATIENT)
Dept: DIABETES SERVICES | Facility: CLINIC | Age: 50
End: 2025-04-28
Payer: OTHER GOVERNMENT

## 2025-04-28 RX ORDER — GLIPIZIDE 5 MG/1
10 TABLET, FILM COATED, EXTENDED RELEASE ORAL
Qty: 180 TABLET | Refills: 3 | Status: SHIPPED | OUTPATIENT
Start: 2025-04-28 | End: 2026-04-28

## 2025-04-28 RX ORDER — ONDANSETRON 4 MG/1
4 TABLET, ORALLY DISINTEGRATING ORAL 2 TIMES DAILY
Qty: 15 TABLET | Refills: 0 | Status: SHIPPED | OUTPATIENT
Start: 2025-04-28

## 2025-04-28 RX ORDER — METOCLOPRAMIDE 10 MG/1
10 TABLET ORAL
Qty: 9 TABLET | Refills: 0 | Status: SHIPPED | OUTPATIENT
Start: 2025-04-28 | End: 2025-05-01

## 2025-04-28 NOTE — TELEPHONE ENCOUNTER
I contacted patient to make her aware of this. She verbalized understanding and thanked me. ----- Message from CEDRIC Burns sent at 4/28/2025  3:59 PM CDT -----  Please let her know that I have also sent in relgan--- it will help increase how quick her stomach empties which will decrease the nausea and vomiting. She needs to increase her fluid intake-- bland soft foods- crackers, rice, jello, juice, ginger ale, broth.She is constipated--- she will need to increase her fluids to help get things moving here as well. She can add miralax and colace daily until she is having regular BM.. We will stop the trulicity all together. Once she is over this, have her reach out with her AM glucose readings so we can make further adjustments if needed.  ----- Message -----  From: Rachna Vick MA  Sent: 4/28/2025   3:11 PM CDT  To: CEDRIC Burns    Per patient it does not hurt to urinate and her last shot of Trulicity was Friday. She is requesting medication be sent to [facility] [address]  ----- Message -----  From: Fela Michael FNP-AGACNP  Sent: 4/28/2025   2:56 PM CDT  To: Rachna Vick MA    Painful to urinate or bowel movement? Lets stop the trulicity effective immediately. I sent zofran in a little bit ago- please inform her to take that and eat bland foods.  ----- Message -----  From: Rachna Vick MA  Sent: 4/28/2025   1:17 PM CDT  To: CEDRIC Burns    Patient called the office stating that Trulicity is causing nausea/vomiting.. she's unable to keep anything down. She also stated that when she is using the bathroom it is very painful. I let her know that I would speak with you about this and would call her back.

## 2025-04-28 NOTE — TELEPHONE ENCOUNTER
I returned patients phone call regarding her Trulicity. She stated that this medication was causing nausea/vomiting and that she was unable to keep anything down. She also stated that when she uses the bathroom it is very painful. I explained to patient that I was going to speak with Fela Michael regarding this and would be in touch with her. She verbalized understanding and thanked me. Message has been sent to provider.

## 2025-04-29 DIAGNOSIS — I10 PRIMARY HYPERTENSION: ICD-10-CM

## 2025-04-29 RX ORDER — CLONIDINE HYDROCHLORIDE 0.2 MG/1
0.2 TABLET ORAL 4 TIMES DAILY
Qty: 360 TABLET | Refills: 3 | Status: SHIPPED | OUTPATIENT
Start: 2025-04-29

## 2025-05-12 ENCOUNTER — CLINICAL SUPPORT (OUTPATIENT)
Dept: CARDIOLOGY | Facility: CLINIC | Age: 50
End: 2025-05-12
Payer: OTHER GOVERNMENT

## 2025-05-12 ENCOUNTER — OFFICE VISIT (OUTPATIENT)
Dept: FAMILY MEDICINE | Facility: CLINIC | Age: 50
End: 2025-05-12
Payer: OTHER GOVERNMENT

## 2025-05-12 ENCOUNTER — HOSPITAL ENCOUNTER (OUTPATIENT)
Dept: RADIOLOGY | Facility: HOSPITAL | Age: 50
Discharge: HOME | End: 2025-05-12
Attending: NURSE PRACTITIONER
Payer: OTHER GOVERNMENT

## 2025-05-12 VITALS
DIASTOLIC BLOOD PRESSURE: 75 MMHG | HEART RATE: 64 BPM | TEMPERATURE: 98 F | WEIGHT: 220 LBS | HEIGHT: 66 IN | SYSTOLIC BLOOD PRESSURE: 109 MMHG | OXYGEN SATURATION: 98 % | BODY MASS INDEX: 35.36 KG/M2

## 2025-05-12 DIAGNOSIS — Z01.818 PRE-PROCEDURAL EXAMINATION: ICD-10-CM

## 2025-05-12 DIAGNOSIS — E11.9 TYPE 2 DIABETES MELLITUS WITHOUT COMPLICATION, WITHOUT LONG-TERM CURRENT USE OF INSULIN: ICD-10-CM

## 2025-05-12 DIAGNOSIS — E03.9 HYPOTHYROIDISM, UNSPECIFIED TYPE: ICD-10-CM

## 2025-05-12 DIAGNOSIS — Z01.818 PRE-PROCEDURAL EXAMINATION: Primary | ICD-10-CM

## 2025-05-12 DIAGNOSIS — M54.16 LUMBAR RADICULOPATHY, CHRONIC: ICD-10-CM

## 2025-05-12 DIAGNOSIS — I10 PRIMARY HYPERTENSION: ICD-10-CM

## 2025-05-12 LAB
ALBUMIN SERPL BCP-MCNC: 3.4 G/DL (ref 3.5–5)
ALBUMIN/GLOB SERPL: 0.9 {RATIO}
ALP SERPL-CCNC: 102 U/L (ref 40–150)
ALT SERPL W P-5'-P-CCNC: 21 U/L
ANION GAP SERPL CALCULATED.3IONS-SCNC: 15 MMOL/L (ref 7–16)
AST SERPL W P-5'-P-CCNC: 27 U/L (ref 11–45)
BACTERIA #/AREA URNS HPF: ABNORMAL /HPF
BASOPHILS # BLD AUTO: 0.03 K/UL (ref 0–0.2)
BASOPHILS NFR BLD AUTO: 0.5 % (ref 0–1)
BILIRUB SERPL-MCNC: 0.2 MG/DL
BILIRUB UR QL STRIP: 0.5
BUN SERPL-MCNC: 12 MG/DL (ref 7–19)
BUN/CREAT SERPL: 13 (ref 6–20)
CALCIUM SERPL-MCNC: 8.3 MG/DL (ref 8.4–10.2)
CAOX CRY UR QL COMP ASSIST: ABNORMAL
CHLORIDE SERPL-SCNC: 105 MMOL/L (ref 98–107)
CHOLEST SERPL-MCNC: 224 MG/DL
CHOLEST/HDLC SERPL: 5.9 {RATIO}
CLARITY UR: CLEAR
CO2 SERPL-SCNC: 29 MMOL/L (ref 22–29)
COLOR UR: YELLOW
CREAT SERPL-MCNC: 0.96 MG/DL (ref 0.55–1.02)
DIFFERENTIAL METHOD BLD: ABNORMAL
EGFR (NO RACE VARIABLE) (RUSH/TITUS): 73 ML/MIN/1.73M2
EOSINOPHIL # BLD AUTO: 0.16 K/UL (ref 0–0.5)
EOSINOPHIL NFR BLD AUTO: 2.4 % (ref 1–4)
ERYTHROCYTE [DISTWIDTH] IN BLOOD BY AUTOMATED COUNT: 12.3 % (ref 11.5–14.5)
GLOBULIN SER-MCNC: 3.6 G/DL (ref 2–4)
GLUCOSE SERPL-MCNC: 181 MG/DL (ref 74–100)
GLUCOSE UR STRIP-MCNC: NORMAL MG/DL
HCT VFR BLD AUTO: 38.5 % (ref 38–47)
HDLC SERPL-MCNC: 38 MG/DL (ref 35–60)
HGB BLD-MCNC: 11.5 G/DL (ref 12–16)
HYALINE CASTS #/AREA URNS LPF: ABNORMAL /LPF
IMM GRANULOCYTES # BLD AUTO: 0.02 K/UL (ref 0–0.04)
IMM GRANULOCYTES NFR BLD: 0.3 % (ref 0–0.4)
KETONES UR STRIP-SCNC: ABNORMAL MG/DL
LDLC SERPL CALC-MCNC: 132 MG/DL
LDLC/HDLC SERPL: 3.5 {RATIO}
LEUKOCYTE ESTERASE UR QL STRIP: ABNORMAL
LYMPHOCYTES # BLD AUTO: 2.57 K/UL (ref 1–4.8)
LYMPHOCYTES NFR BLD AUTO: 39.1 % (ref 27–41)
MCH RBC QN AUTO: 27.8 PG (ref 27–31)
MCHC RBC AUTO-ENTMCNC: 29.9 G/DL (ref 32–36)
MCV RBC AUTO: 93.2 FL (ref 80–96)
MONOCYTES # BLD AUTO: 0.36 K/UL (ref 0–0.8)
MONOCYTES NFR BLD AUTO: 5.5 % (ref 2–6)
MPC BLD CALC-MCNC: 9.7 FL (ref 9.4–12.4)
MUCOUS, UA: ABNORMAL /LPF
NEUTROPHILS # BLD AUTO: 3.44 K/UL (ref 1.8–7.7)
NEUTROPHILS NFR BLD AUTO: 52.2 % (ref 53–65)
NITRITE UR QL STRIP: NEGATIVE
NONHDLC SERPL-MCNC: 186 MG/DL
NRBC # BLD AUTO: 0 X10E3/UL
NRBC, AUTO (.00): 0 %
PH UR STRIP: 6.5 PH UNITS
PLATELET # BLD AUTO: 400 K/UL (ref 150–400)
POTASSIUM SERPL-SCNC: 3.8 MMOL/L (ref 3.5–5.1)
PROT SERPL-MCNC: 7 G/DL (ref 6.4–8.3)
PROT UR QL STRIP: 70
RBC # BLD AUTO: 4.13 M/UL (ref 4.2–5.4)
RBC # UR STRIP: NEGATIVE /UL
RBC #/AREA URNS HPF: 39 /HPF
SODIUM SERPL-SCNC: 145 MMOL/L (ref 136–145)
SP GR UR STRIP: 1.04
SQUAMOUS #/AREA URNS LPF: ABNORMAL /HPF
TRIGL SERPL-MCNC: 268 MG/DL (ref 37–140)
TSH SERPL DL<=0.005 MIU/L-ACNC: 88.7 UIU/ML (ref 0.35–4.94)
UROBILINOGEN UR STRIP-ACNC: 2 MG/DL
VLDLC SERPL-MCNC: 54 MG/DL
WBC # BLD AUTO: 6.58 K/UL (ref 4.5–11)
WBC #/AREA URNS HPF: 15 /HPF

## 2025-05-12 PROCEDURE — 81001 URINALYSIS AUTO W/SCOPE: CPT | Mod: ,,, | Performed by: CLINICAL MEDICAL LABORATORY

## 2025-05-12 PROCEDURE — 71046 X-RAY EXAM CHEST 2 VIEWS: CPT | Mod: 26,,, | Performed by: RADIOLOGY

## 2025-05-12 PROCEDURE — 80050 GENERAL HEALTH PANEL: CPT | Mod: ,,, | Performed by: CLINICAL MEDICAL LABORATORY

## 2025-05-12 PROCEDURE — 80061 LIPID PANEL: CPT | Mod: ,,, | Performed by: CLINICAL MEDICAL LABORATORY

## 2025-05-12 PROCEDURE — 99212 OFFICE O/P EST SF 10 MIN: CPT | Mod: PBBFAC,25

## 2025-05-12 PROCEDURE — 99214 OFFICE O/P EST MOD 30 MIN: CPT | Mod: ,,, | Performed by: NURSE PRACTITIONER

## 2025-05-12 PROCEDURE — 87086 URINE CULTURE/COLONY COUNT: CPT | Mod: ,,, | Performed by: CLINICAL MEDICAL LABORATORY

## 2025-05-12 PROCEDURE — 99999 PR PBB SHADOW E&M-EST. PATIENT-LVL II: CPT | Mod: PBBFAC,,,

## 2025-05-12 PROCEDURE — 71046 X-RAY EXAM CHEST 2 VIEWS: CPT | Mod: TC

## 2025-05-12 RX ORDER — LEVOTHYROXINE SODIUM 200 UG/1
200 TABLET ORAL
Qty: 90 TABLET | Refills: 1 | Status: SHIPPED | OUTPATIENT
Start: 2025-05-12

## 2025-05-12 NOTE — PROGRESS NOTES
Subjective     Patient ID: Carey Leonardo is a 49 y.o. female.    Chief Complaint: surgery clearance and Health Maintenance (TETANUS VACCINE Never done/COVID-19 Vaccine(1 - 2024-25 season) Never done )    Pt presents for a surgery clearance with Dr. Upton for spine surgery on 5/21/2025. Results faxed to [phone]. Pt reports no problems with anesthesia in the past.       Review of Systems   Constitutional:  Negative for activity change, appetite change, chills, fatigue and fever.   HENT:  Negative for nasal congestion, ear discharge, nosebleeds, postnasal drip, rhinorrhea, sinus pressure/congestion, sneezing, sore throat and tinnitus.    Eyes:  Negative for pain, discharge, redness and itching.   Respiratory:  Negative for cough, choking, chest tightness, shortness of breath and wheezing.    Cardiovascular:  Negative for chest pain.   Gastrointestinal:  Negative for abdominal distention, abdominal pain, blood in stool, change in bowel habit, constipation, diarrhea, nausea and vomiting.   Genitourinary:  Negative for decreased urine volume, dysuria, flank pain and frequency.   Musculoskeletal:  Negative for back pain and gait problem.   Integumentary:  Negative for wound, breast mass and breast discharge.   Allergic/Immunologic: Negative for immunocompromised state.   Neurological:  Negative for dizziness, light-headedness and headaches.   Psychiatric/Behavioral:  Negative for agitation, behavioral problems and hallucinations.    Breast: Negative for mass         Objective     Physical Exam  Vitals and nursing note reviewed.   Constitutional:       Appearance: Normal appearance.   Cardiovascular:      Rate and Rhythm: Normal rate and regular rhythm.      Heart sounds: Normal heart sounds.   Pulmonary:      Effort: Pulmonary effort is normal.      Breath sounds: Normal breath sounds.   Musculoskeletal:         General: Normal range of motion.   Neurological:      Mental Status: She is alert and oriented to person,  place, and time.   Psychiatric:         Mood and Affect: Mood normal.         Behavior: Behavior normal.            Assessment and Plan     1. Pre-procedural examination  -     Urinalysis, Reflex to Urine Culture; Future; Expected date: 05/12/2025  -     PT and PTT; Future; Expected date: 05/12/2025  -     X-Ray Chest PA And Lateral; Future; Expected date: 05/12/2025  -     EKG 12-lead; Future    2. Hypothyroidism, unspecified type  Overview:  Pt states she has been out of Synthroid 200 mcg oral daily    Orders:  -     levothyroxine (SYNTHROID) 200 MCG tablet; Take 1 tablet (200 mcg total) by mouth before breakfast.  Dispense: 90 tablet; Refill: 1  -     CBC Auto Differential; Future; Expected date: 05/12/2025  -     Comprehensive Metabolic Panel; Future; Expected date: 05/12/2025  -     Lipid Panel; Future; Expected date: 05/12/2025  -     TSH; Future; Expected date: 05/12/2025    3. Primary hypertension  Controlled at 109/75  Low sodium diet    4. Type 2 diabetes mellitus without complication, without long-term current use of insulin  Last hga1c was 7.7  Keep appts with Fela Michael    5. Lumbar radiculopathy, chronic  Keep appts with Dr. Upton    Instructed pt she can not take effexor and zoloft. I do not see a refill send in on effexor in the last 2 years.       Will call pt with results and fax to Dr. Upton. Labs are pending but no obvious reasons at this time pt can not have surgery.          No follow-ups on file.

## 2025-05-14 ENCOUNTER — RESULTS FOLLOW-UP (OUTPATIENT)
Dept: FAMILY MEDICINE | Facility: CLINIC | Age: 50
End: 2025-05-14

## 2025-05-14 LAB — UA COMPLETE W REFLEX CULTURE PNL UR: NO GROWTH

## 2025-05-20 ENCOUNTER — ANESTHESIA EVENT (OUTPATIENT)
Dept: SURGERY | Facility: HOSPITAL | Age: 50
DRG: 428 | End: 2025-05-20
Payer: OTHER GOVERNMENT

## 2025-05-20 ENCOUNTER — HOSPITAL ENCOUNTER (INPATIENT)
Facility: HOSPITAL | Age: 50
LOS: 2 days | Discharge: HOME HEALTH | DRG: 428 | End: 2025-05-22
Attending: ORTHOPAEDIC SURGERY | Admitting: ORTHOPAEDIC SURGERY
Payer: OTHER GOVERNMENT

## 2025-05-20 ENCOUNTER — ANESTHESIA (OUTPATIENT)
Dept: SURGERY | Facility: HOSPITAL | Age: 50
DRG: 428 | End: 2025-05-20
Payer: OTHER GOVERNMENT

## 2025-05-20 DIAGNOSIS — M54.16 LUMBAR RADICULOPATHY, CHRONIC: Primary | Chronic | ICD-10-CM

## 2025-05-20 DIAGNOSIS — M54.16 LUMBAR RADICULOPATHY: ICD-10-CM

## 2025-05-20 PROBLEM — G47.33 OBSTRUCTIVE SLEEP APNEA SYNDROME: Status: ACTIVE | Noted: 2025-05-20

## 2025-05-20 PROBLEM — I10 PRIMARY HYPERTENSION: Status: ACTIVE | Noted: 2025-05-20

## 2025-05-20 PROBLEM — I50.9 CHF (CONGESTIVE HEART FAILURE): Status: ACTIVE | Noted: 2025-05-20

## 2025-05-20 PROBLEM — J45.20 MILD INTERMITTENT ASTHMA WITHOUT COMPLICATION: Status: ACTIVE | Noted: 2025-05-20

## 2025-05-20 PROBLEM — E11.9 TYPE 2 DIABETES MELLITUS, WITHOUT LONG-TERM CURRENT USE OF INSULIN: Status: ACTIVE | Noted: 2025-05-20

## 2025-05-20 LAB
GLUCOSE SERPL-MCNC: 129 MG/DL (ref 70–105)
GLUCOSE SERPL-MCNC: 196 MG/DL (ref 70–105)
GLUCOSE SERPL-MCNC: 198 MG/DL (ref 70–105)
INDIRECT COOMBS: NORMAL
RH BLD: NORMAL
SPECIMEN OUTDATE: NORMAL

## 2025-05-20 PROCEDURE — 25000003 PHARM REV CODE 250: Performed by: ORTHOPAEDIC SURGERY

## 2025-05-20 PROCEDURE — 94799 UNLISTED PULMONARY SVC/PX: CPT

## 2025-05-20 PROCEDURE — 71000033 HC RECOVERY, INTIAL HOUR: Performed by: ORTHOPAEDIC SURGERY

## 2025-05-20 PROCEDURE — 63600175 PHARM REV CODE 636 W HCPCS: Performed by: ANESTHESIOLOGY

## 2025-05-20 PROCEDURE — 86850 RBC ANTIBODY SCREEN: CPT | Performed by: ORTHOPAEDIC SURGERY

## 2025-05-20 PROCEDURE — C1769 GUIDE WIRE: HCPCS | Performed by: ORTHOPAEDIC SURGERY

## 2025-05-20 PROCEDURE — 99900031 HC PATIENT EDUCATION (STAT)

## 2025-05-20 PROCEDURE — 63600175 PHARM REV CODE 636 W HCPCS: Performed by: ORTHOPAEDIC SURGERY

## 2025-05-20 PROCEDURE — 37000008 HC ANESTHESIA 1ST 15 MINUTES: Performed by: ORTHOPAEDIC SURGERY

## 2025-05-20 PROCEDURE — 0SG10A0 FUSION OF 2 OR MORE LUMBAR VERTEBRAL JOINTS WITH INTERBODY FUSION DEVICE, ANTERIOR APPROACH, ANTERIOR COLUMN, OPEN APPROACH: ICD-10-PCS | Performed by: ORTHOPAEDIC SURGERY

## 2025-05-20 PROCEDURE — 25000003 PHARM REV CODE 250: Performed by: ANESTHESIOLOGY

## 2025-05-20 PROCEDURE — 94761 N-INVAS EAR/PLS OXIMETRY MLT: CPT

## 2025-05-20 PROCEDURE — 27000221 HC OXYGEN, UP TO 24 HOURS

## 2025-05-20 PROCEDURE — 97161 PT EVAL LOW COMPLEX 20 MIN: CPT

## 2025-05-20 PROCEDURE — 36000710: Performed by: ORTHOPAEDIC SURGERY

## 2025-05-20 PROCEDURE — 36415 COLL VENOUS BLD VENIPUNCTURE: CPT | Performed by: ORTHOPAEDIC SURGERY

## 2025-05-20 PROCEDURE — 0ST20ZZ RESECTION OF LUMBAR VERTEBRAL DISC, OPEN APPROACH: ICD-10-PCS | Performed by: ORTHOPAEDIC SURGERY

## 2025-05-20 PROCEDURE — 99222 1ST HOSP IP/OBS MODERATE 55: CPT | Mod: ,,, | Performed by: FAMILY MEDICINE

## 2025-05-20 PROCEDURE — 37000009 HC ANESTHESIA EA ADD 15 MINS: Performed by: ORTHOPAEDIC SURGERY

## 2025-05-20 PROCEDURE — 97165 OT EVAL LOW COMPLEX 30 MIN: CPT

## 2025-05-20 PROCEDURE — 27800903 OPTIME MED/SURG SUP & DEVICES OTHER IMPLANTS: Performed by: ORTHOPAEDIC SURGERY

## 2025-05-20 PROCEDURE — 36000711: Performed by: ORTHOPAEDIC SURGERY

## 2025-05-20 PROCEDURE — C1713 ANCHOR/SCREW BN/BN,TIS/BN: HCPCS | Performed by: ORTHOPAEDIC SURGERY

## 2025-05-20 PROCEDURE — 99900035 HC TECH TIME PER 15 MIN (STAT)

## 2025-05-20 PROCEDURE — 11000001 HC ACUTE MED/SURG PRIVATE ROOM

## 2025-05-20 PROCEDURE — 27201423 OPTIME MED/SURG SUP & DEVICES STERILE SUPPLY: Performed by: ORTHOPAEDIC SURGERY

## 2025-05-20 RX ORDER — METOCLOPRAMIDE HYDROCHLORIDE 5 MG/ML
5 INJECTION INTRAMUSCULAR; INTRAVENOUS EVERY 6 HOURS PRN
Status: DISCONTINUED | OUTPATIENT
Start: 2025-05-20 | End: 2025-05-22 | Stop reason: HOSPADM

## 2025-05-20 RX ORDER — MEPERIDINE HYDROCHLORIDE 25 MG/ML
25 INJECTION INTRAMUSCULAR; INTRAVENOUS; SUBCUTANEOUS EVERY 10 MIN PRN
Status: DISCONTINUED | OUTPATIENT
Start: 2025-05-20 | End: 2025-05-20 | Stop reason: HOSPADM

## 2025-05-20 RX ORDER — DIPHENHYDRAMINE HYDROCHLORIDE 50 MG/ML
25 INJECTION, SOLUTION INTRAMUSCULAR; INTRAVENOUS EVERY 6 HOURS PRN
Status: DISCONTINUED | OUTPATIENT
Start: 2025-05-20 | End: 2025-05-20 | Stop reason: HOSPADM

## 2025-05-20 RX ORDER — IPRATROPIUM BROMIDE 21 UG/1
2 SPRAY, METERED NASAL 2 TIMES DAILY
Status: DISCONTINUED | OUTPATIENT
Start: 2025-05-20 | End: 2025-05-22 | Stop reason: HOSPADM

## 2025-05-20 RX ORDER — MORPHINE SULFATE 2 MG/ML
2 INJECTION, SOLUTION INTRAMUSCULAR; INTRAVENOUS
Status: DISCONTINUED | OUTPATIENT
Start: 2025-05-20 | End: 2025-05-22 | Stop reason: HOSPADM

## 2025-05-20 RX ORDER — GLYCOPYRROLATE 0.2 MG/ML
INJECTION INTRAMUSCULAR; INTRAVENOUS
Status: DISCONTINUED | OUTPATIENT
Start: 2025-05-20 | End: 2025-05-20

## 2025-05-20 RX ORDER — CEFAZOLIN SODIUM 1 G/3ML
INJECTION, POWDER, FOR SOLUTION INTRAMUSCULAR; INTRAVENOUS
Status: DISCONTINUED | OUTPATIENT
Start: 2025-05-20 | End: 2025-05-20

## 2025-05-20 RX ORDER — FAMOTIDINE 20 MG/1
20 TABLET, FILM COATED ORAL 2 TIMES DAILY
Status: DISCONTINUED | OUTPATIENT
Start: 2025-05-20 | End: 2025-05-22 | Stop reason: HOSPADM

## 2025-05-20 RX ORDER — DOCUSATE SODIUM 100 MG/1
100 CAPSULE, LIQUID FILLED ORAL 2 TIMES DAILY
Status: DISCONTINUED | OUTPATIENT
Start: 2025-05-20 | End: 2025-05-22 | Stop reason: HOSPADM

## 2025-05-20 RX ORDER — ALBUTEROL SULFATE 90 UG/1
2 INHALANT RESPIRATORY (INHALATION) EVERY 6 HOURS PRN
Status: DISCONTINUED | OUTPATIENT
Start: 2025-05-20 | End: 2025-05-20

## 2025-05-20 RX ORDER — LEVOTHYROXINE SODIUM 100 UG/1
200 TABLET ORAL
Status: DISCONTINUED | OUTPATIENT
Start: 2025-05-21 | End: 2025-05-22 | Stop reason: HOSPADM

## 2025-05-20 RX ORDER — METOPROLOL SUCCINATE 100 MG/1
100 TABLET, EXTENDED RELEASE ORAL DAILY
Status: DISCONTINUED | OUTPATIENT
Start: 2025-05-21 | End: 2025-05-22 | Stop reason: HOSPADM

## 2025-05-20 RX ORDER — IBUPROFEN 200 MG
16 TABLET ORAL
Status: DISCONTINUED | OUTPATIENT
Start: 2025-05-20 | End: 2025-05-22 | Stop reason: HOSPADM

## 2025-05-20 RX ORDER — TRANEXAMIC ACID 1 G/10ML
INJECTION, SOLUTION INTRAVENOUS
Status: DISCONTINUED | OUTPATIENT
Start: 2025-05-20 | End: 2025-05-20

## 2025-05-20 RX ORDER — ONDANSETRON HYDROCHLORIDE 2 MG/ML
INJECTION, SOLUTION INTRAVENOUS
Status: DISCONTINUED | OUTPATIENT
Start: 2025-05-20 | End: 2025-05-20

## 2025-05-20 RX ORDER — KETAMINE HYDROCHLORIDE 10 MG/ML
INJECTION, SOLUTION INTRAMUSCULAR; INTRAVENOUS
Status: DISCONTINUED | OUTPATIENT
Start: 2025-05-20 | End: 2025-05-20

## 2025-05-20 RX ORDER — SERTRALINE HYDROCHLORIDE 50 MG/1
100 TABLET, FILM COATED ORAL NIGHTLY
Status: DISCONTINUED | OUTPATIENT
Start: 2025-05-20 | End: 2025-05-22 | Stop reason: HOSPADM

## 2025-05-20 RX ORDER — SODIUM CHLORIDE 0.9 % (FLUSH) 0.9 %
10 SYRINGE (ML) INJECTION
Status: DISCONTINUED | OUTPATIENT
Start: 2025-05-20 | End: 2025-05-22 | Stop reason: HOSPADM

## 2025-05-20 RX ORDER — OXYCODONE HCL 10 MG/1
10 TABLET, FILM COATED, EXTENDED RELEASE ORAL ONCE
Status: COMPLETED | OUTPATIENT
Start: 2025-05-20 | End: 2025-05-20

## 2025-05-20 RX ORDER — ROCURONIUM BROMIDE 10 MG/ML
INJECTION, SOLUTION INTRAVENOUS
Status: DISCONTINUED | OUTPATIENT
Start: 2025-05-20 | End: 2025-05-20

## 2025-05-20 RX ORDER — FENTANYL CITRATE 50 UG/ML
INJECTION, SOLUTION INTRAMUSCULAR; INTRAVENOUS
Status: DISCONTINUED | OUTPATIENT
Start: 2025-05-20 | End: 2025-05-20

## 2025-05-20 RX ORDER — MORPHINE SULFATE 10 MG/ML
4 INJECTION INTRAMUSCULAR; INTRAVENOUS; SUBCUTANEOUS EVERY 5 MIN PRN
Status: DISCONTINUED | OUTPATIENT
Start: 2025-05-20 | End: 2025-05-20 | Stop reason: HOSPADM

## 2025-05-20 RX ORDER — HYDROMORPHONE HYDROCHLORIDE 2 MG/ML
0.5 INJECTION, SOLUTION INTRAMUSCULAR; INTRAVENOUS; SUBCUTANEOUS EVERY 5 MIN PRN
Status: DISCONTINUED | OUTPATIENT
Start: 2025-05-20 | End: 2025-05-20 | Stop reason: HOSPADM

## 2025-05-20 RX ORDER — ALBUTEROL SULFATE 0.83 MG/ML
2.5 SOLUTION RESPIRATORY (INHALATION) EVERY 6 HOURS PRN
Status: DISCONTINUED | OUTPATIENT
Start: 2025-05-20 | End: 2025-05-22 | Stop reason: HOSPADM

## 2025-05-20 RX ORDER — LURASIDONE HYDROCHLORIDE 20 MG/1
20 TABLET, FILM COATED ORAL DAILY
Status: DISCONTINUED | OUTPATIENT
Start: 2025-05-20 | End: 2025-05-22 | Stop reason: HOSPADM

## 2025-05-20 RX ORDER — HYDRALAZINE HYDROCHLORIDE 100 MG/1
100 TABLET, FILM COATED ORAL 3 TIMES DAILY
Status: DISCONTINUED | OUTPATIENT
Start: 2025-05-20 | End: 2025-05-22 | Stop reason: HOSPADM

## 2025-05-20 RX ORDER — SUCCINYLCHOLINE CHLORIDE 20 MG/ML
INJECTION INTRAMUSCULAR; INTRAVENOUS
Status: DISCONTINUED | OUTPATIENT
Start: 2025-05-20 | End: 2025-05-20

## 2025-05-20 RX ORDER — PROPOFOL 10 MG/ML
VIAL (ML) INTRAVENOUS
Status: DISCONTINUED | OUTPATIENT
Start: 2025-05-20 | End: 2025-05-20

## 2025-05-20 RX ORDER — AMOXICILLIN 250 MG
1 CAPSULE ORAL 2 TIMES DAILY
Status: DISCONTINUED | OUTPATIENT
Start: 2025-05-20 | End: 2025-05-22 | Stop reason: HOSPADM

## 2025-05-20 RX ORDER — GLUCAGON 1 MG
1 KIT INJECTION
Status: DISCONTINUED | OUTPATIENT
Start: 2025-05-20 | End: 2025-05-20 | Stop reason: HOSPADM

## 2025-05-20 RX ORDER — OXYCODONE HYDROCHLORIDE 5 MG/1
10 TABLET ORAL EVERY 4 HOURS PRN
Status: DISCONTINUED | OUTPATIENT
Start: 2025-05-20 | End: 2025-05-22 | Stop reason: HOSPADM

## 2025-05-20 RX ORDER — GABAPENTIN 300 MG/1
600 CAPSULE ORAL ONCE
Status: COMPLETED | OUTPATIENT
Start: 2025-05-20 | End: 2025-05-20

## 2025-05-20 RX ORDER — MIDAZOLAM HYDROCHLORIDE 1 MG/ML
INJECTION INTRAMUSCULAR; INTRAVENOUS
Status: DISCONTINUED | OUTPATIENT
Start: 2025-05-20 | End: 2025-05-20

## 2025-05-20 RX ORDER — IBUPROFEN 200 MG
24 TABLET ORAL
Status: DISCONTINUED | OUTPATIENT
Start: 2025-05-20 | End: 2025-05-22 | Stop reason: HOSPADM

## 2025-05-20 RX ORDER — EPINEPHRINE 1 MG/ML
INJECTION INTRAMUSCULAR; INTRAVENOUS; SUBCUTANEOUS
Status: DISCONTINUED | OUTPATIENT
Start: 2025-05-20 | End: 2025-05-20 | Stop reason: HOSPADM

## 2025-05-20 RX ORDER — ONDANSETRON HYDROCHLORIDE 2 MG/ML
4 INJECTION, SOLUTION INTRAVENOUS DAILY PRN
Status: DISCONTINUED | OUTPATIENT
Start: 2025-05-20 | End: 2025-05-20 | Stop reason: HOSPADM

## 2025-05-20 RX ORDER — SODIUM CHLORIDE 9 MG/ML
INJECTION, SOLUTION INTRAVENOUS CONTINUOUS
Status: DISCONTINUED | OUTPATIENT
Start: 2025-05-20 | End: 2025-05-22 | Stop reason: HOSPADM

## 2025-05-20 RX ORDER — POLYETHYLENE GLYCOL 3350 17 G/17G
17 POWDER, FOR SOLUTION ORAL DAILY
Status: DISCONTINUED | OUTPATIENT
Start: 2025-05-20 | End: 2025-05-22 | Stop reason: HOSPADM

## 2025-05-20 RX ORDER — ATORVASTATIN CALCIUM 20 MG/1
20 TABLET, FILM COATED ORAL NIGHTLY
Status: DISCONTINUED | OUTPATIENT
Start: 2025-05-20 | End: 2025-05-22 | Stop reason: HOSPADM

## 2025-05-20 RX ORDER — SODIUM CHLORIDE 9 MG/ML
INJECTION, SOLUTION INTRAVENOUS
Status: COMPLETED | OUTPATIENT
Start: 2025-05-20 | End: 2025-05-20

## 2025-05-20 RX ORDER — SODIUM CHLORIDE, SODIUM LACTATE, POTASSIUM CHLORIDE, CALCIUM CHLORIDE 600; 310; 30; 20 MG/100ML; MG/100ML; MG/100ML; MG/100ML
125 INJECTION, SOLUTION INTRAVENOUS CONTINUOUS
Status: DISCONTINUED | OUTPATIENT
Start: 2025-05-20 | End: 2025-05-22 | Stop reason: HOSPADM

## 2025-05-20 RX ORDER — CEFAZOLIN 2 G/1
2 INJECTION, POWDER, FOR SOLUTION INTRAMUSCULAR; INTRAVENOUS
Status: COMPLETED | OUTPATIENT
Start: 2025-05-20 | End: 2025-05-21

## 2025-05-20 RX ORDER — CLONIDINE HYDROCHLORIDE 0.2 MG/1
0.2 TABLET ORAL 4 TIMES DAILY
Status: DISCONTINUED | OUTPATIENT
Start: 2025-05-20 | End: 2025-05-22 | Stop reason: HOSPADM

## 2025-05-20 RX ORDER — INSULIN ASPART 100 [IU]/ML
1-10 INJECTION, SOLUTION INTRAVENOUS; SUBCUTANEOUS
Status: DISCONTINUED | OUTPATIENT
Start: 2025-05-20 | End: 2025-05-22 | Stop reason: HOSPADM

## 2025-05-20 RX ORDER — OXYCODONE HYDROCHLORIDE 5 MG/1
5 TABLET ORAL
Status: DISCONTINUED | OUTPATIENT
Start: 2025-05-20 | End: 2025-05-22 | Stop reason: HOSPADM

## 2025-05-20 RX ORDER — PHENYLEPHRINE HYDROCHLORIDE 10 MG/ML
INJECTION INTRAVENOUS
Status: DISCONTINUED | OUTPATIENT
Start: 2025-05-20 | End: 2025-05-20

## 2025-05-20 RX ORDER — MUPIROCIN 20 MG/G
OINTMENT TOPICAL 2 TIMES DAILY
Status: DISCONTINUED | OUTPATIENT
Start: 2025-05-20 | End: 2025-05-22 | Stop reason: HOSPADM

## 2025-05-20 RX ORDER — ONDANSETRON HYDROCHLORIDE 2 MG/ML
4 INJECTION, SOLUTION INTRAVENOUS EVERY 12 HOURS PRN
Status: DISCONTINUED | OUTPATIENT
Start: 2025-05-20 | End: 2025-05-22 | Stop reason: HOSPADM

## 2025-05-20 RX ORDER — NIFEDIPINE 60 MG/1
60 TABLET, EXTENDED RELEASE ORAL DAILY
Status: DISCONTINUED | OUTPATIENT
Start: 2025-05-21 | End: 2025-05-22 | Stop reason: HOSPADM

## 2025-05-20 RX ORDER — OXYCODONE HYDROCHLORIDE 5 MG/1
5 TABLET ORAL
Status: DISCONTINUED | OUTPATIENT
Start: 2025-05-20 | End: 2025-05-20 | Stop reason: HOSPADM

## 2025-05-20 RX ORDER — ACETAMINOPHEN 500 MG
500 TABLET ORAL EVERY 4 HOURS
Status: DISCONTINUED | OUTPATIENT
Start: 2025-05-20 | End: 2025-05-22 | Stop reason: HOSPADM

## 2025-05-20 RX ORDER — LIDOCAINE HYDROCHLORIDE 20 MG/ML
INJECTION, SOLUTION EPIDURAL; INFILTRATION; INTRACAUDAL; PERINEURAL
Status: DISCONTINUED | OUTPATIENT
Start: 2025-05-20 | End: 2025-05-20

## 2025-05-20 RX ORDER — CELECOXIB 100 MG/1
200 CAPSULE ORAL ONCE
Status: COMPLETED | OUTPATIENT
Start: 2025-05-20 | End: 2025-05-20

## 2025-05-20 RX ORDER — IPRATROPIUM BROMIDE AND ALBUTEROL SULFATE 2.5; .5 MG/3ML; MG/3ML
3 SOLUTION RESPIRATORY (INHALATION)
Status: DISCONTINUED | OUTPATIENT
Start: 2025-05-20 | End: 2025-05-20 | Stop reason: HOSPADM

## 2025-05-20 RX ORDER — GLUCAGON 1 MG
1 KIT INJECTION
Status: DISCONTINUED | OUTPATIENT
Start: 2025-05-20 | End: 2025-05-22 | Stop reason: HOSPADM

## 2025-05-20 RX ADMIN — FENTANYL CITRATE 100 MCG: 50 INJECTION, SOLUTION INTRAMUSCULAR; INTRAVENOUS at 07:05

## 2025-05-20 RX ADMIN — ACETAMINOPHEN 500 MG: 500 TABLET ORAL at 01:05

## 2025-05-20 RX ADMIN — PROPOFOL 100 MCG/KG/MIN: 10 INJECTION, EMULSION INTRAVENOUS at 07:05

## 2025-05-20 RX ADMIN — SUCCINYLCHOLINE CHLORIDE 70 MG: 20 INJECTION, SOLUTION INTRAMUSCULAR; INTRAVENOUS; PARENTERAL at 07:05

## 2025-05-20 RX ADMIN — SENNOSIDES AND DOCUSATE SODIUM 1 TABLET: 50; 8.6 TABLET ORAL at 01:05

## 2025-05-20 RX ADMIN — CLONIDINE HYDROCHLORIDE 0.2 MG: 0.2 TABLET ORAL at 01:05

## 2025-05-20 RX ADMIN — PHENYLEPHRINE HYDROCHLORIDE 100 MCG: 10 INJECTION INTRAVENOUS at 08:05

## 2025-05-20 RX ADMIN — CLONIDINE HYDROCHLORIDE 0.2 MG: 0.2 TABLET ORAL at 09:05

## 2025-05-20 RX ADMIN — IPRATROPIUM BROMIDE 2 SPRAY: 21 SPRAY, METERED NASAL at 09:05

## 2025-05-20 RX ADMIN — TRANEXAMIC ACID 1000 MG: 100 INJECTION, SOLUTION INTRAVENOUS at 09:05

## 2025-05-20 RX ADMIN — FAMOTIDINE 20 MG: 20 TABLET, FILM COATED ORAL at 01:05

## 2025-05-20 RX ADMIN — HYDROMORPHONE HYDROCHLORIDE 0.5 MG: 2 INJECTION, SOLUTION INTRAMUSCULAR; INTRAVENOUS; SUBCUTANEOUS at 09:05

## 2025-05-20 RX ADMIN — SODIUM CHLORIDE, POTASSIUM CHLORIDE, SODIUM LACTATE AND CALCIUM CHLORIDE 125 ML/HR: 600; 310; 30; 20 INJECTION, SOLUTION INTRAVENOUS at 09:05

## 2025-05-20 RX ADMIN — CEFAZOLIN 1900 MG: 1 INJECTION, POWDER, FOR SOLUTION INTRAMUSCULAR; INTRAVENOUS; PARENTERAL at 08:05

## 2025-05-20 RX ADMIN — CEFAZOLIN 2 G: 2 INJECTION, POWDER, FOR SOLUTION INTRAMUSCULAR; INTRAVENOUS at 05:05

## 2025-05-20 RX ADMIN — CEFAZOLIN 100 MG: 1 INJECTION, POWDER, FOR SOLUTION INTRAMUSCULAR; INTRAVENOUS; PARENTERAL at 07:05

## 2025-05-20 RX ADMIN — TRANEXAMIC ACID 1000 MG: 100 INJECTION, SOLUTION INTRAVENOUS at 07:05

## 2025-05-20 RX ADMIN — SERTRALINE HYDROCHLORIDE 100 MG: 50 TABLET ORAL at 09:05

## 2025-05-20 RX ADMIN — LIDOCAINE HYDROCHLORIDE 100 MG: 20 INJECTION, SOLUTION EPIDURAL; INFILTRATION; INTRACAUDAL; PERINEURAL at 07:05

## 2025-05-20 RX ADMIN — GLYCOPYRROLATE 0.1 MG: 0.2 INJECTION INTRAMUSCULAR; INTRAVENOUS at 07:05

## 2025-05-20 RX ADMIN — PROPOFOL 150 MG: 10 INJECTION, EMULSION INTRAVENOUS at 07:05

## 2025-05-20 RX ADMIN — DOCUSATE SODIUM 100 MG: 100 CAPSULE, LIQUID FILLED ORAL at 09:05

## 2025-05-20 RX ADMIN — OXYCODONE 5 MG: 5 TABLET ORAL at 09:05

## 2025-05-20 RX ADMIN — ACETAMINOPHEN 500 MG: 500 TABLET ORAL at 05:05

## 2025-05-20 RX ADMIN — HYDRALAZINE HYDROCHLORIDE 100 MG: 100 TABLET ORAL at 01:05

## 2025-05-20 RX ADMIN — DOCUSATE SODIUM 100 MG: 100 CAPSULE, LIQUID FILLED ORAL at 01:05

## 2025-05-20 RX ADMIN — SENNOSIDES AND DOCUSATE SODIUM 1 TABLET: 50; 8.6 TABLET ORAL at 09:05

## 2025-05-20 RX ADMIN — ATORVASTATIN CALCIUM 20 MG: 20 TABLET, FILM COATED ORAL at 09:05

## 2025-05-20 RX ADMIN — ACETAMINOPHEN 500 MG: 500 TABLET ORAL at 09:05

## 2025-05-20 RX ADMIN — MUPIROCIN: 20 OINTMENT TOPICAL at 09:05

## 2025-05-20 RX ADMIN — GABAPENTIN 600 MG: 300 CAPSULE ORAL at 06:05

## 2025-05-20 RX ADMIN — FAMOTIDINE 20 MG: 20 TABLET, FILM COATED ORAL at 09:05

## 2025-05-20 RX ADMIN — MIDAZOLAM HYDROCHLORIDE 2 MG: 1 INJECTION, SOLUTION INTRAMUSCULAR; INTRAVENOUS at 07:05

## 2025-05-20 RX ADMIN — KETAMINE HYDROCHLORIDE 25 MG: 10 INJECTION, SOLUTION INTRAMUSCULAR; INTRAVENOUS at 07:05

## 2025-05-20 RX ADMIN — ROCURONIUM BROMIDE 2.5 MG: 10 INJECTION, SOLUTION INTRAVENOUS at 07:05

## 2025-05-20 RX ADMIN — POLYETHYLENE GLYCOL 3350 17 G: 17 POWDER, FOR SOLUTION ORAL at 01:05

## 2025-05-20 RX ADMIN — OXYCODONE 5 MG: 5 TABLET ORAL at 05:05

## 2025-05-20 RX ADMIN — SODIUM CHLORIDE: 9 INJECTION, SOLUTION INTRAVENOUS at 06:05

## 2025-05-20 RX ADMIN — OXYCODONE HYDROCHLORIDE 10 MG: 10 TABLET, FILM COATED, EXTENDED RELEASE ORAL at 06:05

## 2025-05-20 RX ADMIN — CELECOXIB 200 MG: 100 CAPSULE ORAL at 06:05

## 2025-05-20 RX ADMIN — FENTANYL CITRATE 50 MCG: 50 INJECTION, SOLUTION INTRAMUSCULAR; INTRAVENOUS at 08:05

## 2025-05-20 RX ADMIN — ONDANSETRON 4 MG: 2 INJECTION INTRAMUSCULAR; INTRAVENOUS at 08:05

## 2025-05-20 RX ADMIN — HYDRALAZINE HYDROCHLORIDE 100 MG: 100 TABLET ORAL at 09:05

## 2025-05-20 RX ADMIN — OXYCODONE 5 MG: 5 TABLET ORAL at 01:05

## 2025-05-20 NOTE — ANESTHESIA PREPROCEDURE EVALUATION
05/20/2025  Carey Leonardo is a 49 y.o., female.      Pre-op Assessment    I have reviewed the Patient Summary Reports.     I have reviewed the Nursing Notes. I have reviewed the NPO Status.   I have reviewed the Medications.     Review of Systems  Anesthesia Hx:  No problems with previous Anesthesia                Social:  Non-Smoker, No Alcohol Use       Hematology/Oncology:  Hematology Normal   Oncology Normal                                   EENT/Dental:  EENT/Dental Normal           Cardiovascular:     Hypertension       CHF                                   Pulmonary:    Asthma    Sleep Apnea                Renal/:  Chronic Renal Disease, CKD                Hepatic/GI:  Hepatic/GI Normal                    Musculoskeletal:     Lumbar radiculopathy       Spine Disorders: lumbar            Neurological:  Neurology Normal                                      Endocrine:  Diabetes, poorly controlled, type 2 Hypothyroidism        Morbid Obesity / BMI > 40  Dermatological:  Skin Normal    Psych:  Psychiatric Normal                    Physical Exam  General: Well nourished    Airway:  Mallampati: III / III  Mouth Opening: Normal  TM Distance: > 6 cm  Tongue: Normal  Neck ROM: Normal ROM    Chest/Lungs:  Clear to auscultation, Normal Respiratory Rate    Heart:  Rate: Normal  Rhythm: Regular Rhythm        Anesthesia Plan  Type of Anesthesia, risks & benefits discussed:    Anesthesia Type: Gen ETT  Intra-op Monitoring Plan: Standard ASA Monitors  Post Op Pain Control Plan: multimodal analgesia  Induction:  IV  Informed Consent: Informed consent signed with the Patient and all parties understand the risks and agree with anesthesia plan.  All questions answered. Patient consented to blood products? Yes  ASA Score: 3  Day of Surgery Review of History & Physical: H&P Update referred to the surgeon/provider.I have  interviewed and examined the patient. I have reviewed the patient's H&P dated:     Ready For Surgery From Anesthesia Perspective.     .

## 2025-05-20 NOTE — PLAN OF CARE
Problem: Physical Therapy  Goal: Physical Therapy Goal  Description: Short term goals:  . Supine to sit with Contact Guard Assistance  . Sit to supine with Contact Guard Assistance  . Sit to stand transfer with Contact Guard Assistance  . Gait  x 100 feet with Contact Guard Assistance using Rolling Walker.     Long term goals:  Patient will gain highest level functional mobility with lowest level of assistive device to return to desired living arrangement and prior ADL's.     Outcome: Progressing

## 2025-05-20 NOTE — PT/OT/SLP EVAL
"Occupational Therapy   Evaluation    Name: Carey Leonardo  MRN: [id number]  Admitting Diagnosis: Lumbar radiculopathy, chronic  Recent Surgery: Procedure(s) (LRB):  ARTHRODESIS, SPINE, LATERAL (N/A) * Day of Surgery *    Recommendations:     Discharge Recommendations: Low Intensity Therapy  Discharge Equipment Recommendations:   (to be determined)  Barriers to discharge:       Assessment:     Carey Leonardo is a 49 y.o. female with a medical diagnosis of Lumbar radiculopathy, chronic.  She presents with lethargy, but c/o severe pain. Performance deficits affecting function: impaired functional mobility, impaired self care skills, gait instability, impaired endurance, pain, decreased safety awareness, orthopedic precautions.      Rehab Prognosis: Good; patient would benefit from acute skilled OT services to address these deficits and reach maximum level of function.       Plan:     Patient to be seen 5 x/week to address the above listed problems via self-care/home management, therapeutic activities, therapeutic exercises  Plan of Care Expires: 06/24/25  Plan of Care Reviewed with: patient, spouse    Subjective     Chief Complaint: Lumbar radiculopathy, chronic    Patient/Family Comments/goals: Pt plans to return home with her spouse at D/C    Occupational Profile:  Living Environment: Pt lives with her spouse in a home with 3 to 4 steps with rail  Previous level of function: Pt is independent at baseline for mobility ADL and IADL  Roles and Routines: Pt is , took care of her  as he was dealing with foot wound and toe amputation. PT is employed as a .  Equipment Used at Home: none  Assistance upon Discharge: Pt will have assistance from her spouse    Pain/Comfort:  Pain Rating 1:  (pt states, "12/10")  Location - Side 1: Left  Location - Orientation 1: lateral  Location 1: back  Pain Addressed 1: Nurse notified, Cessation of Activity  Pain Rating Post-Intervention 1:  (pt did not rate, but " was falling asleep)    Patients cultural, spiritual, Moravian conflicts given the current situation: no    Objective:     Communicated with: RN Anabela Kapadia prior to session.  Patient found HOB elevated with peripheral IV, pulse ox (continuous), blood pressure cuff, telemetry upon OT entry to room.    General Precautions: Standard, fall  Orthopedic Precautions: spinal precautions  Braces:    Respiratory Status: Nasal cannula, flow 2 L/min    Occupational Performance:    Bed Mobility:    Patient completed Supine to Sit with moderate assistance  Patient completed Sit to Supine with minimum assistance    Functional Mobility/Transfers:  Patient completed Sit <> Stand Transfer with minimum assistance and of 2 persons  with  rolling walker   Patient completed Toilet Transfer Step Transfer technique with minimum assistance and of 2 persons with  rolling walker  Functional Mobility: Pt was drowsy, unsteady gait and occasional near buckling of her knees    Activities of Daily Living:  Upper Body Dressing: maximal assistance gown as robe due to IV line and drowsiness  Lower Body Dressing: total assistance for socks  Toileting : total (A) for clothes management and for hygiene due to being very lethargic    Cognitive/Visual Perceptual:  Cognitive/Psychosocial Skills:     -       Oriented to: Person   -       Follows Commands/attention:pt is able to follow 1 step commands with increased time and heavy cues  -       Communication: clear/fluent  -       Safety awareness/insight to disability: impaired   -       Mood/Affect/Coping skills/emotional control: Lethargic      Physical Exam:  Balance:    -       sitting with CGA/ SBA, standing with RW with min(A)  Upper Extremity Range of Motion:     -       Right Upper Extremity: WFL  -       Left Upper Extremity: WFL  Upper Extremity Strength:    -       Right Upper Extremity: WFL  -       Left Upper Extremity: WFL   Strength:    -       Right Upper Extremity: decreased, but  seemed to be decreased effort and attention as well  -       Left Upper Extremity: decreased, but seemed to be decreased attention and decreased effort  Gross motor coordination:   decreased- slow, impaired    AMPAC 6 Click ADL:  AMPAC Total Score: 19    Treatment & Education:  Pt educated on OT role/POC.   Importance of OOB activity with staff assistance.  Importance of sitting up in the chair throughout the day as tolerated, especially for meals   Safety during functional t/f and mobility with use of RW  Importance of assisting with self-care activities   All questions/concerns answered within OT scope of practice      Patient left HOB elevated with all lines intact, call button in reach, RN notified, and spouse present    GOALS:   Multidisciplinary Problems       Occupational Therapy Goals          Problem: Occupational Therapy    Goal Priority Disciplines Outcome Interventions   Occupational Therapy Goal     OT, PT/OT Progressing    Description: STG: (in 1 week)  Pt will perform grooming with setup  Pt will bathe with setup and min(A)  Pt will perform UE dressing with setup  Pt will perform LE dressing with setup and min(A)  Pt will transfer bed/chair/bsc with SBA with RW  Pt will perform standing task x 5 min with SBA with RW   Pt will tolerate 15 minutes of tx without fatigue      LTG: (in 5 weeks)  1.Restore to max I with self care and mobility.                           History:     Past Medical History:   Diagnosis Date    Abdominal pain 10/03/2024    Asthma     CHF (congestive heart failure)     Chronic serous otitis media of both ears 04/04/2023    Depressive disorder     Diabetes mellitus, type 2     Diabetic eye exam 10/24/2024    Dr. [place] [facility]    Gastroparesis     Hyperlipidemia     Hypertension     Hypothyroidism 08/02/2022    Synthroid 200 mcg oral daily      IBS (irritable bowel syndrome)     Kidney stones     Pain 10/03/2024    Postlaminectomy syndrome, lumbar region  04/28/2022    [facility]    Sleep apnea     Does not use a CPAP         Past Surgical History:   Procedure Laterality Date    Bilateral L3-S1 MBB Bilateral 6-, 5-, 1-8-2014    Dr Jessica Randolph    CARPAL TUNNEL RELEASE      CHOLECYSTECTOMY      DIAGNOSTIC LAPAROSCOPY  04/14/2010    Exploratory Laparotomy, Myomectomy, Hydrotubation-Dr. Feng    DILATION AND CURETTAGE OF UTERUS  04/14/2010    Hysteroscopy-Dr. Feng    EPIDURAL STEROID INJECTION N/A 10/22/2024    Procedure: Injection, Steroid, Epidural, L4/5;  Surgeon: Rasheeda Bills MD;  Location: [facility];  Service: Pain Management;  Laterality: N/A;    EXPLORATORY LAPAROTOMY WITH UTERINE MYOMECTOMY  02/27/2007    Dr. Marquise Anderson    HYSTERECTOMY  09/29/2011    Robotic, AFBIENNE, Cystoscopy-Dr. Feng    HYSTEROSCOPY WITH DILATION AND CURETTAGE OF UTERUS  04/04/2006    Laparoscopy, Chromotubation-Dr. Marquise Anderson    INJECTION OF ANESTHETIC AGENT AROUND ILIOINGUINAL NERVE Right 6/22/2023    Procedure: BLOCK, NERVE, ILIOINGUINAL;  Surgeon: Rasheeda Bills MD;  Location: [facility];  Service: Pain Management;  Laterality: Right;    INJECTION OF ANESTHETIC AGENT AROUND MEDIAL BRANCH NERVES INNERVATING LUMBAR FACET JOINT Bilateral 9/21/2023    Procedure: BLOCK, NERVE, FACET JOINT, LUMBAR, MEDIAL BRANCH;  Surgeon: Rasheeda Bills MD;  Location: [facility];  Service: Pain Management;  Laterality: Bilateral;    INJECTION OF ANESTHETIC AGENT AROUND MEDIAL BRANCH NERVES INNERVATING LUMBAR FACET JOINT Bilateral 3/14/2024    Procedure: BLOCK, NERVE, FACET JOINT, LUMBAR, MEDIAL BRANCH, BILATERAL L4-S1 MBB;  Surgeon: Rasheeda Bills MD;  Location: [facility];  Service: Pain Management;  Laterality: Bilateral;    LAMINECTOMY N/A 11/30/2021    Procedure: L2-L5 Laminectomy;  Surgeon: Cyril Upton MD;  [facility];  Service: Neurosurgery;  Laterality: N/A;    LEFT HEART CATHETERIZATION      Left L3-S1  RFTC Left 07/18/2017    Dr Lopez    Left SI JI Left 09/30/2013    Dr Randolph    MYRINGOTOMY WITH INSERTION OF VENTILATION TUBE Bilateral 4/4/2023    Procedure: MYRINGOTOMY, WITH TYMPANOSTOMY TUBE INSERTION (T-TUBES);  Surgeon: Sea Hinton MD;  Location: [facility];  Service: ENT;  Laterality: Bilateral;  T-TUBES    MYRINGOTOMY WITH INSERTION OF VENTILATION TUBE Bilateral 9/12/2023    Procedure: MYRINGOTOMY, WITH TYMPANOSTOMY TUBE INSERTION, T-TUBES;  Surgeon: Sea Hinton MD;  Location: [facility];  Service: ENT;  Laterality: Bilateral;    MYRINGOTOMY WITH INSERTION OF VENTILATION TUBE Bilateral 7/2/2024    Procedure: MYRINGOTOMY, WITH TYMPANOSTOMY TUBE INSERTION;  Surgeon: Sea Hinton MD;  Location: [facility];  Service: ENT;  Laterality: Bilateral;  Bilateral T-Tubes    OOPHORECTOMY  11/11/2014    Robotic, FABIENNE, Cystoscopy-Dr. Feng    SINUS SURGERY         Time Tracking:     OT Date of Treatment: 05/20/25  OT Start Time: 1320  OT Stop Time: 1348  OT Total Time (min): 28 min    Billable Minutes:Evaluation low complexity    5/20/2025

## 2025-05-20 NOTE — CONSULTS
[facility]  [facility] Medicine  Consult Note    Patient Name: Carey Leonardo  MRN: [id number]  Admission Date: 5/20/2025  Hospital Length of Stay: 0 days  Attending Physician: Cyril Upton MD   Primary Care Provider: Zainab Harrell FNP           Patient information was obtained from patient, relative(s), and past medical records.     Inpatient consult to Hospitalist  Consult performed by: Lauryn Patrick DO  Consult ordered by: Cyril Upton MD        Subjective:     Principal Problem: Lumbar radiculopathy, chronic    Chief Complaint: No chief complaint on file.       HPI:     Carey Leonardo is a 49 y.o. female with history of HTN, DM, HLD, and hypothyroidism. She is status-post L2-L4 laminectomy (stage I of II) by Dr. Upton earlier today (05/20/). Patient tolerated procedure well with no immediate post-operative complications. Anticipate second stage tomorrow. Medicine consulted for management of chronic conditions.     Patient was seen and examined after surgery. Chart reviewed. Pre-operative labs unremarkable aside from moderate hyperglycemia. Patient is afebrile with vital signs grossly within normal limits, and no complaints. Pain is controlled and is being managed by primary team. PT/OT evaluation pending following completion of staged procedure, discharge plan to be determined.  following for DME and other discharge needs.           Past Medical History:   Diagnosis Date    Abdominal pain 10/03/2024    Asthma     CHF (congestive heart failure)     Chronic serous otitis media of both ears 04/04/2023    Depressive disorder     Diabetes mellitus, type 2     Diabetic eye exam 10/24/2024    [facility]. [facility]    Gastroparesis     Hyperlipidemia     Hypertension     Hypothyroidism 08/02/2022    Synthroid 200 mcg oral daily      IBS (irritable bowel syndrome)     Kidney stones     Pain 10/03/2024    Postlaminectomy syndrome, lumbar region  04/28/2022    [facility]    Sleep apnea     Does not use a CPAP       Past Surgical History:   Procedure Laterality Date    Bilateral L3-S1 MBB Bilateral 6-, 5-, 1-8-2014    Dr Jessica Randolph    CARPAL TUNNEL RELEASE      CHOLECYSTECTOMY      DIAGNOSTIC LAPAROSCOPY  04/14/2010    Exploratory Laparotomy, Myomectomy, Hydrotubation-Dr. Feng    DILATION AND CURETTAGE OF UTERUS  04/14/2010    Hysteroscopy-Dr. Feng    EPIDURAL STEROID INJECTION N/A 10/22/2024    Procedure: Injection, Steroid, Epidural, L4/5;  Surgeon: Rasheeda Bills MD;  Location: [facility];  Service: Pain Management;  Laterality: N/A;    EXPLORATORY LAPAROTOMY WITH UTERINE MYOMECTOMY  02/27/2007    Dr. Marquise Anderson    HYSTERECTOMY  09/29/2011    Robotic, FABIENNE, Cystoscopy-Dr. Feng    HYSTEROSCOPY WITH DILATION AND CURETTAGE OF UTERUS  04/04/2006    Laparoscopy, Chromotubation-Dr. Marquise Anderson    INJECTION OF ANESTHETIC AGENT AROUND ILIOINGUINAL NERVE Right 6/22/2023    Procedure: BLOCK, NERVE, ILIOINGUINAL;  Surgeon: Rasheeda Bills MD;  Location: [facility];  Service: Pain Management;  Laterality: Right;    INJECTION OF ANESTHETIC AGENT AROUND MEDIAL BRANCH NERVES INNERVATING LUMBAR FACET JOINT Bilateral 9/21/2023    Procedure: BLOCK, NERVE, FACET JOINT, LUMBAR, MEDIAL BRANCH;  Surgeon: Rasheeda Bills MD;  Location: [facility];  Service: Pain Management;  Laterality: Bilateral;    INJECTION OF ANESTHETIC AGENT AROUND MEDIAL BRANCH NERVES INNERVATING LUMBAR FACET JOINT Bilateral 3/14/2024    Procedure: BLOCK, NERVE, FACET JOINT, LUMBAR, MEDIAL BRANCH, BILATERAL L4-S1 MBB;  Surgeon: Rasheeda Bills MD;  Location: [facility];  Service: Pain Management;  Laterality: Bilateral;    LAMINECTOMY N/A 11/30/2021    Procedure: L2-L5 Laminectomy;  Surgeon: Cyril Upton MD;  Location: [facility] OR;  Service: Neurosurgery;  Laterality: N/A;    LEFT HEART CATHETERIZATION      Left L3-S1  RFTC Left 07/18/2017    Dr Lopez    Left SI JI Left 09/30/2013    Dr Randolph    MYRINGOTOMY WITH INSERTION OF VENTILATION TUBE Bilateral 4/4/2023    Procedure: MYRINGOTOMY, WITH TYMPANOSTOMY TUBE INSERTION (T-TUBES);  Surgeon: Sea Hinton MD;  Location: [facility] OR;  Service: ENT;  Laterality: Bilateral;  T-TUBES    MYRINGOTOMY WITH INSERTION OF VENTILATION TUBE Bilateral 9/12/2023    Procedure: MYRINGOTOMY, WITH TYMPANOSTOMY TUBE INSERTION, T-TUBES;  Surgeon: Sea Hinton MD;  Location: [facility] OR;  Service: ENT;  Laterality: Bilateral;    MYRINGOTOMY WITH INSERTION OF VENTILATION TUBE Bilateral 7/2/2024    Procedure: MYRINGOTOMY, WITH TYMPANOSTOMY TUBE INSERTION;  Surgeon: Sea Hinton MD;  Location: [facility] OR;  Service: ENT;  Laterality: Bilateral;  Bilateral T-Tubes    OOPHORECTOMY  11/11/2014    Robotic, FABIENNE, Cystoscopy-Dr. Feng    SINUS SURGERY         Review of patient's allergies indicates:   Allergen Reactions    Fish containing products Anaphylaxis    Iodinated contrast media     Penicillins        No current facility-administered medications on file prior to encounter.     Current Outpatient Medications on File Prior to Encounter   Medication Sig    hydrALAZINE (APRESOLINE) 100 MG tablet Take 1 tablet (100 mg total) by mouth 3 (three) times daily.    metoprolol succinate (TOPROL-XL) 100 MG 24 hr tablet Take 1 tablet (100 mg total) by mouth once daily.    NIFEdipine (PROCARDIA-XL) 60 MG (OSM) 24 hr tablet Take 1 tablet (60 mg total) by mouth once daily.    albuterol (VENTOLIN HFA) 90 mcg/actuation inhaler Inhale 2 puffs into the lungs every 6 (six) hours as needed for Wheezing. Rescue    atorvastatin (LIPITOR) 20 MG tablet Take 1 tablet (20 mg total) by mouth every evening.    blood sugar diagnostic Strp To check BG 4 times daily, to use with insurance preferred meter    blood-glucose meter kit To check BG 4 times daily, to use with insurance preferred meter     "empagliflozin (JARDIANCE) 10 mg tablet Take 1 tablet (10 mg total) by mouth once daily.    glipiZIDE 5 MG TR24 Take 2 tablets (10 mg total) by mouth daily with breakfast.    insulin glargine U-100, Lantus, (LANTUS SOLOSTAR U-100 INSULIN) 100 unit/mL (3 mL) InPn pen Inject 10 Units into the skin every evening.    ipratropium (ATROVENT) 21 mcg (0.03 %) nasal spray 2 sprays by Each Nostril route 2 (two) times daily.    lancets Misc To check BG 4 times daily, to use with insurance preferred meter    lurasidone (LATUDA) 20 mg Tab tablet     ondansetron (ZOFRAN-ODT) 4 MG TbDL Take 1 tablet (4 mg total) by mouth 2 (two) times daily.    pen needle, diabetic 32 gauge x 5/32" Ndle 1 Application by Misc.(Non-Drug; Combo Route) route once daily.    sertraline (ZOLOFT) 100 MG tablet Take 1 tablet (100 mg total) by mouth every evening.    traMADoL (ULTRAM) 50 mg tablet Take 1 tablet (50 mg total) by mouth every 8 (eight) hours as needed for Pain.     Family History       Problem Relation (Age of Onset)    Diabetes Mellitus Mother    Heart disease Mother, Sister    Hypertension Mother    Migraines Mother          Tobacco Use    Smoking status: Never     Passive exposure: Never    Smokeless tobacco: Never   Substance and Sexual Activity    Alcohol use: Not Currently    Drug use: Never    Sexual activity: Not Currently     Review of Systems   Constitutional:  Negative for chills, diaphoresis and fever.   HENT:  Negative for voice change.    Respiratory:  Negative for cough and shortness of breath.    Cardiovascular:  Negative for chest pain and palpitations.   Gastrointestinal:  Negative for abdominal pain, nausea and vomiting.   Musculoskeletal:  Positive for arthralgias and back pain.   Neurological:  Negative for dizziness, syncope and weakness.   Psychiatric/Behavioral:  Negative for confusion.      Objective:     Vital Signs (Most Recent):  Temp: 98.1 °F (36.7 °C) (05/20/25 1034)  Pulse: 93 (05/20/25 1235)  Resp: 18 (05/20/25 " "1328)  BP: (!) 158/89 (05/20/25 1235)  SpO2: 96 % (05/20/25 1204) Vital Signs (24h Range):  Temp:  [97.9 °F (36.6 °C)-98.3 °F (36.8 °C)] 98.1 °F (36.7 °C)  Pulse:  [77-96] 93  Resp:  [11-19] 18  SpO2:  [89 %-100 %] 96 %  BP: (117-168)/(69-93) 158/89     Weight: 91.2 kg (201 lb)  Body mass index is 31.48 kg/m².     Physical Exam  Constitutional:       General: She is sleeping. She is not in acute distress.     Appearance: Normal appearance. She is not ill-appearing.   HENT:      Head: Normocephalic and atraumatic.      Nose: Nose normal.   Eyes:      Conjunctiva/sclera: Conjunctivae normal.      Pupils: Pupils are equal, round, and reactive to light.   Cardiovascular:      Rate and Rhythm: Normal rate and regular rhythm.   Pulmonary:      Effort: Pulmonary effort is normal. No respiratory distress.   Musculoskeletal:      Cervical back: No rigidity.   Skin:     Coloration: Skin is not jaundiced or pale.      Findings: No rash.   Neurological:      Mental Status: She is easily aroused.   Psychiatric:         Behavior: Behavior normal. Behavior is cooperative.         Thought Content: Thought content normal.          Significant Labs: All pertinent labs within the past 24 hours have been reviewed.    Significant Imaging: I have reviewed all pertinent imaging results/findings within the past 24 hours.  Assessment/Plan:     * Lumbar radiculopathy, chronic  Status-post  L2-L4 laminectomy (stageI/II), tolerated well. Management and disposition per primary.     - pain control  - PT/OT pending completion of staged procedure  - case management for discharge planning, DME        CHF (congestive heart failure)  Patient has undetermined heart failure that is Chronic. On presentation their CHF was well compensated. Most recent BNP and echo results are listed below.  No results for input(s): "BNP" in the last 72 hours.  Latest ECHO  No results found for this or any previous visit.    Current Heart Failure Medications  metoprolol " "succinate (TOPROL-XL) 24 hr tablet 100 mg, Daily, Oral  hydrALAZINE tablet 100 mg, 3 times daily, Oral    Plan  - Monitor strict I&Os and daily weights.    - Place on telemetry  - Low sodium diet  - Place on fluid restriction of NA.   - Cardiology has not been consulted  - The patient's volume status is at their baseline        Mild intermittent asthma without complication  Controlled. Albuterol PRN      Obstructive sleep apnea syndrome  History of, does not use CPAP at present.      Type 2 diabetes mellitus, without long-term current use of insulin  Patient's FSGs are controlled on current medication regimen.  Last A1c reviewed-   Lab Results   Component Value Date    HGBA1C 7.7 (H) 04/07/2025     Most recent fingerstick glucose reviewed- No results for input(s): "POCTGLUCOSE" in the last 24 hours.  Current correctional scale  Medium  Maintain anti-hyperglycemic dose as follows-   Antihyperglycemics (From admission, onward)      Start     Stop Route Frequency Ordered    05/20/25 1137  insulin aspart U-100 injection 1-10 Units         -- SubQ Before meals & nightly PRN 05/20/25 1137          Hold Oral hypoglycemics while patient is in the hospital.      Primary hypertension  Patient's blood pressure range in the last 24 hours was: BP  Min: 117/76  Max: 168/88.The patient's inpatient anti-hypertensive regimen is listed below:  Current Antihypertensives  NIFEdipine 24 hr tablet 60 mg, Daily, Oral  metoprolol succinate (TOPROL-XL) 24 hr tablet 100 mg, Daily, Oral  hydrALAZINE tablet 100 mg, 3 times daily, Oral  cloNIDine tablet 0.2 mg, 4 times daily, Oral    Plan  - BP is controlled, no changes needed to their regimen      Hypothyroidism  Continue home levothyroxine. Most recent labs abnormal however patient had been out of medication at that time.         VTE Risk Mitigation (From admission, onward)           Ordered     Place MARTA hose  Until discontinued         05/20/25 1137     Place sequential compression device  " Until discontinued         05/20/25 4005                        Thank you for your consult. I will follow-up with patient. Please contact us if you have any additional questions.    Lauryn Patrick DO  Department of Hospital Medicine   [facility]

## 2025-05-20 NOTE — PROGRESS NOTES
Report given and care released to DILAN Peñaloza. VSS- 98.1, resp 13, HR 90, /85. Drsg clean, dry and intact. Denies numbness/tingling. Able to move all extremities equally. NADN. Family updated via [facility] text.

## 2025-05-20 NOTE — PLAN OF CARE
Problem: Occupational Therapy  Goal: Occupational Therapy Goal  Description: STG: (in 1 week)  Pt will perform grooming with setup  Pt will bathe with setup and min(A)  Pt will perform UE dressing with setup  Pt will perform LE dressing with setup and min(A)  Pt will transfer bed/chair/bsc with SBA with RW  Pt will perform standing task x 5 min with SBA with RW   Pt will tolerate 15 minutes of tx without fatigue      LTG: (in 5 weeks)  1.Restore to max I with self care and mobility.    Outcome: Progressing

## 2025-05-20 NOTE — ANESTHESIA PROCEDURE NOTES
Intubation    Date/Time: 5/20/2025 7:40 AM    Performed by: Rosemary Ponce CRNA  Authorized by: Nahum Kruse MD    Intubation:     Induction:  Intravenous    Intubated:  Postinduction    Mask Ventilation:  Easy mask    Attempts:  2    Attempted By:  CRNA    Method of Intubation:  Direct    Blade:  Alyx 4    Laryngeal View Grade: Grade IIb - only the arytenoids and epiglottis seen      Attempted By (2nd Attempt):  CRNA    Blade (2nd Attempt):  Glidescope4    Laryngeal View Grade (2nd Attempt): Grade IIa - cords partially seen      Difficult Airway Encountered?: No      Complications:  None    Airway Device:  Oral endotracheal tube    Airway Device Size:  7.5    Style/Cuff Inflation:  Cuffed    Inflation Amount (mL):  7    Tube secured:  21    Placement Verified By:  Capnometry    Complicating Factors:  None    Findings Post-Intubation:  BS equal bilateral and atraumatic/condition of teeth unchanged

## 2025-05-20 NOTE — ASSESSMENT & PLAN NOTE
Status-post  L2-L4 laminectomy (stageI/II), tolerated well. Management and disposition per primary.     - pain control  - PT/OT pending completion of staged procedure  - case management for discharge planning, DME

## 2025-05-20 NOTE — HPI
Carey Leonardo is a 49 y.o. female with history of HTN, DM, HLD, and hypothyroidism. She is status-post L2-L4 laminectomy (stage I of II) by Dr. Upton earlier today (05/20/). Patient tolerated procedure well with no immediate post-operative complications. Anticipate second stage tomorrow. Medicine consulted for management of chronic conditions.     Patient was seen and examined after surgery. Chart reviewed. Pre-operative labs unremarkable aside from moderate hyperglycemia. Patient is afebrile with vital signs grossly within normal limits, and no complaints. Pain is controlled and is being managed by primary team. PT/OT evaluation pending following completion of staged procedure, discharge plan to be determined.  following for DME and other discharge needs.

## 2025-05-20 NOTE — H&P
Office: [phone]    Orthopedic Spine Surgery Consult/H&P    ASSESSMENT:  49 y.o. female with lumbar spondylolisthesis and radiculopathy    PLAN:  She has exhausted nonoperative measures and wants to proceed with surgery.  This will be a staged procedure with an L2-L4 lateral fusion followed by L2-L5 laminectomy and fusion with L5-S1 TLIF    HPI:  49 y.o. female with Prior L2-L5 laminectomy 11/30/2021.  Reports persistent lower back pain. This is radiating down primarily the left leg. Does have some right leg pain as well. Reports weakness in bilateral lower extremities. Denies any new injuries.  EMG shows diabetic neuropathy. Does not want a spinal cord stimulator and wants to proceed with surgery.    Past Medical History:   Diagnosis Date    Abdominal pain 10/03/2024    Asthma     CHF (congestive heart failure)     Chronic serous otitis media of both ears 04/04/2023    Depressive disorder     Diabetes mellitus, type 2     Diabetic eye exam 10/24/2024    Dr. [facility]    Gastroparesis     Hyperlipidemia     Hypertension     Hypothyroidism 08/02/2022    Synthroid 200 mcg oral daily      IBS (irritable bowel syndrome)     Kidney stones     Pain 10/03/2024    Postlaminectomy syndrome, lumbar region 04/28/2022    [facility]    Sleep apnea     Does not use a CPAP      Past Surgical History:   Procedure Laterality Date    Bilateral L3-S1 MBB Bilateral 6-, 5-, 1-8-2014    Dr Jessica Randolph    CARPAL TUNNEL RELEASE      CHOLECYSTECTOMY      DIAGNOSTIC LAPAROSCOPY  04/14/2010    Exploratory Laparotomy, Myomectomy, Hydrotubation-Dr. Feng    DILATION AND CURETTAGE OF UTERUS  04/14/2010    Hysteroscopy-Dr. Feng    EPIDURAL STEROID INJECTION N/A 10/22/2024    Procedure: Injection, Steroid, Epidural, L4/5;  Surgeon: Rasheeda Bills MD;  Location: [facility];  Service: Pain Management;  Laterality: N/A;    EXPLORATORY LAPAROTOMY WITH UTERINE MYOMECTOMY   02/27/2007    Dr. Marquise Anderson    HYSTERECTOMY  09/29/2011    Robotic, FABIENNE, Cystoscopy-Dr. Feng    HYSTEROSCOPY WITH DILATION AND CURETTAGE OF UTERUS  04/04/2006    Laparoscopy, Chromotubation-Dr. Marquise Anderson    INJECTION OF ANESTHETIC AGENT AROUND ILIOINGUINAL NERVE Right 6/22/2023    Procedure: BLOCK, NERVE, ILIOINGUINAL;  Surgeon: Rasheeda Bills MD;  Location: [facility];  Service: Pain Management;  Laterality: Right;    INJECTION OF ANESTHETIC AGENT AROUND MEDIAL BRANCH NERVES INNERVATING LUMBAR FACET JOINT Bilateral 9/21/2023    Procedure: BLOCK, NERVE, FACET JOINT, LUMBAR, MEDIAL BRANCH;  Surgeon: Rasheeda Bills MD;  Location: [facility];  Service: Pain Management;  Laterality: Bilateral;    INJECTION OF ANESTHETIC AGENT AROUND MEDIAL BRANCH NERVES INNERVATING LUMBAR FACET JOINT Bilateral 3/14/2024    Procedure: BLOCK, NERVE, FACET JOINT, LUMBAR, MEDIAL BRANCH, BILATERAL L4-S1 MBB;  Surgeon: Rasheeda Bills MD;  Location: [facility];  Service: Pain Management;  Laterality: Bilateral;    LAMINECTOMY N/A 11/30/2021    Procedure: L2-L5 Laminectomy;  Surgeon: Cyril Upton MD;  Location: [facility];  Service: Neurosurgery;  Laterality: N/A;    LEFT HEART CATHETERIZATION      Left L3-S1 RFTC Left 07/18/2017    Dr Lopez    Left SI JI Left 09/30/2013    Dr Randolph    MYRINGOTOMY WITH INSERTION OF VENTILATION TUBE Bilateral 4/4/2023    Procedure: MYRINGOTOMY, WITH TYMPANOSTOMY TUBE INSERTION (T-TUBES);  Surgeon: Sea Hinton MD;  Location: [facility] OR;  Service: ENT;  Laterality: Bilateral;  T-TUBES    MYRINGOTOMY WITH INSERTION OF VENTILATION TUBE Bilateral 9/12/2023    Procedure: MYRINGOTOMY, WITH TYMPANOSTOMY TUBE INSERTION, T-TUBES;  Surgeon: Sea Hinton MD;  Location: [facility] OR;  Service: ENT;  Laterality: Bilateral;    MYRINGOTOMY WITH INSERTION OF VENTILATION TUBE Bilateral 7/2/2024    Procedure: MYRINGOTOMY, WITH TYMPANOSTOMY TUBE INSERTION;   Surgeon: Sea Hinton MD;  Location: [facility];  Service: ENT;  Laterality: Bilateral;  Bilateral T-Tubes    OOPHORECTOMY  11/11/2014    Robotic, FABIENNE, Cystoscopy-Dr. Feng    SINUS SURGERY        Review of patient's allergies indicates:   Allergen Reactions    Fish containing products Anaphylaxis    Iodinated contrast media     Penicillins       Current Medications[1]     Review of systems:  Denies chest pain, nausea,vomiting, abdominal pain, cough, runny nose, eye pain, ear pain, fevers, chills, weight loss, weight gain, dysuria, hematuria, changes in mood    IMAGING:  MRI lumbar spine 11/14/2024 view shows:  L2-5 there is laminectomy with satisfactory distal central decompression  At L2-3 there is mild bilateral foraminal stenosis  At L3-4 there is moderate bilateral foraminal stenosis  At L4-5 there is grade 1 anterolisthesis with moderate bilateral foraminal stenosis    Vitals:    05/20/25 0610   BP:    Pulse:    Resp: 16   Temp:         EXAM:  Constitutional  General Appearance:  Body mass index is 31.48 kg/m²., NAD  Spine exam:    Motor  C5 C6 C7 C8 T1  L2 L3 L4 L5 S1      Left  5 5 5 5 5  5 5 5 5 5      Right  5 5 5 5 5  5 5 5 5 5   Sensory                 Left  + + + + +  + + + + +     Right + + + + +  + + + + +     Babinski: downgoing  Hoffmans: negative  Clonus: none    Reflexes:   Bicep 2+ left/right  Tricep 2+ left/right  BR 2+ left/right  Patella 2+ left/right  Achilles 2+ left/right        [1]   Current Facility-Administered Medications:     0.9% NaCl infusion, , Intravenous, Continuous, Cyril Upton MD, Last Rate: 50 mL/hr at 05/20/25 0610, New Bag at 05/20/25 0610

## 2025-05-20 NOTE — ASSESSMENT & PLAN NOTE
"Patient has undetermined heart failure that is Chronic. On presentation their CHF was well compensated. Most recent BNP and echo results are listed below.  No results for input(s): "BNP" in the last 72 hours.  Latest ECHO  No results found for this or any previous visit.    Current Heart Failure Medications  metoprolol succinate (TOPROL-XL) 24 hr tablet 100 mg, Daily, Oral  hydrALAZINE tablet 100 mg, 3 times daily, Oral    Plan  - Monitor strict I&Os and daily weights.    - Place on telemetry  - Low sodium diet  - Place on fluid restriction of NA.   - Cardiology has not been consulted  - The patient's volume status is at their baseline      "

## 2025-05-20 NOTE — ASSESSMENT & PLAN NOTE
"Patient's FSGs are controlled on current medication regimen.  Last A1c reviewed-   Lab Results   Component Value Date    HGBA1C 7.7 (H) 04/07/2025     Most recent fingerstick glucose reviewed- No results for input(s): "POCTGLUCOSE" in the last 24 hours.  Current correctional scale  Medium  Maintain anti-hyperglycemic dose as follows-   Antihyperglycemics (From admission, onward)      Start     Stop Route Frequency Ordered    05/20/25 1137  insulin aspart U-100 injection 1-10 Units         -- SubQ Before meals & nightly PRN 05/20/25 1137          Hold Oral hypoglycemics while patient is in the hospital.    "

## 2025-05-20 NOTE — OP NOTE
[facility] - Periop Services  Surgery Department  Operative Note    SUMMARY     Date of Procedure: 5/20/2025     Procedure: Procedure(s) (LRB):  ARTHRODESIS, SPINE, LATERAL (N/A)     Surgeons and Role:     * Cyril Upton MD - Primary    Assistant: JOSE Clark RNFA; no qualified resident or fellow was available to assist with this case    Pre-Operative Diagnosis: Lumbar radiculopathy [M54.16]    Post-Operative Diagnosis: Post-Op Diagnosis Codes:     * Lumbar radiculopathy [M54.16]    Anesthesia: General    Technical Procedures Used:   1. Lateral lumbar interbody fusion L2-L4, 81673, 31233  2. Anterior segmental instrumentation L2-L4, 86440  3. Insertion biomechanical device (interbody cages), 22853 x 2  4. Use of allograft for spine surgery, 20930   5. Use of intraoperative neuro monitoring    This is stage I of a planned 2 stage procedure    Description of the Findings of the Procedure:   Indications:  This is a 49 y.o. female with prior L2-L5 laminectomy with L4-5 anterolisthesis and foraminal stenosis with radiculopathy. They failed attempted nonoperative measures. Risks, benefits, and alternatives were discussed with the patient and they elected to proceed with surgery.    Procedure in detail:  The patient was identified in the preoperative holding area and the surgical site was marked. They were then taken back to the operating room where general endotracheal anesthesia was induced. Neuromonitoring leads were placed by the monitoring technician.They were then transitioned to the right lateral decubitus position on the [place] flattop with the arms and legs in the physiologic position and all bony prominences well-padded. Patient was taped into position after confirmation of appropriate radiographic alignment. The left flank was prepped and draped in the normal sterile fashion. A timeout was performed. The incision site was localized with intraoperative fluoroscopy. After the incision  was marked out a solution of epinephrine was injected in the skin and subcutaneous tissues. An incision was made and carried down to the external oblique fascia. This was divided and the external oblique musculature was bluntly  in line with its fibers. The internal oblique was identified and again bluntly divided in line with its fibers. The transverse abdominis muscle was then identified and bluntly divided. The retroperitoneal space was then entered. Blunt dissection was used to sweep the peritoneal contents anteriorly and identify the transverse process and psoas muscle. The psoas was mobilized and retracted posteriorly with blunt dissection using my finger exposing the disc space. A guidewire was inserted into the disc space and the appropriate level and location within the disc space was verified on fluoroscopic imaging. The L2-3 level was addressed first. The table mounted retractor was inserted and expanded to the appropriate size. Fluoroscopic imaging was used to verify appropriate retractor placement. An annulotomy was performed. A Zhao was used to violate the contralateral annulus. The disc space was distracted with the use of paddle distractors and trials. The disc space was prepared for fusion with curettes, pituitary, rasp.  A complete diskectomy was performed. Once the appropriate size trial had been determined and the cage was selected and filled with a mixture of Zavation allograft and powdered vancomycin. This was then inserted under fluoroscopic guidance. The lateral plate was then applied to the cage and secured with a torque . The cage was then tamped into its final position. Screws were placed into the superior and inferior vertebra using an awl to create the trajectory. The final locking plate was then applied and secured with the torque . This process was then repeated at the L3-4 level. Final implant placement was verified on fluoroscopic imaging and found to be  satisfactory.     The wound was then copiously irrigated with normal saline.  The internal and external oblique fascia were each closed with interrupted #1 Vicryl suture. The subcutaneous layer was closed with a running Stratafix #0 suture. A running subcuticular layer was performed with 2-0 Stratafix suture. Dermabond prineo was applied to cover the incision. A sterile dressing of gauze and Tegaderm was then applied.     The patient was then transitioned back to supine position, extubated and awakened and taken to recovery in good condition having suffered no untoward event. All monitoring signals were stable throughout the case.    Complications: No    Estimated Blood Loss (EBL): 40 mL           Implants: * No implants in log * spine Art lateral titanium interbody cages, plates and screws.  Zavation allograft    Specimens:   Specimen (24h ago, onward)      None                    Condition: Good    Disposition: PACU - hemodynamically stable.    Attestation: I was present and scrubbed for the entire procedure.

## 2025-05-20 NOTE — SUBJECTIVE & OBJECTIVE
Past Medical History:   Diagnosis Date    Abdominal pain 10/03/2024    Asthma     CHF (congestive heart failure)     Chronic serous otitis media of both ears 04/04/2023    Depressive disorder     Diabetes mellitus, type 2     Diabetic eye exam 10/24/2024    Dr. [facility]    Gastroparesis     Hyperlipidemia     Hypertension     Hypothyroidism 08/02/2022    Synthroid 200 mcg oral daily      IBS (irritable bowel syndrome)     Kidney stones     Pain 10/03/2024    Postlaminectomy syndrome, lumbar region 04/28/2022    [facility]    Sleep apnea     Does not use a CPAP       Past Surgical History:   Procedure Laterality Date    Bilateral L3-S1 MBB Bilateral 6-, 5-, 1-8-2014    Dr Jessica Randolph    CARPAL TUNNEL RELEASE      CHOLECYSTECTOMY      DIAGNOSTIC LAPAROSCOPY  04/14/2010    Exploratory Laparotomy, Myomectomy, Hydrotubation-Dr. Feng    DILATION AND CURETTAGE OF UTERUS  04/14/2010    Hysteroscopy-Dr. Feng    EPIDURAL STEROID INJECTION N/A 10/22/2024    Procedure: Injection, Steroid, Epidural, L4/5;  Surgeon: Rasheeda Bills MD;  Location: [facility];  Service: Pain Management;  Laterality: N/A;    EXPLORATORY LAPAROTOMY WITH UTERINE MYOMECTOMY  02/27/2007    Dr. Marquise Anderson    HYSTERECTOMY  09/29/2011    Robotic, FABIENNE, Cystoscopy-Dr. Feng    HYSTEROSCOPY WITH DILATION AND CURETTAGE OF UTERUS  04/04/2006    Laparoscopy, Chromotubation-Dr. Marquise Anderson    INJECTION OF ANESTHETIC AGENT AROUND ILIOINGUINAL NERVE Right 6/22/2023    Procedure: BLOCK, NERVE, ILIOINGUINAL;  Surgeon: Rasheeda Bills MD;  Location: [facility];  Service: Pain Management;  Laterality: Right;    INJECTION OF ANESTHETIC AGENT AROUND MEDIAL BRANCH NERVES INNERVATING LUMBAR FACET JOINT Bilateral 9/21/2023    Procedure: BLOCK, NERVE, FACET JOINT, LUMBAR, MEDIAL BRANCH;  Surgeon: Rasheeda Bills MD;  Location: [facility];  Service: Pain Management;  Laterality:  Bilateral;    INJECTION OF ANESTHETIC AGENT AROUND MEDIAL BRANCH NERVES INNERVATING LUMBAR FACET JOINT Bilateral 3/14/2024    Procedure: BLOCK, NERVE, FACET JOINT, LUMBAR, MEDIAL BRANCH, BILATERAL L4-S1 MBB;  Surgeon: Rasheeda Bills MD;  Location: [facility];  Service: Pain Management;  Laterality: Bilateral;    LAMINECTOMY N/A 11/30/2021    Procedure: L2-L5 Laminectomy;  Surgeon: Cyril Upton MD;  Location: [facility] OR;  Service: Neurosurgery;  Laterality: N/A;    LEFT HEART CATHETERIZATION      Left L3-S1 RFTC Left 07/18/2017    Dr Lopez    Left SI JI Left 09/30/2013    Dr Randolph    MYRINGOTOMY WITH INSERTION OF VENTILATION TUBE Bilateral 4/4/2023    Procedure: MYRINGOTOMY, WITH TYMPANOSTOMY TUBE INSERTION (T-TUBES);  Surgeon: Sea Hinton MD;  Location: [facility] OR;  Service: ENT;  Laterality: Bilateral;  T-TUBES    MYRINGOTOMY WITH INSERTION OF VENTILATION TUBE Bilateral 9/12/2023    Procedure: MYRINGOTOMY, WITH TYMPANOSTOMY TUBE INSERTION, T-TUBES;  Surgeon: Sea Hinton MD;  Location: [facility] OR;  Service: ENT;  Laterality: Bilateral;    MYRINGOTOMY WITH INSERTION OF VENTILATION TUBE Bilateral 7/2/2024    Procedure: MYRINGOTOMY, WITH TYMPANOSTOMY TUBE INSERTION;  Surgeon: Sea Hinton MD;  Location: [facility] OR;  Service: ENT;  Laterality: Bilateral;  Bilateral T-Tubes    OOPHORECTOMY  11/11/2014    FABIENNE Barajas, Cystoscopy-Dr. Feng    SINUS SURGERY         Review of patient's allergies indicates:   Allergen Reactions    Fish containing products Anaphylaxis    Iodinated contrast media     Penicillins        No current facility-administered medications on file prior to encounter.     Current Outpatient Medications on File Prior to Encounter   Medication Sig    hydrALAZINE (APRESOLINE) 100 MG tablet Take 1 tablet (100 mg total) by mouth 3 (three) times daily.    metoprolol succinate (TOPROL-XL) 100 MG 24 hr tablet Take 1 tablet (100 mg total) by mouth once  "daily.    NIFEdipine (PROCARDIA-XL) 60 MG (OSM) 24 hr tablet Take 1 tablet (60 mg total) by mouth once daily.    albuterol (VENTOLIN HFA) 90 mcg/actuation inhaler Inhale 2 puffs into the lungs every 6 (six) hours as needed for Wheezing. Rescue    atorvastatin (LIPITOR) 20 MG tablet Take 1 tablet (20 mg total) by mouth every evening.    blood sugar diagnostic Strp To check BG 4 times daily, to use with insurance preferred meter    blood-glucose meter kit To check BG 4 times daily, to use with insurance preferred meter    empagliflozin (JARDIANCE) 10 mg tablet Take 1 tablet (10 mg total) by mouth once daily.    glipiZIDE 5 MG TR24 Take 2 tablets (10 mg total) by mouth daily with breakfast.    insulin glargine U-100, Lantus, (LANTUS SOLOSTAR U-100 INSULIN) 100 unit/mL (3 mL) InPn pen Inject 10 Units into the skin every evening.    ipratropium (ATROVENT) 21 mcg (0.03 %) nasal spray 2 sprays by Each Nostril route 2 (two) times daily.    lancets Misc To check BG 4 times daily, to use with insurance preferred meter    lurasidone (LATUDA) 20 mg Tab tablet     ondansetron (ZOFRAN-ODT) 4 MG TbDL Take 1 tablet (4 mg total) by mouth 2 (two) times daily.    pen needle, diabetic 32 gauge x 5/32" Ndle 1 Application by Misc.(Non-Drug; Combo Route) route once daily.    sertraline (ZOLOFT) 100 MG tablet Take 1 tablet (100 mg total) by mouth every evening.    traMADoL (ULTRAM) 50 mg tablet Take 1 tablet (50 mg total) by mouth every 8 (eight) hours as needed for Pain.     Family History       Problem Relation (Age of Onset)    Diabetes Mellitus Mother    Heart disease Mother, Sister    Hypertension Mother    Migraines Mother          Tobacco Use    Smoking status: Never     Passive exposure: Never    Smokeless tobacco: Never   Substance and Sexual Activity    Alcohol use: Not Currently    Drug use: Never    Sexual activity: Not Currently     Review of Systems   Constitutional:  Negative for chills, diaphoresis and fever.   HENT:  " Negative for voice change.    Respiratory:  Negative for cough and shortness of breath.    Cardiovascular:  Negative for chest pain and palpitations.   Gastrointestinal:  Negative for abdominal pain, nausea and vomiting.   Musculoskeletal:  Positive for arthralgias and back pain.   Neurological:  Negative for dizziness, syncope and weakness.   Psychiatric/Behavioral:  Negative for confusion.      Objective:     Vital Signs (Most Recent):  Temp: 98.1 °F (36.7 °C) (05/20/25 1034)  Pulse: 93 (05/20/25 1235)  Resp: 18 (05/20/25 1328)  BP: (!) 158/89 (05/20/25 1235)  SpO2: 96 % (05/20/25 1204) Vital Signs (24h Range):  Temp:  [97.9 °F (36.6 °C)-98.3 °F (36.8 °C)] 98.1 °F (36.7 °C)  Pulse:  [77-96] 93  Resp:  [11-19] 18  SpO2:  [89 %-100 %] 96 %  BP: (117-168)/(69-93) 158/89     Weight: 91.2 kg (201 lb)  Body mass index is 31.48 kg/m².     Physical Exam  Constitutional:       General: She is sleeping. She is not in acute distress.     Appearance: Normal appearance. She is not ill-appearing.   HENT:      Head: Normocephalic and atraumatic.      Nose: Nose normal.   Eyes:      Conjunctiva/sclera: Conjunctivae normal.      Pupils: Pupils are equal, round, and reactive to light.   Cardiovascular:      Rate and Rhythm: Normal rate and regular rhythm.   Pulmonary:      Effort: Pulmonary effort is normal. No respiratory distress.   Musculoskeletal:      Cervical back: No rigidity.   Skin:     Coloration: Skin is not jaundiced or pale.      Findings: No rash.   Neurological:      Mental Status: She is easily aroused.   Psychiatric:         Behavior: Behavior normal. Behavior is cooperative.         Thought Content: Thought content normal.          Significant Labs: All pertinent labs within the past 24 hours have been reviewed.    Significant Imaging: I have reviewed all pertinent imaging results/findings within the past 24 hours.

## 2025-05-20 NOTE — ASSESSMENT & PLAN NOTE
Continue home levothyroxine. Most recent labs abnormal however patient had been out of medication at that time.

## 2025-05-20 NOTE — OP NOTE
Certification of Assistant at Surgery       Surgery Date: 5/20/2025     Participating Surgeons:  Surgeons and Role:     * Cyril Upton MD - Primary    Procedures:  Procedure(s) (LRB):  ARTHRODESIS, SPINE, LATERAL (N/A)    Assistant Surgeon's Certification of Necessity:  I understand that section 1842 (b) (6) (d) of the Social Security Act generally prohibits Medicare Part B reasonable charge payment for the services of assistants at surgery in teaching hospitals when qualified residents are available to furnish such services. I certify that the services for which payment is claimed were medically necessary, and that no qualified resident was available to perform the services. I further understand that these services are subject to post-payment review by the Medicare carrier.      Martine Morrison NP    05/20/2025  9:33 AM

## 2025-05-20 NOTE — ASSESSMENT & PLAN NOTE
Patient's blood pressure range in the last 24 hours was: BP  Min: 117/76  Max: 168/88.The patient's inpatient anti-hypertensive regimen is listed below:  Current Antihypertensives  NIFEdipine 24 hr tablet 60 mg, Daily, Oral  metoprolol succinate (TOPROL-XL) 24 hr tablet 100 mg, Daily, Oral  hydrALAZINE tablet 100 mg, 3 times daily, Oral  cloNIDine tablet 0.2 mg, 4 times daily, Oral    Plan  - BP is controlled, no changes needed to their regimen

## 2025-05-20 NOTE — PT/OT/SLP EVAL
Physical Therapy Evaluation    Patient Name:  Carey Leonardo   MRN:  [id number]    Recommendations:     Discharge Recommendations: Low Intensity Therapy   Discharge Equipment Recommendations:  (potentially RW)   Barriers to discharge: ongoing treatment    Assessment:     Carey Leonardo is a 49 y.o. female admitted with a medical diagnosis of Lumbar radiculopathy, chronic.  She presents with the following impairments/functional limitations: impaired functional mobility, pain     Patient very drowsy post Stage 1 of surgery. Was able to stand and step over to [facility] with RW. Will follow up tomorrow after stage 2. Patient has spouse at home and current plan is to dc home    Rehab Prognosis: Good; patient would benefit from acute skilled PT services to address these deficits and reach maximum level of function.    Recent Surgery: Procedure(s) (LRB):  ARTHRODESIS, SPINE, LATERAL (N/A) * Day of Surgery *    Plan:     During this hospitalization, patient to be seen daily to address the identified rehab impairments via gait training, therapeutic activities, therapeutic exercises, neuromuscular re-education and progress toward the following goals:    Plan of Care Expires:  06/24/25    Subjective     Chief Complaint: post op pain and drowsiness  Patient/Family Comments/goals: agreeable  Pain/Comfort:  Pain Rating 1:  (12/10)  Location - Side 1: Left  Location - Orientation 1: lateral  Location 1: back  Pain Addressed 1: Pre-medicate for activity, Nurse notified    Patients cultural, spiritual, Oriental orthodox conflicts given the current situation: no    Living Environment:  Home with spouse, 3-4 steps with rails  Prior to admission, patients level of function was independent.  Equipment used at home: none.  DME owned (not currently used): none.  Upon discharge, patient will have assistance from spouse.    Objective:     Communicated with nurse prior to session.  Patient found HOB elevated with peripheral IV, pulse ox (continuous),  blood pressure cuff, telemetry  upon PT entry to room.    General Precautions: Standard, fall  Orthopedic Precautions:spinal precautions   Braces:    Respiratory Status: Nasal cannula, flow 2 L/min    Exams:  RLE ROM: WFL  RLE Strength: WFL  LLE ROM: WFL  LLE Strength: WFL    Functional Mobility:  Bed Mobility:     Supine to Sit: moderate assistance and of 2 persons  Sit to Supine: minimum assistance and of 2 persons  Transfers:     Sit to Stand:  minimum assistance and of 2 persons with rolling walker  Toilet Transfer: minimum assistance and of 2 persons with  rolling walker  using  Step Transfer  Balance: fair      AM-PAC 6 CLICK MOBILITY  Total Score:17       Treatment & Education:  Patient educated on the role of physical therapy in the acute care setting, call light usage  PT answered all patient questions within PT scope of practice  Mobility as noted  Spinal precautions    Patient left right sidelying with all lines intact, call button in reach, nurse notified, and spouse present.    GOALS:   Multidisciplinary Problems       Physical Therapy Goals          Problem: Physical Therapy    Goal Priority Disciplines Outcome Interventions   Physical Therapy Goal     PT, PT/OT Progressing    Description: Short term goals:  . Supine to sit with Contact Guard Assistance  . Sit to supine with Contact Guard Assistance  . Sit to stand transfer with Contact Guard Assistance  . Gait  x 100 feet with Contact Guard Assistance using Rolling Walker.     Long term goals:  Patient will gain highest level functional mobility with lowest level of assistive device to return to desired living arrangement and prior ADL's.                          DME Justifications:  Potential Rw    History:     Past Medical History:   Diagnosis Date    Abdominal pain 10/03/2024    Asthma     CHF (congestive heart failure)     Chronic serous otitis media of both ears 04/04/2023    Depressive disorder     Diabetes mellitus, type 2     Diabetic eye exam  10/24/2024    Dr. [facility]    Gastroparesis     Hyperlipidemia     Hypertension     Hypothyroidism 08/02/2022    Synthroid 200 mcg oral daily      IBS (irritable bowel syndrome)     Kidney stones     Pain 10/03/2024    Postlaminectomy syndrome, lumbar region 04/28/2022    [facility]    Sleep apnea     Does not use a CPAP       Past Surgical History:   Procedure Laterality Date    Bilateral L3-S1 MBB Bilateral 6-, 5-, 1-8-2014    Dr Jessica Randolph    CARPAL TUNNEL RELEASE      CHOLECYSTECTOMY      DIAGNOSTIC LAPAROSCOPY  04/14/2010    Exploratory Laparotomy, Myomectomy, Hydrotubation-Dr. Feng    DILATION AND CURETTAGE OF UTERUS  04/14/2010    Hysteroscopy-Dr. Feng    EPIDURAL STEROID INJECTION N/A 10/22/2024    Procedure: Injection, Steroid, Epidural, L4/5;  Surgeon: Rasheeda Bills MD;  Location: [facility];  Service: Pain Management;  Laterality: N/A;    EXPLORATORY LAPAROTOMY WITH UTERINE MYOMECTOMY  02/27/2007    Dr. Marquise Anderson    HYSTERECTOMY  09/29/2011    Robotic, FABIENNE, Cystoscopy-Dr. Feng    HYSTEROSCOPY WITH DILATION AND CURETTAGE OF UTERUS  04/04/2006    Laparoscopy, Chromotubation-Dr. Marquise Anderson    INJECTION OF ANESTHETIC AGENT AROUND ILIOINGUINAL NERVE Right 6/22/2023    Procedure: BLOCK, NERVE, ILIOINGUINAL;  Surgeon: Rasheeda Bills MD;  Location: [facility];  Service: Pain Management;  Laterality: Right;    INJECTION OF ANESTHETIC AGENT AROUND MEDIAL BRANCH NERVES INNERVATING LUMBAR FACET JOINT Bilateral 9/21/2023    Procedure: BLOCK, NERVE, FACET JOINT, LUMBAR, MEDIAL BRANCH;  Surgeon: Rasheeda Bills MD;  Location: [facility];  Service: Pain Management;  Laterality: Bilateral;    INJECTION OF ANESTHETIC AGENT AROUND MEDIAL BRANCH NERVES INNERVATING LUMBAR FACET JOINT Bilateral 3/14/2024    Procedure: BLOCK, NERVE, FACET JOINT, LUMBAR, MEDIAL BRANCH, BILATERAL L4-S1 MBB;  Surgeon: Rasheeda Bills MD;  [facility];  Service: Pain Management;  Laterality: Bilateral;    LAMINECTOMY N/A 11/30/2021    Procedure: L2-L5 Laminectomy;  Surgeon: Cyril Upton MD;  Location: [facility] OR;  Service: Neurosurgery;  Laterality: N/A;    LEFT HEART CATHETERIZATION      Left L3-S1 RFTC Left 07/18/2017    Dr Lopez    Left SI JI Left 09/30/2013    Dr Randolph    MYRINGOTOMY WITH INSERTION OF VENTILATION TUBE Bilateral 4/4/2023    Procedure: MYRINGOTOMY, WITH TYMPANOSTOMY TUBE INSERTION (T-TUBES);  Surgeon: Sea Hinton MD;  Location: [facility] OR;  Service: ENT;  Laterality: Bilateral;  T-TUBES    MYRINGOTOMY WITH INSERTION OF VENTILATION TUBE Bilateral 9/12/2023    Procedure: MYRINGOTOMY, WITH TYMPANOSTOMY TUBE INSERTION, T-TUBES;  Surgeon: Sea Hinton MD;  Location: [facility] OR;  Service: ENT;  Laterality: Bilateral;    MYRINGOTOMY WITH INSERTION OF VENTILATION TUBE Bilateral 7/2/2024    Procedure: MYRINGOTOMY, WITH TYMPANOSTOMY TUBE INSERTION;  Surgeon: Sea Hinton MD;  Location: [facility] OR;  Service: ENT;  Laterality: Bilateral;  Bilateral T-Tubes    OOPHORECTOMY  11/11/2014    FABIENNE Barajas, Cystoscopy-Dr. Feng    SINUS SURGERY         Time Tracking:     PT Received On: 05/20/25  PT Start Time: 1322     PT Stop Time: 1350  PT Total Time (min): 28 min     Billable Minutes: Evaluation 28 05/20/2025

## 2025-05-20 NOTE — ANESTHESIA POSTPROCEDURE EVALUATION
Anesthesia Post Evaluation    Patient: Carey Leonardo    Procedure(s) Performed: Procedure(s) (LRB):  ARTHRODESIS, SPINE, LATERAL (N/A)    Final Anesthesia Type: general      Patient location during evaluation: PACU  Patient participation: Yes- Able to Participate  Level of consciousness: awake and sedated  Post-procedure vital signs: reviewed and stable  Pain management: adequate  Airway patency: patent    PONV status at discharge: No PONV  Anesthetic complications: no      Cardiovascular status: blood pressure returned to baseline  Respiratory status: unassisted  Hydration status: euvolemic  Follow-up not needed.              Vitals Value Taken Time   /89 05/20/25 12:35   Temp 36.7 °C (98.1 °F) 05/20/25 10:34   Pulse 93 05/20/25 12:35   Resp 18 05/20/25 13:28   SpO2 96 % 05/20/25 12:04         Event Time   Out of Recovery 10:20:00         Pain/Miah Score: Pain Rating Prior to Med Admin: 10 (5/20/2025  1:28 PM)  Pain Rating Post Med Admin: 7 (5/20/2025 10:00 AM)  Miah Score: 8 (5/20/2025 10:13 AM)

## 2025-05-20 NOTE — TRANSFER OF CARE
"Anesthesia Transfer of Care Note    Patient: Carey Leonardo    Procedure(s) Performed: Procedure(s) (LRB):  ARTHRODESIS, SPINE, LATERAL (N/A)    Patient location: PACU    Anesthesia Type: general    Transport from OR: Transported from OR on 6-10 L/min O2 by face mask with adequate spontaneous ventilation    Post pain: adequate analgesia    Post assessment: no apparent anesthetic complications and tolerated procedure well    Post vital signs: stable    Level of consciousness: responds to stimulation    Nausea/Vomiting: no nausea/vomiting    Complications: none    Transfer of care protocol was followedComments: Report Given to PACU rn VSS      Last vitals: Visit Vitals  BP (!) 149/84   Pulse 87   Temp 36.8 °C (98.3 °F)   Resp 19   Ht 5' 7" (1.702 m)   Wt 91.2 kg (201 lb)   LMP  (LMP Unknown)   SpO2 95%   Breastfeeding No   BMI 31.48 kg/m²     "

## 2025-05-21 ENCOUNTER — ANESTHESIA (OUTPATIENT)
Dept: SURGERY | Facility: HOSPITAL | Age: 50
DRG: 428 | End: 2025-05-21
Payer: OTHER GOVERNMENT

## 2025-05-21 ENCOUNTER — ANESTHESIA EVENT (OUTPATIENT)
Dept: SURGERY | Facility: HOSPITAL | Age: 50
DRG: 428 | End: 2025-05-21
Payer: OTHER GOVERNMENT

## 2025-05-21 LAB
GLUCOSE SERPL-MCNC: 174 MG/DL (ref 70–105)
GLUCOSE SERPL-MCNC: 273 MG/DL (ref 70–105)
GLUCOSE SERPL-MCNC: 276 MG/DL (ref 70–105)
GLUCOSE SERPL-MCNC: 320 MG/DL (ref 70–105)

## 2025-05-21 PROCEDURE — 27000716 HC OXISENSOR PROBE, ANY SIZE: Performed by: ANESTHESIOLOGY

## 2025-05-21 PROCEDURE — 27000655: Performed by: ANESTHESIOLOGY

## 2025-05-21 PROCEDURE — 27000510 HC BLANKET BAIR HUGGER ANY SIZE: Performed by: ANESTHESIOLOGY

## 2025-05-21 PROCEDURE — 11000001 HC ACUTE MED/SURG PRIVATE ROOM

## 2025-05-21 PROCEDURE — 25000003 PHARM REV CODE 250

## 2025-05-21 PROCEDURE — 25000003 PHARM REV CODE 250: Performed by: ORTHOPAEDIC SURGERY

## 2025-05-21 PROCEDURE — 37000009 HC ANESTHESIA EA ADD 15 MINS: Performed by: ORTHOPAEDIC SURGERY

## 2025-05-21 PROCEDURE — 71000033 HC RECOVERY, INTIAL HOUR: Performed by: ORTHOPAEDIC SURGERY

## 2025-05-21 PROCEDURE — 0SG1071 FUSION OF 2 OR MORE LUMBAR VERTEBRAL JOINTS WITH AUTOLOGOUS TISSUE SUBSTITUTE, POSTERIOR APPROACH, POSTERIOR COLUMN, OPEN APPROACH: ICD-10-PCS | Performed by: ORTHOPAEDIC SURGERY

## 2025-05-21 PROCEDURE — 36000710: Performed by: ORTHOPAEDIC SURGERY

## 2025-05-21 PROCEDURE — 01NB0ZZ RELEASE LUMBAR NERVE, OPEN APPROACH: ICD-10-PCS | Performed by: ORTHOPAEDIC SURGERY

## 2025-05-21 PROCEDURE — 97530 THERAPEUTIC ACTIVITIES: CPT

## 2025-05-21 PROCEDURE — 07DR3ZZ EXTRACTION OF ILIAC BONE MARROW, PERCUTANEOUS APPROACH: ICD-10-PCS | Performed by: ORTHOPAEDIC SURGERY

## 2025-05-21 PROCEDURE — 27201423 OPTIME MED/SURG SUP & DEVICES STERILE SUPPLY: Performed by: ORTHOPAEDIC SURGERY

## 2025-05-21 PROCEDURE — 63600175 PHARM REV CODE 636 W HCPCS: Performed by: ORTHOPAEDIC SURGERY

## 2025-05-21 PROCEDURE — 27800903 OPTIME MED/SURG SUP & DEVICES OTHER IMPLANTS: Performed by: ORTHOPAEDIC SURGERY

## 2025-05-21 PROCEDURE — 00NY0ZZ RELEASE LUMBAR SPINAL CORD, OPEN APPROACH: ICD-10-PCS | Performed by: ORTHOPAEDIC SURGERY

## 2025-05-21 PROCEDURE — 99231 SBSQ HOSP IP/OBS SF/LOW 25: CPT | Mod: ,,, | Performed by: FAMILY MEDICINE

## 2025-05-21 PROCEDURE — 36000711: Performed by: ORTHOPAEDIC SURGERY

## 2025-05-21 PROCEDURE — 27201480 HC GLIDESCOPE: Performed by: ANESTHESIOLOGY

## 2025-05-21 PROCEDURE — 27000165 HC TUBE, ETT CUFFED: Performed by: ANESTHESIOLOGY

## 2025-05-21 PROCEDURE — 63600175 PHARM REV CODE 636 W HCPCS

## 2025-05-21 PROCEDURE — 63600175 PHARM REV CODE 636 W HCPCS: Performed by: ANESTHESIOLOGY

## 2025-05-21 PROCEDURE — 97116 GAIT TRAINING THERAPY: CPT

## 2025-05-21 PROCEDURE — C1713 ANCHOR/SCREW BN/BN,TIS/BN: HCPCS | Performed by: ORTHOPAEDIC SURGERY

## 2025-05-21 PROCEDURE — 37000008 HC ANESTHESIA 1ST 15 MINUTES: Performed by: ORTHOPAEDIC SURGERY

## 2025-05-21 PROCEDURE — 71000039 HC RECOVERY, EACH ADD'L HOUR: Performed by: ORTHOPAEDIC SURGERY

## 2025-05-21 RX ORDER — GLUCAGON 1 MG
1 KIT INJECTION
Status: DISCONTINUED | OUTPATIENT
Start: 2025-05-21 | End: 2025-05-21 | Stop reason: HOSPADM

## 2025-05-21 RX ORDER — PROPOFOL 10 MG/ML
VIAL (ML) INTRAVENOUS
Status: DISCONTINUED | OUTPATIENT
Start: 2025-05-21 | End: 2025-05-21

## 2025-05-21 RX ORDER — CEFAZOLIN 2 G/1
2 INJECTION, POWDER, FOR SOLUTION INTRAMUSCULAR; INTRAVENOUS
Status: COMPLETED | OUTPATIENT
Start: 2025-05-21 | End: 2025-05-22

## 2025-05-21 RX ORDER — ONDANSETRON HYDROCHLORIDE 2 MG/ML
INJECTION, SOLUTION INTRAVENOUS
Status: DISCONTINUED | OUTPATIENT
Start: 2025-05-21 | End: 2025-05-21

## 2025-05-21 RX ORDER — MEPERIDINE HYDROCHLORIDE 25 MG/ML
25 INJECTION INTRAMUSCULAR; INTRAVENOUS; SUBCUTANEOUS EVERY 10 MIN PRN
Status: DISCONTINUED | OUTPATIENT
Start: 2025-05-21 | End: 2025-05-21 | Stop reason: HOSPADM

## 2025-05-21 RX ORDER — TRANEXAMIC ACID 1 G/10ML
INJECTION, SOLUTION INTRAVENOUS
Status: DISCONTINUED | OUTPATIENT
Start: 2025-05-21 | End: 2025-05-21

## 2025-05-21 RX ORDER — EPINEPHRINE 1 MG/ML
INJECTION INTRAMUSCULAR; INTRAVENOUS; SUBCUTANEOUS
Status: DISCONTINUED | OUTPATIENT
Start: 2025-05-21 | End: 2025-05-21 | Stop reason: HOSPADM

## 2025-05-21 RX ORDER — GLYCOPYRROLATE 0.2 MG/ML
INJECTION INTRAMUSCULAR; INTRAVENOUS
Status: DISCONTINUED | OUTPATIENT
Start: 2025-05-21 | End: 2025-05-21

## 2025-05-21 RX ORDER — DIPHENHYDRAMINE HYDROCHLORIDE 50 MG/ML
25 INJECTION, SOLUTION INTRAMUSCULAR; INTRAVENOUS EVERY 6 HOURS PRN
Status: DISCONTINUED | OUTPATIENT
Start: 2025-05-21 | End: 2025-05-21 | Stop reason: HOSPADM

## 2025-05-21 RX ORDER — ROCURONIUM BROMIDE 10 MG/ML
INJECTION, SOLUTION INTRAVENOUS
Status: DISCONTINUED | OUTPATIENT
Start: 2025-05-21 | End: 2025-05-21

## 2025-05-21 RX ORDER — VECURONIUM BROMIDE 1 MG/ML
INJECTION, POWDER, LYOPHILIZED, FOR SOLUTION INTRAVENOUS
Status: DISCONTINUED | OUTPATIENT
Start: 2025-05-21 | End: 2025-05-21

## 2025-05-21 RX ORDER — SODIUM CHLORIDE 9 MG/ML
INJECTION, SOLUTION INTRAVENOUS
Status: COMPLETED | OUTPATIENT
Start: 2025-05-21 | End: 2025-05-21

## 2025-05-21 RX ORDER — MIDAZOLAM HYDROCHLORIDE 1 MG/ML
INJECTION INTRAMUSCULAR; INTRAVENOUS
Status: DISCONTINUED | OUTPATIENT
Start: 2025-05-21 | End: 2025-05-21

## 2025-05-21 RX ORDER — PHENYLEPHRINE HYDROCHLORIDE 10 MG/ML
INJECTION INTRAVENOUS
Status: DISCONTINUED | OUTPATIENT
Start: 2025-05-21 | End: 2025-05-21

## 2025-05-21 RX ORDER — VANCOMYCIN HYDROCHLORIDE 1 G/20ML
INJECTION, POWDER, LYOPHILIZED, FOR SOLUTION INTRAVENOUS
Status: DISCONTINUED | OUTPATIENT
Start: 2025-05-21 | End: 2025-05-21 | Stop reason: HOSPADM

## 2025-05-21 RX ORDER — ACETAMINOPHEN 10 MG/ML
INJECTION, SOLUTION INTRAVENOUS
Status: DISCONTINUED | OUTPATIENT
Start: 2025-05-21 | End: 2025-05-21

## 2025-05-21 RX ORDER — CALCIUM CHLORIDE DIHYDRATE 100 MG/ML
INJECTION, SOLUTION INTRAVENOUS
Status: DISCONTINUED | OUTPATIENT
Start: 2025-05-21 | End: 2025-05-21

## 2025-05-21 RX ORDER — HYDROMORPHONE HYDROCHLORIDE 2 MG/ML
0.5 INJECTION, SOLUTION INTRAMUSCULAR; INTRAVENOUS; SUBCUTANEOUS EVERY 5 MIN PRN
Status: DISCONTINUED | OUTPATIENT
Start: 2025-05-21 | End: 2025-05-21 | Stop reason: HOSPADM

## 2025-05-21 RX ORDER — FENTANYL CITRATE 50 UG/ML
INJECTION, SOLUTION INTRAMUSCULAR; INTRAVENOUS
Status: DISCONTINUED | OUTPATIENT
Start: 2025-05-21 | End: 2025-05-21

## 2025-05-21 RX ORDER — CEFAZOLIN SODIUM 1 G/3ML
INJECTION, POWDER, FOR SOLUTION INTRAMUSCULAR; INTRAVENOUS
Status: DISCONTINUED | OUTPATIENT
Start: 2025-05-21 | End: 2025-05-21

## 2025-05-21 RX ORDER — EPHEDRINE SULFATE 50 MG/ML
INJECTION, SOLUTION INTRAVENOUS
Status: DISCONTINUED | OUTPATIENT
Start: 2025-05-21 | End: 2025-05-21

## 2025-05-21 RX ORDER — DEXAMETHASONE SODIUM PHOSPHATE 4 MG/ML
INJECTION, SOLUTION INTRA-ARTICULAR; INTRALESIONAL; INTRAMUSCULAR; INTRAVENOUS; SOFT TISSUE
Status: DISCONTINUED | OUTPATIENT
Start: 2025-05-21 | End: 2025-05-21

## 2025-05-21 RX ORDER — MORPHINE SULFATE 10 MG/ML
4 INJECTION INTRAMUSCULAR; INTRAVENOUS; SUBCUTANEOUS EVERY 5 MIN PRN
Status: DISCONTINUED | OUTPATIENT
Start: 2025-05-21 | End: 2025-05-21 | Stop reason: HOSPADM

## 2025-05-21 RX ORDER — ONDANSETRON HYDROCHLORIDE 2 MG/ML
4 INJECTION, SOLUTION INTRAVENOUS DAILY PRN
Status: DISCONTINUED | OUTPATIENT
Start: 2025-05-21 | End: 2025-05-21 | Stop reason: HOSPADM

## 2025-05-21 RX ORDER — LIDOCAINE HYDROCHLORIDE 20 MG/ML
INJECTION INTRAVENOUS
Status: DISCONTINUED | OUTPATIENT
Start: 2025-05-21 | End: 2025-05-21

## 2025-05-21 RX ADMIN — CLONIDINE HYDROCHLORIDE 0.2 MG: 0.2 TABLET ORAL at 08:05

## 2025-05-21 RX ADMIN — ACETAMINOPHEN 1000 MG: 10 INJECTION, SOLUTION INTRAVENOUS at 10:05

## 2025-05-21 RX ADMIN — LEVOTHYROXINE SODIUM 200 MCG: 100 TABLET ORAL at 05:05

## 2025-05-21 RX ADMIN — PHENYLEPHRINE HYDROCHLORIDE 300 MCG: 10 INJECTION INTRAVENOUS at 08:05

## 2025-05-21 RX ADMIN — SUGAMMADEX 200 MG: 100 INJECTION, SOLUTION INTRAVENOUS at 10:05

## 2025-05-21 RX ADMIN — HYDROMORPHONE HYDROCHLORIDE 0.5 MG: 2 INJECTION INTRAMUSCULAR; INTRAVENOUS; SUBCUTANEOUS at 11:05

## 2025-05-21 RX ADMIN — CLONIDINE HYDROCHLORIDE 0.2 MG: 0.2 TABLET ORAL at 02:05

## 2025-05-21 RX ADMIN — VECURONIUM BROMIDE 3 MG: 1 INJECTION, POWDER, LYOPHILIZED, FOR SOLUTION INTRAVENOUS at 08:05

## 2025-05-21 RX ADMIN — PHENYLEPHRINE HYDROCHLORIDE 200 MCG: 10 INJECTION INTRAVENOUS at 10:05

## 2025-05-21 RX ADMIN — OXYCODONE 5 MG: 5 TABLET ORAL at 01:05

## 2025-05-21 RX ADMIN — METOCLOPRAMIDE 5 MG: 5 INJECTION, SOLUTION INTRAMUSCULAR; INTRAVENOUS at 04:05

## 2025-05-21 RX ADMIN — PROPOFOL 30 MG: 10 INJECTION, EMULSION INTRAVENOUS at 10:05

## 2025-05-21 RX ADMIN — CEFAZOLIN 2 G: 1 INJECTION, POWDER, FOR SOLUTION INTRAMUSCULAR; INTRAVENOUS; PARENTERAL at 08:05

## 2025-05-21 RX ADMIN — PROPOFOL 20 MG: 10 INJECTION, EMULSION INTRAVENOUS at 10:05

## 2025-05-21 RX ADMIN — GLYCOPYRROLATE 0.2 MG: 0.2 INJECTION INTRAMUSCULAR; INTRAVENOUS at 07:05

## 2025-05-21 RX ADMIN — OXYCODONE 5 MG: 5 TABLET ORAL at 05:05

## 2025-05-21 RX ADMIN — HYDRALAZINE HYDROCHLORIDE 100 MG: 100 TABLET ORAL at 04:05

## 2025-05-21 RX ADMIN — ONDANSETRON 4 MG: 2 INJECTION INTRAMUSCULAR; INTRAVENOUS at 11:05

## 2025-05-21 RX ADMIN — ACETAMINOPHEN 500 MG: 500 TABLET ORAL at 05:05

## 2025-05-21 RX ADMIN — INSULIN ASPART 4 UNITS: 100 INJECTION, SOLUTION INTRAVENOUS; SUBCUTANEOUS at 09:05

## 2025-05-21 RX ADMIN — PHENYLEPHRINE HYDROCHLORIDE 200 MCG: 10 INJECTION INTRAVENOUS at 08:05

## 2025-05-21 RX ADMIN — OXYCODONE 5 MG: 5 TABLET ORAL at 08:05

## 2025-05-21 RX ADMIN — ACETAMINOPHEN 500 MG: 500 TABLET ORAL at 02:05

## 2025-05-21 RX ADMIN — TRANEXAMIC ACID 1000 MG: 100 INJECTION, SOLUTION INTRAVENOUS at 08:05

## 2025-05-21 RX ADMIN — CEFAZOLIN 2 G: 2 INJECTION, POWDER, FOR SOLUTION INTRAMUSCULAR; INTRAVENOUS at 04:05

## 2025-05-21 RX ADMIN — FENTANYL CITRATE 100 MCG: 50 INJECTION, SOLUTION INTRAMUSCULAR; INTRAVENOUS at 07:05

## 2025-05-21 RX ADMIN — CALCIUM CHLORIDE 1 G: 100 INJECTION, SOLUTION INTRAVENOUS at 09:05

## 2025-05-21 RX ADMIN — DOCUSATE SODIUM 100 MG: 100 CAPSULE, LIQUID FILLED ORAL at 08:05

## 2025-05-21 RX ADMIN — TRANEXAMIC ACID 1000 MG: 100 INJECTION, SOLUTION INTRAVENOUS at 10:05

## 2025-05-21 RX ADMIN — MIDAZOLAM HYDROCHLORIDE 2 MG: 1 INJECTION, SOLUTION INTRAMUSCULAR; INTRAVENOUS at 07:05

## 2025-05-21 RX ADMIN — SERTRALINE HYDROCHLORIDE 100 MG: 50 TABLET ORAL at 08:05

## 2025-05-21 RX ADMIN — CLONIDINE HYDROCHLORIDE 0.2 MG: 0.2 TABLET ORAL at 04:05

## 2025-05-21 RX ADMIN — ACETAMINOPHEN 500 MG: 500 TABLET ORAL at 08:05

## 2025-05-21 RX ADMIN — ATORVASTATIN CALCIUM 20 MG: 20 TABLET, FILM COATED ORAL at 08:05

## 2025-05-21 RX ADMIN — EPHEDRINE SULFATE 50 MG: 50 INJECTION INTRAVENOUS at 08:05

## 2025-05-21 RX ADMIN — IPRATROPIUM BROMIDE 2 SPRAY: 21 SPRAY, METERED NASAL at 08:05

## 2025-05-21 RX ADMIN — MUPIROCIN: 20 OINTMENT TOPICAL at 08:05

## 2025-05-21 RX ADMIN — VECURONIUM BROMIDE 2 MG: 1 INJECTION, POWDER, LYOPHILIZED, FOR SOLUTION INTRAVENOUS at 08:05

## 2025-05-21 RX ADMIN — HUMAN INSULIN 7 UNITS: 100 INJECTION, SOLUTION SUBCUTANEOUS at 11:05

## 2025-05-21 RX ADMIN — FAMOTIDINE 20 MG: 20 TABLET, FILM COATED ORAL at 08:05

## 2025-05-21 RX ADMIN — LIDOCAINE HYDROCHLORIDE 100 MG: 20 INJECTION, SOLUTION INTRAVENOUS at 07:05

## 2025-05-21 RX ADMIN — PROPOFOL 150 MG: 10 INJECTION, EMULSION INTRAVENOUS at 07:05

## 2025-05-21 RX ADMIN — ACETAMINOPHEN 500 MG: 500 TABLET ORAL at 01:05

## 2025-05-21 RX ADMIN — DEXAMETHASONE SODIUM PHOSPHATE 4 MG: 4 INJECTION, SOLUTION INTRA-ARTICULAR; INTRALESIONAL; INTRAMUSCULAR; INTRAVENOUS; SOFT TISSUE at 08:05

## 2025-05-21 RX ADMIN — SENNOSIDES AND DOCUSATE SODIUM 1 TABLET: 50; 8.6 TABLET ORAL at 08:05

## 2025-05-21 RX ADMIN — SODIUM CHLORIDE: 9 INJECTION, SOLUTION INTRAVENOUS at 11:05

## 2025-05-21 RX ADMIN — ONDANSETRON 4 MG: 2 INJECTION INTRAMUSCULAR; INTRAVENOUS at 07:05

## 2025-05-21 RX ADMIN — ROCURONIUM BROMIDE 50 MG: 10 INJECTION, SOLUTION INTRAVENOUS at 07:05

## 2025-05-21 RX ADMIN — DEXMEDETOMIDINE: 200 INJECTION, SOLUTION INTRAVENOUS at 08:05

## 2025-05-21 RX ADMIN — PHENYLEPHRINE HYDROCHLORIDE 100 MCG: 10 INJECTION INTRAVENOUS at 08:05

## 2025-05-21 RX ADMIN — CEFAZOLIN 2 G: 2 INJECTION, POWDER, FOR SOLUTION INTRAMUSCULAR; INTRAVENOUS at 01:05

## 2025-05-21 RX ADMIN — OXYCODONE 5 MG: 5 TABLET ORAL at 02:05

## 2025-05-21 RX ADMIN — ROCURONIUM BROMIDE 20 MG: 10 INJECTION, SOLUTION INTRAVENOUS at 10:05

## 2025-05-21 NOTE — PROGRESS NOTES
[facility]  [facility] Medicine  Progress Note    Patient Name: Carey Leonardo  MRN: [id number]  Patient Class: IP- Inpatient   Admission Date: 5/20/2025  Length of Stay: 1 days  Attending Physician: Cyril Upton MD  Primary Care Provider: Zainab Harrell FNP        Subjective     Principal Problem:Lumbar radiculopathy, chronic        HPI:      Carey Leonardo is a 49 y.o. female with history of HTN, DM, HLD, and hypothyroidism. She is status-post L2-L4 laminectomy (stage I of II) by Dr. Upton earlier today (05/20/). Patient tolerated procedure well with no immediate post-operative complications. Anticipate second stage tomorrow. Medicine consulted for management of chronic conditions.     Patient was seen and examined after surgery. Chart reviewed. Pre-operative labs unremarkable aside from moderate hyperglycemia. Patient is afebrile with vital signs grossly within normal limits, and no complaints. Pain is controlled and is being managed by primary team. PT/OT evaluation pending following completion of staged procedure, discharge plan to be determined.  following for DME and other discharge needs.           Overview/Hospital Course:  No notes on file    Interval History:     No significant events overnight, no new complaints or concerns. To OR again today.       Objective:     Vital Signs (Most Recent):  Temp: 98.1 °F (36.7 °C) (05/21/25 1115)  Pulse: 94 (05/21/25 1225)  Resp: 15 (05/21/25 1210)  BP: (!) 154/85 (05/21/25 1210)  SpO2: (!) 91 % (05/21/25 1225) Vital Signs (24h Range):  Temp:  [98.1 °F (36.7 °C)-99.8 °F (37.7 °C)] 98.1 °F (36.7 °C)  Pulse:  [] 94  Resp:  [14-24] 15  SpO2:  [85 %-100 %] 91 %  BP: (108-154)/(61-97) 154/85     Weight: 91.2 kg (201 lb)  Body mass index is 31.48 kg/m².    Intake/Output Summary (Last 24 hours) at 5/21/2025 1347  Last data filed at 5/21/2025 1210  Gross per 24 hour   Intake 225 ml   Output --   Net 225 ml         Physical  Exam  Constitutional:       General: She is sleeping. She is not in acute distress.     Appearance: Normal appearance. She is not ill-appearing.   HENT:      Head: Normocephalic and atraumatic.      Nose: Nose normal.   Eyes:      Conjunctiva/sclera: Conjunctivae normal.      Pupils: Pupils are equal, round, and reactive to light.   Cardiovascular:      Rate and Rhythm: Normal rate and regular rhythm.   Pulmonary:      Effort: Pulmonary effort is normal. No respiratory distress.   Musculoskeletal:      Cervical back: No rigidity.   Skin:     Coloration: Skin is not jaundiced or pale.      Findings: No rash.   Neurological:      Mental Status: She is easily aroused.   Psychiatric:         Behavior: Behavior normal. Behavior is cooperative.         Thought Content: Thought content normal.               Significant Labs: All pertinent labs within the past 24 hours have been reviewed.    Significant Imaging: I have reviewed all pertinent imaging results/findings within the past 24 hours.      Assessment & Plan  Lumbar radiculopathy, chronic  Status-post  L2-L4 laminectomy (stageI/II), tolerated well. Management and disposition per primary.     - pain control  - PT/OT pending completion of staged procedure  - case management for discharge planning, DME      Hypothyroidism  Continue home levothyroxine. Most recent labs abnormal however patient had been out of medication at that time.     Primary hypertension  Patient's blood pressure range in the last 24 hours was: BP  Min: 108/64  Max: 154/85.The patient's inpatient anti-hypertensive regimen is listed below:  Current Antihypertensives  NIFEdipine 24 hr tablet 60 mg, Daily, Oral  metoprolol succinate (TOPROL-XL) 24 hr tablet 100 mg, Daily, Oral  hydrALAZINE tablet 100 mg, 3 times daily, Oral  cloNIDine tablet 0.2 mg, 4 times daily, Oral    Plan  - BP is controlled, no changes needed to their regimen    Type 2 diabetes mellitus, without long-term current use of  "insulin  Patient's FSGs are controlled on current medication regimen.  Last A1c reviewed-   Lab Results   Component Value Date    HGBA1C 7.7 (H) 04/07/2025     Most recent fingerstick glucose reviewed- No results for input(s): "POCTGLUCOSE" in the last 24 hours.  Current correctional scale  Medium  Maintain anti-hyperglycemic dose as follows-   Antihyperglycemics (From admission, onward)      Start     Stop Route Frequency Ordered    05/20/25 1137  insulin aspart U-100 injection 1-10 Units         -- SubQ Before meals & nightly PRN 05/20/25 1137          Hold Oral hypoglycemics while patient is in the hospital.    Obstructive sleep apnea syndrome  History of, does not use CPAP at present.    Mild intermittent asthma without complication  Controlled. Albuterol PRN    CHF (congestive heart failure)  Patient has undetermined heart failure that is Chronic. On presentation their CHF was well compensated. Most recent BNP and echo results are listed below.  No results for input(s): "BNP" in the last 72 hours.  Latest ECHO  No results found for this or any previous visit.    Current Heart Failure Medications  metoprolol succinate (TOPROL-XL) 24 hr tablet 100 mg, Daily, Oral  hydrALAZINE tablet 100 mg, 3 times daily, Oral    Plan  - Monitor strict I&Os and daily weights.    - Place on telemetry  - Low sodium diet  - Place on fluid restriction of NA.   - Cardiology has not been consulted  - The patient's volume status is at their baseline      VTE Risk Mitigation (From admission, onward)           Ordered     Place MARTA hose  Until discontinued         05/20/25 1137     Place sequential compression device  Until discontinued         05/20/25 1137                    Discharge Planning   LUAN:      Code Status: Full Code   Medical Readiness for Discharge Date:                    Please place Justification for DME        Lauryn Patrick DO  Department of Hospital Medicine   [facility]    "

## 2025-05-21 NOTE — ANESTHESIA PREPROCEDURE EVALUATION
05/21/2025  Carey Leonardo is a 49 y.o., female.      Pre-op Assessment    I have reviewed the Patient Summary Reports.    I have reviewed the NPO Status.   I have reviewed the Medications.     Review of Systems         Anesthesia Plan  Type of Anesthesia, risks & benefits discussed:    Anesthesia Type: Gen ETT  Intra-op Monitoring Plan: Standard ASA Monitors  Post Op Pain Control Plan: IV/PO Opioids PRN  Induction:  IV  Informed Consent: Informed consent signed with the Patient and all parties understand the risks and agree with anesthesia plan.  All questions answered.   ASA Score: 3    Ready For Surgery From Anesthesia Perspective.     .  No anesthetic complications  Allergies      Fish Containing Products Drug Class, Food, Food Anaphylaxis High  9/28/2021      Iodinated Contrast Media Drug Class  Not Specified  9/28/2021      Penicillins             HTN  IDDM  H/o CHF  Hypothyroidism  KILO  CKD  Previous hysterectomy     MP III; very poor dentition; adequate ROM at neck

## 2025-05-21 NOTE — OP NOTE
[facility]  Surgery Department  Operative Note    SUMMARY     Date of Procedure: 5/21/2025     Procedure: Procedure(s) (LRB):  ARTHRODESIS, SPINE, LUMBAR, TLIF (N/A)     Surgeons and Role:     * Cyril Upton MD - Primary    Assistant: None    Pre-Operative Diagnosis: Lumbar radiculopathy [M54.16]    Post-Operative Diagnosis: Post-Op Diagnosis Codes:     * Lumbar radiculopathy [M54.16]    Anesthesia: General    Technical Procedures Used:   1. Combined posterior and posterior interbody fusion L4-5, 69824   2.  Posterolateral fusion L2-L5, 22614 x 2  3. Posterior column osteotomy L4-5, 57892  4. Laminectomy with decompression of central canal, lateral recess, neural foramen L2-L5, 79422-42, 82723  5. Posterior segmental instrumentation L2-L5, 53392  6. Insertion of biomechanical device (interbody cage), 20973  7. Use of allograft and autograft for spine surgery with aspiration of bone marrow from iliac crest through separate incision, 20930, 20936, 20939     This is stage II of a planned 2 stage procedure    Description of the Findings of the Procedure:   Indications:  This is a 49 y.o. female with lumbar spondylolisthesis and radiculopathy.  They failed attempted conservative treatement.  Risks, benefits, and alternatives were discussed with the patient and they elected to proceed with surgery.    Procedure in detail:  The patient was identified in the preoperative holding area and the surgical site was marked. They were then taken back to the operating room where general endotracheal anesthesia was induced. They were then transitioned to the prone position on the Jayesh frame with the arms and legs in the physiologic position and all bony prominences well-padded. The posterior lumbar spine was prepped and draped in the normal sterile fashion. A timeout was performed. The incision site was localized with intraoperative fluoroscopy. After the incision was marked out a solution of  epinephrine was injected in the skin and subcutaneous tissues.  An erector spinae block was performed bilaterally at L3. A posterior midline incision was made. This was carried down to the fascia which was divided in line with the incision. A subperiosteal dissection was performed exposing the posterior elements of L2-L5.    The bilateral L2-L5 facet joints were prepared for fusion with rongeur and high-speed bur.  Pedicle screws were placed bilaterally at L2-L5 using the fluoroscopically assisted freehand technique.    Laminectomy was performed at L2-L5 with decompression of the central canal, lateral recess, neural foramen.  The lamina of L2-L5 were removed with the use of Leksell rongeur and high-speed bur and Kerrison punch.  Medial facetectomy and foraminotomy was performed bilaterally at each aforementioned level. The lateral recesses were decompressed bilaterally with resection of overgrown facet joints with the use of the Kerrison punch.  The nerve roots were followed out into the foramen and decompressed in the foramen with the Kerrison. The nerve roots were palpated and felt to be free of any compression.  The central canal, lateral recesses, neural foramen were noted to be adequately decompressed.    A separate fascial incision was made and a Jamshidi needle was placed into the right iliac crest.  20 cc of bone marrow was aspirated.  This was mixed with the SurGenTec graft.  Posterior column osteotomies were performed bilaterally at L4-5 to allow for restoration of lordosis.  The dural sac and nerve root were mobilized a retracted medially on the left at L4-5 to allow for access to the disc space.  An annulotomy was performed.  The endplates were prepared for fusion with Teddy, curettes, pituitary rongeur.  A complete diskectomy was performed. The appropriate size cage was selected and filled with autograft from the laminectomy mixed with powdered vancomycin.  This was then inserted under fluoroscopic  guidance and articulated into final position in the anterior disc space.  The disc space was then backfilled with SurGenTec allograft.     The appropriate size rods were selected and secured with set screws.  The set screws were final tightened using torque .  Final implant placement and spinal alignment was verified on fluoroscopic imaging found be satisfactory.  The wound was copiously irrigated with normal saline.  SurGenTec allograft was mixed with autograft from the laminectomy as well as 1 g of powdered vancomycin and was placed into the lateral gutters bilaterally.  A medium Hemovac drain was placed in the depths of the wound.    The fascial layer was closed with interrupted figure-of-eight #1 Vicryl suture and a running #1 Stratafix symmetric suture.  Powdered vancomycin was placed in the subcutaneous layer.  The subcutaneous layer was closed with a running Stratafix #0 suture. A running subcuticular layer was performed with 2-0 Stratafix suture. Dermabond prineo was applied to cover the incision. A sterile dressing of gauze and Tegaderm was then applied.  The drain was secured with Steri-Strips and dressed with fluff gauze and Tegaderm dressing.    The patient was then transitioned back to supine position, extubated and awakened and taken to recovery in good condition having suffered no untoward event.      Complications: No    Estimated Blood Loss (EBL):  300 mL           Implants: * No implants in log * Genesys pedicle screws and rods interbody cage.  SurGenTec allograft    Specimens:   Specimen (24h ago, onward)      None                    Condition: Good    Disposition: PACU - hemodynamically stable.    Attestation: I was present and scrubbed for the entire procedure.

## 2025-05-21 NOTE — PT/OT/SLP PROGRESS
Occupational Therapy      Patient Name:  Carey Leonardo   MRN:  [id number]    Patient not seen today secondary to Off the floor for procedure/surgery. Will follow-up 5/22/2025.    5/21/2025

## 2025-05-21 NOTE — PROGRESS NOTES
1105 RECEIVED TO RR EASILY AROUSED. ORIENTATION GIVEN. FOLLOWS COMMANDS, SUSAN. O2 VIA FM. DRESSING LOWER BACK AND LATERAL BACK D/I. HEMOVAC DRAIN TO OP-SITE COMPRESSED P/D SCANT PINKISH/RED DRAINAGE. IV INFUSING LEFT AC 20G. CATH OBSERVING CLOSELY. SEE FLOW SHEET. BLOOD SUGAR 276.    1130 C/O NAUSEA AND PAIN. ZOFRAN AND DILAUDID TITRATED FOR RELIEF.    1146 REGULAR INSULIN GIVEN FOR BLOOD SUGAR.    1205 REPEAT BLOOD SUGAR 273. PAIN LEVEL BELOW 5 SLEEPY, NO FURTHER NARCOTICS GIVEN. TRANSFERRED TO ROOM.

## 2025-05-21 NOTE — PROGRESS NOTES
1225 RELEASED TO FLOOR RN AWAKE, ALERT, MOVED SELF OVER TO BED. V/S 150/75-97-16-97.9 TEMP.-94% O2 SAT.  AT BEDSIDE.

## 2025-05-21 NOTE — SUBJECTIVE & OBJECTIVE
Interval History:     No significant events overnight, no new complaints or concerns. To OR again today.       Objective:     Vital Signs (Most Recent):  Temp: 98.1 °F (36.7 °C) (05/21/25 1115)  Pulse: 94 (05/21/25 1225)  Resp: 15 (05/21/25 1210)  BP: (!) 154/85 (05/21/25 1210)  SpO2: (!) 91 % (05/21/25 1225) Vital Signs (24h Range):  Temp:  [98.1 °F (36.7 °C)-99.8 °F (37.7 °C)] 98.1 °F (36.7 °C)  Pulse:  [] 94  Resp:  [14-24] 15  SpO2:  [85 %-100 %] 91 %  BP: (108-154)/(61-97) 154/85     Weight: 91.2 kg (201 lb)  Body mass index is 31.48 kg/m².    Intake/Output Summary (Last 24 hours) at 5/21/2025 1347  Last data filed at 5/21/2025 1210  Gross per 24 hour   Intake 225 ml   Output --   Net 225 ml         Physical Exam  Constitutional:       General: She is sleeping. She is not in acute distress.     Appearance: Normal appearance. She is not ill-appearing.   HENT:      Head: Normocephalic and atraumatic.      Nose: Nose normal.   Eyes:      Conjunctiva/sclera: Conjunctivae normal.      Pupils: Pupils are equal, round, and reactive to light.   Cardiovascular:      Rate and Rhythm: Normal rate and regular rhythm.   Pulmonary:      Effort: Pulmonary effort is normal. No respiratory distress.   Musculoskeletal:      Cervical back: No rigidity.   Skin:     Coloration: Skin is not jaundiced or pale.      Findings: No rash.   Neurological:      Mental Status: She is easily aroused.   Psychiatric:         Behavior: Behavior normal. Behavior is cooperative.         Thought Content: Thought content normal.               Significant Labs: All pertinent labs within the past 24 hours have been reviewed.    Significant Imaging: I have reviewed all pertinent imaging results/findings within the past 24 hours.

## 2025-05-21 NOTE — ASSESSMENT & PLAN NOTE
Patient's blood pressure range in the last 24 hours was: BP  Min: 108/64  Max: 154/85.The patient's inpatient anti-hypertensive regimen is listed below:  Current Antihypertensives  NIFEdipine 24 hr tablet 60 mg, Daily, Oral  metoprolol succinate (TOPROL-XL) 24 hr tablet 100 mg, Daily, Oral  hydrALAZINE tablet 100 mg, 3 times daily, Oral  cloNIDine tablet 0.2 mg, 4 times daily, Oral    Plan  - BP is controlled, no changes needed to their regimen

## 2025-05-21 NOTE — ANESTHESIA PROCEDURE NOTES
Intubation    Date/Time: 5/21/2025 7:45 AM    Performed by: Jean Stover  Authorized by: Dayday Adame MD    Intubation:     Induction:  Intravenous    Intubated:  Postinduction    Mask Ventilation:  Moderately difficult with oral airway    Attempts:  1    Attempted By:  Student    Method of Intubation:  Video laryngoscopy    Blade:  Glidescope 3    Laryngeal View Grade: Grade I - full view of cords      Difficult Airway Encountered?: No      Complications:  None    Airway Device:  Oral endotracheal tube    Airway Device Size:  7.5    Style/Cuff Inflation:  Cuffed    Inflation Amount (mL):  8    Tube secured:  23    Secured at:  The lips    Placement Verified By:  Capnometry    Complicating Factors:  None    Findings Post-Intubation:  BS equal bilateral and atraumatic/condition of teeth unchanged

## 2025-05-21 NOTE — TRANSFER OF CARE
"Anesthesia Transfer of Care Note    Patient: Carey Leonardo    Procedure(s) Performed: Procedure(s) (LRB):  ARTHRODESIS, SPINE, LUMBAR, TLIF (N/A)    Patient location: PACU    Anesthesia Type: general    Transport from OR: Transported from OR on 6-10 L/min O2 by face mask with adequate spontaneous ventilation    Post pain: adequate analgesia    Post assessment: no apparent anesthetic complications    Post vital signs: stable    Level of consciousness: awake and alert    Nausea/Vomiting: no nausea/vomiting    Complications: none    Transfer of care protocol was followed      Last vitals: Visit Vitals  /77   Pulse 91   Temp 36.7 °C (98.1 °F) (Oral)   Resp 16   Ht 5' 7" (1.702 m)   Wt 91.2 kg (201 lb)   LMP  (LMP Unknown)   SpO2 96%   Breastfeeding No   BMI 31.48 kg/m²     "

## 2025-05-21 NOTE — PT/OT/SLP PROGRESS
"Physical Therapy Treatment    Patient Name:  Carey Leonardo   MRN:  [id number]    Recommendations:     Discharge Recommendations: Low Intensity Therapy  Discharge Equipment Recommendations:  (potentially RW)  Barriers to discharge: ongoing treatment    Assessment:     Carey Leonardo is a 49 y.o. female admitted with a medical diagnosis of Lumbar radiculopathy, chronic.  She presents with the following impairments/functional limitations: weakness, impaired functional mobility, pain     Patient able to ambulate in room today with RW. Mobility better than yesterday. Will continue to progress as able    Rehab Prognosis: Good; patient would benefit from acute skilled PT services to address these deficits and reach maximum level of function.    Recent Surgery: Procedure(s) (LRB):  ARTHRODESIS, SPINE, LUMBAR, TLIF (N/A) * Day of Surgery *    Plan:     During this hospitalization, patient to be seen daily to address the identified rehab impairments via gait training, therapeutic activities, therapeutic exercises, neuromuscular re-education and progress toward the following goals:    Plan of Care Expires:  06/24/25    Subjective     Chief Complaint: post op pain  Patient/Family Comments/goals: "I need to use the bathroom"  Pain/Comfort:  Pain Rating 1: 9/10  Location - Orientation 1: lower  Location 1: back  Pain Addressed 1: Nurse notified      Objective:     Communicated with nurse prior to session.  Patient found right sidelying with peripheral IV, pulse ox (continuous), blood pressure cuff, telemetry, hemovac upon PT entry to room.     General Precautions: Standard, fall  Orthopedic Precautions: spinal precautions  Braces:    Respiratory Status: Room air     Functional Mobility:  Bed Mobility:     Supine to Sit: minimum assistance  Sit to Supine: minimum assistance  Transfers:     Sit to Stand:  contact guard assistance with rolling walker  Toilet Transfer: contact guard assistance with  rolling walker  using  Step " Transfer  Gait: 35 ft with RW and CGA, gait debra improved throughout  Balance: fair      AM-PAC 6 CLICK MOBILITY  Turning over in bed (including adjusting bedclothes, sheets and blankets)?: 3  Sitting down on and standing up from a chair with arms (e.g., wheelchair, bedside commode, etc.): 4  Moving from lying on back to sitting on the side of the bed?: 3  Moving to and from a bed to a chair (including a wheelchair)?: 4  Need to walk in hospital room?: 3  Climbing 3-5 steps with a railing?: 3  Basic Mobility Total Score: 20       Treatment & Education:  Mobility as noted  Ed on spinal precautions and log rolling    Patient left right sidelying with all lines intact, call button in reach, and nurse notified..    GOALS:   Multidisciplinary Problems       Physical Therapy Goals          Problem: Physical Therapy    Goal Priority Disciplines Outcome Interventions   Physical Therapy Goal     PT, PT/OT Progressing    Description: Short term goals:  . Supine to sit with Contact Guard Assistance  . Sit to supine with Contact Guard Assistance  . Sit to stand transfer with Contact Guard Assistance  . Gait  x 100 feet with Contact Guard Assistance using Rolling Walker.     Long term goals:  Patient will gain highest level functional mobility with lowest level of assistive device to return to desired living arrangement and prior ADL's.                          DME Justifications:   Carey's mobility limitation cannot be sufficiently resolved by the use of a cane. Her functional mobility deficit can be sufficiently resolved with the use of a Rolling Walker. Patient's mobility limitation significantly impairs their ability to participate in one of more activities of daily living.  The use of a RW will significantly improve the patient's ability to participate in MRADLS and the patient will use it on regular basis in the home.    Time Tracking:     PT Received On: 05/21/25  PT Start Time: 1516     PT Stop Time: 1542  PT Total  Time (min): 26 min     Billable Minutes: Gait Training 15 and Therapeutic Activity 11    Treatment Type: Treatment  PT/PTA: PT     Number of PTA visits since last PT visit: 0     05/21/2025

## 2025-05-21 NOTE — ANESTHESIA POSTPROCEDURE EVALUATION
Anesthesia Post Evaluation    Patient: Carey Leonardo    Procedure(s) Performed: Procedure(s) (LRB):  ARTHRODESIS, SPINE, LUMBAR, TLIF (N/A)    Final Anesthesia Type: general      Patient location during evaluation: PACU  Post-procedure vital signs: reviewed and stable  Pain management: adequate  Airway patency: patent    PONV status at discharge: No PONV  Anesthetic complications: no      Cardiovascular status: hemodynamically stable  Respiratory status: unassisted  Hydration status: euvolemic  Follow-up not needed.              Vitals Value Taken Time   /85 05/21/25 12:11   Temp 36.7 °C (98.1 °F) 05/21/25 11:15   Pulse 94 05/21/25 12:25   Resp 19 05/21/25 12:12   SpO2 91 % 05/21/25 12:25   Vitals shown include unfiled device data.      Event Time   Out of Recovery 05/21/2025 12:10:00         Pain/Miah Score: Pain Rating Prior to Med Admin: 8 (5/21/2025 11:45 AM)  Pain Rating Post Med Admin: 0 (5/21/2025  5:57 AM)  Miah Score: 9 (5/21/2025 12:00 PM)

## 2025-05-21 NOTE — INTERVAL H&P NOTE
Dr Diaz, ravinder of call with pt's son Jose as noted below.  I have not been able to reach pt yet for additional triage.  Do you have any concerns regarding what Jose reported to me regarding pt's constipation then loose stools?  Follow up visit with you was rescheduled to 1/24/22.   The patient has been examined and the H&P has been reviewed:    I concur with the findings and changes have been noted since the H&P was written: s/p L2-4 LLIF.  Plan for L2-5 lami/PSF    Anesthesia/Surgery risks, benefits and alternative options discussed and understood by patient/family.          Active Hospital Problems    Diagnosis  POA    *Lumbar radiculopathy, chronic [M54.16]  Yes     Chronic    Primary hypertension [I10]  Yes    Type 2 diabetes mellitus, without long-term current use of insulin [E11.9]  Yes    Obstructive sleep apnea syndrome [G47.33]  Yes    Mild intermittent asthma without complication [J45.20]  Yes    CHF (congestive heart failure) [I50.9]  Yes    Hypothyroidism [E03.9]  Yes     Chronic     Synthroid 200 mcg oral daily        Resolved Hospital Problems   No resolved problems to display.

## 2025-05-21 NOTE — NURSING
Pt rc'd back to the unit from PACU ; dressing to the pts' back noted to be clean and dry with two small dime size drainage spots noted. Spots outlined ; hemovac in place. Scd's reapplied and pt hooked up to the vital sign machine.

## 2025-05-22 VITALS
BODY MASS INDEX: 31.55 KG/M2 | OXYGEN SATURATION: 97 % | HEART RATE: 95 BPM | TEMPERATURE: 100 F | RESPIRATION RATE: 18 BRPM | DIASTOLIC BLOOD PRESSURE: 75 MMHG | SYSTOLIC BLOOD PRESSURE: 110 MMHG | HEIGHT: 67 IN | WEIGHT: 201 LBS

## 2025-05-22 LAB
GLUCOSE SERPL-MCNC: 171 MG/DL (ref 70–105)
GLUCOSE SERPL-MCNC: 220 MG/DL (ref 70–105)

## 2025-05-22 PROCEDURE — 27000221 HC OXYGEN, UP TO 24 HOURS

## 2025-05-22 PROCEDURE — 94761 N-INVAS EAR/PLS OXIMETRY MLT: CPT

## 2025-05-22 PROCEDURE — 25000003 PHARM REV CODE 250: Performed by: ORTHOPAEDIC SURGERY

## 2025-05-22 PROCEDURE — 63600175 PHARM REV CODE 636 W HCPCS: Performed by: ORTHOPAEDIC SURGERY

## 2025-05-22 PROCEDURE — 97535 SELF CARE MNGMENT TRAINING: CPT

## 2025-05-22 PROCEDURE — 97116 GAIT TRAINING THERAPY: CPT | Mod: CQ

## 2025-05-22 PROCEDURE — 99900035 HC TECH TIME PER 15 MIN (STAT)

## 2025-05-22 RX ADMIN — CLONIDINE HYDROCHLORIDE 0.2 MG: 0.2 TABLET ORAL at 12:05

## 2025-05-22 RX ADMIN — OXYCODONE 5 MG: 5 TABLET ORAL at 12:05

## 2025-05-22 RX ADMIN — LEVOTHYROXINE SODIUM 200 MCG: 100 TABLET ORAL at 05:05

## 2025-05-22 RX ADMIN — IPRATROPIUM BROMIDE 2 SPRAY: 21 SPRAY, METERED NASAL at 09:05

## 2025-05-22 RX ADMIN — ACETAMINOPHEN 500 MG: 500 TABLET ORAL at 12:05

## 2025-05-22 RX ADMIN — OXYCODONE 5 MG: 5 TABLET ORAL at 05:05

## 2025-05-22 RX ADMIN — ACETAMINOPHEN 500 MG: 500 TABLET ORAL at 05:05

## 2025-05-22 RX ADMIN — POLYETHYLENE GLYCOL 3350 17 G: 17 POWDER, FOR SOLUTION ORAL at 09:05

## 2025-05-22 RX ADMIN — ACETAMINOPHEN 500 MG: 500 TABLET ORAL at 09:05

## 2025-05-22 RX ADMIN — HYDRALAZINE HYDROCHLORIDE 100 MG: 100 TABLET ORAL at 09:05

## 2025-05-22 RX ADMIN — LURASIDONE HYDROCHLORIDE 20 MG: 20 TABLET ORAL at 09:05

## 2025-05-22 RX ADMIN — OXYCODONE 5 MG: 5 TABLET ORAL at 09:05

## 2025-05-22 RX ADMIN — CEFAZOLIN 2 G: 2 INJECTION, POWDER, FOR SOLUTION INTRAMUSCULAR; INTRAVENOUS at 12:05

## 2025-05-22 RX ADMIN — NIFEDIPINE 60 MG: 60 TABLET, FILM COATED, EXTENDED RELEASE ORAL at 09:05

## 2025-05-22 RX ADMIN — CLONIDINE HYDROCHLORIDE 0.2 MG: 0.2 TABLET ORAL at 09:05

## 2025-05-22 RX ADMIN — SENNOSIDES AND DOCUSATE SODIUM 1 TABLET: 50; 8.6 TABLET ORAL at 09:05

## 2025-05-22 RX ADMIN — METOPROLOL SUCCINATE 100 MG: 100 TABLET, EXTENDED RELEASE ORAL at 09:05

## 2025-05-22 RX ADMIN — DOCUSATE SODIUM 100 MG: 100 CAPSULE, LIQUID FILLED ORAL at 09:05

## 2025-05-22 RX ADMIN — FAMOTIDINE 20 MG: 20 TABLET, FILM COATED ORAL at 09:05

## 2025-05-22 NOTE — DISCHARGE INSTRUCTIONS
Office: [phone]      Spine Discharge Instructions    - Follow up with Dr. Upton in 10-14 days. Please call for appointment (if not already scheduled).   - Keep dressing clean and dry. May remove dressing 3 days after surgery  - May shower upon discharge. Do not scrub, tub bathe, or submerge the incision.  - No lifting greater than 10 pounds.  - Ambulate multiple times each day   - You may bend and twist for usual daily activities  - You may sleep in any position that is comfortable  - Do not drive a motor vehicle while on narcotic pain medication.   - Utilize pain medication (narcotics) as prescribed  - Do not exceed 3g Tylenol in a 24 hour period.   - Use stool softeners while taking narcotics to prevent constipation   - Resume your usual diet

## 2025-05-22 NOTE — PLAN OF CARE
[facility]  Initial Discharge Assessment       Primary Care Provider: Zainab Harrell FNP    Admission Diagnosis: Lumbar radiculopathy [M54.16]    Admission Date: 5/20/2025  Expected Discharge Date:     Transition of Care Barriers: None    Payor:  / Plan:  [facility] / Product Type: Government /     Extended Emergency Contact Information  Primary Emergency Contact: Jorge Leonardo  Mobile Phone: [phone]  Relation: Spouse  Preferred language: English   needed? No    Discharge Plan A: Home with family, Home Health  Discharge Plan B: Home with family, Home Health      [id number] - [facility] [place] - [address] AT [facility] [address]  [address]  [place] [facility] [place] [address]  Phone: [phone] Fax: [phone]    [facility] - [place], [place] - [address]  [address] [facility] [address]  Phone: [phone] Fax: [phone]    [facility], [place] - [address]  [address]  [facility] [address]  Phone: [phone] Fax: [phone]      Initial Assessment (most recent)       Adult Discharge Assessment - 05/22/25 0935          Discharge Assessment    Assessment Type Discharge Planning Assessment     Source of Information patient     People in Home spouse     Facility Arrived From: Home     Do you expect to return to your current living situation? Yes     Do you have help at home or someone to help you manage your care at home? No     Prior to hospitilization cognitive status: Alert/Oriented     Current cognitive status: Alert/Oriented     Walking or Climbing Stairs Difficulty no     Dressing/Bathing Difficulty no     Home Accessibility stairs to enter home     Number of Stairs, Main Entrance three     Home Layout Able to live on 1st floor     Equipment Currently Used at Home none     Patient currently being followed by outpatient case management? No     Do you currently have  service(s) that help you manage your care at home? No     Do you take prescription medications? Yes     Do you have prescription coverage? Yes     Do you have any problems affording any of your prescribed medications? No     Is the patient taking medications as prescribed? yes     Who is going to help you get home at discharge? Spouse     How do you get to doctors appointments? car, drives self;family or friend will provide     Are you on dialysis? No     Do you take coumadin? No     Discharge Plan A Home with family;Home Health     Discharge Plan B Home with family;Home Health     DME Needed Upon Discharge  walker, rolling     Discharge Plan discussed with: Patient     Transition of Care Barriers None        Physical Activity    On average, how many days per week do you engage in moderate to strenuous exercise (like a brisk walk)? 3 days     On average, how many minutes do you engage in exercise at this level? 30 min        Financial Resource Strain    How hard is it for you to pay for the very basics like food, housing, medical care, and heating? Not very hard        Housing Stability    In the last 12 months, was there a time when you were not able to pay the mortgage or rent on time? No     In the past 12 months, how many times have you moved where you were living? 0     At any time in the past 12 months, were you homeless or living in a shelter (including now)? No        Transportation Needs    In the past 12 months, has lack of transportation kept you from medical appointments or from getting medications? No     In the past 12 months, has lack of transportation kept you from meetings, work, or from getting things needed for daily living? No        Food Insecurity    Within the past 12 months, you worried that your food would run out before you got the money to buy more. Never true     Within the past 12 months, the food you bought just didn't last and you didn't have money to get more. Never true        Stress     Do you feel stress - tense, restless, nervous, or anxious, or unable to sleep at night because your mind is troubled all the time - these days? Not at all        Social Isolation    How often do you feel lonely or isolated from those around you?  Never        Alcohol Use    Q1: How often do you have a drink containing alcohol? Never     Q2: How many drinks containing alcohol do you have on a typical day when you are drinking? Patient does not drink     Q3: How often do you have six or more drinks on one occasion? Never        Utilities    In the past 12 months has the electric, gas, oil, or water company threatened to shut off services in your home? No        Health Literacy    How often do you need to have someone help you when you read instructions, pamphlets, or other written material from your doctor or pharmacy? Never                      CM was consulted on pt for a rolling walker. CM sent order to [facility], pt received a rolling walker with her  in 2021 and they will not cover another rolling walker. Pt is wanting home health arranged. Choice obtained for [facility]. CM will send referral. Pt will get a rolling walker from [facility]. CM following.

## 2025-05-22 NOTE — PROGRESS NOTES
[facility]    Patient Name:  Carey Leonardo   MRN:  [id number]  Date: 5/22/2025  Diagnosis: Lumbar radiculopathy, chronic    History:     Past Medical History:   Diagnosis Date    Abdominal pain 10/03/2024    Asthma     CHF (congestive heart failure)     Chronic serous otitis media of both ears 04/04/2023    Depressive disorder     Diabetes mellitus, type 2     Diabetic eye exam 10/24/2024    Dr. [facility]    Gastroparesis     Hyperlipidemia     Hypertension     Hypothyroidism 08/02/2022    Synthroid 200 mcg oral daily      IBS (irritable bowel syndrome)     Kidney stones     Pain 10/03/2024    Postlaminectomy syndrome, lumbar region 04/28/2022    [facility]    Sleep apnea     Does not use a CPAP       Social History[1]    Precautions:     Allergies as of 04/29/2025 - Reviewed 04/28/2025   Allergen Reaction Noted    Fish containing products Anaphylaxis 09/28/2021    Iodinated contrast media  09/28/2021    Penicillins  04/09/2021       WO Assessment Details/Treatment     Narrative: Seen patient for initiation of preventative skin care measures    Patient in bed, Alert. On phone. States skin is okay.  Margarito score 20    Consult wound care for any skin issues         05/22/2025         [1]   Social History  Socioeconomic History    Marital status:    Tobacco Use    Smoking status: Never     Passive exposure: Never    Smokeless tobacco: Never   Substance and Sexual Activity    Alcohol use: Not Currently    Drug use: Never    Sexual activity: Not Currently     Social Drivers of Health     Financial Resource Strain: Low Risk  (5/22/2025)    Overall Financial Resource Strain (CARDIA)     Difficulty of Paying Living Expenses: Not very hard   Food Insecurity: No Food Insecurity (5/22/2025)    Hunger Vital Sign     Worried About Running Out of Food in the Last Year: Never true     Ran Out of Food in the Last Year: Never true   Transportation Needs:  No Transportation Needs (5/22/2025)    PRAPARE - Transportation     Lack of Transportation (Medical): No     Lack of Transportation (Non-Medical): No   Physical Activity: Insufficiently Active (5/22/2025)    Exercise Vital Sign     Days of Exercise per Week: 3 days     Minutes of Exercise per Session: 30 min   Stress: No Stress Concern Present (5/22/2025)    [employer] [place] [employer] - Occupational Stress Questionnaire     Feeling of Stress : Not at all   Housing Stability: Low Risk  (5/22/2025)    Housing Stability Vital Sign     Unable to Pay for Housing in the Last Year: No     Number of Times Moved in the Last Year: 0     Homeless in the Last Year: No

## 2025-05-22 NOTE — PT/OT/SLP PROGRESS
Physical Therapy Treatment    Patient Name:  Carey Leonardo   MRN:  [id number]    Recommendations:     Discharge Recommendations: Low Intensity Therapy  Discharge Equipment Recommendations: other (see comments) (to be determined)  Barriers to discharge: ongoing medical treatment    Assessment:     Carey Leonardo is a 49 y.o. female admitted with a medical diagnosis of Lumbar radiculopathy, chronic.  She presents with the following impairments/functional limitations: other (comment), pain (spinal precautions).    Pt with sig increased gait distance as compared to previous treatment session    PT POC discussed with Ramo LucasPT     Rehab Prognosis: Good; patient would benefit from acute skilled PT services to address these deficits and reach maximum level of function.    Recent Surgery: Procedure(s) (LRB):  ARTHRODESIS, SPINE, LUMBAR, TLIF (N/A) 1 Day Post-Op    Plan:     During this hospitalization, patient to be seen daily to address the identified rehab impairments via gait training, therapeutic activities, therapeutic exercises, neuromuscular re-education and progress toward the following goals:    Plan of Care Expires:  06/24/25    Subjective     Chief Complaint: Lumbar radiculopathy, chronic   Patient/Family Comments/goals: pt agreeable  Pain/Comfort:  Pain Rating 1: 7/10  Location - Side 1: Left  Location 1: back  Pain Addressed 1: Pre-medicate for activity      Objective:     Communicated with Anabela Kapadia RN prior to session.  Patient found right sidelying with hemovac, peripheral IV upon PT entry to room.     General Precautions: Standard, fall  Orthopedic Precautions: spinal precautions  Braces:    Respiratory Status: Room air     Functional Mobility:  Bed Mobility:     Supine to Sit: stand by assistance and contact guard assistance  Sit to Supine: stand by assistance  Transfers:     Sit to Stand:  stand by assistance and contact guard assistance with rolling walker and verbal cues for hands  placement  Gait: 156' with RW and standby assist; very slow debra, guarded posture, short step length      AM-PAC 6 CLICK MOBILITY  Turning over in bed (including adjusting bedclothes, sheets and blankets)?: 4  Sitting down on and standing up from a chair with arms (e.g., wheelchair, bedside commode, etc.): 4  Moving from lying on back to sitting on the side of the bed?: 3  Moving to and from a bed to a chair (including a wheelchair)?: 4  Need to walk in hospital room?: 4  Climbing 3-5 steps with a railing?: 3  Basic Mobility Total Score: 22       Treatment & Education:      Patient left right sidelying with all lines intact, call button in reach, and spouse present..    GOALS:   Multidisciplinary Problems       Physical Therapy Goals          Problem: Physical Therapy    Goal Priority Disciplines Outcome Interventions   Physical Therapy Goal     PT, PT/OT Progressing    Description: Short term goals:  . Supine to sit with Contact Guard Assistance  . Sit to supine with Contact Guard Assistance  . Sit to stand transfer with Contact Guard Assistance  . Gait  x 100 feet with Contact Guard Assistance using Rolling Walker.     Long term goals:  Patient will gain highest level functional mobility with lowest level of assistive device to return to desired living arrangement and prior ADL's.                          DME Justifications:      Time Tracking:     PT Received On: 05/22/25  PT Start Time: 1000     PT Stop Time: 1017  PT Total Time (min): 17 min     Billable Minutes: Gait Training 15    Treatment Type: Treatment  PT/PTA: PTA     Number of PTA visits since last PT visit: 1 05/22/2025

## 2025-05-22 NOTE — PT/OT/SLP PROGRESS
Occupational Therapy   Treatment    Name: Carey Leonardo  MRN: [id number]  Admitting Diagnosis:  Lumbar radiculopathy, chronic  1 Day Post-Op    Recommendations:     Discharge Recommendations: Low Intensity Therapy  Discharge Equipment Recommendations:   (to be determined)  Barriers to discharge:       Assessment:     Carey Leonardo is a 49 y.o. female with a medical diagnosis of Lumbar radiculopathy, chronic.  She presents with s/p 2 stage lumbar fusion and TLIF. Performance deficits affecting function are impaired functional mobility, impaired self care skills, gait instability, impaired endurance, pain, decreased safety awareness, orthopedic precautions.     Rehab Prognosis:  Good; patient would benefit from acute skilled OT services to address these deficits and reach maximum level of function.       Plan:     Patient to be seen 5 x/week to address the above listed problems via self-care/home management, therapeutic activities, therapeutic exercises  Plan of Care Expires: 06/24/25  Plan of Care Reviewed with: patient    Subjective     Chief Complaint: lumbar radiculopathy  Patient/Family Comments/goals: pt agreeable to OT tx  Pain/Comfort:  Pain Rating 1: 8/10  Location - Orientation 1: lower  Location 1: back  Pain Addressed 1: Nurse notified, Reposition    Objective:     Communicated with: DILAN Peñaloza prior to session.  Patient found right sidelying with peripheral IV, hemovac upon OT entry to room.    General Precautions: Standard, fall    Orthopedic Precautions:spinal precautions  Braces:    Respiratory Status: Room air     Occupational Performance:     Bed Mobility:    Patient completed Supine to Sit with stand by assistance  Patient completed Sit to Supine with stand by assistance     Functional Mobility/Transfers:  Patient completed Sit <> Stand Transfer with contact guard assistance  with  rolling walker   Patient completed Toilet Transfer Step Transfer technique with contact guard assistance with  rolling  walker  Functional Mobility: pt performed functional mobility to bathroom and back to bed with CGA with RW    Activities of Daily Living:  Upper Body Dressing: modified independence to abdullahi dress  Lower Body Dressing: minimum assistance to abdullahi underwear  Toileting: stand by assistance to perform toilet hygiene      [facility] 6 Click ADL:      Treatment & Education:  Pt performed ADL training as listed above.     Patient left right sidelying with all lines intact, call button in reach, Anabela, RN notified, and  present    GOALS:   Multidisciplinary Problems       Occupational Therapy Goals          Problem: Occupational Therapy    Goal Priority Disciplines Outcome Interventions   Occupational Therapy Goal     OT, PT/OT Progressing    Description: STG: (in 1 week)  Pt will perform grooming with setup  Pt will bathe with setup and min(A)  Pt will perform UE dressing with setup  Pt will perform LE dressing with setup and min(A)  Pt will transfer bed/chair/bsc with SBA with RW  Pt will perform standing task x 5 min with SBA with RW   Pt will tolerate 15 minutes of tx without fatigue      LTG: (in 5 weeks)  1.Restore to max I with self care and mobility.                         DME Justifications:   Carey's mobility limitation cannot be sufficiently resolved by the use of a cane. Her functional mobility deficit can be sufficiently resolved with the use of a Rolling Walker. Patient's mobility limitation significantly impairs their ability to participate in one of more activities of daily living.  The use of a RW will significantly improve the patient's ability to participate in MRADLS and the patient will use it on regular basis in the home.    Time Tracking:     OT Date of Treatment: 05/22/25  OT Start Time: 0838  OT Stop Time: 0901  OT Total Time (min): 23 min    Billable Minutes:Self Care/Home Management 23 minutes    OT/CIPRIANO: OT          5/22/2025

## 2025-05-22 NOTE — DISCHARGE SUMMARY
[facility]  Orthopedics  Discharge Summary      Patient Name: Carey Leonardo  MRN: [id number]  Admission Date: 5/20/2025  Hospital Length of Stay: 2 days  Discharge Date and Time: 05/22/2025 3:14 PM  Attending Physician: Cyril Upton MD   Discharging Provider: Cyril Upton MD  Primary Care Provider: Zainab Harrell FNP      Procedure(s) (LRB):  ARTHRODESIS, SPINE, LUMBAR, TLIF (N/A)      Hospital Course:   This is a 49 y.o. year old female who was admitted on 5/20/2025 with diagnoses of Lumbar radiculopathy, chronic.  The patient was taken to the operating room on 5/20/2025 for an L2-L4 lateral fusion.  She returned to the OR on 05/21/2025 for a Procedure(s) (LRB):  ARTHRODESIS, SPINE, LUMBAR, TLIF (N/A).  See the dictated operative note for full detail. There were no complications during the surgery. The patient was given appropriate bart-operative antibiotics. Patient had a hemovac drain placed intaoperatively. The patient began working with Physical Therapy on Post op Day #1 who recommended home health PT upon discharge. Patients drain was removed on POD 1 and the patients dressing was changed on POD 1. The patient was placed on mechanical DVT prophylaxis.     Patients labs and vital signs are stable at this time.    Patient feels well and will be discharge today, 5/22/2025.    Consults (From admission, onward)          Status Ordering Provider     Inpatient consult to Social Work/Case Management  Once        Provider:  (Not yet assigned)    Completed CYRIL UPTON     Inpatient consult to Social Work  Once        Provider:  (Not yet assigned)    Completed CYRIL UPTON     Inpatient consult to Hospitalist  Once        Provider:  Sergio Ram DO    Completed CYRIL UPTON              Final Active Diagnoses:    Diagnosis Date Noted POA    PRINCIPAL PROBLEM:  Lumbar radiculopathy, chronic [M54.16] 05/18/2023 Yes     Chronic    Primary hypertension [I10] 05/20/2025  Yes    Type 2 diabetes mellitus, without long-term current use of insulin [E11.9] 05/20/2025 Yes    Obstructive sleep apnea syndrome [G47.33] 05/20/2025 Yes    Mild intermittent asthma without complication [J45.20] 05/20/2025 Yes    CHF (congestive heart failure) [I50.9] 05/20/2025 Yes    Hypothyroidism [E03.9] 08/02/2022 Yes     Chronic      Problems Resolved During this Admission:      Discharged Condition: good    Disposition: Home-Health Care [facility]    Follow Up:    Patient Instructions:      Diet general   Order Comments: Resume home diet     Lifting restrictions   Order Comments: No heavy lifting or strenuous activity     No driving, operating heavy equipment or signing legal documents while taking pain medication     Other restrictions (specify):   Order Comments: [facility]  Office: [phone]    Spine Discharge Instructions    - Follow up with Dr. Upton in 10-14 days. Please call for appointment (if not already scheduled).   - Keep dressing clean and dry. May remove dressing 3 days after surgery  - May shower upon discharge. Do not scrub, tub bathe, or submerge the incision.  - No lifting greater than 10 pounds.  - Ambulate multiple times each day   - You may bend and twist for usual daily activities  - You may sleep in any position that is comfortable  - Do not drive a motor vehicle while on narcotic pain medication.   - Utilize pain medication (narcotics) as prescribed  - Do not exceed 3g Tylenol in a 24 hour period.   - Use stool softeners while taking narcotics to prevent constipation   - Resume your usual diet     Wound care routine (specify)   Order Comments: Remove Tegaderm and gauze dressing in 72 hours.  Leave Dermabond mesh glued onto skin.  Okay to shower with running water, no soaking or submerging.     Call MD for:  temperature >100.4     Call MD for:  persistent nausea and vomiting     Call MD for:  severe uncontrolled pain     Call MD for:  difficulty breathing, headache or visual  disturbances     Call MD for:  redness, tenderness, or signs of infection (pain, swelling, redness, odor or green/yellow discharge around incision site)     Call MD for:  hives     Call MD for:  persistent dizziness or light-headedness     Call MD for:  extreme fatigue     Medications:  Reconciled Home Medications:      Medication List        CONTINUE taking these medications      albuterol 90 mcg/actuation inhaler  Commonly known as: VENTOLIN HFA  Inhale 2 puffs into the lungs every 6 (six) hours as needed for Wheezing. Rescue     atorvastatin 20 MG tablet  Commonly known as: LIPITOR  Take 1 tablet (20 mg total) by mouth every evening.     blood sugar diagnostic Strp  To check BG 4 times daily, to use with insurance preferred meter     blood-glucose meter kit  To check BG 4 times daily, to use with insurance preferred meter     cloNIDine 0.2 MG tablet  Commonly known as: CATAPRES  Take 1 tablet (0.2 mg total) by mouth 4 (four) times daily.     empagliflozin 10 mg tablet  Commonly known as: Jardiance  Take 1 tablet (10 mg total) by mouth once daily.     glipiZIDE 5 MG Tr24  Take 2 tablets (10 mg total) by mouth daily with breakfast.     hydrALAZINE 100 MG tablet  Commonly known as: APRESOLINE  Take 1 tablet (100 mg total) by mouth 3 (three) times daily.     ipratropium 21 mcg (0.03 %) nasal spray  Commonly known as: ATROVENT  2 sprays by Each Nostril route 2 (two) times daily.     lancets Misc  To check BG 4 times daily, to use with insurance preferred meter     LANTUS SOLOSTAR U-100 INSULIN 100 unit/mL (3 mL) Inpn pen  Generic drug: insulin glargine U-100 (Lantus)  Inject 10 Units into the skin every evening.     levothyroxine 200 MCG tablet  Commonly known as: SYNTHROID  Take 1 tablet (200 mcg total) by mouth before breakfast.     lurasidone 20 mg Tab tablet  Commonly known as: LATUDA     metoprolol succinate 100 MG 24 hr tablet  Commonly known as: TOPROL-XL  Take 1 tablet (100 mg total) by mouth once daily.    "  NIFEdipine 60 MG (OSM) 24 hr tablet  Commonly known as: PROCARDIA-XL  Take 1 tablet (60 mg total) by mouth once daily.     ondansetron 4 MG Tbdl  Commonly known as: ZOFRAN-ODT  Take 1 tablet (4 mg total) by mouth 2 (two) times daily.     pen needle, diabetic 32 gauge x 5/32" Ndle  1 Application by Misc.(Non-Drug; Combo Route) route once daily.     sertraline 100 MG tablet  Commonly known as: ZOLOFT  Take 1 tablet (100 mg total) by mouth every evening.     traMADoL 50 mg tablet  Commonly known as: ULTRAM  Take 1 tablet (50 mg total) by mouth every 8 (eight) hours as needed for Pain.              Cyril Upton MD  Orthopedics  [facility]   "

## 2025-05-22 NOTE — NURSING
Dc papers given to and gone over with the patient. Pt verbalizes an understanding of all restrictions listed in the paperwork. Dressing change also done at this time ; pt is aware that the dressing can come off on 5-. No tub baths and no lifting greater than 10lbs. Pt nor spouse raise any questions, objections, or concerns regarding discharge. Hemovac dc'd with cath tip intact. IV dc'd with cath tip intact.

## 2025-05-23 NOTE — PLAN OF CARE
[facility] Orth[facility]  Discharge Final Note    Primary Care Provider: Zainab Harrell FNP    Expected Discharge Date: 5/22/2025    Final Discharge Note (most recent)       Final Note - 05/23/25 0825          Final Note    Assessment Type Final Discharge Note     Anticipated Discharge Disposition Home-Health Care [facility]     Hospital Resources/Appts/Education Provided Provided patient/caregiver with written discharge plan information        Post-Acute Status    Post-Acute Authorization Home Health     Home Health Status Set-up Complete/Auth obtained     Patient choice form signed by patient/caregiver List with quality metrics by geographic area provided;List from CMS Compare;List from System Post-Acute Care     Discharge Delays None known at this time                     Important Message from Medicare             Contact Info       Cyril Upton MD   Specialty: Orthopedic Surgery, Spine Surgery    [address]  [facility] [place] [address]   Phone: [phone]       Next Steps: Follow up    Instructions: hosp f/u: June 5, 2025 at 12:50 pm          Pt discharged home with Zoey WATSON

## 2025-05-29 RX ORDER — ATORVASTATIN CALCIUM 20 MG/1
20 TABLET, FILM COATED ORAL NIGHTLY
Qty: 90 TABLET | Refills: 3 | Status: SHIPPED | OUTPATIENT
Start: 2025-05-29

## 2025-06-03 ENCOUNTER — OFFICE VISIT (OUTPATIENT)
Dept: DIABETES SERVICES | Facility: CLINIC | Age: 50
End: 2025-06-03
Payer: OTHER GOVERNMENT

## 2025-06-03 VITALS
DIASTOLIC BLOOD PRESSURE: 72 MMHG | HEIGHT: 67 IN | BODY MASS INDEX: 35 KG/M2 | HEART RATE: 105 BPM | SYSTOLIC BLOOD PRESSURE: 122 MMHG | RESPIRATION RATE: 18 BRPM | OXYGEN SATURATION: 98 % | WEIGHT: 223 LBS

## 2025-06-03 DIAGNOSIS — E11.9 TYPE 2 DIABETES MELLITUS WITHOUT COMPLICATION, WITHOUT LONG-TERM CURRENT USE OF INSULIN: Primary | ICD-10-CM

## 2025-06-03 DIAGNOSIS — E03.9 HYPOTHYROIDISM, UNSPECIFIED TYPE: Chronic | ICD-10-CM

## 2025-06-03 DIAGNOSIS — M54.16 LUMBAR RADICULOPATHY, CHRONIC: Chronic | ICD-10-CM

## 2025-06-03 DIAGNOSIS — I10 PRIMARY HYPERTENSION: ICD-10-CM

## 2025-06-03 DIAGNOSIS — G47.33 OBSTRUCTIVE SLEEP APNEA SYNDROME: ICD-10-CM

## 2025-06-03 DIAGNOSIS — I50.9 CONGESTIVE HEART FAILURE, UNSPECIFIED HF CHRONICITY, UNSPECIFIED HEART FAILURE TYPE: ICD-10-CM

## 2025-06-03 LAB — GLUCOSE SERPL-MCNC: 277 MG/DL (ref 70–110)

## 2025-06-03 PROCEDURE — 99214 OFFICE O/P EST MOD 30 MIN: CPT | Mod: S$PBB,,, | Performed by: NURSE PRACTITIONER

## 2025-06-03 PROCEDURE — 99215 OFFICE O/P EST HI 40 MIN: CPT | Mod: PBBFAC | Performed by: NURSE PRACTITIONER

## 2025-06-03 PROCEDURE — 99999PBSHW POCT GLUCOSE, HAND-HELD DEVICE: Mod: PBBFAC,,,

## 2025-06-03 PROCEDURE — 99999 PR PBB SHADOW E&M-EST. PATIENT-LVL V: CPT | Mod: PBBFAC,,, | Performed by: NURSE PRACTITIONER

## 2025-06-03 PROCEDURE — 82962 GLUCOSE BLOOD TEST: CPT | Mod: PBBFAC | Performed by: NURSE PRACTITIONER

## 2025-06-03 RX ORDER — INSULIN GLARGINE 100 [IU]/ML
20 INJECTION, SOLUTION SUBCUTANEOUS NIGHTLY
Qty: 10 ML | Refills: 3 | Status: SHIPPED | OUTPATIENT
Start: 2025-06-03

## 2025-06-03 RX ORDER — BLOOD-GLUCOSE SENSOR
EACH MISCELLANEOUS
Qty: 3 EACH | Refills: 6 | Status: SHIPPED | OUTPATIENT
Start: 2025-06-03

## 2025-06-03 RX ORDER — BLOOD-GLUCOSE,RECEIVER,CONT
EACH MISCELLANEOUS
Qty: 1 EACH | Refills: 1 | Status: SHIPPED | OUTPATIENT
Start: 2025-06-03

## 2025-06-03 RX ORDER — GLIPIZIDE 5 MG/1
10 TABLET, FILM COATED, EXTENDED RELEASE ORAL
Qty: 180 TABLET | Refills: 3 | Status: SHIPPED | OUTPATIENT
Start: 2025-06-03 | End: 2026-06-03

## 2025-06-12 ENCOUNTER — ANESTHESIA EVENT (OUTPATIENT)
Dept: SURGERY | Facility: HOSPITAL | Age: 50
End: 2025-06-12
Payer: OTHER GOVERNMENT

## 2025-06-12 ENCOUNTER — HOSPITAL ENCOUNTER (INPATIENT)
Facility: HOSPITAL | Age: 50
LOS: 7 days | Discharge: HOME HEALTH | DRG: 856 | End: 2025-06-19
Attending: ORTHOPAEDIC SURGERY | Admitting: ORTHOPAEDIC SURGERY
Payer: OTHER GOVERNMENT

## 2025-06-12 ENCOUNTER — ANESTHESIA (OUTPATIENT)
Dept: SURGERY | Facility: HOSPITAL | Age: 50
End: 2025-06-12
Payer: OTHER GOVERNMENT

## 2025-06-12 VITALS — HEART RATE: 110 BPM | OXYGEN SATURATION: 84 % | RESPIRATION RATE: 11 BRPM

## 2025-06-12 DIAGNOSIS — M46.26 INFECTION OF LUMBAR SPINE: Primary | ICD-10-CM

## 2025-06-12 LAB
BUN SERPL-MCNC: 6 MG/DL (ref 7–19)
BUN/CREAT SERPL: 8 (ref 6–20)
CREAT SERPL-MCNC: 0.78 MG/DL (ref 0.55–1.02)
EGFR (NO RACE VARIABLE) (RUSH/TITUS): 94 ML/MIN/1.73M2
GLUCOSE SERPL-MCNC: 113 MG/DL (ref 70–105)
GLUCOSE SERPL-MCNC: 141 MG/DL (ref 70–105)
GLUCOSE SERPL-MCNC: 181 MG/DL (ref 70–105)

## 2025-06-12 PROCEDURE — 11000001 HC ACUTE MED/SURG PRIVATE ROOM

## 2025-06-12 PROCEDURE — 63600175 PHARM REV CODE 636 W HCPCS: Performed by: ORTHOPAEDIC SURGERY

## 2025-06-12 PROCEDURE — 84520 ASSAY OF UREA NITROGEN: CPT | Performed by: ORTHOPAEDIC SURGERY

## 2025-06-12 PROCEDURE — 25000003 PHARM REV CODE 250: Performed by: ORTHOPAEDIC SURGERY

## 2025-06-12 PROCEDURE — 37000009 HC ANESTHESIA EA ADD 15 MINS: Performed by: ORTHOPAEDIC SURGERY

## 2025-06-12 PROCEDURE — 36000707: Performed by: ORTHOPAEDIC SURGERY

## 2025-06-12 PROCEDURE — 36000706: Performed by: ORTHOPAEDIC SURGERY

## 2025-06-12 PROCEDURE — 36415 COLL VENOUS BLD VENIPUNCTURE: CPT | Performed by: ORTHOPAEDIC SURGERY

## 2025-06-12 PROCEDURE — 82962 GLUCOSE BLOOD TEST: CPT

## 2025-06-12 PROCEDURE — 71000033 HC RECOVERY, INTIAL HOUR: Performed by: ORTHOPAEDIC SURGERY

## 2025-06-12 PROCEDURE — 87075 CULTR BACTERIA EXCEPT BLOOD: CPT | Performed by: ORTHOPAEDIC SURGERY

## 2025-06-12 PROCEDURE — 63600175 PHARM REV CODE 636 W HCPCS: Performed by: ANESTHESIOLOGY

## 2025-06-12 PROCEDURE — 25000003 PHARM REV CODE 250: Performed by: NURSE ANESTHETIST, CERTIFIED REGISTERED

## 2025-06-12 PROCEDURE — 37000008 HC ANESTHESIA 1ST 15 MINUTES: Performed by: ORTHOPAEDIC SURGERY

## 2025-06-12 PROCEDURE — 63600175 PHARM REV CODE 636 W HCPCS: Performed by: NURSE ANESTHETIST, CERTIFIED REGISTERED

## 2025-06-12 PROCEDURE — 87077 CULTURE AEROBIC IDENTIFY: CPT | Performed by: ORTHOPAEDIC SURGERY

## 2025-06-12 PROCEDURE — 0QB00ZZ EXCISION OF LUMBAR VERTEBRA, OPEN APPROACH: ICD-10-PCS | Performed by: ORTHOPAEDIC SURGERY

## 2025-06-12 RX ORDER — GLUCAGON 1 MG
1 KIT INJECTION
Status: DISCONTINUED | OUTPATIENT
Start: 2025-06-12 | End: 2025-06-19 | Stop reason: HOSPADM

## 2025-06-12 RX ORDER — SODIUM CHLORIDE 9 MG/ML
INJECTION, SOLUTION INTRAVENOUS CONTINUOUS
Status: DISCONTINUED | OUTPATIENT
Start: 2025-06-12 | End: 2025-06-12

## 2025-06-12 RX ORDER — GLUCAGON 1 MG
1 KIT INJECTION
Status: DISCONTINUED | OUTPATIENT
Start: 2025-06-12 | End: 2025-06-12 | Stop reason: HOSPADM

## 2025-06-12 RX ORDER — ACETAMINOPHEN 500 MG
500 TABLET ORAL EVERY 4 HOURS
Status: DISCONTINUED | OUTPATIENT
Start: 2025-06-12 | End: 2025-06-19 | Stop reason: HOSPADM

## 2025-06-12 RX ORDER — LIDOCAINE HYDROCHLORIDE 20 MG/ML
INJECTION, SOLUTION EPIDURAL; INFILTRATION; INTRACAUDAL; PERINEURAL
Status: DISCONTINUED | OUTPATIENT
Start: 2025-06-12 | End: 2025-06-12

## 2025-06-12 RX ORDER — ONDANSETRON HYDROCHLORIDE 2 MG/ML
4 INJECTION, SOLUTION INTRAVENOUS DAILY PRN
Status: DISCONTINUED | OUTPATIENT
Start: 2025-06-12 | End: 2025-06-12 | Stop reason: HOSPADM

## 2025-06-12 RX ORDER — MORPHINE SULFATE 2 MG/ML
2 INJECTION, SOLUTION INTRAMUSCULAR; INTRAVENOUS
Status: DISCONTINUED | OUTPATIENT
Start: 2025-06-12 | End: 2025-06-19 | Stop reason: HOSPADM

## 2025-06-12 RX ORDER — OXYCODONE HYDROCHLORIDE 5 MG/1
5 TABLET ORAL
Status: DISCONTINUED | OUTPATIENT
Start: 2025-06-12 | End: 2025-06-19 | Stop reason: HOSPADM

## 2025-06-12 RX ORDER — MUPIROCIN 20 MG/G
OINTMENT TOPICAL 2 TIMES DAILY
Status: COMPLETED | OUTPATIENT
Start: 2025-06-12 | End: 2025-06-14

## 2025-06-12 RX ORDER — CLINDAMYCIN PHOSPHATE 900 MG/50ML
900 INJECTION, SOLUTION INTRAVENOUS
Status: COMPLETED | OUTPATIENT
Start: 2025-06-12 | End: 2025-06-13

## 2025-06-12 RX ORDER — METOCLOPRAMIDE HYDROCHLORIDE 5 MG/ML
5 INJECTION INTRAMUSCULAR; INTRAVENOUS EVERY 6 HOURS PRN
Status: DISCONTINUED | OUTPATIENT
Start: 2025-06-12 | End: 2025-06-19 | Stop reason: HOSPADM

## 2025-06-12 RX ORDER — CLONIDINE HYDROCHLORIDE 0.2 MG/1
0.2 TABLET ORAL 4 TIMES DAILY
Status: DISCONTINUED | OUTPATIENT
Start: 2025-06-12 | End: 2025-06-19 | Stop reason: HOSPADM

## 2025-06-12 RX ORDER — IPRATROPIUM BROMIDE AND ALBUTEROL SULFATE 2.5; .5 MG/3ML; MG/3ML
3 SOLUTION RESPIRATORY (INHALATION) ONCE AS NEEDED
Status: DISCONTINUED | OUTPATIENT
Start: 2025-06-12 | End: 2025-06-12 | Stop reason: HOSPADM

## 2025-06-12 RX ORDER — MORPHINE SULFATE 10 MG/ML
4 INJECTION INTRAMUSCULAR; INTRAVENOUS; SUBCUTANEOUS EVERY 5 MIN PRN
Status: DISCONTINUED | OUTPATIENT
Start: 2025-06-12 | End: 2025-06-12 | Stop reason: HOSPADM

## 2025-06-12 RX ORDER — ACETAMINOPHEN 10 MG/ML
1000 INJECTION, SOLUTION INTRAVENOUS EVERY 8 HOURS
Status: DISCONTINUED | OUTPATIENT
Start: 2025-06-12 | End: 2025-06-12 | Stop reason: HOSPADM

## 2025-06-12 RX ORDER — IBUPROFEN 200 MG
16 TABLET ORAL
Status: DISCONTINUED | OUTPATIENT
Start: 2025-06-12 | End: 2025-06-19 | Stop reason: HOSPADM

## 2025-06-12 RX ORDER — ATORVASTATIN CALCIUM 20 MG/1
20 TABLET, FILM COATED ORAL NIGHTLY
Status: DISCONTINUED | OUTPATIENT
Start: 2025-06-12 | End: 2025-06-19 | Stop reason: HOSPADM

## 2025-06-12 RX ORDER — METOPROLOL SUCCINATE 100 MG/1
100 TABLET, EXTENDED RELEASE ORAL DAILY
Status: DISCONTINUED | OUTPATIENT
Start: 2025-06-13 | End: 2025-06-19 | Stop reason: HOSPADM

## 2025-06-12 RX ORDER — SERTRALINE HYDROCHLORIDE 50 MG/1
100 TABLET, FILM COATED ORAL NIGHTLY
Status: DISCONTINUED | OUTPATIENT
Start: 2025-06-12 | End: 2025-06-19 | Stop reason: HOSPADM

## 2025-06-12 RX ORDER — DOCUSATE SODIUM 100 MG/1
100 CAPSULE, LIQUID FILLED ORAL 2 TIMES DAILY
Status: DISCONTINUED | OUTPATIENT
Start: 2025-06-12 | End: 2025-06-19 | Stop reason: HOSPADM

## 2025-06-12 RX ORDER — DIPHENHYDRAMINE HYDROCHLORIDE 50 MG/ML
25 INJECTION, SOLUTION INTRAMUSCULAR; INTRAVENOUS EVERY 6 HOURS PRN
Status: DISCONTINUED | OUTPATIENT
Start: 2025-06-12 | End: 2025-06-12 | Stop reason: HOSPADM

## 2025-06-12 RX ORDER — FENTANYL CITRATE 50 UG/ML
INJECTION, SOLUTION INTRAMUSCULAR; INTRAVENOUS
Status: DISCONTINUED | OUTPATIENT
Start: 2025-06-12 | End: 2025-06-12

## 2025-06-12 RX ORDER — IPRATROPIUM BROMIDE 21 UG/1
2 SPRAY, METERED NASAL 2 TIMES DAILY
Status: DISCONTINUED | OUTPATIENT
Start: 2025-06-12 | End: 2025-06-19 | Stop reason: HOSPADM

## 2025-06-12 RX ORDER — SODIUM CHLORIDE 0.9 % (FLUSH) 0.9 %
10 SYRINGE (ML) INJECTION
Status: DISCONTINUED | OUTPATIENT
Start: 2025-06-12 | End: 2025-06-19 | Stop reason: HOSPADM

## 2025-06-12 RX ORDER — OXYCODONE AND ACETAMINOPHEN TABLETS 10; 300 MG/1; MG/1
1 TABLET ORAL EVERY 4 HOURS PRN
COMMUNITY
End: 2025-06-26

## 2025-06-12 RX ORDER — ROCURONIUM BROMIDE 10 MG/ML
INJECTION, SOLUTION INTRAVENOUS
Status: DISCONTINUED | OUTPATIENT
Start: 2025-06-12 | End: 2025-06-12

## 2025-06-12 RX ORDER — PROPOFOL 10 MG/ML
VIAL (ML) INTRAVENOUS
Status: DISCONTINUED | OUTPATIENT
Start: 2025-06-12 | End: 2025-06-12

## 2025-06-12 RX ORDER — POLYETHYLENE GLYCOL 3350 17 G/17G
17 POWDER, FOR SOLUTION ORAL DAILY
Status: DISCONTINUED | OUTPATIENT
Start: 2025-06-13 | End: 2025-06-19 | Stop reason: HOSPADM

## 2025-06-12 RX ORDER — ONDANSETRON HYDROCHLORIDE 2 MG/ML
INJECTION, SOLUTION INTRAVENOUS
Status: DISCONTINUED | OUTPATIENT
Start: 2025-06-12 | End: 2025-06-12

## 2025-06-12 RX ORDER — MIDAZOLAM HYDROCHLORIDE 1 MG/ML
INJECTION INTRAMUSCULAR; INTRAVENOUS
Status: DISCONTINUED | OUTPATIENT
Start: 2025-06-12 | End: 2025-06-12

## 2025-06-12 RX ORDER — AMOXICILLIN 250 MG
1 CAPSULE ORAL 2 TIMES DAILY
Status: DISCONTINUED | OUTPATIENT
Start: 2025-06-12 | End: 2025-06-19 | Stop reason: HOSPADM

## 2025-06-12 RX ORDER — HYDRALAZINE HYDROCHLORIDE 100 MG/1
100 TABLET, FILM COATED ORAL 3 TIMES DAILY
Status: DISCONTINUED | OUTPATIENT
Start: 2025-06-12 | End: 2025-06-19 | Stop reason: HOSPADM

## 2025-06-12 RX ORDER — MORPHINE SULFATE 4 MG/ML
INJECTION, SOLUTION INTRAMUSCULAR; INTRAVENOUS
Status: DISCONTINUED | OUTPATIENT
Start: 2025-06-12 | End: 2025-06-12 | Stop reason: HOSPADM

## 2025-06-12 RX ORDER — OXYCODONE HYDROCHLORIDE 5 MG/1
10 TABLET ORAL EVERY 4 HOURS PRN
Status: DISCONTINUED | OUTPATIENT
Start: 2025-06-12 | End: 2025-06-18

## 2025-06-12 RX ORDER — IBUPROFEN 200 MG
24 TABLET ORAL
Status: DISCONTINUED | OUTPATIENT
Start: 2025-06-12 | End: 2025-06-19 | Stop reason: HOSPADM

## 2025-06-12 RX ORDER — CLINDAMYCIN PHOSPHATE 900 MG/50ML
INJECTION, SOLUTION INTRAVENOUS
Status: DISCONTINUED | OUTPATIENT
Start: 2025-06-12 | End: 2025-06-12

## 2025-06-12 RX ORDER — NIFEDIPINE 60 MG/1
60 TABLET, EXTENDED RELEASE ORAL DAILY
Status: DISCONTINUED | OUTPATIENT
Start: 2025-06-13 | End: 2025-06-19 | Stop reason: HOSPADM

## 2025-06-12 RX ORDER — ONDANSETRON HYDROCHLORIDE 2 MG/ML
INJECTION, SOLUTION INTRAVENOUS
Status: DISCONTINUED | OUTPATIENT
Start: 2025-06-12 | End: 2025-06-12 | Stop reason: HOSPADM

## 2025-06-12 RX ORDER — ONDANSETRON HYDROCHLORIDE 2 MG/ML
4 INJECTION, SOLUTION INTRAVENOUS EVERY 12 HOURS PRN
Status: DISCONTINUED | OUTPATIENT
Start: 2025-06-12 | End: 2025-06-19 | Stop reason: HOSPADM

## 2025-06-12 RX ORDER — INSULIN ASPART 100 [IU]/ML
1-10 INJECTION, SOLUTION INTRAVENOUS; SUBCUTANEOUS
Status: DISCONTINUED | OUTPATIENT
Start: 2025-06-12 | End: 2025-06-19 | Stop reason: HOSPADM

## 2025-06-12 RX ORDER — LEVOTHYROXINE SODIUM 100 UG/1
200 TABLET ORAL
Status: DISCONTINUED | OUTPATIENT
Start: 2025-06-13 | End: 2025-06-19 | Stop reason: HOSPADM

## 2025-06-12 RX ORDER — ALBUTEROL SULFATE 0.83 MG/ML
2.5 SOLUTION RESPIRATORY (INHALATION) EVERY 6 HOURS PRN
Status: DISCONTINUED | OUTPATIENT
Start: 2025-06-12 | End: 2025-06-19 | Stop reason: HOSPADM

## 2025-06-12 RX ORDER — FAMOTIDINE 20 MG/1
20 TABLET, FILM COATED ORAL 2 TIMES DAILY
Status: DISCONTINUED | OUTPATIENT
Start: 2025-06-12 | End: 2025-06-19 | Stop reason: HOSPADM

## 2025-06-12 RX ORDER — HYDROMORPHONE HYDROCHLORIDE 2 MG/ML
0.5 INJECTION, SOLUTION INTRAMUSCULAR; INTRAVENOUS; SUBCUTANEOUS EVERY 5 MIN PRN
Status: DISCONTINUED | OUTPATIENT
Start: 2025-06-12 | End: 2025-06-12 | Stop reason: HOSPADM

## 2025-06-12 RX ORDER — ALBUTEROL SULFATE 90 UG/1
2 INHALANT RESPIRATORY (INHALATION) EVERY 6 HOURS PRN
Status: DISCONTINUED | OUTPATIENT
Start: 2025-06-12 | End: 2025-06-12

## 2025-06-12 RX ADMIN — CLINDAMYCIN PHOSPHATE 900 MG: 900 INJECTION, SOLUTION INTRAVENOUS at 09:06

## 2025-06-12 RX ADMIN — SODIUM CHLORIDE: 9 INJECTION, SOLUTION INTRAVENOUS at 08:06

## 2025-06-12 RX ADMIN — SODIUM CHLORIDE: 9 INJECTION, SOLUTION INTRAVENOUS at 05:06

## 2025-06-12 RX ADMIN — LIDOCAINE HYDROCHLORIDE 75 MG: 20 INJECTION, SOLUTION EPIDURAL; INFILTRATION; INTRACAUDAL; PERINEURAL at 06:06

## 2025-06-12 RX ADMIN — MUPIROCIN: 20 OINTMENT TOPICAL at 09:06

## 2025-06-12 RX ADMIN — MIDAZOLAM HYDROCHLORIDE 2 MG: 1 INJECTION, SOLUTION INTRAMUSCULAR; INTRAVENOUS at 05:06

## 2025-06-12 RX ADMIN — ONDANSETRON 4 MG: 2 INJECTION INTRAMUSCULAR; INTRAVENOUS at 05:06

## 2025-06-12 RX ADMIN — ACETAMINOPHEN 1000 MG: 10 INJECTION, SOLUTION INTRAVENOUS at 08:06

## 2025-06-12 RX ADMIN — SODIUM CHLORIDE 2500 MG: 9 INJECTION, SOLUTION INTRAVENOUS at 10:06

## 2025-06-12 RX ADMIN — CLINDAMYCIN IN 5 PERCENT DEXTROSE 900 MG: 18 INJECTION, SOLUTION INTRAVENOUS at 06:06

## 2025-06-12 RX ADMIN — ROCURONIUM BROMIDE 50 MG: 10 INJECTION, SOLUTION INTRAVENOUS at 06:06

## 2025-06-12 RX ADMIN — OXYCODONE 5 MG: 5 TABLET ORAL at 11:06

## 2025-06-12 RX ADMIN — FENTANYL CITRATE 100 MCG: 50 INJECTION, SOLUTION INTRAMUSCULAR; INTRAVENOUS at 06:06

## 2025-06-12 RX ADMIN — PROPOFOL 180 MG: 10 INJECTION, EMULSION INTRAVENOUS at 06:06

## 2025-06-12 RX ADMIN — SODIUM CHLORIDE: 9 INJECTION, SOLUTION INTRAVENOUS at 01:06

## 2025-06-12 RX ADMIN — HYDROMORPHONE HYDROCHLORIDE 0.5 MG: 2 INJECTION INTRAMUSCULAR; INTRAVENOUS; SUBCUTANEOUS at 07:06

## 2025-06-12 NOTE — Clinical Note
Anesthesia Type: Local Only Care notes given on spinal fusion, managing pain at home, oxycodone and tramadol

## 2025-06-12 NOTE — ANESTHESIA PROCEDURE NOTES
Intubation    Date/Time: 6/12/2025 6:08 PM    Performed by: Guero Hill CRNA  Authorized by: Dayday Adame MD    Intubation:     Induction:  Intravenous    Intubated:  Postinduction    Mask Ventilation:  Easy mask    Attempts:  1    Attempted By:  CRNA    Method of Intubation:  Direct    Blade:  Alyx 3    Laryngeal View Grade: Grade IIA - cords partially seen      Difficult Airway Encountered?: No      Complications:  None    Airway Device:  Oral endotracheal tube    Airway Device Size:  7.5    Style/Cuff Inflation:  Cuffed (inflated to minimal occlusive pressure)    Inflation Amount (mL):  7    Tube secured:  23    Secured at:  The lips    Placement Verified By:  Capnometry    Complicating Factors:  Poor neck/head extension and obesity    Findings Post-Intubation:  BS equal bilateral and atraumatic/condition of teeth unchanged  Notes:      Smooth, tolerated well

## 2025-06-12 NOTE — ANESTHESIA PREPROCEDURE EVALUATION
06/12/2025  Carey Leonardo is a 49 y.o., female.      Pre-op Assessment    I have reviewed the Patient Summary Reports.     I have reviewed the Nursing Notes. I have reviewed the NPO Status.   I have reviewed the Medications.     Review of Systems  Anesthesia Hx:  No problems with previous Anesthesia             Denies Family Hx of Anesthesia complications.    Denies Personal Hx of Anesthesia complications.                    Social:  Smoker, Social Alcohol Use       Hematology/Oncology:       -- Anemia:                                  Cardiovascular:  Exercise tolerance: good   Hypertension       CHF       ECG has been reviewed.                            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 [facility]    Gastroparesis     Hyperlipidemia     Hypertension     Hypothyroidism 08/02/2022    Synthroid 200 mcg oral daily      IBS (irritable bowel syndrome)     Kidney stones     Pain 10/03/2024    Postlaminectomy syndrome, lumbar region 04/28/2022    [facility]    Sleep apnea     Does not use a CPAP     Past Surgical History:   Procedure Laterality Date    Bilateral L3-S1 MBB Bilateral 6-, 5-, 1-8-2014    Dr Jessica TAYLOR  TUNNEL RELEASE      CHOLECYSTECTOMY      DIAGNOSTIC LAPAROSCOPY  04/14/2010    Exploratory Laparotomy, Myomectomy, Hydrotubation-Dr. Feng    DILATION AND CURETTAGE OF UTERUS  04/14/2010    Hysteroscopy-Dr. Feng    EPIDURAL STEROID INJECTION N/A 10/22/2024    Procedure: Injection, Steroid, Epidural, L4/5;  Surgeon: Rasheeda Bills MD;  Location: [facility];  Service: Pain Management;  Laterality: N/A;    EXPLORATORY LAPAROTOMY WITH UTERINE MYOMECTOMY  02/27/2007    Dr. Marquise Anderson    FUSION, SPINE, LATERAL APPROACH N/A 5/20/2025    Procedure: ARTHRODESIS, SPINE, LATERAL;  Surgeon: Cyril Upton MD;  Location: [facility] OR;  Service: Orthopedics;  Laterality: N/A;  L2-L4 LATERAL FUSION    HYSTERECTOMY  09/29/2011    Robotic, FABIENNE, Cystoscopy-Dr. Feng    HYSTEROSCOPY WITH DILATION AND CURETTAGE OF UTERUS  04/04/2006    Laparoscopy, Chromotubation-Dr. Marquise Anderson    INJECTION OF ANESTHETIC AGENT AROUND ILIOINGUINAL NERVE Right 6/22/2023    Procedure: BLOCK, NERVE, ILIOINGUINAL;  Surgeon: Rasheeda Bills MD;  Location: [facility];  Service: Pain Management;  Laterality: Right;    INJECTION OF ANESTHETIC AGENT AROUND MEDIAL BRANCH NERVES INNERVATING LUMBAR FACET JOINT Bilateral 9/21/2023    Procedure: BLOCK, NERVE, FACET JOINT, LUMBAR, MEDIAL BRANCH;  Surgeon: Rasheeda Bills MD;  Location: [facility];  Service: Pain Management;  Laterality: Bilateral;    INJECTION OF ANESTHETIC AGENT AROUND MEDIAL BRANCH NERVES INNERVATING LUMBAR FACET JOINT Bilateral 3/14/2024    Procedure: BLOCK, NERVE, FACET JOINT, LUMBAR, MEDIAL BRANCH, BILATERAL L4-S1 MBB;  Surgeon: Rasheeda Bills MD;  Location: [facility];  Service: Pain Management;  Laterality: Bilateral;    LAMINECTOMY N/A 11/30/2021    Procedure: L2-L5 Laminectomy;  Surgeon: Cyril Upton MD;  Location: [facility] OR;  Service: Neurosurgery;  Laterality: N/A;    LEFT HEART CATHETERIZATION      Left L3-S1 RFTC Left 07/18/2017     Dr Lopez    Left SI JI Left 09/30/2013    Dr Randolph    MYRINGOTOMY WITH INSERTION OF VENTILATION TUBE Bilateral 4/4/2023    Procedure: MYRINGOTOMY, WITH TYMPANOSTOMY TUBE INSERTION (T-TUBES);  Surgeon: Sea Hinton MD;  Location: [facility];  Service: ENT;  Laterality: Bilateral;  T-TUBES    MYRINGOTOMY WITH INSERTION OF VENTILATION TUBE Bilateral 9/12/2023    Procedure: MYRINGOTOMY, WITH TYMPANOSTOMY TUBE INSERTION, T-TUBES;  Surgeon: Sea Hinton MD;  Location: [facility];  Service: ENT;  Laterality: Bilateral;    MYRINGOTOMY WITH INSERTION OF VENTILATION TUBE Bilateral 7/2/2024    Procedure: MYRINGOTOMY, WITH TYMPANOSTOMY TUBE INSERTION;  Surgeon: Sea Hinton MD;  Location: [facility];  Service: ENT;  Laterality: Bilateral;  Bilateral T-Tubes    OOPHORECTOMY  11/11/2014    Robotic, FABIENNE, Cystoscopy-Dr. Feng    SINUS SURGERY      TRANSFORAMINAL LUMBAR INTERBODY FUSION N/A 5/21/2025    Procedure: ARTHRODESIS, SPINE, LUMBAR, TLIF;  Surgeon: Cyril Upton MD;  Location: [facility];  Service: Orthopedics;  Laterality: N/A;  L2-L5 POSTERIOR FUSION, L4-L5 TLIF         Physical Exam  General: Well nourished, Cooperative and Alert    Airway:  Mallampati: II   Mouth Opening: Normal  TM Distance: Normal  Tongue: Normal  Neck ROM: Normal ROM    Dental:  Intact    Chest/Lungs:  Clear to auscultation, Normal Respiratory Rate    Heart:  Rate: Normal  Rhythm: Regular Rhythm        Chemistry        Component Value Date/Time     05/12/2025 0926    K 3.8 05/12/2025 0926     05/12/2025 0926    CO2 29 05/12/2025 0926    BUN 12 05/12/2025 0926    CREATININE 0.96 05/12/2025 0926     (H) 05/12/2025 0926        Component Value Date/Time    CALCIUM 8.3 (L) 05/12/2025 0926    ALKPHOS 102 05/12/2025 0926    AST 27 05/12/2025 0926    ALT 21 05/12/2025 0926    BILITOT 0.2 05/12/2025 0926    ESTGFRAFRICA 107 11/23/2021 1346    EGFRNONAA 68 06/15/2022 0943        Lab Results    Component Value Date    WBC 11.21 (H) 06/06/2025    HGB 9.4 (L) 06/06/2025    HCT 33.1 (L) 06/06/2025     (H) 06/06/2025     Results for orders placed or performed in visit on 05/12/25   EKG 12-lead    Collection Time: 05/12/25 10:18 AM   Result Value Ref Range    QRS Duration 86 ms    OHS QTC Calculation 455 ms    Narrative    Test Reason : Z01.818,    Vent. Rate :  64 BPM     Atrial Rate :  64 BPM     P-R Int : 146 ms          QRS Dur :  86 ms      QT Int : 442 ms       P-R-T Axes :  44  61 -69 degrees    QTcB Int : 455 ms    Normal sinus rhythm  T wave abnormality, consider inferolateral ischemia  Abnormal ECG  When compared with ECG of 03-Oct-2024 21:17,  Questionable change in The axis  Inverted T waves have replaced nonspecific T wave abnormality in Inferior  leads  Inverted T waves have replaced nonspecific T wave abnormality in Lateral  leads  Confirmed by Didier Mitchell ([id number]) on 5/21/2025 2:46:59 PM    Referred By: HARITHA RODAS           Confirmed By: Didier Mitchell     No results found for this or any previous visit.        Anesthesia Plan  Type of Anesthesia, risks & benefits discussed:    Anesthesia Type: Gen ETT  Intra-op Monitoring Plan: Standard ASA Monitors  Post Op Pain Control Plan: multimodal analgesia  Induction:  IV  Airway Plan: Direct, Post-Induction  Informed Consent: Informed consent signed with the Patient and all parties understand the risks and agree with anesthesia plan.  All questions answered.   ASA Score: 3  Day of Surgery Review of History & Physical: H&P Update referred to the surgeon/provider.I have interviewed and examined the patient. I have reviewed the patient's H&P dated: There are no significant changes.     Ready For Surgery From Anesthesia Perspective.     .

## 2025-06-12 NOTE — H&P
Office: [phone]    Orthopedic Spine Surgery Consult/H&P    ASSESSMENT:  49 y.o. female with Lumbar postop infection    PLAN:  Plan for I&D and admission for IV antibiotics    HPI:  49 y.o. female with Prior L2-L5 laminectomy 11/30/2021. She is now status post L2-L4 lateral fusion followed by L2-L5 laminectomy and fusion with L4-5 TLIF 5/20/2025 and 5/21/2025. Complaining of some persistent lower back pain. She also reports that she has had some drainage recently. She feels like she has felt warmth around her posterior incision.     Past Medical History:   Diagnosis Date    Abdominal pain 10/03/2024    Asthma     CHF (congestive heart failure)     Chronic serous otitis media of both ears 04/04/2023    Depressive disorder     Diabetes mellitus, type 2     Diabetic eye exam 10/24/2024    Dr. [facility]    Gastroparesis     Hyperlipidemia     Hypertension     Hypothyroidism 08/02/2022    Synthroid 200 mcg oral daily      IBS (irritable bowel syndrome)     Kidney stones     Pain 10/03/2024    Postlaminectomy syndrome, lumbar region 04/28/2022    [facility]    Sleep apnea     Does not use a CPAP      Past Surgical History:   Procedure Laterality Date    Bilateral L3-S1 MBB Bilateral 6-, 5-, 1-8-2014    Dr Jessica Randolph    CARPAL TUNNEL RELEASE      CHOLECYSTECTOMY      DIAGNOSTIC LAPAROSCOPY  04/14/2010    Exploratory Laparotomy, Myomectomy, Hydrotubation-Dr. Feng    DILATION AND CURETTAGE OF UTERUS  04/14/2010    Hysteroscopy-Dr. Feng    EPIDURAL STEROID INJECTION N/A 10/22/2024    Procedure: Injection, Steroid, Epidural, L4/5;  Surgeon: Rasheeda Bills MD;  Location: [facility];  Service: Pain Management;  Laterality: N/A;    EXPLORATORY LAPAROTOMY WITH UTERINE MYOMECTOMY  02/27/2007    Dr. Marquise Anderson    FUSION, SPINE, LATERAL APPROACH N/A 5/20/2025    Procedure: ARTHRODESIS, SPINE, LATERAL;  Surgeon: Cyril Upton MD;  Location: [place]  [facility] OR;  Service: Orthopedics;  Laterality: N/A;  L2-L4 LATERAL FUSION    HYSTERECTOMY  09/29/2011    Robotic, FABIENNE, Cystoscopy-Dr. Feng    HYSTEROSCOPY WITH DILATION AND CURETTAGE OF UTERUS  04/04/2006    Laparoscopy, Chromotubation-Dr. Marquise Anderson    INJECTION OF ANESTHETIC AGENT AROUND ILIOINGUINAL NERVE Right 6/22/2023    Procedure: BLOCK, NERVE, ILIOINGUINAL;  Surgeon: Rasheeda Bills MD;  Location: [facility];  Service: Pain Management;  Laterality: Right;    INJECTION OF ANESTHETIC AGENT AROUND MEDIAL BRANCH NERVES INNERVATING LUMBAR FACET JOINT Bilateral 9/21/2023    Procedure: BLOCK, NERVE, FACET JOINT, LUMBAR, MEDIAL BRANCH;  Surgeon: Rasheeda Bills MD;  Location: [facility];  Service: Pain Management;  Laterality: Bilateral;    INJECTION OF ANESTHETIC AGENT AROUND MEDIAL BRANCH NERVES INNERVATING LUMBAR FACET JOINT Bilateral 3/14/2024    Procedure: BLOCK, NERVE, FACET JOINT, LUMBAR, MEDIAL BRANCH, BILATERAL L4-S1 MBB;  Surgeon: Rasheeda Bills MD;  Location: [facility];  Service: Pain Management;  Laterality: Bilateral;    LAMINECTOMY N/A 11/30/2021    Procedure: L2-L5 Laminectomy;  Surgeon: Cyril Upton MD;  Location: [facility] OR;  Service: Neurosurgery;  Laterality: N/A;    LEFT HEART CATHETERIZATION      Left L3-S1 RFTC Left 07/18/2017    Dr Lopez    Left SI JI Left 09/30/2013    Dr Randolph    MYRINGOTOMY WITH INSERTION OF VENTILATION TUBE Bilateral 4/4/2023    Procedure: MYRINGOTOMY, WITH TYMPANOSTOMY TUBE INSERTION (T-TUBES);  Surgeon: Sea Hinton MD;  Location: [facility] OR;  Service: ENT;  Laterality: Bilateral;  T-TUBES    MYRINGOTOMY WITH INSERTION OF VENTILATION TUBE Bilateral 9/12/2023    Procedure: MYRINGOTOMY, WITH TYMPANOSTOMY TUBE INSERTION, T-TUBES;  Surgeon: Sea Hinton MD;  Location: [facility] OR;  Service: ENT;  Laterality: Bilateral;    MYRINGOTOMY WITH INSERTION OF VENTILATION TUBE Bilateral 7/2/2024    Procedure: MYRINGOTOMY,  WITH TYMPANOSTOMY TUBE INSERTION;  Surgeon: Sea Hinton MD;  Location: [facility] OR;  Service: ENT;  Laterality: Bilateral;  Bilateral T-Tubes    OOPHORECTOMY  11/11/2014    Jenn, FABIENNE, Cystoscopy-Dr. Feng    SINUS SURGERY      TRANSFORAMINAL LUMBAR INTERBODY FUSION N/A 5/21/2025    Procedure: ARTHRODESIS, SPINE, LUMBAR, TLIF;  Surgeon: Cyril Upton MD;  Location: [facility] OR;  Service: Orthopedics;  Laterality: N/A;  L2-L5 POSTERIOR FUSION, L4-L5 TLIF      Review of patient's allergies indicates:   Allergen Reactions    Fish containing products Anaphylaxis    Iodinated contrast media     Penicillins       Current Medications[1]     Review of systems:  Denies chest pain, nausea,vomiting, abdominal pain, cough, runny nose, eye pain, ear pain, fevers, chills, weight loss, weight gain, dysuria, hematuria, changes in mood    IMAGING:  MRI lumbar spine 6/11/2025 at [place] reviewed shows: Satisfactory decompression after L2-L5 laminectomy and fusion. There are superficial and deep fluid collections    There were no vitals filed for this visit.     EXAM:  Constitutional  General Appearance:  There is no height or weight on file to calculate BMI., NAD  Spine exam:    Motor  C5 C6 C7 C8 T1  L2 L3 L4 L5 S1      Left  5 5 5 5 5  5 5 5 5 5      Right  5 5 5 5 5  5 5 5 5 5   Sensory                 Left  + + + + +  + + + + +     Right + + + + +  + + + + +     Babinski: downgoing  Hoffmans: negative  Clonus: none    Reflexes:   Bicep 2+ left/right  Tricep 2+ left/right  BR 2+ left/right  Patella 2+ left/right  Achilles 2+ left/right        [1] No current facility-administered medications for this encounter.

## 2025-06-13 LAB
ANION GAP SERPL CALCULATED.3IONS-SCNC: 10 MMOL/L (ref 7–16)
BASOPHILS # BLD AUTO: 0.04 K/UL (ref 0–0.2)
BASOPHILS NFR BLD AUTO: 0.4 % (ref 0–1)
BUN SERPL-MCNC: 8 MG/DL (ref 7–19)
BUN/CREAT SERPL: 10 (ref 6–20)
CALCIUM SERPL-MCNC: 8.7 MG/DL (ref 8.4–10.2)
CHLORIDE SERPL-SCNC: 107 MMOL/L (ref 98–107)
CO2 SERPL-SCNC: 28 MMOL/L (ref 22–29)
CREAT SERPL-MCNC: 0.77 MG/DL (ref 0.55–1.02)
CRP SERPL HS-MCNC: 77.04 MG/L
DIFFERENTIAL METHOD BLD: ABNORMAL
EGFR (NO RACE VARIABLE) (RUSH/TITUS): 95 ML/MIN/1.73M2
EOSINOPHIL # BLD AUTO: 0.14 K/UL (ref 0–0.5)
EOSINOPHIL NFR BLD AUTO: 1.3 % (ref 1–4)
ERYTHROCYTE [DISTWIDTH] IN BLOOD BY AUTOMATED COUNT: 14 % (ref 11.5–14.5)
ERYTHROCYTE [SEDIMENTATION RATE] IN BLOOD BY WESTERGREN METHOD: 111 MM/HR (ref 0–20)
GLUCOSE SERPL-MCNC: 183 MG/DL (ref 74–100)
GLUCOSE SERPL-MCNC: 200 MG/DL (ref 70–105)
GLUCOSE SERPL-MCNC: 206 MG/DL (ref 70–105)
GLUCOSE SERPL-MCNC: 221 MG/DL (ref 70–105)
GLUCOSE SERPL-MCNC: 258 MG/DL (ref 70–105)
HCT VFR BLD AUTO: 31.2 % (ref 38–47)
HGB BLD-MCNC: 8.9 G/DL (ref 12–16)
IMM GRANULOCYTES # BLD AUTO: 0.06 K/UL (ref 0–0.04)
IMM GRANULOCYTES NFR BLD: 0.6 % (ref 0–0.4)
LYMPHOCYTES # BLD AUTO: 1.92 K/UL (ref 1–4.8)
LYMPHOCYTES NFR BLD AUTO: 18.2 % (ref 27–41)
MCH RBC QN AUTO: 26.8 PG (ref 27–31)
MCHC RBC AUTO-ENTMCNC: 28.5 G/DL (ref 32–36)
MCV RBC AUTO: 94 FL (ref 80–96)
MONOCYTES # BLD AUTO: 1.34 K/UL (ref 0–0.8)
MONOCYTES NFR BLD AUTO: 12.7 % (ref 2–6)
MPC BLD CALC-MCNC: 9.2 FL (ref 9.4–12.4)
NEUTROPHILS # BLD AUTO: 7.06 K/UL (ref 1.8–7.7)
NEUTROPHILS NFR BLD AUTO: 66.8 % (ref 53–65)
NRBC # BLD AUTO: 0 X10E3/UL
NRBC, AUTO (.00): 0 %
PLATELET # BLD AUTO: 391 K/UL (ref 150–400)
POTASSIUM SERPL-SCNC: 3.4 MMOL/L (ref 3.5–5.1)
RBC # BLD AUTO: 3.32 M/UL (ref 4.2–5.4)
SODIUM SERPL-SCNC: 142 MMOL/L (ref 136–145)
WBC # BLD AUTO: 10.56 K/UL (ref 4.5–11)

## 2025-06-13 PROCEDURE — 11000001 HC ACUTE MED/SURG PRIVATE ROOM

## 2025-06-13 PROCEDURE — 94799 UNLISTED PULMONARY SVC/PX: CPT

## 2025-06-13 PROCEDURE — 97165 OT EVAL LOW COMPLEX 30 MIN: CPT

## 2025-06-13 PROCEDURE — 85651 RBC SED RATE NONAUTOMATED: CPT | Performed by: ORTHOPAEDIC SURGERY

## 2025-06-13 PROCEDURE — 82962 GLUCOSE BLOOD TEST: CPT

## 2025-06-13 PROCEDURE — 97161 PT EVAL LOW COMPLEX 20 MIN: CPT

## 2025-06-13 PROCEDURE — 80048 BASIC METABOLIC PNL TOTAL CA: CPT | Performed by: ORTHOPAEDIC SURGERY

## 2025-06-13 PROCEDURE — 96372 THER/PROPH/DIAG INJ SC/IM: CPT

## 2025-06-13 PROCEDURE — 25000003 PHARM REV CODE 250: Performed by: ORTHOPAEDIC SURGERY

## 2025-06-13 PROCEDURE — 99900035 HC TECH TIME PER 15 MIN (STAT)

## 2025-06-13 PROCEDURE — 85025 COMPLETE CBC W/AUTO DIFF WBC: CPT | Performed by: ORTHOPAEDIC SURGERY

## 2025-06-13 PROCEDURE — 27000221 HC OXYGEN, UP TO 24 HOURS

## 2025-06-13 PROCEDURE — 94761 N-INVAS EAR/PLS OXIMETRY MLT: CPT

## 2025-06-13 PROCEDURE — 99253 IP/OBS CNSLTJ NEW/EST LOW 45: CPT | Mod: ,,, | Performed by: FAMILY MEDICINE

## 2025-06-13 PROCEDURE — 86141 C-REACTIVE PROTEIN HS: CPT | Performed by: ORTHOPAEDIC SURGERY

## 2025-06-13 PROCEDURE — 63600175 PHARM REV CODE 636 W HCPCS: Performed by: ORTHOPAEDIC SURGERY

## 2025-06-13 PROCEDURE — 36415 COLL VENOUS BLD VENIPUNCTURE: CPT | Performed by: ORTHOPAEDIC SURGERY

## 2025-06-13 RX ADMIN — MORPHINE SULFATE 2 MG: 2 INJECTION, SOLUTION INTRAMUSCULAR; INTRAVENOUS at 02:06

## 2025-06-13 RX ADMIN — POLYETHYLENE GLYCOL 3350 17 G: 17 POWDER, FOR SOLUTION ORAL at 08:06

## 2025-06-13 RX ADMIN — MORPHINE SULFATE 2 MG: 2 INJECTION, SOLUTION INTRAMUSCULAR; INTRAVENOUS at 09:06

## 2025-06-13 RX ADMIN — OXYCODONE 5 MG: 5 TABLET ORAL at 09:06

## 2025-06-13 RX ADMIN — HYDRALAZINE HYDROCHLORIDE 100 MG: 100 TABLET ORAL at 09:06

## 2025-06-13 RX ADMIN — FAMOTIDINE 20 MG: 20 TABLET, FILM COATED ORAL at 08:06

## 2025-06-13 RX ADMIN — INSULIN ASPART 1 UNITS: 100 INJECTION, SOLUTION INTRAVENOUS; SUBCUTANEOUS at 09:06

## 2025-06-13 RX ADMIN — ACETAMINOPHEN 500 MG: 500 TABLET ORAL at 08:06

## 2025-06-13 RX ADMIN — FAMOTIDINE 20 MG: 20 TABLET, FILM COATED ORAL at 09:06

## 2025-06-13 RX ADMIN — ACETAMINOPHEN 500 MG: 500 TABLET ORAL at 10:06

## 2025-06-13 RX ADMIN — MUPIROCIN: 20 OINTMENT TOPICAL at 09:06

## 2025-06-13 RX ADMIN — LEVOTHYROXINE SODIUM 200 MCG: 100 TABLET ORAL at 05:06

## 2025-06-13 RX ADMIN — ACETAMINOPHEN 500 MG: 500 TABLET ORAL at 05:06

## 2025-06-13 RX ADMIN — DOCUSATE SODIUM 100 MG: 100 CAPSULE, LIQUID FILLED ORAL at 08:06

## 2025-06-13 RX ADMIN — OXYCODONE 5 MG: 5 TABLET ORAL at 05:06

## 2025-06-13 RX ADMIN — OXYCODONE 5 MG: 5 TABLET ORAL at 08:06

## 2025-06-13 RX ADMIN — MORPHINE SULFATE 2 MG: 2 INJECTION, SOLUTION INTRAMUSCULAR; INTRAVENOUS at 04:06

## 2025-06-13 RX ADMIN — METOPROLOL SUCCINATE 100 MG: 100 TABLET, EXTENDED RELEASE ORAL at 08:06

## 2025-06-13 RX ADMIN — SERTRALINE 100 MG: 50 TABLET, FILM COATED ORAL at 09:06

## 2025-06-13 RX ADMIN — MUPIROCIN: 20 OINTMENT TOPICAL at 08:06

## 2025-06-13 RX ADMIN — SODIUM CHLORIDE 2250 MG: 9 INJECTION, SOLUTION INTRAVENOUS at 05:06

## 2025-06-13 RX ADMIN — HYDRALAZINE HYDROCHLORIDE 100 MG: 100 TABLET ORAL at 08:06

## 2025-06-13 RX ADMIN — NIFEDIPINE 60 MG: 60 TABLET, FILM COATED, EXTENDED RELEASE ORAL at 08:06

## 2025-06-13 RX ADMIN — SENNOSIDES AND DOCUSATE SODIUM 1 TABLET: 50; 8.6 TABLET ORAL at 08:06

## 2025-06-13 RX ADMIN — CLONIDINE HYDROCHLORIDE 0.2 MG: 0.2 TABLET ORAL at 08:06

## 2025-06-13 RX ADMIN — ATORVASTATIN CALCIUM 20 MG: 20 TABLET, FILM COATED ORAL at 09:06

## 2025-06-13 RX ADMIN — ACETAMINOPHEN 500 MG: 500 TABLET ORAL at 12:06

## 2025-06-13 RX ADMIN — IPRATROPIUM BROMIDE 2 SPRAY: 21 SPRAY, METERED NASAL at 09:06

## 2025-06-13 RX ADMIN — ACETAMINOPHEN 500 MG: 500 TABLET ORAL at 01:06

## 2025-06-13 RX ADMIN — CLINDAMYCIN PHOSPHATE 900 MG: 900 INJECTION, SOLUTION INTRAVENOUS at 02:06

## 2025-06-13 RX ADMIN — CLINDAMYCIN PHOSPHATE 900 MG: 900 INJECTION, SOLUTION INTRAVENOUS at 08:06

## 2025-06-13 RX ADMIN — OXYCODONE 5 MG: 5 TABLET ORAL at 12:06

## 2025-06-13 NOTE — ANESTHESIA POSTPROCEDURE EVALUATION
Anesthesia Post Evaluation    Patient: Shatagia D Leonardo    Procedure(s) Performed: Procedure(s) (LRB):  INCISION AND DRAINAGE, ABSCESS, SPINE, LUMBOSACRAL, POSTERIOR (N/A)    Final Anesthesia Type: general      Patient location during evaluation: PACU  Post-procedure vital signs: reviewed and stable  Pain management: adequate  Airway patency: patent    PONV status at discharge: No PONV  Anesthetic complications: no      Cardiovascular status: hemodynamically stable  Respiratory status: unassisted  Hydration status: euvolemic  Follow-up not needed.              Vitals Value Taken Time   /60 06/12/25 21:22   Temp 37.7 °C (99.8 °F) 06/12/25 20:35   Pulse 97 06/12/25 21:22   Resp 16 06/12/25 20:35   SpO2 91 % 06/12/25 21:22         Event Time   Out of Recovery 20:15:00         Pain/Miah Score: Pain Rating Prior to Med Admin: 7 (6/12/2025  8:02 PM)  Miah Score: 9 (6/12/2025  8:15 PM)

## 2025-06-13 NOTE — CONSULTS
[facility]  [facility] Medicine  Consult Note    Patient Name: Carey Leonardo  MRN: [id number]  Admission Date: 6/12/2025  Hospital Length of Stay: 1 days  Attending Physician: Cyril Upton MD   Primary Care Provider: Zainab Harrell FNP           Patient information was obtained from patient, past medical records, and primary team.     Consults  Subjective:     Principal Problem: Infection of lumbar spine    Chief Complaint: No chief complaint on file.       HPI: Ms. Leonardo is a 49-year-old female with past medical history significant for hypertension, hypothyroidism, type 2 diabetes mellitus, obstructive sleep apnea, asthma, congestive heart failure, depression.  Admitted with lumbar wound infection after recent laminectomy and fusion 5/21/2025.  She underwent lumbar wound I&D with evacuation of deep spinal abscess on 6/12/25.  Currently receiving IV vancomycin.  Hospital medicine consulted postoperatively for medical management.       Past Medical History:   Diagnosis Date    Abdominal pain 10/03/2024    Asthma     CHF (congestive heart failure)     Chronic serous otitis media of both ears 04/04/2023    Depressive disorder     Diabetes mellitus, type 2     Diabetic eye exam 10/24/2024    Dr. [facility]    Gastroparesis     Hyperlipidemia     Hypertension     Hypothyroidism 08/02/2022    Synthroid 200 mcg oral daily      IBS (irritable bowel syndrome)     Kidney stones     Pain 10/03/2024    Postlaminectomy syndrome, lumbar region 04/28/2022    [facility]    Sleep apnea     Does not use a CPAP       Past Surgical History:   Procedure Laterality Date    Bilateral L3-S1 MBB Bilateral 6-, 5-, 1-8-2014    Dr Jessica Randolph    CARPAL TUNNEL RELEASE      CHOLECYSTECTOMY      DIAGNOSTIC LAPAROSCOPY  04/14/2010    Exploratory Laparotomy, Myomectomy, Hydrotubation-Dr. Feng    DILATION AND CURETTAGE OF UTERUS  04/14/2010    Hysteroscopy-  Ping    EPIDURAL STEROID INJECTION N/A 10/22/2024    Procedure: Injection, Steroid, Epidural, L4/5;  Surgeon: Rasheeda Bills MD;  Location: [facility];  Service: Pain Management;  Laterality: N/A;    EXPLORATORY LAPAROTOMY WITH UTERINE MYOMECTOMY  02/27/2007    Dr. Marquise Anderson    FUSION, SPINE, LATERAL APPROACH N/A 5/20/2025    Procedure: ARTHRODESIS, SPINE, LATERAL;  Surgeon: Cyril Upton MD;  Location: [facility] OR;  Service: Orthopedics;  Laterality: N/A;  L2-L4 LATERAL FUSION    HYSTERECTOMY  09/29/2011    Robotic, FABIENNE, Cystoscopy-Dr. Feng    HYSTEROSCOPY WITH DILATION AND CURETTAGE OF UTERUS  04/04/2006    Laparoscopy, Chromotubation-Dr. Marquise Anderson    INCISION AND DRAINAGE, ABSCESS, SPINE, LUMBOSACRAL, POSTERIOR N/A 6/12/2025    Procedure: INCISION AND DRAINAGE, ABSCESS, SPINE, LUMBOSACRAL, POSTERIOR;  Surgeon: Cyril Upton MD;  Location: [facility];  Service: Neurosurgery;  Laterality: N/A;    INJECTION OF ANESTHETIC AGENT AROUND ILIOINGUINAL NERVE Right 6/22/2023    Procedure: BLOCK, NERVE, ILIOINGUINAL;  Surgeon: Rasheeda Bills MD;  Location: [facility];  Service: Pain Management;  Laterality: Right;    INJECTION OF ANESTHETIC AGENT AROUND MEDIAL BRANCH NERVES INNERVATING LUMBAR FACET JOINT Bilateral 9/21/2023    Procedure: BLOCK, NERVE, FACET JOINT, LUMBAR, MEDIAL BRANCH;  Surgeon: Rasheeda Bills MD;  Location: [facility];  Service: Pain Management;  Laterality: Bilateral;    INJECTION OF ANESTHETIC AGENT AROUND MEDIAL BRANCH NERVES INNERVATING LUMBAR FACET JOINT Bilateral 3/14/2024    Procedure: BLOCK, NERVE, FACET JOINT, LUMBAR, MEDIAL BRANCH, BILATERAL L4-S1 MBB;  Surgeon: Rasheeda Bills MD;  Location: [facility];  Service: Pain Management;  Laterality: Bilateral;    LAMINECTOMY N/A 11/30/2021    Procedure: L2-L5 Laminectomy;  Surgeon: Cyril Upton MD;  Location: [facility];  Service: Neurosurgery;  Laterality: N/A;    LEFT HEART  CATHETERIZATION      Left L3-S1 RFTC Left 07/18/2017    Dr Lopez    Left SI JI Left 09/30/2013    Dr Randolph    MYRINGOTOMY WITH INSERTION OF VENTILATION TUBE Bilateral 4/4/2023    Procedure: MYRINGOTOMY, WITH TYMPANOSTOMY TUBE INSERTION (T-TUBES);  Surgeon: Sea Hinton MD;  Location: [facility] OR;  Service: ENT;  Laterality: Bilateral;  T-TUBES    MYRINGOTOMY WITH INSERTION OF VENTILATION TUBE Bilateral 9/12/2023    Procedure: MYRINGOTOMY, WITH TYMPANOSTOMY TUBE INSERTION, T-TUBES;  Surgeon: Sea Hinton MD;  Location: [facility] OR;  Service: ENT;  Laterality: Bilateral;    MYRINGOTOMY WITH INSERTION OF VENTILATION TUBE Bilateral 7/2/2024    Procedure: MYRINGOTOMY, WITH TYMPANOSTOMY TUBE INSERTION;  Surgeon: Sea Hinton MD;  Location: [facility] OR;  Service: ENT;  Laterality: Bilateral;  Bilateral T-Tubes    OOPHORECTOMY  11/11/2014    Robotic, FABIENNE, Cystoscopy-Dr. Feng    SINUS SURGERY      TRANSFORAMINAL LUMBAR INTERBODY FUSION N/A 5/21/2025    Procedure: ARTHRODESIS, SPINE, LUMBAR, TLIF;  Surgeon: Cyril Upton MD;  Location: [facility] OR;  Service: Orthopedics;  Laterality: N/A;  L2-L5 POSTERIOR FUSION, L4-L5 TLIF       Review of patient's allergies indicates:   Allergen Reactions    Fish containing products Anaphylaxis    Iodinated contrast media     Penicillins        No current facility-administered medications on file prior to encounter.     Current Outpatient Medications on File Prior to Encounter   Medication Sig    albuterol (VENTOLIN HFA) 90 mcg/actuation inhaler Inhale 2 puffs into the lungs every 6 (six) hours as needed for Wheezing. Rescue    atorvastatin (LIPITOR) 20 MG tablet TAKE 1 TABLET(20 MG) BY MOUTH EVERY EVENING    blood sugar diagnostic Strp To check BG 4 times daily, to use with insurance preferred meter    blood-glucose meter kit To check BG 4 times daily, to use with insurance preferred meter    blood-glucose sensor (FREESTYLE DENG 3 SENSOR) Suad Use  "as directed    blood-glucose,,cont (FREESTYLE DENG 3 READER) [facility] Use with your sensor.    cloNIDine (CATAPRES) 0.2 MG tablet Take 1 tablet (0.2 mg total) by mouth 4 (four) times daily.    empagliflozin (JARDIANCE) 10 mg tablet Take 1 tablet (10 mg total) by mouth once daily.    glipiZIDE 5 MG TR24 Take 2 tablets (10 mg total) by mouth daily with breakfast.    hydrALAZINE (APRESOLINE) 100 MG tablet Take 1 tablet (100 mg total) by mouth 3 (three) times daily.    insulin glargine U-100, Lantus, (LANTUS SOLOSTAR U-100 INSULIN) 100 unit/mL (3 mL) InPn pen Inject 20 Units into the skin every evening.    ipratropium (ATROVENT) 21 mcg (0.03 %) nasal spray 2 sprays by Each Nostril route 2 (two) times daily.    lancets [facility] To check BG 4 times daily, to use with insurance preferred meter    levothyroxine (SYNTHROID) 200 MCG tablet Take 1 tablet (200 mcg total) by mouth before breakfast.    metoprolol succinate (TOPROL-XL) 100 MG 24 hr tablet Take 1 tablet (100 mg total) by mouth once daily.    NIFEdipine (PROCARDIA-XL) 60 MG (OSM) 24 hr tablet Take 1 tablet (60 mg total) by mouth once daily.    ondansetron (ZOFRAN-ODT) 4 MG TbDL Take 1 tablet (4 mg total) by mouth 2 (two) times daily.    oxyCODONE-acetaminophen (LYNOX)  mg per tablet Take 1 tablet by mouth every 4 (four) hours as needed for Pain.    pen needle, diabetic 32 gauge x 5/32" Ndle 1 Application by Misc.(Non-Drug; Combo Route) route once daily.    sertraline (ZOLOFT) 100 MG tablet Take 1 tablet (100 mg total) by mouth every evening.    traMADoL (ULTRAM) 50 mg tablet Take 1 tablet (50 mg total) by mouth every 8 (eight) hours as needed for Pain.    lurasidone (LATUDA) 20 mg Tab tablet  (Patient not taking: Reported on 6/3/2025)     Family History       Problem Relation (Age of Onset)    Diabetes Mellitus Mother    Heart disease Mother, Sister    Hypertension Mother    Migraines Mother          Tobacco Use    Smoking status: Never     Passive exposure: " Never    Smokeless tobacco: Never   Substance and Sexual Activity    Alcohol use: Not Currently    Drug use: Never    Sexual activity: Not Currently     Review of Systems   Constitutional:  Negative for chills, fatigue, fever and unexpected weight change.   HENT:  Negative for congestion, mouth sores and sore throat.    Eyes:  Negative for photophobia and visual disturbance.   Respiratory:  Negative for cough, chest tightness, shortness of breath and wheezing.    Cardiovascular:  Negative for chest pain, palpitations and leg swelling.   Gastrointestinal:  Negative for abdominal pain, diarrhea, nausea and vomiting.   Endocrine: Negative for cold intolerance and heat intolerance.   Genitourinary:  Negative for difficulty urinating, dysuria, frequency and urgency.   Musculoskeletal:  Positive for back pain.   Skin:  Negative for pallor and rash.   Neurological:  Negative for tremors, seizures, syncope, numbness and headaches.   Hematological:  Does not bruise/bleed easily.   Psychiatric/Behavioral:  Negative for agitation, confusion, hallucinations and suicidal ideas.      Objective:     Vital Signs (Most Recent):  Temp: (!) 100.5 °F (38.1 °C) (06/13/25 1225)  Pulse: 99 (06/13/25 1225)  Resp: 18 (06/13/25 1239)  BP: 107/63 (06/13/25 1225)  SpO2: (!) 93 % (06/13/25 1225) Vital Signs (24h Range):  Temp:  [99 °F (37.2 °C)-101.7 °F (38.7 °C)] 100.5 °F (38.1 °C)  Pulse:  [] 99  Resp:  [11-21] 18  SpO2:  [82 %-99 %] 93 %  BP: ()/() 107/63     Weight: 108.5 kg (239 lb 1.6 oz)  Body mass index is 37.45 kg/m².     Physical Exam  Constitutional:       Appearance: Normal appearance.   HENT:      Head: Normocephalic and atraumatic.      Nose: Nose normal.   Eyes:      Extraocular Movements: Extraocular movements intact.      Pupils: Pupils are equal, round, and reactive to light.   Cardiovascular:      Rate and Rhythm: Normal rate and regular rhythm.   Pulmonary:      Effort: Pulmonary effort is normal.       "Breath sounds: Normal breath sounds.   Abdominal:      General: Bowel sounds are normal.      Palpations: Abdomen is soft.   Musculoskeletal:      Comments: Dressing CDI   Skin:     General: Skin is warm and dry.   Neurological:      General: No focal deficit present.      Mental Status: She is alert and oriented to person, place, and time.   Psychiatric:         Mood and Affect: Mood normal.         Behavior: Behavior normal.          Significant Labs: All pertinent labs within the past 24 hours have been reviewed.    Significant Imaging: I have reviewed all pertinent imaging results/findings within the past 24 hours.  Assessment/Plan:     * Infection of lumbar spine  S/p I&D of deep lumbosacral abscess 6/12  Continue with IV Vancomycin  Managed by Orthopedics      CHF (congestive heart failure)  Patient has chronic CHF. Unable to specify acute vs chronic as there are no echos documented in EMR. On presentation their CHF was well compensated. Most recent BNP and echo results are listed below.  No results for input(s): "BNP" in the last 72 hours.  Latest ECHO  No results found for this or any previous visit.    Current Heart Failure Medications  metoprolol succinate (TOPROL-XL) 24 hr tablet 100 mg, Daily, Oral  hydrALAZINE tablet 100 mg, 3 times daily, Oral    Plan  - Continue home medication regimen.      Type 2 diabetes mellitus, without long-term current use of insulin  Patient's FSGs are controlled on current medication regimen.  Last A1c reviewed-   Lab Results   Component Value Date    HGBA1C 7.7 (H) 04/07/2025     Most recent fingerstick glucose reviewed- No results for input(s): "POCTGLUCOSE" in the last 24 hours.  Current correctional scale  Medium  Maintain anti-hyperglycemic dose as follows-   Antihyperglycemics (From admission, onward)      Start     Stop Route Frequency Ordered    06/12/25 2045  insulin aspart U-100 injection 1-10 Units         -- SubQ Before meals & nightly PRN 06/12/25 2045      "     Hold Oral hypoglycemics while patient is in the hospital.       Primary hypertension  Patient's blood pressure range in the last 24 hours was: BP  Min: 96/53  Max: 157/112.The patient's inpatient anti-hypertensive regimen is listed below:  Current Antihypertensives  NIFEdipine 24 hr tablet 60 mg, Daily, Oral  metoprolol succinate (TOPROL-XL) 24 hr tablet 100 mg, Daily, Oral  hydrALAZINE tablet 100 mg, 3 times daily, Oral  cloNIDine tablet 0.2 mg, 4 times daily, Oral    Plan  - BP is controlled, no changes needed to their regimen      Hypothyroidism  Stable.  Continue with levothyroxine.      VTE Risk Mitigation (From admission, onward)           Ordered     Place MARTA hose  Until discontinued         06/12/25 2045     Place sequential compression device  Until discontinued         06/12/25 2045                        Thank you for your consult. I will follow-up with patient. Please contact us if you have any additional questions.    Terra Salazar DO  Department of Hospital Medicine   [facility]

## 2025-06-13 NOTE — PROGRESS NOTES
[facility]  Wound Care    Patient Name:  Carey Leonardo   MRN:  [id number]  Date: 6/13/2025  Diagnosis: Infection of lumbar spine    History:     Past Medical History:   Diagnosis Date    Abdominal pain 10/03/2024    Asthma     CHF (congestive heart failure)     Chronic serous otitis media of both ears 04/04/2023    Depressive disorder     Diabetes mellitus, type 2     Diabetic eye exam 10/24/2024    Dr. [facility]    Gastroparesis     Hyperlipidemia     Hypertension     Hypothyroidism 08/02/2022    Synthroid 200 mcg oral daily      IBS (irritable bowel syndrome)     Kidney stones     Pain 10/03/2024    Postlaminectomy syndrome, lumbar region 04/28/2022    [facility]    Sleep apnea     Does not use a CPAP       Social History[1]    Precautions:     Allergies as of 06/12/2025 - Reviewed 06/12/2025   Allergen Reaction Noted    Fish containing products Anaphylaxis 09/28/2021    Iodinated contrast media  09/28/2021    Penicillins  04/09/2021       WOC Assessment Details/Treatment     Narrative: Seen patient for initiation of preventative skin care measures    Patient  in bed, sleepy. States skin is okay  Margarito score 20    Consult wound care for any skin issues     06/13/2025         [1]   Social History  Socioeconomic History    Marital status:    Tobacco Use    Smoking status: Never     Passive exposure: Never    Smokeless tobacco: Never   Substance and Sexual Activity    Alcohol use: Not Currently    Drug use: Never    Sexual activity: Not Currently     Social Drivers of Health     Financial Resource Strain: Patient Declined (6/12/2025)    Overall Financial Resource Strain (CARDIA)     Difficulty of Paying Living Expenses: Patient declined   Food Insecurity: Patient Declined (6/12/2025)    Hunger Vital Sign     Worried About Running Out of Food in the Last Year: Patient declined     Ran Out of Food in the Last Year: Patient declined   Transportation  Needs: Patient Declined (6/12/2025)    PRAPARE - Transportation     Lack of Transportation (Medical): Patient declined     Lack of Transportation (Non-Medical): Patient declined   Physical Activity: Insufficiently Active (5/22/2025)    Exercise Vital Sign     Days of Exercise per Week: 3 days     Minutes of Exercise per Session: 30 min   Stress: Patient Declined (6/12/2025)    [employer] [place] [employer] - Occupational Stress Questionnaire     Feeling of Stress : Patient declined   Housing Stability: Patient Declined (6/12/2025)    Housing Stability Vital Sign     Unable to Pay for Housing in the Last Year: Patient declined     Number of Times Moved in the Last Year: 0     Homeless in the Last Year: Patient declined

## 2025-06-13 NOTE — ASSESSMENT & PLAN NOTE
"Patient has chronic CHF. Unable to specify acute vs chronic as there are no echos documented in EMR. On presentation their CHF was well compensated. Most recent BNP and echo results are listed below.  No results for input(s): "BNP" in the last 72 hours.  Latest ECHO  No results found for this or any previous visit.    Current Heart Failure Medications  metoprolol succinate (TOPROL-XL) 24 hr tablet 100 mg, Daily, Oral  hydrALAZINE tablet 100 mg, 3 times daily, Oral    Plan  - Continue home medication regimen.    "

## 2025-06-13 NOTE — PROGRESS NOTES
Office: [phone]    Subjective:   Pain is well controlled. No complaints as of now. Denies weakness or paraesthesias.     Denies nausea, vomiting, chest pain, fevers/chills.    I/O as of 7:51 AM:   Intake/Output - Last 3 Shifts         06/11 0700  06/12 0659 06/12 0700 06/13 0659 06/13 0700 06/14 0659    P.O.  220     I.V. (mL/kg)  100 (0.9)     IV Piggyback  492.8     Total Intake(mL/kg)  812.8 (7.5)     Urine (mL/kg/hr)  0     Drains  30     Total Output  30     Net  +782.8            Unmeasured Urine Occurrence  1 x     Unmeasured Stool Occurrence  1 x              Vitals / Physical Exam:  Vitals:    06/13/25 0308 06/13/25 0323 06/13/25 0329 06/13/25 0422   BP: 97/66 107/74  (!) 97/54   BP Location:    Left arm   Patient Position:    Lying   Pulse: 89 88 89 86   Resp:    18   Temp:    99 °F (37.2 °C)   TempSrc:    Oral   SpO2: 97% 98% 97% 97%   Weight:       Height:            AOx3   NAD  Dressing CDI      Motor  C5 C6 C7 C8 T1      Left  5 5 5 5 5      Right  5 5 5 5 5   Sensory           Left  + + + + +     Right + + + + +     Motor L2 L3 L4 L5 S1   Left 5 5 5 5 5   Right 5 5 5 5 5   Sensory        Left  + + + + +   Right + + + + +         Assessment / Plan:   Shataluzmaria SHIVAM Leonardo is a 49 y.o. female now 1 Day Post-Op  s/p Procedure(s) (LRB):  INCISION AND DRAINAGE, ABSCESS, SPINE, LUMBOSACRAL, POSTERIOR (N/A).    - medical comanagement per hospitalist  - Progressing postoperatively  - continue broad-spectrum antibiotics.  Follow cultures  - Please keep dressing clean, dry and intact; change as needed  - Please continue Diet Consistent Carbohydrate 2000 Calories (up to 75 gm per meal) diet   - Pain at this time is well controlled; continue current pain regiment  - TEDs/SCDs  - PT  - Bowel regimen  - Please call should you have any questions regarding this patient's care      Signature:  Cyril Upton MD     Electronic Signature

## 2025-06-13 NOTE — ASSESSMENT & PLAN NOTE
"Patient's FSGs are controlled on current medication regimen.  Last A1c reviewed-   Lab Results   Component Value Date    HGBA1C 7.7 (H) 04/07/2025     Most recent fingerstick glucose reviewed- No results for input(s): "POCTGLUCOSE" in the last 24 hours.  Current correctional scale  Medium  Maintain anti-hyperglycemic dose as follows-   Antihyperglycemics (From admission, onward)      Start     Stop Route Frequency Ordered    06/12/25 2045  insulin aspart U-100 injection 1-10 Units         -- SubQ Before meals & nightly PRN 06/12/25 2045          Hold Oral hypoglycemics while patient is in the hospital.     "

## 2025-06-13 NOTE — TRANSFER OF CARE
"Anesthesia Transfer of Care Note    Patient: Carey Leonardo    Procedure(s) Performed: Procedure(s) (LRB):  INCISION AND DRAINAGE, ABSCESS, SPINE, LUMBOSACRAL, POSTERIOR (N/A)    Patient location: PACU    Anesthesia Type: general    Transport from OR: Transported from OR on 6-10 L/min O2 by face mask with adequate spontaneous ventilation    Post pain: adequate analgesia    Post assessment: no apparent anesthetic complications    Post vital signs: stable    Level of consciousness: responds to stimulation and sedated    Nausea/Vomiting: no nausea/vomiting    Complications: none    Transfer of care protocol was followedComments: Good SV continue, NAD noted, VSS, RTRN      Last vitals: Visit Vitals  BP (!) 144/78 (BP Location: Right arm, Patient Position: Lying)   Pulse 99   Temp (!) 38.7 °C (101.7 °F)   Resp (!) 21   Ht 5' 7" (1.702 m)   Wt 95.3 kg (210 lb)   LMP  (LMP Unknown)   SpO2 97%   Breastfeeding No   BMI 32.89 kg/m²     "

## 2025-06-13 NOTE — ASSESSMENT & PLAN NOTE
Patient's blood pressure range in the last 24 hours was: BP  Min: 96/53  Max: 157/112.The patient's inpatient anti-hypertensive regimen is listed below:  Current Antihypertensives  NIFEdipine 24 hr tablet 60 mg, Daily, Oral  metoprolol succinate (TOPROL-XL) 24 hr tablet 100 mg, Daily, Oral  hydrALAZINE tablet 100 mg, 3 times daily, Oral  cloNIDine tablet 0.2 mg, 4 times daily, Oral    Plan  - BP is controlled, no changes needed to their regimen

## 2025-06-13 NOTE — HOSPITAL COURSE
"Patient was seen and examined.  Resting comfortably in bed.  Denies any complaints.  Appropriate home medications resumed.    6/14 - Tmax 102.1 deg F, tachycardic.  Patient meets sepsis criteria. 1L NSS fluid bolus ordered along with  ml/hr x 10 hours.  Blood cultures also ordered.  Continue with IV vancomycin.    6/15 -Tmax 99 def F, tachycardia resolved. Blood cultures in progress. Wound cultures positive for MRSA.    6/16 - afebrile. requesting to go home.    6/17 - resting comfortably in bed. Requesting to go home.    Reevaluated patient later due to complaints of dysuria, urinary incontinence, left pelvic discomfort, flank pain, and "blood in underwear" - patient is unsure if urinary or vaginal. S/p hysterectomy 2011.  Reports that symptoms have been present since surgery.  No bowel incontinence.  UA unremarkable - no evidence of infection or hematuria.  Med list reviewed. Percocet is only med that could be contributing to incontinence.  CT A/P without contrast ordered for further evaluation.     6/18 - incontinence resolved. CT A/P results reviewed.    6/19 - doing well. Schedule for dc today.  "

## 2025-06-13 NOTE — PLAN OF CARE
[facility]  Initial Discharge Assessment       Primary Care Provider: Zainab Harrell FNP    Admission Diagnosis: Infection of lumbar spine [M46.26]    Admission Date: 6/12/2025  Expected Discharge Date:     Transition of Care Barriers: None    Payor:  / Plan:  [facility] / Product Type: Government /     Extended Emergency Contact Information  Primary Emergency Contact: Jorge Leonardo  Mobile Phone: [phone]  Relation: Spouse  Preferred language: English   needed? No    Discharge Plan A: Home with family, Home Health  Discharge Plan B: Skilled Nursing Facility      [id number] - [place], [place] - [address] [facility] [address]  [address]  [facility] [place] [address]  Phone: [phone] Fax: [phone]    [facility] - [place], [place] - [address] [facility] [facility]  [address] [facility] [address]  Phone: [phone] Fax: [phone]    [facility] [address] [facility], [place] - [address]  [address]  [facility] [address]  Phone: [phone] Fax: [phone]    [facility]  [address] [facility] [address]  Phone: [phone] Fax: [phone]      Initial Assessment (most recent)       Adult Discharge Assessment - 06/13/25 1137          Discharge Assessment    Assessment Type Discharge Planning Assessment     Confirmed/corrected address, phone number and insurance Yes     Confirmed Demographics Correct on Facesheet     Source of Information patient     People in Home spouse     Name(s) of People in Home Jorge Leonardo, , [phone]     Do you expect to return to your current living situation? Yes     Do you have help at home or someone to help you manage your care at home? Yes     Who are your caregiver(s) and their phone number(s)? Jorge Leonardo, , [phone]     Prior to hospitilization cognitive status: Unable to Assess     Current cognitive status:  Alert/Oriented     Walking or Climbing Stairs Difficulty no     Dressing/Bathing Difficulty no     Home Accessibility stairs to enter home     Number of Stairs, Main Entrance three     Equipment Currently Used at Home walker, rolling     Readmission within 30 days? Yes     Patient currently being followed by outpatient case management? No     Do you currently have service(s) that help you manage your care at home? Yes     Name and Contact number of agency [facility]     Is the pt/caregiver preference to resume services with current agency Yes     Do you take prescription medications? Yes     Do you have prescription coverage? No     Do you have any problems affording any of your prescribed medications? No     Is the patient taking medications as prescribed? yes     Who is going to help you get home at discharge? Jorge Leonardo, , [phone]     How do you get to doctors appointments? car, drives self;family or friend will provide     Are you on dialysis? No     Do you take coumadin? No     Discharge Plan A Home with family;Home Health     Discharge Plan B Skilled Nursing Facility     DME Needed Upon Discharge  none     Discharge Plan discussed with: Patient     Transition of Care Barriers None                   Ss spoke with pt at bedside. Pt lives at home with her  and plans to return when medically ready for dc. Pt has required dme. Pt current with [facility], choice obtained to resume, initial info faxed. Per MD, pt will need 6 weeks of iv abx but awaiting cultures to determine specific treatment. At this time, pt wishes to complete iv abx at home. SDOH completed 3 weeks ago. Ss following

## 2025-06-13 NOTE — HPI
Ms. Leonardo is a 49-year-old female with past medical history significant for hypertension, hypothyroidism, type 2 diabetes mellitus, obstructive sleep apnea, asthma, congestive heart failure, depression.  Admitted with lumbar wound infection after recent laminectomy and fusion 5/21/2025.  She underwent lumbar wound I&D with evacuation of deep spinal abscess on 6/12/25.  Currently receiving IV vancomycin.  Hospital medicine consulted postoperatively for medical management.

## 2025-06-13 NOTE — OP NOTE
[facility]  Surgery Department  Operative Note    SUMMARY     Date of Procedure: 6/12/2025     Procedure: Procedure(s) (LRB):  INCISION AND DRAINAGE, ABSCESS, SPINE, LUMBOSACRAL, POSTERIOR (N/A)     Surgeons and Role:     * Cyril Upton MD - Primary    Assistant: None    Pre-Operative Diagnosis: Infection of lumbar spine [M46.26]    Post-Operative Diagnosis: Post-Op Diagnosis Codes:     * Infection of lumbar spine [M46.26]    Anesthesia: General    Technical Procedures Used:   Lumbar wound incision and drainage with evacuation of deep spinal abscess, [id number]    Description of the Findings of the Procedure:   Indications:  This is a 49 y.o. female with lumbar wound infection after recent laminectomy and fusion 05/21/2025.  Risks, benefits, and alternatives were discussed with the patient and they elected to proceed with surgery.    Procedure in detail:  The patient was identified in the preoperative holding area and the surgical site was marked. They were then taken back to the operating room where general endotracheal anesthesia was induced. They were then transitioned to the prone position on the Tonny frame with the arms and legs in the physiologic position and all bony prominences well-padded. The posterior lumbar spine was prepped and draped in the normal sterile fashion. A timeout was performed.  The prior incision site was used.  The skin closure was opened.  Cultures were taken in the superficial space.  The superficial space was debrided of any infectious and necrotic tissue with the use of scalpel and rongeur.  The superficial space was then copiously irrigated with 3 L normal saline.  The fascial closure was then opened.  Deep cultures were taken.  The deep space was sharply debrided of any necrotic and infectious tissue using scalpel and rongeur.  The deep space was then copiously irrigated with 3 L of normal saline.  1 g of powdered vancomycin was placed in the deep  space.  A medium Hemovac drain was placed in the depths of the wound.    The fascial layer was closed with interrupted figure-of-eight #1 PDS suture. The subcutaneous layer was closed with interrupted #0 Prolene suture.  The skin was closed with interrupted 3-0 nylon suture.  The drains were secured with Vicryl suture.  A sterile dressing of Xeroform, gauze and Tegaderm was then applied.    The patient was then transitioned back to supine position, extubated and awakened and taken to recovery in good condition having suffered no untoward event.    She will require at least 6 weeks of IV antibiotics with a PICC line.      Complications: No    Estimated Blood Loss (EBL):  50 mL           Implants: * No implants in log *    Specimens:  Superficial and deep cultures  Specimen (24h ago, onward)      None                    Condition: Good    Disposition: PACU - hemodynamically stable.    Attestation: I was present and scrubbed for the entire procedure.

## 2025-06-13 NOTE — PROGRESS NOTES
1929 RECEIVED TO RR EASILY AROUSED. C/O PAIN AT OP-SITED. DRESSING LOWER BACK D/I. HEMOVAC DRAIN X2 TO OP-SITE, COMPRESSED, P/D DARK RED DRAINAGE. IV INFUSING WELL RIGHT AC 20G. CATH. BLOOD SUGAR 181. SEE FLOW SHEET.    1949 DILAUDID TITRATED FOR PAIN RELIEF.    2000 O2 SATS DROPPED AFTER DILAUDID DOSE. O2 VIA NC. ENCOURAGED COUGH DEEP BREATHS. DR. GUPTA AT BEDSIDE. [facility] GIVEN FOR PAIN RELIEF.    2015 SLEEPING, O2 VIA NC. DRESSING D/I TO OP-SITE. HEMOVAC DRAIN X2 INTACT. TRANSFERRED TO ROOM.

## 2025-06-13 NOTE — PT/OT/SLP EVAL
Physical Therapy Evaluation    Patient Name:  Carey Leonardo   MRN:  [id number]    Recommendations:     Discharge Recommendations: No Therapy Indicated   Discharge Equipment Recommendations: none   Barriers to discharge: None    Assessment:     Carey Leonardo is a 49 y.o. female admitted with a medical diagnosis of Infection of lumbar spine.  She presents with the following impairments/functional limitations: impaired functional mobility, pain, orthopedic precautions     Patient doing well with mobility post op despite pain. She was able to ambulate in room safely with RW. Understood spinal precautions. Patient should be safe for home dc from PT standpoint once medically ready.     Rehab Prognosis: Good; patient would benefit from acute skilled PT services to address these deficits and reach maximum level of function.    Recent Surgery: Procedure(s) (LRB):  INCISION AND DRAINAGE, ABSCESS, SPINE, LUMBOSACRAL, POSTERIOR (N/A) 1 Day Post-Op    Plan:     During this hospitalization, patient to be seen daily to address the identified rehab impairments via gait training, therapeutic activities, therapeutic exercises, neuromuscular re-education and progress toward the following goals:    Plan of Care Expires:  07/19/25    Subjective     Chief Complaint: lumbar pain  Patient/Family Comments/goals: agreeable  Pain/Comfort:  Pain Rating 1: 10/10  Location 1: lumbar spine  Pain Addressed 1: Pre-medicate for activity    Patients cultural, spiritual, Mormonism conflicts given the current situation: no    Living Environment:  Home with spouse  Prior to admission, patients level of function was mod I with RW.  Equipment used at home: walker, rolling.  DME owned (not currently used): none.  Upon discharge, patient will have assistance from spouse.    Objective:     Communicated with nurse prior to session.  Patient found right sidelying with hemovac  upon PT entry to room.    General Precautions: Standard, fall  Orthopedic  Precautions:spinal precautions   Braces:    Respiratory Status: Room air    Exams:  RLE ROM: WFL  RLE Strength: WFL  LLE ROM: WFL  LLE Strength: WFL    Functional Mobility:  Bed Mobility:     Supine to Sit: minimum assistance  Sit to Supine: contact guard assistance  Transfers:     Sit to Stand:  contact guard assistance with rolling walker  Gait: 30 ft with RW CGA, slow debra  Balance: good      AM-PAC 6 CLICK MOBILITY  Total Score:22       Treatment & Education:  Mobility as noted  Spinal precautions  Rehab tx plan    Patient left right sidelying with all lines intact, call button in reach, and nurse notified.    GOALS:   Multidisciplinary Problems       Physical Therapy Goals          Problem: Physical Therapy    Goal Priority Disciplines Outcome Interventions   Physical Therapy Goal     PT, PT/OT Progressing    Description: Short term goals:  . Supine to sit with Contact Guard Assistance  . Sit to supine with Contact Guard Assistance  . Sit to stand transfer with Contact Guard Assistance  . Gait  x 100 feet with Contact Guard Assistance using Rolling Walker.     Long term goals:  Patient will gain highest level functional mobility with lowest level of assistive device to return to desired living arrangement and prior ADL's.                          DME Justifications:  none    History:     Past Medical History:   Diagnosis Date    Abdominal pain 10/03/2024    Asthma     CHF (congestive heart failure)     Chronic serous otitis media of both ears 04/04/2023    Depressive disorder     Diabetes mellitus, type 2     Diabetic eye exam 10/24/2024    Dr. [facility] [place]    Gastroparesis     Hyperlipidemia     Hypertension     Hypothyroidism 08/02/2022    Synthroid 200 mcg oral daily      IBS (irritable bowel syndrome)     Kidney stones     Pain 10/03/2024    Postlaminectomy syndrome, lumbar region 04/28/2022    [facility]    Sleep apnea     Does not use a CPAP       Past Surgical  History:   Procedure Laterality Date    Bilateral L3-S1 MBB Bilateral 6-, 5-, 1-8-2014    Dr Jessica Randolph    CARPAL TUNNEL RELEASE      CHOLECYSTECTOMY      DIAGNOSTIC LAPAROSCOPY  04/14/2010    Exploratory Laparotomy, Myomectomy, Hydrotubation-Dr. Feng    DILATION AND CURETTAGE OF UTERUS  04/14/2010    Hysteroscopy-Dr. Feng    EPIDURAL STEROID INJECTION N/A 10/22/2024    Procedure: Injection, Steroid, Epidural, L4/5;  Surgeon: Rasheeda Bills MD;  Location: [facility];  Service: Pain Management;  Laterality: N/A;    EXPLORATORY LAPAROTOMY WITH UTERINE MYOMECTOMY  02/27/2007    Dr. Marquise Anderson    FUSION, SPINE, LATERAL APPROACH N/A 5/20/2025    Procedure: ARTHRODESIS, SPINE, LATERAL;  Surgeon: Cyril Upton MD;  [email or web address] OR;  Service: Orthopedics;  Laterality: N/A;  L2-L4 LATERAL FUSION    HYSTERECTOMY  09/29/2011    Robotic, FABIENNE, Cystoscopy-Dr. Feng    HYSTEROSCOPY WITH DILATION AND CURETTAGE OF UTERUS  04/04/2006    Laparoscopy, Chromotubation-Dr. Marquise Anderson    INCISION AND DRAINAGE, ABSCESS, SPINE, LUMBOSACRAL, POSTERIOR N/A 6/12/2025    Procedure: INCISION AND DRAINAGE, ABSCESS, SPINE, LUMBOSACRAL, POSTERIOR;  Surgeon: Cyril Upton MD;  Location: [email or web address] OR;  Service: Neurosurgery;  Laterality: N/A;    INJECTION OF ANESTHETIC AGENT AROUND ILIOINGUINAL NERVE Right 6/22/2023    Procedure: BLOCK, NERVE, ILIOINGUINAL;  Surgeon: Rasheead Bills MD;  Location: [facility];  Service: Pain Management;  Laterality: Right;    INJECTION OF ANESTHETIC AGENT AROUND MEDIAL BRANCH NERVES INNERVATING LUMBAR FACET JOINT Bilateral 9/21/2023    Procedure: BLOCK, NERVE, FACET JOINT, LUMBAR, MEDIAL BRANCH;  Surgeon: Rasheeda Bills MD;  Location: [facility];  Service: Pain Management;  Laterality: Bilateral;    INJECTION OF ANESTHETIC AGENT AROUND MEDIAL BRANCH NERVES INNERVATING LUMBAR FACET JOINT Bilateral 3/14/2024    Procedure: BLOCK, NERVE, FACET JOINT,  LUMBAR, MEDIAL BRANCH, BILATERAL L4-S1 MBB;  Surgeon: Rasheeda Bills MD;  Location: [facility];  Service: Pain Management;  Laterality: Bilateral;    LAMINECTOMY N/A 11/30/2021    Procedure: L2-L5 Laminectomy;  Surgeon: Cyril Upton MD;  Location: [facility] OR;  Service: Neurosurgery;  Laterality: N/A;    LEFT HEART CATHETERIZATION      Left L3-S1 RFTC Left 07/18/2017    Dr Lopez    Left SI JI Left 09/30/2013    Dr Randolph    MYRINGOTOMY WITH INSERTION OF VENTILATION TUBE Bilateral 4/4/2023    Procedure: MYRINGOTOMY, WITH TYMPANOSTOMY TUBE INSERTION (T-TUBES);  Surgeon: Sea Hinton MD;  Location: [facility];  Service: ENT;  Laterality: Bilateral;  T-TUBES    MYRINGOTOMY WITH INSERTION OF VENTILATION TUBE Bilateral 9/12/2023    Procedure: MYRINGOTOMY, WITH TYMPANOSTOMY TUBE INSERTION, T-TUBES;  Surgeon: Sea Hinton MD;  Location: [facility];  Service: ENT;  Laterality: Bilateral;    MYRINGOTOMY WITH INSERTION OF VENTILATION TUBE Bilateral 7/2/2024    Procedure: MYRINGOTOMY, WITH TYMPANOSTOMY TUBE INSERTION;  Surgeon: Sea Hinton MD;  Location: [facility] OR;  Service: ENT;  Laterality: Bilateral;  Bilateral T-Tubes    OOPHORECTOMY  11/11/2014    Robotic, FABIENNE, Cystoscopy-Dr. Feng    SINUS SURGERY      TRANSFORAMINAL LUMBAR INTERBODY FUSION N/A 5/21/2025    Procedure: ARTHRODESIS, SPINE, LUMBAR, TLIF;  Surgeon: Cyril Upton MD;  Location: [facility];  Service: Orthopedics;  Laterality: N/A;  L2-L5 POSTERIOR FUSION, L4-L5 TLIF       Time Tracking:     PT Received On: 06/13/25  PT Start Time: 1305     PT Stop Time: 1329  PT Total Time (min): 24 min     Billable Minutes: Evaluation 24 06/13/2025

## 2025-06-13 NOTE — PROGRESS NOTES
Pharmacy consulted to assist with the management of vancomycin in this patient to treat: spinal abscess s/p laminectomy and fusion    Patient weight: 108.5 kg  Patient CrCl: ~78 ml/min    Doses given prior to consult: 2500mg x1 given in ER 6/12 @ 2200    Will begin vancomycin: 2250mg every 18 hours    Vancomycin level ordered.     Pharmacy will continue to monitor and make adjustments as needed.      Thank you for the consult,    Aleisha Bell, PharmD  [phone]

## 2025-06-13 NOTE — PLAN OF CARE
Problem: Occupational Therapy  Goal: Occupational Therapy Goal  Description: STG: (in 1 week)  Pt will perform grooming with setup  Pt will bathe with setup and min(A)  Pt will perform UE dressing with independence  Pt will perform LE dressing with with min(A) or CGA with AD  Pt will perform toileting with SBA  Pt will transfer bed/chair/bsc with RW and SBA  Pt will perform standing task x 3 min with RW and SBA   Pt will tolerate 15 minutes of tx without fatigue      LTG: (in 5 weeks)  1.Restore to max I with self care and mobility.    Outcome: Ongoing

## 2025-06-13 NOTE — CONSULTS
Med rec has already been completed.    Pharmacy already consulted for vancomycin.      If any medical issues do arise, notify hospitalist.  I will pass on to day team.

## 2025-06-13 NOTE — PLAN OF CARE
Problem: Adult Inpatient Plan of Care  Goal: Plan of Care Review  6/13/2025 1551 by Salena Horvath RN  Outcome: Progressing  6/13/2025 1529 by Salena Horvath RN  Outcome: Progressing  Goal: Patient-Specific Goal (Individualized)  6/13/2025 1551 by Salena Horvath RN  Outcome: Progressing  6/13/2025 1529 by Salena Horvath RN  Outcome: Progressing  Goal: Absence of Hospital-Acquired Illness or Injury  6/13/2025 1551 by Salena Horvath RN  Outcome: Progressing  6/13/2025 1529 by Salena Horvath RN  Outcome: Progressing  Goal: Optimal Comfort and Wellbeing  6/13/2025 1551 by Salena Horvath RN  Outcome: Progressing  6/13/2025 1529 by Salena Horvath RN  Outcome: Progressing  Goal: Readiness for Transition of Care  6/13/2025 1551 by Salena Horvath RN  Outcome: Progressing  6/13/2025 1529 by Salena Horvath RN  Outcome: Progressing     Problem: Diabetes Comorbidity  Goal: Blood Glucose Level Within Targeted Range  6/13/2025 1551 by Salena Horvath RN  Outcome: Progressing  6/13/2025 1529 by Salena Horvath RN  Outcome: Progressing     Problem: Wound  Goal: Optimal Coping  6/13/2025 1551 by Salena Horvath RN  Outcome: Progressing  6/13/2025 1529 by Salena Horvath RN  Outcome: Progressing  Goal: Optimal Functional Ability  6/13/2025 1551 by Salena Horvath RN  Outcome: Progressing  6/13/2025 1529 by Salena Horvath RN  Outcome: Progressing  Goal: Absence of Infection Signs and Symptoms  6/13/2025 1551 by Salena Horvath RN  Outcome: Progressing  6/13/2025 1529 by Salena Horvath RN  Outcome: Progressing  Goal: Improved Oral Intake  6/13/2025 1551 by Salena Horvath, RN  Outcome: Progressing  6/13/2025 1529 by Salena Horvath RN  Outcome: Progressing  Goal: Optimal Pain Control and Function  6/13/2025 1551 by Salena Horvath, RN  Outcome: Progressing  6/13/2025 1529 by Salena Horvath, RN  Outcome: Progressing  Goal: Skin Health and Integrity  6/13/2025  1551 by Salena Horvath RN  Outcome: Progressing  6/13/2025 1529 by Salena Horvath RN  Outcome: Progressing  Goal: Optimal Wound Healing  6/13/2025 1551 by Salena Horvath RN  Outcome: Progressing  6/13/2025 1529 by Salena Horvath RN  Outcome: Progressing     Problem: Infection  Goal: Absence of Infection Signs and Symptoms  6/13/2025 1551 by Salena Horvath RN  Outcome: Progressing  6/13/2025 1529 by Salena Horvath RN  Outcome: Progressing

## 2025-06-13 NOTE — SUBJECTIVE & OBJECTIVE
Past Medical History:   Diagnosis Date    Abdominal pain 10/03/2024    Asthma     CHF (congestive heart failure)     Chronic serous otitis media of both ears 04/04/2023    Depressive disorder     Diabetes mellitus, type 2     Diabetic eye exam 10/24/2024    Dr. [facility]    Gastroparesis     Hyperlipidemia     Hypertension     Hypothyroidism 08/02/2022    Synthroid 200 mcg oral daily      IBS (irritable bowel syndrome)     Kidney stones     Pain 10/03/2024    Postlaminectomy syndrome, lumbar region 04/28/2022    [facility]    Sleep apnea     Does not use a CPAP       Past Surgical History:   Procedure Laterality Date    Bilateral L3-S1 MBB Bilateral 6-, 5-, 1-8-2014    Dr Jessica Randolph    CARPAL TUNNEL RELEASE      CHOLECYSTECTOMY      DIAGNOSTIC LAPAROSCOPY  04/14/2010    Exploratory Laparotomy, Myomectomy, Hydrotubation-Dr. Feng    DILATION AND CURETTAGE OF UTERUS  04/14/2010    Hysteroscopy-Dr. Feng    EPIDURAL STEROID INJECTION N/A 10/22/2024    Procedure: Injection, Steroid, Epidural, L4/5;  Surgeon: Rasheeda Bills MD;  Location: [facility];  Service: Pain Management;  Laterality: N/A;    EXPLORATORY LAPAROTOMY WITH UTERINE MYOMECTOMY  02/27/2007    Dr. Marquise Anderson    FUSION, SPINE, LATERAL APPROACH N/A 5/20/2025    Procedure: ARTHRODESIS, SPINE, LATERAL;  Surgeon: Cyril Upton MD;  Location: [facility];  Service: Orthopedics;  Laterality: N/A;  L2-L4 LATERAL FUSION    HYSTERECTOMY  09/29/2011    Robotic, FABIENNE, Cystoscopy-Dr. Feng    HYSTEROSCOPY WITH DILATION AND CURETTAGE OF UTERUS  04/04/2006    Laparoscopy, Chromotubation-Dr. Marquise Anderson    INCISION AND DRAINAGE, ABSCESS, SPINE, LUMBOSACRAL, POSTERIOR N/A 6/12/2025    Procedure: INCISION AND DRAINAGE, ABSCESS, SPINE, LUMBOSACRAL, POSTERIOR;  Surgeon: Cyril Upton MD;  Location: [facility] OR;  Service: Neurosurgery;  Laterality: N/A;    INJECTION OF ANESTHETIC AGENT AROUND  ILIOINGUINAL NERVE Right 6/22/2023    Procedure: BLOCK, NERVE, ILIOINGUINAL;  Surgeon: Rasheeda Bills MD;  Location: [facility];  Service: Pain Management;  Laterality: Right;    INJECTION OF ANESTHETIC AGENT AROUND MEDIAL BRANCH NERVES INNERVATING LUMBAR FACET JOINT Bilateral 9/21/2023    Procedure: BLOCK, NERVE, FACET JOINT, LUMBAR, MEDIAL BRANCH;  Surgeon: Rasheeda Bills MD;  Location: [facility];  Service: Pain Management;  Laterality: Bilateral;    INJECTION OF ANESTHETIC AGENT AROUND MEDIAL BRANCH NERVES INNERVATING LUMBAR FACET JOINT Bilateral 3/14/2024    Procedure: BLOCK, NERVE, FACET JOINT, LUMBAR, MEDIAL BRANCH, BILATERAL L4-S1 MBB;  Surgeon: Rasheeda Bills MD;  Location: [facility];  Service: Pain Management;  Laterality: Bilateral;    LAMINECTOMY N/A 11/30/2021    Procedure: L2-L5 Laminectomy;  Surgeon: Cyril Upton MD;  Location: [facility] OR;  Service: Neurosurgery;  Laterality: N/A;    LEFT HEART CATHETERIZATION      Left L3-S1 RFTC Left 07/18/2017    Dr Lopez    Left SI JI Left 09/30/2013    Dr Randolph    MYRINGOTOMY WITH INSERTION OF VENTILATION TUBE Bilateral 4/4/2023    Procedure: MYRINGOTOMY, WITH TYMPANOSTOMY TUBE INSERTION (T-TUBES);  Surgeon: Sea Hinton MD;  Location: [facility] OR;  Service: ENT;  Laterality: Bilateral;  T-TUBES    MYRINGOTOMY WITH INSERTION OF VENTILATION TUBE Bilateral 9/12/2023    Procedure: MYRINGOTOMY, WITH TYMPANOSTOMY TUBE INSERTION, T-TUBES;  Surgeon: Sea Hinton MD;  Location: [facility];  Service: ENT;  Laterality: Bilateral;    MYRINGOTOMY WITH INSERTION OF VENTILATION TUBE Bilateral 7/2/2024    Procedure: MYRINGOTOMY, WITH TYMPANOSTOMY TUBE INSERTION;  Surgeon: Sea Hinton MD;  Location: [facility] OR;  Service: ENT;  Laterality: Bilateral;  Bilateral T-Tubes    OOPHORECTOMY  11/11/2014    Robotic, FABIENNE, Cystoscopy-Dr. Feng    SINUS SURGERY      TRANSFORAMINAL LUMBAR INTERBODY FUSION N/A  5/21/2025    Procedure: ARTHRODESIS, SPINE, LUMBAR, TLIF;  Surgeon: Cyril Upton MD;  Location: [facility];  Service: Orthopedics;  Laterality: N/A;  L2-L5 POSTERIOR FUSION, L4-L5 TLIF       Review of patient's allergies indicates:   Allergen Reactions    Fish containing products Anaphylaxis    Iodinated contrast media     Penicillins        No current facility-administered medications on file prior to encounter.     Current Outpatient Medications on File Prior to Encounter   Medication Sig    albuterol (VENTOLIN HFA) 90 mcg/actuation inhaler Inhale 2 puffs into the lungs every 6 (six) hours as needed for Wheezing. Rescue    atorvastatin (LIPITOR) 20 MG tablet TAKE 1 TABLET(20 MG) BY MOUTH EVERY EVENING    blood sugar diagnostic Strp To check BG 4 times daily, to use with insurance preferred meter    blood-glucose meter kit To check BG 4 times daily, to use with insurance preferred meter    blood-glucose sensor (FREESTYLE DENG 3 SENSOR) Suad Use as directed    blood-glucose,,cont (FREESTYLE DENG 3 READER) Misc Use with your sensor.    cloNIDine (CATAPRES) 0.2 MG tablet Take 1 tablet (0.2 mg total) by mouth 4 (four) times daily.    empagliflozin (JARDIANCE) 10 mg tablet Take 1 tablet (10 mg total) by mouth once daily.    glipiZIDE 5 MG TR24 Take 2 tablets (10 mg total) by mouth daily with breakfast.    hydrALAZINE (APRESOLINE) 100 MG tablet Take 1 tablet (100 mg total) by mouth 3 (three) times daily.    insulin glargine U-100, Lantus, (LANTUS SOLOSTAR U-100 INSULIN) 100 unit/mL (3 mL) InPn pen Inject 20 Units into the skin every evening.    ipratropium (ATROVENT) 21 mcg (0.03 %) nasal spray 2 sprays by Each Nostril route 2 (two) times daily.    lancets Misc To check BG 4 times daily, to use with insurance preferred meter    levothyroxine (SYNTHROID) 200 MCG tablet Take 1 tablet (200 mcg total) by mouth before breakfast.    metoprolol succinate (TOPROL-XL) 100 MG 24 hr tablet Take 1 tablet (100 mg  "total) by mouth once daily.    NIFEdipine (PROCARDIA-XL) 60 MG (OSM) 24 hr tablet Take 1 tablet (60 mg total) by mouth once daily.    ondansetron (ZOFRAN-ODT) 4 MG TbDL Take 1 tablet (4 mg total) by mouth 2 (two) times daily.    oxyCODONE-acetaminophen (LYNOX)  mg per tablet Take 1 tablet by mouth every 4 (four) hours as needed for Pain.    pen needle, diabetic 32 gauge x 5/32" Ndle 1 Application by Misc.(Non-Drug; Combo Route) route once daily.    sertraline (ZOLOFT) 100 MG tablet Take 1 tablet (100 mg total) by mouth every evening.    traMADoL (ULTRAM) 50 mg tablet Take 1 tablet (50 mg total) by mouth every 8 (eight) hours as needed for Pain.    lurasidone (LATUDA) 20 mg Tab tablet  (Patient not taking: Reported on 6/3/2025)     Family History       Problem Relation (Age of Onset)    Diabetes Mellitus Mother    Heart disease Mother, Sister    Hypertension Mother    Migraines Mother          Tobacco Use    Smoking status: Never     Passive exposure: Never    Smokeless tobacco: Never   Substance and Sexual Activity    Alcohol use: Not Currently    Drug use: Never    Sexual activity: Not Currently     Review of Systems   Constitutional:  Negative for chills, fatigue, fever and unexpected weight change.   HENT:  Negative for congestion, mouth sores and sore throat.    Eyes:  Negative for photophobia and visual disturbance.   Respiratory:  Negative for cough, chest tightness, shortness of breath and wheezing.    Cardiovascular:  Negative for chest pain, palpitations and leg swelling.   Gastrointestinal:  Negative for abdominal pain, diarrhea, nausea and vomiting.   Endocrine: Negative for cold intolerance and heat intolerance.   Genitourinary:  Negative for difficulty urinating, dysuria, frequency and urgency.   Musculoskeletal:  Positive for back pain.   Skin:  Negative for pallor and rash.   Neurological:  Negative for tremors, seizures, syncope, numbness and headaches.   Hematological:  Does not bruise/bleed " easily.   Psychiatric/Behavioral:  Negative for agitation, confusion, hallucinations and suicidal ideas.      Objective:     Vital Signs (Most Recent):  Temp: (!) 100.5 °F (38.1 °C) (06/13/25 1225)  Pulse: 99 (06/13/25 1225)  Resp: 18 (06/13/25 1239)  BP: 107/63 (06/13/25 1225)  SpO2: (!) 93 % (06/13/25 1225) Vital Signs (24h Range):  Temp:  [99 °F (37.2 °C)-101.7 °F (38.7 °C)] 100.5 °F (38.1 °C)  Pulse:  [] 99  Resp:  [11-21] 18  SpO2:  [82 %-99 %] 93 %  BP: ()/() 107/63     Weight: 108.5 kg (239 lb 1.6 oz)  Body mass index is 37.45 kg/m².     Physical Exam  Constitutional:       Appearance: Normal appearance.   HENT:      Head: Normocephalic and atraumatic.      Nose: Nose normal.   Eyes:      Extraocular Movements: Extraocular movements intact.      Pupils: Pupils are equal, round, and reactive to light.   Cardiovascular:      Rate and Rhythm: Normal rate and regular rhythm.   Pulmonary:      Effort: Pulmonary effort is normal.      Breath sounds: Normal breath sounds.   Abdominal:      General: Bowel sounds are normal.      Palpations: Abdomen is soft.   Musculoskeletal:      Comments: Dressing CDI   Skin:     General: Skin is warm and dry.   Neurological:      General: No focal deficit present.      Mental Status: She is alert and oriented to person, place, and time.   Psychiatric:         Mood and Affect: Mood normal.         Behavior: Behavior normal.          Significant Labs: All pertinent labs within the past 24 hours have been reviewed.    Significant Imaging: I have reviewed all pertinent imaging results/findings within the past 24 hours.

## 2025-06-13 NOTE — PT/OT/SLP EVAL
Occupational Therapy   Evaluation    Name: Carey Leonardo  MRN: [id number]  Admitting Diagnosis: Infection of lumbar spine  Recent Surgery: Procedure(s) (LRB):  INCISION AND DRAINAGE, ABSCESS, SPINE, LUMBOSACRAL, POSTERIOR (N/A) 1 Day Post-Op    Recommendations:     Discharge Recommendations: No Therapy Indicated  Discharge Equipment Recommendations:  none  Barriers to discharge:   (on going medical treatment)    Assessment:     Carey Leonardo is a 49 y.o. female with a medical diagnosis of Infection of lumbar spine.  She presents with drowsy, but c/o 10/10 pain. Performance deficits affecting function: impaired skin, pain, gait instability, impaired functional mobility, impaired self care skills.      Pt had lumbar laminectomy and fusion in May 2025 and returned home. Pt and spouse report pt was recovering well at home, but then developed increased pain and drainage from her incision area. Pt was found to have a spinal abscess and underwent  I&D with 2 hemovacs placed. Pt is very painful, but moving better than expected for her level of pain. Pt requires very little assistance with mobility or self-care. OT will follow while she is hospitalized, but should not need continued OT services at discharge. Pt looks safe to return home with spouse when medically ready.    Rehab Prognosis: Good; patient would benefit from acute skilled OT services to address these deficits and reach maximum level of function.       Plan:     Patient to be seen   to address the above listed problems via self-care/home management, therapeutic activities, therapeutic exercises  Plan of Care Expires: 07/17/25  Plan of Care Reviewed with: patient, spouse    Subjective     Chief Complaint: Infection of lumbar spine    Patient/Family Comments/goals: Pt is agreeable to evaluation in spite of lumbar pain    Occupational Profile:  Living Environment: Pt lives in a single level home  with 3 to 4 steps with a rail to enter  Previous level of function:  Pt was able to perform her self-care with min(A) for socks and shoes and ambulated with walker following her lumbar surgery in May 2025. Prior to that, pt was independent with all ADL/ IADL and mobility.  Roles and Routines: Pt is , and works as a .  Equipment Used at Home: walker, rolling  Assistance upon Discharge: Pt will have assistance from her spouse    Pain/Comfort:  Pain Rating 1: 10/10  Location 1: lumbar spine  Pain Addressed 1: Pre-medicate for activity, Cessation of Activity  Pain Rating Post-Intervention 1: 9/10    Patients cultural, spiritual, Evangelical conflicts given the current situation: no    Objective:     Communicated with: DILAN Laurent prior to session.  Patient found right sidelying with hemovac, PICC line, peripheral IV upon OT entry to room.    General Precautions: Standard, fall  Orthopedic Precautions: spinal precautions  Braces: N/A  Respiratory Status: Room air    Occupational Performance:    Bed Mobility:    Patient completed Scooting/Bridging with minimum assistance  Patient completed Supine to Sit with minimum assistance  Patient completed Sit to Supine with minimum assistance    Functional Mobility/Transfers:  Patient completed Sit <> Stand Transfer with contact guard assistance  with  rolling walker   Patient completed Toilet Transfer Step Transfer technique with contact guard assistance with  rolling walker  Functional Mobility: Pt ambulated to toilet and back to bed with RW and CGA    Activities of Daily Living:  Toileting: max(A) for clothes management and total (A) with hygiene      Cognitive/Visual Perceptual:  Cognitive/Psychosocial Skills:     -       Oriented to: Person, Place, Time, and Situation   -       Follows Commands/attention:Follows one-step commands  -       Communication: pt's words are garbled and somewhat slurred, likely due to medication that she had received  -       Safety awareness/insight to disability: intact   -        Mood/Affect/Coping skills/emotional control: Cooperative    Physical Exam:  Balance:    -       sitting with supervision, standing with CGA with RW  Dominant hand:    -       right  Upper Extremity Range of Motion:     -       Right Upper Extremity: WNL  -       Left Upper Extremity: WNL  Upper Extremity Strength:    -       Right Upper Extremity: WNL  -       Left Upper Extremity: WNL   Strength:    -       Right Upper Extremity: WNL  -       Left Upper Extremity: WNL  Gross motor coordination:   WFL    AMPAC 6 Click ADL:  AMPAC Total Score: 18    Treatment & Education:  Pt educated on OT role/POC.   Importance of OOB activity with staff assistance.  Importance of sitting up in the chair throughout the day as tolerated, especially for meals   Safety during functional t/f and mobility with use of RW  Importance of assisting with self-care activities   All questions/concerns answered within OT scope of practice      Patient left right sidelying with all lines intact, call button in reach, RN notified, and spouse present    GOALS:   Multidisciplinary Problems       Occupational Therapy Goals          Problem: Occupational Therapy    Goal Priority Disciplines Outcome Interventions   Occupational Therapy Goal     OT, PT/OT Ongoing    Description: STG: (in 1 week)  Pt will perform grooming with setup  Pt will bathe with setup and min(A)  Pt will perform UE dressing with independence  Pt will perform LE dressing with with min(A) or CGA with AD  Pt will perform toileting with SBA  Pt will transfer bed/chair/bsc with RW and SBA  Pt will perform standing task x 3 min with RW and SBA   Pt will tolerate 15 minutes of tx without fatigue      LTG: (in 5 weeks)  1.Restore to max I with self care and mobility.                             History:     Past Medical History:   Diagnosis Date    Abdominal pain 10/03/2024    Asthma     CHF (congestive heart failure)     Chronic serous otitis media of both ears 04/04/2023     Depressive disorder     Diabetes mellitus, type 2     Diabetic eye exam 10/24/2024    Dr. [facility]    Gastroparesis     Hyperlipidemia     Hypertension     Hypothyroidism 08/02/2022    Synthroid 200 mcg oral daily      IBS (irritable bowel syndrome)     Kidney stones     Pain 10/03/2024    Postlaminectomy syndrome, lumbar region 04/28/2022    [facility]    Sleep apnea     Does not use a CPAP         Past Surgical History:   Procedure Laterality Date    Bilateral L3-S1 MBB Bilateral 6-, 5-, 1-8-2014    Dr Jessica Randolph    CARPAL TUNNEL RELEASE      CHOLECYSTECTOMY      DIAGNOSTIC LAPAROSCOPY  04/14/2010    Exploratory Laparotomy, Myomectomy, Hydrotubation-Dr. Feng    DILATION AND CURETTAGE OF UTERUS  04/14/2010    Hysteroscopy-Dr. Feng    EPIDURAL STEROID INJECTION N/A 10/22/2024    Procedure: Injection, Steroid, Epidural, L4/5;  Surgeon: Rasheeda Bills MD;  Location: [facility];  Service: Pain Management;  Laterality: N/A;    EXPLORATORY LAPAROTOMY WITH UTERINE MYOMECTOMY  02/27/2007    Dr. Marquise Anderson    FUSION, SPINE, LATERAL APPROACH N/A 5/20/2025    Procedure: ARTHRODESIS, SPINE, LATERAL;  Surgeon: Cyril Upton MD;  Location: [facility] OR;  Service: Orthopedics;  Laterality: N/A;  L2-L4 LATERAL FUSION    HYSTERECTOMY  09/29/2011    Robotic, FABIENNE, Cystoscopy-Dr. Feng    HYSTEROSCOPY WITH DILATION AND CURETTAGE OF UTERUS  04/04/2006    Laparoscopy, Chromotubation-Dr. Marquise Anderson    INCISION AND DRAINAGE, ABSCESS, SPINE, LUMBOSACRAL, POSTERIOR N/A 6/12/2025    Procedure: INCISION AND DRAINAGE, ABSCESS, SPINE, LUMBOSACRAL, POSTERIOR;  Surgeon: Cyril Upton MD;  Location: [facility] OR;  Service: Neurosurgery;  Laterality: N/A;    INJECTION OF ANESTHETIC AGENT AROUND ILIOINGUINAL NERVE Right 6/22/2023    Procedure: BLOCK, NERVE, ILIOINGUINAL;  Surgeon: Rasheeda Bills MD;  Location: [facility];  Service: Pain Management;   Laterality: Right;    INJECTION OF ANESTHETIC AGENT AROUND MEDIAL BRANCH NERVES INNERVATING LUMBAR FACET JOINT Bilateral 9/21/2023    Procedure: BLOCK, NERVE, FACET JOINT, LUMBAR, MEDIAL BRANCH;  Surgeon: Rasheeda Bills MD;  Location: [facility];  Service: Pain Management;  Laterality: Bilateral;    INJECTION OF ANESTHETIC AGENT AROUND MEDIAL BRANCH NERVES INNERVATING LUMBAR FACET JOINT Bilateral 3/14/2024    Procedure: BLOCK, NERVE, FACET JOINT, LUMBAR, MEDIAL BRANCH, BILATERAL L4-S1 MBB;  Surgeon: Rasheeda Bills MD;  Location: [facility];  Service: Pain Management;  Laterality: Bilateral;    LAMINECTOMY N/A 11/30/2021    Procedure: L2-L5 Laminectomy;  Surgeon: Cyril Upton MD;  Location: [facility] OR;  Service: Neurosurgery;  Laterality: N/A;    LEFT HEART CATHETERIZATION      Left L3-S1 RFTC Left 07/18/2017    Dr Lopez    Left SI JI Left 09/30/2013    Dr Randolph    MYRINGOTOMY WITH INSERTION OF VENTILATION TUBE Bilateral 4/4/2023    Procedure: MYRINGOTOMY, WITH TYMPANOSTOMY TUBE INSERTION (T-TUBES);  Surgeon: Sea Hinton MD;  Location: [facility];  Service: ENT;  Laterality: Bilateral;  T-TUBES    MYRINGOTOMY WITH INSERTION OF VENTILATION TUBE Bilateral 9/12/2023    Procedure: MYRINGOTOMY, WITH TYMPANOSTOMY TUBE INSERTION, T-TUBES;  Surgeon: Sea Hinton MD;  Location: [facility];  Service: ENT;  Laterality: Bilateral;    MYRINGOTOMY WITH INSERTION OF VENTILATION TUBE Bilateral 7/2/2024    Procedure: MYRINGOTOMY, WITH TYMPANOSTOMY TUBE INSERTION;  Surgeon: Sea Hinton MD;  Location: [facility];  Service: ENT;  Laterality: Bilateral;  Bilateral T-Tubes    OOPHORECTOMY  11/11/2014    Robotic, FABIENNE, Cystoscopy-Dr. Feng    SINUS SURGERY      TRANSFORAMINAL LUMBAR INTERBODY FUSION N/A 5/21/2025    Procedure: ARTHRODESIS, SPINE, LUMBAR, TLIF;  Surgeon: Cyril Upton MD;  Location: [facility] OR;  Service: Orthopedics;  Laterality: N/A;  L2-L5 POSTERIOR  FUSION, L4-L5 TLIF       Time Tracking:     OT Date of Treatment: 06/13/25  OT Start Time: 1305  OT Stop Time: 1330  OT Total Time (min): 25 min    Billable Minutes:Evaluation low complexity    6/13/2025

## 2025-06-14 PROBLEM — A41.9 SEPSIS WITHOUT ACUTE ORGAN DYSFUNCTION: Status: ACTIVE | Noted: 2025-06-14

## 2025-06-14 LAB
ANION GAP SERPL CALCULATED.3IONS-SCNC: 11 MMOL/L (ref 7–16)
BASOPHILS # BLD AUTO: 0.04 K/UL (ref 0–0.2)
BASOPHILS NFR BLD AUTO: 0.4 % (ref 0–1)
BUN SERPL-MCNC: 5 MG/DL (ref 7–19)
BUN/CREAT SERPL: 8 (ref 6–20)
CALCIUM SERPL-MCNC: 9.2 MG/DL (ref 8.4–10.2)
CHLORIDE SERPL-SCNC: 105 MMOL/L (ref 98–107)
CO2 SERPL-SCNC: 27 MMOL/L (ref 22–29)
CREAT SERPL-MCNC: 0.64 MG/DL (ref 0.55–1.02)
CRP SERPL HS-MCNC: 150.74 MG/L
DIFFERENTIAL METHOD BLD: ABNORMAL
EGFR (NO RACE VARIABLE) (RUSH/TITUS): 109 ML/MIN/1.73M2
EOSINOPHIL # BLD AUTO: 0.25 K/UL (ref 0–0.5)
EOSINOPHIL NFR BLD AUTO: 2.5 % (ref 1–4)
ERYTHROCYTE [DISTWIDTH] IN BLOOD BY AUTOMATED COUNT: 13.8 % (ref 11.5–14.5)
GLUCOSE SERPL-MCNC: 183 MG/DL (ref 70–105)
GLUCOSE SERPL-MCNC: 218 MG/DL (ref 74–100)
GLUCOSE SERPL-MCNC: 235 MG/DL (ref 70–105)
GLUCOSE SERPL-MCNC: 304 MG/DL (ref 70–105)
GLUCOSE SERPL-MCNC: 347 MG/DL (ref 70–105)
HCT VFR BLD AUTO: 33.6 % (ref 38–47)
HGB BLD-MCNC: 9.8 G/DL (ref 12–16)
IMM GRANULOCYTES # BLD AUTO: 0.04 K/UL (ref 0–0.04)
IMM GRANULOCYTES NFR BLD: 0.4 % (ref 0–0.4)
LYMPHOCYTES # BLD AUTO: 2.26 K/UL (ref 1–4.8)
LYMPHOCYTES NFR BLD AUTO: 22.4 % (ref 27–41)
MCH RBC QN AUTO: 27.1 PG (ref 27–31)
MCHC RBC AUTO-ENTMCNC: 29.2 G/DL (ref 32–36)
MCV RBC AUTO: 92.8 FL (ref 80–96)
MONOCYTES # BLD AUTO: 0.9 K/UL (ref 0–0.8)
MONOCYTES NFR BLD AUTO: 8.9 % (ref 2–6)
MPC BLD CALC-MCNC: 9.3 FL (ref 9.4–12.4)
NEUTROPHILS # BLD AUTO: 6.59 K/UL (ref 1.8–7.7)
NEUTROPHILS NFR BLD AUTO: 65.4 % (ref 53–65)
NRBC # BLD AUTO: 0 X10E3/UL
NRBC, AUTO (.00): 0 %
PLATELET # BLD AUTO: 388 K/UL (ref 150–400)
POTASSIUM SERPL-SCNC: 3.4 MMOL/L (ref 3.5–5.1)
RBC # BLD AUTO: 3.62 M/UL (ref 4.2–5.4)
SODIUM SERPL-SCNC: 140 MMOL/L (ref 136–145)
WBC # BLD AUTO: 10.08 K/UL (ref 4.5–11)

## 2025-06-14 PROCEDURE — 97110 THERAPEUTIC EXERCISES: CPT

## 2025-06-14 PROCEDURE — 25000003 PHARM REV CODE 250: Performed by: FAMILY MEDICINE

## 2025-06-14 PROCEDURE — 97530 THERAPEUTIC ACTIVITIES: CPT

## 2025-06-14 PROCEDURE — 27000221 HC OXYGEN, UP TO 24 HOURS

## 2025-06-14 PROCEDURE — 63600175 PHARM REV CODE 636 W HCPCS: Performed by: FAMILY MEDICINE

## 2025-06-14 PROCEDURE — 80048 BASIC METABOLIC PNL TOTAL CA: CPT | Performed by: ORTHOPAEDIC SURGERY

## 2025-06-14 PROCEDURE — 11000001 HC ACUTE MED/SURG PRIVATE ROOM

## 2025-06-14 PROCEDURE — 97116 GAIT TRAINING THERAPY: CPT

## 2025-06-14 PROCEDURE — 36415 COLL VENOUS BLD VENIPUNCTURE: CPT | Performed by: FAMILY MEDICINE

## 2025-06-14 PROCEDURE — 36415 COLL VENOUS BLD VENIPUNCTURE: CPT | Performed by: ORTHOPAEDIC SURGERY

## 2025-06-14 PROCEDURE — 85025 COMPLETE CBC W/AUTO DIFF WBC: CPT | Performed by: ORTHOPAEDIC SURGERY

## 2025-06-14 PROCEDURE — 87040 BLOOD CULTURE FOR BACTERIA: CPT | Performed by: FAMILY MEDICINE

## 2025-06-14 PROCEDURE — 25000003 PHARM REV CODE 250: Performed by: ORTHOPAEDIC SURGERY

## 2025-06-14 PROCEDURE — 86141 C-REACTIVE PROTEIN HS: CPT | Performed by: ORTHOPAEDIC SURGERY

## 2025-06-14 PROCEDURE — 94761 N-INVAS EAR/PLS OXIMETRY MLT: CPT

## 2025-06-14 PROCEDURE — 99233 SBSQ HOSP IP/OBS HIGH 50: CPT | Mod: ,,, | Performed by: FAMILY MEDICINE

## 2025-06-14 PROCEDURE — 82962 GLUCOSE BLOOD TEST: CPT

## 2025-06-14 PROCEDURE — 99900035 HC TECH TIME PER 15 MIN (STAT)

## 2025-06-14 PROCEDURE — 63600175 PHARM REV CODE 636 W HCPCS: Performed by: ORTHOPAEDIC SURGERY

## 2025-06-14 RX ORDER — INSULIN GLARGINE 100 [IU]/ML
5 INJECTION, SOLUTION SUBCUTANEOUS DAILY
Status: DISCONTINUED | OUTPATIENT
Start: 2025-06-14 | End: 2025-06-17

## 2025-06-14 RX ORDER — SODIUM CHLORIDE 9 MG/ML
INJECTION, SOLUTION INTRAVENOUS CONTINUOUS
Status: DISCONTINUED | OUTPATIENT
Start: 2025-06-14 | End: 2025-06-14

## 2025-06-14 RX ADMIN — ATORVASTATIN CALCIUM 20 MG: 20 TABLET, FILM COATED ORAL at 08:06

## 2025-06-14 RX ADMIN — SODIUM CHLORIDE 1000 ML: 9 INJECTION, SOLUTION INTRAVENOUS at 08:06

## 2025-06-14 RX ADMIN — OXYCODONE 5 MG: 5 TABLET ORAL at 12:06

## 2025-06-14 RX ADMIN — ACETAMINOPHEN 500 MG: 500 TABLET ORAL at 02:06

## 2025-06-14 RX ADMIN — OXYCODONE 5 MG: 5 TABLET ORAL at 08:06

## 2025-06-14 RX ADMIN — FAMOTIDINE 20 MG: 20 TABLET, FILM COATED ORAL at 08:06

## 2025-06-14 RX ADMIN — IPRATROPIUM BROMIDE 2 SPRAY: 21 SPRAY, METERED NASAL at 08:06

## 2025-06-14 RX ADMIN — ONDANSETRON 4 MG: 2 INJECTION INTRAMUSCULAR; INTRAVENOUS at 04:06

## 2025-06-14 RX ADMIN — CLONIDINE HYDROCHLORIDE 0.2 MG: 0.2 TABLET ORAL at 01:06

## 2025-06-14 RX ADMIN — MUPIROCIN: 20 OINTMENT TOPICAL at 08:06

## 2025-06-14 RX ADMIN — SENNOSIDES AND DOCUSATE SODIUM 1 TABLET: 50; 8.6 TABLET ORAL at 08:06

## 2025-06-14 RX ADMIN — ACETAMINOPHEN 500 MG: 500 TABLET ORAL at 09:06

## 2025-06-14 RX ADMIN — SODIUM CHLORIDE: 0.9 INJECTION, SOLUTION INTRAVENOUS at 10:06

## 2025-06-14 RX ADMIN — CLONIDINE HYDROCHLORIDE 0.2 MG: 0.2 TABLET ORAL at 04:06

## 2025-06-14 RX ADMIN — ACETAMINOPHEN 500 MG: 500 TABLET ORAL at 06:06

## 2025-06-14 RX ADMIN — SERTRALINE 100 MG: 50 TABLET, FILM COATED ORAL at 08:06

## 2025-06-14 RX ADMIN — INSULIN GLARGINE 5 UNITS: 100 INJECTION, SOLUTION SUBCUTANEOUS at 04:06

## 2025-06-14 RX ADMIN — HYDRALAZINE HYDROCHLORIDE 100 MG: 100 TABLET ORAL at 08:06

## 2025-06-14 RX ADMIN — HYDRALAZINE HYDROCHLORIDE 100 MG: 100 TABLET ORAL at 04:06

## 2025-06-14 RX ADMIN — SODIUM CHLORIDE 2250 MG: 9 INJECTION, SOLUTION INTRAVENOUS at 10:06

## 2025-06-14 RX ADMIN — NIFEDIPINE 60 MG: 60 TABLET, FILM COATED, EXTENDED RELEASE ORAL at 08:06

## 2025-06-14 RX ADMIN — POLYETHYLENE GLYCOL 3350 17 G: 17 POWDER, FOR SOLUTION ORAL at 08:06

## 2025-06-14 RX ADMIN — ACETAMINOPHEN 500 MG: 500 TABLET ORAL at 08:06

## 2025-06-14 RX ADMIN — DOCUSATE SODIUM 100 MG: 100 CAPSULE, LIQUID FILLED ORAL at 08:06

## 2025-06-14 RX ADMIN — OXYCODONE 5 MG: 5 TABLET ORAL at 11:06

## 2025-06-14 RX ADMIN — LEVOTHYROXINE SODIUM 200 MCG: 100 TABLET ORAL at 06:06

## 2025-06-14 RX ADMIN — OXYCODONE 5 MG: 5 TABLET ORAL at 04:06

## 2025-06-14 RX ADMIN — METOPROLOL SUCCINATE 100 MG: 100 TABLET, EXTENDED RELEASE ORAL at 08:06

## 2025-06-14 RX ADMIN — INSULIN ASPART 4 UNITS: 100 INJECTION, SOLUTION INTRAVENOUS; SUBCUTANEOUS at 08:06

## 2025-06-14 RX ADMIN — OXYCODONE 5 MG: 5 TABLET ORAL at 01:06

## 2025-06-14 RX ADMIN — CLONIDINE HYDROCHLORIDE 0.2 MG: 0.2 TABLET ORAL at 08:06

## 2025-06-14 RX ADMIN — ACETAMINOPHEN 500 MG: 500 TABLET ORAL at 01:06

## 2025-06-14 NOTE — PROGRESS NOTES
[facility]  [facility] Medicine  Progress Note    Patient Name: Carey Leonardo  MRN: [id number]  Patient Class: IP- Inpatient   Admission Date: 6/12/2025  Length of Stay: 2 days  Attending Physician: Cyril Upton MD  Primary Care Provider: Zainab Harrell FNP        Subjective     Principal Problem:Infection of lumbar spine        HPI:  Ms. Leonardo is a 49-year-old female with past medical history significant for hypertension, hypothyroidism, type 2 diabetes mellitus, obstructive sleep apnea, asthma, congestive heart failure, depression.  Admitted with lumbar wound infection after recent laminectomy and fusion 5/21/2025.  She underwent lumbar wound I&D with evacuation of deep spinal abscess on 6/12/25.  Currently receiving IV vancomycin.  Hospital medicine consulted postoperatively for medical management.       Overview/Hospital Course:  Patient was seen and examined.  Resting comfortably in bed.  Denies any complaints.  Appropriate home medications resumed.    6/14 - Tmax 102.1 deg F, tachycardic.  Patient meets sepsis criteria. 1L NSS fluid bolus ordered along with  ml/hr x 10 hours.  Blood cultures also ordered.  Continue with IV vancomycin.    Interval History: NAEO.    Review of Systems  Objective:     Vital Signs (Most Recent):  Temp: 98.4 °F (36.9 °C) (06/14/25 1143)  Pulse: 96 (06/14/25 1143)  Resp: 18 (06/14/25 1329)  BP: 120/69 (06/14/25 1143)  SpO2: 98 % (06/14/25 1143) Vital Signs (24h Range):  Temp:  [98.1 °F (36.7 °C)-102.1 °F (38.9 °C)] 98.4 °F (36.9 °C)  Pulse:  [] 96  Resp:  [13-18] 18  SpO2:  [90 %-98 %] 98 %  BP: (104-137)/(65-79) 120/69     Weight: 108.5 kg (239 lb 1.6 oz)  Body mass index is 37.45 kg/m².    Intake/Output Summary (Last 24 hours) at 6/14/2025 1441  Last data filed at 6/13/2025 2132  Gross per 24 hour   Intake --   Output 30 ml   Net -30 ml         Physical Exam  Constitutional:       Appearance: Normal appearance.   HENT:      Head:  Normocephalic and atraumatic.      Nose: Nose normal.   Eyes:      Extraocular Movements: Extraocular movements intact.      Pupils: Pupils are equal, round, and reactive to light.   Cardiovascular:      Rate and Rhythm: Normal rate and regular rhythm.   Pulmonary:      Effort: Pulmonary effort is normal.      Breath sounds: Normal breath sounds.   Abdominal:      General: Bowel sounds are normal.      Palpations: Abdomen is soft.   Musculoskeletal:      Comments: Dressing noted to lumbar spine - C/D/I. Hemovac drain in place.   Skin:     General: Skin is warm and dry.   Neurological:      General: No focal deficit present.      Mental Status: She is alert and oriented to person, place, and time.   Psychiatric:         Mood and Affect: Mood normal.         Behavior: Behavior normal.               Significant Labs: All pertinent labs within the past 24 hours have been reviewed.    Significant Imaging: I have reviewed all pertinent imaging results/findings within the past 24 hours.      Assessment & Plan  Infection of lumbar spine  S/p I&D of deep lumbosacral abscess 6/12  Continue with IV Vancomycin  Managed by Orthopedics    Hypothyroidism  Stable.  Continue with levothyroxine.  Primary hypertension  Patient's blood pressure range in the last 24 hours was: BP  Min: 104/71  Max: 137/79.The patient's inpatient anti-hypertensive regimen is listed below:  Current Antihypertensives  NIFEdipine 24 hr tablet 60 mg, Daily, Oral  metoprolol succinate (TOPROL-XL) 24 hr tablet 100 mg, Daily, Oral  hydrALAZINE tablet 100 mg, 3 times daily, Oral  cloNIDine tablet 0.2 mg, 4 times daily, Oral    Plan  - BP is controlled, no changes needed to their regimen    Type 2 diabetes mellitus, without long-term current use of insulin  Patient's FSGs are controlled on current medication regimen.  Last A1c reviewed-   Lab Results   Component Value Date    HGBA1C 7.7 (H) 04/07/2025     Most recent fingerstick glucose reviewed- No results for  "input(s): "POCTGLUCOSE" in the last 24 hours.  Current correctional scale  Medium  Maintain anti-hyperglycemic dose as follows-   Antihyperglycemics (From admission, onward)      Start     Stop Route Frequency Ordered    06/14/25 1545  insulin glargine U-100 (Lantus) injection 5 Units         -- SubQ Daily 06/14/25 1438    06/12/25 2045  insulin aspart U-100 injection 1-10 Units         -- SubQ Before meals & nightly PRN 06/12/25 2045          Hold Oral hypoglycemics while patient is in the hospital.     CHF (congestive heart failure)  Patient has chronic CHF. Unable to specify acute vs chronic as there are no echos documented in EMR. On presentation their CHF was well compensated. Most recent BNP and echo results are listed below.  No results for input(s): "BNP" in the last 72 hours.  Latest ECHO  No results found for this or any previous visit.    Current Heart Failure Medications  metoprolol succinate (TOPROL-XL) 24 hr tablet 100 mg, Daily, Oral  hydrALAZINE tablet 100 mg, 3 times daily, Oral    Plan  - Continue home medication regimen.    Sepsis without acute organ dysfunction      VTE Risk Mitigation (From admission, onward)           Ordered     Place MARTA hose  Until discontinued         06/12/25 2045     Place sequential compression device  Until discontinued         06/12/25 2045                    Discharge Planning   LUAN: 6/16/2025     Code Status: Prior   Medical Readiness for Discharge Date:   Discharge Plan A: Home with family, Home Health                Please place Justification for DME        Terra Salazar DO  Department of Hospital Medicine   [facility]    "

## 2025-06-14 NOTE — ASSESSMENT & PLAN NOTE
"Patient's FSGs are controlled on current medication regimen.  Last A1c reviewed-   Lab Results   Component Value Date    HGBA1C 7.7 (H) 04/07/2025     Most recent fingerstick glucose reviewed- No results for input(s): "POCTGLUCOSE" in the last 24 hours.  Current correctional scale  Medium  Maintain anti-hyperglycemic dose as follows-   Antihyperglycemics (From admission, onward)      Start     Stop Route Frequency Ordered    06/14/25 1545  insulin glargine U-100 (Lantus) injection 5 Units         -- SubQ Daily 06/14/25 1438    06/12/25 2045  insulin aspart U-100 injection 1-10 Units         -- SubQ Before meals & nightly PRN 06/12/25 2045          Hold Oral hypoglycemics while patient is in the hospital.     "

## 2025-06-14 NOTE — PLAN OF CARE
Problem: Adult Inpatient Plan of Care  Goal: Plan of Care Review  Outcome: Progressing  Goal: Patient-Specific Goal (Individualized)  Outcome: Progressing  Goal: Absence of Hospital-Acquired Illness or Injury  Outcome: Progressing  Goal: Optimal Comfort and Wellbeing  Outcome: Progressing     Problem: Diabetes Comorbidity  Goal: Blood Glucose Level Within Targeted Range  Outcome: Progressing     Problem: Wound  Goal: Optimal Coping  Outcome: Progressing  Goal: Optimal Functional Ability  Outcome: Progressing

## 2025-06-14 NOTE — PT/OT/SLP PROGRESS
Physical Therapy Treatment    Patient Name:  Carey Leonardo   MRN:  [id number]    Recommendations:     Discharge Recommendations: No Therapy Indicated  Discharge Equipment Recommendations: none  Barriers to discharge: ongoing medical treatment    Assessment:     Carey Leonardo is a 49 y.o. female admitted with a medical diagnosis of Infection of lumbar spine.  She presents with the following impairments/functional limitations: impaired functional mobility, pain, orthopedic precautions     Patient doing very well from PT standpoint. Will be good for dc home once medically ready.    Rehab Prognosis: Good; patient would benefit from acute skilled PT services to address these deficits and reach maximum level of function.    Recent Surgery: Procedure(s) (LRB):  INCISION AND DRAINAGE, ABSCESS, SPINE, LUMBOSACRAL, POSTERIOR (N/A) 2 Days Post-Op    Plan:     During this hospitalization, patient to be seen daily to address the identified rehab impairments via gait training, therapeutic activities, therapeutic exercises, neuromuscular re-education and progress toward the following goals:    Plan of Care Expires:  07/19/25    Subjective     Chief Complaint: back pain and diarrhea  Patient/Family Comments/goals: agreeable  Pain/Comfort:  Pain Rating 1: 8/10  Location 1: lumbar spine  Pain Addressed 1: Pre-medicate for activity      Objective:     Communicated with nurse prior to session.  Patient found sitting edge of bed with hemovac, PICC line upon PT entry to room.     General Precautions: Standard, fall  Orthopedic Precautions: spinal precautions  Braces:    Respiratory Status: Room air     Functional Mobility:  Transfers:     Sit to Stand:  contact guard assistance with rolling walker  Gait: 30', 200' with RW CGA, good step length and debra  Balance: good      AM-PAC 6 CLICK MOBILITY  Turning over in bed (including adjusting bedclothes, sheets and blankets)?: 4  Sitting down on and standing up from a chair with arms  (e.g., wheelchair, bedside commode, etc.): 4  Moving from lying on back to sitting on the side of the bed?: 4  Moving to and from a bed to a chair (including a wheelchair)?: 4  Need to walk in hospital room?: 4  Climbing 3-5 steps with a railing?: 3  Basic Mobility Total Score: 23       Treatment & Education:  Mobility as noted  Assisted patient with set up for bathroom hygiene and toileting in which patient was independent  Spinal precautions    Patient left up in chair with all lines intact, call button in reach, and nurse notified..    GOALS:   Multidisciplinary Problems       Physical Therapy Goals          Problem: Physical Therapy    Goal Priority Disciplines Outcome Interventions   Physical Therapy Goal     PT, PT/OT Progressing    Description: Short term goals:  . Supine to sit with Contact Guard Assistance  . Sit to supine with Contact Guard Assistance  . Sit to stand transfer with Contact Guard Assistance  . Gait  x 100 feet with Contact Guard Assistance using Rolling Walker.     Long term goals:  Patient will gain highest level functional mobility with lowest level of assistive device to return to desired living arrangement and prior ADL's.                          DME Justifications:      Time Tracking:     PT Received On: 06/14/25  PT Start Time: 1521     PT Stop Time: 1545  PT Total Time (min): 24 min     Billable Minutes: Gait Training 15 and Therapeutic Activity 9    Treatment Type: Treatment  PT/PTA: PT     Number of PTA visits since last PT visit: 0     06/14/2025

## 2025-06-14 NOTE — SUBJECTIVE & OBJECTIVE
Interval History: NAEO.    Review of Systems  Objective:     Vital Signs (Most Recent):  Temp: 98.4 °F (36.9 °C) (06/14/25 1143)  Pulse: 96 (06/14/25 1143)  Resp: 18 (06/14/25 1329)  BP: 120/69 (06/14/25 1143)  SpO2: 98 % (06/14/25 1143) Vital Signs (24h Range):  Temp:  [98.1 °F (36.7 °C)-102.1 °F (38.9 °C)] 98.4 °F (36.9 °C)  Pulse:  [] 96  Resp:  [13-18] 18  SpO2:  [90 %-98 %] 98 %  BP: (104-137)/(65-79) 120/69     Weight: 108.5 kg (239 lb 1.6 oz)  Body mass index is 37.45 kg/m².    Intake/Output Summary (Last 24 hours) at 6/14/2025 1441  Last data filed at 6/13/2025 2132  Gross per 24 hour   Intake --   Output 30 ml   Net -30 ml         Physical Exam  Constitutional:       Appearance: Normal appearance.   HENT:      Head: Normocephalic and atraumatic.      Nose: Nose normal.   Eyes:      Extraocular Movements: Extraocular movements intact.      Pupils: Pupils are equal, round, and reactive to light.   Cardiovascular:      Rate and Rhythm: Normal rate and regular rhythm.   Pulmonary:      Effort: Pulmonary effort is normal.      Breath sounds: Normal breath sounds.   Abdominal:      General: Bowel sounds are normal.      Palpations: Abdomen is soft.   Musculoskeletal:      Comments: Dressing noted to lumbar spine - C/D/I. Hemovac drain in place.   Skin:     General: Skin is warm and dry.   Neurological:      General: No focal deficit present.      Mental Status: She is alert and oriented to person, place, and time.   Psychiatric:         Mood and Affect: Mood normal.         Behavior: Behavior normal.               Significant Labs: All pertinent labs within the past 24 hours have been reviewed.    Significant Imaging: I have reviewed all pertinent imaging results/findings within the past 24 hours.

## 2025-06-14 NOTE — PROGRESS NOTES
Denies weakness or paresthesias.    Comfortable  Sensation is intact to light touch to her feet.  EHL/TA/GS 5/5  Serosanguineous drainage and Hemovac  60 cc.    Cultures are positive for Staphylococcus aureus.  Sensitivities are pending.    Hemoglobin 9.8    Postop 2.  S/p Incision and drainage lumbar sacral abscess    Continue vancomycin  Adjust when sensitivities are back.  Discontinue IV fluids.

## 2025-06-14 NOTE — ASSESSMENT & PLAN NOTE
Patient's blood pressure range in the last 24 hours was: BP  Min: 104/71  Max: 137/79.The patient's inpatient anti-hypertensive regimen is listed below:  Current Antihypertensives  NIFEdipine 24 hr tablet 60 mg, Daily, Oral  metoprolol succinate (TOPROL-XL) 24 hr tablet 100 mg, Daily, Oral  hydrALAZINE tablet 100 mg, 3 times daily, Oral  cloNIDine tablet 0.2 mg, 4 times daily, Oral    Plan  - BP is controlled, no changes needed to their regimen

## 2025-06-15 LAB
ANION GAP SERPL CALCULATED.3IONS-SCNC: 10 MMOL/L (ref 7–16)
BACTERIA SPEC ANAEROBE CULT: NORMAL
BACTERIA SPEC ANAEROBE CULT: NORMAL
BASOPHILS # BLD AUTO: 0.03 K/UL (ref 0–0.2)
BASOPHILS NFR BLD AUTO: 0.3 % (ref 0–1)
BUN SERPL-MCNC: 5 MG/DL (ref 7–19)
BUN/CREAT SERPL: 7 (ref 6–20)
CALCIUM SERPL-MCNC: 8.8 MG/DL (ref 8.4–10.2)
CHLORIDE SERPL-SCNC: 106 MMOL/L (ref 98–107)
CO2 SERPL-SCNC: 27 MMOL/L (ref 22–29)
CREAT SERPL-MCNC: 0.7 MG/DL (ref 0.55–1.02)
CRP SERPL HS-MCNC: 126.45 MG/L
DIFFERENTIAL METHOD BLD: ABNORMAL
EGFR (NO RACE VARIABLE) (RUSH/TITUS): 107 ML/MIN/1.73M2
EOSINOPHIL # BLD AUTO: 0.74 K/UL (ref 0–0.5)
EOSINOPHIL NFR BLD AUTO: 6.9 % (ref 1–4)
ERYTHROCYTE [DISTWIDTH] IN BLOOD BY AUTOMATED COUNT: 13.7 % (ref 11.5–14.5)
GLUCOSE SERPL-MCNC: 201 MG/DL (ref 70–105)
GLUCOSE SERPL-MCNC: 202 MG/DL (ref 74–100)
GLUCOSE SERPL-MCNC: 219 MG/DL (ref 70–105)
GLUCOSE SERPL-MCNC: 224 MG/DL (ref 70–105)
GLUCOSE SERPL-MCNC: 256 MG/DL (ref 70–105)
HCT VFR BLD AUTO: 27.9 % (ref 38–47)
HGB BLD-MCNC: 8.2 G/DL (ref 12–16)
IMM GRANULOCYTES # BLD AUTO: 0.03 K/UL (ref 0–0.04)
IMM GRANULOCYTES NFR BLD: 0.3 % (ref 0–0.4)
LYMPHOCYTES # BLD AUTO: 2.46 K/UL (ref 1–4.8)
LYMPHOCYTES NFR BLD AUTO: 22.9 % (ref 27–41)
MCH RBC QN AUTO: 27.3 PG (ref 27–31)
MCHC RBC AUTO-ENTMCNC: 29.4 G/DL (ref 32–36)
MCV RBC AUTO: 93 FL (ref 80–96)
MICROORGANISM SPEC CULT: ABNORMAL
MICROORGANISM SPEC CULT: ABNORMAL
MONOCYTES # BLD AUTO: 1.01 K/UL (ref 0–0.8)
MONOCYTES NFR BLD AUTO: 9.4 % (ref 2–6)
MPC BLD CALC-MCNC: 8.9 FL (ref 9.4–12.4)
NEUTROPHILS # BLD AUTO: 6.45 K/UL (ref 1.8–7.7)
NEUTROPHILS NFR BLD AUTO: 60.2 % (ref 53–65)
NRBC # BLD AUTO: 0 X10E3/UL
NRBC, AUTO (.00): 0 %
PLATELET # BLD AUTO: 313 K/UL (ref 150–400)
POTASSIUM SERPL-SCNC: 3.3 MMOL/L (ref 3.5–5.1)
RBC # BLD AUTO: 3 M/UL (ref 4.2–5.4)
SODIUM SERPL-SCNC: 140 MMOL/L (ref 136–145)
VANCOMYCIN TROUGH SERPL-MCNC: 13.8 ΜG/ML (ref 15–20)
WBC # BLD AUTO: 10.72 K/UL (ref 4.5–11)

## 2025-06-15 PROCEDURE — 80202 ASSAY OF VANCOMYCIN: CPT | Performed by: ORTHOPAEDIC SURGERY

## 2025-06-15 PROCEDURE — 36415 COLL VENOUS BLD VENIPUNCTURE: CPT | Performed by: ORTHOPAEDIC SURGERY

## 2025-06-15 PROCEDURE — 63600175 PHARM REV CODE 636 W HCPCS: Performed by: FAMILY MEDICINE

## 2025-06-15 PROCEDURE — 94761 N-INVAS EAR/PLS OXIMETRY MLT: CPT

## 2025-06-15 PROCEDURE — 82962 GLUCOSE BLOOD TEST: CPT

## 2025-06-15 PROCEDURE — 25000003 PHARM REV CODE 250: Performed by: FAMILY MEDICINE

## 2025-06-15 PROCEDURE — 96372 THER/PROPH/DIAG INJ SC/IM: CPT

## 2025-06-15 PROCEDURE — 63600175 PHARM REV CODE 636 W HCPCS: Performed by: ORTHOPAEDIC SURGERY

## 2025-06-15 PROCEDURE — 80048 BASIC METABOLIC PNL TOTAL CA: CPT | Performed by: ORTHOPAEDIC SURGERY

## 2025-06-15 PROCEDURE — 97116 GAIT TRAINING THERAPY: CPT

## 2025-06-15 PROCEDURE — 99233 SBSQ HOSP IP/OBS HIGH 50: CPT | Mod: ,,, | Performed by: FAMILY MEDICINE

## 2025-06-15 PROCEDURE — 99900035 HC TECH TIME PER 15 MIN (STAT)

## 2025-06-15 PROCEDURE — 25000003 PHARM REV CODE 250: Performed by: ORTHOPAEDIC SURGERY

## 2025-06-15 PROCEDURE — 85025 COMPLETE CBC W/AUTO DIFF WBC: CPT | Performed by: ORTHOPAEDIC SURGERY

## 2025-06-15 PROCEDURE — 11000001 HC ACUTE MED/SURG PRIVATE ROOM

## 2025-06-15 PROCEDURE — 27000207 HC ISOLATION

## 2025-06-15 PROCEDURE — 86141 C-REACTIVE PROTEIN HS: CPT | Performed by: ORTHOPAEDIC SURGERY

## 2025-06-15 PROCEDURE — 27000221 HC OXYGEN, UP TO 24 HOURS

## 2025-06-15 RX ORDER — POTASSIUM CHLORIDE 20 MEQ/1
40 TABLET, EXTENDED RELEASE ORAL ONCE
Status: COMPLETED | OUTPATIENT
Start: 2025-06-15 | End: 2025-06-15

## 2025-06-15 RX ADMIN — ACETAMINOPHEN 500 MG: 500 TABLET ORAL at 10:06

## 2025-06-15 RX ADMIN — CLONIDINE HYDROCHLORIDE 0.2 MG: 0.2 TABLET ORAL at 08:06

## 2025-06-15 RX ADMIN — FAMOTIDINE 20 MG: 20 TABLET, FILM COATED ORAL at 08:06

## 2025-06-15 RX ADMIN — IPRATROPIUM BROMIDE 2 SPRAY: 21 SPRAY, METERED NASAL at 08:06

## 2025-06-15 RX ADMIN — INSULIN GLARGINE 5 UNITS: 100 INJECTION, SOLUTION SUBCUTANEOUS at 08:06

## 2025-06-15 RX ADMIN — OXYCODONE 5 MG: 5 TABLET ORAL at 04:06

## 2025-06-15 RX ADMIN — SERTRALINE 100 MG: 50 TABLET, FILM COATED ORAL at 08:06

## 2025-06-15 RX ADMIN — ACETAMINOPHEN 500 MG: 500 TABLET ORAL at 02:06

## 2025-06-15 RX ADMIN — NIFEDIPINE 60 MG: 60 TABLET, FILM COATED, EXTENDED RELEASE ORAL at 08:06

## 2025-06-15 RX ADMIN — OXYCODONE 5 MG: 5 TABLET ORAL at 08:06

## 2025-06-15 RX ADMIN — HYDRALAZINE HYDROCHLORIDE 100 MG: 100 TABLET ORAL at 08:06

## 2025-06-15 RX ADMIN — ACETAMINOPHEN 500 MG: 500 TABLET ORAL at 06:06

## 2025-06-15 RX ADMIN — CLONIDINE HYDROCHLORIDE 0.2 MG: 0.2 TABLET ORAL at 04:06

## 2025-06-15 RX ADMIN — POTASSIUM CHLORIDE 40 MEQ: 1500 TABLET, EXTENDED RELEASE ORAL at 08:06

## 2025-06-15 RX ADMIN — IPRATROPIUM BROMIDE 2 SPRAY: 21 SPRAY, METERED NASAL at 09:06

## 2025-06-15 RX ADMIN — ACETAMINOPHEN 500 MG: 500 TABLET ORAL at 09:06

## 2025-06-15 RX ADMIN — SODIUM CHLORIDE 2250 MG: 9 INJECTION, SOLUTION INTRAVENOUS at 04:06

## 2025-06-15 RX ADMIN — HYDRALAZINE HYDROCHLORIDE 100 MG: 100 TABLET ORAL at 02:06

## 2025-06-15 RX ADMIN — LEVOTHYROXINE SODIUM 200 MCG: 100 TABLET ORAL at 06:06

## 2025-06-15 RX ADMIN — ACETAMINOPHEN 500 MG: 500 TABLET ORAL at 12:06

## 2025-06-15 RX ADMIN — METOPROLOL SUCCINATE 100 MG: 100 TABLET, EXTENDED RELEASE ORAL at 08:06

## 2025-06-15 RX ADMIN — INSULIN ASPART 2 UNITS: 100 INJECTION, SOLUTION INTRAVENOUS; SUBCUTANEOUS at 10:06

## 2025-06-15 RX ADMIN — ATORVASTATIN CALCIUM 20 MG: 20 TABLET, FILM COATED ORAL at 08:06

## 2025-06-15 RX ADMIN — SODIUM CHLORIDE 2000 MG: 9 INJECTION, SOLUTION INTRAVENOUS at 06:06

## 2025-06-15 RX ADMIN — OXYCODONE 5 MG: 5 TABLET ORAL at 12:06

## 2025-06-15 RX ADMIN — CLONIDINE HYDROCHLORIDE 0.2 MG: 0.2 TABLET ORAL at 12:06

## 2025-06-15 NOTE — PT/OT/SLP PROGRESS
"Occupational Therapy   Treatment    Name: Carey Leonardo  MRN: [id number]  Admitting Diagnosis:  Infection of lumbar spine  2 Days Post-Op    Recommendations:     Discharge Recommendations: No Therapy Indicated  Discharge Equipment Recommendations:  none  Barriers to discharge:   (on going medical treatment)    Assessment:     Carey Leonardo is a 49 y.o. female with a medical diagnosis of Infection of lumbar spine.  She presents with alert and agreeable to UE ex.. Performance deficits affecting function are weakness, impaired endurance, pain, impaired self care skills, impaired functional mobility, gait instability, impaired skin.     Rehab Prognosis:  Good; patient would benefit from acute skilled OT services to address these deficits and reach maximum level of function.       Plan:     Patient to be seen 5 x/week to address the above listed problems via self-care/home management, therapeutic activities, therapeutic exercises  Plan of Care Expires: 07/17/25  Plan of Care Reviewed with: patient    Subjective     Chief Complaint: Infection of lumbar spine    Patient/Family Comments/goals: "I already did that earlier."- when offered to perform grooming tasks at the sink  Pain/Comfort:  Pain Rating 1: 8/10  Location 1: lumbar spine  Pain Addressed 1: Pre-medicate for activity  Pain Rating Post-Intervention 1: 8/10    Objective:     Communicated with: DILAN Brar prior to session.  Patient found HOB elevated with hemovac, PICC line upon OT entry to room.    General Precautions: Standard, fall    Orthopedic Precautions:spinal precautions  Braces: N/A  Respiratory Status: Room air     Occupational Performance:     Bed Mobility:    Patient completed Scooting/Bridging with modified independence  Patient completed Supine to Sit with modified independence  Patient completed Sit to Supine with modified independence     Functional Mobility/Transfers:  Patient completed Sit <> Stand Transfer with stand by assistance  with "  rolling walker   Patient completed Toilet Transfer Step Transfer technique with stand by assistance with  rolling walker  Functional Mobility: Pt ambulated with RW and supervision/ SBA    Activities of Daily Living:  Upper Body Dressing: independence    Toileting: modified independence        [facility] 6 Click ADL: 22    Treatment & Education:  Pt performed (B) UE ex for  15 reps of each:   Elbow flexion with 2# db   Shoulder flexion with 2# db   Shoulder ER/IR with 2# db   Supination/pronation with 2# db   Wrist flexion/extension with 2# db  Mobility, ADL, and UE ex as above.    Patient left right sidelying with all lines intact, call button in reach, and RN notified    GOALS:   Multidisciplinary Problems       Occupational Therapy Goals          Problem: Occupational Therapy    Goal Priority Disciplines Outcome Interventions   Occupational Therapy Goal     OT, PT/OT Ongoing    Description: STG: (in 1 week)  Pt will perform grooming with setup  Pt will bathe with setup and min(A)  Pt will perform UE dressing with independence  Pt will perform LE dressing with with min(A) or CGA with AD  Pt will perform toileting with SBA  Pt will transfer bed/chair/bsc with RW and SBA  Pt will perform standing task x 3 min with RW and SBA   Pt will tolerate 15 minutes of tx without fatigue      LTG: (in 5 weeks)  1.Restore to max I with self care and mobility.                             Time Tracking:     OT Date of Treatment: 06/14/25  OT Start Time: 1416  OT Stop Time: 1435  OT Total Time (min): 19 min    Billable Minutes:Therapeutic Exercise 19 min    OT/CIPRIANO: OT          6/14/2025

## 2025-06-15 NOTE — SUBJECTIVE & OBJECTIVE
Interval History: NAEO.    Review of Systems  Objective:     Vital Signs (Most Recent):  Temp: 98.1 °F (36.7 °C) (06/15/25 0800)  Pulse: 95 (06/15/25 0800)  Resp: 18 (06/15/25 0821)  BP: 130/78 (06/15/25 0800)  SpO2: (!) 93 % (06/15/25 0800) Vital Signs (24h Range):  Temp:  [98.1 °F (36.7 °C)-99 °F (37.2 °C)] 98.1 °F (36.7 °C)  Pulse:  [92-97] 95  Resp:  [17-18] 18  SpO2:  [90 %-98 %] 93 %  BP: ()/(57-78) 130/78     Weight: 108.5 kg (239 lb 1.6 oz)  Body mass index is 37.45 kg/m².    Intake/Output Summary (Last 24 hours) at 6/15/2025 1044  Last data filed at 6/15/2025 0201  Gross per 24 hour   Intake 702 ml   Output 25 ml   Net 677 ml         Physical Exam  Constitutional:       Appearance: Normal appearance.   HENT:      Head: Normocephalic and atraumatic.      Nose: Nose normal.   Eyes:      Extraocular Movements: Extraocular movements intact.      Pupils: Pupils are equal, round, and reactive to light.   Cardiovascular:      Rate and Rhythm: Normal rate and regular rhythm.   Pulmonary:      Effort: Pulmonary effort is normal.      Breath sounds: Normal breath sounds.   Abdominal:      General: Bowel sounds are normal.      Palpations: Abdomen is soft.   Musculoskeletal:      Comments: Dressing noted to lumbar spine - C/D/I. Hemovac drain in place.   Skin:     General: Skin is warm and dry.   Neurological:      General: No focal deficit present.      Mental Status: She is alert and oriented to person, place, and time.   Psychiatric:         Mood and Affect: Mood normal.         Behavior: Behavior normal.               Significant Labs: All pertinent labs within the past 24 hours have been reviewed.    Significant Imaging: I have reviewed all pertinent imaging results/findings within the past 24 hours.

## 2025-06-15 NOTE — ASSESSMENT & PLAN NOTE
"This patient does have evidence of infective focus  My overall impression is sepsis without organ dysfunction.  Source: infection of lumbar spine  Antibiotics given-   Antibiotics (72h ago, onward)      Start     Stop Route Frequency Ordered    06/15/25 1800  vancomycin (VANCOCIN) 2,000 mg in 0.9% NaCl 500 mL IVPB         -- IV Every 12 hours (non-standard times) 06/15/25 0504    06/12/25 2208  vancomycin - pharmacy to dose         -- IV pharmacy to manage frequency 06/12/25 2108          Latest lactate reviewed-  No results for input(s): "LACTATE", "POCLAC" in the last 72 hours.    Fluid challenge Contraindicated- Fluid bolus is contraindicated in this patient due to Congestive Heart Failure     Post- resuscitation assessment Yes - I attest a sepsis perfusion exam was performed within 6 hours of sepsis, severe sepsis, or septic shock presentation, following fluid resuscitation.      Will Not start Pressors- Levophed for MAP of 65  Source control achieved by: surgical intervention and IV abx      "

## 2025-06-15 NOTE — PT/OT/SLP PROGRESS
Physical Therapy Treatment    Patient Name:  Carey Leonardo   MRN:  [id number]    Recommendations:     Discharge Recommendations: No Therapy Indicated  Discharge Equipment Recommendations: none  Barriers to discharge: None    Assessment:     Carey Leonardo is a 49 y.o. female admitted with a medical diagnosis of Infection of lumbar spine.  She presents with the following impairments/functional limitations: weakness, pain .    Rehab Prognosis: Good; patient would benefit from acute skilled PT services to address these deficits and reach maximum level of function.    Recent Surgery: Procedure(s) (LRB):  INCISION AND DRAINAGE, ABSCESS, SPINE, LUMBOSACRAL, POSTERIOR (N/A) 3 Days Post-Op    Plan:     During this hospitalization, patient to be seen daily to address the identified rehab impairments via gait training, therapeutic activities, therapeutic exercises, neuromuscular re-education and progress toward the following goals:    Plan of Care Expires:  07/19/25    Subjective     Chief Complaint: none  Patient/Family Comments/goals: agreeable  Pain/Comfort:  Pain Rating 1: 5/10  Location 1: lumbar spine      Objective:     Communicated with nurse prior to session.  Patient found right sidelying with hemovac, PICC line upon PT entry to room.     General Precautions: Standard, fall  Orthopedic Precautions: spinal precautions  Braces:    Respiratory Status: Room air     Functional Mobility:  Bed Mobility:     Supine to Sit: contact guard assistance  Transfers:     Sit to Stand:  contact guard assistance with rolling walker  Gait: 300 ft with RW CGA  Balance: good      AM-PAC 6 CLICK MOBILITY  Turning over in bed (including adjusting bedclothes, sheets and blankets)?: 4  Sitting down on and standing up from a chair with arms (e.g., wheelchair, bedside commode, etc.): 4  Moving from lying on back to sitting on the side of the bed?: 4  Need to walk in hospital room?: 4  Climbing 3-5 steps with a railing?: 4       Treatment &  Education:  Mobility as noted    Patient left up in chair with all lines intact, call button in reach, and nurse notified..    GOALS:   Multidisciplinary Problems       Physical Therapy Goals          Problem: Physical Therapy    Goal Priority Disciplines Outcome Interventions   Physical Therapy Goal     PT, PT/OT Progressing    Description: Short term goals:  . Supine to sit with Contact Guard Assistance  . Sit to supine with Contact Guard Assistance  . Sit to stand transfer with Contact Guard Assistance  . Gait  x 100 feet with Contact Guard Assistance using Rolling Walker.     Long term goals:  Patient will gain highest level functional mobility with lowest level of assistive device to return to desired living arrangement and prior ADL's.                          DME Justifications:      Time Tracking:     PT Received On: 06/15/25  PT Start Time: 1100     PT Stop Time: 1113  PT Total Time (min): 13 min     Billable Minutes: Gait Training 13    Treatment Type: Treatment  PT/PTA: PT     Number of PTA visits since last PT visit: 0     06/15/2025

## 2025-06-15 NOTE — PROGRESS NOTES
Ms. Leonardo is comfortable.   Denies any weakness or paresthesias.    She is moving around the room without difficulty.  EHL/TA/GS 5/5  Sensation is intact to light touch    Hemoglobin 8.2  Intraoperative cultures are positive for MRSA    Postop 3.    Continue vancomycin

## 2025-06-15 NOTE — PROGRESS NOTES
Pharmacy assisting in the management of vancomycin for this patient for: lumbar spine infection    Current dose 2250mg every 18 hours    Vancomycin level: 13.8    Changes to be made: 2000mg every 12 hours  Trough to be checked 6/16 with evening dose.  Level ordered    Pharmacy will continue to monitor daily and make adjustments as needed.    Aleisha Bell, PharmD  [phone]

## 2025-06-15 NOTE — ASSESSMENT & PLAN NOTE
S/p I&D of deep lumbosacral abscess 6/12  Wound culture positive for MRSA  Continue with IV Vancomycin  Managed by Orthopedics

## 2025-06-15 NOTE — PROGRESS NOTES
[facility]  [facility] Medicine  Progress Note    Patient Name: Carey Leonardo  MRN: [id number]  Patient Class: IP- Inpatient   Admission Date: 6/12/2025  Length of Stay: 3 days  Attending Physician: Cyril Upton MD  Primary Care Provider: Zainab Harrell FNP        Subjective     Principal Problem:Infection of lumbar spine        HPI:  Ms. Leonardo is a 49-year-old female with past medical history significant for hypertension, hypothyroidism, type 2 diabetes mellitus, obstructive sleep apnea, asthma, congestive heart failure, depression.  Admitted with lumbar wound infection after recent laminectomy and fusion 5/21/2025.  She underwent lumbar wound I&D with evacuation of deep spinal abscess on 6/12/25.  Currently receiving IV vancomycin.  Hospital medicine consulted postoperatively for medical management.       Overview/Hospital Course:  Patient was seen and examined.  Resting comfortably in bed.  Denies any complaints.  Appropriate home medications resumed.    6/14 - Tmax 102.1 deg F, tachycardic.  Patient meets sepsis criteria. 1L NSS fluid bolus ordered along with  ml/hr x 10 hours.  Blood cultures also ordered.  Continue with IV vancomycin.    6/15 -Tmax 99 def F, tachycardia resolved. Blood cultures in progress. Wound cultures positive for MRSA.    Interval History: NAEO.    Review of Systems  Objective:     Vital Signs (Most Recent):  Temp: 98.1 °F (36.7 °C) (06/15/25 0800)  Pulse: 95 (06/15/25 0800)  Resp: 18 (06/15/25 0821)  BP: 130/78 (06/15/25 0800)  SpO2: (!) 93 % (06/15/25 0800) Vital Signs (24h Range):  Temp:  [98.1 °F (36.7 °C)-99 °F (37.2 °C)] 98.1 °F (36.7 °C)  Pulse:  [92-97] 95  Resp:  [17-18] 18  SpO2:  [90 %-98 %] 93 %  BP: ()/(57-78) 130/78     Weight: 108.5 kg (239 lb 1.6 oz)  Body mass index is 37.45 kg/m².    Intake/Output Summary (Last 24 hours) at 6/15/2025 1044  Last data filed at 6/15/2025 0201  Gross per 24 hour   Intake 702 ml   Output 25 ml   Net  677 ml         Physical Exam  Constitutional:       Appearance: Normal appearance.   HENT:      Head: Normocephalic and atraumatic.      Nose: Nose normal.   Eyes:      Extraocular Movements: Extraocular movements intact.      Pupils: Pupils are equal, round, and reactive to light.   Cardiovascular:      Rate and Rhythm: Normal rate and regular rhythm.   Pulmonary:      Effort: Pulmonary effort is normal.      Breath sounds: Normal breath sounds.   Abdominal:      General: Bowel sounds are normal.      Palpations: Abdomen is soft.   Musculoskeletal:      Comments: Dressing noted to lumbar spine - C/D/I. Hemovac drain in place.   Skin:     General: Skin is warm and dry.   Neurological:      General: No focal deficit present.      Mental Status: She is alert and oriented to person, place, and time.   Psychiatric:         Mood and Affect: Mood normal.         Behavior: Behavior normal.               Significant Labs: All pertinent labs within the past 24 hours have been reviewed.    Significant Imaging: I have reviewed all pertinent imaging results/findings within the past 24 hours.      Assessment & Plan  Infection of lumbar spine  S/p I&D of deep lumbosacral abscess 6/12  Wound culture positive for MRSA  Continue with IV Vancomycin  Managed by Orthopedics    Hypothyroidism  Stable.  Continue with levothyroxine.  Primary hypertension  Patient's blood pressure range in the last 24 hours was: BP  Min: 96/57  Max: 130/78.The patient's inpatient anti-hypertensive regimen is listed below:  Current Antihypertensives  NIFEdipine 24 hr tablet 60 mg, Daily, Oral  metoprolol succinate (TOPROL-XL) 24 hr tablet 100 mg, Daily, Oral  hydrALAZINE tablet 100 mg, 3 times daily, Oral  cloNIDine tablet 0.2 mg, 4 times daily, Oral    Plan  - BP is controlled, no changes needed to their regimen    Type 2 diabetes mellitus, without long-term current use of insulin  Patient's FSGs are controlled on current medication regimen.  Last A1c  "reviewed-   Lab Results   Component Value Date    HGBA1C 7.7 (H) 04/07/2025     Most recent fingerstick glucose reviewed- No results for input(s): "POCTGLUCOSE" in the last 24 hours.  Current correctional scale  Medium  Maintain anti-hyperglycemic dose as follows-   Antihyperglycemics (From admission, onward)      Start     Stop Route Frequency Ordered    06/14/25 1545  insulin glargine U-100 (Lantus) injection 5 Units         -- SubQ Daily 06/14/25 1438    06/12/25 2045  insulin aspart U-100 injection 1-10 Units         -- SubQ Before meals & nightly PRN 06/12/25 2045          Hold Oral hypoglycemics while patient is in the hospital.     CHF (congestive heart failure)  Patient has chronic CHF. Unable to specify acute vs chronic as there are no echos documented in EMR. On presentation their CHF was well compensated. Most recent BNP and echo results are listed below.  No results for input(s): "BNP" in the last 72 hours.  Latest ECHO  No results found for this or any previous visit.    Current Heart Failure Medications  metoprolol succinate (TOPROL-XL) 24 hr tablet 100 mg, Daily, Oral  hydrALAZINE tablet 100 mg, 3 times daily, Oral    Plan  - Continue home medication regimen.    Sepsis without acute organ dysfunction  This patient does have evidence of infective focus  My overall impression is sepsis without organ dysfunction.  Source: infection of lumbar spine  Antibiotics given-   Antibiotics (72h ago, onward)      Start     Stop Route Frequency Ordered    06/15/25 1800  vancomycin (VANCOCIN) 2,000 mg in 0.9% NaCl 500 mL IVPB         -- IV Every 12 hours (non-standard times) 06/15/25 0504    06/12/25 2208  vancomycin - pharmacy to dose         -- IV pharmacy to manage frequency 06/12/25 2108          Latest lactate reviewed-  No results for input(s): "LACTATE", "POCLAC" in the last 72 hours.    Fluid challenge Contraindicated- Fluid bolus is contraindicated in this patient due to Congestive Heart Failure     Post- " resuscitation assessment Yes - I attest a sepsis perfusion exam was performed within 6 hours of sepsis, severe sepsis, or septic shock presentation, following fluid resuscitation.      Will Not start Pressors- Levophed for MAP of 65  Source control achieved by: surgical intervention and IV abx        VTE Risk Mitigation (From admission, onward)           Ordered     Place MARTA hose  Until discontinued         06/12/25 2045     Place sequential compression device  Until discontinued         06/12/25 2045                    Discharge Planning   LUAN: 6/16/2025     Code Status: Prior   Medical Readiness for Discharge Date:   Discharge Plan A: Home with family, Home Health                Please place Justification for DME        Terra Salazar DO  Department of Hospital Medicine   [facility]

## 2025-06-15 NOTE — PLAN OF CARE
Problem: Adult Inpatient Plan of Care  Goal: Plan of Care Review  Outcome: Progressing  Goal: Patient-Specific Goal (Individualized)  Outcome: Progressing  Goal: Absence of Hospital-Acquired Illness or Injury  Outcome: Progressing  Goal: Optimal Comfort and Wellbeing  Outcome: Progressing  Goal: Readiness for Transition of Care  Outcome: Progressing     Problem: Diabetes Comorbidity  Goal: Blood Glucose Level Within Targeted Range  Outcome: Progressing     Problem: Wound  Goal: Optimal Coping  Outcome: Progressing  Goal: Optimal Functional Ability  Outcome: Progressing  Goal: Absence of Infection Signs and Symptoms  Outcome: Progressing  Goal: Improved Oral Intake  Outcome: Progressing  Goal: Optimal Pain Control and Function  Outcome: Progressing  Goal: Skin Health and Integrity  Outcome: Progressing  Goal: Optimal Wound Healing  Outcome: Progressing     Problem: Infection  Goal: Absence of Infection Signs and Symptoms  Outcome: Progressing     Problem: Sepsis/Septic Shock  Goal: Optimal Coping  Outcome: Progressing  Goal: Absence of Bleeding  Outcome: Progressing  Goal: Blood Glucose Level Within Targeted Range  Outcome: Progressing  Goal: Absence of Infection Signs and Symptoms  Outcome: Progressing  Goal: Optimal Nutrition Intake  Outcome: Progressing

## 2025-06-15 NOTE — ASSESSMENT & PLAN NOTE
Patient's blood pressure range in the last 24 hours was: BP  Min: 96/57  Max: 130/78.The patient's inpatient anti-hypertensive regimen is listed below:  Current Antihypertensives  NIFEdipine 24 hr tablet 60 mg, Daily, Oral  metoprolol succinate (TOPROL-XL) 24 hr tablet 100 mg, Daily, Oral  hydrALAZINE tablet 100 mg, 3 times daily, Oral  cloNIDine tablet 0.2 mg, 4 times daily, Oral    Plan  - BP is controlled, no changes needed to their regimen

## 2025-06-15 NOTE — NURSING
When patient was ambulating to the restroom, the dressing on her back fell off.     Cleaned incision w/ Chloraprep and applied Mepilex border dressing and reinforced drain dressing.

## 2025-06-16 DIAGNOSIS — Z12.11 COLON CANCER SCREENING: Primary | ICD-10-CM

## 2025-06-16 LAB
ANION GAP SERPL CALCULATED.3IONS-SCNC: 11 MMOL/L (ref 7–16)
BASOPHILS # BLD AUTO: 0.03 K/UL (ref 0–0.2)
BASOPHILS NFR BLD AUTO: 0.4 % (ref 0–1)
BUN SERPL-MCNC: 4 MG/DL (ref 7–19)
BUN/CREAT SERPL: 6 (ref 6–20)
CALCIUM SERPL-MCNC: 9 MG/DL (ref 8.4–10.2)
CHLORIDE SERPL-SCNC: 105 MMOL/L (ref 98–107)
CO2 SERPL-SCNC: 27 MMOL/L (ref 22–29)
CREAT SERPL-MCNC: 0.63 MG/DL (ref 0.55–1.02)
CRP SERPL HS-MCNC: 87.18 MG/L
DIFFERENTIAL METHOD BLD: ABNORMAL
EGFR (NO RACE VARIABLE) (RUSH/TITUS): 110 ML/MIN/1.73M2
EOSINOPHIL # BLD AUTO: 0.75 K/UL (ref 0–0.5)
EOSINOPHIL NFR BLD AUTO: 9.1 % (ref 1–4)
ERYTHROCYTE [DISTWIDTH] IN BLOOD BY AUTOMATED COUNT: 13.5 % (ref 11.5–14.5)
GLUCOSE SERPL-MCNC: 178 MG/DL (ref 70–105)
GLUCOSE SERPL-MCNC: 184 MG/DL (ref 70–105)
GLUCOSE SERPL-MCNC: 189 MG/DL (ref 74–100)
GLUCOSE SERPL-MCNC: 190 MG/DL (ref 70–105)
GLUCOSE SERPL-MCNC: 211 MG/DL (ref 70–105)
HCT VFR BLD AUTO: 30.3 % (ref 38–47)
HGB BLD-MCNC: 8.8 G/DL (ref 12–16)
IMM GRANULOCYTES # BLD AUTO: 0.04 K/UL (ref 0–0.04)
IMM GRANULOCYTES NFR BLD: 0.5 % (ref 0–0.4)
LYMPHOCYTES # BLD AUTO: 2.19 K/UL (ref 1–4.8)
LYMPHOCYTES NFR BLD AUTO: 26.7 % (ref 27–41)
MAGNESIUM SERPL-MCNC: 1.9 MG/DL (ref 1.6–2.6)
MCH RBC QN AUTO: 26.7 PG (ref 27–31)
MCHC RBC AUTO-ENTMCNC: 29 G/DL (ref 32–36)
MCV RBC AUTO: 91.8 FL (ref 80–96)
MONOCYTES # BLD AUTO: 0.82 K/UL (ref 0–0.8)
MONOCYTES NFR BLD AUTO: 10 % (ref 2–6)
MPC BLD CALC-MCNC: 9.3 FL (ref 9.4–12.4)
NEUTROPHILS # BLD AUTO: 4.38 K/UL (ref 1.8–7.7)
NEUTROPHILS NFR BLD AUTO: 53.3 % (ref 53–65)
NRBC # BLD AUTO: 0 X10E3/UL
NRBC, AUTO (.00): 0 %
PLATELET # BLD AUTO: 325 K/UL (ref 150–400)
POTASSIUM SERPL-SCNC: 3.3 MMOL/L (ref 3.5–5.1)
RBC # BLD AUTO: 3.3 M/UL (ref 4.2–5.4)
SODIUM SERPL-SCNC: 140 MMOL/L (ref 136–145)
VANCOMYCIN TROUGH SERPL-MCNC: 14.9 ΜG/ML (ref 15–20)
WBC # BLD AUTO: 8.21 K/UL (ref 4.5–11)

## 2025-06-16 PROCEDURE — 94761 N-INVAS EAR/PLS OXIMETRY MLT: CPT

## 2025-06-16 PROCEDURE — 99233 SBSQ HOSP IP/OBS HIGH 50: CPT | Mod: ,,, | Performed by: FAMILY MEDICINE

## 2025-06-16 PROCEDURE — 11000001 HC ACUTE MED/SURG PRIVATE ROOM

## 2025-06-16 PROCEDURE — 83735 ASSAY OF MAGNESIUM: CPT | Performed by: FAMILY MEDICINE

## 2025-06-16 PROCEDURE — 86141 C-REACTIVE PROTEIN HS: CPT | Performed by: ORTHOPAEDIC SURGERY

## 2025-06-16 PROCEDURE — 97530 THERAPEUTIC ACTIVITIES: CPT

## 2025-06-16 PROCEDURE — 36415 COLL VENOUS BLD VENIPUNCTURE: CPT | Performed by: ORTHOPAEDIC SURGERY

## 2025-06-16 PROCEDURE — 63600175 PHARM REV CODE 636 W HCPCS: Performed by: ORTHOPAEDIC SURGERY

## 2025-06-16 PROCEDURE — 80048 BASIC METABOLIC PNL TOTAL CA: CPT | Performed by: ORTHOPAEDIC SURGERY

## 2025-06-16 PROCEDURE — 85025 COMPLETE CBC W/AUTO DIFF WBC: CPT | Performed by: ORTHOPAEDIC SURGERY

## 2025-06-16 PROCEDURE — 27000207 HC ISOLATION

## 2025-06-16 PROCEDURE — 25000003 PHARM REV CODE 250: Performed by: FAMILY MEDICINE

## 2025-06-16 PROCEDURE — 63600175 PHARM REV CODE 636 W HCPCS: Performed by: FAMILY MEDICINE

## 2025-06-16 PROCEDURE — 97110 THERAPEUTIC EXERCISES: CPT

## 2025-06-16 PROCEDURE — 97116 GAIT TRAINING THERAPY: CPT

## 2025-06-16 PROCEDURE — 25000003 PHARM REV CODE 250: Performed by: ORTHOPAEDIC SURGERY

## 2025-06-16 PROCEDURE — 82962 GLUCOSE BLOOD TEST: CPT

## 2025-06-16 PROCEDURE — 99900035 HC TECH TIME PER 15 MIN (STAT)

## 2025-06-16 PROCEDURE — 80202 ASSAY OF VANCOMYCIN: CPT | Performed by: ORTHOPAEDIC SURGERY

## 2025-06-16 RX ORDER — POTASSIUM CHLORIDE 20 MEQ/1
40 TABLET, EXTENDED RELEASE ORAL DAILY
Status: DISCONTINUED | OUTPATIENT
Start: 2025-06-16 | End: 2025-06-17

## 2025-06-16 RX ORDER — POLYETHYLENE GLYCOL 3350, SODIUM SULFATE ANHYDROUS, SODIUM BICARBONATE, SODIUM CHLORIDE, POTASSIUM CHLORIDE 236; 22.74; 6.74; 5.86; 2.97 G/4L; G/4L; G/4L; G/4L; G/4L
4 POWDER, FOR SOLUTION ORAL ONCE
Qty: 4000 ML | Refills: 0 | Status: SHIPPED | OUTPATIENT
Start: 2025-06-16 | End: 2025-06-19 | Stop reason: HOSPADM

## 2025-06-16 RX ADMIN — ACETAMINOPHEN 500 MG: 500 TABLET ORAL at 09:06

## 2025-06-16 RX ADMIN — OXYCODONE 5 MG: 5 TABLET ORAL at 04:06

## 2025-06-16 RX ADMIN — INSULIN ASPART 2 UNITS: 100 INJECTION, SOLUTION INTRAVENOUS; SUBCUTANEOUS at 09:06

## 2025-06-16 RX ADMIN — SODIUM CHLORIDE 2000 MG: 9 INJECTION, SOLUTION INTRAVENOUS at 09:06

## 2025-06-16 RX ADMIN — ACETAMINOPHEN 500 MG: 500 TABLET ORAL at 02:06

## 2025-06-16 RX ADMIN — OXYCODONE 5 MG: 5 TABLET ORAL at 08:06

## 2025-06-16 RX ADMIN — IPRATROPIUM BROMIDE 2 SPRAY: 21 SPRAY, METERED NASAL at 09:06

## 2025-06-16 RX ADMIN — CLONIDINE HYDROCHLORIDE 0.2 MG: 0.2 TABLET ORAL at 12:06

## 2025-06-16 RX ADMIN — CLONIDINE HYDROCHLORIDE 0.2 MG: 0.2 TABLET ORAL at 08:06

## 2025-06-16 RX ADMIN — OXYCODONE 5 MG: 5 TABLET ORAL at 12:06

## 2025-06-16 RX ADMIN — FAMOTIDINE 20 MG: 20 TABLET, FILM COATED ORAL at 08:06

## 2025-06-16 RX ADMIN — IPRATROPIUM BROMIDE 2 SPRAY: 21 SPRAY, METERED NASAL at 08:06

## 2025-06-16 RX ADMIN — MORPHINE SULFATE 2 MG: 2 INJECTION, SOLUTION INTRAMUSCULAR; INTRAVENOUS at 11:06

## 2025-06-16 RX ADMIN — ATORVASTATIN CALCIUM 20 MG: 20 TABLET, FILM COATED ORAL at 08:06

## 2025-06-16 RX ADMIN — POTASSIUM CHLORIDE 40 MEQ: 1500 TABLET, EXTENDED RELEASE ORAL at 08:06

## 2025-06-16 RX ADMIN — SENNOSIDES AND DOCUSATE SODIUM 1 TABLET: 50; 8.6 TABLET ORAL at 08:06

## 2025-06-16 RX ADMIN — INSULIN ASPART 1 UNITS: 100 INJECTION, SOLUTION INTRAVENOUS; SUBCUTANEOUS at 09:06

## 2025-06-16 RX ADMIN — DOCUSATE SODIUM 100 MG: 100 CAPSULE, LIQUID FILLED ORAL at 08:06

## 2025-06-16 RX ADMIN — MORPHINE SULFATE 2 MG: 2 INJECTION, SOLUTION INTRAMUSCULAR; INTRAVENOUS at 07:06

## 2025-06-16 RX ADMIN — HYDRALAZINE HYDROCHLORIDE 100 MG: 100 TABLET ORAL at 08:06

## 2025-06-16 RX ADMIN — ACETAMINOPHEN 500 MG: 500 TABLET ORAL at 05:06

## 2025-06-16 RX ADMIN — INSULIN ASPART 4 UNITS: 100 INJECTION, SOLUTION INTRAVENOUS; SUBCUTANEOUS at 12:06

## 2025-06-16 RX ADMIN — POLYETHYLENE GLYCOL 3350 17 G: 17 POWDER, FOR SOLUTION ORAL at 08:06

## 2025-06-16 RX ADMIN — SERTRALINE 100 MG: 50 TABLET, FILM COATED ORAL at 08:06

## 2025-06-16 RX ADMIN — LEVOTHYROXINE SODIUM 200 MCG: 100 TABLET ORAL at 06:06

## 2025-06-16 RX ADMIN — NIFEDIPINE 60 MG: 60 TABLET, FILM COATED, EXTENDED RELEASE ORAL at 08:06

## 2025-06-16 RX ADMIN — INSULIN GLARGINE 5 UNITS: 100 INJECTION, SOLUTION SUBCUTANEOUS at 09:06

## 2025-06-16 RX ADMIN — CLONIDINE HYDROCHLORIDE 0.2 MG: 0.2 TABLET ORAL at 04:06

## 2025-06-16 RX ADMIN — ACETAMINOPHEN 500 MG: 500 TABLET ORAL at 06:06

## 2025-06-16 RX ADMIN — INSULIN ASPART 2 UNITS: 100 INJECTION, SOLUTION INTRAVENOUS; SUBCUTANEOUS at 05:06

## 2025-06-16 RX ADMIN — HYDRALAZINE HYDROCHLORIDE 100 MG: 100 TABLET ORAL at 02:06

## 2025-06-16 RX ADMIN — METOPROLOL SUCCINATE 100 MG: 100 TABLET, EXTENDED RELEASE ORAL at 08:06

## 2025-06-16 NOTE — ASSESSMENT & PLAN NOTE
"This patient does have evidence of infective focus  My overall impression is sepsis without organ dysfunction.  Source: infection of lumbar spine  Antibiotics given-   Antibiotics (72h ago, onward)      Start     Stop Route Frequency Ordered    06/16/25 0900  vancomycin (VANCOCIN) 2,000 mg in 0.9% NaCl 500 mL IVPB         -- IV Every 12 hours (non-standard times) 06/15/25 2202    06/12/25 2208  vancomycin - pharmacy to dose         -- IV pharmacy to manage frequency 06/12/25 2108          Latest lactate reviewed-  No results for input(s): "LACTATE", "POCLAC" in the last 72 hours.    Fluid challenge Contraindicated- Fluid bolus is contraindicated in this patient due to Congestive Heart Failure     Post- resuscitation assessment Yes - I attest a sepsis perfusion exam was performed within 6 hours of sepsis, severe sepsis, or septic shock presentation, following fluid resuscitation.      Will Not start Pressors- Levophed for MAP of 65  Source control achieved by: surgical intervention and IV abx      "

## 2025-06-16 NOTE — PROCEDURES
Procedure performed by Sonny HELMS under the supervision of Dr. Field. Pre-existing PICC line had been accidentally partially removed.  Informed consent obtained including risks and possible complications. Patient pepped with chloro prep and draped in a sterile fashion. 2 cc 1% lidocaine infused. Utilizing  fluoroscopic guidance a new 48 cm 5 Korean PICC line placed and position verified by fluoroscopy. PICC line flushed with heparinized saline.Statlock and bandage applied. Patient tolerated procedure well with no immediate complication.

## 2025-06-16 NOTE — ASSESSMENT & PLAN NOTE
Patient's blood pressure range in the last 24 hours was: BP  Min: 111/71  Max: 137/83.The patient's inpatient anti-hypertensive regimen is listed below:  Current Antihypertensives  NIFEdipine 24 hr tablet 60 mg, Daily, Oral  metoprolol succinate (TOPROL-XL) 24 hr tablet 100 mg, Daily, Oral  hydrALAZINE tablet 100 mg, 3 times daily, Oral  cloNIDine tablet 0.2 mg, 4 times daily, Oral    Plan  - BP is controlled, no changes needed to their regimen

## 2025-06-16 NOTE — PROGRESS NOTES
[facility]  [facility] Medicine  Progress Note    Patient Name: Carey Leonardo  MRN: [id number]  Patient Class: IP- Inpatient   Admission Date: 6/12/2025  Length of Stay: 4 days  Attending Physician: Cyril Upton MD  Primary Care Provider: Zainab Harrell FNP        Subjective     Principal Problem:Infection of lumbar spine        HPI:  Ms. Leonardo is a 49-year-old female with past medical history significant for hypertension, hypothyroidism, type 2 diabetes mellitus, obstructive sleep apnea, asthma, congestive heart failure, depression.  Admitted with lumbar wound infection after recent laminectomy and fusion 5/21/2025.  She underwent lumbar wound I&D with evacuation of deep spinal abscess on 6/12/25.  Currently receiving IV vancomycin.  Hospital medicine consulted postoperatively for medical management.       Overview/Hospital Course:  Patient was seen and examined.  Resting comfortably in bed.  Denies any complaints.  Appropriate home medications resumed.    6/14 - Tmax 102.1 deg F, tachycardic.  Patient meets sepsis criteria. 1L NSS fluid bolus ordered along with  ml/hr x 10 hours.  Blood cultures also ordered.  Continue with IV vancomycin.    6/15 -Tmax 99 def F, tachycardia resolved. Blood cultures in progress. Wound cultures positive for MRSA.    6/16 - afebrile. requesting to go home.    Interval History: NAEO.    Review of Systems  Objective:     Vital Signs (Most Recent):  Temp: 98.4 °F (36.9 °C) (06/16/25 0822)  Pulse: 86 (06/16/25 0822)  Resp: 16 (06/16/25 0824)  BP: 121/71 (06/16/25 0822)  SpO2: 95 % (06/16/25 0822) Vital Signs (24h Range):  Temp:  [98.3 °F (36.8 °C)-99.7 °F (37.6 °C)] 98.4 °F (36.9 °C)  Pulse:  [86-96] 86  Resp:  [13-19] 16  SpO2:  [95 %-97 %] 95 %  BP: (111-137)/(68-83) 121/71     Weight: 108.5 kg (239 lb 1.6 oz)  Body mass index is 37.45 kg/m².    Intake/Output Summary (Last 24 hours) at 6/16/2025 1102  Last data filed at 6/15/2025 2203  Gross per 24  hour   Intake 444 ml   Output --   Net 444 ml         Physical Exam  Constitutional:       Appearance: Normal appearance.   HENT:      Head: Normocephalic and atraumatic.      Nose: Nose normal.   Eyes:      Extraocular Movements: Extraocular movements intact.      Pupils: Pupils are equal, round, and reactive to light.   Cardiovascular:      Rate and Rhythm: Normal rate and regular rhythm.   Pulmonary:      Effort: Pulmonary effort is normal.      Breath sounds: Normal breath sounds.   Abdominal:      General: Bowel sounds are normal.      Palpations: Abdomen is soft.   Musculoskeletal:      Comments: Dressing noted to lumbar spine - C/D/I. Hemovac drain in place.   Skin:     General: Skin is warm and dry.   Neurological:      General: No focal deficit present.      Mental Status: She is alert and oriented to person, place, and time.   Psychiatric:         Mood and Affect: Mood normal.         Behavior: Behavior normal.               Significant Labs: All pertinent labs within the past 24 hours have been reviewed.    Significant Imaging: I have reviewed all pertinent imaging results/findings within the past 24 hours.      Assessment & Plan  Infection of lumbar spine  S/p I&D of deep lumbosacral abscess 6/12  Wound culture positive for MRSA  Continue with IV Vancomycin  Managed by Orthopedics    Hypothyroidism  Stable.  Continue with levothyroxine.  Primary hypertension  Patient's blood pressure range in the last 24 hours was: BP  Min: 111/71  Max: 137/83.The patient's inpatient anti-hypertensive regimen is listed below:  Current Antihypertensives  NIFEdipine 24 hr tablet 60 mg, Daily, Oral  metoprolol succinate (TOPROL-XL) 24 hr tablet 100 mg, Daily, Oral  hydrALAZINE tablet 100 mg, 3 times daily, Oral  cloNIDine tablet 0.2 mg, 4 times daily, Oral    Plan  - BP is controlled, no changes needed to their regimen    Type 2 diabetes mellitus, without long-term current use of insulin  Patient's FSGs are controlled on  "current medication regimen.  Last A1c reviewed-   Lab Results   Component Value Date    HGBA1C 7.7 (H) 04/07/2025     Most recent fingerstick glucose reviewed- No results for input(s): "POCTGLUCOSE" in the last 24 hours.  Current correctional scale  Medium  Maintain anti-hyperglycemic dose as follows-   Antihyperglycemics (From admission, onward)      Start     Stop Route Frequency Ordered    06/14/25 1545  insulin glargine U-100 (Lantus) injection 5 Units         -- SubQ Daily 06/14/25 1438    06/12/25 2045  insulin aspart U-100 injection 1-10 Units         -- SubQ Before meals & nightly PRN 06/12/25 2045          Hold Oral hypoglycemics while patient is in the hospital.     CHF (congestive heart failure)  Patient has chronic CHF. Unable to specify acute vs chronic as there are no echos documented in EMR. On presentation their CHF was well compensated. Most recent BNP and echo results are listed below.  No results for input(s): "BNP" in the last 72 hours.  Latest ECHO  No results found for this or any previous visit.    Current Heart Failure Medications  metoprolol succinate (TOPROL-XL) 24 hr tablet 100 mg, Daily, Oral  hydrALAZINE tablet 100 mg, 3 times daily, Oral    Plan  - Continue home medication regimen.    Sepsis without acute organ dysfunction  This patient does have evidence of infective focus  My overall impression is sepsis without organ dysfunction.  Source: infection of lumbar spine  Antibiotics given-   Antibiotics (72h ago, onward)      Start     Stop Route Frequency Ordered    06/16/25 0900  vancomycin (VANCOCIN) 2,000 mg in 0.9% NaCl 500 mL IVPB         -- IV Every 12 hours (non-standard times) 06/15/25 2202    06/12/25 2208  vancomycin - pharmacy to dose         -- IV pharmacy to manage frequency 06/12/25 2108          Latest lactate reviewed-  No results for input(s): "LACTATE", "POCLAC" in the last 72 hours.    Fluid challenge Contraindicated- Fluid bolus is contraindicated in this patient due " to Congestive Heart Failure     Post- resuscitation assessment Yes - I attest a sepsis perfusion exam was performed within 6 hours of sepsis, severe sepsis, or septic shock presentation, following fluid resuscitation.      Will Not start Pressors- Levophed for MAP of 65  Source control achieved by: surgical intervention and IV abx        VTE Risk Mitigation (From admission, onward)           Ordered     Place MARTA hose  Until discontinued         06/12/25 2045     Place sequential compression device  Until discontinued         06/12/25 2045                    Discharge Planning   LUAN: 6/16/2025     Code Status: Prior   Medical Readiness for Discharge Date:   Discharge Plan A: Home with family, Home Health                Please place Justification for DME        Terra Salazar DO  Department of Hospital Medicine   [facility]

## 2025-06-16 NOTE — PLAN OF CARE
Ss consulted for hh and home iv abx. Ss spoke with pt, pt agreeable to obtaining iv abx from [facility]. Orders faxed.     Pt current with [facility] hh, choice previously obtained to resume. Ss faxed new hh order with iv abx order and labs to be drawn to [facility] Ss following    Met with pt to review discharge recommendation of hh and is agreeable to plan    Patient/family provided list of facilities in-network with patient's payor plan. Providers that are owned, operated, or affiliated with [facility] are included on the list.     Notified that referral sent to below listed facilities from in-network list based on proximity to home/family support:   [facility]  2.  3.  4.  5. (can send more than 5)    Patient/family instructed to identify preference.    Preferred Facility: (if more than 1, listed in order of descending preference)  1.  OR  Patient has declined to select a preferred provider and elects placement with the first accepting in network provider that is available to provide services as ordered by the physician       If an additional preferred facility not listed above is identified, additional referral to be sent. If above facilities unable to accept, will send additional referrals to in-network providers.

## 2025-06-16 NOTE — PROGRESS NOTES
Daily Orthopaedic Progress Note    Carey Leonardo is a 49 y.o. female admitted on 6/12/2025  Hospital Day: 4  Post Op Day: 4 Days Post-Op    Antibiotics (From admission, onward)      Start     Stop Route Frequency Ordered    06/16/25 0900  vancomycin (VANCOCIN) 2,000 mg in 0.9% NaCl 500 mL IVPB         -- IV Every 12 hours (non-standard times) 06/15/25 2202    06/12/25 2208  vancomycin - pharmacy to dose         -- IV pharmacy to manage frequency 06/12/25 2108             The patient was seen and examined this morning at the bedside. Patient reports no acute issues overnight and adequate control of pain on current regimen.  Patient worked with physical therapy over the last 24 hours.      PHYSICAL EXAM:  Awake/alert/oriented x3, No acute distress, Afebrile, Vital signs stable  Normocephalic, Atraumatic  Good inspiratory effort with unlaboured breathing  Dressing was not saturated but was displaced. Incision c/d/I.   Vitals:    06/16/25 1146 06/16/25 1244 06/16/25 1647 06/16/25 1652   BP: 109/74  118/75    Pulse: 86  92    Resp: 18 18 18 18   Temp: 98.8 °F (37.1 °C)  100.3 °F (37.9 °C)    TempSrc: Oral  Oral    SpO2: 97%  97%    Weight:       Height:         I/O last 3 completed shifts:  In: 1146 [P.O.:1146]  Out: 25 [Drains:25]    Significant Labs:  Recent Lab Results         06/16/25  1144   06/16/25  0821   06/16/25  0354   06/15/25  2025        Anion Gap     11         Baso #     0.03         Basophil %     0.4         BUN     4         BUN/CREAT RATIO     6         Calcium     9.0         Chloride     105         CO2     27         Creatinine     0.63         CRP, High Sensitivity     87.18         Differential Method     Auto         eGFR     110  Comment: Estimated GFR calculated using the CKD-EPI creatinine (2021) equation.         Eos #     0.75         Eos %     9.1         Glucose     189         Hematocrit     30.3         Hemoglobin     8.8         Immature Grans (Abs)     0.04         Immature  Granulocytes     0.5         Lymph #     2.19         Lymph %     26.7         Magnesium      1.9         MCH     26.7         MCHC     29.0         MCV     91.8         Mono #     0.82         Mono %     10.0         MPV     9.3         Neutrophils, Abs     4.38         Neutrophils Relative     53.3         nRBC     0.0         NUCLEATED RBC ABSOLUTE     0.00         Platelet Count     325         POC Glucose 211   190     219       Potassium     3.3         RBC     3.30         RDW     13.5         Sodium     140         WBC     8.21                 Significant Diagnostics:  US Guided Vascular Access  Narrative: EXAMINATION:  IR PICC LINE PLACEMENT W/O PORT, W/IMG > 4 Y/O; US GUIDED VASCULAR ACCESS    CLINICAL HISTORY:  iv abx;; picc;    TECHNIQUE:    * : Procedure performed by Sonny HELMS under the supervision of Dr. Field . 5 Hebrew PICC line placed in basilic vein in left upper extremity. Informed consent obtained including risks and possible complications. Patient pepped with chloro prep and draped in a sterile fashion. 2 cc 1% lidocaine infused. Utilizing U/S guidance, a 48 cm 5 Hebrew PICC line placed and position verified by fluoroscopy. PICC line flushed with heparinized saline.    Statlock and bandage applied. Patient tolerated procedure well with no immediate complication.    Fluoroscopy time-6 seconds  Impression: Status post PICC line insertion via the left basilic vein.    Place of service: [facility]    Electronically signed by: Raeann Field  Date:    06/13/2025  Time:    12:01  IR PICC Line Placement w/o Port w/ Img > 4 Y/O  Narrative: EXAMINATION:  IR PICC LINE PLACEMENT W/O PORT, W/IMG > 4 Y/O; US GUIDED VASCULAR ACCESS    CLINICAL HISTORY:  iv abx;; picc;    TECHNIQUE:    * : Procedure performed by Sonny HELMS under the supervision of Dr. Field . 5 Hebrew PICC line placed in basilic vein in left upper extremity. Informed consent obtained including risks and possible complications.  Patient pepped with chloro prep and draped in a sterile fashion. 2 cc 1% lidocaine infused. Utilizing U/S guidance, a 48 cm 5 Greek PICC line placed and position verified by fluoroscopy. PICC line flushed with heparinized saline.    Statlock and bandage applied. Patient tolerated procedure well with no immediate complication.    Fluoroscopy time-6 seconds  Impression: Status post PICC line insertion via the left basilic vein.    Place of service: [facility]    Electronically signed by: Raeann Field  Date:    06/13/2025  Time:    12:01       A/P: 49 y.o. female 4 Days Post-Op s/p I&D for infected lumbar hardware  -Continue with current pain control regimen  -Continue with current physical therapy plan  -Continue with DVT prophylaxis,  -Anticipate discharge to home with IV abx    Loy Worley MD  Orthopaedic Staff Surgeon

## 2025-06-16 NOTE — PT/OT/SLP PROGRESS
Physical Therapy Treatment    Patient Name:  Carey Leonardo   MRN:  [id number]    Recommendations:     Discharge Recommendations: No Therapy Indicated  Discharge Equipment Recommendations: none  Barriers to discharge: None    Assessment:     Carey Leonardo is a 49 y.o. female admitted with a medical diagnosis of Infection of lumbar spine.  She presents with the following impairments/functional limitations: pain, gait instability .    Rehab Prognosis: Good; patient would benefit from acute skilled PT services to address these deficits and reach maximum level of function.    Recent Surgery: Procedure(s) (LRB):  INCISION AND DRAINAGE, ABSCESS, SPINE, LUMBOSACRAL, POSTERIOR (N/A) 4 Days Post-Op    Plan:     During this hospitalization, patient to be seen daily to address the identified rehab impairments via gait training, therapeutic activities, therapeutic exercises, neuromuscular re-education and progress toward the following goals:    Plan of Care Expires:  07/19/25    Subjective     Chief Complaint: mild back pain  Patient/Family Comments/goals: agreeable  Pain/Comfort:  Pain Rating 1: 3/10  Location 1: lumbar spine      Objective:     Communicated with nurse prior to session.  Patient found right sidelying with PICC line upon PT entry to room.     General Precautions: Standard, fall  Orthopedic Precautions: spinal precautions  Braces:    Respiratory Status: Room air     Functional Mobility:  Bed Mobility:     Supine to Sit: stand by assistance  Transfers:     Sit to Stand:  contact guard assistance with rolling walker  Gait: 40 ft with RW SBA  Balance: good      AM-PAC 6 CLICK MOBILITY  Turning over in bed (including adjusting bedclothes, sheets and blankets)?: 4  Sitting down on and standing up from a chair with arms (e.g., wheelchair, bedside commode, etc.): 4  Moving from lying on back to sitting on the side of the bed?: 4  Moving to and from a bed to a chair (including a wheelchair)?: 4  Need to walk in  hospital room?: 4  Climbing 3-5 steps with a railing?: 4  Basic Mobility Total Score: 24       Treatment & Education:  Mobility as noted  Heel/toe raises, laq, hip marching, hip add/abduction all 3x10 BLE seated in chair    Patient left up in chair with all lines intact, call button in reach, and nurse notified..    GOALS:   Multidisciplinary Problems       Physical Therapy Goals          Problem: Physical Therapy    Goal Priority Disciplines Outcome Interventions   Physical Therapy Goal     PT, PT/OT Progressing    Description: Short term goals:  . Supine to sit with Contact Guard Assistance  . Sit to supine with Contact Guard Assistance  . Sit to stand transfer with Contact Guard Assistance  . Gait  x 100 feet with Contact Guard Assistance using Rolling Walker.     Long term goals:  Patient will gain highest level functional mobility with lowest level of assistive device to return to desired living arrangement and prior ADL's.                          DME Justifications:      Time Tracking:     PT Received On: 06/16/25  PT Start Time: 1025     PT Stop Time: 1056  PT Total Time (min): 31 min     Billable Minutes: Gait Training 15 and Therapeutic Exercise 16    Treatment Type: Treatment  PT/PTA: PT     Number of PTA visits since last PT visit: 0     06/16/2025

## 2025-06-16 NOTE — NURSING
6/16 @ 4:44- Pt's bed alarm going off.  Entered room and patient was up out of the bed and walking to the restroom and voiding on the floor all the way to the toilet.  Told patient that she is supposed to call for help before she gets up and she said she keeps forgetting to call for help.  Re-educated pt on fall prevention and the importance of always calling for help before getting up.    6/16 @ 0012- Pt's bed alarm going off.  Entered room and patient was up out of the bed and walking to the restroom.  Patient says she keeps forgetting to call for help.  Educated pt on fall prevention and the importance of always calling for help before getting up.    6/15 @ 22:08- Patient forgot to call for bathroom assistance and she got up and took herself to the restroom and in the process she pulled her PICC line almost all of the way out and was holding it in her hand and both lumens will not flush.  I tried 2x to get a peripheral IV in her and was unable to get a an iV placed in her so she does not have IV access anymore.  I let the hospitalist know and she said interventional radiology is not here at night time to put another PICC in and they won't be here until tomorrow morning around 8am and that she will let day shift know.  I contacted Pharmacy and let them know and they are going to reschedule patient's 0[address].

## 2025-06-16 NOTE — PLAN OF CARE
Ss spoke with Zohra at [facility], pt's iv abx require authorization through [facility] which is still pending at this time. Ss faxed requested labs to [facility]. Ss to follow up

## 2025-06-16 NOTE — SUBJECTIVE & OBJECTIVE
Interval History: NAEO.    Review of Systems  Objective:     Vital Signs (Most Recent):  Temp: 98.4 °F (36.9 °C) (06/16/25 0822)  Pulse: 86 (06/16/25 0822)  Resp: 16 (06/16/25 0824)  BP: 121/71 (06/16/25 0822)  SpO2: 95 % (06/16/25 0822) Vital Signs (24h Range):  Temp:  [98.3 °F (36.8 °C)-99.7 °F (37.6 °C)] 98.4 °F (36.9 °C)  Pulse:  [86-96] 86  Resp:  [13-19] 16  SpO2:  [95 %-97 %] 95 %  BP: (111-137)/(68-83) 121/71     Weight: 108.5 kg (239 lb 1.6 oz)  Body mass index is 37.45 kg/m².    Intake/Output Summary (Last 24 hours) at 6/16/2025 1102  Last data filed at 6/15/2025 2203  Gross per 24 hour   Intake 444 ml   Output --   Net 444 ml         Physical Exam  Constitutional:       Appearance: Normal appearance.   HENT:      Head: Normocephalic and atraumatic.      Nose: Nose normal.   Eyes:      Extraocular Movements: Extraocular movements intact.      Pupils: Pupils are equal, round, and reactive to light.   Cardiovascular:      Rate and Rhythm: Normal rate and regular rhythm.   Pulmonary:      Effort: Pulmonary effort is normal.      Breath sounds: Normal breath sounds.   Abdominal:      General: Bowel sounds are normal.      Palpations: Abdomen is soft.   Musculoskeletal:      Comments: Dressing noted to lumbar spine - C/D/I. Hemovac drain in place.   Skin:     General: Skin is warm and dry.   Neurological:      General: No focal deficit present.      Mental Status: She is alert and oriented to person, place, and time.   Psychiatric:         Mood and Affect: Mood normal.         Behavior: Behavior normal.               Significant Labs: All pertinent labs within the past 24 hours have been reviewed.    Significant Imaging: I have reviewed all pertinent imaging results/findings within the past 24 hours.

## 2025-06-16 NOTE — PT/OT/SLP PROGRESS
"Occupational Therapy   Treatment    Name: Carey Leonardo  MRN: [id number]  Admitting Diagnosis:  Infection of lumbar spine  4 Days Post-Op    Recommendations:     Discharge Recommendations: Low Intensity Therapy  Discharge Equipment Recommendations:  none  Barriers to discharge:  None    Assessment:     Carey Leonardo is a 49 y.o. female with a medical diagnosis of Infection of lumbar spine.  She presents with alert and agreeable to treatment. Performance deficits affecting function are gait instability, weakness, impaired endurance, pain.     Rehab Prognosis:  Good; patient would benefit from acute skilled OT services to address these deficits and reach maximum level of function.       Plan:     Patient to be seen 5 x/week to address the above listed problems via self-care/home management, therapeutic activities, therapeutic exercises  Plan of Care Expires: 07/17/25  Plan of Care Reviewed with: patient    Subjective     Chief Complaint: Infection of lumbar spine    Patient/Family Comments/goals: "I want to work on standing balance."  Pain/Comfort:  Pain Rating 1: 3/10  Location 1: lumbar spine  Pain Addressed 1: Cessation of Activity  Pain Rating Post-Intervention 1: 3/10    Objective:     Communicated with: RN  prior to session.  Patient found up in chair with PICC line upon OT entry to room.    General Precautions: Standard, fall    Orthopedic Precautions:spinal precautions  Braces: N/A  Respiratory Status: Room air     Occupational Performance:     Bed Mobility:    NT    Functional Mobility/Transfers:  Patient completed Sit <> Stand Transfer with supervision and verbal cues for hand placement  with  rolling walker   Functional Mobility: Pt ambulated with RW and Supervision    Activities of Daily Living:  Grooming: independence standing at the sink to brush her teeth and washing her face      AMPA 6 Click ADL: 23    Treatment & Education:  Pt performed mobility and grooming as above.   Pt performed standing " activity involving weight shifts and small turns with RW for support and SBA.  Pt performed additional standing balance activities involving standing on one foot while moving the other leg in various ways to elicit balance reactions from her foot and ankle while using RW for support. Pt was  advised on ways to upgrade the activities as she becomes stronger    Patient left up in chair with all lines intact, call button in reach, and family present    GOALS:   Multidisciplinary Problems       Occupational Therapy Goals          Problem: Occupational Therapy    Goal Priority Disciplines Outcome Interventions   Occupational Therapy Goal     OT, PT/OT Ongoing    Description: STG: (in 1 week)  Pt will perform grooming with setup  Pt will bathe with setup and min(A)  Pt will perform UE dressing with independence  Pt will perform LE dressing with with min(A) or CGA with AD  Pt will perform toileting with SBA  Pt will transfer bed/chair/bsc with RW and SBA  Pt will perform standing task x 3 min with RW and SBA   Pt will tolerate 15 minutes of tx without fatigue      LTG: (in 5 weeks)  1.Restore to max I with self care and mobility.                             Time Tracking:     OT Date of Treatment: 06/16/25  OT Start Time: 1130  OT Stop Time: 1142  OT Total Time (min): 12 min    Billable Minutes:Therapeutic Activity 12 min    OT/CIPRIANO: OT          6/16/2025

## 2025-06-17 LAB
ANION GAP SERPL CALCULATED.3IONS-SCNC: 11 MMOL/L (ref 7–16)
BASOPHILS # BLD AUTO: 0.03 K/UL (ref 0–0.2)
BASOPHILS NFR BLD AUTO: 0.4 % (ref 0–1)
BILIRUB UR QL STRIP: NEGATIVE
BUN SERPL-MCNC: 5 MG/DL (ref 7–19)
BUN/CREAT SERPL: 7 (ref 6–20)
CALCIUM SERPL-MCNC: 8.8 MG/DL (ref 8.4–10.2)
CHLORIDE SERPL-SCNC: 105 MMOL/L (ref 98–107)
CLARITY UR: CLEAR
CO2 SERPL-SCNC: 27 MMOL/L (ref 22–29)
COLOR UR: ABNORMAL
CREAT SERPL-MCNC: 0.7 MG/DL (ref 0.55–1.02)
CRP SERPL HS-MCNC: 64.28 MG/L
DIFFERENTIAL METHOD BLD: ABNORMAL
EGFR (NO RACE VARIABLE) (RUSH/TITUS): 107 ML/MIN/1.73M2
EOSINOPHIL # BLD AUTO: 0.68 K/UL (ref 0–0.5)
EOSINOPHIL NFR BLD AUTO: 8.3 % (ref 1–4)
ERYTHROCYTE [DISTWIDTH] IN BLOOD BY AUTOMATED COUNT: 13.7 % (ref 11.5–14.5)
GLUCOSE SERPL-MCNC: 183 MG/DL (ref 70–105)
GLUCOSE SERPL-MCNC: 197 MG/DL (ref 74–100)
GLUCOSE SERPL-MCNC: 205 MG/DL (ref 70–105)
GLUCOSE SERPL-MCNC: 220 MG/DL (ref 70–105)
GLUCOSE SERPL-MCNC: 223 MG/DL (ref 70–105)
GLUCOSE UR STRIP-MCNC: 70 MG/DL
HCT VFR BLD AUTO: 27.3 % (ref 38–47)
HGB BLD-MCNC: 8.2 G/DL (ref 12–16)
IMM GRANULOCYTES # BLD AUTO: 0.03 K/UL (ref 0–0.04)
IMM GRANULOCYTES NFR BLD: 0.4 % (ref 0–0.4)
KETONES UR STRIP-SCNC: ABNORMAL MG/DL
LEUKOCYTE ESTERASE UR QL STRIP: NEGATIVE
LYMPHOCYTES # BLD AUTO: 2.3 K/UL (ref 1–4.8)
LYMPHOCYTES NFR BLD AUTO: 28.2 % (ref 27–41)
MCH RBC QN AUTO: 27.2 PG (ref 27–31)
MCHC RBC AUTO-ENTMCNC: 30 G/DL (ref 32–36)
MCV RBC AUTO: 90.7 FL (ref 80–96)
MONOCYTES # BLD AUTO: 0.78 K/UL (ref 0–0.8)
MONOCYTES NFR BLD AUTO: 9.6 % (ref 2–6)
MPC BLD CALC-MCNC: 9.4 FL (ref 9.4–12.4)
NEUTROPHILS # BLD AUTO: 4.34 K/UL (ref 1.8–7.7)
NEUTROPHILS NFR BLD AUTO: 53.1 % (ref 53–65)
NITRITE UR QL STRIP: NEGATIVE
NRBC # BLD AUTO: 0 X10E3/UL
NRBC, AUTO (.00): 0 %
PH UR STRIP: 7 PH UNITS
PLATELET # BLD AUTO: 324 K/UL (ref 150–400)
POTASSIUM SERPL-SCNC: 3.1 MMOL/L (ref 3.5–5.1)
PROT UR QL STRIP: NEGATIVE
RBC # BLD AUTO: 3.01 M/UL (ref 4.2–5.4)
RBC # UR STRIP: NEGATIVE /UL
SODIUM SERPL-SCNC: 140 MMOL/L (ref 136–145)
SP GR UR STRIP: 1.02
UROBILINOGEN UR STRIP-ACNC: NORMAL MG/DL
WBC # BLD AUTO: 8.16 K/UL (ref 4.5–11)

## 2025-06-17 PROCEDURE — 86141 C-REACTIVE PROTEIN HS: CPT | Performed by: ORTHOPAEDIC SURGERY

## 2025-06-17 PROCEDURE — 85025 COMPLETE CBC W/AUTO DIFF WBC: CPT | Performed by: ORTHOPAEDIC SURGERY

## 2025-06-17 PROCEDURE — 94761 N-INVAS EAR/PLS OXIMETRY MLT: CPT

## 2025-06-17 PROCEDURE — 63600175 PHARM REV CODE 636 W HCPCS: Performed by: ORTHOPAEDIC SURGERY

## 2025-06-17 PROCEDURE — 80048 BASIC METABOLIC PNL TOTAL CA: CPT | Performed by: ORTHOPAEDIC SURGERY

## 2025-06-17 PROCEDURE — 97116 GAIT TRAINING THERAPY: CPT

## 2025-06-17 PROCEDURE — 25000003 PHARM REV CODE 250: Performed by: ORTHOPAEDIC SURGERY

## 2025-06-17 PROCEDURE — 27000207 HC ISOLATION

## 2025-06-17 PROCEDURE — 81003 URINALYSIS AUTO W/O SCOPE: CPT | Performed by: ORTHOPAEDIC SURGERY

## 2025-06-17 PROCEDURE — 99900035 HC TECH TIME PER 15 MIN (STAT)

## 2025-06-17 PROCEDURE — 25000003 PHARM REV CODE 250: Performed by: FAMILY MEDICINE

## 2025-06-17 PROCEDURE — 36415 COLL VENOUS BLD VENIPUNCTURE: CPT | Performed by: ORTHOPAEDIC SURGERY

## 2025-06-17 PROCEDURE — 97110 THERAPEUTIC EXERCISES: CPT

## 2025-06-17 PROCEDURE — 11000001 HC ACUTE MED/SURG PRIVATE ROOM

## 2025-06-17 PROCEDURE — 82962 GLUCOSE BLOOD TEST: CPT

## 2025-06-17 PROCEDURE — 99233 SBSQ HOSP IP/OBS HIGH 50: CPT | Mod: ,,, | Performed by: FAMILY MEDICINE

## 2025-06-17 PROCEDURE — 63600175 PHARM REV CODE 636 W HCPCS: Performed by: FAMILY MEDICINE

## 2025-06-17 RX ORDER — POTASSIUM CHLORIDE 20 MEQ/1
40 TABLET, EXTENDED RELEASE ORAL 2 TIMES DAILY
Status: DISCONTINUED | OUTPATIENT
Start: 2025-06-18 | End: 2025-06-19 | Stop reason: HOSPADM

## 2025-06-17 RX ORDER — INSULIN GLARGINE 100 [IU]/ML
10 INJECTION, SOLUTION SUBCUTANEOUS DAILY
Status: DISCONTINUED | OUTPATIENT
Start: 2025-06-17 | End: 2025-06-19 | Stop reason: HOSPADM

## 2025-06-17 RX ORDER — POTASSIUM CHLORIDE 20 MEQ/1
40 TABLET, EXTENDED RELEASE ORAL EVERY 4 HOURS
Status: COMPLETED | OUTPATIENT
Start: 2025-06-17 | End: 2025-06-17

## 2025-06-17 RX ORDER — PHENAZOPYRIDINE HYDROCHLORIDE 100 MG/1
100 TABLET, FILM COATED ORAL
Status: DISCONTINUED | OUTPATIENT
Start: 2025-06-17 | End: 2025-06-19 | Stop reason: HOSPADM

## 2025-06-17 RX ADMIN — INSULIN ASPART 2 UNITS: 100 INJECTION, SOLUTION INTRAVENOUS; SUBCUTANEOUS at 06:06

## 2025-06-17 RX ADMIN — DOCUSATE SODIUM 100 MG: 100 CAPSULE, LIQUID FILLED ORAL at 08:06

## 2025-06-17 RX ADMIN — LEVOTHYROXINE SODIUM 200 MCG: 100 TABLET ORAL at 06:06

## 2025-06-17 RX ADMIN — OXYCODONE 5 MG: 5 TABLET ORAL at 11:06

## 2025-06-17 RX ADMIN — SODIUM CHLORIDE 2000 MG: 9 INJECTION, SOLUTION INTRAVENOUS at 08:06

## 2025-06-17 RX ADMIN — INSULIN ASPART 2 UNITS: 100 INJECTION, SOLUTION INTRAVENOUS; SUBCUTANEOUS at 08:06

## 2025-06-17 RX ADMIN — OXYCODONE 5 MG: 5 TABLET ORAL at 08:06

## 2025-06-17 RX ADMIN — FAMOTIDINE 20 MG: 20 TABLET, FILM COATED ORAL at 08:06

## 2025-06-17 RX ADMIN — SERTRALINE 100 MG: 50 TABLET, FILM COATED ORAL at 08:06

## 2025-06-17 RX ADMIN — IPRATROPIUM BROMIDE 2 SPRAY: 21 SPRAY, METERED NASAL at 08:06

## 2025-06-17 RX ADMIN — MORPHINE SULFATE 2 MG: 2 INJECTION, SOLUTION INTRAMUSCULAR; INTRAVENOUS at 02:06

## 2025-06-17 RX ADMIN — NIFEDIPINE 60 MG: 60 TABLET, FILM COATED, EXTENDED RELEASE ORAL at 08:06

## 2025-06-17 RX ADMIN — MORPHINE SULFATE 2 MG: 2 INJECTION, SOLUTION INTRAMUSCULAR; INTRAVENOUS at 10:06

## 2025-06-17 RX ADMIN — PHENAZOPYRIDINE 100 MG: 100 TABLET ORAL at 03:06

## 2025-06-17 RX ADMIN — OXYCODONE 5 MG: 5 TABLET ORAL at 12:06

## 2025-06-17 RX ADMIN — ACETAMINOPHEN 500 MG: 500 TABLET ORAL at 09:06

## 2025-06-17 RX ADMIN — POTASSIUM CHLORIDE 40 MEQ: 1500 TABLET, EXTENDED RELEASE ORAL at 03:06

## 2025-06-17 RX ADMIN — INSULIN ASPART 2 UNITS: 100 INJECTION, SOLUTION INTRAVENOUS; SUBCUTANEOUS at 09:06

## 2025-06-17 RX ADMIN — CLONIDINE HYDROCHLORIDE 0.2 MG: 0.2 TABLET ORAL at 08:06

## 2025-06-17 RX ADMIN — ATORVASTATIN CALCIUM 20 MG: 20 TABLET, FILM COATED ORAL at 08:06

## 2025-06-17 RX ADMIN — ACETAMINOPHEN 500 MG: 500 TABLET ORAL at 06:06

## 2025-06-17 RX ADMIN — SENNOSIDES AND DOCUSATE SODIUM 1 TABLET: 50; 8.6 TABLET ORAL at 08:06

## 2025-06-17 RX ADMIN — POLYETHYLENE GLYCOL 3350 17 G: 17 POWDER, FOR SOLUTION ORAL at 08:06

## 2025-06-17 RX ADMIN — IPRATROPIUM BROMIDE 2 SPRAY: 21 SPRAY, METERED NASAL at 09:06

## 2025-06-17 RX ADMIN — OXYCODONE HYDROCHLORIDE 10 MG: 5 TABLET ORAL at 03:06

## 2025-06-17 RX ADMIN — ACETAMINOPHEN 500 MG: 500 TABLET ORAL at 03:06

## 2025-06-17 RX ADMIN — METOPROLOL SUCCINATE 100 MG: 100 TABLET, EXTENDED RELEASE ORAL at 08:06

## 2025-06-17 RX ADMIN — INSULIN GLARGINE 10 UNITS: 100 INJECTION, SOLUTION SUBCUTANEOUS at 08:06

## 2025-06-17 RX ADMIN — ACETAMINOPHEN 500 MG: 500 TABLET ORAL at 08:06

## 2025-06-17 RX ADMIN — MORPHINE SULFATE 2 MG: 2 INJECTION, SOLUTION INTRAMUSCULAR; INTRAVENOUS at 06:06

## 2025-06-17 RX ADMIN — HYDRALAZINE HYDROCHLORIDE 100 MG: 100 TABLET ORAL at 08:06

## 2025-06-17 RX ADMIN — POTASSIUM CHLORIDE 40 MEQ: 1500 TABLET, EXTENDED RELEASE ORAL at 08:06

## 2025-06-17 RX ADMIN — SODIUM CHLORIDE 2000 MG: 9 INJECTION, SOLUTION INTRAVENOUS at 09:06

## 2025-06-17 RX ADMIN — HYDRALAZINE HYDROCHLORIDE 100 MG: 100 TABLET ORAL at 03:06

## 2025-06-17 RX ADMIN — CLONIDINE HYDROCHLORIDE 0.2 MG: 0.2 TABLET ORAL at 03:06

## 2025-06-17 NOTE — NURSING
0755 pt reports incontinence of urine while asleep since surgery. Reports that she has no issues when she is awake. Secure chatted Dr. Upton to make sure he is aware, states that he is out of town this week and Dr. Worley is around but to get a UA and post void bladder scan. Will place orders and obtain when pt is able    1010 pt was able to void, sent UA with reflex to CNS to the lab. Post void bladder scan was 0. Pt c/o low back pain and low abdominal pain when she voids. Secure chatted Dr. Upton to let him know about the post void scan and the pain, as well. Asked if he wanted me to let vy know all of this. Awaiting response at this time.    1810 changed back dressing. Administered IVP  morphine for breakthrough pain. Denies further needs. Family at bedside.

## 2025-06-17 NOTE — ASSESSMENT & PLAN NOTE
Patient's blood pressure range in the last 24 hours was: BP  Min: 118/75  Max: 136/78.The patient's inpatient anti-hypertensive regimen is listed below:  Current Antihypertensives  NIFEdipine 24 hr tablet 60 mg, Daily, Oral  metoprolol succinate (TOPROL-XL) 24 hr tablet 100 mg, Daily, Oral  hydrALAZINE tablet 100 mg, 3 times daily, Oral  cloNIDine tablet 0.2 mg, 4 times daily, Oral    Plan  - BP is controlled, no changes needed to their regimen     Include Location In Plan?: No Detail Level: Zone

## 2025-06-17 NOTE — ASSESSMENT & PLAN NOTE
"Patient's FSGs are controlled on current medication regimen.  Last A1c reviewed-   Lab Results   Component Value Date    HGBA1C 7.7 (H) 04/07/2025     Most recent fingerstick glucose reviewed- No results for input(s): "POCTGLUCOSE" in the last 24 hours.  Current correctional scale  Medium  Maintain anti-hyperglycemic dose as follows-   Antihyperglycemics (From admission, onward)      Start     Stop Route Frequency Ordered    06/17/25 0900  insulin glargine U-100 (Lantus) injection 10 Units         -- SubQ Daily 06/17/25 0641    06/12/25 2045  insulin aspart U-100 injection 1-10 Units         -- SubQ Before meals & nightly PRN 06/12/25 2045          Hold Oral hypoglycemics while patient is in the hospital.     "

## 2025-06-17 NOTE — PT/OT/SLP PROGRESS
Occupational Therapy      Patient Name:  Carey Leonardo   MRN:  [id number]    Patient not seen today secondary to Testing/imaging (xray/CT/MRI). Will follow-up 6/18/2025.    6/17/2025

## 2025-06-17 NOTE — PLAN OF CARE
Problem: Adult Inpatient Plan of Care  Goal: Plan of Care Review  Outcome: Progressing  Goal: Patient-Specific Goal (Individualized)  Outcome: Progressing  Goal: Absence of Hospital-Acquired Illness or Injury  Outcome: Progressing  Goal: Optimal Comfort and Wellbeing  Outcome: Progressing  Goal: Readiness for Transition of Care  Outcome: Progressing     Problem: Diabetes Comorbidity  Goal: Blood Glucose Level Within Targeted Range  Outcome: Progressing     Problem: Wound  Goal: Optimal Coping  Outcome: Progressing  Goal: Optimal Functional Ability  Outcome: Progressing  Goal: Absence of Infection Signs and Symptoms  Outcome: Progressing  Goal: Improved Oral Intake  Outcome: Progressing  Goal: Optimal Pain Control and Function  Outcome: Progressing  Goal: Skin Health and Integrity  Outcome: Progressing  Goal: Optimal Wound Healing  Outcome: Progressing     Problem: Infection  Goal: Absence of Infection Signs and Symptoms  Outcome: Progressing     Problem: Sepsis/Septic Shock  Goal: Optimal Coping  Outcome: Progressing  Goal: Absence of Bleeding  Outcome: Progressing  Goal: Blood Glucose Level Within Targeted Range  Outcome: Progressing  Goal: Absence of Infection Signs and Symptoms  Outcome: Progressing  Goal: Optimal Nutrition Intake  Outcome: Progressing     Problem: Skin Injury Risk Increased  Goal: Skin Health and Integrity  Outcome: Progressing

## 2025-06-17 NOTE — PROGRESS NOTES
"[facility] Hospital Medicine  Progress Note    Patient Name: Carey Leonardo  MRN: [id number]  Patient Class: IP- Inpatient   Admission Date: 6/12/2025  Length of Stay: 5 days  Attending Physician: Cyril Upton MD  Primary Care Provider: Zainab Harrell FNP        Subjective     Principal Problem:Infection of lumbar spine        HPI:  Ms. Leonardo is a 49-year-old female with past medical history significant for hypertension, hypothyroidism, type 2 diabetes mellitus, obstructive sleep apnea, asthma, congestive heart failure, depression.  Admitted with lumbar wound infection after recent laminectomy and fusion 5/21/2025.  She underwent lumbar wound I&D with evacuation of deep spinal abscess on 6/12/25.  Currently receiving IV vancomycin.  Hospital medicine consulted postoperatively for medical management.       Overview/Hospital Course:  Patient was seen and examined.  Resting comfortably in bed.  Denies any complaints.  Appropriate home medications resumed.    6/14 - Tmax 102.1 deg F, tachycardic.  Patient meets sepsis criteria. 1L NSS fluid bolus ordered along with  ml/hr x 10 hours.  Blood cultures also ordered.  Continue with IV vancomycin.    6/15 -Tmax 99 def F, tachycardia resolved. Blood cultures in progress. Wound cultures positive for MRSA.    6/16 - afebrile. requesting to go home.    6/17 - resting comfortably in bed. Requesting to go home.    Reevaluated patient later due to complaints of dysuria, urinary incontinence, left pelvic discomfort, flank pain, and "blood in underwear" - patient is unsure if urinary or vaginal. S/p hysterectomy 2011.  Reports that symptoms have been present since surgery.  No bowel incontinence.  UA unremarkable - no evidence of infection or hematuria.  Med list reviewed. Percocet is only med that could be contributing to incontinence.  CT A/P without contrast ordered for further evaluation.    Interval History: NAEO.    Review of " Systems  Objective:     Vital Signs (Most Recent):  Temp: 98.3 °F (36.8 °C) (06/17/25 1131)  Pulse: 84 (06/17/25 1131)  Resp: 18 (06/17/25 1131)  BP: 128/82 (06/17/25 1131)  SpO2: 95 % (06/17/25 1131) Vital Signs (24h Range):  Temp:  [97.3 °F (36.3 °C)-100.3 °F (37.9 °C)] 98.3 °F (36.8 °C)  Pulse:  [84-92] 84  Resp:  [16-20] 18  SpO2:  [93 %-100 %] 95 %  BP: (118-136)/(73-84) 128/82     Weight: 108.5 kg (239 lb 1.6 oz)  Body mass index is 37.45 kg/m².    Intake/Output Summary (Last 24 hours) at 6/17/2025 1332  Last data filed at 6/17/2025 1039  Gross per 24 hour   Intake --   Output 0 ml   Net 0 ml         Physical Exam  Constitutional:       Appearance: Normal appearance.   HENT:      Head: Normocephalic and atraumatic.      Nose: Nose normal.   Eyes:      Extraocular Movements: Extraocular movements intact.      Pupils: Pupils are equal, round, and reactive to light.   Cardiovascular:      Rate and Rhythm: Normal rate and regular rhythm.   Pulmonary:      Effort: Pulmonary effort is normal.      Breath sounds: Normal breath sounds.   Abdominal:      General: Bowel sounds are normal.      Palpations: Abdomen is soft.   Musculoskeletal:      Comments: Dressing noted to lumbar spine - C/D/I. Hemovac drain in place.   Skin:     General: Skin is warm and dry.   Neurological:      General: No focal deficit present.      Mental Status: She is alert and oriented to person, place, and time.   Psychiatric:         Mood and Affect: Mood normal.         Behavior: Behavior normal.               Significant Labs: All pertinent labs within the past 24 hours have been reviewed.    Significant Imaging: I have reviewed all pertinent imaging results/findings within the past 24 hours.      Assessment & Plan  Infection of lumbar spine  S/p I&D of deep lumbosacral abscess 6/12  Wound culture positive for MRSA  Continue with IV Vancomycin  Managed by Orthopedics    Hypothyroidism  Stable.  Continue with levothyroxine.  Primary  "hypertension  Patient's blood pressure range in the last 24 hours was: BP  Min: 118/75  Max: 136/78.The patient's inpatient anti-hypertensive regimen is listed below:  Current Antihypertensives  NIFEdipine 24 hr tablet 60 mg, Daily, Oral  metoprolol succinate (TOPROL-XL) 24 hr tablet 100 mg, Daily, Oral  hydrALAZINE tablet 100 mg, 3 times daily, Oral  cloNIDine tablet 0.2 mg, 4 times daily, Oral    Plan  - BP is controlled, no changes needed to their regimen    Type 2 diabetes mellitus, without long-term current use of insulin  Patient's FSGs are controlled on current medication regimen.  Last A1c reviewed-   Lab Results   Component Value Date    HGBA1C 7.7 (H) 04/07/2025     Most recent fingerstick glucose reviewed- No results for input(s): "POCTGLUCOSE" in the last 24 hours.  Current correctional scale  Medium  Maintain anti-hyperglycemic dose as follows-   Antihyperglycemics (From admission, onward)      Start     Stop Route Frequency Ordered    06/17/25 0900  insulin glargine U-100 (Lantus) injection 10 Units         -- SubQ Daily 06/17/25 0641    06/12/25 2045  insulin aspart U-100 injection 1-10 Units         -- SubQ Before meals & nightly PRN 06/12/25 2045          Hold Oral hypoglycemics while patient is in the hospital.     CHF (congestive heart failure)  Patient has chronic CHF. Unable to specify acute vs chronic as there are no echos documented in EMR. On presentation their CHF was well compensated. Most recent BNP and echo results are listed below.  No results for input(s): "BNP" in the last 72 hours.  Latest ECHO  No results found for this or any previous visit.    Current Heart Failure Medications  metoprolol succinate (TOPROL-XL) 24 hr tablet 100 mg, Daily, Oral  hydrALAZINE tablet 100 mg, 3 times daily, Oral    Plan  - Continue home medication regimen.    Sepsis without acute organ dysfunction  This patient does have evidence of infective focus  My overall impression is sepsis without organ " "dysfunction.  Source: infection of lumbar spine  Antibiotics given-   Antibiotics (72h ago, onward)      Start     Stop Route Frequency Ordered    06/16/25 0900  vancomycin (VANCOCIN) 2,000 mg in 0.9% NaCl 500 mL IVPB         -- IV Every 12 hours (non-standard times) 06/15/25 2202    06/12/25 2208  vancomycin - pharmacy to dose         -- IV pharmacy to manage frequency 06/12/25 2108          Latest lactate reviewed-  No results for input(s): "LACTATE", "POCLAC" in the last 72 hours.    Fluid challenge Contraindicated- Fluid bolus is contraindicated in this patient due to Congestive Heart Failure     Post- resuscitation assessment Yes - I attest a sepsis perfusion exam was performed within 6 hours of sepsis, severe sepsis, or septic shock presentation, following fluid resuscitation.      Will Not start Pressors- Levophed for MAP of 65  Source control achieved by: surgical intervention and IV abx      VTE Risk Mitigation (From admission, onward)           Ordered     Place MARTA hose  Until discontinued         06/12/25 2045     Place sequential compression device  Until discontinued         06/12/25 2045                    Discharge Planning   LUAN: 6/18/2025     Code Status: Prior   Medical Readiness for Discharge Date:   Discharge Plan A: Home with family, Home Health                Please place Justification for EMILI Salazar DO  Department of Hospital Medicine   [facility]    "

## 2025-06-17 NOTE — SUBJECTIVE & OBJECTIVE
Interval History: NAEO.    Review of Systems  Objective:     Vital Signs (Most Recent):  Temp: 98.3 °F (36.8 °C) (06/17/25 1131)  Pulse: 84 (06/17/25 1131)  Resp: 18 (06/17/25 1131)  BP: 128/82 (06/17/25 1131)  SpO2: 95 % (06/17/25 1131) Vital Signs (24h Range):  Temp:  [97.3 °F (36.3 °C)-100.3 °F (37.9 °C)] 98.3 °F (36.8 °C)  Pulse:  [84-92] 84  Resp:  [16-20] 18  SpO2:  [93 %-100 %] 95 %  BP: (118-136)/(73-84) 128/82     Weight: 108.5 kg (239 lb 1.6 oz)  Body mass index is 37.45 kg/m².    Intake/Output Summary (Last 24 hours) at 6/17/2025 1332  Last data filed at 6/17/2025 1039  Gross per 24 hour   Intake --   Output 0 ml   Net 0 ml         Physical Exam  Constitutional:       Appearance: Normal appearance.   HENT:      Head: Normocephalic and atraumatic.      Nose: Nose normal.   Eyes:      Extraocular Movements: Extraocular movements intact.      Pupils: Pupils are equal, round, and reactive to light.   Cardiovascular:      Rate and Rhythm: Normal rate and regular rhythm.   Pulmonary:      Effort: Pulmonary effort is normal.      Breath sounds: Normal breath sounds.   Abdominal:      General: Bowel sounds are normal.      Palpations: Abdomen is soft.   Musculoskeletal:      Comments: Dressing noted to lumbar spine - C/D/I. Hemovac drain in place.   Skin:     General: Skin is warm and dry.   Neurological:      General: No focal deficit present.      Mental Status: She is alert and oriented to person, place, and time.   Psychiatric:         Mood and Affect: Mood normal.         Behavior: Behavior normal.               Significant Labs: All pertinent labs within the past 24 hours have been reviewed.    Significant Imaging: I have reviewed all pertinent imaging results/findings within the past 24 hours.

## 2025-06-17 NOTE — PLAN OF CARE
SS spoke with Hina at [facility] to follow up on iv abx referral. Per Hina, still pending prior auth through [facility]. Ss following    1330: SS spoke with Sarah at [facility] to follow up on referral. Per Sarah, she is reaching out to insurance team to expedite the PA from [facility] for the antibiotics. Ss following

## 2025-06-17 NOTE — PT/OT/SLP PROGRESS
Physical Therapy Treatment    Patient Name:  Carey Leonardo   MRN:  [id number]    Recommendations:     Discharge Recommendations: No Therapy Indicated  Discharge Equipment Recommendations: none  Barriers to discharge: None    Assessment:     Carey Leonardo is a 49 y.o. female admitted with a medical diagnosis of Infection of lumbar spine.  She presents with the following impairments/functional limitations: gait instability, impaired balance, pain .    Rehab Prognosis: Good; patient would benefit from acute skilled PT services to address these deficits and reach maximum level of function.    Recent Surgery: Procedure(s) (LRB):  INCISION AND DRAINAGE, ABSCESS, SPINE, LUMBOSACRAL, POSTERIOR (N/A) 5 Days Post-Op    Plan:     During this hospitalization, patient to be seen daily to address the identified rehab impairments via gait training, therapeutic activities, therapeutic exercises, neuromuscular re-education and progress toward the following goals:    Plan of Care Expires:  07/19/25    Subjective     Chief Complaint: mild back pain  Patient/Family Comments/goals: agreeable  Pain/Comfort:  Pain Rating 1: 3/10  Location 1: lumbar spine      Objective:     Communicated with nurse prior to session.  Patient found up in chair with PICC line upon PT entry to room.     General Precautions: Standard, fall  Orthopedic Precautions: spinal precautions  Braces:    Respiratory Status: Room air     Functional Mobility:  Transfers:     Sit to Stand:  stand by assistance with rolling walker  Gait: 70 ft with RW CGA, 25 ft with HHA CGA, very narrow TERRELL with HHA  Balance: good with RW, fair with HHA      AM-PAC 6 CLICK MOBILITY  Turning over in bed (including adjusting bedclothes, sheets and blankets)?: 4  Sitting down on and standing up from a chair with arms (e.g., wheelchair, bedside commode, etc.): 4  Moving from lying on back to sitting on the side of the bed?: 4  Moving to and from a bed to a chair (including a wheelchair)?:  4  Need to walk in hospital room?: 4  Climbing 3-5 steps with a railing?: 4  Basic Mobility Total Score: 24       Treatment & Education:  Standing Heel/toe raises, WS from R to L while picking opposing LE up with 5 sec old, ble 2x10  Mobility as noted    Patient left up in chair with all lines intact, call button in reach, and nurse notified..    GOALS:   Multidisciplinary Problems       Physical Therapy Goals          Problem: Physical Therapy    Goal Priority Disciplines Outcome Interventions   Physical Therapy Goal     PT, PT/OT Progressing    Description: Short term goals:  . Supine to sit with Contact Guard Assistance  . Sit to supine with Contact Guard Assistance  . Sit to stand transfer with Contact Guard Assistance  . Gait  x 100 feet with Contact Guard Assistance using Rolling Walker.     Long term goals:  Patient will gain highest level functional mobility with lowest level of assistive device to return to desired living arrangement and prior ADL's.                          DME Justifications:      Time Tracking:     PT Received On: 06/17/25  PT Start Time: 1545     PT Stop Time: 1612  PT Total Time (min): 27 min     Billable Minutes: Gait Training 17 and Therapeutic Exercise 10    Treatment Type: Treatment  PT/PTA: PT     Number of PTA visits since last PT visit: 0     06/17/2025

## 2025-06-18 LAB
ANION GAP SERPL CALCULATED.3IONS-SCNC: 13 MMOL/L (ref 7–16)
BASOPHILS # BLD AUTO: 0.03 K/UL (ref 0–0.2)
BASOPHILS NFR BLD AUTO: 0.4 % (ref 0–1)
BUN SERPL-MCNC: 5 MG/DL (ref 7–19)
BUN/CREAT SERPL: 8 (ref 6–20)
CALCIUM SERPL-MCNC: 9.4 MG/DL (ref 8.4–10.2)
CHLORIDE SERPL-SCNC: 104 MMOL/L (ref 98–107)
CO2 SERPL-SCNC: 27 MMOL/L (ref 22–29)
CREAT SERPL-MCNC: 0.61 MG/DL (ref 0.55–1.02)
CRP SERPL HS-MCNC: 54.03 MG/L
DIFFERENTIAL METHOD BLD: ABNORMAL
EGFR (NO RACE VARIABLE) (RUSH/TITUS): 110 ML/MIN/1.73M2
EOSINOPHIL # BLD AUTO: 0.71 K/UL (ref 0–0.5)
EOSINOPHIL NFR BLD AUTO: 9 % (ref 1–4)
ERYTHROCYTE [DISTWIDTH] IN BLOOD BY AUTOMATED COUNT: 13.7 % (ref 11.5–14.5)
GLUCOSE SERPL-MCNC: 153 MG/DL (ref 74–100)
GLUCOSE SERPL-MCNC: 166 MG/DL (ref 70–105)
GLUCOSE SERPL-MCNC: 186 MG/DL (ref 70–105)
GLUCOSE SERPL-MCNC: 201 MG/DL (ref 70–105)
GLUCOSE SERPL-MCNC: 95 MG/DL (ref 70–105)
HCT VFR BLD AUTO: 30.2 % (ref 38–47)
HGB BLD-MCNC: 9 G/DL (ref 12–16)
IMM GRANULOCYTES # BLD AUTO: 0.03 K/UL (ref 0–0.04)
IMM GRANULOCYTES NFR BLD: 0.4 % (ref 0–0.4)
LYMPHOCYTES # BLD AUTO: 2.55 K/UL (ref 1–4.8)
LYMPHOCYTES NFR BLD AUTO: 32.4 % (ref 27–41)
MCH RBC QN AUTO: 27.2 PG (ref 27–31)
MCHC RBC AUTO-ENTMCNC: 29.8 G/DL (ref 32–36)
MCV RBC AUTO: 91.2 FL (ref 80–96)
MONOCYTES # BLD AUTO: 0.83 K/UL (ref 0–0.8)
MONOCYTES NFR BLD AUTO: 10.6 % (ref 2–6)
MPC BLD CALC-MCNC: 9.6 FL (ref 9.4–12.4)
NEUTROPHILS # BLD AUTO: 3.71 K/UL (ref 1.8–7.7)
NEUTROPHILS NFR BLD AUTO: 47.2 % (ref 53–65)
NRBC # BLD AUTO: 0 X10E3/UL
NRBC, AUTO (.00): 0 %
PLATELET # BLD AUTO: 340 K/UL (ref 150–400)
POTASSIUM SERPL-SCNC: 3.5 MMOL/L (ref 3.5–5.1)
RBC # BLD AUTO: 3.31 M/UL (ref 4.2–5.4)
SODIUM SERPL-SCNC: 140 MMOL/L (ref 136–145)
VANCOMYCIN TROUGH SERPL-MCNC: 20 ΜG/ML (ref 15–20)
WBC # BLD AUTO: 7.86 K/UL (ref 4.5–11)

## 2025-06-18 PROCEDURE — 27000207 HC ISOLATION

## 2025-06-18 PROCEDURE — 80202 ASSAY OF VANCOMYCIN: CPT | Performed by: ORTHOPAEDIC SURGERY

## 2025-06-18 PROCEDURE — 97110 THERAPEUTIC EXERCISES: CPT

## 2025-06-18 PROCEDURE — 85025 COMPLETE CBC W/AUTO DIFF WBC: CPT | Performed by: ORTHOPAEDIC SURGERY

## 2025-06-18 PROCEDURE — 36415 COLL VENOUS BLD VENIPUNCTURE: CPT | Performed by: ORTHOPAEDIC SURGERY

## 2025-06-18 PROCEDURE — 63600175 PHARM REV CODE 636 W HCPCS: Performed by: FAMILY MEDICINE

## 2025-06-18 PROCEDURE — 99900035 HC TECH TIME PER 15 MIN (STAT)

## 2025-06-18 PROCEDURE — 99233 SBSQ HOSP IP/OBS HIGH 50: CPT | Mod: ,,, | Performed by: FAMILY MEDICINE

## 2025-06-18 PROCEDURE — 82962 GLUCOSE BLOOD TEST: CPT

## 2025-06-18 PROCEDURE — 94761 N-INVAS EAR/PLS OXIMETRY MLT: CPT

## 2025-06-18 PROCEDURE — 86141 C-REACTIVE PROTEIN HS: CPT | Performed by: ORTHOPAEDIC SURGERY

## 2025-06-18 PROCEDURE — 25000003 PHARM REV CODE 250: Performed by: FAMILY MEDICINE

## 2025-06-18 PROCEDURE — 63600175 PHARM REV CODE 636 W HCPCS: Performed by: ORTHOPAEDIC SURGERY

## 2025-06-18 PROCEDURE — 25000003 PHARM REV CODE 250: Performed by: ORTHOPAEDIC SURGERY

## 2025-06-18 PROCEDURE — 97116 GAIT TRAINING THERAPY: CPT

## 2025-06-18 PROCEDURE — 80048 BASIC METABOLIC PNL TOTAL CA: CPT | Performed by: ORTHOPAEDIC SURGERY

## 2025-06-18 PROCEDURE — 11000001 HC ACUTE MED/SURG PRIVATE ROOM

## 2025-06-18 RX ORDER — OXYCODONE AND ACETAMINOPHEN 10; 325 MG/1; MG/1
1 TABLET ORAL EVERY 4 HOURS PRN
Refills: 0 | Status: DISCONTINUED | OUTPATIENT
Start: 2025-06-18 | End: 2025-06-19 | Stop reason: HOSPADM

## 2025-06-18 RX ORDER — HEPARIN 100 UNIT/ML
5 SYRINGE INTRAVENOUS
Status: DISCONTINUED | OUTPATIENT
Start: 2025-06-18 | End: 2025-06-19 | Stop reason: HOSPADM

## 2025-06-18 RX ADMIN — CLONIDINE HYDROCHLORIDE 0.2 MG: 0.2 TABLET ORAL at 08:06

## 2025-06-18 RX ADMIN — ATORVASTATIN CALCIUM 20 MG: 20 TABLET, FILM COATED ORAL at 08:06

## 2025-06-18 RX ADMIN — ACETAMINOPHEN 500 MG: 500 TABLET ORAL at 02:06

## 2025-06-18 RX ADMIN — ACETAMINOPHEN 500 MG: 500 TABLET ORAL at 05:06

## 2025-06-18 RX ADMIN — PHENAZOPYRIDINE 100 MG: 100 TABLET ORAL at 11:06

## 2025-06-18 RX ADMIN — IPRATROPIUM BROMIDE 2 SPRAY: 21 SPRAY, METERED NASAL at 08:06

## 2025-06-18 RX ADMIN — OXYCODONE 5 MG: 5 TABLET ORAL at 03:06

## 2025-06-18 RX ADMIN — SERTRALINE 100 MG: 50 TABLET, FILM COATED ORAL at 08:06

## 2025-06-18 RX ADMIN — IPRATROPIUM BROMIDE 2 SPRAY: 21 SPRAY, METERED NASAL at 09:06

## 2025-06-18 RX ADMIN — METOPROLOL SUCCINATE 100 MG: 100 TABLET, EXTENDED RELEASE ORAL at 08:06

## 2025-06-18 RX ADMIN — OXYCODONE 5 MG: 5 TABLET ORAL at 11:06

## 2025-06-18 RX ADMIN — PHENAZOPYRIDINE 100 MG: 100 TABLET ORAL at 08:06

## 2025-06-18 RX ADMIN — DOCUSATE SODIUM 100 MG: 100 CAPSULE, LIQUID FILLED ORAL at 08:06

## 2025-06-18 RX ADMIN — SENNOSIDES AND DOCUSATE SODIUM 1 TABLET: 50; 8.6 TABLET ORAL at 08:06

## 2025-06-18 RX ADMIN — ONDANSETRON 4 MG: 2 INJECTION INTRAMUSCULAR; INTRAVENOUS at 08:06

## 2025-06-18 RX ADMIN — HYDRALAZINE HYDROCHLORIDE 100 MG: 100 TABLET ORAL at 08:06

## 2025-06-18 RX ADMIN — CLONIDINE HYDROCHLORIDE 0.2 MG: 0.2 TABLET ORAL at 04:06

## 2025-06-18 RX ADMIN — SODIUM CHLORIDE 2000 MG: 9 INJECTION, SOLUTION INTRAVENOUS at 08:06

## 2025-06-18 RX ADMIN — ACETAMINOPHEN 500 MG: 500 TABLET ORAL at 08:06

## 2025-06-18 RX ADMIN — ACETAMINOPHEN 500 MG: 500 TABLET ORAL at 06:06

## 2025-06-18 RX ADMIN — FAMOTIDINE 20 MG: 20 TABLET, FILM COATED ORAL at 08:06

## 2025-06-18 RX ADMIN — POTASSIUM CHLORIDE 40 MEQ: 1500 TABLET, EXTENDED RELEASE ORAL at 08:06

## 2025-06-18 RX ADMIN — INSULIN GLARGINE 10 UNITS: 100 INJECTION, SOLUTION SUBCUTANEOUS at 08:06

## 2025-06-18 RX ADMIN — LEVOTHYROXINE SODIUM 200 MCG: 100 TABLET ORAL at 05:06

## 2025-06-18 RX ADMIN — NIFEDIPINE 60 MG: 60 TABLET, FILM COATED, EXTENDED RELEASE ORAL at 08:06

## 2025-06-18 RX ADMIN — OXYCODONE 5 MG: 5 TABLET ORAL at 08:06

## 2025-06-18 RX ADMIN — PHENAZOPYRIDINE 100 MG: 100 TABLET ORAL at 04:06

## 2025-06-18 RX ADMIN — MORPHINE SULFATE 2 MG: 2 INJECTION, SOLUTION INTRAMUSCULAR; INTRAVENOUS at 10:06

## 2025-06-18 RX ADMIN — MORPHINE SULFATE 2 MG: 2 INJECTION, SOLUTION INTRAMUSCULAR; INTRAVENOUS at 02:06

## 2025-06-18 RX ADMIN — MORPHINE SULFATE 2 MG: 2 INJECTION, SOLUTION INTRAMUSCULAR; INTRAVENOUS at 05:06

## 2025-06-18 RX ADMIN — ACETAMINOPHEN 500 MG: 500 TABLET ORAL at 10:06

## 2025-06-18 RX ADMIN — CLONIDINE HYDROCHLORIDE 0.2 MG: 0.2 TABLET ORAL at 02:06

## 2025-06-18 RX ADMIN — HYDRALAZINE HYDROCHLORIDE 100 MG: 100 TABLET ORAL at 02:06

## 2025-06-18 RX ADMIN — SODIUM CHLORIDE 1750 MG: 9 INJECTION, SOLUTION INTRAVENOUS at 08:06

## 2025-06-18 RX ADMIN — OXYCODONE 5 MG: 5 TABLET ORAL at 04:06

## 2025-06-18 NOTE — PLAN OF CARE
CM called [facility] and they are still awaiting insurance approval for pt. They sent message to insurance for an update on authorization

## 2025-06-18 NOTE — PT/OT/SLP PROGRESS
"Occupational Therapy   Treatment    Name: Carey Leonardo  MRN: [id number]  Admitting Diagnosis:  Infection of lumbar spine  6 Days Post-Op    Recommendations:     Discharge Recommendations: Low Intensity Therapy  Discharge Equipment Recommendations:  none  Barriers to discharge:  None    Assessment:     Carey Leonardo is a 49 y.o. female with a medical diagnosis of Infection of lumbar spine.  She presents with alert and agreeable to treatment. Performance deficits affecting function are gait instability, weakness, impaired endurance, pain.     Rehab Prognosis:  Good; patient would benefit from acute skilled OT services to address these deficits and reach maximum level of function.       Plan:     Patient to be seen 5 x/week to address the above listed problems via self-care/home management, therapeutic activities, therapeutic exercises  Plan of Care Expires: 07/17/25  Plan of Care Reviewed with: patient    Subjective     Chief Complaint: Infection of lumbar spine    Patient/Family Comments/goals: "It is so difficult waiting here. I am wanting to return home."  Pain/Comfort:  Pain Rating 1: 7/10  Location 1: lumbar spine  Pain Addressed 1: Pre-medicate for activity  Pain Rating Post-Intervention 1: 7/10    Objective:     Communicated with: DILAN Brar prior to session.  Patient found left sidelying with PICC line upon OT entry to room.    General Precautions: Standard, fall    Orthopedic Precautions:spinal precautions  Braces: N/A  Respiratory Status: Room air     Occupational Performance:     Bed Mobility:    Patient completed Scooting/Bridging with modified independence  Patient completed Supine to Sit with modified independence  Patient completed Sit to Supine with modified independence     Functional Mobility/Transfers:  Patient completed Sit <> Stand Transfer with supervision  with  rolling walker   Patient completed Toilet Transfer Step Transfer technique with stand by assistance with  rolling " walker  Functional Mobility: Pt with antalgic gait going to the toilet, but much improved when ambulating back to bed. Pt states that when she is walking, she has pain radiating down her (R) hip and leg.    Activities of Daily Living:  Toileting: independence for clothes management and for hygiene      [facility] 6 Click ADL: 23    Treatment & Education:    Pt performed (B) UE ex for 2x15 reps of each:   Elbow flexion with 2#db   Shoulder punches with 2#db   Shoulder extension with red t-band   Chest pull with red t-band   Shoulder ER with red t-band   Shoulder abduction with red t-band   Supination-pronation with 2#db   Wrist flexion with 2#bd   Wrist extension with 2#db      Patient left left sidelying with call button in reach and spouse present    GOALS:   Multidisciplinary Problems       Occupational Therapy Goals          Problem: Occupational Therapy    Goal Priority Disciplines Outcome Interventions   Occupational Therapy Goal     OT, PT/OT Ongoing    Description: STG: (in 1 week)  Pt will perform grooming with setup  Pt will bathe with setup and min(A)  Pt will perform UE dressing with independence  Pt will perform LE dressing with with min(A) or CGA with AD  Pt will perform toileting with SBA  Pt will transfer bed/chair/bsc with RW and SBA  Pt will perform standing task x 3 min with RW and SBA   Pt will tolerate 15 minutes of tx without fatigue      LTG: (in 5 weeks)  1.Restore to max I with self care and mobility.                           Time Tracking:     OT Date of Treatment: 06/18/25  OT Start Time: 1439  OT Stop Time: 1519  OT Total Time (min): 40 min    Billable Minutes:Therapeutic Exercise 30 min    OT/CIPRIANO: OT          6/18/2025

## 2025-06-18 NOTE — PT/OT/SLP PROGRESS
"Physical Therapy Treatment    Patient Name:  Carey Leonardo   MRN:  [id number]    Recommendations:     Discharge Recommendations: Low Intensity Therapy  Discharge Equipment Recommendations: none  Barriers to discharge: ongoing medical care    Assessment:     Carey Leonardo is a 49 y.o. female admitted with a medical diagnosis of Infection of lumbar spine.  She presents with the following impairments/functional limitations: gait instability, impaired balance, pain. Pt tearful during session, states she is ready for home.     Rehab Prognosis: Good; patient would benefit from acute skilled PT services to address these deficits and reach maximum level of function.    Recent Surgery: Procedure(s) (LRB):  INCISION AND DRAINAGE, ABSCESS, SPINE, LUMBOSACRAL, POSTERIOR (N/A) 6 Days Post-Op    Plan:     During this hospitalization, patient to be seen daily to address the identified rehab impairments via gait training, therapeutic activities, therapeutic exercises, neuromuscular re-education and progress toward the following goals:    Plan of Care Expires:  07/19/25    Subjective     Chief Complaint: Infection of lumbar spine   Patient/Family Comments/goals: "I'm just ready to go home"  Pain/Comfort:  Pain Rating 1: 8/10  Location 1: lumbar spine  Pain Addressed 1: Pre-medicate for activity      Objective:     Communicated with RN prior to session.  Patient found sitting edge of bed with PICC line upon PT entry to room.     General Precautions: Standard, fall  Orthopedic Precautions: spinal precautions  Braces:    Respiratory Status: Room air     Functional Mobility:  Transfers:     Sit to Stand:  contact guard assistance and minimum assistance with rolling walker and from EOB   Gait: 55ft x 2 with standing resting period, CGA progressing to SBA with RW, slow debra  Balance: Fair to fair+ in stance       AM-PAC 6 CLICK MOBILITY  Turning over in bed (including adjusting bedclothes, sheets and blankets)?: 4  Sitting down on " and standing up from a chair with arms (e.g., wheelchair, bedside commode, etc.): 3  Moving from lying on back to sitting on the side of the bed?: 4  Moving to and from a bed to a chair (including a wheelchair)?: 4  Need to walk in hospital room?: 3  Climbing 3-5 steps with a railing?: 3  Basic Mobility Total Score: 21       Treatment & Education:  Mobility as stated above. Pt ed on spinal precautions (no bending, lifting >10lbs, twisting) and the need to increase ambulation throughout the day. Pt verbalized understanding.     Patient left up in chair with all lines intact, call button in reach, and RN notified..    GOALS:   Multidisciplinary Problems       Physical Therapy Goals          Problem: Physical Therapy    Goal Priority Disciplines Outcome Interventions   Physical Therapy Goal     PT, PT/OT Progressing    Description: Short term goals:  . Supine to sit with Contact Guard Assistance  . Sit to supine with Contact Guard Assistance  . Sit to stand transfer with Contact Guard Assistance  . Gait  x 100 feet with Contact Guard Assistance using Rolling Walker.     Long term goals:  Patient will gain highest level functional mobility with lowest level of assistive device to return to desired living arrangement and prior ADL's.                          DME Justifications:  No DME recommended requiring DME justifications    Time Tracking:     PT Received On: 06/18/25  PT Start Time: 1053     PT Stop Time: 1112  PT Total Time (min): 19 min     Billable Minutes: Gait Training 15    Treatment Type: Treatment        Number of PTA visits since last PT visit: 0     06/18/2025

## 2025-06-18 NOTE — PROGRESS NOTES
"[facility] Hospital Medicine  Progress Note    Patient Name: Carey Leonardo  MRN: [id number]  Patient Class: IP- Inpatient   Admission Date: 6/12/2025  Length of Stay: 6 days  Attending Physician: Cyril Upton MD  Primary Care Provider: Zainab Harrell FNP        Subjective     Principal Problem:Infection of lumbar spine        HPI:  Ms. Leonardo is a 49-year-old female with past medical history significant for hypertension, hypothyroidism, type 2 diabetes mellitus, obstructive sleep apnea, asthma, congestive heart failure, depression.  Admitted with lumbar wound infection after recent laminectomy and fusion 5/21/2025.  She underwent lumbar wound I&D with evacuation of deep spinal abscess on 6/12/25.  Currently receiving IV vancomycin.  Hospital medicine consulted postoperatively for medical management.       Overview/Hospital Course:  Patient was seen and examined.  Resting comfortably in bed.  Denies any complaints.  Appropriate home medications resumed.    6/14 - Tmax 102.1 deg F, tachycardic.  Patient meets sepsis criteria. 1L NSS fluid bolus ordered along with  ml/hr x 10 hours.  Blood cultures also ordered.  Continue with IV vancomycin.    6/15 -Tmax 99 def F, tachycardia resolved. Blood cultures in progress. Wound cultures positive for MRSA.    6/16 - afebrile. requesting to go home.    6/17 - resting comfortably in bed. Requesting to go home.    Reevaluated patient later due to complaints of dysuria, urinary incontinence, left pelvic discomfort, flank pain, and "blood in underwear" - patient is unsure if urinary or vaginal. S/p hysterectomy 2011.  Reports that symptoms have been present since surgery.  No bowel incontinence.  UA unremarkable - no evidence of infection or hematuria.  Med list reviewed. Percocet is only med that could be contributing to incontinence.  CT A/P without contrast ordered for further evaluation.     6/18 - incontinence resolved. CT A/P results " reviewed.    Interval History: NAEO.    Review of Systems  Objective:     Vital Signs (Most Recent):  Temp: 98.5 °F (36.9 °C) (06/18/25 0447)  Pulse: 88 (06/18/25 0447)  Resp: 18 (06/18/25 0831)  BP: 126/69 (06/18/25 0447)  SpO2: 97 % (06/18/25 0447) Vital Signs (24h Range):  Temp:  [97.7 °F (36.5 °C)-98.7 °F (37.1 °C)] 98.5 °F (36.9 °C)  Pulse:  [84-90] 88  Resp:  [18-20] 18  SpO2:  [95 %-99 %] 97 %  BP: (116-135)/(55-87) 126/69     Weight: 108.5 kg (239 lb 1.6 oz)  Body mass index is 37.45 kg/m².    Intake/Output Summary (Last 24 hours) at 6/18/2025 0935  Last data filed at 6/18/2025 0344  Gross per 24 hour   Intake 2136.45 ml   Output 0 ml   Net 2136.45 ml         Physical Exam  Constitutional:       Appearance: Normal appearance.   HENT:      Head: Normocephalic and atraumatic.      Nose: Nose normal.   Eyes:      Extraocular Movements: Extraocular movements intact.      Pupils: Pupils are equal, round, and reactive to light.   Cardiovascular:      Rate and Rhythm: Normal rate and regular rhythm.   Pulmonary:      Effort: Pulmonary effort is normal.      Breath sounds: Normal breath sounds.   Abdominal:      General: Bowel sounds are normal.      Palpations: Abdomen is soft.   Musculoskeletal:      Comments: Dressing noted to lumbar spine - C/D/I.    Skin:     General: Skin is warm and dry.   Neurological:      General: No focal deficit present.      Mental Status: She is alert and oriented to person, place, and time.   Psychiatric:         Mood and Affect: Mood normal.         Behavior: Behavior normal.               Significant Labs: All pertinent labs within the past 24 hours have been reviewed.    Significant Imaging: I have reviewed all pertinent imaging results/findings within the past 24 hours.      Assessment & Plan  Infection of lumbar spine  S/p I&D of deep lumbosacral abscess 6/12  Wound culture positive for MRSA  Continue with IV Vancomycin  Managed by Orthopedics    Hypothyroidism  Stable.  Continue  "with levothyroxine.  Primary hypertension  Patient's blood pressure range in the last 24 hours was: BP  Min: 116/55  Max: 135/87.The patient's inpatient anti-hypertensive regimen is listed below:  Current Antihypertensives  NIFEdipine 24 hr tablet 60 mg, Daily, Oral  metoprolol succinate (TOPROL-XL) 24 hr tablet 100 mg, Daily, Oral  hydrALAZINE tablet 100 mg, 3 times daily, Oral  cloNIDine tablet 0.2 mg, 4 times daily, Oral    Plan  - BP is controlled, no changes needed to their regimen    Type 2 diabetes mellitus, without long-term current use of insulin  Patient's FSGs are controlled on current medication regimen.  Last A1c reviewed-   Lab Results   Component Value Date    HGBA1C 7.7 (H) 04/07/2025     Most recent fingerstick glucose reviewed- No results for input(s): "POCTGLUCOSE" in the last 24 hours.  Current correctional scale  Medium  Maintain anti-hyperglycemic dose as follows-   Antihyperglycemics (From admission, onward)      Start     Stop Route Frequency Ordered    06/17/25 0900  insulin glargine U-100 (Lantus) injection 10 Units         -- SubQ Daily 06/17/25 0641    06/12/25 2045  insulin aspart U-100 injection 1-10 Units         -- SubQ Before meals & nightly PRN 06/12/25 2045          Hold Oral hypoglycemics while patient is in the hospital.     CHF (congestive heart failure)  Patient has chronic CHF. Unable to specify acute vs chronic as there are no echos documented in EMR. On presentation their CHF was well compensated. Most recent BNP and echo results are listed below.  No results for input(s): "BNP" in the last 72 hours.  Latest ECHO  No results found for this or any previous visit.    Current Heart Failure Medications  metoprolol succinate (TOPROL-XL) 24 hr tablet 100 mg, Daily, Oral  hydrALAZINE tablet 100 mg, 3 times daily, Oral    Plan  - Continue home medication regimen.    Sepsis without acute organ dysfunction  This patient does have evidence of infective focus  My overall impression is " "sepsis without organ dysfunction.  Source: infection of lumbar spine  Antibiotics given-   Antibiotics (72h ago, onward)      Start     Stop Route Frequency Ordered    06/16/25 0900  vancomycin (VANCOCIN) 2,000 mg in 0.9% NaCl 500 mL IVPB         -- IV Every 12 hours (non-standard times) 06/15/25 2202    06/12/25 2208  vancomycin - pharmacy to dose         -- IV pharmacy to manage frequency 06/12/25 2108          Latest lactate reviewed-  No results for input(s): "LACTATE", "POCLAC" in the last 72 hours.    Fluid challenge Contraindicated- Fluid bolus is contraindicated in this patient due to Congestive Heart Failure     Post- resuscitation assessment Yes - I attest a sepsis perfusion exam was performed within 6 hours of sepsis, severe sepsis, or septic shock presentation, following fluid resuscitation.      Will Not start Pressors- Levophed for MAP of 65  Source control achieved by: surgical intervention and IV abx        VTE Risk Mitigation (From admission, onward)           Ordered     Place MARTA hose  Until discontinued         06/12/25 2045     Place sequential compression device  Until discontinued         06/12/25 2045                    Discharge Planning   LUAN: 6/18/2025     Code Status: Prior   Medical Readiness for Discharge Date:   Discharge Plan A: Home with family, Home Health                Please place Justification for EMILI Salazar DO  Department of Hospital Medicine   [facility]    "

## 2025-06-18 NOTE — SUBJECTIVE & OBJECTIVE
Interval History: NAEO.    Review of Systems  Objective:     Vital Signs (Most Recent):  Temp: 98.5 °F (36.9 °C) (06/18/25 0447)  Pulse: 88 (06/18/25 0447)  Resp: 18 (06/18/25 0831)  BP: 126/69 (06/18/25 0447)  SpO2: 97 % (06/18/25 0447) Vital Signs (24h Range):  Temp:  [97.7 °F (36.5 °C)-98.7 °F (37.1 °C)] 98.5 °F (36.9 °C)  Pulse:  [84-90] 88  Resp:  [18-20] 18  SpO2:  [95 %-99 %] 97 %  BP: (116-135)/(55-87) 126/69     Weight: 108.5 kg (239 lb 1.6 oz)  Body mass index is 37.45 kg/m².    Intake/Output Summary (Last 24 hours) at 6/18/2025 0935  Last data filed at 6/18/2025 0344  Gross per 24 hour   Intake 2136.45 ml   Output 0 ml   Net 2136.45 ml         Physical Exam  Constitutional:       Appearance: Normal appearance.   HENT:      Head: Normocephalic and atraumatic.      Nose: Nose normal.   Eyes:      Extraocular Movements: Extraocular movements intact.      Pupils: Pupils are equal, round, and reactive to light.   Cardiovascular:      Rate and Rhythm: Normal rate and regular rhythm.   Pulmonary:      Effort: Pulmonary effort is normal.      Breath sounds: Normal breath sounds.   Abdominal:      General: Bowel sounds are normal.      Palpations: Abdomen is soft.   Musculoskeletal:      Comments: Dressing noted to lumbar spine - C/D/I.    Skin:     General: Skin is warm and dry.   Neurological:      General: No focal deficit present.      Mental Status: She is alert and oriented to person, place, and time.   Psychiatric:         Mood and Affect: Mood normal.         Behavior: Behavior normal.               Significant Labs: All pertinent labs within the past 24 hours have been reviewed.    Significant Imaging: I have reviewed all pertinent imaging results/findings within the past 24 hours.

## 2025-06-18 NOTE — PROGRESS NOTES
Daily Orthopaedic Progress Note    Carey Leonardo is a 49 y.o. female admitted on 6/12/2025  Hospital Day: 5  Post Op Day: 5 Days Post-Op    Antibiotics (From admission, onward)      Start     Stop Route Frequency Ordered    06/16/25 0900  vancomycin (VANCOCIN) 2,000 mg in 0.9% NaCl 500 mL IVPB         -- IV Every 12 hours (non-standard times) 06/15/25 2202    06/12/25 2208  vancomycin - pharmacy to dose         -- IV pharmacy to manage frequency 06/12/25 2108             The patient was seen and examined this morning at the bedside. Patient reports no acute issues overnight and adequate control of pain on current regimen.  Patient worked with physical therapy over the last 24 hours.      PHYSICAL EXAM:  Awake/alert/oriented x3, No acute distress, Afebrile, Vital signs stable  Normocephalic, Atraumatic  Good inspiratory effort with unlaboured breathing  Dressing was not saturated but was displaced. Incision c/d/I.   Vitals:    06/17/25 1811 06/17/25 2003 06/17/25 2017 06/17/25 2053   BP:  (!) 116/55     Pulse:  87 85    Resp: 18 18 18 18   Temp:  98.7 °F (37.1 °C)     TempSrc:  Oral     SpO2:  98% 99%    Weight:       Height:         No intake/output data recorded.    Significant Labs:  Recent Lab Results  (Last 5 results in the past 24 hours)        06/17/25 2000 06/17/25  1626   06/17/25  1226   06/17/25  1001   06/17/25  0803        Appearance, UA       Clear         Bilirubin (UA)       Negative         Color, UA       Light Yellow         Glucose, UA       70         Ketones, UA       Trace         Leukocyte Esterase, UA       Negative         NITRITE UA       Negative         Blood, UA       Negative         pH, UA       7.0         POC Glucose 205   223   220     183       Protein, UA       Negative         Spec Grav UA       1.018         UROBILINOGEN UA       Normal                                Significant Diagnostics:  IR PICC Line Placement w/o Port w/ Img > 4 Y/O  Narrative: EXAMINATION:  IR  PICC LINE PLACEMENT W/O PORT, W/IMG > 6 Y/O    CLINICAL HISTORY:  Iv abx;    TECHNIQUE:    * : Procedure performed by Sonny HELMS under the supervision of Dr. Field.    Pre-existing PICC line had been accidentally partially removed.  Informed consent obtained including risks and possible complications. Patient pepped with chloro prep and draped in a sterile fashion. 2 cc 1% lidocaine infused. Utilizing fluoroscopic guidance a new 48 cm 5 [place] PICC line placed and position verified by fluoroscopy. PICC line was flushed with heparinized saline.  Statlock and bandage applied. Patient tolerated procedure well with no immediate complication.    Fluoroscopy time-24 seconds  Impression: Status post left PICC line replacement via the left basilic vein.    Place of service: [facility]    Electronically signed by: Raeann Field  Date:    06/16/2025  Time:    19:25       A/P: 49 y.o. female 5 Days Post-Op s/p I&D for infected lumbar hardware  -Continue with current pain control regimen  -Continue with current physical therapy plan  -Continue with DVT prophylaxis,  -Anticipate discharge to home with IV abx    Loy Worley MD  Orthopaedic Staff Surgeon

## 2025-06-18 NOTE — PROGRESS NOTES
Daily Orthopaedic Progress Note    Carey Leonardo is a 49 y.o. female admitted on 6/12/2025  Hospital Day: 6  Post Op Day: 6 Days Post-Op    Antibiotics (From admission, onward)      Start     Stop Route Frequency Ordered    06/18/25 2100  vancomycin (VANCOCIN) 1,750 mg in 0.9% NaCl 500 mL IVPB         -- IV Every 12 hours (non-standard times) 06/18/25 1208    06/12/25 2208  vancomycin - pharmacy to dose         -- IV pharmacy to manage frequency 06/12/25 2108             The patient was seen and examined this morning at the bedside. Patient reports no acute issues overnight and adequate control of pain on current regimen.  Patient worked with physical therapy over the last 24 hours.      PHYSICAL EXAM:  Awake/alert/oriented x3, No acute distress, Afebrile, Vital signs stable  Normocephalic, Atraumatic  Good inspiratory effort with unlaboured breathing  Dressing was not saturated but was displaced. Incision c/d/I.   Vitals:    06/18/25 1135 06/18/25 1135 06/18/25 1449 06/18/25 1617   BP:  112/74     Pulse:  83     Resp: 18 18 18 18   Temp:  97.8 °F (36.6 °C)     TempSrc:  Oral     SpO2:  97%     Weight:       Height:         I/O last 3 completed shifts:  In: 2136.5 [P.O.:180; IV Piggyback:1956.5]  Out: 0     Significant Labs:  Recent Lab Results  (Last 5 results in the past 24 hours)        06/18/25  1156   06/18/25  0819   06/18/25  0809   06/18/25  0453   06/17/25 2000        Anion Gap       13         Baso #       0.03         Basophil %       0.4         BUN       5         BUN/CREAT RATIO       8         Calcium       9.4         Chloride       104         CO2       27         Creatinine       0.61         CRP, High Sensitivity       54.03         Differential Method       Auto         eGFR       110  Comment: Estimated GFR calculated using the CKD-EPI creatinine (2021) equation.         Eos #       0.71         Eos %       9.0         Glucose       153         Hematocrit       30.2         Hemoglobin        9.0         Immature Grans (Abs)       0.03         Immature Granulocytes       0.4         Lymph #       2.55         Lymph %       32.4         MCH       27.2         MCHC       29.8         MCV       91.2         Mono #       0.83         Mono %       10.6         MPV       9.6         Neutrophils, Abs       3.71         Neutrophils Relative       47.2         nRBC       0.0         NUCLEATED RBC ABSOLUTE       0.00         Platelet Count       340         POC Glucose 186     166     205       Potassium       3.5         RBC       3.31         RDW       13.7         Sodium       140         Vancomycin-Trough   20.0             WBC       7.86                                Significant Diagnostics:  CT Abdomen Pelvis  Without Contrast  Narrative: EXAMINATION:  CT ABDOMEN PELVIS WITHOUT CONTRAST    CLINICAL HISTORY:  Pelvic pain, acute, post-menopausal;flank and pelvic pain, ?urinary vs vaginal bleeding;    TECHNIQUE:  Low dose axial images, sagittal and coronal reformations were obtained from the lung bases to the pubic symphysis.  Oral contrast was not administered.    COMPARISON:  10/03/2024    FINDINGS:  There is motion artifact.    Images of the lower thorax are remarkable for mild bilateral dependent atelectasis.  There is mild pericardial fluid.    The liver, spleen, pancreas and adrenal glands have a grossly unremarkable noncontrast appearance.  The gallbladder is surgically absent, no significant biliary dilation status post cholecystectomy.  The stomach is nondistended, no gastric wall thickening.  No significant abdominal lymphadenopathy.    The kidneys have a grossly unremarkable noncontrast appearance without hydronephrosis or nephrolithiasis.  The bilateral ureters are unable to be followed in their entirety to the urinary bladder, no definite calculi seen or secondary findings to suggest obstructive uropathy.  The urinary bladder is nondistended.  The uterus is absent the adnexa is grossly  unremarkable.    There are a few scattered colonic diverticula without inflammation to suggest diverticulitis.  The terminal ileum is unremarkable.  The appendix is not confidently identified, no pericecal inflammation.  The small bowel is grossly unremarkable.  There are a few scattered shotty periaortic, pericaval, and mesenteric lymph nodes.  There is calcification along the gonadal veins.    There is surgical change of the cervical spine.  There are degenerative changes of the spine.  Posterior spinal fusion hardware spans L2 through L5 noting disc spacing material at L2-L3, L3-L4, and L4-L5.  There is induration and disorganized fluid within the posterior paraspinous soft tissues at the level of fusion.  There are a few punctate foci of air in the region likely postsurgical.  Clinical correlation is advised.  No significant inguinal lymphadenopathy.  Impression: 1. Allowing for extensive motion artifact, no findings to suggest obstructive uropathy.  The urinary bladder is decompressed, likely accounting for wall thickening however correlation with urinalysis as warranted.  2. Surgical changes of the spine as described, no discrete collection to suggest abscess however lack of IV contrast limits evaluation.  Punctate foci of air in the operative bed is likely related to recent surgical change although correlation is advised.  3. Please see above for several additional findings.    Electronically signed by: Bassem Mojica MD  Date:    06/18/2025  Time:    08:41       A/P: 49 y.o. female 6 Days Post-Op s/p I&D for infected lumbar hardware  -Continue with current pain control regimen  -Continue with current physical therapy plan  -Continue with DVT prophylaxis,  -Anticipate discharge to home with IV abx    Loy Worley MD  Orthopaedic Staff Surgeon

## 2025-06-18 NOTE — ASSESSMENT & PLAN NOTE
Patient's blood pressure range in the last 24 hours was: BP  Min: 116/55  Max: 135/87.The patient's inpatient anti-hypertensive regimen is listed below:  Current Antihypertensives  NIFEdipine 24 hr tablet 60 mg, Daily, Oral  metoprolol succinate (TOPROL-XL) 24 hr tablet 100 mg, Daily, Oral  hydrALAZINE tablet 100 mg, 3 times daily, Oral  cloNIDine tablet 0.2 mg, 4 times daily, Oral    Plan  - BP is controlled, no changes needed to their regimen

## 2025-06-19 VITALS
SYSTOLIC BLOOD PRESSURE: 151 MMHG | TEMPERATURE: 100 F | WEIGHT: 239.13 LBS | BODY MASS INDEX: 37.53 KG/M2 | OXYGEN SATURATION: 98 % | HEART RATE: 87 BPM | RESPIRATION RATE: 18 BRPM | HEIGHT: 67 IN | DIASTOLIC BLOOD PRESSURE: 108 MMHG

## 2025-06-19 LAB
GLUCOSE SERPL-MCNC: 179 MG/DL (ref 70–105)
GLUCOSE SERPL-MCNC: 202 MG/DL (ref 70–105)

## 2025-06-19 PROCEDURE — 63600175 PHARM REV CODE 636 W HCPCS: Performed by: FAMILY MEDICINE

## 2025-06-19 PROCEDURE — 94761 N-INVAS EAR/PLS OXIMETRY MLT: CPT

## 2025-06-19 PROCEDURE — 97110 THERAPEUTIC EXERCISES: CPT

## 2025-06-19 PROCEDURE — 97110 THERAPEUTIC EXERCISES: CPT | Mod: CQ

## 2025-06-19 PROCEDURE — 63600175 PHARM REV CODE 636 W HCPCS: Performed by: ORTHOPAEDIC SURGERY

## 2025-06-19 PROCEDURE — 99900035 HC TECH TIME PER 15 MIN (STAT)

## 2025-06-19 PROCEDURE — 97116 GAIT TRAINING THERAPY: CPT | Mod: CQ

## 2025-06-19 PROCEDURE — 99233 SBSQ HOSP IP/OBS HIGH 50: CPT | Mod: ,,, | Performed by: FAMILY MEDICINE

## 2025-06-19 PROCEDURE — 25000003 PHARM REV CODE 250: Performed by: FAMILY MEDICINE

## 2025-06-19 PROCEDURE — 82962 GLUCOSE BLOOD TEST: CPT

## 2025-06-19 PROCEDURE — 25000003 PHARM REV CODE 250: Performed by: ORTHOPAEDIC SURGERY

## 2025-06-19 RX ADMIN — FAMOTIDINE 20 MG: 20 TABLET, FILM COATED ORAL at 09:06

## 2025-06-19 RX ADMIN — MORPHINE SULFATE 2 MG: 2 INJECTION, SOLUTION INTRAMUSCULAR; INTRAVENOUS at 02:06

## 2025-06-19 RX ADMIN — ACETAMINOPHEN 500 MG: 500 TABLET ORAL at 09:06

## 2025-06-19 RX ADMIN — SODIUM CHLORIDE 1750 MG: 9 INJECTION, SOLUTION INTRAVENOUS at 09:06

## 2025-06-19 RX ADMIN — SENNOSIDES AND DOCUSATE SODIUM 1 TABLET: 50; 8.6 TABLET ORAL at 09:06

## 2025-06-19 RX ADMIN — POTASSIUM CHLORIDE 40 MEQ: 1500 TABLET, EXTENDED RELEASE ORAL at 09:06

## 2025-06-19 RX ADMIN — NIFEDIPINE 60 MG: 60 TABLET, FILM COATED, EXTENDED RELEASE ORAL at 09:06

## 2025-06-19 RX ADMIN — METOPROLOL SUCCINATE 100 MG: 100 TABLET, EXTENDED RELEASE ORAL at 09:06

## 2025-06-19 RX ADMIN — ONDANSETRON 4 MG: 2 INJECTION INTRAMUSCULAR; INTRAVENOUS at 02:06

## 2025-06-19 RX ADMIN — LEVOTHYROXINE SODIUM 200 MCG: 100 TABLET ORAL at 05:06

## 2025-06-19 RX ADMIN — OXYCODONE 5 MG: 5 TABLET ORAL at 09:06

## 2025-06-19 RX ADMIN — PHENAZOPYRIDINE 100 MG: 100 TABLET ORAL at 09:06

## 2025-06-19 RX ADMIN — INSULIN GLARGINE 10 UNITS: 100 INJECTION, SOLUTION SUBCUTANEOUS at 09:06

## 2025-06-19 RX ADMIN — HYDRALAZINE HYDROCHLORIDE 100 MG: 100 TABLET ORAL at 09:06

## 2025-06-19 RX ADMIN — DOCUSATE SODIUM 100 MG: 100 CAPSULE, LIQUID FILLED ORAL at 09:06

## 2025-06-19 RX ADMIN — OXYCODONE 5 MG: 5 TABLET ORAL at 12:06

## 2025-06-19 RX ADMIN — POLYETHYLENE GLYCOL 3350 17 G: 17 POWDER, FOR SOLUTION ORAL at 09:06

## 2025-06-19 RX ADMIN — PHENAZOPYRIDINE 100 MG: 100 TABLET ORAL at 12:06

## 2025-06-19 RX ADMIN — IPRATROPIUM BROMIDE 2 SPRAY: 21 SPRAY, METERED NASAL at 09:06

## 2025-06-19 RX ADMIN — OXYCODONE 5 MG: 5 TABLET ORAL at 04:06

## 2025-06-19 RX ADMIN — CLONIDINE HYDROCHLORIDE 0.2 MG: 0.2 TABLET ORAL at 09:06

## 2025-06-19 RX ADMIN — ACETAMINOPHEN 500 MG: 500 TABLET ORAL at 05:06

## 2025-06-19 NOTE — PLAN OF CARE
[facility]  Discharge Final Note    Primary Care Provider: Zainab Harrell FNP    Expected Discharge Date: 6/19/2025    Final Discharge Note (most recent)       Final Note - 06/19/25 1350          Final Note    Assessment Type Final Discharge Note     Anticipated Discharge Disposition Home-Health Care Svc        Post-Acute Status    Post-Acute Authorization Home Health     Home Health Status Set-up Complete/Auth obtained     Patient choice form signed by patient/caregiver List with quality metrics by geographic area provided;List from CMS Compare;List from System Post-Acute Care     Discharge Delays None known at this time                     Important Message from Medicare              Follow-up providers       Cyril Upton MD   Specialty: Orthopedic Surgery, Spine Surgery    [address]  [address]  [facility] [address]   Phone: [phone]       Next Steps: Follow up    Instructions: Please follow up on June 26 at 1:10 pm              After-discharge care                Dialysis/Infusion       [facility]   Service: Home Infusion and Injection    [address] [place] [address]   Phone: [phone]                 Home Medical Care       [facility]   Service: Home Nursing    [address] [facility] [place] [address]   Phone: [phone]                             Pt discharged home with Zoey

## 2025-06-19 NOTE — DISCHARGE SUMMARY
[facility]  Orthopedics  Discharge Summary      Patient Name: Carey Leonardo  MRN: [id number]  Admission Date: 6/12/2025  Hospital Length of Stay: 7 days  Discharge Date and Time: 06/19/2025 9:45 AM  Attending Physician: Cyril Upton MD   Discharging Provider: Cyril Upton MD  Primary Care Provider: Zainab Harrell FNP      Procedure(s) (LRB):  INCISION AND DRAINAGE, ABSCESS, SPINE, LUMBOSACRAL, POSTERIOR (N/A)      Hospital Course:   This is a 49 y.o. year old female who was admitted on 6/12/2025 with diagnoses of Infection of lumbar spine.  The patient was taken to the operating room on 6/12/2025 for a Procedure(s) (LRB):  INCISION AND DRAINAGE, ABSCESS, SPINE, LUMBOSACRAL, POSTERIOR (N/A).  See the dictated operative note for full detail. There were no complications during the surgery. The patient was given appropriate bart-operative antibiotics. Patient had a hemovac drain placed intaoperatively. The patient began working with Physical Therapy on Post op Day #1 who recommended discharge home upon discharge. Discharge was delayed pending home health antibiotics approval by insurance.  The patient was placed on mechanical DVT prophylaxis.     Patients labs and vital signs are stable at this time.    Patient feels well and will be discharge today, 6/19/2025.    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    Service: Home Infusion and Injection  Contact information:  [address] [place] [address]  [phone]                     Home Medical Care       [facility] .    Service: Home Nursing  Contact information:  [address] [facility] [address]  [phone]                                 Patient Instructions:      Diet general   Order Comments: Resume home diet     Lifting restrictions   Order Comments: No heavy lifting or strenuous activity     No driving, operating heavy equipment or signing legal documents while taking pain medication     Other restrictions (specify):   Order Comments: [facility]  Office: [phone]    Spine Discharge Instructions    - Follow up with Dr. Upton in 10-14 days. Please call for appointment (if not already scheduled).   - Keep dressing clean and dry. May remove dressing 3 days after surgery  - May shower upon discharge. Do not scrub, tub bathe, or submerge the incision.  - No lifting greater than 10 pounds.  - Ambulate multiple times each day   - You may bend and twist for usual daily activities  - You may sleep in any position that is comfortable  - Do not drive a motor vehicle while on narcotic pain medication.   - Utilize pain medication (narcotics) as prescribed  - Do not exceed 3g Tylenol in a 24 hour period.   - Use stool softeners while taking narcotics to prevent constipation   - Resume your usual diet     Wound care routine (specify)   Order  Comments: Remove Tegaderm and gauze dressing in 72 hours.  Leave Dermabond mesh glued onto skin.  Okay to shower with running water, no soaking or submerging.     Call MD for:  temperature >100.4     Call MD for:  persistent nausea and vomiting     Call MD for:  severe uncontrolled pain     Call MD for:  difficulty breathing, headache or visual disturbances     Call MD for:  redness, tenderness, or signs of infection (pain, swelling, redness, odor or green/yellow discharge around incision site)     Call MD for:  hives     Call MD for:  persistent dizziness or light-headedness     Call MD for:  extreme fatigue     Medications:  Reconciled Home Medications:      Medication List        START taking these medications      0.9% NaCl SolP 500 mL with vancomycin 1,000 mg SolR 2,000 mg  Inject 2,000 mg into the vein every 12 (twelve) hours.            CONTINUE taking these medications      albuterol 90 mcg/actuation inhaler  Commonly known as: VENTOLIN HFA  Inhale 2 puffs into the lungs every 6 (six) hours as needed for Wheezing. Rescue     atorvastatin 20 MG tablet  Commonly known as: LIPITOR  TAKE 1 TABLET(20 MG) BY MOUTH EVERY EVENING     blood sugar diagnostic Strp  To check BG 4 times daily, to use with insurance preferred meter     blood-glucose meter kit  To check BG 4 times daily, to use with insurance preferred meter     cloNIDine 0.2 MG tablet  Commonly known as: CATAPRES  Take 1 tablet (0.2 mg total) by mouth 4 (four) times daily.     empagliflozin 10 mg tablet  Commonly known as: Jardiance  Take 1 tablet (10 mg total) by mouth once daily.     FREESTYLE DENG 3 READER Misc  Generic drug: blood-glucose,,cont  Use with your sensor.     FREESTYLE DENG 3 SENSOR Suad  Generic drug: blood-glucose sensor  Use as directed     glipiZIDE 5 MG Tr24  Take 2 tablets (10 mg total) by mouth daily with breakfast.     hydrALAZINE 100 MG tablet  Commonly known as: APRESOLINE  Take 1 tablet (100 mg total) by mouth 3  "(three) times daily.     ipratropium 21 mcg (0.03 %) nasal spray  Commonly known as: ATROVENT  2 sprays by Each Nostril route 2 (two) times daily.     lancets [facility]  To check BG 4 times daily, to use with insurance preferred meter     LANTUS SOLOSTAR U-100 INSULIN 100 unit/mL (3 mL) Inpn pen  Generic drug: insulin glargine U-100 (Lantus)  Inject 20 Units into the skin every evening.     levothyroxine 200 MCG tablet  Commonly known as: SYNTHROID  Take 1 tablet (200 mcg total) by mouth before breakfast.     metoprolol succinate 100 MG 24 hr tablet  Commonly known as: TOPROL-XL  Take 1 tablet (100 mg total) by mouth once daily.     NIFEdipine 60 MG (OSM) 24 hr tablet  Commonly known as: PROCARDIA-XL  Take 1 tablet (60 mg total) by mouth once daily.     ondansetron 4 MG Tbdl  Commonly known as: ZOFRAN-ODT  Take 1 tablet (4 mg total) by mouth 2 (two) times daily.     oxyCODONE-acetaminophen  mg per tablet  Commonly known as: LYNOX  Take 1 tablet by mouth every 4 (four) hours as needed for Pain.     pen needle, diabetic 32 gauge x 5/32" Ndle  1 Application by Misc.(Non-Drug; Combo Route) route once daily.     sertraline 100 MG tablet  Commonly known as: ZOLOFT  Take 1 tablet (100 mg total) by mouth every evening.     traMADoL 50 mg tablet  Commonly known as: ULTRAM  Take 1 tablet (50 mg total) by mouth every 8 (eight) hours as needed for Pain.            ASK your doctor about these medications      lurasidone 20 mg Tab tablet  Commonly known as: ARSALAN Upton MD  Orthopedics  [facility]   "

## 2025-06-19 NOTE — PT/OT/SLP PROGRESS
Occupational Therapy   Treatment    Name: Carey Leonardo  MRN: [id number]  Admitting Diagnosis:  Infection of lumbar spine  7 Days Post-Op    Recommendations:     Discharge Recommendations: Low Intensity Therapy  Discharge Equipment Recommendations:  none  Barriers to discharge:  None    Assessment:     Carey Leonardo is a 49 y.o. female with a medical diagnosis of Infection of lumbar spine.  She presents with complaint of back pain. Pt agreed to OT treatment. Performance deficits affecting function are impaired endurance.     Rehab Prognosis:  Good; patient would benefit from acute skilled OT services to address these deficits and reach maximum level of function.       Plan:     Patient to be seen 5 x/week to address the above listed problems via self-care/home management, therapeutic activities, therapeutic exercises  Plan of Care Expires: 07/17/25  Plan of Care Reviewed with: patient    Subjective     Chief Complaint: Back pain  Patient/Family Comments/goals: To return home  Pain/Comfort:  Pain Rating 1: 9/10  Location 1: lumbar spine  Pain Addressed 1: Pre-medicate for activity  Pain Rating Post-Intervention 1: 9/10    Objective:     Communicated with: DILAN Alvarez prior to session.  Patient found sitting edge of bed with PICC line upon OT entry to room.    General Precautions: Standard, fall    Orthopedic Precautions:spinal precautions  Braces: N/A  Respiratory Status: Room air     Occupational Performance:     Bed Mobility:         Functional Mobility/Transfers:  Patient completed Sit <> Stand Transfer with supervision  with  rolling walker   Patient completed Bed <> Chair Transfer using Step Transfer technique with supervision with rolling walker  Functional Mobility:     Activities of Daily Living:        [facility] 6 Click ADL:      Treatment & Education:  Pt performed UE strengthening exercises.  2 lb hand wt 2 x sets of 15 reps  (B) shoulder flexion/extension and adduction/abduction  3 lb hand wt x 2 sets of  15 reps   (B) elbow flexion/extension  (B) forearm supination/pronation  (B) wrist flexion/extension  Hand exerciser x 2 sets of 30 reps x 4 bands   Red theraband x 2 sets of 15 reps   (B) shoulder adduction/abduction  (B) elbow flexion  (B) elbow extension      Pt participate well with UE exercises with rest breaks provided as needed    Patient left up in chair with all lines intact and call button in reach    GOALS:   Multidisciplinary Problems       Occupational Therapy Goals          Problem: Occupational Therapy    Goal Priority Disciplines Outcome Interventions   Occupational Therapy Goal     OT, PT/OT Ongoing    Description: STG: (in 1 week)  Pt will perform grooming with setup  Pt will bathe with setup and min(A)  Pt will perform UE dressing with independence  Pt will perform LE dressing with with min(A) or CGA with AD  Pt will perform toileting with SBA  Pt will transfer bed/chair/bsc with RW and SBA  Pt will perform standing task x 3 min with RW and SBA   Pt will tolerate 15 minutes of tx without fatigue      LTG: (in 5 weeks)  1.Restore to max I with self care and mobility.                         DME Justifications:      Time Tracking:     OT Date of Treatment: 06/19/25  OT Start Time: 1024  OT Stop Time: 1054  OT Total Time (min): 30 min    Billable Minutes:Therapeutic Exercise 27    OT/CIPRIANO: OT          6/19/2025

## 2025-06-19 NOTE — NURSING
This Pt has refused routine pain meds and 1 hour later requesting morphine. Rates pain level 9 out of 10 every time pain meds are received.

## 2025-06-19 NOTE — DISCHARGE INSTRUCTIONS
Office: [phone]      Spine Discharge Instructions    - Follow up with Dr. Upton in 10-14 days. Please call for appointment (if not already scheduled).   - May shower upon discharge. Do not scrub, tub bathe, or submerge the incision.  - No lifting greater than 10 pounds.  - Ambulate multiple times each day   - You may bend and twist for usual daily activities  - You may sleep in any position that is comfortable  - Do not drive a motor vehicle while on narcotic pain medication.   - Utilize pain medication (narcotics) as prescribed  - Do not exceed 3g Tylenol in a 24 hour period.   - Use stool softeners while taking narcotics to prevent constipation   - Resume your usual diet

## 2025-06-19 NOTE — SUBJECTIVE & OBJECTIVE
Interval History: NAEO.    Review of Systems  Objective:     Vital Signs (Most Recent):  Temp: 99.9 °F (37.7 °C) (06/19/25 1154)  Pulse: 87 (06/19/25 1154)  Resp: 18 (06/19/25 1202)  BP: (!) 151/108 (06/19/25 1154)  SpO2: 98 % (06/19/25 1154) Vital Signs (24h Range):  Temp:  [97.9 °F (36.6 °C)-99.9 °F (37.7 °C)] 99.9 °F (37.7 °C)  Pulse:  [80-88] 87  Resp:  [17-18] 18  SpO2:  [89 %-98 %] 98 %  BP: (101-151)/() 151/108     Weight: 108.5 kg (239 lb 1.6 oz)  Body mass index is 37.45 kg/m².  No intake or output data in the 24 hours ending 06/19/25 1443        Physical Exam  Constitutional:       Appearance: Normal appearance.   HENT:      Head: Normocephalic and atraumatic.      Nose: Nose normal.   Eyes:      Extraocular Movements: Extraocular movements intact.      Pupils: Pupils are equal, round, and reactive to light.   Cardiovascular:      Rate and Rhythm: Normal rate and regular rhythm.   Pulmonary:      Effort: Pulmonary effort is normal.      Breath sounds: Normal breath sounds.   Abdominal:      General: Bowel sounds are normal.      Palpations: Abdomen is soft.   Musculoskeletal:      Comments: Dressing noted to lumbar spine - C/D/I.    Skin:     General: Skin is warm and dry.   Neurological:      General: No focal deficit present.      Mental Status: She is alert and oriented to person, place, and time.   Psychiatric:         Mood and Affect: Mood normal.         Behavior: Behavior normal.               Significant Labs: All pertinent labs within the past 24 hours have been reviewed.    Significant Imaging: I have reviewed all pertinent imaging results/findings within the past 24 hours.

## 2025-06-19 NOTE — PT/OT/SLP PROGRESS
Physical Therapy Treatment    Patient Name:  Carey Leonardo   MRN:  [id number]    Recommendations:     Discharge Recommendations: Low Intensity Therapy  Discharge Equipment Recommendations: none  Barriers to discharge: None    Assessment:     Carey Leonardo is a 49 y.o. female admitted with a medical diagnosis of Infection of lumbar spine.  She presents with the following impairments/functional limitations: impaired skin.    Pt participated well with PT, completed all activities without sig difficulties. Pt with possible discharge this date    PT POC discussed with Berkley Woodard DPT     Rehab Prognosis: Good; patient would benefit from acute skilled PT services to address these deficits and reach maximum level of function.    Recent Surgery: Procedure(s) (LRB):  INCISION AND DRAINAGE, ABSCESS, SPINE, LUMBOSACRAL, POSTERIOR (N/A) 7 Days Post-Op    Plan:     During this hospitalization, patient to be seen BID to address the identified rehab impairments via gait training, therapeutic activities, therapeutic exercises, neuromuscular re-education and progress toward the following goals:    Plan of Care Expires:  07/19/25    Subjective     Chief Complaint: Infection of lumbar spine   Patient/Family Comments/goals: pt agreeable  Pain/Comfort:         Objective:     Communicated with Bobbi Magallanes RN prior to session.  Patient found up in chair with PICC line upon PT entry to room.     General Precautions: Standard, fall  Orthopedic Precautions: spinal precautions  Braces:    Respiratory Status: Room air     Functional Mobility:  Transfers:     Sit to Stand:  stand by assistance and contact guard assistance with rolling walker  Gait: 32' with RW and contact guard to standby assist; slow debra, narrow TERRELL      AM-PAC 6 CLICK MOBILITY  Turning over in bed (including adjusting bedclothes, sheets and blankets)?: 4  Sitting down on and standing up from a chair with arms (e.g., wheelchair, bedside commode, etc.):  4  Moving from lying on back to sitting on the side of the bed?: 4  Moving to and from a bed to a chair (including a wheelchair)?: 4  Need to walk in hospital room?: 3  Climbing 3-5 steps with a railing?: 3  Basic Mobility Total Score: 22       Treatment & Education:  Pt performed 2 x 15 reps (B) LE exercises: ap, quad set, glut set, straight leg raise, hip ab/adduction, long arc quad, heel slide with active assist       Patient left up in chair with all lines intact and call button in reach..    GOALS:   Multidisciplinary Problems       Physical Therapy Goals          Problem: Physical Therapy    Goal Priority Disciplines Outcome Interventions   Physical Therapy Goal     PT, PT/OT Progressing    Description: Short term goals:  . Supine to sit with Contact Guard Assistance  . Sit to supine with Contact Guard Assistance  . Sit to stand transfer with Contact Guard Assistance  . Gait  x 100 feet with Contact Guard Assistance using Rolling Walker.     Long term goals:  Patient will gain highest level functional mobility with lowest level of assistive device to return to desired living arrangement and prior ADL's.                          DME Justifications:      Time Tracking:     PT Received On: 06/19/25  PT Start Time: 1056     PT Stop Time: 1121  PT Total Time (min): 25 min     Billable Minutes: Gait Training 8 and Therapeutic Exercise 15    Treatment Type: Treatment  PT/PTA: PTA     Number of PTA visits since last PT visit: 1 06/19/2025

## 2025-06-19 NOTE — PLAN OF CARE
Pt's insurance approved for IV antibiotic therapy at home. Pt will need to get am dose of IV antibiotics before leaving hospital. [facility] will deliver mediation to pt. Zoey will be notified pt's insurance gave authorization and will discharge home today

## 2025-06-20 LAB
BACTERIA BLD CULT: NORMAL
BACTERIA BLD CULT: NORMAL

## 2025-06-24 NOTE — PHYSICIAN QUERY
Please provide further clarification on the following debridement performed on lumbar spine:  Excisional debridement of other tissue type (please specify):  To the level of muscle and bone

## 2025-06-26 ENCOUNTER — HOSPITAL ENCOUNTER (INPATIENT)
Facility: HOSPITAL | Age: 50
LOS: 19 days | Discharge: HOME HEALTH | DRG: 981 | End: 2025-07-15
Attending: HOSPITALIST | Admitting: HOSPITALIST
Payer: OTHER GOVERNMENT

## 2025-06-26 DIAGNOSIS — J45.20 MILD INTERMITTENT ASTHMA WITHOUT COMPLICATION: ICD-10-CM

## 2025-06-26 DIAGNOSIS — G06.1 ABSCESS IN EPIDURAL SPACE OF LUMBAR SPINE: ICD-10-CM
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
## 2025-06-26 DIAGNOSIS — R00.0 TACHYCARDIA: ICD-10-CM

## 2025-06-26 DIAGNOSIS — I48.91 ATRIAL FIBRILLATION WITH RVR: ICD-10-CM

## 2025-06-26 DIAGNOSIS — N17.9 AKI (ACUTE KIDNEY INJURY): Primary | ICD-10-CM

## 2025-06-26 DIAGNOSIS — E87.6 HYPOKALEMIA: ICD-10-CM

## 2025-06-26 DIAGNOSIS — G47.33 OBSTRUCTIVE SLEEP APNEA SYNDROME: ICD-10-CM

## 2025-06-26 DIAGNOSIS — E03.9 ACQUIRED HYPOTHYROIDISM: ICD-10-CM

## 2025-06-26 DIAGNOSIS — A49.02 MRSA (METHICILLIN RESISTANT STAPHYLOCOCCUS AUREUS) INFECTION: ICD-10-CM

## 2025-06-26 DIAGNOSIS — S39.012A STRAIN OF LUMBAR REGION, INITIAL ENCOUNTER: ICD-10-CM

## 2025-06-26 DIAGNOSIS — E87.1 HYPONATREMIA: ICD-10-CM

## 2025-06-26 DIAGNOSIS — I48.91 A-FIB: ICD-10-CM

## 2025-06-26 DIAGNOSIS — Z13.6 SCREENING FOR CARDIOVASCULAR CONDITION: ICD-10-CM

## 2025-06-26 DIAGNOSIS — I10 ESSENTIAL (PRIMARY) HYPERTENSION: ICD-10-CM

## 2025-06-26 DIAGNOSIS — L30.8 DERMATITIS ASSOCIATED WITH MOISTURE: ICD-10-CM

## 2025-06-26 PROBLEM — D64.9 ANEMIA: Status: ACTIVE | Noted: 2025-06-26

## 2025-06-26 LAB
ALBUMIN SERPL BCP-MCNC: 2.7 G/DL (ref 3.5–5)
ALBUMIN/GLOB SERPL: 0.6 {RATIO}
ALP SERPL-CCNC: 94 U/L (ref 40–150)
ALT SERPL W P-5'-P-CCNC: 36 U/L
ANION GAP SERPL CALCULATED.3IONS-SCNC: 13 MMOL/L (ref 7–16)
AST SERPL W P-5'-P-CCNC: 83 U/L (ref 11–45)
BASOPHILS # BLD AUTO: 0.01 K/UL (ref 0–0.2)
BASOPHILS NFR BLD AUTO: 0.2 % (ref 0–1)
BILIRUB SERPL-MCNC: 0.4 MG/DL
BUN SERPL-MCNC: 26 MG/DL (ref 7–19)
BUN/CREAT SERPL: 5 (ref 6–20)
CALCIUM SERPL-MCNC: 7.9 MG/DL (ref 8.4–10.2)
CHLORIDE SERPL-SCNC: 101 MMOL/L (ref 98–107)
CK SERPL-CCNC: 269 U/L (ref 29–168)
CO2 SERPL-SCNC: 21 MMOL/L (ref 22–29)
CREAT SERPL-MCNC: 4.9 MG/DL (ref 0.55–1.02)
CRP SERPL HS-MCNC: 55.81 MG/L
DIFFERENTIAL METHOD BLD: ABNORMAL
EGFR (NO RACE VARIABLE) (RUSH/TITUS): 10 ML/MIN/1.73M2
EOSINOPHIL # BLD AUTO: 0.01 K/UL (ref 0–0.5)
EOSINOPHIL NFR BLD AUTO: 0.2 % (ref 1–4)
ERYTHROCYTE [DISTWIDTH] IN BLOOD BY AUTOMATED COUNT: 14.3 % (ref 11.5–14.5)
GLOBULIN SER-MCNC: 4.3 G/DL (ref 2–4)
GLUCOSE SERPL-MCNC: 110 MG/DL (ref 74–100)
HCT VFR BLD AUTO: 26.5 % (ref 38–47)
HGB BLD-MCNC: 7.9 G/DL (ref 12–16)
IMM GRANULOCYTES # BLD AUTO: 0.02 K/UL (ref 0–0.04)
IMM GRANULOCYTES NFR BLD: 0.5 % (ref 0–0.4)
LACTATE SERPL-SCNC: 1.2 MMOL/L (ref 0.5–2.2)
LYMPHOCYTES # BLD AUTO: 0.88 K/UL (ref 1–4.8)
LYMPHOCYTES NFR BLD AUTO: 21.5 % (ref 27–41)
MAGNESIUM SERPL-MCNC: 1.5 MG/DL (ref 1.6–2.6)
MCH RBC QN AUTO: 26.1 PG (ref 27–31)
MCHC RBC AUTO-ENTMCNC: 29.8 G/DL (ref 32–36)
MCV RBC AUTO: 87.5 FL (ref 80–96)
MONOCYTES # BLD AUTO: 0.2 K/UL (ref 0–0.8)
MONOCYTES NFR BLD AUTO: 4.9 % (ref 2–6)
MPC BLD CALC-MCNC: 9.2 FL (ref 9.4–12.4)
NEUTROPHILS # BLD AUTO: 2.98 K/UL (ref 1.8–7.7)
NEUTROPHILS NFR BLD AUTO: 72.7 % (ref 53–65)
NRBC # BLD AUTO: 0 X10E3/UL
NRBC, AUTO (.00): 0 %
PLATELET # BLD AUTO: 470 K/UL (ref 150–400)
POTASSIUM SERPL-SCNC: 3.1 MMOL/L (ref 3.5–5.1)
PROT SERPL-MCNC: 7 G/DL (ref 6.4–8.3)
RBC # BLD AUTO: 3.03 M/UL (ref 4.2–5.4)
SODIUM SERPL-SCNC: 132 MMOL/L (ref 136–145)
WBC # BLD AUTO: 4.1 K/UL (ref 4.5–11)

## 2025-06-26 PROCEDURE — 25000003 PHARM REV CODE 250: Performed by: HOSPITALIST

## 2025-06-26 PROCEDURE — 25000003 PHARM REV CODE 250: Performed by: NURSE PRACTITIONER

## 2025-06-26 PROCEDURE — 36415 COLL VENOUS BLD VENIPUNCTURE: CPT | Performed by: NURSE PRACTITIONER

## 2025-06-26 PROCEDURE — 87040 BLOOD CULTURE FOR BACTERIA: CPT | Performed by: NURSE PRACTITIONER

## 2025-06-26 PROCEDURE — 25000003 PHARM REV CODE 250

## 2025-06-26 PROCEDURE — 83735 ASSAY OF MAGNESIUM: CPT

## 2025-06-26 PROCEDURE — 85025 COMPLETE CBC W/AUTO DIFF WBC: CPT | Performed by: NURSE PRACTITIONER

## 2025-06-26 PROCEDURE — 80053 COMPREHEN METABOLIC PANEL: CPT | Performed by: NURSE PRACTITIONER

## 2025-06-26 PROCEDURE — 5A09357 ASSISTANCE WITH RESPIRATORY VENTILATION, LESS THAN 24 CONSECUTIVE HOURS, CONTINUOUS POSITIVE AIRWAY PRESSURE: ICD-10-PCS | Performed by: HOSPITALIST

## 2025-06-26 PROCEDURE — 83605 ASSAY OF LACTIC ACID: CPT | Performed by: NURSE PRACTITIONER

## 2025-06-26 PROCEDURE — 82550 ASSAY OF CK (CPK): CPT

## 2025-06-26 PROCEDURE — 25000003 PHARM REV CODE 250: Performed by: EMERGENCY MEDICINE

## 2025-06-26 PROCEDURE — 11000001 HC ACUTE MED/SURG PRIVATE ROOM

## 2025-06-26 PROCEDURE — 63600175 PHARM REV CODE 636 W HCPCS

## 2025-06-26 PROCEDURE — 63600175 PHARM REV CODE 636 W HCPCS: Performed by: HOSPITALIST

## 2025-06-26 PROCEDURE — 99285 EMERGENCY DEPT VISIT HI MDM: CPT | Mod: 25

## 2025-06-26 PROCEDURE — 86141 C-REACTIVE PROTEIN HS: CPT | Performed by: NURSE PRACTITIONER

## 2025-06-26 PROCEDURE — 93005 ELECTROCARDIOGRAM TRACING: CPT

## 2025-06-26 PROCEDURE — 99223 1ST HOSP IP/OBS HIGH 75: CPT | Mod: ,,, | Performed by: HOSPITALIST

## 2025-06-26 RX ORDER — GUAIFENESIN AND DEXTROMETHORPHAN HYDROBROMIDE 10; 100 MG/5ML; MG/5ML
10 SYRUP ORAL EVERY 6 HOURS PRN
Status: DISCONTINUED | OUTPATIENT
Start: 2025-06-26 | End: 2025-07-15 | Stop reason: HOSPADM

## 2025-06-26 RX ORDER — IBUPROFEN 200 MG
16 TABLET ORAL
Status: DISCONTINUED | OUTPATIENT
Start: 2025-06-26 | End: 2025-07-15 | Stop reason: HOSPADM

## 2025-06-26 RX ORDER — METOPROLOL SUCCINATE 100 MG/1
100 TABLET, EXTENDED RELEASE ORAL DAILY
Status: DISCONTINUED | OUTPATIENT
Start: 2025-06-27 | End: 2025-06-30

## 2025-06-26 RX ORDER — ACETAMINOPHEN 500 MG
1000 TABLET ORAL
Status: COMPLETED | OUTPATIENT
Start: 2025-06-26 | End: 2025-06-26

## 2025-06-26 RX ORDER — ALBUTEROL SULFATE 0.83 MG/ML
2.5 SOLUTION RESPIRATORY (INHALATION) EVERY 4 HOURS PRN
Status: DISCONTINUED | OUTPATIENT
Start: 2025-06-26 | End: 2025-07-15 | Stop reason: HOSPADM

## 2025-06-26 RX ORDER — ACETAMINOPHEN 500 MG
1000 TABLET ORAL 3 TIMES DAILY
Status: DISCONTINUED | OUTPATIENT
Start: 2025-06-26 | End: 2025-06-27

## 2025-06-26 RX ORDER — GABAPENTIN 300 MG/1
300 CAPSULE ORAL 3 TIMES DAILY
Status: DISCONTINUED | OUTPATIENT
Start: 2025-06-27 | End: 2025-06-30

## 2025-06-26 RX ORDER — INSULIN ASPART 100 [IU]/ML
5 INJECTION, SOLUTION INTRAVENOUS; SUBCUTANEOUS
Status: DISCONTINUED | OUTPATIENT
Start: 2025-06-27 | End: 2025-07-15 | Stop reason: HOSPADM

## 2025-06-26 RX ORDER — ALUMINUM HYDROXIDE, MAGNESIUM HYDROXIDE, AND SIMETHICONE 2400; 240; 2400 MG/30ML; MG/30ML; MG/30ML
30 SUSPENSION ORAL EVERY 6 HOURS PRN
Status: DISCONTINUED | OUTPATIENT
Start: 2025-06-26 | End: 2025-07-15 | Stop reason: HOSPADM

## 2025-06-26 RX ORDER — ONDANSETRON HYDROCHLORIDE 2 MG/ML
8 INJECTION, SOLUTION INTRAVENOUS EVERY 6 HOURS PRN
Status: DISCONTINUED | OUTPATIENT
Start: 2025-06-26 | End: 2025-07-15 | Stop reason: HOSPADM

## 2025-06-26 RX ORDER — IBUPROFEN 200 MG
24 TABLET ORAL
Status: DISCONTINUED | OUTPATIENT
Start: 2025-06-26 | End: 2025-07-15 | Stop reason: HOSPADM

## 2025-06-26 RX ORDER — NIFEDIPINE 60 MG/1
60 TABLET, EXTENDED RELEASE ORAL DAILY
Status: DISCONTINUED | OUTPATIENT
Start: 2025-06-27 | End: 2025-06-30

## 2025-06-26 RX ORDER — GABAPENTIN 300 MG/1
300 CAPSULE ORAL 3 TIMES DAILY
COMMUNITY
Start: 2025-06-02

## 2025-06-26 RX ORDER — TRAZODONE HYDROCHLORIDE 50 MG/1
50 TABLET ORAL NIGHTLY PRN
Status: DISCONTINUED | OUTPATIENT
Start: 2025-06-26 | End: 2025-06-29

## 2025-06-26 RX ORDER — CLONIDINE HYDROCHLORIDE 0.2 MG/1
0.2 TABLET ORAL 3 TIMES DAILY
Status: DISCONTINUED | OUTPATIENT
Start: 2025-06-27 | End: 2025-07-02

## 2025-06-26 RX ORDER — INSULIN ASPART 100 [IU]/ML
0-10 INJECTION, SOLUTION INTRAVENOUS; SUBCUTANEOUS
Status: DISCONTINUED | OUTPATIENT
Start: 2025-06-26 | End: 2025-07-15 | Stop reason: HOSPADM

## 2025-06-26 RX ORDER — MUPIROCIN 20 MG/G
OINTMENT TOPICAL 2 TIMES DAILY
Status: DISPENSED | OUTPATIENT
Start: 2025-06-26 | End: 2025-07-01

## 2025-06-26 RX ORDER — DOCUSATE SODIUM 100 MG/1
100 CAPSULE, LIQUID FILLED ORAL 2 TIMES DAILY PRN
Status: DISCONTINUED | OUTPATIENT
Start: 2025-06-26 | End: 2025-07-15 | Stop reason: HOSPADM

## 2025-06-26 RX ORDER — DIPHENHYDRAMINE HCL 25 MG
50 CAPSULE ORAL EVERY 6 HOURS PRN
Status: DISCONTINUED | OUTPATIENT
Start: 2025-06-26 | End: 2025-07-15 | Stop reason: HOSPADM

## 2025-06-26 RX ORDER — OXYCODONE AND ACETAMINOPHEN 10; 325 MG/1; MG/1
1 TABLET ORAL EVERY 6 HOURS PRN
COMMUNITY
Start: 2025-06-20

## 2025-06-26 RX ORDER — HYDRALAZINE HYDROCHLORIDE 100 MG/1
100 TABLET, FILM COATED ORAL 3 TIMES DAILY
Status: DISCONTINUED | OUTPATIENT
Start: 2025-06-27 | End: 2025-07-02

## 2025-06-26 RX ORDER — IBUPROFEN 600 MG/1
600 TABLET, FILM COATED ORAL
Status: COMPLETED | OUTPATIENT
Start: 2025-06-26 | End: 2025-06-26

## 2025-06-26 RX ORDER — TALC
6 POWDER (GRAM) TOPICAL NIGHTLY PRN
Status: DISCONTINUED | OUTPATIENT
Start: 2025-06-26 | End: 2025-07-15 | Stop reason: HOSPADM

## 2025-06-26 RX ORDER — CYCLOBENZAPRINE HCL 10 MG
10 TABLET ORAL EVERY 6 HOURS PRN
COMMUNITY
Start: 2025-06-20

## 2025-06-26 RX ORDER — ATORVASTATIN CALCIUM 20 MG/1
20 TABLET, FILM COATED ORAL NIGHTLY
Status: DISCONTINUED | OUTPATIENT
Start: 2025-06-26 | End: 2025-06-27

## 2025-06-26 RX ORDER — BISACODYL 5 MG
10 TABLET, DELAYED RELEASE (ENTERIC COATED) ORAL DAILY PRN
Status: DISCONTINUED | OUTPATIENT
Start: 2025-06-26 | End: 2025-07-15 | Stop reason: HOSPADM

## 2025-06-26 RX ORDER — LEVOTHYROXINE SODIUM 100 UG/1
200 TABLET ORAL
Status: DISCONTINUED | OUTPATIENT
Start: 2025-06-27 | End: 2025-07-15 | Stop reason: HOSPADM

## 2025-06-26 RX ORDER — SERTRALINE HYDROCHLORIDE 50 MG/1
100 TABLET, FILM COATED ORAL NIGHTLY
Status: DISCONTINUED | OUTPATIENT
Start: 2025-06-26 | End: 2025-06-29

## 2025-06-26 RX ORDER — HYDROCODONE BITARTRATE AND ACETAMINOPHEN 5; 325 MG/1; MG/1
1 TABLET ORAL
Refills: 0 | Status: COMPLETED | OUTPATIENT
Start: 2025-06-26 | End: 2025-06-26

## 2025-06-26 RX ORDER — CYCLOBENZAPRINE HCL 10 MG
10 TABLET ORAL 3 TIMES DAILY PRN
Status: DISCONTINUED | OUTPATIENT
Start: 2025-06-26 | End: 2025-07-15 | Stop reason: HOSPADM

## 2025-06-26 RX ORDER — GLUCAGON 1 MG
1 KIT INJECTION
Status: DISCONTINUED | OUTPATIENT
Start: 2025-06-26 | End: 2025-07-15 | Stop reason: HOSPADM

## 2025-06-26 RX ORDER — OXYCODONE HYDROCHLORIDE 5 MG/1
5 TABLET ORAL EVERY 4 HOURS PRN
Refills: 0 | Status: DISCONTINUED | OUTPATIENT
Start: 2025-06-26 | End: 2025-06-27

## 2025-06-26 RX ORDER — MAGNESIUM SULFATE HEPTAHYDRATE 40 MG/ML
2 INJECTION, SOLUTION INTRAVENOUS ONCE
Status: COMPLETED | OUTPATIENT
Start: 2025-06-27 | End: 2025-06-27

## 2025-06-26 RX ORDER — HEPARIN SODIUM 5000 [USP'U]/ML
5000 INJECTION, SOLUTION INTRAVENOUS; SUBCUTANEOUS EVERY 8 HOURS
Status: DISCONTINUED | OUTPATIENT
Start: 2025-06-26 | End: 2025-07-15 | Stop reason: HOSPADM

## 2025-06-26 RX ORDER — INSULIN GLARGINE 100 [IU]/ML
20 INJECTION, SOLUTION SUBCUTANEOUS NIGHTLY
Status: DISCONTINUED | OUTPATIENT
Start: 2025-06-26 | End: 2025-07-15 | Stop reason: HOSPADM

## 2025-06-26 RX ORDER — SODIUM CHLORIDE 9 MG/ML
INJECTION, SOLUTION INTRAVENOUS CONTINUOUS
Status: DISCONTINUED | OUTPATIENT
Start: 2025-06-26 | End: 2025-06-28

## 2025-06-26 RX ADMIN — MUPIROCIN: 20 OINTMENT TOPICAL at 11:06

## 2025-06-26 RX ADMIN — SERTRALINE HYDROCHLORIDE 100 MG: 50 TABLET ORAL at 11:06

## 2025-06-26 RX ADMIN — ATORVASTATIN CALCIUM 20 MG: 20 TABLET, FILM COATED ORAL at 11:06

## 2025-06-26 RX ADMIN — HEPARIN SODIUM 5000 UNITS: 5000 INJECTION, SOLUTION INTRAVENOUS; SUBCUTANEOUS at 11:06

## 2025-06-26 RX ADMIN — ACETAMINOPHEN 1000 MG: 500 TABLET ORAL at 04:06

## 2025-06-26 RX ADMIN — MAGNESIUM SULFATE HEPTAHYDRATE 2 G: 40 INJECTION, SOLUTION INTRAVENOUS at 11:06

## 2025-06-26 RX ADMIN — ACETAMINOPHEN 1000 MG: 500 TABLET ORAL at 08:06

## 2025-06-26 RX ADMIN — SODIUM CHLORIDE 1848 ML: 9 INJECTION, SOLUTION INTRAVENOUS at 07:06

## 2025-06-26 RX ADMIN — HYDROCODONE BITARTRATE AND ACETAMINOPHEN 1 TABLET: 5; 325 TABLET ORAL at 06:06

## 2025-06-26 RX ADMIN — POTASSIUM BICARBONATE 20 MEQ: 782 TABLET, EFFERVESCENT ORAL at 11:06

## 2025-06-26 RX ADMIN — INSULIN GLARGINE 20 UNITS: 100 INJECTION, SOLUTION SUBCUTANEOUS at 11:06

## 2025-06-26 RX ADMIN — IBUPROFEN 600 MG: 600 TABLET, FILM COATED ORAL at 06:06

## 2025-06-26 RX ADMIN — SODIUM CHLORIDE: 9 INJECTION, SOLUTION INTRAVENOUS at 09:06
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
## 2025-06-26 NOTE — DISCHARGE INSTRUCTIONS
NUTRITION  Patient requested kid/diabetic-friendly meal ideas.   Visit link for Kid Friendly, Diabetic-Friendly Recipes:  [email or web address]     Noted co-morbidities/PMH of HTN, HLD, DM2, Hypothyroidism, Asthma, and KILO; care management, treatment compliance, medication compliance, and diet reviewed.    Hospitalist Discharge orders  *Notify your healthcare provider if you experience any of the following: temperature >100.4  *Notify your healthcare provider if you experience any of the following: redness, tenderness, or any signs or symptoms of infection (pain, swelling, redness, odor or green/yellow drainage around incision site).  *Notify your healthcare provider if you experience any of the following: difficulty breathing or increased cough.  *Notify your physician if you experience any persistent nausea, vomiting, diarrhea or headache.  *Notify your physician if you experience any of the following: severe uncontrolled pain, worsening rash, increased confusion, weakness, dizziness, lightheadedness, or visual disturbances.

## 2025-06-27 PROBLEM — G06.1 ABSCESS IN EPIDURAL SPACE OF LUMBAR SPINE: Status: ACTIVE | Noted: 2025-06-27

## 2025-06-27 PROBLEM — A49.02 MRSA (METHICILLIN RESISTANT STAPHYLOCOCCUS AUREUS) INFECTION: Status: ACTIVE | Noted: 2025-06-27

## 2025-06-27 LAB
AMORPHOUS CRYSTALS, UA: ABNORMAL /HPF
ANION GAP SERPL CALCULATED.3IONS-SCNC: 18 MMOL/L (ref 7–16)
APTT PPP: 34.6 SECONDS (ref 25.2–37.3)
BASOPHILS # BLD AUTO: 0.01 K/UL (ref 0–0.2)
BASOPHILS NFR BLD AUTO: 0.2 % (ref 0–1)
BILIRUB UR QL STRIP: NEGATIVE
BUN SERPL-MCNC: 28 MG/DL (ref 7–19)
BUN/CREAT SERPL: 6 (ref 6–20)
CALCIUM SERPL-MCNC: 7.8 MG/DL (ref 8.4–10.2)
CHLORIDE SERPL-SCNC: 107 MMOL/L (ref 98–107)
CLARITY UR: ABNORMAL
CO2 SERPL-SCNC: 15 MMOL/L (ref 22–29)
COLOR UR: ABNORMAL
CREAT SERPL-MCNC: 4.53 MG/DL (ref 0.55–1.02)
CRP SERPL HS-MCNC: 58.83 MG/L
DIFFERENTIAL METHOD BLD: ABNORMAL
EGFR (NO RACE VARIABLE) (RUSH/TITUS): 11 ML/MIN/1.73M2
EOSINOPHIL # BLD AUTO: 0.02 K/UL (ref 0–0.5)
EOSINOPHIL NFR BLD AUTO: 0.3 % (ref 1–4)
ERYTHROCYTE [DISTWIDTH] IN BLOOD BY AUTOMATED COUNT: 14.3 % (ref 11.5–14.5)
GLUCOSE SERPL-MCNC: 126 MG/DL (ref 70–105)
GLUCOSE SERPL-MCNC: 52 MG/DL (ref 74–100)
GLUCOSE SERPL-MCNC: 74 MG/DL (ref 70–105)
GLUCOSE SERPL-MCNC: 82 MG/DL (ref 70–105)
GLUCOSE SERPL-MCNC: 94 MG/DL (ref 70–105)
GLUCOSE SERPL-MCNC: 98 MG/DL (ref 70–105)
GLUCOSE UR STRIP-MCNC: NORMAL MG/DL
HCT VFR BLD AUTO: 31.1 % (ref 38–47)
HGB BLD-MCNC: 8.7 G/DL (ref 12–16)
IMM GRANULOCYTES # BLD AUTO: 0.02 K/UL (ref 0–0.04)
IMM GRANULOCYTES NFR BLD: 0.3 % (ref 0–0.4)
INR BLD: 1.1
KETONES UR STRIP-SCNC: NEGATIVE MG/DL
LEUKOCYTE ESTERASE UR QL STRIP: NEGATIVE
LYMPHOCYTES # BLD AUTO: 1.43 K/UL (ref 1–4.8)
LYMPHOCYTES NFR BLD AUTO: 24 % (ref 27–41)
MAGNESIUM SERPL-MCNC: 2.5 MG/DL (ref 1.6–2.6)
MCH RBC QN AUTO: 26 PG (ref 27–31)
MCHC RBC AUTO-ENTMCNC: 28 G/DL (ref 32–36)
MCV RBC AUTO: 93.1 FL (ref 80–96)
MONOCYTES # BLD AUTO: 0.28 K/UL (ref 0–0.8)
MONOCYTES NFR BLD AUTO: 4.7 % (ref 2–6)
MPC BLD CALC-MCNC: 9.2 FL (ref 9.4–12.4)
MUCOUS, UA: ABNORMAL /LPF
NEUTROPHILS # BLD AUTO: 4.21 K/UL (ref 1.8–7.7)
NEUTROPHILS NFR BLD AUTO: 70.5 % (ref 53–65)
NITRITE UR QL STRIP: NEGATIVE
NRBC # BLD AUTO: 0 X10E3/UL
NRBC, AUTO (.00): 0 %
PH UR STRIP: 5.5 PH UNITS
PHOSPHATE SERPL-MCNC: 4.8 MG/DL (ref 2.3–4.7)
PLATELET # BLD AUTO: 444 K/UL (ref 150–400)
POTASSIUM SERPL-SCNC: 3.6 MMOL/L (ref 3.5–5.1)
PROT UR QL STRIP: 30
PROTHROMBIN TIME: 14.3 SECONDS (ref 11.7–14.7)
RBC # BLD AUTO: 3.34 M/UL (ref 4.2–5.4)
RBC # UR STRIP: NEGATIVE /UL
SODIUM SERPL-SCNC: 136 MMOL/L (ref 136–145)
SP GR UR STRIP: 1.01
SQUAMOUS #/AREA URNS LPF: ABNORMAL /HPF
UROBILINOGEN UR STRIP-ACNC: NORMAL MG/DL
WBC # BLD AUTO: 5.97 K/UL (ref 4.5–11)
WBC #/AREA URNS HPF: <1 /HPF

## 2025-06-27 PROCEDURE — 80048 BASIC METABOLIC PNL TOTAL CA: CPT | Performed by: HOSPITALIST

## 2025-06-27 PROCEDURE — 85025 COMPLETE CBC W/AUTO DIFF WBC: CPT | Performed by: HOSPITALIST

## 2025-06-27 PROCEDURE — 36415 COLL VENOUS BLD VENIPUNCTURE: CPT | Performed by: HOSPITALIST

## 2025-06-27 PROCEDURE — 85610 PROTHROMBIN TIME: CPT | Performed by: HOSPITALIST

## 2025-06-27 PROCEDURE — 99900035 HC TECH TIME PER 15 MIN (STAT)

## 2025-06-27 PROCEDURE — 84100 ASSAY OF PHOSPHORUS: CPT | Performed by: HOSPITALIST

## 2025-06-27 PROCEDURE — 81003 URINALYSIS AUTO W/O SCOPE: CPT | Performed by: STUDENT IN AN ORGANIZED HEALTH CARE EDUCATION/TRAINING PROGRAM

## 2025-06-27 PROCEDURE — 25000003 PHARM REV CODE 250: Performed by: STUDENT IN AN ORGANIZED HEALTH CARE EDUCATION/TRAINING PROGRAM

## 2025-06-27 PROCEDURE — 27000221 HC OXYGEN, UP TO 24 HOURS

## 2025-06-27 PROCEDURE — 96372 THER/PROPH/DIAG INJ SC/IM: CPT

## 2025-06-27 PROCEDURE — 25000003 PHARM REV CODE 250: Performed by: HOSPITALIST

## 2025-06-27 PROCEDURE — 63600175 PHARM REV CODE 636 W HCPCS: Performed by: HOSPITALIST

## 2025-06-27 PROCEDURE — 63600175 PHARM REV CODE 636 W HCPCS: Performed by: STUDENT IN AN ORGANIZED HEALTH CARE EDUCATION/TRAINING PROGRAM

## 2025-06-27 PROCEDURE — 86141 C-REACTIVE PROTEIN HS: CPT | Performed by: ORTHOPAEDIC SURGERY

## 2025-06-27 PROCEDURE — 63600175 PHARM REV CODE 636 W HCPCS

## 2025-06-27 PROCEDURE — 25000003 PHARM REV CODE 250

## 2025-06-27 PROCEDURE — 83735 ASSAY OF MAGNESIUM: CPT | Performed by: HOSPITALIST

## 2025-06-27 PROCEDURE — 27000207 HC ISOLATION

## 2025-06-27 PROCEDURE — 99233 SBSQ HOSP IP/OBS HIGH 50: CPT | Mod: ,,, | Performed by: STUDENT IN AN ORGANIZED HEALTH CARE EDUCATION/TRAINING PROGRAM

## 2025-06-27 PROCEDURE — 82962 GLUCOSE BLOOD TEST: CPT

## 2025-06-27 PROCEDURE — 85730 THROMBOPLASTIN TIME PARTIAL: CPT | Performed by: HOSPITALIST

## 2025-06-27 PROCEDURE — 11000001 HC ACUTE MED/SURG PRIVATE ROOM

## 2025-06-27 RX ORDER — ACETAMINOPHEN 325 MG/1
650 TABLET ORAL EVERY 8 HOURS PRN
Status: DISCONTINUED | OUTPATIENT
Start: 2025-06-27 | End: 2025-07-01

## 2025-06-27 RX ORDER — PROMETHAZINE HYDROCHLORIDE 25 MG/1
25 TABLET ORAL EVERY 6 HOURS PRN
Status: DISCONTINUED | OUTPATIENT
Start: 2025-06-27 | End: 2025-06-29

## 2025-06-27 RX ORDER — MORPHINE SULFATE 4 MG/ML
4 INJECTION, SOLUTION INTRAMUSCULAR; INTRAVENOUS EVERY 4 HOURS PRN
Refills: 0 | Status: DISCONTINUED | OUTPATIENT
Start: 2025-06-27 | End: 2025-06-29

## 2025-06-27 RX ORDER — OXYCODONE HYDROCHLORIDE 5 MG/1
10 TABLET ORAL EVERY 4 HOURS PRN
Refills: 0 | Status: DISCONTINUED | OUTPATIENT
Start: 2025-06-27 | End: 2025-06-29

## 2025-06-27 RX ORDER — CEFEPIME HYDROCHLORIDE 1 G/1
1 INJECTION, POWDER, FOR SOLUTION INTRAMUSCULAR; INTRAVENOUS
Status: DISCONTINUED | OUTPATIENT
Start: 2025-06-27 | End: 2025-06-29

## 2025-06-27 RX ORDER — MORPHINE SULFATE 4 MG/ML
4 INJECTION, SOLUTION INTRAMUSCULAR; INTRAVENOUS ONCE
Status: COMPLETED | OUTPATIENT
Start: 2025-06-27 | End: 2025-06-27

## 2025-06-27 RX ADMIN — CLONIDINE HYDROCHLORIDE 0.2 MG: 0.2 TABLET ORAL at 03:06

## 2025-06-27 RX ADMIN — HEPARIN SODIUM 5000 UNITS: 5000 INJECTION, SOLUTION INTRAVENOUS; SUBCUTANEOUS at 05:06

## 2025-06-27 RX ADMIN — GABAPENTIN 300 MG: 300 CAPSULE ORAL at 08:06

## 2025-06-27 RX ADMIN — CLONIDINE HYDROCHLORIDE 0.2 MG: 0.2 TABLET ORAL at 08:06

## 2025-06-27 RX ADMIN — OXYCODONE HYDROCHLORIDE 5 MG: 5 TABLET ORAL at 08:06

## 2025-06-27 RX ADMIN — OXYCODONE HYDROCHLORIDE 5 MG: 5 TABLET ORAL at 04:06

## 2025-06-27 RX ADMIN — HEPARIN SODIUM 5000 UNITS: 5000 INJECTION, SOLUTION INTRAVENOUS; SUBCUTANEOUS at 03:06

## 2025-06-27 RX ADMIN — DAPTOMYCIN 760 MG: 500 INJECTION, POWDER, LYOPHILIZED, FOR SOLUTION INTRAVENOUS at 12:06

## 2025-06-27 RX ADMIN — MORPHINE SULFATE 4 MG: 4 INJECTION INTRAVENOUS at 08:06

## 2025-06-27 RX ADMIN — METOPROLOL SUCCINATE 100 MG: 100 TABLET, EXTENDED RELEASE ORAL at 08:06

## 2025-06-27 RX ADMIN — HYDRALAZINE HYDROCHLORIDE 100 MG: 100 TABLET ORAL at 08:06

## 2025-06-27 RX ADMIN — HEPARIN SODIUM 5000 UNITS: 5000 INJECTION, SOLUTION INTRAVENOUS; SUBCUTANEOUS at 08:06

## 2025-06-27 RX ADMIN — SODIUM CHLORIDE: 9 INJECTION, SOLUTION INTRAVENOUS at 10:06

## 2025-06-27 RX ADMIN — HYDRALAZINE HYDROCHLORIDE 100 MG: 100 TABLET ORAL at 03:06

## 2025-06-27 RX ADMIN — CEFEPIME 1 G: 1 INJECTION, POWDER, FOR SOLUTION INTRAMUSCULAR; INTRAVENOUS at 03:06

## 2025-06-27 RX ADMIN — ACETAMINOPHEN 1000 MG: 500 TABLET ORAL at 08:06

## 2025-06-27 RX ADMIN — MUPIROCIN: 20 OINTMENT TOPICAL at 08:06

## 2025-06-27 RX ADMIN — NIFEDIPINE 60 MG: 60 TABLET, FILM COATED, EXTENDED RELEASE ORAL at 08:06

## 2025-06-27 RX ADMIN — CYCLOBENZAPRINE 10 MG: 10 TABLET, FILM COATED ORAL at 04:06

## 2025-06-27 RX ADMIN — SODIUM CHLORIDE: 9 INJECTION, SOLUTION INTRAVENOUS at 08:06

## 2025-06-27 RX ADMIN — ONDANSETRON 8 MG: 2 INJECTION INTRAMUSCULAR; INTRAVENOUS at 04:06

## 2025-06-27 RX ADMIN — ACETAMINOPHEN 650 MG: 325 TABLET ORAL at 06:06

## 2025-06-27 RX ADMIN — GABAPENTIN 300 MG: 300 CAPSULE ORAL at 03:06

## 2025-06-27 RX ADMIN — LEVOTHYROXINE SODIUM 200 MCG: 100 TABLET ORAL at 05:06

## 2025-06-27 RX ADMIN — SERTRALINE HYDROCHLORIDE 100 MG: 50 TABLET ORAL at 08:06

## 2025-06-27 NOTE — ASSESSMENT & PLAN NOTE
Patient seen by Dr. Upton for abscess on lumber spine 2 weeks ago.  Culture positive for MRSA  Patient was started on Vancomycin  and discharged  Patient subsequently developed MATT  Patient remains febrile   Vancomycin D/c due to MATT  Initiating Daptomycin with pharmacy renally dosing  Pain control managed with Tylenol 1000 mg and 5mg Oxycodone    Persistently febrile in the setting of postoperative infection with the implantation of lumbar hardware.  MRSA.  Consult ortho spine, consult ID

## 2025-06-27 NOTE — NURSING
Patient has a temp of 103 oral. Prn tylenol given. Blankets removed from patient and room temp cooled. Ice packs placed under patients arms. On call hospitalist made aware
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
## 2025-06-27 NOTE — ASSESSMENT & PLAN NOTE
Postoperative infection after the implantation spinal hardware.  Persistently febrile.  CRP ESR noted.  Consult ortho spine, consult ID.  Daptomycin

## 2025-06-27 NOTE — ASSESSMENT & PLAN NOTE
MATT is likely due to pre-renal azotemia due to dehydration and antibiotic usage. Baseline creatinine is .65. Most recent creatinine and eGFR are listed below.  Recent Labs     06/26/25  1906   CREATININE 4.90*   EGFRNORACEVR 10*     Given 1,848 ml bolus of normal saline in the ED     Plan  - MATT is worsening. Will adjust treatment as follows: continuous infusion of .9 Normal Saline  - Avoid nephrotoxins and renally dose meds for GFR listed above  - Monitor urine output, serial BMP, and adjust therapy as needed
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
## 2025-06-27 NOTE — PROGRESS NOTES
Pharmacist Renal Dose Adjustment Note    Carey Leonardo is a 49 y.o. female being treated with the medication daptomycin.    Patient Data:    Vital Signs (Most Recent):  Temp: 98.1 °F (36.7 °C) (06/26/25 2132)  Pulse: 93 (06/26/25 2132)  Resp: 16 (06/26/25 1959)  BP: 130/69 (06/26/25 2132)  SpO2: 95 % (06/26/25 2132) Vital Signs (72h Range):  Temp:  [98.1 °F (36.7 °C)-103.1 °F (39.5 °C)]   Pulse:  []   Resp:  [16-18]   BP: (110-130)/(69-87)   SpO2:  [91 %-95 %]      Recent Labs   Lab 06/23/25  0945 06/26/25 1906   CREATININE 0.86 4.90*     Serum creatinine: 4.9 mg/dL (H) 06/26/25 1906  Estimated creatinine clearance: 16.8 mL/min (A)    Frequency of dosing changed to q48h based on [facility] protocol.  Also, dosing changed to adjusted weight for BMI >30 per [facility] protocol.    Pharmacist's Name: Aleisha Bell PharmD  Pharmacist's Extension: [phone]

## 2025-06-27 NOTE — PLAN OF CARE
CM placed call to admissions and spoke with louise to check if they have any insurance on file.  Louise states that there is no insurance listed for patient. CM following.
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
## 2025-06-27 NOTE — SUBJECTIVE & OBJECTIVE
Past Medical History:   Diagnosis Date    Acquired hypothyroidism     Chronic serous otitis media of both ears 04/04/2023    Depressive disorder     Diabetes mellitus type 2 in obese     Essential (primary) hypertension     Gastroparesis     IBS (irritable bowel syndrome)     Kidney stones     Mild intermittent asthma without complication 05/20/2025    Mixed hyperlipidemia     Obstructive sleep apnea syndrome 05/20/2025    Postlaminectomy syndrome, lumbar region 04/28/2022    [facility]       Past Surgical History:   Procedure Laterality Date    Bilateral L3-S1 MBB Bilateral 6-, 5-, 1-8-2014    Dr Jessica Randolph    CARPAL TUNNEL RELEASE      CHOLECYSTECTOMY      DIAGNOSTIC LAPAROSCOPY  04/14/2010    Exploratory Laparotomy, Myomectomy, Hydrotubation-Dr. Feng    DILATION AND CURETTAGE OF UTERUS  04/14/2010    Hysteroscopy-Dr. Feng    EPIDURAL STEROID INJECTION N/A 10/22/2024    Procedure: Injection, Steroid, Epidural, L4/5;  Surgeon: Rasheeda Bills MD;  Location: [facility];  Service: Pain Management;  Laterality: N/A;    EXPLORATORY LAPAROTOMY WITH UTERINE MYOMECTOMY  02/27/2007    Dr. Marquise Anderson    FUSION, SPINE, LATERAL APPROACH N/A 5/20/2025    Procedure: ARTHRODESIS, SPINE, LATERAL;  Surgeon: Cyril Upton MD;  Location: [facility];  Service: Orthopedics;  Laterality: N/A;  L2-L4 LATERAL FUSION    HYSTERECTOMY  09/29/2011    Robotic, FABIENNE, Cystoscopy-Dr. Feng    HYSTEROSCOPY WITH DILATION AND CURETTAGE OF UTERUS  04/04/2006    Laparoscopy, Chromotubation-Dr. Marquise Anderson    INCISION AND DRAINAGE, ABSCESS, SPINE, LUMBOSACRAL, POSTERIOR N/A 6/12/2025    Procedure: INCISION AND DRAINAGE, ABSCESS, SPINE, LUMBOSACRAL, POSTERIOR;  Surgeon: Cyril Upton MD;  Location: [facility] OR;  Service: Neurosurgery;  Laterality: N/A;    INJECTION OF ANESTHETIC AGENT AROUND ILIOINGUINAL NERVE Right 6/22/2023    Procedure: BLOCK, NERVE, ILIOINGUINAL;  Surgeon: Rasheeda LANGLEY  MD Sanju;  Location: [facility];  Service: Pain Management;  Laterality: Right;    INJECTION OF ANESTHETIC AGENT AROUND MEDIAL BRANCH NERVES INNERVATING LUMBAR FACET JOINT Bilateral 9/21/2023    Procedure: BLOCK, NERVE, FACET JOINT, LUMBAR, MEDIAL BRANCH;  Surgeon: Rasheeda Bills MD;  Location: [facility];  Service: Pain Management;  Laterality: Bilateral;    INJECTION OF ANESTHETIC AGENT AROUND MEDIAL BRANCH NERVES INNERVATING LUMBAR FACET JOINT Bilateral 3/14/2024    Procedure: BLOCK, NERVE, FACET JOINT, LUMBAR, MEDIAL BRANCH, BILATERAL L4-S1 MBB;  Surgeon: Rasheeda Bills MD;  Location: [facility];  Service: Pain Management;  Laterality: Bilateral;    LAMINECTOMY N/A 11/30/2021    Procedure: L2-L5 Laminectomy;  Surgeon: Cyril Upton MD;  Location: [facility];  Service: Neurosurgery;  Laterality: N/A;    LEFT HEART CATHETERIZATION      Left L3-S1 RFTC Left 07/18/2017    Dr Lopez    Left SI JI Left 09/30/2013    Dr Randolph    MYRINGOTOMY WITH INSERTION OF VENTILATION TUBE Bilateral 4/4/2023    Procedure: MYRINGOTOMY, WITH TYMPANOSTOMY TUBE INSERTION (T-TUBES);  Surgeon: Sea Hinton MD;  Location: [facility];  Service: ENT;  Laterality: Bilateral;  T-TUBES    MYRINGOTOMY WITH INSERTION OF VENTILATION TUBE Bilateral 9/12/2023    Procedure: MYRINGOTOMY, WITH TYMPANOSTOMY TUBE INSERTION, T-TUBES;  Surgeon: Sea Hinton MD;  Location: [facility];  Service: ENT;  Laterality: Bilateral;    MYRINGOTOMY WITH INSERTION OF VENTILATION TUBE Bilateral 7/2/2024    Procedure: MYRINGOTOMY, WITH TYMPANOSTOMY TUBE INSERTION;  Surgeon: Sea Hinton MD;  Location: [facility];  Service: ENT;  Laterality: Bilateral;  Bilateral T-Tubes    OOPHORECTOMY  11/11/2014    FABIENNE Barajas, Cystoscopy-Dr. Feng    SINUS SURGERY      TRANSFORAMINAL LUMBAR INTERBODY FUSION N/A 5/21/2025    Procedure: ARTHRODESIS, SPINE, LUMBAR, TLIF;  Surgeon: Cyril Upton MD;  Location:  [facility] OR;  Service: Orthopedics;  Laterality: N/A;  L2-L5 POSTERIOR FUSION, L4-L5 TLIF       Review of patient's allergies indicates:   Allergen Reactions    Fish containing products Anaphylaxis    Iodinated contrast media     Penicillins        No current facility-administered medications on file prior to encounter.     Current Outpatient Medications on File Prior to Encounter   Medication Sig    albuterol (VENTOLIN HFA) 90 mcg/actuation inhaler Inhale 2 puffs into the lungs every 6 (six) hours as needed for Wheezing. Rescue    atorvastatin (LIPITOR) 20 MG tablet TAKE 1 TABLET(20 MG) BY MOUTH EVERY EVENING    cloNIDine (CATAPRES) 0.2 MG tablet Take 1 tablet (0.2 mg total) by mouth 4 (four) times daily.    cyclobenzaprine (FLEXERIL) 10 MG tablet Take 10 mg by mouth every 6 (six) hours as needed.    empagliflozin (JARDIANCE) 10 mg tablet Take 1 tablet (10 mg total) by mouth once daily.    gabapentin (NEURONTIN) 300 MG capsule Take 300 mg by mouth 3 (three) times daily.    glipiZIDE 5 MG TR24 Take 2 tablets (10 mg total) by mouth daily with breakfast.    hydrALAZINE (APRESOLINE) 100 MG tablet Take 1 tablet (100 mg total) by mouth 3 (three) times daily.    insulin glargine U-100, Lantus, (LANTUS SOLOSTAR U-100 INSULIN) 100 unit/mL (3 mL) InPn pen Inject 20 Units into the skin every evening.    levothyroxine (SYNTHROID) 200 MCG tablet Take 1 tablet (200 mcg total) by mouth before breakfast.    metoprolol succinate (TOPROL-XL) 100 MG 24 hr tablet Take 1 tablet (100 mg total) by mouth once daily.    NIFEdipine (PROCARDIA-XL) 60 MG (OSM) 24 hr tablet Take 1 tablet (60 mg total) by mouth once daily.    oxyCODONE-acetaminophen (PERCOCET)  mg per tablet Take 1 tablet by mouth every 6 (six) hours as needed.    sertraline (ZOLOFT) 100 MG tablet Take 1 tablet (100 mg total) by mouth every evening.    traMADoL (ULTRAM) 50 mg tablet Take 1 tablet (50 mg total) by mouth every 8 (eight) hours as needed for Pain.    0.9%  "NaCl SolP 500 mL with vancomycin 1,000 mg SolR 2,000 mg Inject 2,000 mg into the vein every 12 (twelve) hours.    blood sugar diagnostic Strp To check BG 4 times daily, to use with insurance preferred meter    blood-glucose meter kit To check BG 4 times daily, to use with insurance preferred meter    blood-glucose sensor (FREESTYLE DENG 3 SENSOR) Suad Use as directed    blood-glucose,,cont (FREESTYLE DENG 3 READER) Misc Use with your sensor.    lancets Misc To check BG 4 times daily, to use with insurance preferred meter    pen needle, diabetic 32 gauge x 5/32" Ndle 1 Application by Misc.(Non-Drug; Combo Route) route once daily.    [DISCONTINUED] ipratropium (ATROVENT) 21 mcg (0.03 %) nasal spray 2 sprays by Each Nostril route 2 (two) times daily.    [DISCONTINUED] lurasidone (LATUDA) 20 mg Tab tablet  (Patient not taking: Reported on 6/3/2025)    [DISCONTINUED] ondansetron (ZOFRAN-ODT) 4 MG TbDL Take 1 tablet (4 mg total) by mouth 2 (two) times daily.    [DISCONTINUED] oxyCODONE-acetaminophen (LYNOX)  mg per tablet Take 1 tablet by mouth every 4 (four) hours as needed for Pain.     Family History       Problem Relation (Age of Onset)    Diabetes Mellitus Mother    Heart disease Mother, Sister    Hypertension Mother    Migraines Mother          Tobacco Use    Smoking status: Never     Passive exposure: Never    Smokeless tobacco: Never   Substance and Sexual Activity    Alcohol use: Not Currently    Drug use: Never    Sexual activity: Not Currently     Review of Systems   Constitutional:  Positive for fever. Negative for chills and unexpected weight change.   HENT:  Negative for congestion, drooling, ear pain and sore throat.    Eyes:  Negative for visual disturbance.   Respiratory:  Negative for shortness of breath.    Cardiovascular:  Negative for chest pain, palpitations and leg swelling.   Gastrointestinal:  Positive for constipation and nausea. Negative for abdominal pain and vomiting. "   Genitourinary:  Positive for frequency. Negative for difficulty urinating, dysuria and urgency.   Musculoskeletal:  Positive for arthralgias and back pain.   Skin:  Negative for rash.   Neurological:  Positive for weakness. Negative for dizziness, syncope and headaches.   Psychiatric/Behavioral:  Negative for sleep disturbance.      Objective:     Vital Signs (Most Recent):  Temp: 98.1 °F (36.7 °C) (06/26/25 2132)  Pulse: 93 (06/26/25 2132)  Resp: 16 (06/26/25 1959)  BP: 130/69 (06/26/25 2132)  SpO2: 95 % (06/26/25 2132) Vital Signs (24h Range):  Temp:  [98.1 °F (36.7 °C)-103.1 °F (39.5 °C)] 98.1 °F (36.7 °C)  Pulse:  [] 93  Resp:  [16-18] 16  SpO2:  [91 %-95 %] 95 %  BP: (110-130)/(69-87) 130/69     Weight: 97.5 kg (215 lb)  Body mass index is 33.67 kg/m².     Physical Exam  Constitutional:       Appearance: Normal appearance. She is obese.   HENT:      Head: Normocephalic and atraumatic.      Right Ear: External ear normal.      Left Ear: External ear normal.      Nose: Nose normal.      Mouth/Throat:      Mouth: Mucous membranes are dry.   Eyes:      Extraocular Movements: Extraocular movements intact and EOM normal.      Conjunctiva/sclera: Conjunctivae normal.      Pupils: Pupils are equal, round, and reactive to light.   Cardiovascular:      Rate and Rhythm: Normal rate and regular rhythm.      Pulses: Normal pulses.      Heart sounds: Normal heart sounds.   Pulmonary:      Effort: Pulmonary effort is normal.      Breath sounds: Normal breath sounds.   Abdominal:      General: Bowel sounds are normal.      Palpations: Abdomen is soft.      Tenderness: There is no abdominal tenderness.   Musculoskeletal:         General: Normal range of motion.      Cervical back: Normal range of motion and neck supple.   Skin:     General: Skin is warm and dry.      Capillary Refill: Capillary refill takes less than 2 seconds.             Comments: Healed back scar   Neurological:      General: No focal deficit  "present.      Mental Status: She is alert and oriented to person, place, and time.   Psychiatric:         Mood and Affect: Mood normal.         Behavior: Behavior normal.         Thought Content: Thought content normal.         Judgment: Judgment normal.              CRANIAL NERVES     CN I  cranial nerve I not tested    CN II   Visual acuity: normal  Right visual field deficit: none  Left visual field deficit: none     CN III, IV, VI   Pupils are equal, round, and reactive to light.  Extraocular motions are normal.   CN III: no CN III palsy  CN VI: no CN VI palsy    CN V   Facial sensation intact.   Right facial sensation deficit: none  Left facial sensation deficit: none    CN VII   Facial expression full, symmetric.     CN VIII   CN VIII normal.   Hearing: intact    CN XI   CN XI normal.   Right sternocleidomastoid strength: normal  Left sternocleidomastoid strength: normal       Significant Labs: All pertinent labs within the past 24 hours have been reviewed.  Blood Culture: No results for input(s): "LABBLOO" in the last 48 hours.  CBC:   Recent Labs   Lab 06/26/25  1906   WBC 4.10*   HGB 7.9*   HCT 26.5*   *     CMP:   Recent Labs   Lab 06/26/25  1906   *   K 3.1*      CO2 21*   *   BUN 26*   CREATININE 4.90*   CALCIUM 7.9*   PROT 7.0   ALBUMIN 2.7*   BILITOT 0.4   ALKPHOS 94   AST 83*   ALT 36   ANIONGAP 13     Magnesium:   Recent Labs   Lab 06/26/25  1906   MG 1.5*       Significant Imaging: I have reviewed all pertinent imaging results/findings within the past 24 hours.  "

## 2025-06-27 NOTE — ASSESSMENT & PLAN NOTE
Anemia is likely due to infection . Most recent hemoglobin and hematocrit are listed below.  Recent Labs     06/26/25  1906 06/27/25  0504   HGB 7.9* 8.7*   HCT 26.5* 31.1*     Plan  - Monitor serial CBC: Daily  - Transfuse PRBC if patient becomes hemodynamically unstable, symptomatic or H/H drops below 7/21.  - Patient has not received any PRBC transfusions to date  - Patient's anemia is currently stable  - Monitor CBC if H/H continue to drop consider transfusion.  Follow

## 2025-06-27 NOTE — ASSESSMENT & PLAN NOTE
Patient seen by Dr. Upton for abscess on lumber spine 2 weeks ago.  Culture positive for MRSA  Patient was started on Vancomycin  and discharged  Patient subsequently developed MATT  Patient remains febrile   Vancomycin D/c due to MATT  Initiating Daptomycin with pharmacy renally dosing  Pain control managed with Tylenol 1000 mg and 5mg Oxycodone

## 2025-06-27 NOTE — PROGRESS NOTES
[facility]    Patient Name:  Carey Leonardo   MRN:  [id number]  Date: 6/27/2025  Diagnosis: MATT (acute kidney injury)    History:     Past Medical History:   Diagnosis Date    Acquired hypothyroidism     Chronic serous otitis media of both ears 04/04/2023    Depressive disorder     Diabetes mellitus type 2 in obese     Essential (primary) hypertension     Gastroparesis     IBS (irritable bowel syndrome)     Kidney stones     Mild intermittent asthma without complication 05/20/2025    Mixed hyperlipidemia     Obstructive sleep apnea syndrome 05/20/2025    Postlaminectomy syndrome, lumbar region 04/28/2022    [facility]       Social History[1]    Precautions:     Allergies as of 06/26/2025 - Reviewed 06/26/2025   Allergen Reaction Noted    Fish containing products Anaphylaxis 09/28/2021    Iodinated contrast media  09/28/2021    Penicillins  04/09/2021       [facility] Assessment Details/Treatment   Narrative: Seen patient for initial preventative skin care measures.  Patient ambulates.  No foam borders, redness, or open wounds noted to bilateral heels and sacral.  Consult wound care of any findings.  06/27/2025        [1]   Social History  Socioeconomic History    Marital status:    Tobacco Use    Smoking status: Never     Passive exposure: Never    Smokeless tobacco: Never   Substance and Sexual Activity    Alcohol use: Not Currently    Drug use: Never    Sexual activity: Not Currently     Social Drivers of Health     Financial Resource Strain: Low Risk  (6/26/2025)    Overall Financial Resource Strain (CARDIA)     Difficulty of Paying Living Expenses: Not hard at all   Food Insecurity: No Food Insecurity (6/26/2025)    Hunger Vital Sign     Worried About Running Out of Food in the Last Year: Never true     Ran Out of Food in the Last Year: Never true   Transportation Needs: No Transportation Needs (6/26/2025)    PRAPARE - Transportation     Lack of Transportation  (Medical): No     Lack of Transportation (Non-Medical): No   Physical Activity: Insufficiently Active (5/22/2025)    Exercise Vital Sign     Days of Exercise per Week: 3 days     Minutes of Exercise per Session: 30 min   Stress: No Stress Concern Present (6/26/2025)    [employer] [place] [employer] - Occupational Stress Questionnaire     Feeling of Stress : Not at all   Housing Stability: Low Risk  (6/26/2025)    Housing Stability Vital Sign     Unable to Pay for Housing in the Last Year: No     Number of Times Moved in the Last Year: 0     Homeless in the Last Year: No
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
## 2025-06-27 NOTE — ASSESSMENT & PLAN NOTE
Patient's blood pressure range in the last 24 hours was: BP  Min: 110/74  Max: 130/69. Patient requires and takes all medications due to treatment resistant Hypertension that was initiated by Dr. Hurst. The patient's inpatient anti-hypertensive regimen is listed below:  Current Antihypertensives  hydrALAZINE tablet 100 mg, 3 times daily, Oral  metoprolol succinate (TOPROL-XL) 24 hr tablet 100 mg, Daily, Oral  NIFEdipine 24 hr tablet 60 mg, Daily, Oral  cloNIDine tablet 0.2 mg, 3 times daily, Oral    Plan  - BP is controlled, no changes needed to their regimen  -

## 2025-06-27 NOTE — NURSING
Consulted Halie at Dr. Horton office to let him be aware of consult on pt for recurrent fever spinal hardware and MRSA.

## 2025-06-27 NOTE — ASSESSMENT & PLAN NOTE
Hyponatremia is likely due to Dehydration/hypovolemia and renal insufficiency. The patient's most recent sodium results are listed below.  Recent Labs     06/26/25  1906 06/27/25  0504   * 136     Plan  - Correct the sodium by 4-6mEq in 24 hours.   - Obtain the following studies: Urine sodium, urine osmolality, serum osmolality.  - Will treat the hyponatremia with IV fluids as follows: continuous .9 normal saline IV infusion  - Monitor sodium Daily.   - Patient hyponatremia is stable  Resolved

## 2025-06-27 NOTE — PLAN OF CARE
1030- CM attempted to complete initial assessment at bedside.  CM could not understanding what patient was saying.  CM following.   1240- CM attempted to call patient's cell phone and room phone.  No answer. CM following.  1241- Patient currently with [facility]. Paperwork faxed and lakeisha notified.

## 2025-06-27 NOTE — SUBJECTIVE & OBJECTIVE
Interval History:  No acute events overnight    Review of Systems  Objective:     Vital Signs (Most Recent):  Temp: (!) 101.4 °F (38.6 °C) (06/27/25 0736)  Pulse: (!) 124 (06/27/25 0736)  Resp: 20 (06/27/25 0820)  BP: (!) 162/77 (06/27/25 0736)  SpO2: (!) 84 % (06/27/25 0736) Vital Signs (24h Range):  Temp:  [97.3 °F (36.3 °C)-103.1 °F (39.5 °C)] 101.4 °F (38.6 °C)  Pulse:  [] 124  Resp:  [16-20] 20  SpO2:  [84 %-96 %] 84 %  BP: (107-162)/(62-87) 162/77     Weight: 97.5 kg (215 lb)  Body mass index is 33.67 kg/m².  No intake or output data in the 24 hours ending 06/27/25 1210      Physical Exam  Constitutional:       Appearance: Normal appearance. She is obese.   HENT:      Head: Normocephalic and atraumatic.      Right Ear: External ear normal.      Left Ear: External ear normal.      Nose: Nose normal.      Mouth/Throat:      Mouth: Mucous membranes are dry.   Eyes:      Extraocular Movements: Extraocular movements intact.      Conjunctiva/sclera: Conjunctivae normal.      Pupils: Pupils are equal, round, and reactive to light.   Cardiovascular:      Rate and Rhythm: Normal rate and regular rhythm.      Pulses: Normal pulses.      Heart sounds: Normal heart sounds.   Pulmonary:      Effort: Pulmonary effort is normal.      Breath sounds: Normal breath sounds.   Abdominal:      General: Bowel sounds are normal.      Palpations: Abdomen is soft.      Tenderness: There is no abdominal tenderness.   Musculoskeletal:         General: Normal range of motion.      Cervical back: Normal range of motion and neck supple.   Skin:     General: Skin is warm and dry.      Capillary Refill: Capillary refill takes less than 2 seconds.             Comments: Healed back scar   Neurological:      General: No focal deficit present.      Mental Status: She is alert and oriented to person, place, and time.   Psychiatric:         Mood and Affect: Mood normal.         Behavior: Behavior normal.         Thought Content: Thought  content normal.         Judgment: Judgment normal.               Significant Labs: All pertinent labs within the past 24 hours have been reviewed.    Significant Imaging: I have reviewed all pertinent imaging results/findings within the past 24 hours.

## 2025-06-27 NOTE — PHARMACY MED REC
"Admission Medication History     The home medication history was taken by Michelle Hdez.    You may go to "Admission" then "Reconcile Home Medications" tabs to review and/or act upon these items.     The home medication list has been updated by the Pharmacy department.   Please read ALL comments highlighted in yellow.   Please address this information as you see fit.    Feel free to contact us if you have any questions or require assistance.  Medications Added:  Cyclobenzaprine 10 mg  Gabapentin 300 mg  Oxycodone-acetaminophen  mg      Patient reports no longer taking the following medication(s). The medication(s) listed below were removed from the home medication list. Please reorder if appropriate:  Ondansetron 4 mg  Ipratropium 21 mcg  Oxycodone-acetaminophen  mg    Medications listed below were obtained from: Patient/family and Analytic software- [employer]  (Not in a hospital admission)        Current Outpatient Medications on File Prior to Encounter   Medication Sig Dispense Refill Last Dose/Taking    albuterol (VENTOLIN HFA) 90 mcg/actuation inhaler Inhale 2 puffs into the lungs every 6 (six) hours as needed for Wheezing. Rescue 18 g 0 Past Month    atorvastatin (LIPITOR) 20 MG tablet TAKE 1 TABLET(20 MG) BY MOUTH EVERY EVENING 90 tablet 3 6/25/2025    cloNIDine (CATAPRES) 0.2 MG tablet Take 1 tablet (0.2 mg total) by mouth 4 (four) times daily. 360 tablet 3 6/26/2025    cyclobenzaprine (FLEXERIL) 10 MG tablet Take 10 mg by mouth every 6 (six) hours as needed.   6/26/2025    empagliflozin (JARDIANCE) 10 mg tablet Take 1 tablet (10 mg total) by mouth once daily. 90 tablet 3 6/26/2025    gabapentin (NEURONTIN) 300 MG capsule Take 300 mg by mouth 3 (three) times daily.   6/26/2025    glipiZIDE 5 MG TR24 Take 2 tablets (10 mg total) by mouth daily with breakfast. 180 tablet 3 6/26/2025    hydrALAZINE (APRESOLINE) 100 MG tablet Take 1 tablet (100 mg total) by mouth 3 (three) times daily. 270 tablet 3 " "6/26/2025    insulin glargine U-100, Lantus, (LANTUS SOLOSTAR U-100 INSULIN) 100 unit/mL (3 mL) InPn pen Inject 20 Units into the skin every evening. 10 mL 3 6/25/2025    levothyroxine (SYNTHROID) 200 MCG tablet Take 1 tablet (200 mcg total) by mouth before breakfast. 90 tablet 1 6/26/2025    metoprolol succinate (TOPROL-XL) 100 MG 24 hr tablet Take 1 tablet (100 mg total) by mouth once daily. 90 tablet 3 6/26/2025    NIFEdipine (PROCARDIA-XL) 60 MG (OSM) 24 hr tablet Take 1 tablet (60 mg total) by mouth once daily. 90 tablet 3 6/26/2025    oxyCODONE-acetaminophen (PERCOCET)  mg per tablet Take 1 tablet by mouth every 6 (six) hours as needed.   6/26/2025    sertraline (ZOLOFT) 100 MG tablet Take 1 tablet (100 mg total) by mouth every evening. 90 tablet 3 6/25/2025    traMADoL (ULTRAM) 50 mg tablet Take 1 tablet (50 mg total) by mouth every 8 (eight) hours as needed for Pain. 90 tablet 1 6/26/2025    0.9% NaCl SolP 500 mL with vancomycin 1,000 mg SolR 2,000 mg Inject 2,000 mg into the vein every 12 (twelve) hours.       blood sugar diagnostic Strp To check BG 4 times daily, to use with insurance preferred meter 200 each 4     blood-glucose meter kit To check BG 4 times daily, to use with insurance preferred meter 1 each 1     blood-glucose sensor (FREESTYLE DENG 3 SENSOR) Suad Use as directed 3 each 6     blood-glucose,,cont (FREESTYLE DENG 3 READER) [facility] Use with your sensor. 1 each 1     lancets Misc To check BG 4 times daily, to use with insurance preferred meter 200 each 4     pen needle, diabetic 32 gauge x 5/32" Ndle 1 Application by Misc.(Non-Drug; Combo Route) route once daily. 100 each 5     [DISCONTINUED] ipratropium (ATROVENT) 21 mcg (0.03 %) nasal spray 2 sprays by Each Nostril route 2 (two) times daily. 30 mL 0     [DISCONTINUED] lurasidone (LATUDA) 20 mg Tab tablet  (Patient not taking: Reported on 6/3/2025)       [DISCONTINUED] ondansetron (ZOFRAN-ODT) 4 MG TbDL Take 1 tablet (4 mg " total) by mouth 2 (two) times daily. 15 tablet 0     [DISCONTINUED] oxyCODONE-acetaminophen (LYNOX)  mg per tablet Take 1 tablet by mouth every 4 (four) hours as needed for Pain.            Potential issues to be addressed PRIOR TO DISCHARGE  No issues identified.    Michelle Hdez  Medication Access Specialist  [address]. [id number]    .

## 2025-06-27 NOTE — ASSESSMENT & PLAN NOTE
Patient's blood pressure range in the last 24 hours was: BP  Min: 107/67  Max: 162/77. Patient requires and takes all medications due to treatment resistant Hypertension that was initiated by Dr. Hurst. The patient's inpatient anti-hypertensive regimen is listed below:  Current Antihypertensives  hydrALAZINE tablet 100 mg, 3 times daily, Oral  metoprolol succinate (TOPROL-XL) 24 hr tablet 100 mg, Daily, Oral  NIFEdipine 24 hr tablet 60 mg, Daily, Oral  cloNIDine tablet 0.2 mg, 3 times daily, Oral    Plan  - BP is controlled, no changes needed to their regimen  - follow

## 2025-06-27 NOTE — HOSPITAL COURSE
6/27 I am consulting ortho spine due to recurrent fevers in the setting of postoperative infection.  I am consulting ID in the setting of postoperative MRSA infection after implantation of spinal hardware.  MATT due to vancomycin.  Change the adapter  6/28 renal function worse, consult nephrology, persistent fevers-ID following  6/29 respiratory failure, encephalopathic.  Respiratory failure likely secondary to volume overload with her having minimal urine output.  Nephrology following.  We will support her respiratory status until decision for dialysis as made.  Encephalopathy maybe due to sepsis versus cefepime.  She continues to be febrile.  Broadened to carbapenem  -- stepped down    7/7  - transferred to floor, is alert and oriented, on 4L NC. Renal function is improving, switched to po lasix for now after IV access lost, reestablishing today. ID following, continues on dapto  7/8  - picc line placed today  - patient with delirium today with visual hallucinations, precautions placed, seroquel tonight   - nephro following, some improvement in renal function  7/9  - picc line placed, dapto for spinal abscess continues, will get timeline for IV meds today  - patient more oriented this morning after given seroquel overnight but still not at baseline, will continue seroquel again tonight  - renal function improving, nephrology following    7/10  - ID recs: - continue daptomycin (610mg)  8mg/kg iv q48hr administered through PICC    - will need weekly labs: cbc w/ diff and cmp and CPK    - EOT for abx: 7/24/25    - Will need suppression for 1 year via bactrim given concern for HW infection w/o removal as MRSA was resistant to tetracycline. This should be done  after        completing IV abx course, but the dose will be dependent on her renal function at that time. Please call [facility] call center and [facility] to verify dose   - will discuss with  about options for abx    7/11  - renal  function improving, nephro following  - Ortho cleared for discharge to follow up next week in office for suture removal  - submitted for vital care for abx home admin, once approved can be dc home    7/12  - update for abx at home to be given Monday 7/13  - continuing current care    7/14  - abx delivered to patient's room, was going to discharge today however no family at home, plan to dc in the morning  - follow up with Dr. Upton for suture removal this week in office  7/15 await guidance from Nephrology on discharge  7/15 spoke with Dr. Nathan simon for discharge.  Follow up PCP.  Follow up Nephrology 1 week

## 2025-06-27 NOTE — NURSING
Inpatient Consult to [facility]: Postop infection with MRSA after spinal hardware implantation, recurrent fevers. Consult was called placed.

## 2025-06-27 NOTE — ASSESSMENT & PLAN NOTE
Anemia is likely due to infection . Most recent hemoglobin and hematocrit are listed below.  Recent Labs     06/26/25  1906   HGB 7.9*   HCT 26.5*     Plan  - Monitor serial CBC: Daily  - Transfuse PRBC if patient becomes hemodynamically unstable, symptomatic or H/H drops below 7/21.  - Patient has not received any PRBC transfusions to date  - Patient's anemia is currently stable  - Monitor CBC if H/H continue to drop consider transfusion.

## 2025-06-27 NOTE — ASSESSMENT & PLAN NOTE
Patient's most recent potassium results are listed below.   Recent Labs     06/26/25  1906   K 3.1*     Plan  - Replete potassium per protocol  - Monitor potassium Daily  - Patient's hypokalemia is worsening. Will adjust treatment as follows:    - 20 mEq Effer K

## 2025-06-27 NOTE — ASSESSMENT & PLAN NOTE
MATT is likely due to ATN from vancomycin Baseline creatinine is .65. Most recent creatinine and eGFR are listed below.  Recent Labs     06/26/25  1906 06/27/25  0504   CREATININE 4.90* 4.53*   EGFRNORACEVR 10* 11*     Given 1,848 ml bolus of normal saline in the ED     Plan  - MATT is worsening. Will adjust treatment as follows: continuous infusion of .9 Normal Saline  - Avoid nephrotoxins and renally dose meds for GFR listed above  - Monitor urine output, serial BMP, and adjust therapy as needed  6/27 continue IV fluids

## 2025-06-27 NOTE — ASSESSMENT & PLAN NOTE
Hyponatremia is likely due to Dehydration/hypovolemia and renal insufficiency. The patient's most recent sodium results are listed below.  Recent Labs     06/26/25  1906   *     Plan  - Correct the sodium by 4-6mEq in 24 hours.   - Obtain the following studies: Urine sodium, urine osmolality, serum osmolality.  - Will treat the hyponatremia with IV fluids as follows: continuous .9 normal saline IV infusion  - Monitor sodium Daily.   - Patient hyponatremia is stable

## 2025-06-27 NOTE — ASSESSMENT & PLAN NOTE
Patient's most recent potassium results are listed below.   Recent Labs     06/26/25  1906 06/27/25  0504   K 3.1* 3.6     Plan  - Replete potassium per protocol  - Monitor potassium Daily  - Patient's hypokalemia is worsening. Will adjust treatment as follows:    - 20 mEq Effer K  Stable

## 2025-06-28 PROBLEM — E87.20 METABOLIC ACIDOSIS: Status: ACTIVE | Noted: 2025-06-28

## 2025-06-28 LAB
ANION GAP SERPL CALCULATED.3IONS-SCNC: 16 MMOL/L (ref 7–16)
BASOPHILS # BLD AUTO: 0.02 K/UL (ref 0–0.2)
BASOPHILS NFR BLD AUTO: 0.2 % (ref 0–1)
BILIRUB UR QL STRIP: NEGATIVE
BUN SERPL-MCNC: 35 MG/DL (ref 7–19)
BUN/CREAT SERPL: 7 (ref 6–20)
CALCIUM SERPL-MCNC: 7.4 MG/DL (ref 8.4–10.2)
CHLORIDE SERPL-SCNC: 109 MMOL/L (ref 98–107)
CLARITY UR: ABNORMAL
CO2 SERPL-SCNC: 16 MMOL/L (ref 22–29)
COLOR UR: ABNORMAL
CREAT SERPL-MCNC: 5.33 MG/DL (ref 0.55–1.02)
DIFFERENTIAL METHOD BLD: ABNORMAL
EGFR (NO RACE VARIABLE) (RUSH/TITUS): 9 ML/MIN/1.73M2
EOSINOPHIL # BLD AUTO: 0 K/UL (ref 0–0.5)
EOSINOPHIL NFR BLD AUTO: 0 % (ref 1–4)
ERYTHROCYTE [DISTWIDTH] IN BLOOD BY AUTOMATED COUNT: 14.7 % (ref 11.5–14.5)
GLUCOSE SERPL-MCNC: 139 MG/DL (ref 70–105)
GLUCOSE SERPL-MCNC: 55 MG/DL (ref 70–105)
GLUCOSE SERPL-MCNC: 56 MG/DL (ref 74–100)
GLUCOSE SERPL-MCNC: 62 MG/DL (ref 70–105)
GLUCOSE SERPL-MCNC: 80 MG/DL (ref 70–105)
GLUCOSE SERPL-MCNC: 84 MG/DL (ref 70–105)
GLUCOSE UR STRIP-MCNC: NORMAL MG/DL
HCT VFR BLD AUTO: 25.2 % (ref 38–47)
HGB BLD-MCNC: 7.3 G/DL (ref 12–16)
IMM GRANULOCYTES # BLD AUTO: 0.03 K/UL (ref 0–0.04)
IMM GRANULOCYTES NFR BLD: 0.4 % (ref 0–0.4)
KETONES UR STRIP-SCNC: NEGATIVE MG/DL
LEUKOCYTE ESTERASE UR QL STRIP: NEGATIVE
LYMPHOCYTES # BLD AUTO: 1.64 K/UL (ref 1–4.8)
LYMPHOCYTES NFR BLD AUTO: 20.2 % (ref 27–41)
MAGNESIUM SERPL-MCNC: 2.2 MG/DL (ref 1.6–2.6)
MCH RBC QN AUTO: 26.2 PG (ref 27–31)
MCHC RBC AUTO-ENTMCNC: 29 G/DL (ref 32–36)
MCV RBC AUTO: 90.3 FL (ref 80–96)
MONOCYTES # BLD AUTO: 0.53 K/UL (ref 0–0.8)
MONOCYTES NFR BLD AUTO: 6.5 % (ref 2–6)
MPC BLD CALC-MCNC: 9.6 FL (ref 9.4–12.4)
MUCOUS, UA: ABNORMAL /LPF
NEUTROPHILS # BLD AUTO: 5.9 K/UL (ref 1.8–7.7)
NEUTROPHILS NFR BLD AUTO: 72.7 % (ref 53–65)
NITRITE UR QL STRIP: NEGATIVE
NRBC # BLD AUTO: 0.02 X10E3/UL
NRBC, AUTO (.00): 0.2 %
PH UR STRIP: 5.5 PH UNITS
PHOSPHATE SERPL-MCNC: 5 MG/DL (ref 2.3–4.7)
PLATELET # BLD AUTO: 486 K/UL (ref 150–400)
POTASSIUM SERPL-SCNC: 3.9 MMOL/L (ref 3.5–5.1)
PROT UR QL STRIP: 50
RBC # BLD AUTO: 2.79 M/UL (ref 4.2–5.4)
RBC # UR STRIP: NEGATIVE /UL
RBC #/AREA URNS HPF: 6 /HPF
SODIUM SERPL-SCNC: 137 MMOL/L (ref 136–145)
SP GR UR STRIP: 1.01
SQUAMOUS #/AREA URNS LPF: ABNORMAL /HPF
UROBILINOGEN UR STRIP-ACNC: NORMAL MG/DL
WBC # BLD AUTO: 8.12 K/UL (ref 4.5–11)
WBC #/AREA URNS HPF: 1 /HPF

## 2025-06-28 PROCEDURE — 96372 THER/PROPH/DIAG INJ SC/IM: CPT

## 2025-06-28 PROCEDURE — 27000207 HC ISOLATION

## 2025-06-28 PROCEDURE — 36415 COLL VENOUS BLD VENIPUNCTURE: CPT | Performed by: STUDENT IN AN ORGANIZED HEALTH CARE EDUCATION/TRAINING PROGRAM

## 2025-06-28 PROCEDURE — 63600175 PHARM REV CODE 636 W HCPCS: Performed by: INTERNAL MEDICINE

## 2025-06-28 PROCEDURE — 11000001 HC ACUTE MED/SURG PRIVATE ROOM

## 2025-06-28 PROCEDURE — 25000003 PHARM REV CODE 250: Performed by: INTERNAL MEDICINE

## 2025-06-28 PROCEDURE — 99233 SBSQ HOSP IP/OBS HIGH 50: CPT | Mod: ,,, | Performed by: STUDENT IN AN ORGANIZED HEALTH CARE EDUCATION/TRAINING PROGRAM

## 2025-06-28 PROCEDURE — 84100 ASSAY OF PHOSPHORUS: CPT | Performed by: STUDENT IN AN ORGANIZED HEALTH CARE EDUCATION/TRAINING PROGRAM

## 2025-06-28 PROCEDURE — 63600175 PHARM REV CODE 636 W HCPCS: Performed by: STUDENT IN AN ORGANIZED HEALTH CARE EDUCATION/TRAINING PROGRAM

## 2025-06-28 PROCEDURE — 25000003 PHARM REV CODE 250: Performed by: STUDENT IN AN ORGANIZED HEALTH CARE EDUCATION/TRAINING PROGRAM

## 2025-06-28 PROCEDURE — A4217 STERILE WATER/SALINE, 500 ML: HCPCS | Performed by: INTERNAL MEDICINE

## 2025-06-28 PROCEDURE — 25000003 PHARM REV CODE 250: Performed by: HOSPITALIST

## 2025-06-28 PROCEDURE — 85025 COMPLETE CBC W/AUTO DIFF WBC: CPT | Performed by: STUDENT IN AN ORGANIZED HEALTH CARE EDUCATION/TRAINING PROGRAM

## 2025-06-28 PROCEDURE — 27000221 HC OXYGEN, UP TO 24 HOURS

## 2025-06-28 PROCEDURE — 63600175 PHARM REV CODE 636 W HCPCS: Performed by: HOSPITALIST

## 2025-06-28 PROCEDURE — 81003 URINALYSIS AUTO W/O SCOPE: CPT | Performed by: INTERNAL MEDICINE

## 2025-06-28 PROCEDURE — 80048 BASIC METABOLIC PNL TOTAL CA: CPT | Performed by: STUDENT IN AN ORGANIZED HEALTH CARE EDUCATION/TRAINING PROGRAM

## 2025-06-28 PROCEDURE — 83735 ASSAY OF MAGNESIUM: CPT | Performed by: STUDENT IN AN ORGANIZED HEALTH CARE EDUCATION/TRAINING PROGRAM

## 2025-06-28 PROCEDURE — 82962 GLUCOSE BLOOD TEST: CPT

## 2025-06-28 RX ADMIN — Medication 16 G: at 07:06

## 2025-06-28 RX ADMIN — ACETAMINOPHEN 650 MG: 325 TABLET ORAL at 07:06

## 2025-06-28 RX ADMIN — DAPTOMYCIN 610 MG: 500 INJECTION, POWDER, LYOPHILIZED, FOR SOLUTION INTRAVENOUS at 11:06

## 2025-06-28 RX ADMIN — SODIUM CHLORIDE: 9 INJECTION, SOLUTION INTRAVENOUS at 03:06

## 2025-06-28 RX ADMIN — HEPARIN SODIUM 5000 UNITS: 5000 INJECTION, SOLUTION INTRAVENOUS; SUBCUTANEOUS at 05:06

## 2025-06-28 RX ADMIN — NIFEDIPINE 60 MG: 60 TABLET, FILM COATED, EXTENDED RELEASE ORAL at 08:06

## 2025-06-28 RX ADMIN — MUPIROCIN: 20 OINTMENT TOPICAL at 08:06

## 2025-06-28 RX ADMIN — CLONIDINE HYDROCHLORIDE 0.2 MG: 0.2 TABLET ORAL at 08:06

## 2025-06-28 RX ADMIN — HYDRALAZINE HYDROCHLORIDE 100 MG: 100 TABLET ORAL at 08:06

## 2025-06-28 RX ADMIN — ACETAMINOPHEN 650 MG: 325 TABLET ORAL at 08:06

## 2025-06-28 RX ADMIN — CEFEPIME 1 G: 1 INJECTION, POWDER, FOR SOLUTION INTRAMUSCULAR; INTRAVENOUS at 03:06

## 2025-06-28 RX ADMIN — HEPARIN SODIUM 5000 UNITS: 5000 INJECTION, SOLUTION INTRAVENOUS; SUBCUTANEOUS at 09:06

## 2025-06-28 RX ADMIN — HEPARIN SODIUM 5000 UNITS: 5000 INJECTION, SOLUTION INTRAVENOUS; SUBCUTANEOUS at 03:06

## 2025-06-28 RX ADMIN — SODIUM BICARBONATE: 84 INJECTION, SOLUTION INTRAVENOUS at 04:06

## 2025-06-28 RX ADMIN — HYDRALAZINE HYDROCHLORIDE 100 MG: 100 TABLET ORAL at 03:06

## 2025-06-28 RX ADMIN — SERTRALINE HYDROCHLORIDE 100 MG: 50 TABLET ORAL at 08:06

## 2025-06-28 RX ADMIN — MUPIROCIN: 20 OINTMENT TOPICAL at 09:06

## 2025-06-28 RX ADMIN — CLONIDINE HYDROCHLORIDE 0.2 MG: 0.2 TABLET ORAL at 03:06

## 2025-06-28 RX ADMIN — GABAPENTIN 300 MG: 300 CAPSULE ORAL at 03:06

## 2025-06-28 RX ADMIN — GABAPENTIN 300 MG: 300 CAPSULE ORAL at 08:06

## 2025-06-28 RX ADMIN — METOPROLOL SUCCINATE 100 MG: 100 TABLET, EXTENDED RELEASE ORAL at 08:06

## 2025-06-28 RX ADMIN — SODIUM CHLORIDE: 9 INJECTION, SOLUTION INTRAVENOUS at 05:06

## 2025-06-28 RX ADMIN — LEVOTHYROXINE SODIUM 200 MCG: 100 TABLET ORAL at 05:06
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
## 2025-06-28 NOTE — ASSESSMENT & PLAN NOTE
Anemia is likely due to infection . Most recent hemoglobin and hematocrit are listed below.  Recent Labs     06/26/25  1906 06/27/25  0504 06/28/25  0431   HGB 7.9* 8.7* 7.3*   HCT 26.5* 31.1* 25.2*     Plan  - Monitor serial CBC: Daily  - Transfuse PRBC if patient becomes hemodynamically unstable, symptomatic or H/H drops below 7/21.  - Patient has not received any PRBC transfusions to date  - Patient's anemia is currently stable  - Monitor CBC if H/H continue to drop consider transfusion.  Follow

## 2025-06-28 NOTE — SUBJECTIVE & OBJECTIVE
Past Medical History:   Diagnosis Date    Acquired hypothyroidism     Chronic serous otitis media of both ears 04/04/2023    Depressive disorder     Diabetes mellitus type 2 in obese     Essential (primary) hypertension     Gastroparesis     IBS (irritable bowel syndrome)     Kidney stones     Mild intermittent asthma without complication 05/20/2025    Mixed hyperlipidemia     Obstructive sleep apnea syndrome 05/20/2025    Postlaminectomy syndrome, lumbar region 04/28/2022    [facility]       Past Surgical History:   Procedure Laterality Date    Bilateral L3-S1 MBB Bilateral 6-, 5-, 1-8-2014    Dr Jessica Randolph    CARPAL TUNNEL RELEASE      CHOLECYSTECTOMY      DIAGNOSTIC LAPAROSCOPY  04/14/2010    Exploratory Laparotomy, Myomectomy, Hydrotubation-Dr. Feng    DILATION AND CURETTAGE OF UTERUS  04/14/2010    Hysteroscopy-Dr. Feng    EPIDURAL STEROID INJECTION N/A 10/22/2024    Procedure: Injection, Steroid, Epidural, L4/5;  Surgeon: Rasheeda Bills MD;  Location: [facility];  Service: Pain Management;  Laterality: N/A;    EXPLORATORY LAPAROTOMY WITH UTERINE MYOMECTOMY  02/27/2007    Dr. Marquise Anderson    FUSION, SPINE, LATERAL APPROACH N/A 5/20/2025    Procedure: ARTHRODESIS, SPINE, LATERAL;  Surgeon: Cyril Upton MD;  Location: [facility];  Service: Orthopedics;  Laterality: N/A;  L2-L4 LATERAL FUSION    HYSTERECTOMY  09/29/2011    Robotic, FABIENNE, Cystoscopy-Dr. Feng    HYSTEROSCOPY WITH DILATION AND CURETTAGE OF UTERUS  04/04/2006    Laparoscopy, Chromotubation-Dr. Marquise Anderson    INCISION AND DRAINAGE, ABSCESS, SPINE, LUMBOSACRAL, POSTERIOR N/A 6/12/2025    Procedure: INCISION AND DRAINAGE, ABSCESS, SPINE, LUMBOSACRAL, POSTERIOR;  Surgeon: Cyril Upton MD;  Location: [facility] OR;  Service: Neurosurgery;  Laterality: N/A;    INJECTION OF ANESTHETIC AGENT AROUND ILIOINGUINAL NERVE Right 6/22/2023    Procedure: BLOCK, NERVE, ILIOINGUINAL;  Surgeon: Rasheeda LANGLEY  MD Sanju;  Location: [facility];  Service: Pain Management;  Laterality: Right;    INJECTION OF ANESTHETIC AGENT AROUND MEDIAL BRANCH NERVES INNERVATING LUMBAR FACET JOINT Bilateral 9/21/2023    Procedure: BLOCK, NERVE, FACET JOINT, LUMBAR, MEDIAL BRANCH;  Surgeon: Rasheeda Bills MD;  Location: [facility];  Service: Pain Management;  Laterality: Bilateral;    INJECTION OF ANESTHETIC AGENT AROUND MEDIAL BRANCH NERVES INNERVATING LUMBAR FACET JOINT Bilateral 3/14/2024    Procedure: BLOCK, NERVE, FACET JOINT, LUMBAR, MEDIAL BRANCH, BILATERAL L4-S1 MBB;  Surgeon: Rasheeda Bills MD;  Location: [facility];  Service: Pain Management;  Laterality: Bilateral;    LAMINECTOMY N/A 11/30/2021    Procedure: L2-L5 Laminectomy;  Surgeon: Cyril Upton MD;  Location: [facility] OR;  Service: Neurosurgery;  Laterality: N/A;    LEFT HEART CATHETERIZATION      Left L3-S1 RFTC Left 07/18/2017    Dr Lopez    Left SI JI Left 09/30/2013    Dr Randolph    MYRINGOTOMY WITH INSERTION OF VENTILATION TUBE Bilateral 4/4/2023    Procedure: MYRINGOTOMY, WITH TYMPANOSTOMY TUBE INSERTION (T-TUBES);  Surgeon: Sea Hinton MD;  Location: [facility] OR;  Service: ENT;  Laterality: Bilateral;  T-TUBES    MYRINGOTOMY WITH INSERTION OF VENTILATION TUBE Bilateral 9/12/2023    Procedure: MYRINGOTOMY, WITH TYMPANOSTOMY TUBE INSERTION, T-TUBES;  Surgeon: Sea Hinton MD;  Location: [facility] OR;  Service: ENT;  Laterality: Bilateral;    MYRINGOTOMY WITH INSERTION OF VENTILATION TUBE Bilateral 7/2/2024    Procedure: MYRINGOTOMY, WITH TYMPANOSTOMY TUBE INSERTION;  Surgeon: Sea Hinton MD;  Location: [facility] OR;  Service: ENT;  Laterality: Bilateral;  Bilateral T-Tubes    OOPHORECTOMY  11/11/2014    FABIENNE Barajas, Cystoscopy-Dr. Feng    SINUS SURGERY      TRANSFORAMINAL LUMBAR INTERBODY FUSION N/A 5/21/2025    Procedure: ARTHRODESIS, SPINE, LUMBAR, TLIF;  Surgeon: Cyril Upton MD;  Location:  [facility] OR;  Service: Orthopedics;  Laterality: N/A;  L2-L5 POSTERIOR FUSION, L4-L5 TLIF       Review of patient's allergies indicates:   Allergen Reactions    Fish containing products Anaphylaxis    Iodinated contrast media     Penicillins      Current Facility-Administered Medications   Medication Frequency    0.9% NaCl infusion Continuous    acetaminophen tablet 650 mg Q8H PRN    albuterol nebulizer solution 2.5 mg Q4H PRN    aluminum & magnesium hydroxide-simethicone 400-400-40 mg/5 mL suspension 30 mL Q6H PRN    bisacodyL EC tablet 10 mg Daily PRN    ceFEPIme injection 1 g Q12H    cloNIDine tablet 0.2 mg TID    cyclobenzaprine tablet 10 mg TID PRN    [START ON 6/29/2025] DAPTOmycin (CUBICIN) 610 mg in 0.9% NaCl SolP 50 mL IVPB Q48H    dextromethorphan-guaiFENesin  mg/5 ml liquid 10 mL Q6H PRN    dextrose 50% injection 12.5 g PRN    dextrose 50% injection 25 g PRN    diphenhydrAMINE capsule 50 mg Q6H PRN    docusate sodium capsule 100 mg BID PRN    gabapentin capsule 300 mg TID    glucagon (human recombinant) injection 1 mg PRN    glucose chewable tablet 16 g PRN    glucose chewable tablet 24 g PRN    heparin (porcine) injection 5,000 Units Q8H    hydrALAZINE tablet 100 mg TID    insulin aspart U-100 injection 0-10 Units QID (AC + HS) PRN    insulin aspart U-100 injection 5 Units TIDWM    insulin glargine U-100 (Lantus) injection 20 Units QHS    levothyroxine tablet 200 mcg Before breakfast    melatonin tablet 6 mg Nightly PRN    metoprolol succinate (TOPROL-XL) 24 hr tablet 100 mg Daily    morphine injection 4 mg Q4H PRN    mupirocin 2 % ointment BID    NIFEdipine 24 hr tablet 60 mg Daily    ondansetron injection 8 mg Q6H PRN    oxyCODONE immediate release tablet 10 mg Q4H PRN    promethazine tablet 25 mg Q6H PRN    sertraline tablet 100 mg QHS    traZODone tablet 50 mg Nightly PRN     Family History       Problem Relation (Age of Onset)    Diabetes Mellitus Mother    Heart disease Mother, Sister     Hypertension Mother    Migraines Mother          Tobacco Use    Smoking status: Never     Passive exposure: Never    Smokeless tobacco: Never   Substance and Sexual Activity    Alcohol use: Not Currently    Drug use: Never    Sexual activity: Not Currently     Review of Systems   Unable to perform ROS: Acuity of condition   Past medical history, social history and family history unable to be obtained due to patient's medical condition.    Objective:     Vital Signs (Most Recent):  Temp: 99.9 °F (37.7 °C) (06/28/25 1216)  Pulse: 94 (06/28/25 1216)  Resp: 18 (06/28/25 1216)  BP: (!) 155/95 (06/28/25 1216)  SpO2: (!) 94 % (06/28/25 1216) Vital Signs (24h Range):  Temp:  [98.1 °F (36.7 °C)-103 °F (39.4 °C)] 99.9 °F (37.7 °C)  Pulse:  [] 94  Resp:  [17-18] 18  SpO2:  [90 %-99 %] 94 %  BP: (104-155)/(57-95) 155/95     Weight: 97.5 kg (215 lb) (06/26/25 1620)  Body mass index is 33.67 kg/m².  Body surface area is 2.15 meters squared.    I/O last 3 completed shifts:  In: 2339.5 [P.O.:694; I.V.:1645.5]  Out: 400 [Urine:400]     Physical Exam  Vitals reviewed.   Constitutional:       General: She is not in acute distress.     Appearance: She is not toxic-appearing.   HENT:      Head: Normocephalic and atraumatic.      Nose: Nose normal.      Mouth/Throat:      Mouth: Mucous membranes are moist.      Pharynx: Oropharynx is clear.   Eyes:      General: No scleral icterus.     Conjunctiva/sclera: Conjunctivae normal.      Pupils: Pupils are equal, round, and reactive to light.   Cardiovascular:      Rate and Rhythm: Normal rate and regular rhythm.   Pulmonary:      Effort: No respiratory distress.      Breath sounds: Normal breath sounds.   Abdominal:      General: Bowel sounds are normal.      Palpations: Abdomen is soft. There is no mass.      Tenderness: There is abdominal tenderness (Mild tenderness in her periumbilical area and pelvic area).   Musculoskeletal:         General: No swelling or tenderness.      Cervical  back: Normal range of motion and neck supple.   Lymphadenopathy:      Cervical: No cervical adenopathy.   Skin:     General: Skin is warm and dry.      Findings: No erythema.   Neurological:      General: No focal deficit present.      Mental Status: Mental status is at baseline.   Psychiatric:         Mood and Affect: Mood normal.         Behavior: Behavior normal.          Significant Labs:  All labs within the past 24 hours have been reviewed.    Significant Imaging:

## 2025-06-28 NOTE — ASSESSMENT & PLAN NOTE
Patient seen by Dr. Upton for abscess on lumber spine 2 weeks ago.  Culture positive for MRSA  Patient was started on Vancomycin  and discharged  Patient subsequently developed MATT  Patient remains febrile   Vancomycin D/c due to MATT  Initiating Daptomycin with pharmacy renally dosing  Pain control managed with Tylenol 1000 mg and 5mg Oxycodone    Persistently febrile in the setting of postoperative infection with the implantation of lumbar hardware.  MRSA.  Consult ortho spine, consult ID  6/28 continue dapto, added cefepime. MRI could not be done with contrast due to renal function

## 2025-06-28 NOTE — ASSESSMENT & PLAN NOTE
Patient's most recent potassium results are listed below.   Recent Labs     06/26/25  1906 06/27/25  0504 06/28/25  0431   K 3.1* 3.6 3.9     Plan  - Replete potassium per protocol  - Monitor potassium Daily  - Patient's hypokalemia is worsening. Will adjust treatment as follows:    - 20 mEq Effer K  Stable

## 2025-06-28 NOTE — ASSESSMENT & PLAN NOTE
Patient's hematocrit is 25% down from 31% yesterday however it was 26% the day before that, continue to monitor

## 2025-06-28 NOTE — SUBJECTIVE & OBJECTIVE
Interval History:  No acute events overnight    Review of Systems  Objective:     Vital Signs (Most Recent):  Temp: 98.2 °F (36.8 °C) (06/28/25 0452)  Pulse: 100 (06/28/25 0452)  Resp: 18 (06/28/25 0452)  BP: 113/66 (06/28/25 0452)  SpO2: (!) 94 % (06/28/25 0452) Vital Signs (24h Range):  Temp:  [98.1 °F (36.7 °C)-103 °F (39.4 °C)] 98.2 °F (36.8 °C)  Pulse:  [] 100  Resp:  [17-20] 18  SpO2:  [84 %-99 %] 94 %  BP: (104-162)/(57-77) 113/66     Weight: 97.5 kg (215 lb)  Body mass index is 33.67 kg/m².    Intake/Output Summary (Last 24 hours) at 6/28/2025 0650  Last data filed at 6/28/2025 0140  Gross per 24 hour   Intake 2339.47 ml   Output 400 ml   Net 1939.47 ml         Physical Exam  Constitutional:       Appearance: Normal appearance. She is obese.   HENT:      Head: Normocephalic and atraumatic.      Right Ear: External ear normal.      Left Ear: External ear normal.      Nose: Nose normal.      Mouth/Throat:      Mouth: Mucous membranes are dry.   Eyes:      Extraocular Movements: Extraocular movements intact.      Conjunctiva/sclera: Conjunctivae normal.      Pupils: Pupils are equal, round, and reactive to light.   Cardiovascular:      Rate and Rhythm: Normal rate and regular rhythm.      Pulses: Normal pulses.      Heart sounds: Normal heart sounds.   Pulmonary:      Effort: Pulmonary effort is normal.      Breath sounds: Normal breath sounds.   Abdominal:      General: Bowel sounds are normal.      Palpations: Abdomen is soft.      Tenderness: There is no abdominal tenderness.   Musculoskeletal:         General: Normal range of motion.      Cervical back: Normal range of motion and neck supple.   Skin:     General: Skin is warm and dry.      Capillary Refill: Capillary refill takes less than 2 seconds.             Comments: Healed back scar   Neurological:      General: No focal deficit present.      Mental Status: She is alert and oriented to person, place, and time.   Psychiatric:         Mood and  Affect: Mood normal.         Behavior: Behavior normal.         Thought Content: Thought content normal.         Judgment: Judgment normal.               Significant Labs: All pertinent labs within the past 24 hours have been reviewed.    Significant Imaging: I have reviewed all pertinent imaging results/findings within the past 24 hours.

## 2025-06-28 NOTE — ASSESSMENT & PLAN NOTE
I am going to adjust her IV fluids and give her some sodium bicarbonate, will also decrease IV fluid rate.

## 2025-06-28 NOTE — NURSING
Bladder scanner 386mL post void. Taylor catheter placed per order by Dr. Evans. Urine sent to lab.

## 2025-06-28 NOTE — NURSING
Called on call nephrologist- Dr. Sean Evans and let him know of Nephrology consult d/t MATT and he said ok thank you.

## 2025-06-28 NOTE — ASSESSMENT & PLAN NOTE
Patient appears to have a fairly abrupt rise in her creatinine over the past 5 days.  Her urinary sediment is fairly bland appearing, I will get a urine with microscopic evaluation.  I was suspect there is a component of vancomycin toxicity at play here.  However she has some complaints of lower abdominal pain and periumbilical pain, with her recent spinal surgery she may have some urinary retention issues.  I am going to get a bladder scan.  We will place a Taylor to gravity drainage if she has greater than 300 cc of urine.  Of note the patient apparently was having some frequency however this may represent some overflow pressure urination.

## 2025-06-28 NOTE — ASSESSMENT & PLAN NOTE
Hyponatremia is likely due to Dehydration/hypovolemia and renal insufficiency. The patient's most recent sodium results are listed below.  Recent Labs     06/26/25  1906 06/27/25  0504 06/28/25  0431   * 136 137     Plan  - Correct the sodium by 4-6mEq in 24 hours.   - Obtain the following studies: Urine sodium, urine osmolality, serum osmolality.  - Will treat the hyponatremia with IV fluids as follows: continuous .9 normal saline IV infusion  - Monitor sodium Daily.   - Patient hyponatremia is stable  Resolved
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
## 2025-06-28 NOTE — ASSESSMENT & PLAN NOTE
MATT is likely due to ATN from vancomycin Baseline creatinine is .65. Most recent creatinine and eGFR are listed below.  Recent Labs     06/26/25  1906 06/27/25  0504 06/28/25  0431   CREATININE 4.90* 4.53* 5.33*   EGFRNORACEVR 10* 11* 9*     Given 1,848 ml bolus of normal saline in the ED     Plan  - MATT is worsening. Will adjust treatment as follows: continuous infusion of .9 Normal Saline  - Avoid nephrotoxins and renally dose meds for GFR listed above  - Monitor urine output, serial BMP, and adjust therapy as needed  6/27 continue IV fluids  6/28 consult nephrology, worse

## 2025-06-28 NOTE — ASSESSMENT & PLAN NOTE
Postoperative infection after the implantation spinal hardware.  Persistently febrile.  CRP ESR noted.  Consult ortho spine, consult ID.  Daptomycin  6/29 continue dapto, added cefepime due to persistent fevers. MRI could not be done with contrast due to renal function.  US LE to r/o thrombus as cause of fever

## 2025-06-28 NOTE — ASSESSMENT & PLAN NOTE
Patient's blood pressure range in the last 24 hours was: BP  Min: 104/57  Max: 162/77. Patient requires and takes all medications due to treatment resistant Hypertension that was initiated by Dr. Hurst. The patient's inpatient anti-hypertensive regimen is listed below:  Current Antihypertensives  hydrALAZINE tablet 100 mg, 3 times daily, Oral  metoprolol succinate (TOPROL-XL) 24 hr tablet 100 mg, Daily, Oral  NIFEdipine 24 hr tablet 60 mg, Daily, Oral  cloNIDine tablet 0.2 mg, 3 times daily, Oral    Plan  - BP is controlled, no changes needed to their regimen  - follow

## 2025-06-29 PROBLEM — J96.01 ACUTE HYPOXIC RESPIRATORY FAILURE: Status: ACTIVE | Noted: 2025-06-29

## 2025-06-29 PROBLEM — G93.41 ENCEPHALOPATHY, METABOLIC: Status: ACTIVE | Noted: 2025-06-29

## 2025-06-29 LAB
ABO + RH BLD: NORMAL
ANION GAP SERPL CALCULATED.3IONS-SCNC: 18 MMOL/L (ref 7–16)
BASOPHILS # BLD AUTO: 0.03 K/UL (ref 0–0.2)
BASOPHILS NFR BLD AUTO: 0.4 % (ref 0–1)
BLD PROD TYP BPU: NORMAL
BLOOD UNIT EXPIRATION DATE: NORMAL
BLOOD UNIT TYPE CODE: 5100
BUN SERPL-MCNC: 45 MG/DL (ref 7–19)
BUN/CREAT SERPL: 8 (ref 6–20)
CALCIUM SERPL-MCNC: 7.7 MG/DL (ref 8.4–10.2)
CHLORIDE SERPL-SCNC: 106 MMOL/L (ref 98–107)
CO2 SERPL-SCNC: 18 MMOL/L (ref 22–29)
CREAT SERPL-MCNC: 5.84 MG/DL (ref 0.55–1.02)
CROSSMATCH INTERPRETATION: NORMAL
DIFFERENTIAL METHOD BLD: ABNORMAL
DISPENSE STATUS: NORMAL
EGFR (NO RACE VARIABLE) (RUSH/TITUS): 8 ML/MIN/1.73M2
EOSINOPHIL # BLD AUTO: 0.01 K/UL (ref 0–0.5)
EOSINOPHIL NFR BLD AUTO: 0.1 % (ref 1–4)
ERYTHROCYTE [DISTWIDTH] IN BLOOD BY AUTOMATED COUNT: 15.5 % (ref 11.5–14.5)
GLUCOSE SERPL-MCNC: 103 MG/DL (ref 70–105)
GLUCOSE SERPL-MCNC: 106 MG/DL (ref 70–105)
GLUCOSE SERPL-MCNC: 114 MG/DL (ref 70–105)
GLUCOSE SERPL-MCNC: 144 MG/DL (ref 70–105)
GLUCOSE SERPL-MCNC: 163 MG/DL (ref 70–105)
GLUCOSE SERPL-MCNC: 51 MG/DL (ref 74–100)
GLUCOSE SERPL-MCNC: 67 MG/DL (ref 70–105)
GLUCOSE SERPL-MCNC: 69 MG/DL (ref 70–105)
GLUCOSE SERPL-MCNC: 92 MG/DL (ref 70–105)
HCO3 UR-SCNC: 22.4 MMOL/L (ref 21–28)
HCO3 UR-SCNC: 23.2 MMOL/L (ref 21–28)
HCT VFR BLD AUTO: 24.2 % (ref 38–47)
HGB BLD-MCNC: 6.8 G/DL (ref 12–16)
IMM GRANULOCYTES # BLD AUTO: 0.04 K/UL (ref 0–0.04)
IMM GRANULOCYTES NFR BLD: 0.5 % (ref 0–0.4)
INDIRECT COOMBS: NORMAL
LACTATE SERPL-SCNC: 0.5 MMOL/L (ref 0.5–2.2)
LACTATE SERPL-SCNC: 0.7 MMOL/L (ref 0.5–2.2)
LYMPHOCYTES # BLD AUTO: 1.4 K/UL (ref 1–4.8)
LYMPHOCYTES NFR BLD AUTO: 17.3 % (ref 27–41)
MCH RBC QN AUTO: 25.2 PG (ref 27–31)
MCHC RBC AUTO-ENTMCNC: 28.1 G/DL (ref 32–36)
MCV RBC AUTO: 89.6 FL (ref 80–96)
MONOCYTES # BLD AUTO: 0.53 K/UL (ref 0–0.8)
MONOCYTES NFR BLD AUTO: 6.5 % (ref 2–6)
MPC BLD CALC-MCNC: 9.6 FL (ref 9.4–12.4)
NEUTROPHILS # BLD AUTO: 6.1 K/UL (ref 1.8–7.7)
NEUTROPHILS NFR BLD AUTO: 75.2 % (ref 53–65)
NRBC # BLD AUTO: 0 X10E3/UL
NRBC, AUTO (.00): 0 %
PCO2 BLDA: 37 MMHG (ref 35–48)
PCO2 BLDA: 42 MMHG (ref 35–48)
PH SMN: 7.35 [PH] (ref 7.35–7.45)
PH SMN: 7.39 [PH] (ref 7.35–7.45)
PLATELET # BLD AUTO: 512 K/UL (ref 150–400)
PO2 BLDA: 113 MMHG (ref 83–108)
PO2 BLDA: 51 MMHG (ref 83–108)
POC BASE EXCESS: -2.2 MMOL/L (ref -2–3)
POC BASE EXCESS: -2.4 MMOL/L (ref -2–3)
POC SATURATED O2: 85 % (ref 95–98)
POC SATURATED O2: 98 % (ref 95–98)
POTASSIUM SERPL-SCNC: 4.1 MMOL/L (ref 3.5–5.1)
RBC # BLD AUTO: 2.7 M/UL (ref 4.2–5.4)
RH BLD: NORMAL
SODIUM SERPL-SCNC: 138 MMOL/L (ref 136–145)
SPECIMEN OUTDATE: NORMAL
UNIT NUMBER: NORMAL
WBC # BLD AUTO: 8.11 K/UL (ref 4.5–11)

## 2025-06-29 PROCEDURE — 27000190 HC CPAP FULL FACE MASK W/VALVE

## 2025-06-29 PROCEDURE — 94761 N-INVAS EAR/PLS OXIMETRY MLT: CPT

## 2025-06-29 PROCEDURE — 25000003 PHARM REV CODE 250: Performed by: HOSPITALIST

## 2025-06-29 PROCEDURE — 80048 BASIC METABOLIC PNL TOTAL CA: CPT | Performed by: STUDENT IN AN ORGANIZED HEALTH CARE EDUCATION/TRAINING PROGRAM

## 2025-06-29 PROCEDURE — A4217 STERILE WATER/SALINE, 500 ML: HCPCS | Performed by: INTERNAL MEDICINE

## 2025-06-29 PROCEDURE — 27000207 HC ISOLATION

## 2025-06-29 PROCEDURE — 93005 ELECTROCARDIOGRAM TRACING: CPT

## 2025-06-29 PROCEDURE — 30243N1 TRANSFUSION OF NONAUTOLOGOUS RED BLOOD CELLS INTO CENTRAL VEIN, PERCUTANEOUS APPROACH: ICD-10-PCS | Performed by: STUDENT IN AN ORGANIZED HEALTH CARE EDUCATION/TRAINING PROGRAM

## 2025-06-29 PROCEDURE — 99900035 HC TECH TIME PER 15 MIN (STAT)

## 2025-06-29 PROCEDURE — P9016 RBC LEUKOCYTES REDUCED: HCPCS | Performed by: STUDENT IN AN ORGANIZED HEALTH CARE EDUCATION/TRAINING PROGRAM

## 2025-06-29 PROCEDURE — 94799 UNLISTED PULMONARY SVC/PX: CPT

## 2025-06-29 PROCEDURE — 36415 COLL VENOUS BLD VENIPUNCTURE: CPT | Performed by: STUDENT IN AN ORGANIZED HEALTH CARE EDUCATION/TRAINING PROGRAM

## 2025-06-29 PROCEDURE — 36430 TRANSFUSION BLD/BLD COMPNT: CPT

## 2025-06-29 PROCEDURE — 25000003 PHARM REV CODE 250: Performed by: INTERNAL MEDICINE

## 2025-06-29 PROCEDURE — 93010 ELECTROCARDIOGRAM REPORT: CPT | Mod: ,,, | Performed by: HOSPITALIST

## 2025-06-29 PROCEDURE — 83605 ASSAY OF LACTIC ACID: CPT | Performed by: STUDENT IN AN ORGANIZED HEALTH CARE EDUCATION/TRAINING PROGRAM

## 2025-06-29 PROCEDURE — 27000221 HC OXYGEN, UP TO 24 HOURS

## 2025-06-29 PROCEDURE — 63600175 PHARM REV CODE 636 W HCPCS: Performed by: STUDENT IN AN ORGANIZED HEALTH CARE EDUCATION/TRAINING PROGRAM

## 2025-06-29 PROCEDURE — 82962 GLUCOSE BLOOD TEST: CPT

## 2025-06-29 PROCEDURE — 63600175 PHARM REV CODE 636 W HCPCS: Performed by: HOSPITALIST

## 2025-06-29 PROCEDURE — 20000000 HC ICU ROOM

## 2025-06-29 PROCEDURE — 36600 WITHDRAWAL OF ARTERIAL BLOOD: CPT

## 2025-06-29 PROCEDURE — 25000003 PHARM REV CODE 250: Performed by: STUDENT IN AN ORGANIZED HEALTH CARE EDUCATION/TRAINING PROGRAM

## 2025-06-29 PROCEDURE — 86923 COMPATIBILITY TEST ELECTRIC: CPT | Performed by: STUDENT IN AN ORGANIZED HEALTH CARE EDUCATION/TRAINING PROGRAM

## 2025-06-29 PROCEDURE — 99900031 HC PATIENT EDUCATION (STAT)

## 2025-06-29 PROCEDURE — 85025 COMPLETE CBC W/AUTO DIFF WBC: CPT | Performed by: STUDENT IN AN ORGANIZED HEALTH CARE EDUCATION/TRAINING PROGRAM

## 2025-06-29 PROCEDURE — 94660 CPAP INITIATION&MGMT: CPT

## 2025-06-29 PROCEDURE — 82803 BLOOD GASES ANY COMBINATION: CPT

## 2025-06-29 PROCEDURE — 99291 CRITICAL CARE FIRST HOUR: CPT | Mod: ,,, | Performed by: STUDENT IN AN ORGANIZED HEALTH CARE EDUCATION/TRAINING PROGRAM

## 2025-06-29 PROCEDURE — 86850 RBC ANTIBODY SCREEN: CPT | Performed by: STUDENT IN AN ORGANIZED HEALTH CARE EDUCATION/TRAINING PROGRAM

## 2025-06-29 RX ORDER — MEROPENEM 1 G/1
1 INJECTION, POWDER, FOR SOLUTION INTRAVENOUS
Status: DISCONTINUED | OUTPATIENT
Start: 2025-06-29 | End: 2025-06-29

## 2025-06-29 RX ORDER — DIPHENHYDRAMINE HYDROCHLORIDE 50 MG/ML
50 INJECTION, SOLUTION INTRAMUSCULAR; INTRAVENOUS ONCE
Status: COMPLETED | OUTPATIENT
Start: 2025-06-29 | End: 2025-06-29

## 2025-06-29 RX ORDER — HYDROCODONE BITARTRATE AND ACETAMINOPHEN 500; 5 MG/1; MG/1
TABLET ORAL
Status: DISCONTINUED | OUTPATIENT
Start: 2025-06-29 | End: 2025-07-03

## 2025-06-29 RX ORDER — ACETAMINOPHEN 500 MG
1000 TABLET ORAL ONCE
Status: DISCONTINUED | OUTPATIENT
Start: 2025-06-29 | End: 2025-06-29

## 2025-06-29 RX ORDER — METOPROLOL TARTRATE 1 MG/ML
5 INJECTION, SOLUTION INTRAVENOUS ONCE
Status: COMPLETED | OUTPATIENT
Start: 2025-06-29 | End: 2025-06-29

## 2025-06-29 RX ORDER — ACETAMINOPHEN 650 MG/1
650 SUPPOSITORY RECTAL EVERY 6 HOURS PRN
Status: DISCONTINUED | OUTPATIENT
Start: 2025-06-29 | End: 2025-07-15 | Stop reason: HOSPADM

## 2025-06-29 RX ORDER — ACETAMINOPHEN 10 MG/ML
1000 INJECTION, SOLUTION INTRAVENOUS ONCE
Status: COMPLETED | OUTPATIENT
Start: 2025-06-29 | End: 2025-06-29

## 2025-06-29 RX ORDER — MEROPENEM 500 MG/1
500 INJECTION, POWDER, FOR SOLUTION INTRAVENOUS
Status: DISCONTINUED | OUTPATIENT
Start: 2025-06-29 | End: 2025-07-03

## 2025-06-29 RX ADMIN — HEPARIN SODIUM 5000 UNITS: 5000 INJECTION, SOLUTION INTRAVENOUS; SUBCUTANEOUS at 06:06

## 2025-06-29 RX ADMIN — HEPARIN SODIUM 5000 UNITS: 5000 INJECTION, SOLUTION INTRAVENOUS; SUBCUTANEOUS at 09:06

## 2025-06-29 RX ADMIN — DEXTROSE MONOHYDRATE 12.5 G: 25 INJECTION, SOLUTION INTRAVENOUS at 07:06

## 2025-06-29 RX ADMIN — LEVOTHYROXINE SODIUM 200 MCG: 100 TABLET ORAL at 06:06

## 2025-06-29 RX ADMIN — ACETAMINOPHEN 650 MG: 650 SUPPOSITORY RECTAL at 03:06

## 2025-06-29 RX ADMIN — FUROSEMIDE 160 MG: 10 INJECTION, SOLUTION INTRAMUSCULAR; INTRAVENOUS at 09:06

## 2025-06-29 RX ADMIN — SODIUM BICARBONATE: 84 INJECTION, SOLUTION INTRAVENOUS at 07:06

## 2025-06-29 RX ADMIN — CEFEPIME 1 G: 1 INJECTION, POWDER, FOR SOLUTION INTRAMUSCULAR; INTRAVENOUS at 03:06

## 2025-06-29 RX ADMIN — MUPIROCIN: 20 OINTMENT TOPICAL at 10:06

## 2025-06-29 RX ADMIN — ACETAMINOPHEN 650 MG: 650 SUPPOSITORY RECTAL at 07:06

## 2025-06-29 RX ADMIN — METOPROLOL TARTRATE 5 MG: 1 INJECTION, SOLUTION INTRAVENOUS at 10:06

## 2025-06-29 RX ADMIN — MUPIROCIN: 20 OINTMENT TOPICAL at 09:06

## 2025-06-29 RX ADMIN — ACETAMINOPHEN 1000 MG: 10 INJECTION INTRAVENOUS at 09:06

## 2025-06-29 RX ADMIN — SODIUM BICARBONATE: 84 INJECTION, SOLUTION INTRAVENOUS at 09:06

## 2025-06-29 RX ADMIN — DIPHENHYDRAMINE HYDROCHLORIDE 50 MG: 50 INJECTION INTRAMUSCULAR; INTRAVENOUS at 09:06

## 2025-06-29 RX ADMIN — ACETAMINOPHEN 650 MG: 325 TABLET ORAL at 03:06

## 2025-06-29 RX ADMIN — MEROPENEM 1 G: 1 INJECTION, POWDER, FOR SOLUTION INTRAVENOUS at 10:06

## 2025-06-29 RX ADMIN — MEROPENEM 500 MG: 500 INJECTION, POWDER, FOR SOLUTION INTRAVENOUS at 10:06

## 2025-06-29 RX ADMIN — HEPARIN SODIUM 5000 UNITS: 5000 INJECTION, SOLUTION INTRAVENOUS; SUBCUTANEOUS at 01:06

## 2025-06-29 NOTE — NURSING
Patient's spO2 on 3L of O2 via nasal cannula was 81%.  Applied non rebreather mask and increased O2 to 15L.

## 2025-06-29 NOTE — ASSESSMENT & PLAN NOTE
Postoperative infection after the implantation spinal hardware.  Persistently febrile.  CRP ESR noted.  Consult ortho spine, consult ID.  Daptomycin  6/28 continue dapto, added cefepime due to persistent fevers. MRI could not be done with contrast due to renal function.  US LE to r/o thrombus as cause of fever  6/29 Continue daptomycin
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
## 2025-06-29 NOTE — PROGRESS NOTES
Pharmacist Renal Dose Adjustment Note    Carey Leonardo is a 49 y.o. female being treated with the medication Merrem    Patient Data:    Vital Signs (Most Recent):  Temp: 99.3 °F (37.4 °C) (06/29/25 1208)  Pulse: (!) 123 (06/29/25 1208)  Resp: 18 (06/29/25 1143)  BP: 117/72 (06/29/25 1143)  SpO2: (!) 90 % (06/29/25 1208) Vital Signs (72h Range):  Temp:  [97.3 °F (36.3 °C)-103.1 °F (39.5 °C)]   Pulse:  []   Resp:  [16-20]   BP: ()/(54-95)   SpO2:  [81 %-100 %]      Recent Labs   Lab 06/27/25  0504 06/28/25  0431 06/29/25  0441   CREATININE 4.53* 5.33* 5.84*     Serum creatinine: 5.84 mg/dL (H) 06/29/25 0441  Estimated creatinine clearance: 14.1 mL/min (A)    Medication:Merrem dose: 1 gm frequency q8h will be changed to medication:Merrem dose:500 mg frequency:q12h    Pharmacist's Name: Doirs Osuna  Pharmacist's Extension: [phone]

## 2025-06-29 NOTE — NURSING
Repositioned patient after sats dropped down to 88% and called respiratory since it would not come up after several minutes, patient's o2 went up to 93% after some additional repositioning. Updated Dr. Ram on o2, continuing fever and cold blanket still in use.

## 2025-06-29 NOTE — ASSESSMENT & PLAN NOTE
Anemia is likely due to infection . Most recent hemoglobin and hematocrit are listed below.  Recent Labs     06/27/25  0504 06/28/25  0431 06/29/25  0441   HGB 8.7* 7.3* 6.8*   HCT 31.1* 25.2* 24.2*     Plan  - Monitor serial CBC: Daily  - Transfuse PRBC if patient becomes hemodynamically unstable, symptomatic or H/H drops below 7/21.  - Patient has not received any PRBC transfusions to date  - Patient's anemia is currently stable  - Monitor CBC if H/H continue to drop consider transfusion.  Follow

## 2025-06-29 NOTE — SUBJECTIVE & OBJECTIVE
Interval History:  No acute events overnight    Review of Systems  Objective:     Vital Signs (Most Recent):  Temp: (!) 102.4 °F (39.1 °C) (06/29/25 0907)  Pulse: (!) 116 (06/29/25 0907)  Resp: 20 (06/29/25 0800)  BP: 98/61 (06/29/25 0907)  SpO2: (!) 92 % (06/29/25 0907) Vital Signs (24h Range):  Temp:  [98.1 °F (36.7 °C)-102.7 °F (39.3 °C)] 102.4 °F (39.1 °C)  Pulse:  [] 116  Resp:  [18-20] 20  SpO2:  [81 %-100 %] 92 %  BP: ()/(54-95) 98/61     Weight: 97.5 kg (215 lb)  Body mass index is 33.67 kg/m².    Intake/Output Summary (Last 24 hours) at 6/29/2025 0933  Last data filed at 6/29/2025 0448  Gross per 24 hour   Intake --   Output 1200 ml   Net -1200 ml         Physical Exam  Constitutional:       Appearance: Normal appearance. She is obese.   HENT:      Head: Normocephalic and atraumatic.      Right Ear: External ear normal.      Left Ear: External ear normal.      Nose: Nose normal.      Mouth/Throat:      Mouth: Mucous membranes are dry.   Eyes:      Extraocular Movements: Extraocular movements intact.      Conjunctiva/sclera: Conjunctivae normal.      Pupils: Pupils are equal, round, and reactive to light.   Cardiovascular:      Rate and Rhythm: Normal rate and regular rhythm.      Pulses: Normal pulses.      Heart sounds: Normal heart sounds.   Pulmonary:      Effort: Pulmonary effort is normal.      Breath sounds: Normal breath sounds.   Abdominal:      General: Bowel sounds are normal.      Palpations: Abdomen is soft.      Tenderness: There is no abdominal tenderness.   Musculoskeletal:         General: Normal range of motion.      Cervical back: Normal range of motion and neck supple.   Skin:     General: Skin is warm and dry.      Capillary Refill: Capillary refill takes less than 2 seconds.             Comments: Healed back scar   Neurological:      General: No focal deficit present.      Mental Status: She is alert and oriented to person, place, and time.   Psychiatric:         Mood and  Affect: Mood normal.         Behavior: Behavior normal.         Thought Content: Thought content normal.         Judgment: Judgment normal.               Significant Labs: All pertinent labs within the past 24 hours have been reviewed.    Significant Imaging: I have reviewed all pertinent imaging results/findings within the past 24 hours.

## 2025-06-29 NOTE — ASSESSMENT & PLAN NOTE
MATT is likely due to ATN from vancomycin Baseline creatinine is .65. Most recent creatinine and eGFR are listed below.  Recent Labs     06/27/25  0504 06/28/25  0431 06/29/25  0441   CREATININE 4.53* 5.33* 5.84*   EGFRNORACEVR 11* 9* 8*     Given 1,848 ml bolus of normal saline in the ED     Plan  - MATT is worsening. Will adjust treatment as follows: continuous infusion of .9 Normal Saline  - Avoid nephrotoxins and renally dose meds for GFR listed above  - Monitor urine output, serial BMP, and adjust therapy as needed  6/27 continue IV fluids  6/28 consult nephrology, worse  6/29 now developing respiratory failure with pulmonary edema.

## 2025-06-29 NOTE — ASSESSMENT & PLAN NOTE
Patient with Hypoxic Respiratory failure which is Acute.  she is not on home oxygen. Supplemental oxygen was provided and noted- Oxygen Concentration (%):  [50] 50    .   Signs/symptoms of respiratory failure include- increased work of breathing and lethargy. Contributing diagnoses includes - pulmonary edema Labs and images were reviewed. Patient Has recent ABG, which has been reviewed. Will treat underlying causes and adjust management of respiratory failure as follows- may need dialysis nephrology following

## 2025-06-29 NOTE — ASSESSMENT & PLAN NOTE
Secondary to infection versus medication effect.  Stop cefepime.  Broaden antibiotics  CT head negative.

## 2025-06-29 NOTE — ASSESSMENT & PLAN NOTE
Patient's most recent potassium results are listed below.   Recent Labs     06/27/25  0504 06/28/25  0431 06/29/25  0441   K 3.6 3.9 4.1     Plan  - Replete potassium per protocol  - Monitor potassium Daily  - Patient's hypokalemia is worsening. Will adjust treatment as follows:    - 20 mEq Effer K  Stable

## 2025-06-29 NOTE — ASSESSMENT & PLAN NOTE
Patient's blood pressure range in the last 24 hours was: BP  Min: 98/61  Max: 155/95. Patient requires and takes all medications due to treatment resistant Hypertension that was initiated by Dr. Hurst. The patient's inpatient anti-hypertensive regimen is listed below:  Current Antihypertensives  hydrALAZINE tablet 100 mg, 3 times daily, Oral  metoprolol succinate (TOPROL-XL) 24 hr tablet 100 mg, Daily, Oral  NIFEdipine 24 hr tablet 60 mg, Daily, Oral  cloNIDine tablet 0.2 mg, 3 times daily, Oral    Plan  - BP is controlled, no changes needed to their regimen  - follow

## 2025-06-29 NOTE — PROGRESS NOTES
Patient is non interactive.  She is laying on her side her eyes open briefly during my exam and interview.    Physical exam general patient is chronically ill-appearing, heart is regular rate and rhythm, she has no pitting edema, lungs auscultation anteriorly, abdomen is soft with positive bowel sounds     Assessment/plan 1.  Acute kidney injury-this patient's creatinine is increased to 5.84 mg/dL from 5.3 yesterday.  Her renal injury is be secondary to vancomycin.  She may also have a component of urinary retention issues as she was found to have about 380 cc of urine by bladder scan and a postvoid check yesterday.  She now has a Taylor to gravity drainage.  She has had about 1200 cc out since the catheter was placed yesterday to   2.  Hypertension-patient's blood pressure is 124/58 , continue present antihypertensives   3.  Diabetes mellitus-continue present hypoglycemic therapy   4.  Anemia-patient's hematocrit is 24% down from 25% yesterday, continue to monitor  5.  Spinal abscess-continue meropenem   6.  Metabolic acidosis patient's bicarb is 18 up from 16 yesterday, continue bicarb infusion

## 2025-06-29 NOTE — NURSING
7:16- New bag of Na+ bicarb hung.    6:30- IV Na+ bicarb is out.  Called Pharmacy to bring some more and he said ok he will but it still is not up here yet.

## 2025-06-29 NOTE — ASSESSMENT & PLAN NOTE
Patient seen by Dr. Upton for abscess on lumber spine 2 weeks ago.  Culture positive for MRSA  Patient was started on Vancomycin  and discharged  Patient subsequently developed MATT  Patient remains febrile   Vancomycin D/c due to MATT  Initiating Daptomycin with pharmacy renally dosing  Pain control managed with Tylenol 1000 mg and 5mg Oxycodone    Persistently febrile in the setting of postoperative infection with the implantation of lumbar hardware.  MRSA.  Consult ortho spine, consult ID  6/28 continue dapto, added cefepime. MRI could not be done with contrast due to renal function  Persistently febrile.  Broadened to carbapenem

## 2025-06-29 NOTE — ASSESSMENT & PLAN NOTE
Hyponatremia is likely due to Dehydration/hypovolemia and renal insufficiency. The patient's most recent sodium results are listed below.  Recent Labs     06/27/25  0504 06/28/25  0431 06/29/25  0441    137 138     Plan  - Correct the sodium by 4-6mEq in 24 hours.   - Obtain the following studies: Urine sodium, urine osmolality, serum osmolality.  - Will treat the hyponatremia with IV fluids as follows: continuous .9 normal saline IV infusion  - Monitor sodium Daily.   - Patient hyponatremia is stable  Resolved

## 2025-06-29 NOTE — NURSING
PRN Tylenol given for oral temp of 100.7.  Cooling blanket is on the patient.  Cool cloth placed on patient's forehead and blanket removed off the bed.  Educated patient this was done to try to break her fever and she said ok.

## 2025-06-29 NOTE — NURSING
Obtained phone consent from patient's  with DILAN Dowling for blood administration.    1018 Dr. Ram notified of consent needing to be signed.     1418 notified Dr. Ram that patient's  was in the room.     1748 Dr. Ram signed blood consent. Dr. Ram verbalized patient is going to ICU.

## 2025-06-29 NOTE — NURSING
Oral temp= 102.7.  PRN Tylenol given.  Glucose= 68.  Patient states not eating good and did not eat dinner.  Gave her a Coke and held PM Lantus.

## 2025-06-29 NOTE — PLAN OF CARE
Problem: Adult Inpatient Plan of Care  Goal: Plan of Care Review  Outcome: Progressing  Goal: Patient-Specific Goal (Individualized)  Outcome: Progressing  Goal: Absence of Hospital-Acquired Illness or Injury  Outcome: Progressing  Goal: Optimal Comfort and Wellbeing  Outcome: Progressing  Goal: Readiness for Transition of Care  Outcome: Progressing     Problem: Acute Kidney Injury/Impairment  Goal: Fluid and Electrolyte Balance  Outcome: Progressing  Goal: Improved Oral Intake  Outcome: Progressing  Goal: Effective Renal Function  Outcome: Progressing     Problem: Infection  Goal: Absence of Infection Signs and Symptoms  Outcome: Progressing     Problem: Wound  Goal: Optimal Coping  Outcome: Progressing  Goal: Optimal Functional Ability  Outcome: Progressing  Goal: Absence of Infection Signs and Symptoms  Outcome: Progressing  Goal: Improved Oral Intake  Outcome: Progressing  Goal: Optimal Pain Control and Function  Outcome: Progressing  Goal: Skin Health and Integrity  Outcome: Progressing  Goal: Optimal Wound Healing  Outcome: Progressing     Problem: Skin Injury Risk Increased  Goal: Skin Health and Integrity  Outcome: Progressing

## 2025-06-29 NOTE — NURSING
Patient is now on nonrebreather from overnight. She sats 70s on nasal cannula. Patient is still lethargic and needs repeated stimulation to get patient to follow commands. Dr. Ram notified.     0854 Patient brought back from CT and hooked up to continuous pulse ox and back on venti mask. Attempted to call respiratory twice with no answer. Secure chat sent to respiratory letting them know that patient is back from CT.

## 2025-06-30 ENCOUNTER — ANESTHESIA EVENT (OUTPATIENT)
Dept: SURGERY | Facility: HOSPITAL | Age: 50
End: 2025-06-30
Payer: OTHER GOVERNMENT

## 2025-06-30 ENCOUNTER — ANESTHESIA (OUTPATIENT)
Dept: SURGERY | Facility: HOSPITAL | Age: 50
End: 2025-06-30
Payer: OTHER GOVERNMENT

## 2025-06-30 LAB
ANION GAP SERPL CALCULATED.3IONS-SCNC: 21 MMOL/L (ref 7–16)
AORTIC ROOT ANNULUS: 2.6 CM
AORTIC VALVE CUSP SEPERATION: 2.33 CM
APICAL FOUR CHAMBER EJECTION FRACTION: 68 %
AV INDEX (PROSTH): 0.59
AV MEAN GRADIENT: 11 MMHG
AV PEAK GRADIENT: 18 MMHG
AV VALVE AREA BY VELOCITY RATIO: 1.9 CM²
AV VALVE AREA: 1.9 CM²
AV VELOCITY RATIO: 0.62
BASOPHILS # BLD AUTO: 0.03 K/UL (ref 0–0.2)
BASOPHILS NFR BLD AUTO: 0.2 % (ref 0–1)
BSA FOR ECHO PROCEDURE: 2.24 M2
BUN SERPL-MCNC: 54 MG/DL (ref 7–19)
BUN/CREAT SERPL: 8 (ref 6–20)
CALCIUM SERPL-MCNC: 7.8 MG/DL (ref 8.4–10.2)
CHLORIDE SERPL-SCNC: 102 MMOL/L (ref 98–107)
CK SERPL-CCNC: 1639 U/L (ref 29–168)
CO2 SERPL-SCNC: 20 MMOL/L (ref 22–29)
CREAT SERPL-MCNC: 6.55 MG/DL (ref 0.55–1.02)
CV ECHO LV RWT: 0.47 CM
DIFFERENTIAL METHOD BLD: ABNORMAL
DOP CALC AO PEAK VEL: 2.1 M/S
DOP CALC AO VTI: 34.8 CM
DOP CALC LVOT AREA: 3.1 CM2
DOP CALC LVOT DIAMETER: 2 CM
DOP CALC LVOT PEAK VEL: 1.3 M/S
DOP CALC LVOT STROKE VOLUME: 65 CM3
DOP CALCLVOT PEAK VEL VTI: 20.7 CM
E WAVE DECELERATION TIME: 184 MSEC
E/A RATIO: 1.3
E/E' RATIO: 10 M/S
ECHO LV POSTERIOR WALL: 1.2 CM (ref 0.6–1.1)
EGFR (NO RACE VARIABLE) (RUSH/TITUS): 7 ML/MIN/1.73M2
EJECTION FRACTION: 65 %
EOSINOPHIL # BLD AUTO: 0.04 K/UL (ref 0–0.5)
EOSINOPHIL NFR BLD AUTO: 0.3 % (ref 1–4)
ERYTHROCYTE [DISTWIDTH] IN BLOOD BY AUTOMATED COUNT: 15.4 % (ref 11.5–14.5)
FRACTIONAL SHORTENING: 47.1 % (ref 28–44)
GLUCOSE SERPL-MCNC: 154 MG/DL (ref 74–100)
GLUCOSE SERPL-MCNC: 187 MG/DL (ref 70–105)
GLUCOSE SERPL-MCNC: 190 MG/DL (ref 70–105)
GLUCOSE SERPL-MCNC: 208 MG/DL (ref 70–105)
GLUCOSE SERPL-MCNC: 244 MG/DL (ref 70–105)
HCT VFR BLD AUTO: 26.9 % (ref 38–47)
HGB BLD-MCNC: 7.9 G/DL (ref 12–16)
IMM GRANULOCYTES # BLD AUTO: 0.14 K/UL (ref 0–0.04)
IMM GRANULOCYTES NFR BLD: 1.2 % (ref 0–0.4)
INTERVENTRICULAR SEPTUM: 1.2 CM (ref 0.6–1.1)
IVC DIAMETER: 2.08 CM
LEFT ATRIUM SIZE: 3.6 CM
LEFT INTERNAL DIMENSION IN SYSTOLE: 2.7 CM (ref 2.1–4)
LEFT VENTRICLE DIASTOLIC VOLUME INDEX: 56.94 ML/M2
LEFT VENTRICLE DIASTOLIC VOLUME: 123 ML
LEFT VENTRICLE END DIASTOLIC VOLUME APICAL 4 CHAMBER: 65.2 ML
LEFT VENTRICLE MASS INDEX: 111.7 G/M2
LEFT VENTRICLE SYSTOLIC VOLUME INDEX: 12.5 ML/M2
LEFT VENTRICLE SYSTOLIC VOLUME: 27 ML
LEFT VENTRICULAR INTERNAL DIMENSION IN DIASTOLE: 5.1 CM (ref 3.5–6)
LEFT VENTRICULAR MASS: 241.2 G
LV LATERAL E/E' RATIO: 9.6 M/S
LV SEPTAL E/E' RATIO: 9.6 M/S
LVED V (TEICH): 123.3 ML
LVES V (TEICH): 27.26 ML
LVOT MG: 3.59 MMHG
LVOT MV: 0.9 CM/S
LYMPHOCYTES # BLD AUTO: 0.93 K/UL (ref 1–4.8)
LYMPHOCYTES NFR BLD AUTO: 7.6 % (ref 27–41)
MCH RBC QN AUTO: 25.9 PG (ref 27–31)
MCHC RBC AUTO-ENTMCNC: 29.4 G/DL (ref 32–36)
MCV RBC AUTO: 88.2 FL (ref 80–96)
MONOCYTES # BLD AUTO: 0.64 K/UL (ref 0–0.8)
MONOCYTES NFR BLD AUTO: 5.3 % (ref 2–6)
MPC BLD CALC-MCNC: 9.7 FL (ref 9.4–12.4)
MV PEAK A VEL: 1.03 M/S
MV PEAK E VEL: 1.34 M/S
MV STENOSIS PRESSURE HALF TIME: 53.26 MS
MV VALVE AREA P 1/2 METHOD: 4.13 CM2
NEUTROPHILS # BLD AUTO: 10.38 K/UL (ref 1.8–7.7)
NEUTROPHILS NFR BLD AUTO: 85.4 % (ref 53–65)
NRBC # BLD AUTO: 0.02 X10E3/UL
NRBC, AUTO (.00): 0.2 %
OHS CV RV/LV RATIO: 0.67 CM
OHS QRS DURATION: 72 MS
OHS QTC CALCULATION: 431 MS
PISA TR MAX VEL: 2.2 M/S
PLATELET # BLD AUTO: 506 K/UL (ref 150–400)
POTASSIUM SERPL-SCNC: 3.8 MMOL/L (ref 3.5–5.1)
PV PEAK GRADIENT: 5 MMHG
PV PEAK VELOCITY: 1.07 M/S
RA MAJOR: 3.41 CM
RA PRESSURE ESTIMATED: 15 MMHG
RBC # BLD AUTO: 3.05 M/UL (ref 4.2–5.4)
RIGHT VENTRICLE DIASTOLIC BASEL DIMENSION: 3.4 CM
RIGHT VENTRICLE DIASTOLIC LENGTH: 6.1 CM
RIGHT VENTRICLE DIASTOLIC MID DIMENSION: 2.6 CM
RIGHT VENTRICULAR LENGTH IN DIASTOLE (APICAL 4-CHAMBER VIEW): 6.1 CM
RV MID DIAMA: 2.57 CM
RV TB RVSP: 17 MMHG
SODIUM SERPL-SCNC: 139 MMOL/L (ref 136–145)
T4 FREE SERPL-MCNC: 0.78 NG/DL (ref 0.7–1.48)
TDI LATERAL: 0.14 M/S
TDI SEPTAL: 0.14 M/S
TDI: 0.14 M/S
TR MAX PG: 20 MMHG
TRICUSPID ANNULAR PLANE SYSTOLIC EXCURSION: 2.6 CM
TSH SERPL DL<=0.005 MIU/L-ACNC: 0.32 UIU/ML (ref 0.35–4.94)
TV REST PULMONARY ARTERY PRESSURE: 34 MMHG
WBC # BLD AUTO: 12.16 K/UL (ref 4.5–11)
Z-SCORE OF LEFT VENTRICULAR DIMENSION IN END DIASTOLE: -3.28
Z-SCORE OF LEFT VENTRICULAR DIMENSION IN END SYSTOLE: -3.7

## 2025-06-30 PROCEDURE — 94660 CPAP INITIATION&MGMT: CPT | Mod: XB

## 2025-06-30 PROCEDURE — 63600175 PHARM REV CODE 636 W HCPCS: Performed by: ANESTHESIOLOGY

## 2025-06-30 PROCEDURE — 63600175 PHARM REV CODE 636 W HCPCS: Performed by: ORTHOPAEDIC SURGERY

## 2025-06-30 PROCEDURE — 84443 ASSAY THYROID STIM HORMONE: CPT | Performed by: HOSPITALIST

## 2025-06-30 PROCEDURE — 82550 ASSAY OF CK (CPK): CPT | Performed by: STUDENT IN AN ORGANIZED HEALTH CARE EDUCATION/TRAINING PROGRAM

## 2025-06-30 PROCEDURE — 0QB00ZZ EXCISION OF LUMBAR VERTEBRA, OPEN APPROACH: ICD-10-PCS | Performed by: ORTHOPAEDIC SURGERY

## 2025-06-30 PROCEDURE — 94002 VENT MGMT INPAT INIT DAY: CPT

## 2025-06-30 PROCEDURE — 99291 CRITICAL CARE FIRST HOUR: CPT | Mod: ,,, | Performed by: INTERNAL MEDICINE

## 2025-06-30 PROCEDURE — 87070 CULTURE OTHR SPECIMN AEROBIC: CPT | Performed by: ORTHOPAEDIC SURGERY

## 2025-06-30 PROCEDURE — 63600175 PHARM REV CODE 636 W HCPCS: Performed by: STUDENT IN AN ORGANIZED HEALTH CARE EDUCATION/TRAINING PROGRAM

## 2025-06-30 PROCEDURE — 99900035 HC TECH TIME PER 15 MIN (STAT)

## 2025-06-30 PROCEDURE — 87075 CULTR BACTERIA EXCEPT BLOOD: CPT | Performed by: ORTHOPAEDIC SURGERY

## 2025-06-30 PROCEDURE — A4217 STERILE WATER/SALINE, 500 ML: HCPCS | Performed by: INTERNAL MEDICINE

## 2025-06-30 PROCEDURE — 25000003 PHARM REV CODE 250: Performed by: INTERNAL MEDICINE

## 2025-06-30 PROCEDURE — 63600175 PHARM REV CODE 636 W HCPCS: Performed by: NURSE ANESTHETIST, CERTIFIED REGISTERED

## 2025-06-30 PROCEDURE — 63600175 PHARM REV CODE 636 W HCPCS: Performed by: INTERNAL MEDICINE

## 2025-06-30 PROCEDURE — 31500 INSERT EMERGENCY AIRWAY: CPT

## 2025-06-30 PROCEDURE — 36415 COLL VENOUS BLD VENIPUNCTURE: CPT | Performed by: HOSPITALIST

## 2025-06-30 PROCEDURE — 80048 BASIC METABOLIC PNL TOTAL CA: CPT | Performed by: STUDENT IN AN ORGANIZED HEALTH CARE EDUCATION/TRAINING PROGRAM

## 2025-06-30 PROCEDURE — 25000003 PHARM REV CODE 250: Performed by: NURSE ANESTHETIST, CERTIFIED REGISTERED

## 2025-06-30 PROCEDURE — 36415 COLL VENOUS BLD VENIPUNCTURE: CPT | Performed by: ORTHOPAEDIC SURGERY

## 2025-06-30 PROCEDURE — 82962 GLUCOSE BLOOD TEST: CPT

## 2025-06-30 PROCEDURE — 25000003 PHARM REV CODE 250: Performed by: NURSE PRACTITIONER

## 2025-06-30 PROCEDURE — 84439 ASSAY OF FREE THYROXINE: CPT | Performed by: HOSPITALIST

## 2025-06-30 PROCEDURE — 36000706: Performed by: ORTHOPAEDIC SURGERY

## 2025-06-30 PROCEDURE — 27000207 HC ISOLATION

## 2025-06-30 PROCEDURE — 20000000 HC ICU ROOM

## 2025-06-30 PROCEDURE — 94761 N-INVAS EAR/PLS OXIMETRY MLT: CPT

## 2025-06-30 PROCEDURE — 0J973ZX DRAINAGE OF BACK SUBCUTANEOUS TISSUE AND FASCIA, PERCUTANEOUS APPROACH, DIAGNOSTIC: ICD-10-PCS | Performed by: ORTHOPAEDIC SURGERY

## 2025-06-30 PROCEDURE — 99900026 HC AIRWAY MAINTENANCE (STAT)

## 2025-06-30 PROCEDURE — 37000009 HC ANESTHESIA EA ADD 15 MINS: Performed by: ORTHOPAEDIC SURGERY

## 2025-06-30 PROCEDURE — 27000221 HC OXYGEN, UP TO 24 HOURS

## 2025-06-30 PROCEDURE — 36000707: Performed by: ORTHOPAEDIC SURGERY

## 2025-06-30 PROCEDURE — 85025 COMPLETE CBC W/AUTO DIFF WBC: CPT | Performed by: STUDENT IN AN ORGANIZED HEALTH CARE EDUCATION/TRAINING PROGRAM

## 2025-06-30 PROCEDURE — 37000008 HC ANESTHESIA 1ST 15 MINUTES: Performed by: ORTHOPAEDIC SURGERY

## 2025-06-30 PROCEDURE — 63600175 PHARM REV CODE 636 W HCPCS: Performed by: HOSPITALIST

## 2025-06-30 PROCEDURE — 87040 BLOOD CULTURE FOR BACTERIA: CPT | Performed by: ORTHOPAEDIC SURGERY

## 2025-06-30 RX ORDER — LIDOCAINE HYDROCHLORIDE 20 MG/ML
INJECTION, SOLUTION EPIDURAL; INFILTRATION; INTRACAUDAL; PERINEURAL
Status: DISCONTINUED | OUTPATIENT
Start: 2025-06-30 | End: 2025-06-30

## 2025-06-30 RX ORDER — PHENYLEPHRINE HYDROCHLORIDE 10 MG/ML
INJECTION INTRAVENOUS
Status: DISCONTINUED | OUTPATIENT
Start: 2025-06-30 | End: 2025-06-30

## 2025-06-30 RX ORDER — FENTANYL CITRATE 50 UG/ML
50 INJECTION, SOLUTION INTRAMUSCULAR; INTRAVENOUS
Refills: 0 | Status: ACTIVE | OUTPATIENT
Start: 2025-06-30 | End: 2025-06-30

## 2025-06-30 RX ORDER — MIDAZOLAM HYDROCHLORIDE 1 MG/ML
INJECTION INTRAMUSCULAR; INTRAVENOUS
Status: DISCONTINUED | OUTPATIENT
Start: 2025-06-30 | End: 2025-06-30

## 2025-06-30 RX ORDER — FENTANYL CITRATE 50 UG/ML
50 INJECTION, SOLUTION INTRAMUSCULAR; INTRAVENOUS
Refills: 0 | Status: DISCONTINUED | OUTPATIENT
Start: 2025-06-30 | End: 2025-07-03

## 2025-06-30 RX ORDER — PROPOFOL 10 MG/ML
VIAL (ML) INTRAVENOUS
Status: DISCONTINUED | OUTPATIENT
Start: 2025-06-30 | End: 2025-06-30

## 2025-06-30 RX ORDER — CHLORHEXIDINE GLUCONATE ORAL RINSE 1.2 MG/ML
15 SOLUTION DENTAL 2 TIMES DAILY
Status: DISCONTINUED | OUTPATIENT
Start: 2025-06-30 | End: 2025-07-03

## 2025-06-30 RX ORDER — FUROSEMIDE 10 MG/ML
120 INJECTION INTRAMUSCULAR; INTRAVENOUS ONCE
Status: DISCONTINUED | OUTPATIENT
Start: 2025-06-30 | End: 2025-06-30

## 2025-06-30 RX ORDER — CALCIUM CHLORIDE DIHYDRATE 100 MG/ML
INJECTION, SOLUTION INTRAVENOUS
Status: DISCONTINUED | OUTPATIENT
Start: 2025-06-30 | End: 2025-06-30

## 2025-06-30 RX ORDER — FUROSEMIDE 10 MG/ML
80 INJECTION INTRAMUSCULAR; INTRAVENOUS ONCE
Status: DISCONTINUED | OUTPATIENT
Start: 2025-06-30 | End: 2025-06-30

## 2025-06-30 RX ORDER — FENTANYL CITRATE 50 UG/ML
INJECTION, SOLUTION INTRAMUSCULAR; INTRAVENOUS
Status: DISCONTINUED | OUTPATIENT
Start: 2025-06-30 | End: 2025-06-30

## 2025-06-30 RX ORDER — PANTOPRAZOLE SODIUM 40 MG/10ML
40 INJECTION, POWDER, LYOPHILIZED, FOR SOLUTION INTRAVENOUS DAILY
Status: DISCONTINUED | OUTPATIENT
Start: 2025-07-01 | End: 2025-07-15 | Stop reason: HOSPADM

## 2025-06-30 RX ORDER — ACETAMINOPHEN 10 MG/ML
1000 INJECTION, SOLUTION INTRAVENOUS ONCE
Status: COMPLETED | OUTPATIENT
Start: 2025-06-30 | End: 2025-06-30

## 2025-06-30 RX ORDER — METOPROLOL TARTRATE 50 MG/1
50 TABLET ORAL 2 TIMES DAILY
Status: DISCONTINUED | OUTPATIENT
Start: 2025-06-30 | End: 2025-07-02

## 2025-06-30 RX ORDER — DILTIAZEM HYDROCHLORIDE 30 MG/1
30 TABLET, FILM COATED ORAL EVERY 12 HOURS
Status: DISCONTINUED | OUTPATIENT
Start: 2025-06-30 | End: 2025-07-02

## 2025-06-30 RX ORDER — VANCOMYCIN HYDROCHLORIDE 1 G/20ML
INJECTION, POWDER, LYOPHILIZED, FOR SOLUTION INTRAVENOUS
Status: DISCONTINUED | OUTPATIENT
Start: 2025-06-30 | End: 2025-06-30

## 2025-06-30 RX ORDER — GABAPENTIN 300 MG/1
300 CAPSULE ORAL DAILY
Status: DISCONTINUED | OUTPATIENT
Start: 2025-07-01 | End: 2025-06-30

## 2025-06-30 RX ORDER — ROCURONIUM BROMIDE 10 MG/ML
INJECTION, SOLUTION INTRAVENOUS
Status: DISCONTINUED | OUTPATIENT
Start: 2025-06-30 | End: 2025-06-30

## 2025-06-30 RX ORDER — DEXMEDETOMIDINE HYDROCHLORIDE 4 UG/ML
1.4 INJECTION, SOLUTION INTRAVENOUS CONTINUOUS
Status: DISCONTINUED | OUTPATIENT
Start: 2025-06-30 | End: 2025-07-03

## 2025-06-30 RX ADMIN — DEXMEDETOMIDINE HYDROCHLORIDE IN 0.9% SODIUM CHLORIDE 0.7 MCG/KG/HR: 4 INJECTION INTRAVENOUS at 04:06

## 2025-06-30 RX ADMIN — FENTANYL CITRATE 25 MCG: 50 INJECTION, SOLUTION INTRAMUSCULAR; INTRAVENOUS at 02:06

## 2025-06-30 RX ADMIN — PROPOFOL 150 MG: 10 INJECTION, EMULSION INTRAVENOUS at 01:06

## 2025-06-30 RX ADMIN — MEROPENEM 500 MG: 500 INJECTION, POWDER, FOR SOLUTION INTRAVENOUS at 10:06

## 2025-06-30 RX ADMIN — SODIUM CHLORIDE: 9 INJECTION, SOLUTION INTRAVENOUS at 01:06

## 2025-06-30 RX ADMIN — AMIODARONE HYDROCHLORIDE 0.5 MG/MIN: 1.8 INJECTION, SOLUTION INTRAVENOUS at 09:06

## 2025-06-30 RX ADMIN — FUROSEMIDE 200 MG: 10 INJECTION, SOLUTION INTRAMUSCULAR; INTRAVENOUS at 05:06

## 2025-06-30 RX ADMIN — INSULIN GLARGINE 20 UNITS: 100 INJECTION, SOLUTION SUBCUTANEOUS at 09:06

## 2025-06-30 RX ADMIN — AMIODARONE HYDROCHLORIDE 0.5 MG/MIN: 1.8 INJECTION, SOLUTION INTRAVENOUS at 04:06

## 2025-06-30 RX ADMIN — FENTANYL CITRATE 25 MCG: 50 INJECTION, SOLUTION INTRAMUSCULAR; INTRAVENOUS at 03:06

## 2025-06-30 RX ADMIN — INSULIN ASPART 5 UNITS: 100 INJECTION, SOLUTION INTRAVENOUS; SUBCUTANEOUS at 06:06

## 2025-06-30 RX ADMIN — PHENYLEPHRINE HYDROCHLORIDE 150 MCG: 10 INJECTION INTRAVENOUS at 01:06

## 2025-06-30 RX ADMIN — CHLORHEXIDINE GLUCONATE 15 ML: 1.2 RINSE ORAL at 09:06

## 2025-06-30 RX ADMIN — MIDAZOLAM HYDROCHLORIDE 2 MG: 1 INJECTION, SOLUTION INTRAMUSCULAR; INTRAVENOUS at 03:06

## 2025-06-30 RX ADMIN — FENTANYL CITRATE 50 MCG: 50 INJECTION, SOLUTION INTRAMUSCULAR; INTRAVENOUS at 03:06

## 2025-06-30 RX ADMIN — MEROPENEM 500 MG: 500 INJECTION, POWDER, FOR SOLUTION INTRAVENOUS at 09:06

## 2025-06-30 RX ADMIN — INSULIN ASPART 5 UNITS: 100 INJECTION, SOLUTION INTRAVENOUS; SUBCUTANEOUS at 11:06

## 2025-06-30 RX ADMIN — SUGAMMADEX 200 MG: 100 INJECTION, SOLUTION INTRAVENOUS at 03:06

## 2025-06-30 RX ADMIN — LIDOCAINE HYDROCHLORIDE 100 MG: 20 INJECTION, SOLUTION EPIDURAL; INFILTRATION; INTRACAUDAL; PERINEURAL at 01:06

## 2025-06-30 RX ADMIN — INSULIN ASPART 4 UNITS: 100 INJECTION, SOLUTION INTRAVENOUS; SUBCUTANEOUS at 11:06

## 2025-06-30 RX ADMIN — INSULIN ASPART 5 UNITS: 100 INJECTION, SOLUTION INTRAVENOUS; SUBCUTANEOUS at 05:06

## 2025-06-30 RX ADMIN — DEXMEDETOMIDINE HYDROCHLORIDE IN 0.9% SODIUM CHLORIDE 0.5 MCG/KG/HR: 4 INJECTION INTRAVENOUS at 04:06

## 2025-06-30 RX ADMIN — HEPARIN SODIUM 5000 UNITS: 5000 INJECTION, SOLUTION INTRAVENOUS; SUBCUTANEOUS at 06:06

## 2025-06-30 RX ADMIN — ROCURONIUM BROMIDE 30 MG: 10 INJECTION, SOLUTION INTRAVENOUS at 03:06

## 2025-06-30 RX ADMIN — FENTANYL CITRATE 25 MCG: 50 INJECTION, SOLUTION INTRAMUSCULAR; INTRAVENOUS at 01:06

## 2025-06-30 RX ADMIN — PHENYLEPHRINE HYDROCHLORIDE 100 MCG: 10 INJECTION INTRAVENOUS at 02:06

## 2025-06-30 RX ADMIN — CALCIUM CHLORIDE 0.5 G: 100 INJECTION, SOLUTION INTRAVENOUS at 02:06

## 2025-06-30 RX ADMIN — AMIODARONE HYDROCHLORIDE 1 MG/MIN: 1.8 INJECTION, SOLUTION INTRAVENOUS at 03:06

## 2025-06-30 RX ADMIN — HEPARIN SODIUM 5000 UNITS: 5000 INJECTION, SOLUTION INTRAVENOUS; SUBCUTANEOUS at 09:06

## 2025-06-30 RX ADMIN — ACETAMINOPHEN 1000 MG: 10 INJECTION INTRAVENOUS at 11:06

## 2025-06-30 RX ADMIN — ROCURONIUM BROMIDE 50 MG: 10 INJECTION, SOLUTION INTRAVENOUS at 01:06

## 2025-06-30 RX ADMIN — DEXMEDETOMIDINE HYDROCHLORIDE IN 0.9% SODIUM CHLORIDE 0.5 MCG/KG/HR: 4 INJECTION INTRAVENOUS at 09:06

## 2025-06-30 RX ADMIN — MUPIROCIN: 20 OINTMENT TOPICAL at 09:06

## 2025-06-30 RX ADMIN — INSULIN ASPART 2 UNITS: 100 INJECTION, SOLUTION INTRAVENOUS; SUBCUTANEOUS at 05:06

## 2025-06-30 RX ADMIN — AMIODARONE HYDROCHLORIDE 150 MG: 1.5 INJECTION, SOLUTION INTRAVENOUS at 03:06

## 2025-06-30 RX ADMIN — SODIUM BICARBONATE: 84 INJECTION, SOLUTION INTRAVENOUS at 09:06

## 2025-06-30 NOTE — INTERVAL H&P NOTE
The patient has been examined and the H&P has been reviewed:    I concur with the findings and changes have been noted since the H&P was written: Plan for lumbar I&D    Anesthesia/Surgery risks, benefits and alternative options discussed and understood by patient/family.          Active Hospital Problems    Diagnosis  POA    *MATT (acute kidney injury) [N17.9]  Yes    Encephalopathy, metabolic [G93.41]  Yes    Acute hypoxic respiratory failure [J96.01]  Yes    Metabolic acidosis [E87.20]  Yes    MRSA (methicillin resistant Staphylococcus aureus) infection [A49.02]  Yes    Abscess in epidural space of lumbar spine [G06.1]  No    Anemia [D64.9]  Yes    Hypokalemia [E87.6]  Yes    Hyponatremia [E87.1]  Yes    Acquired hypothyroidism [E03.9]  Yes    Essential (primary) hypertension [I10]  Yes    Infection of lumbar spine [M46.26]  Yes    Obstructive sleep apnea syndrome [G47.33]  Yes    Mild intermittent asthma without complication [J45.20]  Yes      Resolved Hospital Problems   No resolved problems to display.

## 2025-06-30 NOTE — PROGRESS NOTES
Pharmacist Renal Dose Adjustment Note    Carey Leonardo is a 49 y.o. female being treated with the medication gabapentin.    Patient Data:    Vital Signs (Most Recent):  Temp: (!) 102.4 °F (39.1 °C) (06/30/25 1045)  Pulse: (!) 120 (06/30/25 1100)  Resp: (!) 27 (06/30/25 1100)  BP: (!) 171/69 (06/30/25 1100)  SpO2: 98 % (06/30/25 1100) Vital Signs (72h Range):  Temp:  [97.5 °F (36.4 °C)-103 °F (39.4 °C)]   Pulse:  []   Resp:  [11-37]   BP: ()/()   SpO2:  [81 %-100 %]      Recent Labs   Lab 06/28/25  0431 06/29/25  0441 06/30/25  0300   CREATININE 5.33* 5.84* 6.55*     Serum creatinine: 6.55 mg/dL (H) 06/30/25 0300  Estimated creatinine clearance: 13.2 mL/min (A)    Medication:Gabapentin dose: 300 mg frequency three times daily will be changed to medication:Gabapentin dose:300 mg frequency:daily.     Pharmacist's Name: Zohra Edgar  Pharmacist's Extension: [phone]

## 2025-06-30 NOTE — PLAN OF CARE
Thin gold wedding band removed in pts room (icu 10) and placed in bag labeled with pt name per ICU staff. Ring left in pts room. On arrival to OR pt noted to have upper denture plate. Upper plate removed per anesthesia, placed in bag labeled with pt sticker and transported back to icu 10 with pt at end if procedure

## 2025-06-30 NOTE — PROGRESS NOTES
Office: [phone]    Subjective:   Transferred to the ICU with respiratory distress.  Creatinine worsening.  Show    I/O as of 9:01 AM:   Intake/Output - Last 3 Shifts         06/28 0700 06/29 0659 06/29 0700 06/30 0659 06/30 0700 07/01 0659    P.O.       I.V. (mL/kg)  2433 (22.9)     Blood  329.2     IV Piggyback  263.8     Total Intake(mL/kg)  3026 (28.5)     Urine (mL/kg/hr) 1200 (0.5)  2150 (10)    Total Output 1200  2150    Net -1200 +3026 -2150           Unmeasured Stool Occurrence  2 x              Vitals / Physical Exam:  Vitals:    06/30/25 0700 06/30/25 0715 06/30/25 0730 06/30/25 0745   BP: (!) 163/85 (!) 158/95 (!) 178/81 (!) 171/101   Pulse: 106 108 109 (!) 114   Resp: (!) 23 (!) 25 17 (!) 29   Temp:  99.8 °F (37.7 °C)     TempSrc:  Axillary     SpO2: 99% 98% 98% (!) 93%   Weight:       Height:          Awake.  Responds to questions.  Somewhat disoriented  Moving bilateral upper and lower extremities.  Not the most cooperative with exam   Sensation intact    Assessment / Plan:   Carey Leonardo is a 49 y.o. female with prior L2-L4 lateral fusion followed by L2-L5 laminectomy and fusion with subsequent posterior wound infection status post I&D 06/12/2025    - medical comanagement per hospitalist/ICU  - discussed with Dr. Martines.  Patient would benefit from repeat I and D lumbar wound for source control.  Nephrology is does not plan to dialyze today.  - TEDs/SCDs  - PT  - Please call should you have any questions regarding this patient's care      Signature:  Cyril Upton MD     Electronic Signature

## 2025-06-30 NOTE — SUBJECTIVE & OBJECTIVE
Interval History/Significant Events:  Patient will awaken and answer simple questions follows simple commands not completely oriented    Review of Systems  Objective:     Vital Signs (Most Recent):  Temp: 97.8 °F (36.6 °C) (06/30/25 0301)  Pulse: (!) 116 (06/30/25 0340)  Resp: 17 (06/30/25 0340)  BP: (!) 151/85 (06/30/25 0340)  SpO2: 99 % (06/30/25 0340) Vital Signs (24h Range):  Temp:  [97.5 °F (36.4 °C)-102.7 °F (39.3 °C)] 97.8 °F (36.6 °C)  Pulse:  [105-161] 116  Resp:  [11-21] 17  SpO2:  [88 %-100 %] 99 %  BP: ()/() 151/85   Weight: 106.1 kg (233 lb 14.5 oz)  Body mass index is 36.64 kg/m².      Intake/Output Summary (Last 24 hours) at 6/30/2025 0554  Last data filed at 6/30/2025 0315  Gross per 24 hour   Intake 3025.96 ml   Output --   Net 3025.96 ml          Physical Exam  Vitals reviewed.   Constitutional:       Appearance: Normal appearance.      Interventions: She is not intubated.  HENT:      Head: Normocephalic and atraumatic.      Nose: Nose normal.      Mouth/Throat:      Mouth: Mucous membranes are dry.      Pharynx: Oropharynx is clear.   Eyes:      Extraocular Movements: Extraocular movements intact.      Conjunctiva/sclera: Conjunctivae normal.      Pupils: Pupils are equal, round, and reactive to light.   Cardiovascular:      Rate and Rhythm: Normal rate.      Heart sounds: Normal heart sounds. No murmur heard.  Pulmonary:      Effort: Pulmonary effort is normal. She is not intubated.      Breath sounds: Normal breath sounds.   Abdominal:      General: Abdomen is flat. Bowel sounds are normal.      Palpations: Abdomen is soft.   Musculoskeletal:         General: Normal range of motion.      Cervical back: Normal range of motion and neck supple.      Right lower leg: No edema.      Left lower leg: No edema.   Skin:     General: Skin is warm and dry.      Capillary Refill: Capillary refill takes less than 2 seconds.   Neurological:      General: No focal deficit present.      Mental  Status: She is alert and oriented to person, place, and time.   Psychiatric:         Mood and Affect: Mood normal.         Behavior: Behavior normal.            Vents:  Oxygen Concentration (%): 100 (06/30/25 0354)  Lines/Drains/Airways       Drain  Duration                  Urethral Catheter 06/28/25 1654 1 day              Peripheral Intravenous Line  Duration             Peripheral IV 06/29/25 0730 20 G Left Antecubital <1 day    Peripheral IV Single Lumen 06/29/25 1900 20 G Right Antecubital <1 day    Peripheral IV Single Lumen 06/29/25 2015 22 G Right Forearm <1 day                  Significant Labs:    CBC/Anemia Profile:  Recent Labs   Lab 06/29/25  0441 06/30/25  0300   WBC 8.11 12.16*   HGB 6.8* 7.9*   HCT 24.2* 26.9*   * 506*   MCV 89.6 88.2   RDW 15.5* 15.4*        Chemistries:  Recent Labs   Lab 06/29/25  0441 06/30/25  0300    139   K 4.1 3.8    102   CO2 18* 20*   BUN 45* 54*   CREATININE 5.84* 6.55*   CALCIUM 7.7* 7.8*       Recent Lab Results  (Last 5 results in the past 24 hours)        06/30/25  0504   06/30/25  0300   06/29/25  2053   06/29/25  1810   06/29/25  1650        Anion Gap   21             Baso #   0.03             Basophil %   0.2             BUN   54  Comment: Pharmacy monitoring CK for daptomycin             BUN/CREAT RATIO   8             Calcium   7.8  Comment: Pharmacy monitoring CK for daptomycin             Chloride   102  Comment: Pharmacy monitoring CK for daptomycin             CO2   20  Comment: Pharmacy monitoring CK for daptomycin             CPK   1,639  Comment: Pharmacy monitoring CK for daptomycin             Creatinine   6.55  Comment: Pharmacy monitoring CK for daptomycin             Differential Method   Auto             eGFR   7  Comment: Estimated GFR calculated using the CKD-EPI creatinine (2021) equation.             Eos #   0.04             Eos %   0.3             Free T4   0.78             Glucose   154  Comment: Pharmacy monitoring CK for  daptomycin             Hematocrit   26.9             Hemoglobin   7.9             Immature Grans (Abs)   0.14             Immature Granulocytes   1.2             Lactic Acid Level               Lymph #   0.93             Lymph %   7.6             MCH   25.9             MCHC   29.4             MCV   88.2             Mono #   0.64             Mono %   5.3             MPV   9.7             Neutrophils, Abs   10.38             Neutrophils Relative   85.4             nRBC   0.2             NUCLEATED RBC ABSOLUTE   0.02             Platelet Count   506             POC Base Excess       -2.2         POC Glucose 208     144     114       POC HCO3       22.4         POC PCO2       37         POC PH       7.39         POC PO2       51         POC SATURATED O2       85         Potassium   3.8  Comment: Pharmacy monitoring CK for daptomycin             RBC   3.05             RDW   15.4             Sodium   139  Comment: Pharmacy monitoring CK for daptomycin             TSH   0.321             WBC   12.16                                    Significant Imaging:  I have reviewed all pertinent imaging results/findings within the past 24 hours.

## 2025-06-30 NOTE — PROGRESS NOTES
Infectious Disease [facility] E-consult Follow-up -    Patient Name: Carey Leonardo    Attending Physician: Kameron Martines MD   Primary Care Physician: Zainab Harrell FNP     Consultation Information  This patient recommendation is based on a telemedicine consult request which was completed asynchronously through chart review and information provided by the primary physician. The patient was not seen or examined today. The evaluation is consultative in nature and all patient care and treatment decisions can either be accepted or rejected by the patient's primary hospital-based treating physician using their own independent medical judgment for their patient.    Consultant Contact Information: Please call [facility] Call Center [phone]      Assessment & Plan      Impression: 49 year old patient,  s/p laminectomy and fusion c/b early postoperative infection w/ MRSA, on treatment with vancomycin, readmitted with fevers and MATT.  Patient with persistent fevers despite broad spectrum abx.     Over the weekend transferred to ICU given worsening kidney function, respiratory failure likely due to volume overload, requiring BiPAP. IMprpving urine output with Lasix ~2L. Patient is persistently febrile, T max 39.1.   Increasing WBC to 12.16, worsening Cr to 6.55. Increasing -> 1639.  She was broadened to Daptomycin and meropenem over the weekend.   Blood cultures from 6/26, only 1set, NGTD.   TTE on 6/30 without vegetations.   MRI spine with nonspecific prominent fluid collection extended throughout  operative bed and surrounding hardware, but not extending to spinal canal or thecal sac. Seroma vs superimposed infected collection.   She is planned for lumbar I&D.        ID related problem list  MRSA lumbar spinal hardware infection  MATT,   relevant drug allergies: penicillins (has tolerated cefazolin prior admissions)        Recommendations  Continue daptomycin 8 mg/kg q 48 hours  Continue meropenem  Given  "that only 1 set of cultures was sent, I will send repeat blood cultures x 2 today  If I&D is performed, please send for aerobic, anaerobic and fungal cultures  Consider PICC line exchange when stable      Thank you for involving us in the care of this patient. Infectious diseases will follow with you. Please contact us via the [facility] call center at [phone] should any questions arise.    TISHA RANKIN MD  Infectious Diseases Attending  [facility]         Subjective   Interval History: Over the weekend transferred to ICU given worsening kidney function, respiratory failure likely due to volume overload, requiring BiPAP. Improving urine output with Lasix ~2L. Patient is persistently febrile, T max 39.1.   Increasing WBC to 12.16, worsening Cr to 6.55. Increasing -> 1639.  She was broadened to Daptomycin and meropenem over the weekend.   Blood cultures from 6/26, only 1set, NGTD.   TTE on 6/30 without vegetations.   MRI spine with nonspecific prominent fluid collection extended throughout  operative bed and surrounding hardware, but not extending to spinal canal or thecal sac. Seroma vs superimposed infected collection.   She is planned for lumbar I&D.             Objective   Vitals: T:(!) 100.9 °F (38.3 °C),  BP:(!) 147/59, HR:(!) 120, RR:19, SaO2:96%,FiO2-O2 (L/m): , Dosing Wt:  Wt Readings from Last 1 Encounters:   06/29/25 106.1 kg (233 lb 14.5 oz)   , BMI:Body mass index is 36.64 kg/m².    Physical Exam: Patient not seen or examined.    Results Review    Labs:      CBC  Recent Labs     06/28/25  0431 06/29/25  0441 06/30/25  0300   WBC 8.12 8.11 12.16*   HGB 7.3* 6.8* 7.9*   HCT 25.2* 24.2* 26.9*   * 512* 506*     Renal function  Recent Labs     06/28/25  0431 06/29/25  0441 06/30/25  0300    138 139   K 3.9 4.1 3.8   CREATININE 5.33* 5.84* 6.55*     Liver function  No results for input(s): "AST", "ALT", "BILITOT", "BILIDIR" in the last 72 hours.  Coagulation  No results for " "input(s): "PT", "INR", "APTT" in the last 72 hours.   Procalcitonin  No results for input(s): "PROCALCIT" in the last 72 hours.    Microbiology:      Susceptibility data from last 90 days.  Collected Specimen Info Organism Amox/ K Clav Amp/Sulbactam Ampicillin Azithromycin Cefazolin Ciprofloxacin Clindamycin Daptomycin Erythromycin Gentamicin LEVOFLOXACIN ETEST Linezolid Oxacillin Penicillin Rifampin   06/12/25 Wound from Back, Lower Methicillin resistant Staphylococcus aureus  R  R  R  R  R  S  R  S  R  S  S  S  R  R  S   06/12/25 Wound from Back, Lower Methicillin resistant Staphylococcus aureus  R  R  R  R  R  S  R  S  R  S  S  S  R  R  S     Collected Specimen Info Organism Tetracycline Trimeth/Sulfa Vancomycin   06/12/25 Wound from Back, Lower Methicillin resistant Staphylococcus aureus  R  S  S   06/12/25 Wound from Back, Lower Methicillin resistant Staphylococcus aureus  R  S  S       Imaging:     Echo  Result Date: 6/30/2025    Left Ventricle: The left ventricle is moderately dilated. Mildly increased wall thickness. There is normal systolic function. Ejection fraction is approximately 65%. There is normal diastolic function.   Right Ventricle: The right ventricle is normal in size measuring 3.4 cm. Systolic function is normal.   Tricuspid Valve: There is mild regurgitation with an eccentrically directed jet.   IVC/SVC: Elevated venous pressure at 15 mmHg.     X-Ray Chest AP Portable  Result Date: 6/29/2025  EXAMINATION: XR CHEST AP PORTABLE CLINICAL HISTORY: hypoxia; TECHNIQUE: Portable view of the chest was performed. COMPARISON: 06/26/2025. FINDINGS: There are diffuse bilateral pulmonary infiltrates.  No significant pleural effusion.  Heart size is enlarged.  Bony thorax intact.     1. Diffuse bilateral pulmonary infiltrates likely reflecting pulmonary edema.  Differential diagnosis includes multi lobar pneumonia.  Correlate clinically with possible fever and/or elevated white count. 2. Cardiomegaly. This " report was flagged in Epic as abnormal. Electronically signed by: Tomer Paulino Date:    06/29/2025 Time:    08:52    CT Head Without Contrast  Result Date: 6/29/2025  EXAMINATION: CT HEAD WITHOUT CONTRAST CLINICAL HISTORY: Mental status change, unknown cause; TECHNIQUE: Low dose axial images were obtained through the head.  Coronal and sagittal reformations were also performed. Contrast was not administered. COMPARISON: CT 05/12/2014. FINDINGS: There is no acute hemorrhage or infarction.  There is a normal pattern of gray-white matter differentiation. No extra-axial fluid collections.  Ventricles are normal in size, shape and configuration.  The basal cisterns are patent. The imaged paranasal sinuses and ethmoid air cells are well aerated. The mastoid air cells and middle ears are normally pneumatized.     No acute intracranial abnormality. Electronically signed by: Tomer Paulino Date:    06/29/2025 Time:    08:51

## 2025-06-30 NOTE — NURSING
Wakes, nods head to yes/no questions. States her name and birthday. Bipap mask in place. Follows commands.   To OR [id number]. Back from surgery intubated. Woke, followed commands prior to Precidex started for sedation. Hemavac drain x2 intact.   Patient's  updated on the phone this AM, came to visit while she was in surgery, updated him and he took her cell phone and  home. Updated her sister Derrick on the phone.   Fever of 102.4 treated pre op with Ofirmev.   Post op, esophageal temperature probe placed.

## 2025-06-30 NOTE — PLAN OF CARE
06/30/25 1109   Rounds   Attendance Provider;;Charge nurse;Physical therapist;Pharmacist;Nurse    Discharge Plan A Home with family;Home Health   Why the patient remains in the hospital Requires continued medical care   Transition of Care Barriers None     Per SIBR rounds pt to go back to surgery today. CM following for discharge needs.

## 2025-06-30 NOTE — NURSING
Called patient's - Jorge Leonardo and let him know that patient has been transferred to ICU room 10.  He said he will get his niece to drive him up here and thanked me for calling.

## 2025-06-30 NOTE — PLAN OF CARE
Problem: Adult Inpatient Plan of Care  Goal: Optimal Comfort and Wellbeing  Intervention: Monitor Pain and Promote Comfort  Flowsheets (Taken 6/29/2025 2340)  Pain Management Interventions:   quiet environment facilitated   relaxation techniques promoted   position adjusted   pillow support provided   pain management plan reviewed with patient/caregiver  Intervention: Provide Person-Centered Care  Flowsheets (Taken 6/29/2025 2340)  Trust Relationship/Rapport:   care explained   choices provided   emotional support provided   empathic listening provided   questions answered   thoughts/feelings acknowledged   reassurance provided   questions encouraged     Problem: Skin Injury Risk Increased  Goal: Skin Health and Integrity  Outcome: Progressing  Intervention: Optimize Skin Protection  Flowsheets (Taken 6/29/2025 2340)  Pressure Reduction Techniques:   weight shift assistance provided   heels elevated off bed   pressure points protected   positioned off wounds  Pressure Reduction Devices: specialty bed utilized  Skin Protection:   transparent dressing maintained   silicone foam dressing in place   incontinence pads utilized

## 2025-06-30 NOTE — TRANSFER OF CARE
"Anesthesia Transfer of Care Note    Patient: Carey Leonardo    Procedure(s) Performed: Procedure(s) (LRB):  INCISION AND DRAINAGE, ABSCESS, SPINE, LUMBOSACRAL, POSTERIOR (N/A)    Patient location: ICU    Anesthesia Type: general    Transport from OR: Transported from OR intubated on 100% O2 by AMBU with adequate controlled ventilation. Continuous ECG monitoring in transport. Continuous SpO2 monitoring in transport    Post pain: adequate analgesia    Post assessment: no apparent anesthetic complications and tolerated procedure well    Post vital signs: stable    Level of consciousness: sedated    Nausea/Vomiting: no nausea/vomiting    Complications: none    Transfer of care protocol was followedComments: Tolerated transport well. RN at bedside for report and update. Hooked to ventilator by respiratory therapist. VSS.       Last vitals: Visit Vitals  BP (!) 146/73 (BP Location: Left arm)   Pulse (!) 114   Temp 36.8 °C (98.2 °F) (Oral)   Resp (!) 21   Ht 5' 7" (1.702 m)   Wt 106.1 kg (233 lb 14.5 oz)   LMP  (LMP Unknown)   SpO2 95%   Breastfeeding No   BMI 36.64 kg/m²     "

## 2025-06-30 NOTE — PROGRESS NOTES
[facility]  Critical Care Medicine  Progress Note    Patient Name: Carey Leonardo  MRN: [id number]  Admission Date: 6/26/2025  Hospital Length of Stay: 4 days  Code Status: Full Code  Attending Provider: Sergio Ram DO  Primary Care Provider: Zainab Harrell FNP   Principal Problem: MATT (acute kidney injury)    Subjective:     HPI:  No notes on file    Hospital/ICU Course:  No notes on file    Interval History/Significant Events:  Patient will awaken and answer simple questions follows simple commands not completely oriented    Review of Systems  Objective:     Vital Signs (Most Recent):  Temp: 97.8 °F (36.6 °C) (06/30/25 0301)  Pulse: (!) 116 (06/30/25 0340)  Resp: 17 (06/30/25 0340)  BP: (!) 151/85 (06/30/25 0340)  SpO2: 99 % (06/30/25 0340) Vital Signs (24h Range):  Temp:  [97.5 °F (36.4 °C)-102.7 °F (39.3 °C)] 97.8 °F (36.6 °C)  Pulse:  [105-161] 116  Resp:  [11-21] 17  SpO2:  [88 %-100 %] 99 %  BP: ()/() 151/85   Weight: 106.1 kg (233 lb 14.5 oz)  Body mass index is 36.64 kg/m².      Intake/Output Summary (Last 24 hours) at 6/30/2025 0554  Last data filed at 6/30/2025 0315  Gross per 24 hour   Intake 3025.96 ml   Output --   Net 3025.96 ml          Physical Exam  Vitals reviewed.   Constitutional:       Appearance: Normal appearance.      Interventions: She is not intubated.  HENT:      Head: Normocephalic and atraumatic.      Nose: Nose normal.      Mouth/Throat:      Mouth: Mucous membranes are dry.      Pharynx: Oropharynx is clear.   Eyes:      Extraocular Movements: Extraocular movements intact.      Conjunctiva/sclera: Conjunctivae normal.      Pupils: Pupils are equal, round, and reactive to light.   Cardiovascular:      Rate and Rhythm: Normal rate.      Heart sounds: Normal heart sounds. No murmur heard.  Pulmonary:      Effort: Pulmonary effort is normal. She is not intubated.      Breath sounds: Normal breath sounds.   Abdominal:      General: Abdomen is flat.  Bowel sounds are normal.      Palpations: Abdomen is soft.   Musculoskeletal:         General: Normal range of motion.      Cervical back: Normal range of motion and neck supple.      Right lower leg: No edema.      Left lower leg: No edema.   Skin:     General: Skin is warm and dry.      Capillary Refill: Capillary refill takes less than 2 seconds.   Neurological:      General: No focal deficit present.      Mental Status: She is alert and oriented to person, place, and time.   Psychiatric:         Mood and Affect: Mood normal.         Behavior: Behavior normal.            Vents:  Oxygen Concentration (%): 100 (06/30/25 0354)  Lines/Drains/Airways       Drain  Duration                  Urethral Catheter 06/28/25 1654 1 day              Peripheral Intravenous Line  Duration             Peripheral IV 06/29/25 0730 20 G Left Antecubital <1 day    Peripheral IV Single Lumen 06/29/25 1900 20 G Right Antecubital <1 day    Peripheral IV Single Lumen 06/29/25 2015 22 G Right Forearm <1 day                  Significant Labs:    CBC/Anemia Profile:  Recent Labs   Lab 06/29/25  0441 06/30/25  0300   WBC 8.11 12.16*   HGB 6.8* 7.9*   HCT 24.2* 26.9*   * 506*   MCV 89.6 88.2   RDW 15.5* 15.4*        Chemistries:  Recent Labs   Lab 06/29/25  0441 06/30/25  0300    139   K 4.1 3.8    102   CO2 18* 20*   BUN 45* 54*   CREATININE 5.84* 6.55*   CALCIUM 7.7* 7.8*       Recent Lab Results  (Last 5 results in the past 24 hours)        06/30/25  0504   06/30/25  0300   06/29/25  2053   06/29/25  1810   06/29/25  1650        Anion Gap   21             Baso #   0.03             Basophil %   0.2             BUN   54  Comment: Pharmacy monitoring CK for daptomycin             BUN/CREAT RATIO   8             Calcium   7.8  Comment: Pharmacy monitoring CK for daptomycin             Chloride   102  Comment: Pharmacy monitoring CK for daptomycin             CO2   20  Comment: Pharmacy monitoring CK for daptomycin              CPK   1,639  Comment: Pharmacy monitoring CK for daptomycin             Creatinine   6.55  Comment: Pharmacy monitoring CK for daptomycin             Differential Method   Auto             eGFR   7  Comment: Estimated GFR calculated using the CKD-EPI creatinine (2021) equation.             Eos #   0.04             Eos %   0.3             Free T4   0.78             Glucose   154  Comment: Pharmacy monitoring CK for daptomycin             Hematocrit   26.9             Hemoglobin   7.9             Immature Grans (Abs)   0.14             Immature Granulocytes   1.2             Lactic Acid Level               Lymph #   0.93             Lymph %   7.6             MCH   25.9             MCHC   29.4             MCV   88.2             Mono #   0.64             Mono %   5.3             MPV   9.7             Neutrophils, Abs   10.38             Neutrophils Relative   85.4             nRBC   0.2             NUCLEATED RBC ABSOLUTE   0.02             Platelet Count   506             POC Base Excess       -2.2         POC Glucose 208     144     114       POC HCO3       22.4         POC PCO2       37         POC PH       7.39         POC PO2       51         POC SATURATED O2       85         Potassium   3.8  Comment: Pharmacy monitoring CK for daptomycin             RBC   3.05             RDW   15.4             Sodium   139  Comment: Pharmacy monitoring CK for daptomycin             TSH   0.321             WBC   12.16                                    Significant Imaging:  I have reviewed all pertinent imaging results/findings within the past 24 hours.    ABG  Recent Labs   Lab 06/29/25  1810   PH 7.39   PO2 51*   PCO2 37   HCO3 22.4     Assessment/Plan:     Neuro  Encephalopathy, metabolic  More alert than last night still unable to answer complicated questions does not seem oriented to time replace    Pulmonary  Acute hypoxic respiratory failure  Patient has bilateral infiltrates on x-ray is tolerating BiPAP reasonably well.   Will watch for now no need to repeat blood gases    Mild intermittent asthma without complication  Worsening shortness of breath last night thought to be more volume overload than asthma    Cardiac/Vascular  Essential (primary) hypertension  Fair control will not add any medicines right now    Renal/  * MATT (acute kidney injury)  Continues to make urine probably 700 cc overnight, creatinine up to 6.56    Metabolic acidosis  Improving with bicarb    Hypokalemia  Potassium up to 3.8    ID  Abscess in epidural space of lumbar spine  Noted    MRSA (methicillin resistant Staphylococcus aureus) infection  Continue daptomycin    Infection of lumbar spine  MRI currently pending because of her renal failure receiving daptomycin Merrem    Oncology  Anemia  Hemoglobin stable this morning    Endocrine  Hyponatremia  Resolved    Acquired hypothyroidism  Noted    Other  Obstructive sleep apnea syndrome  Noted on BiPAP currently       Critical Care Daily Checklist:    A: Awake: RASS Goal/Actual Goal:    Actual:     B: Spontaneous Breathing Trial Performed?     C: SAT & SBT Coordinated?  Not applicable                      D: Delirium: CAM-ICU     E: Early Mobility Performed? No   F: Feeding Goal:    Status:     Current Diet Order   Procedures    Diet Adult Regular      AS: Analgesia/Sedation Pain relief as needed   T: Thromboembolic Prophylaxis SubQ heparin   H: HOB > 300 Yes   U: Stress Ulcer Prophylaxis (if needed) Not required currently   G: Glucose Control Elevated will watch   B: Bowel Function Unmeasured Stool Occurrence: 1   I: Indwelling Catheter (Lines & Taylor) Necessity Taylor   D: De-escalation of Antimicrobials/Pharmacotherapies Not appropriate    Plan for the day/ETD Possible dialysis    Code Status:  Family/Goals of Care: Full Code  Discuss with family     Critical Care Time:  35 minutes  Critical secondary to Patient has a condition that poses threat to life and bodily function:  Acute respiratory failure with  acute renal failure and sepsis      Critical care was time spent personally by me on the following activities: development of treatment plan with patient or surrogate and bedside caregivers, discussions with consultants, evaluation of patient's response to treatment, examination of patient, ordering and performing treatments and interventions, ordering and review of laboratory studies, ordering and review of radiographic studies, pulse oximetry, re-evaluation of patient's condition. This critical care time did not overlap with that of any other provider or involve time for any procedures.     Kameron Martines MD  Critical Care Medicine  [facility]

## 2025-06-30 NOTE — PLAN OF CARE
Problem: Noninvasive Ventilation Acute  Goal: Effective Unassisted Ventilation and Oxygenation  Outcome: Progressing  Intervention: Monitor and Manage Noninvasive Ventilation  Flowsheets (Taken 6/30/2025 2615)  Airway/Ventilation Management:   airway patency maintained   calming measures promoted   pulmonary hygiene promoted  NPPV/CPAP Maintenance: skin (device) pressure points assessed

## 2025-06-30 NOTE — ANESTHESIA PREPROCEDURE EVALUATION
06/30/2025  Carey Leonardo is a 49 y.o., female.      Pre-op Assessment    I have reviewed the Patient Summary Reports.     I have reviewed the Nursing Notes. I have reviewed the NPO Status.   I have reviewed the Medications.     Review of Systems  Anesthesia Hx:  No problems with previous Anesthesia             Denies Family Hx of Anesthesia complications.    Denies Personal Hx of Anesthesia complications.                    Social:  Non-Smoker, No Alcohol Use       Hematology/Oncology:       -- Anemia:                                  Cardiovascular:     Hypertension    Dysrhythmias atrial fibrillation         ECG has been reviewed. Recent Afib with RVR - on amiodarone infusion                            Pulmonary:    Asthma mild   Sleep Apnea, CPAP Acute pulmonary edema with respiratory distress requiring BiPaP     High probability of inability to extubate following this procedure               Renal/:  Chronic Renal Disease, ARF   Cr>6, being followed by renal service - currently on HCO3 infusion, has not required HD yet             Musculoskeletal:     Lumbar TLIF 3 weeks ago, has I/D lumbar abscess performed 2 weeks ago, started on vancomycin (MRSA) and has suffered MATT with worsening renal function, Cr >6. Patient has not been dialyzed as of yet - renal service following  Patient has been posted for return to OR for I/D, washout of lumbar epidural abscess as patient is septic       Spine Disorders: lumbar Chronic Pain           Neurological:           Patient is encephalopathic from sepsis, MATT - consents obtained from her       Chronic Pain Syndrome                         Endocrine:  Diabetes Hypothyroidism        Obesity / BMI > 30  Psych:  Psychiatric History  depression              Past Medical History:   Diagnosis Date    Acquired hypothyroidism     Chronic serous otitis media of both  ears 04/04/2023    Depressive disorder     Diabetes mellitus type 2 in obese     Essential (primary) hypertension     Gastroparesis     IBS (irritable bowel syndrome)     Kidney stones     Mild intermittent asthma without complication 05/20/2025    Mixed hyperlipidemia     Obstructive sleep apnea syndrome 05/20/2025    Postlaminectomy syndrome, lumbar region 04/28/2022    [facility]     Past Surgical History:   Procedure Laterality Date    Bilateral L3-S1 MBB Bilateral 6-, 5-, 1-8-2014    Dr Jessica Randolph    CARPAL TUNNEL RELEASE      CHOLECYSTECTOMY      DIAGNOSTIC LAPAROSCOPY  04/14/2010    Exploratory Laparotomy, Myomectomy, Hydrotubation-Dr. Feng    DILATION AND CURETTAGE OF UTERUS  04/14/2010    Hysteroscopy-Dr. Feng    EPIDURAL STEROID INJECTION N/A 10/22/2024    Procedure: Injection, Steroid, Epidural, L4/5;  Surgeon: Rasheeda Bills MD;  Location: [facility];  Service: Pain Management;  Laterality: N/A;    EXPLORATORY LAPAROTOMY WITH UTERINE MYOMECTOMY  02/27/2007    Dr. Marquise Anderson    FUSION, SPINE, LATERAL APPROACH N/A 5/20/2025    Procedure: ARTHRODESIS, SPINE, LATERAL;  Surgeon: Cyril Upton MD;  Location: [facility] OR;  Service: Orthopedics;  Laterality: N/A;  L2-L4 LATERAL FUSION    HYSTERECTOMY  09/29/2011    Robotic, FABIENNE, Cystoscopy-Dr. Feng    HYSTEROSCOPY WITH DILATION AND CURETTAGE OF UTERUS  04/04/2006    Laparoscopy, Chromotubation-Dr. Marquise Anderson    INCISION AND DRAINAGE, ABSCESS, SPINE, LUMBOSACRAL, POSTERIOR N/A 6/12/2025    Procedure: INCISION AND DRAINAGE, ABSCESS, SPINE, LUMBOSACRAL, POSTERIOR;  Surgeon: Cyril Upton MD;  Location: [facility] OR;  Service: Neurosurgery;  Laterality: N/A;    INJECTION OF ANESTHETIC AGENT AROUND ILIOINGUINAL NERVE Right 6/22/2023    Procedure: BLOCK, NERVE, ILIOINGUINAL;  Surgeon: Rasheeda Bills MD;  Location: [facility];  Service: Pain Management;  Laterality: Right;    INJECTION OF  ANESTHETIC AGENT AROUND MEDIAL BRANCH NERVES INNERVATING LUMBAR FACET JOINT Bilateral 9/21/2023    Procedure: BLOCK, NERVE, FACET JOINT, LUMBAR, MEDIAL BRANCH;  Surgeon: Rasheeda Bills MD;  Location: [facility];  Service: Pain Management;  Laterality: Bilateral;    INJECTION OF ANESTHETIC AGENT AROUND MEDIAL BRANCH NERVES INNERVATING LUMBAR FACET JOINT Bilateral 3/14/2024    Procedure: BLOCK, NERVE, FACET JOINT, LUMBAR, MEDIAL BRANCH, BILATERAL L4-S1 MBB;  Surgeon: Rasheeda Bills MD;  Location: [facility];  Service: Pain Management;  Laterality: Bilateral;    LAMINECTOMY N/A 11/30/2021    Procedure: L2-L5 Laminectomy;  Surgeon: Cyril Upton MD;  Location: [facility] OR;  Service: Neurosurgery;  Laterality: N/A;    LEFT HEART CATHETERIZATION      Left L3-S1 RFTC Left 07/18/2017    Dr Lopez    Left SI JI Left 09/30/2013    Dr Randolph    MYRINGOTOMY WITH INSERTION OF VENTILATION TUBE Bilateral 4/4/2023    Procedure: MYRINGOTOMY, WITH TYMPANOSTOMY TUBE INSERTION (T-TUBES);  Surgeon: Sea Hinton MD;  Location: [facility] OR;  Service: ENT;  Laterality: Bilateral;  T-TUBES    MYRINGOTOMY WITH INSERTION OF VENTILATION TUBE Bilateral 9/12/2023    Procedure: MYRINGOTOMY, WITH TYMPANOSTOMY TUBE INSERTION, T-TUBES;  Surgeon: Sea Hinton MD;  Location: [facility] OR;  Service: ENT;  Laterality: Bilateral;    MYRINGOTOMY WITH INSERTION OF VENTILATION TUBE Bilateral 7/2/2024    Procedure: MYRINGOTOMY, WITH TYMPANOSTOMY TUBE INSERTION;  Surgeon: Sea Hinton MD;  Location: [facility] OR;  Service: ENT;  Laterality: Bilateral;  Bilateral T-Tubes    OOPHORECTOMY  11/11/2014    Robotic, FABIENNE, Cystoscopy-Dr. Feng    SINUS SURGERY      TRANSFORAMINAL LUMBAR INTERBODY FUSION N/A 5/21/2025    Procedure: ARTHRODESIS, SPINE, LUMBAR, TLIF;  Surgeon: Cyril Upton MD;  Location: [facility] OR;  Service: Orthopedics;  Laterality: N/A;  L2-L5 POSTERIOR FUSION, L4-L5 TLIF         Physical  Exam  General: Well nourished and Confusion    Airway:  Mallampati: III   Mouth Opening: Normal  TM Distance: Normal  Tongue: Normal  Neck ROM: Normal ROM    Chest/Lungs:  Normal Respiratory Rate, Rhonchi    Heart:  Rate: Tachycardia  Rhythm: Regular Rhythm        Chemistry        Component Value Date/Time     06/30/2025 0300    K 3.8 06/30/2025 0300     06/30/2025 0300    CO2 20 (L) 06/30/2025 0300    BUN 54 (H) 06/30/2025 0300    CREATININE 6.55 (H) 06/30/2025 0300     (H) 06/30/2025 0300        Component Value Date/Time    CALCIUM 7.8 (L) 06/30/2025 0300    ALKPHOS 94 06/26/2025 1906    AST 83 (H) 06/26/2025 1906    ALT 36 06/26/2025 1906    BILITOT 0.4 06/26/2025 1906    ESTGFRAFRICA 107 11/23/2021 1346    EGFRNONAA 68 06/15/2022 0943        Lab Results   Component Value Date    WBC 12.16 (H) 06/30/2025    HGB 7.9 (L) 06/30/2025    HCT 26.9 (L) 06/30/2025     (H) 06/30/2025     Results for orders placed or performed in visit on 05/12/25   EKG 12-lead    Collection Time: 05/12/25 10:18 AM   Result Value Ref Range    QRS Duration 86 ms    [facility] QTC Calculation 455 ms    Narrative    Test Reason : Z01.818,    Vent. Rate :  64 BPM     Atrial Rate :  64 BPM     P-R Int : 146 ms          QRS Dur :  86 ms      QT Int : 442 ms       P-R-T Axes :  44  61 -69 degrees    QTcB Int : 455 ms    Normal sinus rhythm  T wave abnormality, consider inferolateral ischemia  Abnormal ECG  When compared with ECG of 03-Oct-2024 21:17,  Questionable change in The axis  Inverted T waves have replaced nonspecific T wave abnormality in Inferior  leads  Inverted T waves have replaced nonspecific T wave abnormality in Lateral  leads  Confirmed by Didier Mitchell ([id number]) on 5/21/2025 2:46:59 PM    Referred By: HARITHA RODAS           Confirmed By: Didier Mitchell           Anesthesia Plan  Type of Anesthesia, risks & benefits discussed:    Anesthesia Type: Gen ETT  Intra-op Monitoring Plan: Standard ASA  Monitors  Post Op Pain Control Plan: multimodal analgesia  Induction:  IV  Airway Plan: Direct, Post-Induction  Informed Consent: Informed consent signed with the Patient and all parties understand the risks and agree with anesthesia plan.  All questions answered.   ASA Score: 3  Day of Surgery Review of History & Physical: H&P Update referred to the surgeon/provider.I have interviewed and examined the patient. I have reviewed the patient's H&P dated: There are no significant changes.     Ready For Surgery From Anesthesia Perspective.     .

## 2025-06-30 NOTE — ASSESSMENT & PLAN NOTE
Patient has bilateral infiltrates on x-ray is tolerating BiPAP reasonably well.  Will watch for now no need to repeat blood gases

## 2025-06-30 NOTE — PROGRESS NOTES
Patient is more awake and interactive today.  She responded appropriately.    Physical exam general patient is chronically ill-appearing, heart is regular rate and rhythm, she has no pitting edema, lungs are clear to auscultation anteriorly, abdomen is soft with positive bowel sounds     Assessment/plan 1.  Acute kidney injury-this patient's creatinine is increased to 6.3 mg/dL from 5.8 yesterday.  Her renal injury is secondary to vancomycin.  She may also have a component of urinary retention issues as she was found to have about 380 cc of urine by bladder scan on a postvoid check a few days ago.  She now has a Taylor to gravity drainage.  She seems to be responding to Lasix with about 2000 cc of urine output over the past few shifts   2.  Hypertension-continue present antihypertensives  3.  Diabetes mellitus-continue present hypoglycemic therapy   4.  Anemia-patient's hematocrit is 24% down from 25% yesterday, continue to monitor  5.  Spinal abscess-continue meropenem, surgery is to evaluate for read debridement of her wound.   6.  Metabolic acidosis patient's bicarb is 20 up from 18 yesterday, continue bicarb infusion  7.  Respiratory failure-patient seems to be responding well to BiPAP, I am going to decrease her IV fluid rate and ask pharmacy to concentrate her IV medication maximally as allowed to decrease her fluid input.    Discussed her case with Dr. Martines today    Vitals:    06/30/25 0700 06/30/25 0715 06/30/25 0730 06/30/25 0745   BP: (!) 163/85 (!) 158/95 (!) 178/81 (!) 171/101   Pulse: 106 108 109 (!) 114   Resp: (!) 23 (!) 25 17 (!) 29   Temp:  99.8 °F (37.7 °C)     TempSrc:  Axillary     SpO2: 99% 98% 98% (!) 93%   Weight:       Height:

## 2025-06-30 NOTE — ASSESSMENT & PLAN NOTE
More alert than last night still unable to answer complicated questions does not seem oriented to time replace

## 2025-06-30 NOTE — OP NOTE
[facility]  Surgery Department  Operative Note    SUMMARY     Date of Procedure: 6/30/2025     Procedure: Procedure(s) (LRB):  INCISION AND DRAINAGE, ABSCESS, SPINE, LUMBOSACRAL, POSTERIOR (N/A)     Surgeons and Role:     * Cyril Upton MD - Primary    Assistant: None    Pre-Operative Diagnosis: MRSA (methicillin resistant Staphylococcus aureus) infection [A49.02]    Post-Operative Diagnosis: Post-Op Diagnosis Codes:     * MRSA (methicillin resistant Staphylococcus aureus) infection [A49.02]    Anesthesia: General    Technical Procedures Used:   1. Incision and drainage deep spinal abscess, lumbar, 55248-01     Description of the Findings of the Procedure:   Indications:  This is a 49 y.o. female with history of L2-L4 lateral fusion followed by L2-L5 laminectomy and fusion with subsequent posterior wound infection.  She had undergone previous I and D 06/12/2025.  Recently admitted with acute kidney injury and workup revealed elevating CRP and deep fluid collection on MRI.  Risks, benefits, and alternatives were discussed with the patient and they elected to proceed with surgery.    Procedure in detail:  The patient was identified in the ICU and the surgical site was marked. They were then taken back to the operating room where general endotracheal anesthesia was induced. They were then transitioned to the prone position on the Jayesh frame with the arms and legs in the physiologic position and all bony prominences well-padded. The posterior lumbar spine was prepped and draped in the normal sterile fashion. A timeout was performed.     The previous incision was used.  The skin was opened and no fluid was noted in the superficial space.  The fascial incision was then opened.  Fluid was noted in the deep space.  Deep cultures were taken.  The deep space was copiously irrigated with an initial round of normal saline.  Sharp excisional debridement to the level of the bone was  performed with scalpel, Zhao elevator, curette and rongeur.  Remaining unincorporated bone graft was removed from the lateral gutters.  Another round of copious irrigation with normal saline was performed.  IrriSept chlorhexidine irrigation was used.  A medium Hemovac drain was placed in the depths of the wound.    The fascial layer was closed with interrupted figure-of-eight #1 PDS suture. The subcutaneous layer was closed with interrupted 2-0 Monocryl suture suture.  The skin was closed with interrupted 3-0 nylon.  The drains were secured with Vicryl suture.  A sterile dressing was applied with Xeroform, fluff gauze and Tegaderm.    The patient was then transitioned back to supine position, extubated and awakened and taken to ICU in stable condition having suffered no untoward event.      Complications: No    Estimated Blood Loss (EBL):  50 mL           Implants: * No implants in log *    Specimens:  Deep cultures  Specimen (24h ago, onward)      None                    Condition: Good    Disposition: PACU - hemodynamically stable.    Attestation: I was present and scrubbed for the entire procedure.

## 2025-06-30 NOTE — ANESTHESIA POSTPROCEDURE EVALUATION
Anesthesia Post Evaluation    Patient: Carey Leonardo    Procedure(s) Performed: Procedure(s) (LRB):  INCISION AND DRAINAGE, ABSCESS, SPINE, LUMBOSACRAL, POSTERIOR (N/A)    Final Anesthesia Type: general      Patient location during evaluation: ICU  Patient participation: No - Unable to Participate, Intubation  Level of consciousness: sedated  Post-procedure vital signs: reviewed and stable  Pain management: adequate  Airway patency: patent  KILO mitigation strategies: Multimodal analgesia  PONV status at discharge: No PONV  Anesthetic complications: no      Cardiovascular status: hemodynamically stable  Respiratory status: intubated and ventilator  Hydration status: euvolemic  Follow-up needed   Comments: Patient did not meet extubation criteria, low lung volumes with pressure support of 25 or greater - sats in the 80s with increased FiO2, recruitment maneuvers performed - decision made to remain intubated and transfer to ICU with ventilatory support, handoff of information given to ICU, RN              Vitals Value Taken Time   /73 06/30/25 16:04   Temp 36.8 °C (98.2 °F) 06/30/25 16:04   Pulse 115 06/30/25 16:08   Resp 21 06/30/25 16:08   SpO2 88 % 06/30/25 16:08   Vitals shown include unfiled device data.      No case tracking events are documented in the log.      Pain/Miah Score: Pain Rating Prior to Med Admin: -- (fever 102.4) (6/30/2025 11:30 AM)  Pain Rating Post Med Admin: 0 (treatment of fever) (6/30/2025 12:02 PM)

## 2025-07-01 LAB
ABO + RH BLD: NORMAL
ANION GAP SERPL CALCULATED.3IONS-SCNC: 16 MMOL/L (ref 7–16)
BASOPHILS # BLD AUTO: 0.02 K/UL (ref 0–0.2)
BASOPHILS NFR BLD AUTO: 0.2 % (ref 0–1)
BLD PROD TYP BPU: NORMAL
BLOOD UNIT EXPIRATION DATE: NORMAL
BLOOD UNIT TYPE CODE: 5100
BUN SERPL-MCNC: 62 MG/DL (ref 7–19)
BUN/CREAT SERPL: 9 (ref 6–20)
CALCIUM SERPL-MCNC: 7.6 MG/DL (ref 8.4–10.2)
CHLORIDE SERPL-SCNC: 99 MMOL/L (ref 98–107)
CK SERPL-CCNC: 737 U/L (ref 29–168)
CO2 SERPL-SCNC: 26 MMOL/L (ref 22–29)
CREAT SERPL-MCNC: 6.73 MG/DL (ref 0.55–1.02)
CROSSMATCH INTERPRETATION: NORMAL
DIFFERENTIAL METHOD BLD: ABNORMAL
DISPENSE STATUS: NORMAL
EGFR (NO RACE VARIABLE) (RUSH/TITUS): 7 ML/MIN/1.73M2
EOSINOPHIL # BLD AUTO: 0.16 K/UL (ref 0–0.5)
EOSINOPHIL NFR BLD AUTO: 1.7 % (ref 1–4)
ERYTHROCYTE [DISTWIDTH] IN BLOOD BY AUTOMATED COUNT: 15.7 % (ref 11.5–14.5)
GLUCOSE SERPL-MCNC: 134 MG/DL (ref 70–105)
GLUCOSE SERPL-MCNC: 172 MG/DL (ref 74–100)
GLUCOSE SERPL-MCNC: 178 MG/DL (ref 70–105)
GLUCOSE SERPL-MCNC: 182 MG/DL (ref 70–105)
GLUCOSE SERPL-MCNC: 197 MG/DL (ref 70–105)
GLUCOSE SERPL-MCNC: 95 MG/DL (ref 70–105)
GLUCOSE SERPL-MCNC: 99 MG/DL (ref 70–105)
HCO3 UR-SCNC: 33.4 MMOL/L (ref 21–28)
HCT VFR BLD AUTO: 21.7 % (ref 38–47)
HGB BLD-MCNC: 6.5 G/DL (ref 12–16)
IMM GRANULOCYTES # BLD AUTO: 0.08 K/UL (ref 0–0.04)
IMM GRANULOCYTES NFR BLD: 0.8 % (ref 0–0.4)
LYMPHOCYTES # BLD AUTO: 0.73 K/UL (ref 1–4.8)
LYMPHOCYTES NFR BLD AUTO: 7.6 % (ref 27–41)
MCH RBC QN AUTO: 25.2 PG (ref 27–31)
MCHC RBC AUTO-ENTMCNC: 30 G/DL (ref 32–36)
MCV RBC AUTO: 84.1 FL (ref 80–96)
MONOCYTES # BLD AUTO: 0.52 K/UL (ref 0–0.8)
MONOCYTES NFR BLD AUTO: 5.4 % (ref 2–6)
MPC BLD CALC-MCNC: 10.2 FL (ref 9.4–12.4)
NEUTROPHILS # BLD AUTO: 8.06 K/UL (ref 1.8–7.7)
NEUTROPHILS NFR BLD AUTO: 84.3 % (ref 53–65)
NRBC # BLD AUTO: 0.02 X10E3/UL
NRBC, AUTO (.00): 0.2 %
PCO2 BLDA: 47 MMHG (ref 35–48)
PH SMN: 7.46 [PH] (ref 7.35–7.45)
PLATELET # BLD AUTO: 467 K/UL (ref 150–400)
PO2 BLDA: 100 MMHG (ref 83–108)
POC BASE EXCESS: 8.4 MMOL/L (ref -2–3)
POC SATURATED O2: 98 % (ref 95–98)
POTASSIUM SERPL-SCNC: 3.5 MMOL/L (ref 3.5–5.1)
RBC # BLD AUTO: 2.58 M/UL (ref 4.2–5.4)
SODIUM SERPL-SCNC: 137 MMOL/L (ref 136–145)
UNIT NUMBER: NORMAL
WBC # BLD AUTO: 9.57 K/UL (ref 4.5–11)

## 2025-07-01 PROCEDURE — 25000003 PHARM REV CODE 250: Performed by: INTERNAL MEDICINE

## 2025-07-01 PROCEDURE — 25000003 PHARM REV CODE 250: Performed by: ORTHOPAEDIC SURGERY

## 2025-07-01 PROCEDURE — 99900035 HC TECH TIME PER 15 MIN (STAT)

## 2025-07-01 PROCEDURE — 36430 TRANSFUSION BLD/BLD COMPNT: CPT

## 2025-07-01 PROCEDURE — 63600175 PHARM REV CODE 636 W HCPCS: Performed by: ORTHOPAEDIC SURGERY

## 2025-07-01 PROCEDURE — 36415 COLL VENOUS BLD VENIPUNCTURE: CPT

## 2025-07-01 PROCEDURE — 25000003 PHARM REV CODE 250: Performed by: NURSE PRACTITIONER

## 2025-07-01 PROCEDURE — 85025 COMPLETE CBC W/AUTO DIFF WBC: CPT | Performed by: ORTHOPAEDIC SURGERY

## 2025-07-01 PROCEDURE — 82550 ASSAY OF CK (CPK): CPT

## 2025-07-01 PROCEDURE — 80048 BASIC METABOLIC PNL TOTAL CA: CPT | Performed by: ORTHOPAEDIC SURGERY

## 2025-07-01 PROCEDURE — 83735 ASSAY OF MAGNESIUM: CPT | Performed by: INTERNAL MEDICINE

## 2025-07-01 PROCEDURE — 86923 COMPATIBILITY TEST ELECTRIC: CPT

## 2025-07-01 PROCEDURE — 36600 WITHDRAWAL OF ARTERIAL BLOOD: CPT

## 2025-07-01 PROCEDURE — 82962 GLUCOSE BLOOD TEST: CPT

## 2025-07-01 PROCEDURE — 84100 ASSAY OF PHOSPHORUS: CPT | Performed by: INTERNAL MEDICINE

## 2025-07-01 PROCEDURE — 82803 BLOOD GASES ANY COMBINATION: CPT

## 2025-07-01 PROCEDURE — 20000000 HC ICU ROOM

## 2025-07-01 PROCEDURE — 27000207 HC ISOLATION

## 2025-07-01 PROCEDURE — 63600175 PHARM REV CODE 636 W HCPCS: Performed by: NURSE PRACTITIONER

## 2025-07-01 PROCEDURE — 94003 VENT MGMT INPAT SUBQ DAY: CPT

## 2025-07-01 PROCEDURE — 36415 COLL VENOUS BLD VENIPUNCTURE: CPT | Performed by: ORTHOPAEDIC SURGERY

## 2025-07-01 PROCEDURE — 99291 CRITICAL CARE FIRST HOUR: CPT | Mod: ,,, | Performed by: INTERNAL MEDICINE

## 2025-07-01 PROCEDURE — P9016 RBC LEUKOCYTES REDUCED: HCPCS

## 2025-07-01 PROCEDURE — 99900026 HC AIRWAY MAINTENANCE (STAT)

## 2025-07-01 PROCEDURE — 63600175 PHARM REV CODE 636 W HCPCS: Performed by: INTERNAL MEDICINE

## 2025-07-01 PROCEDURE — 27000221 HC OXYGEN, UP TO 24 HOURS

## 2025-07-01 RX ORDER — DOXYCYCLINE HYCLATE 100 MG
100 TABLET ORAL EVERY 12 HOURS
Status: DISCONTINUED | OUTPATIENT
Start: 2025-07-01 | End: 2025-07-03

## 2025-07-01 RX ORDER — SODIUM CHLORIDE 450 MG/100ML
INJECTION, SOLUTION INTRAVENOUS CONTINUOUS
Status: DISCONTINUED | OUTPATIENT
Start: 2025-07-01 | End: 2025-07-02

## 2025-07-01 RX ORDER — NOREPINEPHRINE BITARTRATE/D5W 4MG/250ML
0-.2 PLASTIC BAG, INJECTION (ML) INTRAVENOUS CONTINUOUS
Status: DISPENSED | OUTPATIENT
Start: 2025-07-02 | End: 2025-07-02

## 2025-07-01 RX ORDER — HYDROCODONE BITARTRATE AND ACETAMINOPHEN 500; 5 MG/1; MG/1
TABLET ORAL
Status: DISCONTINUED | OUTPATIENT
Start: 2025-07-01 | End: 2025-07-15 | Stop reason: HOSPADM

## 2025-07-01 RX ORDER — ACETAMINOPHEN 325 MG/1
650 TABLET ORAL EVERY 8 HOURS PRN
Status: DISCONTINUED | OUTPATIENT
Start: 2025-07-01 | End: 2025-07-15 | Stop reason: HOSPADM

## 2025-07-01 RX ADMIN — AMIODARONE HYDROCHLORIDE 0.5 MG/MIN: 1.8 INJECTION, SOLUTION INTRAVENOUS at 07:07

## 2025-07-01 RX ADMIN — HEPARIN SODIUM 5000 UNITS: 5000 INJECTION, SOLUTION INTRAVENOUS; SUBCUTANEOUS at 06:07

## 2025-07-01 RX ADMIN — FUROSEMIDE 200 MG: 10 INJECTION, SOLUTION INTRAMUSCULAR; INTRAVENOUS at 05:07

## 2025-07-01 RX ADMIN — HEPARIN SODIUM 5000 UNITS: 5000 INJECTION, SOLUTION INTRAVENOUS; SUBCUTANEOUS at 09:07

## 2025-07-01 RX ADMIN — INSULIN ASPART 2 UNITS: 100 INJECTION, SOLUTION INTRAVENOUS; SUBCUTANEOUS at 11:07

## 2025-07-01 RX ADMIN — DEXMEDETOMIDINE HYDROCHLORIDE IN 0.9% SODIUM CHLORIDE 1.4 MCG/KG/HR: 4 INJECTION INTRAVENOUS at 02:07

## 2025-07-01 RX ADMIN — METOPROLOL TARTRATE 50 MG: 50 TABLET, FILM COATED ORAL at 09:07

## 2025-07-01 RX ADMIN — DILTIAZEM HYDROCHLORIDE 30 MG: 30 TABLET, FILM COATED ORAL at 09:07

## 2025-07-01 RX ADMIN — DEXMEDETOMIDINE HYDROCHLORIDE IN 0.9% SODIUM CHLORIDE 1.4 MCG/KG/HR: 4 INJECTION INTRAVENOUS at 05:07

## 2025-07-01 RX ADMIN — FENTANYL CITRATE 50 MCG: 50 INJECTION INTRAMUSCULAR; INTRAVENOUS at 10:07

## 2025-07-01 RX ADMIN — MUPIROCIN: 20 OINTMENT TOPICAL at 08:07

## 2025-07-01 RX ADMIN — CHLORHEXIDINE GLUCONATE 15 ML: 1.2 RINSE ORAL at 08:07

## 2025-07-01 RX ADMIN — CHLORHEXIDINE GLUCONATE 15 ML: 1.2 RINSE ORAL at 09:07

## 2025-07-01 RX ADMIN — CLONIDINE HYDROCHLORIDE 0.2 MG: 0.2 TABLET ORAL at 09:07

## 2025-07-01 RX ADMIN — PANTOPRAZOLE SODIUM 40 MG: 40 INJECTION, POWDER, FOR SOLUTION INTRAVENOUS at 08:07

## 2025-07-01 RX ADMIN — MEROPENEM 500 MG: 500 INJECTION, POWDER, FOR SOLUTION INTRAVENOUS at 08:07

## 2025-07-01 RX ADMIN — FUROSEMIDE 200 MG: 10 INJECTION, SOLUTION INTRAMUSCULAR; INTRAVENOUS at 01:07

## 2025-07-01 RX ADMIN — DEXMEDETOMIDINE HYDROCHLORIDE IN 0.9% SODIUM CHLORIDE 0.5 MCG/KG/HR: 4 INJECTION INTRAVENOUS at 06:07

## 2025-07-01 RX ADMIN — NOREPINEPHRINE BITARTRATE 0.02 MCG/KG/MIN: 4 INJECTION, SOLUTION INTRAVENOUS at 11:07

## 2025-07-01 RX ADMIN — AMIODARONE HYDROCHLORIDE 0.5 MG/MIN: 1.8 INJECTION, SOLUTION INTRAVENOUS at 05:07

## 2025-07-01 RX ADMIN — INSULIN ASPART 5 UNITS: 100 INJECTION, SOLUTION INTRAVENOUS; SUBCUTANEOUS at 05:07

## 2025-07-01 RX ADMIN — MEROPENEM 500 MG: 500 INJECTION, POWDER, FOR SOLUTION INTRAVENOUS at 09:07

## 2025-07-01 RX ADMIN — SODIUM CHLORIDE: 4.5 INJECTION, SOLUTION INTRAVENOUS at 02:07

## 2025-07-01 RX ADMIN — INSULIN ASPART 5 UNITS: 100 INJECTION, SOLUTION INTRAVENOUS; SUBCUTANEOUS at 11:07

## 2025-07-01 RX ADMIN — ACETAMINOPHEN 650 MG: 325 TABLET ORAL at 02:07

## 2025-07-01 RX ADMIN — DAPTOMYCIN 610 MG: 500 INJECTION, POWDER, LYOPHILIZED, FOR SOLUTION INTRAVENOUS at 12:07

## 2025-07-01 RX ADMIN — LEVOTHYROXINE SODIUM 200 MCG: 100 TABLET ORAL at 06:07

## 2025-07-01 RX ADMIN — ACETAMINOPHEN 650 MG: 325 TABLET ORAL at 12:07

## 2025-07-01 RX ADMIN — FUROSEMIDE 200 MG: 10 INJECTION, SOLUTION INTRAMUSCULAR; INTRAVENOUS at 08:07

## 2025-07-01 RX ADMIN — DEXMEDETOMIDINE HYDROCHLORIDE IN 0.9% SODIUM CHLORIDE 1.4 MCG/KG/HR: 4 INJECTION INTRAVENOUS at 11:07

## 2025-07-01 RX ADMIN — DOXYCYCLINE HYCLATE 100 MG: 100 TABLET, FILM COATED ORAL at 09:07

## 2025-07-01 RX ADMIN — INSULIN ASPART 5 UNITS: 100 INJECTION, SOLUTION INTRAVENOUS; SUBCUTANEOUS at 06:07

## 2025-07-01 RX ADMIN — DEXMEDETOMIDINE HYDROCHLORIDE IN 0.9% SODIUM CHLORIDE 1.4 MCG/KG/HR: 4 INJECTION INTRAVENOUS at 08:07

## 2025-07-01 RX ADMIN — HYDRALAZINE HYDROCHLORIDE 100 MG: 100 TABLET ORAL at 09:07

## 2025-07-01 RX ADMIN — FENTANYL CITRATE 50 MCG: 50 INJECTION INTRAMUSCULAR; INTRAVENOUS at 12:07

## 2025-07-01 NOTE — ASSESSMENT & PLAN NOTE
Patient's diuresing chest x-ray looks markedly improved compared to yesterday still has pulmonary edema will transfuse 1 unit of packed red blood cells continue diurese wash creatinine
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
## 2025-07-01 NOTE — SUBJECTIVE & OBJECTIVE
Interval History/Significant Events:  Patient will awaken follow commands    Review of Systems  Objective:     Vital Signs (Most Recent):  Temp: 97 °F (36.1 °C) (07/01/25 0345)  Pulse: 70 (07/01/25 0445)  Resp: 15 (07/01/25 0445)  BP: (!) 91/54 (07/01/25 0445)  SpO2: 100 % (07/01/25 0445) Vital Signs (24h Range):  Temp:  [97 °F (36.1 °C)-102.4 °F (39.1 °C)] 97 °F (36.1 °C)  Pulse:  [] 70  Resp:  [14-37] 15  SpO2:  [86 %-100 %] 100 %  BP: ()/() 91/54   Weight: 106.1 kg (233 lb 14.5 oz)  Body mass index is 36.64 kg/m².      Intake/Output Summary (Last 24 hours) at 7/1/2025 0557  Last data filed at 7/1/2025 0405  Gross per 24 hour   Intake 2234.89 ml   Output 2550 ml   Net -315.11 ml          Physical Exam  Vitals reviewed.   Constitutional:       Appearance: Normal appearance.      Interventions: She is not intubated.  HENT:      Head: Normocephalic and atraumatic.      Nose: Nose normal.      Mouth/Throat:      Mouth: Mucous membranes are dry.      Pharynx: Oropharynx is clear.   Eyes:      Extraocular Movements: Extraocular movements intact.      Conjunctiva/sclera: Conjunctivae normal.      Pupils: Pupils are equal, round, and reactive to light.   Cardiovascular:      Rate and Rhythm: Normal rate.      Heart sounds: Normal heart sounds. No murmur heard.  Pulmonary:      Effort: Pulmonary effort is normal. She is not intubated.      Breath sounds: Normal breath sounds.   Abdominal:      General: Abdomen is flat. Bowel sounds are normal.      Palpations: Abdomen is soft.   Musculoskeletal:         General: Normal range of motion.      Cervical back: Normal range of motion and neck supple.      Right lower leg: No edema.      Left lower leg: No edema.   Skin:     General: Skin is warm and dry.      Capillary Refill: Capillary refill takes less than 2 seconds.   Neurological:      General: No focal deficit present.      Mental Status: She is alert and oriented to person, place, and time.    Psychiatric:         Mood and Affect: Mood normal.         Behavior: Behavior normal.            Vents:  Vent Mode: A/CMV-VC (07/01/25 0400)  Ventilator Initiated: Yes (06/30/25 1555)  Set Rate: 14 BPM (07/01/25 0400)  Vt Set: 500 mL (07/01/25 0400)  PEEP/CPAP: 10 cmH20 (07/01/25 0400)  Oxygen Concentration (%): 70 (FIO2 decreased by Dr. Martines from 80% to 70%) (07/01/25 0500)  Peak Airway Pressure: 31.8 cmH20 (07/01/25 0400)  Plateau Pressure: 27.7 cmH20 (07/01/25 0400)  Total Ve: 7.71 L/m (07/01/25 0400)  F/VT Ratio<105 (RSBI): (!) 35.79 (07/01/25 0400)  Lines/Drains/Airways       Drain  Duration                  Urethral Catheter 06/28/25 1654 2 days         Closed/Suction Drain 06/30/25 Tube - 1 Inferior;Right Back Accordion 10 Fr. 1 day         Closed/Suction Drain 06/30/25 Tube - 2 Inferior;Medial;Right Back 10 Fr. 1 day         NG/OG Tube 06/30/25 [facility] mouth 1 day              Airway  Duration                  Airway - Non-Surgical 06/30/25 1555 <1 day              Peripheral Intravenous Line  Duration             Peripheral IV 06/29/25 0730 20 G Left Antecubital 1 day    Peripheral IV Single Lumen 06/29/25 1900 20 G Right Antecubital 1 day    Peripheral IV Single Lumen 06/29/25 2015 22 G;20 G Right Forearm 1 day                  Significant Labs:    CBC/Anemia Profile:  Recent Labs   Lab 06/30/25  0300 07/01/25  0254   WBC 12.16* 9.57   HGB 7.9* 6.5*   HCT 26.9* 21.7*   * 467*   MCV 88.2 84.1   RDW 15.4* 15.7*        Chemistries:  Recent Labs   Lab 06/30/25  0300 07/01/25  0254    137   K 3.8 3.5    99   CO2 20* 26   BUN 54* 62*   CREATININE 6.55* 6.73*   CALCIUM 7.8* 7.6*       Recent Lab Results  (Last 5 results in the past 24 hours)        07/01/25  0531   07/01/25  0343   07/01/25  0254   06/30/25  2032   06/30/25  1631        Anion Gap     16           Baso #     0.02           Basophil %     0.2           BUN     62           BUN/CREAT RATIO     9           Calcium      7.6           Chloride     99           CO2     26           Creatinine     6.73           Differential Method     Auto           eGFR     7  Comment: Estimated GFR calculated using the CKD-EPI creatinine (2021) equation.           Eos #     0.16           Eos %     1.7           Glucose     172           Hematocrit     21.7           Hemoglobin     6.5           Immature Grans (Abs)     0.08           Immature Granulocytes     0.8           Lymph #     0.73           Lymph %     7.6           MCH     25.2           MCHC     30.0           MCV     84.1           Mono #     0.52           Mono %     5.4           MPV     10.2           Neutrophils, Abs     8.06           Neutrophils Relative     84.3           nRBC     0.2           NUCLEATED RBC ABSOLUTE     0.02           Platelet Count     467           POC Glucose 197   178     190   187       Potassium     3.5           RBC     2.58           RDW     15.7           Sodium     137           WBC     9.57                                  Significant Imaging:  I have reviewed all pertinent imaging results/findings within the past 24 hours.

## 2025-07-01 NOTE — PROGRESS NOTES
Patient intubated and sedate, she does open her eyes and seemed to fix and follow.    Physical exam general patient is chronically ill-appearing, heart is regular rate and rhythm, she has no pitting edema, lungs - breath sounds are little rhonchorous, abdomen is soft with positive bowel sounds     Assessment/plan 1.  Acute kidney injury-this patient's creatinine is increased to 6.7 mg/dL from 6.5 yesterday.  Urine output was around 1300 cc over 24 hours yesterday, since getting 200 mg of Lasix around 8 this morning she is put out about 200 cc of urine over the past 24 hours.  I am hopeful that were starting to see her creatinine plateau and in the next few days will start to decrease and that her kidneys will start to excrete more water.  The patient's chest x-ray looks a little better from yesterday, her FiO2 has been decreased to 60%, O2 sat is acceptable.  Discussed her case with Dr. Martines this morning, will cont. To monitor for renal recovery at this time. Reassess daily for changes.    2.  Hypertension-continue present antihypertensives  3.  Diabetes mellitus-continue present hypoglycemic therapy   4.  Anemia-patient's hematocrit is 24% down from 25% yesterday, continue to monitor  5.  Spinal abscess-continue meropenem, surgery is to evaluate for read debridement of her wound.   6.  Metabolic acidosis patient's bicarb is 26, will change ivf's at change drip to a kvo rate.    Vitals:    07/01/25 1015 07/01/25 1022 07/01/25 1030 07/01/25 1055   BP: 122/76  (!) 126/91    Pulse: 92 92 98    Resp: (!) 26 (!) 24 (!) 28 (!) 39   Temp:  98.6 °F (37 °C)     TempSrc:  Axillary     SpO2: 98% (!) 94% 95%    Weight:       Height:

## 2025-07-01 NOTE — PROGRESS NOTES
Office: [phone]    Subjective:   Remains intubated and sedated postop.  Spiked another fever today    I/O as of 3:54 PM:   Intake/Output - Last 3 Shifts         06/29 0700 06/30 0659 06/30 0700 07/01 0659 07/01 0700 07/02 0659    I.V. (mL/kg) 2433 (22.9) 2025.9 (19.1) 502.4 (4.7)    Blood 329.2  500    IV Piggyback 263.8 368.9 115.7    Total Intake(mL/kg) 3026 (28.5) 2394.8 (22.6) 1118.2 (10.5)    Urine (mL/kg/hr)  3300 (1.3) 800 (0.8)    Drains  0     Total Output  3300 800    Net +3026 -905.2 +318.2           Unmeasured Stool Occurrence 2 x               Vitals / Physical Exam:  Vitals:    07/01/25 1441 07/01/25 1445 07/01/25 1500 07/01/25 1501   BP: 110/62 110/62 105/63 105/63   Pulse: 86 86 86 86   Resp: (!) 23 (!) 22 (!) 21 (!) 21   Temp:    (!) 102.8 °F (39.3 °C)   TempSrc:    Axillary   SpO2: 95% (!) 94% (!) 93% (!) 93%   Weight:       Height:            Intubated and sedated        Assessment / Plan:   Carey Leonardo is a 49 y.o. female now 1 Day Post-Op  s/p Procedure(s) (LRB):  INCISION AND DRAINAGE, ABSCESS, SPINE, LUMBOSACRAL, POSTERIOR (N/A).    - medical comanagement per hospitalist/ICU  - follow-up cultures, continue antibiotics  - Please call should you have any questions regarding this patient's care      Signature:  Cyril Upton MD     Electronic Signature

## 2025-07-01 NOTE — ASSESSMENT & PLAN NOTE
Creatinine 6.73 rate of rise is slowing down urine output was 2500 cc.  Patient needs unit of packed red blood cells today continue diurese 0 need for dialysis at this point CO2 is normalized

## 2025-07-01 NOTE — CARE UPDATE
Done by RT student Isidra Ovalle       07/01/25 0750   PRE-TX-O2   Device (Oxygen Therapy) ventilator   Oxygen Concentration (%) 60   Ready to Wean/Extubation Screen   FIO2<=50 (chart decimal) (!) 0.6   Blood Gas Puncture   Blood Gas Type arterial   Arterial Site right;radial artery   Collateral Circulation Verified Ankit's Test   Site Preparation alcohol   Pressure Held yes  (held pressure until bleeding stop)   Distal Pulse Present yes   Hematoma Present no   Sample Obtained/Sent to Lab yes   Oxygen Amount FiO2 (specify)  (60%)   Number of Attempts for ABG? 1   Unsuccessful ABG Attempts  0   Attempted By? MERRY Chawla, CRT   Labs   $ Was an ABG obtained? Arterial Puncture   $ Labs Tech Time 15 min

## 2025-07-01 NOTE — CARE UPDATE
Done by RT student Isidra Ovalle       07/01/25 1501   Patient Assessment/Suction   Level of Consciousness (AVPU) responds to voice   Respiratory Effort Normal   Expansion/Accessory Muscles/Retractions no use of accessory muscles   All Lung Fields Breath Sounds Anterior:;crackles, fine   Rhythm/Pattern, Respiratory assisted mechanically   Cough Frequency with stimulation   Cough Type assisted   Suction Method tracheal   Suction Pressure (mmHg) -120 mmHg   $ Suction Charges Inline Suction Procedure Stat Charge   Secretions Amount scant   Secretions Color pink   Secretions Characteristics thin;frothy

## 2025-07-01 NOTE — PLAN OF CARE
Problem: Adult Inpatient Plan of Care  Goal: Optimal Comfort and Wellbeing  Outcome: Progressing  Intervention: Monitor Pain and Promote Comfort  Flowsheets (Taken 6/30/2025 2040)  Pain Management Interventions:   warm blanket provided   relaxation techniques promoted   quiet environment facilitated   position adjusted   pillow support provided  Intervention: Provide Person-Centered Care  Flowsheets (Taken 6/30/2025 2040)  Trust Relationship/Rapport: care explained     Problem: Wound  Goal: Absence of Infection Signs and Symptoms  Outcome: Progressing  Intervention: Prevent or Manage Infection  Flowsheets (Taken 6/30/2025 2040)  Fever Reduction/Comfort Measures:   lightweight bedding   lightweight clothing  Infection Management: aseptic technique maintained  Isolation Precautions: precautions maintained

## 2025-07-01 NOTE — PROGRESS NOTES
[facility] ICU  Critical Care Medicine  Progress Note    Patient Name: Carey Leonardo  MRN: [id number]  Admission Date: 6/26/2025  Hospital Length of Stay: 5 days  Code Status: Full Code  Attending Provider: Kameron Martines MD  Primary Care Provider: Zainab Harrell FNP   Principal Problem: MATT (acute kidney injury)    Subjective:     HPI:  No notes on file    Hospital/ICU Course:  No notes on file    Interval History/Significant Events:  Patient will awaken follow commands    Review of Systems  Objective:     Vital Signs (Most Recent):  Temp: 97 °F (36.1 °C) (07/01/25 0345)  Pulse: 70 (07/01/25 0445)  Resp: 15 (07/01/25 0445)  BP: (!) 91/54 (07/01/25 0445)  SpO2: 100 % (07/01/25 0445) Vital Signs (24h Range):  Temp:  [97 °F (36.1 °C)-102.4 °F (39.1 °C)] 97 °F (36.1 °C)  Pulse:  [] 70  Resp:  [14-37] 15  SpO2:  [86 %-100 %] 100 %  BP: ()/() 91/54   Weight: 106.1 kg (233 lb 14.5 oz)  Body mass index is 36.64 kg/m².      Intake/Output Summary (Last 24 hours) at 7/1/2025 0557  Last data filed at 7/1/2025 0405  Gross per 24 hour   Intake 2234.89 ml   Output 2550 ml   Net -315.11 ml          Physical Exam  Vitals reviewed.   Constitutional:       Appearance: Normal appearance.      Interventions: She is not intubated.  HENT:      Head: Normocephalic and atraumatic.      Nose: Nose normal.      Mouth/Throat:      Mouth: Mucous membranes are dry.      Pharynx: Oropharynx is clear.   Eyes:      Extraocular Movements: Extraocular movements intact.      Conjunctiva/sclera: Conjunctivae normal.      Pupils: Pupils are equal, round, and reactive to light.   Cardiovascular:      Rate and Rhythm: Normal rate.      Heart sounds: Normal heart sounds. No murmur heard.  Pulmonary:      Effort: Pulmonary effort is normal. She is not intubated.      Breath sounds: Normal breath sounds.   Abdominal:      General: Abdomen is flat. Bowel sounds are normal.      Palpations: Abdomen is soft.    Musculoskeletal:         General: Normal range of motion.      Cervical back: Normal range of motion and neck supple.      Right lower leg: No edema.      Left lower leg: No edema.   Skin:     General: Skin is warm and dry.      Capillary Refill: Capillary refill takes less than 2 seconds.   Neurological:      General: No focal deficit present.      Mental Status: She is alert and oriented to person, place, and time.   Psychiatric:         Mood and Affect: Mood normal.         Behavior: Behavior normal.            Vents:  Vent Mode: A/CMV-VC (07/01/25 0400)  Ventilator Initiated: Yes (06/30/25 1555)  Set Rate: 14 BPM (07/01/25 0400)  Vt Set: 500 mL (07/01/25 0400)  PEEP/CPAP: 10 cmH20 (07/01/25 0400)  Oxygen Concentration (%): 70 (FIO2 decreased by Dr. Martines from 80% to 70%) (07/01/25 0500)  Peak Airway Pressure: 31.8 cmH20 (07/01/25 0400)  Plateau Pressure: 27.7 cmH20 (07/01/25 0400)  Total Ve: 7.71 L/m (07/01/25 0400)  F/VT Ratio<105 (RSBI): (!) 35.79 (07/01/25 0400)  Lines/Drains/Airways       Drain  Duration                  Urethral Catheter 06/28/25 1654 2 days         Closed/Suction Drain 06/30/25 Tube - 1 Inferior;Right Back Accordion 10 Fr. 1 day         Closed/Suction Drain 06/30/25 Tube - 2 Inferior;Medial;Right Back 10 Fr. 1 day         NG/OG Tube 06/30/25 [facility] mouth 1 day              Airway  Duration                  Airway - Non-Surgical 06/30/25 1555 <1 day              Peripheral Intravenous Line  Duration             Peripheral IV 06/29/25 0730 20 G Left Antecubital 1 day    Peripheral IV Single Lumen 06/29/25 1900 20 G Right Antecubital 1 day    Peripheral IV Single Lumen 06/29/25 2015 22 G;20 G Right Forearm 1 day                  Significant Labs:    CBC/Anemia Profile:  Recent Labs   Lab 06/30/25  0300 07/01/25  0254   WBC 12.16* 9.57   HGB 7.9* 6.5*   HCT 26.9* 21.7*   * 467*   MCV 88.2 84.1   RDW 15.4* 15.7*        Chemistries:  Recent Labs   Lab 06/30/25  9205  07/01/25  0254    137   K 3.8 3.5    99   CO2 20* 26   BUN 54* 62*   CREATININE 6.55* 6.73*   CALCIUM 7.8* 7.6*       Recent Lab Results  (Last 5 results in the past 24 hours)        07/01/25  0531   07/01/25  0343   07/01/25  0254   06/30/25  2032   06/30/25  1631        Anion Gap     16           Baso #     0.02           Basophil %     0.2           BUN     62           BUN/CREAT RATIO     9           Calcium     7.6           Chloride     99           CO2     26           Creatinine     6.73           Differential Method     Auto           eGFR     7  Comment: Estimated GFR calculated using the CKD-EPI creatinine (2021) equation.           Eos #     0.16           Eos %     1.7           Glucose     172           Hematocrit     21.7           Hemoglobin     6.5           Immature Grans (Abs)     0.08           Immature Granulocytes     0.8           Lymph #     0.73           Lymph %     7.6           MCH     25.2           MCHC     30.0           MCV     84.1           Mono #     0.52           Mono %     5.4           MPV     10.2           Neutrophils, Abs     8.06           Neutrophils Relative     84.3           nRBC     0.2           NUCLEATED RBC ABSOLUTE     0.02           Platelet Count     467           POC Glucose 197   178     190   187       Potassium     3.5           RBC     2.58           RDW     15.7           Sodium     137           WBC     9.57                                  Significant Imaging:  I have reviewed all pertinent imaging results/findings within the past 24 hours.    ABG  Recent Labs   Lab 06/29/25  1810   PH 7.39   PO2 51*   PCO2 37   HCO3 22.4     Assessment/Plan:     Neuro  Encephalopathy, metabolic  Markedly improved today even though on ventilator and sedated    Pulmonary  Acute hypoxic respiratory failure  Patient's diuresing chest x-ray looks markedly improved compared to yesterday still has pulmonary edema will transfuse 1 unit of packed red blood cells  continue diurese wash creatinine    Mild intermittent asthma without complication  Pulmonary edema noted on x-ray    Cardiac/Vascular  Essential (primary) hypertension  Fair control will not add any medicines right now    Renal/  * MATT (acute kidney injury)  Creatinine 6.73 rate of rise is slowing down urine output was 2500 cc.  Patient needs unit of packed red blood cells today continue diurese 0 need for dialysis at this point CO2 is normalized    Metabolic acidosis  Improving with bicarb    Hypokalemia  Potassium up to 3.8    ID  Abscess in epidural space of lumbar spine  Noted    MRSA (methicillin resistant Staphylococcus aureus) infection  Continue daptomycin    Infection of lumbar spine  Status post debridement yesterday    Oncology  Anemia  Transfuse 1 unit packed red blood cells this morning    Endocrine  Hyponatremia  Resolved    Acquired hypothyroidism  Noted    Other  Obstructive sleep apnea syndrome  Currently intubated       Critical Care Daily Checklist:    A: Awake: RASS Goal/Actual Goal:    Actual:     B: Spontaneous Breathing Trial Performed?     C: SAT & SBT Coordinated?  Yes                      D: Delirium: CAM-ICU     E: Early Mobility Performed? Yes   F: Feeding Goal:    Status:     Current Diet Order   Procedures    Diet NPO      AS: Analgesia/Sedation Precedex   T: Thromboembolic Prophylaxis SCD hose   H: HOB > 300 Yes   U: Stress Ulcer Prophylaxis (if needed) Protonix   G: Glucose Control Sliding scale   B: Bowel Function Unmeasured Stool Occurrence: 1   I: Indwelling Catheter (Lines [employer] Taylor) Necessity Taylor   D: De-escalation of Antimicrobials/Pharmacotherapies Not applicable    Plan for the day/ETD Wean FiO2 and then PEEP    Code Status:  Family/Goals of Care: Full Code  Discuss with family     Critical Care Time:  35 minutes  Critical secondary to Patient has a condition that poses threat to life and bodily function:  Acute respiratory failure with abscess and possible sepsis  encephalopathy improving      Critical care was time spent personally by me on the following activities: development of treatment plan with patient or surrogate and bedside caregivers, discussions with consultants, evaluation of patient's response to treatment, examination of patient, ordering and performing treatments and interventions, ordering and review of laboratory studies, ordering and review of radiographic studies, pulse oximetry, re-evaluation of patient's condition. This critical care time did not overlap with that of any other provider or involve time for any procedures.     Kameron Martines MD  Critical Care Medicine  [facility]

## 2025-07-01 NOTE — NURSING
0701:Intubated, lightly sedated. Will wake, nod head to yes/no questions. Denies pain/SOB. 1055: PRN fentanyl given for high resp rate, agitation  1112: titrating precidex up for agitation, pt biting down on ET. PRN fentanyl recently given.  1115:1 PRBC started  1133: ,NP at bedside. Pt resp rate, appears uncomfortable. Repositioned patient. Continues to bite down on ET. Precidex set at max rate now and will titrate it down as tolerated.   1135: ID consult follow up via tele medicine.  1430: temp 103.1.  notified, ice packs applied. Tylenol given.  1450: [facility] cooling unit in place for fever control  1550:  at bedside

## 2025-07-02 LAB
ANION GAP SERPL CALCULATED.3IONS-SCNC: 16 MMOL/L (ref 7–16)
BACTERIA BLD CULT: NORMAL
BACTERIA BLD CULT: NORMAL
BASOPHILS # BLD AUTO: 0.03 K/UL (ref 0–0.2)
BASOPHILS NFR BLD AUTO: 0.2 % (ref 0–1)
BUN SERPL-MCNC: 70 MG/DL (ref 7–19)
BUN/CREAT SERPL: 11 (ref 6–20)
CALCIUM SERPL-MCNC: 7.4 MG/DL (ref 8.4–10.2)
CHLORIDE SERPL-SCNC: 99 MMOL/L (ref 98–107)
CK SERPL-CCNC: 746 U/L (ref 29–168)
CO2 SERPL-SCNC: 25 MMOL/L (ref 22–29)
CREAT SERPL-MCNC: 6.59 MG/DL (ref 0.55–1.02)
DIFFERENTIAL METHOD BLD: ABNORMAL
EGFR (NO RACE VARIABLE) (RUSH/TITUS): 7 ML/MIN/1.73M2
EOSINOPHIL # BLD AUTO: 0.53 K/UL (ref 0–0.5)
EOSINOPHIL NFR BLD AUTO: 4.4 % (ref 1–4)
ERYTHROCYTE [DISTWIDTH] IN BLOOD BY AUTOMATED COUNT: 15.9 % (ref 11.5–14.5)
GLUCOSE SERPL-MCNC: 109 MG/DL (ref 70–105)
GLUCOSE SERPL-MCNC: 122 MG/DL (ref 70–105)
GLUCOSE SERPL-MCNC: 132 MG/DL (ref 74–100)
GLUCOSE SERPL-MCNC: 155 MG/DL (ref 70–105)
GLUCOSE SERPL-MCNC: 95 MG/DL (ref 70–105)
HCT VFR BLD AUTO: 22.8 % (ref 38–47)
HGB BLD-MCNC: 7.1 G/DL (ref 12–16)
IMM GRANULOCYTES # BLD AUTO: 0.15 K/UL (ref 0–0.04)
IMM GRANULOCYTES NFR BLD: 1.2 % (ref 0–0.4)
LYMPHOCYTES # BLD AUTO: 0.77 K/UL (ref 1–4.8)
LYMPHOCYTES NFR BLD AUTO: 6.4 % (ref 27–41)
MAGNESIUM SERPL-MCNC: 2 MG/DL (ref 1.6–2.6)
MCH RBC QN AUTO: 26 PG (ref 27–31)
MCHC RBC AUTO-ENTMCNC: 31.1 G/DL (ref 32–36)
MCV RBC AUTO: 83.5 FL (ref 80–96)
MONOCYTES # BLD AUTO: 0.75 K/UL (ref 0–0.8)
MONOCYTES NFR BLD AUTO: 6.2 % (ref 2–6)
MPC BLD CALC-MCNC: 10.7 FL (ref 9.4–12.4)
NEUTROPHILS # BLD AUTO: 9.86 K/UL (ref 1.8–7.7)
NEUTROPHILS NFR BLD AUTO: 81.6 % (ref 53–65)
NRBC # BLD AUTO: 0 X10E3/UL
NRBC, AUTO (.00): 0 %
PHOSPHATE SERPL-MCNC: 5.3 MG/DL (ref 2.3–4.7)
PLATELET # BLD AUTO: 466 K/UL (ref 150–400)
POTASSIUM SERPL-SCNC: 3.4 MMOL/L (ref 3.5–5.1)
RBC # BLD AUTO: 2.73 M/UL (ref 4.2–5.4)
SODIUM SERPL-SCNC: 137 MMOL/L (ref 136–145)
WBC # BLD AUTO: 12.09 K/UL (ref 4.5–11)

## 2025-07-02 PROCEDURE — 63600175 PHARM REV CODE 636 W HCPCS: Performed by: NURSE PRACTITIONER

## 2025-07-02 PROCEDURE — 80048 BASIC METABOLIC PNL TOTAL CA: CPT | Performed by: ORTHOPAEDIC SURGERY

## 2025-07-02 PROCEDURE — 27000207 HC ISOLATION

## 2025-07-02 PROCEDURE — 63600175 PHARM REV CODE 636 W HCPCS: Performed by: ORTHOPAEDIC SURGERY

## 2025-07-02 PROCEDURE — 94003 VENT MGMT INPAT SUBQ DAY: CPT

## 2025-07-02 PROCEDURE — 94761 N-INVAS EAR/PLS OXIMETRY MLT: CPT

## 2025-07-02 PROCEDURE — 99291 CRITICAL CARE FIRST HOUR: CPT | Mod: ,,, | Performed by: INTERNAL MEDICINE

## 2025-07-02 PROCEDURE — 82550 ASSAY OF CK (CPK): CPT | Performed by: INTERNAL MEDICINE

## 2025-07-02 PROCEDURE — 36415 COLL VENOUS BLD VENIPUNCTURE: CPT | Performed by: ORTHOPAEDIC SURGERY

## 2025-07-02 PROCEDURE — 25000003 PHARM REV CODE 250

## 2025-07-02 PROCEDURE — 82962 GLUCOSE BLOOD TEST: CPT

## 2025-07-02 PROCEDURE — 25000003 PHARM REV CODE 250: Performed by: NURSE PRACTITIONER

## 2025-07-02 PROCEDURE — 25000003 PHARM REV CODE 250: Performed by: INTERNAL MEDICINE

## 2025-07-02 PROCEDURE — 27000221 HC OXYGEN, UP TO 24 HOURS

## 2025-07-02 PROCEDURE — 20000000 HC ICU ROOM

## 2025-07-02 PROCEDURE — 63600175 PHARM REV CODE 636 W HCPCS: Performed by: INTERNAL MEDICINE

## 2025-07-02 PROCEDURE — 25000003 PHARM REV CODE 250: Performed by: ORTHOPAEDIC SURGERY

## 2025-07-02 PROCEDURE — 99900026 HC AIRWAY MAINTENANCE (STAT)

## 2025-07-02 PROCEDURE — 85025 COMPLETE CBC W/AUTO DIFF WBC: CPT | Performed by: ORTHOPAEDIC SURGERY

## 2025-07-02 PROCEDURE — 99900035 HC TECH TIME PER 15 MIN (STAT)

## 2025-07-02 RX ORDER — CLONIDINE HYDROCHLORIDE 0.1 MG/1
0.1 TABLET ORAL EVERY 6 HOURS PRN
Status: DISCONTINUED | OUTPATIENT
Start: 2025-07-02 | End: 2025-07-15 | Stop reason: HOSPADM

## 2025-07-02 RX ORDER — DILTIAZEM HYDROCHLORIDE 30 MG/1
30 TABLET, FILM COATED ORAL EVERY 8 HOURS
Status: DISCONTINUED | OUTPATIENT
Start: 2025-07-02 | End: 2025-07-02

## 2025-07-02 RX ORDER — HYDRALAZINE HYDROCHLORIDE 50 MG/1
50 TABLET, FILM COATED ORAL 2 TIMES DAILY
Status: DISCONTINUED | OUTPATIENT
Start: 2025-07-02 | End: 2025-07-02

## 2025-07-02 RX ORDER — AMIODARONE HYDROCHLORIDE 200 MG/1
200 TABLET ORAL DAILY
Status: DISCONTINUED | OUTPATIENT
Start: 2025-07-02 | End: 2025-07-15 | Stop reason: HOSPADM

## 2025-07-02 RX ORDER — DILTIAZEM HYDROCHLORIDE 30 MG/1
30 TABLET, FILM COATED ORAL EVERY 6 HOURS
Status: DISCONTINUED | OUTPATIENT
Start: 2025-07-02 | End: 2025-07-02

## 2025-07-02 RX ADMIN — MEROPENEM 500 MG: 500 INJECTION, POWDER, FOR SOLUTION INTRAVENOUS at 08:07

## 2025-07-02 RX ADMIN — AMIODARONE HYDROCHLORIDE 0.5 MG/MIN: 1.8 INJECTION, SOLUTION INTRAVENOUS at 08:07

## 2025-07-02 RX ADMIN — HEPARIN SODIUM 5000 UNITS: 5000 INJECTION, SOLUTION INTRAVENOUS; SUBCUTANEOUS at 09:07

## 2025-07-02 RX ADMIN — DEXMEDETOMIDINE HYDROCHLORIDE IN 0.9% SODIUM CHLORIDE 1.4 MCG/KG/HR: 4 INJECTION INTRAVENOUS at 06:07

## 2025-07-02 RX ADMIN — HEPARIN SODIUM 5000 UNITS: 5000 INJECTION, SOLUTION INTRAVENOUS; SUBCUTANEOUS at 06:07

## 2025-07-02 RX ADMIN — DEXMEDETOMIDINE HYDROCHLORIDE IN 0.9% SODIUM CHLORIDE 1.4 MCG/KG/HR: 4 INJECTION INTRAVENOUS at 11:07

## 2025-07-02 RX ADMIN — LEVOTHYROXINE SODIUM 200 MCG: 100 TABLET ORAL at 06:07

## 2025-07-02 RX ADMIN — HEPARIN SODIUM 5000 UNITS: 5000 INJECTION, SOLUTION INTRAVENOUS; SUBCUTANEOUS at 02:07

## 2025-07-02 RX ADMIN — DEXMEDETOMIDINE HYDROCHLORIDE IN 0.9% SODIUM CHLORIDE 1.4 MCG/KG/HR: 4 INJECTION INTRAVENOUS at 02:07

## 2025-07-02 RX ADMIN — PANTOPRAZOLE SODIUM 40 MG: 40 INJECTION, POWDER, FOR SOLUTION INTRAVENOUS at 08:07

## 2025-07-02 RX ADMIN — DEXMEDETOMIDINE HYDROCHLORIDE IN 0.9% SODIUM CHLORIDE 1.2 MCG/KG/HR: 4 INJECTION INTRAVENOUS at 08:07

## 2025-07-02 RX ADMIN — DEXMEDETOMIDINE HYDROCHLORIDE IN 0.9% SODIUM CHLORIDE 1.4 MCG/KG/HR: 4 INJECTION INTRAVENOUS at 08:07

## 2025-07-02 RX ADMIN — FENTANYL CITRATE 50 MCG: 50 INJECTION INTRAMUSCULAR; INTRAVENOUS at 05:07

## 2025-07-02 RX ADMIN — FUROSEMIDE 200 MG: 10 INJECTION, SOLUTION INTRAMUSCULAR; INTRAVENOUS at 05:07

## 2025-07-02 RX ADMIN — CHLORHEXIDINE GLUCONATE 15 ML: 1.2 RINSE ORAL at 08:07

## 2025-07-02 RX ADMIN — DEXMEDETOMIDINE HYDROCHLORIDE IN 0.9% SODIUM CHLORIDE 1.3 MCG/KG/HR: 4 INJECTION INTRAVENOUS at 11:07

## 2025-07-02 RX ADMIN — FUROSEMIDE 200 MG: 10 INJECTION, SOLUTION INTRAMUSCULAR; INTRAVENOUS at 08:07

## 2025-07-02 RX ADMIN — DEXMEDETOMIDINE HYDROCHLORIDE IN 0.9% SODIUM CHLORIDE 1.3 MCG/KG/HR: 4 INJECTION INTRAVENOUS at 05:07

## 2025-07-02 RX ADMIN — AMIODARONE HYDROCHLORIDE 200 MG: 200 TABLET ORAL at 10:07

## 2025-07-02 RX ADMIN — INSULIN ASPART 5 UNITS: 100 INJECTION, SOLUTION INTRAVENOUS; SUBCUTANEOUS at 11:07

## 2025-07-02 RX ADMIN — INSULIN ASPART 2 UNITS: 100 INJECTION, SOLUTION INTRAVENOUS; SUBCUTANEOUS at 06:07

## 2025-07-02 RX ADMIN — ACETAMINOPHEN 650 MG: 325 TABLET ORAL at 11:07

## 2025-07-02 RX ADMIN — DOXYCYCLINE HYCLATE 100 MG: 100 TABLET, FILM COATED ORAL at 08:07

## 2025-07-02 RX ADMIN — DEXMEDETOMIDINE HYDROCHLORIDE IN 0.9% SODIUM CHLORIDE 1.4 MCG/KG/HR: 4 INJECTION INTRAVENOUS at 03:07

## 2025-07-02 RX ADMIN — POTASSIUM BICARBONATE 20 MEQ: 782 TABLET, EFFERVESCENT ORAL at 06:07

## 2025-07-02 RX ADMIN — INSULIN GLARGINE 20 UNITS: 100 INJECTION, SOLUTION SUBCUTANEOUS at 08:07

## 2025-07-02 RX ADMIN — INSULIN ASPART 5 UNITS: 100 INJECTION, SOLUTION INTRAVENOUS; SUBCUTANEOUS at 08:07

## 2025-07-02 RX ADMIN — FUROSEMIDE 200 MG: 10 INJECTION, SOLUTION INTRAMUSCULAR; INTRAVENOUS at 01:07

## 2025-07-02 NOTE — SUBJECTIVE & OBJECTIVE
Interval History/Significant Events:  Patient is sedated    Review of Systems  Objective:     Vital Signs (Most Recent):  Temp: 98.2 °F (36.8 °C) (07/02/25 0301)  Pulse: 65 (07/02/25 0435)  Resp: 14 (07/02/25 0435)  BP: 98/62 (07/02/25 0435)  SpO2: 100 % (07/02/25 0435) Vital Signs (24h Range):  Temp:  [97.6 °F (36.4 °C)-103.1 °F (39.5 °C)] 98.2 °F (36.8 °C)  Pulse:  [] 65  Resp:  [13-40] 14  SpO2:  [83 %-100 %] 100 %  BP: ()/(41-92) 98/62   Weight: 106.1 kg (233 lb 14.5 oz)  Body mass index is 36.64 kg/m².      Intake/Output Summary (Last 24 hours) at 7/2/2025 0555  Last data filed at 7/2/2025 0301  Gross per 24 hour   Intake 2394.05 ml   Output 1800 ml   Net 594.05 ml          Physical Exam  Vitals reviewed.   Constitutional:       Appearance: Normal appearance.      Interventions: She is sedated and intubated.   HENT:      Head: Normocephalic and atraumatic.      Nose: Nose normal.      Mouth/Throat:      Mouth: Mucous membranes are dry.      Pharynx: Oropharynx is clear.   Eyes:      Extraocular Movements: Extraocular movements intact.      Conjunctiva/sclera: Conjunctivae normal.      Pupils: Pupils are equal, round, and reactive to light.   Cardiovascular:      Rate and Rhythm: Normal rate.      Heart sounds: Normal heart sounds. No murmur heard.  Pulmonary:      Effort: Pulmonary effort is normal. She is intubated.      Breath sounds: Rhonchi present.   Abdominal:      General: Abdomen is flat. Bowel sounds are normal.      Palpations: Abdomen is soft.   Musculoskeletal:         General: Normal range of motion.      Cervical back: Normal range of motion and neck supple.      Right lower leg: No edema.      Left lower leg: No edema.   Skin:     General: Skin is warm and dry.      Capillary Refill: Capillary refill takes less than 2 seconds.   Neurological:      General: No focal deficit present.      Mental Status: She is alert and oriented to person, place, and time.   Psychiatric:         Mood  and Affect: Mood normal.         Behavior: Behavior normal.            Vents:  Vent Mode: A/CMV-VC (07/02/25 0249)  Ventilator Initiated: Yes (06/30/25 1555)  Set Rate: 14 BPM (07/02/25 0249)  Vt Set: 500 mL (07/02/25 0249)  PEEP/CPAP: 12 cmH20 (07/02/25 0249)  Oxygen Concentration (%): 60 (07/02/25 0029)  Peak Airway Pressure: 29.4 cmH20 (07/02/25 0249)  Plateau Pressure: 24.9 cmH20 (07/02/25 0249)  Total Ve: 7.27 L/m (07/02/25 0249)  F/VT Ratio<105 (RSBI): (!) 31.98 (07/02/25 0249)  Lines/Drains/Airways       Drain  Duration                  Urethral Catheter 06/28/25 1654 3 days         Closed/Suction Drain 06/30/25 Tube - 1 Inferior;Right Back Accordion 10 Fr. 2 days         Closed/Suction Drain 06/30/25 Tube - 2 Inferior;Medial;Right Back 10 Fr. 2 days         NG/OG Tube 06/30/25 [place] sump Center mouth 2 days              Airway  Duration                  Airway - Non-Surgical 06/30/25 1555 1 day              Peripheral Intravenous Line  Duration             Peripheral IV Single Lumen 06/29/25 0730 20 G Left Antecubital 2 days    Peripheral IV Single Lumen 06/29/25 1900 20 G Right Antecubital 2 days    Peripheral IV Single Lumen 06/29/25 2015 22 G;20 G Right Forearm 2 days                  Significant Labs:    CBC/Anemia Profile:  Recent Labs   Lab 07/01/25  0254 07/02/25  0300   WBC 9.57 12.09*   HGB 6.5* 7.1*   HCT 21.7* 22.8*   * 466*   MCV 84.1 83.5   RDW 15.7* 15.9*        Chemistries:  Recent Labs   Lab 07/01/25  0254 07/02/25  0300    137   K 3.5 3.4*   CL 99 99   CO2 26 25   BUN 62* 70*   CREATININE 6.73* 6.59*   CALCIUM 7.6* 7.4*       Recent Lab Results  (Last 5 results in the past 24 hours)        07/02/25  0300   07/01/25  2338   07/01/25  2113   07/01/25  1702   07/01/25  1147        Anion Gap 16               Baso # 0.03               Basophil % 0.2               BUN 70               BUN/CREAT RATIO 11               Calcium 7.4               Chloride 99               CO2 25                CPK         737       Creatinine 6.59               Differential Method Auto               eGFR 7  Comment: Estimated GFR calculated using the CKD-EPI creatinine (2021) equation.               Eos # 0.53               Eos % 4.4               Glucose 132               Hematocrit 22.8               Hemoglobin 7.1               Immature Grans (Abs) 0.15               Immature Granulocytes 1.2               Lymph # 0.77               Lymph % 6.4               MCH 26.0               MCHC 31.1               MCV 83.5               Mono # 0.75               Mono % 6.2               MPV 10.7               Neutrophils, Abs 9.86               Neutrophils Relative 81.6               nRBC 0.0               NUCLEATED RBC ABSOLUTE 0.00               Platelet Count 466               POC Glucose   99   95   134         Potassium 3.4               RBC 2.73               RDW 15.9               Sodium 137               WBC 12.09                                      Significant Imaging:  I have reviewed all pertinent imaging results/findings within the past 24 hours.

## 2025-07-02 NOTE — RESPIRATORY THERAPY
PLACED ON CPAP @ 10:25 - 11:25 WITH 5 PEEP , 10 PRESSURE SUPPORT AND 50% FIO2 WITH NO COMPLICATIONS , 1ST TRIAL DONE TODAY .                       PLACED BACK ON CPAP @ 14:30 - 15:35 WITH SAME SETTINGS  AND NO COMPLICATIONS , 2ND TRIAL DONE TODAY .

## 2025-07-02 NOTE — PLAN OF CARE
Problem: Adult Inpatient Plan of Care  Goal: Plan of Care Review  7/2/2025 0926 by Guero Alvarez, RRT  Outcome: Progressing  7/2/2025 0925 by Guero Alvarez, RRT  Outcome: Progressing     Problem: Mechanical Ventilation Invasive  Goal: Effective Communication  7/2/2025 0926 by Guero Alvarez, RRT  Outcome: Progressing  7/2/2025 0925 by Guero Alvarez, RRT  Outcome: Progressing     Problem: Gas Exchange Impaired  Goal: Optimal Gas Exchange  Outcome: Progressing

## 2025-07-02 NOTE — NURSING
7/1/25 - 2242 : Dr. Freeman notified of blood pressure of low blood pressure, 74/43.   7/1/25 - 2347 : Levophed started as ordered.

## 2025-07-02 NOTE — ASSESSMENT & PLAN NOTE
Status post debridement yesterday, no positive culture currently, white count 14052, fever seems to be better

## 2025-07-02 NOTE — PROGRESS NOTES
Office: [phone]    Subjective:   Remains intubated and sedated postop.  Spiked another fever today    I/O as of 3:54 PM:   Intake/Output - Last 3 Shifts         06/30 0700 07/01 0659 07/01 0700 07/02 0659 07/02 0700 07/03 0659    I.V. (mL/kg) 2025.9 (19.1) 1736.3 (16.4) 252.3 (2.4)    Blood  500     IV Piggyback 368.9 310.4 97.3    Total Intake(mL/kg) 2394.8 (22.6) 2546.7 (24) 349.6 (3.3)    Urine (mL/kg/hr) 3300 (1.3) 2400 (0.9) 1400 (1.3)    Drains 0      Total Output 3300 2400 1400    Net -905.2 +146.7 -1050.4                    Vitals / Physical Exam:  Vitals:    07/02/25 1651 07/02/25 1700 07/02/25 1715 07/02/25 1718   BP:  138/67 (!) 141/69 (!) 141/69   Pulse: 89 87 91 91   Resp: (!) 25 (!) 28 (!) 25 (!) 22   Temp:       TempSrc:       SpO2: 99% 100% 97% 98%   Weight:       Height:            Intubated   Responds to commands  Moves toes, sensation intact      Assessment / Plan:   Carey SHIVAM Leonardo is a 49 y.o. female now 2 Days Post-Op  s/p Procedure(s) (LRB):  INCISION AND DRAINAGE, ABSCESS, SPINE, LUMBOSACRAL, POSTERIOR (N/A).    - medical comanagement per hospitalist/ICU  - follow-up cultures, continue antibiotics  - Please call should you have any questions regarding this patient's care      Signature:  Cyril Upton MD     Electronic Signature

## 2025-07-02 NOTE — PROGRESS NOTES
[facility] ICU  Critical Care Medicine  Progress Note    Patient Name: Carey Leonardo  MRN: [id number]  Admission Date: 6/26/2025  Hospital Length of Stay: 6 days  Code Status: Full Code  Attending Provider: Kameron Martines MD  Primary Care Provider: Zainab Harrell FNP   Principal Problem: MATT (acute kidney injury)    Subjective:     HPI:  No notes on file    Hospital/ICU Course:  No notes on file    Interval History/Significant Events:  Patient is sedated    Review of Systems  Objective:     Vital Signs (Most Recent):  Temp: 98.2 °F (36.8 °C) (07/02/25 0301)  Pulse: 65 (07/02/25 0435)  Resp: 14 (07/02/25 0435)  BP: 98/62 (07/02/25 0435)  SpO2: 100 % (07/02/25 0435) Vital Signs (24h Range):  Temp:  [97.6 °F (36.4 °C)-103.1 °F (39.5 °C)] 98.2 °F (36.8 °C)  Pulse:  [] 65  Resp:  [13-40] 14  SpO2:  [83 %-100 %] 100 %  BP: ()/(41-92) 98/62   Weight: 106.1 kg (233 lb 14.5 oz)  Body mass index is 36.64 kg/m².      Intake/Output Summary (Last 24 hours) at 7/2/2025 0555  Last data filed at 7/2/2025 0301  Gross per 24 hour   Intake 2394.05 ml   Output 1800 ml   Net 594.05 ml          Physical Exam  Vitals reviewed.   Constitutional:       Appearance: Normal appearance.      Interventions: She is sedated and intubated.   HENT:      Head: Normocephalic and atraumatic.      Nose: Nose normal.      Mouth/Throat:      Mouth: Mucous membranes are dry.      Pharynx: Oropharynx is clear.   Eyes:      Extraocular Movements: Extraocular movements intact.      Conjunctiva/sclera: Conjunctivae normal.      Pupils: Pupils are equal, round, and reactive to light.   Cardiovascular:      Rate and Rhythm: Normal rate.      Heart sounds: Normal heart sounds. No murmur heard.  Pulmonary:      Effort: Pulmonary effort is normal. She is intubated.      Breath sounds: Rhonchi present.   Abdominal:      General: Abdomen is flat. Bowel sounds are normal.      Palpations: Abdomen is soft.   Musculoskeletal:          General: Normal range of motion.      Cervical back: Normal range of motion and neck supple.      Right lower leg: No edema.      Left lower leg: No edema.   Skin:     General: Skin is warm and dry.      Capillary Refill: Capillary refill takes less than 2 seconds.   Neurological:      General: No focal deficit present.      Mental Status: She is alert and oriented to person, place, and time.   Psychiatric:         Mood and Affect: Mood normal.         Behavior: Behavior normal.            Vents:  Vent Mode: A/CMV-VC (07/02/25 0249)  Ventilator Initiated: Yes (06/30/25 1555)  Set Rate: 14 BPM (07/02/25 0249)  Vt Set: 500 mL (07/02/25 0249)  PEEP/CPAP: 12 cmH20 (07/02/25 0249)  Oxygen Concentration (%): 60 (07/02/25 0029)  Peak Airway Pressure: 29.4 cmH20 (07/02/25 0249)  Plateau Pressure: 24.9 cmH20 (07/02/25 0249)  Total Ve: 7.27 L/m (07/02/25 0249)  F/VT Ratio<105 (RSBI): (!) 31.98 (07/02/25 0249)  Lines/Drains/Airways       Drain  Duration                  Urethral Catheter 06/28/25 1654 3 days         Closed/Suction Drain 06/30/25 Tube - 1 Inferior;Right Back Accordion 10 Fr. 2 days         Closed/Suction Drain 06/30/25 Tube - 2 Inferior;Medial;Right Back 10 Fr. 2 days         NG/OG Tube 06/30/25 [place] sump Center mouth 2 days              Airway  Duration                  Airway - Non-Surgical 06/30/25 1555 1 day              Peripheral Intravenous Line  Duration             Peripheral IV Single Lumen 06/29/25 0730 20 G Left Antecubital 2 days    Peripheral IV Single Lumen 06/29/25 1900 20 G Right Antecubital 2 days    Peripheral IV Single Lumen 06/29/25 2015 22 G;20 G Right Forearm 2 days                  Significant Labs:    CBC/Anemia Profile:  Recent Labs   Lab 07/01/25  0254 07/02/25  0300   WBC 9.57 12.09*   HGB 6.5* 7.1*   HCT 21.7* 22.8*   * 466*   MCV 84.1 83.5   RDW 15.7* 15.9*        Chemistries:  Recent Labs   Lab 07/01/25  0254 07/02/25  0300    137   K 3.5 3.4*   CL 99 99   CO2  26 25   BUN 62* 70*   CREATININE 6.73* 6.59*   CALCIUM 7.6* 7.4*       Recent Lab Results  (Last 5 results in the past 24 hours)        07/02/25  0300   07/01/25  2338   07/01/25  2113   07/01/25  1702   07/01/25  1147        Anion Gap 16               Baso # 0.03               Basophil % 0.2               BUN 70               BUN/CREAT RATIO 11               Calcium 7.4               Chloride 99               CO2 25               CPK         737       Creatinine 6.59               Differential Method Auto               eGFR 7  Comment: Estimated GFR calculated using the CKD-EPI creatinine (2021) equation.               Eos # 0.53               Eos % 4.4               Glucose 132               Hematocrit 22.8               Hemoglobin 7.1               Immature Grans (Abs) 0.15               Immature Granulocytes 1.2               Lymph # 0.77               Lymph % 6.4               MCH 26.0               MCHC 31.1               MCV 83.5               Mono # 0.75               Mono % 6.2               MPV 10.7               Neutrophils, Abs 9.86               Neutrophils Relative 81.6               nRBC 0.0               NUCLEATED RBC ABSOLUTE 0.00               Platelet Count 466               POC Glucose   99   95   134         Potassium 3.4               RBC 2.73               RDW 15.9               Sodium 137               WBC 12.09                                      Significant Imaging:  I have reviewed all pertinent imaging results/findings within the past 24 hours.    ABG  Recent Labs   Lab 07/01/25  0751   PH 7.46*   PO2 100   PCO2 47   HCO3 33.4*     Assessment/Plan:     Neuro  Encephalopathy, metabolic  Markedly improved today even though on ventilator and sedated    Pulmonary  Acute hypoxic respiratory failure  Begin weaning PEEP today    Mild intermittent asthma without complication  Improving    Cardiac/Vascular  Essential (primary) hypertension  Fair control will not add any medicines right  now    Renal/  * MATT (acute kidney injury)  Creatinine is 6.59 1st day his started to decrease, excellent diuresis with Lasix    Metabolic acidosis  Improving with bicarb    Hypokalemia  Potassium 3.4 continue supplement    ID  Abscess in epidural space of lumbar spine  Noted    MRSA (methicillin resistant Staphylococcus aureus) infection  Continue daptomycin    Infection of lumbar spine  Status post debridement yesterday, no positive culture currently, white count 14311, fever seems to be better    Oncology  Anemia  Transfuse 1 unit packed red blood cells this morning    Endocrine  Hyponatremia  Resolved    Acquired hypothyroidism  Noted    Other  Obstructive sleep apnea syndrome  Currently intubated       Critical Care Daily Checklist:    A: Awake: RASS Goal/Actual Goal:    Actual:     B: Spontaneous Breathing Trial Performed?     C: SAT & SBT Coordinated?  Yes                      D: Delirium: CAM-ICU     E: Early Mobility Performed? Yes   F: Feeding Goal: Goals: tolerate feeds at goal rate, weight maintenance during admission or gentle weight loss (-0.5 - 1 lb/week), improve BG control, improve kidney function, ADAT  Status: Nutrition Goal Status: new   Current Diet Order   Procedures    Diet NPO      AS: Analgesia/Sedation Precedex   T: Thromboembolic Prophylaxis Heparin   H: HOB > 300 Yes   U: Stress Ulcer Prophylaxis (if needed) Protonix   G: Glucose Control Sliding scale   B: Bowel Function Unmeasured Stool Occurrence: 1   I: Indwelling Catheter (Lines [employer] Taylor) Necessity Taylor   D: De-escalation of Antimicrobials/Pharmacotherapies Not appropriate    Plan for the day/ETD Wean PEEP    Code Status:  Family/Goals of Care: Full Code  Discuss with family     Critical Care Time:  35 minutes  Critical secondary to Patient has a condition that poses threat to life and bodily function:  Acute respiratory failure acute renal failure status post wound infection and sepsis      Critical care was time spent personally  by me on the following activities: development of treatment plan with patient or surrogate and bedside caregivers, discussions with consultants, evaluation of patient's response to treatment, examination of patient, ordering and performing treatments and interventions, ordering and review of laboratory studies, ordering and review of radiographic studies, pulse oximetry, re-evaluation of patient's condition. This critical care time did not overlap with that of any other provider or involve time for any procedures.     Kameron Martines MD  Critical Care Medicine  [facility]

## 2025-07-02 NOTE — PROGRESS NOTES
Infectious Disease [facility] E-consult Follow-up    Patient Name: Carey Leonardo    Attending Physician: Kameron Martines MD   Primary Care Physician: Zainab Harrell FNP     Consultation Information  This patient recommendation is based on a telemedicine consult request which was completed asynchronously through chart review and information provided by the primary physician. The patient was not seen or examined today. The evaluation is consultative in nature and all patient care and treatment decisions can either be accepted or rejected by the patient's primary hospital-based treating physician using their own independent medical judgment for their patient.    Consultant Contact Information: Please call [facility] Call Center [phone]      Assessment & Plan      Impression:   49 year old patient,  s/p laminectomy and fusion c/b early postoperative infection w/ MRSA (superficial per OP note), on treatment with vancomycin, planned for 6 week course, readmitted with fevers and MATT.  Patient with persistent fevers despite broad spectrum abx.      Over the weekend transferred to ICU given worsening kidney function, respiratory failure likely due to volume overload, requiring BiPAP. IMprpving urine output with Lasix ~2L. Patient is persistently febrile, T max 39.1.   Increasing WBC to 12.16, worsening Cr to 6.55. Increasing -> 1639.  She was broadened to Daptomycin and meropenem over the weekend.   Blood cultures from 6/26, only 1set, NGTD.   TTE on 6/30 without vegetations.   MRI spine with nonspecific prominent fluid collection extended throughout  operative bed and surrounding hardware, but not extending to spinal canal S/p I&D, in OR noted to have no fluid in superficial space, fascia was opened and fluid was noted in deep space, cultures were taken. Sharp excisional debridement performed down to the bone. Cultures are  NGTD, plan for prolonged incubation      Post op with fever up to 103.9,  "persistently febrile, so I added doxycycline for atypical coverage        ID related problem list  MRSA lumbar spinal hardware infection  MATT,   relevant drug allergies: penicillins (has tolerated cefazolin prior admissions)  Eosinophilia  Elevated CK     Recommendations  Continue daptomycin 8 mg/kg q 48 hrs (CK trending down, continue daily checks)   Icontinue doxycycline 100 mg BID for now  Continue meropenem, if no improvement in fever curve, can consider transitioning to levofloxacin and flagyl vs stopping all together and focusing treatment on MRSA  Will follow up culture reults  Please send daily  LFTS and daily CK        Thank you for involving us in the care of this patient. Infectious diseases will follow with you. Please contact us via the [facility] call center at [phone] should any questions arise.    TISHA RANKIN MD  Infectious Diseases Attending  [facility]         Subjective   Interval History: Afebrile. WBC up to 12.          Objective   Vitals: T:97.3 °F (36.3 °C) (Axillary),  BP:(!) 111/58, HR:63, RR:14, SaO2:98%,FiO2-O2 (L/m): , Dosing Wt:  Wt Readings from Last 1 Encounters:   06/29/25 106.1 kg (233 lb 14.5 oz)   , BMI:Body mass index is 36.64 kg/m².    Physical Exam: Patient not seen or examined.    Results Review    Labs:      CBC  Recent Labs     06/30/25  0300 07/01/25  0254 07/02/25  0300   WBC 12.16* 9.57 12.09*   HGB 7.9* 6.5* 7.1*   HCT 26.9* 21.7* 22.8*   * 467* 466*     Renal function  Recent Labs     06/30/25  0300 07/01/25  0254 07/02/25  0300    137 137   K 3.8 3.5 3.4*   CREATININE 6.55* 6.73* 6.59*     Liver function  No results for input(s): "AST", "ALT", "BILITOT", "BILIDIR" in the last 72 hours.  Coagulation  No results for input(s): "PT", "INR", "APTT" in the last 72 hours.   Procalcitonin  No results for input(s): "PROCALCIT" in the last 72 hours.    Microbiology:      Susceptibility data from last 90 days.  Collected Specimen Info Organism Amox/ K Clav " Amp/Sulbactam Ampicillin Azithromycin Cefazolin Ciprofloxacin Clindamycin Daptomycin Erythromycin Gentamicin LEVOFLOXACIN ETEST Linezolid Oxacillin Penicillin Rifampin   06/12/25 Wound from Back, Lower Methicillin resistant Staphylococcus aureus  R  R  R  R  R  S  R  S  R  S  S  S  R  R  S   06/12/25 Wound from Back, Lower Methicillin resistant Staphylococcus aureus  R  R  R  R  R  S  R  S  R  S  S  S  R  R  S     Collected Specimen Info Organism Tetracycline Trimeth/Sulfa Vancomycin   06/12/25 Wound from Back, Lower Methicillin resistant Staphylococcus aureus  R  S  S   06/12/25 Wound from Back, Lower Methicillin resistant Staphylococcus aureus  R  S  S       Imaging:     X-Ray Chest 1 View  Result Date: 7/2/2025  EXAMINATION: XR CHEST 1 VIEW CLINICAL HISTORY: pulmonary edema, hypoxia, on vent; TECHNIQUE: Single frontal view of the chest was performed. COMPARISON: Chest radiograph 07/01/2025 FINDINGS: Lines and tubes: Endotracheal tube, enteric tube, and monitoring leads. Heart and mediastinum: Unchanged. Pleura: No significant pleural effusion.  No pneumothorax. Lungs: Lungs are well inflated. There is pulmonary vascular indistinctness suggesting pulmonary edema.  There are patchy opacities bilaterally, most pronounced in the right lower lung zone, which have improved from yesterday.  No new consolidation. Soft tissue/bone: Unchanged.     As above. Electronically signed by: Viraj Antunez Date:    07/02/2025 Time:    09:54    X-Ray Chest 1 View  Result Date: 7/1/2025  EXAMINATION: XR CHEST 1 VIEW CLINICAL HISTORY: pulmonary edema, hypoxia, on vent; TECHNIQUE: Single frontal view of the chest was performed. COMPARISON: June 30, 2025 FINDINGS: The endotracheal tube is in satisfactory position.  Nasogastric tube extends beyond the field of view within the abdomen.  Cardiomediastinal silhouette is stable.  Parenchymal opacities within the lungs are again noted, relatively stable on the left, with slightly improved  aeration on the right.  Surgical changes incompletely imaged within the spine.     As above Electronically signed by: Jacqui Quiroz MD Date:    07/01/2025 Time:    10:07    X-Ray Chest AP Portable  Result Date: 7/1/2025  EXAMINATION: XR CHEST AP PORTABLE CLINICAL HISTORY: ET tube placement; TECHNIQUE: Single frontal view of the chest was performed. COMPARISON: Radiograph 06/29/2025. FINDINGS: ETT projects over the tracheal stripe with tip below the level of the clavicular heads approximately 1 cm from the vita. Enteric catheter crosses the diaphragm with tip extending beyond the field of view. Mediastinal structures are midline. Hilar contours are not well evaluated. Cardiac silhouette is normal in size. Lung volumes are symmetric.  Bilateral, symmetric pulmonary parenchymal ground-glass attenuation and consolidation most concerning for edema, pneumonia, aspiration or ARDS.  No pneumothorax or pleural effusion. No free air beneath the diaphragm.  No acute osseous abnormalities.     1. Enteric catheter crosses the diaphragm with tip extending beyond the field of view. 2. ETT with tip projecting over the tracheal stripe approximately 1 cm from the vita. Recommend pulling back 5 cm. 3. Bilateral pulmonary parenchymal ground-glass attenuation and consolidation most concerning for edema, aspiration, pneumonia or ARDS. Electronically signed by: Go Urrutia MD Date:    07/01/2025 Time:    07:43    XR NG/OG tube placement check, non-radiologist performed  Result Date: 7/1/2025  EXAMINATION: XR NG/OG TUBE PLACEMENT CHECK, NON-RADIOLOGIST PERFORMED CLINICAL HISTORY: ngt; TECHNIQUE: AP supine abdominal radiograph COMPARISON: [id number]/13/2024 FINDINGS: ETT projects over the tracheal stripe with tip below the level of the clavicular heads approximately 1 cm from the vita.  Enteric catheter crosses the diaphragm with tip extending beyond the field of view. Mediastinal structures are midline.  Hilar contours are  not well evaluated.  Cardiac silhouette is normal in size.  Lung volumes are symmetric.  Bilateral, symmetric pulmonary parenchymal ground-glass attenuation and consolidation most concerning for edema, pneumonia, aspiration or ARDS.  No pneumothorax or pleural effusion.  No free air beneath the diaphragm.  No acute osseous abnormalities.     1. Enteric catheter crosses the diaphragm with tip extending beyond the field of view. 2. ETT with tip projecting over the tracheal stripe approximately 1 cm from the vita.  Recommend pulling back 5 cm. 3. Bilateral pulmonary parenchymal ground-glass attenuation and consolidation most concerning for edema, aspiration, pneumonia or ARDS. Electronically signed by: Go Urrutia MD Date:    07/01/2025 Time:    07:42

## 2025-07-02 NOTE — PLAN OF CARE
Care plan addressed    Problem: Adult Inpatient Plan of Care  Goal: Plan of Care Review  Outcome: Progressing  Goal: Patient-Specific Goal (Individualized)  Outcome: Progressing  Goal: Absence of Hospital-Acquired Illness or Injury  Outcome: Progressing  Goal: Optimal Comfort and Wellbeing  Outcome: Progressing  Goal: Readiness for Transition of Care  Outcome: Progressing     Problem: Acute Kidney Injury/Impairment  Goal: Fluid and Electrolyte Balance  Outcome: Progressing  Goal: Improved Oral Intake  Outcome: Progressing  Goal: Effective Renal Function  Outcome: Progressing     Problem: Infection  Goal: Absence of Infection Signs and Symptoms  Outcome: Progressing  Intervention: Prevent or Manage Infection  Flowsheets (Taken 7/2/2025 0447)  Fever Reduction/Comfort Measures:   lightweight bedding   lightweight clothing  Infection Management: aseptic technique maintained  Isolation Precautions:   precautions maintained   contact     Problem: Wound  Goal: Optimal Coping  Outcome: Progressing  Goal: Optimal Functional Ability  Outcome: Progressing  Goal: Absence of Infection Signs and Symptoms  Outcome: Progressing  Goal: Improved Oral Intake  Outcome: Progressing  Goal: Optimal Pain Control and Function  Outcome: Progressing  Goal: Skin Health and Integrity  Outcome: Progressing  Intervention: Optimize Skin Protection  Flowsheets (Taken 7/2/2025 0447)  Pressure Reduction Techniques:   positioned off wounds   pressure points protected  Pressure Reduction Devices:   positioning supports utilized   specialty bed utilized  Skin Protection: incontinence pads utilized  Activity Management: Rolling - L1  Head of Bed (HOB) Positioning: HOB at 30-45 degrees  Goal: Optimal Wound Healing  Outcome: Progressing     Problem: Skin Injury Risk Increased  Goal: Skin Health and Integrity  Outcome: Progressing  Intervention: Optimize Skin Protection  Flowsheets (Taken 7/2/2025 0447)  Pressure Reduction Techniques:   positioned off  wounds   pressure points protected  Pressure Reduction Devices:   positioning supports utilized   specialty bed utilized  Skin Protection: incontinence pads utilized  Activity Management: Rolling - L1  Head of Bed (HOB) Positioning: HOB at 30-45 degrees     Problem: Noninvasive Ventilation Acute  Goal: Effective Unassisted Ventilation and Oxygenation  Outcome: Progressing     Problem: Fall Injury Risk  Goal: Absence of Fall and Fall-Related Injury  Outcome: Progressing     Problem: Restraint, Nonviolent  Goal: Absence of Harm or Injury  Outcome: Progressing     Problem: Mechanical Ventilation Invasive  Goal: Effective Communication  Outcome: Progressing  Goal: Optimal Device Function  Outcome: Progressing  Goal: Mechanical Ventilation Liberation  Outcome: Progressing  Goal: Optimal Nutrition Delivery  Outcome: Progressing  Goal: Absence of Device-Related Skin and Tissue Injury  Outcome: Progressing  Goal: Absence of Ventilator-Induced Lung Injury  Outcome: Progressing

## 2025-07-02 NOTE — PROGRESS NOTES
Patient intubated and sedate, she does open her eyes and seemed to fix and follow.    Physical exam general patient is chronically ill-appearing, heart is regular rate and rhythm, she has no pitting edema, lungs - breath sounds are little rhonchorous, abdomen is soft with positive bowel sounds     Assessment/plan 1.  Acute kidney injury-this patient's creatinine is decreased to 6.5 mg/dL from 6.7 yesterday.  Urine output was around 2400 cc over 24 hours yesterday, and about 1400 cc's so far today. Cont. To monitor for recovery    2.  Hypertension-had some low bp last night and received levophed, bp better today with holding bp meds. Would probably do better with a prn order for bp and discontinue scheduled bp meds.  3.  Diabetes mellitus-continue present hypoglycemic therapy   4.  Anemia-  5.  Spinal abscess-continue abx and wound care       Vitals:    07/02/25 1651 07/02/25 1700 07/02/25 1715 07/02/25 1718   BP:  138/67 (!) 141/69 (!) 141/69   Pulse: 89 87 91 91   Resp: (!) 25 (!) 28 (!) 25 (!) 22   Temp:       TempSrc:       SpO2: 99% 100% 97% 98%   Weight:       Height:

## 2025-07-03 LAB
ANION GAP SERPL CALCULATED.3IONS-SCNC: 23 MMOL/L (ref 7–16)
BASOPHILS # BLD AUTO: 0.02 K/UL (ref 0–0.2)
BASOPHILS NFR BLD AUTO: 0.3 % (ref 0–1)
BUN SERPL-MCNC: 80 MG/DL (ref 7–19)
BUN/CREAT SERPL: 12 (ref 6–20)
CALCIUM SERPL-MCNC: 6.7 MG/DL (ref 8.4–10.2)
CHLORIDE SERPL-SCNC: 98 MMOL/L (ref 98–107)
CO2 SERPL-SCNC: 26 MMOL/L (ref 22–29)
CREAT SERPL-MCNC: 6.5 MG/DL (ref 0.55–1.02)
DIFFERENTIAL METHOD BLD: ABNORMAL
EGFR (NO RACE VARIABLE) (RUSH/TITUS): 7 ML/MIN/1.73M2
EOSINOPHIL # BLD AUTO: 0.57 K/UL (ref 0–0.5)
EOSINOPHIL NFR BLD AUTO: 7.5 % (ref 1–4)
ERYTHROCYTE [DISTWIDTH] IN BLOOD BY AUTOMATED COUNT: 16 % (ref 11.5–14.5)
GLUCOSE SERPL-MCNC: 110 MG/DL (ref 70–105)
GLUCOSE SERPL-MCNC: 135 MG/DL (ref 70–105)
GLUCOSE SERPL-MCNC: 137 MG/DL (ref 70–105)
GLUCOSE SERPL-MCNC: 139 MG/DL (ref 74–100)
GLUCOSE SERPL-MCNC: 150 MG/DL (ref 70–105)
GLUCOSE SERPL-MCNC: 183 MG/DL (ref 70–105)
HCO3 UR-SCNC: 26.2 MMOL/L (ref 21–28)
HCO3 UR-SCNC: 30.6 MMOL/L (ref 21–28)
HCT VFR BLD AUTO: 25.1 % (ref 38–47)
HGB BLD-MCNC: 8.2 G/DL (ref 12–16)
IMM GRANULOCYTES # BLD AUTO: 0.08 K/UL (ref 0–0.04)
IMM GRANULOCYTES NFR BLD: 1.1 % (ref 0–0.4)
INDIRECT COOMBS: NORMAL
LYMPHOCYTES # BLD AUTO: 0.71 K/UL (ref 1–4.8)
LYMPHOCYTES NFR BLD AUTO: 9.4 % (ref 27–41)
MCH RBC QN AUTO: 26.5 PG (ref 27–31)
MCHC RBC AUTO-ENTMCNC: 32.7 G/DL (ref 32–36)
MCV RBC AUTO: 81.2 FL (ref 80–96)
MONOCYTES # BLD AUTO: 0.45 K/UL (ref 0–0.8)
MONOCYTES NFR BLD AUTO: 6 % (ref 2–6)
MPC BLD CALC-MCNC: 10.7 FL (ref 9.4–12.4)
NEUTROPHILS # BLD AUTO: 5.73 K/UL (ref 1.8–7.7)
NEUTROPHILS NFR BLD AUTO: 75.7 % (ref 53–65)
NRBC # BLD AUTO: 0.02 X10E3/UL
NRBC, AUTO (.00): 0.3 %
PCO2 BLDA: 36 MMHG (ref 35–48)
PCO2 BLDA: 42 MMHG (ref 35–48)
PH SMN: 7.47 [PH] (ref 7.35–7.45)
PH SMN: 7.47 [PH] (ref 7.35–7.45)
PLATELET # BLD AUTO: 503 K/UL (ref 150–400)
PO2 BLDA: 69 MMHG (ref 83–108)
PO2 BLDA: 69 MMHG (ref 83–108)
POC BASE EXCESS: 2.6 MMOL/L (ref -2–3)
POC BASE EXCESS: 6.3 MMOL/L (ref -2–3)
POC SATURATED O2: 95 % (ref 95–98)
POC SATURATED O2: 95 % (ref 95–98)
POTASSIUM SERPL-SCNC: 3.6 MMOL/L (ref 3.5–5.1)
RBC # BLD AUTO: 3.09 M/UL (ref 4.2–5.4)
RH BLD: NORMAL
SODIUM SERPL-SCNC: 143 MMOL/L (ref 136–145)
SPECIMEN OUTDATE: NORMAL
WBC # BLD AUTO: 7.56 K/UL (ref 4.5–11)

## 2025-07-03 PROCEDURE — 25000003 PHARM REV CODE 250

## 2025-07-03 PROCEDURE — 80053 COMPREHEN METABOLIC PANEL: CPT | Performed by: INTERNAL MEDICINE

## 2025-07-03 PROCEDURE — 63600175 PHARM REV CODE 636 W HCPCS: Performed by: ORTHOPAEDIC SURGERY

## 2025-07-03 PROCEDURE — 93010 ELECTROCARDIOGRAM REPORT: CPT | Mod: ,,, | Performed by: HOSPITALIST

## 2025-07-03 PROCEDURE — 25000003 PHARM REV CODE 250: Performed by: ORTHOPAEDIC SURGERY

## 2025-07-03 PROCEDURE — 20000000 HC ICU ROOM

## 2025-07-03 PROCEDURE — 80048 BASIC METABOLIC PNL TOTAL CA: CPT | Performed by: ORTHOPAEDIC SURGERY

## 2025-07-03 PROCEDURE — 25000003 PHARM REV CODE 250: Performed by: INTERNAL MEDICINE

## 2025-07-03 PROCEDURE — 99900035 HC TECH TIME PER 15 MIN (STAT)

## 2025-07-03 PROCEDURE — 25000003 PHARM REV CODE 250: Performed by: NURSE PRACTITIONER

## 2025-07-03 PROCEDURE — 27000221 HC OXYGEN, UP TO 24 HOURS

## 2025-07-03 PROCEDURE — 94003 VENT MGMT INPAT SUBQ DAY: CPT

## 2025-07-03 PROCEDURE — 84484 ASSAY OF TROPONIN QUANT: CPT | Performed by: INTERNAL MEDICINE

## 2025-07-03 PROCEDURE — 63600175 PHARM REV CODE 636 W HCPCS: Performed by: INTERNAL MEDICINE

## 2025-07-03 PROCEDURE — 93005 ELECTROCARDIOGRAM TRACING: CPT

## 2025-07-03 PROCEDURE — 63600175 PHARM REV CODE 636 W HCPCS: Performed by: NURSE PRACTITIONER

## 2025-07-03 PROCEDURE — 82962 GLUCOSE BLOOD TEST: CPT

## 2025-07-03 PROCEDURE — 85025 COMPLETE CBC W/AUTO DIFF WBC: CPT | Performed by: ORTHOPAEDIC SURGERY

## 2025-07-03 PROCEDURE — 94761 N-INVAS EAR/PLS OXIMETRY MLT: CPT

## 2025-07-03 PROCEDURE — 86850 RBC ANTIBODY SCREEN: CPT | Performed by: INTERNAL MEDICINE

## 2025-07-03 PROCEDURE — 27000207 HC ISOLATION

## 2025-07-03 PROCEDURE — 99900026 HC AIRWAY MAINTENANCE (STAT)

## 2025-07-03 PROCEDURE — 36415 COLL VENOUS BLD VENIPUNCTURE: CPT | Performed by: INTERNAL MEDICINE

## 2025-07-03 PROCEDURE — 36600 WITHDRAWAL OF ARTERIAL BLOOD: CPT

## 2025-07-03 PROCEDURE — 99233 SBSQ HOSP IP/OBS HIGH 50: CPT | Mod: ,,, | Performed by: INTERNAL MEDICINE

## 2025-07-03 PROCEDURE — 82803 BLOOD GASES ANY COMBINATION: CPT

## 2025-07-03 RX ORDER — DILTIAZEM HYDROCHLORIDE 5 MG/ML
INJECTION INTRAVENOUS
Status: DISPENSED
Start: 2025-07-03 | End: 2025-07-04

## 2025-07-03 RX ORDER — MICONAZOLE NITRATE 2 G/100G
POWDER TOPICAL 2 TIMES DAILY
Status: DISCONTINUED | OUTPATIENT
Start: 2025-07-03 | End: 2025-07-15 | Stop reason: HOSPADM

## 2025-07-03 RX ORDER — METOPROLOL TARTRATE 1 MG/ML
5 INJECTION, SOLUTION INTRAVENOUS EVERY 6 HOURS PRN
Status: DISCONTINUED | OUTPATIENT
Start: 2025-07-03 | End: 2025-07-15 | Stop reason: HOSPADM

## 2025-07-03 RX ORDER — LEVOFLOXACIN 500 MG/1
500 TABLET, FILM COATED ORAL EVERY OTHER DAY
Status: DISCONTINUED | OUTPATIENT
Start: 2025-07-04 | End: 2025-07-03

## 2025-07-03 RX ORDER — DILTIAZEM HYDROCHLORIDE 5 MG/ML
20 INJECTION INTRAVENOUS ONCE
Status: COMPLETED | OUTPATIENT
Start: 2025-07-04 | End: 2025-07-03

## 2025-07-03 RX ORDER — METOPROLOL TARTRATE 25 MG/1
50 TABLET, FILM COATED ORAL 2 TIMES DAILY
Status: DISCONTINUED | OUTPATIENT
Start: 2025-07-03 | End: 2025-07-15 | Stop reason: HOSPADM

## 2025-07-03 RX ADMIN — FUROSEMIDE 200 MG: 10 INJECTION, SOLUTION INTRAMUSCULAR; INTRAVENOUS at 06:07

## 2025-07-03 RX ADMIN — HEPARIN SODIUM 5000 UNITS: 5000 INJECTION, SOLUTION INTRAVENOUS; SUBCUTANEOUS at 06:07

## 2025-07-03 RX ADMIN — ACETAMINOPHEN 650 MG: 325 TABLET ORAL at 01:07

## 2025-07-03 RX ADMIN — CHLORHEXIDINE GLUCONATE 15 ML: 1.2 RINSE ORAL at 08:07

## 2025-07-03 RX ADMIN — FUROSEMIDE 200 MG: 10 INJECTION, SOLUTION INTRAMUSCULAR; INTRAVENOUS at 11:07

## 2025-07-03 RX ADMIN — METOPROLOL TARTRATE 50 MG: 50 TABLET, FILM COATED ORAL at 01:07

## 2025-07-03 RX ADMIN — HEPARIN SODIUM 5000 UNITS: 5000 INJECTION, SOLUTION INTRAVENOUS; SUBCUTANEOUS at 01:07

## 2025-07-03 RX ADMIN — DEXMEDETOMIDINE HYDROCHLORIDE IN 0.9% SODIUM CHLORIDE 1.4 MCG/KG/HR: 4 INJECTION INTRAVENOUS at 01:07

## 2025-07-03 RX ADMIN — DILTIAZEM HYDROCHLORIDE 20 MG: 5 INJECTION INTRAVENOUS at 11:07

## 2025-07-03 RX ADMIN — PANTOPRAZOLE SODIUM 40 MG: 40 INJECTION, POWDER, FOR SOLUTION INTRAVENOUS at 08:07

## 2025-07-03 RX ADMIN — DAPTOMYCIN 610 MG: 500 INJECTION, POWDER, LYOPHILIZED, FOR SOLUTION INTRAVENOUS at 12:07

## 2025-07-03 RX ADMIN — INSULIN ASPART 5 UNITS: 100 INJECTION, SOLUTION INTRAVENOUS; SUBCUTANEOUS at 12:07

## 2025-07-03 RX ADMIN — INSULIN ASPART 5 UNITS: 100 INJECTION, SOLUTION INTRAVENOUS; SUBCUTANEOUS at 06:07

## 2025-07-03 RX ADMIN — MEROPENEM 500 MG: 500 INJECTION, POWDER, FOR SOLUTION INTRAVENOUS at 08:07

## 2025-07-03 RX ADMIN — FUROSEMIDE 200 MG: 10 INJECTION, SOLUTION INTRAMUSCULAR; INTRAVENOUS at 01:07

## 2025-07-03 RX ADMIN — DEXMEDETOMIDINE HYDROCHLORIDE IN 0.9% SODIUM CHLORIDE 1.4 MCG/KG/HR: 4 INJECTION INTRAVENOUS at 04:07

## 2025-07-03 RX ADMIN — METOPROLOL TARTRATE 5 MG: 1 INJECTION, SOLUTION INTRAVENOUS at 10:07

## 2025-07-03 RX ADMIN — DOXYCYCLINE HYCLATE 100 MG: 100 TABLET, FILM COATED ORAL at 08:07

## 2025-07-03 RX ADMIN — LEVOTHYROXINE SODIUM 200 MCG: 100 TABLET ORAL at 06:07

## 2025-07-03 RX ADMIN — AMIODARONE HYDROCHLORIDE 200 MG: 200 TABLET ORAL at 08:07

## 2025-07-03 RX ADMIN — DEXMEDETOMIDINE HYDROCHLORIDE IN 0.9% SODIUM CHLORIDE 1.4 MCG/KG/HR: 4 INJECTION INTRAVENOUS at 07:07

## 2025-07-03 NOTE — PROGRESS NOTES
Office: [phone]    Subjective:   Still intubated.  Looks like ICU was planning to try to extubate today.  Cultures from most recent washout no growth    I/O as of 3:54 PM:   Intake/Output - Last 3 Shifts         07/01 0700 07/02 0659 07/02 0700 07/03 0659 07/03 0700 07/04 0659    I.V. (mL/kg) 1736.3 (16.4) 493.7 (4.7)     Blood 500      NG/GT  390     IV Piggyback 310.4 348     Total Intake(mL/kg) 2546.7 (24) 1231.8 (11.6)     Urine (mL/kg/hr) 2400 (0.9) 2545 (1)     Drains       Total Output 2400 2545     Net +146.7 -1313.2                     Vitals / Physical Exam:  Vitals:    07/03/25 0500 07/03/25 0515 07/03/25 0600 07/03/25 0712   BP: (!) 112/54  113/62    Pulse: 70 70 75    Resp: 16 18 (!) 24    Temp:    98.7 °F (37.1 °C)   TempSrc:    Oral   SpO2: 98% 97% 100%    Weight:       Height:            Intubated   Responds to commands  Moves toes, sensation intact      Assessment / Plan:   Everettluzmaria Leonardo is a 49 y.o. female now 3 Days Post-Op  s/p Procedure(s) (LRB):  INCISION AND DRAINAGE, ABSCESS, SPINE, LUMBOSACRAL, POSTERIOR (N/A).    - medical comanagement per hospitalist/ICU  - follow-up cultures, continue antibiotics  - Please call should you have any questions regarding this patient's care      Signature:  Cyril Upton MD     Electronic Signature

## 2025-07-03 NOTE — PROGRESS NOTES
Patient intubated and sedate, she does open her eyes and seemed to fix and follow her head appropriately to interactions.    Physical exam general patient is chronically ill-appearing, heart is regular rate and rhythm, she has no pitting edema, lungs - breath sounds are little sonorous, abdomen is soft with positive bowel sounds     Assessment/plan 1.  Acute kidney injury-this patient's creatinine is decreased to 6.5 mg/dL from 6.56 yesterday.  Urine output was around 2500 cc over 24 hours yesterday. Cont. To monitor for recovery    2.  Hypertension-systolic blood pressure was around 113 this morning, continue to hold antihypertensives, she has clonidine ordered p.r.n.   3.  Diabetes mellitus-continue present hypoglycemic therapy   4.  Anemia-hematocrit is 25% continue to monitor   5.  Spinal abscess-continue abx and wound care   6.  Respiratory failure-patient may be extubated today pending her ABGs.      Vitals:    07/03/25 0500 07/03/25 0515 07/03/25 0600 07/03/25 0712   BP: (!) 112/54  113/62    Pulse: 70 70 75    Resp: 16 18 (!) 24    Temp:    98.7 °F (37.1 °C)   TempSrc:    Oral   SpO2: 98% 97% 100%    Weight:       Height:

## 2025-07-03 NOTE — PLAN OF CARE
Problem: Adult Inpatient Plan of Care  Goal: Plan of Care Review  7/3/2025 1147 by Guero Alvarez, RRT  Outcome: Progressing  7/3/2025 1146 by Guero Alvarez, RRT  Outcome: Progressing     Problem: Mechanical Ventilation Invasive  Goal: Effective Communication  7/3/2025 1147 by Guero Alvarez, RRT  Outcome: Progressing  7/3/2025 1146 by Guero Alvarez, RRT  Outcome: Progressing     Problem: Gas Exchange Impaired  Goal: Optimal Gas Exchange  7/3/2025 1147 by Guero Alvarez, RRT  Outcome: Progressing  7/3/2025 1146 by Guero Alvarez, RRT  Outcome: Progressing     Problem: Breathing Pattern Ineffective  Goal: Effective Breathing Pattern  Outcome: Progressing

## 2025-07-03 NOTE — RESPIRATORY THERAPY
RECEIVED PATIENT ON CPAP @ 07:00 WITH A PEEP OF 5 , PRESSURE SUPPORT 8 AND 40% FIO2 FROM PREVIOUS SHIFT , EXTUBATED @ 14:45 TO 50% MASK WITH NO COMPLICATIONS .

## 2025-07-03 NOTE — CARE UPDATE
Patient placed on PS 8, PEEP 5 at 40% per order. No complications or signs of distress at this time.

## 2025-07-03 NOTE — PROGRESS NOTES
[facility] ICU  Critical Care Medicine  Progress Note    Patient Name: Carey Leonardo  MRN: [id number]  Admission Date: 6/26/2025  Hospital Length of Stay: 7 days  Code Status: Full Code  Attending Provider: Kameron Martines MD  Primary Care Provider: Zainab Harrell FNP   Principal Problem: MATT (acute kidney injury)    Subjective:     HPI:  No notes on file    Hospital/ICU Course:  No notes on file    Interval History/Significant Events:  Patient without complaints this morning    Review of Systems  Objective:     Vital Signs (Most Recent):  Temp: 98.1 °F (36.7 °C) (07/03/25 0330)  Pulse: 70 (07/03/25 0515)  Resp: 18 (07/03/25 0515)  BP: (!) 112/54 (07/03/25 0500)  SpO2: 97 % (07/03/25 0515) Vital Signs (24h Range):  Temp:  [97.3 °F (36.3 °C)-102 °F (38.9 °C)] 98.1 °F (36.7 °C)  Pulse:  [61-99] 70  Resp:  [14-33] 18  SpO2:  [91 %-100 %] 97 %  BP: ()/(48-80) 112/54   Weight: 106.1 kg (233 lb 14.5 oz)  Body mass index is 36.64 kg/m².      Intake/Output Summary (Last 24 hours) at 7/3/2025 0551  Last data filed at 7/3/2025 0515  Gross per 24 hour   Intake 1308.63 ml   Output 2535 ml   Net -1226.37 ml          Physical Exam  Vitals reviewed.   Constitutional:       Appearance: Normal appearance.      Interventions: She is not intubated.  HENT:      Head: Normocephalic and atraumatic.      Nose: Nose normal.      Mouth/Throat:      Mouth: Mucous membranes are dry.      Pharynx: Oropharynx is clear.   Eyes:      Extraocular Movements: Extraocular movements intact.      Conjunctiva/sclera: Conjunctivae normal.      Pupils: Pupils are equal, round, and reactive to light.   Cardiovascular:      Rate and Rhythm: Normal rate.      Heart sounds: Normal heart sounds. No murmur heard.  Pulmonary:      Effort: Pulmonary effort is normal. She is not intubated.      Breath sounds: Normal breath sounds.   Abdominal:      General: Abdomen is flat. Bowel sounds are normal.      Palpations: Abdomen is  soft.   Musculoskeletal:         General: Normal range of motion.      Cervical back: Normal range of motion and neck supple.      Right lower leg: No edema.      Left lower leg: No edema.   Skin:     General: Skin is warm and dry.      Capillary Refill: Capillary refill takes less than 2 seconds.   Neurological:      General: No focal deficit present.      Mental Status: She is alert and oriented to person, place, and time.   Psychiatric:         Mood and Affect: Mood normal.         Behavior: Behavior normal.            Vents:  Vent Mode: A/CMV-VC (07/03/25 0412)  Ventilator Initiated: Yes (06/30/25 1555)  Set Rate: 14 BPM (07/03/25 0412)  Vt Set: 500 mL (07/03/25 0412)  Pressure Support: 10 cmH20 (07/02/25 1918)  PEEP/CPAP: 5 cmH20 (07/03/25 0412)  Oxygen Concentration (%): 50 (07/03/25 0412)  Peak Airway Pressure: 28.8 cmH20 (07/03/25 0412)  Plateau Pressure: 24.3 cmH20 (07/03/25 0412)  Total Ve: 9.2 L/m (07/03/25 0412)  F/VT Ratio<105 (RSBI): (!) 44 (07/02/25 2018)  Lines/Drains/Airways       Drain  Duration                  Urethral Catheter 06/28/25 1654 4 days         Closed/Suction Drain 06/30/25 Tube - 1 Inferior;Right Back Accordion 10 Fr. 3 days         Closed/Suction Drain 06/30/25 Tube - 2 Inferior;Medial;Right Back 10 Fr. 3 days         NG/OG Tube 06/30/25 [facility] mouth 3 days              Airway  Duration                  Airway - Non-Surgical 06/30/25 1555 2 days              Peripheral Intravenous Line  Duration             Peripheral IV 07/03/25 0115 18 G Anterior;Left Forearm <1 day    Peripheral IV 07/03/25 0115 20 G Anterior;Distal;Left Forearm <1 day                  Significant Labs:    CBC/Anemia Profile:  Recent Labs   Lab 07/02/25  0300 07/03/25  0232   WBC 12.09* 7.56   HGB 7.1* 8.2*   HCT 22.8* 25.1*   * 503*   MCV 83.5 81.2   RDW 15.9* 16.0*        Chemistries:  Recent Labs   Lab 07/01/25  1147 07/02/25  0300 07/03/25  0232   NA  --  137 143   K  --  3.4* 3.6   CL  --   99 98   CO2  --  25 26   BUN  --  70* 80*   CREATININE  --  6.59* 6.50*   CALCIUM  --  7.4* 6.7*   MG 2.0  --   --    PHOS 5.3*  --   --        Recent Lab Results  (Last 5 results in the past 24 hours)        07/03/25  0232   07/03/25  0200   07/02/25  1933   07/02/25  1711   07/02/25  1057        Anion Gap 23               Baso # 0.02               Basophil % 0.3               BUN 80               BUN/CREAT RATIO 12               Calcium 6.7  Comment: Critical Result called and verified by verbal readback to: reported critical value to [place] on 07/03/2025 at 04:16. Reported by [id number].               Chloride 98               CO2 26               Creatinine 6.50               Differential Method Auto               eGFR 7  Comment: Estimated GFR calculated using the CKD-EPI creatinine (2021) equation.               Eos # 0.57               Eos % 7.5               Glucose 139               Group & Rh O POS               Hematocrit 25.1               Hemoglobin 8.2               Immature Grans (Abs) 0.08               Immature Granulocytes 1.1               INDIRECT MARCELL NEG               Lymph # 0.71               Lymph % 9.4               MCH 26.5               MCHC 32.7               MCV 81.2               Mono # 0.45               Mono % 6.0               MPV 10.7               Neutrophils, Abs 5.73               Neutrophils Relative 75.7               nRBC 0.3               NUCLEATED RBC ABSOLUTE 0.02               Platelet Count 503               POC Glucose   137   109   95   122       Potassium 3.6               RBC 3.09               RDW 16.0               Sodium 143               Specimen Outdate 07/06/2025 23:59               WBC 7.56                                      Significant Imaging:  I have reviewed all pertinent imaging results/findings within the past 24 hours.    ABG  Recent Labs   Lab 07/01/25  0751   PH 7.46*   PO2 100   PCO2 47   HCO3 33.4*     Assessment/Plan:     Neuro  Encephalopathy,  metabolic  Markedly improved today even though on ventilator and sedated    Pulmonary  Acute hypoxic respiratory failure  Spontaneous breathing trial with the possibility of extubation today    Mild intermittent asthma without complication  Improving    Cardiac/Vascular  Essential (primary) hypertension  Fair control will not add any medicines right now    Renal/  * MATT (acute kidney injury)  Slow fallen creatinine good urine output with diuresis improving    Metabolic acidosis  Improving with bicarb    Hypokalemia  Potassium 3.8 today    ID  Abscess in epidural space of lumbar spine  Noted    MRSA (methicillin resistant Staphylococcus aureus) infection  Continue daptomycin    Infection of lumbar spine  White blood cell count down 7000,2 spells of temp yesterday no positive cultures currently continue current therapy    Oncology  Anemia  Hemoglobin stable no need for transfusion    Endocrine  Hyponatremia  Resolved    Acquired hypothyroidism  Noted    Other  Obstructive sleep apnea syndrome  Currently intubated             Kameron Martines MD  Critical Care Medicine  [facility]

## 2025-07-03 NOTE — PLAN OF CARE
Chart review. Patient is on vent. Iv meds noted. Nephrology and ortho spine are following. Cm will continue to follow.

## 2025-07-03 NOTE — SUBJECTIVE & OBJECTIVE
Interval History/Significant Events:  Patient without complaints this morning    Review of Systems  Objective:     Vital Signs (Most Recent):  Temp: 98.1 °F (36.7 °C) (07/03/25 0330)  Pulse: 70 (07/03/25 0515)  Resp: 18 (07/03/25 0515)  BP: (!) 112/54 (07/03/25 0500)  SpO2: 97 % (07/03/25 0515) Vital Signs (24h Range):  Temp:  [97.3 °F (36.3 °C)-102 °F (38.9 °C)] 98.1 °F (36.7 °C)  Pulse:  [61-99] 70  Resp:  [14-33] 18  SpO2:  [91 %-100 %] 97 %  BP: ()/(48-80) 112/54   Weight: 106.1 kg (233 lb 14.5 oz)  Body mass index is 36.64 kg/m².      Intake/Output Summary (Last 24 hours) at 7/3/2025 0551  Last data filed at 7/3/2025 0515  Gross per 24 hour   Intake 1308.63 ml   Output 2535 ml   Net -1226.37 ml          Physical Exam  Vitals reviewed.   Constitutional:       Appearance: Normal appearance.      Interventions: She is not intubated.  HENT:      Head: Normocephalic and atraumatic.      Nose: Nose normal.      Mouth/Throat:      Mouth: Mucous membranes are dry.      Pharynx: Oropharynx is clear.   Eyes:      Extraocular Movements: Extraocular movements intact.      Conjunctiva/sclera: Conjunctivae normal.      Pupils: Pupils are equal, round, and reactive to light.   Cardiovascular:      Rate and Rhythm: Normal rate.      Heart sounds: Normal heart sounds. No murmur heard.  Pulmonary:      Effort: Pulmonary effort is normal. She is not intubated.      Breath sounds: Normal breath sounds.   Abdominal:      General: Abdomen is flat. Bowel sounds are normal.      Palpations: Abdomen is soft.   Musculoskeletal:         General: Normal range of motion.      Cervical back: Normal range of motion and neck supple.      Right lower leg: No edema.      Left lower leg: No edema.   Skin:     General: Skin is warm and dry.      Capillary Refill: Capillary refill takes less than 2 seconds.   Neurological:      General: No focal deficit present.      Mental Status: She is alert and oriented to person, place, and time.    Psychiatric:         Mood and Affect: Mood normal.         Behavior: Behavior normal.            Vents:  Vent Mode: A/CMV-VC (07/03/25 0412)  Ventilator Initiated: Yes (06/30/25 1555)  Set Rate: 14 BPM (07/03/25 0412)  Vt Set: 500 mL (07/03/25 0412)  Pressure Support: 10 cmH20 (07/02/25 1918)  PEEP/CPAP: 5 cmH20 (07/03/25 0412)  Oxygen Concentration (%): 50 (07/03/25 0412)  Peak Airway Pressure: 28.8 cmH20 (07/03/25 0412)  Plateau Pressure: 24.3 cmH20 (07/03/25 0412)  Total Ve: 9.2 L/m (07/03/25 0412)  F/VT Ratio<105 (RSBI): (!) 44 (07/02/25 2018)  Lines/Drains/Airways       Drain  Duration                  Urethral Catheter 06/28/25 1654 4 days         Closed/Suction Drain 06/30/25 Tube - 1 Inferior;Right Back Accordion 10 Fr. 3 days         Closed/Suction Drain 06/30/25 Tube - 2 Inferior;Medial;Right Back 10 Fr. 3 days         [facility]              Airway  Duration                  Airway - Non-Surgical 06/30/25 1555 2 days              Peripheral Intravenous Line  Duration             Peripheral IV 07/03/25 0115 18 G Anterior;Left Forearm <1 day    Peripheral IV 07/03/25 0115 20 G Anterior;Distal;Left Forearm <1 day                  Significant Labs:    CBC/Anemia Profile:  Recent Labs   Lab 07/02/25 0300 07/03/25  0232   WBC 12.09* 7.56   HGB 7.1* 8.2*   HCT 22.8* 25.1*   * 503*   MCV 83.5 81.2   RDW 15.9* 16.0*        Chemistries:  Recent Labs   Lab 07/01/25  1147 07/02/25  0300 07/03/25  0232   NA  --  137 143   K  --  3.4* 3.6   CL  --  99 98   CO2  --  25 26   BUN  --  70* 80*   CREATININE  --  6.59* 6.50*   CALCIUM  --  7.4* 6.7*   MG 2.0  --   --    PHOS 5.3*  --   --        Recent Lab Results  (Last 5 results in the past 24 hours)        07/03/25  0232   07/03/25  0200   07/02/25  1933   07/02/25  1711   07/02/25  1057        Anion Gap 23               Baso # 0.02               Basophil % 0.3               BUN 80               BUN/CREAT RATIO 12                Calcium 6.7  Comment: Critical Result called and verified by verbal readback to: reported critical value to Milly on 07/03/2025 at 04:16. Reported by [id number].               Chloride 98               CO2 26               Creatinine 6.50               Differential Method Auto               eGFR 7  Comment: Estimated GFR calculated using the CKD-EPI creatinine (2021) equation.               Eos # 0.57               Eos % 7.5               Glucose 139               Group & Rh O POS               Hematocrit 25.1               Hemoglobin 8.2               Immature Grans (Abs) 0.08               Immature Granulocytes 1.1               INDIRECT MARCELL NEG               Lymph # 0.71               Lymph % 9.4               MCH 26.5               MCHC 32.7               MCV 81.2               Mono # 0.45               Mono % 6.0               MPV 10.7               Neutrophils, Abs 5.73               Neutrophils Relative 75.7               nRBC 0.3               NUCLEATED RBC ABSOLUTE 0.02               Platelet Count 503               POC Glucose   137   109   95   122       Potassium 3.6               RBC 3.09               RDW 16.0               Sodium 143               Specimen Outdate 07/06/2025 23:59               WBC 7.56                                      Significant Imaging:  I have reviewed all pertinent imaging results/findings within the past 24 hours.

## 2025-07-03 NOTE — PROGRESS NOTES
Infectious Disease [facility] Live Follow-up -   Patient Name: Carey Leonardo    Attending Physician: Kameron Martines MD   Primary Care Physician: Zainab Harrell FNP     Consultation Information  Consultation was provided via telemedicine from the location in the above note header using two-way real-time interactive telecommunication including between the patient and telemedicine provider. This includes use of bluetooth stethoscope for auscultation performed by the telepresenter that the telemedicine provider can hear if described in the physical exam. Time spent 60    Consultant Contact Information: Please call [facility] Call Center [phone]      Assessment & Plan      Impression: 49 year old patient,  s/p laminectomy and fusion c/b early postoperative infection w/ MRSA (superficial per OP note), on treatment with vancomycin, planned for 6 week course, readmitted with fevers and AK within 1.5 weeks of starting vanco therapy   Patient with persistent fevers, intially started on daptomycin, however due to persistent fevers cefepime and meropenem were added.       Hospital course c/b respiratory failure requiring intubation in the setting of volume overload due to worsening kidney function, now appears to be imrpoving and she has good urine output. She was extubated on 7/3.   She did have Increasing -> 1639, which now stabilzed around 700.     Blood cultures from 6/26, and 6/30 are , NGTD.   TTE on 6/30 without vegetations.   MRI spine with nonspecific prominent fluid collection extended throughout  operative bed and surrounding hardware, but not extending to spinal canal S/p I&D, in OR noted to have no fluid in superficial space, fascia was opened and fluid was noted in deep space, cultures were taken. Sharp excisional debridement performed down to the bone. Cultures are  NGTD, plan for prolonged incubation     Despite overall improvement, and broad spectrum she continues to have fevers, as well as  "eosinophilia.   Review of labs shows that she had eosinophilia while on vancomycin as well    At this point Im concerned from drug fever due to prior vancomycin, vs additional fever from b -lactam. Plan to simplify therapy to include only Daptomycin and monitor.   She denied tick exposure to me today, so will stop doxy           ID related problem list  MRSA lumbar spinal hardware infection  MATT,   relevant drug allergies: penicillins (has tolerated cefazolin prior admissions)  Eosinophilia  Elevated CK     Recommendations  Continue daptomycin 8 mg/kg q 48 hrs (CK trending down, continue daily checks)   II stopped meropenem and doxycycline  Would not restart additional abx for fevers only, however if worsening clinical status, with c/f sepsis, then can add meropenem back on.   Will follow up culture reults  Please send daily  CBC/diff, LFTS and daily CK   Trend fever curve  IF fevers presists, would do US DVT UE and LE to exclude thrombosis as cause of her fevers       Thank you for involving us in the care of this patient. Infectious diseases will follow with you but are not able to chart review or enter notes over the weekend. Should any questions arise, please don't hesitate to contact the [facility] [employer] call center to discuss with our on call weekend physician at [phone].    TISHA RANKIN MD  Infectious Diseases Attending  [facility]         Subjective   Interval History: still intubated this am during my visit, but extubated now.  She has endorsed some diarrhea prior to presentation. Currently denies any abdominal pain, or back pain. Per nurse no BM for several days now.   Persistently febrile, with increasing eosinophilia.          Objective   Vitals: BP (!) 146/61   Pulse (!) 140   Temp (!) 101.4 °F (38.6 °C) (Oral)   Resp (!) 37   Ht 5' 7" (1.702 m)   Wt 106.1 kg (233 lb 14.5 oz)   LMP  (LMP Unknown)   SpO2 100%   Breastfeeding No   BMI 36.64 kg/m²  SaO2:100%,FiO2-O2 (L/m): , Dosing Wt:  Wt " "Readings from Last 1 Encounters:   06/29/25 106.1 kg (233 lb 14.5 oz)   , BMI:Body mass index is 36.64 kg/m².    Physical Exam: Physical Exam  Chronically Ill appearing, intubated  No rashes  RRR on telemetry    Results Review    Labs:      CBC  Recent Labs     07/01/25  0254 07/02/25  0300 07/03/25  0232   WBC 9.57 12.09* 7.56   HGB 6.5* 7.1* 8.2*   HCT 21.7* 22.8* 25.1*   * 466* 503*     Renal function  Recent Labs     07/01/25  0254 07/02/25  0300 07/03/25  0232    137 143   K 3.5 3.4* 3.6   CREATININE 6.73* 6.59* 6.50*     Liver function  No results for input(s): "AST", "ALT", "BILITOT", "BILIDIR" in the last 72 hours.  Coagulation  No results for input(s): "PT", "INR", "APTT" in the last 72 hours.   Procalcitonin  No results for input(s): "PROCALCIT" in the last 72 hours.    Microbiology:      Susceptibility data from last 90 days.  Collected Specimen Info Organism Amox/ K Clav Amp/Sulbactam Ampicillin Azithromycin Cefazolin Ciprofloxacin Clindamycin Daptomycin Erythromycin Gentamicin LEVOFLOXACIN ETEST Linezolid Oxacillin Penicillin Rifampin   06/12/25 Wound from Back, Lower Methicillin resistant Staphylococcus aureus  R  R  R  R  R  S  R  S  R  S  S  S  R  R  S   06/12/25 Wound from Back, Lower Methicillin resistant Staphylococcus aureus  R  R  R  R  R  S  R  S  R  S  S  S  R  R  S     Collected Specimen Info Organism Tetracycline Trimeth/Sulfa Vancomycin   06/12/25 Wound from Back, Lower Methicillin resistant Staphylococcus aureus  R  S  S   06/12/25 Wound from Back, Lower Methicillin resistant Staphylococcus aureus  R  S  S       Imaging:     X-Ray Chest 1 View  Result Date: 7/3/2025  EXAMINATION: XR CHEST 1 VIEW CLINICAL HISTORY: pulmonary edema, hypoxia, on vent; TECHNIQUE: Single frontal view of the chest was performed. COMPARISON: Plain film from 07/02/2025 FINDINGS: Endotracheal tube with its tip 5.1 cm above the vita.  Enteric tube with its tip below the diaphragm, out of the field of " view.  Cardiac silhouette is enlarged similar to prior exam.  Prominence of the pulmonary vasculature with coarse interstitial lung markings suggestive of pulmonary edema, multifocal pneumonia, or ARDS.  No pleural effusion.  No pneumothorax.     Slightly increased bilateral interstitial lung markings suggestive of worsening pulmonary edema. Electronically signed by: Renny Argueta MD Date:    07/03/2025 Time:    08:23    X-Ray Chest 1 View  Result Date: 7/2/2025  EXAMINATION: XR CHEST 1 VIEW CLINICAL HISTORY: pulmonary edema, hypoxia, on vent; TECHNIQUE: Single frontal view of the chest was performed. COMPARISON: Chest radiograph 07/01/2025 FINDINGS: Lines and tubes: Endotracheal tube, enteric tube, and monitoring leads. Heart and mediastinum: Unchanged. Pleura: No significant pleural effusion.  No pneumothorax. Lungs: Lungs are well inflated. There is pulmonary vascular indistinctness suggesting pulmonary edema.  There are patchy opacities bilaterally, most pronounced in the right lower lung zone, which have improved from yesterday.  No new consolidation. Soft tissue/bone: Unchanged.     As above. Electronically signed by: Viraj Antunez Date:    07/02/2025 Time:    09:54

## 2025-07-03 NOTE — ASSESSMENT & PLAN NOTE
White blood cell count down 7000,2 spells of temp yesterday no positive cultures currently continue current therapy

## 2025-07-03 NOTE — PLAN OF CARE
Problem: Adult Inpatient Plan of Care  Goal: Plan of Care Review  Outcome: Progressing  Goal: Patient-Specific Goal (Individualized)  Outcome: Progressing  Goal: Absence of Hospital-Acquired Illness or Injury  Outcome: Progressing  Goal: Optimal Comfort and Wellbeing  Outcome: Progressing  Goal: Readiness for Transition of Care  Outcome: Progressing     Problem: Acute Kidney Injury/Impairment  Goal: Fluid and Electrolyte Balance  Outcome: Progressing  Goal: Improved Oral Intake  Outcome: Progressing  Goal: Effective Renal Function  Outcome: Progressing     Problem: Infection  Goal: Absence of Infection Signs and Symptoms  Outcome: Progressing     Problem: Wound  Goal: Optimal Coping  Outcome: Progressing  Goal: Optimal Functional Ability  Outcome: Progressing  Goal: Absence of Infection Signs and Symptoms  Outcome: Progressing  Goal: Improved Oral Intake  Outcome: Progressing  Goal: Optimal Pain Control and Function  Outcome: Progressing  Goal: Skin Health and Integrity  Outcome: Progressing  Goal: Optimal Wound Healing  Outcome: Progressing     Problem: Skin Injury Risk Increased  Goal: Skin Health and Integrity  Outcome: Progressing     Problem: Noninvasive Ventilation Acute  Goal: Effective Unassisted Ventilation and Oxygenation  Outcome: Progressing     Problem: Fall Injury Risk  Goal: Absence of Fall and Fall-Related Injury  Outcome: Progressing     Problem: Restraint, Nonviolent  Goal: Absence of Harm or Injury  Outcome: Progressing     Problem: Mechanical Ventilation Invasive  Goal: Effective Communication  Outcome: Progressing  Goal: Optimal Device Function  Outcome: Progressing  Goal: Mechanical Ventilation Liberation  Outcome: Progressing  Goal: Optimal Nutrition Delivery  Outcome: Progressing  Goal: Absence of Device-Related Skin and Tissue Injury  Outcome: Progressing  Goal: Absence of Ventilator-Induced Lung Injury  Outcome: Progressing     Problem: Gas Exchange Impaired  Goal: Optimal Gas  Exchange  Outcome: Progressing     Problem: Delirium  Goal: Optimal Coping  Outcome: Progressing  Goal: Improved Behavioral Control  Outcome: Progressing  Goal: Improved Attention and Thought Clarity  Outcome: Progressing  Goal: Improved Sleep  Outcome: Progressing

## 2025-07-03 NOTE — CARE UPDATE
Patient completed CPAP trial (PS 10, PEEP of 5) at 2018 with no complications or signs of distress. Placed patient back on VC settings.

## 2025-07-04 LAB
ALBUMIN SERPL BCP-MCNC: 1.8 G/DL (ref 3.5–5)
ALBUMIN/GLOB SERPL: 0.3 {RATIO}
ALP SERPL-CCNC: 106 U/L (ref 40–150)
ALT SERPL W P-5'-P-CCNC: 23 U/L
ANION GAP SERPL CALCULATED.3IONS-SCNC: 26 MMOL/L (ref 7–16)
ANION GAP SERPL CALCULATED.3IONS-SCNC: 27 MMOL/L (ref 7–16)
AST SERPL W P-5'-P-CCNC: 83 U/L (ref 11–45)
BASOPHILS # BLD AUTO: 0.03 K/UL (ref 0–0.2)
BASOPHILS NFR BLD AUTO: 0.2 % (ref 0–1)
BILIRUB SERPL-MCNC: 0.6 MG/DL
BUN SERPL-MCNC: 84 MG/DL (ref 7–19)
BUN SERPL-MCNC: 84 MG/DL (ref 7–19)
BUN/CREAT SERPL: 13 (ref 6–20)
BUN/CREAT SERPL: 13 (ref 6–20)
CALCIUM SERPL-MCNC: 7.2 MG/DL (ref 8.4–10.2)
CALCIUM SERPL-MCNC: 7.9 MG/DL (ref 8.4–10.2)
CHLORIDE SERPL-SCNC: 97 MMOL/L (ref 98–107)
CHLORIDE SERPL-SCNC: 97 MMOL/L (ref 98–107)
CO2 SERPL-SCNC: 21 MMOL/L (ref 22–29)
CO2 SERPL-SCNC: 22 MMOL/L (ref 22–29)
CREAT SERPL-MCNC: 6.35 MG/DL (ref 0.55–1.02)
CREAT SERPL-MCNC: 6.37 MG/DL (ref 0.55–1.02)
DIFFERENTIAL METHOD BLD: ABNORMAL
EGFR (NO RACE VARIABLE) (RUSH/TITUS): 8 ML/MIN/1.73M2
EGFR (NO RACE VARIABLE) (RUSH/TITUS): 8 ML/MIN/1.73M2
EOSINOPHIL # BLD AUTO: 0.41 K/UL (ref 0–0.5)
EOSINOPHIL NFR BLD AUTO: 3.3 % (ref 1–4)
ERYTHROCYTE [DISTWIDTH] IN BLOOD BY AUTOMATED COUNT: 16.9 % (ref 11.5–14.5)
GLOBULIN SER-MCNC: 5.2 G/DL (ref 2–4)
GLUCOSE SERPL-MCNC: 162 MG/DL (ref 70–105)
GLUCOSE SERPL-MCNC: 166 MG/DL (ref 70–105)
GLUCOSE SERPL-MCNC: 170 MG/DL (ref 70–105)
GLUCOSE SERPL-MCNC: 181 MG/DL (ref 74–100)
GLUCOSE SERPL-MCNC: 183 MG/DL (ref 74–100)
GLUCOSE SERPL-MCNC: 189 MG/DL (ref 70–105)
HCT VFR BLD AUTO: 30.5 % (ref 38–47)
HGB BLD-MCNC: 9.5 G/DL (ref 12–16)
IMM GRANULOCYTES # BLD AUTO: 0.23 K/UL (ref 0–0.04)
IMM GRANULOCYTES NFR BLD: 1.8 % (ref 0–0.4)
LYMPHOCYTES # BLD AUTO: 0.88 K/UL (ref 1–4.8)
LYMPHOCYTES NFR BLD AUTO: 7 % (ref 27–41)
MCH RBC QN AUTO: 25.5 PG (ref 27–31)
MCHC RBC AUTO-ENTMCNC: 31.1 G/DL (ref 32–36)
MCV RBC AUTO: 82 FL (ref 80–96)
MONOCYTES # BLD AUTO: 0.6 K/UL (ref 0–0.8)
MONOCYTES NFR BLD AUTO: 4.8 % (ref 2–6)
MPC BLD CALC-MCNC: 11 FL (ref 9.4–12.4)
NEUTROPHILS # BLD AUTO: 10.35 K/UL (ref 1.8–7.7)
NEUTROPHILS NFR BLD AUTO: 82.9 % (ref 53–65)
NRBC # BLD AUTO: 0.02 X10E3/UL
NRBC, AUTO (.00): 0.2 %
PLATELET # BLD AUTO: 577 K/UL (ref 150–400)
POTASSIUM SERPL-SCNC: 3.1 MMOL/L (ref 3.5–5.1)
POTASSIUM SERPL-SCNC: 3.2 MMOL/L (ref 3.5–5.1)
PROT SERPL-MCNC: 7 G/DL (ref 6.4–8.3)
RBC # BLD AUTO: 3.72 M/UL (ref 4.2–5.4)
SODIUM SERPL-SCNC: 142 MMOL/L (ref 136–145)
SODIUM SERPL-SCNC: 142 MMOL/L (ref 136–145)
TROPONIN I SERPL HS-MCNC: 25.7 NG/L
WBC # BLD AUTO: 12.5 K/UL (ref 4.5–11)

## 2025-07-04 PROCEDURE — 85025 COMPLETE CBC W/AUTO DIFF WBC: CPT | Performed by: ORTHOPAEDIC SURGERY

## 2025-07-04 PROCEDURE — 63600175 PHARM REV CODE 636 W HCPCS: Performed by: NURSE PRACTITIONER

## 2025-07-04 PROCEDURE — 25000003 PHARM REV CODE 250: Performed by: INTERNAL MEDICINE

## 2025-07-04 PROCEDURE — 63600175 PHARM REV CODE 636 W HCPCS: Performed by: INTERNAL MEDICINE

## 2025-07-04 PROCEDURE — 99233 SBSQ HOSP IP/OBS HIGH 50: CPT | Mod: ,,, | Performed by: INTERNAL MEDICINE

## 2025-07-04 PROCEDURE — 27000207 HC ISOLATION

## 2025-07-04 PROCEDURE — 97162 PT EVAL MOD COMPLEX 30 MIN: CPT

## 2025-07-04 PROCEDURE — 99900035 HC TECH TIME PER 15 MIN (STAT)

## 2025-07-04 PROCEDURE — 94761 N-INVAS EAR/PLS OXIMETRY MLT: CPT

## 2025-07-04 PROCEDURE — 25000003 PHARM REV CODE 250: Performed by: ORTHOPAEDIC SURGERY

## 2025-07-04 PROCEDURE — 20000000 HC ICU ROOM

## 2025-07-04 PROCEDURE — 36415 COLL VENOUS BLD VENIPUNCTURE: CPT | Performed by: ORTHOPAEDIC SURGERY

## 2025-07-04 PROCEDURE — 63600175 PHARM REV CODE 636 W HCPCS: Performed by: ORTHOPAEDIC SURGERY

## 2025-07-04 PROCEDURE — 25000003 PHARM REV CODE 250

## 2025-07-04 PROCEDURE — 82962 GLUCOSE BLOOD TEST: CPT

## 2025-07-04 PROCEDURE — 80048 BASIC METABOLIC PNL TOTAL CA: CPT | Performed by: ORTHOPAEDIC SURGERY

## 2025-07-04 PROCEDURE — 27000221 HC OXYGEN, UP TO 24 HOURS

## 2025-07-04 RX ORDER — POTASSIUM CHLORIDE 7.45 MG/ML
20 INJECTION INTRAVENOUS ONCE
Status: COMPLETED | OUTPATIENT
Start: 2025-07-04 | End: 2025-07-04

## 2025-07-04 RX ORDER — HYDROCODONE BITARTRATE AND ACETAMINOPHEN 5; 325 MG/1; MG/1
1 TABLET ORAL EVERY 4 HOURS PRN
Refills: 0 | Status: DISCONTINUED | OUTPATIENT
Start: 2025-07-04 | End: 2025-07-15 | Stop reason: HOSPADM

## 2025-07-04 RX ADMIN — FUROSEMIDE 200 MG: 10 INJECTION, SOLUTION INTRAMUSCULAR; INTRAVENOUS at 01:07

## 2025-07-04 RX ADMIN — HEPARIN SODIUM 5000 UNITS: 5000 INJECTION, SOLUTION INTRAVENOUS; SUBCUTANEOUS at 06:07

## 2025-07-04 RX ADMIN — HEPARIN SODIUM 5000 UNITS: 5000 INJECTION, SOLUTION INTRAVENOUS; SUBCUTANEOUS at 02:07

## 2025-07-04 RX ADMIN — FUROSEMIDE 200 MG: 10 INJECTION, SOLUTION INTRAMUSCULAR; INTRAVENOUS at 09:07

## 2025-07-04 RX ADMIN — INSULIN GLARGINE 20 UNITS: 100 INJECTION, SOLUTION SUBCUTANEOUS at 09:07

## 2025-07-04 RX ADMIN — DILTIAZEM HYDROCHLORIDE 5 MG/HR: 5 INJECTION, SOLUTION INTRAVENOUS at 12:07

## 2025-07-04 RX ADMIN — DILTIAZEM HYDROCHLORIDE 5 MG/HR: 5 INJECTION, SOLUTION INTRAVENOUS at 02:07

## 2025-07-04 RX ADMIN — MICONAZOLE NITRATE ANTIFUNGAL POWDER: 2 POWDER TOPICAL at 09:07

## 2025-07-04 RX ADMIN — LEVOTHYROXINE SODIUM 200 MCG: 100 TABLET ORAL at 06:07

## 2025-07-04 RX ADMIN — AMIODARONE HYDROCHLORIDE 200 MG: 200 TABLET ORAL at 11:07

## 2025-07-04 RX ADMIN — INSULIN ASPART 5 UNITS: 100 INJECTION, SOLUTION INTRAVENOUS; SUBCUTANEOUS at 11:07

## 2025-07-04 RX ADMIN — Medication 6 MG: at 09:07

## 2025-07-04 RX ADMIN — METOPROLOL TARTRATE 50 MG: 50 TABLET, FILM COATED ORAL at 11:07

## 2025-07-04 RX ADMIN — PANTOPRAZOLE SODIUM 40 MG: 40 INJECTION, POWDER, FOR SOLUTION INTRAVENOUS at 09:07

## 2025-07-04 RX ADMIN — HEPARIN SODIUM 5000 UNITS: 5000 INJECTION, SOLUTION INTRAVENOUS; SUBCUTANEOUS at 09:07

## 2025-07-04 RX ADMIN — HYDROCODONE BITARTRATE AND ACETAMINOPHEN 1 TABLET: 5; 325 TABLET ORAL at 09:07

## 2025-07-04 RX ADMIN — POTASSIUM CHLORIDE 20 MEQ: 7.46 INJECTION, SOLUTION INTRAVENOUS at 04:07

## 2025-07-04 RX ADMIN — INSULIN ASPART 5 UNITS: 100 INJECTION, SOLUTION INTRAVENOUS; SUBCUTANEOUS at 06:07

## 2025-07-04 RX ADMIN — HYDROCODONE BITARTRATE AND ACETAMINOPHEN 1 TABLET: 5; 325 TABLET ORAL at 11:07

## 2025-07-04 RX ADMIN — HYDROCODONE BITARTRATE AND ACETAMINOPHEN 1 TABLET: 5; 325 TABLET ORAL at 06:07

## 2025-07-04 RX ADMIN — METOPROLOL TARTRATE 50 MG: 50 TABLET, FILM COATED ORAL at 09:07

## 2025-07-04 RX ADMIN — INSULIN ASPART 5 UNITS: 100 INJECTION, SOLUTION INTRAVENOUS; SUBCUTANEOUS at 05:07
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
## 2025-07-04 NOTE — PLAN OF CARE
Problem: Noninvasive Ventilation Acute  Goal: Effective Unassisted Ventilation and Oxygenation  Outcome: Progressing     Problem: Gas Exchange Impaired  Goal: Optimal Gas Exchange  Outcome: Progressing     Problem: Mechanical Ventilation Invasive  Goal: Effective Communication  Outcome: Met  Goal: Optimal Device Function  Outcome: Met  Goal: Mechanical Ventilation Liberation  Outcome: Met  Goal: Optimal Nutrition Delivery  Outcome: Met  Goal: Absence of Device-Related Skin and Tissue Injury  Outcome: Met  Goal: Absence of Ventilator-Induced Lung Injury  Outcome: Met

## 2025-07-04 NOTE — PLAN OF CARE
Problem: Physical Therapy  Goal: Physical Therapy Goal  Description: Short term goals:  1. Supine to sit with MInimal Assistance  2. Sit to stand transfer with Minimal Assistance  3. Bed to chair transfer with Minimal Assistance using Rolling Walker  4. Gait  x 50 feet with Minimal Assistance using Rolling Walker.     Long term goals:  1. Supine to sit with Contact Guard Assistance  2. Sit to stand transfer with Contact Guard Assistance  3. Bed to chair transfer with Contact Guard Assistance using Rolling Walker  4. Gait  x 100 feet with Contact Guard Assistance using Rolling Walker.     Outcome: Progressing

## 2025-07-04 NOTE — PT/OT/SLP EVAL
Physical Therapy Evaluation     Patient Name: Carey Leonardo   MRN: [id number]  Recent Surgery: Procedure(s) (LRB):  INCISION AND DRAINAGE, ABSCESS, SPINE, LUMBOSACRAL, POSTERIOR (N/A) 4 Days Post-Op    Recommendations:     Discharge Recommendations: Moderate Intensity Therapy   Discharge Equipment Recommendations: to be determined by next level of care   Barriers to discharge: Increased level of assist and Ongoing medical treatment    Assessment:     Carey eLonardo is a 49 y.o. female admitted with a medical diagnosis of MATT (acute kidney injury). She presents with the following impairments/functional limitations: weakness, impaired self care skills, impaired functional mobility, decreased lower extremity function, pain, impaired cardiopulmonary response to activity, orthopedic precautions. Pt was extubated yesterday. She has had complicated recovery from lumbar fusion, now s/p abscess drainage of lumbar spine. Pt is generally weak and requires increased assistance with all mobility. She was able to stand briefly with rolling walker but fatigued quickly. Likely will need extensive rehab to return to baseline.    Rehab Prognosis: Fair; patient would benefit from acute PT services to address these deficits and reach maximum level of function.    Plan:     During this hospitalization, patient to be seen 5 x/week to address the above listed problems via gait training, therapeutic activities, therapeutic exercises, neuromuscular re-education    Plan of Care Expires: 08/04/25    Subjective     Chief Complaint: weakness  Patient Comments/Goals: Pt is agreeable to PT   Pain/Comfort:  Pain Rating 1: 7/10  Location - Orientation 1: lower  Location 1: back  Pain Addressed 1: Reposition  Pain Rating Post-Intervention 1: 7/10    Social History:  Living Environment: Patient lives with their spouse and daughter in a single story home with number of outside stair(s): 0  Prior Level of Function: Prior to admission, patient was  modified independent with ADLs using rolling walker for mobility  Equipment Used at Home: walker, rolling  DME owned (not currently used): none  Assistance Upon Discharge: family    Objective:     Communicated with KEN Hutson RN prior to session. Patient found HOB elevated with peripheral IV, telemetry, blood pressure cuff, pulse ox (continuous), oxygen, bronson catheter, hemovac upon PT entry to room.    General Precautions: Standard, contact, fall   Orthopedic Precautions: spinal precautions   Braces: N/A    Respiratory Status: Nasal cannula, flow 3 L/min    Exams:  Cognition: Patient is oriented to Person, Place, Time, Situation  RLE ROM: WFL  RLE Strength: 3/5  LLE ROM: WFL  LLE Strength: 3/5  Gross Motor Coordination: WFL    Functional Mobility:  Gait belt applied - Yes  Bed Mobility  Rolling Left: minimum assistance  Scooting: moderate assistance  Supine to Sit: moderate assistance for LE management and trunk management  Sit to Supine: maximal assistance for LE management and trunk management  Transfers  Sit to Stand: moderate assistance with rolling walker  Gait  unable  Balance  Sitting: contact guard assistance  Standing: minimum assistance      Therapeutic Activities and Exercises:   Patient educated on role of acute care PT and PT POC, safety while in hospital including calling nurse for mobility, and call light usage      AM-PAC 6 CLICK MOBILITY  Total Score:13    Patient left HOB elevated with all lines intact and call button in reach.    GOALS:   Multidisciplinary Problems       Physical Therapy Goals          Problem: Physical Therapy    Goal Priority Disciplines Outcome Interventions   Physical Therapy Goal     PT, PT/OT Progressing    Description: Short term goals:  1. Supine to sit with MInimal Assistance  2. Sit to stand transfer with Minimal Assistance  3. Bed to chair transfer with Minimal Assistance using Rolling Walker  4. Gait  x 50 feet with Minimal Assistance using Rolling Walker.     Long  term goals:  1. Supine to sit with Contact Guard Assistance  2. Sit to stand transfer with Contact Guard Assistance  3. Bed to chair transfer with Contact Guard Assistance using Rolling Walker  4. Gait  x 100 feet with Contact Guard Assistance using Rolling Walker.                          DME Justifications:  No DME recommended requiring DME justifications    History:     Past Medical History:   Diagnosis Date    Acquired hypothyroidism     Chronic serous otitis media of both ears 04/04/2023    Depressive disorder     Diabetes mellitus type 2 in obese     Essential (primary) hypertension     Gastroparesis     IBS (irritable bowel syndrome)     Kidney stones     Mild intermittent asthma without complication 05/20/2025    Mixed hyperlipidemia     Obstructive sleep apnea syndrome 05/20/2025    Postlaminectomy syndrome, lumbar region 04/28/2022    [facility]       Past Surgical History:   Procedure Laterality Date    Bilateral L3-S1 MBB Bilateral 6-, 5-, 1-8-2014    Dr Jessica Randolph    CARPAL TUNNEL RELEASE      CHOLECYSTECTOMY      DIAGNOSTIC LAPAROSCOPY  04/14/2010    Exploratory Laparotomy, Myomectomy, Hydrotubation-Dr. Feng    DILATION AND CURETTAGE OF UTERUS  04/14/2010    Hysteroscopy-Dr. Feng    EPIDURAL STEROID INJECTION N/A 10/22/2024    Procedure: Injection, Steroid, Epidural, L4/5;  Surgeon: Rasheeda Bills MD;  Location: [facility];  Service: Pain Management;  Laterality: N/A;    EXPLORATORY LAPAROTOMY WITH UTERINE MYOMECTOMY  02/27/2007    Dr. Marquise Anderson    FUSION, SPINE, LATERAL APPROACH N/A 5/20/2025    Procedure: ARTHRODESIS, SPINE, LATERAL;  Surgeon: Cyril Upton MD;  [facility]: [facility] OR;  Service: Orthopedics;  Laterality: N/A;  L2-L4 LATERAL FUSION    HYSTERECTOMY  09/29/2011    Robotic, FABIENNE, Cystoscopy-Dr. Feng    HYSTEROSCOPY WITH DILATION AND CURETTAGE OF UTERUS  04/04/2006    Laparoscopy, Chromotubation-Dr. Marquise Anderson    INCISION AND  DRAINAGE, ABSCESS, SPINE, LUMBOSACRAL, POSTERIOR N/A 6/12/2025    Procedure: INCISION AND DRAINAGE, ABSCESS, SPINE, LUMBOSACRAL, POSTERIOR;  Surgeon: Cyril Upton MD;  Location: [facility];  Service: Neurosurgery;  Laterality: N/A;    INCISION AND DRAINAGE, ABSCESS, SPINE, LUMBOSACRAL, POSTERIOR N/A 6/30/2025    Procedure: INCISION AND DRAINAGE, ABSCESS, SPINE, LUMBOSACRAL, POSTERIOR;  Surgeon: Cyril Upton MD;  Location: [facility];  Service: Neurosurgery;  Laterality: N/A;    INJECTION OF ANESTHETIC AGENT AROUND ILIOINGUINAL NERVE Right 6/22/2023    Procedure: BLOCK, NERVE, ILIOINGUINAL;  Surgeon: Rasheeda Bills MD;  Location: [facility];  Service: Pain Management;  Laterality: Right;    INJECTION OF ANESTHETIC AGENT AROUND MEDIAL BRANCH NERVES INNERVATING LUMBAR FACET JOINT Bilateral 9/21/2023    Procedure: BLOCK, NERVE, FACET JOINT, LUMBAR, MEDIAL BRANCH;  Surgeon: Rasheeda Bills MD;  Location: [facility];  Service: Pain Management;  Laterality: Bilateral;    INJECTION OF ANESTHETIC AGENT AROUND MEDIAL BRANCH NERVES INNERVATING LUMBAR FACET JOINT Bilateral 3/14/2024    Procedure: BLOCK, NERVE, FACET JOINT, LUMBAR, MEDIAL BRANCH, BILATERAL L4-S1 MBB;  Surgeon: Rasheeda Bills MD;  Location: [facility];  Service: Pain Management;  Laterality: Bilateral;    LAMINECTOMY N/A 11/30/2021    Procedure: L2-L5 Laminectomy;  Surgeon: Cyril Upton MD;  Location: [facility];  Service: Neurosurgery;  Laterality: N/A;    LEFT HEART CATHETERIZATION      Left L3-S1 RFTC Left 07/18/2017    Dr Jessica Daniel SI JI Left 09/30/2013    Dr Randolph    MYRINGOTOMY WITH INSERTION OF VENTILATION TUBE Bilateral 4/4/2023    Procedure: MYRINGOTOMY, WITH TYMPANOSTOMY TUBE INSERTION (T-TUBES);  Surgeon: Sea Hinton MD;  Location: [facility];  Service: ENT;  Laterality: Bilateral;  T-TUBES    MYRINGOTOMY WITH INSERTION OF VENTILATION TUBE Bilateral 9/12/2023    Procedure: MYRINGOTOMY,  WITH TYMPANOSTOMY TUBE INSERTION, T-TUBES;  Surgeon: Sea Hinton MD;  Location: [facility] OR;  Service: ENT;  Laterality: Bilateral;    MYRINGOTOMY WITH INSERTION OF VENTILATION TUBE Bilateral 7/2/2024    Procedure: MYRINGOTOMY, WITH TYMPANOSTOMY TUBE INSERTION;  Surgeon: Sea Hinton MD;  Location: [facility];  Service: ENT;  Laterality: Bilateral;  Bilateral T-Tubes    OOPHORECTOMY  11/11/2014    Robotic, FABIENNE, Cystoscopy-Dr. Feng    SINUS SURGERY      TRANSFORAMINAL LUMBAR INTERBODY FUSION N/A 5/21/2025    Procedure: ARTHRODESIS, SPINE, LUMBAR, TLIF;  Surgeon: Cyril Upton MD;  Location: [facility];  Service: Orthopedics;  Laterality: N/A;  L2-L5 POSTERIOR FUSION, L4-L5 TLIF       Time Tracking:     PT Received On: 07/04/25  PT Start Time: 1330  PT Stop Time: 1401  PT Total Time (min): 31 min     Billable Minutes: Evaluation moderate complexity    7/4/2025

## 2025-07-04 NOTE — PLAN OF CARE
Problem: Adult Inpatient Plan of Care  Goal: Plan of Care Review  Outcome: Progressing  Goal: Patient-Specific Goal (Individualized)  Outcome: Progressing  Goal: Absence of Hospital-Acquired Illness or Injury  Outcome: Progressing  Goal: Optimal Comfort and Wellbeing  Outcome: Progressing  Goal: Readiness for Transition of Care  Outcome: Progressing     Problem: Acute Kidney Injury/Impairment  Goal: Fluid and Electrolyte Balance  Outcome: Progressing  Goal: Improved Oral Intake  Outcome: Progressing  Goal: Effective Renal Function  Outcome: Progressing     Problem: Infection  Goal: Absence of Infection Signs and Symptoms  Outcome: Progressing     Problem: Wound  Goal: Optimal Coping  Outcome: Progressing  Goal: Optimal Functional Ability  Outcome: Progressing  Goal: Absence of Infection Signs and Symptoms  Outcome: Progressing  Goal: Improved Oral Intake  Outcome: Progressing  Goal: Optimal Pain Control and Function  Outcome: Progressing  Goal: Skin Health and Integrity  Outcome: Progressing  Goal: Optimal Wound Healing  Outcome: Progressing     Problem: Skin Injury Risk Increased  Goal: Skin Health and Integrity  Outcome: Progressing     Problem: Noninvasive Ventilation Acute  Goal: Effective Unassisted Ventilation and Oxygenation  Outcome: Progressing     Problem: Fall Injury Risk  Goal: Absence of Fall and Fall-Related Injury  Outcome: Progressing     Problem: Restraint, Nonviolent  Goal: Absence of Harm or Injury  Outcome: Progressing     Problem: Mechanical Ventilation Invasive  Goal: Effective Communication  Outcome: Progressing  Goal: Optimal Device Function  Outcome: Progressing  Goal: Mechanical Ventilation Liberation  Outcome: Progressing  Goal: Optimal Nutrition Delivery  Outcome: Progressing  Goal: Absence of Device-Related Skin and Tissue Injury  Outcome: Progressing  Goal: Absence of Ventilator-Induced Lung Injury  Outcome: Progressing     Problem: Gas Exchange Impaired  Goal: Optimal Gas  Exchange  Outcome: Progressing     Problem: Delirium  Goal: Optimal Coping  Outcome: Progressing  Goal: Improved Behavioral Control  Outcome: Progressing  Goal: Improved Attention and Thought Clarity  Outcome: Progressing  Goal: Improved Sleep  Outcome: Progressing     Problem: Breathing Pattern Ineffective  Goal: Effective Breathing Pattern  Outcome: Progressing

## 2025-07-04 NOTE — PLAN OF CARE
Problem: Adult Inpatient Plan of Care  Goal: Plan of Care Review  Outcome: Progressing  Goal: Patient-Specific Goal (Individualized)  Outcome: Progressing  Goal: Absence of Hospital-Acquired Illness or Injury  Outcome: Progressing  Goal: Optimal Comfort and Wellbeing  Outcome: Progressing  Goal: Readiness for Transition of Care  Outcome: Progressing     Problem: Gas Exchange Impaired  Goal: Optimal Gas Exchange  Outcome: Progressing     Problem: Breathing Pattern Ineffective  Goal: Effective Breathing Pattern  Outcome: Progressing

## 2025-07-04 NOTE — SUBJECTIVE & OBJECTIVE
Interval History/Significant Events:  Patient without complaints    Review of Systems  Objective:     Vital Signs (Most Recent):  Temp: 98.9 °F (37.2 °C) (07/04/25 0430)  Pulse: (!) 111 (07/04/25 0615)  Resp: (!) 27 (07/04/25 0615)  BP: (!) 151/72 (07/04/25 0600)  SpO2: 100 % (07/04/25 0615) Vital Signs (24h Range):  Temp:  [97.9 °F (36.6 °C)-102.9 °F (39.4 °C)] 98.9 °F (37.2 °C)  Pulse:  [] 111  Resp:  [14-56] 27  SpO2:  [91 %-100 %] 100 %  BP: (100-190)/(49-91) 151/72   Weight: 106.1 kg (233 lb 14.5 oz)  Body mass index is 36.64 kg/m².      Intake/Output Summary (Last 24 hours) at 7/4/2025 0625  Last data filed at 7/4/2025 0605  Gross per 24 hour   Intake 648.7 ml   Output 5095 ml   Net -4446.3 ml          Physical Exam  Vitals reviewed.   Constitutional:       Appearance: Normal appearance.      Interventions: She is not intubated.  HENT:      Head: Normocephalic and atraumatic.      Nose: Nose normal.      Mouth/Throat:      Mouth: Mucous membranes are dry.      Pharynx: Oropharynx is clear.   Eyes:      Extraocular Movements: Extraocular movements intact.      Conjunctiva/sclera: Conjunctivae normal.      Pupils: Pupils are equal, round, and reactive to light.   Cardiovascular:      Rate and Rhythm: Normal rate.      Heart sounds: Normal heart sounds. No murmur heard.  Pulmonary:      Effort: Pulmonary effort is normal. She is not intubated.      Breath sounds: Normal breath sounds.   Abdominal:      General: Abdomen is flat. Bowel sounds are normal.      Palpations: Abdomen is soft.   Musculoskeletal:         General: Normal range of motion.      Cervical back: Normal range of motion and neck supple.      Right lower leg: No edema.      Left lower leg: No edema.   Skin:     General: Skin is warm and dry.      Capillary Refill: Capillary refill takes less than 2 seconds.   Neurological:      General: No focal deficit present.      Mental Status: She is alert and oriented to person, place, and time.    Psychiatric:         Mood and Affect: Mood normal.         Behavior: Behavior normal.            Vents:  Vent Mode: A/CMV-VC (07/03/25 1222)  Ventilator Initiated: Yes (06/30/25 1555)  Set Rate: 0 BPM (07/03/25 1222)  Vt Set: 0 mL (07/03/25 1222)  Pressure Support: 8 cmH20 (07/03/25 1222)  PEEP/CPAP: 5 cmH20 (07/03/25 1222)  Oxygen Concentration (%): 32 (07/04/25 0430)  Peak Airway Pressure: 15.6 cmH20 (07/03/25 1222)  Plateau Pressure: 15.4 cmH20 (07/03/25 1222)  Total Ve: 17.1 L/m (07/03/25 1222)  F/VT Ratio<105 (RSBI): (!) 46.33 (07/03/25 0600)  Lines/Drains/Airways       Drain  Duration                  Urethral Catheter 06/28/25 1654 5 days         Closed/Suction Drain 06/30/25 Tube - 1 Inferior;Right Back Accordion 10 Fr. 4 days         Closed/Suction Drain 06/30/25 Tube - 2 Inferior;Medial;Right Back 10 Fr. 4 days              Peripheral Intravenous Line  Duration             Peripheral IV 07/03/25 0115 18 G Anterior;Left Forearm 1 day    Peripheral IV 07/03/25 0115 20 G Anterior;Distal;Left Forearm 1 day                  Significant Labs:    CBC/Anemia Profile:  Recent Labs   Lab 07/03/25  0232 07/04/25  0205   WBC 7.56 12.50*   HGB 8.2* 9.5*   HCT 25.1* 30.5*   * 577*   MCV 81.2 82.0   RDW 16.0* 16.9*        Chemistries:  Recent Labs   Lab 07/03/25  0232 07/03/25  2351 07/04/25  0205    142 142   K 3.6 3.1* 3.2*   CL 98 97* 97*   CO2 26 21* 22   BUN 80* 84* 84*   CREATININE 6.50* 6.37* 6.35*   CALCIUM 6.7* 7.9* 7.2*   ALBUMIN  --  1.8*  --    PROT  --  7.0  --    BILITOT  --  0.6  --    ALKPHOS  --  106  --    ALT  --  23  --    AST  --  83*  --        Recent Lab Results  (Last 5 results in the past 24 hours)        07/04/25  0208   07/04/25  0205   07/03/25  2351   07/03/25  2245   07/03/25 2015        Albumin/Globulin Ratio     0.3           Albumin     1.8           ALP     106           ALT     23           Anion Gap   26   27           AST     83           Baso #   0.03              Basophil %   0.2             BILIRUBIN TOTAL     0.6           BUN   84   84           BUN/CREAT RATIO   13   13           Calcium   7.2   7.9           Chloride   97   97           CO2   22   21           Creatinine   6.35   6.37           Differential Method   Auto             eGFR   8  Comment: Estimated GFR calculated using the CKD-EPI creatinine (2021) equation.   8  Comment: Estimated GFR calculated using the CKD-EPI creatinine (2021) equation.           Eos #   0.41             Eos %   3.3             Globulin, Total     5.2           Glucose   183   181           Hematocrit   30.5             Hemoglobin   9.5             Immature Grans (Abs)   0.23             Immature Granulocytes   1.8             Lymph #   0.88             Lymph %   7.0             MCH   25.5             MCHC   31.1             MCV   82.0             Mono #   0.60             Mono %   4.8             MPV   11.0             Neutrophils, Abs   10.35             Neutrophils Relative   82.9             nRBC   0.2             NUCLEATED RBC ABSOLUTE   0.02             Platelet Count   577             POC Base Excess       2.6         POC Glucose 189         135       POC HCO3       26.2         POC PCO2       36         POC PH       7.47         POC PO2       69         POC SATURATED O2       95         Potassium   3.2   3.1           PROTEIN TOTAL     7.0           RBC   3.72             RDW   16.9             Sodium   142   142           Troponin I High Sensitivity     25.7  Comment: Critical Result called and verified by verbal readback to: Reginaldo RUBIO RN on 07/04/2025 at 00:42. Reported by [id number].           WBC   12.50                                    Significant Imaging:  I have reviewed all pertinent imaging results/findings within the past 24 hours.

## 2025-07-04 NOTE — PROGRESS NOTES
[facility]  Critical Care Medicine  Progress Note    Patient Name: Carey Leonardo  MRN: [id number]  Admission Date: 6/26/2025  Hospital Length of Stay: 8 days  Code Status: Full Code  Attending Provider: Kameron Martines MD  Primary Care Provider: Zainab Harrell FNP   Principal Problem: MATT (acute kidney injury)    Subjective:     HPI:  No notes on file    Hospital/ICU Course:  No notes on file    Interval History/Significant Events:  Patient without complaints    Review of Systems  Objective:     Vital Signs (Most Recent):  Temp: 98.9 °F (37.2 °C) (07/04/25 0430)  Pulse: (!) 111 (07/04/25 0615)  Resp: (!) 27 (07/04/25 0615)  BP: (!) 151/72 (07/04/25 0600)  SpO2: 100 % (07/04/25 0615) Vital Signs (24h Range):  Temp:  [97.9 °F (36.6 °C)-102.9 °F (39.4 °C)] 98.9 °F (37.2 °C)  Pulse:  [] 111  Resp:  [14-56] 27  SpO2:  [91 %-100 %] 100 %  BP: (100-190)/(49-91) 151/72   Weight: 106.1 kg (233 lb 14.5 oz)  Body mass index is 36.64 kg/m².      Intake/Output Summary (Last 24 hours) at 7/4/2025 0625  Last data filed at 7/4/2025 0605  Gross per 24 hour   Intake 648.7 ml   Output 5095 ml   Net -4446.3 ml          Physical Exam  Vitals reviewed.   Constitutional:       Appearance: Normal appearance.      Interventions: She is not intubated.  HENT:      Head: Normocephalic and atraumatic.      Nose: Nose normal.      Mouth/Throat:      Mouth: Mucous membranes are dry.      Pharynx: Oropharynx is clear.   Eyes:      Extraocular Movements: Extraocular movements intact.      Conjunctiva/sclera: Conjunctivae normal.      Pupils: Pupils are equal, round, and reactive to light.   Cardiovascular:      Rate and Rhythm: Normal rate.      Heart sounds: Normal heart sounds. No murmur heard.  Pulmonary:      Effort: Pulmonary effort is normal. She is not intubated.      Breath sounds: Normal breath sounds.   Abdominal:      General: Abdomen is flat. Bowel sounds are normal.      Palpations: Abdomen is  soft.   Musculoskeletal:         General: Normal range of motion.      Cervical back: Normal range of motion and neck supple.      Right lower leg: No edema.      Left lower leg: No edema.   Skin:     General: Skin is warm and dry.      Capillary Refill: Capillary refill takes less than 2 seconds.   Neurological:      General: No focal deficit present.      Mental Status: She is alert and oriented to person, place, and time.   Psychiatric:         Mood and Affect: Mood normal.         Behavior: Behavior normal.            Vents:  Vent Mode: A/CMV-VC (07/03/25 1222)  Ventilator Initiated: Yes (06/30/25 1555)  Set Rate: 0 BPM (07/03/25 1222)  Vt Set: 0 mL (07/03/25 1222)  Pressure Support: 8 cmH20 (07/03/25 1222)  PEEP/CPAP: 5 cmH20 (07/03/25 1222)  Oxygen Concentration (%): 32 (07/04/25 0430)  Peak Airway Pressure: 15.6 cmH20 (07/03/25 1222)  Plateau Pressure: 15.4 cmH20 (07/03/25 1222)  Total Ve: 17.1 L/m (07/03/25 1222)  F/VT Ratio<105 (RSBI): (!) 46.33 (07/03/25 0600)  Lines/Drains/Airways       Drain  Duration                  Urethral Catheter 06/28/25 1654 5 days         Closed/Suction Drain 06/30/25 Tube - 1 Inferior;Right Back Accordion 10 Fr. 4 days         Closed/Suction Drain 06/30/25 Tube - 2 Inferior;Medial;Right Back 10 Fr. 4 days              Peripheral Intravenous Line  Duration             Peripheral IV 07/03/25 0115 18 G Anterior;Left Forearm 1 day    Peripheral IV 07/03/25 0115 20 G Anterior;Distal;Left Forearm 1 day                  Significant Labs:    CBC/Anemia Profile:  Recent Labs   Lab 07/03/25  0232 07/04/25  0205   WBC 7.56 12.50*   HGB 8.2* 9.5*   HCT 25.1* 30.5*   * 577*   MCV 81.2 82.0   RDW 16.0* 16.9*        Chemistries:  Recent Labs   Lab 07/03/25  0232 07/03/25  2351 07/04/25  0205    142 142   K 3.6 3.1* 3.2*   CL 98 97* 97*   CO2 26 21* 22   BUN 80* 84* 84*   CREATININE 6.50* 6.37* 6.35*   CALCIUM 6.7* 7.9* 7.2*   ALBUMIN  --  1.8*  --    PROT  --  7.0  --    BILITOT   --  0.6  --    ALKPHOS  --  106  --    ALT  --  23  --    AST  --  83*  --        Recent Lab Results  (Last 5 results in the past 24 hours)        07/04/25  0208   07/04/25  0205   07/03/25 2351 07/03/25 2245 07/03/25 2015        Albumin/Globulin Ratio     0.3           Albumin     1.8           ALP     106           ALT     23           Anion Gap   26   27           AST     83           Baso #   0.03             Basophil %   0.2             BILIRUBIN TOTAL     0.6           BUN   84   84           BUN/CREAT RATIO   13   13           Calcium   7.2   7.9           Chloride   97   97           CO2   22   21           Creatinine   6.35   6.37           Differential Method   Auto             eGFR   8  Comment: Estimated GFR calculated using the CKD-EPI creatinine (2021) equation.   8  Comment: Estimated GFR calculated using the CKD-EPI creatinine (2021) equation.           Eos #   0.41             Eos %   3.3             Globulin, Total     5.2           Glucose   183   181           Hematocrit   30.5             Hemoglobin   9.5             Immature Grans (Abs)   0.23             Immature Granulocytes   1.8             Lymph #   0.88             Lymph %   7.0             MCH   25.5             MCHC   31.1             MCV   82.0             Mono #   0.60             Mono %   4.8             MPV   11.0             Neutrophils, Abs   10.35             Neutrophils Relative   82.9             nRBC   0.2             NUCLEATED RBC ABSOLUTE   0.02             Platelet Count   577             POC Base Excess       2.6         POC Glucose 189         135       POC HCO3       26.2         POC PCO2       36         POC PH       7.47         POC PO2       69         POC SATURATED O2       95         Potassium   3.2   3.1           PROTEIN TOTAL     7.0           RBC   3.72             RDW   16.9             Sodium   142   142           Troponin I High Sensitivity     25.7  Comment: Critical Result called and verified by  verbal readback to: Reginaldo RUBIO RN on 07/04/2025 at 00:42. Reported by [id number].           WBC   12.50                                    Significant Imaging:  I have reviewed all pertinent imaging results/findings within the past 24 hours.    ABG  Recent Labs   Lab 07/03/25  2245   PH 7.47*   PO2 69*   PCO2 36   HCO3 26.2     Assessment/Plan:     Neuro  Encephalopathy, metabolic  Seems little confused and paranoid will watch carefully    Pulmonary  Acute hypoxic respiratory failure  Doing well extubated    Mild intermittent asthma without complication  Improving    Cardiac/Vascular  Essential (primary) hypertension  Fair control will not add any medicines right now    Renal/  * MATT (acute kidney injury)  Creatinine stable urine output great will back off on Lasix little bit    Metabolic acidosis  Resolved    Hypokalemia  Potassium 3.2 being supplemented    ID  Abscess in epidural space of lumbar spine  Noted    MRSA (methicillin resistant Staphylococcus aureus) infection  Continue daptomycin, check CPK    Infection of lumbar spine  White count 60399 low-grade temp continue antibiotics no positive cultures    Oncology  Anemia  Hemoglobin stable no need for transfusion    Endocrine  Hyponatremia  Resolved    Acquired hypothyroidism  Noted    Other  Obstructive sleep apnea syndrome  Use her BiPAP at night             Kameron Martines MD  Critical Care Medicine  [facility]
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
## 2025-07-04 NOTE — SUBJECTIVE & OBJECTIVE
Interval History: The patient is sitting up resting.  She voices no complaints.  Urine output has been good.  Creatinine is 6.3.    Review of patient's allergies indicates:   Allergen Reactions    Fish containing products Anaphylaxis    Iodinated contrast media     Penicillins      Current Facility-Administered Medications   Medication Frequency    0.9%  NaCl infusion (for blood administration) Q24H PRN    acetaminophen suppository 650 mg Q6H PRN    acetaminophen tablet 650 mg Q8H PRN    albuterol nebulizer solution 2.5 mg Q4H PRN    aluminum & magnesium hydroxide-simethicone 400-400-40 mg/5 mL suspension 30 mL Q6H PRN    amiodarone tablet 200 mg Daily    bisacodyL EC tablet 10 mg Daily PRN    cloNIDine tablet 0.1 mg Q6H PRN    cyclobenzaprine tablet 10 mg TID PRN    DAPTOmycin (CUBICIN) 610 mg in 0.9% NaCl SolP 50 mL IVPB Q48H    dextromethorphan-guaiFENesin  mg/5 ml liquid 10 mL Q6H PRN    dextrose 50% injection 12.5 g PRN    dextrose 50% injection 25 g PRN    diltiaZEM (CARDIZEM) 125 mg in 0.9% NaCl 125 mL infusion Continuous    diphenhydrAMINE capsule 50 mg Q6H PRN    docusate sodium capsule 100 mg BID PRN    furosemide (Lasix) 200 mg in 0.9% NaCl 100 mL IVPB Q12H    glucagon (human recombinant) injection 1 mg PRN    glucose chewable tablet 16 g PRN    glucose chewable tablet 24 g PRN    heparin (porcine) injection 5,000 Units Q8H    HYDROcodone-acetaminophen 5-325 mg per tablet 1 tablet Q4H PRN    insulin aspart U-100 injection 0-10 Units QID (AC + HS) PRN    insulin aspart U-100 injection 5 Units TIDWM    insulin glargine U-100 (Lantus) injection 20 Units QHS    levothyroxine tablet 200 mcg Before breakfast    melatonin tablet 6 mg Nightly PRN    metoprolol injection 5 mg Q6H PRN    metoprolol tartrate (LOPRESSOR) tablet 50 mg BID    miconazole NITRATE 2 % top powder BID    ondansetron injection 8 mg Q6H PRN    pantoprazole injection 40 mg Daily       Objective:     Vital Signs (Most Recent):  Temp: 97.6  °F (36.4 °C) (07/04/25 1142)  Pulse: 89 (07/04/25 1315)  Resp: (!) 22 (07/04/25 1315)  BP: (!) 142/63 (07/04/25 1300)  SpO2: 99 % (07/04/25 1315) Vital Signs (24h Range):  Temp:  [97.6 °F (36.4 °C)-102.9 °F (39.4 °C)] 97.6 °F (36.4 °C)  Pulse:  [] 89  Resp:  [12-56] 22  SpO2:  [93 %-100 %] 99 %  BP: (111-190)/(49-91) 142/63     Weight: 106.1 kg (233 lb 14.5 oz) (06/29/25 2000)  Body mass index is 36.64 kg/m².  Body surface area is 2.24 meters squared.    I/O last 3 completed shifts:  In: 1530.9 [I.V.:430.2; NG/GT:430; IV Piggyback:670.7]  Out: 6090 [Urine:6070; Drains:20]     Physical Exam  Vitals reviewed.   HENT:      Head: Normocephalic and atraumatic.      Mouth/Throat:      Mouth: Mucous membranes are moist.   Eyes:      Pupils: Pupils are equal, round, and reactive to light.   Cardiovascular:      Rate and Rhythm: Regular rhythm.      Heart sounds: Normal heart sounds.   Pulmonary:      Effort: Pulmonary effort is normal.      Breath sounds: Normal breath sounds.   Musculoskeletal:      Cervical back: Neck supple.   Skin:     General: Skin is warm.   Neurological:      Mental Status: She is alert.   Psychiatric:         Mood and Affect: Mood normal.          Significant Labs:  BMP:   Recent Labs   Lab 07/01/25  1147 07/02/25  0300 07/04/25  0205   GLU  --    < > 183*   NA  --    < > 142   K  --    < > 3.2*   CL  --    < > 97*   CO2  --    < > 22   BUN  --    < > 84*   CREATININE  --    < > 6.35*   CALCIUM  --    < > 7.2*   MG 2.0  --   --     < > = values in this interval not displayed.     CBC:   Recent Labs   Lab 07/04/25  0205   WBC 12.50*   RBC 3.72*   HGB 9.5*   HCT 30.5*   *   MCV 82.0   MCH 25.5*   MCHC 31.1*        Significant Imaging:  Labs: Reviewed

## 2025-07-05 LAB
ANION GAP SERPL CALCULATED.3IONS-SCNC: 21 MMOL/L (ref 7–16)
BASOPHILS # BLD AUTO: 0.02 K/UL (ref 0–0.2)
BASOPHILS NFR BLD AUTO: 0.2 % (ref 0–1)
BUN SERPL-MCNC: 90 MG/DL (ref 7–19)
BUN/CREAT SERPL: 16 (ref 6–20)
CALCIUM SERPL-MCNC: 8 MG/DL (ref 8.4–10.2)
CHLORIDE SERPL-SCNC: 97 MMOL/L (ref 98–107)
CK SERPL-CCNC: 143 U/L (ref 29–168)
CO2 SERPL-SCNC: 32 MMOL/L (ref 22–29)
CREAT SERPL-MCNC: 5.62 MG/DL (ref 0.55–1.02)
DIFFERENTIAL METHOD BLD: ABNORMAL
EGFR (NO RACE VARIABLE) (RUSH/TITUS): 9 ML/MIN/1.73M2
EOSINOPHIL # BLD AUTO: 1.07 K/UL (ref 0–0.5)
EOSINOPHIL NFR BLD AUTO: 9.5 % (ref 1–4)
ERYTHROCYTE [DISTWIDTH] IN BLOOD BY AUTOMATED COUNT: 17.1 % (ref 11.5–14.5)
GLUCOSE SERPL-MCNC: 149 MG/DL (ref 70–105)
GLUCOSE SERPL-MCNC: 151 MG/DL (ref 70–105)
GLUCOSE SERPL-MCNC: 160 MG/DL (ref 70–105)
GLUCOSE SERPL-MCNC: 165 MG/DL (ref 74–100)
GLUCOSE SERPL-MCNC: 171 MG/DL (ref 70–105)
GLUCOSE SERPL-MCNC: 175 MG/DL (ref 70–105)
HCT VFR BLD AUTO: 30.1 % (ref 38–47)
HGB BLD-MCNC: 9.5 G/DL (ref 12–16)
IMM GRANULOCYTES # BLD AUTO: 0.18 K/UL (ref 0–0.04)
IMM GRANULOCYTES NFR BLD: 1.6 % (ref 0–0.4)
LYMPHOCYTES # BLD AUTO: 1.04 K/UL (ref 1–4.8)
LYMPHOCYTES NFR BLD AUTO: 9.2 % (ref 27–41)
MAGNESIUM SERPL-MCNC: 2 MG/DL (ref 1.6–2.6)
MCH RBC QN AUTO: 25.6 PG (ref 27–31)
MCHC RBC AUTO-ENTMCNC: 31.6 G/DL (ref 32–36)
MCV RBC AUTO: 81.1 FL (ref 80–96)
MONOCYTES # BLD AUTO: 0.74 K/UL (ref 0–0.8)
MONOCYTES NFR BLD AUTO: 6.6 % (ref 2–6)
MPC BLD CALC-MCNC: 10.8 FL (ref 9.4–12.4)
NEUTROPHILS # BLD AUTO: 8.23 K/UL (ref 1.8–7.7)
NEUTROPHILS NFR BLD AUTO: 72.9 % (ref 53–65)
NRBC # BLD AUTO: 0.04 X10E3/UL
NRBC, AUTO (.00): 0.4 %
PLATELET # BLD AUTO: 605 K/UL (ref 150–400)
POTASSIUM SERPL-SCNC: 3.6 MMOL/L (ref 3.5–5.1)
RBC # BLD AUTO: 3.71 M/UL (ref 4.2–5.4)
SODIUM SERPL-SCNC: 146 MMOL/L (ref 136–145)
WBC # BLD AUTO: 11.28 K/UL (ref 4.5–11)

## 2025-07-05 PROCEDURE — 25000003 PHARM REV CODE 250: Performed by: INTERNAL MEDICINE

## 2025-07-05 PROCEDURE — 63600175 PHARM REV CODE 636 W HCPCS: Performed by: INTERNAL MEDICINE

## 2025-07-05 PROCEDURE — 36415 COLL VENOUS BLD VENIPUNCTURE: CPT | Performed by: ORTHOPAEDIC SURGERY

## 2025-07-05 PROCEDURE — 82962 GLUCOSE BLOOD TEST: CPT

## 2025-07-05 PROCEDURE — 20000000 HC ICU ROOM

## 2025-07-05 PROCEDURE — 99900035 HC TECH TIME PER 15 MIN (STAT)

## 2025-07-05 PROCEDURE — 97166 OT EVAL MOD COMPLEX 45 MIN: CPT

## 2025-07-05 PROCEDURE — 25000003 PHARM REV CODE 250

## 2025-07-05 PROCEDURE — 83735 ASSAY OF MAGNESIUM: CPT

## 2025-07-05 PROCEDURE — 85025 COMPLETE CBC W/AUTO DIFF WBC: CPT | Performed by: ORTHOPAEDIC SURGERY

## 2025-07-05 PROCEDURE — 25000003 PHARM REV CODE 250: Performed by: ORTHOPAEDIC SURGERY

## 2025-07-05 PROCEDURE — 27000207 HC ISOLATION

## 2025-07-05 PROCEDURE — 63600175 PHARM REV CODE 636 W HCPCS: Performed by: ORTHOPAEDIC SURGERY

## 2025-07-05 PROCEDURE — 80048 BASIC METABOLIC PNL TOTAL CA: CPT | Performed by: ORTHOPAEDIC SURGERY

## 2025-07-05 PROCEDURE — 63600175 PHARM REV CODE 636 W HCPCS: Performed by: NURSE PRACTITIONER

## 2025-07-05 PROCEDURE — 27000221 HC OXYGEN, UP TO 24 HOURS

## 2025-07-05 PROCEDURE — 82550 ASSAY OF CK (CPK): CPT | Performed by: INTERNAL MEDICINE

## 2025-07-05 PROCEDURE — 99233 SBSQ HOSP IP/OBS HIGH 50: CPT | Mod: ,,, | Performed by: INTERNAL MEDICINE

## 2025-07-05 RX ADMIN — HYDROCODONE BITARTRATE AND ACETAMINOPHEN 1 TABLET: 5; 325 TABLET ORAL at 08:07

## 2025-07-05 RX ADMIN — DAPTOMYCIN 610 MG: 500 INJECTION, POWDER, LYOPHILIZED, FOR SOLUTION INTRAVENOUS at 12:07

## 2025-07-05 RX ADMIN — INSULIN ASPART 5 UNITS: 100 INJECTION, SOLUTION INTRAVENOUS; SUBCUTANEOUS at 10:07

## 2025-07-05 RX ADMIN — METOPROLOL TARTRATE 50 MG: 50 TABLET, FILM COATED ORAL at 09:07

## 2025-07-05 RX ADMIN — METOPROLOL TARTRATE 50 MG: 50 TABLET, FILM COATED ORAL at 08:07

## 2025-07-05 RX ADMIN — HEPARIN SODIUM 5000 UNITS: 5000 INJECTION, SOLUTION INTRAVENOUS; SUBCUTANEOUS at 01:07

## 2025-07-05 RX ADMIN — AMIODARONE HYDROCHLORIDE 200 MG: 200 TABLET ORAL at 08:07

## 2025-07-05 RX ADMIN — HEPARIN SODIUM 5000 UNITS: 5000 INJECTION, SOLUTION INTRAVENOUS; SUBCUTANEOUS at 09:07

## 2025-07-05 RX ADMIN — INSULIN ASPART 5 UNITS: 100 INJECTION, SOLUTION INTRAVENOUS; SUBCUTANEOUS at 06:07

## 2025-07-05 RX ADMIN — MICONAZOLE NITRATE ANTIFUNGAL POWDER: 2 POWDER TOPICAL at 09:07

## 2025-07-05 RX ADMIN — INSULIN GLARGINE 20 UNITS: 100 INJECTION, SOLUTION SUBCUTANEOUS at 09:07

## 2025-07-05 RX ADMIN — LEVOTHYROXINE SODIUM 200 MCG: 100 TABLET ORAL at 05:07

## 2025-07-05 RX ADMIN — INSULIN ASPART 5 UNITS: 100 INJECTION, SOLUTION INTRAVENOUS; SUBCUTANEOUS at 04:07

## 2025-07-05 RX ADMIN — PANTOPRAZOLE SODIUM 40 MG: 40 INJECTION, POWDER, FOR SOLUTION INTRAVENOUS at 08:07

## 2025-07-05 RX ADMIN — HYDROCODONE BITARTRATE AND ACETAMINOPHEN 1 TABLET: 5; 325 TABLET ORAL at 12:07

## 2025-07-05 RX ADMIN — INSULIN ASPART 2 UNITS: 100 INJECTION, SOLUTION INTRAVENOUS; SUBCUTANEOUS at 10:07

## 2025-07-05 RX ADMIN — HEPARIN SODIUM 5000 UNITS: 5000 INJECTION, SOLUTION INTRAVENOUS; SUBCUTANEOUS at 05:07

## 2025-07-05 RX ADMIN — HYDROCODONE BITARTRATE AND ACETAMINOPHEN 1 TABLET: 5; 325 TABLET ORAL at 06:07

## 2025-07-05 RX ADMIN — MICONAZOLE NITRATE ANTIFUNGAL POWDER: 2 POWDER TOPICAL at 08:07

## 2025-07-05 RX ADMIN — FUROSEMIDE 160 MG: 10 INJECTION, SOLUTION INTRAMUSCULAR; INTRAVENOUS at 08:07

## 2025-07-05 RX ADMIN — INSULIN ASPART 2 UNITS: 100 INJECTION, SOLUTION INTRAVENOUS; SUBCUTANEOUS at 04:07

## 2025-07-05 NOTE — PLAN OF CARE
Problem: Adult Inpatient Plan of Care  Goal: Plan of Care Review  Outcome: Progressing  Goal: Absence of Hospital-Acquired Illness or Injury  Outcome: Progressing  Goal: Optimal Comfort and Wellbeing  Outcome: Progressing  Goal: Readiness for Transition of Care  Outcome: Progressing     Problem: Acute Kidney Injury/Impairment  Goal: Fluid and Electrolyte Balance  Outcome: Progressing  Goal: Improved Oral Intake  Outcome: Progressing  Intervention: Promote and Optimize Oral Intake  Flowsheets (Taken 7/5/2025 1532)  Nutrition Interventions:   diet advanced   meal set-up provided  Goal: Effective Renal Function  Outcome: Progressing     Problem: Wound  Goal: Optimal Coping  Outcome: Progressing  Goal: Optimal Functional Ability  Outcome: Progressing  Goal: Absence of Infection Signs and Symptoms  Outcome: Progressing  Intervention: Prevent or Manage Infection  Flowsheets (Taken 7/5/2025 1532)  Infection Management: aseptic technique maintained  Isolation Precautions: precautions maintained  Goal: Improved Oral Intake  Outcome: Progressing  Goal: Optimal Pain Control and Function  Outcome: Progressing  Intervention: Prevent or Manage Pain  Flowsheets (Taken 7/5/2025 1532)  Sleep/Rest Enhancement:   consistent schedule promoted   family presence promoted   noise level reduced   relaxation techniques promoted   natural light exposure provided  Pain Management Interventions:   care clustered   pain management plan reviewed with patient/caregiver   medication offered   pillow support provided   relaxation techniques promoted   quiet environment facilitated     Problem: Fall Injury Risk  Goal: Absence of Fall and Fall-Related Injury  Outcome: Progressing
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
## 2025-07-05 NOTE — PLAN OF CARE
Problem: Occupational Therapy  Goal: Occupational Therapy Goal  Description: STG:  Pt will perform grooming with setup  Pt will bathe with min a with AD as needed  Pt will perform UE dressing with (I)  Pt will perform LE dressing with Min a with AD as needed  Pt will sit EOB x 15 min with (I)   Pt will transfer bed/chair/bsc with Min a with RW  Pt will perform standing x 1 min with CGA assistance  Pt will tolerate 20 minutes of tx without fatigue      LT.Restore to max I with self care and mobility.     Outcome: Progressing

## 2025-07-05 NOTE — SUBJECTIVE & OBJECTIVE
Interval History: The patient is sitting up in bed resting.  She seems a little more comfortable today.  Serum creatinine is 5.6 which shows signs of improvement.    Review of patient's allergies indicates:   Allergen Reactions    Fish containing products Anaphylaxis    Iodinated contrast media     Penicillins      Current Facility-Administered Medications   Medication Frequency    0.9%  NaCl infusion (for blood administration) Q24H PRN    acetaminophen suppository 650 mg Q6H PRN    acetaminophen tablet 650 mg Q8H PRN    albuterol nebulizer solution 2.5 mg Q4H PRN    aluminum & magnesium hydroxide-simethicone 400-400-40 mg/5 mL suspension 30 mL Q6H PRN    amiodarone tablet 200 mg Daily    bisacodyL EC tablet 10 mg Daily PRN    cloNIDine tablet 0.1 mg Q6H PRN    cyclobenzaprine tablet 10 mg TID PRN    DAPTOmycin (CUBICIN) 610 mg in 0.9% NaCl SolP 50 mL IVPB Q48H    dextromethorphan-guaiFENesin  mg/5 ml liquid 10 mL Q6H PRN    dextrose 50% injection 12.5 g PRN    dextrose 50% injection 25 g PRN    diphenhydrAMINE capsule 50 mg Q6H PRN    docusate sodium capsule 100 mg BID PRN    furosemide (Lasix) 160 mg in 0.9% NaCl 100 mL IVPB Daily    glucagon (human recombinant) injection 1 mg PRN    glucose chewable tablet 16 g PRN    glucose chewable tablet 24 g PRN    heparin (porcine) injection 5,000 Units Q8H    HYDROcodone-acetaminophen 5-325 mg per tablet 1 tablet Q4H PRN    insulin aspart U-100 injection 0-10 Units QID (AC + HS) PRN    insulin aspart U-100 injection 5 Units TIDWM    insulin glargine U-100 (Lantus) injection 20 Units QHS    levothyroxine tablet 200 mcg Before breakfast    melatonin tablet 6 mg Nightly PRN    metoprolol injection 5 mg Q6H PRN    metoprolol tartrate (LOPRESSOR) tablet 50 mg BID    miconazole NITRATE 2 % top powder BID    ondansetron injection 8 mg Q6H PRN    pantoprazole injection 40 mg Daily       Objective:     Vital Signs (Most Recent):  Temp: 98.1 °F (36.7 °C) (07/05/25  1101)  Pulse: 91 (07/05/25 1330)  Resp: 17 (07/05/25 1330)  BP: 134/81 (07/05/25 1301)  SpO2: 99 % (07/05/25 1330) Vital Signs (24h Range):  Temp:  [98.1 °F (36.7 °C)-98.6 °F (37 °C)] 98.1 °F (36.7 °C)  Pulse:  [] 91  Resp:  [11-26] 17  SpO2:  [80 %-100 %] 99 %  BP: (123-172)/() 134/81     Weight: 106.1 kg (233 lb 14.5 oz) (06/29/25 2000)  Body mass index is 36.64 kg/m².  Body surface area is 2.24 meters squared.    I/O last 3 completed shifts:  In: 853.7 [I.V.:132.7; IV Piggyback:721]  Out: 7475 [Urine:7475]     Physical Exam  Vitals reviewed.   HENT:      Head: Normocephalic and atraumatic.   Eyes:      Pupils: Pupils are equal, round, and reactive to light.   Cardiovascular:      Rate and Rhythm: Regular rhythm.      Heart sounds: Normal heart sounds.   Pulmonary:      Breath sounds: Normal breath sounds.   Abdominal:      Palpations: Abdomen is soft.   Musculoskeletal:      Cervical back: Neck supple.   Skin:     General: Skin is warm.   Neurological:      Mental Status: She is alert.   Psychiatric:         Mood and Affect: Mood normal.          Significant Labs:  BMP:   Recent Labs   Lab 07/05/25  0354   *   *   K 3.6   CL 97*   CO2 32*   BUN 90*   CREATININE 5.62*   CALCIUM 8.0*   MG 2.0     CBC:   Recent Labs   Lab 07/05/25  0354   WBC 11.28*   RBC 3.71*   HGB 9.5*   HCT 30.1*   *   MCV 81.1   MCH 25.6*   MCHC 31.6*        Significant Imaging:  Labs: Reviewed

## 2025-07-05 NOTE — PROGRESS NOTES
[facility]  Critical Care Medicine  Progress Note    Patient Name: Carey Leonardo  MRN: [id number]  Admission Date: 6/26/2025  Hospital Length of Stay: 9 days  Code Status: Full Code  Attending Provider: Kameron Martines MD  Primary Care Provider: Zainab Harrell FNP   Principal Problem: MATT (acute kidney injury)    Subjective:     HPI:  No notes on file    Hospital/ICU Course:  No notes on file    Interval History/Significant Events:  Patient without complaints    Review of Systems  Objective:     Vital Signs (Most Recent):  Temp: 98.3 °F (36.8 °C) (07/05/25 0330)  Pulse: 88 (07/05/25 0345)  Resp: 13 (07/05/25 0345)  BP: 131/79 (07/05/25 0330)  SpO2: 97 % (07/05/25 0345) Vital Signs (24h Range):  Temp:  [97.6 °F (36.4 °C)-98.5 °F (36.9 °C)] 98.3 °F (36.8 °C)  Pulse:  [] 88  Resp:  [12-35] 13  SpO2:  [80 %-100 %] 97 %  BP: (123-159)/(51-84) 131/79   Weight: 106.1 kg (233 lb 14.5 oz)  Body mass index is 36.64 kg/m².      Intake/Output Summary (Last 24 hours) at 7/5/2025 0613  Last data filed at 7/5/2025 0605  Gross per 24 hour   Intake 398.81 ml   Output 4375 ml   Net -3976.19 ml          Physical Exam  Vitals reviewed.   Constitutional:       Appearance: Normal appearance.      Interventions: She is not intubated.  HENT:      Head: Normocephalic and atraumatic.      Nose: Nose normal.      Mouth/Throat:      Mouth: Mucous membranes are dry.      Pharynx: Oropharynx is clear.   Eyes:      Extraocular Movements: Extraocular movements intact.      Conjunctiva/sclera: Conjunctivae normal.      Pupils: Pupils are equal, round, and reactive to light.   Cardiovascular:      Rate and Rhythm: Normal rate.      Heart sounds: Normal heart sounds. No murmur heard.  Pulmonary:      Effort: Pulmonary effort is normal. She is not intubated.      Breath sounds: Normal breath sounds.   Abdominal:      General: Abdomen is flat. Bowel sounds are normal.      Palpations: Abdomen is soft.    Musculoskeletal:         General: Normal range of motion.      Cervical back: Normal range of motion and neck supple.      Right lower leg: No edema.      Left lower leg: No edema.   Skin:     General: Skin is warm and dry.      Capillary Refill: Capillary refill takes less than 2 seconds.   Neurological:      General: No focal deficit present.      Mental Status: She is alert and oriented to person, place, and time.   Psychiatric:         Mood and Affect: Mood normal.         Behavior: Behavior normal.            Vents:  Vent Mode: A/CMV-VC (07/03/25 1222)  Ventilator Initiated: Yes (06/30/25 1555)  Set Rate: 0 BPM (07/03/25 1222)  Vt Set: 0 mL (07/03/25 1222)  Pressure Support: 8 cmH20 (07/03/25 1222)  PEEP/CPAP: 5 cmH20 (07/03/25 1222)  Oxygen Concentration (%): 32 (07/05/25 0305)  Peak Airway Pressure: 15.6 cmH20 (07/03/25 1222)  Plateau Pressure: 15.4 cmH20 (07/03/25 1222)  Total Ve: 17.1 L/m (07/03/25 1222)  F/VT Ratio<105 (RSBI): (!) 46.33 (07/03/25 0600)  Lines/Drains/Airways       Drain  Duration                  Urethral Catheter 06/28/25 1654 6 days         Closed/Suction Drain 06/30/25 Tube - 1 Inferior;Right Back Accordion 10 Fr. 5 days         Closed/Suction Drain 06/30/25 Tube - 2 Inferior;Medial;Right Back 10 Fr. 5 days              Peripheral Intravenous Line  Duration             Peripheral IV 07/03/25 0115 18 G Anterior;Left Forearm 2 days    Peripheral IV 07/03/25 0115 20 G Anterior;Distal;Left Forearm 2 days                  Significant Labs:    CBC/Anemia Profile:  Recent Labs   Lab 07/04/25  0205 07/05/25  0354   WBC 12.50* 11.28*   HGB 9.5* 9.5*   HCT 30.5* 30.1*   * 605*   MCV 82.0 81.1   RDW 16.9* 17.1*        Chemistries:  Recent Labs   Lab 07/03/25  2351 07/04/25  0205    142   K 3.1* 3.2*   CL 97* 97*   CO2 21* 22   BUN 84* 84*   CREATININE 6.37* 6.35*   CALCIUM 7.9* 7.2*   ALBUMIN 1.8*  --    PROT 7.0  --    BILITOT 0.6  --    ALKPHOS 106  --    ALT 23  --    AST 83*  --         Recent Lab Results  (Last 5 results in the past 24 hours)        07/05/25  0354   07/05/25  0210   07/04/25 2003 07/04/25  1648   07/04/25  1121        Baso # 0.02               Basophil % 0.2               Differential Method Auto               Eos # 1.07               Eos % 9.5               Hematocrit 30.1               Hemoglobin 9.5               Immature Grans (Abs) 0.18               Immature Granulocytes 1.6               Lymph # 1.04               Lymph % 9.2               MCH 25.6               MCHC 31.6               MCV 81.1               Mono # 0.74               Mono % 6.6               MPV 10.8               Neutrophils, Abs 8.23               Neutrophils Relative 72.9               nRBC 0.4               NUCLEATED RBC ABSOLUTE 0.04               Platelet Count 605               POC Glucose   149   170   162   166       RBC 3.71               RDW 17.1               WBC 11.28                                      Significant Imaging:  I have reviewed all pertinent imaging results/findings within the past 24 hours.    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

## 2025-07-05 NOTE — PLAN OF CARE
Problem: Adult Inpatient Plan of Care  Goal: Plan of Care Review  Outcome: Progressing  Goal: Patient-Specific Goal (Individualized)  Outcome: Progressing  Goal: Absence of Hospital-Acquired Illness or Injury  Outcome: Progressing  Goal: Optimal Comfort and Wellbeing  Outcome: Progressing  Goal: Readiness for Transition of Care  Outcome: Progressing     Problem: Acute Kidney Injury/Impairment  Goal: Fluid and Electrolyte Balance  Outcome: Progressing  Goal: Improved Oral Intake  Outcome: Progressing  Goal: Effective Renal Function  Outcome: Progressing     Problem: Infection  Goal: Absence of Infection Signs and Symptoms  Outcome: Progressing     Problem: Wound  Goal: Optimal Coping  Outcome: Progressing  Goal: Optimal Functional Ability  Outcome: Progressing  Goal: Absence of Infection Signs and Symptoms  Outcome: Progressing  Goal: Improved Oral Intake  Outcome: Progressing  Goal: Optimal Pain Control and Function  Outcome: Progressing  Goal: Skin Health and Integrity  Outcome: Progressing  Goal: Optimal Wound Healing  Outcome: Progressing     Problem: Skin Injury Risk Increased  Goal: Skin Health and Integrity  Outcome: Progressing     Problem: Noninvasive Ventilation Acute  Goal: Effective Unassisted Ventilation and Oxygenation  Outcome: Progressing     Problem: Fall Injury Risk  Goal: Absence of Fall and Fall-Related Injury  Outcome: Progressing     Problem: Restraint, Nonviolent  Goal: Absence of Harm or Injury  Outcome: Progressing     Problem: Gas Exchange Impaired  Goal: Optimal Gas Exchange  Outcome: Progressing     Problem: Delirium  Goal: Optimal Coping  Outcome: Progressing  Goal: Improved Behavioral Control  Outcome: Progressing  Goal: Improved Attention and Thought Clarity  Outcome: Progressing  Goal: Improved Sleep  Outcome: Progressing     Problem: Breathing Pattern Ineffective  Goal: Effective Breathing Pattern  Outcome: Progressing

## 2025-07-05 NOTE — PROGRESS NOTES
More alert today.    Plus F/E toes and ankles to command.  EHL 5/5    Most recent cultures are no growth today    Hemoglobin 9.5  White count 11.3    S/p incision and drainage lumbar spine postop 5.    Continue antibiotics.  Mobilize with therapy.  Continue drains.

## 2025-07-05 NOTE — PT/OT/SLP EVAL
Occupational Therapy   Evaluation    Name: Carey Leonardo  MRN: [id number]  Admitting Diagnosis: MATT (acute kidney injury)  Recent Surgery: Procedure(s) (LRB):  INCISION AND DRAINAGE, ABSCESS, SPINE, LUMBOSACRAL, POSTERIOR (N/A) 5 Days Post-Op    Recommendations:     Discharge Recommendations:    Discharge Equipment Recommendations:  to be determined by next level of care  Barriers to discharge:  None    Assessment:     Carey Leonardo is a 49 y.o. female with a medical diagnosis of MATT (acute kidney injury).  She presents with complaint of back pain. Pt agreed to OT evaluation. Performance deficits affecting function: weakness, impaired endurance, impaired self care skills, impaired functional mobility, impaired balance, decreased lower extremity function, pain, orthopedic precautions.      Rehab Prognosis: Good; patient would benefit from acute skilled OT services to address these deficits and reach maximum level of function.       Plan:     Patient to be seen 5 x/week to address the above listed problems via self-care/home management, therapeutic activities, therapeutic exercises  Plan of Care Expires: 08/09/25  Plan of Care Reviewed with: patient, sibling    Subjective     Chief Complaint: Back Pain  Patient/Family Comments/goals: Plan is to return home, but may benefit from swing bed    Occupational Profile:  Living Environment: Pt lives with  and grandchildren in 1 story home with 3 to 4 steps with rail  Previous level of function: I with self care prior to May  Roles and Routines: Pt has had a decline in status since previous surgery. Requiring assistance from Family.Sister reports HH did not perform scheduled visits  Equipment Used at Home: walker, rolling  Assistance upon Discharge: Family    Pain/Comfort:  Pain Rating 1: 8/10  Location 1: back  Pain Addressed 1: Reposition, Distraction  Pain Rating Post-Intervention 1: 8/10    Patients cultural, spiritual, Orthodoxy conflicts given the current  situation: no    Objective:     Communicated with: DILAN Roman  prior to session.  Patient found HOB elevated with blood pressure cuff, hemovac, peripheral IV, pulse ox (continuous), telemetry upon OT entry to room.    General Precautions: Standard, contact, fall  Orthopedic Precautions: spinal precautions  Braces: N/A  Respiratory Status: Room air    Occupational Performance:    Bed Mobility:    Patient completed Rolling/Turning to Right with maximal assistance and x 2 persons  Patient completed Supine to Sit with maximal assistance and x 2 persons  Patient completed Sit to Supine with maximal assistance and x 2 persons    Functional Mobility/Transfers:  Patient completed Sit <> Stand Transfer with minimum assistance and of 2 persons  with  gait belt to stand to RW   Functional Mobility: Min a x 2 to side step to HOB with verbal/tactile cueing     Activities of Daily Living:  Upper Body Dressing: minimum assistance    Lower Body Dressing: dependence donning socks    Cognitive/Visual Perceptual:  Cognitive/Psychosocial Skills:     -       Oriented to: Person and Situation   -       Follows Commands/attention:Follows one-step commands  -       Communication: WFL, but pt is lethargic today requiring increased time for responses  Visual/Perceptual:      -vision/hearing WFL      Physical Exam:  Balance:    -       CGA with EOB sitting, Min a x 2 for standing balance  Skin integrity: Visible skin intact  Sensation:    -       Intact  Motor Planning:    -       wfl  Dominant hand:    -       Right  Upper Extremity Range of Motion:     -       Right Upper Extremity: WFL  -       Left Upper Extremity: WFL  Upper Extremity Strength:    -       Right Upper Extremity: WFL  -       Left Upper Extremity: WFL   Strength:    -       Right Upper Extremity: WFL  -       Left Upper Extremity: WFL    AMPAC 6 Click ADL:  AMPAC Total Score: 15    Treatment & Education:  OT evaluation completed. See eval for details. Pt  presents with decline is status following I & D of Lumbar Abscess. Pt has been requiring increased assistance with self care since returning home from initial surgery per sister. Tx plan to focus on increasing (I) with self care,mobility and adaptive equipment assessment/training  Pt educated on OT role/POC.   Importance of OOB activity with staff assistance.  Importance of sitting up in the chair throughout the day as tolerated, especially for meals   Safety during functional t/f and mobility with use of RW  Importance of assisting with self-care activities   All questions/concerns answered within OT scope of practice     Patient left HOB elevated with all lines intact, call button in reach, and Nurse and sister present    GOALS:   Multidisciplinary Problems       Occupational Therapy Goals          Problem: Occupational Therapy    Goal Priority Disciplines Outcome Interventions   Occupational Therapy Goal     OT, PT/OT Progressing    Description: STG:  Pt will perform grooming with setup  Pt will bathe with min a with AD as needed  Pt will perform UE dressing with (I)  Pt will perform LE dressing with Min a with AD as needed  Pt will sit EOB x 15 min with (I)   Pt will transfer bed/chair/bsc with Min a with RW  Pt will perform standing x 1 min with CGA assistance  Pt will tolerate 20 minutes of tx without fatigue      LT.Restore to max I with self care and mobility.                          DME Justifications:      History:     Past Medical History:   Diagnosis Date    Acquired hypothyroidism     Chronic serous otitis media of both ears 2023    Depressive disorder     Diabetes mellitus type 2 in obese     Essential (primary) hypertension     Gastroparesis     IBS (irritable bowel syndrome)     Kidney stones     Mild intermittent asthma without complication 2025    Mixed hyperlipidemia     Obstructive sleep apnea syndrome 2025    Postlaminectomy syndrome, lumbar region 2022    [facility]         Past Surgical History:   Procedure Laterality Date    Bilateral L3-S1 MBB Bilateral 6-, 5-, 1-8-2014    Dr Jessica Randolph    CARPAL TUNNEL RELEASE      CHOLECYSTECTOMY      DIAGNOSTIC LAPAROSCOPY  04/14/2010    Exploratory Laparotomy, Myomectomy, Hydrotubation-Dr. Feng    DILATION AND CURETTAGE OF UTERUS  04/14/2010    Hysteroscopy-Dr. Feng    EPIDURAL STEROID INJECTION N/A 10/22/2024    Procedure: Injection, Steroid, Epidural, L4/5;  Surgeon: Rasheeda Bills MD;  Location: [facility];  Service: Pain Management;  Laterality: N/A;    EXPLORATORY LAPAROTOMY WITH UTERINE MYOMECTOMY  02/27/2007    Dr. Marquise Anderson    FUSION, SPINE, LATERAL APPROACH N/A 5/20/2025    Procedure: ARTHRODESIS, SPINE, LATERAL;  Surgeon: Cyril Upton MD;  Location: [facility] [place];  Service: Orthopedics;  Laterality: N/A;  L2-L4 LATERAL FUSION    HYSTERECTOMY  09/29/2011    Robotic, FABIENNE, Cystoscopy-Dr. Feng    HYSTEROSCOPY WITH DILATION AND CURETTAGE OF UTERUS  04/04/2006    Laparoscopy, Chromotubation-Dr. Marquise Anderson    INCISION AND DRAINAGE, ABSCESS, SPINE, LUMBOSACRAL, POSTERIOR N/A 6/12/2025    Procedure: INCISION AND DRAINAGE, ABSCESS, SPINE, LUMBOSACRAL, POSTERIOR;  Surgeon: Cyril Upton MD;  Location: [facility] OR;  Service: Neurosurgery;  Laterality: N/A;    INCISION AND DRAINAGE, ABSCESS, SPINE, LUMBOSACRAL, POSTERIOR N/A 6/30/2025    Procedure: INCISION AND DRAINAGE, ABSCESS, SPINE, LUMBOSACRAL, POSTERIOR;  Surgeon: Cyril Upton MD;  Location: [facility] OR;  Service: Neurosurgery;  Laterality: N/A;    INJECTION OF ANESTHETIC AGENT AROUND ILIOINGUINAL NERVE Right 6/22/2023    Procedure: BLOCK, NERVE, ILIOINGUINAL;  Surgeon: Rasheeda Bills MD;  Location: [facility];  Service: Pain Management;  Laterality: Right;    INJECTION OF ANESTHETIC AGENT AROUND MEDIAL BRANCH NERVES INNERVATING LUMBAR FACET JOINT Bilateral 9/21/2023    Procedure: BLOCK, NERVE, FACET  JOINT, LUMBAR, MEDIAL BRANCH;  Surgeon: Rasheeda Bills MD;  Location: [facility];  Service: Pain Management;  Laterality: Bilateral;    INJECTION OF ANESTHETIC AGENT AROUND MEDIAL BRANCH NERVES INNERVATING LUMBAR FACET JOINT Bilateral 3/14/2024    Procedure: BLOCK, NERVE, FACET JOINT, LUMBAR, MEDIAL BRANCH, BILATERAL L4-S1 MBB;  Surgeon: Rasheeda Bills MD;  Location: [facility];  Service: Pain Management;  Laterality: Bilateral;    LAMINECTOMY N/A 11/30/2021    Procedure: L2-L5 Laminectomy;  Surgeon: Cyril Upton MD;  Location: [facility] OR;  Service: Neurosurgery;  Laterality: N/A;    LEFT HEART CATHETERIZATION      Left L3-S1 RFTC Left 07/18/2017    Dr Lopez    Left SI JI Left 09/30/2013    Dr Randolph    MYRINGOTOMY WITH INSERTION OF VENTILATION TUBE Bilateral 4/4/2023    Procedure: MYRINGOTOMY, WITH TYMPANOSTOMY TUBE INSERTION (T-TUBES);  Surgeon: Sea Hinton MD;  Location: [facility];  Service: ENT;  Laterality: Bilateral;  T-TUBES    MYRINGOTOMY WITH INSERTION OF VENTILATION TUBE Bilateral 9/12/2023    Procedure: MYRINGOTOMY, WITH TYMPANOSTOMY TUBE INSERTION, T-TUBES;  Surgeon: Sea Hinton MD;  Location: [facility] OR;  Service: ENT;  Laterality: Bilateral;    MYRINGOTOMY WITH INSERTION OF VENTILATION TUBE Bilateral 7/2/2024    Procedure: MYRINGOTOMY, WITH TYMPANOSTOMY TUBE INSERTION;  Surgeon: Sea Hinton MD;  Location: [facility] OR;  Service: ENT;  Laterality: Bilateral;  Bilateral T-Tubes    OOPHORECTOMY  11/11/2014    Robotic, FABIENNE, Cystoscopy-Dr. Feng    SINUS SURGERY      TRANSFORAMINAL LUMBAR INTERBODY FUSION N/A 5/21/2025    Procedure: ARTHRODESIS, SPINE, LUMBAR, TLIF;  Surgeon: Cyril Upton MD;  Location: [facility] OR;  Service: Orthopedics;  Laterality: N/A;  L2-L5 POSTERIOR FUSION, L4-L5 TLIF       Time Tracking:     OT Date of Treatment: 07/05/25  OT Start Time: 0910  OT Stop Time: 0936  OT Total Time (min): 26 min    Billable  Minutes:Evaluation 26    7/5/2025

## 2025-07-05 NOTE — SUBJECTIVE & OBJECTIVE
Interval History/Significant Events:  Patient without complaints    Review of Systems  Objective:     Vital Signs (Most Recent):  Temp: 98.3 °F (36.8 °C) (07/05/25 0330)  Pulse: 88 (07/05/25 0345)  Resp: 13 (07/05/25 0345)  BP: 131/79 (07/05/25 0330)  SpO2: 97 % (07/05/25 0345) Vital Signs (24h Range):  Temp:  [97.6 °F (36.4 °C)-98.5 °F (36.9 °C)] 98.3 °F (36.8 °C)  Pulse:  [] 88  Resp:  [12-35] 13  SpO2:  [80 %-100 %] 97 %  BP: (123-159)/(51-84) 131/79   Weight: 106.1 kg (233 lb 14.5 oz)  Body mass index is 36.64 kg/m².      Intake/Output Summary (Last 24 hours) at 7/5/2025 0613  Last data filed at 7/5/2025 0605  Gross per 24 hour   Intake 398.81 ml   Output 4375 ml   Net -3976.19 ml          Physical Exam  Vitals reviewed.   Constitutional:       Appearance: Normal appearance.      Interventions: She is not intubated.  HENT:      Head: Normocephalic and atraumatic.      Nose: Nose normal.      Mouth/Throat:      Mouth: Mucous membranes are dry.      Pharynx: Oropharynx is clear.   Eyes:      Extraocular Movements: Extraocular movements intact.      Conjunctiva/sclera: Conjunctivae normal.      Pupils: Pupils are equal, round, and reactive to light.   Cardiovascular:      Rate and Rhythm: Normal rate.      Heart sounds: Normal heart sounds. No murmur heard.  Pulmonary:      Effort: Pulmonary effort is normal. She is not intubated.      Breath sounds: Normal breath sounds.   Abdominal:      General: Abdomen is flat. Bowel sounds are normal.      Palpations: Abdomen is soft.   Musculoskeletal:         General: Normal range of motion.      Cervical back: Normal range of motion and neck supple.      Right lower leg: No edema.      Left lower leg: No edema.   Skin:     General: Skin is warm and dry.      Capillary Refill: Capillary refill takes less than 2 seconds.   Neurological:      General: No focal deficit present.      Mental Status: She is alert and oriented to person, place, and time.   Psychiatric:          Mood and Affect: Mood normal.         Behavior: Behavior normal.            Vents:  Vent Mode: A/CMV-VC (07/03/25 1222)  Ventilator Initiated: Yes (06/30/25 1555)  Set Rate: 0 BPM (07/03/25 1222)  Vt Set: 0 mL (07/03/25 1222)  Pressure Support: 8 cmH20 (07/03/25 1222)  PEEP/CPAP: 5 cmH20 (07/03/25 1222)  Oxygen Concentration (%): 32 (07/05/25 0305)  Peak Airway Pressure: 15.6 cmH20 (07/03/25 1222)  Plateau Pressure: 15.4 cmH20 (07/03/25 1222)  Total Ve: 17.1 L/m (07/03/25 1222)  F/VT Ratio<105 (RSBI): (!) 46.33 (07/03/25 0600)  Lines/Drains/Airways       Drain  Duration                  Urethral Catheter 06/28/25 1654 6 days         Closed/Suction Drain 06/30/25 Tube - 1 Inferior;Right Back Accordion 10 Fr. 5 days         Closed/Suction Drain 06/30/25 Tube - 2 Inferior;Medial;Right Back 10 Fr. 5 days              Peripheral Intravenous Line  Duration             Peripheral IV 07/03/25 0115 18 G Anterior;Left Forearm 2 days    Peripheral IV 07/03/25 0115 20 G Anterior;Distal;Left Forearm 2 days                  Significant Labs:    CBC/Anemia Profile:  Recent Labs   Lab 07/04/25  0205 07/05/25  0354   WBC 12.50* 11.28*   HGB 9.5* 9.5*   HCT 30.5* 30.1*   * 605*   MCV 82.0 81.1   RDW 16.9* 17.1*        Chemistries:  Recent Labs   Lab 07/03/25  2351 07/04/25  0205    142   K 3.1* 3.2*   CL 97* 97*   CO2 21* 22   BUN 84* 84*   CREATININE 6.37* 6.35*   CALCIUM 7.9* 7.2*   ALBUMIN 1.8*  --    PROT 7.0  --    BILITOT 0.6  --    ALKPHOS 106  --    ALT 23  --    AST 83*  --        Recent Lab Results  (Last 5 results in the past 24 hours)        07/05/25  0354   07/05/25  0210   07/04/25  2003   07/04/25  1648   07/04/25  1121        Baso # 0.02               Basophil % 0.2               Differential Method Auto               Eos # 1.07               Eos % 9.5               Hematocrit 30.1               Hemoglobin 9.5               Immature Grans (Abs) 0.18               Immature Granulocytes 1.6                Lymph # 1.04               Lymph % 9.2               MCH 25.6               MCHC 31.6               MCV 81.1               Mono # 0.74               Mono % 6.6               MPV 10.8               Neutrophils, Abs 8.23               Neutrophils Relative 72.9               nRBC 0.4               NUCLEATED RBC ABSOLUTE 0.04               Platelet Count 605               POC Glucose   149   170   162   166       RBC 3.71               RDW 17.1               WBC 11.28                                      Significant Imaging:  I have reviewed all pertinent imaging results/findings within the past 24 hours.

## 2025-07-06 LAB
ANION GAP SERPL CALCULATED.3IONS-SCNC: 17 MMOL/L (ref 7–16)
ANISOCYTOSIS BLD QL SMEAR: ABNORMAL
BACTERIA BLD CULT: NORMAL
BACTERIA BLD CULT: NORMAL
BASOPHILS # BLD AUTO: 0.03 K/UL (ref 0–0.2)
BASOPHILS NFR BLD AUTO: 0.2 % (ref 0–1)
BUN SERPL-MCNC: 84 MG/DL (ref 7–19)
BUN/CREAT SERPL: 15 (ref 6–20)
CALCIUM SERPL-MCNC: 8 MG/DL (ref 8.4–10.2)
CHLORIDE SERPL-SCNC: 94 MMOL/L (ref 98–107)
CK SERPL-CCNC: 78 U/L (ref 29–168)
CO2 SERPL-SCNC: 31 MMOL/L (ref 22–29)
CREAT SERPL-MCNC: 5.51 MG/DL (ref 0.55–1.02)
DIFFERENTIAL METHOD BLD: ABNORMAL
EGFR (NO RACE VARIABLE) (RUSH/TITUS): 9 ML/MIN/1.73M2
EOSINOPHIL # BLD AUTO: 1.23 K/UL (ref 0–0.5)
EOSINOPHIL NFR BLD AUTO: 9.1 % (ref 1–4)
EOSINOPHIL NFR BLD MANUAL: 8 % (ref 1–4)
ERYTHROCYTE [DISTWIDTH] IN BLOOD BY AUTOMATED COUNT: 17.2 % (ref 11.5–14.5)
GLUCOSE SERPL-MCNC: 145 MG/DL (ref 70–105)
GLUCOSE SERPL-MCNC: 146 MG/DL (ref 70–105)
GLUCOSE SERPL-MCNC: 147 MG/DL (ref 74–100)
GLUCOSE SERPL-MCNC: 237 MG/DL (ref 70–105)
GLUCOSE SERPL-MCNC: 242 MG/DL (ref 70–105)
HCT VFR BLD AUTO: 27.8 % (ref 38–47)
HGB BLD-MCNC: 8.6 G/DL (ref 12–16)
HYPOCHROMIA BLD QL SMEAR: ABNORMAL
IMM GRANULOCYTES # BLD AUTO: 0.6 K/UL (ref 0–0.04)
IMM GRANULOCYTES NFR BLD: 4.4 % (ref 0–0.4)
LYMPHOCYTES # BLD AUTO: 1.64 K/UL (ref 1–4.8)
LYMPHOCYTES NFR BLD AUTO: 12.1 % (ref 27–41)
LYMPHOCYTES NFR BLD MANUAL: 10 % (ref 27–41)
MCH RBC QN AUTO: 26.1 PG (ref 27–31)
MCHC RBC AUTO-ENTMCNC: 30.9 G/DL (ref 32–36)
MCV RBC AUTO: 84.2 FL (ref 80–96)
METAMYELOCYTES NFR BLD MANUAL: 1 %
MONOCYTES # BLD AUTO: 0.97 K/UL (ref 0–0.8)
MONOCYTES NFR BLD AUTO: 7.1 % (ref 2–6)
MONOCYTES NFR BLD MANUAL: 5 % (ref 2–6)
MPC BLD CALC-MCNC: 10.6 FL (ref 9.4–12.4)
NEUTROPHILS # BLD AUTO: 9.1 K/UL (ref 1.8–7.7)
NEUTROPHILS NFR BLD AUTO: 67.1 % (ref 53–65)
NEUTS BAND NFR BLD MANUAL: 7 % (ref 1–5)
NEUTS SEG NFR BLD MANUAL: 69 % (ref 50–62)
NRBC # BLD AUTO: 0.05 X10E3/UL
NRBC BLD MANUAL-RTO: 1 /100 WBC
NRBC, AUTO (.00): 0.4 %
PLATELET # BLD AUTO: 615 K/UL (ref 150–400)
PLATELET MORPHOLOGY: ABNORMAL
POLYCHROMASIA BLD QL SMEAR: ABNORMAL
POTASSIUM SERPL-SCNC: 3.1 MMOL/L (ref 3.5–5.1)
RBC # BLD AUTO: 3.3 M/UL (ref 4.2–5.4)
SODIUM SERPL-SCNC: 139 MMOL/L (ref 136–145)
TARGETS BLD QL SMEAR: ABNORMAL
WBC # BLD AUTO: 13.57 K/UL (ref 4.5–11)

## 2025-07-06 PROCEDURE — 63600175 PHARM REV CODE 636 W HCPCS: Performed by: ORTHOPAEDIC SURGERY

## 2025-07-06 PROCEDURE — 25000003 PHARM REV CODE 250: Performed by: ORTHOPAEDIC SURGERY

## 2025-07-06 PROCEDURE — 94761 N-INVAS EAR/PLS OXIMETRY MLT: CPT

## 2025-07-06 PROCEDURE — 99232 SBSQ HOSP IP/OBS MODERATE 35: CPT | Mod: ,,,

## 2025-07-06 PROCEDURE — 27000221 HC OXYGEN, UP TO 24 HOURS

## 2025-07-06 PROCEDURE — 80048 BASIC METABOLIC PNL TOTAL CA: CPT | Performed by: ORTHOPAEDIC SURGERY

## 2025-07-06 PROCEDURE — 27000207 HC ISOLATION

## 2025-07-06 PROCEDURE — 99900035 HC TECH TIME PER 15 MIN (STAT)

## 2025-07-06 PROCEDURE — 94799 UNLISTED PULMONARY SVC/PX: CPT

## 2025-07-06 PROCEDURE — 25000003 PHARM REV CODE 250

## 2025-07-06 PROCEDURE — 63600175 PHARM REV CODE 636 W HCPCS: Performed by: INTERNAL MEDICINE

## 2025-07-06 PROCEDURE — 82550 ASSAY OF CK (CPK): CPT | Performed by: INTERNAL MEDICINE

## 2025-07-06 PROCEDURE — 63600175 PHARM REV CODE 636 W HCPCS: Performed by: NURSE PRACTITIONER

## 2025-07-06 PROCEDURE — 36415 COLL VENOUS BLD VENIPUNCTURE: CPT | Performed by: ORTHOPAEDIC SURGERY

## 2025-07-06 PROCEDURE — 82962 GLUCOSE BLOOD TEST: CPT

## 2025-07-06 PROCEDURE — 85025 COMPLETE CBC W/AUTO DIFF WBC: CPT | Performed by: ORTHOPAEDIC SURGERY

## 2025-07-06 PROCEDURE — 25000003 PHARM REV CODE 250: Performed by: INTERNAL MEDICINE

## 2025-07-06 PROCEDURE — 11000001 HC ACUTE MED/SURG PRIVATE ROOM

## 2025-07-06 RX ORDER — POTASSIUM CHLORIDE 7.45 MG/ML
40 INJECTION INTRAVENOUS ONCE
Status: COMPLETED | OUTPATIENT
Start: 2025-07-06 | End: 2025-07-06

## 2025-07-06 RX ADMIN — INSULIN ASPART 5 UNITS: 100 INJECTION, SOLUTION INTRAVENOUS; SUBCUTANEOUS at 05:07

## 2025-07-06 RX ADMIN — HEPARIN SODIUM 5000 UNITS: 5000 INJECTION, SOLUTION INTRAVENOUS; SUBCUTANEOUS at 09:07

## 2025-07-06 RX ADMIN — METOPROLOL TARTRATE 50 MG: 50 TABLET, FILM COATED ORAL at 08:07

## 2025-07-06 RX ADMIN — POTASSIUM CHLORIDE 40 MEQ: 7.46 INJECTION, SOLUTION INTRAVENOUS at 05:07

## 2025-07-06 RX ADMIN — HYDROCODONE BITARTRATE AND ACETAMINOPHEN 1 TABLET: 5; 325 TABLET ORAL at 12:07

## 2025-07-06 RX ADMIN — AMIODARONE HYDROCHLORIDE 200 MG: 200 TABLET ORAL at 08:07

## 2025-07-06 RX ADMIN — LEVOTHYROXINE SODIUM 200 MCG: 100 TABLET ORAL at 05:07

## 2025-07-06 RX ADMIN — INSULIN ASPART 5 UNITS: 100 INJECTION, SOLUTION INTRAVENOUS; SUBCUTANEOUS at 06:07

## 2025-07-06 RX ADMIN — HYDROCODONE BITARTRATE AND ACETAMINOPHEN 1 TABLET: 5; 325 TABLET ORAL at 05:07

## 2025-07-06 RX ADMIN — INSULIN GLARGINE 20 UNITS: 100 INJECTION, SOLUTION SUBCUTANEOUS at 08:07

## 2025-07-06 RX ADMIN — HYDROCODONE BITARTRATE AND ACETAMINOPHEN 1 TABLET: 5; 325 TABLET ORAL at 08:07

## 2025-07-06 RX ADMIN — ACETAMINOPHEN 650 MG: 325 TABLET ORAL at 01:07

## 2025-07-06 RX ADMIN — MICONAZOLE NITRATE ANTIFUNGAL POWDER: 2 POWDER TOPICAL at 08:07

## 2025-07-06 RX ADMIN — INSULIN ASPART 5 UNITS: 100 INJECTION, SOLUTION INTRAVENOUS; SUBCUTANEOUS at 12:07

## 2025-07-06 RX ADMIN — FUROSEMIDE 160 MG: 10 INJECTION, SOLUTION INTRAMUSCULAR; INTRAVENOUS at 08:07

## 2025-07-06 RX ADMIN — Medication 6 MG: at 01:07

## 2025-07-06 RX ADMIN — HEPARIN SODIUM 5000 UNITS: 5000 INJECTION, SOLUTION INTRAVENOUS; SUBCUTANEOUS at 05:07

## 2025-07-06 RX ADMIN — PANTOPRAZOLE SODIUM 40 MG: 40 INJECTION, POWDER, FOR SOLUTION INTRAVENOUS at 08:07

## 2025-07-06 NOTE — SUBJECTIVE & OBJECTIVE
Interval History/Significant Events: Please refer to hospital course    Review of Systems   Respiratory:  Negative for shortness of breath.    Cardiovascular:  Negative for chest pain.   Gastrointestinal:  Negative for abdominal pain.     Objective:     Vital Signs (Most Recent):  Temp: 98.7 °F (37.1 °C) (07/06/25 0701)  Pulse: 81 (07/06/25 0900)  Resp: 15 (07/06/25 0900)  BP: (!) 140/75 (07/06/25 0900)  SpO2: 100 % (07/06/25 0900) Vital Signs (24h Range):  Temp:  [97.8 °F (36.6 °C)-98.7 °F (37.1 °C)] 98.7 °F (37.1 °C)  Pulse:  [76-97] 81  Resp:  [9-22] 15  SpO2:  [81 %-100 %] 100 %  BP: ()/(57-92) 140/75   Weight: 106.1 kg (233 lb 14.5 oz)  Body mass index is 36.64 kg/m².      Intake/Output Summary (Last 24 hours) at 7/6/2025 1033  Last data filed at 7/6/2025 1020  Gross per 24 hour   Intake 1058.82 ml   Output 1950 ml   Net -891.18 ml          Physical Exam  Vitals reviewed.   Constitutional:       General: She is awake. She is not in acute distress.     Appearance: Normal appearance.   HENT:      Head: Normocephalic.      Mouth/Throat:      Mouth: Mucous membranes are moist.      Dentition: Abnormal dentition. Dental caries present.   Eyes:      General: No scleral icterus.     Pupils: Pupils are equal, round, and reactive to light.   Cardiovascular:      Rate and Rhythm: Normal rate and regular rhythm.      Pulses: Normal pulses.   Pulmonary:      Effort: Pulmonary effort is normal.      Breath sounds: Normal breath sounds.   Abdominal:      General: Bowel sounds are normal.      Palpations: Abdomen is soft.      Tenderness: There is no abdominal tenderness.   Skin:     General: Skin is warm and dry.      Capillary Refill: Capillary refill takes 2 to 3 seconds.   Neurological:      Mental Status: She is alert and oriented to person, place, and time.      Sensory: Sensation is intact.   Psychiatric:         Behavior: Behavior is cooperative.            Vents:  Vent Mode: A/CMV-VC (07/03/25 1222)  Ventilator  Initiated: Yes (06/30/25 1555)  Set Rate: 0 BPM (07/03/25 1222)  Vt Set: 0 mL (07/03/25 1222)  Pressure Support: 8 cmH20 (07/03/25 1222)  PEEP/CPAP: 5 cmH20 (07/03/25 1222)  Oxygen Concentration (%): 32 (07/06/25 0714)  Peak Airway Pressure: 15.6 cmH20 (07/03/25 1222)  Plateau Pressure: 15.4 cmH20 (07/03/25 1222)  Total Ve: 17.1 L/m (07/03/25 1222)  F/VT Ratio<105 (RSBI): (!) 46.33 (07/03/25 0600)  Lines/Drains/Airways       Drain  Duration                  Closed/Suction Drain 06/30/25 Tube - 1 Inferior;Right Back Accordion 10 Fr. 6 days         Closed/Suction Drain 06/30/25 Tube - 2 Inferior;Medial;Right Back 10 Fr. 6 days              Peripheral Intravenous Line  Duration             Peripheral IV 07/03/25 0115 18 G Anterior;Left Forearm 3 days    Peripheral IV 07/03/25 0115 20 G Anterior;Distal;Left Forearm 3 days                  Significant Labs:    CBC/Anemia Profile:  Recent Labs   Lab 07/05/25 0354 07/06/25  0245   WBC 11.28* 13.57*   HGB 9.5* 8.6*   HCT 30.1* 27.8*   * 615*   MCV 81.1 84.2   RDW 17.1* 17.2*        Chemistries:  Recent Labs   Lab 07/05/25 0354 07/06/25  0245   * 139   K 3.6 3.1*   CL 97* 94*   CO2 32* 31*   BUN 90* 84*   CREATININE 5.62* 5.51*   CALCIUM 8.0* 8.0*   MG 2.0  --        All pertinent labs within the past 24 hours have been reviewed.    Significant Imaging:  I have reviewed all pertinent imaging results/findings within the past 24 hours.

## 2025-07-06 NOTE — PROGRESS NOTES
Sitting upright and eating.    Plus F/E toes and ankles to command.  EHL 5/5    Most recent cultures are no growth today    Hemoglobin 8.6  White count 13.6    S/p incision and drainage lumbar spine postop 6    Continue antibiotics.  Mobilize with therapy.  Continue drains.

## 2025-07-06 NOTE — NURSING
1003: [facility]  [facility]: pt along with pt's belongings transported to room 655, transferred to bed, VSS, received by Ez, family notified

## 2025-07-06 NOTE — NURSING
Pt called out that hemovac drain was leaking, upon examination the pt drain was out labeled #2, she had rolled on top of it and inadvertently pulled it out. Attempted to reach Dr Upton and Dr Mackey.    Per Dr Mackey, change the dressing and leave #1 in.       Per Dr Upton, take out both drains and dress with xeroform

## 2025-07-06 NOTE — HOSPITAL COURSE
7/6/25: No overnight events to note. On assessment, patient is AAOx3. She denies SOB, pain, or discomfort. AM labs reviewed: sodium 139, potassium 3.1 (replaced), BUN/Cr 84/5.5 (nephrology following), glucose 147, WBC 13, H/H 8.6/27. Hemodynamically stable.

## 2025-07-06 NOTE — SUBJECTIVE & OBJECTIVE
Interval History: The patient is resting.  She is without complaints.    Review of patient's allergies indicates:   Allergen Reactions    Fish containing products Anaphylaxis    Iodinated contrast media     Penicillins      Current Facility-Administered Medications   Medication Frequency    0.9%  NaCl infusion (for blood administration) Q24H PRN    acetaminophen suppository 650 mg Q6H PRN    acetaminophen tablet 650 mg Q8H PRN    albuterol nebulizer solution 2.5 mg Q4H PRN    aluminum & magnesium hydroxide-simethicone 400-400-40 mg/5 mL suspension 30 mL Q6H PRN    amiodarone tablet 200 mg Daily    bisacodyL EC tablet 10 mg Daily PRN    cloNIDine tablet 0.1 mg Q6H PRN    cyclobenzaprine tablet 10 mg TID PRN    DAPTOmycin (CUBICIN) 610 mg in 0.9% NaCl SolP 50 mL IVPB Q48H    dextromethorphan-guaiFENesin  mg/5 ml liquid 10 mL Q6H PRN    dextrose 50% injection 12.5 g PRN    dextrose 50% injection 25 g PRN    diphenhydrAMINE capsule 50 mg Q6H PRN    docusate sodium capsule 100 mg BID PRN    furosemide (Lasix) 160 mg in 0.9% NaCl 100 mL IVPB Daily    glucagon (human recombinant) injection 1 mg PRN    glucose chewable tablet 16 g PRN    glucose chewable tablet 24 g PRN    heparin (porcine) injection 5,000 Units Q8H    HYDROcodone-acetaminophen 5-325 mg per tablet 1 tablet Q4H PRN    insulin aspart U-100 injection 0-10 Units QID (AC + HS) PRN    insulin aspart U-100 injection 5 Units TIDWM    insulin glargine U-100 (Lantus) injection 20 Units QHS    levothyroxine tablet 200 mcg Before breakfast    melatonin tablet 6 mg Nightly PRN    metoprolol injection 5 mg Q6H PRN    metoprolol tartrate (LOPRESSOR) tablet 50 mg BID    miconazole NITRATE 2 % top powder BID    ondansetron injection 8 mg Q6H PRN    pantoprazole injection 40 mg Daily       Objective:     Vital Signs (Most Recent):  Temp: 98.5 °F (36.9 °C) (07/06/25 1640)  Pulse: 88 (07/06/25 1640)  Resp: 16 (07/06/25 1703)  BP: (!) 124/58 (07/06/25 1640)  SpO2: (!) 94  % (07/06/25 1640) Vital Signs (24h Range):  Temp:  [97.8 °F (36.6 °C)-98.7 °F (37.1 °C)] 98.5 °F (36.9 °C)  Pulse:  [74-97] 88  Resp:  [9-22] 16  SpO2:  [81 %-100 %] 94 %  BP: ()/(57-84) 124/58     Weight: 106.1 kg (233 lb 14.5 oz) (06/29/25 2000)  Body mass index is 36.64 kg/m².  Body surface area is 2.24 meters squared.    I/O last 3 completed shifts:  In: 719.7 [P.O.:480; IV Piggyback:239.7]  Out: 4980 [Urine:4970; Drains:10]     Physical Exam  Vitals reviewed.   HENT:      Head: Normocephalic.      Mouth/Throat:      Mouth: Mucous membranes are moist.   Eyes:      Pupils: Pupils are equal, round, and reactive to light.   Cardiovascular:      Rate and Rhythm: Regular rhythm.   Pulmonary:      Breath sounds: Normal breath sounds.   Abdominal:      Palpations: Abdomen is soft.   Musculoskeletal:      Cervical back: Neck supple.   Neurological:      Mental Status: She is alert.   Psychiatric:         Mood and Affect: Mood normal.          Significant Labs:  BMP:   Recent Labs   Lab 07/05/25  0354 07/06/25  0245   * 147*   * 139   K 3.6 3.1*   CL 97* 94*   CO2 32* 31*   BUN 90* 84*   CREATININE 5.62* 5.51*   CALCIUM 8.0* 8.0*   MG 2.0  --      CBC:   Recent Labs   Lab 07/06/25  0245   WBC 13.57*   RBC 3.30*   HGB 8.6*   HCT 27.8*   *   MCV 84.2   MCH 26.1*   MCHC 30.9*        Significant Imaging:  Labs: Reviewed

## 2025-07-06 NOTE — PROGRESS NOTES
[facility]  Critical Care Medicine  Progress Note    Patient Name: Carey Leonardo  MRN: [id number]  Admission Date: 6/26/2025  Hospital Length of Stay: 10 days  Code Status: Full Code  Attending Provider: Kameron Martines MD  Primary Care Provider: Zainab Harrell FNP   Principal Problem: MATT (acute kidney injury)    Subjective:     HPI:  No notes on file    Hospital/ICU Course:  7/6/25: No overnight events to note. On assessment, patient is AAOx3. She denies SOB, pain, or discomfort. AM labs reviewed: sodium 139, potassium 3.1 (replaced), BUN/Cr 84/5.5 (nephrology following), glucose 147, WBC 13, H/H 8.6/27. Hemodynamically stable.     Interval History/Significant Events: Please refer to hospital course    Review of Systems   Respiratory:  Negative for shortness of breath.    Cardiovascular:  Negative for chest pain.   Gastrointestinal:  Negative for abdominal pain.     Objective:     Vital Signs (Most Recent):  Temp: 98.7 °F (37.1 °C) (07/06/25 0701)  Pulse: 81 (07/06/25 0900)  Resp: 15 (07/06/25 0900)  BP: (!) 140/75 (07/06/25 0900)  SpO2: 100 % (07/06/25 0900) Vital Signs (24h Range):  Temp:  [97.8 °F (36.6 °C)-98.7 °F (37.1 °C)] 98.7 °F (37.1 °C)  Pulse:  [76-97] 81  Resp:  [9-22] 15  SpO2:  [81 %-100 %] 100 %  BP: ()/(57-92) 140/75   Weight: 106.1 kg (233 lb 14.5 oz)  Body mass index is 36.64 kg/m².      Intake/Output Summary (Last 24 hours) at 7/6/2025 1033  Last data filed at 7/6/2025 1020  Gross per 24 hour   Intake 1058.82 ml   Output 1950 ml   Net -891.18 ml          Physical Exam  Vitals reviewed.   Constitutional:       General: She is awake. She is not in acute distress.     Appearance: Normal appearance.   HENT:      Head: Normocephalic.      Mouth/Throat:      Mouth: Mucous membranes are moist.      Dentition: Abnormal dentition. Dental caries present.   Eyes:      General: No scleral icterus.     Pupils: Pupils are equal, round, and reactive to  light.   Cardiovascular:      Rate and Rhythm: Normal rate and regular rhythm.      Pulses: Normal pulses.   Pulmonary:      Effort: Pulmonary effort is normal.      Breath sounds: Normal breath sounds.   Abdominal:      General: Bowel sounds are normal.      Palpations: Abdomen is soft.      Tenderness: There is no abdominal tenderness.   Skin:     General: Skin is warm and dry.      Capillary Refill: Capillary refill takes 2 to 3 seconds.   Neurological:      Mental Status: She is alert and oriented to person, place, and time.      Sensory: Sensation is intact.   Psychiatric:         Behavior: Behavior is cooperative.            Vents:  Vent Mode: A/CMV-VC (07/03/25 1222)  Ventilator Initiated: Yes (06/30/25 1555)  Set Rate: 0 BPM (07/03/25 1222)  Vt Set: 0 mL (07/03/25 1222)  Pressure Support: 8 cmH20 (07/03/25 1222)  PEEP/CPAP: 5 cmH20 (07/03/25 1222)  Oxygen Concentration (%): 32 (07/06/25 0714)  Peak Airway Pressure: 15.6 cmH20 (07/03/25 1222)  Plateau Pressure: 15.4 cmH20 (07/03/25 1222)  Total Ve: 17.1 L/m (07/03/25 1222)  F/VT Ratio<105 (RSBI): (!) 46.33 (07/03/25 0600)  Lines/Drains/Airways       Drain  Duration                  Closed/Suction Drain 06/30/25 Tube - 1 Inferior;Right Back Accordion 10 Fr. 6 days         Closed/Suction Drain 06/30/25 Tube - 2 Inferior;Medial;Right Back 10 Fr. 6 days              Peripheral Intravenous Line  Duration             Peripheral IV 07/03/25 0115 18 G Anterior;Left Forearm 3 days    Peripheral IV 07/03/25 0115 20 G Anterior;Distal;Left Forearm 3 days                  Significant Labs:    CBC/Anemia Profile:  Recent Labs   Lab 07/05/25  0354 07/06/25  0245   WBC 11.28* 13.57*   HGB 9.5* 8.6*   HCT 30.1* 27.8*   * 615*   MCV 81.1 84.2   RDW 17.1* 17.2*        Chemistries:  Recent Labs   Lab 07/05/25  0354 07/06/25  0245   * 139   K 3.6 3.1*   CL 97* 94*   CO2 32* 31*   BUN 90* 84*   CREATININE 5.62* 5.51*   CALCIUM 8.0* 8.0*   MG 2.0  --        All  pertinent labs within the past 24 hours have been reviewed.    Significant Imaging:  I have reviewed all pertinent imaging results/findings within the past 24 hours.    ABG  Recent Labs   Lab 07/03/25  2245   PH 7.47*   PO2 69*   PCO2 36   HCO3 26.2     Assessment/Plan:     Neuro  Encephalopathy, metabolic  More awake & answering questions    Pulmonary  Acute hypoxic respiratory failure  Doing well extubated    Mild intermittent asthma without complication  Improving    Cardiac/Vascular  Essential (primary) hypertension  BP stable    Renal/  * MATT (acute kidney injury)  - good UOP  - S Cr 5.5 (down from 5.6)  - Continue lasix IV  - avoid nephrotoxic agents  - renally dose medications  - strict I&O  - daily BMP      Metabolic acidosis  Resolved    Hypokalemia  Supplement for goal K > 4.0  - daily BMP    ID  Abscess in epidural space of lumbar spine  Noted    MRSA (methicillin resistant Staphylococcus aureus) infection  Continue daptomycin  - CPK continues to improve  - check daily CPK    Infection of lumbar spine  - WBC 13 today  - daily CBC  - No fever, vital signs stable      Oncology  Anemia  Hemoglobin stable no need for transfusion    Endocrine  Hyponatremia  Resolved    Acquired hypothyroidism  Noted  - continue Levothyroxine    Other  Obstructive sleep apnea syndrome  - Bipap at night         ANANYA OLGUIN NP  Critical Care Medicine  [facility] - [facility]
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
## 2025-07-06 NOTE — ASSESSMENT & PLAN NOTE
- good UOP  - S Cr 5.5 (down from 5.6)  - Continue lasix IV  - avoid nephrotoxic agents  - renally dose medications  - strict I&O  - daily BMP

## 2025-07-07 LAB
ANION GAP SERPL CALCULATED.3IONS-SCNC: 16 MMOL/L (ref 7–16)
ANISOCYTOSIS BLD QL SMEAR: ABNORMAL
BASOPHILS # BLD AUTO: 0.02 K/UL (ref 0–0.2)
BASOPHILS NFR BLD AUTO: 0.2 % (ref 0–1)
BUN SERPL-MCNC: 76 MG/DL (ref 7–19)
BUN/CREAT SERPL: 16 (ref 6–20)
CALCIUM SERPL-MCNC: 8 MG/DL (ref 8.4–10.2)
CHLORIDE SERPL-SCNC: 96 MMOL/L (ref 98–107)
CK SERPL-CCNC: 78 U/L (ref 29–168)
CO2 SERPL-SCNC: 31 MMOL/L (ref 22–29)
CREAT SERPL-MCNC: 4.75 MG/DL (ref 0.55–1.02)
DIFFERENTIAL METHOD BLD: ABNORMAL
EGFR (NO RACE VARIABLE) (RUSH/TITUS): 11 ML/MIN/1.73M2
EOSINOPHIL # BLD AUTO: 1.07 K/UL (ref 0–0.5)
EOSINOPHIL NFR BLD AUTO: 8.6 % (ref 1–4)
EOSINOPHIL NFR BLD MANUAL: 9 % (ref 1–4)
ERYTHROCYTE [DISTWIDTH] IN BLOOD BY AUTOMATED COUNT: 16.9 % (ref 11.5–14.5)
GLUCOSE SERPL-MCNC: 115 MG/DL (ref 70–105)
GLUCOSE SERPL-MCNC: 137 MG/DL (ref 70–105)
GLUCOSE SERPL-MCNC: 147 MG/DL (ref 74–100)
GLUCOSE SERPL-MCNC: 190 MG/DL (ref 70–105)
GLUCOSE SERPL-MCNC: 219 MG/DL (ref 70–105)
HCT VFR BLD AUTO: 28.9 % (ref 38–47)
HGB BLD-MCNC: 8.6 G/DL (ref 12–16)
HYPOCHROMIA BLD QL SMEAR: ABNORMAL
IMM GRANULOCYTES # BLD AUTO: 0.6 K/UL (ref 0–0.04)
IMM GRANULOCYTES NFR BLD: 4.9 % (ref 0–0.4)
LYMPHOCYTES # BLD AUTO: 2.66 K/UL (ref 1–4.8)
LYMPHOCYTES NFR BLD AUTO: 21.5 % (ref 27–41)
LYMPHOCYTES NFR BLD MANUAL: 14 % (ref 27–41)
MCH RBC QN AUTO: 25.9 PG (ref 27–31)
MCHC RBC AUTO-ENTMCNC: 29.8 G/DL (ref 32–36)
MCV RBC AUTO: 87 FL (ref 80–96)
MONOCYTES # BLD AUTO: 0.99 K/UL (ref 0–0.8)
MONOCYTES NFR BLD AUTO: 8 % (ref 2–6)
MONOCYTES NFR BLD MANUAL: 7 % (ref 2–6)
MPC BLD CALC-MCNC: 10.1 FL (ref 9.4–12.4)
NEUTROPHILS # BLD AUTO: 7.03 K/UL (ref 1.8–7.7)
NEUTROPHILS NFR BLD AUTO: 56.8 % (ref 53–65)
NEUTS BAND NFR BLD MANUAL: 7 % (ref 1–5)
NEUTS SEG NFR BLD MANUAL: 63 % (ref 50–62)
NRBC # BLD AUTO: 0.08 X10E3/UL
NRBC, AUTO (.00): 0.6 %
OVALOCYTES BLD QL SMEAR: ABNORMAL
PHOSPHATE SERPL-MCNC: 3.5 MG/DL (ref 2.3–4.7)
PLATELET # BLD AUTO: 624 K/UL (ref 150–400)
PLATELET MORPHOLOGY: ABNORMAL
POLYCHROMASIA BLD QL SMEAR: ABNORMAL
POTASSIUM SERPL-SCNC: 3.1 MMOL/L (ref 3.5–5.1)
RBC # BLD AUTO: 3.32 M/UL (ref 4.2–5.4)
SODIUM SERPL-SCNC: 140 MMOL/L (ref 136–145)
TARGETS BLD QL SMEAR: ABNORMAL
WBC # BLD AUTO: 12.37 K/UL (ref 4.5–11)

## 2025-07-07 PROCEDURE — 25000003 PHARM REV CODE 250

## 2025-07-07 PROCEDURE — 82962 GLUCOSE BLOOD TEST: CPT

## 2025-07-07 PROCEDURE — 27000207 HC ISOLATION

## 2025-07-07 PROCEDURE — 99233 SBSQ HOSP IP/OBS HIGH 50: CPT | Mod: ,,,

## 2025-07-07 PROCEDURE — 99900035 HC TECH TIME PER 15 MIN (STAT)

## 2025-07-07 PROCEDURE — 63600175 PHARM REV CODE 636 W HCPCS: Performed by: ORTHOPAEDIC SURGERY

## 2025-07-07 PROCEDURE — 36415 COLL VENOUS BLD VENIPUNCTURE: CPT

## 2025-07-07 PROCEDURE — 80048 BASIC METABOLIC PNL TOTAL CA: CPT | Performed by: ORTHOPAEDIC SURGERY

## 2025-07-07 PROCEDURE — 82550 ASSAY OF CK (CPK): CPT | Performed by: ORTHOPAEDIC SURGERY

## 2025-07-07 PROCEDURE — 63600175 PHARM REV CODE 636 W HCPCS: Performed by: NURSE PRACTITIONER

## 2025-07-07 PROCEDURE — 97110 THERAPEUTIC EXERCISES: CPT

## 2025-07-07 PROCEDURE — 85025 COMPLETE CBC W/AUTO DIFF WBC: CPT | Performed by: ORTHOPAEDIC SURGERY

## 2025-07-07 PROCEDURE — 25000003 PHARM REV CODE 250: Performed by: ORTHOPAEDIC SURGERY

## 2025-07-07 PROCEDURE — 11000001 HC ACUTE MED/SURG PRIVATE ROOM

## 2025-07-07 PROCEDURE — 36415 COLL VENOUS BLD VENIPUNCTURE: CPT | Performed by: ORTHOPAEDIC SURGERY

## 2025-07-07 PROCEDURE — 25000003 PHARM REV CODE 250: Performed by: INTERNAL MEDICINE

## 2025-07-07 PROCEDURE — 97116 GAIT TRAINING THERAPY: CPT

## 2025-07-07 PROCEDURE — 84100 ASSAY OF PHOSPHORUS: CPT

## 2025-07-07 RX ORDER — FUROSEMIDE 40 MG/1
80 TABLET ORAL 2 TIMES DAILY
Status: DISCONTINUED | OUTPATIENT
Start: 2025-07-07 | End: 2025-07-11

## 2025-07-07 RX ADMIN — DAPTOMYCIN 610 MG: 500 INJECTION, POWDER, LYOPHILIZED, FOR SOLUTION INTRAVENOUS at 12:07

## 2025-07-07 RX ADMIN — PANTOPRAZOLE SODIUM 40 MG: 40 INJECTION, POWDER, FOR SOLUTION INTRAVENOUS at 08:07

## 2025-07-07 RX ADMIN — HEPARIN SODIUM 5000 UNITS: 5000 INJECTION, SOLUTION INTRAVENOUS; SUBCUTANEOUS at 05:07

## 2025-07-07 RX ADMIN — METOPROLOL TARTRATE 50 MG: 50 TABLET, FILM COATED ORAL at 08:07

## 2025-07-07 RX ADMIN — METOPROLOL TARTRATE 50 MG: 50 TABLET, FILM COATED ORAL at 09:07

## 2025-07-07 RX ADMIN — HEPARIN SODIUM 5000 UNITS: 5000 INJECTION, SOLUTION INTRAVENOUS; SUBCUTANEOUS at 01:07

## 2025-07-07 RX ADMIN — INSULIN ASPART 5 UNITS: 100 INJECTION, SOLUTION INTRAVENOUS; SUBCUTANEOUS at 01:07

## 2025-07-07 RX ADMIN — DIPHENHYDRAMINE HYDROCHLORIDE 50 MG: 25 CAPSULE ORAL at 11:07

## 2025-07-07 RX ADMIN — HEPARIN SODIUM 5000 UNITS: 5000 INJECTION, SOLUTION INTRAVENOUS; SUBCUTANEOUS at 09:07

## 2025-07-07 RX ADMIN — INSULIN ASPART 5 UNITS: 100 INJECTION, SOLUTION INTRAVENOUS; SUBCUTANEOUS at 08:07

## 2025-07-07 RX ADMIN — MICONAZOLE NITRATE ANTIFUNGAL POWDER: 2 POWDER TOPICAL at 09:07

## 2025-07-07 RX ADMIN — INSULIN ASPART 4 UNITS: 100 INJECTION, SOLUTION INTRAVENOUS; SUBCUTANEOUS at 01:07

## 2025-07-07 RX ADMIN — LEVOTHYROXINE SODIUM 200 MCG: 100 TABLET ORAL at 05:07

## 2025-07-07 RX ADMIN — INSULIN GLARGINE 20 UNITS: 100 INJECTION, SOLUTION SUBCUTANEOUS at 09:07

## 2025-07-07 RX ADMIN — HYDROCODONE BITARTRATE AND ACETAMINOPHEN 1 TABLET: 5; 325 TABLET ORAL at 05:07

## 2025-07-07 RX ADMIN — INSULIN ASPART 5 UNITS: 100 INJECTION, SOLUTION INTRAVENOUS; SUBCUTANEOUS at 04:07

## 2025-07-07 RX ADMIN — AMIODARONE HYDROCHLORIDE 200 MG: 200 TABLET ORAL at 08:07

## 2025-07-07 RX ADMIN — FUROSEMIDE 80 MG: 40 TABLET ORAL at 04:07

## 2025-07-07 NOTE — SUBJECTIVE & OBJECTIVE
Interval History:     Review of Systems   All other systems reviewed and are negative.    Objective:     Vital Signs (Most Recent):  Temp: 98 °F (36.7 °C) (07/07/25 1223)  Pulse: 86 (07/07/25 1223)  Resp: 18 (07/07/25 0404)  BP: 105/65 (07/07/25 1223)  SpO2: (!) 94 % (07/07/25 1223) Vital Signs (24h Range):  Temp:  [97.6 °F (36.4 °C)-98.5 °F (36.9 °C)] 98 °F (36.7 °C)  Pulse:  [76-95] 86  Resp:  [16-20] 18  SpO2:  [90 %-96 %] 94 %  BP: (105-156)/(58-77) 105/65     Weight: 106.1 kg (233 lb 14.5 oz)  Body mass index is 36.64 kg/m².    Intake/Output Summary (Last 24 hours) at 7/7/2025 1559  Last data filed at 7/6/2025 1823  Gross per 24 hour   Intake 240 ml   Output 4 ml   Net 236 ml         Physical Exam  Vitals and nursing note reviewed.   Constitutional:       Appearance: She is ill-appearing.   HENT:      Head: Normocephalic.      Mouth/Throat:      Mouth: Mucous membranes are moist.   Eyes:      Pupils: Pupils are equal, round, and reactive to light.   Neck:      Vascular: No carotid bruit.   Cardiovascular:      Rate and Rhythm: Normal rate and regular rhythm.   Pulmonary:      Effort: Pulmonary effort is normal.   Abdominal:      General: Abdomen is flat. Bowel sounds are normal. There is no distension.      Palpations: Abdomen is soft.   Musculoskeletal:      Right lower leg: Edema present.      Left lower leg: Edema present.   Skin:     General: Skin is warm and dry.   Neurological:      General: No focal deficit present.      Mental Status: She is alert. Mental status is at baseline.   Psychiatric:         Mood and Affect: Mood normal.               Significant Labs: All pertinent labs within the past 24 hours have been reviewed.  BMP:   Recent Labs   Lab 07/07/25  0440   *      K 3.1*   CL 96*   CO2 31*   BUN 76*   CREATININE 4.75*   CALCIUM 8.0*     CBC:   Recent Labs   Lab 07/06/25  0245 07/07/25  0441   WBC 13.57* 12.37*   HGB 8.6* 8.6*   HCT 27.8* 28.9*   * 624*     CMP:   Recent Labs    Lab 07/06/25  0245 07/07/25  0440    140   K 3.1* 3.1*   CL 94* 96*   CO2 31* 31*   * 147*   BUN 84* 76*   CREATININE 5.51* 4.75*   CALCIUM 8.0* 8.0*   ANIONGAP 17* 16       Significant Imaging: I have reviewed all pertinent imaging results/findings within the past 24 hours.

## 2025-07-07 NOTE — ASSESSMENT & PLAN NOTE
Patient's most recent potassium results are listed below.   Recent Labs     07/05/25  0354 07/06/25  0245 07/07/25  0440   K 3.6 3.1* 3.1*     Plan  - Replete potassium per protocol  - Monitor potassium Daily  - Patient's hypokalemia is worsening. Will adjust treatment as follows:    - 20 mEq Effer K  Stable

## 2025-07-07 NOTE — ASSESSMENT & PLAN NOTE
Hyponatremia is likely due to Dehydration/hypovolemia and renal insufficiency. The patient's most recent sodium results are listed below.  Recent Labs     07/05/25  0354 07/06/25  0245 07/07/25  0440   * 139 140     Plan  - Correct the sodium by 4-6mEq in 24 hours.   - Obtain the following studies: Urine sodium, urine osmolality, serum osmolality.  - Will treat the hyponatremia with IV fluids as follows: continuous .9 normal saline IV infusion  - Monitor sodium Daily.   - Patient hyponatremia is stable  Resolved

## 2025-07-07 NOTE — PROGRESS NOTES
Office: [phone]    Subjective:   Stepped down from ICU to the floor.  Creatinine continues to come down.  Cultures no growth.  Drains discontinued.    I/O as of 7:32 AM:   Intake/Output - Last 3 Shifts         07/05 0700 07/06 0659 07/06 0700 07/07 0659 07/07 0700 07/08 0659    P.O. 480 600     I.V. (mL/kg)       IV Piggyback 96 482.8     Total Intake(mL/kg) 576 (5.4) 1082.8 (10.2)     Urine (mL/kg/hr) 2370 (0.9) 203 (0.1)     Emesis/NG output  1     Drains 10 0     Total Output 2380 204     Net -1804 +878.8            Unmeasured Urine Occurrence  3 x              Vitals / Physical Exam:  Vitals:    07/07/25 0022 07/07/25 0022 07/07/25 0404 07/07/25 0722   BP: 118/61  130/64 126/63   BP Location:       Patient Position:       Pulse: 79 78 76 76   Resp: 20  18    Temp: 97.6 °F (36.4 °C)  97.9 °F (36.6 °C) 98.3 °F (36.8 °C)   TempSrc: Oral  Oral Oral   SpO2:  (!) 90% (!) 92% 96%   Weight:       Height:            AOx3   NAD  Dressing CDI      Motor  C5 C6 C7 C8 T1      Left  5 5 5 5 5      Right  5 5 5 5 5   Sensory           Left  + + + + +     Right + + + + +     Motor L2 L3 L4 L5 S1   Left 5 5 5 5 5   Right 5 5 5 5 5   Sensory        Left  + + + + +   Right + + + + +         Assessment / Plan:   Carey LANGLEY Malissa is a 49 y.o. female now 7 Days Post-Op  s/p Procedure(s) (LRB):  INCISION AND DRAINAGE, ABSCESS, SPINE, LUMBOSACRAL, POSTERIOR (N/A).    - medical comanagement per hospitalist  - Progressing postoperatively  - Please keep dressing clean, dry and intact; change as needed  - Please continue Diet Consistent Carbohydrate 2000 Calories (up to 75 gm per meal); Isolation Tray - Regular [place] diet   - Pain at this time is well controlled; continue current pain regiment  - TEDs/SCDs  - PT  - Bowel regimen  - Please call should you have any questions regarding this patient's care      Signature:  Cyril Upton MD     Electronic Signature

## 2025-07-07 NOTE — NURSING
[facility] PT notified me about a rash or welts coming up on patient's hands and left forearm.  The patient was scratching and saying she was itching.      Reported to Dr. Damon via secure.

## 2025-07-07 NOTE — PROGRESS NOTES
Patient is feeling better. She is answering questions appropriately and verbalizing well. She denies shortness of breath. Review of systems G.I. - she has an appetite and ate some this morning without nausea or vomiting. -the patient states she is urinating well.    Physical exam general the patient in no acute distress, she has no pitting edema.    Assessment/plan 1. Acute kidney injury - this patients creatinine is slowly improving. Her creatinine is 4.6 today down from around five yesterday. Continue to monitor. She is getting lasix 100 mg daily  Two. Vancomycin toxicity.  Three. Respiratory failure-patient was extubated the previous few days and is doing well presently .  Four. DM  Five. HD - continue present antihypertensives.  Six. Spinal abscess-continue antibiotics.

## 2025-07-07 NOTE — PT/OT/SLP PROGRESS
Occupational Therapy   Treatment    Name: Carey Leonardo  MRN: [id number]  Admitting Diagnosis:  MATT (acute kidney injury)  7 Days Post-Op    Recommendations:     Discharge Recommendations:    Discharge Equipment Recommendations:  to be determined by next level of care  Barriers to discharge:  None    Assessment:     Carey Leonardo is a 49 y.o. female with a medical diagnosis of MATT (acute kidney injury).  She presents with no complaints. Pt agreed to OT treatment.Performance deficits affecting function are weakness, impaired endurance.     Rehab Prognosis:  Good; patient would benefit from acute skilled OT services to address these deficits and reach maximum level of function.       Plan:     Patient to be seen 5 x/week to address the above listed problems via self-care/home management, therapeutic activities, therapeutic exercises  Plan of Care Expires: 08/09/25  Plan of Care Reviewed with: patient    Subjective     Chief Complaint: MATT  Patient/Family Comments/goals: Pt agreed to tx  Pain/Comfort:  Pain Rating 1: 0/10  Pain Rating Post-Intervention 1: 0/10    Objective:     Communicated with: Aditi Charles prior to session.  Patient found up in chair with peripheral IV, pulse ox (continuous) upon OT entry to room.    General Precautions: Standard, fall, contact    Orthopedic Precautions:spinal precautions  Braces: N/A  Respiratory Status: Room air     Occupational Performance:     Bed Mobility:         Functional Mobility/Transfers:    Functional Mobility:     Activities of Daily Living:  Grooming: independence washing hands prior to meal      [facility] 6 Click ADL:      Treatment & Education:  Pt agreed to OT treatment.  1 lb hand wt 2 x sets of 15 reps with CGA  (B) shoulder flexion/extension and adduction/abduction  2 lb hand wt x 2 sets of 15 reps with CGA and cueing  (B) elbow flexion/extension  (B) forearm supination/pronation  (B) wrist flexion/extension  Hand exerciser x 2 sets of 25 reps x 3 bands   Red  theraband x 2 sets of 15 reps   (B) elbow flexion and extension.      Pt participated well with exercises with rest breaks provided as needed    Patient left up in chair with all lines intact and call button in reach    GOALS:   Multidisciplinary Problems       Occupational Therapy Goals          Problem: Occupational Therapy    Goal Priority Disciplines Outcome Interventions   Occupational Therapy Goal     OT, PT/OT Progressing    Description: STG:  Pt will perform grooming with setup  Pt will bathe with min a with AD as needed  Pt will perform UE dressing with (I)  Pt will perform LE dressing with Min a with AD as needed  Pt will sit EOB x 15 min with (I)   Pt will transfer bed/chair/bsc with Min a with RW  Pt will perform standing x 1 min with CGA assistance  Pt will tolerate 20 minutes of tx without fatigue      LT.Restore to max I with self care and mobility.                          DME Justifications:      Time Tracking:     OT Date of Treatment: 25  OT Start Time: 1143  OT Stop Time: 1214  OT Total Time (min): 31 min    Billable Minutes:Therapeutic Exercise 27    OT/CIPRIANO: OT          2025

## 2025-07-07 NOTE — ASSESSMENT & PLAN NOTE
Patient's blood pressure range in the last 24 hours was: BP  Min: 105/65  Max: 156/77. Patient requires and takes all medications due to treatment resistant Hypertension that was initiated by Dr. Hurst. The patient's inpatient anti-hypertensive regimen is listed below:  Current Antihypertensives  cloNIDine tablet 0.1 mg, Every 6 hours PRN, Oral  metoprolol tartrate (LOPRESSOR) tablet 50 mg, 2 times daily, Oral  metoprolol injection 5 mg, Every 6 hours PRN, Intravenous  furosemide tablet 80 mg, 2 times daily, Oral    Plan  - BP is controlled, no changes needed to their regimen  - follow

## 2025-07-07 NOTE — ASSESSMENT & PLAN NOTE
MATT is likely due to ATN from vancomycin Baseline creatinine is .65. Most recent creatinine and eGFR are listed below.  Recent Labs     07/05/25  0354 07/06/25  0245 07/07/25  0440   CREATININE 5.62* 5.51* 4.75*   EGFRNORACEVR 9* 9* 11*     Given 1,848 ml bolus of normal saline in the ED     Plan  - MATT is worsening. Will adjust treatment as follows: continuous infusion of .9 Normal Saline  - Avoid nephrotoxins and renally dose meds for GFR listed above  - Monitor urine output, serial BMP, and adjust therapy as needed  6/27 continue IV fluids  6/28 consult nephrology, worse  6/29 now developing respiratory failure with pulmonary edema.

## 2025-07-07 NOTE — PT/OT/SLP PROGRESS
Physical Therapy Treatment    Patient Name:  Carey Leonardo   MRN:  [id number]    Recommendations:     Discharge Recommendations: Moderate Intensity Therapy  Discharge Equipment Recommendations: to be determined by next level of care  Barriers to discharge: None    Assessment:     Carey Leonardo is a 49 y.o. female admitted with a medical diagnosis of MATT (acute kidney injury).  She presents with the following impairments/functional limitations: weakness, impaired self care skills, impaired functional mobility, decreased lower extremity function, pain, impaired cardiopulmonary response to activity, orthopedic precautions. Patient was initially difficult to arouse from sleep. Patient appeared confused and had difficulty following commands. Patient is self-aware of cognitive issues. Patient was able to perform activities and presents with functional mobility. Patient was also concerned with what appeared to be hives on BUE possibly caused by shower soap. Pt would benefit from continued rehab prior to d/c home.      Rehab Prognosis: Good; patient would benefit from acute skilled PT services to address these deficits and reach maximum level of function.    Recent Surgery: Procedure(s) (LRB):  INCISION AND DRAINAGE, ABSCESS, SPINE, LUMBOSACRAL, POSTERIOR (N/A) 7 Days Post-Op    Plan:     During this hospitalization, patient to be seen 5 x/week to address the identified rehab impairments via gait training, therapeutic activities, therapeutic exercises and progress toward the following goals:    Plan of Care Expires:  08/04/25    Subjective     Chief Complaint: confusion and itchy skin   Patient/Family Comments/goals: Patient reports itchy skin on the arms. Patient also reports being too tired to stand.   Pain/Comfort:  Pain Rating 1: 0/10  Pain Addressed 1: Cessation of Activity      Objective:     Communicated with nurse prior to session.  Patient found HOB elevated with peripheral IV, pulse ox (continuous) upon PT entry  to room.     General Precautions: Standard, fall, contact  Orthopedic Precautions: spinal precautions  Braces: N/A  Respiratory Status: Room air     Functional Mobility:  Bed Mobility:     Supine to Sit: minimum assistance due to pt difficulty to arouse from sleep   Transfers:     Sit to Stand:  contact guard assistance with no AD  Toilet transfer: CGA to SBA with grab bars   Gait: Patient able to ambulate 15-20ft x 2 trials to toilet, 10-15ft from EOB to recliner with CGA, refusal to use RW.      AM-PAC 6 CLICK MOBILITY  Turning over in bed (including adjusting bedclothes, sheets and blankets)?: 4  Sitting down on and standing up from a chair with arms (e.g., wheelchair, bedside commode, etc.): 4  Moving from lying on back to sitting on the side of the bed?: 3  Moving to and from a bed to a chair (including a wheelchair)?: 4  Need to walk in hospital room?: 4  Climbing 3-5 steps with a railing?: 3  Basic Mobility Total Score: 22       Treatment & Education:  Tx plan   Therex: BLE -seated LAQ (20), seated marches (20), heel raises (12)   Gait training with cga no AD    Patient left up in chair with call button in reach and nurse present..    GOALS:   Multidisciplinary Problems       Physical Therapy Goals          Problem: Physical Therapy    Goal Priority Disciplines Outcome Interventions   Physical Therapy Goal     PT, PT/OT Progressing    Description: Short term goals:  1. Supine to sit with MInimal Assistance  2. Sit to stand transfer with Minimal Assistance  3. Bed to chair transfer with Minimal Assistance using Rolling Walker  4. Gait  x 50 feet with Minimal Assistance using Rolling Walker.     Long term goals:  1. Supine to sit with Contact Guard Assistance  2. Sit to stand transfer with Contact Guard Assistance  3. Bed to chair transfer with Contact Guard Assistance using Rolling Walker  4. Gait  x 100 feet with Contact Guard Assistance using Rolling Walker.                          DME Justifications:  No  DME recommended requiring DME justifications    Time Tracking:     PT Received On: 07/07/25  PT Start Time: 1032     PT Stop Time: 1104  PT Total Time (min): 32 min     Billable Minutes: Gait Training 12 and Therapeutic Exercise 15    Treatment Type: Treatment  PT/PTA: PT     Number of PTA visits since last PT visit: 0     07/07/2025
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
## 2025-07-07 NOTE — ASSESSMENT & PLAN NOTE
Patient with Hypoxic Respiratory failure which is Acute.  she is not on home oxygen. Supplemental oxygen was provided and noted-      .   Signs/symptoms of respiratory failure include- increased work of breathing and lethargy. Contributing diagnoses includes - pulmonary edema Labs and images were reviewed. Patient Has recent ABG, which has been reviewed. Will treat underlying causes and adjust management of respiratory failure as follows- may need dialysis nephrology following

## 2025-07-07 NOTE — ASSESSMENT & PLAN NOTE
Anemia is likely due to infection . Most recent hemoglobin and hematocrit are listed below.  Recent Labs     07/05/25  0354 07/06/25  0245 07/07/25  0441   HGB 9.5* 8.6* 8.6*   HCT 30.1* 27.8* 28.9*     Plan  - Monitor serial CBC: Daily  - Transfuse PRBC if patient becomes hemodynamically unstable, symptomatic or H/H drops below 7/21.  - Patient has not received any PRBC transfusions to date  - Patient's anemia is currently stable  - Monitor CBC if H/H continue to drop consider transfusion.  Follow

## 2025-07-07 NOTE — PROGRESS NOTES
Infectious Disease [facility] E-consult Follow-up - x   Patient Name: Carey Leonardo    Attending Physician: Hayley Damon MD   Primary Care Physician: Zainab Harrell FNP     Consultation Information  This patient recommendation is based on a telemedicine consult request which was completed asynchronously through chart review and information provided by the primary physician. The patient was not seen or examined today. The evaluation is consultative in nature and all patient care and treatment decisions can either be accepted or rejected by the patient's primary hospital-based treating physician using their own independent medical judgment for their patient.    Consultant Contact Information: Please call [facility] Call Center [phone]      Assessment & Plan      ID related problem list  MRSA lumbar spinal hardware infection  MATT,   relevant drug allergies: penicillins (has tolerated cefazolin prior admissions)  Eosinophilia  Elevated CK    Impression: 49 year old patient,  s/p laminectomy and fusion c/b early postoperative infection w/ MRSA (superficial per OP note), on treatment with vancomycin, planned for 6 week course, readmitted with fevers and AK within 1.5 weeks of starting vanco therapy   Patient with persistent fevers, intially started on daptomycin, however due to persistent fevers cefepime and meropenem were added.        Hospital course c/b respiratory failure requiring intubation in the setting of volume overload due to worsening kidney function, now appears to be imrpoving and she has good urine output. She was extubated on 7/3.   She did have Increasing -> 1639, but now has resolved based off of today's reading of 78.      Blood cultures from 6/26, and 6/30 are , NGTD. TTE on 6/30 without vegetations. MRI spine with nonspecific prominent fluid collection extended throughout  operative bed and surrounding hardware, but not extending to spinal canal S/p I&D, in OR noted to have no fluid in  superficial space, fascia was opened and fluid was noted in deep space, cultures were taken. Sharp excisional debridement performed down to the bone. Cultures are  NGTD, plan for prolonged incubation. She had persistent post-operative fevers while on dapto + cefepime/meropenem w/ a substantial eosinophilia. This was indicative of a drug fever     Despite overall improvement, and broad spectrum she continues to have fevers, as well as eosinophilia. Since stopping meropenem on 7/3, fevers have resolved. She has had multiple Bcx in the interim period while febrile that have been negative.     Strongly suspect drug fever to cephalosporin and carbapenem, which is rare but does have cross-reactivity. Fortunately, we do not have any current pathogens that need treatment w/ beta-lactams based off of the current culture data. We will need to treat w/ 6 weeks of daptomycin renally dosed to finish prior 6 week course (EOT 7/24/25) from 6/12/25 posterior lumbar I&D.     - continue daptomycin (610mg)  8mg/kg iv q48hr administered through PICC  - will need weekly labs: cbc w/ diff and cmp and CPK  - EOT for abx: 7/24/25  - Will need suppression for 1 year via bactrim given concern for HW infection w/o removal as MRSA was resistant to tetracycline. This should be done after completing IV abx course, but the dose will be dependent on her renal function at that time. Please call [facility] call center and ask for the [facility] ID physician to verify dose   - ID to sign off; [facility] will not be responsible for any orders as an outpatient, but we will accept calls re: clinical questions    Chuy Jordan MD  Associate Prof of Medicine  [facility]   Call Center: [phone]                                 Subjective   Interval History: x         Objective   Vitals: T:98.3 °F (36.8 °C) (Axillary),  BP:126/63, HR:76, RR:18, SaO2:96%,FiO2-O2 (L/m): , Dosing Wt:  Wt Readings from Last 1 Encounters:   06/29/25 106.1 kg (233 lb 14.5 oz)   ,  "BMI:Body mass index is 36.64 kg/m².    Physical Exam: Patient not seen or examined.    Results Review    Labs:      CBC  Recent Labs     07/05/25  0354 07/06/25  0245 07/07/25  0441   WBC 11.28* 13.57* 12.37*   HGB 9.5* 8.6* 8.6*   HCT 30.1* 27.8* 28.9*   * 615* 624*     Renal function  Recent Labs     07/05/25  0354 07/06/25  0245 07/07/25  0440   * 139 140   K 3.6 3.1* 3.1*   CREATININE 5.62* 5.51* 4.75*     Liver function  No results for input(s): "AST", "ALT", "BILITOT", "BILIDIR" in the last 72 hours.  Coagulation  No results for input(s): "PT", "INR", "APTT" in the last 72 hours.   Procalcitonin  No results for input(s): "PROCALCIT" in the last 72 hours.    Microbiology:      Susceptibility data from last 90 days.  Collected Specimen Info Organism Amox/ K Clav Amp/Sulbactam Ampicillin Azithromycin Cefazolin Ciprofloxacin Clindamycin Daptomycin Erythromycin Gentamicin LEVOFLOXACIN ETEST Linezolid Oxacillin Penicillin Rifampin   06/12/25 Wound from Back, Lower Methicillin resistant Staphylococcus aureus  R  R  R  R  R  S  R  S  R  S  S  S  R  R  S   06/12/25 Wound from Back, Lower Methicillin resistant Staphylococcus aureus  R  R  R  R  R  S  R  S  R  S  S  S  R  R  S     Collected Specimen Info Organism Tetracycline Trimeth/Sulfa Vancomycin   06/12/25 Wound from Back, Lower Methicillin resistant Staphylococcus aureus  R  S  S   06/12/25 Wound from Back, Lower Methicillin resistant Staphylococcus aureus  R  S  S       Imaging:     No results found.     "

## 2025-07-08 LAB
ANION GAP SERPL CALCULATED.3IONS-SCNC: 15 MMOL/L (ref 7–16)
ANISOCYTOSIS BLD QL SMEAR: ABNORMAL
BASOPHILS # BLD AUTO: 0.04 K/UL (ref 0–0.2)
BASOPHILS NFR BLD AUTO: 0.3 % (ref 0–1)
BUN SERPL-MCNC: 66 MG/DL (ref 7–19)
BUN/CREAT SERPL: 14 (ref 6–20)
CALCIUM SERPL-MCNC: 8.1 MG/DL (ref 8.4–10.2)
CHLORIDE SERPL-SCNC: 95 MMOL/L (ref 98–107)
CO2 SERPL-SCNC: 30 MMOL/L (ref 22–29)
CREAT SERPL-MCNC: 4.73 MG/DL (ref 0.55–1.02)
DIFFERENTIAL METHOD BLD: ABNORMAL
EGFR (NO RACE VARIABLE) (RUSH/TITUS): 11 ML/MIN/1.73M2
EOSINOPHIL # BLD AUTO: 1.06 K/UL (ref 0–0.5)
EOSINOPHIL NFR BLD AUTO: 7.3 % (ref 1–4)
EOSINOPHIL NFR BLD MANUAL: 7 % (ref 1–4)
ERYTHROCYTE [DISTWIDTH] IN BLOOD BY AUTOMATED COUNT: 17.1 % (ref 11.5–14.5)
GLUCOSE SERPL-MCNC: 109 MG/DL (ref 70–105)
GLUCOSE SERPL-MCNC: 115 MG/DL (ref 70–105)
GLUCOSE SERPL-MCNC: 119 MG/DL (ref 70–105)
GLUCOSE SERPL-MCNC: 139 MG/DL (ref 74–100)
GLUCOSE SERPL-MCNC: 260 MG/DL (ref 70–105)
HCT VFR BLD AUTO: 29.5 % (ref 38–47)
HGB BLD-MCNC: 8.8 G/DL (ref 12–16)
IMM GRANULOCYTES # BLD AUTO: 0.69 K/UL (ref 0–0.04)
IMM GRANULOCYTES NFR BLD: 4.7 % (ref 0–0.4)
LYMPHOCYTES # BLD AUTO: 3.8 K/UL (ref 1–4.8)
LYMPHOCYTES NFR BLD AUTO: 26 % (ref 27–41)
LYMPHOCYTES NFR BLD MANUAL: 20 % (ref 27–41)
MCH RBC QN AUTO: 26 PG (ref 27–31)
MCHC RBC AUTO-ENTMCNC: 29.8 G/DL (ref 32–36)
MCV RBC AUTO: 87.3 FL (ref 80–96)
METAMYELOCYTES NFR BLD MANUAL: 1 %
MONOCYTES # BLD AUTO: 1.18 K/UL (ref 0–0.8)
MONOCYTES NFR BLD AUTO: 8.1 % (ref 2–6)
MONOCYTES NFR BLD MANUAL: 7 % (ref 2–6)
MPC BLD CALC-MCNC: 10.4 FL (ref 9.4–12.4)
NEUTROPHILS # BLD AUTO: 7.82 K/UL (ref 1.8–7.7)
NEUTROPHILS NFR BLD AUTO: 53.6 % (ref 53–65)
NEUTS BAND NFR BLD MANUAL: 11 % (ref 1–5)
NEUTS SEG NFR BLD MANUAL: 54 % (ref 50–62)
NRBC # BLD AUTO: 0.08 X10E3/UL
NRBC, AUTO (.00): 0.5 %
PLATELET # BLD AUTO: 692 K/UL (ref 150–400)
PLATELET MORPHOLOGY: NORMAL
POLYCHROMASIA BLD QL SMEAR: ABNORMAL
POTASSIUM SERPL-SCNC: 3.2 MMOL/L (ref 3.5–5.1)
RBC # BLD AUTO: 3.38 M/UL (ref 4.2–5.4)
SODIUM SERPL-SCNC: 137 MMOL/L (ref 136–145)
SPHEROCYTES BLD QL SMEAR: ABNORMAL
TARGETS BLD QL SMEAR: ABNORMAL
WBC # BLD AUTO: 14.59 K/UL (ref 4.5–11)

## 2025-07-08 PROCEDURE — 25000003 PHARM REV CODE 250

## 2025-07-08 PROCEDURE — 36415 COLL VENOUS BLD VENIPUNCTURE: CPT | Performed by: ORTHOPAEDIC SURGERY

## 2025-07-08 PROCEDURE — 85025 COMPLETE CBC W/AUTO DIFF WBC: CPT | Performed by: ORTHOPAEDIC SURGERY

## 2025-07-08 PROCEDURE — 99900035 HC TECH TIME PER 15 MIN (STAT)

## 2025-07-08 PROCEDURE — 82962 GLUCOSE BLOOD TEST: CPT

## 2025-07-08 PROCEDURE — 25000003 PHARM REV CODE 250: Performed by: INTERNAL MEDICINE

## 2025-07-08 PROCEDURE — 63600175 PHARM REV CODE 636 W HCPCS: Performed by: ORTHOPAEDIC SURGERY

## 2025-07-08 PROCEDURE — 80048 BASIC METABOLIC PNL TOTAL CA: CPT | Performed by: ORTHOPAEDIC SURGERY

## 2025-07-08 PROCEDURE — 97110 THERAPEUTIC EXERCISES: CPT | Mod: CQ

## 2025-07-08 PROCEDURE — 97110 THERAPEUTIC EXERCISES: CPT

## 2025-07-08 PROCEDURE — 25000003 PHARM REV CODE 250: Performed by: ORTHOPAEDIC SURGERY

## 2025-07-08 PROCEDURE — 27000207 HC ISOLATION

## 2025-07-08 PROCEDURE — 97116 GAIT TRAINING THERAPY: CPT | Mod: CQ

## 2025-07-08 PROCEDURE — 99233 SBSQ HOSP IP/OBS HIGH 50: CPT | Mod: ,,,

## 2025-07-08 PROCEDURE — 11000001 HC ACUTE MED/SURG PRIVATE ROOM

## 2025-07-08 PROCEDURE — 02HV33Z INSERTION OF INFUSION DEVICE INTO SUPERIOR VENA CAVA, PERCUTANEOUS APPROACH: ICD-10-PCS | Performed by: RADIOLOGY

## 2025-07-08 RX ORDER — ALPRAZOLAM 0.25 MG/1
0.25 TABLET ORAL 2 TIMES DAILY PRN
Status: DISCONTINUED | OUTPATIENT
Start: 2025-07-08 | End: 2025-07-15 | Stop reason: HOSPADM

## 2025-07-08 RX ORDER — QUETIAPINE FUMARATE 25 MG/1
25 TABLET, FILM COATED ORAL NIGHTLY
Status: DISCONTINUED | OUTPATIENT
Start: 2025-07-08 | End: 2025-07-15 | Stop reason: HOSPADM

## 2025-07-08 RX ADMIN — HYDROCODONE BITARTRATE AND ACETAMINOPHEN 1 TABLET: 5; 325 TABLET ORAL at 12:07

## 2025-07-08 RX ADMIN — MICONAZOLE NITRATE ANTIFUNGAL POWDER: 2 POWDER TOPICAL at 09:07

## 2025-07-08 RX ADMIN — METOPROLOL TARTRATE 50 MG: 50 TABLET, FILM COATED ORAL at 08:07

## 2025-07-08 RX ADMIN — QUETIAPINE FUMARATE 25 MG: 25 TABLET ORAL at 08:07

## 2025-07-08 RX ADMIN — AMIODARONE HYDROCHLORIDE 200 MG: 200 TABLET ORAL at 09:07

## 2025-07-08 RX ADMIN — INSULIN ASPART 5 UNITS: 100 INJECTION, SOLUTION INTRAVENOUS; SUBCUTANEOUS at 04:07

## 2025-07-08 RX ADMIN — INSULIN ASPART 5 UNITS: 100 INJECTION, SOLUTION INTRAVENOUS; SUBCUTANEOUS at 08:07

## 2025-07-08 RX ADMIN — INSULIN ASPART 5 UNITS: 100 INJECTION, SOLUTION INTRAVENOUS; SUBCUTANEOUS at 12:07

## 2025-07-08 RX ADMIN — FUROSEMIDE 80 MG: 40 TABLET ORAL at 08:07

## 2025-07-08 RX ADMIN — MICONAZOLE NITRATE ANTIFUNGAL POWDER: 2 POWDER TOPICAL at 08:07

## 2025-07-08 RX ADMIN — HEPARIN SODIUM 5000 UNITS: 5000 INJECTION, SOLUTION INTRAVENOUS; SUBCUTANEOUS at 09:07

## 2025-07-08 RX ADMIN — LEVOTHYROXINE SODIUM 200 MCG: 100 TABLET ORAL at 05:07

## 2025-07-08 RX ADMIN — HEPARIN SODIUM 5000 UNITS: 5000 INJECTION, SOLUTION INTRAVENOUS; SUBCUTANEOUS at 05:07

## 2025-07-08 RX ADMIN — HEPARIN SODIUM 5000 UNITS: 5000 INJECTION, SOLUTION INTRAVENOUS; SUBCUTANEOUS at 02:07

## 2025-07-08 RX ADMIN — INSULIN ASPART 6 UNITS: 100 INJECTION, SOLUTION INTRAVENOUS; SUBCUTANEOUS at 08:07

## 2025-07-08 RX ADMIN — DAPTOMYCIN 610 MG: 500 INJECTION, POWDER, LYOPHILIZED, FOR SOLUTION INTRAVENOUS at 11:07

## 2025-07-08 RX ADMIN — INSULIN GLARGINE 20 UNITS: 100 INJECTION, SOLUTION SUBCUTANEOUS at 08:07

## 2025-07-08 RX ADMIN — FUROSEMIDE 80 MG: 40 TABLET ORAL at 05:07

## 2025-07-08 NOTE — PT/OT/SLP PROGRESS
Occupational Therapy   Treatment    Name: Carey Leonardo  MRN: [id number]  Admitting Diagnosis:  MATT (acute kidney injury)  8 Days Post-Op    Recommendations:     Discharge Recommendations:    Discharge Equipment Recommendations:  to be determined by next level of care  Barriers to discharge:  None    Assessment:     Carey Leonardo is a 49 y.o. female with a medical diagnosis of MATT (acute kidney injury).  She presents with complaint of pain at PICC line site. Pt agreed to UE exercises.. Performance deficits affecting function are weakness, impaired endurance, impaired cognition, orthopedic precautions.     Rehab Prognosis:  Good; patient would benefit from acute skilled OT services to address these deficits and reach maximum level of function.       Plan:     Patient to be seen 5 x/week to address the above listed problems via self-care/home management, therapeutic activities, therapeutic exercises  Plan of Care Expires: 08/09/25  Plan of Care Reviewed with: patient    Subjective     Chief Complaint: (L) arm pain  Patient/Family Comments/goals: Pt agreed to tx  Pain/Comfort:  Pain Rating 1: 4/10  Location - Side 1: Left  Location 1:  (upper arm at PICC line insertion site)  Pain Addressed 1: Distraction  Pain Rating Post-Intervention 1: 4/10    Objective:     Communicated with: DILAN Patrick prior to session.  Patient found HOB elevated with telemetry, PICC line upon OT entry to room.    General Precautions: Standard, contact, fall    Orthopedic Precautions:spinal precautions  Braces: N/A  Respiratory Status: Room air     Occupational Performance:     Bed Mobility:    Patient completed Rolling/Turning to Right with stand by assistance  Patient completed Supine to Sit with stand by assistance  Patient completed Sit to Supine with stand by assistance     Functional Mobility/Transfers:    Functional Mobility:     Activities of Daily Living:        [facility] 6 Click ADL:      Treatment & Education:  Pt performed UE  strengthening exercises.  2 lb hand wt 2 x sets of 15 reps  (B) shoulder flexion/extension and adduction/abduction  (B) elbow flexion/extension  (B) forearm supination/pronation  (B) wrist flexion/extension  Hand exerciser x 2 sets of 25 reps x 3 bands   Red theraband x 2 sets of 15 reps   (B) elbow flexion and extension  (B) shoulder adduction/abduction      Pt participated well with tx without complaint. Tolerated tx well with rest breaks provided as needed.    Patient left HOB elevated with all lines intact and call button in reach    GOALS:   Multidisciplinary Problems       Occupational Therapy Goals          Problem: Occupational Therapy    Goal Priority Disciplines Outcome Interventions   Occupational Therapy Goal     OT, PT/OT Progressing    Description: STG:  Pt will perform grooming with setup  Pt will bathe with min a with AD as needed  Pt will perform UE dressing with (I)  Pt will perform LE dressing with Min a with AD as needed  Pt will sit EOB x 15 min with (I)   Pt will transfer bed/chair/bsc with Min a with RW  Pt will perform standing x 1 min with CGA assistance  Pt will tolerate 20 minutes of tx without fatigue      LT.Restore to max I with self care and mobility.                          DME Justifications:      Time Tracking:     OT Date of Treatment: 25  OT Start Time: 1439  OT Stop Time: 1508  OT Total Time (min): 29 min    Billable Minutes:Therapeutic Exercise 26    OT/CIPRIANO: OT          2025

## 2025-07-08 NOTE — PROGRESS NOTES
Patient slept well last night. She denies shortness of breath. Review of systems G.I. and she denies nausea or vomiting.    Physical exam, general patient to no acute distress, she is resting comfortably laying on her side. She has no pitting edema in your legs    Assessment/plan 1. Acute kidney injury  patient creatinine is 4.73 mg/dL. This is down from 4.75 yesterday. Continue to monitor.  Two. Vanc toxicity  continue to monitor for improvement.  Three. Hypertension - continue present antihypertensives, her blood pressure is controlled.  Four. DM glucose was 139 this morning, continue present glucose monitoring, and Treatment.  Five. Spinal abscess-continue antibiotics.

## 2025-07-08 NOTE — PLAN OF CARE
Problem: Adult Inpatient Plan of Care  Goal: Plan of Care Review  Outcome: Progressing  Goal: Absence of Hospital-Acquired Illness or Injury  Outcome: Progressing  Goal: Optimal Comfort and Wellbeing  Outcome: Progressing     Problem: Acute Kidney Injury/Impairment  Goal: Fluid and Electrolyte Balance  Outcome: Progressing  Goal: Improved Oral Intake  Outcome: Progressing  Goal: Effective Renal Function  Outcome: Progressing     Problem: Wound  Goal: Optimal Coping  Outcome: Progressing  Goal: Optimal Functional Ability  Outcome: Progressing

## 2025-07-08 NOTE — PT/OT/SLP PROGRESS
Physical Therapy Treatment    Patient Name:  Carey Leonardo   MRN:  [id number]    Recommendations:     Discharge Recommendations: Moderate Intensity Therapy  Discharge Equipment Recommendations: to be determined by next level of care  Barriers to discharge: ongoing medical treatment    Assessment:     Carey Leonardo is a 49 y.o. female admitted with a medical diagnosis of MATT (acute kidney injury).  She presents with the following impairments/functional limitations: impaired endurance, orthopedic precautions, impaired cognition.    Pt able to complete activities without sig difficulties, however, she requires increased time to process and execute commands.  Pt has split treatment due to  in room during AM treatment and lunch arriving.    PT POC discussed with Berkley Woodard DPT     Rehab Prognosis: Good; patient would benefit from acute skilled PT services to address these deficits and reach maximum level of function.    Recent Surgery: Procedure(s) (LRB):  INCISION AND DRAINAGE, ABSCESS, SPINE, LUMBOSACRAL, POSTERIOR (N/A) 8 Days Post-Op    Plan:     During this hospitalization, patient to be seen 5 x/week to address the identified rehab impairments via gait training, therapeutic activities, therapeutic exercises and progress toward the following goals:    Plan of Care Expires:  08/04/25    Subjective     Chief Complaint: MATT (acute kidney injury)   Patient/Family Comments/goals: pt agreeable  Pain/Comfort:         Objective:     Communicated with Addi Patrick RN prior to session.  Patient found HOB elevated with telemetry, peripheral IV upon PT entry to room.     General Precautions: Standard, contact, fall  Orthopedic Precautions: spinal precautions  Braces: N/A  Respiratory Status: Room air     Functional Mobility:  Bed Mobility:     Supine to Sit: stand by assistance  Transfers:     Sit to Stand:  contact guard assistance with rolling walker  Gait: 36' x 2 trials with RW and contact guard  assist; slow debra, short step length, mild axial flexion      AM-PAC 6 CLICK MOBILITY  Turning over in bed (including adjusting bedclothes, sheets and blankets)?: 4  Sitting down on and standing up from a chair with arms (e.g., wheelchair, bedside commode, etc.): 4  Moving from lying on back to sitting on the side of the bed?: 4  Moving to and from a bed to a chair (including a wheelchair)?: 4  Need to walk in hospital room?: 4  Climbing 3-5 steps with a railing?: 3  Basic Mobility Total Score: 23       Treatment & Education:  Pt performed 2 x 15 reps (B) LE exercises: ap, quad set, glut set, straight leg raise, hip ab/adduction, long arc quad, heel slide with active assist       Patient left up in chair with all lines intact and call button in reach (AM) case manger in room..    GOALS:   Multidisciplinary Problems       Physical Therapy Goals          Problem: Physical Therapy    Goal Priority Disciplines Outcome Interventions   Physical Therapy Goal     PT, PT/OT Progressing    Description: Short term goals:  1. Supine to sit with MInimal Assistance  2. Sit to stand transfer with Minimal Assistance  3. Bed to chair transfer with Minimal Assistance using Rolling Walker  4. Gait  x 50 feet with Minimal Assistance using Rolling Walker.     Long term goals:  1. Supine to sit with Contact Guard Assistance  2. Sit to stand transfer with Contact Guard Assistance  3. Bed to chair transfer with Contact Guard Assistance using Rolling Walker  4. Gait  x 100 feet with Contact Guard Assistance using Rolling Walker.                          DME Justifications:      Time Tracking:     PT Received On: 07/08/25  PT Start Time: 1515 (1120)     PT Stop Time: 1535 (1137)  PT Total Time (min): 20 min     Billable Minutes: Gait Training 12 and Therapeutic Exercise 15    Treatment Type: Treatment  PT/PTA: PTA     Number of PTA visits since last PT visit: 1 07/08/2025

## 2025-07-08 NOTE — PLAN OF CARE
Problem: Adult Inpatient Plan of Care  Goal: Plan of Care Review  Outcome: Progressing  Goal: Absence of Hospital-Acquired Illness or Injury  Outcome: Progressing  Goal: Optimal Comfort and Wellbeing  Outcome: Progressing     Problem: Acute Kidney Injury/Impairment  Goal: Fluid and Electrolyte Balance  Outcome: Progressing  Goal: Improved Oral Intake  Outcome: Progressing  Goal: Effective Renal Function  7/7/2025 2308 by Tarun Carl RN  Outcome: Progressing  7/7/2025 2307 by Tarun Carl RN  Outcome: Progressing     Problem: Infection  Goal: Absence of Infection Signs and Symptoms  Outcome: Progressing     Problem: Wound  Goal: Optimal Coping  7/7/2025 2308 by Tarun Carl RN  Outcome: Progressing  7/7/2025 2307 by Tarun Carl RN  Outcome: Progressing  Goal: Optimal Functional Ability  7/7/2025 2308 by Tarun Carl RN  Outcome: Progressing  7/7/2025 2307 by Tarun Carl RN  Outcome: Progressing  Goal: Improved Oral Intake  Outcome: Progressing  Goal: Optimal Pain Control and Function  Outcome: Progressing  Goal: Skin Health and Integrity  Outcome: Progressing

## 2025-07-08 NOTE — PROGRESS NOTES
PICC insertion  Performed by Stewart Murphy [facility]  Consent obtained for PICC line insertion.  A formal timeout was called all staff present agree to patient and procedure.  The left upper extremity was prepped with ChloraPrep and sterile field was established.  1% lidocaine was used as local anesthetic.  Under ultrasound guidance the basilic vein was localized and accessed with a micropuncture needle.  An 018 guidewire was passed through the vein to the level superior vena cava.  A 49 cm PICC line was then placed over the wire with its tip terminating at the atrial caval junction.  The wire was removed both ports were flushed with heparinized saline.  The PICC line was then cleaned and secured.  The patient tolerated the procedure well there were no immediate postprocedure complications.  Total fluoro time was 24 seconds.

## 2025-07-08 NOTE — PLAN OF CARE
07/08/25 1226   Rounds   Attendance Provider;Nurse ;Charge nurse;Physical therapist;Pharmacist   Discharge Plan A Home with family;Home Health   Why the patient remains in the hospital Requires continued medical care   Transition of Care Barriers None     CM reviewed patient's chart. Patient is being followed by ID and nephrology. Patient not medically ready for discharge. PT/OT eval reviewed and noted to suggest acute PT for patient. CM spoke with patient. Patient states that she does not want to go to [facility]. Patient current with [facility]. Criss notified that pt will need to have PT added to her chart. CM following for anticipated discharge needs.

## 2025-07-09 LAB
ANION GAP SERPL CALCULATED.3IONS-SCNC: 16 MMOL/L (ref 7–16)
ANISOCYTOSIS BLD QL SMEAR: ABNORMAL
BASOPHILS # BLD AUTO: 0.05 K/UL (ref 0–0.2)
BASOPHILS NFR BLD AUTO: 0.4 % (ref 0–1)
BUN SERPL-MCNC: 58 MG/DL (ref 7–19)
BUN/CREAT SERPL: 15 (ref 6–20)
CALCIUM SERPL-MCNC: 8.1 MG/DL (ref 8.4–10.2)
CHLORIDE SERPL-SCNC: 96 MMOL/L (ref 98–107)
CO2 SERPL-SCNC: 30 MMOL/L (ref 22–29)
CREAT SERPL-MCNC: 3.94 MG/DL (ref 0.55–1.02)
DIFFERENTIAL METHOD BLD: ABNORMAL
EGFR (NO RACE VARIABLE) (RUSH/TITUS): 13 ML/MIN/1.73M2
EOSINOPHIL # BLD AUTO: 0.82 K/UL (ref 0–0.5)
EOSINOPHIL NFR BLD AUTO: 6.8 % (ref 1–4)
EOSINOPHIL NFR BLD MANUAL: 6 % (ref 1–4)
ERYTHROCYTE [DISTWIDTH] IN BLOOD BY AUTOMATED COUNT: 17.5 % (ref 11.5–14.5)
GLUCOSE SERPL-MCNC: 121 MG/DL (ref 70–105)
GLUCOSE SERPL-MCNC: 127 MG/DL (ref 74–100)
GLUCOSE SERPL-MCNC: 160 MG/DL (ref 70–105)
GLUCOSE SERPL-MCNC: 201 MG/DL (ref 70–105)
GLUCOSE SERPL-MCNC: 262 MG/DL (ref 70–105)
HCT VFR BLD AUTO: 26.2 % (ref 38–47)
HGB BLD-MCNC: 7.7 G/DL (ref 12–16)
HYPOCHROMIA BLD QL SMEAR: ABNORMAL
IMM GRANULOCYTES # BLD AUTO: 0.52 K/UL (ref 0–0.04)
IMM GRANULOCYTES NFR BLD: 4.3 % (ref 0–0.4)
LYMPHOCYTES # BLD AUTO: 2.12 K/UL (ref 1–4.8)
LYMPHOCYTES NFR BLD AUTO: 17.5 % (ref 27–41)
LYMPHOCYTES NFR BLD MANUAL: 16 % (ref 27–41)
MCH RBC QN AUTO: 26.5 PG (ref 27–31)
MCHC RBC AUTO-ENTMCNC: 29.4 G/DL (ref 32–36)
MCV RBC AUTO: 90 FL (ref 80–96)
MONOCYTES # BLD AUTO: 1.11 K/UL (ref 0–0.8)
MONOCYTES NFR BLD AUTO: 9.2 % (ref 2–6)
MONOCYTES NFR BLD MANUAL: 6 % (ref 2–6)
MPC BLD CALC-MCNC: 10 FL (ref 9.4–12.4)
NEUTROPHILS # BLD AUTO: 7.49 K/UL (ref 1.8–7.7)
NEUTROPHILS NFR BLD AUTO: 61.8 % (ref 53–65)
NEUTS BAND NFR BLD MANUAL: 3 % (ref 1–5)
NEUTS SEG NFR BLD MANUAL: 69 % (ref 50–62)
NRBC # BLD AUTO: 0.04 X10E3/UL
NRBC BLD MANUAL-RTO: 1 /100 WBC
NRBC, AUTO (.00): 0.3 %
PLATELET # BLD AUTO: 596 K/UL (ref 150–400)
PLATELET MORPHOLOGY: ABNORMAL
POTASSIUM SERPL-SCNC: 3 MMOL/L (ref 3.5–5.1)
RBC # BLD AUTO: 2.91 M/UL (ref 4.2–5.4)
SODIUM SERPL-SCNC: 139 MMOL/L (ref 136–145)
TARGETS BLD QL SMEAR: ABNORMAL
WBC # BLD AUTO: 12.11 K/UL (ref 4.5–11)

## 2025-07-09 PROCEDURE — 27000207 HC ISOLATION

## 2025-07-09 PROCEDURE — 99900035 HC TECH TIME PER 15 MIN (STAT)

## 2025-07-09 PROCEDURE — 25000003 PHARM REV CODE 250: Performed by: INTERNAL MEDICINE

## 2025-07-09 PROCEDURE — 63600175 PHARM REV CODE 636 W HCPCS: Performed by: ORTHOPAEDIC SURGERY

## 2025-07-09 PROCEDURE — 11000001 HC ACUTE MED/SURG PRIVATE ROOM

## 2025-07-09 PROCEDURE — 97530 THERAPEUTIC ACTIVITIES: CPT

## 2025-07-09 PROCEDURE — 36415 COLL VENOUS BLD VENIPUNCTURE: CPT | Performed by: ORTHOPAEDIC SURGERY

## 2025-07-09 PROCEDURE — 63600175 PHARM REV CODE 636 W HCPCS: Performed by: NURSE PRACTITIONER

## 2025-07-09 PROCEDURE — 80048 BASIC METABOLIC PNL TOTAL CA: CPT | Performed by: ORTHOPAEDIC SURGERY

## 2025-07-09 PROCEDURE — 99232 SBSQ HOSP IP/OBS MODERATE 35: CPT | Mod: ,,,

## 2025-07-09 PROCEDURE — 97116 GAIT TRAINING THERAPY: CPT

## 2025-07-09 PROCEDURE — 82962 GLUCOSE BLOOD TEST: CPT

## 2025-07-09 PROCEDURE — 97110 THERAPEUTIC EXERCISES: CPT

## 2025-07-09 PROCEDURE — 25000003 PHARM REV CODE 250

## 2025-07-09 PROCEDURE — 25000003 PHARM REV CODE 250: Performed by: ORTHOPAEDIC SURGERY

## 2025-07-09 PROCEDURE — 94761 N-INVAS EAR/PLS OXIMETRY MLT: CPT

## 2025-07-09 PROCEDURE — 85025 COMPLETE CBC W/AUTO DIFF WBC: CPT | Performed by: ORTHOPAEDIC SURGERY

## 2025-07-09 RX ORDER — POTASSIUM CHLORIDE 20 MEQ/1
20 TABLET, EXTENDED RELEASE ORAL 3 TIMES DAILY
Status: DISCONTINUED | OUTPATIENT
Start: 2025-07-09 | End: 2025-07-10

## 2025-07-09 RX ADMIN — AMIODARONE HYDROCHLORIDE 200 MG: 200 TABLET ORAL at 08:07

## 2025-07-09 RX ADMIN — METOPROLOL TARTRATE 50 MG: 50 TABLET, FILM COATED ORAL at 08:07

## 2025-07-09 RX ADMIN — HEPARIN SODIUM 5000 UNITS: 5000 INJECTION, SOLUTION INTRAVENOUS; SUBCUTANEOUS at 12:07

## 2025-07-09 RX ADMIN — HYDROCODONE BITARTRATE AND ACETAMINOPHEN 1 TABLET: 5; 325 TABLET ORAL at 08:07

## 2025-07-09 RX ADMIN — HEPARIN SODIUM 5000 UNITS: 5000 INJECTION, SOLUTION INTRAVENOUS; SUBCUTANEOUS at 05:07

## 2025-07-09 RX ADMIN — MICONAZOLE NITRATE ANTIFUNGAL POWDER: 2 POWDER TOPICAL at 08:07

## 2025-07-09 RX ADMIN — INSULIN ASPART 5 UNITS: 100 INJECTION, SOLUTION INTRAVENOUS; SUBCUTANEOUS at 12:07

## 2025-07-09 RX ADMIN — FUROSEMIDE 80 MG: 40 TABLET ORAL at 08:07

## 2025-07-09 RX ADMIN — POTASSIUM CHLORIDE EXTENDED-RELEASE 20 MEQ: 1500 TABLET ORAL at 05:07

## 2025-07-09 RX ADMIN — LEVOTHYROXINE SODIUM 200 MCG: 100 TABLET ORAL at 05:07

## 2025-07-09 RX ADMIN — QUETIAPINE FUMARATE 25 MG: 25 TABLET ORAL at 08:07

## 2025-07-09 RX ADMIN — PANTOPRAZOLE SODIUM 40 MG: 40 INJECTION, POWDER, FOR SOLUTION INTRAVENOUS at 08:07

## 2025-07-09 RX ADMIN — INSULIN GLARGINE 20 UNITS: 100 INJECTION, SOLUTION SUBCUTANEOUS at 08:07

## 2025-07-09 RX ADMIN — INSULIN ASPART 5 UNITS: 100 INJECTION, SOLUTION INTRAVENOUS; SUBCUTANEOUS at 05:07

## 2025-07-09 RX ADMIN — HYDROCODONE BITARTRATE AND ACETAMINOPHEN 1 TABLET: 5; 325 TABLET ORAL at 12:07

## 2025-07-09 RX ADMIN — INSULIN ASPART 5 UNITS: 100 INJECTION, SOLUTION INTRAVENOUS; SUBCUTANEOUS at 08:07

## 2025-07-09 RX ADMIN — INSULIN ASPART 6 UNITS: 100 INJECTION, SOLUTION INTRAVENOUS; SUBCUTANEOUS at 08:07

## 2025-07-09 RX ADMIN — FUROSEMIDE 80 MG: 40 TABLET ORAL at 05:07

## 2025-07-09 RX ADMIN — HEPARIN SODIUM 5000 UNITS: 5000 INJECTION, SOLUTION INTRAVENOUS; SUBCUTANEOUS at 09:07

## 2025-07-09 RX ADMIN — POTASSIUM CHLORIDE EXTENDED-RELEASE 20 MEQ: 1500 TABLET ORAL at 08:07

## 2025-07-09 RX ADMIN — INSULIN ASPART 2 UNITS: 100 INJECTION, SOLUTION INTRAVENOUS; SUBCUTANEOUS at 08:07

## 2025-07-09 NOTE — PLAN OF CARE
Problem: Adult Inpatient Plan of Care  Goal: Patient-Specific Goal (Individualized)  Outcome: Progressing  Goal: Optimal Comfort and Wellbeing  Outcome: Progressing     Problem: Acute Kidney Injury/Impairment  Goal: Improved Oral Intake  Outcome: Progressing     Problem: Infection  Goal: Absence of Infection Signs and Symptoms  Outcome: Progressing     Problem: Wound  Goal: Absence of Infection Signs and Symptoms  Outcome: Progressing

## 2025-07-09 NOTE — ASSESSMENT & PLAN NOTE
Patient with Hypoxic Respiratory failure which is Acute.  she is not on home oxygen. Supplemental oxygen was provided and noted- Oxygen Concentration (%):  [21] 21    .   Signs/symptoms of respiratory failure include- increased work of breathing and lethargy. Contributing diagnoses includes - pulmonary edema Labs and images were reviewed. Patient Has recent ABG, which has been reviewed. Will treat underlying causes and adjust management of respiratory failure as follows- may need dialysis nephrology following

## 2025-07-09 NOTE — ASSESSMENT & PLAN NOTE
Patient's most recent potassium results are listed below.   Recent Labs     07/07/25  0440 07/08/25  0419 07/09/25  0508   K 3.1* 3.2* 3.0*     Plan  - Replete potassium per protocol  - Monitor potassium Daily  - Patient's hypokalemia is worsening. Will adjust treatment as follows:    - 20 mEq Effer K  Stable

## 2025-07-09 NOTE — ASSESSMENT & PLAN NOTE
Patient seen by Dr. Upton for abscess on lumber spine 2 weeks ago.  Culture positive for MRSA  Patient was started on Vancomycin  and discharged  Patient subsequently developed MATT  Patient remains febrile   Vancomycin D/c due to MATT  Initiating Daptomycin with pharmacy renally dosing  Pain control managed with Tylenol 1000 mg and 5mg Oxycodone    Persistently febrile in the setting of postoperative infection with the implantation of lumbar hardware.  MRSA.  Consult ortho spine, consult ID  6/28 continue dapto, added cefepime. MRI could not be done with contrast due to renal function  Persistently febrile.  Broadened to carbapenem    7/9  - picc line placed, dapto for spinal abscess continues, will get timeline for IV meds today

## 2025-07-09 NOTE — ASSESSMENT & PLAN NOTE
Hyponatremia is likely due to Dehydration/hypovolemia and renal insufficiency. The patient's most recent sodium results are listed below.  Recent Labs     07/07/25  0440 07/08/25  0419 07/09/25  0508    137 139     Plan  - Correct the sodium by 4-6mEq in 24 hours.   - Obtain the following studies: Urine sodium, urine osmolality, serum osmolality.  - Will treat the hyponatremia with IV fluids as follows: continuous .9 normal saline IV infusion  - Monitor sodium Daily.   - Patient hyponatremia is stable  Resolved
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
## 2025-07-09 NOTE — PT/OT/SLP PROGRESS
Occupational Therapy   Treatment    Name: Carey Leonardo  MRN: [id number]  Admitting Diagnosis:  MATT (acute kidney injury)  9 Days Post-Op    Recommendations:     Discharge Recommendations:    Discharge Equipment Recommendations:  to be determined by next level of care  Barriers to discharge:  None    Assessment:     Carey Leonardo is a 49 y.o. female with a medical diagnosis of MATT (acute kidney injury).  She presents with no complaints.Pt agreed to OT treatment focusin starr UE strengthening exercises. Performance deficits affecting function are weakness, impaired endurance, impaired cognition, orthopedic precautions.     Rehab Prognosis:  Good; patient would benefit from acute skilled OT services to address these deficits and reach maximum level of function.       Plan:     Patient to be seen 5 x/week to address the above listed problems via self-care/home management, therapeutic activities, therapeutic exercises  Plan of Care Expires: 08/09/25  Plan of Care Reviewed with: patient    Subjective     Chief Complaint: MATT  Patient/Family Comments/goals: Plan is to d/c home  Pain/Comfort:  Pain Rating 1: 0/10  Pain Rating Post-Intervention 1: 0/10    Objective:     Communicated with: DILAN Patrick prior to session.  Patient found up in chair with peripheral IV upon OT entry to room.    General Precautions: Standard, contact, fall    Orthopedic Precautions:spinal precautions  Braces: N/A  Respiratory Status: Room air     Occupational Performance:     Bed Mobility:         Functional Mobility/Transfers:    Functional Mobility:     Activities of Daily Living:  Pt reports that ADL had already been performed      [facility] 6 Click ADL:      Treatment & Education:  Pt performed UE strengthening exercises  Pt completed 2 x 15 reps with Red theraband in sitting  position:  bilateral Shoulder horizontal adduction/abduction   bilateral Shoulder flexion/extension  bilateral Elbow extension  bilateral Elbow flexion  3 lb hand wt x  2 sets of 15 reps (B) elbow and wrist flexion/extension  Hand exerciser x 3 sets of 25 reps x 3 bands.    Pt presents with difficulty following commands and staying on task today, Tolerated exercises without complaint.      Patient left up in chair with all lines intact, call button in reach, and PT no Woodard present    GOALS:   Multidisciplinary Problems       Occupational Therapy Goals          Problem: Occupational Therapy    Goal Priority Disciplines Outcome Interventions   Occupational Therapy Goal     OT, PT/OT Progressing    Description: STG:  Pt will perform grooming with setup  Pt will bathe with min a with AD as needed  Pt will perform UE dressing with (I)  Pt will perform LE dressing with Min a with AD as needed  Pt will sit EOB x 15 min with (I)   Pt will transfer bed/chair/bsc with Min a with RW  Pt will perform standing x 1 min with CGA assistance  Pt will tolerate 20 minutes of tx without fatigue      LT.Restore to max I with self care and mobility.                          DME Justifications:      Time Tracking:     OT Date of Treatment: 25  OT Start Time: 151  OT Stop Time: 1547  OT Total Time (min): 28 min    Billable Minutes:Therapeutic Exercise 26    OT/CIPRIANO: OT          2025

## 2025-07-09 NOTE — ASSESSMENT & PLAN NOTE
MATT is likely due to ATN from vancomycin Baseline creatinine is .65. Most recent creatinine and eGFR are listed below.  Recent Labs     07/07/25  0440 07/08/25  0419 07/09/25  0508   CREATININE 4.75* 4.73* 3.94*   EGFRNORACEVR 11* 11* 13*     Given 1,848 ml bolus of normal saline in the ED     Plan  - MATT is worsening. Will adjust treatment as follows: continuous infusion of .9 Normal Saline  - Avoid nephrotoxins and renally dose meds for GFR listed above  - Monitor urine output, serial BMP, and adjust therapy as needed  6/27 continue IV fluids  6/28 consult nephrology, worse  6/29 now developing respiratory failure with pulmonary edema.    7/9  - renal function improving, nephrology following

## 2025-07-09 NOTE — ASSESSMENT & PLAN NOTE
Patient's blood pressure range in the last 24 hours was: BP  Min: 109/68  Max: 142/81. Patient requires and takes all medications due to treatment resistant Hypertension that was initiated by Dr. Hurst. The patient's inpatient anti-hypertensive regimen is listed below:  Current Antihypertensives  cloNIDine tablet 0.1 mg, Every 6 hours PRN, Oral  metoprolol tartrate (LOPRESSOR) tablet 50 mg, 2 times daily, Oral  metoprolol injection 5 mg, Every 6 hours PRN, Intravenous  furosemide tablet 80 mg, 2 times daily, Oral    Plan  - BP is controlled, no changes needed to their regimen  - follow

## 2025-07-09 NOTE — ASSESSMENT & PLAN NOTE
Secondary to infection versus medication effect.  Stop cefepime.  Broaden antibiotics  CT head negative.    7/9  - patient more oriented this morning after given seroquel overnight but still not at baseline, will continue seroquel again tonight

## 2025-07-09 NOTE — ASSESSMENT & PLAN NOTE
Anemia is likely due to infection . Most recent hemoglobin and hematocrit are listed below.  Recent Labs     07/07/25  0441 07/08/25  0419 07/09/25  0508   HGB 8.6* 8.8* 7.7*   HCT 28.9* 29.5* 26.2*     Plan  - Monitor serial CBC: Daily  - Transfuse PRBC if patient becomes hemodynamically unstable, symptomatic or H/H drops below 7/21.  - Patient has not received any PRBC transfusions to date  - Patient's anemia is currently stable  - Monitor CBC if H/H continue to drop consider transfusion.  Follow

## 2025-07-09 NOTE — SUBJECTIVE & OBJECTIVE
Interval History:     Review of Systems   All other systems reviewed and are negative.    Objective:     Vital Signs (Most Recent):  Temp: 98.1 °F (36.7 °C) (07/09/25 1133)  Pulse: 76 (07/09/25 1351)  Resp: 18 (07/09/25 1351)  BP: 109/68 (07/09/25 1133)  SpO2: 98 % (07/09/25 1351) Vital Signs (24h Range):  Temp:  [97.5 °F (36.4 °C)-99.9 °F (37.7 °C)] 98.1 °F (36.7 °C)  Pulse:  [71-77] 76  Resp:  [18] 18  SpO2:  [96 %-99 %] 98 %  BP: (109-142)/(62-81) 109/68     Weight: 105.9 kg (233 lb 7.5 oz)  Body mass index is 36.57 kg/m².  No intake or output data in the 24 hours ending 07/09/25 1519        Physical Exam  Vitals and nursing note reviewed.   Constitutional:       Appearance: She is ill-appearing.   HENT:      Head: Normocephalic.      Mouth/Throat:      Mouth: Mucous membranes are moist.   Eyes:      Pupils: Pupils are equal, round, and reactive to light.   Neck:      Vascular: No carotid bruit.   Cardiovascular:      Rate and Rhythm: Normal rate and regular rhythm.   Pulmonary:      Effort: Pulmonary effort is normal.   Abdominal:      General: Abdomen is flat. Bowel sounds are normal. There is no distension.      Palpations: Abdomen is soft.   Musculoskeletal:      Right lower leg: Edema present.      Left lower leg: Edema present.   Skin:     General: Skin is warm and dry.   Neurological:      General: No focal deficit present.      Mental Status: She is alert. Mental status is at baseline. She is disoriented.               Significant Labs: All pertinent labs within the past 24 hours have been reviewed.  BMP:   Recent Labs   Lab 07/09/25  0508   *      K 3.0*   CL 96*   CO2 30*   BUN 58*   CREATININE 3.94*   CALCIUM 8.1*     CBC:   Recent Labs   Lab 07/08/25  0419 07/09/25  0508   WBC 14.59* 12.11*   HGB 8.8* 7.7*   HCT 29.5* 26.2*   * 596*     CMP:   Recent Labs   Lab 07/08/25  0419 07/09/25  0508    139   K 3.2* 3.0*   CL 95* 96*   CO2 30* 30*   * 127*   BUN 66* 58*    CREATININE 4.73* 3.94*   CALCIUM 8.1* 8.1*   ANIONGAP 15 16       Significant Imaging: I have reviewed all pertinent imaging results/findings within the past 24 hours.

## 2025-07-09 NOTE — ASSESSMENT & PLAN NOTE
MATT is likely due to ATN from vancomycin Baseline creatinine is .65. Most recent creatinine and eGFR are listed below.  Recent Labs     07/06/25  0245 07/07/25  0440 07/08/25  0419   CREATININE 5.51* 4.75* 4.73*   EGFRNORACEVR 9* 11* 11*     Given 1,848 ml bolus of normal saline in the ED     Plan  - MATT is worsening. Will adjust treatment as follows: continuous infusion of .9 Normal Saline  - Avoid nephrotoxins and renally dose meds for GFR listed above  - Monitor urine output, serial BMP, and adjust therapy as needed  6/27 continue IV fluids  6/28 consult nephrology, worse  6/29 now developing respiratory failure with pulmonary edema.
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
## 2025-07-09 NOTE — ASSESSMENT & PLAN NOTE
Anemia is likely due to infection . Most recent hemoglobin and hematocrit are listed below.  Recent Labs     07/06/25  0245 07/07/25  0441 07/08/25  0419   HGB 8.6* 8.6* 8.8*   HCT 27.8* 28.9* 29.5*     Plan  - Monitor serial CBC: Daily  - Transfuse PRBC if patient becomes hemodynamically unstable, symptomatic or H/H drops below 7/21.  - Patient has not received any PRBC transfusions to date  - Patient's anemia is currently stable  - Monitor CBC if H/H continue to drop consider transfusion.  Follow

## 2025-07-09 NOTE — ASSESSMENT & PLAN NOTE
Hyponatremia is likely due to Dehydration/hypovolemia and renal insufficiency. The patient's most recent sodium results are listed below.  Recent Labs     07/06/25  0245 07/07/25  0440 07/08/25  0419    140 137     Plan  - Correct the sodium by 4-6mEq in 24 hours.   - Obtain the following studies: Urine sodium, urine osmolality, serum osmolality.  - Will treat the hyponatremia with IV fluids as follows: continuous .9 normal saline IV infusion  - Monitor sodium Daily.   - Patient hyponatremia is stable  Resolved

## 2025-07-09 NOTE — ASSESSMENT & PLAN NOTE
Patient's blood pressure range in the last 24 hours was: BP  Min: 125/62  Max: 143/72. Patient requires and takes all medications due to treatment resistant Hypertension that was initiated by Dr. Hurts. The patient's inpatient anti-hypertensive regimen is listed below:  Current Antihypertensives  cloNIDine tablet 0.1 mg, Every 6 hours PRN, Oral  metoprolol tartrate (LOPRESSOR) tablet 50 mg, 2 times daily, Oral  metoprolol injection 5 mg, Every 6 hours PRN, Intravenous  furosemide tablet 80 mg, 2 times daily, Oral    Plan  - BP is controlled, no changes needed to their regimen  - follow

## 2025-07-09 NOTE — SUBJECTIVE & OBJECTIVE
Interval History:     Review of Systems   All other systems reviewed and are negative.    Objective:     Vital Signs (Most Recent):  Temp: 98.4 °F (36.9 °C) (07/08/25 1913)  Pulse: 71 (07/08/25 1913)  Resp: 18 (07/08/25 1913)  BP: 125/62 (07/08/25 1913)  SpO2: 96 % (07/08/25 1913) Vital Signs (24h Range):  Temp:  [97.9 °F (36.6 °C)-99.9 °F (37.7 °C)] 98.4 °F (36.9 °C)  Pulse:  [71-82] 71  Resp:  [18] 18  SpO2:  [93 %-100 %] 96 %  BP: (125-143)/(58-79) 125/62     Weight: 106.1 kg (233 lb 14.5 oz)  Body mass index is 36.64 kg/m².  No intake or output data in the 24 hours ending 07/08/25 2038        Physical Exam  Vitals and nursing note reviewed.   Constitutional:       Appearance: She is ill-appearing.   HENT:      Head: Normocephalic.      Mouth/Throat:      Mouth: Mucous membranes are moist.   Eyes:      Pupils: Pupils are equal, round, and reactive to light.   Neck:      Vascular: No carotid bruit.   Cardiovascular:      Rate and Rhythm: Normal rate and regular rhythm.   Pulmonary:      Effort: Pulmonary effort is normal.   Abdominal:      General: Abdomen is flat. Bowel sounds are normal. There is no distension.      Palpations: Abdomen is soft.   Musculoskeletal:      Right lower leg: Edema present.      Left lower leg: Edema present.   Skin:     General: Skin is warm and dry.   Neurological:      General: No focal deficit present.      Mental Status: She is alert. Mental status is at baseline. She is disoriented.               Significant Labs: All pertinent labs within the past 24 hours have been reviewed.  BMP:   Recent Labs   Lab 07/08/25  0419   *      K 3.2*   CL 95*   CO2 30*   BUN 66*   CREATININE 4.73*   CALCIUM 8.1*     CBC:   Recent Labs   Lab 07/07/25  0441 07/08/25  0419   WBC 12.37* 14.59*   HGB 8.6* 8.8*   HCT 28.9* 29.5*   * 692*     CMP:   Recent Labs   Lab 07/07/25  0440 07/08/25  0419    137   K 3.1* 3.2*   CL 96* 95*   CO2 31* 30*   * 139*   BUN 76* 66*    CREATININE 4.75* 4.73*   CALCIUM 8.0* 8.1*   ANIONGAP 16 15       Significant Imaging: I have reviewed all pertinent imaging results/findings within the past 24 hours.

## 2025-07-09 NOTE — PROGRESS NOTES
Patient is sleeping upon entering the room.  She did briefly open her eyes and look at me upon verbal arousal but drifted off to sleep rapidly thereafter.  The nurse reports the patient just had a narcotic analgesic for hip pain.    Physical exam, general patient to no acute distress, heart is regular rate and rhythm, she has no pitting edema, lungs reveal sonorous breath sounds, abdomen is soft with positive bowel sounds    Assessment/plan 1. Acute kidney injury - patient creatinine is 3.9 mg/dL. This is down from 4.73 yesterday. Continue to monitor.  Two. Vanc toxicity - seems to be resolving Three. Hypertension - continue present antihypertensives, her blood pressure is controlled.  Four. DM continue present hypoglycemic monitoring therapy  Five. Spinal abscess-continue antibiotics.  6.  Hypokalemia-will supplement

## 2025-07-09 NOTE — ASSESSMENT & PLAN NOTE
Patient's most recent potassium results are listed below.   Recent Labs     07/06/25  0245 07/07/25  0440 07/08/25  0419   K 3.1* 3.1* 3.2*     Plan  - Replete potassium per protocol  - Monitor potassium Daily  - Patient's hypokalemia is worsening. Will adjust treatment as follows:    - 20 mEq Effer K  Stable

## 2025-07-10 LAB
ANION GAP SERPL CALCULATED.3IONS-SCNC: 16 MMOL/L (ref 7–16)
ANISOCYTOSIS BLD QL SMEAR: ABNORMAL
BASOPHILS # BLD AUTO: 0.05 K/UL (ref 0–0.2)
BASOPHILS NFR BLD AUTO: 0.4 % (ref 0–1)
BASOPHILS NFR BLD MANUAL: 1 % (ref 0–1)
BUN SERPL-MCNC: 54 MG/DL (ref 7–19)
BUN/CREAT SERPL: 13 (ref 6–20)
CALCIUM SERPL-MCNC: 8.5 MG/DL (ref 8.4–10.2)
CHLORIDE SERPL-SCNC: 98 MMOL/L (ref 98–107)
CO2 SERPL-SCNC: 29 MMOL/L (ref 22–29)
CREAT SERPL-MCNC: 4.06 MG/DL (ref 0.55–1.02)
DIFFERENTIAL METHOD BLD: ABNORMAL
EGFR (NO RACE VARIABLE) (RUSH/TITUS): 13 ML/MIN/1.73M2
EOSINOPHIL # BLD AUTO: 0.86 K/UL (ref 0–0.5)
EOSINOPHIL NFR BLD AUTO: 6.3 % (ref 1–4)
EOSINOPHIL NFR BLD MANUAL: 7 % (ref 1–4)
ERYTHROCYTE [DISTWIDTH] IN BLOOD BY AUTOMATED COUNT: 18.6 % (ref 11.5–14.5)
GLUCOSE SERPL-MCNC: 135 MG/DL (ref 74–100)
GLUCOSE SERPL-MCNC: 143 MG/DL (ref 70–105)
GLUCOSE SERPL-MCNC: 165 MG/DL (ref 70–105)
GLUCOSE SERPL-MCNC: 170 MG/DL (ref 70–105)
GLUCOSE SERPL-MCNC: 228 MG/DL (ref 70–105)
HCT VFR BLD AUTO: 28.6 % (ref 38–47)
HGB BLD-MCNC: 8.4 G/DL (ref 12–16)
IMM GRANULOCYTES # BLD AUTO: 0.53 K/UL (ref 0–0.04)
IMM GRANULOCYTES NFR BLD: 3.9 % (ref 0–0.4)
LYMPHOCYTES # BLD AUTO: 2.92 K/UL (ref 1–4.8)
LYMPHOCYTES NFR BLD AUTO: 21.3 % (ref 27–41)
LYMPHOCYTES NFR BLD MANUAL: 22 % (ref 27–41)
MCH RBC QN AUTO: 26.8 PG (ref 27–31)
MCHC RBC AUTO-ENTMCNC: 29.4 G/DL (ref 32–36)
MCV RBC AUTO: 91.4 FL (ref 80–96)
MONOCYTES # BLD AUTO: 1.22 K/UL (ref 0–0.8)
MONOCYTES NFR BLD AUTO: 8.9 % (ref 2–6)
MONOCYTES NFR BLD MANUAL: 8 % (ref 2–6)
MPC BLD CALC-MCNC: 10.2 FL (ref 9.4–12.4)
MYELOCYTES NFR BLD MANUAL: 1 %
NEUTROPHILS # BLD AUTO: 8.15 K/UL (ref 1.8–7.7)
NEUTROPHILS NFR BLD AUTO: 59.2 % (ref 53–65)
NEUTS BAND NFR BLD MANUAL: 1 % (ref 1–5)
NEUTS SEG NFR BLD MANUAL: 60 % (ref 50–62)
NRBC # BLD AUTO: 0.03 X10E3/UL
NRBC BLD MANUAL-RTO: 1 /100 WBC
NRBC, AUTO (.00): 0.2 %
OVALOCYTES BLD QL SMEAR: ABNORMAL
PLATELET # BLD AUTO: 609 K/UL (ref 150–400)
PLATELET MORPHOLOGY: ABNORMAL
POLYCHROMASIA BLD QL SMEAR: ABNORMAL
POTASSIUM SERPL-SCNC: 3.9 MMOL/L (ref 3.5–5.1)
RBC # BLD AUTO: 3.13 M/UL (ref 4.2–5.4)
SODIUM SERPL-SCNC: 139 MMOL/L (ref 136–145)
TARGETS BLD QL SMEAR: ABNORMAL
WBC # BLD AUTO: 13.73 K/UL (ref 4.5–11)

## 2025-07-10 PROCEDURE — 97116 GAIT TRAINING THERAPY: CPT

## 2025-07-10 PROCEDURE — 97110 THERAPEUTIC EXERCISES: CPT

## 2025-07-10 PROCEDURE — 82962 GLUCOSE BLOOD TEST: CPT

## 2025-07-10 PROCEDURE — 25000003 PHARM REV CODE 250: Performed by: ORTHOPAEDIC SURGERY

## 2025-07-10 PROCEDURE — 11000001 HC ACUTE MED/SURG PRIVATE ROOM

## 2025-07-10 PROCEDURE — 80048 BASIC METABOLIC PNL TOTAL CA: CPT | Performed by: ORTHOPAEDIC SURGERY

## 2025-07-10 PROCEDURE — 25000003 PHARM REV CODE 250

## 2025-07-10 PROCEDURE — 25000003 PHARM REV CODE 250: Performed by: INTERNAL MEDICINE

## 2025-07-10 PROCEDURE — 36415 COLL VENOUS BLD VENIPUNCTURE: CPT | Performed by: ORTHOPAEDIC SURGERY

## 2025-07-10 PROCEDURE — 85025 COMPLETE CBC W/AUTO DIFF WBC: CPT | Performed by: ORTHOPAEDIC SURGERY

## 2025-07-10 PROCEDURE — 27000207 HC ISOLATION

## 2025-07-10 PROCEDURE — 63600175 PHARM REV CODE 636 W HCPCS: Performed by: NURSE PRACTITIONER

## 2025-07-10 PROCEDURE — 63600175 PHARM REV CODE 636 W HCPCS: Performed by: ORTHOPAEDIC SURGERY

## 2025-07-10 PROCEDURE — 99232 SBSQ HOSP IP/OBS MODERATE 35: CPT | Mod: ,,,

## 2025-07-10 PROCEDURE — 94761 N-INVAS EAR/PLS OXIMETRY MLT: CPT

## 2025-07-10 RX ORDER — POTASSIUM CHLORIDE 20 MEQ/1
20 TABLET, EXTENDED RELEASE ORAL DAILY
Status: DISCONTINUED | OUTPATIENT
Start: 2025-07-10 | End: 2025-07-15 | Stop reason: HOSPADM

## 2025-07-10 RX ADMIN — QUETIAPINE FUMARATE 25 MG: 25 TABLET ORAL at 09:07

## 2025-07-10 RX ADMIN — LEVOTHYROXINE SODIUM 200 MCG: 100 TABLET ORAL at 05:07

## 2025-07-10 RX ADMIN — INSULIN ASPART 5 UNITS: 100 INJECTION, SOLUTION INTRAVENOUS; SUBCUTANEOUS at 05:07

## 2025-07-10 RX ADMIN — METOPROLOL TARTRATE 50 MG: 50 TABLET, FILM COATED ORAL at 09:07

## 2025-07-10 RX ADMIN — HEPARIN SODIUM 5000 UNITS: 5000 INJECTION, SOLUTION INTRAVENOUS; SUBCUTANEOUS at 03:07

## 2025-07-10 RX ADMIN — ALPRAZOLAM 0.25 MG: 0.25 TABLET ORAL at 09:07

## 2025-07-10 RX ADMIN — AMIODARONE HYDROCHLORIDE 200 MG: 200 TABLET ORAL at 09:07

## 2025-07-10 RX ADMIN — INSULIN ASPART 2 UNITS: 100 INJECTION, SOLUTION INTRAVENOUS; SUBCUTANEOUS at 09:07

## 2025-07-10 RX ADMIN — INSULIN ASPART 5 UNITS: 100 INJECTION, SOLUTION INTRAVENOUS; SUBCUTANEOUS at 11:07

## 2025-07-10 RX ADMIN — POTASSIUM CHLORIDE 20 MEQ: 1500 TABLET, EXTENDED RELEASE ORAL at 09:07

## 2025-07-10 RX ADMIN — PANTOPRAZOLE SODIUM 40 MG: 40 INJECTION, POWDER, FOR SOLUTION INTRAVENOUS at 09:07

## 2025-07-10 RX ADMIN — HYDROCODONE BITARTRATE AND ACETAMINOPHEN 1 TABLET: 5; 325 TABLET ORAL at 09:07

## 2025-07-10 RX ADMIN — INSULIN ASPART 4 UNITS: 100 INJECTION, SOLUTION INTRAVENOUS; SUBCUTANEOUS at 11:07

## 2025-07-10 RX ADMIN — FUROSEMIDE 80 MG: 40 TABLET ORAL at 05:07

## 2025-07-10 RX ADMIN — FUROSEMIDE 80 MG: 40 TABLET ORAL at 09:07

## 2025-07-10 RX ADMIN — MICONAZOLE NITRATE ANTIFUNGAL POWDER: 2 POWDER TOPICAL at 09:07

## 2025-07-10 RX ADMIN — INSULIN GLARGINE 20 UNITS: 100 INJECTION, SOLUTION SUBCUTANEOUS at 09:07

## 2025-07-10 RX ADMIN — DIPHENHYDRAMINE HYDROCHLORIDE 50 MG: 25 CAPSULE ORAL at 09:07

## 2025-07-10 RX ADMIN — HEPARIN SODIUM 5000 UNITS: 5000 INJECTION, SOLUTION INTRAVENOUS; SUBCUTANEOUS at 05:07

## 2025-07-10 RX ADMIN — INSULIN ASPART 5 UNITS: 100 INJECTION, SOLUTION INTRAVENOUS; SUBCUTANEOUS at 09:07

## 2025-07-10 RX ADMIN — HEPARIN SODIUM 5000 UNITS: 5000 INJECTION, SOLUTION INTRAVENOUS; SUBCUTANEOUS at 09:07

## 2025-07-10 RX ADMIN — INSULIN ASPART 1 UNITS: 100 INJECTION, SOLUTION INTRAVENOUS; SUBCUTANEOUS at 09:07

## 2025-07-10 RX ADMIN — HYDROCODONE BITARTRATE AND ACETAMINOPHEN 1 TABLET: 5; 325 TABLET ORAL at 03:07

## 2025-07-10 NOTE — ASSESSMENT & PLAN NOTE
Anemia is likely due to infection . Most recent hemoglobin and hematocrit are listed below.  Recent Labs     07/08/25  0419 07/09/25  0508 07/10/25  0316   HGB 8.8* 7.7* 8.4*   HCT 29.5* 26.2* 28.6*     Plan  - Monitor serial CBC: Daily  - Transfuse PRBC if patient becomes hemodynamically unstable, symptomatic or H/H drops below 7/21.  - Patient has not received any PRBC transfusions to date  - Patient's anemia is currently stable  - Monitor CBC if H/H continue to drop consider transfusion.  Follow

## 2025-07-10 NOTE — PROGRESS NOTES
Patient is awake this morning sitting up in a chair interacting appropriately.  She denies shortness of breath.  Review of systems -patient complains of urinary incontinence at night.    Physical exam, general patient to no acute distress    Assessment/plan 1. Acute kidney injury - patient creatinine is 4 mg/dL. This is down from up from around 3.93 yesterday. Continue to monitor.  This patient may have sustained some permanent injury related to her acute illness.  She was noted to have normal kidney function a few weeks ago.  However it is still early and she may continue to recover yet.  Two. Vanc toxicity -   3. Hypertension continue present antihypertensives, her blood pressure is controlled.  Four. DM continue present hypoglycemic monitoring therapy  Five. Spinal abscess-continue antibiotics.  6.  Hypokalemia-patient's potassium is increased to 3.9 this morning, I decreased her potassium supplementation from 20 mEq 3 times a day to 20 mEq daily  7. Nocturnal Urinary incontinence-I suggested the patient empty her bladder before going to bed whether she feels like she needs to urinate or not.  Also recommended not eating or drinking anything about 3 hours before the time she plans on going to bed.

## 2025-07-10 NOTE — ASSESSMENT & PLAN NOTE
MATT is likely due to ATN from vancomycin Baseline creatinine is .65. Most recent creatinine and eGFR are listed below.  Recent Labs     07/08/25  0419 07/09/25  0508 07/10/25  0316   CREATININE 4.73* 3.94* 4.06*   EGFRNORACEVR 11* 13* 13*     Given 1,848 ml bolus of normal saline in the ED     Plan  - MATT is worsening. Will adjust treatment as follows: continuous infusion of .9 Normal Saline  - Avoid nephrotoxins and renally dose meds for GFR listed above  - Monitor urine output, serial BMP, and adjust therapy as needed  6/27 continue IV fluids  6/28 consult nephrology, worse  6/29 now developing respiratory failure with pulmonary edema.    7/9  - renal function improving, nephrology following

## 2025-07-10 NOTE — ASSESSMENT & PLAN NOTE
Patient's blood pressure range in the last 24 hours was: BP  Min: 104/68  Max: 148/86. Patient requires and takes all medications due to treatment resistant Hypertension that was initiated by Dr. Hurst. The patient's inpatient anti-hypertensive regimen is listed below:  Current Antihypertensives  cloNIDine tablet 0.1 mg, Every 6 hours PRN, Oral  metoprolol tartrate (LOPRESSOR) tablet 50 mg, 2 times daily, Oral  metoprolol injection 5 mg, Every 6 hours PRN, Intravenous  furosemide tablet 80 mg, 2 times daily, Oral    Plan  - BP is controlled, no changes needed to their regimen  - follow

## 2025-07-10 NOTE — PT/OT/SLP PROGRESS
Physical Therapy Treatment    Patient Name:  Carey Leonardo   MRN:  [id number]    Recommendations:     Discharge Recommendations: Low Intensity Therapy  Discharge Equipment Recommendations: to be determined by next level of care  Barriers to discharge: ongoing medical care    Assessment:     Carey Leonardo is a 49 y.o. female admitted with a medical diagnosis of MATT (acute kidney injury).  She presents with the following impairments/functional limitations: weakness, impaired endurance, impaired functional mobility, gait instability, impaired balance, decreased lower extremity function, decreased safety awareness, pain.     Rehab Prognosis: Good and Fair; patient would benefit from acute skilled PT services to address these deficits and reach maximum level of function.    Recent Surgery: Procedure(s) (LRB):  INCISION AND DRAINAGE, ABSCESS, SPINE, LUMBOSACRAL, POSTERIOR (N/A) 10 Days Post-Op    Plan:     During this hospitalization, patient to be seen 5 x/week to address the identified rehab impairments via gait training, therapeutic activities, therapeutic exercises, neuromuscular re-education and progress toward the following goals:    Plan of Care Expires:  08/04/25    Subjective     Chief Complaint: MATT (acute kidney injury)   Patient/Family Comments/goals: agreeable  Pain/Comfort:  Pain Rating 1: 10/10 (however no physical outward signs of severe pain noted)  Location 1: back  Pain Addressed 1: Reposition  Pain Rating Post-Intervention 1: 10/10      Objective:     Communicated with RN prior to session.  Patient found ambulatory in room/turcios with peripheral IV, telemetry upon PT entry to room.     General Precautions: Standard, contact, fall  Orthopedic Precautions: spinal precautions  Braces: N/A  Respiratory Status: Room air     Functional Mobility:  Transfers:     Sit to Stand:  stand by assistance with no AD  Gait: 20 ft, seated rest, 80 ft with CGA progressing to SBA, used wall for UE support on initial  distance then agreed to use RW on second attempt due to slightly unsteady gait.  Pt agrees that there are times when she would benefit from use of AD to prevent falls.  Balance: good-  in static standing x 5 mins in bathroom while performing hygiene      AM-PAC 6 CLICK MOBILITY  Turning over in bed (including adjusting bedclothes, sheets and blankets)?: 4  Sitting down on and standing up from a chair with arms (e.g., wheelchair, bedside commode, etc.): 4  Moving from lying on back to sitting on the side of the bed?: 4  Moving to and from a bed to a chair (including a wheelchair)?: 4  Need to walk in hospital room?: 4  Climbing 3-5 steps with a railing?: 3  Basic Mobility Total Score: 23       Treatment & Education:  TE:  Pt performed B LE seated AP, LAQ, marching, hip add squeezes all x 2 sets 15 reps for LE flexibility and strengthening to facilitate improved safety and stability with functional mobility.     Gait:  Functional mobility as noted above.     Patient left up in chair with all lines intact, call button in reach, RN notified, and family present..    GOALS:   Multidisciplinary Problems       Physical Therapy Goals          Problem: Physical Therapy    Goal Priority Disciplines Outcome Interventions   Physical Therapy Goal     PT, PT/OT Progressing    Description: Short term goals:  1. Supine to sit with MInimal Assistance  2. Sit to stand transfer with Minimal Assistance  3. Bed to chair transfer with Minimal Assistance using Rolling Walker  4. Gait  x 50 feet with Minimal Assistance using Rolling Walker.     Long term goals:  1. Supine to sit with Contact Guard Assistance  2. Sit to stand transfer with Contact Guard Assistance  3. Bed to chair transfer with Contact Guard Assistance using Rolling Walker  4. Gait  x 100 feet with Contact Guard Assistance using Rolling Walker.                          DME Justifications:   Carey's mobility limitation cannot be sufficiently resolved by the use of a cane.  Her functional mobility deficit can be sufficiently resolved with the use of a Rollator. Patient's mobility limitation significantly impairs their ability to participate in one of more activities of daily living.  The use of a Rollator will significantly improve the patient's ability to participate in MRADLS and the patient will use it on regular basis in the home.      Time Tracking:     PT Received On: 07/10/25  PT Start Time: 0940     PT Stop Time: 1009  PT Total Time (min): 29 min     Billable Minutes: Gait Training 17 and Therapeutic Activity 12    Treatment Type: Treatment  PT/PTA: PT     Number of PTA visits since last PT visit: 0     07/10/2025

## 2025-07-10 NOTE — PLAN OF CARE
Problem: Adult Inpatient Plan of Care  Goal: Plan of Care Review  Outcome: Progressing  Goal: Patient-Specific Goal (Individualized)  Outcome: Progressing  Goal: Absence of Hospital-Acquired Illness or Injury  Outcome: Progressing  Goal: Readiness for Transition of Care  Outcome: Progressing     Problem: Acute Kidney Injury/Impairment  Goal: Improved Oral Intake  Outcome: Progressing  Goal: Effective Renal Function  Outcome: Progressing     Problem: Infection  Goal: Absence of Infection Signs and Symptoms  Outcome: Progressing     Problem: Wound  Goal: Improved Oral Intake  Outcome: Progressing

## 2025-07-10 NOTE — SUBJECTIVE & OBJECTIVE
Interval History:     Review of Systems   All other systems reviewed and are negative.    Objective:     Vital Signs (Most Recent):  Temp: 98 °F (36.7 °C) (07/10/25 1150)  Pulse: 78 (07/10/25 1150)  Resp: 19 (07/10/25 1150)  BP: 136/75 (07/10/25 1150)  SpO2: 100 % (07/10/25 1200) Vital Signs (24h Range):  Temp:  [97.5 °F (36.4 °C)-98.5 °F (36.9 °C)] 98 °F (36.7 °C)  Pulse:  [73-94] 78  Resp:  [17-20] 19  SpO2:  [96 %-100 %] 100 %  BP: (104-148)/(63-86) 136/75     Weight: 105.9 kg (233 lb 7.5 oz)  Body mass index is 36.57 kg/m².    Intake/Output Summary (Last 24 hours) at 7/10/2025 1449  Last data filed at 7/10/2025 0809  Gross per 24 hour   Intake 240 ml   Output --   Net 240 ml           Physical Exam  Vitals and nursing note reviewed.   HENT:      Head: Normocephalic.      Mouth/Throat:      Mouth: Mucous membranes are moist.   Eyes:      Pupils: Pupils are equal, round, and reactive to light.   Neck:      Vascular: No carotid bruit.   Cardiovascular:      Rate and Rhythm: Normal rate and regular rhythm.   Pulmonary:      Effort: Pulmonary effort is normal.   Abdominal:      General: Abdomen is flat. Bowel sounds are normal. There is no distension.      Palpations: Abdomen is soft.   Musculoskeletal:      Right lower leg: No edema.      Left lower leg: No edema.   Skin:     General: Skin is warm and dry.   Neurological:      General: No focal deficit present.      Mental Status: She is alert. Mental status is at baseline.               Significant Labs: All pertinent labs within the past 24 hours have been reviewed.  BMP:   Recent Labs   Lab 07/10/25  0316   *      K 3.9   CL 98   CO2 29   BUN 54*   CREATININE 4.06*   CALCIUM 8.5     CBC:   Recent Labs   Lab 07/09/25  0508 07/10/25  0316   WBC 12.11* 13.73*   HGB 7.7* 8.4*   HCT 26.2* 28.6*   * 609*     CMP:   Recent Labs   Lab 07/09/25  0508 07/10/25  0316    139   K 3.0* 3.9   CL 96* 98   CO2 30* 29   * 135*   BUN 58* 54*    CREATININE 3.94* 4.06*   CALCIUM 8.1* 8.5   ANIONGAP 16 16       Significant Imaging: I have reviewed all pertinent imaging results/findings within the past 24 hours.

## 2025-07-10 NOTE — ASSESSMENT & PLAN NOTE
Patient seen by Dr. Upton for abscess on lumber spine 2 weeks ago.  Culture positive for MRSA  Patient was started on Vancomycin  and discharged  Patient subsequently developed MATT  Patient remains febrile   Vancomycin D/c due to MATT  Initiating Daptomycin with pharmacy renally dosing  Pain control managed with Tylenol 1000 mg and 5mg Oxycodone    Persistently febrile in the setting of postoperative infection with the implantation of lumbar hardware.  MRSA.  Consult ortho spine, consult ID  6/28 continue dapto, added cefepime. MRI could not be done with contrast due to renal function  Persistently febrile.  Broadened to carbapenem    7/9  - picc line placed, dapto for spinal abscess continues, will get timeline for IV meds today    7/10  - ID recs: - continue daptomycin (610mg)  8mg/kg iv q48hr administered through PICC    - will need weekly labs: cbc w/ diff and cmp and CPK    - EOT for abx: 7/24/25    - Will need suppression for 1 year via bactrim given concern for HW infection w/o removal as MRSA was resistant to tetracycline. This should be done  after        completing IV abx course, but the dose will be dependent on her renal function at that time. Please call [facility] call center and ask for the [facility] ID physician to verify dose   - will discuss with  about options for abx

## 2025-07-10 NOTE — PT/OT/SLP PROGRESS
Physical Therapy Treatment    Patient Name:  Carey Leonardo   MRN:  [id number]    Recommendations:     Discharge Recommendations: Moderate Intensity Therapy  Discharge Equipment Recommendations: to be determined by next level of care  Barriers to discharge: ongoing medical care    Assessment:     Carey Leonardo is a 49 y.o. female admitted with a medical diagnosis of MATT (acute kidney injury).  She presents with the following impairments/functional limitations: impaired endurance, orthopedic precautions, impaired cognition. Pt more alert this session as compared to last session with this therapist.     Rehab Prognosis: Good and Fair; patient would benefit from acute skilled PT services to address these deficits and reach maximum level of function.    Recent Surgery: Procedure(s) (LRB):  INCISION AND DRAINAGE, ABSCESS, SPINE, LUMBOSACRAL, POSTERIOR (N/A) 10 Days Post-Op    Plan:     During this hospitalization, patient to be seen 5 x/week to address the identified rehab impairments via gait training, therapeutic activities, therapeutic exercises and progress toward the following goals:    Plan of Care Expires:  08/04/25    Subjective     Chief Complaint: MATT (acute kidney injury)   Patient/Family Comments/goals: agreeable  Pain/Comfort:         Objective:     Communicated with RN prior to session.  Patient found ambulatory in room/turcios with peripheral IV, pulse ox (continuous) upon PT entry to room.     General Precautions: Standard, contact, fall  Orthopedic Precautions: spinal precautions  Braces: N/A  Respiratory Status: Room air     Functional Mobility:  Transfers:     Sit to Stand:  contact guard assistance with no AD and hand-held assist  Toilet Transfer: contact guard assistance with  hand-held assist and grab bars  using  Step Transfer  Gait: 40ft, 86ft with CGA progressing to SBA, average debra, slightly unsteady, used wall for UE support intermittently  Balance: Fair+ in stance       AM-PAC 6 CLICK  MOBILITY          Treatment & Education:  Mobility as stated above.     Patient left up in chair with all lines intact, call button in reach, RN notified, and family present..    GOALS:   Multidisciplinary Problems       Physical Therapy Goals          Problem: Physical Therapy    Goal Priority Disciplines Outcome Interventions   Physical Therapy Goal     PT, PT/OT Progressing    Description: Short term goals:  1. Supine to sit with MInimal Assistance  2. Sit to stand transfer with Minimal Assistance  3. Bed to chair transfer with Minimal Assistance using Rolling Walker  4. Gait  x 50 feet with Minimal Assistance using Rolling Walker.     Long term goals:  1. Supine to sit with Contact Guard Assistance  2. Sit to stand transfer with Contact Guard Assistance  3. Bed to chair transfer with Contact Guard Assistance using Rolling Walker  4. Gait  x 100 feet with Contact Guard Assistance using Rolling Walker.                          DME Justifications:   Carey's mobility limitation cannot be sufficiently resolved by the use of a cane. Her functional mobility deficit can be sufficiently resolved with the use of a Rollator. Patient's mobility limitation significantly impairs their ability to participate in one of more activities of daily living.  The use of a Rollator will significantly improve the patient's ability to participate in MRADLS and the patient will use it on regular basis in the home.      Time Tracking:     PT Received On: 07/09/25  PT Start Time: 1545     PT Stop Time: 1609  PT Total Time (min): 24 min     Billable Minutes: Gait Training 15 and Therapeutic Activity 9    Treatment Type: Treatment  PT/PTA: PT     Number of PTA visits since last PT visit: 1     07/10/2025

## 2025-07-10 NOTE — ASSESSMENT & PLAN NOTE
Patient's most recent potassium results are listed below.   Recent Labs     07/08/25  0419 07/09/25  0508 07/10/25  0316   K 3.2* 3.0* 3.9     Plan  - Replete potassium per protocol  - Monitor potassium Daily  - Patient's hypokalemia is worsening. Will adjust treatment as follows:    - 20 mEq Effer K  Stable

## 2025-07-10 NOTE — ASSESSMENT & PLAN NOTE
Hyponatremia is likely due to Dehydration/hypovolemia and renal insufficiency. The patient's most recent sodium results are listed below.  Recent Labs     07/08/25  0419 07/09/25  0508 07/10/25  0316    139 139     Plan  - Correct the sodium by 4-6mEq in 24 hours.   - Obtain the following studies: Urine sodium, urine osmolality, serum osmolality.  - Will treat the hyponatremia with IV fluids as follows: continuous .9 normal saline IV infusion  - Monitor sodium Daily.   - Patient hyponatremia is stable  Resolved
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
## 2025-07-11 LAB
ANION GAP SERPL CALCULATED.3IONS-SCNC: 18 MMOL/L (ref 7–16)
BASOPHILS # BLD AUTO: 0.05 K/UL (ref 0–0.2)
BASOPHILS NFR BLD AUTO: 0.4 % (ref 0–1)
BUN SERPL-MCNC: 54 MG/DL (ref 7–19)
BUN/CREAT SERPL: 14 (ref 6–20)
CALCIUM SERPL-MCNC: 8.6 MG/DL (ref 8.4–10.2)
CHLORIDE SERPL-SCNC: 100 MMOL/L (ref 98–107)
CO2 SERPL-SCNC: 29 MMOL/L (ref 22–29)
CREAT SERPL-MCNC: 3.86 MG/DL (ref 0.55–1.02)
DIFFERENTIAL METHOD BLD: ABNORMAL
EGFR (NO RACE VARIABLE) (RUSH/TITUS): 14 ML/MIN/1.73M2
EOSINOPHIL # BLD AUTO: 1.07 K/UL (ref 0–0.5)
EOSINOPHIL NFR BLD AUTO: 8.4 % (ref 1–4)
ERYTHROCYTE [DISTWIDTH] IN BLOOD BY AUTOMATED COUNT: 19.1 % (ref 11.5–14.5)
GLUCOSE SERPL-MCNC: 116 MG/DL (ref 70–105)
GLUCOSE SERPL-MCNC: 124 MG/DL (ref 70–105)
GLUCOSE SERPL-MCNC: 127 MG/DL (ref 70–105)
GLUCOSE SERPL-MCNC: 143 MG/DL (ref 74–100)
GLUCOSE SERPL-MCNC: 218 MG/DL (ref 70–105)
GLUCOSE SERPL-MCNC: 250 MG/DL (ref 70–105)
HCT VFR BLD AUTO: 27.6 % (ref 38–47)
HGB BLD-MCNC: 7.9 G/DL (ref 12–16)
IMM GRANULOCYTES # BLD AUTO: 0.34 K/UL (ref 0–0.04)
IMM GRANULOCYTES NFR BLD: 2.7 % (ref 0–0.4)
LYMPHOCYTES # BLD AUTO: 2.49 K/UL (ref 1–4.8)
LYMPHOCYTES NFR BLD AUTO: 19.5 % (ref 27–41)
MCH RBC QN AUTO: 26.2 PG (ref 27–31)
MCHC RBC AUTO-ENTMCNC: 28.6 G/DL (ref 32–36)
MCV RBC AUTO: 91.7 FL (ref 80–96)
MONOCYTES # BLD AUTO: 1.24 K/UL (ref 0–0.8)
MONOCYTES NFR BLD AUTO: 9.7 % (ref 2–6)
MPC BLD CALC-MCNC: 10 FL (ref 9.4–12.4)
NEUTROPHILS # BLD AUTO: 7.61 K/UL (ref 1.8–7.7)
NEUTROPHILS NFR BLD AUTO: 59.3 % (ref 53–65)
NRBC # BLD AUTO: 0 X10E3/UL
NRBC, AUTO (.00): 0 %
PLATELET # BLD AUTO: 613 K/UL (ref 150–400)
POTASSIUM SERPL-SCNC: 3.7 MMOL/L (ref 3.5–5.1)
RBC # BLD AUTO: 3.01 M/UL (ref 4.2–5.4)
SODIUM SERPL-SCNC: 143 MMOL/L (ref 136–145)
WBC # BLD AUTO: 12.8 K/UL (ref 4.5–11)

## 2025-07-11 PROCEDURE — 97116 GAIT TRAINING THERAPY: CPT

## 2025-07-11 PROCEDURE — 97110 THERAPEUTIC EXERCISES: CPT

## 2025-07-11 PROCEDURE — 11000001 HC ACUTE MED/SURG PRIVATE ROOM

## 2025-07-11 PROCEDURE — 25000003 PHARM REV CODE 250: Performed by: ORTHOPAEDIC SURGERY

## 2025-07-11 PROCEDURE — 25000003 PHARM REV CODE 250: Performed by: INTERNAL MEDICINE

## 2025-07-11 PROCEDURE — 63600175 PHARM REV CODE 636 W HCPCS: Performed by: ORTHOPAEDIC SURGERY

## 2025-07-11 PROCEDURE — 85025 COMPLETE CBC W/AUTO DIFF WBC: CPT | Performed by: ORTHOPAEDIC SURGERY

## 2025-07-11 PROCEDURE — 99900035 HC TECH TIME PER 15 MIN (STAT)

## 2025-07-11 PROCEDURE — 82962 GLUCOSE BLOOD TEST: CPT

## 2025-07-11 PROCEDURE — 36415 COLL VENOUS BLD VENIPUNCTURE: CPT | Performed by: ORTHOPAEDIC SURGERY

## 2025-07-11 PROCEDURE — 80048 BASIC METABOLIC PNL TOTAL CA: CPT | Performed by: ORTHOPAEDIC SURGERY

## 2025-07-11 PROCEDURE — 94761 N-INVAS EAR/PLS OXIMETRY MLT: CPT

## 2025-07-11 PROCEDURE — 99232 SBSQ HOSP IP/OBS MODERATE 35: CPT | Mod: ,,,

## 2025-07-11 PROCEDURE — 25000003 PHARM REV CODE 250

## 2025-07-11 PROCEDURE — 63600175 PHARM REV CODE 636 W HCPCS: Performed by: NURSE PRACTITIONER

## 2025-07-11 PROCEDURE — 27000207 HC ISOLATION

## 2025-07-11 RX ORDER — FUROSEMIDE 40 MG/1
80 TABLET ORAL DAILY
Status: DISCONTINUED | OUTPATIENT
Start: 2025-07-12 | End: 2025-07-15 | Stop reason: HOSPADM

## 2025-07-11 RX ADMIN — LEVOTHYROXINE SODIUM 200 MCG: 100 TABLET ORAL at 05:07

## 2025-07-11 RX ADMIN — DAPTOMYCIN 610 MG: 500 INJECTION, POWDER, LYOPHILIZED, FOR SOLUTION INTRAVENOUS at 12:07

## 2025-07-11 RX ADMIN — HEPARIN SODIUM 5000 UNITS: 5000 INJECTION, SOLUTION INTRAVENOUS; SUBCUTANEOUS at 09:07

## 2025-07-11 RX ADMIN — INSULIN GLARGINE 20 UNITS: 100 INJECTION, SOLUTION SUBCUTANEOUS at 08:07

## 2025-07-11 RX ADMIN — HEPARIN SODIUM 5000 UNITS: 5000 INJECTION, SOLUTION INTRAVENOUS; SUBCUTANEOUS at 05:07

## 2025-07-11 RX ADMIN — METOPROLOL TARTRATE 50 MG: 50 TABLET, FILM COATED ORAL at 08:07

## 2025-07-11 RX ADMIN — HEPARIN SODIUM 5000 UNITS: 5000 INJECTION, SOLUTION INTRAVENOUS; SUBCUTANEOUS at 12:07

## 2025-07-11 RX ADMIN — HYDROCODONE BITARTRATE AND ACETAMINOPHEN 1 TABLET: 5; 325 TABLET ORAL at 06:07

## 2025-07-11 RX ADMIN — PANTOPRAZOLE SODIUM 40 MG: 40 INJECTION, POWDER, FOR SOLUTION INTRAVENOUS at 09:07

## 2025-07-11 RX ADMIN — AMIODARONE HYDROCHLORIDE 200 MG: 200 TABLET ORAL at 09:07

## 2025-07-11 RX ADMIN — POTASSIUM CHLORIDE 20 MEQ: 1500 TABLET, EXTENDED RELEASE ORAL at 09:07

## 2025-07-11 RX ADMIN — QUETIAPINE FUMARATE 25 MG: 25 TABLET ORAL at 08:07

## 2025-07-11 RX ADMIN — INSULIN ASPART 5 UNITS: 100 INJECTION, SOLUTION INTRAVENOUS; SUBCUTANEOUS at 06:07

## 2025-07-11 RX ADMIN — METOPROLOL TARTRATE 50 MG: 50 TABLET, FILM COATED ORAL at 09:07

## 2025-07-11 RX ADMIN — FUROSEMIDE 80 MG: 40 TABLET ORAL at 09:07

## 2025-07-11 RX ADMIN — INSULIN ASPART 2 UNITS: 100 INJECTION, SOLUTION INTRAVENOUS; SUBCUTANEOUS at 08:07

## 2025-07-11 RX ADMIN — INSULIN ASPART 5 UNITS: 100 INJECTION, SOLUTION INTRAVENOUS; SUBCUTANEOUS at 08:07

## 2025-07-11 RX ADMIN — INSULIN ASPART 5 UNITS: 100 INJECTION, SOLUTION INTRAVENOUS; SUBCUTANEOUS at 12:07

## 2025-07-11 RX ADMIN — MICONAZOLE NITRATE ANTIFUNGAL POWDER: 2 POWDER TOPICAL at 09:07

## 2025-07-11 NOTE — ASSESSMENT & PLAN NOTE
MATT is likely due to ATN from vancomycin Baseline creatinine is .65. Most recent creatinine and eGFR are listed below.  Recent Labs     07/09/25  0508 07/10/25  0316 07/11/25  0502   CREATININE 3.94* 4.06* 3.86*   EGFRNORACEVR 13* 13* 14*     Given 1,848 ml bolus of normal saline in the ED     Plan  - MATT is worsening. Will adjust treatment as follows: continuous infusion of .9 Normal Saline  - Avoid nephrotoxins and renally dose meds for GFR listed above  - Monitor urine output, serial BMP, and adjust therapy as needed  6/27 continue IV fluids  6/28 consult nephrology, worse  6/29 now developing respiratory failure with pulmonary edema.    7/9  - renal function improving, nephrology following

## 2025-07-11 NOTE — PROGRESS NOTES
Patient denies shortness of breath.  Review of systems GI-she denies nausea or vomiting    Physical exam general patient is chronically ill-appearing, she is in no acute distress, she has no pitting edema.    Assessment/plan 1.  Acute renal failure-this patient's creatinine is improved to 3.7 mg/dL from around 3.9 yesterday, continue to monitor for recovery.  The patient's creatinine was normal about 2-3 weeks ago, it increased precipitous a couple of weeks ago going from normal up to 4 mg/dL.  The major cause is thought to be vancomycin toxicity.   2. HTN-patient's blood pressure controlled, continue present antihypertensives  3.  Diabetes mellitus-continue present hypoglycemic therapy   4. Volume overload-patient has been on Lasix 80 mg twice a day, I think she is reaching a euvolemic state.  I am going to decrease her Lasix to 80 mg once a day   5.  Spinal abscess-continue wound care and antibiotics

## 2025-07-11 NOTE — PLAN OF CARE
"Per consult,  antibiotic order [facility]ed to [facility].  Will f/u as needed.    1300 Called [facility] to check on receipt of order and status.  [place] that fax did not go through as it "timed out".  Refaxed order to [facility].  Marilou stated she will look for it now.  Notified her that we'd like to dc patient today if possible.  Noted that patient has .  Will f/u as needed.  "

## 2025-07-11 NOTE — ASSESSMENT & PLAN NOTE
Anemia is likely due to infection . Most recent hemoglobin and hematocrit are listed below.  Recent Labs     07/09/25  0508 07/10/25  0316 07/11/25  0503   HGB 7.7* 8.4* 7.9*   HCT 26.2* 28.6* 27.6*     Plan  - Monitor serial CBC: Daily  - Transfuse PRBC if patient becomes hemodynamically unstable, symptomatic or H/H drops below 7/21.  - Patient has not received any PRBC transfusions to date  - Patient's anemia is currently stable  - Monitor CBC if H/H continue to drop consider transfusion.  Follow

## 2025-07-11 NOTE — PT/OT/SLP PROGRESS
Occupational Therapy   Treatment    Name: Carey Leonardo  MRN: [id number]  Admitting Diagnosis:  MATT (acute kidney injury)  11 Days Post-Op    Recommendations:     Discharge Recommendations: Low Intensity Therapy  Discharge Equipment Recommendations:  to be determined by next level of care  Barriers to discharge:  None    Assessment:     Carey Leonardo is a 49 y.o. female with a medical diagnosis of MATT (acute kidney injury).  She presents with alert and agreeable to treatment. Performance deficits affecting function are weakness, impaired endurance, impaired functional mobility, orthopedic precautions.     Rehab Prognosis:  Good; patient would benefit from acute skilled OT services to address these deficits and reach maximum level of function.       Plan:     Patient to be seen 5 x/week to address the above listed problems via self-care/home management, therapeutic activities, therapeutic exercises  Plan of Care Expires: 08/09/25  Plan of Care Reviewed with: patient, sibling    Subjective     Chief Complaint: MATT (acute kidney injury)    Patient/Family Comments/goals: Pt is agreeable to treatment  Pain/Comfort:  Pain Rating 1: 0/10  Pain Rating Post-Intervention 1: 0/10    Objective:     Communicated with: MELANY [place] prior to session.  Patient found HOB elevated with peripheral IV, telemetry upon OT entry to room.    General Precautions: Standard, contact, fall    Orthopedic Precautions:spinal precautions  Braces: N/A  Respiratory Status: Room air     Occupational Performance:     Bed Mobility:    Patient completed Supine to Sit with independence     Functional Mobility/Transfers:  Patient completed Sit <> Stand Transfer with contact guard assistance  with  rolling walker   Functional Mobility: Pt ambulated a couple of laps around the room and to the bath room to wash her hands and her face at the sink.    Activities of Daily Living:  Grooming: supervision to stand at the sink and wash her hands and her  face  Lower Body Dressing: modified independence with socks      [facility] 6 Click ADL: 22    Treatment & Education:  Pt performed (B) UE ex while standing:   Shoulder extension with red t-band x 15 reps   Horizontal shoulder abduction with red t-band x 15 reps     Pt sat EOB and completed her (B) UE ex for 2 x 15 reps of each:   Shoulder flexion with 1# db   Elbow flexion with 2# db   Punches with 1# db   Shoulder ER with red t-band   Elbow extension with red t-band   Handhelper with 3 bands of resistance    Pt demonstrated improve standing balance with activities today. Pt is much more steady than previous notes had indicated.    Dr. Evans came in and spoke to pt and answered questions for about 10 min during tx session.         Patient left sitting edge of bed with call button in reach and LPN notified, and sister present    GOALS:   Multidisciplinary Problems       Occupational Therapy Goals          Problem: Occupational Therapy    Goal Priority Disciplines Outcome Interventions   Occupational Therapy Goal     OT, PT/OT Progressing    Description: STG:  Pt will perform grooming with setup  Pt will bathe with min a with AD as needed  Pt will perform UE dressing with (I)  Pt will perform LE dressing with Min a with AD as needed  Pt will sit EOB x 15 min with (I)   Pt will transfer bed/chair/bsc with Min a with RW  Pt will perform standing x 1 min with CGA assistance  Pt will tolerate 20 minutes of tx without fatigue      LT.Restore to max I with self care and mobility.                              Time Tracking:     OT Date of Treatment: 25  OT Start Time: 1128  OT Stop Time: 1210  OT Total Time (min): 42 min    Billable Minutes:Therapeutic Exercise 32 min    OT/CIPRIANO: OT          2025
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
## 2025-07-11 NOTE — PROGRESS NOTES
Office: [phone]    Subjective:   Renal function improving.    I/O as of 7:32 AM:   Intake/Output - Last 3 Shifts         07/09 0700  07/10 0659 07/10 0700 07/11 0659 07/11 0700 07/12 0659    P.O.  720     Total Intake(mL/kg)  720 (6.8)     Net  +720            Unmeasured Urine Occurrence 3 x 10 x     Unmeasured Stool Occurrence  1 x              Vitals / Physical Exam:  Vitals:    07/10/25 2344 07/11/25 0026 07/11/25 0444 07/11/25 0745   BP:  124/68 118/71 124/77   BP Location:       Patient Position:       Pulse: 84 75 73 81   Resp: 18 12 16    Temp:  98 °F (36.7 °C) 98.4 °F (36.9 °C) 98.3 °F (36.8 °C)   TempSrc:  Oral Oral Oral   SpO2: 96% 96% 98% 100%   Weight:       Height:            AOx3   NAD  Dressing CDI      Motor  C5 C6 C7 C8 T1      Left  5 5 5 5 5      Right  5 5 5 5 5   Sensory           Left  + + + + +     Right + + + + +     Motor L2 L3 L4 L5 S1   Left 5 5 5 5 5   Right 5 5 5 5 5   Sensory        Left  + + + + +   Right + + + + +         Assessment / Plan:   Carey Leonardo is a 49 y.o. female now 11 Days Post-Op  s/p Procedure(s) (LRB):  INCISION AND DRAINAGE, ABSCESS, SPINE, LUMBOSACRAL, POSTERIOR (N/A).    - medical comanagement per hospitalist  - Progressing postoperatively  - Please keep dressing clean, dry and intact; change as needed  - Pain at this time is well controlled; continue current pain regiment  - TEDs/SCDs  - PT  - Bowel regimen  - Please call should you have any questions regarding this patient's care      Signature:  Cyril Upton MD     Electronic Signature

## 2025-07-11 NOTE — PLAN OF CARE
Spoke with chaparro at [facility]. Patients insurance requires a prior auth for med coverage under [facility] medical. Updated dr lopez. Updated patient.

## 2025-07-11 NOTE — ASSESSMENT & PLAN NOTE
Patient's blood pressure range in the last 24 hours was: BP  Min: 118/71  Max: 148/83. Patient requires and takes all medications due to treatment resistant Hypertension that was initiated by Dr. Hurst. The patient's inpatient anti-hypertensive regimen is listed below:  Current Antihypertensives  cloNIDine tablet 0.1 mg, Every 6 hours PRN, Oral  metoprolol tartrate (LOPRESSOR) tablet 50 mg, 2 times daily, Oral  metoprolol injection 5 mg, Every 6 hours PRN, Intravenous  furosemide tablet 80 mg, Daily, Oral    Plan  - BP is controlled, no changes needed to their regimen  - follow
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
## 2025-07-11 NOTE — PLAN OF CARE
Per consult, according to cm notes patient is current with Zoey baron.  Will continue to follow for anticipated dc needs/dc planning.    0800 Faxed updated notes to Zoey baron.

## 2025-07-11 NOTE — ASSESSMENT & PLAN NOTE
Patient's most recent potassium results are listed below.   Recent Labs     07/09/25  0508 07/10/25  0316 07/11/25  0502   K 3.0* 3.9 3.7     Plan  - Replete potassium per protocol  - Monitor potassium Daily  - Patient's hypokalemia is worsening. Will adjust treatment as follows:    - 20 mEq Effer K  Stable

## 2025-07-11 NOTE — ASSESSMENT & PLAN NOTE
Patient seen by Dr. Upton for abscess on lumber spine 2 weeks ago.  Culture positive for MRSA  Patient was started on Vancomycin  and discharged  Patient subsequently developed MATT  Patient remains febrile   Vancomycin D/c due to MATT  Initiating Daptomycin with pharmacy renally dosing  Pain control managed with Tylenol 1000 mg and 5mg Oxycodone    Persistently febrile in the setting of postoperative infection with the implantation of lumbar hardware.  MRSA.  Consult ortho spine, consult ID  6/28 continue dapto, added cefepime. MRI could not be done with contrast due to renal function  Persistently febrile.  Broadened to carbapenem    7/9  - picc line placed, dapto for spinal abscess continues, will get timeline for IV meds today    7/10  - ID recs: - continue daptomycin (610mg)  8mg/kg iv q48hr administered through PICC    - will need weekly labs: cbc w/ diff and cmp and CPK    - EOT for abx: 7/24/25    - Will need suppression for 1 year via bactrim given concern for HW infection w/o removal as MRSA was resistant to tetracycline. This should be done  after        completing IV abx course, but the dose will be dependent on her renal function at that time. Please call [facility] call center and ask for the [facility] physician to verify dose   - will discuss with  about options for abx    7/11  - renal function improving, nephro following  - Ortho cleared for discharge to follow up next week in office for suture removal  - submitted for vital care for abx home admin, once approved can be dc home

## 2025-07-11 NOTE — PT/OT/SLP PROGRESS
Physical Therapy Treatment    Patient Name:  Carey Leonardo   MRN:  [id number]    Recommendations:     Discharge Recommendations: Low Intensity Therapy  Discharge Equipment Recommendations: to be determined by next level of care  Barriers to discharge: ongoing medical care    Assessment:     Carey Leonardo is a 49 y.o. female admitted with a medical diagnosis of MATT (acute kidney injury).  She presents with the following impairments/functional limitations: weakness, impaired endurance, impaired functional mobility, gait instability, impaired balance, decreased lower extremity function, decreased safety awareness, orthopedic precautions.  Pt tolerated treatment well with no complaints voiced during treatment session.  Pt voiced no complaints of pain with ex in unsupported sitting or short distance ambulation in room with RW.    Rehab Prognosis: Good and Fair; patient would benefit from acute skilled PT services to address these deficits and reach maximum level of function.    Recent Surgery: Procedure(s) (LRB):  INCISION AND DRAINAGE, ABSCESS, SPINE, LUMBOSACRAL, POSTERIOR (N/A) 11 Days Post-Op    Plan:     During this hospitalization, patient to be seen 5 x/week to address the identified rehab impairments via gait training, therapeutic activities, therapeutic exercises, neuromuscular re-education and progress toward the following goals:    Plan of Care Expires:  08/04/25    Subjective     Chief Complaint: MATT (acute kidney injury)   Patient/Family Comments/goals: agreeable to treatment  Pain/Comfort:  Pain Rating 1: 0/10  Pain Rating Post-Intervention 1: 0/10      Objective:     Communicated with RN prior to session.  Patient found ambulatory in room/turcios with peripheral IV, telemetry upon PT entry to room.     General Precautions: Standard, contact, fall  Orthopedic Precautions: spinal precautions  Braces: N/A  Respiratory Status: Room air     Functional Mobility:  Transfers:     Sit to Stand:  stand by assistance  with rolling walker  Gait: 80 ft with SBA using RW with slow, deliberate gait noted.  Balance: good-        AM-PAC 6 CLICK MOBILITY  Turning over in bed (including adjusting bedclothes, sheets and blankets)?: 4  Sitting down on and standing up from a chair with arms (e.g., wheelchair, bedside commode, etc.): 4  Moving from lying on back to sitting on the side of the bed?: 4  Moving to and from a bed to a chair (including a wheelchair)?: 4  Need to walk in hospital room?: 4  Climbing 3-5 steps with a railing?: 3  Basic Mobility Total Score: 23       Treatment & Education:  TE:  Pt performed B LE seated AP, LAQ, marching, hip add squeezes in unsupported sitting at EOB all x 2 sets 15 reps for LE flexibility and strengthening to facilitate improved safety and stability with functional mobility.     Gait:  Functional mobility as noted above.  Pt walking from hallway bathroom with nurse upon PT entry to room.    Patient left up in chair with all lines intact, call button in reach, RN notified, and family present..    GOALS:   Multidisciplinary Problems       Physical Therapy Goals          Problem: Physical Therapy    Goal Priority Disciplines Outcome Interventions   Physical Therapy Goal     PT, PT/OT Progressing    Description: Short term goals:  1. Supine to sit with MInimal Assistance  2. Sit to stand transfer with Minimal Assistance  3. Bed to chair transfer with Minimal Assistance using Rolling Walker  4. Gait  x 50 feet with Minimal Assistance using Rolling Walker.     Long term goals:  1. Supine to sit with Contact Guard Assistance  2. Sit to stand transfer with Contact Guard Assistance  3. Bed to chair transfer with Contact Guard Assistance using Rolling Walker  4. Gait  x 100 feet with Contact Guard Assistance using Rolling Walker.                          DME Justifications:   Carey's mobility limitation cannot be sufficiently resolved by the use of a cane. Her functional mobility deficit can be  sufficiently resolved with the use of a Rollator. Patient's mobility limitation significantly impairs their ability to participate in one of more activities of daily living.  The use of a Rollator will significantly improve the patient's ability to participate in MRADLS and the patient will use it on regular basis in the home.      Time Tracking:     PT Received On: 07/11/25  PT Start Time: 1348     PT Stop Time: 1412  PT Total Time (min): 24 min     Billable Minutes: Gait Training 12 and Therapeutic Activity 12    Treatment Type: Treatment  PT/PTA: PT     Number of PTA visits since last PT visit: 0     07/11/2025

## 2025-07-11 NOTE — ASSESSMENT & PLAN NOTE
Hyponatremia is likely due to Dehydration/hypovolemia and renal insufficiency. The patient's most recent sodium results are listed below.  Recent Labs     07/09/25  0508 07/10/25  0316 07/11/25  0502    139 143     Plan  - Correct the sodium by 4-6mEq in 24 hours.   - Obtain the following studies: Urine sodium, urine osmolality, serum osmolality.  - Will treat the hyponatremia with IV fluids as follows: continuous .9 normal saline IV infusion  - Monitor sodium Daily.   - Patient hyponatremia is stable  Resolved

## 2025-07-11 NOTE — SUBJECTIVE & OBJECTIVE
Interval History:     Review of Systems   All other systems reviewed and are negative.    Objective:     Vital Signs (Most Recent):  Temp: 97.2 °F (36.2 °C) (07/11/25 1130)  Pulse: 82 (07/11/25 1130)  Resp: 16 (07/11/25 0444)  BP: (!) 148/83 (07/11/25 1130)  SpO2: 100 % (07/11/25 1130) Vital Signs (24h Range):  Temp:  [97.2 °F (36.2 °C)-98.9 °F (37.2 °C)] 97.2 °F (36.2 °C)  Pulse:  [73-85] 82  Resp:  [12-18] 16  SpO2:  [96 %-100 %] 100 %  BP: (118-148)/(68-83) 148/83     Weight: 105.9 kg (233 lb 7.5 oz)  Body mass index is 36.57 kg/m².    Intake/Output Summary (Last 24 hours) at 7/11/2025 1407  Last data filed at 7/10/2025 1630  Gross per 24 hour   Intake 240 ml   Output --   Net 240 ml           Physical Exam  Vitals and nursing note reviewed.   HENT:      Head: Normocephalic.      Mouth/Throat:      Mouth: Mucous membranes are moist.   Eyes:      Pupils: Pupils are equal, round, and reactive to light.   Neck:      Vascular: No carotid bruit.   Cardiovascular:      Rate and Rhythm: Normal rate and regular rhythm.   Pulmonary:      Effort: Pulmonary effort is normal.   Abdominal:      General: Abdomen is flat. Bowel sounds are normal. There is no distension.      Palpations: Abdomen is soft.   Musculoskeletal:      Right lower leg: No edema.      Left lower leg: No edema.   Skin:     General: Skin is warm and dry.   Neurological:      General: No focal deficit present.      Mental Status: She is alert. Mental status is at baseline.               Significant Labs: All pertinent labs within the past 24 hours have been reviewed.  BMP:   Recent Labs   Lab 07/11/25  0502   *      K 3.7      CO2 29   BUN 54*   CREATININE 3.86*   CALCIUM 8.6     CBC:   Recent Labs   Lab 07/10/25  0316 07/11/25  0503   WBC 13.73* 12.80*   HGB 8.4* 7.9*   HCT 28.6* 27.6*   * 613*     CMP:   Recent Labs   Lab 07/10/25  0316 07/11/25  0502    143   K 3.9 3.7   CL 98 100   CO2 29 29   * 143*   BUN 54* 54*    CREATININE 4.06* 3.86*   CALCIUM 8.5 8.6   ANIONGAP 16 18*       Significant Imaging: I have reviewed all pertinent imaging results/findings within the past 24 hours.

## 2025-07-12 LAB
ANION GAP SERPL CALCULATED.3IONS-SCNC: 16 MMOL/L (ref 7–16)
BASOPHILS # BLD AUTO: 0.06 K/UL (ref 0–0.2)
BASOPHILS NFR BLD AUTO: 0.5 % (ref 0–1)
BUN SERPL-MCNC: 54 MG/DL (ref 7–19)
BUN/CREAT SERPL: 13 (ref 6–20)
CALCIUM SERPL-MCNC: 8.6 MG/DL (ref 8.4–10.2)
CHLORIDE SERPL-SCNC: 100 MMOL/L (ref 98–107)
CO2 SERPL-SCNC: 29 MMOL/L (ref 22–29)
CREAT SERPL-MCNC: 4.22 MG/DL (ref 0.55–1.02)
DIFFERENTIAL METHOD BLD: ABNORMAL
EGFR (NO RACE VARIABLE) (RUSH/TITUS): 12 ML/MIN/1.73M2
EOSINOPHIL # BLD AUTO: 1.18 K/UL (ref 0–0.5)
EOSINOPHIL NFR BLD AUTO: 10.2 % (ref 1–4)
ERYTHROCYTE [DISTWIDTH] IN BLOOD BY AUTOMATED COUNT: 19.5 % (ref 11.5–14.5)
GLUCOSE SERPL-MCNC: 152 MG/DL (ref 70–105)
GLUCOSE SERPL-MCNC: 154 MG/DL (ref 70–105)
GLUCOSE SERPL-MCNC: 163 MG/DL (ref 70–105)
GLUCOSE SERPL-MCNC: 163 MG/DL (ref 74–100)
GLUCOSE SERPL-MCNC: 229 MG/DL (ref 70–105)
HCT VFR BLD AUTO: 27.6 % (ref 38–47)
HGB BLD-MCNC: 8.1 G/DL (ref 12–16)
IMM GRANULOCYTES # BLD AUTO: 0.18 K/UL (ref 0–0.04)
IMM GRANULOCYTES NFR BLD: 1.6 % (ref 0–0.4)
LYMPHOCYTES # BLD AUTO: 2.32 K/UL (ref 1–4.8)
LYMPHOCYTES NFR BLD AUTO: 20.1 % (ref 27–41)
MCH RBC QN AUTO: 26.5 PG (ref 27–31)
MCHC RBC AUTO-ENTMCNC: 29.3 G/DL (ref 32–36)
MCV RBC AUTO: 90.2 FL (ref 80–96)
MONOCYTES # BLD AUTO: 1.17 K/UL (ref 0–0.8)
MONOCYTES NFR BLD AUTO: 10.1 % (ref 2–6)
MPC BLD CALC-MCNC: 10.5 FL (ref 9.4–12.4)
NEUTROPHILS # BLD AUTO: 6.64 K/UL (ref 1.8–7.7)
NEUTROPHILS NFR BLD AUTO: 57.5 % (ref 53–65)
NRBC # BLD AUTO: 0.02 X10E3/UL
NRBC, AUTO (.00): 0.2 %
PLATELET # BLD AUTO: 512 K/UL (ref 150–400)
POTASSIUM SERPL-SCNC: 4.3 MMOL/L (ref 3.5–5.1)
RBC # BLD AUTO: 3.06 M/UL (ref 4.2–5.4)
SODIUM SERPL-SCNC: 141 MMOL/L (ref 136–145)
WBC # BLD AUTO: 11.55 K/UL (ref 4.5–11)

## 2025-07-12 PROCEDURE — 97535 SELF CARE MNGMENT TRAINING: CPT

## 2025-07-12 PROCEDURE — 97110 THERAPEUTIC EXERCISES: CPT

## 2025-07-12 PROCEDURE — 11000001 HC ACUTE MED/SURG PRIVATE ROOM

## 2025-07-12 PROCEDURE — 99900035 HC TECH TIME PER 15 MIN (STAT)

## 2025-07-12 PROCEDURE — 25000003 PHARM REV CODE 250

## 2025-07-12 PROCEDURE — 85025 COMPLETE CBC W/AUTO DIFF WBC: CPT | Performed by: ORTHOPAEDIC SURGERY

## 2025-07-12 PROCEDURE — 25000003 PHARM REV CODE 250: Performed by: ORTHOPAEDIC SURGERY

## 2025-07-12 PROCEDURE — 80048 BASIC METABOLIC PNL TOTAL CA: CPT | Performed by: ORTHOPAEDIC SURGERY

## 2025-07-12 PROCEDURE — 63600175 PHARM REV CODE 636 W HCPCS: Performed by: ORTHOPAEDIC SURGERY

## 2025-07-12 PROCEDURE — 36415 COLL VENOUS BLD VENIPUNCTURE: CPT | Performed by: ORTHOPAEDIC SURGERY

## 2025-07-12 PROCEDURE — 97530 THERAPEUTIC ACTIVITIES: CPT

## 2025-07-12 PROCEDURE — 82962 GLUCOSE BLOOD TEST: CPT

## 2025-07-12 PROCEDURE — 63600175 PHARM REV CODE 636 W HCPCS: Performed by: NURSE PRACTITIONER

## 2025-07-12 PROCEDURE — 94761 N-INVAS EAR/PLS OXIMETRY MLT: CPT

## 2025-07-12 PROCEDURE — 27000207 HC ISOLATION

## 2025-07-12 PROCEDURE — 99232 SBSQ HOSP IP/OBS MODERATE 35: CPT | Mod: ,,,

## 2025-07-12 PROCEDURE — 25000003 PHARM REV CODE 250: Performed by: INTERNAL MEDICINE

## 2025-07-12 RX ADMIN — HYDROCODONE BITARTRATE AND ACETAMINOPHEN 1 TABLET: 5; 325 TABLET ORAL at 10:07

## 2025-07-12 RX ADMIN — POTASSIUM CHLORIDE 20 MEQ: 1500 TABLET, EXTENDED RELEASE ORAL at 09:07

## 2025-07-12 RX ADMIN — HEPARIN SODIUM 5000 UNITS: 5000 INJECTION, SOLUTION INTRAVENOUS; SUBCUTANEOUS at 12:07

## 2025-07-12 RX ADMIN — QUETIAPINE FUMARATE 25 MG: 25 TABLET ORAL at 08:07

## 2025-07-12 RX ADMIN — INSULIN GLARGINE 20 UNITS: 100 INJECTION, SOLUTION SUBCUTANEOUS at 09:07

## 2025-07-12 RX ADMIN — AMIODARONE HYDROCHLORIDE 200 MG: 200 TABLET ORAL at 09:07

## 2025-07-12 RX ADMIN — HEPARIN SODIUM 5000 UNITS: 5000 INJECTION, SOLUTION INTRAVENOUS; SUBCUTANEOUS at 05:07

## 2025-07-12 RX ADMIN — INSULIN ASPART 5 UNITS: 100 INJECTION, SOLUTION INTRAVENOUS; SUBCUTANEOUS at 05:07

## 2025-07-12 RX ADMIN — INSULIN ASPART 4 UNITS: 100 INJECTION, SOLUTION INTRAVENOUS; SUBCUTANEOUS at 12:07

## 2025-07-12 RX ADMIN — LEVOTHYROXINE SODIUM 200 MCG: 100 TABLET ORAL at 05:07

## 2025-07-12 RX ADMIN — PANTOPRAZOLE SODIUM 40 MG: 40 INJECTION, POWDER, FOR SOLUTION INTRAVENOUS at 08:07

## 2025-07-12 RX ADMIN — FUROSEMIDE 80 MG: 40 TABLET ORAL at 09:07

## 2025-07-12 RX ADMIN — HEPARIN SODIUM 5000 UNITS: 5000 INJECTION, SOLUTION INTRAVENOUS; SUBCUTANEOUS at 10:07

## 2025-07-12 RX ADMIN — INSULIN ASPART 10 UNITS: 100 INJECTION, SOLUTION INTRAVENOUS; SUBCUTANEOUS at 12:07

## 2025-07-12 RX ADMIN — INSULIN ASPART 5 UNITS: 100 INJECTION, SOLUTION INTRAVENOUS; SUBCUTANEOUS at 09:07

## 2025-07-12 RX ADMIN — MICONAZOLE NITRATE ANTIFUNGAL POWDER: 2 POWDER TOPICAL at 09:07

## 2025-07-12 RX ADMIN — METOPROLOL TARTRATE 50 MG: 50 TABLET, FILM COATED ORAL at 08:07

## 2025-07-12 RX ADMIN — INSULIN ASPART 5 UNITS: 100 INJECTION, SOLUTION INTRAVENOUS; SUBCUTANEOUS at 12:07

## 2025-07-12 RX ADMIN — DAPTOMYCIN 610 MG: 500 INJECTION, POWDER, LYOPHILIZED, FOR SOLUTION INTRAVENOUS at 11:07

## 2025-07-12 RX ADMIN — METOPROLOL TARTRATE 50 MG: 50 TABLET, FILM COATED ORAL at 09:07

## 2025-07-12 NOTE — PT/OT/SLP PROGRESS
"Occupational Therapy   Treatment    Name: Carey Leonardo  MRN: [id number]  Admitting Diagnosis:  MATT (acute kidney injury)  12 Days Post-Op    Recommendations:     Discharge Recommendations: Low Intensity Therapy  Discharge Equipment Recommendations:  to be determined by next level of care  Barriers to discharge:  None    Assessment:     Carey Leonardo is a 49 y.o. female with a medical diagnosis of MATT (acute kidney injury).  She presents with alert and agreeable to treatment. Performance deficits affecting function are weakness, impaired endurance, impaired functional mobility, orthopedic precautions.     Rehab Prognosis:  Good; patient would benefit from acute skilled OT services to address these deficits and reach maximum level of function.       Plan:     Patient to be seen 5 x/week to address the above listed problems via self-care/home management, therapeutic activities, therapeutic exercises  Plan of Care Expires: 08/09/25  Plan of Care Reviewed with: patient, sibling    Subjective     Chief Complaint: MATT (acute kidney injury)    Patient/Family Comments/goals: "I want to work on stability."  Pain/Comfort:  Pain Rating 1: 0/10 (at rest, c/o lower back pain with standing exercises, up to 4/10)  Location - Orientation 1: lower  Location 1: back  Pain Addressed 1: Reposition  Pain Rating Post-Intervention 1: 2/10    Objective:     Communicated with: MELANY Wilson prior to session.  Patient found up in chair with peripheral IV, telemetry upon OT entry to room.    General Precautions: Standard, fall, contact    Orthopedic Precautions:spinal precautions  Braces: N/A  Respiratory Status: Room air     Occupational Performance:     Bed Mobility:    NT    Functional Mobility/Transfers:  Patient completed Sit <> Stand Transfer with supervision  with  rolling walker   Patient completed Toilet Transfer Step Transfer technique with stand by assistance with  rolling walker  Functional Mobility: Pt ambulated back to her " chair without RW and SBA/CGA. Pt had very mild path deviations and unsteady gait.    Activities of Daily Living:  Feeding:  independence with her own setup  Grooming: supervision at sink  Toileting: modified independence at toilet      [facility] 6 Click ADL: 23    Treatment & Education:  Pt performed mobility and ADL as above.  Pt performed standing balance activities while standing with RW for support for 10 to 15 reps of each:   Marching   Toe rises   Heels down with toes up   Leg abd/adduction    Pt performed standing without RW and performed (B) UE ex with red t-band for 15 reps of each:   Shoulder extension   Chest pull   Shoulder abduction    Pt performed (B) UE ex while sitting for 2 x 15 reps of each:   Elbow flexion with 2# db    Punches with 2# db   Shoulder ER with red t-band   Elbow extension with red t-band   Handhelper with 3 bands of resistance    Patient left up in chair with call button in reach and LPN notified    GOALS:   Multidisciplinary Problems       Occupational Therapy Goals          Problem: Occupational Therapy    Goal Priority Disciplines Outcome Interventions   Occupational Therapy Goal     OT, PT/OT Progressing    Description: STG:  Pt will perform grooming with setup  Pt will bathe with min a with AD as needed  Pt will perform UE dressing with (I)  Pt will perform LE dressing with Min a with AD as needed  Pt will sit EOB x 15 min with (I)   Pt will transfer bed/chair/bsc with Min a with RW  Pt will perform standing x 1 min with CGA assistance  Pt will tolerate 20 minutes of tx without fatigue      LT.Restore to max I with self care and mobility.                              Time Tracking:     OT Date of Treatment: 25  OT Start Time: 1100  OT Stop Time: 1140  OT Total Time (min): 40 min    Billable Minutes:Self Care/Home Management 12 min  Therapeutic Activity 15 min  Therapeutic Exercise 13 min    OT/CIPRIANO: OT          2025

## 2025-07-13 LAB
ANION GAP SERPL CALCULATED.3IONS-SCNC: 18 MMOL/L (ref 7–16)
BASOPHILS # BLD AUTO: 0.08 K/UL (ref 0–0.2)
BASOPHILS NFR BLD AUTO: 0.7 % (ref 0–1)
BUN SERPL-MCNC: 51 MG/DL (ref 7–19)
BUN/CREAT SERPL: 13 (ref 6–20)
CALCIUM SERPL-MCNC: 8.6 MG/DL (ref 8.4–10.2)
CHLORIDE SERPL-SCNC: 100 MMOL/L (ref 98–107)
CO2 SERPL-SCNC: 28 MMOL/L (ref 22–29)
CREAT SERPL-MCNC: 4.03 MG/DL (ref 0.55–1.02)
DIFFERENTIAL METHOD BLD: ABNORMAL
EGFR (NO RACE VARIABLE) (RUSH/TITUS): 13 ML/MIN/1.73M2
EOSINOPHIL # BLD AUTO: 1.7 K/UL (ref 0–0.5)
EOSINOPHIL NFR BLD AUTO: 15.3 % (ref 1–4)
ERYTHROCYTE [DISTWIDTH] IN BLOOD BY AUTOMATED COUNT: 19.1 % (ref 11.5–14.5)
GLUCOSE SERPL-MCNC: 133 MG/DL (ref 74–100)
GLUCOSE SERPL-MCNC: 170 MG/DL (ref 70–105)
GLUCOSE SERPL-MCNC: 185 MG/DL (ref 70–105)
GLUCOSE SERPL-MCNC: 224 MG/DL (ref 70–105)
GLUCOSE SERPL-MCNC: 244 MG/DL (ref 70–105)
HCT VFR BLD AUTO: 29.7 % (ref 38–47)
HGB BLD-MCNC: 8.6 G/DL (ref 12–16)
IMM GRANULOCYTES # BLD AUTO: 0.16 K/UL (ref 0–0.04)
IMM GRANULOCYTES NFR BLD: 1.4 % (ref 0–0.4)
LYMPHOCYTES # BLD AUTO: 2.73 K/UL (ref 1–4.8)
LYMPHOCYTES NFR BLD AUTO: 24.5 % (ref 27–41)
MCH RBC QN AUTO: 27.1 PG (ref 27–31)
MCHC RBC AUTO-ENTMCNC: 29 G/DL (ref 32–36)
MCV RBC AUTO: 93.7 FL (ref 80–96)
MONOCYTES # BLD AUTO: 1.17 K/UL (ref 0–0.8)
MONOCYTES NFR BLD AUTO: 10.5 % (ref 2–6)
MPC BLD CALC-MCNC: 10 FL (ref 9.4–12.4)
NEUTROPHILS # BLD AUTO: 5.3 K/UL (ref 1.8–7.7)
NEUTROPHILS NFR BLD AUTO: 47.6 % (ref 53–65)
NRBC # BLD AUTO: 0 X10E3/UL
NRBC, AUTO (.00): 0 %
PLATELET # BLD AUTO: 578 K/UL (ref 150–400)
POTASSIUM SERPL-SCNC: 4.9 MMOL/L (ref 3.5–5.1)
RBC # BLD AUTO: 3.17 M/UL (ref 4.2–5.4)
SODIUM SERPL-SCNC: 141 MMOL/L (ref 136–145)
WBC # BLD AUTO: 11.14 K/UL (ref 4.5–11)

## 2025-07-13 PROCEDURE — 84165 PROTEIN E-PHORESIS SERUM: CPT | Mod: 26,,, | Performed by: PATHOLOGY

## 2025-07-13 PROCEDURE — 25000003 PHARM REV CODE 250

## 2025-07-13 PROCEDURE — 82962 GLUCOSE BLOOD TEST: CPT

## 2025-07-13 PROCEDURE — 36415 COLL VENOUS BLD VENIPUNCTURE: CPT | Performed by: ORTHOPAEDIC SURGERY

## 2025-07-13 PROCEDURE — 99900035 HC TECH TIME PER 15 MIN (STAT)

## 2025-07-13 PROCEDURE — 80048 BASIC METABOLIC PNL TOTAL CA: CPT | Performed by: ORTHOPAEDIC SURGERY

## 2025-07-13 PROCEDURE — 27000207 HC ISOLATION

## 2025-07-13 PROCEDURE — 85025 COMPLETE CBC W/AUTO DIFF WBC: CPT | Performed by: ORTHOPAEDIC SURGERY

## 2025-07-13 PROCEDURE — 25000003 PHARM REV CODE 250: Performed by: ORTHOPAEDIC SURGERY

## 2025-07-13 PROCEDURE — 63600175 PHARM REV CODE 636 W HCPCS: Performed by: NURSE PRACTITIONER

## 2025-07-13 PROCEDURE — 99232 SBSQ HOSP IP/OBS MODERATE 35: CPT | Mod: ,,,

## 2025-07-13 PROCEDURE — 84165 PROTEIN E-PHORESIS SERUM: CPT | Performed by: INTERNAL MEDICINE

## 2025-07-13 PROCEDURE — 11000001 HC ACUTE MED/SURG PRIVATE ROOM

## 2025-07-13 PROCEDURE — 25000003 PHARM REV CODE 250: Performed by: INTERNAL MEDICINE

## 2025-07-13 PROCEDURE — 63600175 PHARM REV CODE 636 W HCPCS: Performed by: ORTHOPAEDIC SURGERY

## 2025-07-13 RX ADMIN — POTASSIUM CHLORIDE 20 MEQ: 1500 TABLET, EXTENDED RELEASE ORAL at 08:07

## 2025-07-13 RX ADMIN — LEVOTHYROXINE SODIUM 200 MCG: 100 TABLET ORAL at 06:07

## 2025-07-13 RX ADMIN — HEPARIN SODIUM 5000 UNITS: 5000 INJECTION, SOLUTION INTRAVENOUS; SUBCUTANEOUS at 09:07

## 2025-07-13 RX ADMIN — AMIODARONE HYDROCHLORIDE 200 MG: 200 TABLET ORAL at 08:07

## 2025-07-13 RX ADMIN — METOPROLOL TARTRATE 50 MG: 50 TABLET, FILM COATED ORAL at 08:07

## 2025-07-13 RX ADMIN — HEPARIN SODIUM 5000 UNITS: 5000 INJECTION, SOLUTION INTRAVENOUS; SUBCUTANEOUS at 06:07

## 2025-07-13 RX ADMIN — MICONAZOLE NITRATE ANTIFUNGAL POWDER: 2 POWDER TOPICAL at 08:07

## 2025-07-13 RX ADMIN — INSULIN ASPART 5 UNITS: 100 INJECTION, SOLUTION INTRAVENOUS; SUBCUTANEOUS at 05:07

## 2025-07-13 RX ADMIN — PANTOPRAZOLE SODIUM 40 MG: 40 INJECTION, POWDER, FOR SOLUTION INTRAVENOUS at 08:07

## 2025-07-13 RX ADMIN — ALPRAZOLAM 0.25 MG: 0.25 TABLET ORAL at 04:07

## 2025-07-13 RX ADMIN — HEPARIN SODIUM 5000 UNITS: 5000 INJECTION, SOLUTION INTRAVENOUS; SUBCUTANEOUS at 01:07

## 2025-07-13 RX ADMIN — INSULIN GLARGINE 20 UNITS: 100 INJECTION, SOLUTION SUBCUTANEOUS at 09:07

## 2025-07-13 RX ADMIN — METOPROLOL TARTRATE 50 MG: 50 TABLET, FILM COATED ORAL at 09:07

## 2025-07-13 RX ADMIN — FUROSEMIDE 80 MG: 40 TABLET ORAL at 08:07

## 2025-07-13 RX ADMIN — INSULIN ASPART 5 UNITS: 100 INJECTION, SOLUTION INTRAVENOUS; SUBCUTANEOUS at 08:07

## 2025-07-13 RX ADMIN — DIPHENHYDRAMINE HYDROCHLORIDE 50 MG: 25 CAPSULE ORAL at 01:07

## 2025-07-13 RX ADMIN — INSULIN ASPART 5 UNITS: 100 INJECTION, SOLUTION INTRAVENOUS; SUBCUTANEOUS at 12:07

## 2025-07-13 RX ADMIN — QUETIAPINE FUMARATE 25 MG: 25 TABLET ORAL at 09:07

## 2025-07-13 NOTE — ASSESSMENT & PLAN NOTE
Hyponatremia is likely due to Dehydration/hypovolemia and renal insufficiency. The patient's most recent sodium results are listed below.  Recent Labs     07/10/25  0316 07/11/25  0502 07/12/25  0455    143 141     Plan  - Correct the sodium by 4-6mEq in 24 hours.   - Obtain the following studies: Urine sodium, urine osmolality, serum osmolality.  - Will treat the hyponatremia with IV fluids as follows: continuous .9 normal saline IV infusion  - Monitor sodium Daily.   - Patient hyponatremia is stable  Resolved

## 2025-07-13 NOTE — PROGRESS NOTES
[facility] - [facility] Tele[facility]  Nephrology  Progress Note    Patient Name: Carey Leonardo  MRN: [id number]  Admission Date: 6/26/2025  Hospital Length of Stay: 16 days  Attending Provider: Hayley Damon MD   Primary Care Physician: Zainab Harrell FNP  Principal Problem:MATT (acute kidney injury)    Consults  Subjective:     Interval History: The patient has no complaints today    Review of patient's allergies indicates:   Allergen Reactions    Fish containing products Anaphylaxis    Iodinated contrast media     Penicillins      Current Facility-Administered Medications   Medication Frequency    0.9%  NaCl infusion (for blood administration) Q24H PRN    acetaminophen suppository 650 mg Q6H PRN    acetaminophen tablet 650 mg Q8H PRN    albuterol nebulizer solution 2.5 mg Q4H PRN    ALPRAZolam tablet 0.25 mg BID PRN    aluminum & magnesium hydroxide-simethicone 400-400-40 mg/5 mL suspension 30 mL Q6H PRN    amiodarone tablet 200 mg Daily    bisacodyL EC tablet 10 mg Daily PRN    cloNIDine tablet 0.1 mg Q6H PRN    cyclobenzaprine tablet 10 mg TID PRN    DAPTOmycin (CUBICIN) 610 mg in 0.9% NaCl SolP 50 mL IVPB Q48H    dextromethorphan-guaiFENesin  mg/5 ml liquid 10 mL Q6H PRN    dextrose 50% injection 12.5 g PRN    dextrose 50% injection 25 g PRN    diphenhydrAMINE capsule 50 mg Q6H PRN    docusate sodium capsule 100 mg BID PRN    furosemide tablet 80 mg Daily    glucagon (human recombinant) injection 1 mg PRN    glucose chewable tablet 16 g PRN    glucose chewable tablet 24 g PRN    heparin (porcine) injection 5,000 Units Q8H    HYDROcodone-acetaminophen 5-325 mg per tablet 1 tablet Q4H PRN    insulin aspart U-100 injection 0-10 Units QID (AC + HS) PRN    insulin aspart U-100 injection 5 Units TIDWM    insulin glargine U-100 (Lantus) injection 20 Units QHS    levothyroxine tablet 200 mcg Before breakfast    melatonin tablet 6 mg Nightly PRN    metoprolol injection 5 mg Q6H PRN    metoprolol  tartrate (LOPRESSOR) tablet 50 mg BID    miconazole NITRATE 2 % top powder BID    ondansetron injection 8 mg Q6H PRN    pantoprazole injection 40 mg Daily    potassium chloride SA CR tablet 20 mEq Daily    QUEtiapine tablet 25 mg QHS       Objective:     Vital Signs (Most Recent):  Temp: 97.9 °F (36.6 °C) (07/12/25 1924)  Pulse: 90 (07/12/25 1924)  Resp: 18 (07/12/25 1924)  BP: 123/67 (07/12/25 1924)  SpO2: 99 % (07/12/25 1924) Vital Signs (24h Range):  Temp:  [97.2 °F (36.2 °C)-98.5 °F (36.9 °C)] 97.9 °F (36.6 °C)  Pulse:  [81-90] 90  Resp:  [18] 18  SpO2:  [97 %-99 %] 99 %  BP: (109-123)/(63-72) 123/67     Weight: 105.9 kg (233 lb 7.5 oz) (07/12/25 0438)  Body mass index is 36.57 kg/m².  Body surface area is 2.24 meters squared.    No intake/output data recorded.    Physical Exam  Cardiovascular:      Heart sounds: No murmur heard.     No friction rub. No gallop.   Pulmonary:      Breath sounds: No wheezing, rhonchi or rales.   Abdominal:      General: Bowel sounds are normal. There is no distension.      Palpations: Abdomen is soft.      Tenderness: There is no abdominal tenderness.   Musculoskeletal:      Right lower leg: No edema.      Left lower leg: No edema.   Neurological:      Mental Status: She is alert.         Significant Labs:sureCBC:   Recent Labs   Lab 07/12/25 0455   WBC 11.55*   RBC 3.06*   HGB 8.1*   HCT 27.6*   *   MCV 90.2   MCH 26.5*   MCHC 29.3*     CMP:   Recent Labs   Lab 07/12/25 0455   *   CALCIUM 8.6      K 4.3   CO2 29      BUN 54*   CREATININE 4.22*     All labs within the past 24 hours have been reviewed.    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 I will follow-up with patient. Please contact us if you have any additional questions.    Jimbo Delgado MD  Nephrology  [facility] - [address] [place] [address]
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
## 2025-07-13 NOTE — ASSESSMENT & PLAN NOTE
Patient's blood pressure range in the last 24 hours was: BP  Min: 109/72  Max: 123/67. Patient requires and takes all medications due to treatment resistant Hypertension that was initiated by Dr. Hurst. The patient's inpatient anti-hypertensive regimen is listed below:  Current Antihypertensives  cloNIDine tablet 0.1 mg, Every 6 hours PRN, Oral  metoprolol tartrate (LOPRESSOR) tablet 50 mg, 2 times daily, Oral  metoprolol injection 5 mg, Every 6 hours PRN, Intravenous  furosemide tablet 80 mg, Daily, Oral    Plan  - BP is controlled, no changes needed to their regimen  - follow

## 2025-07-13 NOTE — SUBJECTIVE & OBJECTIVE
Interval History:     Review of Systems   All other systems reviewed and are negative.    Objective:     Vital Signs (Most Recent):  Temp: 97.9 °F (36.6 °C) (07/12/25 1924)  Pulse: 90 (07/12/25 1924)  Resp: 18 (07/12/25 1924)  BP: 123/67 (07/12/25 1924)  SpO2: 99 % (07/12/25 1924) Vital Signs (24h Range):  Temp:  [97.2 °F (36.2 °C)-98.5 °F (36.9 °C)] 97.9 °F (36.6 °C)  Pulse:  [81-90] 90  Resp:  [18] 18  SpO2:  [97 %-99 %] 99 %  BP: (109-123)/(63-72) 123/67     Weight: 105.9 kg (233 lb 7.5 oz)  Body mass index is 36.57 kg/m².  No intake or output data in the 24 hours ending 07/12/25 2146          Physical Exam  Vitals and nursing note reviewed.   HENT:      Head: Normocephalic.      Mouth/Throat:      Mouth: Mucous membranes are moist.   Eyes:      Pupils: Pupils are equal, round, and reactive to light.   Neck:      Vascular: No carotid bruit.   Cardiovascular:      Rate and Rhythm: Normal rate and regular rhythm.   Pulmonary:      Effort: Pulmonary effort is normal.   Abdominal:      General: Abdomen is flat. Bowel sounds are normal. There is no distension.      Palpations: Abdomen is soft.   Musculoskeletal:      Right lower leg: No edema.      Left lower leg: No edema.   Skin:     General: Skin is warm and dry.   Neurological:      General: No focal deficit present.      Mental Status: She is alert. Mental status is at baseline.               Significant Labs: All pertinent labs within the past 24 hours have been reviewed.  BMP:   Recent Labs   Lab 07/12/25 0455   *      K 4.3      CO2 29   BUN 54*   CREATININE 4.22*   CALCIUM 8.6     CBC:   Recent Labs   Lab 07/11/25  0503 07/12/25 0455   WBC 12.80* 11.55*   HGB 7.9* 8.1*   HCT 27.6* 27.6*   * 512*     CMP:   Recent Labs   Lab 07/11/25  0502 07/12/25  0455    141   K 3.7 4.3    100   CO2 29 29   * 163*   BUN 54* 54*   CREATININE 3.86* 4.22*   CALCIUM 8.6 8.6   ANIONGAP 18* 16       Significant Imaging: I have  reviewed all pertinent imaging results/findings within the past 24 hours.

## 2025-07-13 NOTE — ASSESSMENT & PLAN NOTE
Patient seen by Dr. Upton for abscess on lumber spine 2 weeks ago.  Culture positive for MRSA  Patient was started on Vancomycin  and discharged  Patient subsequently developed MATT  Patient remains febrile   Vancomycin D/c due to MATT  Initiating Daptomycin with pharmacy renally dosing  Pain control managed with Tylenol 1000 mg and 5mg Oxycodone    Persistently febrile in the setting of postoperative infection with the implantation of lumbar hardware.  MRSA.  Consult ortho spine, consult ID  6/28 continue dapto, added cefepime. MRI could not be done with contrast due to renal function  Persistently febrile.  Broadened to carbapenem    7/9  - picc line placed, dapto for spinal abscess continues, will get timeline for IV meds today    7/10  - ID recs: - continue daptomycin (610mg)  8mg/kg iv q48hr administered through PICC    - will need weekly labs: cbc w/ diff and cmp and CPK    - EOT for abx: 7/24/25    - Will need suppression for 1 year via bactrim given concern for HW infection w/o removal as MRSA was resistant to tetracycline. This should be done  after        completing IV abx course, but the dose will be dependent on her renal function at that time. Please call [facility] call center and ask for the [facility] physician to verify dose   - will discuss with  about options for abx    7/11  - renal function improving, nephro following  - Ortho cleared for discharge to follow up next week in office for suture removal  - submitted for vital care for abx home admin, once approved can be dc home

## 2025-07-13 NOTE — ASSESSMENT & PLAN NOTE
Anemia is likely due to infection . Most recent hemoglobin and hematocrit are listed below.  Recent Labs     07/10/25  0316 07/11/25  0503 07/12/25  0455   HGB 8.4* 7.9* 8.1*   HCT 28.6* 27.6* 27.6*     Plan  - Monitor serial CBC: Daily  - Transfuse PRBC if patient becomes hemodynamically unstable, symptomatic or H/H drops below 7/21.  - Patient has not received any PRBC transfusions to date  - Patient's anemia is currently stable  - Monitor CBC if H/H continue to drop consider transfusion.  Follow

## 2025-07-13 NOTE — ASSESSMENT & PLAN NOTE
Patient's most recent potassium results are listed below.   Recent Labs     07/10/25  0316 07/11/25  0502 07/12/25  0455   K 3.9 3.7 4.3     Plan  - Replete potassium per protocol  - Monitor potassium Daily  - Patient's hypokalemia is worsening. Will adjust treatment as follows:    - 20 mEq Effer K  Stable

## 2025-07-13 NOTE — ASSESSMENT & PLAN NOTE
MATT is likely due to ATN from vancomycin Baseline creatinine is .65. Most recent creatinine and eGFR are listed below.  Recent Labs     07/10/25  0316 07/11/25  0502 07/12/25  0455   CREATININE 4.06* 3.86* 4.22*   EGFRNORACEVR 13* 14* 12*     Given 1,848 ml bolus of normal saline in the ED     Plan  - MATT is worsening. Will adjust treatment as follows: continuous infusion of .9 Normal Saline  - Avoid nephrotoxins and renally dose meds for GFR listed above  - Monitor urine output, serial BMP, and adjust therapy as needed  6/27 continue IV fluids  6/28 consult nephrology, worse  6/29 now developing respiratory failure with pulmonary edema.    7/9  - renal function improving, nephrology following

## 2025-07-14 LAB
ANION GAP SERPL CALCULATED.3IONS-SCNC: 15 MMOL/L (ref 7–16)
BASOPHILS # BLD AUTO: 0.07 K/UL (ref 0–0.2)
BASOPHILS NFR BLD AUTO: 0.7 % (ref 0–1)
BUN SERPL-MCNC: 48 MG/DL (ref 7–19)
BUN/CREAT SERPL: 11 (ref 6–20)
CALCIUM SERPL-MCNC: 9 MG/DL (ref 8.4–10.2)
CHLORIDE SERPL-SCNC: 103 MMOL/L (ref 98–107)
CK SERPL-CCNC: <70 U/L (ref 29–168)
CO2 SERPL-SCNC: 27 MMOL/L (ref 22–29)
CREAT SERPL-MCNC: 4.36 MG/DL (ref 0.55–1.02)
DIFFERENTIAL METHOD BLD: ABNORMAL
EGFR (NO RACE VARIABLE) (RUSH/TITUS): 12 ML/MIN/1.73M2
EOSINOPHIL # BLD AUTO: 1.92 K/UL (ref 0–0.5)
EOSINOPHIL NFR BLD AUTO: 18.9 % (ref 1–4)
ERYTHROCYTE [DISTWIDTH] IN BLOOD BY AUTOMATED COUNT: 19 % (ref 11.5–14.5)
GLUCOSE SERPL-MCNC: 125 MG/DL (ref 70–105)
GLUCOSE SERPL-MCNC: 137 MG/DL (ref 70–105)
GLUCOSE SERPL-MCNC: 144 MG/DL (ref 74–100)
GLUCOSE SERPL-MCNC: 184 MG/DL (ref 70–105)
GLUCOSE SERPL-MCNC: 208 MG/DL (ref 70–105)
HCT VFR BLD AUTO: 27.4 % (ref 38–47)
HGB BLD-MCNC: 7.9 G/DL (ref 12–16)
IMM GRANULOCYTES # BLD AUTO: 0.09 K/UL (ref 0–0.04)
IMM GRANULOCYTES NFR BLD: 0.9 % (ref 0–0.4)
LYMPHOCYTES # BLD AUTO: 2.61 K/UL (ref 1–4.8)
LYMPHOCYTES NFR BLD AUTO: 25.7 % (ref 27–41)
MCH RBC QN AUTO: 26.8 PG (ref 27–31)
MCHC RBC AUTO-ENTMCNC: 28.8 G/DL (ref 32–36)
MCV RBC AUTO: 92.9 FL (ref 80–96)
MONOCYTES # BLD AUTO: 1.15 K/UL (ref 0–0.8)
MONOCYTES NFR BLD AUTO: 11.3 % (ref 2–6)
MPC BLD CALC-MCNC: 9.6 FL (ref 9.4–12.4)
NEUTROPHILS # BLD AUTO: 4.33 K/UL (ref 1.8–7.7)
NEUTROPHILS NFR BLD AUTO: 42.5 % (ref 53–65)
NRBC # BLD AUTO: 0 X10E3/UL
NRBC, AUTO (.00): 0 %
PHOSPHATE SERPL-MCNC: 6.3 MG/DL (ref 2.3–4.7)
PLATELET # BLD AUTO: 558 K/UL (ref 150–400)
POTASSIUM SERPL-SCNC: 4.4 MMOL/L (ref 3.5–5.1)
RBC # BLD AUTO: 2.95 M/UL (ref 4.2–5.4)
SODIUM SERPL-SCNC: 141 MMOL/L (ref 136–145)
URATE SERPL-MCNC: 10.2 MG/DL (ref 2.6–6)
WBC # BLD AUTO: 10.17 K/UL (ref 4.5–11)

## 2025-07-14 PROCEDURE — 97110 THERAPEUTIC EXERCISES: CPT | Mod: CQ

## 2025-07-14 PROCEDURE — 97116 GAIT TRAINING THERAPY: CPT | Mod: CQ

## 2025-07-14 PROCEDURE — 25000003 PHARM REV CODE 250

## 2025-07-14 PROCEDURE — 80048 BASIC METABOLIC PNL TOTAL CA: CPT | Performed by: ORTHOPAEDIC SURGERY

## 2025-07-14 PROCEDURE — 51798 US URINE CAPACITY MEASURE: CPT

## 2025-07-14 PROCEDURE — 82550 ASSAY OF CK (CPK): CPT | Performed by: ORTHOPAEDIC SURGERY

## 2025-07-14 PROCEDURE — 97530 THERAPEUTIC ACTIVITIES: CPT | Mod: CO

## 2025-07-14 PROCEDURE — 84100 ASSAY OF PHOSPHORUS: CPT | Performed by: INTERNAL MEDICINE

## 2025-07-14 PROCEDURE — 94761 N-INVAS EAR/PLS OXIMETRY MLT: CPT

## 2025-07-14 PROCEDURE — 27000207 HC ISOLATION

## 2025-07-14 PROCEDURE — 63600175 PHARM REV CODE 636 W HCPCS: Performed by: NURSE PRACTITIONER

## 2025-07-14 PROCEDURE — 11000001 HC ACUTE MED/SURG PRIVATE ROOM

## 2025-07-14 PROCEDURE — 85025 COMPLETE CBC W/AUTO DIFF WBC: CPT | Performed by: ORTHOPAEDIC SURGERY

## 2025-07-14 PROCEDURE — 25000003 PHARM REV CODE 250: Performed by: INTERNAL MEDICINE

## 2025-07-14 PROCEDURE — 25000003 PHARM REV CODE 250: Performed by: ORTHOPAEDIC SURGERY

## 2025-07-14 PROCEDURE — 97110 THERAPEUTIC EXERCISES: CPT | Mod: CO

## 2025-07-14 PROCEDURE — 36415 COLL VENOUS BLD VENIPUNCTURE: CPT | Performed by: ORTHOPAEDIC SURGERY

## 2025-07-14 PROCEDURE — 84550 ASSAY OF BLOOD/URIC ACID: CPT | Performed by: INTERNAL MEDICINE

## 2025-07-14 PROCEDURE — 99232 SBSQ HOSP IP/OBS MODERATE 35: CPT | Mod: ,,,

## 2025-07-14 PROCEDURE — 63600175 PHARM REV CODE 636 W HCPCS: Performed by: ORTHOPAEDIC SURGERY

## 2025-07-14 PROCEDURE — 99900035 HC TECH TIME PER 15 MIN (STAT)

## 2025-07-14 PROCEDURE — 82962 GLUCOSE BLOOD TEST: CPT

## 2025-07-14 RX ADMIN — DIPHENHYDRAMINE HYDROCHLORIDE 50 MG: 25 CAPSULE ORAL at 10:07

## 2025-07-14 RX ADMIN — INSULIN ASPART 4 UNITS: 100 INJECTION, SOLUTION INTRAVENOUS; SUBCUTANEOUS at 09:07

## 2025-07-14 RX ADMIN — LEVOTHYROXINE SODIUM 200 MCG: 100 TABLET ORAL at 05:07

## 2025-07-14 RX ADMIN — MICONAZOLE NITRATE ANTIFUNGAL POWDER: 2 POWDER TOPICAL at 09:07

## 2025-07-14 RX ADMIN — INSULIN ASPART 5 UNITS: 100 INJECTION, SOLUTION INTRAVENOUS; SUBCUTANEOUS at 09:07

## 2025-07-14 RX ADMIN — HEPARIN SODIUM 5000 UNITS: 5000 INJECTION, SOLUTION INTRAVENOUS; SUBCUTANEOUS at 05:07

## 2025-07-14 RX ADMIN — INSULIN GLARGINE 20 UNITS: 100 INJECTION, SOLUTION SUBCUTANEOUS at 09:07

## 2025-07-14 RX ADMIN — POTASSIUM CHLORIDE 20 MEQ: 1500 TABLET, EXTENDED RELEASE ORAL at 09:07

## 2025-07-14 RX ADMIN — HEPARIN SODIUM 5000 UNITS: 5000 INJECTION, SOLUTION INTRAVENOUS; SUBCUTANEOUS at 09:07

## 2025-07-14 RX ADMIN — FUROSEMIDE 80 MG: 40 TABLET ORAL at 09:07

## 2025-07-14 RX ADMIN — QUETIAPINE FUMARATE 25 MG: 25 TABLET ORAL at 09:07

## 2025-07-14 RX ADMIN — PANTOPRAZOLE SODIUM 40 MG: 40 INJECTION, POWDER, FOR SOLUTION INTRAVENOUS at 09:07

## 2025-07-14 RX ADMIN — INSULIN ASPART 5 UNITS: 100 INJECTION, SOLUTION INTRAVENOUS; SUBCUTANEOUS at 12:07

## 2025-07-14 RX ADMIN — INSULIN ASPART 5 UNITS: 100 INJECTION, SOLUTION INTRAVENOUS; SUBCUTANEOUS at 05:07

## 2025-07-14 RX ADMIN — MICONAZOLE NITRATE ANTIFUNGAL POWDER: 2 POWDER TOPICAL at 05:07

## 2025-07-14 RX ADMIN — METOPROLOL TARTRATE 50 MG: 50 TABLET, FILM COATED ORAL at 09:07

## 2025-07-14 RX ADMIN — AMIODARONE HYDROCHLORIDE 200 MG: 200 TABLET ORAL at 09:07

## 2025-07-14 NOTE — PT/OT/SLP PROGRESS
Occupational Therapy   Treatment    Name: Carey Leonardo  MRN: [id number]  Admitting Diagnosis:  MATT (acute kidney injury)  14 Days Post-Op    Recommendations:     Discharge Recommendations: Low Intensity Therapy  Discharge Equipment Recommendations:  to be determined by next level of care  Barriers to discharge:       Assessment:     Carey Leonardo is a 49 y.o. female with a medical diagnosis of MATT (acute kidney injury).  She presents with the following Performance deficits affecting function are weakness, impaired endurance, impaired functional mobility, decreased upper extremity function, orthopedic precautions.     Rehab Prognosis:  Fair; patient would benefit from acute skilled OT services to address these deficits and reach maximum level of function.       Plan:     Patient to be seen 5 x/week to address the above listed problems via self-care/home management, therapeutic activities, therapeutic exercises  Plan of Care Expires: 08/09/25  Plan of Care Reviewed with: patient, sibling    Subjective   Patient was sitting EOB in her room upon POZO arrival. Patient stated she was feeling fine with no complaints of pain and was agreeable to participate in todays session.   Chief Complaint: no complaints voiced  Patient/Family Comments/goals: to increase UB strength in order to return home.   Pain/Comfort:  Pain Rating 1: 0/10    Objective:     Communicated with: RN prior to session.  Patient found sitting edge of bed with telemetry, peripheral IV upon OT entry to room.    General Precautions: Standard, fall, contact    Orthopedic Precautions:spinal precautions  Braces: N/A  Respiratory Status: Room air     Therapeutic activities:    Functional Mobility/Transfers:  Patient completed Sit <> Stand Transfer with stand by assistance  with  no assistive device   Patient completed Bed <> Chair Transfer using Step Transfer technique with stand by assistance with no assistive device  Pt completed 8 sit to stand transfers  with standy by assistance to work on tricep strength, stand tolerance and endurance in preparation to complete ADLs and transfers from different surfaces such as BSC, chair, etc. with less assistance. Increased time/effort needed.   While standing, pt engaged in standing tolerance activity to increase overall tolerance and static/dynamic stand balance in order to complete ADLs standing at sink with less assistance. Pt stood 4:44 minutes with Stand-by Assistance before requiring a rest break.     Therapeutic exercises:  Patient completed the following exercises to work on UB strength in order to complete ADLs, bed mobility, and transfers with less assistance.    -Bicep curls: 3 sets of 10 with 2# dumbbell  -Chest press: 3 sets of 10 with 2# dumbbell  -Shoulder flex: 3 sets of 10 with 2# dumbbell  -Internal/External rotation: 3 sets of 10 with 2# dumbbell  -Rows: 3 sets of 10 with red Theraband    Increased time/effort needed to complete each exercise.       Patient left up in chair with call button in reach and RN notified    GOALS:   Multidisciplinary Problems       Occupational Therapy Goals          Problem: Occupational Therapy    Goal Priority Disciplines Outcome Interventions   Occupational Therapy Goal     OT, PT/OT Progressing    Description: STG:  Pt will perform grooming with setup  Pt will bathe with min a with AD as needed  Pt will perform UE dressing with (I)  Pt will perform LE dressing with Min a with AD as needed  Pt will sit EOB x 15 min with (I)   Pt will transfer bed/chair/bsc with Min a with RW  Pt will perform standing x 1 min with CGA assistance  Pt will tolerate 20 minutes of tx without fatigue      LT.Restore to max I with self care and mobility.                          DME Justifications:    Time Tracking:     OT Date of Treatment: 25  OT Start Time: 1300  OT Stop Time: 1332  OT Total Time (min): 32 min    Billable Minutes:Therapeutic Activity 14 minutes  Therapeutic Exercise 18  minutes    OT/CIPRIANO: CIPRIANO     Number of CIPRIANO visits since last OT visit: 1    CIPRIANO Ramires  7/14/2025  3:30 PM

## 2025-07-14 NOTE — PROGRESS NOTES
[facility]  Nephrology  Progress Note    Patient Name: Carey Leonardo  MRN: [id number]  Admission Date: 6/26/2025  Hospital Length of Stay: 17 days  Attending Provider: Hayley Damon MD   Primary Care Physician: Zainab Harrell FNP  Principal Problem:MATT (acute kidney injury)    Consults  Subjective:     Interval History: The patient is resting comfortably    Review of patient's allergies indicates:   Allergen Reactions    Fish containing products Anaphylaxis    Iodinated contrast media     Penicillins      Current Facility-Administered Medications   Medication Frequency    0.9%  NaCl infusion (for blood administration) Q24H PRN    acetaminophen suppository 650 mg Q6H PRN    acetaminophen tablet 650 mg Q8H PRN    albuterol nebulizer solution 2.5 mg Q4H PRN    ALPRAZolam tablet 0.25 mg BID PRN    aluminum & magnesium hydroxide-simethicone 400-400-40 mg/5 mL suspension 30 mL Q6H PRN    amiodarone tablet 200 mg Daily    bisacodyL EC tablet 10 mg Daily PRN    cloNIDine tablet 0.1 mg Q6H PRN    cyclobenzaprine tablet 10 mg TID PRN    DAPTOmycin (CUBICIN) 610 mg in 0.9% NaCl SolP 50 mL IVPB Q48H    dextromethorphan-guaiFENesin  mg/5 ml liquid 10 mL Q6H PRN    dextrose 50% injection 12.5 g PRN    dextrose 50% injection 25 g PRN    diphenhydrAMINE capsule 50 mg Q6H PRN    docusate sodium capsule 100 mg BID PRN    furosemide tablet 80 mg Daily    glucagon (human recombinant) injection 1 mg PRN    glucose chewable tablet 16 g PRN    glucose chewable tablet 24 g PRN    heparin (porcine) injection 5,000 Units Q8H    HYDROcodone-acetaminophen 5-325 mg per tablet 1 tablet Q4H PRN    insulin aspart U-100 injection 0-10 Units QID (AC + HS) PRN    insulin aspart U-100 injection 5 Units TIDWM    insulin glargine U-100 (Lantus) injection 20 Units QHS    levothyroxine tablet 200 mcg Before breakfast    melatonin tablet 6 mg Nightly PRN    metoprolol injection 5 mg Q6H PRN    metoprolol  tartrate (LOPRESSOR) tablet 50 mg BID    miconazole NITRATE 2 % top powder BID    ondansetron injection 8 mg Q6H PRN    pantoprazole injection 40 mg Daily    potassium chloride SA CR tablet 20 mEq Daily    QUEtiapine tablet 25 mg QHS       Objective:     Vital Signs (Most Recent):  Temp: 98.9 °F (37.2 °C) (07/13/25 1917)  Pulse: 87 (07/13/25 1917)  Resp: 18 (07/13/25 1917)  BP: (!) 140/81 (07/13/25 1917)  SpO2: 100 % (07/13/25 1917) Vital Signs (24h Range):  Temp:  [97.3 °F (36.3 °C)-98.9 °F (37.2 °C)] 98.9 °F (37.2 °C)  Pulse:  [81-87] 87  Resp:  [18] 18  SpO2:  [97 %-100 %] 100 %  BP: (120-146)/(63-83) 140/81  Arterial Line BP: (141)/(68) 141/68     Weight: 106.2 kg (234 lb 2.1 oz) (07/13/25 0200)  Body mass index is 36.67 kg/m².  Body surface area is 2.24 meters squared.    No intake/output data recorded.    Physical Exam  Constitutional:       Appearance: She is obese.   Cardiovascular:      Heart sounds:      No friction rub. No gallop.   Pulmonary:      Breath sounds: Normal breath sounds.   Abdominal:      General: Bowel sounds are normal.      Palpations: Abdomen is soft.   Neurological:      Mental Status: She is alert.         Significant Labs:sureCBC:   Recent Labs   Lab 07/13/25  0503   WBC 11.14*   RBC 3.17*   HGB 8.6*   HCT 29.7*   *   MCV 93.7   MCH 27.1   MCHC 29.0*     CMP:   Recent Labs   Lab 07/13/25  0503   *   CALCIUM 8.6      K 4.9   CO2 28      BUN 51*   CREATININE 4.03*     All labs within the past 24 hours have been reviewed.    Significant Imaging:      Assessment/Plan:     Active Diagnoses:    Diagnosis Date Noted POA    PRINCIPAL PROBLEM:  MATT (acute kidney injury) [N17.9] 06/26/2025 Yes    Encephalopathy, metabolic [G93.41] 06/29/2025 Yes    Acute hypoxic respiratory failure [J96.01] 06/29/2025 Yes    Metabolic acidosis [E87.20] 06/28/2025 Yes    MRSA (methicillin resistant Staphylococcus aureus) infection [A49.02] 06/27/2025 Yes    Abscess in epidural space of  lumbar spine [G06.1] 06/27/2025 No    Anemia [D64.9] 06/26/2025 Yes    Hypokalemia [E87.6] 06/26/2025 Yes    Hyponatremia [E87.1] 06/26/2025 Yes    Acquired hypothyroidism [E03.9]  Yes    Essential (primary) hypertension [I10]  Yes    Infection of lumbar spine [M46.26] 06/12/2025 Yes    Obstructive sleep apnea syndrome [G47.33] 05/20/2025 Yes    Mild intermittent asthma without complication [J45.20] 05/20/2025 Yes      Problems Resolved During this Admission:     The BUN and creatinine have improved since yesterday  Plan:  Continue to monitor the renal function     Thank you for your consult. I will follow-up with patient. Please contact us if you have any additional questions.    Jimbo Delgado MD  Nephrology  [facility]

## 2025-07-14 NOTE — ASSESSMENT & PLAN NOTE
Patient seen by Dr. Upton for abscess on lumber spine 2 weeks ago.  Culture positive for MRSA  Patient was started on Vancomycin  and discharged  Patient subsequently developed MATT  Patient remains febrile   Vancomycin D/c due to MATT  Initiating Daptomycin with pharmacy renally dosing  Pain control managed with Tylenol 1000 mg and 5mg Oxycodone    Persistently febrile in the setting of postoperative infection with the implantation of lumbar hardware.  MRSA.  Consult ortho spine, consult ID  6/28 continue dapto, added cefepime. MRI could not be done with contrast due to renal function  Persistently febrile.  Broadened to carbapenem    7/9  - picc line placed, dapto for spinal abscess continues, will get timeline for IV meds today    7/10  - ID recs: - continue daptomycin (610mg)  8mg/kg iv q48hr administered through PICC    - will need weekly labs: cbc w/ diff and cmp and CPK    - EOT for abx: 7/24/25    - Will need suppression for 1 year via bactrim given concern for HW infection w/o removal as MRSA was resistant to tetracycline. This should be done  after        completing IV abx course, but the dose will be dependent on her renal function at that time. Please call [facility] call center and ask for the [facility] physician to verify dose   - will discuss with  about options for abx    7/11  - renal function improving, nephro following  - Ortho cleared for discharge to follow up next week in office for suture removal  - submitted for [facility] for abx home admin, once approved can be dc home    7/14  - abx delivered to patient's room, was going to discharge today however no family at home, plan to dc in the morning  - follow up with Dr. Upton for suture removal this week in office
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
## 2025-07-14 NOTE — PLAN OF CARE
"Amirah called vital care and spoke with Claire regarding patient IV daptomycin order that was sent over on Friday. Claire states that it showing that prior auth is required.  States that she will reach out to Hayley their specialist and get back to CM. CM following.    1337-  placed call to vital care and spoke with claire.  Claire states the abx are "good to go". AMIRAH informed her that patient will DC tomorrow.  Claire states that the abx will be delivered to the patient's room. Dr. Damon notified.  AMIRAH following.  "
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
## 2025-07-14 NOTE — PLAN OF CARE
Problem: Adult Inpatient Plan of Care  Goal: Plan of Care Review  Outcome: Progressing  Goal: Patient-Specific Goal (Individualized)  Outcome: Progressing  Goal: Absence of Hospital-Acquired Illness or Injury  Outcome: Progressing  Goal: Readiness for Transition of Care  Outcome: Progressing     Problem: Acute Kidney Injury/Impairment  Goal: Fluid and Electrolyte Balance  Outcome: Progressing  Goal: Improved Oral Intake  Outcome: Progressing  Goal: Effective Renal Function  Outcome: Progressing     Problem: Infection  Goal: Absence of Infection Signs and Symptoms  Outcome: Progressing     Problem: Wound  Goal: Optimal Coping  Outcome: Progressing  Goal: Optimal Functional Ability  Outcome: Progressing  Goal: Absence of Infection Signs and Symptoms  Outcome: Progressing  Goal: Improved Oral Intake  Outcome: Progressing  Goal: Optimal Pain Control and Function  Outcome: Progressing  Goal: Skin Health and Integrity  Outcome: Progressing  Goal: Optimal Wound Healing  Outcome: Progressing     Problem: Skin Injury Risk Increased  Goal: Skin Health and Integrity  Outcome: Progressing     Problem: Fall Injury Risk  Goal: Absence of Fall and Fall-Related Injury  Outcome: Progressing     Problem: Gas Exchange Impaired  Goal: Optimal Gas Exchange  Outcome: Progressing     Problem: Delirium  Goal: Optimal Coping  Outcome: Progressing  Goal: Improved Behavioral Control  Outcome: Progressing  Goal: Improved Attention and Thought Clarity  Outcome: Progressing  Goal: Improved Sleep  Outcome: Progressing     Problem: Breathing Pattern Ineffective  Goal: Effective Breathing Pattern  Outcome: Progressing

## 2025-07-14 NOTE — NURSING
Dr Evans requested post void residual; 1st occurrence pt had 51 ml in bladder, post void 5 ml. 2nd re-check pt had 108 ml in bladder, post void 0 ml.

## 2025-07-14 NOTE — PROGRESS NOTES
Patient denies shortness of breath. Review of systems G.I.-she denies nausea or vomiting, GUN, the patients having frequency with small amounts. She denies any dysuria.    Physical exam general to patients in no acute distress, she has no pitting Adema, she has a mild discomfort to D palpation in her super pubic region.    Assessment/plan 1. Acute renal failure Dash this patients creatinine has been slowly creeping up the past several days. Im worried that she may be having some urinary retention, Im going to get a post, void residual, bladder, scan, and place a Taylor to gravity, if her bladder scan reveals to 50 cc or greater in her bladder after voiding. And    Too. Hypertension.  Three. Diabetes mellitus.  Four. Anemia  Five. Spinal abscess.

## 2025-07-14 NOTE — ASSESSMENT & PLAN NOTE
Patient's most recent potassium results are listed below.   Recent Labs     07/12/25  0455 07/13/25  0503 07/14/25  0409   K 4.3 4.9 4.4     Plan  - Replete potassium per protocol  - Monitor potassium Daily  - Patient's hypokalemia is worsening. Will adjust treatment as follows:    - 20 mEq Effer K  Stable

## 2025-07-14 NOTE — ASSESSMENT & PLAN NOTE
Anemia is likely due to infection . Most recent hemoglobin and hematocrit are listed below.  Recent Labs     07/11/25  0503 07/12/25  0455 07/13/25  0503   HGB 7.9* 8.1* 8.6*   HCT 27.6* 27.6* 29.7*     Plan  - Monitor serial CBC: Daily  - Transfuse PRBC if patient becomes hemodynamically unstable, symptomatic or H/H drops below 7/21.  - Patient has not received any PRBC transfusions to date  - Patient's anemia is currently stable  - Monitor CBC if H/H continue to drop consider transfusion.  Follow

## 2025-07-14 NOTE — SUBJECTIVE & OBJECTIVE
Interval History:     Review of Systems   All other systems reviewed and are negative.    Objective:     Vital Signs (Most Recent):  Temp: 98.9 °F (37.2 °C) (07/13/25 1917)  Pulse: 87 (07/13/25 1917)  Resp: 18 (07/13/25 1917)  BP: (!) 140/81 (07/13/25 1917)  SpO2: 100 % (07/13/25 1917) Vital Signs (24h Range):  Temp:  [97.3 °F (36.3 °C)-98.9 °F (37.2 °C)] 98.9 °F (37.2 °C)  Pulse:  [81-87] 87  Resp:  [18] 18  SpO2:  [97 %-100 %] 100 %  BP: (120-146)/(63-83) 140/81  Arterial Line BP: (141)/(68) 141/68     Weight: 106.2 kg (234 lb 2.1 oz)  Body mass index is 36.67 kg/m².  No intake or output data in the 24 hours ending 07/13/25 2009          Physical Exam  Vitals and nursing note reviewed.   HENT:      Head: Normocephalic.      Mouth/Throat:      Mouth: Mucous membranes are moist.   Eyes:      Pupils: Pupils are equal, round, and reactive to light.   Neck:      Vascular: No carotid bruit.   Cardiovascular:      Rate and Rhythm: Normal rate and regular rhythm.   Pulmonary:      Effort: Pulmonary effort is normal.   Abdominal:      General: Abdomen is flat. Bowel sounds are normal. There is no distension.      Palpations: Abdomen is soft.   Musculoskeletal:      Right lower leg: No edema.      Left lower leg: No edema.   Skin:     General: Skin is warm and dry.   Neurological:      General: No focal deficit present.      Mental Status: She is alert. Mental status is at baseline.               Significant Labs: All pertinent labs within the past 24 hours have been reviewed.  BMP:   Recent Labs   Lab 07/13/25  0503   *      K 4.9      CO2 28   BUN 51*   CREATININE 4.03*   CALCIUM 8.6     CBC:   Recent Labs   Lab 07/12/25 0455 07/13/25  0503   WBC 11.55* 11.14*   HGB 8.1* 8.6*   HCT 27.6* 29.7*   * 578*     CMP:   Recent Labs   Lab 07/12/25  0455 07/13/25  0503    141   K 4.3 4.9    100   CO2 29 28   * 133*   BUN 54* 51*   CREATININE 4.22* 4.03*   CALCIUM 8.6 8.6   ANIONGAP 16  18*       Significant Imaging: I have reviewed all pertinent imaging results/findings within the past 24 hours.

## 2025-07-14 NOTE — PT/OT/SLP PROGRESS
Physical Therapy Treatment    Patient Name:  Carey Leonardo   MRN:  [id number]    Recommendations:     Discharge Recommendations: Low Intensity Therapy  Discharge Equipment Recommendations: to be determined by next level of care  Barriers to discharge: Inaccessible home and Decreased caregiver support    Assessment:     Carey Leonardo is a 49 y.o. female admitted with a medical diagnosis of MATT (acute kidney injury).  She presents with the following impairments/functional limitations: weakness, impaired endurance, impaired functional mobility, decreased upper extremity function, orthopedic precautions.    Rehab Prognosis: Good; patient would benefit from acute skilled PT services to address these deficits and reach maximum level of function.    Recent Surgery: Procedure(s) (LRB):  INCISION AND DRAINAGE, ABSCESS, SPINE, LUMBOSACRAL, POSTERIOR (N/A) 14 Days Post-Op    Plan:     During this hospitalization, patient to be seen 5 x/week to address the identified rehab impairments via gait training, therapeutic activities, therapeutic exercises, neuromuscular re-education and progress toward the following goals:    Plan of Care Expires:  08/04/25    Subjective     Chief Complaint: weak  Patient/Family Comments/goals: Pt. With pleasant demeanor, agreeable to PT.   Pain/Comfort:  Pain Rating 1: 0/10      Objective:     Communicated with Yadi Maria, PT for POC and nursing prior to session.  Patient found HOB elevated with telemetry, peripheral IV upon PT entry to room.     General Precautions: Standard, fall, contact  Orthopedic Precautions: spinal precautions  Braces: N/A  Respiratory Status: Room air     Functional Mobility:  Bed Mobility:     Rolling Right: stand by assistance  Scooting: modified independence  Supine to Sit: modified independence  Transfers:     Sit to Stand:  supervision and stand by assistance with no AD  Gait: Pt. Amb. With SBA 2 x 22 feet  in room.   Balance: Pt. Participated in side stepping x  10 reps each to left and right with bilat. HHA.       AM-PAC 6 CLICK MOBILITY  Turning over in bed (including adjusting bedclothes, sheets and blankets)?: 4  Sitting down on and standing up from a chair with arms (e.g., wheelchair, bedside commode, etc.): 4  Moving from lying on back to sitting on the side of the bed?: 4  Moving to and from a bed to a chair (including a wheelchair)?: 4  Need to walk in hospital room?: 4  Climbing 3-5 steps with a railing?: 3  Basic Mobility Total Score: 23       Treatment & Education:  Pt. Participated in mobility per above and Standing : Heel Raise, HS curls, Marching, Hip Ext.  And Hip ABD with bilat. HHA x 10 reps each B. Seated LAQs 3 x 10t  Patient left up in chair with all lines intact and call button in reach..    GOALS:   Multidisciplinary Problems       Physical Therapy Goals          Problem: Physical Therapy    Goal Priority Disciplines Outcome Interventions   Physical Therapy Goal     PT, PT/OT Progressing    Description: Short term goals:  1. Supine to sit with MInimal Assistance  2. Sit to stand transfer with Minimal Assistance  3. Bed to chair transfer with Minimal Assistance using Rolling Walker  4. Gait  x 50 feet with Minimal Assistance using Rolling Walker.     Long term goals:  1. Supine to sit with Contact Guard Assistance  2. Sit to stand transfer with Contact Guard Assistance  3. Bed to chair transfer with Contact Guard Assistance using Rolling Walker  4. Gait  x 100 feet with Contact Guard Assistance using Rolling Walker.                          DME Justifications:   Carey's mobility limitation cannot be sufficiently resolved by the use of a cane. Her functional mobility deficit can be sufficiently resolved with the use of a Rolling Walker. Patient's mobility limitation significantly impairs their ability to participate in one of more activities of daily living.  The use of a RW will significantly improve the patient's ability to participate in MRADLS and  the patient will use it on regular basis in the home.    Time Tracking:     PT Received On: 07/14/25  PT Start Time: 0222     PT Stop Time: 0302  PT Total Time (min): 40 min     Billable Minutes: Gait Training 10 and Therapeutic Exercise 30    Treatment Type: Treatment  PT/PTA: PTA     Number of PTA visits since last PT visit: 1 07/14/2025

## 2025-07-14 NOTE — SUBJECTIVE & OBJECTIVE
Interval History:     Review of Systems   All other systems reviewed and are negative.    Objective:     Vital Signs (Most Recent):  Temp: 99.6 °F (37.6 °C) (07/14/25 1604)  Pulse: 88 (07/14/25 1604)  Resp: 17 (07/14/25 1604)  BP: (!) 144/79 (07/14/25 1604)  SpO2: 100 % (07/14/25 1604) Vital Signs (24h Range):  Temp:  [97.6 °F (36.4 °C)-99.6 °F (37.6 °C)] 99.6 °F (37.6 °C)  Pulse:  [80-91] 88  Resp:  [17-18] 17  SpO2:  [96 %-100 %] 100 %  BP: (107-152)/(59-81) 144/79     Weight: 98 kg (216 lb 0.8 oz)  Body mass index is 33.84 kg/m².    Intake/Output Summary (Last 24 hours) at 7/14/2025 1654  Last data filed at 7/14/2025 1205  Gross per 24 hour   Intake 480 ml   Output --   Net 480 ml             Physical Exam  Vitals and nursing note reviewed.   HENT:      Head: Normocephalic.      Mouth/Throat:      Mouth: Mucous membranes are moist.   Eyes:      Pupils: Pupils are equal, round, and reactive to light.   Neck:      Vascular: No carotid bruit.   Cardiovascular:      Rate and Rhythm: Normal rate and regular rhythm.   Pulmonary:      Effort: Pulmonary effort is normal.   Abdominal:      General: Abdomen is flat. Bowel sounds are normal. There is no distension.      Palpations: Abdomen is soft.   Musculoskeletal:      Right lower leg: No edema.      Left lower leg: No edema.   Skin:     General: Skin is warm and dry.   Neurological:      General: No focal deficit present.      Mental Status: She is alert. Mental status is at baseline.               Significant Labs: All pertinent labs within the past 24 hours have been reviewed.  BMP:   Recent Labs   Lab 07/14/25  0409   *      K 4.4      CO2 27   BUN 48*   CREATININE 4.36*   CALCIUM 9.0     CBC:   Recent Labs   Lab 07/13/25  0503 07/14/25  0409   WBC 11.14* 10.17   HGB 8.6* 7.9*   HCT 29.7* 27.4*   * 558*     CMP:   Recent Labs   Lab 07/13/25  0503 07/14/25  0409    141   K 4.9 4.4    103   CO2 28 27   * 144*   BUN 51* 48*    CREATININE 4.03* 4.36*   CALCIUM 8.6 9.0   ANIONGAP 18* 15       Significant Imaging: I have reviewed all pertinent imaging results/findings within the past 24 hours.

## 2025-07-14 NOTE — ASSESSMENT & PLAN NOTE
Patient's most recent potassium results are listed below.   Recent Labs     07/11/25  0502 07/12/25  0455 07/13/25  0503   K 3.7 4.3 4.9     Plan  - Replete potassium per protocol  - Monitor potassium Daily  - Patient's hypokalemia is worsening. Will adjust treatment as follows:    - 20 mEq Effer K  Stable

## 2025-07-14 NOTE — ASSESSMENT & PLAN NOTE
MATT is likely due to ATN from vancomycin Baseline creatinine is .65. Most recent creatinine and eGFR are listed below.  Recent Labs     07/11/25  0502 07/12/25  0455 07/13/25  0503   CREATININE 3.86* 4.22* 4.03*   EGFRNORACEVR 14* 12* 13*     Given 1,848 ml bolus of normal saline in the ED     Plan  - MATT is worsening. Will adjust treatment as follows: continuous infusion of .9 Normal Saline  - Avoid nephrotoxins and renally dose meds for GFR listed above  - Monitor urine output, serial BMP, and adjust therapy as needed  6/27 continue IV fluids  6/28 consult nephrology, worse  6/29 now developing respiratory failure with pulmonary edema.    7/9  - renal function improving, nephrology following

## 2025-07-14 NOTE — ASSESSMENT & PLAN NOTE
Patient seen by Dr. Upton for abscess on lumber spine 2 weeks ago.  Culture positive for MRSA  Patient was started on Vancomycin  and discharged  Patient subsequently developed MATT  Patient remains febrile   Vancomycin D/c due to MATT  Initiating Daptomycin with pharmacy renally dosing  Pain control managed with Tylenol 1000 mg and 5mg Oxycodone    Persistently febrile in the setting of postoperative infection with the implantation of lumbar hardware.  MRSA.  Consult ortho spine, consult ID  6/28 continue dapto, added cefepime. MRI could not be done with contrast due to renal function  Persistently febrile.  Broadened to carbapenem    7/9  - picc line placed, dapto for spinal abscess continues, will get timeline for IV meds today    7/10  - ID recs: - continue daptomycin (610mg)  8mg/kg iv q48hr administered through PICC    - will need weekly labs: cbc w/ diff and cmp and CPK    - EOT for abx: 7/24/25    - Will need suppression for 1 year via bactrim given concern for HW infection w/o removal as MRSA was resistant to tetracycline. This should be done  after        completing IV abx course, but the dose will be dependent on her renal function at that time. Please call [facility] call center and ask for the [facility] physician to verify dose   - will discuss with  about options for abx    7/11  - renal function improving, nephro following  - Ortho cleared for discharge to follow up next week in office for suture removal  - submitted for [facility] for abx home admin, once approved can be dc home

## 2025-07-14 NOTE — ASSESSMENT & PLAN NOTE
MATT is likely due to ATN from vancomycin Baseline creatinine is .65. Most recent creatinine and eGFR are listed below.  Recent Labs     07/12/25  0455 07/13/25  0503 07/14/25  0409   CREATININE 4.22* 4.03* 4.36*   EGFRNORACEVR 12* 13* 12*     Given 1,848 ml bolus of normal saline in the ED     Plan  - MATT is worsening. Will adjust treatment as follows: continuous infusion of .9 Normal Saline  - Avoid nephrotoxins and renally dose meds for GFR listed above  - Monitor urine output, serial BMP, and adjust therapy as needed  6/27 continue IV fluids  6/28 consult nephrology, worse  6/29 now developing respiratory failure with pulmonary edema.    7/9  - renal function improving, nephrology following

## 2025-07-14 NOTE — ASSESSMENT & PLAN NOTE
Hyponatremia is likely due to Dehydration/hypovolemia and renal insufficiency. The patient's most recent sodium results are listed below.  Recent Labs     07/11/25  0502 07/12/25  0455 07/13/25  0503    141 141     Plan  - Correct the sodium by 4-6mEq in 24 hours.   - Obtain the following studies: Urine sodium, urine osmolality, serum osmolality.  - Will treat the hyponatremia with IV fluids as follows: continuous .9 normal saline IV infusion  - Monitor sodium Daily.   - Patient hyponatremia is stable  Resolved

## 2025-07-14 NOTE — ASSESSMENT & PLAN NOTE
Anemia is likely due to infection . Most recent hemoglobin and hematocrit are listed below.  Recent Labs     07/12/25  0455 07/13/25  0503 07/14/25  0409   HGB 8.1* 8.6* 7.9*   HCT 27.6* 29.7* 27.4*     Plan  - Monitor serial CBC: Daily  - Transfuse PRBC if patient becomes hemodynamically unstable, symptomatic or H/H drops below 7/21.  - Patient has not received any PRBC transfusions to date  - Patient's anemia is currently stable  - Monitor CBC if H/H continue to drop consider transfusion.  Follow

## 2025-07-14 NOTE — ASSESSMENT & PLAN NOTE
Hyponatremia is likely due to Dehydration/hypovolemia and renal insufficiency. The patient's most recent sodium results are listed below.  Recent Labs     07/12/25  0455 07/13/25  0503 07/14/25  0409    141 141     Plan  - Correct the sodium by 4-6mEq in 24 hours.   - Obtain the following studies: Urine sodium, urine osmolality, serum osmolality.  - Will treat the hyponatremia with IV fluids as follows: continuous .9 normal saline IV infusion  - Monitor sodium Daily.   - Patient hyponatremia is stable  Resolved

## 2025-07-14 NOTE — ASSESSMENT & PLAN NOTE
Patient's blood pressure range in the last 24 hours was: BP  Min: 107/59  Max: 152/71. Patient requires and takes all medications due to treatment resistant Hypertension that was initiated by Dr. Hurst. The patient's inpatient anti-hypertensive regimen is listed below:  Current Antihypertensives  cloNIDine tablet 0.1 mg, Every 6 hours PRN, Oral  metoprolol tartrate (LOPRESSOR) tablet 50 mg, 2 times daily, Oral  metoprolol injection 5 mg, Every 6 hours PRN, Intravenous  furosemide tablet 80 mg, Daily, Oral    Plan  - BP is controlled, no changes needed to their regimen  - follow

## 2025-07-14 NOTE — ASSESSMENT & PLAN NOTE
Patient's blood pressure range in the last 24 hours was: BP  Min: 120/63  Max: 146/83. Patient requires and takes all medications due to treatment resistant Hypertension that was initiated by Dr. Hurst. The patient's inpatient anti-hypertensive regimen is listed below:  Current Antihypertensives  cloNIDine tablet 0.1 mg, Every 6 hours PRN, Oral  metoprolol tartrate (LOPRESSOR) tablet 50 mg, 2 times daily, Oral  metoprolol injection 5 mg, Every 6 hours PRN, Intravenous  furosemide tablet 80 mg, Daily, Oral    Plan  - BP is controlled, no changes needed to their regimen  - follow

## 2025-07-15 VITALS
WEIGHT: 216.06 LBS | SYSTOLIC BLOOD PRESSURE: 156 MMHG | RESPIRATION RATE: 17 BRPM | OXYGEN SATURATION: 98 % | HEART RATE: 86 BPM | HEIGHT: 67 IN | TEMPERATURE: 98 F | BODY MASS INDEX: 33.91 KG/M2 | DIASTOLIC BLOOD PRESSURE: 86 MMHG

## 2025-07-15 LAB
ANION GAP SERPL CALCULATED.3IONS-SCNC: 18 MMOL/L (ref 7–16)
ANISOCYTOSIS BLD QL SMEAR: ABNORMAL
BASOPHILS # BLD AUTO: 0.07 K/UL (ref 0–0.2)
BASOPHILS NFR BLD AUTO: 0.7 % (ref 0–1)
BUN SERPL-MCNC: 44 MG/DL (ref 7–19)
BUN/CREAT SERPL: 10 (ref 6–20)
CALCIUM SERPL-MCNC: 9 MG/DL (ref 8.4–10.2)
CHLORIDE SERPL-SCNC: 105 MMOL/L (ref 98–107)
CO2 SERPL-SCNC: 26 MMOL/L (ref 22–29)
CREAT SERPL-MCNC: 4.38 MG/DL (ref 0.55–1.02)
DIFFERENTIAL METHOD BLD: ABNORMAL
EGFR (NO RACE VARIABLE) (RUSH/TITUS): 12 ML/MIN/1.73M2
EOSINOPHIL # BLD AUTO: 2.29 K/UL (ref 0–0.5)
EOSINOPHIL NFR BLD AUTO: 24.5 % (ref 1–4)
EOSINOPHIL NFR BLD MANUAL: 29 % (ref 1–4)
ERYTHROCYTE [DISTWIDTH] IN BLOOD BY AUTOMATED COUNT: 18.8 % (ref 11.5–14.5)
GLUCOSE SERPL-MCNC: 153 MG/DL (ref 74–100)
GLUCOSE SERPL-MCNC: 172 MG/DL (ref 70–105)
GLUCOSE SERPL-MCNC: 199 MG/DL (ref 70–105)
GLUCOSE SERPL-MCNC: 210 MG/DL (ref 70–105)
HCT VFR BLD AUTO: 29.9 % (ref 38–47)
HGB BLD-MCNC: 8.5 G/DL (ref 12–16)
HYPOCHROMIA BLD QL SMEAR: ABNORMAL
IMM GRANULOCYTES # BLD AUTO: 0.05 K/UL (ref 0–0.04)
IMM GRANULOCYTES NFR BLD: 0.5 % (ref 0–0.4)
LYMPHOCYTES # BLD AUTO: 2.39 K/UL (ref 1–4.8)
LYMPHOCYTES NFR BLD AUTO: 25.5 % (ref 27–41)
LYMPHOCYTES NFR BLD MANUAL: 18 % (ref 27–41)
MCH RBC QN AUTO: 27 PG (ref 27–31)
MCHC RBC AUTO-ENTMCNC: 28.4 G/DL (ref 32–36)
MCV RBC AUTO: 94.9 FL (ref 80–96)
MONOCYTES # BLD AUTO: 1 K/UL (ref 0–0.8)
MONOCYTES NFR BLD AUTO: 10.7 % (ref 2–6)
MONOCYTES NFR BLD MANUAL: 2 % (ref 2–6)
MPC BLD CALC-MCNC: 9.7 FL (ref 9.4–12.4)
NEUTROPHILS # BLD AUTO: 3.56 K/UL (ref 1.8–7.7)
NEUTROPHILS NFR BLD AUTO: 38.1 % (ref 53–65)
NEUTS SEG NFR BLD MANUAL: 51 % (ref 50–62)
NRBC # BLD AUTO: 0 X10E3/UL
NRBC, AUTO (.00): 0 %
PLATELET # BLD AUTO: 559 K/UL (ref 150–400)
PLATELET MORPHOLOGY: ABNORMAL
POLYCHROMASIA BLD QL SMEAR: ABNORMAL
POTASSIUM SERPL-SCNC: 4.5 MMOL/L (ref 3.5–5.1)
RBC # BLD AUTO: 3.15 M/UL (ref 4.2–5.4)
SODIUM SERPL-SCNC: 144 MMOL/L (ref 136–145)
WBC # BLD AUTO: 9.36 K/UL (ref 4.5–11)

## 2025-07-15 PROCEDURE — 25000003 PHARM REV CODE 250: Performed by: ORTHOPAEDIC SURGERY

## 2025-07-15 PROCEDURE — 94761 N-INVAS EAR/PLS OXIMETRY MLT: CPT

## 2025-07-15 PROCEDURE — 80048 BASIC METABOLIC PNL TOTAL CA: CPT | Performed by: ORTHOPAEDIC SURGERY

## 2025-07-15 PROCEDURE — 63600175 PHARM REV CODE 636 W HCPCS: Performed by: ORTHOPAEDIC SURGERY

## 2025-07-15 PROCEDURE — 82962 GLUCOSE BLOOD TEST: CPT

## 2025-07-15 PROCEDURE — 99900035 HC TECH TIME PER 15 MIN (STAT)

## 2025-07-15 PROCEDURE — 63600175 PHARM REV CODE 636 W HCPCS: Performed by: NURSE PRACTITIONER

## 2025-07-15 PROCEDURE — 99232 SBSQ HOSP IP/OBS MODERATE 35: CPT | Mod: ,,, | Performed by: STUDENT IN AN ORGANIZED HEALTH CARE EDUCATION/TRAINING PROGRAM

## 2025-07-15 PROCEDURE — 85025 COMPLETE CBC W/AUTO DIFF WBC: CPT | Performed by: ORTHOPAEDIC SURGERY

## 2025-07-15 PROCEDURE — 25000003 PHARM REV CODE 250: Performed by: INTERNAL MEDICINE

## 2025-07-15 PROCEDURE — 25000003 PHARM REV CODE 250

## 2025-07-15 PROCEDURE — 36415 COLL VENOUS BLD VENIPUNCTURE: CPT | Performed by: ORTHOPAEDIC SURGERY

## 2025-07-15 RX ORDER — FUROSEMIDE 80 MG/1
80 TABLET ORAL DAILY
Qty: 30 TABLET | Refills: 11 | Status: SHIPPED | OUTPATIENT
Start: 2025-07-16 | End: 2026-07-16

## 2025-07-15 RX ORDER — AMIODARONE HYDROCHLORIDE 200 MG/1
200 TABLET ORAL DAILY
Qty: 30 TABLET | Refills: 11 | Status: SHIPPED | OUTPATIENT
Start: 2025-07-16 | End: 2026-07-16

## 2025-07-15 RX ADMIN — FUROSEMIDE 80 MG: 40 TABLET ORAL at 08:07

## 2025-07-15 RX ADMIN — PANTOPRAZOLE SODIUM 40 MG: 40 INJECTION, POWDER, FOR SOLUTION INTRAVENOUS at 08:07

## 2025-07-15 RX ADMIN — AMIODARONE HYDROCHLORIDE 200 MG: 200 TABLET ORAL at 08:07

## 2025-07-15 RX ADMIN — INSULIN ASPART 5 UNITS: 100 INJECTION, SOLUTION INTRAVENOUS; SUBCUTANEOUS at 08:07

## 2025-07-15 RX ADMIN — HEPARIN SODIUM 5000 UNITS: 5000 INJECTION, SOLUTION INTRAVENOUS; SUBCUTANEOUS at 05:07

## 2025-07-15 RX ADMIN — POTASSIUM CHLORIDE 20 MEQ: 1500 TABLET, EXTENDED RELEASE ORAL at 08:07

## 2025-07-15 RX ADMIN — MICONAZOLE NITRATE ANTIFUNGAL POWDER: 2 POWDER TOPICAL at 08:07

## 2025-07-15 RX ADMIN — LEVOTHYROXINE SODIUM 200 MCG: 100 TABLET ORAL at 05:07

## 2025-07-15 RX ADMIN — HYDROCODONE BITARTRATE AND ACETAMINOPHEN 1 TABLET: 5; 325 TABLET ORAL at 08:07

## 2025-07-15 RX ADMIN — METOPROLOL TARTRATE 50 MG: 50 TABLET, FILM COATED ORAL at 08:07

## 2025-07-15 RX ADMIN — INSULIN ASPART 5 UNITS: 100 INJECTION, SOLUTION INTRAVENOUS; SUBCUTANEOUS at 12:07

## 2025-07-15 NOTE — ASSESSMENT & PLAN NOTE
Patient's blood pressure range in the last 24 hours was: BP  Min: 120/69  Max: 148/92. Patient requires and takes all medications due to treatment resistant Hypertension that was initiated by Dr. Hurst. The patient's inpatient anti-hypertensive regimen is listed below:  Current Antihypertensives  cloNIDine tablet 0.1 mg, Every 6 hours PRN, Oral  metoprolol tartrate (LOPRESSOR) tablet 50 mg, 2 times daily, Oral  metoprolol injection 5 mg, Every 6 hours PRN, Intravenous  furosemide tablet 80 mg, Daily, Oral  furosemide (LASIX) tablet, Daily, Oral    Plan  - BP is controlled, no changes needed to their regimen  - follow  Blood pressure looks good

## 2025-07-15 NOTE — SUBJECTIVE & OBJECTIVE
Interval History:  No acute events overnight    Review of Systems   All other systems reviewed and are negative.    Objective:     Vital Signs (Most Recent):  Temp: 98.2 °F (36.8 °C) (07/15/25 1138)  Pulse: 88 (07/15/25 1138)  Resp: 19 (07/15/25 1138)  BP: (!) 148/92 (07/15/25 1138)  SpO2: 100 % (07/15/25 1138) Vital Signs (24h Range):  Temp:  [97.7 °F (36.5 °C)-99.6 °F (37.6 °C)] 98.2 °F (36.8 °C)  Pulse:  [82-91] 88  Resp:  [17-19] 19  SpO2:  [98 %-100 %] 100 %  BP: (120-148)/(69-92) 148/92     Weight: 98 kg (216 lb 0.8 oz)  Body mass index is 33.84 kg/m².    Intake/Output Summary (Last 24 hours) at 7/15/2025 1404  Last data filed at 7/15/2025 0800  Gross per 24 hour   Intake 480 ml   Output 5 ml   Net 475 ml             Physical Exam  Vitals and nursing note reviewed.   HENT:      Head: Normocephalic.      Mouth/Throat:      Mouth: Mucous membranes are moist.   Eyes:      Pupils: Pupils are equal, round, and reactive to light.   Neck:      Vascular: No carotid bruit.   Cardiovascular:      Rate and Rhythm: Normal rate and regular rhythm.   Pulmonary:      Effort: Pulmonary effort is normal.   Abdominal:      General: Abdomen is flat. Bowel sounds are normal. There is no distension.      Palpations: Abdomen is soft.   Musculoskeletal:      Right lower leg: No edema.      Left lower leg: No edema.   Skin:     General: Skin is warm and dry.   Neurological:      General: No focal deficit present.      Mental Status: She is alert. Mental status is at baseline.               Significant Labs: All pertinent labs within the past 24 hours have been reviewed.  BMP:   Recent Labs   Lab 07/15/25  0507   *      K 4.5      CO2 26   BUN 44*   CREATININE 4.38*   CALCIUM 9.0     CBC:   Recent Labs   Lab 07/14/25  0409 07/15/25  0507   WBC 10.17 9.36   HGB 7.9* 8.5*   HCT 27.4* 29.9*   * 559*     CMP:   Recent Labs   Lab 07/14/25  0409 07/15/25  0507    144   K 4.4 4.5    105   CO2 27 26   GLU  144* 153*   BUN 48* 44*   CREATININE 4.36* 4.38*   CALCIUM 9.0 9.0   ANIONGAP 15 18*       Significant Imaging: I have reviewed all pertinent imaging results/findings within the past 24 hours.

## 2025-07-15 NOTE — ASSESSMENT & PLAN NOTE
Patient seen by Dr. Upton for abscess on lumber spine 2 weeks ago.  Culture positive for MRSA  Patient was started on Vancomycin  and discharged  Patient subsequently developed MATT  Patient remains febrile   Vancomycin D/c due to MATT  Initiating Daptomycin with pharmacy renally dosing  Pain control managed with Tylenol 1000 mg and 5mg Oxycodone    Persistently febrile in the setting of postoperative infection with the implantation of lumbar hardware.  MRSA.  Consult ortho spine, consult ID  6/28 continue dapto, added cefepime. MRI could not be done with contrast due to renal function  Persistently febrile.  Broadened to carbapenem    7/9  - picc line placed, dapto for spinal abscess continues, will get timeline for IV meds today    7/10  - ID recs: - continue daptomycin (610mg)  8mg/kg iv q48hr administered through PICC    - will need weekly labs: cbc w/ diff and cmp and CPK    - EOT for abx: 7/24/25    - Will need suppression for 1 year via bactrim given concern for HW infection w/o removal as MRSA was resistant to tetracycline. This should be done  after        completing IV abx course, but the dose will be dependent on her renal function at that time. Please call [facility] call center and ask for the [facility] physician to verify dose   - will discuss with  about options for abx    7/11  - renal function improving, nephro following  - Ortho cleared for discharge to follow up next week in office for suture removal  - submitted for [facility] for abx home admin, once approved can be dc home    7/14  - abx delivered to patient's room, was going to discharge today however no family at home, plan to dc in the morning  - follow up with Dr. Upton for suture removal this week in office

## 2025-07-15 NOTE — ASSESSMENT & PLAN NOTE
Patient's blood pressure range in the last 24 hours was: BP  Min: 120/69  Max: 148/92. Patient requires and takes all medications due to treatment resistant Hypertension that was initiated by Dr. Hurst. The patient's inpatient anti-hypertensive regimen is listed below:  Current Antihypertensives  cloNIDine tablet 0.1 mg, Every 6 hours PRN, Oral  metoprolol tartrate (LOPRESSOR) tablet 50 mg, 2 times daily, Oral  metoprolol injection 5 mg, Every 6 hours PRN, Intravenous  furosemide tablet 80 mg, Daily, Oral    Plan  - BP is controlled, no changes needed to their regimen  - follow  Blood pressure looks good

## 2025-07-15 NOTE — ASSESSMENT & PLAN NOTE
Patient's most recent potassium results are listed below.   Recent Labs     07/13/25  0503 07/14/25  0409 07/15/25  0507   K 4.9 4.4 4.5     Plan  - Replete potassium per protocol  - Monitor potassium Daily  - Patient's hypokalemia is worsening. Will adjust treatment as follows:    - 20 mEq Effer K  Stable

## 2025-07-15 NOTE — PLAN OF CARE
Amirah noted that patient has discharge orders in.  AMIRAH placed call to [facility] to inquire about patient's IV abx status.  Fang states that the  has not made it back to the office, and if needed, the antibiotics can be delivered to the patient's home. AMIRAH notified DILAN Wagner. CM following.

## 2025-07-15 NOTE — ASSESSMENT & PLAN NOTE
Postoperative infection after the implantation spinal hardware.  Persistently febrile.  CRP ESR noted.  Consult ortho spine, consult ID.  Daptomycin  6/28 continue dapto, added cefepime due to persistent fevers. MRI could not be done with contrast due to renal function.  US LE to r/o thrombus as cause of fever  6/29 Continue daptomycin  Continue daptomycin

## 2025-07-15 NOTE — ASSESSMENT & PLAN NOTE
Anemia is likely due to infection . Most recent hemoglobin and hematocrit are listed below.  Recent Labs     07/13/25  0503 07/14/25  0409 07/15/25  0507   HGB 8.6* 7.9* 8.5*   HCT 29.7* 27.4* 29.9*     Plan  - Monitor serial CBC: Daily  - Transfuse PRBC if patient becomes hemodynamically unstable, symptomatic or H/H drops below 7/21.  - Patient has not received any PRBC transfusions to date  - Patient's anemia is currently stable  - Monitor CBC if H/H continue to drop consider transfusion.  Follow  No evidence of bleeding

## 2025-07-15 NOTE — ASSESSMENT & PLAN NOTE
Secondary to infection versus medication effect.  Stop cefepime.  Broaden antibiotics  CT head negative.    7/9  - patient more oriented this morning after given seroquel overnight but still not at baseline, will continue seroquel again tonight  Resolved
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
## 2025-07-15 NOTE — ASSESSMENT & PLAN NOTE
MATT is likely due to ATN from vancomycin Baseline creatinine is .65. Most recent creatinine and eGFR are listed below.  Recent Labs     07/13/25  0503 07/14/25  0409 07/15/25  0507   CREATININE 4.03* 4.36* 4.38*   EGFRNORACEVR 13* 12* 12*     Given 1,848 ml bolus of normal saline in the ED     Plan  - MATT is worsening. Will adjust treatment as follows: continuous infusion of .9 Normal Saline  - Avoid nephrotoxins and renally dose meds for GFR listed above  - Monitor urine output, serial BMP, and adjust therapy as needed  6/27 continue IV fluids  6/28 consult nephrology, worse  6/29 now developing respiratory failure with pulmonary edema.    7/9  - renal function improving, nephrology following  7/15 renal function has been deteriorating over the last few days.  Need to ensure renal function stabilizes prior to discharge  Follow up nephrology

## 2025-07-15 NOTE — ASSESSMENT & PLAN NOTE
Patient seen by Dr. Upton for abscess on lumber spine 2 weeks ago.  Culture positive for MRSA  Patient was started on Vancomycin  and discharged  Patient subsequently developed MATT  Patient remains febrile   Vancomycin D/c due to MATT  Initiating Daptomycin with pharmacy renally dosing  Pain control managed with Tylenol 1000 mg and 5mg Oxycodone    Persistently febrile in the setting of postoperative infection with the implantation of lumbar hardware.  MRSA.  Consult ortho spine, consult ID  6/28 continue dapto, added cefepime. MRI could not be done with contrast due to renal function  Persistently febrile.  Broadened to carbapenem    7/9  - picc line placed, dapto for spinal abscess continues, will get timeline for IV meds today    7/10  - ID recs: - continue daptomycin (610mg)  8mg/kg iv q48hr administered through PICC    - will need weekly labs: cbc w/ diff and cmp and CPK    - EOT for abx: 7/24/25    - Will need suppression for 1 year via bactrim given concern for HW infection w/o removal as MRSA was resistant to tetracycline. This should be done  after        completing IV abx course, but the dose will be dependent on her renal function at that time. Please call [facility] call center and ask for the [facility] physician to verify dose   - will discuss with  about options for abx    7/11  - renal function improving, nephro following  - Ortho cleared for discharge to follow up next week in office for suture removal  - submitted for vital care for abx home admin, once approved can be dc home    7/14  - abx delivered to patient's room, was going to discharge today however no family at home, plan to dc in the morning  - follow up with Dr. Upton for suture removal this week in office

## 2025-07-15 NOTE — ASSESSMENT & PLAN NOTE
Hyponatremia is likely due to Dehydration/hypovolemia and renal insufficiency. The patient's most recent sodium results are listed below.  Recent Labs     07/13/25  0503 07/14/25  0409 07/15/25  0507    141 144     Plan  - Correct the sodium by 4-6mEq in 24 hours.   - Obtain the following studies: Urine sodium, urine osmolality, serum osmolality.  - Will treat the hyponatremia with IV fluids as follows: continuous .9 normal saline IV infusion  - Monitor sodium Daily.   - Patient hyponatremia is stable  Resolved

## 2025-07-15 NOTE — ASSESSMENT & PLAN NOTE
MATT is likely due to ATN from vancomycin Baseline creatinine is .65. Most recent creatinine and eGFR are listed below.  Recent Labs     07/13/25  0503 07/14/25  0409 07/15/25  0507   CREATININE 4.03* 4.36* 4.38*   EGFRNORACEVR 13* 12* 12*     Given 1,848 ml bolus of normal saline in the ED     Plan  - MATT is worsening. Will adjust treatment as follows: continuous infusion of .9 Normal Saline  - Avoid nephrotoxins and renally dose meds for GFR listed above  - Monitor urine output, serial BMP, and adjust therapy as needed  6/27 continue IV fluids  6/28 consult nephrology, worse  6/29 now developing respiratory failure with pulmonary edema.    7/9  - renal function improving, nephrology following  7/15 renal function has been deteriorating over the last few days.  Need to ensure renal function stabilizes prior to discharge

## 2025-07-15 NOTE — ASSESSMENT & PLAN NOTE
Postoperative infection after the implantation spinal hardware.  Persistently febrile.  CRP ESR noted.  Consult ortho spine, consult ID.  Daptomycin  6/28 continue dapto, added cefepime due to persistent fevers. MRI could not be done with contrast due to renal function.  US LE to r/o thrombus as cause of fever  6/29 Continue daptomycin  Continue daptomycin  Continue daptomycin

## 2025-07-15 NOTE — ASSESSMENT & PLAN NOTE
Anemia is likely due to infection . Most recent hemoglobin and hematocrit are listed below.  Recent Labs     07/13/25  0503 07/14/25  0409 07/15/25  0507   HGB 8.6* 7.9* 8.5*   HCT 29.7* 27.4* 29.9*     Plan  - Monitor serial CBC: Daily  - Transfuse PRBC if patient becomes hemodynamically unstable, symptomatic or H/H drops below 7/21.  - Patient has not received any PRBC transfusions to date  - Patient's anemia is currently stable  - Monitor CBC if H/H continue to drop consider transfusion.  Follow

## 2025-07-16 ENCOUNTER — TELEPHONE (OUTPATIENT)
Dept: FAMILY MEDICINE | Facility: CLINIC | Age: 50
End: 2025-07-16
Payer: OTHER GOVERNMENT

## 2025-07-16 DIAGNOSIS — I10 HYPERTENSION, UNSPECIFIED TYPE: ICD-10-CM

## 2025-07-16 LAB
ALBUMIN PE, BLOOD: 3.3 G/DL (ref 3.5–5.2)
ALPHA1 GLOB SERPL ELPH-MCNC: 0.3 G/DL (ref 0.1–0.4)
ALPHA2 GLOB SERPL ELPH-MCNC: 1.1 G/DL (ref 0.4–1.3)
B-GLOBULIN SERPL ELPH-MCNC: 0.9 G/DL (ref 0.5–1.5)
GAMMA GLOB SERPL ELPH-MCNC: 0.9 G/DL (ref 0.5–1.8)
PATH INTERP BLD-IMP: ABNORMAL
PROT SERPL-MCNC: 6.5 G/DL (ref 6.4–8.3)

## 2025-07-16 NOTE — PLAN OF CARE
[facility] - [facility] Telemetry  Discharge Final Note    Primary Care Provider: Zainab Harrell FNP    Expected Discharge Date: 7/15/2025    Final Discharge Note (most recent)       Final Note - 07/16/25 0819          Final Note    Assessment Type Final Discharge Note (P)      Anticipated Discharge Disposition Home or Self Care (P)      What phone number can be called within the next 1-3 days to see how you are doing after discharge? [phone] (P)      Hospital Resources/Appts/Education Provided Provided patient/caregiver with written discharge plan information (P)         Post-Acute Status    Post-Acute Authorization IV Infusion (P)      IV Infusion Status Set-up Complete/Auth obtained (P)      Discharge Delays None known at this time (P)                          Follow-up providers       Zainab Harrell FNP   Specialty: Family Medicine, Emergency Medicine   Relationship: PCP - General    [address] [facility] [place] [address]   Phone: [phone]       Next Steps: Schedule an appointment as soon as possible for a visit in 1 week(s)    Instructions: [facility] OFF[facility] WILL CALL PATIENT WITH FOLLOW-UP APPOINTMENT, SPOKE WITH Sean Archer MD   Specialty: Nephrology    [address]  INTERNAL MEDICINE CLINIC  [facility] [address]   Phone: [phone]       Next Steps: Go on 8/1/2025    Instructions: 10:30 am spoke with Cyril Mcgrath MD   Specialty: Orthopedic Surgery, Spine Surgery    [address] [facility] [place] [id number]   Phone: [phone]       Next Steps: Go on 7/24/2025    Instructions: 12:45 pm spoke with Mayito              After-discharge care                Dialysis/Infusion       [facility]   Service: Home Infusion and Injection    [address] [facility] [address]   Phone: [phone]                 [facility]       [facility]   Service: Home Nursing    [address]  [facility] [place] [address]   Phone: [phone]                           Patient discharged home self care. IV anitbiotics set up through [facility]. DC paperworkk faxed to aime and lakeisha notified.  No further needs noted.

## 2025-07-16 NOTE — TELEPHONE ENCOUNTER
Copied from CRM #[id number]. Topic: General Inquiry - Patient Advice  >> Jul 15, 2025  4:12 PM Claudia wrote:  Who Called: Carey Leonardo    Caller is requesting a sooner appointment. Patient needs 1 week post hospital follow up.   Preferred Method of Contact: Phone Call  Patient's Preferred Phone Number on File: [phone]   Best Call Back Number, if different:  Additional Information: please contact patient with appt

## 2025-07-17 ENCOUNTER — TELEPHONE (OUTPATIENT)
Dept: FAMILY MEDICINE | Facility: CLINIC | Age: 50
End: 2025-07-17
Payer: OTHER GOVERNMENT

## 2025-07-17 LAB
OHS QRS DURATION: 68 MS
OHS QRS DURATION: 72 MS
OHS QTC CALCULATION: 371 MS
OHS QTC CALCULATION: 446 MS

## 2025-07-17 RX ORDER — NIFEDIPINE 60 MG/1
TABLET, EXTENDED RELEASE ORAL
Qty: 90 TABLET | Refills: 3 | Status: SHIPPED | OUTPATIENT
Start: 2025-07-17

## 2025-07-17 NOTE — TELEPHONE ENCOUNTER
Copied from CRM #[id number]. Topic: Appointments - Appointment Scheduling  >> Jul 15, 2025  3:16 PM Clementine wrote:  Who Called: Carey Leonardo    Caller is requesting assistance/information from provider's office.    Carey Leonardo MRN [id number] needs [facility] f/u with Julio Cesar           Preferred Method of Contact: Phone Call  Patient's Preferred Phone Number on File: [phone]   Best Call Back Number, if different:  Additional Information:

## 2025-07-22 LAB
BACTERIA SPEC ANAEROBE CULT: NORMAL
MICROORGANISM SPEC CULT: NORMAL

## 2025-08-05 DIAGNOSIS — M43.16 SPONDYLOLISTHESIS OF LUMBAR REGION: Primary | ICD-10-CM

## 2025-08-05 DIAGNOSIS — I10 PRIMARY HYPERTENSION: ICD-10-CM

## 2025-08-05 RX ORDER — METOPROLOL SUCCINATE 100 MG/1
100 TABLET, EXTENDED RELEASE ORAL DAILY
Qty: 90 TABLET | Refills: 3 | Status: SHIPPED | OUTPATIENT
Start: 2025-08-05

## 2025-08-12 ENCOUNTER — OFFICE VISIT (OUTPATIENT)
Dept: CARDIOLOGY | Facility: CLINIC | Age: 50
End: 2025-08-12
Payer: OTHER GOVERNMENT

## 2025-08-12 VITALS
BODY MASS INDEX: 32.65 KG/M2 | DIASTOLIC BLOOD PRESSURE: 70 MMHG | HEIGHT: 67 IN | HEART RATE: 72 BPM | OXYGEN SATURATION: 98 % | WEIGHT: 208 LBS | SYSTOLIC BLOOD PRESSURE: 116 MMHG

## 2025-08-12 DIAGNOSIS — I1A.0 RESISTANT HYPERTENSION: Primary | Chronic | ICD-10-CM

## 2025-08-12 DIAGNOSIS — G47.00 INSOMNIA, UNSPECIFIED TYPE: Chronic | ICD-10-CM

## 2025-08-12 DIAGNOSIS — G47.33 OBSTRUCTIVE SLEEP APNEA SYNDROME: Chronic | ICD-10-CM

## 2025-08-12 DIAGNOSIS — E03.9 ACQUIRED HYPOTHYROIDISM: Chronic | ICD-10-CM

## 2025-08-12 PROCEDURE — 93005 ELECTROCARDIOGRAM TRACING: CPT | Mod: PBBFAC | Performed by: INTERNAL MEDICINE

## 2025-08-12 PROCEDURE — 99215 OFFICE O/P EST HI 40 MIN: CPT | Mod: PBBFAC,25 | Performed by: INTERNAL MEDICINE

## 2025-08-12 PROCEDURE — 93010 ELECTROCARDIOGRAM REPORT: CPT | Mod: S$PBB,,, | Performed by: INTERNAL MEDICINE

## 2025-08-12 PROCEDURE — 99999 PR PBB SHADOW E&M-EST. PATIENT-LVL V: CPT | Mod: PBBFAC,,, | Performed by: INTERNAL MEDICINE

## 2025-08-12 PROCEDURE — 99204 OFFICE O/P NEW MOD 45 MIN: CPT | Mod: S$PBB,,, | Performed by: INTERNAL MEDICINE

## 2025-08-13 LAB
OHS QRS DURATION: 74 MS
OHS QTC CALCULATION: 462 MS

## 2025-08-15 PROBLEM — G47.00 INSOMNIA: Chronic | Status: ACTIVE | Noted: 2025-08-15

## 2025-08-18 ENCOUNTER — TELEPHONE (OUTPATIENT)
Dept: CARDIOLOGY | Facility: CLINIC | Age: 50
End: 2025-08-18
Payer: OTHER GOVERNMENT

## 2025-08-20 ENCOUNTER — HOSPITAL ENCOUNTER (EMERGENCY)
Facility: HOSPITAL | Age: 50
Discharge: HOME | End: 2025-08-20
Attending: EMERGENCY MEDICINE
Payer: OTHER GOVERNMENT

## 2025-08-20 ENCOUNTER — TELEPHONE (OUTPATIENT)
Dept: FAMILY MEDICINE | Facility: CLINIC | Age: 50
End: 2025-08-20
Payer: OTHER GOVERNMENT

## 2025-08-20 ENCOUNTER — OFFICE VISIT (OUTPATIENT)
Dept: FAMILY MEDICINE | Facility: CLINIC | Age: 50
End: 2025-08-20
Payer: OTHER GOVERNMENT

## 2025-08-20 VITALS
WEIGHT: 207.63 LBS | OXYGEN SATURATION: 99 % | HEART RATE: 62 BPM | TEMPERATURE: 98 F | BODY MASS INDEX: 32.59 KG/M2 | HEIGHT: 67 IN | SYSTOLIC BLOOD PRESSURE: 92 MMHG | DIASTOLIC BLOOD PRESSURE: 58 MMHG

## 2025-08-20 VITALS
WEIGHT: 202 LBS | HEART RATE: 66 BPM | OXYGEN SATURATION: 98 % | SYSTOLIC BLOOD PRESSURE: 136 MMHG | RESPIRATION RATE: 16 BRPM | HEIGHT: 67 IN | DIASTOLIC BLOOD PRESSURE: 84 MMHG | BODY MASS INDEX: 31.71 KG/M2 | TEMPERATURE: 98 F

## 2025-08-20 DIAGNOSIS — I10 PRIMARY HYPERTENSION: Primary | ICD-10-CM

## 2025-08-20 DIAGNOSIS — R11.2 NAUSEA AND VOMITING: Primary | ICD-10-CM

## 2025-08-20 DIAGNOSIS — E86.0 DEHYDRATION: ICD-10-CM

## 2025-08-20 DIAGNOSIS — E11.9 TYPE 2 DIABETES MELLITUS WITHOUT COMPLICATION, WITHOUT LONG-TERM CURRENT USE OF INSULIN: ICD-10-CM

## 2025-08-20 DIAGNOSIS — E03.9 HYPOTHYROIDISM, UNSPECIFIED TYPE: ICD-10-CM

## 2025-08-20 LAB
ALBUMIN SERPL BCP-MCNC: 3 G/DL (ref 3.5–5)
ALBUMIN/GLOB SERPL: 0.7 {RATIO}
ALP SERPL-CCNC: 82 U/L (ref 40–150)
ALT SERPL W P-5'-P-CCNC: <7 U/L
ANION GAP SERPL CALCULATED.3IONS-SCNC: 10 MMOL/L (ref 7–16)
AST SERPL W P-5'-P-CCNC: 13 U/L (ref 11–45)
BASOPHILS # BLD AUTO: 0.04 K/UL (ref 0–0.2)
BASOPHILS NFR BLD AUTO: 0.3 % (ref 0–1)
BILIRUB SERPL-MCNC: 0.3 MG/DL
BILIRUB UR QL STRIP: NEGATIVE
BUN SERPL-MCNC: 17 MG/DL (ref 7–19)
BUN/CREAT SERPL: 8 (ref 6–20)
CALCIUM SERPL-MCNC: 8.4 MG/DL (ref 8.4–10.2)
CHLORIDE SERPL-SCNC: 108 MMOL/L (ref 98–107)
CLARITY UR: ABNORMAL
CO2 SERPL-SCNC: 26 MMOL/L (ref 22–29)
COLOR UR: YELLOW
CREAT SERPL-MCNC: 2.2 MG/DL (ref 0.55–1.02)
DIFFERENTIAL METHOD BLD: ABNORMAL
EGFR (NO RACE VARIABLE) (RUSH/TITUS): 27 ML/MIN/1.73M2
EOSINOPHIL # BLD AUTO: 0.26 K/UL (ref 0–0.5)
EOSINOPHIL NFR BLD AUTO: 1.8 % (ref 1–4)
ERYTHROCYTE [DISTWIDTH] IN BLOOD BY AUTOMATED COUNT: 16.3 % (ref 11.5–14.5)
GLOBULIN SER-MCNC: 4.3 G/DL (ref 2–4)
GLUCOSE SERPL-MCNC: 104 MG/DL (ref 74–100)
GLUCOSE UR STRIP-MCNC: NORMAL MG/DL
HCT VFR BLD AUTO: 24.6 % (ref 38–47)
HGB BLD-MCNC: 7.3 G/DL (ref 12–16)
IMM GRANULOCYTES # BLD AUTO: 0.07 K/UL (ref 0–0.04)
IMM GRANULOCYTES NFR BLD: 0.5 % (ref 0–0.4)
KETONES UR STRIP-SCNC: NEGATIVE MG/DL
LEUKOCYTE ESTERASE UR QL STRIP: NEGATIVE
LIPASE SERPL-CCNC: 205 U/L
LYMPHOCYTES # BLD AUTO: 3.66 K/UL (ref 1–4.8)
LYMPHOCYTES NFR BLD AUTO: 26 % (ref 27–41)
MCH RBC QN AUTO: 27.2 PG (ref 27–31)
MCHC RBC AUTO-ENTMCNC: 29.7 G/DL (ref 32–36)
MCV RBC AUTO: 91.8 FL (ref 80–96)
MONOCYTES # BLD AUTO: 1.41 K/UL (ref 0–0.8)
MONOCYTES NFR BLD AUTO: 10 % (ref 2–6)
MPC BLD CALC-MCNC: 9.6 FL (ref 9.4–12.4)
MUCOUS, UA: ABNORMAL /LPF
NEUTROPHILS # BLD AUTO: 8.65 K/UL (ref 1.8–7.7)
NEUTROPHILS NFR BLD AUTO: 61.4 % (ref 53–65)
NITRITE UR QL STRIP: NEGATIVE
NRBC # BLD AUTO: 0 X10E3/UL
NRBC, AUTO (.00): 0 %
PH UR STRIP: 6 PH UNITS
PLATELET # BLD AUTO: 262 K/UL (ref 150–400)
POTASSIUM SERPL-SCNC: 3.1 MMOL/L (ref 3.5–5.1)
PROT SERPL-MCNC: 7.3 G/DL (ref 6.4–8.3)
PROT UR QL STRIP: 30
RBC # BLD AUTO: 2.68 M/UL (ref 4.2–5.4)
RBC # UR STRIP: NEGATIVE /UL
RBC #/AREA URNS HPF: 5 /HPF
SODIUM SERPL-SCNC: 141 MMOL/L (ref 136–145)
SP GR UR STRIP: 1.02
SQUAMOUS #/AREA URNS LPF: ABNORMAL /HPF
UROBILINOGEN UR STRIP-ACNC: NORMAL MG/DL
WBC # BLD AUTO: 14.09 K/UL (ref 4.5–11)
WBC #/AREA URNS HPF: 2 /HPF

## 2025-08-20 PROCEDURE — 25000003 PHARM REV CODE 250: Performed by: EMERGENCY MEDICINE

## 2025-08-20 PROCEDURE — 99213 OFFICE O/P EST LOW 20 MIN: CPT | Mod: ,,, | Performed by: NURSE PRACTITIONER

## 2025-08-20 PROCEDURE — 85025 COMPLETE CBC W/AUTO DIFF WBC: CPT | Performed by: EMERGENCY MEDICINE

## 2025-08-20 PROCEDURE — 83690 ASSAY OF LIPASE: CPT | Performed by: EMERGENCY MEDICINE

## 2025-08-20 PROCEDURE — 63600175 PHARM REV CODE 636 W HCPCS: Performed by: EMERGENCY MEDICINE

## 2025-08-20 PROCEDURE — 96361 HYDRATE IV INFUSION ADD-ON: CPT

## 2025-08-20 PROCEDURE — 80053 COMPREHEN METABOLIC PANEL: CPT | Performed by: EMERGENCY MEDICINE

## 2025-08-20 PROCEDURE — 99285 EMERGENCY DEPT VISIT HI MDM: CPT | Mod: 25

## 2025-08-20 PROCEDURE — 96374 THER/PROPH/DIAG INJ IV PUSH: CPT

## 2025-08-20 PROCEDURE — 36415 COLL VENOUS BLD VENIPUNCTURE: CPT | Performed by: EMERGENCY MEDICINE

## 2025-08-20 PROCEDURE — 81003 URINALYSIS AUTO W/O SCOPE: CPT | Performed by: EMERGENCY MEDICINE

## 2025-08-20 RX ORDER — GLIPIZIDE 5 MG/1
5 TABLET, FILM COATED, EXTENDED RELEASE ORAL 2 TIMES DAILY
COMMUNITY
Start: 2025-07-27

## 2025-08-20 RX ORDER — PROMETHAZINE HYDROCHLORIDE 25 MG/1
25 TABLET ORAL EVERY 6 HOURS PRN
Qty: 15 TABLET | Refills: 0 | Status: SHIPPED | OUTPATIENT
Start: 2025-08-20

## 2025-08-20 RX ORDER — ONDANSETRON HYDROCHLORIDE 2 MG/ML
4 INJECTION, SOLUTION INTRAVENOUS
Status: COMPLETED | OUTPATIENT
Start: 2025-08-20 | End: 2025-08-20

## 2025-08-20 RX ADMIN — SODIUM CHLORIDE 1000 ML: 9 INJECTION, SOLUTION INTRAVENOUS at 06:08

## 2025-08-20 RX ADMIN — ONDANSETRON 4 MG: 2 INJECTION INTRAMUSCULAR; INTRAVENOUS at 06:08

## 2025-08-20 RX ADMIN — POTASSIUM BICARBONATE 40 MEQ: 782 TABLET, EFFERVESCENT ORAL at 07:08

## 2025-08-24 ENCOUNTER — OFFICE VISIT (OUTPATIENT)
Dept: FAMILY MEDICINE | Facility: CLINIC | Age: 50
End: 2025-08-24
Payer: OTHER GOVERNMENT

## 2025-08-24 VITALS
OXYGEN SATURATION: 96 % | HEART RATE: 63 BPM | BODY MASS INDEX: 33.43 KG/M2 | WEIGHT: 213 LBS | HEIGHT: 67 IN | TEMPERATURE: 99 F | DIASTOLIC BLOOD PRESSURE: 70 MMHG | SYSTOLIC BLOOD PRESSURE: 112 MMHG

## 2025-08-24 DIAGNOSIS — J02.9 SORE THROAT: ICD-10-CM

## 2025-08-24 DIAGNOSIS — U07.1 COVID-19 VIRUS DETECTED: ICD-10-CM

## 2025-08-24 DIAGNOSIS — U07.1 CLINICAL DIAGNOSIS OF COVID-19: Primary | ICD-10-CM

## 2025-08-24 LAB
CTP QC/QA: YES
MOLECULAR STREP A: NEGATIVE
POC MOLECULAR INFLUENZA A AGN: NEGATIVE
POC MOLECULAR INFLUENZA B AGN: NEGATIVE
SARS-COV-2 RDRP RESP QL NAA+PROBE: POSITIVE

## 2025-08-24 PROCEDURE — 87502 INFLUENZA DNA AMP PROBE: CPT | Mod: QW,,, | Performed by: NURSE PRACTITIONER

## 2025-08-24 PROCEDURE — 87651 STREP A DNA AMP PROBE: CPT | Mod: QW,,, | Performed by: NURSE PRACTITIONER

## 2025-08-24 PROCEDURE — 99213 OFFICE O/P EST LOW 20 MIN: CPT | Mod: ,,, | Performed by: NURSE PRACTITIONER

## 2025-08-24 PROCEDURE — 87635 SARS-COV-2 COVID-19 AMP PRB: CPT | Mod: QW,,, | Performed by: NURSE PRACTITIONER

## 2025-08-26 ENCOUNTER — TELEPHONE (OUTPATIENT)
Dept: FAMILY MEDICINE | Facility: CLINIC | Age: 50
End: 2025-08-26
Payer: OTHER GOVERNMENT

## 2025-09-03 ENCOUNTER — TELEPHONE (OUTPATIENT)
Dept: FAMILY MEDICINE | Facility: CLINIC | Age: 50
End: 2025-09-03
Payer: OTHER GOVERNMENT

## (undated) DEVICE — Device

## (undated) DEVICE — PENCIL HANDSWITCHING ROCKER SW

## (undated) DEVICE — SWAB AEROBIC CULTURETTE

## (undated) DEVICE — UBAR CHEST PACK III

## (undated) DEVICE — SUT STRATAFIX PDS 2-0 SH 5IN

## (undated) DEVICE — BLADE SPEAR TIP BEAVER 45DEG

## (undated) DEVICE — TRAY NERVE BLOCK UNIV 10/CA

## (undated) DEVICE — CANNULA RADIOPAQUE 20G 145MM

## (undated) DEVICE — WOUND DRAINAGE KIT MEDIUM 10

## (undated) DEVICE — GOWN NONREINF SET-IN SLV 2XL

## (undated) DEVICE — PRINEO SKIN CLOSURE DERMABOND 22CM

## (undated) DEVICE — KIT EVACUATOR 3 SPR  DRN 400CC

## (undated) DEVICE — KIT IV START

## (undated) DEVICE — HEADREST SOFT TOUCH

## (undated) DEVICE — NDL QUINCKE SPINAL 20G 3.5IN

## (undated) DEVICE — COVER TABLE 77 X 96

## (undated) DEVICE — APPLICATOR CHLORAPREP ORN 26ML

## (undated) DEVICE — SOL NACL IRR 1000ML BTL

## (undated) DEVICE — SUTURE STRATAFIX CP-2 24CM X 24CX

## (undated) DEVICE — COTTONBALL LARGE STRL

## (undated) DEVICE — KIT IV START RUSH

## (undated) DEVICE — GLOVE PROTEXIS PI SYN SURG 6.5

## (undated) DEVICE — GLOVE SENSICARE PI GRN 6.5

## (undated) DEVICE — SEALER BIPOLAR TISSUE 6.0

## (undated) DEVICE — SUT VICRYL PLUS CT-1 1 8-27IN

## (undated) DEVICE — SUT PROLENE 0 CT1 30IN BLUE

## (undated) DEVICE — SUT ETHILON 3-0 PS2 18 BLK

## (undated) DEVICE — GLOVE SENSICARE PI SURG 6

## (undated) DEVICE — DRAPE STERI SURGICAL TOWEL 1000

## (undated) DEVICE — DRAPE FLUORO C ARM 36X30IN

## (undated) DEVICE — FORCEP BAYONET BIPOLAR 10.5 DISP

## (undated) DEVICE — COUNT NDL FOAM MAGNET 40COUNT

## (undated) DEVICE — STRIP MEDI WND CLSR 1/2X4IN

## (undated) DEVICE — GLOVE SENSICARE PI SURG 6.5

## (undated) DEVICE — SET CYSTO IRR DRP CHMBR 84IN

## (undated) DEVICE — DISPOSABLE PACK

## (undated) DEVICE — PAD ELECTROSURGICAL SPL W/CORD

## (undated) DEVICE — SUTURE VICRYL 1 CT-1 UD BR 27IN

## (undated) DEVICE — GOWN ASTOUND AAMI LVL3 BLUE LG

## (undated) DEVICE — TUBE DURAVENT SIL BLUE 1.27MM: Type: IMPLANTABLE DEVICE | Site: EAR | Status: NON-FUNCTIONAL

## (undated) DEVICE — APPLICATOR CHLORAPREP HI-LITE TINTED ORANGE 26ML

## (undated) DEVICE — COVER OVERHEAD TABLE TIBURON 80 X 90""

## (undated) DEVICE — GLOVE SURGICAL PROTEXIS PI BLUE SIZE 8.5

## (undated) DEVICE — SPONGE LAPAROTOMY SURGICAL 1X1 X RAY DETECTABLE

## (undated) DEVICE — FILM IOBAN ANTIMICROBL 60X60CM

## (undated) DEVICE — SLIPPER FALL PREV YEL BARIAT

## (undated) DEVICE — SET EXT STD BORE CATH 7.6IN

## (undated) DEVICE — PENCIL EDGE SMOKE ROCKER

## (undated) DEVICE — CATH IV 22G X 1 AUTOGUARD

## (undated) DEVICE — SUT PDS II 1 CT VIL MONO 36

## (undated) DEVICE — SUTURE STRATAFIX CP-2 24X24

## (undated) DEVICE — TUBING SUCTION 5MM STERILE 3/16IN X 12FT

## (undated) DEVICE — GLOVE SURGICAL PROTEXIS PI SIZE 6

## (undated) DEVICE — SUT MONO 2-0 CT-1 UNDYED

## (undated) DEVICE — SYR IRRIGATION BULB STER 60ML

## (undated) DEVICE — GLOVE SENSICARE PI GRN 6

## (undated) DEVICE — SUTURE STRATFIX 14CM X 14CM SH 2-0

## (undated) DEVICE — DRESSING SURGICAL 1X1

## (undated) DEVICE — NEURO DISPOSABLE PACK

## (undated) DEVICE — GLOVE 6.0 PROTEXIS PI BLUE

## (undated) DEVICE — DRESSING TRANS 4X4 TEGADERM

## (undated) DEVICE — GOWN SURGICAL STERILE LEVEL 3 / XX-LARGE

## (undated) DEVICE — SPONGE XRAY DETECT 4X4IN PK/10

## (undated) DEVICE — GLOVE SENSICARE PI GRN 7

## (undated) DEVICE — BUR ELITE PRECISION NEURO 3.0

## (undated) DEVICE — DRAPE SURGICAL STERI INCISE

## (undated) DEVICE — NDL QUINCKE SPINAL 18G 3.5IN

## (undated) DEVICE — BUR BONE CUT MICRO TPS 3X3.8MM

## (undated) DEVICE — NDL TUOHY EPIDURAL 20GX3.5IN

## (undated) DEVICE — TUBE SUCTION MEDI-VAC STERILE

## (undated) DEVICE — STRIP CLOSURE SKIN 1/2 X 4 IN

## (undated) DEVICE — SYS IRRISEPT 450ML0.05% CHG

## (undated) DEVICE — SOL CONTINU-FLO SET 2 LAV

## (undated) DEVICE — VALLEYLAB BIPOLAR FORCEPS CORD 12FT

## (undated) DEVICE — GLOVE SENSICARE PI SURG 8.5

## (undated) DEVICE — SUT STRATAFIX 1 PDS CT-1

## (undated) DEVICE — GLOVE SENSICARE PI GRN 8.5

## (undated) DEVICE — FOAM POSITIONER ARM SURGICAL

## (undated) DEVICE — GLOVE SENSICARE PI SURG 7

## (undated) DEVICE — NDL SPINAL SPINOCAN 22GX3.5

## (undated) DEVICE — SYRINGE 20CC LL PLASTIC  BX/48

## (undated) DEVICE — CATH IV INTROCAN 24G X 3/4

## (undated) DEVICE — DRAPE THREE-QTR REINF 53X77IN

## (undated) DEVICE — GLOVE BIOGEL SKINSENSE PI 7.5

## (undated) DEVICE — GOWN SURGICAL SMARTGOWN LEVEL 4 / EXTRA LARGE STERILE

## (undated) DEVICE — SYRINGE ASEPTO IRRIGATION BULB 60CC LF DISP

## (undated) DEVICE — GAUZE SPONGE 4X412 PLY STERILE

## (undated) DEVICE — COLLECTOR SPECIMEN ANAEROBIC

## (undated) DEVICE — CATH IV INTROCAN 20G X 1.1

## (undated) DEVICE — GOWN POLY REINF BRTH SLV XL

## (undated) DEVICE — SPONGE GAUZE 16PLY 4X4

## (undated) DEVICE — MARKER SURGICAL PEN & RULER STERILE

## (undated) DEVICE — SYR EPILOR LUER-LOK LOR 7ML

## (undated) DEVICE — SOL IRR NACL .9% 3000ML

## (undated) DEVICE — GLOVE SENSICARE PI SURG 7.5

## (undated) DEVICE — NEEDLE SPINAL 18G X 3 1/2IN

## (undated) DEVICE — SYRINGE 10-12CC LURE -LOK TIP

## (undated) DEVICE — POSITIONER HEADREST FOAM 11 X 10 X 6

## (undated) DEVICE — MATRIX FLOSEAL HEMOSTATIC 10ML

## (undated) DEVICE — DRAPE C-ARMOR EQUIPMENT COVER

## (undated) DEVICE — CLEANER CAUT TIP STRL 2X2IN

## (undated) DEVICE — SPONGE COTTON TRAY 4X4IN

## (undated) DEVICE — DECANTER FLUID TRNSF WHITE 9IN

## (undated) DEVICE — IRRIGATION SET Y-TYPE TUR/BLAD

## (undated) DEVICE — SPONGE LAP STRL 18X18 5/PK

## (undated) DEVICE — BLADE CARBON SURG SS SZ 15 STERILE

## (undated) DEVICE — FLEXIBLE YANKAUER SUCTION CATH

## (undated) DEVICE — GLOVE SURGICAL PROTEXIS PI SIZE 8.5

## (undated) DEVICE — SYS CLSR DERMABOND PRINEO 22CM

## (undated) DEVICE — SUTURE STRATAFIX VIOLET CT-1 45CM

## (undated) DEVICE — COUNTER SHARPS FOAM BLOCK MAGNET 20-40

## (undated) DEVICE — DRESSING IV TEGADERM 10X12CM

## (undated) DEVICE — GLOVE SENSICARE PI GRN 8

## (undated) DEVICE — BAG-A-JET FLUID DISPENSER SYSTEM

## (undated) DEVICE — TOWEL OR STERILE BLUE 4/PK 20PK/CS

## (undated) DEVICE — KIT BASIC RUSH

## (undated) DEVICE — DRAPE INVISISHIELD TOWEL SMALL

## (undated) DEVICE — BLADE SURG SS SZ 10

## (undated) DEVICE — U-BAR CHESTPACK III

## (undated) DEVICE — NDL SPINAL CLR HUB 22GX4 3/4

## (undated) DEVICE — DRESSING GAUZE XEROFORM 5X9

## (undated) DEVICE — SYR ONLY LUER LOCK 20CC

## (undated) DEVICE — SUT STRATAFIX PDO 2-0 SH

## (undated) DEVICE — PACK UNIV PROCEDURE

## (undated) DEVICE — GLOVE SURGICAL PROTEXIS PI SIZE 7

## (undated) DEVICE — DRESSING SURGICAL 1/2X1/2

## (undated) DEVICE — DRAPE SURGICAL 3/4 SHEET 52IN X 76IN STERILE

## (undated) DEVICE — SOL IRRIGATION SALINE 0.9% 1000ML BOTTLE

## (undated) DEVICE — DRAPE INCISE IOBAN 2 23X23IN

## (undated) DEVICE — DRAPE COVER C-ARM C-ARMOR 42IN X 74IN DISP STERILE

## (undated) DEVICE — HANDPIECE ARGYLE YANKAUER 18FR

## (undated) DEVICE — SYR 10CC LUER LOCK

## (undated) DEVICE — NEEDLE SPINAL 20G X 3 1/2IN

## (undated) DEVICE — FLOSEAL MATRIX HEMOSTATIC 10ML